# Patient Record
Sex: FEMALE | Race: WHITE | NOT HISPANIC OR LATINO | Employment: OTHER | URBAN - METROPOLITAN AREA
[De-identification: names, ages, dates, MRNs, and addresses within clinical notes are randomized per-mention and may not be internally consistent; named-entity substitution may affect disease eponyms.]

---

## 2017-05-12 DIAGNOSIS — Z43.3 ATTENTION TO COLOSTOMY: Primary | ICD-10-CM

## 2017-05-16 ENCOUNTER — TELEPHONE (OUTPATIENT)
Dept: INTERNAL MEDICINE | Facility: CLINIC | Age: 72
End: 2017-05-16

## 2017-05-16 NOTE — TELEPHONE ENCOUNTER
----- Message from Darcie Escudero sent at 5/15/2017  2:09 PM CDT -----  Contact: Regan with [employer] [facility]   Refill- Need medical necessity form    Ostomy supplies Product # [id number], silvio pouches #[id number], 3 refills 90 day supply     Please send to [employer], fax [phone], phone [phone] ext [phone]    Thanks!

## 2017-05-16 NOTE — TELEPHONE ENCOUNTER
Spoke w [facility]; informed that Dr. Walker is not the prescribing doctor and will have to contact colon and rectal.

## 2017-05-22 ENCOUNTER — TELEPHONE (OUTPATIENT)
Dept: ADMINISTRATIVE | Facility: HOSPITAL | Age: 72
End: 2017-05-22

## 2017-05-22 NOTE — TELEPHONE ENCOUNTER
Per email from Vidal alfonso/MALI. Can you please call patient to schedule appt w/Dr. Walker?  ------------------------------------------------------------------------  Good Morning ,  Radha Sotelo MRN [id number]  Orders were received at [facility] for colostomy supplies for this patient , clinical notes are needed to support this request. There are no office visits on file w/ PCP since 7/28/2015 and no office visits on file w/ [facility] .   Please schedule the patient for office visit and fax the office notes in to support the request for colostomy supplies.  Thanks ,  Vidal Palafox, RN   [facility] RN Navigator   [facility]

## 2017-06-28 DIAGNOSIS — Z12.11 COLON CANCER SCREENING: ICD-10-CM

## 2017-08-03 RX ORDER — BUDESONIDE AND FORMOTEROL FUMARATE DIHYDRATE 80; 4.5 UG/1; UG/1
AEROSOL RESPIRATORY (INHALATION)
Qty: 10.2 INHALER | Refills: 5 | Status: SHIPPED | OUTPATIENT
Start: 2017-08-03 | End: 2018-02-20 | Stop reason: SDUPTHER

## 2018-02-20 RX ORDER — BUDESONIDE AND FORMOTEROL FUMARATE DIHYDRATE 80; 4.5 UG/1; UG/1
AEROSOL RESPIRATORY (INHALATION)
Qty: 10.2 G | Refills: 5 | Status: SHIPPED | OUTPATIENT
Start: 2018-02-20 | End: 2019-01-16 | Stop reason: SDUPTHER

## 2019-01-17 RX ORDER — BUDESONIDE AND FORMOTEROL FUMARATE DIHYDRATE 80; 4.5 UG/1; UG/1
AEROSOL RESPIRATORY (INHALATION)
Qty: 10.2 G | Refills: 0 | Status: SHIPPED | OUTPATIENT
Start: 2019-01-17 | End: 2019-03-14 | Stop reason: SDUPTHER

## 2019-03-14 RX ORDER — BUDESONIDE AND FORMOTEROL FUMARATE DIHYDRATE 80; 4.5 UG/1; UG/1
AEROSOL RESPIRATORY (INHALATION)
Qty: 10.2 G | Refills: 0 | Status: SHIPPED | OUTPATIENT
Start: 2019-03-14 | End: 2019-04-13 | Stop reason: SDUPTHER

## 2019-04-15 RX ORDER — BUDESONIDE AND FORMOTEROL FUMARATE DIHYDRATE 80; 4.5 UG/1; UG/1
AEROSOL RESPIRATORY (INHALATION)
Qty: 10.2 G | Refills: 3 | Status: SHIPPED | OUTPATIENT
Start: 2019-04-15 | End: 2019-09-23 | Stop reason: SDUPTHER

## 2019-09-23 RX ORDER — BUDESONIDE AND FORMOTEROL FUMARATE DIHYDRATE 80; 4.5 UG/1; UG/1
AEROSOL RESPIRATORY (INHALATION)
Qty: 10.2 G | Refills: 0 | Status: SHIPPED | OUTPATIENT
Start: 2019-09-23 | End: 2019-10-18 | Stop reason: SDUPTHER

## 2019-10-20 RX ORDER — BUDESONIDE AND FORMOTEROL FUMARATE DIHYDRATE 80; 4.5 UG/1; UG/1
AEROSOL RESPIRATORY (INHALATION)
Qty: 10.2 G | Refills: 6 | Status: SHIPPED | OUTPATIENT
Start: 2019-10-20 | End: 2020-07-28

## 2020-03-01 ENCOUNTER — HOSPITAL ENCOUNTER (INPATIENT)
Facility: HOSPITAL | Age: 75
LOS: 5 days | Discharge: NURSING, REHAB OR LONG-TERM CARE | DRG: 189 | End: 2020-03-06
Attending: EMERGENCY MEDICINE | Admitting: INTERNAL MEDICINE
Payer: MEDICARE

## 2020-03-01 DIAGNOSIS — R06.02 SOB (SHORTNESS OF BREATH): Primary | ICD-10-CM

## 2020-03-01 DIAGNOSIS — J96.22 ACUTE ON CHRONIC RESPIRATORY FAILURE WITH HYPOXIA AND HYPERCAPNIA: ICD-10-CM

## 2020-03-01 DIAGNOSIS — J18.9 ATYPICAL PNEUMONIA: ICD-10-CM

## 2020-03-01 DIAGNOSIS — R07.9 CHEST PAIN: ICD-10-CM

## 2020-03-01 DIAGNOSIS — R07.89 CHEST DISCOMFORT: ICD-10-CM

## 2020-03-01 DIAGNOSIS — M79.89 LEG SWELLING: ICD-10-CM

## 2020-03-01 DIAGNOSIS — J44.1 COPD EXACERBATION: ICD-10-CM

## 2020-03-01 DIAGNOSIS — J96.21 ACUTE ON CHRONIC RESPIRATORY FAILURE WITH HYPOXIA AND HYPERCAPNIA: ICD-10-CM

## 2020-03-01 LAB
ALBUMIN SERPL BCP-MCNC: 2.8 G/DL (ref 3.5–5.2)
ALLENS TEST: ABNORMAL
ALP SERPL-CCNC: 175 U/L (ref 55–135)
ALT SERPL W/O P-5'-P-CCNC: 13 U/L (ref 10–44)
ANION GAP SERPL CALC-SCNC: 17 MMOL/L (ref 8–16)
AST SERPL-CCNC: 17 U/L (ref 10–40)
BASOPHILS # BLD AUTO: 0.1 K/UL (ref 0–0.2)
BASOPHILS NFR BLD: 0.6 % (ref 0–1.9)
BILIRUB SERPL-MCNC: 0.3 MG/DL (ref 0.1–1)
BNP SERPL-MCNC: 482 PG/ML (ref 0–99)
BUN SERPL-MCNC: 17 MG/DL (ref 8–23)
CALCIUM SERPL-MCNC: 9.5 MG/DL (ref 8.7–10.5)
CHLORIDE SERPL-SCNC: 104 MMOL/L (ref 95–110)
CO2 SERPL-SCNC: 15 MMOL/L (ref 23–29)
CREAT SERPL-MCNC: 0.9 MG/DL (ref 0.5–1.4)
CTP QC/QA: YES
D DIMER PPP IA.FEU-MCNC: 4.64 MG/L FEU
DELSYS: ABNORMAL
DIFFERENTIAL METHOD: ABNORMAL
EOSINOPHIL # BLD AUTO: 0 K/UL (ref 0–0.5)
EOSINOPHIL NFR BLD: 0 % (ref 0–8)
EP: 5
EP: 5
ERYTHROCYTE [DISTWIDTH] IN BLOOD BY AUTOMATED COUNT: 13.3 % (ref 11.5–14.5)
ERYTHROCYTE [SEDIMENTATION RATE] IN BLOOD BY WESTERGREN METHOD: 18 MM/H
EST. GFR  (AFRICAN AMERICAN): >60 ML/MIN/1.73 M^2
EST. GFR  (NON AFRICAN AMERICAN): >60 ML/MIN/1.73 M^2
FIO2: 40
FLOW: 3
GLUCOSE SERPL-MCNC: 94 MG/DL (ref 70–110)
HCO3 UR-SCNC: 21.4 MMOL/L (ref 24–28)
HCO3 UR-SCNC: 23.6 MMOL/L (ref 24–28)
HCO3 UR-SCNC: 25.9 MMOL/L (ref 24–28)
HCT VFR BLD AUTO: 41.2 % (ref 37–48.5)
HGB BLD-MCNC: 12.7 G/DL (ref 12–16)
IMM GRANULOCYTES # BLD AUTO: 0.14 K/UL (ref 0–0.04)
IMM GRANULOCYTES NFR BLD AUTO: 0.8 % (ref 0–0.5)
IP: 10
IP: 15
LACTATE SERPL-SCNC: 0.9 MMOL/L (ref 0.5–2.2)
LYMPHOCYTES # BLD AUTO: 0.4 K/UL (ref 1–4.8)
LYMPHOCYTES NFR BLD: 2.4 % (ref 18–48)
MCH RBC QN AUTO: 30.5 PG (ref 27–31)
MCHC RBC AUTO-ENTMCNC: 30.8 G/DL (ref 32–36)
MCV RBC AUTO: 99 FL (ref 82–98)
MIN VOL: 7.9
MODE: ABNORMAL
MONOCYTES # BLD AUTO: 0.7 K/UL (ref 0.3–1)
MONOCYTES NFR BLD: 3.9 % (ref 4–15)
NEUTROPHILS # BLD AUTO: 16.1 K/UL (ref 1.8–7.7)
NEUTROPHILS NFR BLD: 92.3 % (ref 38–73)
NRBC BLD-RTO: 0 /100 WBC
PCO2 BLDA: 70.6 MMHG (ref 35–45)
PCO2 BLDA: 80.3 MMHG (ref 35–45)
PCO2 BLDA: 83.6 MMHG (ref 35–45)
PH SMN: 7.08 [PH] (ref 7.35–7.45)
PH SMN: 7.09 [PH] (ref 7.35–7.45)
PH SMN: 7.1 [PH] (ref 7.35–7.45)
PLATELET # BLD AUTO: 251 K/UL (ref 150–350)
PLATELET BLD QL SMEAR: ABNORMAL
PMV BLD AUTO: 9.5 FL (ref 9.2–12.9)
PO2 BLDA: 28 MMHG (ref 40–60)
PO2 BLDA: 42 MMHG (ref 40–60)
PO2 BLDA: 48 MMHG (ref 40–60)
POC BE: -4 MMOL/L
POC BE: -6 MMOL/L
POC BE: -8 MMOL/L
POC MOLECULAR INFLUENZA A AGN: NEGATIVE
POC MOLECULAR INFLUENZA B AGN: NEGATIVE
POC SATURATED O2: 32 % (ref 95–100)
POC SATURATED O2: 58 % (ref 95–100)
POC SATURATED O2: 66 % (ref 95–100)
POC TCO2: 24 MMOL/L (ref 24–29)
POC TCO2: 26 MMOL/L (ref 24–29)
POC TCO2: 28 MMOL/L (ref 24–29)
POTASSIUM SERPL-SCNC: 3.5 MMOL/L (ref 3.5–5.1)
PROT SERPL-MCNC: 7.6 G/DL (ref 6–8.4)
RBC # BLD AUTO: 4.16 M/UL (ref 4–5.4)
SAMPLE: ABNORMAL
SITE: ABNORMAL
SODIUM SERPL-SCNC: 136 MMOL/L (ref 136–145)
SP02: 95
SP02: 99
SPONT RATE: 20
TOXIC GRANULES BLD QL SMEAR: PRESENT
TROPONIN I SERPL DL<=0.01 NG/ML-MCNC: 0.02 NG/ML (ref 0–0.03)
TROPONIN I SERPL DL<=0.01 NG/ML-MCNC: 0.03 NG/ML (ref 0–0.03)
WBC # BLD AUTO: 17.44 K/UL (ref 3.9–12.7)

## 2020-03-01 PROCEDURE — 83880 ASSAY OF NATRIURETIC PEPTIDE: CPT

## 2020-03-01 PROCEDURE — 86703 HIV-1/HIV-2 1 RESULT ANTBDY: CPT

## 2020-03-01 PROCEDURE — 63600175 PHARM REV CODE 636 W HCPCS: Performed by: EMERGENCY MEDICINE

## 2020-03-01 PROCEDURE — 83605 ASSAY OF LACTIC ACID: CPT

## 2020-03-01 PROCEDURE — 94660 CPAP INITIATION&MGMT: CPT

## 2020-03-01 PROCEDURE — 85379 FIBRIN DEGRADATION QUANT: CPT

## 2020-03-01 PROCEDURE — 84484 ASSAY OF TROPONIN QUANT: CPT | Mod: 91

## 2020-03-01 PROCEDURE — 84484 ASSAY OF TROPONIN QUANT: CPT

## 2020-03-01 PROCEDURE — 87633 RESP VIRUS 12-25 TARGETS: CPT

## 2020-03-01 PROCEDURE — 99900035 HC TECH TIME PER 15 MIN (STAT)

## 2020-03-01 PROCEDURE — 93010 EKG 12-LEAD: ICD-10-PCS | Mod: 76,,, | Performed by: INTERNAL MEDICINE

## 2020-03-01 PROCEDURE — 63600175 PHARM REV CODE 636 W HCPCS: Performed by: STUDENT IN AN ORGANIZED HEALTH CARE EDUCATION/TRAINING PROGRAM

## 2020-03-01 PROCEDURE — 96374 THER/PROPH/DIAG INJ IV PUSH: CPT | Mod: 59

## 2020-03-01 PROCEDURE — 82803 BLOOD GASES ANY COMBINATION: CPT

## 2020-03-01 PROCEDURE — 25000003 PHARM REV CODE 250: Performed by: EMERGENCY MEDICINE

## 2020-03-01 PROCEDURE — 93010 ELECTROCARDIOGRAM REPORT: CPT | Mod: 76,,, | Performed by: INTERNAL MEDICINE

## 2020-03-01 PROCEDURE — 93005 ELECTROCARDIOGRAM TRACING: CPT

## 2020-03-01 PROCEDURE — 96375 TX/PRO/DX INJ NEW DRUG ADDON: CPT

## 2020-03-01 PROCEDURE — 12000002 HC ACUTE/MED SURGE SEMI-PRIVATE ROOM

## 2020-03-01 PROCEDURE — 94640 AIRWAY INHALATION TREATMENT: CPT

## 2020-03-01 PROCEDURE — 25000242 PHARM REV CODE 250 ALT 637 W/ HCPCS: Performed by: EMERGENCY MEDICINE

## 2020-03-01 PROCEDURE — 80053 COMPREHEN METABOLIC PANEL: CPT

## 2020-03-01 PROCEDURE — 84145 PROCALCITONIN (PCT): CPT

## 2020-03-01 PROCEDURE — 25500020 PHARM REV CODE 255: Performed by: INTERNAL MEDICINE

## 2020-03-01 PROCEDURE — 99291 PR CRITICAL CARE, E/M 30-74 MINUTES: ICD-10-PCS | Mod: ,,, | Performed by: EMERGENCY MEDICINE

## 2020-03-01 PROCEDURE — 27000190 HC CPAP FULL FACE MASK W/VALVE

## 2020-03-01 PROCEDURE — 99291 CRITICAL CARE FIRST HOUR: CPT | Mod: 25

## 2020-03-01 PROCEDURE — 99291 CRITICAL CARE FIRST HOUR: CPT | Mod: ,,, | Performed by: EMERGENCY MEDICINE

## 2020-03-01 PROCEDURE — 87040 BLOOD CULTURE FOR BACTERIA: CPT | Mod: 59

## 2020-03-01 PROCEDURE — 87502 INFLUENZA DNA AMP PROBE: CPT

## 2020-03-01 PROCEDURE — 25000003 PHARM REV CODE 250: Performed by: STUDENT IN AN ORGANIZED HEALTH CARE EDUCATION/TRAINING PROGRAM

## 2020-03-01 PROCEDURE — 93010 ELECTROCARDIOGRAM REPORT: CPT | Mod: ,,, | Performed by: INTERNAL MEDICINE

## 2020-03-01 PROCEDURE — 96365 THER/PROPH/DIAG IV INF INIT: CPT

## 2020-03-01 PROCEDURE — 85025 COMPLETE CBC W/AUTO DIFF WBC: CPT

## 2020-03-01 RX ORDER — HEPARIN SODIUM 5000 [USP'U]/ML
5000 INJECTION, SOLUTION INTRAVENOUS; SUBCUTANEOUS EVERY 8 HOURS
Status: DISCONTINUED | OUTPATIENT
Start: 2020-03-01 | End: 2020-03-05

## 2020-03-01 RX ORDER — ALBUTEROL SULFATE 2.5 MG/.5ML
2.5 SOLUTION RESPIRATORY (INHALATION)
Status: COMPLETED | OUTPATIENT
Start: 2020-03-01 | End: 2020-03-01

## 2020-03-01 RX ORDER — FAMOTIDINE 20 MG/1
20 TABLET, FILM COATED ORAL 2 TIMES DAILY
Status: DISCONTINUED | OUTPATIENT
Start: 2020-03-01 | End: 2020-03-02

## 2020-03-01 RX ORDER — SODIUM CHLORIDE 0.9 % (FLUSH) 0.9 %
10 SYRINGE (ML) INJECTION
Status: DISCONTINUED | OUTPATIENT
Start: 2020-03-01 | End: 2020-03-06 | Stop reason: HOSPADM

## 2020-03-01 RX ORDER — METHYLPREDNISOLONE SOD SUCC 125 MG
125 VIAL (EA) INJECTION
Status: COMPLETED | OUTPATIENT
Start: 2020-03-01 | End: 2020-03-01

## 2020-03-01 RX ORDER — IPRATROPIUM BROMIDE AND ALBUTEROL SULFATE 2.5; .5 MG/3ML; MG/3ML
3 SOLUTION RESPIRATORY (INHALATION) EVERY 4 HOURS
Status: DISCONTINUED | OUTPATIENT
Start: 2020-03-02 | End: 2020-03-06 | Stop reason: HOSPADM

## 2020-03-01 RX ORDER — ASPIRIN 325 MG
325 TABLET ORAL
Status: COMPLETED | OUTPATIENT
Start: 2020-03-01 | End: 2020-03-01

## 2020-03-01 RX ORDER — FUROSEMIDE 10 MG/ML
40 INJECTION INTRAMUSCULAR; INTRAVENOUS
Status: COMPLETED | OUTPATIENT
Start: 2020-03-01 | End: 2020-03-01

## 2020-03-01 RX ORDER — CEFTRIAXONE 1 G/1
1 INJECTION, POWDER, FOR SOLUTION INTRAMUSCULAR; INTRAVENOUS
Status: COMPLETED | OUTPATIENT
Start: 2020-03-01 | End: 2020-03-01

## 2020-03-01 RX ORDER — CEFTRIAXONE 1 G/1
1 INJECTION, POWDER, FOR SOLUTION INTRAMUSCULAR; INTRAVENOUS
Status: DISCONTINUED | OUTPATIENT
Start: 2020-03-02 | End: 2020-03-02

## 2020-03-01 RX ADMIN — FUROSEMIDE 40 MG: 10 INJECTION, SOLUTION INTRAMUSCULAR; INTRAVENOUS at 07:03

## 2020-03-01 RX ADMIN — AZITHROMYCIN MONOHYDRATE 500 MG: 500 INJECTION, POWDER, LYOPHILIZED, FOR SOLUTION INTRAVENOUS at 05:03

## 2020-03-01 RX ADMIN — METHYLPREDNISOLONE SODIUM SUCCINATE 125 MG: 125 INJECTION, POWDER, FOR SOLUTION INTRAMUSCULAR; INTRAVENOUS at 05:03

## 2020-03-01 RX ADMIN — FAMOTIDINE 20 MG: 20 TABLET ORAL at 11:03

## 2020-03-01 RX ADMIN — ALBUTEROL SULFATE 2.5 MG: 2.5 SOLUTION RESPIRATORY (INHALATION) at 04:03

## 2020-03-01 RX ADMIN — METHYLPREDNISOLONE SODIUM SUCCINATE 80 MG: 40 INJECTION, POWDER, FOR SOLUTION INTRAMUSCULAR; INTRAVENOUS at 11:03

## 2020-03-01 RX ADMIN — CEFTRIAXONE SODIUM 1 G: 1 INJECTION, POWDER, FOR SOLUTION INTRAMUSCULAR; INTRAVENOUS at 05:03

## 2020-03-01 RX ADMIN — ASPIRIN 325 MG ORAL TABLET 325 MG: 325 PILL ORAL at 05:03

## 2020-03-01 RX ADMIN — HEPARIN SODIUM 5000 UNITS: 5000 INJECTION, SOLUTION INTRAVENOUS; SUBCUTANEOUS at 11:03

## 2020-03-01 RX ADMIN — IOHEXOL 75 ML: 350 INJECTION, SOLUTION INTRAVENOUS at 10:03

## 2020-03-01 NOTE — ED PROVIDER NOTES
ED PROGRESS NOTE    Care transferred to me by DENY Bartholomew with plan to f/u labs, CXR, and reassess.    Labs Reviewed   CBC W/ AUTO DIFFERENTIAL - Abnormal; Notable for the following components:       Result Value    WBC 17.44 (*)     Mean Corpuscular Volume 99 (*)     Mean Corpuscular Hemoglobin Conc 30.8 (*)     Immature Granulocytes 0.8 (*)     Gran # (ANC) 16.1 (*)     Immature Grans (Abs) 0.14 (*)     Lymph # 0.4 (*)     Gran% 92.3 (*)     Lymph% 2.4 (*)     Mono% 3.9 (*)     All other components within normal limits   COMPREHENSIVE METABOLIC PANEL - Abnormal; Notable for the following components:    CO2 15 (*)     Albumin 2.8 (*)     Alkaline Phosphatase 175 (*)     Anion Gap 17 (*)     All other components within normal limits   TROPONIN I - Abnormal; Notable for the following components:    Troponin I 0.034 (*)     All other components within normal limits   B-TYPE NATRIURETIC PEPTIDE - Abnormal; Notable for the following components:     (*)     All other components within normal limits   D DIMER, QUANTITATIVE - Abnormal; Notable for the following components:    D-Dimer 4.64 (*)     All other components within normal limits    Narrative:     add on ddimr [id number] per dr. bartholomew 03/01/2020  18:33    ISTAT PROCEDURE - Abnormal; Notable for the following components:    POC PH 7.089 (*)     POC PCO2 70.6 (*)     POC HCO3 21.4 (*)     POC SATURATED O2 66 (*)     All other components within normal limits   CULTURE, BLOOD   CULTURE, BLOOD   LACTIC ACID, PLASMA   D DIMER, QUANTITATIVE   TROPONIN I   POCT INFLUENZA A/B MOLECULAR         X-Ray Chest AP Portable   Final Result      Bilateral diffuse nonspecific interstitial coarsening which could represent pulmonary edema versus interstitial pneumonia.  No focal consolidation.         Electronically signed by: Zain Browning MD   Date:    03/01/2020   Time:    17:01        Reassessed pt -- using accessory muscles, lungs with slight rales at bases, hypoxic to 85%  on NC 3L. Ordered VBG, as well as d-dimer and CTPA after noting mild RLE edema. Denies CP. VBG returned 7.08  70  48. Trial of BiPAP for one hour. Pt taking mask off intermittently; repeat VBG worse at 7.07  80  28. Discussed code status with pt who reports she is full code and would like to be intubated if necessary. Discussed with ICU for admission. CTPA pending at time of admission to ICU.     Demetra Almodovar MD  03/01/20 2467

## 2020-03-01 NOTE — ED PROVIDER NOTES
Encounter Date: 3/1/2020       History     Chief Complaint   Patient presents with    Asthma     history of COPD; duo nebs given by EMS. Pt is 98% on RA with respirations of 24.      74-year-old female presents with 3 days of shortness of breath with associated wheezing and cough.  She has a history of COPD and significant smoking history.  She no longer smokes cigarettes but continues to vape.  She has had 3 days of chest discomfort.  She also has had fevers and a productive cough.  She denies any hemoptysis.  She states she has a history of heart disease.  She has had 4 heart attacks.  She denies using oxygen at home.  She denies sick contacts.  She denies travel outside the [place]        Review of patient's allergies indicates:  No Known Allergies  Past Medical History:   Diagnosis Date    Anemia     Anxiety     COPD (chronic obstructive pulmonary disease)     COPD (chronic obstructive pulmonary disease) with emphysema     Coronary artery disease     Depression     Diverticulitis     Hyperlipidemia     Hypertension     PVD (peripheral vascular disease)     PVD (peripheral vascular disease)     S/P colostomy     Substance abuse     hx heavy etoh use     Tobacco abuse      Past Surgical History:   Procedure Laterality Date    COLOSTOMY      ECTOPIC PREGNANCY SURGERY      right forefoot amputation      right toe amputation      x2 toes     History reviewed. No pertinent family history.  Social History     Tobacco Use    Smoking status: Former Smoker     Packs/day: 0.50     Years: 50.00     Pack years: 25.00     Types: Cigarettes     Last attempt to quit: 2013     Years since quittin.6    Smokeless tobacco: Never Used   Substance Use Topics    Alcohol use: No    Drug use: No     Review of Systems   Constitutional: Positive for fever.   HENT: Positive for congestion. Negative for sore throat.    Respiratory: Positive for cough, shortness of breath and wheezing.    Cardiovascular: Positive  for chest pain. Negative for palpitations and leg swelling.   Gastrointestinal: Negative for abdominal pain.   Genitourinary: Negative for dysuria.   Musculoskeletal: Negative for arthralgias.   Neurological: Negative for headaches.   Hematological: Negative for adenopathy.   Psychiatric/Behavioral: Negative for agitation.       Physical Exam     Initial Vitals   BP Pulse Resp Temp SpO2   03/01/20 1442 03/01/20 1442 03/01/20 1442 03/01/20 1451 03/01/20 1442   (!) 142/78 (!) 120 (!) 24 98.3 °F (36.8 °C) 98 %      MAP       --                Physical Exam    Constitutional:   Patient appears chronically ill and with moderate respiratory distress   HENT:   Head: Atraumatic.   Eyes: Pupils are equal, round, and reactive to light.   Neck: Normal range of motion. Neck supple. No JVD present.   Cardiovascular:   Tachycardic   Pulmonary/Chest: She has rales.   Abdominal: Soft. Bowel sounds are normal. She exhibits no distension.   Musculoskeletal: She exhibits edema.   Neurological: She is alert. She has normal strength. No sensory deficit. GCS score is 15. GCS eye subscore is 4. GCS verbal subscore is 5. GCS motor subscore is 6.   Skin: Skin is warm. No rash noted.   Psychiatric: She has a normal mood and affect.         ED Course   Procedures  Labs Reviewed   CBC W/ AUTO DIFFERENTIAL - Abnormal; Notable for the following components:       Result Value    WBC 17.44 (*)     Mean Corpuscular Volume 99 (*)     Mean Corpuscular Hemoglobin Conc 30.8 (*)     All other components within normal limits   CULTURE, BLOOD   CULTURE, BLOOD   COMPREHENSIVE METABOLIC PANEL   TROPONIN I   TROPONIN I   B-TYPE NATRIURETIC PEPTIDE   LACTIC ACID, PLASMA   POCT INFLUENZA A/B MOLECULAR     EKG Readings: (Independently Interpreted)   Sinus tachycardia 121 with nonspecific ST changes       Imaging Results          X-Ray Chest AP Portable (Final result)  Result time 03/01/20 17:01:04    Final result by Zain Browning MD (03/01/20 17:01:04)                  Impression:      Bilateral diffuse nonspecific interstitial coarsening which could represent pulmonary edema versus interstitial pneumonia.  No focal consolidation.      Electronically signed by: Zain Browning MD  Date:    03/01/2020  Time:    17:01             Narrative:    EXAMINATION:  XR CHEST AP PORTABLE    CLINICAL HISTORY:  Chest Pain;    TECHNIQUE:  Single frontal view of the chest was performed.    COMPARISON:  Chest radiograph 01/22/2015    FINDINGS:  Patient is slightly rotated.  Tubing overlies the chest.    Cardiomediastinal silhouette is midline and within normal limits for age allowing for frontal view and patient rotation.  Interval increased bilateral diffuse nonspecific interstitial coarsening.  Bibasilar scattered linear opacities consistent with subsegmental scarring versus atelectasis.  No large consolidation, pleural effusion or pneumothorax definitively seen.  Hilar contours appear unchanged.  No acute osseous process seen.  PA and lateral views can be obtained.                                 Medical Decision Making:   Initial Assessment:   Patient with history of COPD presents with shortness of breath, wheeze, or rhonchi and infectious history.  She also has chest pain.  Will do a cardiac, infectious workup.  Will give nebs and steroids.  Will empiric IV antibiotics to cover pulmonary source.  Clinical Tests:   Lab Tests: Ordered  Radiological Study: Ordered and Reviewed  Medical Tests: Ordered and Reviewed  ED Management:  Patient had some improvement with nebs.  Case signed out to Dr. Nishi ESQUIVEL at 5:00 p.m. with labs and radiology read pending.  We still have a broad differential diagnosis              Attending Attestation:         Attending Critical Care:   Critical Care Times:   Direct Patient Care (initial evaluation, reassessments, and time considering the case)................................................................22 minutes.   Additional History from reviewing old  medical records or taking additional history from the family, EMS, PCP, etc.......................3 minutes.   Ordering, Reviewing, and Interpreting Diagnostic Studies...............................................................................................................3 minutes.   Documentation..................................................................................................................................................................................3 minutes.   Consultation with other Physicians. .................................................................................................................................................3 minutes.   ==============================================================  · Total Critical Care Time - exclusive of procedural time: 34 minutes.  ==============================================================                            Clinical Impression:       ICD-10-CM ICD-9-CM   1. SOB (shortness of breath) R06.02 786.05   2. Chest pain R07.9 786.50                                Javy Landrum MD  03/01/20 1716

## 2020-03-01 NOTE — ED NOTES
Radha Flores, a 74 y.o. female presents to the ED via EMS with CC SOB      Patient identifiers verified verbally with patient and correct for Radha Flores.    LOC/ APPEARANCE: The patient is AAOx4. Pt is speaking appropriately, no slurred speech. Pt changed into hospital gown. Continuous cardiac monitor, cont pulse ox, and auto BP cuff applied to patient. Pt is clean and well groomed. No JVD visible. Pt reports pain level of 0. Pt updated on POC. Bed low and locked with side rails up x2, call bell in pt reach.  SKIN: Skin is warm dry and intact, and color is consistent with ethnicity. Capillary refill <3 seconds. No breakdown or brusing visible. Mucus membranes moist, acyanotic.  RESPIRATORY: Airway is open and patent. Respirations-spontaneous, labored, 22 breaths per min, equal bilaterally on inspiration and expiration. Pt. Taking shallow breaths, wheezes heard on expiration and crackles heard in bilateral bases.   CARDIAC: Patienttachycardic heart rate 112.  No peripheral edema noted, and patient has no c/o chest pain. Peripheral pulses present equal and strong throughout.  ABDOMEN: Soft and non-tender to palpation with no distention noted. Normoactive bowel sounds x4 quadrants. Pt has no complaints of abnormal bowel movements, denies blood in stool. Pt reports normal appetite.   NEUROLOGIC: Eyes open spontaneously and facial expression symmetrical. Pt behavior appropriate to situation, and pt follows commands. Pt reports sensation present in all extremities when touched with a finger, denies any numbness or tingling. PERRLA  MUSCULOSKELETAL: Spontaneous movement noted to all extremities. Hand  equal and leg strength strong +5 bilaterally.   : No complaints of frequency, burning, urgency or blood in the urine. No complaints of incontinence.

## 2020-03-02 PROBLEM — J18.9 ATYPICAL PNEUMONIA: Status: ACTIVE | Noted: 2020-03-02

## 2020-03-02 PROBLEM — R79.89 ELEVATED TROPONIN: Status: ACTIVE | Noted: 2020-03-02

## 2020-03-02 LAB
ADENOVIRUS: NOT DETECTED
ALLENS TEST: ABNORMAL
ANION GAP SERPL CALC-SCNC: 13 MMOL/L (ref 8–16)
ANION GAP SERPL CALC-SCNC: 16 MMOL/L (ref 8–16)
ANISOCYTOSIS BLD QL SMEAR: SLIGHT
ASCENDING AORTA: 2.98 CM
AV INDEX (PROSTH): 1.27
AV MEAN GRADIENT: 2 MMHG
AV PEAK GRADIENT: 3 MMHG
AV VALVE AREA: 4.1 CM2
AV VELOCITY RATIO: 1.15
B-OH-BUTYR BLD STRIP-SCNC: 0.9 MMOL/L (ref 0–0.5)
BASOPHILS # BLD AUTO: 0.05 K/UL (ref 0–0.2)
BASOPHILS NFR BLD: 0.4 % (ref 0–1.9)
BILIRUB UR QL STRIP: NEGATIVE
BORDETELLA PARAPERTUSSIS (IS1001): NOT DETECTED
BORDETELLA PERTUSSIS (PTXP): NOT DETECTED
BSA FOR ECHO PROCEDURE: 1.64 M2
BUN SERPL-MCNC: 24 MG/DL (ref 8–23)
BUN SERPL-MCNC: 32 MG/DL (ref 8–23)
CALCIUM SERPL-MCNC: 9.1 MG/DL (ref 8.7–10.5)
CALCIUM SERPL-MCNC: 9.3 MG/DL (ref 8.7–10.5)
CHLAMYDIA PNEUMONIAE: NOT DETECTED
CHLORIDE SERPL-SCNC: 103 MMOL/L (ref 95–110)
CHLORIDE SERPL-SCNC: 99 MMOL/L (ref 95–110)
CLARITY UR REFRACT.AUTO: CLEAR
CO2 SERPL-SCNC: 21 MMOL/L (ref 23–29)
CO2 SERPL-SCNC: 23 MMOL/L (ref 23–29)
COLOR UR AUTO: ABNORMAL
CORONAVIRUS 229E, COMMON COLD VIRUS: NOT DETECTED
CORONAVIRUS HKU1, COMMON COLD VIRUS: NOT DETECTED
CORONAVIRUS NL63, COMMON COLD VIRUS: NOT DETECTED
CORONAVIRUS OC43, COMMON COLD VIRUS: NOT DETECTED
CREAT SERPL-MCNC: 0.9 MG/DL (ref 0.5–1.4)
CREAT SERPL-MCNC: 1 MG/DL (ref 0.5–1.4)
CV ECHO LV RWT: 0.27 CM
DELSYS: ABNORMAL
DIFFERENTIAL METHOD: ABNORMAL
DOP CALC AO PEAK VEL: 0.92 M/S
DOP CALC AO VTI: 15.61 CM
DOP CALC LVOT AREA: 3.2 CM2
DOP CALC LVOT DIAMETER: 2.03 CM
DOP CALC LVOT PEAK VEL: 1.06 M/S
DOP CALC LVOT STROKE VOLUME: 63.95 CM3
DOP CALCLVOT PEAK VEL VTI: 19.77 CM
ECHO LV POSTERIOR WALL: 0.68 CM (ref 0.6–1.1)
EOSINOPHIL # BLD AUTO: 0 K/UL (ref 0–0.5)
EOSINOPHIL NFR BLD: 0 % (ref 0–8)
EP: 5
EP: 5
ERYTHROCYTE [DISTWIDTH] IN BLOOD BY AUTOMATED COUNT: 13.2 % (ref 11.5–14.5)
EST. GFR  (AFRICAN AMERICAN): >60 ML/MIN/1.73 M^2
EST. GFR  (AFRICAN AMERICAN): >60 ML/MIN/1.73 M^2
EST. GFR  (NON AFRICAN AMERICAN): 56 ML/MIN/1.73 M^2
EST. GFR  (NON AFRICAN AMERICAN): >60 ML/MIN/1.73 M^2
ETHANOL SERPL-MCNC: <10 MG/DL
FIO2: 30
FLOW: 1
FLUBV RNA NPH QL NAA+NON-PROBE: NOT DETECTED
FRACTIONAL SHORTENING: 42 % (ref 28–44)
GLUCOSE SERPL-MCNC: 131 MG/DL (ref 70–110)
GLUCOSE SERPL-MCNC: 144 MG/DL (ref 70–110)
GLUCOSE UR QL STRIP: NEGATIVE
HCO3 UR-SCNC: 19.4 MMOL/L (ref 24–28)
HCO3 UR-SCNC: 28.4 MMOL/L (ref 24–28)
HCO3 UR-SCNC: 32.7 MMOL/L (ref 24–28)
HCT VFR BLD AUTO: 39.6 % (ref 37–48.5)
HGB BLD-MCNC: 12.2 G/DL (ref 12–16)
HGB UR QL STRIP: ABNORMAL
HIV 1+2 AB+HIV1 P24 AG SERPL QL IA: NEGATIVE
HPIV1 RNA NPH QL NAA+NON-PROBE: NOT DETECTED
HPIV2 RNA NPH QL NAA+NON-PROBE: NOT DETECTED
HPIV3 RNA NPH QL NAA+NON-PROBE: NOT DETECTED
HPIV4 RNA NPH QL NAA+NON-PROBE: NOT DETECTED
HUMAN METAPNEUMOVIRUS: NOT DETECTED
HYALINE CASTS UR QL AUTO: 3 /LPF
HYPOCHROMIA BLD QL SMEAR: ABNORMAL
IMM GRANULOCYTES # BLD AUTO: 0.07 K/UL (ref 0–0.04)
IMM GRANULOCYTES NFR BLD AUTO: 0.6 % (ref 0–0.5)
INFLUENZA A (SUBTYPES H1,H1-2009,H3): NOT DETECTED
INTERVENTRICULAR SEPTUM: 0.71 CM (ref 0.6–1.1)
IP: 15
IP: 15
KETONES UR QL STRIP: NEGATIVE
LA MAJOR: 3.83 CM
LA MINOR: 3.89 CM
LA WIDTH: 4 CM
LACTATE SERPL-SCNC: 0.7 MMOL/L (ref 0.5–2.2)
LEFT ATRIUM SIZE: 3.25 CM
LEFT ATRIUM VOLUME INDEX: 26.6 ML/M2
LEFT ATRIUM VOLUME: 42.65 CM3
LEFT INTERNAL DIMENSION IN SYSTOLE: 2.98 CM (ref 2.1–4)
LEFT VENTRICLE DIASTOLIC VOLUME INDEX: 77.49 ML/M2
LEFT VENTRICLE DIASTOLIC VOLUME: 124.29 ML
LEFT VENTRICLE MASS INDEX: 74 G/M2
LEFT VENTRICLE SYSTOLIC VOLUME INDEX: 21.5 ML/M2
LEFT VENTRICLE SYSTOLIC VOLUME: 34.56 ML
LEFT VENTRICULAR INTERNAL DIMENSION IN DIASTOLE: 5.11 CM (ref 3.5–6)
LEFT VENTRICULAR MASS: 118.07 G
LEUKOCYTE ESTERASE UR QL STRIP: NEGATIVE
LYMPHOCYTES # BLD AUTO: 0.6 K/UL (ref 1–4.8)
LYMPHOCYTES NFR BLD: 4.6 % (ref 18–48)
MAGNESIUM SERPL-MCNC: 1.9 MG/DL (ref 1.6–2.6)
MAGNESIUM SERPL-MCNC: 2.2 MG/DL (ref 1.6–2.6)
MCH RBC QN AUTO: 30.2 PG (ref 27–31)
MCHC RBC AUTO-ENTMCNC: 30.8 G/DL (ref 32–36)
MCV RBC AUTO: 98 FL (ref 82–98)
MICROSCOPIC COMMENT: ABNORMAL
MODE: ABNORMAL
MONOCYTES # BLD AUTO: 0.5 K/UL (ref 0.3–1)
MONOCYTES NFR BLD: 4 % (ref 4–15)
MYCOPLASMA PNEUMONIAE: NOT DETECTED
NEUTROPHILS # BLD AUTO: 11.3 K/UL (ref 1.8–7.7)
NEUTROPHILS NFR BLD: 90.4 % (ref 38–73)
NITRITE UR QL STRIP: NEGATIVE
NRBC BLD-RTO: 0 /100 WBC
PCO2 BLDA: 56.4 MMHG (ref 35–45)
PCO2 BLDA: 73.4 MMHG (ref 35–45)
PCO2 BLDA: 91.2 MMHG (ref 35–45)
PH SMN: 7.14 [PH] (ref 7.35–7.45)
PH SMN: 7.16 [PH] (ref 7.35–7.45)
PH SMN: 7.2 [PH] (ref 7.35–7.45)
PH UR STRIP: 5 [PH] (ref 5–8)
PISA TR MAX VEL: 2.64 M/S
PLATELET # BLD AUTO: 291 K/UL (ref 150–350)
PLATELET BLD QL SMEAR: ABNORMAL
PMV BLD AUTO: 9.6 FL (ref 9.2–12.9)
PO2 BLDA: 36 MMHG (ref 40–60)
PO2 BLDA: 43 MMHG (ref 40–60)
PO2 BLDA: 51 MMHG (ref 40–60)
POC BE: -9 MMOL/L
POC BE: 0 MMOL/L
POC BE: 4 MMOL/L
POC SATURATED O2: 51 % (ref 95–100)
POC SATURATED O2: 63 % (ref 95–100)
POC SATURATED O2: 75 % (ref 95–100)
POC TCO2: 21 MMOL/L (ref 24–29)
POC TCO2: 31 MMOL/L (ref 24–29)
POC TCO2: 35 MMOL/L (ref 24–29)
POIKILOCYTOSIS BLD QL SMEAR: SLIGHT
POTASSIUM SERPL-SCNC: 2.7 MMOL/L (ref 3.5–5.1)
POTASSIUM SERPL-SCNC: 4.6 MMOL/L (ref 3.5–5.1)
PROCALCITONIN SERPL IA-MCNC: 0.54 NG/ML
PROT UR QL STRIP: NEGATIVE
RA PRESSURE: 3 MMHG
RBC # BLD AUTO: 4.04 M/UL (ref 4–5.4)
RBC #/AREA URNS AUTO: 2 /HPF (ref 0–4)
RESPIRATORY INFECTION PANEL SOURCE: NORMAL
RSV RNA NPH QL NAA+NON-PROBE: NOT DETECTED
RV+EV RNA NPH QL NAA+NON-PROBE: NOT DETECTED
SAMPLE: ABNORMAL
SINUS: 3 CM
SITE: ABNORMAL
SODIUM SERPL-SCNC: 136 MMOL/L (ref 136–145)
SODIUM SERPL-SCNC: 139 MMOL/L (ref 136–145)
SP GR UR STRIP: 1.01 (ref 1–1.03)
SP02: 90
SP02: 94
SPONT RATE: 18
STJ: 2.83 CM
TDI SEPTAL: 0.06 M/S
TOXIC GRANULES BLD QL SMEAR: PRESENT
TR MAX PG: 28 MMHG
TRICUSPID ANNULAR PLANE SYSTOLIC EXCURSION: 1.99 CM
TROPONIN I SERPL DL<=0.01 NG/ML-MCNC: 0.01 NG/ML (ref 0–0.03)
TROPONIN I SERPL DL<=0.01 NG/ML-MCNC: 0.02 NG/ML (ref 0–0.03)
TV REST PULMONARY ARTERY PRESSURE: 31 MMHG
URN SPEC COLLECT METH UR: ABNORMAL
WBC # BLD AUTO: 12.44 K/UL (ref 3.9–12.7)
WBC #/AREA URNS AUTO: 0 /HPF (ref 0–5)

## 2020-03-02 PROCEDURE — 99291 CRITICAL CARE FIRST HOUR: CPT | Mod: ,,, | Performed by: INTERNAL MEDICINE

## 2020-03-02 PROCEDURE — 87449 NOS EACH ORGANISM AG IA: CPT

## 2020-03-02 PROCEDURE — 99900035 HC TECH TIME PER 15 MIN (STAT)

## 2020-03-02 PROCEDURE — 94640 AIRWAY INHALATION TREATMENT: CPT

## 2020-03-02 PROCEDURE — 83735 ASSAY OF MAGNESIUM: CPT | Mod: 91

## 2020-03-02 PROCEDURE — 51702 INSERT TEMP BLADDER CATH: CPT

## 2020-03-02 PROCEDURE — 51701 INSERT BLADDER CATHETER: CPT

## 2020-03-02 PROCEDURE — 81001 URINALYSIS AUTO W/SCOPE: CPT

## 2020-03-02 PROCEDURE — 83735 ASSAY OF MAGNESIUM: CPT

## 2020-03-02 PROCEDURE — 80320 DRUG SCREEN QUANTALCOHOLS: CPT

## 2020-03-02 PROCEDURE — 93010 ELECTROCARDIOGRAM REPORT: CPT | Mod: ,,, | Performed by: INTERNAL MEDICINE

## 2020-03-02 PROCEDURE — 84484 ASSAY OF TROPONIN QUANT: CPT

## 2020-03-02 PROCEDURE — 82010 KETONE BODYS QUAN: CPT | Mod: 91

## 2020-03-02 PROCEDURE — 25000003 PHARM REV CODE 250: Performed by: INTERNAL MEDICINE

## 2020-03-02 PROCEDURE — 25000242 PHARM REV CODE 250 ALT 637 W/ HCPCS: Performed by: STUDENT IN AN ORGANIZED HEALTH CARE EDUCATION/TRAINING PROGRAM

## 2020-03-02 PROCEDURE — 97110 THERAPEUTIC EXERCISES: CPT

## 2020-03-02 PROCEDURE — 80048 BASIC METABOLIC PNL TOTAL CA: CPT | Mod: 91

## 2020-03-02 PROCEDURE — 82803 BLOOD GASES ANY COMBINATION: CPT

## 2020-03-02 PROCEDURE — 97165 OT EVAL LOW COMPLEX 30 MIN: CPT

## 2020-03-02 PROCEDURE — 20000000 HC ICU ROOM

## 2020-03-02 PROCEDURE — 97162 PT EVAL MOD COMPLEX 30 MIN: CPT

## 2020-03-02 PROCEDURE — 99291 PR CRITICAL CARE, E/M 30-74 MINUTES: ICD-10-PCS | Mod: ,,, | Performed by: INTERNAL MEDICINE

## 2020-03-02 PROCEDURE — 25000003 PHARM REV CODE 250: Performed by: STUDENT IN AN ORGANIZED HEALTH CARE EDUCATION/TRAINING PROGRAM

## 2020-03-02 PROCEDURE — 82010 KETONE BODYS QUAN: CPT

## 2020-03-02 PROCEDURE — 93005 ELECTROCARDIOGRAM TRACING: CPT

## 2020-03-02 PROCEDURE — 63600175 PHARM REV CODE 636 W HCPCS: Performed by: STUDENT IN AN ORGANIZED HEALTH CARE EDUCATION/TRAINING PROGRAM

## 2020-03-02 PROCEDURE — 94660 CPAP INITIATION&MGMT: CPT

## 2020-03-02 PROCEDURE — 80048 BASIC METABOLIC PNL TOTAL CA: CPT

## 2020-03-02 PROCEDURE — 93010 EKG 12-LEAD: ICD-10-PCS | Mod: ,,, | Performed by: INTERNAL MEDICINE

## 2020-03-02 PROCEDURE — S0028 INJECTION, FAMOTIDINE, 20 MG: HCPCS | Performed by: INTERNAL MEDICINE

## 2020-03-02 PROCEDURE — 83605 ASSAY OF LACTIC ACID: CPT

## 2020-03-02 PROCEDURE — 94761 N-INVAS EAR/PLS OXIMETRY MLT: CPT

## 2020-03-02 PROCEDURE — 85025 COMPLETE CBC W/AUTO DIFF WBC: CPT

## 2020-03-02 PROCEDURE — 27000221 HC OXYGEN, UP TO 24 HOURS

## 2020-03-02 RX ORDER — ONDANSETRON 2 MG/ML
4 INJECTION INTRAMUSCULAR; INTRAVENOUS EVERY 6 HOURS PRN
Status: DISCONTINUED | OUTPATIENT
Start: 2020-03-02 | End: 2020-03-06 | Stop reason: HOSPADM

## 2020-03-02 RX ORDER — POTASSIUM CHLORIDE 7.45 MG/ML
10 INJECTION INTRAVENOUS
Status: DISCONTINUED | OUTPATIENT
Start: 2020-03-02 | End: 2020-03-02

## 2020-03-02 RX ORDER — CEFTRIAXONE 1 G/1
1 INJECTION, POWDER, FOR SOLUTION INTRAMUSCULAR; INTRAVENOUS ONCE
Status: COMPLETED | OUTPATIENT
Start: 2020-03-02 | End: 2020-03-02

## 2020-03-02 RX ORDER — FAMOTIDINE 10 MG/ML
20 INJECTION INTRAVENOUS DAILY
Status: DISCONTINUED | OUTPATIENT
Start: 2020-03-02 | End: 2020-03-03

## 2020-03-02 RX ORDER — GUAIFENESIN 600 MG/1
600 TABLET, EXTENDED RELEASE ORAL 2 TIMES DAILY
Status: DISCONTINUED | OUTPATIENT
Start: 2020-03-02 | End: 2020-03-02

## 2020-03-02 RX ORDER — FAMOTIDINE 20 MG/1
20 TABLET, FILM COATED ORAL DAILY
Status: DISCONTINUED | OUTPATIENT
Start: 2020-03-03 | End: 2020-03-02

## 2020-03-02 RX ORDER — POTASSIUM CHLORIDE 20 MEQ/1
40 TABLET, EXTENDED RELEASE ORAL
Status: COMPLETED | OUTPATIENT
Start: 2020-03-02 | End: 2020-03-02

## 2020-03-02 RX ORDER — POTASSIUM CHLORIDE 7.45 MG/ML
30 INJECTION INTRAVENOUS ONCE
Status: COMPLETED | OUTPATIENT
Start: 2020-03-02 | End: 2020-03-02

## 2020-03-02 RX ORDER — ASPIRIN 325 MG
325 TABLET ORAL ONCE
Status: COMPLETED | OUTPATIENT
Start: 2020-03-02 | End: 2020-03-02

## 2020-03-02 RX ORDER — GUAIFENESIN 600 MG/1
600 TABLET, EXTENDED RELEASE ORAL 2 TIMES DAILY
Status: DISCONTINUED | OUTPATIENT
Start: 2020-03-02 | End: 2020-03-06 | Stop reason: HOSPADM

## 2020-03-02 RX ADMIN — IPRATROPIUM BROMIDE AND ALBUTEROL SULFATE 3 ML: .5; 3 SOLUTION RESPIRATORY (INHALATION) at 12:03

## 2020-03-02 RX ADMIN — IPRATROPIUM BROMIDE AND ALBUTEROL SULFATE 3 ML: .5; 3 SOLUTION RESPIRATORY (INHALATION) at 08:03

## 2020-03-02 RX ADMIN — METHYLPREDNISOLONE SODIUM SUCCINATE 80 MG: 40 INJECTION, POWDER, FOR SOLUTION INTRAMUSCULAR; INTRAVENOUS at 08:03

## 2020-03-02 RX ADMIN — HEPARIN SODIUM 5000 UNITS: 5000 INJECTION, SOLUTION INTRAVENOUS; SUBCUTANEOUS at 01:03

## 2020-03-02 RX ADMIN — IPRATROPIUM BROMIDE AND ALBUTEROL SULFATE 3 ML: .5; 3 SOLUTION RESPIRATORY (INHALATION) at 04:03

## 2020-03-02 RX ADMIN — AZITHROMYCIN MONOHYDRATE 500 MG: 500 INJECTION, POWDER, LYOPHILIZED, FOR SOLUTION INTRAVENOUS at 05:03

## 2020-03-02 RX ADMIN — IPRATROPIUM BROMIDE AND ALBUTEROL SULFATE 3 ML: .5; 3 SOLUTION RESPIRATORY (INHALATION) at 11:03

## 2020-03-02 RX ADMIN — HEPARIN SODIUM 5000 UNITS: 5000 INJECTION, SOLUTION INTRAVENOUS; SUBCUTANEOUS at 10:03

## 2020-03-02 RX ADMIN — ONDANSETRON 4 MG: 2 INJECTION INTRAMUSCULAR; INTRAVENOUS at 04:03

## 2020-03-02 RX ADMIN — METHYLPREDNISOLONE SODIUM SUCCINATE 40 MG: 40 INJECTION, POWDER, FOR SOLUTION INTRAMUSCULAR; INTRAVENOUS at 01:03

## 2020-03-02 RX ADMIN — HEPARIN SODIUM 5000 UNITS: 5000 INJECTION, SOLUTION INTRAVENOUS; SUBCUTANEOUS at 05:03

## 2020-03-02 RX ADMIN — CEFTRIAXONE SODIUM 1 G: 1 INJECTION, POWDER, FOR SOLUTION INTRAMUSCULAR; INTRAVENOUS at 05:03

## 2020-03-02 RX ADMIN — POTASSIUM CHLORIDE 30 MEQ: 7.46 INJECTION, SOLUTION INTRAVENOUS at 06:03

## 2020-03-02 RX ADMIN — ASPIRIN 325 MG ORAL TABLET 325 MG: 325 PILL ORAL at 09:03

## 2020-03-02 RX ADMIN — POTASSIUM CHLORIDE 40 MEQ: 1500 TABLET, EXTENDED RELEASE ORAL at 05:03

## 2020-03-02 RX ADMIN — GUAIFENESIN 600 MG: 600 TABLET, EXTENDED RELEASE ORAL at 05:03

## 2020-03-02 RX ADMIN — IPRATROPIUM BROMIDE AND ALBUTEROL SULFATE 3 ML: .5; 3 SOLUTION RESPIRATORY (INHALATION) at 03:03

## 2020-03-02 RX ADMIN — FAMOTIDINE 20 MG: 10 INJECTION INTRAVENOUS at 01:03

## 2020-03-02 RX ADMIN — CEFTRIAXONE SODIUM 1 G: 1 INJECTION, POWDER, FOR SOLUTION INTRAMUSCULAR; INTRAVENOUS at 01:03

## 2020-03-02 RX ADMIN — METHYLPREDNISOLONE SODIUM SUCCINATE 40 MG: 40 INJECTION, POWDER, FOR SOLUTION INTRAMUSCULAR; INTRAVENOUS at 11:03

## 2020-03-02 RX ADMIN — POTASSIUM CHLORIDE 10 MEQ: 7.46 INJECTION, SOLUTION INTRAVENOUS at 03:03

## 2020-03-02 NOTE — ASSESSMENT & PLAN NOTE
Likely 2/2 Atypical PNA. Initial VBG consistent with hypercapnic, hypoxic respiratory failure. CXR with interstitial markings, however less likely to be pulmonary edema as patient without other clinical signs of overload.  - CT Chest pending in setting of unilateral leg swelling, elevated Ddimer  - Continue bipap, currently worsening, if hypercarbia persists, plan to intubate  - See atypical PNA treatment plan below

## 2020-03-02 NOTE — HPI
74 year old F with PMH of COPD, CAD, PAD who initially presented to the ED today for SOB for the past 3 days. States that she has had associated cough and subjective fevers, all of which have progressively worsened. Upon presentation to the ED, she was tachypneic, using accessory muscles to breath with wheezing on examination. Patient was placed on bipap for breathing support. Breathing treatment was given. Labs remarkable for WBC of 17.44. Initial VBG of 7.1/70/48/21.4/-8.  BNP of 482, Troponin of 0.034. CXR with nonspecific interstitial markings concerning for possible atypical PNA vs pulmonary edema. She was given Lasix, Azithromycin and Rocephin in ED. Patient remained tachycardic throughout ED course with signs of left leg edema raising concern for possible PE. Ddimer positive. CT Chest pending at time of consultation. Repeat VBG unchanged while in ED.     Critical Care Medicine consulted for management of unstable respiratory status.

## 2020-03-02 NOTE — ED NOTES
Pt care assumed. Report received by GUSTAVO Vidal. Pt lying in stretcher in low and locked position and side rails raised x2. Call light, pt's belongings, and bedside table within pt's reach. Pt on continuous cardiac monitoring, pulse oximetry, and BP cycling every 30 minutes. Pt in NAD and verbalized no needs at this time.

## 2020-03-02 NOTE — PLAN OF CARE
Problem: Physical Therapy Goal  Goal: Physical Therapy Goal  Description  Goals to be met by: 3/26/2020     Patient will increase functional independence with mobility by performin. Supine to sit with MInimal Assistance  2. Sit to stand transfer with Minimal Assistance  3. Bed to chair transfer with Moderate Assistance using LRAD  4. Sitting at edge of bed x8 minutes with Stand-by Assistance     Outcome: Ongoing, Progressing     Pt evaluated and appropriate goals established.     Brittney Griffith, PT, DPT  3/2/2020  [phone]

## 2020-03-02 NOTE — ASSESSMENT & PLAN NOTE
CXR with increased interstitial markings.   - CT Chest pending for further characterization  - Continue Azithro, Rocephin (started 3/1)  - Duonebs prn  - Solumedrol 80mg bid  - Procalcitonin, RIP, Legionella pending

## 2020-03-02 NOTE — PLAN OF CARE
Problem: Occupational Therapy Goal  Goal: Occupational Therapy Goal  Description  Goals to be met by: 3/16/2020     Patient will increase functional independence with ADLs by performing:    UE Dressing with Minimal Assistance.  Grooming while seated with Minimal Assistance.  Sitting at edge of bed x10 minutes with Supervision.  Rolling to Bilateral with Moderate Assistance.   Supine to sit with Maximum Assistance in preparation for EOB/OOB functional activities.     Outcome: Ongoing, Progressing    OT evaluation completed and POC established.  Melissa Moya OT  3/2/2020

## 2020-03-02 NOTE — ED NOTES
Pt found out of bed, sitting on chair on side of bed urinating on floor. Pt redirected, put back into bed and again educated on using call light.

## 2020-03-02 NOTE — SUBJECTIVE & OBJECTIVE
Past Medical History:   Diagnosis Date    Anemia     Anxiety     COPD (chronic obstructive pulmonary disease)     COPD (chronic obstructive pulmonary disease) with emphysema     Coronary artery disease     Depression     Diverticulitis     Hyperlipidemia     Hypertension     PVD (peripheral vascular disease)     PVD (peripheral vascular disease)     S/P colostomy     Substance abuse     hx heavy etoh use     Tobacco abuse        Past Surgical History:   Procedure Laterality Date    COLOSTOMY      ECTOPIC PREGNANCY SURGERY      right forefoot amputation      right toe amputation      x2 toes       Review of patient's allergies indicates:  No Known Allergies    Family History     None        Tobacco Use    Smoking status: Former Smoker     Packs/day: 0.50     Years: 50.00     Pack years: 25.00     Types: Cigarettes     Last attempt to quit: 2013     Years since quittin.6    Smokeless tobacco: Never Used   Substance and Sexual Activity    Alcohol use: No    Drug use: No    Sexual activity: Not on file      Review of Systems   Constitutional: Positive for fatigue and fever. Negative for activity change and appetite change.   HENT: Negative for congestion, sneezing and sore throat.    Eyes: Negative for discharge and redness.   Respiratory: Positive for cough and shortness of breath. Negative for wheezing.    Gastrointestinal: Negative for abdominal pain, blood in stool, diarrhea, nausea and vomiting.   Genitourinary: Negative for dysuria and vaginal bleeding.   Musculoskeletal: Negative for back pain and neck pain.   Skin: Negative for pallor and rash.   Neurological: Negative for seizures and light-headedness.     Objective:     Vital Signs (Most Recent):  Temp: 98.3 °F (36.8 °C) (20 1451)  Pulse: 108 (20 013)  Resp: 18 (20)  BP: (!) 110/53 (20 0012)  SpO2: 99 % (20) Vital Signs (24h Range):  Temp:  [98.3 °F (36.8 °C)] 98.3 °F (36.8 °C)  Pulse:   [107-124] 108  Resp:  [18-41] 18  SpO2:  [92 %-100 %] 99 %  BP: (110-156)/(53-78) 110/53   Weight: 63.5 kg (140 lb)  Body mass index is 27.34 kg/m².      Intake/Output Summary (Last 24 hours) at 3/2/2020 0140  Last data filed at 3/1/2020 1835  Gross per 24 hour   Intake 250 ml   Output --   Net 250 ml       Physical Exam   Constitutional: She is oriented to person, place, and time. She appears distressed (mild).   Elderly, chronically ill appearing woman in mild distress with Bipap on   HENT:   Head: Normocephalic and atraumatic.   Eyes: Pupils are equal, round, and reactive to light. Conjunctivae and EOM are normal. Right eye exhibits no discharge. Left eye exhibits no discharge.   Neck: Normal range of motion. Neck supple.   Cardiovascular: Normal heart sounds and intact distal pulses.   Tachycardic and regular   Pulmonary/Chest: She is in respiratory distress. She has wheezes (mild end expiratory wheezes).   Using accessory muscles to breath with retractions noted  Diminished breath sounds bibasilar   Abdominal: Soft. Bowel sounds are normal. There is no tenderness. There is no guarding.   Colostomy bag in place with dark stool  Ostomy patent pink and producing with mild prolapse   Musculoskeletal: She exhibits edema (1+ edema of RLE). She exhibits no tenderness.   Right foot s/p midfoot amputation   Neurological: She is alert and oriented to person, place, and time.   Skin: Skin is warm and dry. Capillary refill takes less than 2 seconds. There is pallor.   Nursing note and vitals reviewed.      Vents:  Oxygen Concentration (%): 40 (03/02/20 0137)  Lines/Drains/Airways     Drain                 Colostomy LLQ -- days         Colostomy LLQ -- days    Female External Urinary Catheter 03/01/20 2053 less than 1 day          Peripheral Intravenous Line                 Peripheral IV - Single Lumen Left Antecubital -- days         Peripheral IV - Single Lumen 03/01/20 2013 20 G Left Antecubital less than 1 day          Peripheral IV - Single Lumen 03/02/20 0001 20 G Right Forearm less than 1 day              Significant Labs:    CBC/Anemia Profile:  Recent Labs   Lab 03/01/20  1644   WBC 17.44*   HGB 12.7   HCT 41.2      MCV 99*   RDW 13.3        Chemistries:  Recent Labs   Lab 03/01/20  1644      K 3.5      CO2 15*   BUN 17   CREATININE 0.9   CALCIUM 9.5   ALBUMIN 2.8*   PROT 7.6   BILITOT 0.3   ALKPHOS 175*   ALT 13   AST 17       All pertinent labs within the past 24 hours have been reviewed.    Significant Imaging: I have reviewed and interpreted all pertinent imaging results/findings within the past 24 hours.

## 2020-03-02 NOTE — ED NOTES
Pt transported to CMICU with this nurse and respiratory therapist. Pt on continuous cardiac monitoring, pulse oximetry, and BP. Pt not soiled with urine or feces at time of transport.

## 2020-03-02 NOTE — PLAN OF CARE
POC reviewed with pt at 0500. Pt unable to verbalize understanding. No acute events overnight. O2 sats >94% throughout shift. Pt progressing toward goals. Will continue to monitor. See flowsheets for full assessment and VS info

## 2020-03-02 NOTE — ED NOTES
Pt found out of bed sitting on trash can. Pt states that she had to use bathroom. Urine saturating pt's pants and noted on floor. Pt helped back into bed. Pt redirected to stay in bed and instructed how to use call light. Side rails raised x2, call light in reach.

## 2020-03-02 NOTE — PT/OT/SLP EVAL
Physical Therapy Evaluation and Treatment    Patient Name:  Radha Flores   MRN:  [id number]    *co-treatment with OT \  Recommendations:     Discharge Recommendations:  nursing facility, skilled   Discharge Equipment Recommendations: (TBD)   Barriers to discharge: Decreased caregiver support, unknown living environment     Assessment:     Radha Flores is a 74 y.o. female admitted with a medical diagnosis of Acute on chronic respiratory failure with hypoxia and hypercapnia.  She presents with the following impairments/functional limitations:  weakness, impaired endurance, impaired self care skills, impaired functional mobilty, gait instability, impaired balance, impaired cardiopulmonary response to activity, pain. Pt required significant encouragement for participation this date. Pt unable to effectively communicate, given pt on BiPAP. Pt with intermittent command following, able to sit EOB, however, minimal participation noted. Pt returned to supine, indicating pain at IV insertion site, RN notified. PT was unable to gather information regarding pt prior level of function, pt reports she lives alone and was not using any medical equipment. Pt does not appear to be at prior level of function at this time. Upon discharge, pt would benefit from continued skilled therapy intervention at skilled nursing facility to progress toward more independent mobility. At this time, pt would continue to benefit from acute skilled therapy intervention to address deficits and progress toward prior level of function.       Rehab Prognosis: Good; patient would benefit from acute skilled PT services to address these deficits and reach maximum level of function.    Recent Surgery: * No surgery found *      Plan:     During this hospitalization, patient to be seen 3 x/week to address the identified rehab impairments via gait training, therapeutic activities, neuromuscular re-education, therapeutic exercises and progress toward the  following goals:    · Plan of Care Expires:  04/02/20    Subjective     Chief Complaint: Pt indicating pain at IV insertion site following session   Patient/Family Comments/goals: unable to determine   Pain/Comfort:  · Pain Rating 1: (pt reported pain, did not quantify or indicate location )    Patients cultural, spiritual, Islam conflicts given the current situation: no    Living Environment:  Unable to accurately assess. Pt on BiPAP, not answering yes/no questions. Pt does indicate that she lives alone and does not own any medical equipment.    Update: spoke to pt daughter this PM. Per daughter, pt lives with her daughter in  A 2nd floor apartment with no elevator access, full flight of stairs to enter with one sided hand rail. Pt was independent with ambulating in apartment, did not require assistance with ADLs and did not leave apartment. Pt does not have any medical equipment. Pt daughter will be available for assistance upon discharge.     Objective:     Communicated with RN prior to session.  Patient found HOB elevated with blood pressure cuff, pulse ox (continuous), telemetry, peripheral IV, BIPAP, colostomy  upon PT entry to room.    General Precautions: Standard, droplet, fall   Orthopedic Precautions:N/A   Braces: N/A     Exams:  · Cognitive Exam:  Patient is unable to communicate 2/2 BiPAP donned, arouses to verbal/tactile stimuli, followed ~40% of commands  · Gross Motor Coordination:  Impaired   · RLE ROM: WFL  · RLE Strength: grossly 3/5   · LLE ROM: WFL  · LLE Strength: grossly 3/5     Functional Mobility:  · Bed Mobility:     · Supine to Sit: total assistance  · Sit to Supine: total assistance      Therapeutic Activities and Exercises:   Pt sat EOB for 5 mins with maximum assistance initially, progressing to requiring contact guard assistance for static sitting balance. Encouragement and cuing provided to perform functional mobility with UE, or exercises with LE. Pt not participating in any  movement with extremities. Cuing continued to focus on strengthening of trunk musculature and increase tolerance to sitting EOB.   Pt educated on role of PT/POC. Pt verbalized understanding.   Pt encouraged to only perform OOB mobility with assistance from nursing/therapy. Pt agreeable.   Pt encouraged to perform bed level exercise daily including straight leg raise, knee to chest, and ankle DF/PF.     AM-PAC 6 CLICK MOBILITY  Total Score:10     Patient left HOB elevated with all lines intact, call button in reach and RN notified.    GOALS:   Multidisciplinary Problems     Physical Therapy Goals        Problem: Physical Therapy Goal    Goal Priority Disciplines Outcome Goal Variances Interventions   Physical Therapy Goal     PT, PT/OT Ongoing, Progressing     Description:  Goals to be met by: 3/26/2020     Patient will increase functional independence with mobility by performin. Supine to sit with MInimal Assistance  2. Sit to stand transfer with Minimal Assistance  3. Bed to chair transfer with Moderate Assistance using LRAD  4. Sitting at edge of bed x8 minutes with Stand-by Assistance                      History:     Past Medical History:   Diagnosis Date    Anemia     Anxiety     COPD (chronic obstructive pulmonary disease)     COPD (chronic obstructive pulmonary disease) with emphysema     Coronary artery disease     Depression     Diverticulitis     Hyperlipidemia     Hypertension     PVD (peripheral vascular disease)     PVD (peripheral vascular disease)     S/P colostomy     Substance abuse     hx heavy etoh use     Tobacco abuse        Past Surgical History:   Procedure Laterality Date    COLOSTOMY      ECTOPIC PREGNANCY SURGERY      right forefoot amputation      right toe amputation      x2 toes       Time Tracking:     PT Received On: 20  PT Start Time: 856     PT Stop Time: 15  PT Total Time (min): 19 min     Billable Minutes: Evaluation 9 mins  and Therapeutic  Exercise 10 mins       Brittney Griffith, PT  03/02/2020

## 2020-03-02 NOTE — PT/OT/SLP EVAL
Occupational Therapy   Evaluation (Co-Eval with PT)    Name: Radha Flores  MRN: [id number]  Admitting Diagnosis:  Acute on chronic respiratory failure with hypoxia and hypercapnia     * No surgery found *      Recommendations:     Discharge Recommendations: nursing facility, skilled  Discharge Equipment Recommendations:  (TBD)  Barriers to discharge:  Decreased caregiver support(Requires increased skilled assistance)    Assessment:     Radha Flores is a 74 y.o. female with a medical diagnosis of Acute on chronic respiratory failure with hypoxia and hypercapnia.  She presents with a decline in occupational participation and functional mobility. Patient is lethargic throughout evaluation and intermittently following commands. Performance deficits affecting function: weakness, impaired endurance, impaired self care skills, impaired functional mobilty, gait instability, impaired balance, impaired cardiopulmonary response to activity, pain.      Rehab Prognosis: Good; patient would benefit from acute skilled OT services to address these deficits and reach maximum level of function.       Plan:     Patient to be seen 3 x/week to address the above listed problems via self-care/home management, therapeutic activities, therapeutic exercises  · Plan of Care Expires: 04/02/20  · Plan of Care Reviewed with: patient    Subjective     Chief Complaint: Patient indicated pain at IV site  Patient/Family Comments/goals: Unknown    Occupational Profile:  Living Environment: Patient indicated she lives alone and does not use/own equipment but did not provide further information  Previous level of function: Unknown  Roles and Routines: Unknown  Equipment Used at Home:  (Reports no equipment)  Assistance upon Discharge: Unknown    Pain/Comfort:  · Pain Rating 1: other (see comments)(Reported pain but did not rate)    Patients cultural, spiritual, Confucianism conflicts given the current situation: no    Objective:     Communicated  with: RN prior to session.  Patient found HOB elevated with peripheral IV, mock catheter, colostomy, pulse ox (continuous), telemetry upon OT entry to room.    General Precautions: Standard, droplet, fall   Orthopedic Precautions:N/A   Braces: N/A     Occupational Performance:    Bed Mobility:    · Patient completed Supine to Sit to R side EOB with total assistance  · Patient sat EOB ~5 minutes with max assist initially for static/dynamic sitting balance progressing to CGA  · Patient completed Sit to Supine with total assistance    Functional Mobility/Transfers:  · Did not assess - unsafe at this time    Activities of Daily Living:  · Did not assess - patient too lethargic and not following commands    Cognitive/Visual Perceptual:  Cognitive/Psychosocial Skills:     -       Follows Commands/attention:Follows ~40% of simple commands  -       Communication: limited due to BiPAP    Physical Exam:  Upper Extremity Range of Motion:     -       Right Upper Extremity: Observed to be WFL  -       Left Upper Extremity: Observed to be WFL  Upper Extremity Strength:    -       Right Upper Extremity: Unable to assess  -       Left Upper Extremity: Unable to assess    AMPAC 6 Click ADL:  AMPAC Total Score: 12    Treatment & Education:  Role of OT/evaluation  Call button for assistance  Education:    Patient left HOB elevated with all lines intact, call button in reach, bed alarm on and RN notified    GOALS:   Multidisciplinary Problems     Occupational Therapy Goals        Problem: Occupational Therapy Goal    Goal Priority Disciplines Outcome Interventions   Occupational Therapy Goal     OT, PT/OT Ongoing, Progressing    Description:  Goals to be met by: 3/16/2020     Patient will increase functional independence with ADLs by performing:    UE Dressing with Minimal Assistance.  Grooming while seated with Minimal Assistance.  Sitting at edge of bed x10 minutes with Supervision.  Rolling to Bilateral with Moderate Assistance.    Supine to sit with Maximum Assistance in preparation for EOB/OOB functional activities.                      History:     Past Medical History:   Diagnosis Date    Anemia     Anxiety     COPD (chronic obstructive pulmonary disease)     COPD (chronic obstructive pulmonary disease) with emphysema     Coronary artery disease     Depression     Diverticulitis     Hyperlipidemia     Hypertension     PVD (peripheral vascular disease)     PVD (peripheral vascular disease)     S/P colostomy     Substance abuse     hx heavy etoh use     Tobacco abuse        Past Surgical History:   Procedure Laterality Date    COLOSTOMY      ECTOPIC PREGNANCY SURGERY      right forefoot amputation      right toe amputation      x2 toes       Time Tracking:     OT Date of Treatment: 03/02/20  OT Start Time: 0856  OT Stop Time: 0916  OT Total Time (min): 20 min    Billable Minutes:Evaluation 10 minutes    Melissa Moya OT  3/2/2020

## 2020-03-02 NOTE — PLAN OF CARE
CMICU DAILY GOALS       A: Awake    RASS: Goal - RASS Goal: 0-->alert and calm  Actual - RASS (Junior Agitation-Sedation Scale): 0-->alert and calm   Restraint necessity:    B: Breath   SBT: Not intubated   C: Coordinate A & B, analgesics/sedatives   Pain: managed    SAT: Not intubated  D: Delirium   CAM-ICU: Overall CAM-ICU: Negative  E: Early Mobility   MOVE Screen: Pass   Activity: Activity Management: activity adjusted per tolerance  FAS: Feeding/Nutrition   Diet order: Diet/Nutrition Received: NPO, Specialty Diet/Nutrition Received: other (see comments)(NPO) Fluid restriction:    T: Thrombus   DVT prophylaxis: VTE Required Core Measure: Pharmacological prophylaxis initiated/maintained  H: HOB Elevation   Head of Bed (HOB): HOB at 30-45 degrees  U: Ulcer Prophylaxis   GI: yes  G: Glucose control   managed    S: Skin   Bundle compliance: yes   Bathing/Skin Care: dressed/undressed Date: Partial bath completed today.  B: Bowel Function   no issues   I: Indwelling Catheters   Harris necessity:      Urethral Catheter 03/02/20 0749-Reason for Continuing Urinary Catheterization: Urinary retention   CVC necessity: No   IPAD offered: No  D: De-escalation Antibx   No  Plan for the day   BiPAP, IV antibiotics.   Family/Goals of care/Code Status   Code Status: Full Code     No acute events throughout day, VS and assessment per flow sheet, patient progressing towards goals as tolerated, plan of care reviewed with Radha Flores and family, all concerns addressed, will continue to monitor.

## 2020-03-02 NOTE — CONSULTS
Critical Care Medicine Consultation Note    Consult acknowledged.    74 year old F with PMH of CAD, COPD presenting to ED for progressive SOB. Found to be in acute on chronic hypoxic and hypercapnic respiratory failure. Now worsening on bipap. Plan to admit to Critical Care Medicine.    Full H&P to follow.    Cecille Land MD  Critical Care Medicine

## 2020-03-02 NOTE — PLAN OF CARE
74 year old woman with history of COPD, CAD, and PAD a/w acute on chronic hypoxemic and hypercapnic respiratory failure.    · Acute on chronic respiratory failure likely 2/2 COPD exacerbation: Patient awake and alert/conversant during my evaluation today; responding to bipap (10/5) continue NIPPV, steroids, nebulizers. Monitor mental status closely; low threshold for intubation should the patient develop narcosis.  · Chest xray with bilateral patchy opacities; follow up CT with tree-n-bud appearance at the bases.  Patient started on CAP coverage with azithromycin and ceftriaxone. Sputum cultures with AFB when possible.   · Hypokalemia: potassium replaced. Follow up electrolytes this afternoon.    Spoke with patient's daughter (who is also currently an inpatient at [facility]) and explained the plan of care for today.  All questions were answered to her satisfaction.     Critical Care Time: 25 minutes  Critical care was time spent personally by me on the following activities: evaluating this patient's organ dysfunction, development of treatment plan, discussing treatment plan with patient or surrogate and bedside caregivers, discussions with consultants, evaluation of patient's response to treatment, examination of patient, ordering and performing treatments and interventions, ordering and review of laboratory studies, ordering and review of radiographic studies, re-evaluation of patient's condition. This critical care time did not overlap with that of any other provider or involve time for any procedures.

## 2020-03-02 NOTE — H&P
[facility]  Critical Care Medicine  History & Physical    Patient Name: Radha Flores  MRN: [id number]  Admission Date: 3/1/2020  Hospital Length of Stay: 1 days  Code Status: Full Code  Attending Physician: Serge Le MD   Primary Care Provider: Angela Walker MD   Principal Problem: Acute on chronic respiratory failure with hypoxia and hypercapnia    Subjective:     HPI:  74 year old F with PMH of COPD, CAD, PAD who initially presented to the ED today for SOB for the past 3 days. States that she has had associated cough and subjective fevers, all of which have progressively worsened. Upon presentation to the ED, she was tachypneic, using accessory muscles to breath with wheezing on examination. Patient was placed on bipap for breathing support. Breathing treatment was given. Labs remarkable for WBC of 17.44. Initial VBG of 7.1/70/48/21.4/-8.  BNP of 482, Troponin of 0.034. CXR with nonspecific interstitial markings concerning for possible atypical PNA vs pulmonary edema. She was given Lasix, Azithromycin and Rocephin in ED. Patient remained tachycardic throughout ED course with signs of left leg edema raising concern for possible PE. Ddimer positive. CT Chest pending at time of consultation. Repeat VBG unchanged while in ED.     Critical Care Medicine consulted for management of unstable respiratory status.        Hospital/ICU Course:  No notes on file     Past Medical History:   Diagnosis Date    Anemia     Anxiety     COPD (chronic obstructive pulmonary disease)     COPD (chronic obstructive pulmonary disease) with emphysema     Coronary artery disease     Depression     Diverticulitis     Hyperlipidemia     Hypertension     PVD (peripheral vascular disease)     PVD (peripheral vascular disease)     S/P colostomy     Substance abuse     hx heavy etoh use     Tobacco abuse        Past Surgical History:   Procedure Laterality Date    COLOSTOMY      ECTOPIC  PREGNANCY SURGERY      right forefoot amputation      right toe amputation      x2 toes       Review of patient's allergies indicates:  No Known Allergies    Family History     None        Tobacco Use    Smoking status: Former Smoker     Packs/day: 0.50     Years: 50.00     Pack years: 25.00     Types: Cigarettes     Last attempt to quit: 2013     Years since quittin.6    Smokeless tobacco: Never Used   Substance and Sexual Activity    Alcohol use: No    Drug use: No    Sexual activity: Not on file      Review of Systems   Constitutional: Positive for fatigue and fever. Negative for activity change and appetite change.   HENT: Negative for congestion, sneezing and sore throat.    Eyes: Negative for discharge and redness.   Respiratory: Positive for cough and shortness of breath. Negative for wheezing.    Gastrointestinal: Negative for abdominal pain, blood in stool, diarrhea, nausea and vomiting.   Genitourinary: Negative for dysuria and vaginal bleeding.   Musculoskeletal: Negative for back pain and neck pain.   Skin: Negative for pallor and rash.   Neurological: Negative for seizures and light-headedness.     Objective:     Vital Signs (Most Recent):  Temp: 98.3 °F (36.8 °C) (20 1451)  Pulse: 108 (20 013)  Resp: 18 (20 013)  BP: (!) 110/53 (20 0012)  SpO2: 99 % (20) Vital Signs (24h Range):  Temp:  [98.3 °F (36.8 °C)] 98.3 °F (36.8 °C)  Pulse:  [107-124] 108  Resp:  [18-41] 18  SpO2:  [92 %-100 %] 99 %  BP: (110-156)/(53-78) 110/53   Weight: 63.5 kg (140 lb)  Body mass index is 27.34 kg/m².      Intake/Output Summary (Last 24 hours) at 3/2/2020 0140  Last data filed at 3/1/2020 1835  Gross per 24 hour   Intake 250 ml   Output --   Net 250 ml       Physical Exam   Constitutional: She is oriented to person, place, and time. She appears distressed (mild).   Elderly, chronically ill appearing woman in mild distress with Bipap on   HENT:   Head: Normocephalic and  atraumatic.   Eyes: Pupils are equal, round, and reactive to light. Conjunctivae and EOM are normal. Right eye exhibits no discharge. Left eye exhibits no discharge.   Neck: Normal range of motion. Neck supple.   Cardiovascular: Normal heart sounds and intact distal pulses.   Tachycardic and regular   Pulmonary/Chest: She is in respiratory distress. She has wheezes (mild end expiratory wheezes).   Using accessory muscles to breath with retractions noted  Diminished breath sounds bibasilar   Abdominal: Soft. Bowel sounds are normal. There is no tenderness. There is no guarding.   Colostomy bag in place with dark stool  Ostomy patent pink and producing with mild prolapse   Musculoskeletal: She exhibits edema (1+ edema of RLE). She exhibits no tenderness.   Right foot s/p midfoot amputation   Neurological: She is alert and oriented to person, place, and time.   Skin: Skin is warm and dry. Capillary refill takes less than 2 seconds. There is pallor.   Nursing note and vitals reviewed.      Vents:  Oxygen Concentration (%): 40 (03/02/20 0137)  Lines/Drains/Airways     Drain                 Colostomy LLQ -- days         Colostomy LLQ -- days    Female External Urinary Catheter 03/01/20 2053 less than 1 day          Peripheral Intravenous Line                 Peripheral IV - Single Lumen Left Antecubital -- days         Peripheral IV - Single Lumen 03/01/20 2013 20 G Left Antecubital less than 1 day         Peripheral IV - Single Lumen 03/02/20 0001 20 G Right Forearm less than 1 day              Significant Labs:    CBC/Anemia Profile:  Recent Labs   Lab 03/01/20  1644   WBC 17.44*   HGB 12.7   HCT 41.2      MCV 99*   RDW 13.3        Chemistries:  Recent Labs   Lab 03/01/20  1644      K 3.5      CO2 15*   BUN 17   CREATININE 0.9   CALCIUM 9.5   ALBUMIN 2.8*   PROT 7.6   BILITOT 0.3   ALKPHOS 175*   ALT 13   AST 17       All pertinent labs within the past 24 hours have been reviewed.    Significant  Imaging: I have reviewed and interpreted all pertinent imaging results/findings within the past 24 hours.    Assessment/Plan:     Pulmonary  * Acute on chronic respiratory failure with hypoxia and hypercapnia  Likely 2/2 Atypical PNA. Initial VBG consistent with hypercapnic, hypoxic respiratory failure. CXR with interstitial markings, however less likely to be pulmonary edema as patient without other clinical signs of overload.  - CT Chest pending in setting of unilateral leg swelling, elevated Ddimer  - Continue bipap, currently worsening, if hypercarbia persists, plan to intubate  - See atypical PNA treatment plan below    Atypical pneumonia  CXR with increased interstitial markings.   - CT Chest pending for further characterization  - Continue Azithro, Rocephin (started 3/1)  - Duonebs prn  - Solumedrol 80mg bid  - Procalcitonin, RIP, Legionella pending    Cardiac/Vascular  Elevated troponin  Likely NSTEMI type 2 secondary to demand. EKG with ST elevation  - Trend q6h until peak    CAD (coronary artery disease)  Elevated BNP and troponin on admission  - Plan for TTE in AM        Critical Care Daily Checklist:    A: Awake: RASS Goal/Actual Goal: RASS Goal: 0-->alert and calm  Actual: Junior Agitation Sedation Scale (RASS): (P) Drowsy   B: Spontaneous Breathing Trial Performed?  n/a   C: SAT & SBT Coordinated?  n/a                      D: Delirium: CAM-ICU Overall CAM-ICU: (P) Positive   E: Early Mobility Performed? No   F: Feeding Goal:    Status:     Current Diet Order   Procedures    Diet NPO      AS: Analgesia/Sedation n/a   T: Thromboembolic Prophylaxis JOHN   H: HOB > 300 Yes   U: Stress Ulcer Prophylaxis (if needed) Pepcid   G: Glucose Control None   B: Bowel Function     I: Indwelling Catheter (Lines & Harris) Necessity Assessed   D: De-escalation of Antimicrobials/Pharmacotherapies Assessed; on Azithro, Rocephin    Plan for the day/ETD Admit to MICU    Code Status:  Family/Goals of Care: Full  Code  Continue plan       Critical secondary to Patient has a condition that poses threat to life and bodily function: Severe Respiratory Distress     Critical care was time spent personally by me on the following activities: development of treatment plan with patient or surrogate and bedside caregivers, discussions with consultants, evaluation of patient's response to treatment, examination of patient, ordering and performing treatments and interventions, ordering and review of laboratory studies, ordering and review of radiographic studies, pulse oximetry, re-evaluation of patient's condition. This critical care time did not overlap with that of any other provider or involve time for any procedures.     Cecille Land MD  Critical Care Medicine  [facility]

## 2020-03-03 PROBLEM — R06.02 SOB (SHORTNESS OF BREATH): Status: ACTIVE | Noted: 2020-03-03

## 2020-03-03 LAB
ALLENS TEST: ABNORMAL
ALLENS TEST: ABNORMAL
ANION GAP SERPL CALC-SCNC: 16 MMOL/L (ref 8–16)
BASOPHILS # BLD AUTO: 0.03 K/UL (ref 0–0.2)
BASOPHILS NFR BLD: 0.4 % (ref 0–1.9)
BUN SERPL-MCNC: 35 MG/DL (ref 8–23)
CALCIUM SERPL-MCNC: 8.9 MG/DL (ref 8.7–10.5)
CHLORIDE SERPL-SCNC: 104 MMOL/L (ref 95–110)
CO2 SERPL-SCNC: 23 MMOL/L (ref 23–29)
CREAT SERPL-MCNC: 0.9 MG/DL (ref 0.5–1.4)
DELSYS: ABNORMAL
DELSYS: ABNORMAL
DIFFERENTIAL METHOD: ABNORMAL
EOSINOPHIL # BLD AUTO: 0 K/UL (ref 0–0.5)
EOSINOPHIL NFR BLD: 0 % (ref 0–8)
EP: 7
ERYTHROCYTE [DISTWIDTH] IN BLOOD BY AUTOMATED COUNT: 13.6 % (ref 11.5–14.5)
ERYTHROCYTE [SEDIMENTATION RATE] IN BLOOD BY WESTERGREN METHOD: 20 MM/H
EST. GFR  (AFRICAN AMERICAN): >60 ML/MIN/1.73 M^2
EST. GFR  (NON AFRICAN AMERICAN): >60 ML/MIN/1.73 M^2
FIO2: 30
FLOW: 2
GLUCOSE SERPL-MCNC: 128 MG/DL (ref 70–110)
HCO3 UR-SCNC: 25.3 MMOL/L (ref 24–28)
HCO3 UR-SCNC: 32.6 MMOL/L (ref 24–28)
HCT VFR BLD AUTO: 44.5 % (ref 37–48.5)
HGB BLD-MCNC: 13.2 G/DL (ref 12–16)
IMM GRANULOCYTES # BLD AUTO: 0.06 K/UL (ref 0–0.04)
IMM GRANULOCYTES NFR BLD AUTO: 0.8 % (ref 0–0.5)
IP: 17
LYMPHOCYTES # BLD AUTO: 0.7 K/UL (ref 1–4.8)
LYMPHOCYTES NFR BLD: 8.5 % (ref 18–48)
MAGNESIUM SERPL-MCNC: 2.3 MG/DL (ref 1.6–2.6)
MCH RBC QN AUTO: 30 PG (ref 27–31)
MCHC RBC AUTO-ENTMCNC: 29.7 G/DL (ref 32–36)
MCV RBC AUTO: 101 FL (ref 82–98)
MODE: ABNORMAL
MODE: ABNORMAL
MONOCYTES # BLD AUTO: 0.5 K/UL (ref 0.3–1)
MONOCYTES NFR BLD: 6 % (ref 4–15)
NEUTROPHILS # BLD AUTO: 6.4 K/UL (ref 1.8–7.7)
NEUTROPHILS NFR BLD: 84.3 % (ref 38–73)
NRBC BLD-RTO: 0 /100 WBC
PCO2 BLDA: 57.9 MMHG (ref 35–45)
PCO2 BLDA: 78.1 MMHG (ref 35–45)
PH SMN: 7.23 [PH] (ref 7.35–7.45)
PH SMN: 7.25 [PH] (ref 7.35–7.45)
PHOSPHATE SERPL-MCNC: 4.3 MG/DL (ref 2.7–4.5)
PLATELET # BLD AUTO: 315 K/UL (ref 150–350)
PMV BLD AUTO: 9.7 FL (ref 9.2–12.9)
PO2 BLDA: 46 MMHG (ref 40–60)
PO2 BLDA: 54 MMHG (ref 40–60)
POC BE: -2 MMOL/L
POC BE: 5 MMOL/L
POC SATURATED O2: 71 % (ref 95–100)
POC SATURATED O2: 81 % (ref 95–100)
POC TCO2: 27 MMOL/L (ref 24–29)
POC TCO2: 35 MMOL/L (ref 24–29)
POTASSIUM SERPL-SCNC: 4.4 MMOL/L (ref 3.5–5.1)
RBC # BLD AUTO: 4.4 M/UL (ref 4–5.4)
SAMPLE: ABNORMAL
SAMPLE: ABNORMAL
SITE: ABNORMAL
SITE: ABNORMAL
SODIUM SERPL-SCNC: 143 MMOL/L (ref 136–145)
SP02: 97
WBC # BLD AUTO: 7.62 K/UL (ref 3.9–12.7)

## 2020-03-03 PROCEDURE — 63600175 PHARM REV CODE 636 W HCPCS: Performed by: STUDENT IN AN ORGANIZED HEALTH CARE EDUCATION/TRAINING PROGRAM

## 2020-03-03 PROCEDURE — 84100 ASSAY OF PHOSPHORUS: CPT

## 2020-03-03 PROCEDURE — 99900035 HC TECH TIME PER 15 MIN (STAT)

## 2020-03-03 PROCEDURE — 25000003 PHARM REV CODE 250: Performed by: INTERNAL MEDICINE

## 2020-03-03 PROCEDURE — 82803 BLOOD GASES ANY COMBINATION: CPT

## 2020-03-03 PROCEDURE — 83735 ASSAY OF MAGNESIUM: CPT

## 2020-03-03 PROCEDURE — 99233 SBSQ HOSP IP/OBS HIGH 50: CPT | Mod: GC,,, | Performed by: INTERNAL MEDICINE

## 2020-03-03 PROCEDURE — 86580 TB INTRADERMAL TEST: CPT | Performed by: STUDENT IN AN ORGANIZED HEALTH CARE EDUCATION/TRAINING PROGRAM

## 2020-03-03 PROCEDURE — 80048 BASIC METABOLIC PNL TOTAL CA: CPT

## 2020-03-03 PROCEDURE — 25000003 PHARM REV CODE 250: Performed by: STUDENT IN AN ORGANIZED HEALTH CARE EDUCATION/TRAINING PROGRAM

## 2020-03-03 PROCEDURE — 94640 AIRWAY INHALATION TREATMENT: CPT

## 2020-03-03 PROCEDURE — 27000221 HC OXYGEN, UP TO 24 HOURS

## 2020-03-03 PROCEDURE — 99233 PR SUBSEQUENT HOSPITAL CARE,LEVL III: ICD-10-PCS | Mod: GC,,, | Performed by: INTERNAL MEDICINE

## 2020-03-03 PROCEDURE — 94761 N-INVAS EAR/PLS OXIMETRY MLT: CPT

## 2020-03-03 PROCEDURE — S0028 INJECTION, FAMOTIDINE, 20 MG: HCPCS | Performed by: INTERNAL MEDICINE

## 2020-03-03 PROCEDURE — 94660 CPAP INITIATION&MGMT: CPT

## 2020-03-03 PROCEDURE — 20000000 HC ICU ROOM

## 2020-03-03 PROCEDURE — 30200315 PPD INTRADERMAL TEST REV CODE 302: Performed by: STUDENT IN AN ORGANIZED HEALTH CARE EDUCATION/TRAINING PROGRAM

## 2020-03-03 PROCEDURE — 85025 COMPLETE CBC W/AUTO DIFF WBC: CPT

## 2020-03-03 PROCEDURE — 25000242 PHARM REV CODE 250 ALT 637 W/ HCPCS: Performed by: STUDENT IN AN ORGANIZED HEALTH CARE EDUCATION/TRAINING PROGRAM

## 2020-03-03 RX ORDER — SIMVASTATIN 20 MG/1
20 TABLET, FILM COATED ORAL NIGHTLY
Status: DISCONTINUED | OUTPATIENT
Start: 2020-03-03 | End: 2020-03-05

## 2020-03-03 RX ORDER — NAPROXEN SODIUM 220 MG/1
81 TABLET, FILM COATED ORAL DAILY
Status: DISCONTINUED | OUTPATIENT
Start: 2020-03-04 | End: 2020-03-06 | Stop reason: HOSPADM

## 2020-03-03 RX ORDER — CLOPIDOGREL BISULFATE 75 MG/1
75 TABLET ORAL DAILY
Status: DISCONTINUED | OUTPATIENT
Start: 2020-03-04 | End: 2020-03-06 | Stop reason: HOSPADM

## 2020-03-03 RX ORDER — CITALOPRAM 10 MG/1
20 TABLET ORAL DAILY
Status: DISCONTINUED | OUTPATIENT
Start: 2020-03-04 | End: 2020-03-06 | Stop reason: HOSPADM

## 2020-03-03 RX ORDER — PREDNISONE 20 MG/1
40 TABLET ORAL DAILY
Status: COMPLETED | OUTPATIENT
Start: 2020-03-03 | End: 2020-03-05

## 2020-03-03 RX ORDER — CILOSTAZOL 100 MG/1
100 TABLET ORAL 2 TIMES DAILY
Status: DISCONTINUED | OUTPATIENT
Start: 2020-03-03 | End: 2020-03-06 | Stop reason: HOSPADM

## 2020-03-03 RX ORDER — FLUTICASONE FUROATE AND VILANTEROL 100; 25 UG/1; UG/1
1 POWDER RESPIRATORY (INHALATION) DAILY
Status: DISCONTINUED | OUTPATIENT
Start: 2020-03-04 | End: 2020-03-06 | Stop reason: HOSPADM

## 2020-03-03 RX ORDER — METOPROLOL TARTRATE 25 MG/1
25 TABLET, FILM COATED ORAL 2 TIMES DAILY
Status: DISCONTINUED | OUTPATIENT
Start: 2020-03-03 | End: 2020-03-05

## 2020-03-03 RX ADMIN — PREDNISONE 40 MG: 20 TABLET ORAL at 01:03

## 2020-03-03 RX ADMIN — SIMVASTATIN 20 MG: 20 TABLET, FILM COATED ORAL at 09:03

## 2020-03-03 RX ADMIN — HEPARIN SODIUM 5000 UNITS: 5000 INJECTION, SOLUTION INTRAVENOUS; SUBCUTANEOUS at 05:03

## 2020-03-03 RX ADMIN — GUAIFENESIN 600 MG: 600 TABLET, EXTENDED RELEASE ORAL at 09:03

## 2020-03-03 RX ADMIN — TUBERCULIN PURIFIED PROTEIN DERIVATIVE 5 UNITS: 5 INJECTION, SOLUTION INTRADERMAL at 02:03

## 2020-03-03 RX ADMIN — AZITHROMYCIN MONOHYDRATE 500 MG: 500 INJECTION, POWDER, LYOPHILIZED, FOR SOLUTION INTRAVENOUS at 04:03

## 2020-03-03 RX ADMIN — HEPARIN SODIUM 5000 UNITS: 5000 INJECTION, SOLUTION INTRAVENOUS; SUBCUTANEOUS at 09:03

## 2020-03-03 RX ADMIN — FAMOTIDINE 20 MG: 10 INJECTION INTRAVENOUS at 09:03

## 2020-03-03 RX ADMIN — HEPARIN SODIUM 5000 UNITS: 5000 INJECTION, SOLUTION INTRAVENOUS; SUBCUTANEOUS at 01:03

## 2020-03-03 RX ADMIN — CILOSTAZOL 100 MG: 100 TABLET ORAL at 09:03

## 2020-03-03 RX ADMIN — IPRATROPIUM BROMIDE AND ALBUTEROL SULFATE 3 ML: .5; 3 SOLUTION RESPIRATORY (INHALATION) at 08:03

## 2020-03-03 RX ADMIN — IPRATROPIUM BROMIDE AND ALBUTEROL SULFATE 3 ML: .5; 3 SOLUTION RESPIRATORY (INHALATION) at 05:03

## 2020-03-03 RX ADMIN — IPRATROPIUM BROMIDE AND ALBUTEROL SULFATE 3 ML: .5; 3 SOLUTION RESPIRATORY (INHALATION) at 11:03

## 2020-03-03 RX ADMIN — GUAIFENESIN 600 MG: 600 TABLET, EXTENDED RELEASE ORAL at 08:03

## 2020-03-03 RX ADMIN — CEFTRIAXONE 2 G: 2 INJECTION, SOLUTION INTRAVENOUS at 11:03

## 2020-03-03 RX ADMIN — METHYLPREDNISOLONE SODIUM SUCCINATE 40 MG: 40 INJECTION, POWDER, FOR SOLUTION INTRAMUSCULAR; INTRAVENOUS at 05:03

## 2020-03-03 RX ADMIN — IPRATROPIUM BROMIDE AND ALBUTEROL SULFATE 3 ML: .5; 3 SOLUTION RESPIRATORY (INHALATION) at 03:03

## 2020-03-03 RX ADMIN — METOPROLOL TARTRATE 25 MG: 25 TABLET ORAL at 09:03

## 2020-03-03 NOTE — PLAN OF CARE
SW has completed locet. Awaiting 142.       03/03/20 1405   Post-Acute Status   Post-Acute Authorization Placement  (Skilled Nursing Facility)   Post-Acute Placement Status Awaiting Orders for SNF   Discharge Delays (!) Other  (SW left vmsg for dgtr regarding choices for snf placement within the [facility].)   Discharge Plan   Discharge Plan A Skilled Nursing Facility   Discharge Plan B [facility]       Dixie Sarkar LMSW  [facility]  [phone]

## 2020-03-03 NOTE — RESIDENT HANDOFF
ICU Transfer of Care Note  Critical Care Medicine    Admit Date: 3/1/2020  LOS: 2    CC: Acute on chronic respiratory failure with hypoxia and hypercapnia    Code Status: Full Code     Transfer to Hospital Medicine discussed with Dr. Huang at 1600.     HPI and Hospital Course:     HPI:  74 year old F with PMH of COPD, CAD, PAD who initially presented to the ED today for SOB for the past 3 days. States that she has had associated cough and subjective fevers, all of which have progressively worsened. Upon presentation to the ED, she was tachypneic, using accessory muscles to breath with wheezing on examination. Patient was placed on bipap for breathing support. Breathing treatment was given. Labs remarkable for WBC of 17.44. Initial VBG of 7.1/70/48/21.4/-8.  BNP of 482, Troponin of 0.034. CXR with nonspecific interstitial markings concerning for possible atypical PNA vs pulmonary edema. She was given Lasix, Azithromycin and Rocephin in ED. Patient remained tachycardic throughout ED course with signs of left leg edema raising concern for possible PE. Ddimer positive. CT Chest pending at time of consultation. Repeat VBG unchanged while in ED.     Critical Care Medicine consulted for management of unstable respiratory status.        Hospital/ICU Course:  Admitted to CMICU on 3/2 for acute on chronic hypoxemic/hypercapnic respiratory failure. Given 40 mg IV lasix x1 and placed on BiPAP. Later in the day, tolerating NC before going back on BiPAP overnight. On 3/3, patient off BiPAP in the morning and tolerating 1-2L NC. Cough assist ordered to obtain sputum sample for concerns of MAC. Azithromycin completed, but continuing Rocephin at this time. Advancing diet as tolerated. Steroids decreased to 40 prednisone qd with plan to stop after another day or two. Patient stable for step down from ICU to hospital medicine.           To Follow Up:     - Sputum sample with AFB pending collection (MAC)  - Bipap (10/5); continue  bipap qhs, transitioning to oral steroids, continue nebulizers.       Discharge Plan:     - Evaluate for home O2 requirement  - Complete antibiotic therapy for CAP  - If chest xray does not clear prior to discharge, repeat imaging and follow up with pulmonary 4-6 weeks after discharge.    Call with questions.

## 2020-03-03 NOTE — SUBJECTIVE & OBJECTIVE
Interval History/Significant Events: NAEO. Patient off BiPAP in the morning and tolerating 1-2L NC. Cough assist ordered to obtain sputum sample for concerns of MAC. Azithromycin completed, but continuing Rocephin at this time. Advancing diet as tolerated. Patient stable for step down from ICU to hospital medicine.      Review of Systems   Constitutional: Negative for activity change, appetite change, fatigue and fever.   HENT: Negative for congestion, sneezing and sore throat.    Eyes: Negative for discharge and redness.   Respiratory: Positive for cough. Negative for shortness of breath and wheezing.    Gastrointestinal: Negative for abdominal pain, blood in stool, diarrhea, nausea and vomiting.   Genitourinary: Negative for dysuria and vaginal bleeding.   Musculoskeletal: Negative for back pain and neck pain.   Skin: Negative for pallor and rash.   Neurological: Negative for seizures and light-headedness.     Objective:     Vital Signs (Most Recent):  Temp: 98.4 °F (36.9 °C) (03/03/20 0715)  Pulse: (!) 113 (03/03/20 0830)  Resp: (!) 24 (03/03/20 0830)  BP: (!) 154/68 (03/03/20 0800)  SpO2: (!) 76 % (03/03/20 0830) Vital Signs (24h Range):  Temp:  [97.5 °F (36.4 °C)-98.4 °F (36.9 °C)] 98.4 °F (36.9 °C)  Pulse:  [] 113  Resp:  [15-60] 24  SpO2:  [76 %-100 %] 76 %  BP: ()/() 154/68   Weight: 63.5 kg (140 lb)  Body mass index is 27.34 kg/m².      Intake/Output Summary (Last 24 hours) at 3/3/2020 0845  Last data filed at 3/3/2020 0800  Gross per 24 hour   Intake 1015 ml   Output 1615 ml   Net -600 ml       Physical Exam   Constitutional: She is oriented to person, place, and time. No distress.   Elderly, chronically ill appearing woman   HENT:   Head: Normocephalic and atraumatic.   Eyes: Pupils are equal, round, and reactive to light. Conjunctivae and EOM are normal. Right eye exhibits no discharge. Left eye exhibits no discharge.   Neck: Normal range of motion. Neck supple.   Cardiovascular: Normal  heart sounds and intact distal pulses.   Tachycardic and regular   Pulmonary/Chest: No respiratory distress. She has wheezes (mild end expiratory wheezes).   Abdominal: Soft. Bowel sounds are normal. There is no tenderness. There is no guarding.   Colostomy bag in place with dark stool  Ostomy patent pink and producing with mild prolapse   Musculoskeletal: She exhibits no edema or tenderness.   Right foot s/p midfoot amputation   Neurological: She is alert and oriented to person, place, and time.   Skin: Skin is warm and dry. Capillary refill takes less than 2 seconds. No rash noted. No erythema.   Nursing note and vitals reviewed.      Vents:  Oxygen Concentration (%): 2 (03/03/20 0400)  Lines/Drains/Airways     Drain                 Colostomy -- days         Colostomy LLQ -- days         Colostomy LLQ -- days         Urethral Catheter 03/02/20 0749 1 day          Peripheral Intravenous Line                 Peripheral IV - Single Lumen Left Antecubital -- days         Peripheral IV - Single Lumen 03/01/20 2013 20 G Left Antecubital 1 day         Peripheral IV - Single Lumen 03/02/20 2100 Left Forearm less than 1 day              Significant Labs:    CBC/Anemia Profile:  Recent Labs   Lab 03/01/20  1644 03/02/20  0506 03/03/20  0624   WBC 17.44* 12.44 7.62   HGB 12.7 12.2 13.2   HCT 41.2 39.6 44.5    291 315   MCV 99* 98 101*   RDW 13.3 13.2 13.6        Chemistries:  Recent Labs   Lab 03/01/20  1644 03/02/20  0506 03/02/20  1144 03/02/20  2056 03/03/20  0428    136  --  139 143   K 3.5 2.7*  --  4.6 4.4    99  --  103 104   CO2 15* 21*  --  23 23   BUN 17 24*  --  32* 35*   CREATININE 0.9 0.9  --  1.0 0.9   CALCIUM 9.5 9.1  --  9.3 8.9   ALBUMIN 2.8*  --   --   --   --    PROT 7.6  --   --   --   --    BILITOT 0.3  --   --   --   --    ALKPHOS 175*  --   --   --   --    ALT 13  --   --   --   --    AST 17  --   --   --   --    MG  --   --  1.9 2.2 2.3   PHOS  --   --   --   --  4.3       All  pertinent labs within the past 24 hours have been reviewed.    Significant Imaging:  I have reviewed all pertinent imaging results/findings within the past 24 hours.

## 2020-03-03 NOTE — ASSESSMENT & PLAN NOTE
CXR with increased interstitial markings.   - Continue Rocephin (started 3/1)  - Completed Zachary Powell prn  - Prednisone 40 qd

## 2020-03-03 NOTE — PLAN OF CARE
CMICU DAILY GOALS       A: Awake    RASS: Goal - RASS Goal: 0-->alert and calm  Actual - RASS (Junior Agitation-Sedation Scale): 0-->alert and calm   Restraint necessity: Clinical Justification: Removing medical devices  B: Breath   SBT: Not intubated   C: Coordinate A & B, analgesics/sedatives   Pain: managed    SAT: Pass  D: Delirium   CAM-ICU: Overall CAM-ICU: Negative  E: Early Mobility   MOVE Screen: Pass   Activity: Activity Management: bedrest maintained per order  FAS: Feeding/Nutrition   Diet order: Diet/Nutrition Received: NPO, other (see comments)(except meds), Specialty Diet/Nutrition Received: other (see comments)(NPO) Fluid restriction:    T: Thrombus   DVT prophylaxis: VTE Required Core Measure: Pharmacological prophylaxis initiated/maintained  H: HOB Elevation   Head of Bed (HOB): HOB at 30-45 degrees  U: Ulcer Prophylaxis   GI: no  G: Glucose control   managed    S: Skin   Bundle compliance: yes   Bathing/Skin Care: dressed/undressed Date: [unfilled]  B: Bowel Function   With colostomy bag    I: Indwelling Catheters   Harris necessity:      Urethral Catheter 03/02/20 0749-Reason for Continuing Urinary Catheterization: Critically ill in ICU requiring intensive monitoring   CVC necessity: No   IPAD offered: No  D: De-escalation Antibx   Yes  Plan for the day   Monitor respiratory status. Antibiotics.   Family/Goals of care/Code Status   Code Status: Full Code     No acute events throughout day, VS and assessment per flow sheet, patient progressing towards goals as tolerated, plan of care reviewed with Radha Flores and family, all concerns addressed, will continue to monitor.

## 2020-03-03 NOTE — HOSPITAL COURSE
Admitted to CMICU on 3/2 for acute on chronic hypoxemic/hypercapnic respiratory failure. Given 40 mg IV lasix x1 and placed on BiPAP. Later in the day, tolerating NC before going back on BiPAP overnight. On 3/3, patient off BiPAP in the morning and tolerating 1-2L NC. Cough assist ordered to obtain sputum sample for concerns of MAC. Azithromycin completed, but continuing Rocephin at this time. Advancing diet as tolerated. Steroids decreased to 40 prednisone qd with plan to stop after another day or two. Patient stable for step down from ICU to hospital medicine.

## 2020-03-03 NOTE — PLAN OF CARE
Hospital Medicine ICU Acceptance Note      Date of Admit: 3/1/2020  Date of Transfer / Stepdown: 3/3/2020  Terrell, C/J, L, Onc (IV chemo w/in 1 month), Gyn/Onc, or other special case?: no   ICU team stepping patient down: MICU  ICU team member giving verbal handoff: Leonid Edwards MD  Accepting  team: TO    Brief History of Present Illness:      74 year old F with PMH of COPD, CAD, PAD who initially presented to the ED today for SOB. States that she has had associated cough and subjective fevers, all of which have progressively worsened. Upon presentation to the ED, she was tachypneic, using accessory muscles to breath with wheezing on examination. Patient was placed on bipap for breathing support. Breathing treatment was given. Labs remarkable for WBC of 17.44. Initial VBG of 7.1/70/48/21.4/-8.  BNP of 482, Troponin of 0.034. CXR with nonspecific interstitial markings concerning for possible atypical PNA vs pulmonary edema. She was given Lasix, Azithromycin and Rocephin in ED. Patient remained tachycardic throughout ED course with signs of left leg edema raising concern for possible PE. Ddimer positive. CT Chest pending at time of consultation. Repeat VBG unchanged while in ED. Critical Care Medicine consulted for management of unstable respiratory status.    Hospital/ICU Course:     Admitted to CMICU on 3/2 for acute on chronic hypoxemic/hypercapnic respiratory failure. Given 40 mg IV lasix x1 and placed on BiPAP. Later in the day, tolerating NC before going back on BiPAP overnight. On 3/3, patient off BiPAP in the morning and tolerating 1-2L NC. Cough assist ordered to obtain sputum sample for concerns of MAC. Azithromycin completed, but continuing Rocephin at this time. Advancing diet as tolerated. Steroids decreased to 40 prednisone qd with plan to stop after another day or two. Patient stable for step down from ICU to hospital medicine.    Consultants and Procedures:      Consultants:  None    Procedures:    None    Transfer Information:     Diet:  Cardiac diet    Physical Activity:  PT OT    To Do / Pending Studies / Follow ups:  - sputum for AFB  - continue abx for CAP, transition to oral tomorrow if stable  - check need for home oxygen   - dispo  - Bipap (10/5); continue bipap qhs, transitioning to oral steroids, continue nebulizers  - If chest xray does not clear prior to discharge, repeat imaging and follow up with pulmonary 4-6 weeks after discharge.    Patient has been accepted by Hospital Medicine Team G, who will assume care of the patient upon arrival to the floor from the ICU. Please contact ICU team with any concerns prior to arrival. Please contact Hospital Medicine at [phone] (please do NOT leave a voicemail) when patient arrives to the floor.    Storm Huang MD

## 2020-03-03 NOTE — ASSESSMENT & PLAN NOTE
Likely 2/2 Atypical PNA. Initial VBG consistent with hypercapnic, hypoxic respiratory failure. CXR with interstitial markings, however less likely to be pulmonary edema as patient without other clinical signs of overload.  - CT Chest negative for PE  - Continue NC 1-2L during daytime and bipap QHS  - See atypical PNA treatment plan below

## 2020-03-03 NOTE — NURSING
Pt  had episode of 32 beat V.tach , BP stable .  After 5 mins pt complaint of chest pain , EKG done and labs sent (CMP, Mg and Trop). MD Emery Oden came to bedside, 1x  dose of  mg given. Pt pain fully relieved. WCTM

## 2020-03-03 NOTE — PROGRESS NOTES
[facility]  Critical Care Medicine  Progress Note    Patient Name: Radha Flores  MRN: [id number]  Admission Date: 3/1/2020  Hospital Length of Stay: 2 days  Code Status: Full Code  Attending Provider: Miriam Wright MD  Primary Care Provider: Angela Walker MD   Principal Problem: Acute on chronic respiratory failure with hypoxia and hypercapnia    Subjective:     HPI:  74 year old F with PMH of COPD, CAD, PAD who initially presented to the ED today for SOB for the past 3 days. States that she has had associated cough and subjective fevers, all of which have progressively worsened. Upon presentation to the ED, she was tachypneic, using accessory muscles to breath with wheezing on examination. Patient was placed on bipap for breathing support. Breathing treatment was given. Labs remarkable for WBC of 17.44. Initial VBG of 7.1/70/48/21.4/-8.  BNP of 482, Troponin of 0.034. CXR with nonspecific interstitial markings concerning for possible atypical PNA vs pulmonary edema. She was given Lasix, Azithromycin and Rocephin in ED. Patient remained tachycardic throughout ED course with signs of left leg edema raising concern for possible PE. Ddimer positive. CT Chest pending at time of consultation. Repeat VBG unchanged while in ED.     Critical Care Medicine consulted for management of unstable respiratory status.        Hospital/ICU Course:  Admitted to CMICU on 3/2 for acute on chronic hypoxemic/hypercapnic respiratory failure. Given 40 mg IV lasix x1 and placed on BiPAP. Later in the day, tolerating NC before going back on BiPAP overnight. On 3/3, patient off BiPAP in the morning and tolerating 1-2L NC. Cough assist ordered to obtain sputum sample for concerns of MAC. Azithromycin completed, but continuing Rocephin at this time. Advancing diet as tolerated. Steroids decreased to 40 prednisone qd with plan to stop after another day or two. Patient stable for step down from ICU to hospital  medicine.         Interval History/Significant Events: NAEO. Patient off BiPAP in the morning and tolerating 1-2L NC. Cough assist ordered to obtain sputum sample for concerns of MAC. Azithromycin completed, but continuing Rocephin at this time. Advancing diet as tolerated. Patient stable for step down from ICU to hospital medicine.      Review of Systems   Constitutional: Negative for activity change, appetite change, fatigue and fever.   HENT: Negative for congestion, sneezing and sore throat.    Eyes: Negative for discharge and redness.   Respiratory: Positive for cough. Negative for shortness of breath and wheezing.    Gastrointestinal: Negative for abdominal pain, blood in stool, diarrhea, nausea and vomiting.   Genitourinary: Negative for dysuria and vaginal bleeding.   Musculoskeletal: Negative for back pain and neck pain.   Skin: Negative for pallor and rash.   Neurological: Negative for seizures and light-headedness.     Objective:     Vital Signs (Most Recent):  Temp: 98.4 °F (36.9 °C) (03/03/20 0715)  Pulse: (!) 113 (03/03/20 0830)  Resp: (!) 24 (03/03/20 0830)  BP: (!) 154/68 (03/03/20 0800)  SpO2: (!) 76 % (03/03/20 0830) Vital Signs (24h Range):  Temp:  [97.5 °F (36.4 °C)-98.4 °F (36.9 °C)] 98.4 °F (36.9 °C)  Pulse:  [] 113  Resp:  [15-60] 24  SpO2:  [76 %-100 %] 76 %  BP: ()/() 154/68   Weight: 63.5 kg (140 lb)  Body mass index is 27.34 kg/m².      Intake/Output Summary (Last 24 hours) at 3/3/2020 0845  Last data filed at 3/3/2020 0800  Gross per 24 hour   Intake 1015 ml   Output 1615 ml   Net -600 ml       Physical Exam   Constitutional: She is oriented to person, place, and time. No distress.   Elderly, chronically ill appearing woman   HENT:   Head: Normocephalic and atraumatic.   Eyes: Pupils are equal, round, and reactive to light. Conjunctivae and EOM are normal. Right eye exhibits no discharge. Left eye exhibits no discharge.   Neck: Normal range of motion. Neck supple.    Cardiovascular: Normal heart sounds and intact distal pulses.   Tachycardic and regular   Pulmonary/Chest: No respiratory distress. She has wheezes (mild end expiratory wheezes).   Abdominal: Soft. Bowel sounds are normal. There is no tenderness. There is no guarding.   Colostomy bag in place with dark stool  Ostomy patent pink and producing with mild prolapse   Musculoskeletal: She exhibits no edema or tenderness.   Right foot s/p midfoot amputation   Neurological: She is alert and oriented to person, place, and time.   Skin: Skin is warm and dry. Capillary refill takes less than 2 seconds. No rash noted. No erythema.   Nursing note and vitals reviewed.      Vents:  Oxygen Concentration (%): 2 (03/03/20 0400)  Lines/Drains/Airways     Drain                 Colostomy -- days         Colostomy LLQ -- days         Colostomy LLQ -- days         Urethral Catheter 03/02/20 0749 1 day          Peripheral Intravenous Line                 Peripheral IV - Single Lumen Left Antecubital -- days         Peripheral IV - Single Lumen 03/01/20 2013 20 G Left Antecubital 1 day         Peripheral IV - Single Lumen 03/02/20 2100 Left Forearm less than 1 day              Significant Labs:    CBC/Anemia Profile:  Recent Labs   Lab 03/01/20  1644 03/02/20  0506 03/03/20  0624   WBC 17.44* 12.44 7.62   HGB 12.7 12.2 13.2   HCT 41.2 39.6 44.5    291 315   MCV 99* 98 101*   RDW 13.3 13.2 13.6        Chemistries:  Recent Labs   Lab 03/01/20  1644 03/02/20  0506 03/02/20  1144 03/02/20  2056 03/03/20  0428    136  --  139 143   K 3.5 2.7*  --  4.6 4.4    99  --  103 104   CO2 15* 21*  --  23 23   BUN 17 24*  --  32* 35*   CREATININE 0.9 0.9  --  1.0 0.9   CALCIUM 9.5 9.1  --  9.3 8.9   ALBUMIN 2.8*  --   --   --   --    PROT 7.6  --   --   --   --    BILITOT 0.3  --   --   --   --    ALKPHOS 175*  --   --   --   --    ALT 13  --   --   --   --    AST 17  --   --   --   --    MG  --   --  1.9 2.2 2.3   PHOS  --   --   --    --  4.3       All pertinent labs within the past 24 hours have been reviewed.    Significant Imaging:  I have reviewed all pertinent imaging results/findings within the past 24 hours.      ABG  Recent Labs   Lab 03/03/20  0953   PH 7.249*   PO2 54   PCO2 57.9*   HCO3 25.3   BE -2     Assessment/Plan:     Pulmonary  * Acute on chronic respiratory failure with hypoxia and hypercapnia  Likely 2/2 Atypical PNA. Initial VBG consistent with hypercapnic, hypoxic respiratory failure. CXR with interstitial markings, however less likely to be pulmonary edema as patient without other clinical signs of overload.  - CT Chest negative for PE  - Continue NC 1-2L during daytime and bipap QHS  - See atypical PNA treatment plan below    COPD exacerbation  See Acute on chronic respiratory failure with hypoxia and hypercapnia    Atypical pneumonia  CXR with increased interstitial markings.   - Continue Rocephin (started 3/1)  - Completed Azithro  - Duonebs prn  - Prednisone 40 qd      Cardiac/Vascular  Elevated troponin  Likely NSTEMI type 2 secondary to demand.       CAD (coronary artery disease)  Elevated BNP and troponin on admission  - Lasix x1 given  - TTE: EF 60, normal LV function       Critical Care Daily Checklist:    A: Awake: RASS Goal/Actual Goal: RASS Goal: 0-->alert and calm  Actual: Junior Agitation Sedation Scale (RASS): Alert and calm   B: Spontaneous Breathing Trial Performed?     C: SAT & SBT Coordinated?  NA                      D: Delirium: CAM-ICU Overall CAM-ICU: Negative   E: Early Mobility Performed? Yes   F: Feeding Goal:    Status:     Current Diet Order   Procedures    Diet Low Sodium, 2gm      AS: Analgesia/Sedation NA   T: Thromboembolic Prophylaxis SQH   H: HOB > 300 Yes   U: Stress Ulcer Prophylaxis (if needed) NA   G: Glucose Control NA   B: Bowel Function     I: Indwelling Catheter (Lines & Harris) Necessity NA   D: De-escalation of Antimicrobials/Pharmacotherapies Rocephin    Plan for the day/ETD  SD    Code Status:  Family/Goals of Care: Full Code  Discussed with patient        Critical secondary to Patient has a condition that poses threat to life and bodily function: Severe Respiratory Distress      Critical care was time spent personally by me on the following activities: development of treatment plan with patient or surrogate and bedside caregivers, discussions with consultants, evaluation of patient's response to treatment, examination of patient, ordering and performing treatments and interventions, ordering and review of laboratory studies, ordering and review of radiographic studies, pulse oximetry, re-evaluation of patient's condition. This critical care time did not overlap with that of any other provider or involve time for any procedures.     Leonid Edwards MD  Critical Care Medicine  [facility]

## 2020-03-04 PROBLEM — Z72.0 NICOTINE VAPOR PRODUCT USER: Status: ACTIVE | Noted: 2020-03-04

## 2020-03-04 LAB
ANION GAP SERPL CALC-SCNC: 8 MMOL/L (ref 8–16)
BASOPHILS # BLD AUTO: 0.01 K/UL (ref 0–0.2)
BASOPHILS NFR BLD: 0.1 % (ref 0–1.9)
BUN SERPL-MCNC: 29 MG/DL (ref 8–23)
CALCIUM SERPL-MCNC: 9.1 MG/DL (ref 8.7–10.5)
CHLORIDE SERPL-SCNC: 105 MMOL/L (ref 95–110)
CO2 SERPL-SCNC: 27 MMOL/L (ref 23–29)
CREAT SERPL-MCNC: 0.7 MG/DL (ref 0.5–1.4)
DIFFERENTIAL METHOD: ABNORMAL
EOSINOPHIL # BLD AUTO: 0 K/UL (ref 0–0.5)
EOSINOPHIL NFR BLD: 0 % (ref 0–8)
ERYTHROCYTE [DISTWIDTH] IN BLOOD BY AUTOMATED COUNT: 13.6 % (ref 11.5–14.5)
EST. GFR  (AFRICAN AMERICAN): >60 ML/MIN/1.73 M^2
EST. GFR  (NON AFRICAN AMERICAN): >60 ML/MIN/1.73 M^2
GLUCOSE SERPL-MCNC: 118 MG/DL (ref 70–110)
HCT VFR BLD AUTO: 38.7 % (ref 37–48.5)
HGB BLD-MCNC: 11.6 G/DL (ref 12–16)
IMM GRANULOCYTES # BLD AUTO: 0.11 K/UL (ref 0–0.04)
IMM GRANULOCYTES NFR BLD AUTO: 1.3 % (ref 0–0.5)
L PNEUMO AG UR QL IA: NOT DETECTED
LYMPHOCYTES # BLD AUTO: 0.7 K/UL (ref 1–4.8)
LYMPHOCYTES NFR BLD: 8.4 % (ref 18–48)
MAGNESIUM SERPL-MCNC: 2.3 MG/DL (ref 1.6–2.6)
MCH RBC QN AUTO: 30.2 PG (ref 27–31)
MCHC RBC AUTO-ENTMCNC: 30 G/DL (ref 32–36)
MCV RBC AUTO: 101 FL (ref 82–98)
MONOCYTES # BLD AUTO: 0.7 K/UL (ref 0.3–1)
MONOCYTES NFR BLD: 8.8 % (ref 4–15)
NEUTROPHILS # BLD AUTO: 6.7 K/UL (ref 1.8–7.7)
NEUTROPHILS NFR BLD: 81.4 % (ref 38–73)
NRBC BLD-RTO: 0 /100 WBC
PHOSPHATE SERPL-MCNC: 2.6 MG/DL (ref 2.7–4.5)
PLATELET # BLD AUTO: 291 K/UL (ref 150–350)
PMV BLD AUTO: 9.5 FL (ref 9.2–12.9)
POTASSIUM SERPL-SCNC: 4 MMOL/L (ref 3.5–5.1)
RBC # BLD AUTO: 3.84 M/UL (ref 4–5.4)
SODIUM SERPL-SCNC: 140 MMOL/L (ref 136–145)
WBC # BLD AUTO: 8.26 K/UL (ref 3.9–12.7)

## 2020-03-04 PROCEDURE — 99900035 HC TECH TIME PER 15 MIN (STAT)

## 2020-03-04 PROCEDURE — 97535 SELF CARE MNGMENT TRAINING: CPT

## 2020-03-04 PROCEDURE — 94640 AIRWAY INHALATION TREATMENT: CPT

## 2020-03-04 PROCEDURE — 25000242 PHARM REV CODE 250 ALT 637 W/ HCPCS: Performed by: STUDENT IN AN ORGANIZED HEALTH CARE EDUCATION/TRAINING PROGRAM

## 2020-03-04 PROCEDURE — 80048 BASIC METABOLIC PNL TOTAL CA: CPT

## 2020-03-04 PROCEDURE — 25000003 PHARM REV CODE 250: Performed by: STUDENT IN AN ORGANIZED HEALTH CARE EDUCATION/TRAINING PROGRAM

## 2020-03-04 PROCEDURE — 85025 COMPLETE CBC W/AUTO DIFF WBC: CPT

## 2020-03-04 PROCEDURE — 20600001 HC STEP DOWN PRIVATE ROOM

## 2020-03-04 PROCEDURE — 27000646 HC AEROBIKA DEVICE

## 2020-03-04 PROCEDURE — 94761 N-INVAS EAR/PLS OXIMETRY MLT: CPT

## 2020-03-04 PROCEDURE — 63600175 PHARM REV CODE 636 W HCPCS: Performed by: STUDENT IN AN ORGANIZED HEALTH CARE EDUCATION/TRAINING PROGRAM

## 2020-03-04 PROCEDURE — 27000221 HC OXYGEN, UP TO 24 HOURS

## 2020-03-04 PROCEDURE — 99233 SBSQ HOSP IP/OBS HIGH 50: CPT | Mod: GC,,, | Performed by: INTERNAL MEDICINE

## 2020-03-04 PROCEDURE — S4991 NICOTINE PATCH NONLEGEND: HCPCS | Performed by: STUDENT IN AN ORGANIZED HEALTH CARE EDUCATION/TRAINING PROGRAM

## 2020-03-04 PROCEDURE — 83735 ASSAY OF MAGNESIUM: CPT

## 2020-03-04 PROCEDURE — 97530 THERAPEUTIC ACTIVITIES: CPT

## 2020-03-04 PROCEDURE — 99233 PR SUBSEQUENT HOSPITAL CARE,LEVL III: ICD-10-PCS | Mod: GC,,, | Performed by: INTERNAL MEDICINE

## 2020-03-04 PROCEDURE — 84100 ASSAY OF PHOSPHORUS: CPT

## 2020-03-04 PROCEDURE — 94664 DEMO&/EVAL PT USE INHALER: CPT

## 2020-03-04 RX ORDER — NICOTINE 7MG/24HR
1 PATCH, TRANSDERMAL 24 HOURS TRANSDERMAL DAILY
Status: DISCONTINUED | OUTPATIENT
Start: 2020-03-04 | End: 2020-03-06 | Stop reason: HOSPADM

## 2020-03-04 RX ORDER — SODIUM,POTASSIUM PHOSPHATES 280-250MG
2 POWDER IN PACKET (EA) ORAL EVERY 4 HOURS
Status: COMPLETED | OUTPATIENT
Start: 2020-03-04 | End: 2020-03-04

## 2020-03-04 RX ADMIN — ASPIRIN 81 MG CHEWABLE TABLET 81 MG: 81 TABLET CHEWABLE at 08:03

## 2020-03-04 RX ADMIN — IPRATROPIUM BROMIDE AND ALBUTEROL SULFATE 3 ML: .5; 3 SOLUTION RESPIRATORY (INHALATION) at 08:03

## 2020-03-04 RX ADMIN — CLOPIDOGREL BISULFATE 75 MG: 75 TABLET ORAL at 08:03

## 2020-03-04 RX ADMIN — CILOSTAZOL 100 MG: 100 TABLET ORAL at 08:03

## 2020-03-04 RX ADMIN — GUAIFENESIN 600 MG: 600 TABLET, EXTENDED RELEASE ORAL at 08:03

## 2020-03-04 RX ADMIN — HEPARIN SODIUM 5000 UNITS: 5000 INJECTION, SOLUTION INTRAVENOUS; SUBCUTANEOUS at 09:03

## 2020-03-04 RX ADMIN — IPRATROPIUM BROMIDE AND ALBUTEROL SULFATE 3 ML: .5; 3 SOLUTION RESPIRATORY (INHALATION) at 03:03

## 2020-03-04 RX ADMIN — CEFTRIAXONE 2 G: 2 INJECTION, SOLUTION INTRAVENOUS at 10:03

## 2020-03-04 RX ADMIN — IPRATROPIUM BROMIDE AND ALBUTEROL SULFATE 3 ML: .5; 3 SOLUTION RESPIRATORY (INHALATION) at 11:03

## 2020-03-04 RX ADMIN — IPRATROPIUM BROMIDE AND ALBUTEROL SULFATE 3 ML: .5; 3 SOLUTION RESPIRATORY (INHALATION) at 12:03

## 2020-03-04 RX ADMIN — FLUTICASONE FUROATE AND VILANTEROL TRIFENATATE 1 PUFF: 100; 25 POWDER RESPIRATORY (INHALATION) at 08:03

## 2020-03-04 RX ADMIN — NICOTINE 1 PATCH: 7 PATCH TRANSDERMAL at 10:03

## 2020-03-04 RX ADMIN — METOPROLOL TARTRATE 25 MG: 25 TABLET ORAL at 08:03

## 2020-03-04 RX ADMIN — POTASSIUM & SODIUM PHOSPHATES POWDER PACK 280-160-250 MG 2 PACKET: 280-160-250 PACK at 08:03

## 2020-03-04 RX ADMIN — HEPARIN SODIUM 5000 UNITS: 5000 INJECTION, SOLUTION INTRAVENOUS; SUBCUTANEOUS at 05:03

## 2020-03-04 RX ADMIN — CITALOPRAM HYDROBROMIDE 20 MG: 20 TABLET ORAL at 08:03

## 2020-03-04 RX ADMIN — HEPARIN SODIUM 5000 UNITS: 5000 INJECTION, SOLUTION INTRAVENOUS; SUBCUTANEOUS at 02:03

## 2020-03-04 RX ADMIN — PREDNISONE 40 MG: 20 TABLET ORAL at 08:03

## 2020-03-04 RX ADMIN — POTASSIUM & SODIUM PHOSPHATES POWDER PACK 280-160-250 MG 2 PACKET: 280-160-250 PACK at 02:03

## 2020-03-04 RX ADMIN — SIMVASTATIN 20 MG: 20 TABLET, FILM COATED ORAL at 08:03

## 2020-03-04 RX ADMIN — POTASSIUM & SODIUM PHOSPHATES POWDER PACK 280-160-250 MG 2 PACKET: 280-160-250 PACK at 05:03

## 2020-03-04 NOTE — PLAN OF CARE
Problem: Physical Therapy Goal  Goal: Physical Therapy Goal  Description  Goals to be met by: 3/26/2020     Patient will increase functional independence with mobility by performin. Supine to sit with MInimal Assistance  2. Sit to stand transfer with Minimal Assistance- met 3/4/2020  Sit to stand transfer with stand by assistance   3. Bed to chair transfer with Moderate Assistance using LRAD  4. Sitting at edge of bed x8 minutes with Stand-by Assistance      Outcome: Ongoing, Progressing     Pt is progressing toward goals. All goals remain appropriate.    Brittney Griffith, PT, DPT  3/4/2020  [phone]

## 2020-03-04 NOTE — PLAN OF CARE
"CMICU DAILY GOALS       A: Awake    RASS: Goal - RASS Goal: 0-->alert and calm  Actual - RASS (Junior Agitation-Sedation Scale): 0-->alert and calm   Restraint necessity: Clinical Justification: Removing medical devices  B: Breath   SBT: Not intubated   C: Coordinate A & B, analgesics/sedatives   Pain: managed    SAT: Not intubated  D: Delirium   CAM-ICU: Overall CAM-ICU: Negative  E: Early Mobility   MOVE Screen: Pass   Activity: Activity Management: bedrest maintained per order  FAS: Feeding/Nutrition   Diet order: Diet/Nutrition Received: 2 gram sodium, Specialty Diet/Nutrition Received: other (see comments)(NPO) Fluid restriction:    T: Thrombus   DVT prophylaxis: VTE Required Core Measure: Pharmacological prophylaxis initiated/maintained  H: HOB Elevation   Head of Bed (HOB): HOB at 30-45 degrees  U: Ulcer Prophylaxis   GI:no  G: Glucose control   managed    S: Skin   Bundle compliance: yes   Bathing/Skin Care: back care, bath, chlorhexidine, bath, complete, linen changed Date: [unfilled]  B: Bowel Function   Colostomy bag    I: Indwelling Catheters   Harris necessity: [REMOVED]      Urethral Catheter 03/02/20 0749-Reason for Continuing Urinary Catheterization: Critically ill in ICU requiring intensive monitoring   CVC necessity: No   IPAD offered: No  D: De-escalation Antibx   Yes  Plan for the day   For step down with sitter.   Family/Goals of care/Code Status   Code Status: Full Code    09:46 pm -Pt noted more confused tonight then yesterday, RN asked for her name and said " I'm Caro",  doesn't know where she is and  wants to go home. MD NAOMI Land made aware.   MD ordered VBG. WCTM.  Pt more awake and oriented, knows her name and birthday, needs frequent reoriention, with sitter at bedside.   No acute events throughout day, VS and assessment per flow sheet, patient progressing towards goals as tolerated, plan of care reviewed with Radha Flores and family, all concerns addressed, will continue to " monitor.

## 2020-03-04 NOTE — PT/OT/SLP PROGRESS
"Occupational Therapy   Treatment    Name: Radha Flores  MRN: [id number]  Admitting Diagnosis:  Acute on chronic respiratory failure with hypoxia and hypercapnia       Recommendations:     Discharge Recommendations: nursing facility, skilled pending functional progress       Assessment:     Radha Flores is a 74 y.o. female with a medical diagnosis of Acute on chronic respiratory failure with hypoxia and hypercapnia.  . Performance deficits affecting function are weakness, gait instability, impaired balance, impaired endurance, impaired coordination, decreased safety awareness, impaired self care skills, impaired cognition, decreased coordination, impaired functional mobilty. Pt tolerated session well. Improved performance compared to eval; however, decreased oxygenation and increased SOB with limited activity.     Rehab Prognosis:  Good; patient would benefit from acute skilled OT services to address these deficits and reach maximum level of function.       Plan:     Patient to be seen 3 x/week to address the above listed problems via self-care/home management, therapeutic activities, therapeutic exercises, neuromuscular re-education, cognitive retraining  · Plan of Care Expires: 04/02/20  · Plan of Care Reviewed with: patient    Subjective     "I am short of breath" pt reports once sitting EOB.     Pain/Comfort:  · Pain Rating 1: 0/10    Objective:     Communicated with: nsg prior to session.  Pt found supine in bed with 2 LPM oxygen in place and saturation 96%. With activity, saturation decreased to 78% and returned to 90% with supine rest break    General Precautions: Standard, droplet, fall   Orthopedic Precautions:N/A     Occupational Performance:     Bed Mobility:    Supine>sit with MAX A   Sit>supine with MIN A     Functional Mobility/Transfers:  · Sit>stand with CGA    Activities of Daily Living:  · Feeding: set-up  · G/H; seated with set-up  · LE dressing: TOTAL A       [facility] 6 Click ADL: " 14    Treatment & Education:  Initially, pt with eyes open but not following commands. Once EOB, pt with increased alertness and interaction with therapist. Pt then following simple commands. Pt tolerated sitting approx 5-6 min with Fair to Fair+ sitting balance; however, pt reports increased SOB with oxygenation not recovering very well following the supine>sit transition.  Pt returned to bed with bed placed in modified chair position with saturation improving to 90%.  Education provided re: OT POC and safety with functional mobility/ADL skills.     Patient left with bed in chair position with all lines intact, call button in reach and nsg notifiedEducation:      GOALS:   Multidisciplinary Problems     Occupational Therapy Goals        Problem: Occupational Therapy Goal    Goal Priority Disciplines Outcome Interventions   Occupational Therapy Goal     OT, PT/OT Ongoing, Progressing    Description:  Goals to be met by: 3/16/2020     Patient will increase functional independence with ADLs by performing:    UE Dressing  With set-up  Grooming while standing with with set-up  LE dressing with supervision.  Toileting with SBA  Pt to tolerated OOB to chair x 3-4 hours daily to increase endurance for functional activity.  Pt to t/f to commode with SBA                         Time Tracking:     OT Date of Treatment: 03/04/20  OT Start Time: 0814  OT Stop Time: 0833  OT Total Time (min): 19 min    Billable Minutes:Self Care/Home Management 19    MAURICIO Myers  3/4/2020

## 2020-03-04 NOTE — PLAN OF CARE
Goals updated this date. POC remains appropriate.   Problem: Occupational Therapy Goal  Goal: Occupational Therapy Goal  Description  Goals to be met by: 3/16/2020     Patient will increase functional independence with ADLs by performing:    UE Dressing  With set-up  Grooming while standing with with set-up  LE dressing with supervision.  Toileting with SBA  Pt to tolerated OOB to chair x 3-4 hours daily to increase endurance for functional activity.  Pt to t/f to commode with SBA        3/4/2020 1036 by MAURICIO Myers  Outcome: Ongoing, Progressing  3/4/2020 1035 by MAURICIO Myers  Outcome: Ongoing, Progressing

## 2020-03-04 NOTE — PT/OT/SLP PROGRESS
Physical Therapy Treatment    Patient Name:  Radha Flores   MRN:  [id number]    *co-treatment with OT   Recommendations:     Discharge Recommendations:  nursing facility, skilled   Discharge Equipment Recommendations: walker, rolling   Barriers to discharge: Inaccessible home and Decreased caregiver support    Assessment:     Radha Flores is a 74 y.o. female admitted with a medical diagnosis of Acute on chronic respiratory failure with hypoxia and hypercapnia.  She presents with the following impairments/functional limitations:  weakness, impaired endurance, impaired functional mobilty, impaired self care skills, gait instability, impaired balance, impaired cognition, impaired cardiopulmonary response to activity. Pt tolerated activity with increased participation, however pt continues to dem'o periods of decreased response with decreased command following. Pt with fluctuating respiratory status, SpO2 dropped 96% to 78 % following supine to sit transfer, improved with prolonged sitting rest. Pt is not at PLOF and not safe to return home at this time. Upon discharge, pt would benefit from continued skilled therapy intervention at skilled nursing facility to progress toward more independent mobility. At this time, pt would continue to benefit from acute skilled therapy intervention to address deficits and progress toward prior level of function.       Rehab Prognosis: Good; patient would benefit from acute skilled PT services to address these deficits and reach maximum level of function.    Recent Surgery: * No surgery found *      Plan:     During this hospitalization, patient to be seen 3 x/week to address the identified rehab impairments via gait training, therapeutic activities, therapeutic exercises, neuromuscular re-education and progress toward the following goals:    · Plan of Care Expires:  04/02/20    Subjective     Chief Complaint: Pt c/o SOB   Patient/Family Comments/goals: to get better and  return home   Pain/Comfort:  · Pain Rating 1: 0/10  · Pain Rating Post-Intervention 1: 0/10      Objective:     Communicated with RN prior to session.  Patient found HOB elevated with blood pressure cuff, pulse ox (continuous), telemetry, oxygen, peripheral IV, colostomy upon PT entry to room.     General Precautions: Standard, fall   Orthopedic Precautions:N/A   Braces: N/A     Functional Mobility:  · Bed Mobility:     · Supine to Sit: maximal assistance  · Sit to Supine: minimum assistance  · Transfers:     · Sit to Stand:  minimum assistance with hand-held assist  · Gait: Pt ambulated 4 lateral steps to the L along EOB with minimum assistance and HHA. Pt demo'd small steps size, decreased foot clearance. Facilitation provided for lateral weight shift to promote step initiation. Pt with no LOB, no dizziness, reports of SOB.       AM-PAC 6 CLICK MOBILITY  Turning over in bed (including adjusting bedclothes, sheets and blankets)?: 2  Sitting down on and standing up from a chair with arms (e.g., wheelchair, bedside commode, etc.): 3  Moving from lying on back to sitting on the side of the bed?: 2  Moving to and from a bed to a chair (including a wheelchair)?: 3  Need to walk in hospital room?: 2  Climbing 3-5 steps with a railing?: 1  Basic Mobility Total Score: 13       Therapeutic Activities and Exercises:   Pt sat EOB with stand by assistance for ~5 mins. Pt with intermittent command following and interaction with therapists. Pt participated in self care with OT.   Pt reporting SOB, following supine > sit transfer, pt SpO2 decreased 96%-78%, slowly increased to 88-90%.   Pt educated on role of PT/POC. Pt verbalized understanding.   Pt encouraged to only perform OOB mobility with assistance from nursing/therapy. Pt agreeable.       Patient left HOB elevated with all lines intact, call button in reach and RN notified..    GOALS:   Multidisciplinary Problems     Physical Therapy Goals        Problem: Physical Therapy  Goal    Goal Priority Disciplines Outcome Goal Variances Interventions   Physical Therapy Goal     PT, PT/OT Ongoing, Progressing     Description:  Goals to be met by: 3/26/2020     Patient will increase functional independence with mobility by performin. Supine to sit with MInimal Assistance  2. Sit to stand transfer with Minimal Assistance- met 3/4/2020  Sit to stand transfer with stand by assistance   3. Bed to chair transfer with Moderate Assistance using LRAD  4. Sitting at edge of bed x8 minutes with Stand-by Assistance                       Time Tracking:     PT Received On: 20  PT Start Time: 813     PT Stop Time: 830  PT Total Time (min): 17 min     Billable Minutes: Therapeutic Activity 17 mins     Treatment Type: Treatment  PT/PTA: PT     PTA Visit Number: 0     Brittney Griffith, PT  2020

## 2020-03-04 NOTE — SUBJECTIVE & OBJECTIVE
Interval History/Significant Events: NAEO. Patient off BiPAP in the morning and tolerating 1-2L NC. Continue BiPAP QHS. Continuing Rocephin at this time. Advancing diet as tolerated. Patient stable for step down from ICU to hospital medicine.      Review of Systems   Constitutional: Negative for activity change, appetite change, fatigue and fever.   HENT: Negative for congestion, sneezing and sore throat.    Eyes: Negative for discharge and redness.   Respiratory: Positive for cough. Negative for shortness of breath and wheezing.    Gastrointestinal: Negative for abdominal pain, blood in stool, diarrhea, nausea and vomiting.   Genitourinary: Negative for dysuria and vaginal bleeding.   Musculoskeletal: Negative for back pain and neck pain.   Skin: Negative for pallor and rash.   Neurological: Negative for seizures and light-headedness.     Objective:     Vital Signs (Most Recent):  Temp: 98.2 °F (36.8 °C) (03/04/20 1203)  Pulse: (!) 112 (03/04/20 1203)  Resp: 20 (03/04/20 1203)  BP: 131/74 (03/04/20 1203)  SpO2: (!) 90 % (03/04/20 1203) Vital Signs (24h Range):  Temp:  [97.5 °F (36.4 °C)-98.4 °F (36.9 °C)] 98.2 °F (36.8 °C)  Pulse:  [] 112  Resp:  [18-45] 20  SpO2:  [86 %-100 %] 90 %  BP: (105-159)/(53-81) 131/74   Weight: 63.5 kg (140 lb)  Body mass index is 27.34 kg/m².      Intake/Output Summary (Last 24 hours) at 3/4/2020 1418  Last data filed at 3/4/2020 1025  Gross per 24 hour   Intake 380 ml   Output 551 ml   Net -171 ml       Physical Exam   Constitutional: She is oriented to person, place, and time. No distress.   Elderly, chronically ill appearing woman   HENT:   Head: Normocephalic and atraumatic.   Eyes: Pupils are equal, round, and reactive to light. Conjunctivae and EOM are normal. Right eye exhibits no discharge. Left eye exhibits no discharge.   Neck: Normal range of motion. Neck supple.   Cardiovascular: Normal heart sounds and intact distal pulses.   Tachycardic and regular   Pulmonary/Chest:  No respiratory distress. She has wheezes (mild end expiratory wheezes).   Abdominal: Soft. Bowel sounds are normal. There is no tenderness. There is no guarding.   Colostomy bag in place with dark stool  Ostomy patent pink and producing with mild prolapse   Musculoskeletal: She exhibits no edema or tenderness.   Right foot s/p midfoot amputation   Neurological: She is alert and oriented to person, place, and time.   Skin: Skin is warm and dry. Capillary refill takes less than 2 seconds. No rash noted. No erythema.   Nursing note and vitals reviewed.      Vents:  Oxygen Concentration (%): 30 (03/04/20 0600)  Lines/Drains/Airways     Drain                 Colostomy -- days         Colostomy LLQ -- days         Colostomy LLQ -- days          Peripheral Intravenous Line                 Peripheral IV - Single Lumen Left Antecubital -- days         Peripheral IV - Single Lumen 03/03/20 22 G Left Forearm 1 day              Significant Labs:    CBC/Anemia Profile:  Recent Labs   Lab 03/03/20  0624 03/04/20  0306   WBC 7.62 8.26   HGB 13.2 11.6*   HCT 44.5 38.7    291   * 101*   RDW 13.6 13.6        Chemistries:  Recent Labs   Lab 03/02/20  2056 03/03/20  0428 03/04/20  0306    143 140   K 4.6 4.4 4.0    104 105   CO2 23 23 27   BUN 32* 35* 29*   CREATININE 1.0 0.9 0.7   CALCIUM 9.3 8.9 9.1   MG 2.2 2.3 2.3   PHOS  --  4.3 2.6*       All pertinent labs within the past 24 hours have been reviewed.    Significant Imaging:  I have reviewed all pertinent imaging results/findings within the past 24 hours.

## 2020-03-04 NOTE — PROGRESS NOTES
[facility]  Critical Care Medicine  Progress Note    Patient Name: Radha Flores  MRN: [id number]  Admission Date: 3/1/2020  Hospital Length of Stay: 3 days  Code Status: Full Code  Attending Provider: Storm Huang MD  Primary Care Provider: Angela Walker MD   Principal Problem: Acute on chronic respiratory failure with hypoxia and hypercapnia    Subjective:     HPI:  74 year old F with PMH of COPD, CAD, PAD who initially presented to the ED today for SOB for the past 3 days. States that she has had associated cough and subjective fevers, all of which have progressively worsened. Upon presentation to the ED, she was tachypneic, using accessory muscles to breath with wheezing on examination. Patient was placed on bipap for breathing support. Breathing treatment was given. Labs remarkable for WBC of 17.44. Initial VBG of 7.1/70/48/21.4/-8.  BNP of 482, Troponin of 0.034. CXR with nonspecific interstitial markings concerning for possible atypical PNA vs pulmonary edema. She was given Lasix, Azithromycin and Rocephin in ED. Patient remained tachycardic throughout ED course with signs of left leg edema raising concern for possible PE. Ddimer positive. CT Chest pending at time of consultation. Repeat VBG unchanged while in ED.     Critical Care Medicine consulted for management of unstable respiratory status.        Hospital/ICU Course:  Admitted to CMICU on 3/2 for acute on chronic hypoxemic/hypercapnic respiratory failure. Given 40 mg IV lasix x1 and placed on BiPAP. Later in the day, tolerating NC before going back on BiPAP overnight. On 3/3, patient off BiPAP in the morning and tolerating 1-2L NC. Cough assist ordered to obtain sputum sample for concerns of MAC. Azithromycin completed, but continuing Rocephin at this time. Advancing diet as tolerated. Steroids decreased to 40 prednisone qd with plan to stop after another day or two. Patient stable for step down from ICU to hospital  medicine.         Interval History/Significant Events: NAEO. Patient off BiPAP in the morning and tolerating 1-2L NC. Continue BiPAP QHS. Continuing Rocephin at this time. Advancing diet as tolerated. Patient stable for step down from ICU to hospital medicine.      Review of Systems   Constitutional: Negative for activity change, appetite change, fatigue and fever.   HENT: Negative for congestion, sneezing and sore throat.    Eyes: Negative for discharge and redness.   Respiratory: Positive for cough. Negative for shortness of breath and wheezing.    Gastrointestinal: Negative for abdominal pain, blood in stool, diarrhea, nausea and vomiting.   Genitourinary: Negative for dysuria and vaginal bleeding.   Musculoskeletal: Negative for back pain and neck pain.   Skin: Negative for pallor and rash.   Neurological: Negative for seizures and light-headedness.     Objective:     Vital Signs (Most Recent):  Temp: 98.2 °F (36.8 °C) (03/04/20 1203)  Pulse: (!) 112 (03/04/20 1203)  Resp: 20 (03/04/20 1203)  BP: 131/74 (03/04/20 1203)  SpO2: (!) 90 % (03/04/20 1203) Vital Signs (24h Range):  Temp:  [97.5 °F (36.4 °C)-98.4 °F (36.9 °C)] 98.2 °F (36.8 °C)  Pulse:  [] 112  Resp:  [18-45] 20  SpO2:  [86 %-100 %] 90 %  BP: (105-159)/(53-81) 131/74   Weight: 63.5 kg (140 lb)  Body mass index is 27.34 kg/m².      Intake/Output Summary (Last 24 hours) at 3/4/2020 1418  Last data filed at 3/4/2020 1025  Gross per 24 hour   Intake 380 ml   Output 551 ml   Net -171 ml       Physical Exam   Constitutional: She is oriented to person, place, and time. No distress.   Elderly, chronically ill appearing woman   HENT:   Head: Normocephalic and atraumatic.   Eyes: Pupils are equal, round, and reactive to light. Conjunctivae and EOM are normal. Right eye exhibits no discharge. Left eye exhibits no discharge.   Neck: Normal range of motion. Neck supple.   Cardiovascular: Normal heart sounds and intact distal pulses.   Tachycardic and regular    Pulmonary/Chest: No respiratory distress. She has wheezes (mild end expiratory wheezes).   Abdominal: Soft. Bowel sounds are normal. There is no tenderness. There is no guarding.   Colostomy bag in place with dark stool  Ostomy patent pink and producing with mild prolapse   Musculoskeletal: She exhibits no edema or tenderness.   Right foot s/p midfoot amputation   Neurological: She is alert and oriented to person, place, and time.   Skin: Skin is warm and dry. Capillary refill takes less than 2 seconds. No rash noted. No erythema.   Nursing note and vitals reviewed.      Vents:  Oxygen Concentration (%): 30 (03/04/20 0600)  Lines/Drains/Airways     Drain                 Colostomy -- days         Colostomy LLQ -- days         Colostomy LLQ -- days          Peripheral Intravenous Line                 Peripheral IV - Single Lumen Left Antecubital -- days         Peripheral IV - Single Lumen 03/03/20 22 G Left Forearm 1 day              Significant Labs:    CBC/Anemia Profile:  Recent Labs   Lab 03/03/20  0624 03/04/20  0306   WBC 7.62 8.26   HGB 13.2 11.6*   HCT 44.5 38.7    291   * 101*   RDW 13.6 13.6        Chemistries:  Recent Labs   Lab 03/02/20  2056 03/03/20  0428 03/04/20  0306    143 140   K 4.6 4.4 4.0    104 105   CO2 23 23 27   BUN 32* 35* 29*   CREATININE 1.0 0.9 0.7   CALCIUM 9.3 8.9 9.1   MG 2.2 2.3 2.3   PHOS  --  4.3 2.6*       All pertinent labs within the past 24 hours have been reviewed.    Significant Imaging:  I have reviewed all pertinent imaging results/findings within the past 24 hours.      ABG  Recent Labs   Lab 03/03/20  2201   PH 7.229*   PO2 46   PCO2 78.1*   HCO3 32.6*   BE 5     Assessment/Plan:     Pulmonary  * Acute on chronic respiratory failure with hypoxia and hypercapnia  Likely 2/2 Atypical PNA. Initial VBG consistent with hypercapnic, hypoxic respiratory failure. CXR with interstitial markings, however less likely to be pulmonary edema as patient  without other clinical signs of overload.  - CT Chest negative for PE  - Continue NC 1-2L during daytime and bipap QHS  - See atypical PNA treatment plan below    COPD exacerbation  See Acute on chronic respiratory failure with hypoxia and hypercapnia    Atypical pneumonia  CXR with increased interstitial markings.   - Continue Rocephin (started 3/1)  - Completed Azithro  - Duonebs prn  - Prednisone 40 qd      Cardiac/Vascular  Elevated troponin  Likely NSTEMI type 2 secondary to demand.       CAD (coronary artery disease)  Elevated BNP and troponin on admission  - Lasix x1 given  - TTE: EF 60, normal LV function    Other  Nicotine vapor product user  Counseled on the risks associated with vaping and encouraged cessation   Nicotine patch ordered      Critical secondary to Patient has a condition that poses threat to life and bodily function: Severe Respiratory Distress      Critical care was time spent personally by me on the following activities: development of treatment plan with patient or surrogate and bedside caregivers, discussions with consultants, evaluation of patient's response to treatment, examination of patient, ordering and performing treatments and interventions, ordering and review of laboratory studies, ordering and review of radiographic studies, pulse oximetry, re-evaluation of patient's condition. This critical care time did not overlap with that of any other provider or involve time for any procedures.     Leonid Edwards MD  Critical Care Medicine  [facility]

## 2020-03-05 LAB
ANION GAP SERPL CALC-SCNC: 8 MMOL/L (ref 8–16)
BASOPHILS # BLD AUTO: 0.03 K/UL (ref 0–0.2)
BASOPHILS NFR BLD: 0.3 % (ref 0–1.9)
BUN SERPL-MCNC: 26 MG/DL (ref 8–23)
CALCIUM SERPL-MCNC: 9 MG/DL (ref 8.7–10.5)
CHLORIDE SERPL-SCNC: 104 MMOL/L (ref 95–110)
CO2 SERPL-SCNC: 30 MMOL/L (ref 23–29)
CREAT SERPL-MCNC: 0.7 MG/DL (ref 0.5–1.4)
DIFFERENTIAL METHOD: ABNORMAL
EOSINOPHIL # BLD AUTO: 0 K/UL (ref 0–0.5)
EOSINOPHIL NFR BLD: 0.1 % (ref 0–8)
ERYTHROCYTE [DISTWIDTH] IN BLOOD BY AUTOMATED COUNT: 13.6 % (ref 11.5–14.5)
EST. GFR  (AFRICAN AMERICAN): >60 ML/MIN/1.73 M^2
EST. GFR  (NON AFRICAN AMERICAN): >60 ML/MIN/1.73 M^2
GLUCOSE SERPL-MCNC: 91 MG/DL (ref 70–110)
HCT VFR BLD AUTO: 41.1 % (ref 37–48.5)
HGB BLD-MCNC: 12.3 G/DL (ref 12–16)
IMM GRANULOCYTES # BLD AUTO: 0.13 K/UL (ref 0–0.04)
IMM GRANULOCYTES NFR BLD AUTO: 1.5 % (ref 0–0.5)
LYMPHOCYTES # BLD AUTO: 1.3 K/UL (ref 1–4.8)
LYMPHOCYTES NFR BLD: 15.1 % (ref 18–48)
MAGNESIUM SERPL-MCNC: 2.3 MG/DL (ref 1.6–2.6)
MCH RBC QN AUTO: 30.1 PG (ref 27–31)
MCHC RBC AUTO-ENTMCNC: 29.9 G/DL (ref 32–36)
MCV RBC AUTO: 101 FL (ref 82–98)
MONOCYTES # BLD AUTO: 1.3 K/UL (ref 0.3–1)
MONOCYTES NFR BLD: 14.4 % (ref 4–15)
NEUTROPHILS # BLD AUTO: 6.1 K/UL (ref 1.8–7.7)
NEUTROPHILS NFR BLD: 68.6 % (ref 38–73)
NRBC BLD-RTO: 0 /100 WBC
PHOSPHATE SERPL-MCNC: 2.6 MG/DL (ref 2.7–4.5)
PLATELET # BLD AUTO: 241 K/UL (ref 150–350)
PMV BLD AUTO: 9.5 FL (ref 9.2–12.9)
POTASSIUM SERPL-SCNC: 3.8 MMOL/L (ref 3.5–5.1)
RBC # BLD AUTO: 4.09 M/UL (ref 4–5.4)
SODIUM SERPL-SCNC: 142 MMOL/L (ref 136–145)
TB INDURATION 48 - 72 HR READ: 0 MM
WBC # BLD AUTO: 8.87 K/UL (ref 3.9–12.7)

## 2020-03-05 PROCEDURE — 84100 ASSAY OF PHOSPHORUS: CPT

## 2020-03-05 PROCEDURE — 94640 AIRWAY INHALATION TREATMENT: CPT

## 2020-03-05 PROCEDURE — 99231 SBSQ HOSP IP/OBS SF/LOW 25: CPT | Mod: ,,, | Performed by: INTERNAL MEDICINE

## 2020-03-05 PROCEDURE — 94761 N-INVAS EAR/PLS OXIMETRY MLT: CPT

## 2020-03-05 PROCEDURE — S4991 NICOTINE PATCH NONLEGEND: HCPCS | Performed by: STUDENT IN AN ORGANIZED HEALTH CARE EDUCATION/TRAINING PROGRAM

## 2020-03-05 PROCEDURE — 63600175 PHARM REV CODE 636 W HCPCS: Performed by: STUDENT IN AN ORGANIZED HEALTH CARE EDUCATION/TRAINING PROGRAM

## 2020-03-05 PROCEDURE — 99231 PR SUBSEQUENT HOSPITAL CARE,LEVL I: ICD-10-PCS | Mod: ,,, | Performed by: INTERNAL MEDICINE

## 2020-03-05 PROCEDURE — 25000003 PHARM REV CODE 250: Performed by: STUDENT IN AN ORGANIZED HEALTH CARE EDUCATION/TRAINING PROGRAM

## 2020-03-05 PROCEDURE — 63600175 PHARM REV CODE 636 W HCPCS: Performed by: INTERNAL MEDICINE

## 2020-03-05 PROCEDURE — 27000221 HC OXYGEN, UP TO 24 HOURS

## 2020-03-05 PROCEDURE — 83735 ASSAY OF MAGNESIUM: CPT

## 2020-03-05 PROCEDURE — 20600001 HC STEP DOWN PRIVATE ROOM

## 2020-03-05 PROCEDURE — 94660 CPAP INITIATION&MGMT: CPT

## 2020-03-05 PROCEDURE — 99900035 HC TECH TIME PER 15 MIN (STAT)

## 2020-03-05 PROCEDURE — 80048 BASIC METABOLIC PNL TOTAL CA: CPT

## 2020-03-05 PROCEDURE — 25000242 PHARM REV CODE 250 ALT 637 W/ HCPCS: Performed by: STUDENT IN AN ORGANIZED HEALTH CARE EDUCATION/TRAINING PROGRAM

## 2020-03-05 PROCEDURE — 94664 DEMO&/EVAL PT USE INHALER: CPT

## 2020-03-05 PROCEDURE — 25000003 PHARM REV CODE 250: Performed by: PHYSICIAN ASSISTANT

## 2020-03-05 PROCEDURE — 36415 COLL VENOUS BLD VENIPUNCTURE: CPT

## 2020-03-05 PROCEDURE — 25000003 PHARM REV CODE 250: Performed by: INTERNAL MEDICINE

## 2020-03-05 PROCEDURE — 85025 COMPLETE CBC W/AUTO DIFF WBC: CPT

## 2020-03-05 RX ORDER — TALC
6 POWDER (GRAM) TOPICAL NIGHTLY PRN
Status: DISCONTINUED | OUTPATIENT
Start: 2020-03-05 | End: 2020-03-06 | Stop reason: HOSPADM

## 2020-03-05 RX ORDER — GLUCAGON 1 MG
1 KIT INJECTION
Status: DISCONTINUED | OUTPATIENT
Start: 2020-03-05 | End: 2020-03-06 | Stop reason: HOSPADM

## 2020-03-05 RX ORDER — POLYETHYLENE GLYCOL 3350 17 G/17G
17 POWDER, FOR SOLUTION ORAL DAILY
Status: DISCONTINUED | OUTPATIENT
Start: 2020-03-05 | End: 2020-03-06 | Stop reason: HOSPADM

## 2020-03-05 RX ORDER — ERGOCALCIFEROL 1.25 MG/1
50000 CAPSULE ORAL
Status: DISCONTINUED | OUTPATIENT
Start: 2020-03-05 | End: 2020-03-06 | Stop reason: HOSPADM

## 2020-03-05 RX ORDER — LIDOCAINE 50 MG/G
1 PATCH TOPICAL
Status: DISCONTINUED | OUTPATIENT
Start: 2020-03-05 | End: 2020-03-06 | Stop reason: HOSPADM

## 2020-03-05 RX ORDER — IBUPROFEN 200 MG
16 TABLET ORAL
Status: DISCONTINUED | OUTPATIENT
Start: 2020-03-05 | End: 2020-03-06 | Stop reason: HOSPADM

## 2020-03-05 RX ORDER — IPRATROPIUM BROMIDE AND ALBUTEROL SULFATE 2.5; .5 MG/3ML; MG/3ML
3 SOLUTION RESPIRATORY (INHALATION) EVERY 4 HOURS PRN
Status: DISCONTINUED | OUTPATIENT
Start: 2020-03-05 | End: 2020-03-06 | Stop reason: HOSPADM

## 2020-03-05 RX ORDER — ENOXAPARIN SODIUM 100 MG/ML
40 INJECTION SUBCUTANEOUS EVERY 24 HOURS
Status: DISCONTINUED | OUTPATIENT
Start: 2020-03-05 | End: 2020-03-06 | Stop reason: HOSPADM

## 2020-03-05 RX ORDER — METOPROLOL TARTRATE 25 MG/1
25 TABLET, FILM COATED ORAL ONCE
Status: COMPLETED | OUTPATIENT
Start: 2020-03-05 | End: 2020-03-05

## 2020-03-05 RX ORDER — IBUPROFEN 200 MG
24 TABLET ORAL
Status: DISCONTINUED | OUTPATIENT
Start: 2020-03-05 | End: 2020-03-06 | Stop reason: HOSPADM

## 2020-03-05 RX ORDER — ATORVASTATIN CALCIUM 20 MG/1
40 TABLET, FILM COATED ORAL NIGHTLY
Status: DISCONTINUED | OUTPATIENT
Start: 2020-03-05 | End: 2020-03-06 | Stop reason: HOSPADM

## 2020-03-05 RX ORDER — SODIUM,POTASSIUM PHOSPHATES 280-250MG
1 POWDER IN PACKET (EA) ORAL EVERY 4 HOURS
Status: DISCONTINUED | OUTPATIENT
Start: 2020-03-05 | End: 2020-03-05

## 2020-03-05 RX ORDER — BISACODYL 10 MG
10 SUPPOSITORY, RECTAL RECTAL DAILY PRN
Status: DISCONTINUED | OUTPATIENT
Start: 2020-03-05 | End: 2020-03-06 | Stop reason: HOSPADM

## 2020-03-05 RX ORDER — ACETAMINOPHEN 325 MG/1
650 TABLET ORAL EVERY 4 HOURS PRN
Status: DISCONTINUED | OUTPATIENT
Start: 2020-03-05 | End: 2020-03-06 | Stop reason: HOSPADM

## 2020-03-05 RX ORDER — METOPROLOL TARTRATE 25 MG/1
50 TABLET, FILM COATED ORAL 2 TIMES DAILY
Status: DISCONTINUED | OUTPATIENT
Start: 2020-03-05 | End: 2020-03-06 | Stop reason: HOSPADM

## 2020-03-05 RX ADMIN — POTASSIUM & SODIUM PHOSPHATES POWDER PACK 280-160-250 MG 1 PACKET: 280-160-250 PACK at 09:03

## 2020-03-05 RX ADMIN — IPRATROPIUM BROMIDE AND ALBUTEROL SULFATE 3 ML: .5; 3 SOLUTION RESPIRATORY (INHALATION) at 09:03

## 2020-03-05 RX ADMIN — CEFTRIAXONE 2 G: 2 INJECTION, SOLUTION INTRAVENOUS at 11:03

## 2020-03-05 RX ADMIN — IPRATROPIUM BROMIDE AND ALBUTEROL SULFATE 3 ML: .5; 3 SOLUTION RESPIRATORY (INHALATION) at 04:03

## 2020-03-05 RX ADMIN — POLYETHYLENE GLYCOL 3350 17 G: 17 POWDER, FOR SOLUTION ORAL at 09:03

## 2020-03-05 RX ADMIN — METOPROLOL TARTRATE 50 MG: 25 TABLET ORAL at 09:03

## 2020-03-05 RX ADMIN — CILOSTAZOL 100 MG: 100 TABLET ORAL at 09:03

## 2020-03-05 RX ADMIN — ERGOCALCIFEROL 50000 UNITS: 1.25 CAPSULE ORAL at 11:03

## 2020-03-05 RX ADMIN — ASPIRIN 81 MG CHEWABLE TABLET 81 MG: 81 TABLET CHEWABLE at 09:03

## 2020-03-05 RX ADMIN — HEPARIN SODIUM 5000 UNITS: 5000 INJECTION, SOLUTION INTRAVENOUS; SUBCUTANEOUS at 07:03

## 2020-03-05 RX ADMIN — IPRATROPIUM BROMIDE AND ALBUTEROL SULFATE 3 ML: .5; 3 SOLUTION RESPIRATORY (INHALATION) at 12:03

## 2020-03-05 RX ADMIN — GUAIFENESIN 600 MG: 600 TABLET, EXTENDED RELEASE ORAL at 09:03

## 2020-03-05 RX ADMIN — METOPROLOL TARTRATE 25 MG: 25 TABLET ORAL at 11:03

## 2020-03-05 RX ADMIN — ATORVASTATIN CALCIUM 40 MG: 20 TABLET, FILM COATED ORAL at 09:03

## 2020-03-05 RX ADMIN — METOPROLOL TARTRATE 25 MG: 25 TABLET ORAL at 09:03

## 2020-03-05 RX ADMIN — PREDNISONE 40 MG: 20 TABLET ORAL at 09:03

## 2020-03-05 RX ADMIN — CITALOPRAM HYDROBROMIDE 20 MG: 20 TABLET ORAL at 09:03

## 2020-03-05 RX ADMIN — IPRATROPIUM BROMIDE AND ALBUTEROL SULFATE 3 ML: .5; 3 SOLUTION RESPIRATORY (INHALATION) at 07:03

## 2020-03-05 RX ADMIN — CLOPIDOGREL BISULFATE 75 MG: 75 TABLET ORAL at 09:03

## 2020-03-05 RX ADMIN — ENOXAPARIN SODIUM 40 MG: 100 INJECTION SUBCUTANEOUS at 05:03

## 2020-03-05 RX ADMIN — NICOTINE 1 PATCH: 7 PATCH TRANSDERMAL at 09:03

## 2020-03-05 RX ADMIN — IPRATROPIUM BROMIDE AND ALBUTEROL SULFATE 3 ML: .5; 3 SOLUTION RESPIRATORY (INHALATION) at 11:03

## 2020-03-05 NOTE — CARE UPDATE
Pt refusing bipap at this time. MD made aware. Pt sats WNL at this time. Will continue to monitor pt closely.

## 2020-03-05 NOTE — PLAN OF CARE
CM met with patient to discuss her discharge plan. PT/OT are recommending SNF at this time. Ms. Flores is agreeable to going to a skilled facility. She would like referrals sent to facilities near her home in [place]. SW notified of above conversation. Will continue to follow.    Germaine Esparza RN  Ext [phone]

## 2020-03-05 NOTE — PLAN OF CARE
SNF request FS sent to Evon Duncan at [facility] for auth request.        Franci Rodriguez LMSW

## 2020-03-05 NOTE — PLAN OF CARE
Problem: Adult Inpatient Plan of Care  Goal: Plan of Care Review  Description    Outcome: Ongoing, Progressing  Flowsheets (Taken 3/4/2020 1816)  Plan of Care Reviewed With: patient     Pt arrived to unit from ICU on shift. Alert and oriented x 2. Occasional confusion to place and situation. Vitals stable. Denies any pain when asked. Pt assisted to bedside commode on shift. Tolerated well. Remains on 2 L NC. Pt satting 82% at arrival and bumped up to 3 L but able to be titrated back down. No other issues noted. Pt updated on POC. WCTM.       Problem: Fall Injury Risk  Goal: Absence of Fall and Fall-Related Injury  Outcome: Ongoing, Progressing  Intervention: Promote Injury-Free Environment  Flowsheets (Taken 3/4/2020 1816)  Environmental Safety Modification: assistive device/personal items within reach; room near unit station     Pt remains free from falls. Bed alarm kept on for pt safety. Non skid socks in place. Pt educated on importance on calling for assistance before getting out of bed. Verbalized understanding.

## 2020-03-05 NOTE — PLAN OF CARE
Purposeful rounding completed this shift. Patient resting in bed in NAD. No new or adverse findings noted by this nurse this shift. No deviation from shift established baseline. No needs verbalized at this time. Bed in low laying position with bed locks intact. Call bell within reach. Safety maintained this shift.

## 2020-03-05 NOTE — PLAN OF CARE
03/05/20 1119   Post-Acute Status   Post-Acute Authorization Placement   Post-Acute Placement Status Referrals Sent   Discharge Delays None known at this time   Discharge Plan   Discharge Plan A Skilled Nursing Facility

## 2020-03-05 NOTE — PROGRESS NOTES
[facility] Hospital Medicine                                                                     Progress Note     [place] [facility] [place] Storm Huang MD   Admit Date: 3/1/2020   Hospital Day: 4  FLORIAN: 3/9/2020   Code status: Full Code   Principal Problem: Acute on chronic respiratory failure with hypoxia and hypercapnia     SUMMARY:     74 year old F with PMH of COPD, CAD, PAD, Tobacco abuse presented with progressively worsening dyspnea for several days with associated cough and subjective fevers, all of which have progressively worsened. Upon presentation to the ED, she was tachypneic, using accessory muscles to breath with wheezing on examination. Patient was placed on bipap for breathing support. Breathing treatment was given. Labs remarkable for WBC of 17.44. Initial VBG of 7.1/70/48/21.4/-8.  BNP of 482, Troponin of 0.034. CXR with nonspecific interstitial markings concerning for possible atypical PNA vs pulmonary edema. She was given Lasix, Azithromycin and Rocephin in ED. Patient remained tachycardic throughout ED course with signs of left leg edema raising concern for possible PE. Ddimer positive. CT Chest pending at time of consultation. Repeat VBG unchanged while in ED. Critical Care Medicine consulted for management of unstable respiratory status.    Admitted to MICU on 3/2 for acute on chronic hypoxemic/hypercapnic respiratory failure. Given 40 mg IV lasix x1 and placed on BiPAP. Later in the day, tolerating NC before going back on BiPAP overnight. On 3/3, patient off BiPAP in the morning and tolerating 1-2L NC. Cough assist ordered to obtain sputum sample for concerns of MAC. Azithromycin completed, but continuing Rocephin at this time. Advancing diet as tolerated. Steroids decreased to 40 prednisone qd with plan to stop after another day or two.  Patient stable for step down from ICU to hospital medicine. She was stepped down to medicine 3/ and remains stable on oxygen.     PT OT rec SNF. PPD placed. SW/CM aware.    SUBJECTIVE:     Pt was seen and examined at bedside. Pt had no acute events overnight, and no new complaints this morning. Afebrile. Tachycardic. No new complaints other than dyspnea (which is improving) and generalized weakness. Denies any CP or pain. Eating well otherwise. Normal BM and urinating well.     ROS (Positive in Bold, otherwise negative)  Pain Scale: 0 /10   Constitutional: fever, chills, night sweats, generalized weakness   CV: chest pain, edema, palpitations  Resp: SOB, cough, wheezing, sputum production  GI: changes in appetite, NVDC, pain, melena, hematochezia, GERD, hematemesis  : Dysuria, hematuria, urinary urgency, frequency  MSK: arthralgia/myalgia, joint swelling  SKIN: rashes, pruritis, petechiae   Neuro/Psych: FND, anxiety, depression      OBJECTIVE:     Vitals:  Temp:  [96.8 °F (36 °C)-98.6 °F (37 °C)]   Pulse:  []   Resp:  [18-28]   BP: (109-158)/(59-77)   SpO2:  [82 %-99 %]      I & O (Last 24H):     Intake/Output Summary (Last 24 hours) at 3/5/2020 1017  Last data filed at 3/5/2020 0900  Gross per 24 hour   Intake 770 ml   Output 751 ml   Net 19 ml         GEN:  female  in no acute distress. Nontoxic. Resting in bed. Cooperative.  HEENT: NCAT. PERRL. EOMI. Conjunctivae/corneas clear, sclera Anicteric.  CVS: RR. Tachycardic. Normal s1 s2 no murmur, click, rub or gallop  LUNL NC. CTAB. Normal respiratory effort. Diffuse wheezes on bilateral lung fields. No rhonchi, or crackles.  ABD: Normoactive BS, soft, NT, ND, no masses or organomegaly.  EXT: No edema. No cyanosis. Full ROM. No clubbing  SKIN: color, texture, turgor normal. No rashes or lesions  NEURO: Alert, oriented x 4, Spont mvt of all extremities with no focal deficits noted.      All recent labs and imaging has been reviewed.     Recent  Results (from the past 24 hour(s))   Basic metabolic panel    Collection Time: 03/05/20  4:37 AM   Result Value Ref Range    Sodium 142 136 - 145 mmol/L    Potassium 3.8 3.5 - 5.1 mmol/L    Chloride 104 95 - 110 mmol/L    CO2 30 (H) 23 - 29 mmol/L    Glucose 91 70 - 110 mg/dL    BUN, Bld 26 (H) 8 - 23 mg/dL    Creatinine 0.7 0.5 - 1.4 mg/dL    Calcium 9.0 8.7 - 10.5 mg/dL    Anion Gap 8 8 - 16 mmol/L    eGFR if African American >60.0 >60 mL/min/1.73 m^2    eGFR if non African American >60.0 >60 mL/min/1.73 m^2   CBC auto differential    Collection Time: 03/05/20  4:37 AM   Result Value Ref Range    WBC 8.87 3.90 - 12.70 K/uL    RBC 4.09 4.00 - 5.40 M/uL    Hemoglobin 12.3 12.0 - 16.0 g/dL    Hematocrit 41.1 37.0 - 48.5 %    Mean Corpuscular Volume 101 (H) 82 - 98 fL    Mean Corpuscular Hemoglobin 30.1 27.0 - 31.0 pg    Mean Corpuscular Hemoglobin Conc 29.9 (L) 32.0 - 36.0 g/dL    RDW 13.6 11.5 - 14.5 %    Platelets 241 150 - 350 K/uL    MPV 9.5 9.2 - 12.9 fL    Immature Granulocytes 1.5 (H) 0.0 - 0.5 %    Gran # (ANC) 6.1 1.8 - 7.7 K/uL    Immature Grans (Abs) 0.13 (H) 0.00 - 0.04 K/uL    Lymph # 1.3 1.0 - 4.8 K/uL    Mono # 1.3 (H) 0.3 - 1.0 K/uL    Eos # 0.0 0.0 - 0.5 K/uL    Baso # 0.03 0.00 - 0.20 K/uL    nRBC 0 0 /100 WBC    Gran% 68.6 38.0 - 73.0 %    Lymph% 15.1 (L) 18.0 - 48.0 %    Mono% 14.4 4.0 - 15.0 %    Eosinophil% 0.1 0.0 - 8.0 %    Basophil% 0.3 0.0 - 1.9 %    Differential Method Automated    Phosphorus    Collection Time: 03/05/20  4:37 AM   Result Value Ref Range    Phosphorus 2.6 (L) 2.7 - 4.5 mg/dL   Magnesium    Collection Time: 03/05/20  4:37 AM   Result Value Ref Range    Magnesium 2.3 1.6 - 2.6 mg/dL       No results for input(s): POCTGLUCOSE in the last 168 hours.    Hemoglobin A1C   Date Value Ref Range Status   10/23/2012 5.6 4.0 - 6.2 % Final        Active Hospital Problems    Diagnosis  POA    *Acute on chronic respiratory failure with hypoxia and hypercapnia [J96.21, J96.22]  Yes     Nicotine vapor product user [Z78.9]  Yes    SOB (shortness of breath) [R06.02]  Yes    Atypical pneumonia [J18.9]  Yes    Elevated troponin [R79.89]  Yes    COPD exacerbation [J44.1]  Yes    CAD (coronary artery disease) [I25.10]  Yes      Resolved Hospital Problems   No resolved problems to display.          ASSESSMENT AND PLAN:     Acute on chronic respiratory failure with hypoxia and hypercapnia  COPD exacerbation   - Secondary to COPD exacerbation secondary possible atypical pneumonia  - Initial VBG consistent with hypercapnic, hypoxic respiratory failure. CXR with interstitial markings, however less likely to be pulmonary edema as patient without other clinical signs of overload. CT Chest negative for PE however noting tree-n-bud appearance at the bases which could be concerning for GRACIELA in the right setting.  - Admitted to ICU now step down to medicine  - Supplemental oxygen PRN, wean as able  - Scheduled duonebs   - Completed Azithro, continue ceftriaxone started 3/1  - Continue bipap qhs  - 5 days of steroids  - Sputum cultures with AFB  - If chest xray does not clear prior to discharge, she should have repeat imaging and follow up with pulmonary 4-6 weeks after discharge.  - Overall improved since admit     CAD (coronary artery disease)  Elevated troponin  - type 2 secondary to demand. No acute cp.  - Elevated BNP and troponin on admission  - Lasix x1 given on admit  - TTE: EF 60, normal LV function  - Continue to monitor  - Continue daily asa, BB and statin      Nicotine vapor product user  - Counseled on the risks associated with vaping and encouraged cessation   - Nicotine patch ordered    Debility  - PT OT rec SNF  - CM/SW aware     Vit D def  - continue ergocalciferol     PAD  - continue asa and statin     Depression/Anxiety  - continue home citalopram 20 mg daily         Prophylaxis- Lovenox  Code Status- Full Code   Discharge plan and follow up - d/c to SNF once medically stable    Storm  MD Reina  Hospital Medicine Staff  Pager [phone]

## 2020-03-06 VITALS
HEIGHT: 60 IN | RESPIRATION RATE: 18 BRPM | OXYGEN SATURATION: 92 % | TEMPERATURE: 96 F | HEART RATE: 84 BPM | BODY MASS INDEX: 22.47 KG/M2 | WEIGHT: 114.44 LBS | DIASTOLIC BLOOD PRESSURE: 74 MMHG | SYSTOLIC BLOOD PRESSURE: 135 MMHG

## 2020-03-06 LAB
25(OH)D3+25(OH)D2 SERPL-MCNC: 5 NG/ML (ref 30–96)
ANION GAP SERPL CALC-SCNC: 8 MMOL/L (ref 8–16)
BACTERIA BLD CULT: NORMAL
BACTERIA BLD CULT: NORMAL
BASOPHILS # BLD AUTO: 0.02 K/UL (ref 0–0.2)
BASOPHILS NFR BLD: 0.3 % (ref 0–1.9)
BUN SERPL-MCNC: 25 MG/DL (ref 8–23)
CALCIUM SERPL-MCNC: 9 MG/DL (ref 8.7–10.5)
CHLORIDE SERPL-SCNC: 105 MMOL/L (ref 95–110)
CO2 SERPL-SCNC: 32 MMOL/L (ref 23–29)
CREAT SERPL-MCNC: 0.7 MG/DL (ref 0.5–1.4)
DIFFERENTIAL METHOD: ABNORMAL
EOSINOPHIL # BLD AUTO: 0 K/UL (ref 0–0.5)
EOSINOPHIL NFR BLD: 0.6 % (ref 0–8)
ERYTHROCYTE [DISTWIDTH] IN BLOOD BY AUTOMATED COUNT: 13.7 % (ref 11.5–14.5)
EST. GFR  (AFRICAN AMERICAN): >60 ML/MIN/1.73 M^2
EST. GFR  (NON AFRICAN AMERICAN): >60 ML/MIN/1.73 M^2
GLUCOSE SERPL-MCNC: 92 MG/DL (ref 70–110)
HCT VFR BLD AUTO: 43.7 % (ref 37–48.5)
HGB BLD-MCNC: 13 G/DL (ref 12–16)
IMM GRANULOCYTES # BLD AUTO: 0.11 K/UL (ref 0–0.04)
IMM GRANULOCYTES NFR BLD AUTO: 1.6 % (ref 0–0.5)
LYMPHOCYTES # BLD AUTO: 1.3 K/UL (ref 1–4.8)
LYMPHOCYTES NFR BLD: 18.3 % (ref 18–48)
MAGNESIUM SERPL-MCNC: 2.4 MG/DL (ref 1.6–2.6)
MCH RBC QN AUTO: 29.7 PG (ref 27–31)
MCHC RBC AUTO-ENTMCNC: 29.7 G/DL (ref 32–36)
MCV RBC AUTO: 100 FL (ref 82–98)
MONOCYTES # BLD AUTO: 0.9 K/UL (ref 0.3–1)
MONOCYTES NFR BLD: 12.9 % (ref 4–15)
NEUTROPHILS # BLD AUTO: 4.6 K/UL (ref 1.8–7.7)
NEUTROPHILS NFR BLD: 66.3 % (ref 38–73)
NRBC BLD-RTO: 0 /100 WBC
PHOSPHATE SERPL-MCNC: 3.1 MG/DL (ref 2.7–4.5)
PLATELET # BLD AUTO: 256 K/UL (ref 150–350)
PMV BLD AUTO: 9.8 FL (ref 9.2–12.9)
POTASSIUM SERPL-SCNC: 3.8 MMOL/L (ref 3.5–5.1)
RBC # BLD AUTO: 4.38 M/UL (ref 4–5.4)
SODIUM SERPL-SCNC: 145 MMOL/L (ref 136–145)
WBC # BLD AUTO: 6.99 K/UL (ref 3.9–12.7)

## 2020-03-06 PROCEDURE — 99239 HOSP IP/OBS DSCHRG MGMT >30: CPT | Mod: ,,, | Performed by: INTERNAL MEDICINE

## 2020-03-06 PROCEDURE — 85025 COMPLETE CBC W/AUTO DIFF WBC: CPT

## 2020-03-06 PROCEDURE — 82306 VITAMIN D 25 HYDROXY: CPT

## 2020-03-06 PROCEDURE — 63600175 PHARM REV CODE 636 W HCPCS: Performed by: STUDENT IN AN ORGANIZED HEALTH CARE EDUCATION/TRAINING PROGRAM

## 2020-03-06 PROCEDURE — 25000003 PHARM REV CODE 250: Performed by: INTERNAL MEDICINE

## 2020-03-06 PROCEDURE — 25000003 PHARM REV CODE 250: Performed by: STUDENT IN AN ORGANIZED HEALTH CARE EDUCATION/TRAINING PROGRAM

## 2020-03-06 PROCEDURE — 94660 CPAP INITIATION&MGMT: CPT

## 2020-03-06 PROCEDURE — 99900035 HC TECH TIME PER 15 MIN (STAT)

## 2020-03-06 PROCEDURE — 94640 AIRWAY INHALATION TREATMENT: CPT

## 2020-03-06 PROCEDURE — 99239 PR HOSPITAL DISCHARGE DAY,>30 MIN: ICD-10-PCS | Mod: ,,, | Performed by: INTERNAL MEDICINE

## 2020-03-06 PROCEDURE — 80048 BASIC METABOLIC PNL TOTAL CA: CPT

## 2020-03-06 PROCEDURE — 94761 N-INVAS EAR/PLS OXIMETRY MLT: CPT

## 2020-03-06 PROCEDURE — 94664 DEMO&/EVAL PT USE INHALER: CPT

## 2020-03-06 PROCEDURE — 97530 THERAPEUTIC ACTIVITIES: CPT

## 2020-03-06 PROCEDURE — 83735 ASSAY OF MAGNESIUM: CPT

## 2020-03-06 PROCEDURE — 27000221 HC OXYGEN, UP TO 24 HOURS

## 2020-03-06 PROCEDURE — 36415 COLL VENOUS BLD VENIPUNCTURE: CPT

## 2020-03-06 PROCEDURE — 63600175 PHARM REV CODE 636 W HCPCS: Performed by: INTERNAL MEDICINE

## 2020-03-06 PROCEDURE — S4991 NICOTINE PATCH NONLEGEND: HCPCS | Performed by: STUDENT IN AN ORGANIZED HEALTH CARE EDUCATION/TRAINING PROGRAM

## 2020-03-06 PROCEDURE — 25000242 PHARM REV CODE 250 ALT 637 W/ HCPCS: Performed by: STUDENT IN AN ORGANIZED HEALTH CARE EDUCATION/TRAINING PROGRAM

## 2020-03-06 PROCEDURE — 97535 SELF CARE MNGMENT TRAINING: CPT

## 2020-03-06 PROCEDURE — 84100 ASSAY OF PHOSPHORUS: CPT

## 2020-03-06 RX ORDER — NICOTINE 7MG/24HR
1 PATCH, TRANSDERMAL 24 HOURS TRANSDERMAL DAILY
Qty: 10 PATCH | Refills: 0 | Status: SHIPPED | OUTPATIENT
Start: 2020-03-07 | End: 2023-08-11

## 2020-03-06 RX ORDER — IPRATROPIUM BROMIDE AND ALBUTEROL SULFATE 2.5; .5 MG/3ML; MG/3ML
3 SOLUTION RESPIRATORY (INHALATION)
Qty: 1 BOX | Refills: 0
Start: 2020-03-06 | End: 2021-08-09 | Stop reason: SDUPTHER

## 2020-03-06 RX ORDER — LIDOCAINE 50 MG/G
1 PATCH TOPICAL DAILY
Refills: 0
Start: 2020-03-06 | End: 2023-08-11

## 2020-03-06 RX ORDER — METOPROLOL TARTRATE 50 MG/1
50 TABLET ORAL 2 TIMES DAILY
Qty: 180 TABLET | Refills: 3
Start: 2020-03-06 | End: 2023-08-11

## 2020-03-06 RX ORDER — POLYETHYLENE GLYCOL 3350 17 G/17G
17 POWDER, FOR SOLUTION ORAL DAILY
Refills: 0
Start: 2020-03-07 | End: 2023-08-11

## 2020-03-06 RX ORDER — ACETAMINOPHEN 325 MG/1
650 TABLET ORAL EVERY 4 HOURS PRN
Refills: 0
Start: 2020-03-06 | End: 2023-08-11

## 2020-03-06 RX ORDER — ACETYLCYSTEINE 600 MG
600 CAPSULE ORAL 2 TIMES DAILY
Start: 2020-03-06 | End: 2023-08-11

## 2020-03-06 RX ORDER — ERGOCALCIFEROL 1.25 MG/1
CAPSULE ORAL
Qty: 12 CAPSULE | Refills: 0
Start: 2020-03-06 | End: 2023-08-11

## 2020-03-06 RX ORDER — ATORVASTATIN CALCIUM 40 MG/1
40 TABLET, FILM COATED ORAL NIGHTLY
Qty: 90 TABLET | Refills: 3 | Status: SHIPPED | OUTPATIENT
Start: 2020-03-06 | End: 2021-03-06

## 2020-03-06 RX ORDER — GUAIFENESIN 600 MG/1
600 TABLET, EXTENDED RELEASE ORAL 2 TIMES DAILY
Qty: 16 TABLET | Refills: 0
Start: 2020-03-06 | End: 2020-03-14

## 2020-03-06 RX ORDER — TIOTROPIUM BROMIDE 18 UG/1
18 CAPSULE ORAL; RESPIRATORY (INHALATION) DAILY
Refills: 0
Start: 2020-03-13 | End: 2023-08-11

## 2020-03-06 RX ADMIN — CILOSTAZOL 100 MG: 100 TABLET ORAL at 09:03

## 2020-03-06 RX ADMIN — NICOTINE 1 PATCH: 7 PATCH TRANSDERMAL at 09:03

## 2020-03-06 RX ADMIN — GUAIFENESIN 600 MG: 600 TABLET, EXTENDED RELEASE ORAL at 09:03

## 2020-03-06 RX ADMIN — IPRATROPIUM BROMIDE AND ALBUTEROL SULFATE 3 ML: .5; 3 SOLUTION RESPIRATORY (INHALATION) at 03:03

## 2020-03-06 RX ADMIN — METOPROLOL TARTRATE 50 MG: 25 TABLET ORAL at 09:03

## 2020-03-06 RX ADMIN — IPRATROPIUM BROMIDE AND ALBUTEROL SULFATE 3 ML: .5; 3 SOLUTION RESPIRATORY (INHALATION) at 01:03

## 2020-03-06 RX ADMIN — FLUTICASONE FUROATE AND VILANTEROL TRIFENATATE 1 PUFF: 100; 25 POWDER RESPIRATORY (INHALATION) at 09:03

## 2020-03-06 RX ADMIN — ENOXAPARIN SODIUM 40 MG: 100 INJECTION SUBCUTANEOUS at 04:03

## 2020-03-06 RX ADMIN — CLOPIDOGREL BISULFATE 75 MG: 75 TABLET ORAL at 09:03

## 2020-03-06 RX ADMIN — ASPIRIN 81 MG CHEWABLE TABLET 81 MG: 81 TABLET CHEWABLE at 09:03

## 2020-03-06 RX ADMIN — CEFTRIAXONE 2 G: 2 INJECTION, SOLUTION INTRAVENOUS at 11:03

## 2020-03-06 RX ADMIN — IPRATROPIUM BROMIDE AND ALBUTEROL SULFATE 3 ML: .5; 3 SOLUTION RESPIRATORY (INHALATION) at 08:03

## 2020-03-06 RX ADMIN — IPRATROPIUM BROMIDE AND ALBUTEROL SULFATE 3 ML: .5; 3 SOLUTION RESPIRATORY (INHALATION) at 04:03

## 2020-03-06 NOTE — PT/OT/SLP PROGRESS
Occupational Therapy   Treatment    Name: Radha Flores  MRN: [id number]  Admitting Diagnosis:  Acute on chronic respiratory failure with hypoxia and hypercapnia       Recommendations:     Discharge Recommendations: nursing facility, skilled  Discharge Equipment Recommendations:  (TBD)  Barriers to discharge:  Decreased caregiver support    Assessment:     Radha Flores is a 74 y.o. female with a medical diagnosis of Acute on chronic respiratory failure with hypoxia and hypercapnia.  She presents with performance deficits affecting function are weakness, impaired endurance, impaired self care skills, impaired functional mobilty, gait instability, impaired balance, decreased lower extremity function, impaired cardiopulmonary response to activity. Pt presenting with continued decreased endurance, decreased activity tolerance, fatigue, and weakness. Pt is easily distracted when therapist performing set-up of tasks within room while she is completing another task. Requires continued re-directing and repeated commands to complete activity. Very eager to maintain independence, will respond she can do something when therapist attempts to provide assistance. Increased time needed to complete tasks due to increased work of breath and increased secretions forming requiring pt to attempt to self-suction.  Pt would continue to benefit from acute skilled OT services to increase independence. OT to continue to recommend SNF upon D/C at this time to improve quality of life.    Rehab Prognosis:  Good; patient would benefit from acute skilled OT services to address these deficits and reach maximum level of function.       Plan:     Patient to be seen 3 x/week to address the above listed problems via self-care/home management, therapeutic activities, therapeutic exercises  · Plan of Care Expires: 04/02/20  · Plan of Care Reviewed with: patient    Subjective     Pain/Comfort:  · Pain Rating 1: 0/10  · Pain Rating  Post-Intervention 1: 0/10    Objective:     Communicated with: RN prior to session.  Patient found HOB elevated with telemetry, pulse ox (continuous), oxygen, colostomy upon OT entry to room. RN present upon OT entry waiting for pt to take morning medications. Pt requiring increased time due to complaints of unable to catch breath. Offered to help pt sit EOB, however, pt able to take medications while seated with HOB elevated.    General Precautions: Standard, fall   Orthopedic Precautions:N/A   Braces: N/A     Occupational Performance:     Bed Mobility:    · Patient completed Rolling/Turning to Left with  stand by assistance  · Patient completed Scooting to EOB with stand by assistance  · Patient completed Supine to Sit with stand by assistance     Functional Mobility/Transfers:  · Patient completed Sit <> Stand Transfer from EOB with contact guard assistance with no assistive device and hand-held assist   · Patient completed Bed <> Bedside Chair Transfer with minimum assistance with no assistive device and hand-held assist  · Functional Mobility: Pt performed functional mobility task with MIN A and no AD using [place] assist. Pt requires assist to maintain balance, presenting with RLE toe amputations. Pt would benefit from RW to increase functional independence.    Activities of Daily Living:  · Feeding:  independence not assessed, however, pt able to perform  · Grooming: stand by assistance pt with increased distractions, increased work of breath with activity, and increased secretion formation  · Pt left with set-up to perform oral care seated UIC  · Pt performed wiping of face with wet towel seated EOB  · Upper Body Dressing: stand by assistance not assessed, however, pt would require set-up  · Lower Body Dressing: minimum assistance not assessed, however pt would require assistance      AMPA 6 Click ADL: 18    Treatment & Education:  OT role and POC reviewed. Pt tolerated session well, performing EOB sitting ~15  mins, presenting with lean into LUE at times with increased fatigue. Pt able to self-correct posture. Pt with increased fatigue with activity, and resulting in increased work of breath. Increased time required to complete tasks due to easily distractibility and cardiopulmonary response with activity. Pt requires MIN A to perform functional mobility tasks, would benefit from RW to increase independence and improve balance and stability in stance. During grooming task, pt removed oxygen to wipe face with wet towel, became SOB with increased work of breath due to prolonged oxygen off. Returned nasal cannula to pt's nose and instructed to perform deep breathing. Pt with understanding and demonstrated learning of deep breathing technique, able to recall needing to breath in through nose and out through mouth.     Patient left up in chair with call button in reach and chair alarm onEducation:      GOALS:   Multidisciplinary Problems     Occupational Therapy Goals        Problem: Occupational Therapy Goal    Goal Priority Disciplines Outcome Interventions   Occupational Therapy Goal     OT, PT/OT Ongoing, Progressing    Description:  Goals to be met by: 3/16/2020     Patient will increase functional independence with ADLs by performing:    UE Dressing  With set-up  Grooming while standing with with set-up  LE dressing with supervision.  Toileting with SBA  Pt to tolerated OOB to chair x 3-4 hours daily to increase endurance for functional activity.  Pt to t/f to commode with SBA                         Time Tracking:     OT Date of Treatment: 03/06/20  OT Start Time: 0957  OT Stop Time: 1023  OT Total Time (min): 26 min    Billable Minutes:Self Care/Home Management 12 mintues  Therapeutic Activity 14 minutes    SKYLAR Tejeda  3/6/2020

## 2020-03-06 NOTE — PLAN OF CARE
Continue OT plan of care.    Problem: Occupational Therapy Goal  Goal: Occupational Therapy Goal  Description  Goals to be met by: 3/16/2020     Patient will increase functional independence with ADLs by performing:    UE Dressing  With set-up  Grooming while standing with with set-up  LE dressing with supervision.  Toileting with SBA  Pt to tolerated OOB to chair x 3-4 hours daily to increase endurance for functional activity.  Pt to t/f to commode with SBA        Outcome: Ongoing, Progressing

## 2020-03-06 NOTE — PLAN OF CARE
03/06/20 1557   Post-Acute Status   Post-Acute Authorization Placement   Post-Acute Placement Status Set-up Complete   [facility]

## 2020-03-06 NOTE — PLAN OF CARE
Pt accepted to [facility] SNF as per Nakita in admissions.  [facility] RN should call report to nurse for Radha Flores at [phone].  Transportation order placed in pt chart for WC van with 2L of oxygen, and all questions regarding transfer should be directed to extension [phone], option 5.  Transport packet printed and sent to nurses's station desk on 8th floor tower.              Franci Rodriguez LMSW  Ext [phone]

## 2020-03-06 NOTE — PLAN OF CARE
[facility]     Department of Hospital Medicine     [address] [place] [address]     [phone] after hours  [phone] fax       NURSING HOME ORDERS    03/06/2020    Admit to Nursing Home:  Skilled Bed      Diagnoses:  Active Hospital Problems    Diagnosis  POA    *Acute on chronic respiratory failure with hypoxia and hypercapnia [J96.21, J96.22]  Yes    Nicotine vapor product user [Z78.9]  Yes    SOB (shortness of breath) [R06.02]  Yes    Atypical pneumonia [J18.9]  Yes    Elevated troponin [R79.89]  Yes    COPD exacerbation [J44.1]  Yes    CAD (coronary artery disease) [I25.10]  Yes      Resolved Hospital Problems   No resolved problems to display.       Patient is homebound due to:  Acute on chronic respiratory failure with hypoxia and hypercapnia    Allergies:Review of patient's allergies indicates:  No Known Allergies    Vitals:     Every shift (Skilled Nursing patients)    Diet: cardiac diet    Supplement:  1 can every three times a day with meals                         Type:  Boost     Acitivities: As tolerated       LABS:  Per facility protocol    Nursing Precautions:   - Aspiration precautions:             - Total assistance with meals            -  Upright 90 degrees befor during and after meals             -  Suction at bedside          - Fall precautions per nursing home protocol   - Decubitus precautions:        -  for positioning   - Pressure reducing foam mattress   - Turn patient every two hours. Use wedge pillows to anchor patient    CONSULTS:   Physical Therapy to evaluate and treat     Occupational Therapy to evaluate and treat     Nutrition to evaluate and recommend diet    MISCELLANEOUS CARE:      Routine Skin for Bedridden Patients:  Apply moisture barrier cream to all    skin folds and wet areas in perineal area daily and after baths and                           all bowel movements.      Medications: Discontinue all  previous medication orders, if any. See new list below.     Mark Radha Christel   Home Medication Instructions DEEPIKA:[id number]    Printed on:03/06/20 1340   Medication Information                      acetaminophen (TYLENOL) 325 MG tablet  Take 2 tablets (650 mg total) by mouth every 4 (four) hours as needed for Pain or Temperature greater than (101).             acetylcysteine 600 mg Cap  Take 1 capsule (600 mg total) by mouth 2 (two) times daily.             albuterol-ipratropium (DUO-NEB) 2.5 mg-0.5 mg/3 mL nebulizer solution  Take 3 mLs by nebulization every 6 (six) hours while awake. Scheduled until 3/13 then PRN every 6 hours as needed             aspirin 81 MG Chew  Take 1 tablet (81 mg total) by mouth once daily.             atorvastatin (LIPITOR) 40 MG tablet  Take 1 tablet (40 mg total) by mouth every evening.             cilostazol (PLETAL) 100 MG Tab  Take 1 tablet (100 mg total) by mouth 2 (two) times daily.             citalopram (CELEXA) 20 MG tablet  Take 1 tablet (20 mg total) by mouth once daily.             clopidogrel (PLAVIX) 75 mg tablet  Take 1 tablet (75 mg total) by mouth once daily.             ergocalciferol (ERGOCALCIFEROL) 50,000 unit Cap  Twice weekly for 4 weeks then once weekly for 4 weeks             guaiFENesin (MUCINEX) 600 mg 12 hr tablet  Take 1 tablet (600 mg total) by mouth 2 (two) times daily. for 8 days             lidocaine (LIDODERM) 5 %  Place 1 patch onto the skin once daily. Remove & Discard patch within 12 hours or as directed by MD             metoprolol tartrate (LOPRESSOR) 50 MG tablet  Take 1 tablet (50 mg total) by mouth 2 (two) times daily.             nicotine (NICODERM CQ) 7 mg/24 hr  Place 1 patch onto the skin once daily for 10 days             polyethylene glycol (GLYCOLAX) 17 gram PwPk  Take 17 g by mouth once daily.             SYMBICORT 80-4.5 mcg/actuation HFAA  INHALE 2 PUFFS BY MOUTH TWICE DAILY. RINSE MOUTH AFTER USE             tiotropium (SPIRIVA) 18  mcg inhalation capsule  Inhale 1 capsule (18 mcg total) into the lungs once daily. Controller. Start after scheduled duone course completed. Start 3/14.               2L continuous oxygen                         _________________________________  Storm Huang MD  03/06/2020

## 2020-03-09 LAB — ACETOACET SERPL-MCNC: 56 MCG/ML

## 2020-03-09 NOTE — DISCHARGE SUMMARY
[facility]  Discharge Summary  Hospital Medicine    Patient Name: Radha Flores  YOB: 1945    Admit Date: 3/1/2020    Discharge Date and Time: 3/6/2020  7:54 PM    Discharge Attending Physician: Storm Huang MD     Team: [facility]    Reason for Admission:   Chief Complaint   Patient presents with    Asthma     history of COPD; duo nebs given by EMS. Pt is 98% on RA with respirations of 24.        Active Hospital Problems    Diagnosis  POA    *Acute on chronic respiratory failure with hypoxia and hypercapnia [J96.21, J96.22]  Yes    Nicotine vapor product user [Z78.9]  Yes    SOB (shortness of breath) [R06.02]  Yes    Atypical pneumonia [J18.9]  Yes    Elevated troponin [R79.89]  Yes    COPD exacerbation [J44.1]  Yes    CAD (coronary artery disease) [I25.10]  Yes      Resolved Hospital Problems   No resolved problems to display.       HPI:   74 year old F with PMH of COPD, CAD, PAD, Tobacco abuse presented with progressively worsening dyspnea for several days with associated cough and subjective fevers, all of which have progressively worsened. Upon presentation to the ED, she was tachypneic, using accessory muscles to breath with wheezing on examination. Patient was placed on bipap for breathing support. Breathing treatment was given. Labs remarkable for WBC of 17.44. Initial VBG of 7.1/70/48/21.4/-8.  BNP of 482, Troponin of 0.034. CXR with nonspecific interstitial markings concerning for possible atypical PNA vs pulmonary edema. She was given Lasix, Azithromycin and Rocephin in ED. Patient remained tachycardic throughout ED course with signs of left leg edema raising concern for possible PE. Ddimer positive. CT Chest pending at time of consultation. Repeat VBG unchanged while in ED. Critical Care Medicine consulted for management of unstable respiratory status.    Hospital Course:   Admitted to MICU on 3/2 for acute on chronic hypoxemic/hypercapnic respiratory  failure. Given 40 mg IV lasix x1 and placed on BiPAP. Later in the day, tolerating NC before going back on BiPAP overnight. On 3/3, patient off BiPAP in the morning and tolerating 1-2L NC. Cough assist ordered to obtain sputum sample for concerns of MAC. Azithromycin completed, but continuing Rocephin at this time. Advancing diet as tolerated. Steroids decreased to 40 prednisone qd with plan to stop after another day or two. Patient stable for step down from ICU to hospital medicine. She was stepped down to medicine 3/4 and remains stable on oxygen. Remained stable on the floors. PT OT rec SNF. PPD placed. SW/CM aware. SNF approved. Stable for dc with oxygen. Will need to fu with PCP and pulmonary on discharge.    Principal Problem: Acute on chronic respiratory failure with hypoxia and hypercapnia    Other Problems Addressed:  Acute on chronic respiratory failure with hypoxia and hypercapnia  COPD exacerbation   CAD (coronary artery disease)  Elevated troponin  Nicotine vapor product user  Debility  Vit D def  PAD  Depression/Anxiety    Procedures Performed: * No surgery found *    Special Care, Treatment, and Services Provided: none    Consults: CCM    Significant Diagnostic Studies: Reviewed     Final Diagnoses: Same as principal problem.    Discharged Condition: stable  Face to face services were provided on 3/9/2020   Time Spent:  I spent > 30 minutes on the discharge, which included reviewing hospital course with patient/family, reviewing discharge medications, and arranging follow-up care.  Physical Exam on 3/9/2020:  /74 (BP Location: Left arm, Patient Position: Lying)   Pulse 84   Temp 96 °F (35.6 °C) (Oral)   Resp 18   Ht 5' (1.524 m)   Wt 51.9 kg (114 lb 6.7 oz)   LMP  (LMP Unknown)   SpO2 (!) 92%   Breastfeeding? No   BMI 22.35 kg/m²     GEN:  female  in no acute distress. Nontoxic. Resting in bed. Cooperative.  HEENT: NCAT. PERRL. EOMI. Conjunctivae/corneas clear, sclera  Anicteric.  CVS: RR. Tachycardic. Normal s1 s2 no murmur, click, rub or gallop  LUNL NC. CTAB. Normal respiratory effort.  wheezes on bilateral lung fields chronic issue. No rhonchi, or crackles.  ABD: Normoactive BS, soft, NT, ND, no masses or organomegaly.  EXT: No edema. No cyanosis. Full ROM. No clubbing  SKIN: color, texture, turgor normal. No rashes or lesions  NEURO: Alert, oriented x 4, Spont mvt of all extremities with no focal deficits noted.    Disposition: Skilled Nursing Facility    Follow Up Instructions:   Follow-up Information     Angela Walker MD. Schedule an appointment as soon as possible for a visit in 2 weeks.    Specialty:  Internal Medicine  Why:  Post hospital follow up  Contact information:  [address]  [facility] [address] [phone]                 No future appointments.    Medications:    Discharge Medication List as of 3/6/2020  7:56 PM      START taking these medications    Details   acetaminophen (TYLENOL) 325 MG tablet Take 2 tablets (650 mg total) by mouth every 4 (four) hours as needed for Pain or Temperature greater than (101)., Starting Fri 3/6/2020, No Print      acetylcysteine 600 mg Cap Take 1 capsule (600 mg total) by mouth 2 (two) times daily., Starting Fri 3/6/2020, No Print      albuterol-ipratropium (DUO-NEB) 2.5 mg-0.5 mg/3 mL nebulizer solution Take 3 mLs by nebulization every 6 (six) hours while awake. Scheduled until 3/13 then PRN every 6 hours as needed, Starting Fri 3/6/2020, Until Sat 3/13/2021, No Print      atorvastatin (LIPITOR) 40 MG tablet Take 1 tablet (40 mg total) by mouth every evening., Starting Fri 3/6/2020, Until Sat 3/6/2021, Normal      ergocalciferol (ERGOCALCIFEROL) 50,000 unit Cap Twice weekly for 4 weeks then once weekly for 4 weeks, No Print      guaiFENesin (MUCINEX) 600 mg 12 hr tablet Take 1 tablet (600 mg total) by mouth 2 (two) times daily. for 8 days, Starting Fri 3/6/2020, Until Sat 3/14/2020, No Print      lidocaine  (LIDODERM) 5 % Place 1 patch onto the skin once daily. Remove & Discard patch within 12 hours or as directed by MD, Starting Fri 3/6/2020, No Print      nicotine (NICODERM CQ) 7 mg/24 hr Place 1 patch onto the skin once daily., Starting Sat 3/7/2020, Normal      polyethylene glycol (GLYCOLAX) 17 gram PwPk Take 17 g by mouth once daily., Starting Sat 3/7/2020, No Print      tiotropium (SPIRIVA) 18 mcg inhalation capsule Inhale 1 capsule (18 mcg total) into the lungs once daily. Controller. Start after scheduled duonebs course completed., Starting Fri 3/13/2020, Until Sat 3/13/2021, No Print         CONTINUE these medications which have CHANGED    Details   metoprolol tartrate (LOPRESSOR) 50 MG tablet Take 1 tablet (50 mg total) by mouth 2 (two) times daily., Starting Fri 3/6/2020, No Print         CONTINUE these medications which have NOT CHANGED    Details   aspirin 81 MG Chew Take 1 tablet (81 mg total) by mouth once daily., Starting 11/21/2012, Until Discontinued, No Print      cilostazol (PLETAL) 100 MG Tab Take 1 tablet (100 mg total) by mouth 2 (two) times daily., Starting 3/2/2016, Until u 3/2/17, Normal      citalopram (CELEXA) 20 MG tablet Take 1 tablet (20 mg total) by mouth once daily., Starting 7/28/2015, Until Discontinued, Normal      clopidogrel (PLAVIX) 75 mg tablet Take 1 tablet (75 mg total) by mouth once daily., Starting 2/26/2016, Until Sat 2/25/17, Print      SYMBICORT 80-4.5 mcg/actuation HFAA INHALE 2 PUFFS BY MOUTH TWICE DAILY. RINSE MOUTH AFTER USE, Normal         STOP taking these medications       PROAIR HFA 90 mcg/actuation inhaler Comments:   Reason for Stopping:         simvastatin (ZOCOR) 20 MG tablet Comments:   Reason for Stopping:               Discharge Instructions:  Discussed in length with patient. If her presenting symptoms were to worsen, or if febrile, she should return to the ED. Patient voiced understanding. She needs to fu with PCP and Pulmonary with appropriate  labs.      Discharge Procedure Orders   CT Chest Without Contrast   Standing Status: Future Standing Exp. Date: 03/06/21     Order Specific Question Answer Comments   May the Radiologist modify the order per protocol to meet the clinical needs of the patient? Yes      Ambulatory referral/consult to Pulmonology   Standing Status: Future   Referral Priority: Routine Referral Type: Consultation   Referral Reason: Specialty Services Required   Requested Specialty: Pulmonary Disease   Number of Visits Requested: 1         Storm Huang MD  Department of Hospital Medicine

## 2020-03-10 ENCOUNTER — TELEPHONE (OUTPATIENT)
Dept: PULMONOLOGY | Facility: CLINIC | Age: 75
End: 2020-03-10

## 2020-03-10 NOTE — TELEPHONE ENCOUNTER
Attempted to contact patient to schedule her appointment but no one answered patient telephone and no voicemail picked up to leave patient a message.

## 2020-03-11 ENCOUNTER — DOCUMENTATION ONLY (OUTPATIENT)
Dept: HEPATOLOGY | Facility: HOSPITAL | Age: 75
End: 2020-03-11

## 2020-03-18 ENCOUNTER — DOCUMENTATION ONLY (OUTPATIENT)
Dept: HEPATOLOGY | Facility: HOSPITAL | Age: 75
End: 2020-03-18

## 2020-03-22 PROCEDURE — G0180 PR HOME HEALTH MD CERTIFICATION: ICD-10-PCS | Mod: ,,, | Performed by: INTERNAL MEDICINE

## 2020-03-22 PROCEDURE — G0180 MD CERTIFICATION HHA PATIENT: HCPCS | Mod: ,,, | Performed by: INTERNAL MEDICINE

## 2020-03-24 ENCOUNTER — TELEPHONE (OUTPATIENT)
Dept: INTERNAL MEDICINE | Facility: CLINIC | Age: 75
End: 2020-03-24

## 2020-03-24 NOTE — TELEPHONE ENCOUNTER
----- Message from Addis Tiwari sent at 3/24/2020 11:25 AM CDT -----  Contact: Jos [phone]  Patient was released from [facility] and is back home.  Request call back about follow up.

## 2020-03-24 NOTE — TELEPHONE ENCOUNTER
Spoke with pt recent admission for pneumonia  Was hospitalized and sent to nursing home  For rehab doing better reports home health monitoring her  And feeling well   Her new phone number clarified in chart  She has home health  And does not want follow up with coronavirus  At this time but plans to call back to skyla  In the future

## 2020-03-25 ENCOUNTER — TELEPHONE (OUTPATIENT)
Dept: WOUND CARE | Facility: CLINIC | Age: 75
End: 2020-03-25

## 2020-03-25 DIAGNOSIS — Z43.3 ATTENTION TO COLOSTOMY: Primary | ICD-10-CM

## 2020-04-14 ENCOUNTER — EXTERNAL HOME HEALTH (OUTPATIENT)
Dept: HOME HEALTH SERVICES | Facility: HOSPITAL | Age: 75
End: 2020-04-14
Payer: MEDICARE

## 2020-04-21 ENCOUNTER — TELEPHONE (OUTPATIENT)
Dept: PULMONOLOGY | Facility: CLINIC | Age: 75
End: 2020-04-21

## 2020-04-21 NOTE — TELEPHONE ENCOUNTER
I called and spoke to Mrs Flores about a [facility] visit after her 3-1-20 admission for respiratory failure. She told me she is feeling better and rarely uses the oxygen.She is not interested in a Virtual visit. She is staying home due to the Covid virus. Mrs Flores said send her a visit when the clinic reopens. Betsy Garcia LPN

## 2020-06-10 ENCOUNTER — TELEPHONE (OUTPATIENT)
Dept: PULMONOLOGY | Facility: CLINIC | Age: 75
End: 2020-06-10

## 2020-06-10 NOTE — TELEPHONE ENCOUNTER
"Pt called back to schedule appt, but did not want to travel to have covid testing done, then come in for appt, pt will call back when as she stated "things settle down", I advised pt to call back when she was ready, pt verbalized understanding  "

## 2020-11-25 ENCOUNTER — TELEPHONE (OUTPATIENT)
Dept: INTERNAL MEDICINE | Facility: CLINIC | Age: 75
End: 2020-11-25

## 2020-11-25 RX ORDER — ALBUTEROL SULFATE 90 UG/1
AEROSOL, METERED RESPIRATORY (INHALATION)
Qty: 25.5 G | Refills: 1 | Status: CANCELLED | OUTPATIENT
Start: 2020-11-25

## 2020-11-25 RX ORDER — ALBUTEROL SULFATE 90 UG/1
AEROSOL, METERED RESPIRATORY (INHALATION)
Qty: 25.5 G | Refills: 1 | Status: SHIPPED | OUTPATIENT
Start: 2020-11-25 | End: 2021-05-28 | Stop reason: SDUPTHER

## 2020-11-25 NOTE — TELEPHONE ENCOUNTER
----- Message from Gissell Taylor sent at 11/25/2020 11:09 AM CST -----  Contact: [phone]  Requesting an RX refill or new RX.  Is this a refill or new RX: Refill 1  RX name and strength:PROAIR HFA 90 mcg/actuation inhaler  Is this a 30 day or 90 day RX:   Pharmacy name and phone # (copy/paste from chart):  [id number] [place] [id number] [address] AT [facility] [address] [phone] (Phone) [phone] (Fax)    Comments:

## 2020-11-27 ENCOUNTER — TELEPHONE (OUTPATIENT)
Dept: INTERNAL MEDICINE | Facility: CLINIC | Age: 75
End: 2020-11-27

## 2020-11-27 NOTE — TELEPHONE ENCOUNTER
----- Message from Linda Puente sent at 11/27/2020 10:57 AM CST -----  Contact: [phone] @ Patient  Requesting an RX refill or new RX.  Is this a refill or new RX:   RX name and strength:albuterol (PROAIR HFA) 90 mcg/actuation inhaler  Is this a 30 day or 90 day RX:   Pharmacy name and phone # [facility] #[id number] [place] [phone] AT [facility] [address]    Comments:

## 2021-03-30 RX ORDER — BUDESONIDE AND FORMOTEROL FUMARATE DIHYDRATE 80; 4.5 UG/1; UG/1
AEROSOL RESPIRATORY (INHALATION)
Qty: 10.2 G | Refills: 6 | Status: SHIPPED | OUTPATIENT
Start: 2021-03-30 | End: 2021-12-01

## 2021-05-28 RX ORDER — ALBUTEROL SULFATE 90 UG/1
AEROSOL, METERED RESPIRATORY (INHALATION)
Qty: 25.5 G | Refills: 0 | Status: SHIPPED | OUTPATIENT
Start: 2021-05-28 | End: 2021-08-14

## 2021-07-12 ENCOUNTER — HOSPITAL ENCOUNTER (INPATIENT)
Facility: HOSPITAL | Age: 76
LOS: 1 days | Discharge: NURSING, REHAB OR LONG-TERM CARE | DRG: 190 | End: 2021-07-15
Attending: EMERGENCY MEDICINE | Admitting: INTERNAL MEDICINE
Payer: MEDICARE

## 2021-07-12 DIAGNOSIS — J44.0 COPD (CHRONIC OBSTRUCTIVE PULMONARY DISEASE) WITH ACUTE BRONCHITIS: ICD-10-CM

## 2021-07-12 DIAGNOSIS — R06.02 SHORTNESS OF BREATH: ICD-10-CM

## 2021-07-12 DIAGNOSIS — J96.01 ACUTE RESPIRATORY FAILURE WITH HYPOXIA: ICD-10-CM

## 2021-07-12 DIAGNOSIS — I25.10 CORONARY ARTERY DISEASE INVOLVING NATIVE CORONARY ARTERY OF NATIVE HEART WITHOUT ANGINA PECTORIS: ICD-10-CM

## 2021-07-12 DIAGNOSIS — J44.1 COPD WITH ACUTE EXACERBATION: Primary | ICD-10-CM

## 2021-07-12 DIAGNOSIS — E78.5 HYPERLIPIDEMIA, UNSPECIFIED HYPERLIPIDEMIA TYPE: ICD-10-CM

## 2021-07-12 DIAGNOSIS — Z86.73 HX OF TRANSIENT ISCHEMIC ATTACK (TIA): ICD-10-CM

## 2021-07-12 DIAGNOSIS — I10 ESSENTIAL HYPERTENSION: ICD-10-CM

## 2021-07-12 DIAGNOSIS — Z95.5 S/P CORONARY ARTERY STENT PLACEMENT: ICD-10-CM

## 2021-07-12 DIAGNOSIS — I25.10 CORONARY ARTERY DISEASE, ANGINA PRESENCE UNSPECIFIED, UNSPECIFIED VESSEL OR LESION TYPE, UNSPECIFIED WHETHER NATIVE OR TRANSPLANTED HEART: ICD-10-CM

## 2021-07-12 DIAGNOSIS — J20.9 COPD (CHRONIC OBSTRUCTIVE PULMONARY DISEASE) WITH ACUTE BRONCHITIS: ICD-10-CM

## 2021-07-12 DIAGNOSIS — Z87.891 FORMER SMOKER: ICD-10-CM

## 2021-07-12 DIAGNOSIS — I73.9 PAD (PERIPHERAL ARTERY DISEASE): ICD-10-CM

## 2021-07-12 DIAGNOSIS — Z89.431 HISTORY OF AMPUTATION OF RIGHT FOOT: ICD-10-CM

## 2021-07-12 PROBLEM — F32.9 MAJOR DEPRESSIVE DISORDER: Status: ACTIVE | Noted: 2021-07-12

## 2021-07-12 LAB
ALBUMIN SERPL BCP-MCNC: 4.1 G/DL (ref 3.5–5.2)
ALP SERPL-CCNC: 92 U/L (ref 55–135)
ALT SERPL W/O P-5'-P-CCNC: 19 U/L (ref 10–44)
ANION GAP SERPL CALC-SCNC: 12 MMOL/L (ref 8–16)
AST SERPL-CCNC: 28 U/L (ref 10–40)
BASOPHILS # BLD AUTO: 0.04 K/UL (ref 0–0.2)
BASOPHILS NFR BLD: 0.5 % (ref 0–1.9)
BILIRUB SERPL-MCNC: 0.5 MG/DL (ref 0.1–1)
BNP SERPL-MCNC: 72 PG/ML (ref 0–99)
BUN SERPL-MCNC: 11 MG/DL (ref 8–23)
CALCIUM SERPL-MCNC: 9 MG/DL (ref 8.7–10.5)
CHLORIDE SERPL-SCNC: 100 MMOL/L (ref 95–110)
CHOLEST SERPL-MCNC: 188 MG/DL (ref 120–199)
CHOLEST/HDLC SERPL: 2.6 {RATIO} (ref 2–5)
CO2 SERPL-SCNC: 21 MMOL/L (ref 23–29)
CREAT SERPL-MCNC: 0.8 MG/DL (ref 0.5–1.4)
CTP QC/QA: YES
DIFFERENTIAL METHOD: ABNORMAL
EOSINOPHIL # BLD AUTO: 0.4 K/UL (ref 0–0.5)
EOSINOPHIL NFR BLD: 5.2 % (ref 0–8)
ERYTHROCYTE [DISTWIDTH] IN BLOOD BY AUTOMATED COUNT: 12.8 % (ref 11.5–14.5)
EST. GFR  (AFRICAN AMERICAN): >60 ML/MIN/1.73 M^2
EST. GFR  (NON AFRICAN AMERICAN): >60 ML/MIN/1.73 M^2
ESTIMATED AVG GLUCOSE: 105 MG/DL (ref 68–131)
GLUCOSE SERPL-MCNC: 109 MG/DL (ref 70–110)
HBA1C MFR BLD: 5.3 % (ref 4–5.6)
HCT VFR BLD AUTO: 39 % (ref 37–48.5)
HDLC SERPL-MCNC: 73 MG/DL (ref 40–75)
HDLC SERPL: 38.8 % (ref 20–50)
HGB BLD-MCNC: 12.6 G/DL (ref 12–16)
IMM GRANULOCYTES # BLD AUTO: 0.05 K/UL (ref 0–0.04)
IMM GRANULOCYTES NFR BLD AUTO: 0.6 % (ref 0–0.5)
LACTATE SERPL-SCNC: 0.9 MMOL/L (ref 0.5–2.2)
LDLC SERPL CALC-MCNC: 105.8 MG/DL (ref 63–159)
LYMPHOCYTES # BLD AUTO: 0.4 K/UL (ref 1–4.8)
LYMPHOCYTES NFR BLD: 5.3 % (ref 18–48)
MAGNESIUM SERPL-MCNC: 1.7 MG/DL (ref 1.6–2.6)
MCH RBC QN AUTO: 30.5 PG (ref 27–31)
MCHC RBC AUTO-ENTMCNC: 32.3 G/DL (ref 32–36)
MCV RBC AUTO: 94 FL (ref 82–98)
MONOCYTES # BLD AUTO: 0.7 K/UL (ref 0.3–1)
MONOCYTES NFR BLD: 8.2 % (ref 4–15)
NEUTROPHILS # BLD AUTO: 6.5 K/UL (ref 1.8–7.7)
NEUTROPHILS NFR BLD: 80.2 % (ref 38–73)
NONHDLC SERPL-MCNC: 115 MG/DL
NRBC BLD-RTO: 0 /100 WBC
PLATELET # BLD AUTO: 209 K/UL (ref 150–450)
PMV BLD AUTO: 9.9 FL (ref 9.2–12.9)
POTASSIUM SERPL-SCNC: 3.9 MMOL/L (ref 3.5–5.1)
PROT SERPL-MCNC: 7.8 G/DL (ref 6–8.4)
RBC # BLD AUTO: 4.13 M/UL (ref 4–5.4)
SARS-COV-2 RDRP RESP QL NAA+PROBE: NEGATIVE
SODIUM SERPL-SCNC: 133 MMOL/L (ref 136–145)
TRIGL SERPL-MCNC: 46 MG/DL (ref 30–150)
TROPONIN I SERPL DL<=0.01 NG/ML-MCNC: 0.02 NG/ML (ref 0–0.03)
TROPONIN I SERPL DL<=0.01 NG/ML-MCNC: 0.02 NG/ML (ref 0–0.03)
TSH SERPL DL<=0.005 MIU/L-ACNC: 0.88 UIU/ML (ref 0.4–4)
WBC # BLD AUTO: 8.13 K/UL (ref 3.9–12.7)

## 2021-07-12 PROCEDURE — 93010 EKG 12-LEAD: ICD-10-PCS | Mod: ,,, | Performed by: INTERNAL MEDICINE

## 2021-07-12 PROCEDURE — 94640 AIRWAY INHALATION TREATMENT: CPT

## 2021-07-12 PROCEDURE — 63700000 PHARM REV CODE 250 ALT 637 W/O HCPCS: Performed by: EMERGENCY MEDICINE

## 2021-07-12 PROCEDURE — 83605 ASSAY OF LACTIC ACID: CPT | Performed by: EMERGENCY MEDICINE

## 2021-07-12 PROCEDURE — 83735 ASSAY OF MAGNESIUM: CPT | Performed by: STUDENT IN AN ORGANIZED HEALTH CARE EDUCATION/TRAINING PROGRAM

## 2021-07-12 PROCEDURE — G0378 HOSPITAL OBSERVATION PER HR: HCPCS

## 2021-07-12 PROCEDURE — 63600175 PHARM REV CODE 636 W HCPCS: Performed by: EMERGENCY MEDICINE

## 2021-07-12 PROCEDURE — 83880 ASSAY OF NATRIURETIC PEPTIDE: CPT | Performed by: STUDENT IN AN ORGANIZED HEALTH CARE EDUCATION/TRAINING PROGRAM

## 2021-07-12 PROCEDURE — 80053 COMPREHEN METABOLIC PANEL: CPT | Performed by: EMERGENCY MEDICINE

## 2021-07-12 PROCEDURE — U0002 COVID-19 LAB TEST NON-CDC: HCPCS | Performed by: EMERGENCY MEDICINE

## 2021-07-12 PROCEDURE — 80061 LIPID PANEL: CPT | Performed by: STUDENT IN AN ORGANIZED HEALTH CARE EDUCATION/TRAINING PROGRAM

## 2021-07-12 PROCEDURE — 85025 COMPLETE CBC W/AUTO DIFF WBC: CPT | Performed by: EMERGENCY MEDICINE

## 2021-07-12 PROCEDURE — 84484 ASSAY OF TROPONIN QUANT: CPT | Performed by: EMERGENCY MEDICINE

## 2021-07-12 PROCEDURE — 83036 HEMOGLOBIN GLYCOSYLATED A1C: CPT | Performed by: STUDENT IN AN ORGANIZED HEALTH CARE EDUCATION/TRAINING PROGRAM

## 2021-07-12 PROCEDURE — 25000003 PHARM REV CODE 250: Performed by: STUDENT IN AN ORGANIZED HEALTH CARE EDUCATION/TRAINING PROGRAM

## 2021-07-12 PROCEDURE — 96372 THER/PROPH/DIAG INJ SC/IM: CPT | Mod: 59

## 2021-07-12 PROCEDURE — 87040 BLOOD CULTURE FOR BACTERIA: CPT | Performed by: EMERGENCY MEDICINE

## 2021-07-12 PROCEDURE — 93010 ELECTROCARDIOGRAM REPORT: CPT | Mod: ,,, | Performed by: INTERNAL MEDICINE

## 2021-07-12 PROCEDURE — 25000242 PHARM REV CODE 250 ALT 637 W/ HCPCS: Performed by: STUDENT IN AN ORGANIZED HEALTH CARE EDUCATION/TRAINING PROGRAM

## 2021-07-12 PROCEDURE — 84443 ASSAY THYROID STIM HORMONE: CPT | Performed by: STUDENT IN AN ORGANIZED HEALTH CARE EDUCATION/TRAINING PROGRAM

## 2021-07-12 PROCEDURE — 63600175 PHARM REV CODE 636 W HCPCS: Performed by: STUDENT IN AN ORGANIZED HEALTH CARE EDUCATION/TRAINING PROGRAM

## 2021-07-12 PROCEDURE — 25000003 PHARM REV CODE 250: Performed by: EMERGENCY MEDICINE

## 2021-07-12 PROCEDURE — 96375 TX/PRO/DX INJ NEW DRUG ADDON: CPT

## 2021-07-12 PROCEDURE — 25000242 PHARM REV CODE 250 ALT 637 W/ HCPCS: Performed by: EMERGENCY MEDICINE

## 2021-07-12 PROCEDURE — 99291 CRITICAL CARE FIRST HOUR: CPT | Mod: 25

## 2021-07-12 PROCEDURE — 96365 THER/PROPH/DIAG IV INF INIT: CPT

## 2021-07-12 PROCEDURE — 96361 HYDRATE IV INFUSION ADD-ON: CPT

## 2021-07-12 PROCEDURE — 93005 ELECTROCARDIOGRAM TRACING: CPT

## 2021-07-12 PROCEDURE — 99292 CRITICAL CARE ADDL 30 MIN: CPT

## 2021-07-12 RX ORDER — GUAIFENESIN 600 MG/1
1200 TABLET, EXTENDED RELEASE ORAL 2 TIMES DAILY
Status: ON HOLD | COMMUNITY
End: 2021-07-15 | Stop reason: SDUPTHER

## 2021-07-12 RX ORDER — ASPIRIN 81 MG/1
81 TABLET ORAL DAILY
Status: ON HOLD | COMMUNITY
End: 2021-07-15 | Stop reason: SDUPTHER

## 2021-07-12 RX ORDER — IBUPROFEN 200 MG
24 TABLET ORAL
Status: DISCONTINUED | OUTPATIENT
Start: 2021-07-12 | End: 2021-07-13

## 2021-07-12 RX ORDER — ENOXAPARIN SODIUM 100 MG/ML
40 INJECTION SUBCUTANEOUS EVERY 24 HOURS
Status: DISCONTINUED | OUTPATIENT
Start: 2021-07-12 | End: 2021-07-15 | Stop reason: HOSPADM

## 2021-07-12 RX ORDER — IBUPROFEN 200 MG
16 TABLET ORAL
Status: DISCONTINUED | OUTPATIENT
Start: 2021-07-12 | End: 2021-07-15 | Stop reason: HOSPADM

## 2021-07-12 RX ORDER — PREDNISONE 20 MG/1
40 TABLET ORAL
Status: COMPLETED | OUTPATIENT
Start: 2021-07-12 | End: 2021-07-12

## 2021-07-12 RX ORDER — GUAIFENESIN 600 MG/1
1200 TABLET, EXTENDED RELEASE ORAL 2 TIMES DAILY
Status: DISCONTINUED | OUTPATIENT
Start: 2021-07-12 | End: 2021-07-15 | Stop reason: HOSPADM

## 2021-07-12 RX ORDER — AMLODIPINE BESYLATE 5 MG/1
5 TABLET ORAL
Status: COMPLETED | OUTPATIENT
Start: 2021-07-12 | End: 2021-07-12

## 2021-07-12 RX ORDER — METOPROLOL SUCCINATE 25 MG/1
25 TABLET, EXTENDED RELEASE ORAL DAILY
Status: CANCELLED | OUTPATIENT
Start: 2021-07-12

## 2021-07-12 RX ORDER — ALBUTEROL SULFATE 2.5 MG/.5ML
2.5 SOLUTION RESPIRATORY (INHALATION) EVERY 4 HOURS PRN
Status: DISCONTINUED | OUTPATIENT
Start: 2021-07-12 | End: 2021-07-15 | Stop reason: HOSPADM

## 2021-07-12 RX ORDER — ATORVASTATIN CALCIUM 40 MG/1
40 TABLET, FILM COATED ORAL DAILY
Status: DISCONTINUED | OUTPATIENT
Start: 2021-07-12 | End: 2021-07-15 | Stop reason: HOSPADM

## 2021-07-12 RX ORDER — BUDESONIDE AND FORMOTEROL FUMARATE DIHYDRATE 160; 4.5 UG/1; UG/1
2 AEROSOL RESPIRATORY (INHALATION) EVERY 12 HOURS
Status: ON HOLD | COMMUNITY
End: 2021-07-15 | Stop reason: SDUPTHER

## 2021-07-12 RX ORDER — GLUCAGON 1 MG
1 KIT INJECTION
Status: DISCONTINUED | OUTPATIENT
Start: 2021-07-12 | End: 2021-07-13

## 2021-07-12 RX ORDER — ASPIRIN 81 MG/1
81 TABLET ORAL DAILY
Status: DISCONTINUED | OUTPATIENT
Start: 2021-07-13 | End: 2021-07-15 | Stop reason: HOSPADM

## 2021-07-12 RX ORDER — PREDNISONE 20 MG/1
40 TABLET ORAL DAILY
Status: DISCONTINUED | OUTPATIENT
Start: 2021-07-13 | End: 2021-07-15 | Stop reason: HOSPADM

## 2021-07-12 RX ORDER — HYDROCORTISONE 1 %
CREAM (GRAM) TOPICAL 2 TIMES DAILY
Status: DISCONTINUED | OUTPATIENT
Start: 2021-07-12 | End: 2021-07-12

## 2021-07-12 RX ORDER — SODIUM CHLORIDE 0.9 % (FLUSH) 0.9 %
10 SYRINGE (ML) INJECTION
Status: DISCONTINUED | OUTPATIENT
Start: 2021-07-12 | End: 2021-07-15 | Stop reason: HOSPADM

## 2021-07-12 RX ORDER — IPRATROPIUM BROMIDE AND ALBUTEROL SULFATE 2.5; .5 MG/3ML; MG/3ML
3 SOLUTION RESPIRATORY (INHALATION)
Status: COMPLETED | OUTPATIENT
Start: 2021-07-12 | End: 2021-07-12

## 2021-07-12 RX ORDER — ELECTROLYTES/DEXTROSE
SOLUTION, ORAL ORAL 2 TIMES DAILY
Status: DISCONTINUED | OUTPATIENT
Start: 2021-07-12 | End: 2021-07-15 | Stop reason: HOSPADM

## 2021-07-12 RX ORDER — METHYLPREDNISOLONE SOD SUCC 125 MG
125 VIAL (EA) INJECTION ONCE
Status: COMPLETED | OUTPATIENT
Start: 2021-07-12 | End: 2021-07-12

## 2021-07-12 RX ORDER — IPRATROPIUM BROMIDE AND ALBUTEROL SULFATE 2.5; .5 MG/3ML; MG/3ML
3 SOLUTION RESPIRATORY (INHALATION)
Status: DISCONTINUED | OUTPATIENT
Start: 2021-07-12 | End: 2021-07-15 | Stop reason: HOSPADM

## 2021-07-12 RX ORDER — AZITHROMYCIN 250 MG/1
500 TABLET, FILM COATED ORAL
Status: COMPLETED | OUTPATIENT
Start: 2021-07-12 | End: 2021-07-12

## 2021-07-12 RX ADMIN — METHYLPREDNISOLONE SODIUM SUCCINATE 125 MG: 125 INJECTION, POWDER, FOR SOLUTION INTRAMUSCULAR; INTRAVENOUS at 08:07

## 2021-07-12 RX ADMIN — PREDNISONE 40 MG: 20 TABLET ORAL at 03:07

## 2021-07-12 RX ADMIN — GUAIFENESIN 1200 MG: 600 TABLET, EXTENDED RELEASE ORAL at 08:07

## 2021-07-12 RX ADMIN — IPRATROPIUM BROMIDE AND ALBUTEROL SULFATE 3 ML: .5; 2.5 SOLUTION RESPIRATORY (INHALATION) at 03:07

## 2021-07-12 RX ADMIN — SODIUM CHLORIDE 500 ML: 0.9 INJECTION, SOLUTION INTRAVENOUS at 04:07

## 2021-07-12 RX ADMIN — AMLODIPINE BESYLATE 5 MG: 5 TABLET ORAL at 04:07

## 2021-07-12 RX ADMIN — ATORVASTATIN CALCIUM 40 MG: 40 TABLET, FILM COATED ORAL at 08:07

## 2021-07-12 RX ADMIN — ENOXAPARIN SODIUM 40 MG: 40 INJECTION SUBCUTANEOUS at 08:07

## 2021-07-12 RX ADMIN — CEFTRIAXONE 1 G: 1 INJECTION, SOLUTION INTRAVENOUS at 08:07

## 2021-07-12 RX ADMIN — AZITHROMYCIN MONOHYDRATE 500 MG: 250 TABLET ORAL at 03:07

## 2021-07-12 RX ADMIN — IPRATROPIUM BROMIDE AND ALBUTEROL SULFATE 3 ML: .5; 3 SOLUTION RESPIRATORY (INHALATION) at 07:07

## 2021-07-13 LAB
ALBUMIN SERPL BCP-MCNC: 4 G/DL (ref 3.5–5.2)
ALP SERPL-CCNC: 96 U/L (ref 55–135)
ALT SERPL W/O P-5'-P-CCNC: 20 U/L (ref 10–44)
ANION GAP SERPL CALC-SCNC: 14 MMOL/L (ref 8–16)
AST SERPL-CCNC: 29 U/L (ref 10–40)
BASOPHILS # BLD AUTO: 0.02 K/UL (ref 0–0.2)
BASOPHILS NFR BLD: 0.3 % (ref 0–1.9)
BILIRUB SERPL-MCNC: 0.3 MG/DL (ref 0.1–1)
BUN SERPL-MCNC: 14 MG/DL (ref 8–23)
CALCIUM SERPL-MCNC: 9.1 MG/DL (ref 8.7–10.5)
CHLORIDE SERPL-SCNC: 101 MMOL/L (ref 95–110)
CO2 SERPL-SCNC: 20 MMOL/L (ref 23–29)
CREAT SERPL-MCNC: 0.8 MG/DL (ref 0.5–1.4)
DACRYOCYTES BLD QL SMEAR: ABNORMAL
DIFFERENTIAL METHOD: ABNORMAL
EOSINOPHIL # BLD AUTO: 0 K/UL (ref 0–0.5)
EOSINOPHIL NFR BLD: 0 % (ref 0–8)
ERYTHROCYTE [DISTWIDTH] IN BLOOD BY AUTOMATED COUNT: 12.9 % (ref 11.5–14.5)
EST. GFR  (AFRICAN AMERICAN): >60 ML/MIN/1.73 M^2
EST. GFR  (NON AFRICAN AMERICAN): >60 ML/MIN/1.73 M^2
GLUCOSE SERPL-MCNC: 146 MG/DL (ref 70–110)
HCT VFR BLD AUTO: 41.3 % (ref 37–48.5)
HGB BLD-MCNC: 12.8 G/DL (ref 12–16)
IMM GRANULOCYTES # BLD AUTO: 0.04 K/UL (ref 0–0.04)
IMM GRANULOCYTES NFR BLD AUTO: 0.7 % (ref 0–0.5)
LYMPHOCYTES # BLD AUTO: 0.5 K/UL (ref 1–4.8)
LYMPHOCYTES NFR BLD: 9.3 % (ref 18–48)
MAGNESIUM SERPL-MCNC: 1.9 MG/DL (ref 1.6–2.6)
MCH RBC QN AUTO: 29.7 PG (ref 27–31)
MCHC RBC AUTO-ENTMCNC: 31 G/DL (ref 32–36)
MCV RBC AUTO: 96 FL (ref 82–98)
MONOCYTES # BLD AUTO: 0.2 K/UL (ref 0.3–1)
MONOCYTES NFR BLD: 3.4 % (ref 4–15)
NEUTROPHILS # BLD AUTO: 5 K/UL (ref 1.8–7.7)
NEUTROPHILS NFR BLD: 86.3 % (ref 38–73)
NRBC BLD-RTO: 0 /100 WBC
OVALOCYTES BLD QL SMEAR: ABNORMAL
PLATELET # BLD AUTO: 192 K/UL (ref 150–450)
PLATELET BLD QL SMEAR: ABNORMAL
PMV BLD AUTO: 10.3 FL (ref 9.2–12.9)
POIKILOCYTOSIS BLD QL SMEAR: SLIGHT
POTASSIUM SERPL-SCNC: 4.2 MMOL/L (ref 3.5–5.1)
PROT SERPL-MCNC: 7.8 G/DL (ref 6–8.4)
RBC # BLD AUTO: 4.31 M/UL (ref 4–5.4)
SODIUM SERPL-SCNC: 135 MMOL/L (ref 136–145)
WBC # BLD AUTO: 5.83 K/UL (ref 3.9–12.7)

## 2021-07-13 PROCEDURE — 94761 N-INVAS EAR/PLS OXIMETRY MLT: CPT

## 2021-07-13 PROCEDURE — 63700000 PHARM REV CODE 250 ALT 637 W/O HCPCS: Performed by: STUDENT IN AN ORGANIZED HEALTH CARE EDUCATION/TRAINING PROGRAM

## 2021-07-13 PROCEDURE — 99900035 HC TECH TIME PER 15 MIN (STAT)

## 2021-07-13 PROCEDURE — 96376 TX/PRO/DX INJ SAME DRUG ADON: CPT

## 2021-07-13 PROCEDURE — 25000242 PHARM REV CODE 250 ALT 637 W/ HCPCS: Performed by: STUDENT IN AN ORGANIZED HEALTH CARE EDUCATION/TRAINING PROGRAM

## 2021-07-13 PROCEDURE — 96372 THER/PROPH/DIAG INJ SC/IM: CPT | Mod: 59

## 2021-07-13 PROCEDURE — 96361 HYDRATE IV INFUSION ADD-ON: CPT

## 2021-07-13 PROCEDURE — 63600175 PHARM REV CODE 636 W HCPCS: Performed by: STUDENT IN AN ORGANIZED HEALTH CARE EDUCATION/TRAINING PROGRAM

## 2021-07-13 PROCEDURE — 25000003 PHARM REV CODE 250: Performed by: INTERNAL MEDICINE

## 2021-07-13 PROCEDURE — 80053 COMPREHEN METABOLIC PANEL: CPT | Performed by: STUDENT IN AN ORGANIZED HEALTH CARE EDUCATION/TRAINING PROGRAM

## 2021-07-13 PROCEDURE — 85025 COMPLETE CBC W/AUTO DIFF WBC: CPT | Performed by: STUDENT IN AN ORGANIZED HEALTH CARE EDUCATION/TRAINING PROGRAM

## 2021-07-13 PROCEDURE — 97535 SELF CARE MNGMENT TRAINING: CPT

## 2021-07-13 PROCEDURE — 94640 AIRWAY INHALATION TREATMENT: CPT

## 2021-07-13 PROCEDURE — 27000221 HC OXYGEN, UP TO 24 HOURS

## 2021-07-13 PROCEDURE — 83735 ASSAY OF MAGNESIUM: CPT | Performed by: STUDENT IN AN ORGANIZED HEALTH CARE EDUCATION/TRAINING PROGRAM

## 2021-07-13 PROCEDURE — 97165 OT EVAL LOW COMPLEX 30 MIN: CPT

## 2021-07-13 PROCEDURE — 25000003 PHARM REV CODE 250: Performed by: STUDENT IN AN ORGANIZED HEALTH CARE EDUCATION/TRAINING PROGRAM

## 2021-07-13 PROCEDURE — G0378 HOSPITAL OBSERVATION PER HR: HCPCS

## 2021-07-13 PROCEDURE — 97162 PT EVAL MOD COMPLEX 30 MIN: CPT

## 2021-07-13 PROCEDURE — 97530 THERAPEUTIC ACTIVITIES: CPT

## 2021-07-13 RX ORDER — AZITHROMYCIN 250 MG/1
250 TABLET, FILM COATED ORAL DAILY
Status: DISCONTINUED | OUTPATIENT
Start: 2021-07-13 | End: 2021-07-15 | Stop reason: HOSPADM

## 2021-07-13 RX ORDER — METOPROLOL SUCCINATE 25 MG/1
25 TABLET, EXTENDED RELEASE ORAL DAILY
Status: DISCONTINUED | OUTPATIENT
Start: 2021-07-13 | End: 2021-07-15 | Stop reason: HOSPADM

## 2021-07-13 RX ORDER — SODIUM CHLORIDE 9 MG/ML
INJECTION, SOLUTION INTRAVENOUS CONTINUOUS
Status: DISCONTINUED | OUTPATIENT
Start: 2021-07-13 | End: 2021-07-15 | Stop reason: HOSPADM

## 2021-07-13 RX ORDER — AZITHROMYCIN 250 MG/1
500 TABLET, FILM COATED ORAL DAILY
Status: DISCONTINUED | OUTPATIENT
Start: 2021-07-13 | End: 2021-07-13

## 2021-07-13 RX ADMIN — IPRATROPIUM BROMIDE AND ALBUTEROL SULFATE 3 ML: .5; 3 SOLUTION RESPIRATORY (INHALATION) at 01:07

## 2021-07-13 RX ADMIN — PREDNISONE 40 MG: 20 TABLET ORAL at 09:07

## 2021-07-13 RX ADMIN — IPRATROPIUM BROMIDE AND ALBUTEROL SULFATE 3 ML: .5; 3 SOLUTION RESPIRATORY (INHALATION) at 07:07

## 2021-07-13 RX ADMIN — AZITHROMYCIN MONOHYDRATE 250 MG: 250 TABLET ORAL at 11:07

## 2021-07-13 RX ADMIN — ATORVASTATIN CALCIUM 40 MG: 40 TABLET, FILM COATED ORAL at 09:07

## 2021-07-13 RX ADMIN — GUAIFENESIN 1200 MG: 600 TABLET, EXTENDED RELEASE ORAL at 09:07

## 2021-07-13 RX ADMIN — ASPIRIN 81 MG: 81 TABLET, COATED ORAL at 09:07

## 2021-07-13 RX ADMIN — SODIUM CHLORIDE: 0.9 INJECTION, SOLUTION INTRAVENOUS at 08:07

## 2021-07-13 RX ADMIN — ENOXAPARIN SODIUM 40 MG: 40 INJECTION SUBCUTANEOUS at 04:07

## 2021-07-13 RX ADMIN — HYDROCORTISONE: 0.01 CREAM TOPICAL at 09:07

## 2021-07-13 RX ADMIN — CEFTRIAXONE 1 G: 1 INJECTION, SOLUTION INTRAVENOUS at 08:07

## 2021-07-13 RX ADMIN — HYDROCORTISONE: 0.01 CREAM TOPICAL at 01:07

## 2021-07-13 RX ADMIN — METOPROLOL SUCCINATE 25 MG: 25 TABLET, EXTENDED RELEASE ORAL at 09:07

## 2021-07-14 ENCOUNTER — TELEPHONE (OUTPATIENT)
Dept: CARDIOLOGY | Facility: HOSPITAL | Age: 76
End: 2021-07-14

## 2021-07-14 PROBLEM — J96.01 ACUTE RESPIRATORY FAILURE WITH HYPOXIA: Status: ACTIVE | Noted: 2021-07-14

## 2021-07-14 PROBLEM — J96.21 ACUTE ON CHRONIC RESPIRATORY FAILURE WITH HYPOXIA: Status: ACTIVE | Noted: 2021-07-14

## 2021-07-14 LAB
ALBUMIN SERPL BCP-MCNC: 3.6 G/DL (ref 3.5–5.2)
ALP SERPL-CCNC: 77 U/L (ref 55–135)
ALT SERPL W/O P-5'-P-CCNC: 18 U/L (ref 10–44)
ANION GAP SERPL CALC-SCNC: 9 MMOL/L (ref 8–16)
AST SERPL-CCNC: 36 U/L (ref 10–40)
BASOPHILS # BLD AUTO: 0.01 K/UL (ref 0–0.2)
BASOPHILS NFR BLD: 0.1 % (ref 0–1.9)
BILIRUB SERPL-MCNC: 0.2 MG/DL (ref 0.1–1)
BUN SERPL-MCNC: 23 MG/DL (ref 8–23)
CALCIUM SERPL-MCNC: 9.4 MG/DL (ref 8.7–10.5)
CHLORIDE SERPL-SCNC: 104 MMOL/L (ref 95–110)
CO2 SERPL-SCNC: 25 MMOL/L (ref 23–29)
CREAT SERPL-MCNC: 0.8 MG/DL (ref 0.5–1.4)
DIFFERENTIAL METHOD: ABNORMAL
EOSINOPHIL # BLD AUTO: 0 K/UL (ref 0–0.5)
EOSINOPHIL NFR BLD: 0 % (ref 0–8)
ERYTHROCYTE [DISTWIDTH] IN BLOOD BY AUTOMATED COUNT: 12.9 % (ref 11.5–14.5)
EST. GFR  (AFRICAN AMERICAN): >60 ML/MIN/1.73 M^2
EST. GFR  (NON AFRICAN AMERICAN): >60 ML/MIN/1.73 M^2
GLUCOSE SERPL-MCNC: 97 MG/DL (ref 70–110)
HCT VFR BLD AUTO: 37.6 % (ref 37–48.5)
HGB BLD-MCNC: 11.6 G/DL (ref 12–16)
IMM GRANULOCYTES # BLD AUTO: 0.05 K/UL (ref 0–0.04)
IMM GRANULOCYTES NFR BLD AUTO: 0.6 % (ref 0–0.5)
LYMPHOCYTES # BLD AUTO: 1.1 K/UL (ref 1–4.8)
LYMPHOCYTES NFR BLD: 11.8 % (ref 18–48)
MAGNESIUM SERPL-MCNC: 2.1 MG/DL (ref 1.6–2.6)
MCH RBC QN AUTO: 30 PG (ref 27–31)
MCHC RBC AUTO-ENTMCNC: 30.9 G/DL (ref 32–36)
MCV RBC AUTO: 97 FL (ref 82–98)
MONOCYTES # BLD AUTO: 1.2 K/UL (ref 0.3–1)
MONOCYTES NFR BLD: 13.6 % (ref 4–15)
NEUTROPHILS # BLD AUTO: 6.6 K/UL (ref 1.8–7.7)
NEUTROPHILS NFR BLD: 73.9 % (ref 38–73)
NRBC BLD-RTO: 0 /100 WBC
PLATELET # BLD AUTO: 211 K/UL (ref 150–450)
PMV BLD AUTO: 9.7 FL (ref 9.2–12.9)
POTASSIUM SERPL-SCNC: 4.2 MMOL/L (ref 3.5–5.1)
PROT SERPL-MCNC: 6.9 G/DL (ref 6–8.4)
RBC # BLD AUTO: 3.87 M/UL (ref 4–5.4)
SODIUM SERPL-SCNC: 138 MMOL/L (ref 136–145)
WBC # BLD AUTO: 8.89 K/UL (ref 3.9–12.7)

## 2021-07-14 PROCEDURE — 99900035 HC TECH TIME PER 15 MIN (STAT)

## 2021-07-14 PROCEDURE — 96361 HYDRATE IV INFUSION ADD-ON: CPT

## 2021-07-14 PROCEDURE — 94761 N-INVAS EAR/PLS OXIMETRY MLT: CPT

## 2021-07-14 PROCEDURE — 25000242 PHARM REV CODE 250 ALT 637 W/ HCPCS: Performed by: STUDENT IN AN ORGANIZED HEALTH CARE EDUCATION/TRAINING PROGRAM

## 2021-07-14 PROCEDURE — 11000001 HC ACUTE MED/SURG PRIVATE ROOM

## 2021-07-14 PROCEDURE — 63700000 PHARM REV CODE 250 ALT 637 W/O HCPCS: Performed by: STUDENT IN AN ORGANIZED HEALTH CARE EDUCATION/TRAINING PROGRAM

## 2021-07-14 PROCEDURE — 97535 SELF CARE MNGMENT TRAINING: CPT

## 2021-07-14 PROCEDURE — 94640 AIRWAY INHALATION TREATMENT: CPT

## 2021-07-14 PROCEDURE — 97116 GAIT TRAINING THERAPY: CPT | Mod: CQ

## 2021-07-14 PROCEDURE — 25000003 PHARM REV CODE 250: Performed by: STUDENT IN AN ORGANIZED HEALTH CARE EDUCATION/TRAINING PROGRAM

## 2021-07-14 PROCEDURE — 85025 COMPLETE CBC W/AUTO DIFF WBC: CPT | Performed by: STUDENT IN AN ORGANIZED HEALTH CARE EDUCATION/TRAINING PROGRAM

## 2021-07-14 PROCEDURE — 86580 TB INTRADERMAL TEST: CPT | Performed by: STUDENT IN AN ORGANIZED HEALTH CARE EDUCATION/TRAINING PROGRAM

## 2021-07-14 PROCEDURE — 36415 COLL VENOUS BLD VENIPUNCTURE: CPT | Performed by: STUDENT IN AN ORGANIZED HEALTH CARE EDUCATION/TRAINING PROGRAM

## 2021-07-14 PROCEDURE — 83735 ASSAY OF MAGNESIUM: CPT | Performed by: STUDENT IN AN ORGANIZED HEALTH CARE EDUCATION/TRAINING PROGRAM

## 2021-07-14 PROCEDURE — 30200315 PPD INTRADERMAL TEST REV CODE 302: Performed by: STUDENT IN AN ORGANIZED HEALTH CARE EDUCATION/TRAINING PROGRAM

## 2021-07-14 PROCEDURE — 63600175 PHARM REV CODE 636 W HCPCS: Performed by: STUDENT IN AN ORGANIZED HEALTH CARE EDUCATION/TRAINING PROGRAM

## 2021-07-14 PROCEDURE — 25000003 PHARM REV CODE 250: Performed by: INTERNAL MEDICINE

## 2021-07-14 PROCEDURE — 27000221 HC OXYGEN, UP TO 24 HOURS

## 2021-07-14 PROCEDURE — 80053 COMPREHEN METABOLIC PANEL: CPT | Performed by: STUDENT IN AN ORGANIZED HEALTH CARE EDUCATION/TRAINING PROGRAM

## 2021-07-14 PROCEDURE — 97530 THERAPEUTIC ACTIVITIES: CPT | Mod: CQ

## 2021-07-14 RX ADMIN — ASPIRIN 81 MG: 81 TABLET, COATED ORAL at 09:07

## 2021-07-14 RX ADMIN — IPRATROPIUM BROMIDE AND ALBUTEROL SULFATE 3 ML: .5; 3 SOLUTION RESPIRATORY (INHALATION) at 08:07

## 2021-07-14 RX ADMIN — PREDNISONE 40 MG: 20 TABLET ORAL at 09:07

## 2021-07-14 RX ADMIN — GUAIFENESIN 1200 MG: 600 TABLET, EXTENDED RELEASE ORAL at 09:07

## 2021-07-14 RX ADMIN — IPRATROPIUM BROMIDE AND ALBUTEROL SULFATE 3 ML: .5; 3 SOLUTION RESPIRATORY (INHALATION) at 02:07

## 2021-07-14 RX ADMIN — CEFTRIAXONE 1 G: 1 INJECTION, SOLUTION INTRAVENOUS at 09:07

## 2021-07-14 RX ADMIN — SODIUM CHLORIDE: 0.9 INJECTION, SOLUTION INTRAVENOUS at 09:07

## 2021-07-14 RX ADMIN — TUBERCULIN PURIFIED PROTEIN DERIVATIVE 5 UNITS: 5 INJECTION, SOLUTION INTRADERMAL at 03:07

## 2021-07-14 RX ADMIN — METOPROLOL SUCCINATE 25 MG: 25 TABLET, EXTENDED RELEASE ORAL at 09:07

## 2021-07-14 RX ADMIN — ATORVASTATIN CALCIUM 40 MG: 40 TABLET, FILM COATED ORAL at 09:07

## 2021-07-14 RX ADMIN — AZITHROMYCIN MONOHYDRATE 250 MG: 250 TABLET ORAL at 09:07

## 2021-07-14 RX ADMIN — HYDROCORTISONE: 0.01 CREAM TOPICAL at 09:07

## 2021-07-14 RX ADMIN — ENOXAPARIN SODIUM 40 MG: 40 INJECTION SUBCUTANEOUS at 04:07

## 2021-07-15 VITALS
TEMPERATURE: 98 F | WEIGHT: 145.06 LBS | SYSTOLIC BLOOD PRESSURE: 153 MMHG | HEART RATE: 83 BPM | RESPIRATION RATE: 16 BRPM | OXYGEN SATURATION: 92 % | HEIGHT: 60 IN | DIASTOLIC BLOOD PRESSURE: 96 MMHG | BODY MASS INDEX: 28.48 KG/M2

## 2021-07-15 LAB
ALBUMIN SERPL BCP-MCNC: 3.5 G/DL (ref 3.5–5.2)
ALP SERPL-CCNC: 71 U/L (ref 55–135)
ALT SERPL W/O P-5'-P-CCNC: 17 U/L (ref 10–44)
ANION GAP SERPL CALC-SCNC: 8 MMOL/L (ref 8–16)
AST SERPL-CCNC: 33 U/L (ref 10–40)
BASOPHILS # BLD AUTO: 0.01 K/UL (ref 0–0.2)
BASOPHILS NFR BLD: 0.2 % (ref 0–1.9)
BILIRUB SERPL-MCNC: 0.2 MG/DL (ref 0.1–1)
BUN SERPL-MCNC: 23 MG/DL (ref 8–23)
CALCIUM SERPL-MCNC: 8.8 MG/DL (ref 8.7–10.5)
CHLORIDE SERPL-SCNC: 105 MMOL/L (ref 95–110)
CO2 SERPL-SCNC: 26 MMOL/L (ref 23–29)
CREAT SERPL-MCNC: 0.8 MG/DL (ref 0.5–1.4)
DIFFERENTIAL METHOD: ABNORMAL
EOSINOPHIL # BLD AUTO: 0 K/UL (ref 0–0.5)
EOSINOPHIL NFR BLD: 0.3 % (ref 0–8)
ERYTHROCYTE [DISTWIDTH] IN BLOOD BY AUTOMATED COUNT: 12.9 % (ref 11.5–14.5)
EST. GFR  (AFRICAN AMERICAN): >60 ML/MIN/1.73 M^2
EST. GFR  (NON AFRICAN AMERICAN): >60 ML/MIN/1.73 M^2
FERRITIN SERPL-MCNC: 135 NG/ML (ref 20–300)
GLUCOSE SERPL-MCNC: 89 MG/DL (ref 70–110)
HCT VFR BLD AUTO: 38.6 % (ref 37–48.5)
HGB BLD-MCNC: 11.7 G/DL (ref 12–16)
IMM GRANULOCYTES # BLD AUTO: 0.03 K/UL (ref 0–0.04)
IMM GRANULOCYTES NFR BLD AUTO: 0.5 % (ref 0–0.5)
IRON SERPL-MCNC: 65 UG/DL (ref 30–160)
LYMPHOCYTES # BLD AUTO: 1.3 K/UL (ref 1–4.8)
LYMPHOCYTES NFR BLD: 21.6 % (ref 18–48)
MAGNESIUM SERPL-MCNC: 2 MG/DL (ref 1.6–2.6)
MCH RBC QN AUTO: 30.2 PG (ref 27–31)
MCHC RBC AUTO-ENTMCNC: 30.3 G/DL (ref 32–36)
MCV RBC AUTO: 100 FL (ref 82–98)
MONOCYTES # BLD AUTO: 1 K/UL (ref 0.3–1)
MONOCYTES NFR BLD: 16.8 % (ref 4–15)
NEUTROPHILS # BLD AUTO: 3.6 K/UL (ref 1.8–7.7)
NEUTROPHILS NFR BLD: 60.6 % (ref 38–73)
NRBC BLD-RTO: 0 /100 WBC
PLATELET # BLD AUTO: 190 K/UL (ref 150–450)
PLATELET BLD QL SMEAR: ABNORMAL
PMV BLD AUTO: 10.6 FL (ref 9.2–12.9)
POTASSIUM SERPL-SCNC: 4.2 MMOL/L (ref 3.5–5.1)
PROT SERPL-MCNC: 6.6 G/DL (ref 6–8.4)
RBC # BLD AUTO: 3.88 M/UL (ref 4–5.4)
SATURATED IRON: 22 % (ref 20–50)
SODIUM SERPL-SCNC: 139 MMOL/L (ref 136–145)
TOTAL IRON BINDING CAPACITY: 302 UG/DL (ref 250–450)
TRANSFERRIN SERPL-MCNC: 204 MG/DL (ref 200–375)
WBC # BLD AUTO: 6.01 K/UL (ref 3.9–12.7)

## 2021-07-15 PROCEDURE — 97116 GAIT TRAINING THERAPY: CPT

## 2021-07-15 PROCEDURE — 63700000 PHARM REV CODE 250 ALT 637 W/O HCPCS: Performed by: STUDENT IN AN ORGANIZED HEALTH CARE EDUCATION/TRAINING PROGRAM

## 2021-07-15 PROCEDURE — 83735 ASSAY OF MAGNESIUM: CPT | Performed by: STUDENT IN AN ORGANIZED HEALTH CARE EDUCATION/TRAINING PROGRAM

## 2021-07-15 PROCEDURE — 25000242 PHARM REV CODE 250 ALT 637 W/ HCPCS: Performed by: STUDENT IN AN ORGANIZED HEALTH CARE EDUCATION/TRAINING PROGRAM

## 2021-07-15 PROCEDURE — 36415 COLL VENOUS BLD VENIPUNCTURE: CPT | Performed by: STUDENT IN AN ORGANIZED HEALTH CARE EDUCATION/TRAINING PROGRAM

## 2021-07-15 PROCEDURE — 25000003 PHARM REV CODE 250: Performed by: STUDENT IN AN ORGANIZED HEALTH CARE EDUCATION/TRAINING PROGRAM

## 2021-07-15 PROCEDURE — 82607 VITAMIN B-12: CPT | Performed by: STUDENT IN AN ORGANIZED HEALTH CARE EDUCATION/TRAINING PROGRAM

## 2021-07-15 PROCEDURE — 85025 COMPLETE CBC W/AUTO DIFF WBC: CPT | Performed by: STUDENT IN AN ORGANIZED HEALTH CARE EDUCATION/TRAINING PROGRAM

## 2021-07-15 PROCEDURE — 94640 AIRWAY INHALATION TREATMENT: CPT

## 2021-07-15 PROCEDURE — 80053 COMPREHEN METABOLIC PANEL: CPT | Performed by: STUDENT IN AN ORGANIZED HEALTH CARE EDUCATION/TRAINING PROGRAM

## 2021-07-15 PROCEDURE — 97110 THERAPEUTIC EXERCISES: CPT

## 2021-07-15 PROCEDURE — 82746 ASSAY OF FOLIC ACID SERUM: CPT | Performed by: STUDENT IN AN ORGANIZED HEALTH CARE EDUCATION/TRAINING PROGRAM

## 2021-07-15 PROCEDURE — 82728 ASSAY OF FERRITIN: CPT | Performed by: STUDENT IN AN ORGANIZED HEALTH CARE EDUCATION/TRAINING PROGRAM

## 2021-07-15 PROCEDURE — 83540 ASSAY OF IRON: CPT | Performed by: STUDENT IN AN ORGANIZED HEALTH CARE EDUCATION/TRAINING PROGRAM

## 2021-07-15 PROCEDURE — 63600175 PHARM REV CODE 636 W HCPCS: Performed by: STUDENT IN AN ORGANIZED HEALTH CARE EDUCATION/TRAINING PROGRAM

## 2021-07-15 PROCEDURE — 94761 N-INVAS EAR/PLS OXIMETRY MLT: CPT

## 2021-07-15 PROCEDURE — 27000221 HC OXYGEN, UP TO 24 HOURS

## 2021-07-15 RX ORDER — BUDESONIDE AND FORMOTEROL FUMARATE DIHYDRATE 160; 4.5 UG/1; UG/1
2 AEROSOL RESPIRATORY (INHALATION) EVERY 12 HOURS
Qty: 1 INHALER | Refills: 5
Start: 2021-07-15 | End: 2021-08-14

## 2021-07-15 RX ORDER — IPRATROPIUM BROMIDE AND ALBUTEROL SULFATE 2.5; .5 MG/3ML; MG/3ML
3 SOLUTION RESPIRATORY (INHALATION)
Qty: 1 BOX | Refills: 0
Start: 2021-07-15 | End: 2022-05-30 | Stop reason: SDUPTHER

## 2021-07-15 RX ORDER — ATORVASTATIN CALCIUM 40 MG/1
40 TABLET, FILM COATED ORAL DAILY
Qty: 30 TABLET | Refills: 5
Start: 2021-07-16 | End: 2023-08-11

## 2021-07-15 RX ORDER — ASPIRIN 81 MG/1
81 TABLET ORAL DAILY
Qty: 30 TABLET | Refills: 5
Start: 2021-07-15 | End: 2022-01-11

## 2021-07-15 RX ORDER — METOPROLOL SUCCINATE 25 MG/1
25 TABLET, EXTENDED RELEASE ORAL DAILY
Qty: 30 TABLET | Refills: 5
Start: 2021-07-16 | End: 2023-08-11

## 2021-07-15 RX ORDER — GUAIFENESIN 600 MG/1
1200 TABLET, EXTENDED RELEASE ORAL 2 TIMES DAILY
Qty: 120 TABLET | Refills: 3
Start: 2021-07-15 | End: 2021-11-12

## 2021-07-15 RX ORDER — AZITHROMYCIN 250 MG/1
250 TABLET, FILM COATED ORAL DAILY
Qty: 1 TABLET | Refills: 0
Start: 2021-07-16 | End: 2021-07-17

## 2021-07-15 RX ORDER — PREDNISONE 20 MG/1
40 TABLET ORAL DAILY
Qty: 4 TABLET | Refills: 0
Start: 2021-07-16 | End: 2021-07-18

## 2021-07-15 RX ORDER — ELECTROLYTES/DEXTROSE
SOLUTION, ORAL ORAL 2 TIMES DAILY
Qty: 28 G | Refills: 0
Start: 2021-07-15

## 2021-07-15 RX ADMIN — HYDROCORTISONE: 0.01 CREAM TOPICAL at 09:07

## 2021-07-15 RX ADMIN — PREDNISONE 40 MG: 20 TABLET ORAL at 09:07

## 2021-07-15 RX ADMIN — METOPROLOL SUCCINATE 25 MG: 25 TABLET, EXTENDED RELEASE ORAL at 09:07

## 2021-07-15 RX ADMIN — IPRATROPIUM BROMIDE AND ALBUTEROL SULFATE 3 ML: .5; 3 SOLUTION RESPIRATORY (INHALATION) at 02:07

## 2021-07-15 RX ADMIN — ASPIRIN 81 MG: 81 TABLET, COATED ORAL at 09:07

## 2021-07-15 RX ADMIN — IPRATROPIUM BROMIDE AND ALBUTEROL SULFATE 3 ML: .5; 3 SOLUTION RESPIRATORY (INHALATION) at 08:07

## 2021-07-15 RX ADMIN — AZITHROMYCIN MONOHYDRATE 250 MG: 250 TABLET ORAL at 09:07

## 2021-07-15 RX ADMIN — ENOXAPARIN SODIUM 40 MG: 40 INJECTION SUBCUTANEOUS at 05:07

## 2021-07-15 RX ADMIN — ATORVASTATIN CALCIUM 40 MG: 40 TABLET, FILM COATED ORAL at 09:07

## 2021-07-15 RX ADMIN — GUAIFENESIN 1200 MG: 600 TABLET, EXTENDED RELEASE ORAL at 09:07

## 2021-07-16 LAB
FOLATE SERPL-MCNC: 10.3 NG/ML (ref 4–24)
VIT B12 SERPL-MCNC: 229 PG/ML (ref 210–950)

## 2021-07-17 LAB
BACTERIA BLD CULT: NORMAL
BACTERIA BLD CULT: NORMAL

## 2021-07-22 ENCOUNTER — LAB VISIT (OUTPATIENT)
Dept: LAB | Facility: OTHER | Age: 76
End: 2021-07-22
Payer: MEDICARE

## 2021-07-22 DIAGNOSIS — Z20.822 ENCOUNTER FOR LABORATORY TESTING FOR COVID-19 VIRUS: ICD-10-CM

## 2021-07-22 PROCEDURE — U0003 INFECTIOUS AGENT DETECTION BY NUCLEIC ACID (DNA OR RNA); SEVERE ACUTE RESPIRATORY SYNDROME CORONAVIRUS 2 (SARS-COV-2) (CORONAVIRUS DISEASE [COVID-19]), AMPLIFIED PROBE TECHNIQUE, MAKING USE OF HIGH THROUGHPUT TECHNOLOGIES AS DESCRIBED BY CMS-2020-01-R: HCPCS | Performed by: NURSE PRACTITIONER

## 2021-07-24 LAB
SARS-COV-2 RNA RESP QL NAA+PROBE: NOT DETECTED
SARS-COV-2- CYCLE NUMBER: -1

## 2021-07-29 ENCOUNTER — LAB VISIT (OUTPATIENT)
Dept: LAB | Facility: OTHER | Age: 76
End: 2021-07-29
Payer: MEDICARE

## 2021-07-29 DIAGNOSIS — Z20.822 ENCOUNTER FOR LABORATORY TESTING FOR COVID-19 VIRUS: ICD-10-CM

## 2021-07-29 PROCEDURE — U0003 INFECTIOUS AGENT DETECTION BY NUCLEIC ACID (DNA OR RNA); SEVERE ACUTE RESPIRATORY SYNDROME CORONAVIRUS 2 (SARS-COV-2) (CORONAVIRUS DISEASE [COVID-19]), AMPLIFIED PROBE TECHNIQUE, MAKING USE OF HIGH THROUGHPUT TECHNOLOGIES AS DESCRIBED BY CMS-2020-01-R: HCPCS | Performed by: NURSE PRACTITIONER

## 2021-07-30 LAB
SARS-COV-2 RNA RESP QL NAA+PROBE: NOT DETECTED
SARS-COV-2- CYCLE NUMBER: -1

## 2021-08-04 PROCEDURE — G0180 MD CERTIFICATION HHA PATIENT: HCPCS | Mod: ,,, | Performed by: INTERNAL MEDICINE

## 2021-08-04 PROCEDURE — G0180 PR HOME HEALTH MD CERTIFICATION: ICD-10-PCS | Mod: ,,, | Performed by: INTERNAL MEDICINE

## 2021-08-05 ENCOUNTER — TELEPHONE (OUTPATIENT)
Dept: ADMINISTRATIVE | Facility: HOSPITAL | Age: 76
End: 2021-08-05

## 2021-08-05 DIAGNOSIS — I25.10 CORONARY ARTERY DISEASE, ANGINA PRESENCE UNSPECIFIED, UNSPECIFIED VESSEL OR LESION TYPE, UNSPECIFIED WHETHER NATIVE OR TRANSPLANTED HEART: ICD-10-CM

## 2021-08-05 DIAGNOSIS — J44.1 COPD EXACERBATION: Primary | ICD-10-CM

## 2021-08-05 DIAGNOSIS — R06.02 SOB (SHORTNESS OF BREATH): ICD-10-CM

## 2021-08-06 DIAGNOSIS — I25.10 CORONARY ARTERY DISEASE, ANGINA PRESENCE UNSPECIFIED, UNSPECIFIED VESSEL OR LESION TYPE, UNSPECIFIED WHETHER NATIVE OR TRANSPLANTED HEART: Primary | ICD-10-CM

## 2021-08-06 DIAGNOSIS — J44.9 CHRONIC OBSTRUCTIVE PULMONARY DISEASE, UNSPECIFIED COPD TYPE: ICD-10-CM

## 2021-08-06 DIAGNOSIS — R53.81 DEBILITY: ICD-10-CM

## 2021-08-09 RX ORDER — IPRATROPIUM BROMIDE AND ALBUTEROL SULFATE 2.5; .5 MG/3ML; MG/3ML
3 SOLUTION RESPIRATORY (INHALATION) EVERY 6 HOURS PRN
Qty: 1 BOX | Refills: 6 | Status: SHIPPED | OUTPATIENT
Start: 2021-08-09 | End: 2022-02-03 | Stop reason: SDUPTHER

## 2021-08-14 RX ORDER — ALBUTEROL SULFATE 90 UG/1
AEROSOL, METERED RESPIRATORY (INHALATION)
Qty: 25.5 G | Refills: 0 | Status: SHIPPED | OUTPATIENT
Start: 2021-08-14 | End: 2022-06-08

## 2021-08-18 ENCOUNTER — CARE AT HOME (OUTPATIENT)
Dept: HOME HEALTH SERVICES | Facility: CLINIC | Age: 76
End: 2021-08-18
Payer: MEDICARE

## 2021-08-18 DIAGNOSIS — J44.1 COPD EXACERBATION: ICD-10-CM

## 2021-08-18 DIAGNOSIS — I25.10 CORONARY ARTERY DISEASE, ANGINA PRESENCE UNSPECIFIED, UNSPECIFIED VESSEL OR LESION TYPE, UNSPECIFIED WHETHER NATIVE OR TRANSPLANTED HEART: ICD-10-CM

## 2021-08-18 DIAGNOSIS — R06.02 SOB (SHORTNESS OF BREATH): ICD-10-CM

## 2021-08-18 PROCEDURE — 99497 PR ADVNCD CARE PLAN 30 MIN: ICD-10-PCS | Mod: S$GLB,,, | Performed by: NURSE PRACTITIONER

## 2021-08-18 PROCEDURE — 99350 HOME/RES VST EST HIGH MDM 60: CPT | Mod: S$GLB,,, | Performed by: NURSE PRACTITIONER

## 2021-08-18 PROCEDURE — 99350 PR HOME VISIT,ESTAB PATIENT,LEVEL IV: ICD-10-PCS | Mod: S$GLB,,, | Performed by: NURSE PRACTITIONER

## 2021-08-18 PROCEDURE — 99499 UNLISTED E&M SERVICE: CPT | Mod: S$GLB,,, | Performed by: NURSE PRACTITIONER

## 2021-08-18 PROCEDURE — 99499 RISK ADDL DX/OHS AUDIT: ICD-10-PCS | Mod: S$GLB,,, | Performed by: NURSE PRACTITIONER

## 2021-08-18 PROCEDURE — 99497 ADVNCD CARE PLAN 30 MIN: CPT | Mod: S$GLB,,, | Performed by: NURSE PRACTITIONER

## 2021-08-22 VITALS
RESPIRATION RATE: 20 BRPM | HEIGHT: 60 IN | DIASTOLIC BLOOD PRESSURE: 69 MMHG | WEIGHT: 114 LBS | HEART RATE: 80 BPM | OXYGEN SATURATION: 94 % | SYSTOLIC BLOOD PRESSURE: 130 MMHG | TEMPERATURE: 98 F | BODY MASS INDEX: 22.38 KG/M2

## 2021-08-23 ENCOUNTER — EXTERNAL HOME HEALTH (OUTPATIENT)
Dept: HOME HEALTH SERVICES | Facility: HOSPITAL | Age: 76
End: 2021-08-23
Payer: MEDICARE

## 2021-09-30 ENCOUNTER — CARE AT HOME (OUTPATIENT)
Dept: HOME HEALTH SERVICES | Facility: CLINIC | Age: 76
End: 2021-09-30
Payer: MEDICARE

## 2021-09-30 DIAGNOSIS — J44.1 COPD EXACERBATION: Primary | ICD-10-CM

## 2021-09-30 PROCEDURE — 99350 HOME/RES VST EST HIGH MDM 60: CPT | Mod: S$GLB,,, | Performed by: NURSE PRACTITIONER

## 2021-09-30 PROCEDURE — 99499 UNLISTED E&M SERVICE: CPT | Mod: S$GLB,,, | Performed by: NURSE PRACTITIONER

## 2021-09-30 PROCEDURE — 99499 RISK ADDL DX/OHS AUDIT: ICD-10-PCS | Mod: S$GLB,,, | Performed by: NURSE PRACTITIONER

## 2021-09-30 PROCEDURE — 99350 PR HOME VISIT,ESTAB PATIENT,LEVEL IV: ICD-10-PCS | Mod: S$GLB,,, | Performed by: NURSE PRACTITIONER

## 2021-10-13 VITALS — BODY MASS INDEX: 22.38 KG/M2 | HEIGHT: 60 IN | WEIGHT: 114 LBS

## 2021-12-01 RX ORDER — BUDESONIDE AND FORMOTEROL FUMARATE DIHYDRATE 80; 4.5 UG/1; UG/1
AEROSOL RESPIRATORY (INHALATION)
Qty: 10.2 G | Refills: 6 | Status: SHIPPED | OUTPATIENT
Start: 2021-12-01 | End: 2022-06-10

## 2022-02-03 RX ORDER — IPRATROPIUM BROMIDE AND ALBUTEROL SULFATE 2.5; .5 MG/3ML; MG/3ML
3 SOLUTION RESPIRATORY (INHALATION) EVERY 6 HOURS PRN
Qty: 1 EACH | Refills: 6 | Status: SHIPPED | OUTPATIENT
Start: 2022-02-03 | End: 2022-05-30

## 2022-02-03 NOTE — TELEPHONE ENCOUNTER
----- Message from Jadyn Ghosh sent at 2/3/2022  9:20 AM CST -----  Contact: [phone]  Requesting an RX refill or new RX.  Is this a refill or new RX: refill  RX name and strength :  albuterol-ipratropium (DUO-NEB) 2.5 mg-0.5 mg/3 mL nebulizer solution 1 Box   Is this a 30 day or 90 day RX:   Pharmacy name and phone # :  [id number] [place] [id number] [address] AT [facility] [address]   Phone:  [phone]  Fax:  [phone]    The doctors have asked that we provide their patients with the following 2 reminders -- prescription refills can take up to 72 hours, and a friendly reminder that in the future you can use your [facility] account to request refills: yes, patient is out of medication, please call to confirm.

## 2022-02-24 ENCOUNTER — PES CALL (OUTPATIENT)
Dept: ADMINISTRATIVE | Facility: CLINIC | Age: 77
End: 2022-02-24
Payer: MEDICARE

## 2022-05-16 RX ORDER — BUDESONIDE AND FORMOTEROL FUMARATE DIHYDRATE 80; 4.5 UG/1; UG/1
AEROSOL RESPIRATORY (INHALATION)
Qty: 10.2 G | Refills: 6 | OUTPATIENT
Start: 2022-05-16

## 2022-05-16 NOTE — TELEPHONE ENCOUNTER
Refill Routing Note   Medication(s) are not appropriate for processing by [facility] for the following reason(s):      - Patient has been seen in the ED/Hospital since the last PCP visit  - Unclear if patient follows with you     [facility] action(s):  Defer          Medication reconciliation completed: No     Appointments  past 12m or future 3m with PCP    Date Provider   Last Visit   7/28/2015 Angela Walker MD   Next Visit   Visit date not found Angela Walker MD   ED visits in past 90 days: 0        Note composed:2:54 PM 05/16/2022

## 2022-06-07 NOTE — TELEPHONE ENCOUNTER
Refill Routing Note   Medication(s) are not appropriate for processing by [facility] for the following reason(s):      - Unclear if patient follows with you     [facility] action(s):  Defer          Medication reconciliation completed: No     Appointments  past 12m or future 3m with PCP    Date Provider   Last Visit   7/28/2015 Angela Walker MD   Next Visit   Visit date not found Angela Walker MD   ED visits in past 90 days: 0        Note composed:5:51 PM 06/07/2022

## 2022-06-08 RX ORDER — ALBUTEROL SULFATE 90 UG/1
AEROSOL, METERED RESPIRATORY (INHALATION)
Qty: 25.5 G | Refills: 3 | Status: ON HOLD | OUTPATIENT
Start: 2022-06-08 | End: 2023-11-30 | Stop reason: SDUPTHER

## 2022-06-10 RX ORDER — BUDESONIDE AND FORMOTEROL FUMARATE DIHYDRATE 80; 4.5 UG/1; UG/1
AEROSOL RESPIRATORY (INHALATION)
Qty: 10.2 G | Refills: 6 | Status: SHIPPED | OUTPATIENT
Start: 2022-06-10 | End: 2022-12-23 | Stop reason: SDUPTHER

## 2022-06-10 NOTE — TELEPHONE ENCOUNTER
Refill Routing Note   Medication(s) are not appropriate for processing by [facility] for the following reason(s):      - Patient has not been seen in over 15 months by PCP  - Unclear if patient follows with you     [facility] action(s):  Defer          Medication reconciliation completed: No     Appointments  past 12m or future 3m with PCP    Date Provider   Last Visit   7/28/2015 Angela Walker MD   Next Visit   Visit date not found Angela Walker MD   ED visits in past 90 days: 0        Note composed:8:50 AM 06/10/2022

## 2022-08-26 RX ORDER — IPRATROPIUM BROMIDE AND ALBUTEROL SULFATE 2.5; .5 MG/3ML; MG/3ML
SOLUTION RESPIRATORY (INHALATION)
Qty: 540 ML | Refills: 6 | Status: SHIPPED | OUTPATIENT
Start: 2022-08-26 | End: 2023-08-31

## 2022-08-26 NOTE — TELEPHONE ENCOUNTER
Refill Routing Note   Medication(s) are not appropriate for processing by [facility] for the following reason(s):      - Unclear if patient follows with you     [facility] action(s):  Defer          Medication reconciliation completed: No     Appointments  past 12m or future 3m with PCP    Date Provider   Last Visit   7/28/2015 Angela Walker MD   Next Visit   Visit date not found Angela Walker MD   ED visits in past 90 days: 0        Note composed:4:51 AM 08/26/2022

## 2023-02-09 ENCOUNTER — TELEPHONE (OUTPATIENT)
Dept: INTERNAL MEDICINE | Facility: CLINIC | Age: 78
End: 2023-02-09
Payer: MEDICARE

## 2023-07-23 RX ORDER — BUDESONIDE AND FORMOTEROL FUMARATE DIHYDRATE 80; 4.5 UG/1; UG/1
AEROSOL RESPIRATORY (INHALATION)
Qty: 10.2 G | Refills: 6 | Status: ON HOLD | OUTPATIENT
Start: 2023-07-23 | End: 2023-11-30 | Stop reason: SDUPTHER

## 2023-07-23 NOTE — TELEPHONE ENCOUNTER
Refill Routing Note   Medication(s) are not appropriate for processing by [facility] for the following reason(s):      Responsible provider unclear    [facility] action(s):  Defer Care Due:  None identified            Appointments  past 12m or future 3m with PCP    Date Provider   Last Visit   7/28/2015 Angela Walker MD   Next Visit   Visit date not found Angela Walker MD   ED visits in past 90 days: 0        Note composed:12:54 PM 07/23/2023

## 2023-08-10 ENCOUNTER — HOSPITAL ENCOUNTER (OUTPATIENT)
Facility: HOSPITAL | Age: 78
Discharge: HOME HEALTH | End: 2023-08-12
Attending: EMERGENCY MEDICINE | Admitting: EMERGENCY MEDICINE
Payer: MEDICARE

## 2023-08-10 DIAGNOSIS — L03.90 CELLULITIS: ICD-10-CM

## 2023-08-10 DIAGNOSIS — I70.25 ATHEROSCLEROSIS OF NATIVE ARTERIES OF THE EXTREMITIES WITH ULCERATION: ICD-10-CM

## 2023-08-10 DIAGNOSIS — I99.8 ISCHEMIA OF EXTREMITY: ICD-10-CM

## 2023-08-10 DIAGNOSIS — L03.119 CELLULITIS AND ABSCESS OF FOOT: ICD-10-CM

## 2023-08-10 DIAGNOSIS — L02.619 CELLULITIS AND ABSCESS OF FOOT: ICD-10-CM

## 2023-08-10 DIAGNOSIS — I73.9 PAD (PERIPHERAL ARTERY DISEASE): Primary | ICD-10-CM

## 2023-08-10 DIAGNOSIS — J44.1 COPD EXACERBATION: ICD-10-CM

## 2023-08-10 DIAGNOSIS — Z89.431: ICD-10-CM

## 2023-08-10 DIAGNOSIS — T81.89XD NON-HEALING SURGICAL WOUND, SUBSEQUENT ENCOUNTER: ICD-10-CM

## 2023-08-10 DIAGNOSIS — R53.81 PHYSICAL DECONDITIONING: ICD-10-CM

## 2023-08-10 DIAGNOSIS — R06.02 SHORTNESS OF BREATH: ICD-10-CM

## 2023-08-10 DIAGNOSIS — R52 PAIN: ICD-10-CM

## 2023-08-10 PROCEDURE — 94761 N-INVAS EAR/PLS OXIMETRY MLT: CPT

## 2023-08-10 PROCEDURE — 94640 AIRWAY INHALATION TREATMENT: CPT | Mod: XB

## 2023-08-10 PROCEDURE — 93010 EKG 12-LEAD: ICD-10-PCS | Mod: ,,, | Performed by: INTERNAL MEDICINE

## 2023-08-10 PROCEDURE — 63600175 PHARM REV CODE 636 W HCPCS

## 2023-08-10 PROCEDURE — 27000221 HC OXYGEN, UP TO 24 HOURS

## 2023-08-10 PROCEDURE — 93010 ELECTROCARDIOGRAM REPORT: CPT | Mod: ,,, | Performed by: INTERNAL MEDICINE

## 2023-08-10 PROCEDURE — 99900035 HC TECH TIME PER 15 MIN (STAT)

## 2023-08-10 PROCEDURE — 93005 ELECTROCARDIOGRAM TRACING: CPT

## 2023-08-10 PROCEDURE — 96375 TX/PRO/DX INJ NEW DRUG ADDON: CPT

## 2023-08-10 PROCEDURE — 99285 EMERGENCY DEPT VISIT HI MDM: CPT | Mod: 25

## 2023-08-10 PROCEDURE — 80053 COMPREHEN METABOLIC PANEL: CPT

## 2023-08-10 PROCEDURE — 86803 HEPATITIS C AB TEST: CPT | Performed by: PHYSICIAN ASSISTANT

## 2023-08-10 PROCEDURE — 85025 COMPLETE CBC W/AUTO DIFF WBC: CPT

## 2023-08-10 PROCEDURE — 25000242 PHARM REV CODE 250 ALT 637 W/ HCPCS

## 2023-08-10 PROCEDURE — 84484 ASSAY OF TROPONIN QUANT: CPT

## 2023-08-10 PROCEDURE — 83880 ASSAY OF NATRIURETIC PEPTIDE: CPT

## 2023-08-10 PROCEDURE — 87389 HIV-1 AG W/HIV-1&-2 AB AG IA: CPT | Performed by: PHYSICIAN ASSISTANT

## 2023-08-10 RX ORDER — IPRATROPIUM BROMIDE AND ALBUTEROL SULFATE 2.5; .5 MG/3ML; MG/3ML
3 SOLUTION RESPIRATORY (INHALATION)
Status: COMPLETED | OUTPATIENT
Start: 2023-08-10 | End: 2023-08-10

## 2023-08-10 RX ORDER — METHYLPREDNISOLONE SOD SUCC 125 MG
125 VIAL (EA) INJECTION
Status: COMPLETED | OUTPATIENT
Start: 2023-08-10 | End: 2023-08-10

## 2023-08-10 RX ORDER — IPRATROPIUM BROMIDE AND ALBUTEROL SULFATE 2.5; .5 MG/3ML; MG/3ML
SOLUTION RESPIRATORY (INHALATION)
Status: DISPENSED
Start: 2023-08-10 | End: 2023-08-11

## 2023-08-10 RX ORDER — MORPHINE SULFATE 4 MG/ML
4 INJECTION, SOLUTION INTRAMUSCULAR; INTRAVENOUS
Status: COMPLETED | OUTPATIENT
Start: 2023-08-10 | End: 2023-08-10

## 2023-08-10 RX ORDER — ONDANSETRON 2 MG/ML
4 INJECTION INTRAMUSCULAR; INTRAVENOUS
Status: COMPLETED | OUTPATIENT
Start: 2023-08-10 | End: 2023-08-10

## 2023-08-10 RX ADMIN — IPRATROPIUM BROMIDE AND ALBUTEROL SULFATE 3 ML: .5; 3 SOLUTION RESPIRATORY (INHALATION) at 11:08

## 2023-08-10 RX ADMIN — ONDANSETRON 4 MG: 2 INJECTION INTRAMUSCULAR; INTRAVENOUS at 11:08

## 2023-08-10 RX ADMIN — MORPHINE SULFATE 4 MG: 4 INJECTION INTRAVENOUS at 11:08

## 2023-08-10 RX ADMIN — METHYLPREDNISOLONE SODIUM SUCCINATE 125 MG: 125 INJECTION, POWDER, FOR SOLUTION INTRAMUSCULAR; INTRAVENOUS at 11:08

## 2023-08-11 PROBLEM — L08.9 RIGHT FOOT INFECTION: Status: ACTIVE | Noted: 2023-08-11

## 2023-08-11 PROBLEM — J44.9 CHRONIC OBSTRUCTIVE PULMONARY DISEASE: Status: ACTIVE | Noted: 2021-07-12

## 2023-08-11 LAB
ALBUMIN SERPL BCP-MCNC: 3.5 G/DL (ref 3.5–5.2)
ALBUMIN SERPL BCP-MCNC: 3.6 G/DL (ref 3.5–5.2)
ALLENS TEST: ABNORMAL
ALLENS TEST: ABNORMAL
ALP SERPL-CCNC: 92 U/L (ref 55–135)
ALP SERPL-CCNC: 93 U/L (ref 55–135)
ALT SERPL W/O P-5'-P-CCNC: 30 U/L (ref 10–44)
ALT SERPL W/O P-5'-P-CCNC: 32 U/L (ref 10–44)
ANION GAP SERPL CALC-SCNC: 12 MMOL/L (ref 8–16)
ANION GAP SERPL CALC-SCNC: 9 MMOL/L (ref 8–16)
APTT PPP: 26.6 SEC (ref 21–32)
AST SERPL-CCNC: 22 U/L (ref 10–40)
AST SERPL-CCNC: 25 U/L (ref 10–40)
BASOPHILS # BLD AUTO: 0.04 K/UL (ref 0–0.2)
BASOPHILS # BLD AUTO: 0.07 K/UL (ref 0–0.2)
BASOPHILS NFR BLD: 0.3 % (ref 0–1.9)
BASOPHILS NFR BLD: 0.8 % (ref 0–1.9)
BILIRUB SERPL-MCNC: 0.2 MG/DL (ref 0.1–1)
BILIRUB SERPL-MCNC: 0.3 MG/DL (ref 0.1–1)
BNP SERPL-MCNC: 56 PG/ML (ref 0–99)
BUN SERPL-MCNC: 10 MG/DL (ref 8–23)
BUN SERPL-MCNC: 9 MG/DL (ref 8–23)
CALCIUM SERPL-MCNC: 8.5 MG/DL (ref 8.7–10.5)
CALCIUM SERPL-MCNC: 9.4 MG/DL (ref 8.7–10.5)
CHLORIDE SERPL-SCNC: 101 MMOL/L (ref 95–110)
CHLORIDE SERPL-SCNC: 101 MMOL/L (ref 95–110)
CO2 SERPL-SCNC: 20 MMOL/L (ref 23–29)
CO2 SERPL-SCNC: 28 MMOL/L (ref 23–29)
CREAT SERPL-MCNC: 0.7 MG/DL (ref 0.5–1.4)
CREAT SERPL-MCNC: 0.7 MG/DL (ref 0.5–1.4)
DIFFERENTIAL METHOD: ABNORMAL
DIFFERENTIAL METHOD: ABNORMAL
EOSINOPHIL # BLD AUTO: 0 K/UL (ref 0–0.5)
EOSINOPHIL # BLD AUTO: 0.3 K/UL (ref 0–0.5)
EOSINOPHIL NFR BLD: 0.1 % (ref 0–8)
EOSINOPHIL NFR BLD: 3.8 % (ref 0–8)
ERYTHROCYTE [DISTWIDTH] IN BLOOD BY AUTOMATED COUNT: 13.6 % (ref 11.5–14.5)
ERYTHROCYTE [DISTWIDTH] IN BLOOD BY AUTOMATED COUNT: 13.7 % (ref 11.5–14.5)
EST. GFR  (NO RACE VARIABLE): >60 ML/MIN/1.73 M^2
EST. GFR  (NO RACE VARIABLE): >60 ML/MIN/1.73 M^2
GLUCOSE SERPL-MCNC: 101 MG/DL (ref 70–110)
GLUCOSE SERPL-MCNC: 182 MG/DL (ref 70–110)
HCO3 UR-SCNC: 29.2 MMOL/L (ref 24–28)
HCO3 UR-SCNC: 29.5 MMOL/L (ref 24–28)
HCT VFR BLD AUTO: 41.1 % (ref 37–48.5)
HCT VFR BLD AUTO: 42.5 % (ref 37–48.5)
HCV AB SERPL QL IA: NORMAL
HGB BLD-MCNC: 13.1 G/DL (ref 12–16)
HGB BLD-MCNC: 13.2 G/DL (ref 12–16)
HIV 1+2 AB+HIV1 P24 AG SERPL QL IA: NORMAL
IMM GRANULOCYTES # BLD AUTO: 0.1 K/UL (ref 0–0.04)
IMM GRANULOCYTES # BLD AUTO: 0.1 K/UL (ref 0–0.04)
IMM GRANULOCYTES NFR BLD AUTO: 0.7 % (ref 0–0.5)
IMM GRANULOCYTES NFR BLD AUTO: 1.1 % (ref 0–0.5)
INR PPP: 1 (ref 0.8–1.2)
LACTATE SERPL-SCNC: 2 MMOL/L (ref 0.5–2.2)
LYMPHOCYTES # BLD AUTO: 0.3 K/UL (ref 1–4.8)
LYMPHOCYTES # BLD AUTO: 1 K/UL (ref 1–4.8)
LYMPHOCYTES NFR BLD: 11 % (ref 18–48)
LYMPHOCYTES NFR BLD: 2.3 % (ref 18–48)
MAGNESIUM SERPL-MCNC: 1.7 MG/DL (ref 1.6–2.6)
MCH RBC QN AUTO: 29.3 PG (ref 27–31)
MCH RBC QN AUTO: 29.4 PG (ref 27–31)
MCHC RBC AUTO-ENTMCNC: 31.1 G/DL (ref 32–36)
MCHC RBC AUTO-ENTMCNC: 31.9 G/DL (ref 32–36)
MCV RBC AUTO: 92 FL (ref 82–98)
MCV RBC AUTO: 94 FL (ref 82–98)
MONOCYTES # BLD AUTO: 0.1 K/UL (ref 0.3–1)
MONOCYTES # BLD AUTO: 0.7 K/UL (ref 0.3–1)
MONOCYTES NFR BLD: 0.5 % (ref 4–15)
MONOCYTES NFR BLD: 8 % (ref 4–15)
NEUTROPHILS # BLD AUTO: 12.8 K/UL (ref 1.8–7.7)
NEUTROPHILS # BLD AUTO: 6.8 K/UL (ref 1.8–7.7)
NEUTROPHILS NFR BLD: 75.3 % (ref 38–73)
NEUTROPHILS NFR BLD: 96.1 % (ref 38–73)
NRBC BLD-RTO: 0 /100 WBC
NRBC BLD-RTO: 0 /100 WBC
PCO2 BLDA: 67 MMHG (ref 35–45)
PCO2 BLDA: 68.1 MMHG (ref 35–45)
PH SMN: 7.24 [PH] (ref 7.35–7.45)
PH SMN: 7.25 [PH] (ref 7.35–7.45)
PHOSPHATE SERPL-MCNC: 4.5 MG/DL (ref 2.7–4.5)
PLATELET # BLD AUTO: 173 K/UL (ref 150–450)
PLATELET # BLD AUTO: 188 K/UL (ref 150–450)
PMV BLD AUTO: 9.6 FL (ref 9.2–12.9)
PMV BLD AUTO: 9.6 FL (ref 9.2–12.9)
PO2 BLDA: 23 MMHG (ref 40–60)
PO2 BLDA: 24 MMHG (ref 40–60)
POC BE: 2 MMOL/L
POC BE: 2 MMOL/L
POC SATURATED O2: 28 % (ref 95–100)
POC SATURATED O2: 32 % (ref 95–100)
POC TCO2: 31 MMOL/L (ref 24–29)
POC TCO2: 32 MMOL/L (ref 24–29)
POTASSIUM SERPL-SCNC: 4.2 MMOL/L (ref 3.5–5.1)
POTASSIUM SERPL-SCNC: 4.3 MMOL/L (ref 3.5–5.1)
PROT SERPL-MCNC: 6.9 G/DL (ref 6–8.4)
PROT SERPL-MCNC: 6.9 G/DL (ref 6–8.4)
PROTHROMBIN TIME: 10.3 SEC (ref 9–12.5)
RBC # BLD AUTO: 4.45 M/UL (ref 4–5.4)
RBC # BLD AUTO: 4.51 M/UL (ref 4–5.4)
SAMPLE: ABNORMAL
SAMPLE: ABNORMAL
SITE: ABNORMAL
SITE: ABNORMAL
SODIUM SERPL-SCNC: 133 MMOL/L (ref 136–145)
SODIUM SERPL-SCNC: 138 MMOL/L (ref 136–145)
TROPONIN I SERPL DL<=0.01 NG/ML-MCNC: 0.01 NG/ML (ref 0–0.03)
WBC # BLD AUTO: 13.36 K/UL (ref 3.9–12.7)
WBC # BLD AUTO: 8.99 K/UL (ref 3.9–12.7)

## 2023-08-11 PROCEDURE — 96372 THER/PROPH/DIAG INJ SC/IM: CPT | Mod: 59

## 2023-08-11 PROCEDURE — 85610 PROTHROMBIN TIME: CPT

## 2023-08-11 PROCEDURE — 63600175 PHARM REV CODE 636 W HCPCS

## 2023-08-11 PROCEDURE — 96367 TX/PROPH/DG ADDL SEQ IV INF: CPT

## 2023-08-11 PROCEDURE — 96366 THER/PROPH/DIAG IV INF ADDON: CPT

## 2023-08-11 PROCEDURE — 96365 THER/PROPH/DIAG IV INF INIT: CPT

## 2023-08-11 PROCEDURE — 25000003 PHARM REV CODE 250

## 2023-08-11 PROCEDURE — 99204 OFFICE O/P NEW MOD 45 MIN: CPT | Mod: ,,, | Performed by: PODIATRIST

## 2023-08-11 PROCEDURE — 99204 PR OFFICE/OUTPT VISIT, NEW, LEVL IV, 45-59 MIN: ICD-10-PCS | Mod: ,,, | Performed by: SURGERY

## 2023-08-11 PROCEDURE — 84100 ASSAY OF PHOSPHORUS: CPT

## 2023-08-11 PROCEDURE — 99204 OFFICE O/P NEW MOD 45 MIN: CPT | Mod: ,,, | Performed by: SURGERY

## 2023-08-11 PROCEDURE — 80053 COMPREHEN METABOLIC PANEL: CPT

## 2023-08-11 PROCEDURE — 25500020 PHARM REV CODE 255: Performed by: STUDENT IN AN ORGANIZED HEALTH CARE EDUCATION/TRAINING PROGRAM

## 2023-08-11 PROCEDURE — 25000242 PHARM REV CODE 250 ALT 637 W/ HCPCS

## 2023-08-11 PROCEDURE — 96375 TX/PRO/DX INJ NEW DRUG ADDON: CPT

## 2023-08-11 PROCEDURE — 99900035 HC TECH TIME PER 15 MIN (STAT)

## 2023-08-11 PROCEDURE — 87040 BLOOD CULTURE FOR BACTERIA: CPT | Mod: 59

## 2023-08-11 PROCEDURE — 99406 PR TOBACCO USE CESSATION INTERMEDIATE 3-10 MINUTES: ICD-10-PCS | Mod: ,,, | Performed by: STUDENT IN AN ORGANIZED HEALTH CARE EDUCATION/TRAINING PROGRAM

## 2023-08-11 PROCEDURE — 94761 N-INVAS EAR/PLS OXIMETRY MLT: CPT

## 2023-08-11 PROCEDURE — 83735 ASSAY OF MAGNESIUM: CPT

## 2023-08-11 PROCEDURE — 99223 PR INITIAL HOSPITAL CARE,LEVL III: ICD-10-PCS | Mod: 25,GC,, | Performed by: STUDENT IN AN ORGANIZED HEALTH CARE EDUCATION/TRAINING PROGRAM

## 2023-08-11 PROCEDURE — 27000221 HC OXYGEN, UP TO 24 HOURS

## 2023-08-11 PROCEDURE — G0378 HOSPITAL OBSERVATION PER HR: HCPCS

## 2023-08-11 PROCEDURE — 96376 TX/PRO/DX INJ SAME DRUG ADON: CPT

## 2023-08-11 PROCEDURE — 99406 BEHAV CHNG SMOKING 3-10 MIN: CPT | Mod: ,,, | Performed by: STUDENT IN AN ORGANIZED HEALTH CARE EDUCATION/TRAINING PROGRAM

## 2023-08-11 PROCEDURE — S4991 NICOTINE PATCH NONLEGEND: HCPCS | Performed by: STUDENT IN AN ORGANIZED HEALTH CARE EDUCATION/TRAINING PROGRAM

## 2023-08-11 PROCEDURE — 85025 COMPLETE CBC W/AUTO DIFF WBC: CPT

## 2023-08-11 PROCEDURE — 82803 BLOOD GASES ANY COMBINATION: CPT

## 2023-08-11 PROCEDURE — 25000003 PHARM REV CODE 250: Performed by: STUDENT IN AN ORGANIZED HEALTH CARE EDUCATION/TRAINING PROGRAM

## 2023-08-11 PROCEDURE — 25500020 PHARM REV CODE 255: Performed by: EMERGENCY MEDICINE

## 2023-08-11 PROCEDURE — 94640 AIRWAY INHALATION TREATMENT: CPT | Mod: XB

## 2023-08-11 PROCEDURE — 63600175 PHARM REV CODE 636 W HCPCS: Performed by: STUDENT IN AN ORGANIZED HEALTH CARE EDUCATION/TRAINING PROGRAM

## 2023-08-11 PROCEDURE — 85730 THROMBOPLASTIN TIME PARTIAL: CPT

## 2023-08-11 PROCEDURE — 83605 ASSAY OF LACTIC ACID: CPT

## 2023-08-11 PROCEDURE — A9585 GADOBUTROL INJECTION: HCPCS | Performed by: STUDENT IN AN ORGANIZED HEALTH CARE EDUCATION/TRAINING PROGRAM

## 2023-08-11 PROCEDURE — 99223 1ST HOSP IP/OBS HIGH 75: CPT | Mod: 25,GC,, | Performed by: STUDENT IN AN ORGANIZED HEALTH CARE EDUCATION/TRAINING PROGRAM

## 2023-08-11 PROCEDURE — 99204 PR OFFICE/OUTPT VISIT, NEW, LEVL IV, 45-59 MIN: ICD-10-PCS | Mod: ,,, | Performed by: PODIATRIST

## 2023-08-11 RX ORDER — ONDANSETRON 2 MG/ML
4 INJECTION INTRAMUSCULAR; INTRAVENOUS
Status: COMPLETED | OUTPATIENT
Start: 2023-08-11 | End: 2023-08-11

## 2023-08-11 RX ORDER — IBUPROFEN 200 MG
800 TABLET ORAL 2 TIMES DAILY PRN
Status: ON HOLD | COMMUNITY
End: 2023-08-12 | Stop reason: HOSPADM

## 2023-08-11 RX ORDER — HEPARIN SODIUM,PORCINE/D5W 25000/250
0-40 INTRAVENOUS SOLUTION INTRAVENOUS CONTINUOUS
Status: DISCONTINUED | OUTPATIENT
Start: 2023-08-11 | End: 2023-08-11

## 2023-08-11 RX ORDER — IPRATROPIUM BROMIDE AND ALBUTEROL SULFATE 2.5; .5 MG/3ML; MG/3ML
3 SOLUTION RESPIRATORY (INHALATION)
Status: DISCONTINUED | OUTPATIENT
Start: 2023-08-11 | End: 2023-08-11

## 2023-08-11 RX ORDER — IPRATROPIUM BROMIDE AND ALBUTEROL SULFATE 2.5; .5 MG/3ML; MG/3ML
3 SOLUTION RESPIRATORY (INHALATION) EVERY 4 HOURS
Status: DISCONTINUED | OUTPATIENT
Start: 2023-08-11 | End: 2023-08-12 | Stop reason: HOSPADM

## 2023-08-11 RX ORDER — TALC
6 POWDER (GRAM) TOPICAL NIGHTLY PRN
Status: DISCONTINUED | OUTPATIENT
Start: 2023-08-11 | End: 2023-08-12 | Stop reason: HOSPADM

## 2023-08-11 RX ORDER — PREDNISONE 20 MG/1
40 TABLET ORAL DAILY
Status: DISCONTINUED | OUTPATIENT
Start: 2023-08-12 | End: 2023-08-12 | Stop reason: HOSPADM

## 2023-08-11 RX ORDER — NALOXONE HCL 0.4 MG/ML
0.02 VIAL (ML) INJECTION
Status: DISCONTINUED | OUTPATIENT
Start: 2023-08-11 | End: 2023-08-12 | Stop reason: HOSPADM

## 2023-08-11 RX ORDER — ACETAMINOPHEN 325 MG/1
650 TABLET ORAL EVERY 4 HOURS PRN
Status: DISCONTINUED | OUTPATIENT
Start: 2023-08-11 | End: 2023-08-12 | Stop reason: HOSPADM

## 2023-08-11 RX ORDER — NAPROXEN SODIUM 220 MG/1
81 TABLET, FILM COATED ORAL DAILY
Status: DISCONTINUED | OUTPATIENT
Start: 2023-08-11 | End: 2023-08-12 | Stop reason: HOSPADM

## 2023-08-11 RX ORDER — BUDESONIDE AND FORMOTEROL FUMARATE DIHYDRATE 80; 4.5 UG/1; UG/1
2 AEROSOL RESPIRATORY (INHALATION) 2 TIMES DAILY
Status: DISCONTINUED | OUTPATIENT
Start: 2023-08-11 | End: 2023-08-11

## 2023-08-11 RX ORDER — ACETYLCYSTEINE 600 MG
600 CAPSULE ORAL 2 TIMES DAILY
Status: DISCONTINUED | OUTPATIENT
Start: 2023-08-11 | End: 2023-08-11

## 2023-08-11 RX ORDER — HYDROMORPHONE HYDROCHLORIDE 1 MG/ML
0.5 INJECTION, SOLUTION INTRAMUSCULAR; INTRAVENOUS; SUBCUTANEOUS
Status: DISCONTINUED | OUTPATIENT
Start: 2023-08-11 | End: 2023-08-11

## 2023-08-11 RX ORDER — PREDNISONE 20 MG/1
40 TABLET ORAL DAILY
Status: DISCONTINUED | OUTPATIENT
Start: 2023-08-11 | End: 2023-08-11

## 2023-08-11 RX ORDER — HYDROMORPHONE HYDROCHLORIDE 1 MG/ML
1 INJECTION, SOLUTION INTRAMUSCULAR; INTRAVENOUS; SUBCUTANEOUS
Status: COMPLETED | OUTPATIENT
Start: 2023-08-11 | End: 2023-08-11

## 2023-08-11 RX ORDER — SODIUM CHLORIDE 0.9 % (FLUSH) 0.9 %
10 SYRINGE (ML) INJECTION EVERY 12 HOURS PRN
Status: DISCONTINUED | OUTPATIENT
Start: 2023-08-11 | End: 2023-08-12 | Stop reason: HOSPADM

## 2023-08-11 RX ORDER — MORPHINE SULFATE 4 MG/ML
4 INJECTION, SOLUTION INTRAMUSCULAR; INTRAVENOUS
Status: COMPLETED | OUTPATIENT
Start: 2023-08-11 | End: 2023-08-11

## 2023-08-11 RX ORDER — GADOBUTROL 604.72 MG/ML
7 INJECTION INTRAVENOUS
Status: COMPLETED | OUTPATIENT
Start: 2023-08-11 | End: 2023-08-11

## 2023-08-11 RX ORDER — CITALOPRAM 20 MG/1
20 TABLET, FILM COATED ORAL DAILY
Status: DISCONTINUED | OUTPATIENT
Start: 2023-08-11 | End: 2023-08-12 | Stop reason: HOSPADM

## 2023-08-11 RX ORDER — IBUPROFEN 200 MG
1 TABLET ORAL DAILY
Status: DISCONTINUED | OUTPATIENT
Start: 2023-08-11 | End: 2023-08-12 | Stop reason: HOSPADM

## 2023-08-11 RX ORDER — ATORVASTATIN CALCIUM 40 MG/1
40 TABLET, FILM COATED ORAL DAILY
Status: DISCONTINUED | OUTPATIENT
Start: 2023-08-11 | End: 2023-08-12 | Stop reason: HOSPADM

## 2023-08-11 RX ORDER — IBUPROFEN 200 MG
24 TABLET ORAL
Status: DISCONTINUED | OUTPATIENT
Start: 2023-08-11 | End: 2023-08-12 | Stop reason: HOSPADM

## 2023-08-11 RX ORDER — ENOXAPARIN SODIUM 100 MG/ML
40 INJECTION SUBCUTANEOUS EVERY 24 HOURS
Status: DISCONTINUED | OUTPATIENT
Start: 2023-08-11 | End: 2023-08-12 | Stop reason: HOSPADM

## 2023-08-11 RX ORDER — DROPERIDOL 2.5 MG/ML
1.25 INJECTION, SOLUTION INTRAMUSCULAR; INTRAVENOUS
Status: COMPLETED | OUTPATIENT
Start: 2023-08-11 | End: 2023-08-11

## 2023-08-11 RX ORDER — ONDANSETRON 4 MG/1
4 TABLET, ORALLY DISINTEGRATING ORAL EVERY 6 HOURS PRN
Status: DISCONTINUED | OUTPATIENT
Start: 2023-08-11 | End: 2023-08-12 | Stop reason: HOSPADM

## 2023-08-11 RX ORDER — IBUPROFEN 200 MG
16 TABLET ORAL
Status: DISCONTINUED | OUTPATIENT
Start: 2023-08-11 | End: 2023-08-12 | Stop reason: HOSPADM

## 2023-08-11 RX ORDER — METOPROLOL TARTRATE 50 MG/1
50 TABLET ORAL 2 TIMES DAILY
Status: DISCONTINUED | OUTPATIENT
Start: 2023-08-11 | End: 2023-08-12 | Stop reason: HOSPADM

## 2023-08-11 RX ORDER — GLUCAGON 1 MG
1 KIT INJECTION
Status: DISCONTINUED | OUTPATIENT
Start: 2023-08-11 | End: 2023-08-12 | Stop reason: HOSPADM

## 2023-08-11 RX ADMIN — CITALOPRAM HYDROBROMIDE 20 MG: 20 TABLET ORAL at 09:08

## 2023-08-11 RX ADMIN — Medication 1 PATCH: at 06:08

## 2023-08-11 RX ADMIN — CEFTRIAXONE 1 G: 1 INJECTION, POWDER, FOR SOLUTION INTRAMUSCULAR; INTRAVENOUS at 05:08

## 2023-08-11 RX ADMIN — ONDANSETRON 4 MG: 2 INJECTION INTRAMUSCULAR; INTRAVENOUS at 02:08

## 2023-08-11 RX ADMIN — ACETAMINOPHEN 650 MG: 325 TABLET ORAL at 10:08

## 2023-08-11 RX ADMIN — VANCOMYCIN HYDROCHLORIDE 1250 MG: 1.25 INJECTION, POWDER, LYOPHILIZED, FOR SOLUTION INTRAVENOUS at 06:08

## 2023-08-11 RX ADMIN — GADOBUTROL 7 ML: 604.72 INJECTION INTRAVENOUS at 10:08

## 2023-08-11 RX ADMIN — PIPERACILLIN AND TAZOBACTAM 4.5 G: 4; .5 INJECTION, POWDER, LYOPHILIZED, FOR SOLUTION INTRAVENOUS; PARENTERAL at 05:08

## 2023-08-11 RX ADMIN — IPRATROPIUM BROMIDE AND ALBUTEROL SULFATE 3 ML: .5; 3 SOLUTION RESPIRATORY (INHALATION) at 08:08

## 2023-08-11 RX ADMIN — BUDESONIDE AND FORMOTEROL FUMARATE DIHYDRATE 2 PUFF: 80; 4.5 AEROSOL RESPIRATORY (INHALATION) at 09:08

## 2023-08-11 RX ADMIN — ENOXAPARIN SODIUM 40 MG: 40 INJECTION SUBCUTANEOUS at 06:08

## 2023-08-11 RX ADMIN — METOPROLOL TARTRATE 50 MG: 50 TABLET, FILM COATED ORAL at 10:08

## 2023-08-11 RX ADMIN — METOPROLOL TARTRATE 50 MG: 50 TABLET, FILM COATED ORAL at 09:08

## 2023-08-11 RX ADMIN — MORPHINE SULFATE 4 MG: 4 INJECTION INTRAVENOUS at 12:08

## 2023-08-11 RX ADMIN — PREDNISONE 40 MG: 20 TABLET ORAL at 12:08

## 2023-08-11 RX ADMIN — ATORVASTATIN CALCIUM 40 MG: 40 TABLET, FILM COATED ORAL at 09:08

## 2023-08-11 RX ADMIN — DROPERIDOL 1.25 MG: 2.5 INJECTION, SOLUTION INTRAMUSCULAR; INTRAVENOUS at 03:08

## 2023-08-11 RX ADMIN — IOHEXOL 100 ML: 350 INJECTION, SOLUTION INTRAVENOUS at 03:08

## 2023-08-11 RX ADMIN — HYDROMORPHONE HYDROCHLORIDE 1 MG: 1 INJECTION, SOLUTION INTRAMUSCULAR; INTRAVENOUS; SUBCUTANEOUS at 01:08

## 2023-08-11 RX ADMIN — HEPARIN SODIUM AND DEXTROSE 18 UNITS/KG/HR: 10000; 5 INJECTION INTRAVENOUS at 01:08

## 2023-08-11 RX ADMIN — ASPIRIN 81 MG 81 MG: 81 TABLET ORAL at 09:08

## 2023-08-11 RX ADMIN — IPRATROPIUM BROMIDE AND ALBUTEROL SULFATE 3 ML: .5; 3 SOLUTION RESPIRATORY (INHALATION) at 02:08

## 2023-08-11 NOTE — SUBJECTIVE & OBJECTIVE
Past Medical History:   Diagnosis Date    Anemia     Anxiety     COPD (chronic obstructive pulmonary disease)     COPD (chronic obstructive pulmonary disease) with emphysema     Coronary artery disease     Depression     Diverticulitis     Hyperlipidemia     Hypertension     PVD (peripheral vascular disease)     PVD (peripheral vascular disease)     S/P colostomy     Substance abuse     hx heavy etoh use     Tobacco abuse        Past Surgical History:   Procedure Laterality Date    COLOSTOMY      ECTOPIC PREGNANCY SURGERY      right forefoot amputation      right toe amputation      x2 toes       Review of patient's allergies indicates:  No Known Allergies    No current facility-administered medications on file prior to encounter.     Current Outpatient Medications on File Prior to Encounter   Medication Sig    acetaminophen (TYLENOL) 325 MG tablet Take 2 tablets (650 mg total) by mouth every 4 (four) hours as needed for Pain or Temperature greater than (101).    acetylcysteine 600 mg Cap Take 1 capsule (600 mg total) by mouth 2 (two) times daily.    albuterol (PROVENTIL/VENTOLIN HFA) 90 mcg/actuation inhaler INHALE 2 PUFFS BY MOUTH EVERY 6 HOURS AS NEEDED    albuterol sulfate (PROAIR RESPICLICK) 90 mcg/actuation inhaler Inhale 2 puffs into the lungs every 6 (six) hours as needed for Wheezing. Rescue    albuterol-ipratropium (DUO-NEB) 2.5 mg-0.5 mg/3 mL nebulizer solution USE 1 VIAL VIA NEBULIZER EVERY 6 HOURS AS NEEDED FOR WHEEZING, SHORTNESS OF BREATH    aspirin 81 MG Chew Take 1 tablet (81 mg total) by mouth once daily.    atorvastatin (LIPITOR) 40 MG tablet Take 1 tablet (40 mg total) by mouth once daily.    budesonide-formoterol 80-4.5 mcg (SYMBICORT) 80-4.5 mcg/actuation HFAA INHALE 2 PUFFS INTO THE LUNGS TWICE DAILY. RINSE MOUTH AFTER USE    cilostazol (PLETAL) 100 MG Tab Take 1 tablet (100 mg total) by mouth 2 (two) times daily.    citalopram (CELEXA) 20 MG tablet Take 1 tablet (20 mg total) by mouth once  daily.    clopidogrel (PLAVIX) 75 mg tablet Take 1 tablet (75 mg total) by mouth once daily.    ergocalciferol (ERGOCALCIFEROL) 50,000 unit Cap Twice weekly for 4 weeks then once weekly for 4 weeks    hydrocortisone-aloe vera 1 % Crea cream Apply topically 2 (two) times daily.    lidocaine (LIDODERM) 5 % Place 1 patch onto the skin once daily. Remove & Discard patch within 12 hours or as directed by MD    metoprolol succinate (TOPROL-XL) 25 MG 24 hr tablet Take 1 tablet (25 mg total) by mouth once daily.    metoprolol tartrate (LOPRESSOR) 50 MG tablet Take 1 tablet (50 mg total) by mouth 2 (two) times daily.    nicotine (NICODERM CQ) 7 mg/24 hr Place 1 patch onto the skin once daily.    polyethylene glycol (GLYCOLAX) 17 gram PwPk Take 17 g by mouth once daily.    tiotropium (SPIRIVA) 18 mcg inhalation capsule Inhale 1 capsule (18 mcg total) into the lungs once daily. Controller. Start after scheduled duone course completed.     Family History    None       Tobacco Use    Smoking status: Former     Current packs/day: 0.00     Average packs/day: 0.5 packs/day for 50.0 years (25.0 ttl pk-yrs)     Types: Cigarettes     Start date: 7/5/1963     Quit date: 7/5/2013     Years since quitting: 10.1    Smokeless tobacco: Never   Substance and Sexual Activity    Alcohol use: No    Drug use: No    Sexual activity: Not on file     Review of Systems   Constitutional:  Negative for chills and fever.   HENT:  Negative for sore throat.    Respiratory:  Negative for cough and shortness of breath.    Cardiovascular:  Negative for chest pain and leg swelling.   Genitourinary:  Negative for difficulty urinating.   Musculoskeletal:  Positive for joint swelling. Negative for back pain.   Skin:  Positive for color change.   Neurological:  Negative for headaches.     Objective:     Vital Signs (Most Recent):  Temp: 98.3 °F (36.8 °C) (08/11/23 0702)  Pulse: 110 (08/11/23 0850)  Resp: 18 (08/11/23 0850)  BP: (!) 146/67 (08/11/23  0702)  SpO2: 95 % (08/11/23 0850) Vital Signs (24h Range):  Temp:  [98.2 °F (36.8 °C)-98.7 °F (37.1 °C)] 98.3 °F (36.8 °C)  Pulse:  [] 110  Resp:  [12-34] 18  SpO2:  [94 %-100 %] 95 %  BP: (131-170)/(60-79) 146/67     Weight: 65.3 kg (144 lb)  Body mass index is 28.12 kg/m².     Physical Exam  Vitals and nursing note reviewed.   Constitutional:       General: She is not in acute distress.     Appearance: Normal appearance. She is not ill-appearing.   HENT:      Head: Normocephalic and atraumatic.      Right Ear: External ear normal.      Left Ear: External ear normal.      Nose: Nose normal.      Mouth/Throat:      Mouth: Mucous membranes are moist.      Pharynx: Oropharynx is clear.   Eyes:      General:         Right eye: No discharge.         Left eye: No discharge.   Cardiovascular:      Rate and Rhythm: Normal rate and regular rhythm.      Pulses: Normal pulses.      Heart sounds: Normal heart sounds. No murmur heard.  Pulmonary:      Effort: Pulmonary effort is normal. No respiratory distress.      Breath sounds: Normal breath sounds.   Abdominal:      General: Bowel sounds are normal. There is no distension.      Palpations: Abdomen is soft. There is no mass.      Tenderness: There is no abdominal tenderness. There is no guarding or rebound.      Hernia: No hernia is present.   Musculoskeletal:         General: Swelling and tenderness present. Normal range of motion.      Cervical back: Normal range of motion.      Right lower leg: Edema present.      Left lower leg: No edema.   Skin:     General: Skin is warm and dry.      Capillary Refill: Capillary refill takes less than 2 seconds.   Neurological:      General: No focal deficit present.      Mental Status: She is alert and oriented to person, place, and time.   Psychiatric:         Mood and Affect: Mood normal.         Behavior: Behavior normal.                Significant Labs: All pertinent labs within the past 24 hours have been  reviewed.    Significant Imaging: I have reviewed all pertinent imaging results/findings within the past 24 hours.

## 2023-08-11 NOTE — ASSESSMENT & PLAN NOTE
78-year-old woman with history of PID status post SFA stent who now presents with right TMA pain and erythema.    - CTA reviewed, previous SFA stents occluded however with distal reconstitution.  Likely chronic in nature, less likely acute limb ischemic given erythematous and warm skin sensation.  - would recommend workup for osteomyelitis, starting empiric antibiotics at this time.  - would benefit from podiatry evaluation.  - from a vascular perspective:  No indication for anticoagulation at this time given likely chronic SFA occlusion.

## 2023-08-11 NOTE — SUBJECTIVE & OBJECTIVE
(Not in a hospital admission)      Review of patient's allergies indicates:  No Known Allergies    Past Medical History:   Diagnosis Date    Anemia     Anxiety     COPD (chronic obstructive pulmonary disease)     COPD (chronic obstructive pulmonary disease) with emphysema     Coronary artery disease     Depression     Diverticulitis     Hyperlipidemia     Hypertension     PVD (peripheral vascular disease)     PVD (peripheral vascular disease)     S/P colostomy     Substance abuse     hx heavy etoh use     Tobacco abuse      Past Surgical History:   Procedure Laterality Date    COLOSTOMY      ECTOPIC PREGNANCY SURGERY      right forefoot amputation      right toe amputation      x2 toes     Family History    None       Tobacco Use    Smoking status: Former     Current packs/day: 0.00     Average packs/day: 0.5 packs/day for 50.0 years (25.0 ttl pk-yrs)     Types: Cigarettes     Start date: 7/5/1963     Quit date: 7/5/2013     Years since quitting: 10.1    Smokeless tobacco: Never   Substance and Sexual Activity    Alcohol use: No    Drug use: No    Sexual activity: Not on file     Review of Systems   Constitutional:  Negative for chills and fever.        As stated in HPI otherwise negative.   Skin:  Positive for color change.   Neurological:  Negative for weakness and numbness.   All other systems reviewed and are negative.    Objective:     Vital Signs (Most Recent):  Temp: 98.3 °F (36.8 °C) (08/11/23 0702)  Pulse: 109 (08/11/23 0702)  Resp: 12 (08/11/23 0702)  BP: (!) 146/67 (08/11/23 0702)  SpO2: (!) 94 % (08/11/23 0702) Vital Signs (24h Range):  Temp:  [98.2 °F (36.8 °C)-98.7 °F (37.1 °C)] 98.3 °F (36.8 °C)  Pulse:  [] 109  Resp:  [12-34] 12  SpO2:  [94 %-100 %] 94 %  BP: (131-170)/(60-79) 146/67     Weight: 65.3 kg (144 lb)  Body mass index is 28.12 kg/m².      Physical Exam  Constitutional:       General: She is not in acute distress.     Appearance: Normal appearance.   HENT:      Head: Normocephalic and  atraumatic.   Eyes:      Pupils: Pupils are equal, round, and reactive to light.   Cardiovascular:      Rate and Rhythm: Normal rate and regular rhythm.      Pulses:           Femoral pulses are 1+ on the right side and 2+ on the left side.       Popliteal pulses are 2+ on the right side and 2+ on the left side.        Dorsalis pedis pulses are 0 on the right side and detected w/ Doppler on the left side.        Posterior tibial pulses are 0 on the right side and detected w/ Doppler on the left side.   Pulmonary:      Effort: Pulmonary effort is normal.   Abdominal:      General: There is no distension.      Tenderness: There is no abdominal tenderness.      Comments: LLQ ostomy present.    Musculoskeletal:         General: Tenderness present.      Right Lower Extremity: (R TMA with erythema and tenderness to palpation, focal area of dark purple tissue on distal portion of TMA.)  Feet:      Right foot:      Skin integrity: Erythema present.   Skin:     General: Skin is warm.      Findings: Erythema present.   Neurological:      General: No focal deficit present.      Mental Status: She is alert.          Significant Labs:  All pertinent labs from the last 24 hours have been reviewed.    Significant Diagnostics:  CT: No results found in the last 24 hours.

## 2023-08-11 NOTE — CONSULTS
[facility]  Vascular Surgery  Consult Note    Inpatient consult to Vascular Surgery  Consult performed by: Loy Hernandez MD  Consult ordered by: Brittney Dickinson MD        Subjective:     Chief Complaint/Reason for Admission:  TMA pain    History of Present Illness: [age] year old female with multiple medical histories, previous right foot wounds for which she underwent an angiogram with SFA stents and TMA by Dr. Saldaña in 2015, who presents with 4 days of worsening right foot pain at the distal site of her TMA.  Since her revascularization and TMA in the past, she has not had any issues.  Ambulates well without issue.    In regards to her foot pain,  She states that she has not had any injury to the area.  She noted the pain was severe, tenderness to palpation.  No purulent drainage.  No fevers or chills.    Former smoker.  Quit in the past.      (Not in a hospital admission)      Review of patient's allergies indicates:  No Known Allergies    Past Medical History:   Diagnosis Date    Anemia     Anxiety     COPD (chronic obstructive pulmonary disease)     COPD (chronic obstructive pulmonary disease) with emphysema     Coronary artery disease     Depression     Diverticulitis     Hyperlipidemia     Hypertension     PVD (peripheral vascular disease)     PVD (peripheral vascular disease)     S/P colostomy     Substance abuse     hx heavy etoh use     Tobacco abuse      Past Surgical History:   Procedure Laterality Date    COLOSTOMY      ECTOPIC PREGNANCY SURGERY      right forefoot amputation      right toe amputation      x2 toes     Family History    None       Tobacco Use    Smoking status: Former     Current packs/day: 0.00     Average packs/day: 0.5 packs/day for 50.0 years (25.0 ttl pk-yrs)     Types: Cigarettes     Start date: 7/5/1963     Quit date: 7/5/2013     Years since quitting: 10.1    Smokeless tobacco: Never   Substance and Sexual Activity    Alcohol use: No     Drug use: No    Sexual activity: Not on file     Review of Systems   Constitutional:  Negative for chills and fever.        As stated in HPI otherwise negative.   Skin:  Positive for color change.   Neurological:  Negative for weakness and numbness.   All other systems reviewed and are negative.    Objective:     Vital Signs (Most Recent):  Temp: 98.3 °F (36.8 °C) (08/11/23 0702)  Pulse: 109 (08/11/23 0702)  Resp: 12 (08/11/23 0702)  BP: (!) 146/67 (08/11/23 0702)  SpO2: (!) 94 % (08/11/23 0702) Vital Signs (24h Range):  Temp:  [98.2 °F (36.8 °C)-98.7 °F (37.1 °C)] 98.3 °F (36.8 °C)  Pulse:  [] 109  Resp:  [12-34] 12  SpO2:  [94 %-100 %] 94 %  BP: (131-170)/(60-79) 146/67     Weight: 65.3 kg (144 lb)  Body mass index is 28.12 kg/m².      Physical Exam  Constitutional:       General: She is not in acute distress.     Appearance: Normal appearance.   HENT:      Head: Normocephalic and atraumatic.   Eyes:      Pupils: Pupils are equal, round, and reactive to light.   Cardiovascular:      Rate and Rhythm: Normal rate and regular rhythm.      Pulses:           Femoral pulses are 1+ on the right side and 2+ on the left side.       Popliteal pulses are 2+ on the right side and 2+ on the left side.        Dorsalis pedis pulses are 0 on the right side and detected w/ Doppler on the left side.        Posterior tibial pulses are 0 on the right side and detected w/ Doppler on the left side.   Pulmonary:      Effort: Pulmonary effort is normal.   Abdominal:      General: There is no distension.      Tenderness: There is no abdominal tenderness.      Comments: LLQ ostomy present.    Musculoskeletal:         General: Tenderness present.      Right Lower Extremity: (R TMA with erythema and tenderness to palpation, focal area of dark purple tissue on distal portion of TMA.)  Feet:      Right foot:      Skin integrity: Erythema present.   Skin:     General: Skin is warm.      Findings: Erythema present.   Neurological:       General: No focal deficit present.      Mental Status: She is alert.          Significant Labs:  All pertinent labs from the last 24 hours have been reviewed.    Significant Diagnostics:  CT: No results found in the last 24 hours.    Assessment/Plan:     History of amputation of right foot  78-year-old woman with history of PID status post SFA stent who now presents with right TMA pain and erythema.    - CTA reviewed, previous SFA stents occluded however with distal reconstitution.  Likely chronic in nature, less likely acute limb ischemic given erythematous and warm skin sensation.  - would recommend workup for osteomyelitis, starting empiric antibiotics at this time.  - would benefit from podiatry evaluation.  - from a vascular perspective:  No indication for anticoagulation at this time given likely chronic SFA occlusion.           Thank you for your consult. I will follow-up with patient. Please contact us if you have any additional questions.    Loy Hernandez MD  Vascular Surgery  [facility] - [facility]

## 2023-08-11 NOTE — HOSPITAL COURSE
Admitted to [facility] for further evaluation and treatment of a COPD exacerbation and infection of her R foot.  Rocephin and Methylprednisolone were started, placed on 4L via NC.   Ordered Q6 DuoNeb and Prednisone 40mg QD x5 days for COPD.   Podiatry was consulted; heel-off boots ordered.  Started empiric IV Vanc and Zosyn. MRI foot: no osteomyelitis identified.   Zosyn switched to Rocephin.  Her COPD sxs resolved by the following morning, and she felt back to her recent baseline.  She was DC'ed to home in stable condition w home O2, HH, Pulm rehab orders, Pulm follow-up, PO doxy and Augmentin x 14 days v skin/soft tissue infection, and steroids for her COPD.  Return precautions educated.

## 2023-08-11 NOTE — H&P
"[facility] - Emergency Dept  [facility]  History & Physical    Patient Name: Radha Sotelo  MRN: [id number]  Patient Class: OP- Observation  Admission Date: 8/10/2023  Attending Physician: Harlan Aguilar MD   Primary Care Provider: [facility] Primary [facility]         Patient information was obtained from patient, past medical records and ER records.     Subjective:     Principal Problem:Right foot infection    Chief Complaint:   Chief Complaint   Patient presents with    Foot Pain     Arrived by ems c/ o R foot. Per ems, was amputated 8 years ago. Pain started on Monday 10/10. Redness noted per ems. Denies fever. Denies trauma. "Just woke up one day & the pain started" gradually worsening. Ambulates with walker per ems        HPI: Ms. Sotelo is a 78 YOF with COPD, significant cardiac history, diabetes, PVC s/p R foot amputation presents for R foot pain. Onset 4 days ago, located on right distal foot, no injury or trauma to the area, area is tender and erythematous. No remitting factors, patient has been unable to walk normally so has been using a walker for 1 day. No pus present. ROS negative for falls, fevers, chills. Skin changes and pain is limited to R foot and has not spread.     In the ED the patient was hemodynamically stable and lab work was unremarkable. XR of R foot was concerning for edema at right foot stump, with osteopenia. No fracture. In the ED they were unable to obtain DP or PT pulse, consulted vascular surgery. Patient was initially placed patient on heparin drip. CTA with runoff was concerning for multifocal moderate to severe stenosis involving the bilateral lower extremities.  Occlusion of the distal right femoral stent and moderate to severe stenosis of the popliteal artery with one-vessel runoff.  Severe multifocal stenosis within the left femoral and popliteal artery with two vessel runoff. Per vascular surgery the findings are likely showing chronically occluded stent and she has " collateral vasculature. They recommend discontinuing the heparin drip and  admission for osteomyelitis vs cellulitis.         Past Medical History:   Diagnosis Date    Anemia     Anxiety     COPD (chronic obstructive pulmonary disease)     COPD (chronic obstructive pulmonary disease) with emphysema     Coronary artery disease     Depression     Diverticulitis     Hyperlipidemia     Hypertension     PVD (peripheral vascular disease)     PVD (peripheral vascular disease)     S/P colostomy     Substance abuse     hx heavy etoh use     Tobacco abuse        Past Surgical History:   Procedure Laterality Date    COLOSTOMY      ECTOPIC PREGNANCY SURGERY      right forefoot amputation      right toe amputation      x2 toes       Review of patient's allergies indicates:  No Known Allergies    No current facility-administered medications on file prior to encounter.     Current Outpatient Medications on File Prior to Encounter   Medication Sig    acetaminophen (TYLENOL) 325 MG tablet Take 2 tablets (650 mg total) by mouth every 4 (four) hours as needed for Pain or Temperature greater than (101).    acetylcysteine 600 mg Cap Take 1 capsule (600 mg total) by mouth 2 (two) times daily.    albuterol (PROVENTIL/VENTOLIN HFA) 90 mcg/actuation inhaler INHALE 2 PUFFS BY MOUTH EVERY 6 HOURS AS NEEDED    albuterol sulfate (PROAIR RESPICLICK) 90 mcg/actuation inhaler Inhale 2 puffs into the lungs every 6 (six) hours as needed for Wheezing. Rescue    albuterol-ipratropium (DUO-NEB) 2.5 mg-0.5 mg/3 mL nebulizer solution USE 1 VIAL VIA NEBULIZER EVERY 6 HOURS AS NEEDED FOR WHEEZING, SHORTNESS OF BREATH    aspirin 81 MG Chew Take 1 tablet (81 mg total) by mouth once daily.    atorvastatin (LIPITOR) 40 MG tablet Take 1 tablet (40 mg total) by mouth once daily.    budesonide-formoterol 80-4.5 mcg (SYMBICORT) 80-4.5 mcg/actuation HFAA INHALE 2 PUFFS INTO THE LUNGS TWICE DAILY. RINSE MOUTH AFTER USE    cilostazol  (PLETAL) 100 MG Tab Take 1 tablet (100 mg total) by mouth 2 (two) times daily.    citalopram (CELEXA) 20 MG tablet Take 1 tablet (20 mg total) by mouth once daily.    clopidogrel (PLAVIX) 75 mg tablet Take 1 tablet (75 mg total) by mouth once daily.    ergocalciferol (ERGOCALCIFEROL) 50,000 unit Cap Twice weekly for 4 weeks then once weekly for 4 weeks    hydrocortisone-aloe vera 1 % Crea cream Apply topically 2 (two) times daily.    lidocaine (LIDODERM) 5 % Place 1 patch onto the skin once daily. Remove & Discard patch within 12 hours or as directed by MD    metoprolol succinate (TOPROL-XL) 25 MG 24 hr tablet Take 1 tablet (25 mg total) by mouth once daily.    metoprolol tartrate (LOPRESSOR) 50 MG tablet Take 1 tablet (50 mg total) by mouth 2 (two) times daily.    nicotine (NICODERM CQ) 7 mg/24 hr Place 1 patch onto the skin once daily.    polyethylene glycol (GLYCOLAX) 17 gram PwPk Take 17 g by mouth once daily.    tiotropium (SPIRIVA) 18 mcg inhalation capsule Inhale 1 capsule (18 mcg total) into the lungs once daily. Controller. Start after scheduled duonebs course completed.     Family History    None       Tobacco Use    Smoking status: Former     Current packs/day: 0.00     Average packs/day: 0.5 packs/day for 50.0 years (25.0 ttl pk-yrs)     Types: Cigarettes     Start date: 7/5/1963     Quit date: 7/5/2013     Years since quitting: 10.1    Smokeless tobacco: Never   Substance and Sexual Activity    Alcohol use: No    Drug use: No    Sexual activity: Not on file     Review of Systems   Constitutional:  Negative for chills and fever.   HENT:  Negative for sore throat.    Respiratory:  Negative for cough and shortness of breath.    Cardiovascular:  Negative for chest pain and leg swelling.   Genitourinary:  Negative for difficulty urinating.   Musculoskeletal:  Positive for joint swelling. Negative for back pain.   Skin:  Positive for color change.   Neurological:  Negative for headaches.      Objective:     Vital Signs (Most Recent):  Temp: 98.3 °F (36.8 °C) (08/11/23 0702)  Pulse: 110 (08/11/23 0850)  Resp: 18 (08/11/23 0850)  BP: (!) 146/67 (08/11/23 0702)  SpO2: 95 % (08/11/23 0850) Vital Signs (24h Range):  Temp:  [98.2 °F (36.8 °C)-98.7 °F (37.1 °C)] 98.3 °F (36.8 °C)  Pulse:  [] 110  Resp:  [12-34] 18  SpO2:  [94 %-100 %] 95 %  BP: (131-170)/(60-79) 146/67     Weight: 65.3 kg (144 lb)  Body mass index is 28.12 kg/m².     Physical Exam  Vitals and nursing note reviewed.   Constitutional:       General: She is not in acute distress.     Appearance: Normal appearance. She is not ill-appearing.   HENT:      Head: Normocephalic and atraumatic.      Right Ear: External ear normal.      Left Ear: External ear normal.      Nose: Nose normal.      Mouth/Throat:      Mouth: Mucous membranes are moist.      Pharynx: Oropharynx is clear.   Eyes:      General:         Right eye: No discharge.         Left eye: No discharge.   Cardiovascular:      Rate and Rhythm: Normal rate and regular rhythm.      Pulses: Normal pulses.      Heart sounds: Normal heart sounds. No murmur heard.  Pulmonary:      Effort: Pulmonary effort is normal. No respiratory distress.      Breath sounds: Normal breath sounds.   Abdominal:      General: Bowel sounds are normal. There is no distension.      Palpations: Abdomen is soft. There is no mass.      Tenderness: There is no abdominal tenderness. There is no guarding or rebound.      Hernia: No hernia is present.   Musculoskeletal:         General: Swelling and tenderness present. Normal range of motion.      Cervical back: Normal range of motion.      Right lower leg: Edema present.      Left lower leg: No edema.   Skin:     General: Skin is warm and dry.      Capillary Refill: Capillary refill takes less than 2 seconds.   Neurological:      General: No focal deficit present.      Mental Status: She is alert and oriented to person, place, and time.   Psychiatric:         Mood  and Affect: Mood normal.         Behavior: Behavior normal.                Significant Labs: All pertinent labs within the past 24 hours have been reviewed.    Significant Imaging: I have reviewed all pertinent imaging results/findings within the past 24 hours.    Assessment/Plan:     * Right foot infection  Patient with a history of PAD s/p angioplasty and transmetatarsal amputation of right foot presenting with new foot tenderness, erythema, and warmth likely infectious. Per vascular surgery CTA findings show occlusions which are likely chronic and the patient has formed collaterals. Patient is not septic at presentation. XR of R foot was concerning for edema at right foot stump, with osteopenia. No fracture. XR did not show evidence of osteomyelitis     Ddx: RLE cellulitis vs osteomyelitis    - MRI R foot pending   - Vanc/ceftriaxone for osteomyelitis and skin and soft tissue coverage   - podiatry consulted, recs appreciated   - Follow up wound culture   - Fall precautions  - Pain control: Tylenol and can escalate as needed  - Wound care   - Blood cultures pending   - Aerobic and anerobic cultures pending     Major depressive disorder  - Continue home citalopram     History of amputation of right foot  See right foot infection plan       HLD (hyperlipidemia)  HLD   - Continue home statin     Essential hypertension  Continue home metoprolol       Chronic obstructive pulmonary disease  Continue Symbicort BID, Acetylcysteine BID, Albuterol neb Q6H      CAD (coronary artery disease)  S/P coronary artery stent placement 07/12/2021   - continue home ASA      PVD (peripheral vascular disease)  Continue home Lipitor 40 and ASA 81         VTE Risk Mitigation (From admission, onward)         Ordered     enoxaparin injection 40 mg  Daily         08/11/23 0456     IP VTE HIGH RISK PATIENT  Once         08/11/23 0456     Place sequential compression device  Until discontinued         08/11/23 0456                       On  08/11/2023, patient should be placed in hospital observation services under my care in collaboration with Dr. Aguilar.    Neris Grigsby DO  Department of Hospital Medicine  [facility] - Emergency Dept

## 2023-08-11 NOTE — MEDICAL/APP STUDENT
[facility] Medicine Student   Progress Note  [facility] HOSP MED 4    Admit Date: 8/10/2023  Hospital Day: 0  08/11/2023  2:19 PM    SUBJECTIVE:   Ms. Radha Sotelo is a 78 y.o. female with a relevant medical history of COPD, significant cardiac history, PVD s/p SFA stent (2012) and R metatarsal amputation(2015), diverticulitis s/p colostomy(10+ years ago) who is being followed up for Right foot infection and COPD exacerbation. Presenting complaint of sudden onset R foot pain x4 days with slight response to home NSAIDS and ice baths. Pt also reports shortness of breath and worsening cough. Pt does not use oxygen or CPAP at home; only uses Symbicort with rescue inhaler. Reports >60 pack year smoking history.     Pt lives with her granddaughter in a 2nd story apartment and has not left her home in years due to mobility issues with staircases. Pt recently began using a walker and can only tolerate short walking intervals. Denies hx of recent falls.    Interval history: Overnight pt reports persistent pain in R foot. Pt states hard to breathe due to it being too warm. No further complaints.    Review of Systems   Constitutional:  Negative for chills and fever.   HENT:  Negative for congestion and ear pain.    Eyes:  Negative for double vision and photophobia.   Respiratory:  Positive for cough, sputum production, shortness of breath and wheezing. Negative for hemoptysis.    Cardiovascular:  Negative for chest pain and palpitations.   Gastrointestinal:  Negative for nausea and vomiting.   Genitourinary:  Negative for dysuria, hematuria and urgency.   Musculoskeletal:  Negative for falls.   Skin: Negative.    Neurological:  Negative for dizziness, loss of consciousness and headaches.       Please refer to the H&P for past medical, family, and social history.    OBJECTIVE:     Vital Signs Recent:  Temp: 97.8 °F (36.6 °C) (08/11/23 1413)  Pulse: 81 (08/11/23 1413)  Resp: 18 (08/11/23 1413)  BP: (!) 153/60 (08/11/23 1413)  SpO2:  96 % (08/11/23 1413)  Oxygen Documentation:    Flow (L/min): 4  Oxygen Concentration (%): 100        Device (Oxygen Therapy): nasal cannula  $ Is the patient on Low Flow Oxygen?: Yes      Vital Signs Range (Last 24H):  Temp:  [97.8 °F (36.6 °C)-98.7 °F (37.1 °C)]   Pulse:  []   Resp:  [12-34]   BP: (130-170)/(60-82)   SpO2:  [94 %-100 %]   Oxygen Concentration (%): 100 (08/11/23 1413)    I & O (Last 24H):  Intake/Output Summary (Last 24 hours) at 8/11/2023 1419  Last data filed at 8/11/2023 0544  Gross per 24 hour   Intake 160.37 ml   Output --   Net 160.37 ml        Physical Exam:  Physical Exam  Constitutional:       Appearance: Normal appearance.   HENT:      Head: Normocephalic.      Nose: Nose normal.      Mouth/Throat:      Mouth: Mucous membranes are moist.      Pharynx: Oropharynx is clear.   Eyes:      Extraocular Movements: Extraocular movements intact.      Conjunctiva/sclera: Conjunctivae normal.      Pupils: Pupils are equal, round, and reactive to light.   Cardiovascular:      Rate and Rhythm: Normal rate and regular rhythm.      Heart sounds: Normal heart sounds.   Pulmonary:      Breath sounds: Wheezing present.   Abdominal:      Hernia: A hernia is present.      Comments: Large chronic ventral hernia and colostomy pouch   Musculoskeletal:      Cervical back: Normal range of motion and neck supple.      Right lower leg: Edema present.      Left lower leg: Edema present.   Skin:     General: Skin is warm and dry.   Neurological:      General: No focal deficit present.      Mental Status: She is alert.         Labs:   Recent Labs   Lab 08/10/23  2357 08/11/23  0519    133*   K 4.3 4.2    101   CO2 28 20*   BUN 10 9   CREATININE 0.7 0.7    182*   CALCIUM 9.4 8.5*   MG  --  1.7   PHOS  --  4.5     Recent Labs   Lab 08/10/23  2357 08/11/23  0040 08/11/23  0519   ALKPHOS 93  --  92   ALT 32  --  30   AST 25  --  22   ALBUMIN 3.6  --  3.5   PROT 6.9  --  6.9   BILITOT 0.3  --  0.2    INR  --  1.0  --      Recent Labs   Lab 08/10/23  2357 08/11/23  0519   WBC 8.99 13.36*   HGB 13.1 13.2   HCT 41.1 42.5    188       Diagnostic Results:  Imaging Results               CTA Runoff ABD PEL Bilat Lower Ext (Final result)  Result time 08/11/23 06:10:31      Final result by Grupo Barrios MD (08/11/23 06:10:31)                   Impression:      Significant aortic atherosclerotic disease with ectatic aorta.  Prominent atherosclerotic calcification at the origins of the major branch vessels with likely hemodynamically significant stenosis of the renal arteries.    Significant multifocal moderate to severe stenosis involving the bilateral lower extremities.  Occlusion of the distal right femoral stent and moderate to severe stenosis of the popliteal artery with one-vessel runoff.  Severe multifocal stenosis within the left femoral and popliteal artery with two vessel runoff.    Postoperative changes of left hemicolectomy and transverse colon end colostomy within the left lower quadrant.  There is a large bowel containing parastomal hernia as well as a bowel containing abdominal hernia.  No evidence to suggest bowel obstruction.    Multiple thoracolumbar compression deformities as above.    Postoperative changes of transmetatarsal amputation of the right foot.    Additional findings as above.    This report was flagged in Epic as abnormal.    Electronically signed by resident: Shefali Drake  Date:    08/11/2023  Time:    05:11    Electronically signed by: Grupo Barrios MD  Date:    08/11/2023  Time:    06:10               Narrative:    EXAMINATION:  CTA RUNOFF ABD PEL BILAT LOWER EXT    CLINICAL HISTORY:  Claudication or leg ischemia;    TECHNIQUE:  CTA of abdomen, pelvis, and bilateral lower extremities performed with 100 mL Omnipaque 350 intravenous contrast.    COMPARISON:  CTA 03/01/2020 10/18/2012.  CT 01/11/2013 12/27/2012    FINDINGS:  Heart: Aortic and mitral valve calcifications.   Coronary artery calcifications.  Heart size is normal.  No pericardial fluid.    Lung Bases: Elevated left hemidiaphragm associated with compressive atelectasis..    Liver: Mildly enlarged liver.  Decreased attenuation of the hepatic parenchyma may relate to hepatic steatosis versus contrast bolus timing.    Gallbladder: Normal appearance without evidence for cholecystitis.    Bile Ducts: No intra or extrahepatic biliary ductal dilation.    Pancreas: Unremarkable.    Spleen: Normal.    Adrenals: Normal.    Genitourinary: Normal in size and location.Left vascular calcifications.  No evidence of hydronephrosis.  Bladder is distended without wall thickening.    Reproductive organs: Normal.    GI tract/Mesentery: Small-sized hiatal hernia.  Postoperative changes of left hemicolectomy and transverse colon end colostomy within the left lower quadrant.  There is a large parastomal hernia.  Additional large bowel containing ventral wall hernia.  No evidence of bowel obstruction or dilatation.  No bowel wall thickening.  Colonic diverticulosis without evidence of diverticulitis.  Appendix is unremarkable.  Mesenteric haziness within the central abdomen.    Peritoneal Space: No abdominopelvic ascites or intraperitoneal free air.    Retroperitoneum: No significant adenopathy.    Abdominal wall: Midline ventral wall and parastomal hernias as discussed above.    Bones and soft tissue: Diffuse osteopenia.  There is a mild superior endplate deformity of the T11 vertebral body.  There is a moderate compression deformity of the T12 vertebral body, mildly progressed compared to prior study of 03/01/2020.  There is a severe T1 compression fracture.  There is a mild superior endplate deformity of the L2 vertebral body.  Bilateral knee degenerative changes.  Bilateral foot osseous heterogeneity right greater than left, possibly relating to disuse osteopenia.  Postoperative changes of transmetatarsal amputation of the right foot.  No  evidence of subcutaneous emphysema within the soft tissues.  Nonspecific edema of the lower extremities.    Aorta: Torturous and mildly ectatic aorta.  Extensive calcified and noncalcified atherosclerotic plaque.  Significant atherosclerotic calcification involving the origins of the celiac artery and superior mesenteric artery which remain patent.  Prominent atherosclerotic calcification involving the proximal bilateral renal arteries with likely hemodynamically significant stenosis.    Right:    Common iliac artery: Moderate disease with mild narrowing.    External iliac artery: No significant stenosis.    Common femoral artery: Mild narrowing.    Femoral artery: Multiple stents in place.  There is mild narrowing proximal to the stent.  There is occlusion of the distal stent with reconstitution distally.  There is multifocal hemodynamically significant stenosis involving the reconstituted distal femoral artery.    Popliteal artery: Moderate to severe stenosis.    Anterior tibial artery: Occluded distally    Posterior tibial artery: Occluded proximally.    Peroneal artery: Patent to the level of foot.    Left:    Common iliac artery: Moderate disease with moderate stenosis proximally.    External iliac artery: Mild disease    Common femoral artery: Moderate calcification at the bifurcation.    Femoral artery: Multifocal severe stenosis.    Popliteal artery: Multifocal moderate to severe stenosis with severe high-grade stenosis versus occlusion distally.    Anterior tibial artery: Severe disease.    Posterior tibial artery: Occluded distally.    Peroneal artery: Severe disease.                                       X-Ray Chest AP Portable (Final result)  Result time 08/11/23 01:01:04      Final result by Grupo Barrios MD (08/11/23 01:01:04)                   Impression:      Chronic coarse interstitial attenuation and left basilar atelectasis or scarring.  No definite new large confluent airspace consolidation  appreciated.      Electronically signed by: Grupo Barrios MD  Date:    08/11/2023  Time:    01:01               Narrative:    EXAMINATION:  XR CHEST AP PORTABLE    CLINICAL HISTORY:  Shortness of breath    TECHNIQUE:  Single frontal view of the chest was performed.    COMPARISON:  07/12/2021    FINDINGS:  Cardiac monitoring leads overlie the chest.  Cardiac silhouette is not significantly enlarged.  There is atherosclerotic calcification of the thoracic aorta.  There is mild elevation of the left hemidiaphragm.  Lungs demonstrate chronic coarse interstitial attenuation.  There is a linear subsegmental opacity at the left lung base favored relate to atelectasis or scarring.  No new large confluent airspace consolidation appreciated.  No significant volume of pleural fluid or pneumothorax identified.  Osseous structures demonstrate degenerative change.                                       X-Ray Foot Complete Right (Final result)  Result time 08/11/23 00:48:02      Final result by rGupo Barrios MD (08/11/23 00:48:02)                   Impression:      As above.      Electronically signed by: Grupo Barrios MD  Date:    08/11/2023  Time:    00:48               Narrative:    EXAMINATION:  XR FOOT COMPLETE 3 VIEW RIGHT    CLINICAL HISTORY:  . Pain, unspecified    TECHNIQUE:  AP, lateral, and oblique views of the right foot were performed.    COMPARISON:  Right foot radiograph series 03/22/2013    FINDINGS:  There are postoperative changes relating to prior partial transmetatarsal amputation of the right foot.  The resection margins appear relatively well delineated without significant erosive change, noting assessment is limited by lack of any prior postoperative imaging.  There is mild nonspecific soft tissue edema at the resection stump.    The remaining osseous structures demonstrate heterogeneous appearance which may relate to disuse osteopenia.  No definite displaced fracture identified.  There are  scattered vascular calcifications present.                                       Scheduled Meds:   albuterol-ipratropium  3 mL Nebulization Q6H WAKE    aspirin  81 mg Oral Daily    atorvastatin  40 mg Oral Daily    budesonide-formoterol 80-4.5 mcg  2 puff Inhalation BID    [START ON 8/12/2023] cefTRIAXone (ROCEPHIN) IVPB  2 g Intravenous Q24H    citalopram  20 mg Oral Daily    enoxparin  40 mg Subcutaneous Daily    metoprolol tartrate  50 mg Oral BID    nicotine  1 patch Transdermal Daily    predniSONE  40 mg Oral Daily    [START ON 8/12/2023] vancomycin (VANCOCIN) IV (PEDS and ADULTS)  15 mg/kg Intravenous Q24H     Continuous Infusions:  PRN Meds:acetaminophen, dextrose 50%, dextrose 50%, glucagon (human recombinant), glucose, glucose, HYDROmorphone, melatonin, naloxone, ondansetron, sodium chloride 0.9%, Pharmacy to dose Vancomycin consult **AND** vancomycin - pharmacy to dose    ASSESSMENT/PLAN:   * Right foot infection  Patient with a history of PAD s/p angioplasty and transmetatarsal amputation of right foot presenting with new foot tenderness, erythema, and warmth likely infectious. Per vascular surgery CTA findings show occlusions which are likely chronic and the patient has formed collaterals. Patient is not septic at presentation. XR of R foot was concerning for edema at right foot stump, with osteopenia. No fracture. XR did not show evidence of osteomyelitis      Ddx: RLE cellulitis vs osteomyelitis     MRI R foot pending   Vanc/ceftriaxone for osteomyelitis and skin and soft tissue coverage   podiatry consulted, recs appreciated   Follow up wound culture   Fall precautions  Pain control: Tylenol and can escalate as needed  Wound care   Blood cultures pending   Aerobic and anerobic cultures pending     Chronic obstructive pulmonary disease exacerbation  Hold home Symbicort BID  Begin prednisone 40mg QD  Begin Acetylcysteine BID - D/C 08/11  Continue Home Albuterol neb Q6H  Antibiotic coverage as above for  right foot infection     Major depressive disorder  - Continue home citalopram      History of amputation of right foot  See right foot infection plan         HLD (hyperlipidemia)  HLD   Continue home statin      Essential hypertension  Continue home metoprolol            CAD (coronary artery disease)  S/P coronary artery stent placement 07/12/2021   - continue home ASA        PVD (peripheral vascular disease)  Continue home Lipitor 40 and ASA 81       VTE Risk Mitigation (From admission, onward)           Ordered     enoxaparin injection 40 mg  Daily         08/11/23 0456     IP VTE HIGH RISK PATIENT  Once         08/11/23 0456     Place sequential compression device  Until discontinued         08/11/23 0456                   Tomas Hernandez MS3  Department of Hospital Medicine  [facility]

## 2023-08-11 NOTE — HPI
[age] year old female with multiple medical histories, previous right foot wounds for which she underwent an angiogram with SFA stents and TMA by Dr. Saldaña in 2015, who presents with 4 days of worsening right foot pain at the distal site of her TMA.  Since her revascularization and TMA in the past, she has not had any issues.  Ambulates well without issue.    In regards to her foot pain,  She states that she has not had any injury to the area.  She noted the pain was severe, tenderness to palpation.  No purulent drainage.  No fevers or chills.    Former smoker.  Quit in the past.

## 2023-08-11 NOTE — PHARMACY MED REC
"Admission Medication History     The home medication history was taken by John Garibay.    You may go to "Admission" then "Reconcile Home Medications" tabs to review and/or act upon these items.     The home medication list has been updated by the Pharmacy department.   Please read ALL comments highlighted in yellow.   Please address this information as you see fit.    Feel free to contact us if you have any questions or require assistance.      The medications listed below were removed from the home medication list. Please reorder if appropriate:  Patient reports no longer taking the following medication(s):  ACETAMINOPHEN 325 MG  ACETYLCYSTEINE 600 MG CAP  ATORVASTATIN 40 MG  CILOSTAZOL 100 MG  CITALOPRAM 20 MG   ERGOCALCIFEROL 50,000 UNIT  LIDOCAINE 5 % PATCH  METOPROLOL SUCCINATE 25 MG TAB  METOPROLOL TARTRATE 50 MG  NICOTINE 7 MG/24 HR PATCH  POLYETHYLENE GLYCOL 17 GRAM PWPK  TIOTROPIUM 18 MCG INH CAP    Medications listed below were obtained from: Patient  Current Outpatient Medications on File Prior to Encounter   Medication Sig    albuterol (PROVENTIL/VENTOLIN HFA) 90 mcg/actuation inhaler   INHALE 2 PUFFS BY MOUTH EVERY 6 HOURS AS NEEDED    albuterol-ipratropium (DUO-NEB) 2.5 mg-0.5 mg/3 mL nebulizer solution   USE 1 VIAL VIA NEBULIZER EVERY 6 HOURS AS NEEDED FOR WHEEZING, SHORTNESS OF BREATH      aspirin 81 MG Chew Take 1 tablet (81 mg total) by mouth once daily.      budesonide-formoterol 80-4.5 mcg (SYMBICORT) 80-4.5 mcg/actuation HFAA   INHALE 2 PUFFS INTO THE LUNGS TWICE DAILY. RINSE MOUTH AFTER USE    hydrocortisone-aloe vera 1 % Crea cream Apply topically 2 (two) times daily as needed (Itching).      ibuprofen (ADVIL,MOTRIN) 200 MG tablet   Take 800 mg by mouth 2 (two) times daily as needed for Pain.       Potential issues to be addressed PRIOR TO DISCHARGE  Patient reported not taking the following medications: (PLAVIX). These medications remain on the home medication list. Please address " accordingly.               John Garibay  EXT [id number]                  .

## 2023-08-11 NOTE — ED TRIAGE NOTES
"Radha Sotelo, a 78 y.o. female presents to the ED w/ complaint of pain in right foot. Pt states that she had right foot amputated 8 years ago, but has been having pain for 2 days. Foot is red and swollen also.    Triage note:  Chief Complaint   Patient presents with    Foot Pain     Arrived by ems c/ o R foot. Per ems, was amputated 8 years ago. Pain started on Monday 10/10. Redness noted per ems. Denies fever. Denies trauma. "Just woke up one day & the pain started" gradually worsening. Ambulates with walker per ems     Review of patient's allergies indicates:  No Known Allergies  Past Medical History:   Diagnosis Date    Anemia     Anxiety     COPD (chronic obstructive pulmonary disease)     COPD (chronic obstructive pulmonary disease) with emphysema     Coronary artery disease     Depression     Diverticulitis     Hyperlipidemia     Hypertension     PVD (peripheral vascular disease)     PVD (peripheral vascular disease)     S/P colostomy     Substance abuse     hx heavy etoh use     Tobacco abuse     Patient identifiers for Radha Sotelo checked and correct.    LOC: The patient is awake, alert and aware of environment with an appropriate affect, the patient is oriented x 4 and speaking appropriately.    APPEARANCE: Patient resting comfortably and in no acute distress, patient is clean and well groomed, patient's clothing is properly fastened.    SKIN: The skin is warm and dry, color consistent with ethnicity, patient has normal skin turgor and moist mucus membranes, skin intact, no breakdown or bruising noted.    MUSCULOSKELETAL: Patient moving all extremities well, but has obvious swelling with deformities noted on right foot and leg area. Not able to feel pulse on r foot area.    RESPIRATORY: Airway is open and patent, respirations are spontaneous and even, patient has a normal effort and rate.    CARDIAC: Patient has a normal rate and rhythm, no periphreal edema noted, capillary refill < 3 " seconds.    ABDOMEN: Soft and non tender to palpation, no distention noted. Patient denies any nausea, vomiting, diarrhea, or constipation.     NEUROLOGIC: Eyes open spontaneously, PERRL, behavior appropriate to situation, follows commands, facial expression symmetrical, bilateral hand grasp equal and even, purposeful motor response noted, normal sensation in all extremities.     HEENT: No abnormalities noted. White sclera and pupils equal round and reactive to light. Denies headache, dizziness.     : Pt voids independently, denies dysuria, hematuria, frequency.

## 2023-08-11 NOTE — HPI
Ms. Sotelo is a 78 YOF with COPD, significant cardiac history, diabetes, PVC s/p R foot amputation presents for R foot pain. Onset 4 days ago, located on right distal foot, no injury or trauma to the area, area is tender and erythematous. No remitting factors, patient has been unable to walk normally so has been using a walker for 1 day. No pus present. ROS negative for falls, fevers, chills. Skin changes and pain is limited to R foot and has not spread.     In the ED the patient was hemodynamically stable and lab work was unremarkable. XR of R foot was concerning for edema at right foot stump, with osteopenia. No fracture. In the ED they were unable to obtain DP or PT pulse, consulted vascular surgery. Patient was initially placed patient on heparin drip. CTA with runoff was concerning for multifocal moderate to severe stenosis involving the bilateral lower extremities.  Occlusion of the distal right femoral stent and moderate to severe stenosis of the popliteal artery with one-vessel runoff.  Severe multifocal stenosis within the left femoral and popliteal artery with two vessel runoff. Per vascular surgery the findings are likely showing chronically occluded stent and she has collateral vasculature. They recommend discontinuing the heparin drip and HM admission for osteomyelitis vs cellulitis.

## 2023-08-11 NOTE — NURSING
Nurses Note -- 4 Eyes      8/11/2023   6:55 PM      Skin assessed during: Admit      [] No Altered Skin Integrity Present    []Prevention Measures Documented      [x] Yes- Altered Skin Integrity Present or Discovered   [x] LDA Added if Not in Epic (Describe Wound)   [] New Altered Skin Integrity was Present on Admit and Documented in LDA   [] Wound Image Taken    Wound Care Consulted? Yes    Attending Nurse:  Brittany Mcintosh RN/Staff Member:   Alphonse Doe RN

## 2023-08-11 NOTE — ASSESSMENT & PLAN NOTE
Patient with a history of PAD s/p angioplasty and transmetatarsal amputation of right foot presenting with new foot tenderness, erythema, and warmth likely infectious. Per vascular surgery CTA findings show occlusions which are likely chronic and the patient has formed collaterals. Patient is not septic at presentation. XR of R foot was concerning for edema at right foot stump, with osteopenia. No fracture. XR did not show evidence of osteomyelitis     Ddx: RLE cellulitis vs osteomyelitis    - MRI R foot pending   - Vanc/ceftriaxone for osteomyelitis and skin and soft tissue coverage   - podiatry consulted, recs appreciated   - Follow up wound culture   - Fall precautions  - Pain control: Tylenol and can escalate as needed  - Wound care   - Blood cultures pending   - Aerobic and anerobic cultures pending

## 2023-08-11 NOTE — ED PROVIDER NOTES
"Encounter Date: 8/10/2023       History     Chief Complaint   Patient presents with    Foot Pain     Arrived by ems c/ o R foot. Per ems, was amputated 8 years ago. Pain started on Monday 10/10. Redness noted per ems. Denies fever. Denies trauma. "Just woke up one day & the pain started" gradually worsening. Ambulates with walker per ems     Radha Sotelo is a 78 y.o. female with PMH of COPD, significant cardiac history, diabetes, peripheral vascular disease status post right foot amputation.  presenting to [facility] ED for right foot pain.  States the pain began yesterday.  Patient has redness to the distal part of her right foot.  Foot is extremely tender to light touch.  She has not had anything similar to this happened before.  Patient denies any recent fever or chills.  Patient states that she is short of breath since the room is hot.  Patient appears significantly short of breath and is coughing a lot.        Review of patient's allergies indicates:  No Known Allergies  Past Medical History:   Diagnosis Date    Anemia     Anxiety     COPD (chronic obstructive pulmonary disease)     COPD (chronic obstructive pulmonary disease) with emphysema     Coronary artery disease     Depression     Diverticulitis     Hyperlipidemia     Hypertension     PVD (peripheral vascular disease)     PVD (peripheral vascular disease)     S/P colostomy     Substance abuse     hx heavy etoh use     Tobacco abuse      Past Surgical History:   Procedure Laterality Date    COLOSTOMY      ECTOPIC PREGNANCY SURGERY      right forefoot amputation      right toe amputation      x2 toes     History reviewed. No pertinent family history.  Social History     Tobacco Use    Smoking status: Former     Current packs/day: 0.00     Average packs/day: 0.5 packs/day for 50.0 years (25.0 ttl pk-yrs)     Types: Cigarettes     Start date: 7/5/1963     Quit date: 7/5/2013     Years since quitting: 10.1    Smokeless tobacco: Never   Substance Use Topics    " Alcohol use: No    Drug use: No     Review of Systems   Constitutional:  Negative for chills and fever.   HENT:  Negative for congestion, rhinorrhea and sore throat.    Eyes:  Negative for visual disturbance.   Respiratory:  Positive for cough and shortness of breath.    Cardiovascular:  Negative for chest pain and leg swelling.   Gastrointestinal:  Negative for abdominal pain, diarrhea, nausea and vomiting.   Genitourinary:  Negative for dysuria and hematuria.   Neurological:  Negative for weakness.       Physical Exam     Initial Vitals [08/10/23 2328]   BP Pulse Resp Temp SpO2   (!) 163/73 95 18 98.7 °F (37.1 °C) 96 %      MAP       --         Physical Exam    Nursing note and vitals reviewed.  Constitutional: She appears well-developed and well-nourished. She is cooperative.  Non-toxic appearance. She appears ill. No distress.   HENT:   Head: Normocephalic and atraumatic.   Mouth/Throat: Mucous membranes are normal. Mucous membranes are not dry.   Eyes: Conjunctivae are normal. Pupils are equal, round, and reactive to light.   Neck: Trachea normal and phonation normal.   Cardiovascular:  Regular rhythm, normal heart sounds, intact distal pulses and normal pulses.   Tachycardia present.   Exam reveals no gallop, no S3, no S4 and no friction rub.       No murmur heard.  Pulmonary/Chest: Tachypnea noted. No respiratory distress. She has wheezes. She has rhonchi. She has no rales.   Abdominal: Abdomen is soft. She exhibits no distension.   Musculoskeletal:      Right lower leg: No edema.      Left lower leg: No edema.     Neurological: She is alert.   Skin: Skin is warm, dry and intact. Capillary refill takes less than 2 seconds.        Psychiatric: She has a normal mood and affect. Her speech is normal.         ED Course   Procedures  Labs Reviewed   CBC W/ AUTO DIFFERENTIAL - Abnormal; Notable for the following components:       Result Value    MCHC 31.9 (*)     Immature Granulocytes 1.1 (*)     Immature Grans  (Abs) 0.10 (*)     Gran % 75.3 (*)     Lymph % 11.0 (*)     All other components within normal limits    Narrative:     Release to patient->Immediate   ISTAT PROCEDURE - Abnormal; Notable for the following components:    POC PH 7.240 (*)     POC PCO2 68.1 (*)     POC PO2 23 (*)     POC HCO3 29.2 (*)     POC SATURATED O2 28 (*)     POC TCO2 31 (*)     All other components within normal limits   HIV 1 / 2 ANTIBODY    Narrative:     Release to patient->Immediate   HEPATITIS C ANTIBODY    Narrative:     Release to patient->Immediate   COMPREHENSIVE METABOLIC PANEL    Narrative:     Release to patient->Immediate   TROPONIN I    Narrative:     Release to patient->Immediate   B-TYPE NATRIURETIC PEPTIDE    Narrative:     Release to patient->Immediate   LACTIC ACID, PLASMA   APTT    Narrative:     Draw baseline aPTT prior to starting the heparin bolus or  infusion  (if patient is on warfarin prior to heparin therapy)   PROTIME-INR    Narrative:     Draw baseline aPTT prior to starting the heparin bolus or  infusion  (if patient is on warfarin prior to heparin therapy)     EKG Readings: (Independently Interpreted)   Initial Reading: No STEMI. Rhythm: Sinus Tachycardia. Heart Rate: 103. Ectopy: No Ectopy. Conduction: Normal. ST Segments: Normal ST Segments. T Waves Flipped: AVR, V1 and V2. Axis: Normal. Clinical Impression: Sinus Tachycardia       Imaging Results              CTA Runoff ABD PEL Bilat Lower Ext (In process)                      X-Ray Chest AP Portable (Final result)  Result time 08/11/23 01:01:04      Final result by Grupo Barrios MD (08/11/23 01:01:04)                   Impression:      Chronic coarse interstitial attenuation and left basilar atelectasis or scarring.  No definite new large confluent airspace consolidation appreciated.      Electronically signed by: Grupo Barrios MD  Date:    08/11/2023  Time:    01:01               Narrative:    EXAMINATION:  XR CHEST AP PORTABLE    CLINICAL  HISTORY:  Shortness of breath    TECHNIQUE:  Single frontal view of the chest was performed.    COMPARISON:  07/12/2021    FINDINGS:  Cardiac monitoring leads overlie the chest.  Cardiac silhouette is not significantly enlarged.  There is atherosclerotic calcification of the thoracic aorta.  There is mild elevation of the left hemidiaphragm.  Lungs demonstrate chronic coarse interstitial attenuation.  There is a linear subsegmental opacity at the left lung base favored relate to atelectasis or scarring.  No new large confluent airspace consolidation appreciated.  No significant volume of pleural fluid or pneumothorax identified.  Osseous structures demonstrate degenerative change.                                       X-Ray Foot Complete Right (Final result)  Result time 08/11/23 00:48:02      Final result by Grupo Barrios MD (08/11/23 00:48:02)                   Impression:      As above.      Electronically signed by: Grupo Barrios MD  Date:    08/11/2023  Time:    00:48               Narrative:    EXAMINATION:  XR FOOT COMPLETE 3 VIEW RIGHT    CLINICAL HISTORY:  . Pain, unspecified    TECHNIQUE:  AP, lateral, and oblique views of the right foot were performed.    COMPARISON:  Right foot radiograph series 03/22/2013    FINDINGS:  There are postoperative changes relating to prior partial transmetatarsal amputation of the right foot.  The resection margins appear relatively well delineated without significant erosive change, noting assessment is limited by lack of any prior postoperative imaging.  There is mild nonspecific soft tissue edema at the resection stump.    The remaining osseous structures demonstrate heterogeneous appearance which may relate to disuse osteopenia.  No definite displaced fracture identified.  There are scattered vascular calcifications present.                                       Medications   albuterol-ipratropium (DUO-NEB) 2.5 mg-0.5 mg/3 mL nebulizer solution (  Canceled Entry  8/11/23 0000)   HYDROmorphone injection 0.5 mg (has no administration in time range)   vancomycin (VANCOCIN) 1,000 mg in dextrose 5 % (D5W) 250 mL IVPB (Vial-Mate) (has no administration in time range)   piperacillin-tazobactam (ZOSYN) 4.5 g in dextrose 5 % in water (D5W) 100 mL IVPB (MB+) (has no administration in time range)   morphine injection 4 mg (4 mg Intravenous Given 8/10/23 2358)   ondansetron injection 4 mg (4 mg Intravenous Given 8/10/23 2359)   methylPREDNISolone sodium succinate injection 125 mg (125 mg Intravenous Given 8/10/23 2359)   albuterol-ipratropium 2.5 mg-0.5 mg/3 mL nebulizer solution 3 mL (3 mLs Nebulization Given 8/10/23 2355)   morphine injection 4 mg (4 mg Intravenous Given 8/11/23 0025)   heparin 25,000 units in dextrose 5% (100 units/ml) IV bolus from bag INITIAL BOLUS (4,272 Units Intravenous Bolus from Bag 8/11/23 0134)   HYDROmorphone injection 1 mg (1 mg Intravenous Given 8/11/23 0123)   ondansetron injection 4 mg (4 mg Intravenous Given 8/11/23 0227)   droPERidol injection 1.25 mg (1.25 mg Intravenous Given 8/11/23 0310)   iohexoL (OMNIPAQUE 350) injection 100 mL (100 mLs Intravenous Given 8/11/23 0337)     Medical Decision Making:   Initial Assessment:   78-year-old female with history of peripheral vascular disease presenting for right foot pain.  Initially, patient is hypertensive but overall hemodynamically stable.  Appears to be in significant pain secondary to pain in her foot.  Differential Diagnosis:   Acute limb ischemia, COPD exacerbation, pneumonia, acute on chronic respiratory failure, demand ischemia, fluid overload  Clinical Tests:   Lab Tests: Reviewed and Ordered  The following lab test(s) were unremarkable: CBC, CMP and Troponin  Radiological Study: Ordered and Reviewed  Medical Tests: Ordered and Reviewed  ED Management:  Patient presents with severe pain in her right foot with no Doppler dorsalis pedis or posterior tibialis pulse.  Concerning for acute limb  ischemia.  Will place order for heparin drip, obtain CTA with runoff, obtain vascular surgery consult.  Additionally, patient is significantly short of breath and coughing a lot.  In the setting of COPD, concerning for pneumonia versus COPD exacerbation.  Will give Solu-Medrol, DuoNebs x3 and reassess.  Will give morphine and Zofran for pain.    Workup as detailed below in ED course.  Workup significant for hypercapnic respiratory acidosis consistent with COPD exacerbation.  Upon reassessment, patient breathing comfortably with significant improving in wheezing in the bilateral lung zones.    Discussed patient's case with vascular surgery who agreed to evaluate this patient.  After CT imaging, state that stent is chronically occluded patient has collateral vasculature.  They recommended admission to Hospital Medicine for cellulitis versus osteomyelitis.  Recommend discontinuing heparin infusion.      Will give patient vanc/Zosyn for possible osteomyelitis.  Patient requiring multiple doses of IV pain medication for pain control and antiemetics for nausea.  Discussed with Hospital Medicine who agreed to admit patient service for further management.  She is hemodynamically stable and pain-free without any signs of respiratory distress, she is appropriate for transfer to the floor.               ED Course as of 08/11/23 0446   Fri Aug 11, 2023   0038 POC PH(!): 7.240 [ES]   0039 POC PCO2(!): 68.1 [ES]   0039 POC HCO3(!): 29.2 [ES]   0039 CBC auto differential(!)  WNL   [ES]   0123 Lactate, Buddy: 2.0 [ES]   0327 CTA Runoff ABD PEL Bilat Lower Ext [AA]      ED Course User Index  [AA] Twyla Seals MD  [ES] Brittney Dickinson MD                 Clinical Impression:   Final diagnoses:  [R06.02] Shortness of breath  [R52] Pain  [L03.90] Cellulitis        ED Disposition Condition    Observation Stable                Brittney Dickinson MD  Resident  08/11/23 0446

## 2023-08-11 NOTE — PROGRESS NOTES
"Pharmacokinetic Initial Assessment: IV Vancomycin    Assessment/Plan:    Initiate intravenous vancomycin with loading dose of 1250 mg once followed by a maintenance dose of vancomycin 1000 mg IV every 24 hours  Desired empiric serum trough concentration is 10 to 20 mcg/mL  Draw vancomycin trough level 60 min prior to third dose on 08/13/23 at approximately 0530  Pharmacy will continue to follow and monitor vancomycin.      Please contact pharmacy at extension [phone] with any questions regarding this assessment.     Thank you for the consult,   Fifi Mesa       Patient brief summary:  Radha Sotelo is a 78 y.o. female initiated on antimicrobial therapy with IV Vancomycin for treatment of suspected skin & soft tissue infection    Drug Allergies:   Review of patient's allergies indicates:  No Known Allergies    Actual Body Weight:   65.3 kg    Renal Function:   Estimated Creatinine Clearance: 56.7 mL/min (based on SCr of 0.7 mg/dL).,     Dialysis Method (if applicable):  N/A    CBC (last 72 hours):  Recent Labs   Lab Result Units 08/10/23  2357   WBC K/uL 8.99   Hemoglobin g/dL 13.1   Hematocrit % 41.1   Platelets K/uL 173   Gran % % 75.3*   Lymph % % 11.0*   Mono % % 8.0   Eosinophil % % 3.8   Basophil % % 0.8   Differential Method  Automated       Metabolic Panel (last 72 hours):  Recent Labs   Lab Result Units 08/10/23  2357   Sodium mmol/L 138   Potassium mmol/L 4.3   Chloride mmol/L 101   CO2 mmol/L 28   Glucose mg/dL 101   BUN mg/dL 10   Creatinine mg/dL 0.7   Albumin g/dL 3.6   Total Bilirubin mg/dL 0.3   Alkaline Phosphatase U/L 93   AST U/L 25   ALT U/L 32       Drug levels (last 3 results):  No results for input(s): "VANCOMYCINRA", "VANCORANDOM", "VANCOMYCINPE", "VANCOPEAK", "VANCOMYCINTR", "VANCOTROUGH" in the last 72 hours.    Microbiologic Results:  Microbiology Results (last 7 days)       Procedure Component Value Units Date/Time    Blood culture [[id number]] Collected: 08/11/23 0501    Order " Status: Sent Specimen: Blood from Peripheral, Hand, Right Updated: 08/11/23 0520    Blood culture [[id number]] Collected: 08/11/23 0501    Order Status: Sent Specimen: Blood from Peripheral, Hand, Right Updated: 08/11/23 0520    Culture, Anaerobe [[id number]]     Order Status: No result Specimen: Skin from Foot, Right     Aerobic culture [[id number]]     Order Status: No result Specimen: Skin from Foot, Right

## 2023-08-11 NOTE — PROVIDER PROGRESS NOTES - EMERGENCY DEPT.
Encounter Date: 8/10/2023    ED Physician Progress Notes          Physician Attestation Statement for Resident:  As the supervising MD   Physician Attestation Statement: I have personally seen and examined this patient.       I agree with the history unless otherwise noted.     As the supervising MD I agree with the PE unless otherwise noted.      I have reviewed and agree with the residents interpretation of the following unless otherwise noted:   I have personally reviewed and interpreted the patients laboratory studies: VB., tro pneg, CBC/CMP unremarkable  I have personally reviewed and interpreted imaging studies: CXR: No evidence of consolidation, cardiomegaly, pulmonary edema, pneumothroax    I have personally reviewed and interpreted the patient's EKG: Sinus tachycardia without ischemic changes, limited by motion artifact      I have also reviewed the following:   External records:  Patient with a history of peripheral artery disease status post angioplasty and right foot amputation    old imaging and results: echo 3.2020 EF 60%    As the supervising MD I agree with the treatment, course, plan, and disposition unless otherwise noted.    Critical Care   Date: 2023  Performed by: Twyla Seals MD   Authorized by: Twyla Seals MD      Total critical care time (exclusive of procedural time) : 45 minutes, exclusive of separately billable procedures and treating other patients and teaching time.    Critical care was necessary to treat or prevent imminent or life-threatening deterioration of the following conditions:  limb ischemia     Due to a high probability of clinically significant, life threatening deterioration, the patient required my highest level of preparedness to intervene emergently and I personally spent this critical care time directly and personally managing the patient. This critical care time included obtaining a history; examining the patient; pulse oximetry; ordering and review of  studies; arranging urgent treatment with development of a management plan; evaluation of patient's response to treatment; frequent reassessment, documentation, and, discussions with other providers, patient.    Of note, patient with evidence of CO2 > 60 on VBG, however, patient is awake, alert, oriented, not concerning for hypercarbic resp failure    Discussed with vascular surgery - patient with likely chronic vascular changes, more concerned that patient's foot pain represents infection rather than critical limb ischemia

## 2023-08-12 VITALS
TEMPERATURE: 99 F | BODY MASS INDEX: 28.26 KG/M2 | DIASTOLIC BLOOD PRESSURE: 59 MMHG | HEART RATE: 116 BPM | RESPIRATION RATE: 18 BRPM | OXYGEN SATURATION: 98 % | SYSTOLIC BLOOD PRESSURE: 130 MMHG | WEIGHT: 143.94 LBS | HEIGHT: 60 IN

## 2023-08-12 LAB
ALBUMIN SERPL BCP-MCNC: 3.1 G/DL (ref 3.5–5.2)
ALP SERPL-CCNC: 77 U/L (ref 55–135)
ALT SERPL W/O P-5'-P-CCNC: 22 U/L (ref 10–44)
ANION GAP SERPL CALC-SCNC: 9 MMOL/L (ref 8–16)
AST SERPL-CCNC: 26 U/L (ref 10–40)
BASOPHILS # BLD AUTO: 0.01 K/UL (ref 0–0.2)
BASOPHILS NFR BLD: 0.1 % (ref 0–1.9)
BILIRUB SERPL-MCNC: 0.2 MG/DL (ref 0.1–1)
BUN SERPL-MCNC: 23 MG/DL (ref 8–23)
CALCIUM SERPL-MCNC: 8.5 MG/DL (ref 8.7–10.5)
CHLORIDE SERPL-SCNC: 99 MMOL/L (ref 95–110)
CO2 SERPL-SCNC: 26 MMOL/L (ref 23–29)
CREAT SERPL-MCNC: 0.9 MG/DL (ref 0.5–1.4)
DIFFERENTIAL METHOD: ABNORMAL
EOSINOPHIL # BLD AUTO: 0 K/UL (ref 0–0.5)
EOSINOPHIL NFR BLD: 0 % (ref 0–8)
ERYTHROCYTE [DISTWIDTH] IN BLOOD BY AUTOMATED COUNT: 13.5 % (ref 11.5–14.5)
EST. GFR  (NO RACE VARIABLE): >60 ML/MIN/1.73 M^2
GLUCOSE SERPL-MCNC: 91 MG/DL (ref 70–110)
HCT VFR BLD AUTO: 39 % (ref 37–48.5)
HGB BLD-MCNC: 12.1 G/DL (ref 12–16)
IMM GRANULOCYTES # BLD AUTO: 0.07 K/UL (ref 0–0.04)
IMM GRANULOCYTES NFR BLD AUTO: 0.7 % (ref 0–0.5)
LYMPHOCYTES # BLD AUTO: 0.8 K/UL (ref 1–4.8)
LYMPHOCYTES NFR BLD: 7.8 % (ref 18–48)
MAGNESIUM SERPL-MCNC: 1.9 MG/DL (ref 1.6–2.6)
MCH RBC QN AUTO: 29.2 PG (ref 27–31)
MCHC RBC AUTO-ENTMCNC: 31 G/DL (ref 32–36)
MCV RBC AUTO: 94 FL (ref 82–98)
MONOCYTES # BLD AUTO: 1 K/UL (ref 0.3–1)
MONOCYTES NFR BLD: 9.4 % (ref 4–15)
NEUTROPHILS # BLD AUTO: 8.7 K/UL (ref 1.8–7.7)
NEUTROPHILS NFR BLD: 82 % (ref 38–73)
NRBC BLD-RTO: 0 /100 WBC
PHOSPHATE SERPL-MCNC: 5.4 MG/DL (ref 2.7–4.5)
PLATELET # BLD AUTO: 208 K/UL (ref 150–450)
PMV BLD AUTO: 10 FL (ref 9.2–12.9)
POTASSIUM SERPL-SCNC: 4.6 MMOL/L (ref 3.5–5.1)
PROT SERPL-MCNC: 6 G/DL (ref 6–8.4)
RBC # BLD AUTO: 4.14 M/UL (ref 4–5.4)
SODIUM SERPL-SCNC: 134 MMOL/L (ref 136–145)
WBC # BLD AUTO: 10.56 K/UL (ref 3.9–12.7)

## 2023-08-12 PROCEDURE — 83735 ASSAY OF MAGNESIUM: CPT

## 2023-08-12 PROCEDURE — 25000003 PHARM REV CODE 250

## 2023-08-12 PROCEDURE — G0378 HOSPITAL OBSERVATION PER HR: HCPCS

## 2023-08-12 PROCEDURE — 36415 COLL VENOUS BLD VENIPUNCTURE: CPT

## 2023-08-12 PROCEDURE — 25000003 PHARM REV CODE 250: Performed by: STUDENT IN AN ORGANIZED HEALTH CARE EDUCATION/TRAINING PROGRAM

## 2023-08-12 PROCEDURE — 63600175 PHARM REV CODE 636 W HCPCS

## 2023-08-12 PROCEDURE — 80053 COMPREHEN METABOLIC PANEL: CPT

## 2023-08-12 PROCEDURE — 94761 N-INVAS EAR/PLS OXIMETRY MLT: CPT

## 2023-08-12 PROCEDURE — 94640 AIRWAY INHALATION TREATMENT: CPT | Mod: XB

## 2023-08-12 PROCEDURE — 27000221 HC OXYGEN, UP TO 24 HOURS

## 2023-08-12 PROCEDURE — 25000242 PHARM REV CODE 250 ALT 637 W/ HCPCS: Performed by: STUDENT IN AN ORGANIZED HEALTH CARE EDUCATION/TRAINING PROGRAM

## 2023-08-12 PROCEDURE — 96365 THER/PROPH/DIAG IV INF INIT: CPT | Mod: 59

## 2023-08-12 PROCEDURE — 84100 ASSAY OF PHOSPHORUS: CPT

## 2023-08-12 PROCEDURE — 96376 TX/PRO/DX INJ SAME DRUG ADON: CPT

## 2023-08-12 PROCEDURE — 99900035 HC TECH TIME PER 15 MIN (STAT)

## 2023-08-12 PROCEDURE — 99239 PR HOSPITAL DISCHARGE DAY,>30 MIN: ICD-10-PCS | Mod: GC,,, | Performed by: STUDENT IN AN ORGANIZED HEALTH CARE EDUCATION/TRAINING PROGRAM

## 2023-08-12 PROCEDURE — S4991 NICOTINE PATCH NONLEGEND: HCPCS | Performed by: STUDENT IN AN ORGANIZED HEALTH CARE EDUCATION/TRAINING PROGRAM

## 2023-08-12 PROCEDURE — 63600175 PHARM REV CODE 636 W HCPCS: Performed by: STUDENT IN AN ORGANIZED HEALTH CARE EDUCATION/TRAINING PROGRAM

## 2023-08-12 PROCEDURE — 85025 COMPLETE CBC W/AUTO DIFF WBC: CPT

## 2023-08-12 PROCEDURE — 99239 HOSP IP/OBS DSCHRG MGMT >30: CPT | Mod: GC,,, | Performed by: STUDENT IN AN ORGANIZED HEALTH CARE EDUCATION/TRAINING PROGRAM

## 2023-08-12 RX ORDER — IBUPROFEN 400 MG/1
400 TABLET ORAL EVERY 4 HOURS PRN
Status: DISCONTINUED | OUTPATIENT
Start: 2023-08-12 | End: 2023-08-12 | Stop reason: HOSPADM

## 2023-08-12 RX ORDER — OXYCODONE HYDROCHLORIDE 5 MG/1
5 TABLET ORAL EVERY 8 HOURS PRN
Status: DISCONTINUED | OUTPATIENT
Start: 2023-08-12 | End: 2023-08-12 | Stop reason: HOSPADM

## 2023-08-12 RX ORDER — OXYCODONE HYDROCHLORIDE 5 MG/1
5 TABLET ORAL EVERY 8 HOURS PRN
Qty: 5 TABLET | Refills: 0 | Status: SHIPPED | OUTPATIENT
Start: 2023-08-12 | End: 2023-08-12 | Stop reason: SDUPTHER

## 2023-08-12 RX ORDER — METOPROLOL TARTRATE 50 MG/1
50 TABLET ORAL 2 TIMES DAILY
Qty: 180 TABLET | Refills: 3 | Status: ON HOLD
Start: 2023-08-12 | End: 2023-11-22

## 2023-08-12 RX ORDER — IBUPROFEN 600 MG/1
600 TABLET ORAL EVERY 6 HOURS PRN
Qty: 56 TABLET | Refills: 0 | Status: ON HOLD | OUTPATIENT
Start: 2023-08-12 | End: 2023-08-28 | Stop reason: HOSPADM

## 2023-08-12 RX ORDER — ONDANSETRON 4 MG/1
4 TABLET, ORALLY DISINTEGRATING ORAL EVERY 6 HOURS PRN
Qty: 30 TABLET | Refills: 0 | Status: ON HOLD | OUTPATIENT
Start: 2023-08-12 | End: 2023-08-28

## 2023-08-12 RX ORDER — GABAPENTIN 300 MG/1
300 CAPSULE ORAL ONCE
Status: COMPLETED | OUTPATIENT
Start: 2023-08-12 | End: 2023-08-12

## 2023-08-12 RX ORDER — AMOXICILLIN AND CLAVULANATE POTASSIUM 875; 125 MG/1; MG/1
1 TABLET, FILM COATED ORAL 2 TIMES DAILY
Qty: 24 TABLET | Refills: 0 | Status: ON HOLD | OUTPATIENT
Start: 2023-08-12 | End: 2023-08-28 | Stop reason: HOSPADM

## 2023-08-12 RX ORDER — OXYCODONE HYDROCHLORIDE 5 MG/1
5 TABLET ORAL EVERY 6 HOURS PRN
Qty: 12 TABLET | Refills: 0 | Status: SHIPPED | OUTPATIENT
Start: 2023-08-12 | End: 2023-08-15

## 2023-08-12 RX ORDER — IBUPROFEN 200 MG
1 TABLET ORAL DAILY
Qty: 28 PATCH | Refills: 11 | Status: SHIPPED | OUTPATIENT
Start: 2023-08-13 | End: 2023-09-13

## 2023-08-12 RX ORDER — CILOSTAZOL 100 MG/1
100 TABLET ORAL 2 TIMES DAILY
Qty: 60 TABLET | Refills: 11 | Status: ON HOLD | OUTPATIENT
Start: 2023-08-12 | End: 2023-08-28 | Stop reason: HOSPADM

## 2023-08-12 RX ORDER — DOXYCYCLINE HYCLATE 100 MG
100 TABLET ORAL EVERY 12 HOURS
Qty: 24 TABLET | Refills: 0 | Status: ON HOLD | OUTPATIENT
Start: 2023-08-12 | End: 2023-08-28 | Stop reason: HOSPADM

## 2023-08-12 RX ORDER — IBUPROFEN 600 MG/1
600 TABLET ORAL EVERY 6 HOURS PRN
Status: DISCONTINUED | OUTPATIENT
Start: 2023-08-12 | End: 2023-08-12

## 2023-08-12 RX ORDER — CITALOPRAM 20 MG/1
20 TABLET, FILM COATED ORAL DAILY
Qty: 90 TABLET | Refills: 3 | Status: ON HOLD | OUTPATIENT
Start: 2023-08-12 | End: 2023-11-22

## 2023-08-12 RX ORDER — PREDNISONE 20 MG/1
40 TABLET ORAL DAILY
Qty: 4 TABLET | Refills: 0 | Status: SHIPPED | OUTPATIENT
Start: 2023-08-13 | End: 2023-08-15

## 2023-08-12 RX ORDER — ATORVASTATIN CALCIUM 40 MG/1
40 TABLET, FILM COATED ORAL DAILY
Qty: 30 TABLET | Refills: 5 | Status: ON HOLD
Start: 2023-08-12 | End: 2023-11-22

## 2023-08-12 RX ADMIN — CEFTRIAXONE 2 G: 2 INJECTION, POWDER, FOR SOLUTION INTRAMUSCULAR; INTRAVENOUS at 07:08

## 2023-08-12 RX ADMIN — ASPIRIN 81 MG 81 MG: 81 TABLET ORAL at 08:08

## 2023-08-12 RX ADMIN — IBUPROFEN 400 MG: 400 TABLET, FILM COATED ORAL at 02:08

## 2023-08-12 RX ADMIN — ONDANSETRON 4 MG: 4 TABLET, ORALLY DISINTEGRATING ORAL at 08:08

## 2023-08-12 RX ADMIN — GABAPENTIN 300 MG: 300 CAPSULE ORAL at 12:08

## 2023-08-12 RX ADMIN — IPRATROPIUM BROMIDE AND ALBUTEROL SULFATE 3 ML: .5; 3 SOLUTION RESPIRATORY (INHALATION) at 04:08

## 2023-08-12 RX ADMIN — OXYCODONE HYDROCHLORIDE 5 MG: 5 TABLET ORAL at 01:08

## 2023-08-12 RX ADMIN — METOPROLOL TARTRATE 50 MG: 50 TABLET, FILM COATED ORAL at 08:08

## 2023-08-12 RX ADMIN — OXYCODONE HYDROCHLORIDE 5 MG: 5 TABLET ORAL at 08:08

## 2023-08-12 RX ADMIN — IBUPROFEN 600 MG: 600 TABLET ORAL at 10:08

## 2023-08-12 RX ADMIN — CITALOPRAM HYDROBROMIDE 20 MG: 20 TABLET ORAL at 08:08

## 2023-08-12 RX ADMIN — VANCOMYCIN HYDROCHLORIDE 1000 MG: 1 INJECTION, POWDER, LYOPHILIZED, FOR SOLUTION INTRAVENOUS at 08:08

## 2023-08-12 RX ADMIN — PREDNISONE 40 MG: 20 TABLET ORAL at 08:08

## 2023-08-12 RX ADMIN — IPRATROPIUM BROMIDE AND ALBUTEROL SULFATE 3 ML: .5; 3 SOLUTION RESPIRATORY (INHALATION) at 12:08

## 2023-08-12 RX ADMIN — IPRATROPIUM BROMIDE AND ALBUTEROL SULFATE 3 ML: .5; 3 SOLUTION RESPIRATORY (INHALATION) at 11:08

## 2023-08-12 RX ADMIN — ATORVASTATIN CALCIUM 40 MG: 40 TABLET, FILM COATED ORAL at 08:08

## 2023-08-12 RX ADMIN — IPRATROPIUM BROMIDE AND ALBUTEROL SULFATE 3 ML: .5; 3 SOLUTION RESPIRATORY (INHALATION) at 03:08

## 2023-08-12 RX ADMIN — IPRATROPIUM BROMIDE AND ALBUTEROL SULFATE 3 ML: .5; 3 SOLUTION RESPIRATORY (INHALATION) at 07:08

## 2023-08-12 RX ADMIN — Medication 1 PATCH: at 08:08

## 2023-08-12 NOTE — NURSING
"Pt AAO x 4, c/o right foot pain throughout the shift. Pain medication given as ordered. Telmetry monitoring in progress throughout the shift. Pt is discharged. IV to left forearm removed with catheter intact. Pt refused COVID vaccine on discharge. All discharge instructions reviewed with patient. Pt verbalized understanding. All medications delivered to the bedside. Medication reviewed with patient for accuracy. All questions answered. Belongings and medications packed in a "patient belonging" bag. 2 full oxygen tanks with nasal cannulas attached and belongings bag sent with patient and [facility]. Pt left on a stretcher with 3L O2 NC with [facility].   "

## 2023-08-12 NOTE — HPI
Radha Sotelo is a  77 yo F with a PMHx of HLD, HTN, PVS, CAD, COPD, alcoholism, and tobacco abuse who was admitted to the hospital for R foot pain.  The pain started 2 days ago.  Patient noticed redness on the distal part of her right foot.  She denies fevers, chills, nausea, vomiting.  Patient has a R TMA conducted 8 years ago.  TMA surgical site is healed .  Patient says she has a 60 year history of smoking.    X-ray of right foot show prior TMA, no other osseous deformities, mild nonspecific soft tissue edema noted at the resection.  CTA of right distal lower extremity shows occlusion of previous SFA stent.  MRI of right foot pending.    Podiatry was consulted for right foot pain with erythema noted at distal stump of TMA site.  Patient denies any recent trauma.  Denies any wounds or ulcers in the past.  Denies any recent infections or illnesses.

## 2023-08-12 NOTE — ASSESSMENT & PLAN NOTE
Patient with a history of PAD s/p angioplasty and transmetatarsal amputation of right foot presenting with new foot tenderness, erythema, and warmth likely infectious. Per vascular surgery CTA findings show occlusions which are likely chronic and the patient has formed collaterals. Patient is not septic at presentation. XR of R foot was concerning for edema at right foot stump, with osteopenia. No fracture. XR did not show evidence of osteomyelitis     Ddx: RLE cellulitis vs osteomyelitis.  Vanc/ceftriaxone for osteomyelitis and skin and soft tissue coverage.  MRI R foot ruled out osteo.     - podiatry consulted, boots ordered  - PO doxycycline 100 and Augmentin 875 BID x 12 days (to complete 14 days total)  - Pain control: Tylenol, Ibuprofen and 5 tablets of oxycodone 5 mg  -  w aide, PT  - Walker for home use  -     Smoking cessation program ordered

## 2023-08-12 NOTE — PLAN OF CARE
Pt Aox4 and follows commands. Drowsy, but responds to voice. NS-ST on tele. 4L NC. Congested cough. Standby assist with walker. Pt reports severe pain in right amputated foot. Provider made aware and one time dose of Gabapentin given. Pt tolerated well. Bed in lowest position and call light within reach.

## 2023-08-12 NOTE — ASSESSMENT & PLAN NOTE
IDSA R foot concerning of soft tissue infection or OM. Xray of R foot shows mild nonspecific soft tissue edema at the resection stump, osseous structures demonstrate heterogeneous appearance which may relate to disuse osteopenias.CTA of right distal lower extremity shows occlusion of previous SFA stent. There are scattered vascular calcifications present TMA site has healed appropriately.  No wounds or lesions noted.    - MRI of right foot pending  - Vascular surgery consulted, recommendations appreciated  - Right foot cultures pending  - Continue antibiotics plan per ID  - Heel-off boots ordered.  Advised patient to always wear heel-off boots when laying in bed  - Pain management per primary team  - Podiatry will continue to follow

## 2023-08-12 NOTE — PLAN OF CARE
O-E approved home O2 for pt. SW delivered 2 e-tanks to pt.     Pt has concerns about discharge and pain control. Primary team and bedside RN aware.     PFC order for stretcher transportation initiated. Pt lives upstairs and cannot walk and has no one to assist her with transferring. Ambulance auth request sent to [facility] for review.     TIGRE Nicholson, LCSW  [facility]  [facility] [phone]

## 2023-08-12 NOTE — PLAN OF CARE
[facility] - [facility] ([facility]  Discharge Final Note    Primary Care Provider: No, Primary Doctor    Expected Discharge Date: 8/12/2023    Final Discharge Note (most recent)       Final Note - 08/12/23 1656          Final Note    Assessment Type Final Discharge Note     Anticipated Discharge Disposition [place] [facility]     Hospital Resources/Appts/Education Provided Provided patient/caregiver with written discharge plan information        Post-Acute Status    Post-Acute Authorization Home Health     Home Health Status Pending Payor Review     Discharge Delays None known at this time                     Important Message from Medicare             [facility] ETA 1730. Pt has two portable O2 tanks at bedside. Sent HH referrals. Pending PHN review and HH agency acceptance.     Talisha Beal, TIGRE, LCSW  [facility]end -[facility]  [facility] [phone]

## 2023-08-12 NOTE — NURSING
Pt AAO x 4, c/o pain 10/10 to her right foot. Pt states that she will return to the ED, if the pain is not controlled. Med team 4 notified. Pain medication adjusted for discharge and additional dose of Ibuprofen 600mg PO ordered for now. Monitoring.

## 2023-08-12 NOTE — NURSING
Pt arrived from the ER at 5p. Pt AAO x 4, c/o pain to right foot. Telemetry monitoring in progress. Skin intact with a 4 eyes assessment completed. Right foot is partially amputated with edema. Anterior part of the foot is black/purple in color with no skin breakdown. Pt is ambulatory with a walker and standby assist. Pt sitting in the recliner with feet elevated. Oriented to the room. Call light in reach. Monitoring.

## 2023-08-12 NOTE — ASSESSMENT & PLAN NOTE
· Continue Symbicort BID, Acetylcysteine BID, Albuterol neb Q6H  · Pulmonary rehabilitation  · Follow up with Pulm in clinic   · Smoking cessation program ordered, and pt educated about dangers of smoking

## 2023-08-12 NOTE — DISCHARGE SUMMARY
[facility] ([address] Medicine  Discharge Summary      Patient Name: Radha Sotelo  MRN: [id number]  DEEPIKA: [id number]  Patient Class: OP- Observation  Admission Date: 8/10/2023  Hospital Length of Stay: 0 days  Discharge Date and Time:  08/12/2023 2:21 PM  Attending Physician: Harlan Aguilar MD   Discharging Provider: Ken Graf MD  Primary Care Provider: Laurence Primary Doctor  [facility] Medicine Team: [place] [id number] Ken Graf MD  Primary Care Team: [place] [id number]    HPI:   Ms. Sotelo is a 78 YOF with COPD, significant cardiac history, diabetes, PVC s/p R foot amputation presents for R foot pain. Onset 4 days ago, located on right distal foot, no injury or trauma to the area, area is tender and erythematous. No remitting factors, patient has been unable to walk normally so has been using a walker for 1 day. No pus present. ROS negative for falls, fevers, chills. Skin changes and pain is limited to R foot and has not spread.     In the ED the patient was hemodynamically stable and lab work was unremarkable. XR of R foot was concerning for edema at right foot stump, with osteopenia. No fracture. In the ED they were unable to obtain DP or PT pulse, consulted vascular surgery. Patient was initially placed patient on heparin drip. CTA with runoff was concerning for multifocal moderate to severe stenosis involving the bilateral lower extremities.  Occlusion of the distal right femoral stent and moderate to severe stenosis of the popliteal artery with one-vessel runoff.  Severe multifocal stenosis within the left femoral and popliteal artery with two vessel runoff. Per vascular surgery the findings are likely showing chronically occluded stent and she has collateral vasculature. They recommend discontinuing the heparin drip and HM admission for osteomyelitis vs cellulitis.         * No surgery found *      Hospital Course:   Admitted to [facility] Medicine 4 for further  "evaluation and treatment of a COPD exacerbation and infection of her R foot.  placed on 4L via NC. Ordered Q6 DuoNeb and Prednisone 40mg QD x5 days for COPD.   Podiatry was consulted; heel-off boots ordered.  Started empiric IV Vanc and Zosyn. MRI foot: no osteomyelitis identified.   Zosyn switched to Rocephin.  Her COPD sxs resolved by the following morning, and she felt back to her recent baseline.  She was DC'ed to home in stable condition w home O2, HH, Pulm rehab orders, Pulm follow-up, PO doxy and Augmentin x 14 days v skin/soft tissue infection, and steroids for her COPD.  Return precautions educated.         Goals of Care Treatment Preferences:  Code Status: Full Code      Consults:   Consults (From admission, onward)          Status Ordering Provider     IP consult to case management  Once        Provider:  (Not yet assigned)    Acknowledged SONA VILLAVICENCIO     Inpatient consult to Podiatry  Once        Provider:  (Not yet assigned)    Completed YEN EPSTEIN     Inpatient consult to Vascular Surgery  Once        Provider:  (Not yet assigned)    Completed CHERRY GE                Final Active Diagnoses:    Diagnosis Date Noted POA    PRINCIPAL PROBLEM:  Soft tissue infection of R foot [L08.9] 08/11/2023 Yes    History of amputation of right forefoot [Z89.431] 07/12/2021 Not Applicable    Chronic obstructive pulmonary disease [J44.9] 07/12/2021 Unknown    HLD (hyperlipidemia) [E78.5] 07/12/2021 Yes    Major depressive disorder [F32.9] 07/12/2021 Yes    Essential hypertension [I10] 07/12/2021 Yes    CAD (coronary artery disease) [I25.10] 03/22/2013 Yes    PVD (peripheral vascular disease) [I73.9] 11/12/2012 Yes      Problems Resolved During this Admission:       Discharged Condition: stable    Disposition: Home-Health Care c    Follow Up:    Patient Instructions:      WALKER FOR HOME USE     Order Specific Question Answer Comments   Type of Walker: Xavi (4'4"-5'6")    With wheels? No  "   Height: 5' (1.524 m)    Weight: 65.3 kg (143 lb 15.4 oz)    Length of need (1-99 months): 99    Please check all that apply: Patient's condition impairs ambulation.    Please check all that apply: Patient is unable to safely ambulate without equipment.      OXYGEN FOR HOME USE     Order Specific Question Answer Comments   Liter Flow 3    Duration Continuous    Qualifying Test Performed at: Rest    Oxygen saturation: 82    Portable mode: continuous    Route nasal cannula    Device: home concentrator with portable tanks    Length of need (in months): 99 mos    Patient condition with qualifying saturation COPD    Height: 5' (1.524 m)    Weight: 65.3 kg (143 lb 15.4 oz)    Alternative treatment measures have been tried or considered and deemed clinically ineffective. Yes      PRAFO BOOTS FOR HOME USE     Order Specific Question Answer Comments   Height: 5' (1.524 m)    Weight: 65.3 kg (143 lb 15.4 oz)    Quantity: 1    Length of need (1-99 months): 99      Ambulatory referral/consult to Smoking Cessation Program   Standing Status: Future   Referral Priority: Routine Referral Type: Consultation   Referral Reason: Specialty Services Required   Requested Specialty: CTTS   Number of Visits Requested: 1     Ambulatory referral/consult to Pulmonary Rehab   Standing Status: Future   Referral Priority: Routine Referral Type: Consultation   Referral Reason: Specialty Services Required   Requested Specialty: Pulmonary Disease   Number of Visits Requested: 1     Ambulatory referral/consult to Pulmonology   Standing Status: Future   Referral Priority: Routine Referral Type: Consultation   Referral Reason: Specialty Services Required   Requested Specialty: Pulmonary Disease   Number of Visits Requested: 1       Significant Diagnostic Studies: Labs:   CMP   Recent Labs   Lab 08/10/23  2357 08/11/23  0519 08/12/23  0415    133* 134*   K 4.3 4.2 4.6    101 99   CO2 28 20* 26    182* 91   BUN 10 9 23   CREATININE  0.7 0.7 0.9   CALCIUM 9.4 8.5* 8.5*   PROT 6.9 6.9 6.0   ALBUMIN 3.6 3.5 3.1*   BILITOT 0.3 0.2 0.2   ALKPHOS 93 92 77   AST 25 22 26   ALT 32 30 22   ANIONGAP 9 12 9    and CBC   Recent Labs   Lab 08/10/23  2357 08/11/23  0519 08/12/23  0415   WBC 8.99 13.36* 10.56   HGB 13.1 13.2 12.1   HCT 41.1 42.5 39.0    188 208     Microbiology:   Blood Culture   Lab Results   Component Value Date    LABBLOO No Growth to date 08/11/2023    LABBLOO No Growth to date 08/11/2023    LABBLOO No Growth to date 08/11/2023    LABBLOO No Growth to date 08/11/2023     Radiology: X-Ray: CXR: X-Ray Chest 1 View (CXR): No results found for this visit on 08/10/23.    Pending Diagnostic Studies:       Procedure Component Value Units Date/Time    VAS Ankle Brachial Indices Resting [[id number]]     Order Status: Sent Lab Status: No result            Medications:  Reconciled Home Medications:      Medication List        START taking these medications      amoxicillin-clavulanate 875-125mg 875-125 mg per tablet  Commonly known as: AUGMENTIN  Take 1 tablet by mouth 2 (two) times daily for 12 days     doxycycline 100 MG tablet  Commonly known as: VIBRA-TABS  Take 1 tablet (100 mg total) by mouth every 12 (twelve) hours. for 12 days     nicotine 21 mg/24 hr  Commonly known as: NICODERM CQ  Place 1 patch onto the skin once daily.  Start taking on: August 13, 2023     ondansetron 4 MG Tbdl  Commonly known as: ZOFRAN-ODT  Dissolve 1 tablet (4 mg total) by mouth every 6 (six) hours as needed.     oxyCODONE 5 MG immediate release tablet  Commonly known as: ROXICODONE  Take 1 tablet (5 mg total) by mouth every 8 (eight) hours as needed for Pain.     predniSONE 20 MG tablet  Commonly known as: DELTASONE  Take 2 tablets (40 mg total) by mouth once daily for 2 days  Start taking on: August 13, 2023            CHANGE how you take these medications      hydrocortisone-aloe vera 1 % Crea cream  Apply topically 2 (two) times daily.  What changed:   when  to take this  reasons to take this     ibuprofen 600 MG tablet  Commonly known as: ADVIL,MOTRIN  Take 1 tablet (600 mg total) by mouth every 6 (six) hours as needed (moderate pain and swelling).  What changed:   medication strength  how much to take  when to take this  reasons to take this            CONTINUE taking these medications      albuterol 90 mcg/actuation inhaler  Commonly known as: PROVENTIL/VENTOLIN HFA  INHALE 2 PUFFS BY MOUTH EVERY 6 HOURS AS NEEDED     albuterol-ipratropium 2.5 mg-0.5 mg/3 mL nebulizer solution  Commonly known as: DUO-NEB  USE 1 VIAL VIA NEBULIZER EVERY 6 HOURS AS NEEDED FOR WHEEZING, SHORTNESS OF BREATH     aspirin 81 MG Chew  Take 1 tablet (81 mg total) by mouth once daily.     atorvastatin 40 MG tablet  Commonly known as: LIPITOR  Take 1 tablet (40 mg total) by mouth once daily.     budesonide-formoterol 80-4.5 mcg 80-4.5 mcg/actuation Hfaa  Commonly known as: SYMBICORT  INHALE 2 PUFFS INTO THE LUNGS TWICE DAILY. RINSE MOUTH AFTER USE     citalopram 20 MG tablet  Commonly known as: CeleXA  Take 1 tablet (20 mg total) by mouth once daily.     clopidogreL 75 mg tablet  Commonly known as: PLAVIX  Take 1 tablet (75 mg total) by mouth once daily.     metoprolol tartrate 50 MG tablet  Commonly known as: LOPRESSOR  Take 1 tablet (50 mg total) by mouth 2 (two) times daily.              Indwelling Lines/Drains at time of discharge:   Lines/Drains/Airways       Drain  Duration                  Colostomy LLQ -- days         Colostomy 07/13/21 0201 Descending/sigmoid  days                    Time spent on the discharge of patient: 45 minutes         Ken Graf MD  Department of Hospital Medicine  [facility] - Intensive Care ([place])

## 2023-08-12 NOTE — PLAN OF CARE
Problem: Adult Inpatient Plan of Care  Goal: Plan of Care Review  Outcome: Ongoing, Progressing  Goal: Patient-Specific Goal (Individualized)  Outcome: Ongoing, Progressing  Goal: Optimal Comfort and Wellbeing  Outcome: Ongoing, Progressing     Problem: Oral Intake Inadequate (Acute Kidney Injury/Impairment)  Goal: Optimal Nutrition Intake  Outcome: Ongoing, Progressing     Problem: Infection (Pneumonia)  Goal: Resolution of Infection Signs and Symptoms  Outcome: Ongoing, Progressing     Problem: Respiratory Compromise (Pneumonia)  Goal: Effective Oxygenation and Ventilation  Outcome: Ongoing, Progressing     Problem: Impaired Wound Healing  Goal: Optimal Wound Healing  Outcome: Ongoing, Progressing

## 2023-08-12 NOTE — ASSESSMENT & PLAN NOTE
- Continue home citalopram    tenzinbin [id number]  n meddadv  Group rxcvsd  Id [id number]  [phone]    Called pt w/ dtr and states that rivastigmine patch is effective  PA initiated on CMM, key#[id number]   Awaiting determination

## 2023-08-12 NOTE — CONSULTS
[facility] ([place] [address])  Podiatry  Consult Note    Patient Name: Radha Sotelo  MRN: [id number]  Admission Date: 8/10/2023  Hospital Length of Stay: 0 days  Attending Physician: Harlan Aguilar MD  Primary Care Provider: Laurence, Primary Doctor     Inpatient consult to Podiatry  Consult performed by: Enedina Harrington DPM  Consult ordered by: Neris Grigsby DO  Reason for consult: R foot erythema and pain        Subjective:     History of Present Illness:  Radha Sotelo is a  77 yo F with a PMHx of HLD, HTN, PVS, CAD, COPD, alcoholism, and tobacco abuse who was admitted to the hospital for R foot pain.  The pain started 2 days ago.  Patient noticed redness on the distal part of her right foot.  She denies fevers, chills, nausea, vomiting.  Patient has a R TMA conducted 8 years ago.  TMA surgical site is healed .  Patient says she has a 60 year history of smoking.    X-ray of right foot show prior TMA, no other osseous deformities, mild nonspecific soft tissue edema noted at the resection.  CTA of right distal lower extremity shows occlusion of previous SFA stent.  MRI of right foot pending.    Podiatry was consulted for right foot pain with erythema noted at distal stump of TMA site.  Patient denies any recent trauma.  Denies any wounds or ulcers in the past.  Denies any recent infections or illnesses.      Scheduled Meds:   albuterol-ipratropium  3 mL Nebulization Q4H    aspirin  81 mg Oral Daily    atorvastatin  40 mg Oral Daily    [START ON 8/12/2023] cefTRIAXone (ROCEPHIN) IVPB  2 g Intravenous Q24H    citalopram  20 mg Oral Daily    enoxparin  40 mg Subcutaneous Daily    metoprolol tartrate  50 mg Oral BID    nicotine  1 patch Transdermal Daily    [START ON 8/12/2023] predniSONE  40 mg Oral Daily    [START ON 8/12/2023] vancomycin (VANCOCIN) IV (PEDS and ADULTS)  15 mg/kg Intravenous Q24H     Continuous Infusions:  PRN Meds:acetaminophen, dextrose 50%, dextrose 50%, glucagon (human  recombinant), glucose, glucose, melatonin, naloxone, ondansetron, sodium chloride 0.9%, Pharmacy to dose Vancomycin consult **AND** vancomycin - pharmacy to dose    Review of patient's allergies indicates:  No Known Allergies     Past Medical History:   Diagnosis Date    Anemia     Anxiety     COPD (chronic obstructive pulmonary disease)     COPD (chronic obstructive pulmonary disease) with emphysema     Coronary artery disease     Depression     Diverticulitis     Hyperlipidemia     Hypertension     PVD (peripheral vascular disease)     PVD (peripheral vascular disease)     S/P colostomy     Substance abuse     hx heavy etoh use     Tobacco abuse      Past Surgical History:   Procedure Laterality Date    COLOSTOMY      ECTOPIC PREGNANCY SURGERY      right forefoot amputation      right toe amputation      x2 toes       Family History    None       Tobacco Use    Smoking status: Former     Current packs/day: 0.00     Average packs/day: 0.5 packs/day for 50.0 years (25.0 ttl pk-yrs)     Types: Cigarettes     Start date: 7/5/1963     Quit date: 7/5/2013     Years since quitting: 10.1    Smokeless tobacco: Never   Substance and Sexual Activity    Alcohol use: No    Drug use: No    Sexual activity: Not on file     Review of Systems   Constitutional:  Positive for activity change and fatigue. Negative for chills and fever.   HENT:  Negative for congestion and facial swelling.    Eyes:  Negative for pain and visual disturbance.   Respiratory:  Positive for shortness of breath. Negative for chest tightness.    Cardiovascular:  Negative for chest pain and palpitations.   Gastrointestinal:  Negative for abdominal pain and diarrhea.   Endocrine: Negative for cold intolerance and polyuria.   Genitourinary:  Negative for dysuria and urgency.   Musculoskeletal:  Negative for back pain and neck pain.        Reports right TMA 8 years ago and pain   Skin:  Positive for color change and rash.        Reports erythema on dorsal right  TMA site that started 2 days ago   Allergic/Immunologic: Negative for environmental allergies and food allergies.   Neurological:  Negative for dizziness and weakness.   Hematological:  Negative for adenopathy. Does not bruise/bleed easily.   Psychiatric/Behavioral:  Negative for behavioral problems and sleep disturbance.      Objective:     Vital Signs (Most Recent):  Temp: 97.9 °F (36.6 °C) (08/11/23 2003)  Pulse: 89 (08/11/23 2003)  Resp: 18 (08/11/23 2003)  BP: (!) 120/56 (08/11/23 2003)  SpO2: 96 % (08/11/23 2003) Vital Signs (24h Range):  Temp:  [97.8 °F (36.6 °C)-99 °F (37.2 °C)] 97.9 °F (36.6 °C)  Pulse:  [] 89  Resp:  [12-34] 18  SpO2:  [94 %-100 %] 96 %  BP: ()/(54-82) 120/56     Weight: 65.3 kg (143 lb 15.4 oz)  Body mass index is 28.12 kg/m².    Foot Exam    General  Orientation: alert and oriented to person, place, and time       Right Foot/Ankle     Inspection and Palpation  Ecchymosis: none  Tenderness: bony tenderness (Tenderness to palpation on dorsal foot and also distal stump of TMA site)  Swelling: none   Skin Exam: skin changes, abnormal color and erythema; no drainage and no maceration (Erythema noted on dorsal midfoot and distal TMA stump.  No wounds or lesions noted.  Pre ulcerative lesion noted on plantar foot.)    Neurovascular  Dorsalis pedis: absent  Posterior tibial: absent    Comments  Right foot:  Previous TMA has healed.  Erythema noted on dorsal midfoot and distal TMA stump.  No wounds or lesions noted.  No edema noted.  No drainage or blisters.  Foot is tenderness to palpation.    Left Foot/Ankle      Inspection and Palpation  Ecchymosis: none  Tenderness: none   Swelling: none   Arch: normal  Hammertoes: absent  Claw toes: absent  Hallux valgus: no  Hallux limitus: no  Skin Exam: skin intact;     Neurovascular  Dorsalis pedis: doppler  Posterior tibial: doppler          Bedside images or R foot    Laboratory:  CBC:   Recent Labs   Lab 08/11/23  0519   WBC 13.36*   RBC  4.51   HGB 13.2   HCT 42.5      MCV 94   MCH 29.3   MCHC 31.1*     CMP:   Recent Labs   Lab 08/11/23  0519   *   CALCIUM 8.5*   ALBUMIN 3.5   PROT 6.9   *   K 4.2   CO2 20*      BUN 9   CREATININE 0.7   ALKPHOS 92   ALT 30   AST 22   BILITOT 0.2     Diagnostic Results:  I have reviewed all pertinent imaging results/findings within the past 24 hours.    Assessment/Plan:     ID  * Right foot infection  IDSA R foot concerning of soft tissue infection or OM. Xray of R foot shows mild nonspecific soft tissue edema at the resection stump, osseous structures demonstrate heterogeneous appearance which may relate to disuse osteopenias.CTA of right distal lower extremity shows occlusion of previous SFA stent. There are scattered vascular calcifications present TMA site has healed appropriately.  No wounds or lesions noted.    - MRI of right foot pending  - Vascular surgery consulted, recommendations appreciated  - Right foot cultures pending  - Continue antibiotics plan per ID  - Heel-off boots ordered.  Advised patient to always wear heel-off boots when laying in bed  - Pain management per primary team  - Podiatry will continue to follow      Thank you for your consult. I will follow-up with patient. Please contact us if you have any additional questions.    Enedina Harrington DPM  Podiatry  [facility] ([place]

## 2023-08-12 NOTE — NURSING
Home Oxygen Evaluation    Date Performed: 8/12/2023    1) Patient's Home O2 Sat on room air, while at rest: 82%        If O2 sats on room air at rest are 88% or below, patient qualifies. No additional testing needed. Document N/A in steps 2 and 3. If 89% or above, complete steps 2.      2) Patient's O2 Sat on room air while exercising: N/A        If O2 sats on room air while exercising remain 89% or above patient does not qualify, no further testing needed Document N/A in step 3. If O2 sats on room air while exercising are 88% or below, continue to step 3.      3) Patient's O2 Sat while exercising on O2: N/A at N/A LPM         (Must show improvement from #2 for patients to qualify)    If O2 sats improve on oxygen, patient qualifies for portable oxygen. If not, the patient does not qualify.

## 2023-08-12 NOTE — PLAN OF CARE
Problem: Adult Inpatient Plan of Care  Goal: Plan of Care Review  Outcome: Ongoing, Progressing  Goal: Patient-Specific Goal (Individualized)  Outcome: Ongoing, Progressing     Problem: Infection (Pneumonia)  Goal: Resolution of Infection Signs and Symptoms  Outcome: Ongoing, Progressing     Problem: Respiratory Compromise (Pneumonia)  Goal: Effective Oxygenation and Ventilation  Outcome: Ongoing, Progressing     Problem: Impaired Wound Healing  Goal: Optimal Wound Healing  Outcome: Ongoing, Progressing

## 2023-08-12 NOTE — SUBJECTIVE & OBJECTIVE
Scheduled Meds:   albuterol-ipratropium  3 mL Nebulization Q4H    aspirin  81 mg Oral Daily    atorvastatin  40 mg Oral Daily    [START ON 8/12/2023] cefTRIAXone (ROCEPHIN) IVPB  2 g Intravenous Q24H    citalopram  20 mg Oral Daily    enoxparin  40 mg Subcutaneous Daily    metoprolol tartrate  50 mg Oral BID    nicotine  1 patch Transdermal Daily    [START ON 8/12/2023] predniSONE  40 mg Oral Daily    [START ON 8/12/2023] vancomycin (VANCOCIN) IV (PEDS and ADULTS)  15 mg/kg Intravenous Q24H     Continuous Infusions:  PRN Meds:acetaminophen, dextrose 50%, dextrose 50%, glucagon (human recombinant), glucose, glucose, melatonin, naloxone, ondansetron, sodium chloride 0.9%, Pharmacy to dose Vancomycin consult **AND** vancomycin - pharmacy to dose    Review of patient's allergies indicates:  No Known Allergies     Past Medical History:   Diagnosis Date    Anemia     Anxiety     COPD (chronic obstructive pulmonary disease)     COPD (chronic obstructive pulmonary disease) with emphysema     Coronary artery disease     Depression     Diverticulitis     Hyperlipidemia     Hypertension     PVD (peripheral vascular disease)     PVD (peripheral vascular disease)     S/P colostomy     Substance abuse     hx heavy etoh use     Tobacco abuse      Past Surgical History:   Procedure Laterality Date    COLOSTOMY      ECTOPIC PREGNANCY SURGERY      right forefoot amputation      right toe amputation      x2 toes       Family History    None       Tobacco Use    Smoking status: Former     Current packs/day: 0.00     Average packs/day: 0.5 packs/day for 50.0 years (25.0 ttl pk-yrs)     Types: Cigarettes     Start date: 7/5/1963     Quit date: 7/5/2013     Years since quitting: 10.1    Smokeless tobacco: Never   Substance and Sexual Activity    Alcohol use: No    Drug use: No    Sexual activity: Not on file     Review of Systems   Constitutional:  Positive for activity change and fatigue. Negative for chills and fever.   HENT:  Negative  for congestion and facial swelling.    Eyes:  Negative for pain and visual disturbance.   Respiratory:  Positive for shortness of breath. Negative for chest tightness.    Cardiovascular:  Negative for chest pain and palpitations.   Gastrointestinal:  Negative for abdominal pain and diarrhea.   Endocrine: Negative for cold intolerance and polyuria.   Genitourinary:  Negative for dysuria and urgency.   Musculoskeletal:  Negative for back pain and neck pain.        Reports right TMA 8 years ago and pain   Skin:  Positive for color change and rash.        Reports erythema on dorsal right TMA site that started 2 days ago   Allergic/Immunologic: Negative for environmental allergies and food allergies.   Neurological:  Negative for dizziness and weakness.   Hematological:  Negative for adenopathy. Does not bruise/bleed easily.   Psychiatric/Behavioral:  Negative for behavioral problems and sleep disturbance.      Objective:     Vital Signs (Most Recent):  Temp: 97.9 °F (36.6 °C) (08/11/23 2003)  Pulse: 89 (08/11/23 2003)  Resp: 18 (08/11/23 2003)  BP: (!) 120/56 (08/11/23 2003)  SpO2: 96 % (08/11/23 2003) Vital Signs (24h Range):  Temp:  [97.8 °F (36.6 °C)-99 °F (37.2 °C)] 97.9 °F (36.6 °C)  Pulse:  [] 89  Resp:  [12-34] 18  SpO2:  [94 %-100 %] 96 %  BP: ()/(54-82) 120/56     Weight: 65.3 kg (143 lb 15.4 oz)  Body mass index is 28.12 kg/m².    Foot Exam    General  Orientation: alert and oriented to person, place, and time       Right Foot/Ankle     Inspection and Palpation  Ecchymosis: none  Tenderness: bony tenderness (Tenderness to palpation on dorsal foot and also distal stump of TMA site)  Swelling: none   Skin Exam: skin changes, abnormal color and erythema; no drainage and no maceration (Erythema noted on dorsal midfoot and distal TMA stump.  No wounds or lesions noted.  Pre ulcerative lesion noted on plantar foot.)    Neurovascular  Dorsalis pedis: absent  Posterior tibial: absent    Comments  Right  foot:  Previous TMA has healed.  Erythema noted on dorsal midfoot and distal TMA stump.  No wounds or lesions noted.  No edema noted.  No drainage or blisters.  Foot is tenderness to palpation.    Left Foot/Ankle      Inspection and Palpation  Ecchymosis: none  Tenderness: none   Swelling: none   Arch: normal  Hammertoes: absent  Claw toes: absent  Hallux valgus: no  Hallux limitus: no  Skin Exam: skin intact;     Neurovascular  Dorsalis pedis: doppler  Posterior tibial: doppler          Bedside images or R foot    Laboratory:  CBC:   Recent Labs   Lab 08/11/23  0519   WBC 13.36*   RBC 4.51   HGB 13.2   HCT 42.5      MCV 94   MCH 29.3   MCHC 31.1*     CMP:   Recent Labs   Lab 08/11/23  0519   *   CALCIUM 8.5*   ALBUMIN 3.5   PROT 6.9   *   K 4.2   CO2 20*      BUN 9   CREATININE 0.7   ALKPHOS 92   ALT 30   AST 22   BILITOT 0.2     Diagnostic Results:  I have reviewed all pertinent imaging results/findings within the past 24 hours.

## 2023-08-12 NOTE — PLAN OF CARE
[facility] ([place] [address])      HOME HEALTH ORDERS  FACE TO FACE ENCOUNTER    Patient Name: Radha Sotelo  YOB: 1945    PCP: Laurence, Primary Doctor   PCP Address: None  PCP Phone Number: None  PCP Fax: None    Encounter Date: 8/10/23    Admit to Home Health    Diagnoses:  Active Hospital Problems    Diagnosis  POA    *Right foot infection [L08.9]  Unknown    History of amputation of right foot [Z89.431]  Not Applicable    Chronic obstructive pulmonary disease [J44.9]  Unknown    HLD (hyperlipidemia) [E78.5]  Yes    Major depressive disorder [F32.9]  Yes    Essential hypertension [I10]  Yes    CAD (coronary artery disease) [I25.10]  Yes    PVD (peripheral vascular disease) [I73.9]  Yes      Resolved Hospital Problems   No resolved problems to display.       Follow Up Appointments:  No future appointments.    Allergies:Review of patient's allergies indicates:  No Known Allergies    Medications: Review discharge medications with patient and family and provide education.    Current Facility-Administered Medications   Medication Dose Route Frequency Provider Last Rate Last Admin    acetaminophen tablet 650 mg  650 mg Oral Q4H PRN Calista, Neris, DO   650 mg at 08/11/23 2251    albuterol-ipratropium 2.5 mg-0.5 mg/3 mL nebulizer solution 3 mL  3 mL Nebulization Q4H Harlan Aguilar MD   3 mL at 08/12/23 1131    aspirin chewable tablet 81 mg  81 mg Oral Daily Calista, Neris, DO   81 mg at 08/12/23 0807    atorvastatin tablet 40 mg  40 mg Oral Daily Calista, Neris, DO   40 mg at 08/12/23 0807    cefTRIAXone (ROCEPHIN) 2 g in dextrose 5 % in water (D5W) 100 mL IVPB (MB+)  2 g Intravenous Q24H Randolph Mistry MD   Stopped at 08/12/23 0755    citalopram tablet 20 mg  20 mg Oral Daily Calista, Neris, DO   20 mg at 08/12/23 0807    dextrose 50% injection 12.5 g  12.5 g Intravenous PRN Calista, Neris, DO        dextrose 50% injection 25 g  25 g Intravenous PRN Calista, Neris, DO        enoxaparin  injection 40 mg  40 mg Subcutaneous Daily Neris Grigsby, DO   40 mg at 08/11/23 1809    glucagon (human recombinant) injection 1 mg  1 mg Intramuscular PRN Neris Grigsby,         glucose chewable tablet 16 g  16 g Oral PRN Neris Grigsby,         glucose chewable tablet 24 g  24 g Oral PRN Neris Grigsby, DO        ibuprofen tablet 600 mg  600 mg Oral Q6H PRN Ken Graf MD   600 mg at 08/12/23 1031    melatonin tablet 6 mg  6 mg Oral Nightly PRN Neris Grigsby,         metoprolol tartrate (LOPRESSOR) tablet 50 mg  50 mg Oral BID Neris Grigsby, DO   50 mg at 08/12/23 0807    naloxone 0.4 mg/mL injection 0.02 mg  0.02 mg Intravenous PRN Neris Grigsby DO        nicotine 21 mg/24 hr 1 patch  1 patch Transdermal Daily Harlan Aguilar MD   1 patch at 08/12/23 0807    ondansetron disintegrating tablet 4 mg  4 mg Oral Q6H PRN Harlan Aguilar MD   4 mg at 08/12/23 0826    oxyCODONE immediate release tablet 5 mg  5 mg Oral Q8H PRN Harlan Aguilar MD   5 mg at 08/12/23 1304    predniSONE tablet 40 mg  40 mg Oral Daily Randolph Mistry MD   40 mg at 08/12/23 0807    sodium chloride 0.9% flush 10 mL  10 mL Intravenous Q12H PRN Neris Grigsby DO         Current Discharge Medication List        START taking these medications    Details   amoxicillin-clavulanate 875-125mg (AUGMENTIN) 875-125 mg per tablet Take 1 tablet by mouth 2 (two) times daily for 12 days  Qty: 24 tablet, Refills: 0      doxycycline (VIBRA-TABS) 100 MG tablet Take 1 tablet (100 mg total) by mouth every 12 (twelve) hours. for 12 days  Qty: 24 tablet, Refills: 0      nicotine (NICODERM CQ) 21 mg/24 hr Place 1 patch onto the skin once daily.  Qty: 28 patch, Refills: 11      ondansetron (ZOFRAN-ODT) 4 MG TbDL Dissolve 1 tablet (4 mg total) by mouth every 6 (six) hours as needed.  Qty: 30 tablet, Refills: 0      oxyCODONE (ROXICODONE) 5 MG immediate release tablet Take 1 tablet (5 mg total) by mouth every 8 (eight) hours as needed for  Pain.  Qty: 5 tablet, Refills: 0    Comments: Quantity prescribed more than 7 day supply? No      predniSONE (DELTASONE) 20 MG tablet Take 2 tablets (40 mg total) by mouth once daily for 2 days  Qty: 4 tablet, Refills: 0           CONTINUE these medications which have CHANGED    Details   atorvastatin (LIPITOR) 40 MG tablet Take 1 tablet (40 mg total) by mouth once daily.  Qty: 30 tablet, Refills: 5      citalopram (CELEXA) 20 MG tablet Take 1 tablet (20 mg total) by mouth once daily.  Qty: 90 tablet, Refills: 3    Comments: needs appointment for further refills      ibuprofen (ADVIL,MOTRIN) 600 MG tablet Take 1 tablet (600 mg total) by mouth every 6 (six) hours as needed (moderate pain and swelling).  Qty: 56 tablet, Refills: 0      metoprolol tartrate (LOPRESSOR) 50 MG tablet Take 1 tablet (50 mg total) by mouth 2 (two) times daily.  Qty: 180 tablet, Refills: 3    Comments: .           CONTINUE these medications which have NOT CHANGED    Details   albuterol (PROVENTIL/VENTOLIN HFA) 90 mcg/actuation inhaler INHALE 2 PUFFS BY MOUTH EVERY 6 HOURS AS NEEDED  Qty: 25.5 g, Refills: 3      albuterol-ipratropium (DUO-NEB) 2.5 mg-0.5 mg/3 mL nebulizer solution USE 1 VIAL VIA NEBULIZER EVERY 6 HOURS AS NEEDED FOR WHEEZING, SHORTNESS OF BREATH  Qty: 540 mL, Refills: 6      aspirin 81 MG Chew Take 1 tablet (81 mg total) by mouth once daily.      budesonide-formoterol 80-4.5 mcg (SYMBICORT) 80-4.5 mcg/actuation HFAA INHALE 2 PUFFS INTO THE LUNGS TWICE DAILY. RINSE MOUTH AFTER USE  Qty: 10.2 g, Refills: 6      hydrocortisone-aloe vera 1 % Crea cream Apply topically 2 (two) times daily.  Qty: 28 g, Refills: 0      clopidogrel (PLAVIX) 75 mg tablet Take 1 tablet (75 mg total) by mouth once daily.  Qty: 30 tablet, Refills: 11               I have seen and examined this patient within the last 30 days. My clinical findings that support the need for the home health skilled services and home bound status are the  following:no   Weakness/numbness causing balance and gait disturbance due to Infection, Weakness/Debility, and foot partial amputation making it taxing to leave home.  Requiring assistive device to leave home due to unsteady gait caused by  Weakness/Debility and partial foot amputation.     Diet:   cardiac diet    Labs:  SN to perform labs:  CBC: Biweekly; 1 month(s) and BMP: Weekly; 1 month(s) and Report Lab results to PCP.    Referrals/ Consults  Physical Therapy to evaluate and treat. Evaluate for home safety and equipment needs; Establish/upgrade home exercise program. Perform / instruct on therapeutic exercises, gait training, transfer training, and Range of Motion.  Occupational Therapy to evaluate and treat. Evaluate home environment for safety and equipment needs. Perform/Instruct on transfers, ADL training, ROM, and therapeutic exercises.   to evaluate for community resources/long-range planning.  Aide to provide assistance with personal care, ADLs, and vital signs.    Activities:   activity as tolerated    Nursing:   Agency to admit patient within 24 hours of hospital discharge unless specified on physician order or at patient request    SN to complete comprehensive assessment including routine vital signs. Instruct on disease process and s/s of complications to report to MD. Review/verify medication list sent home with the patient at time of discharge  and instruct patient/caregiver as needed. Frequency may be adjusted depending on start of care date.     Skilled nurse to perform up to 3 visits PRN for symptoms related to diagnosis    Notify MD if SBP > 160 or < 90; DBP > 90 or < 50; HR > 120 or < 50; Temp > 101; O2 < 88%;    Ok to schedule additional visits based on staff availability and patient request on consecutive days within the home health episode.    When multiple disciplines ordered:    Start of Care occurs on Sunday - Wednesday schedule remaining discipline evaluations as ordered  on separate consecutive days following the start of care.    Thursday SOC -schedule subsequent evaluations Friday and Monday the following week.     Friday - Saturday SOC - schedule subsequent discipline evaluations on consecutive days starting Monday of the following week.    For all post-discharge communication and subsequent orders please contact patient's primary care physician. If unable to reach primary care physician or do not receive response within 30 minutes, please contact [facility] RN for clinical staff order clarification    Miscellaneous   Home Oxygen:  Oxygen at 3 L/min nasal canula to be used:  Continuously.    Home Health Aide:  Nursing Three times weekly, Physical Therapy Three times weekly, Occupational Therapy Three times weekly, Medical Social Work Other: Weekly for 2 visits , Respiratory Therapy Three times weekly, and Home Health Aide Three times weekly    Wound Care Orders  no    I certify that this patient is confined to her home and needs intermittent skilled nursing care, physical therapy, and occupational therapy.

## 2023-08-12 NOTE — PROGRESS NOTES
VANCOMYCIN DOSING BY PHARMACY DISCONTINUATION NOTE    Radha Sotelo is a 78 y.o. female who had been consulted for vancomycin dosing.    The pharmacy consult for vancomycin dosing has been discontinued.     Vancomycin Dosing by Pharmacy Consult will sign-off. Please reconsult if necessary. Thank you for allowing us to participate in this patient's care.       Haleigh Reis, PharmD  [id number]

## 2023-08-12 NOTE — PLAN OF CARE
Met with pt to review discharge recommendation of HH and is agreeable to plan    Provided list of facilities in-network with patient's payor plan. Notified that referral sent to below listed facilities from in-network list based on proximity to home/family support:   [facility]  2. [facility]  3. [facility]  4. [facility]  5.     Pt instructed to identify preference.    Preferred Facility: (if more than 1, listed in order of descending preference)  1.Pt has no HH preferences     If an additional preferred facility not listed above is identified, additional referral to be sent. If above facilities unable to accept, will send additional referrals to in-network providers.      Referral sent to multiple agencies as well as pt's insurer, [employer]. Awaiting acceptance.     TIGRE Nicholson, LCSW  [facility]  [facility] [phone]

## 2023-08-15 DIAGNOSIS — I73.9 PVD (PERIPHERAL VASCULAR DISEASE): Primary | ICD-10-CM

## 2023-08-16 LAB
BACTERIA BLD CULT: NORMAL
BACTERIA BLD CULT: NORMAL

## 2023-08-18 ENCOUNTER — HOSPITAL ENCOUNTER (INPATIENT)
Facility: HOSPITAL | Age: 78
LOS: 10 days | Discharge: HOME HEALTH | DRG: 464 | End: 2023-08-29
Attending: STUDENT IN AN ORGANIZED HEALTH CARE EDUCATION/TRAINING PROGRAM | Admitting: HOSPITALIST
Payer: MEDICARE

## 2023-08-18 ENCOUNTER — TELEPHONE (OUTPATIENT)
Dept: VASCULAR SURGERY | Facility: CLINIC | Age: 78
End: 2023-08-18
Payer: MEDICARE

## 2023-08-18 DIAGNOSIS — I73.9 PAD (PERIPHERAL ARTERY DISEASE): Primary | ICD-10-CM

## 2023-08-18 DIAGNOSIS — L02.611 ABSCESS OF FOOT WITHOUT TOES, RIGHT: ICD-10-CM

## 2023-08-18 DIAGNOSIS — Z98.890 S/P ANGIOGRAM OF EXTREMITY: ICD-10-CM

## 2023-08-18 DIAGNOSIS — L08.9 SOFT TISSUE INFECTION OF FOOT: ICD-10-CM

## 2023-08-18 DIAGNOSIS — L03.115 CELLULITIS OF RIGHT LOWER EXTREMITY: ICD-10-CM

## 2023-08-18 DIAGNOSIS — L03.119 CELLULITIS AND ABSCESS OF FOOT: ICD-10-CM

## 2023-08-18 DIAGNOSIS — L97.512 FOOT ULCER, RIGHT, WITH FAT LAYER EXPOSED: ICD-10-CM

## 2023-08-18 DIAGNOSIS — I70.235 ATHEROSCLEROSIS OF NATIVE ARTERIES OF RIGHT LEG WITH ULCERATION OF OTHER PART OF FOOT: ICD-10-CM

## 2023-08-18 DIAGNOSIS — I73.9 PVD (PERIPHERAL VASCULAR DISEASE): ICD-10-CM

## 2023-08-18 DIAGNOSIS — L02.619 CELLULITIS AND ABSCESS OF FOOT: ICD-10-CM

## 2023-08-18 DIAGNOSIS — E87.6 HYPOKALEMIA: ICD-10-CM

## 2023-08-18 DIAGNOSIS — R52 PAIN: ICD-10-CM

## 2023-08-18 DIAGNOSIS — L03.90 CELLULITIS: ICD-10-CM

## 2023-08-18 LAB
ALBUMIN SERPL BCP-MCNC: 3.2 G/DL (ref 3.5–5.2)
ALP SERPL-CCNC: 74 U/L (ref 55–135)
ALT SERPL W/O P-5'-P-CCNC: 19 U/L (ref 10–44)
ANION GAP SERPL CALC-SCNC: 9 MMOL/L (ref 8–16)
AST SERPL-CCNC: 17 U/L (ref 10–40)
BASOPHILS # BLD AUTO: 0.04 K/UL (ref 0–0.2)
BASOPHILS NFR BLD: 0.5 % (ref 0–1.9)
BILIRUB SERPL-MCNC: 0.6 MG/DL (ref 0.1–1)
BUN SERPL-MCNC: 14 MG/DL (ref 8–23)
CALCIUM SERPL-MCNC: 8.4 MG/DL (ref 8.7–10.5)
CHLORIDE SERPL-SCNC: 100 MMOL/L (ref 95–110)
CO2 SERPL-SCNC: 27 MMOL/L (ref 23–29)
CREAT SERPL-MCNC: 0.8 MG/DL (ref 0.5–1.4)
CRP SERPL-MCNC: 33.7 MG/L (ref 0–8.2)
DIFFERENTIAL METHOD: ABNORMAL
EOSINOPHIL # BLD AUTO: 0.4 K/UL (ref 0–0.5)
EOSINOPHIL NFR BLD: 4.8 % (ref 0–8)
ERYTHROCYTE [DISTWIDTH] IN BLOOD BY AUTOMATED COUNT: 14.6 % (ref 11.5–14.5)
ERYTHROCYTE [SEDIMENTATION RATE] IN BLOOD BY PHOTOMETRIC METHOD: 27 MM/HR (ref 0–36)
EST. GFR  (NO RACE VARIABLE): >60 ML/MIN/1.73 M^2
GLUCOSE SERPL-MCNC: 94 MG/DL (ref 70–110)
HCT VFR BLD AUTO: 38.3 % (ref 37–48.5)
HGB BLD-MCNC: 12.2 G/DL (ref 12–16)
IMM GRANULOCYTES # BLD AUTO: 0.06 K/UL (ref 0–0.04)
IMM GRANULOCYTES NFR BLD AUTO: 0.7 % (ref 0–0.5)
LYMPHOCYTES # BLD AUTO: 0.9 K/UL (ref 1–4.8)
LYMPHOCYTES NFR BLD: 10.7 % (ref 18–48)
MCH RBC QN AUTO: 29 PG (ref 27–31)
MCHC RBC AUTO-ENTMCNC: 31.9 G/DL (ref 32–36)
MCV RBC AUTO: 91 FL (ref 82–98)
MONOCYTES # BLD AUTO: 0.8 K/UL (ref 0.3–1)
MONOCYTES NFR BLD: 8.8 % (ref 4–15)
NEUTROPHILS # BLD AUTO: 6.4 K/UL (ref 1.8–7.7)
NEUTROPHILS NFR BLD: 74.5 % (ref 38–73)
NRBC BLD-RTO: 0 /100 WBC
PLATELET # BLD AUTO: 244 K/UL (ref 150–450)
PMV BLD AUTO: 9.8 FL (ref 9.2–12.9)
POTASSIUM SERPL-SCNC: 3.2 MMOL/L (ref 3.5–5.1)
PROT SERPL-MCNC: 6 G/DL (ref 6–8.4)
RBC # BLD AUTO: 4.21 M/UL (ref 4–5.4)
SODIUM SERPL-SCNC: 136 MMOL/L (ref 136–145)
WBC # BLD AUTO: 8.57 K/UL (ref 3.9–12.7)

## 2023-08-18 PROCEDURE — 87040 BLOOD CULTURE FOR BACTERIA: CPT | Mod: 59

## 2023-08-18 PROCEDURE — 99223 1ST HOSP IP/OBS HIGH 75: CPT | Mod: ,,, | Performed by: STUDENT IN AN ORGANIZED HEALTH CARE EDUCATION/TRAINING PROGRAM

## 2023-08-18 PROCEDURE — 63600175 PHARM REV CODE 636 W HCPCS

## 2023-08-18 PROCEDURE — 99285 EMERGENCY DEPT VISIT HI MDM: CPT | Mod: 25

## 2023-08-18 PROCEDURE — 80053 COMPREHEN METABOLIC PANEL: CPT

## 2023-08-18 PROCEDURE — 99223 PR INITIAL HOSPITAL CARE,LEVL III: ICD-10-PCS | Mod: ,,, | Performed by: STUDENT IN AN ORGANIZED HEALTH CARE EDUCATION/TRAINING PROGRAM

## 2023-08-18 PROCEDURE — G0378 HOSPITAL OBSERVATION PER HR: HCPCS

## 2023-08-18 PROCEDURE — 25000003 PHARM REV CODE 250

## 2023-08-18 PROCEDURE — 85025 COMPLETE CBC W/AUTO DIFF WBC: CPT

## 2023-08-18 PROCEDURE — 96365 THER/PROPH/DIAG IV INF INIT: CPT

## 2023-08-18 PROCEDURE — 86140 C-REACTIVE PROTEIN: CPT

## 2023-08-18 PROCEDURE — 85652 RBC SED RATE AUTOMATED: CPT

## 2023-08-18 RX ORDER — HYDROCODONE BITARTRATE AND ACETAMINOPHEN 5; 325 MG/1; MG/1
1 TABLET ORAL
Status: COMPLETED | OUTPATIENT
Start: 2023-08-18 | End: 2023-08-18

## 2023-08-18 RX ADMIN — VANCOMYCIN HYDROCHLORIDE 1250 MG: 1.25 INJECTION, POWDER, LYOPHILIZED, FOR SOLUTION INTRAVENOUS at 09:08

## 2023-08-18 RX ADMIN — HYDROCODONE BITARTRATE AND ACETAMINOPHEN 1 TABLET: 5; 325 TABLET ORAL at 10:08

## 2023-08-18 RX ADMIN — POTASSIUM BICARBONATE 25 MEQ: 978 TABLET, EFFERVESCENT ORAL at 10:08

## 2023-08-18 NOTE — TELEPHONE ENCOUNTER
Spoke with Didi to ascertain the reason for her request.Informed her of the pt's last visit with Dr Saldaña and questioned which provider ordered the Home Health and she should contact that provider.Didi verbalized that she will contact another provider.  ----- Message from Glenda Nielson sent at 8/18/2023  8:19 AM CDT -----  Regarding: orders  Contact: [phone]  Didi alfonso/ [facility] calling want to know if you can sign home health order for this pt.  Fax # [phone]

## 2023-08-19 LAB
ALBUMIN SERPL BCP-MCNC: 3 G/DL (ref 3.5–5.2)
ALP SERPL-CCNC: 75 U/L (ref 55–135)
ALT SERPL W/O P-5'-P-CCNC: 22 U/L (ref 10–44)
ANION GAP SERPL CALC-SCNC: 10 MMOL/L (ref 8–16)
AST SERPL-CCNC: 22 U/L (ref 10–40)
BASOPHILS # BLD AUTO: 0.05 K/UL (ref 0–0.2)
BASOPHILS NFR BLD: 0.5 % (ref 0–1.9)
BILIRUB SERPL-MCNC: 0.4 MG/DL (ref 0.1–1)
BUN SERPL-MCNC: 12 MG/DL (ref 8–23)
CALCIUM SERPL-MCNC: 8.6 MG/DL (ref 8.7–10.5)
CHLORIDE SERPL-SCNC: 101 MMOL/L (ref 95–110)
CO2 SERPL-SCNC: 27 MMOL/L (ref 23–29)
CREAT SERPL-MCNC: 0.7 MG/DL (ref 0.5–1.4)
DIFFERENTIAL METHOD: ABNORMAL
EOSINOPHIL # BLD AUTO: 0.5 K/UL (ref 0–0.5)
EOSINOPHIL NFR BLD: 4.6 % (ref 0–8)
ERYTHROCYTE [DISTWIDTH] IN BLOOD BY AUTOMATED COUNT: 14.6 % (ref 11.5–14.5)
EST. GFR  (NO RACE VARIABLE): >60 ML/MIN/1.73 M^2
ESTIMATED AVG GLUCOSE: 114 MG/DL (ref 68–131)
GLUCOSE SERPL-MCNC: 84 MG/DL (ref 70–110)
HBA1C MFR BLD: 5.6 % (ref 4–5.6)
HCT VFR BLD AUTO: 41.2 % (ref 37–48.5)
HGB BLD-MCNC: 12.9 G/DL (ref 12–16)
IMM GRANULOCYTES # BLD AUTO: 0.06 K/UL (ref 0–0.04)
IMM GRANULOCYTES NFR BLD AUTO: 0.6 % (ref 0–0.5)
LYMPHOCYTES # BLD AUTO: 0.7 K/UL (ref 1–4.8)
LYMPHOCYTES NFR BLD: 7 % (ref 18–48)
MAGNESIUM SERPL-MCNC: 1.7 MG/DL (ref 1.6–2.6)
MCH RBC QN AUTO: 29.3 PG (ref 27–31)
MCHC RBC AUTO-ENTMCNC: 31.3 G/DL (ref 32–36)
MCV RBC AUTO: 94 FL (ref 82–98)
MONOCYTES # BLD AUTO: 1 K/UL (ref 0.3–1)
MONOCYTES NFR BLD: 9.8 % (ref 4–15)
NEUTROPHILS # BLD AUTO: 8.2 K/UL (ref 1.8–7.7)
NEUTROPHILS NFR BLD: 77.5 % (ref 38–73)
NRBC BLD-RTO: 0 /100 WBC
PHOSPHATE SERPL-MCNC: 3.1 MG/DL (ref 2.7–4.5)
PLATELET # BLD AUTO: 234 K/UL (ref 150–450)
PMV BLD AUTO: 9.5 FL (ref 9.2–12.9)
POTASSIUM SERPL-SCNC: 4.4 MMOL/L (ref 3.5–5.1)
PROT SERPL-MCNC: 6.1 G/DL (ref 6–8.4)
RBC # BLD AUTO: 4.4 M/UL (ref 4–5.4)
SODIUM SERPL-SCNC: 138 MMOL/L (ref 136–145)
WBC # BLD AUTO: 10.52 K/UL (ref 3.9–12.7)

## 2023-08-19 PROCEDURE — 80053 COMPREHEN METABOLIC PANEL: CPT | Performed by: STUDENT IN AN ORGANIZED HEALTH CARE EDUCATION/TRAINING PROGRAM

## 2023-08-19 PROCEDURE — 83735 ASSAY OF MAGNESIUM: CPT | Performed by: STUDENT IN AN ORGANIZED HEALTH CARE EDUCATION/TRAINING PROGRAM

## 2023-08-19 PROCEDURE — 25000003 PHARM REV CODE 250: Performed by: STUDENT IN AN ORGANIZED HEALTH CARE EDUCATION/TRAINING PROGRAM

## 2023-08-19 PROCEDURE — 63600175 PHARM REV CODE 636 W HCPCS: Performed by: STUDENT IN AN ORGANIZED HEALTH CARE EDUCATION/TRAINING PROGRAM

## 2023-08-19 PROCEDURE — 83036 HEMOGLOBIN GLYCOSYLATED A1C: CPT | Performed by: STUDENT IN AN ORGANIZED HEALTH CARE EDUCATION/TRAINING PROGRAM

## 2023-08-19 PROCEDURE — 85025 COMPLETE CBC W/AUTO DIFF WBC: CPT | Performed by: STUDENT IN AN ORGANIZED HEALTH CARE EDUCATION/TRAINING PROGRAM

## 2023-08-19 PROCEDURE — 94640 AIRWAY INHALATION TREATMENT: CPT

## 2023-08-19 PROCEDURE — 27000221 HC OXYGEN, UP TO 24 HOURS

## 2023-08-19 PROCEDURE — 99900035 HC TECH TIME PER 15 MIN (STAT)

## 2023-08-19 PROCEDURE — 84100 ASSAY OF PHOSPHORUS: CPT | Performed by: STUDENT IN AN ORGANIZED HEALTH CARE EDUCATION/TRAINING PROGRAM

## 2023-08-19 PROCEDURE — 25000242 PHARM REV CODE 250 ALT 637 W/ HCPCS: Performed by: STUDENT IN AN ORGANIZED HEALTH CARE EDUCATION/TRAINING PROGRAM

## 2023-08-19 PROCEDURE — 21400001 HC TELEMETRY ROOM

## 2023-08-19 PROCEDURE — 94761 N-INVAS EAR/PLS OXIMETRY MLT: CPT

## 2023-08-19 PROCEDURE — 25000003 PHARM REV CODE 250: Performed by: INTERNAL MEDICINE

## 2023-08-19 PROCEDURE — 36415 COLL VENOUS BLD VENIPUNCTURE: CPT | Performed by: STUDENT IN AN ORGANIZED HEALTH CARE EDUCATION/TRAINING PROGRAM

## 2023-08-19 RX ORDER — OXYCODONE HYDROCHLORIDE 5 MG/1
5 TABLET ORAL EVERY 8 HOURS PRN
Status: DISCONTINUED | OUTPATIENT
Start: 2023-08-19 | End: 2023-08-19

## 2023-08-19 RX ORDER — CITALOPRAM 20 MG/1
20 TABLET, FILM COATED ORAL DAILY
Status: DISCONTINUED | OUTPATIENT
Start: 2023-08-19 | End: 2023-08-24

## 2023-08-19 RX ORDER — IBUPROFEN 200 MG
24 TABLET ORAL
Status: DISCONTINUED | OUTPATIENT
Start: 2023-08-19 | End: 2023-08-29 | Stop reason: HOSPADM

## 2023-08-19 RX ORDER — IBUPROFEN 200 MG
1 TABLET ORAL DAILY
Status: DISCONTINUED | OUTPATIENT
Start: 2023-08-19 | End: 2023-08-19

## 2023-08-19 RX ORDER — ACETAMINOPHEN 500 MG
1000 TABLET ORAL EVERY 8 HOURS
Status: DISCONTINUED | OUTPATIENT
Start: 2023-08-19 | End: 2023-08-29 | Stop reason: HOSPADM

## 2023-08-19 RX ORDER — NALOXONE HCL 0.4 MG/ML
0.02 VIAL (ML) INJECTION
Status: DISCONTINUED | OUTPATIENT
Start: 2023-08-19 | End: 2023-08-29 | Stop reason: HOSPADM

## 2023-08-19 RX ORDER — IBUPROFEN 200 MG
16 TABLET ORAL
Status: DISCONTINUED | OUTPATIENT
Start: 2023-08-19 | End: 2023-08-29 | Stop reason: HOSPADM

## 2023-08-19 RX ORDER — ENOXAPARIN SODIUM 100 MG/ML
40 INJECTION SUBCUTANEOUS EVERY 24 HOURS
Status: DISCONTINUED | OUTPATIENT
Start: 2023-08-19 | End: 2023-08-29 | Stop reason: HOSPADM

## 2023-08-19 RX ORDER — CILOSTAZOL 50 MG/1
50 TABLET ORAL 2 TIMES DAILY
Status: DISCONTINUED | OUTPATIENT
Start: 2023-08-19 | End: 2023-08-29 | Stop reason: HOSPADM

## 2023-08-19 RX ORDER — PREDNISONE 20 MG/1
40 TABLET ORAL DAILY
Status: DISCONTINUED | OUTPATIENT
Start: 2023-08-19 | End: 2023-08-19

## 2023-08-19 RX ORDER — KETOROLAC TROMETHAMINE 15 MG/ML
15 INJECTION, SOLUTION INTRAMUSCULAR; INTRAVENOUS EVERY 6 HOURS PRN
Status: ACTIVE | OUTPATIENT
Start: 2023-08-19 | End: 2023-08-22

## 2023-08-19 RX ORDER — SODIUM CHLORIDE 0.9 % (FLUSH) 0.9 %
10 SYRINGE (ML) INJECTION EVERY 12 HOURS PRN
Status: DISCONTINUED | OUTPATIENT
Start: 2023-08-19 | End: 2023-08-29 | Stop reason: HOSPADM

## 2023-08-19 RX ORDER — OXYCODONE HYDROCHLORIDE 10 MG/1
10 TABLET ORAL EVERY 6 HOURS PRN
Status: DISCONTINUED | OUTPATIENT
Start: 2023-08-19 | End: 2023-08-29 | Stop reason: HOSPADM

## 2023-08-19 RX ORDER — IPRATROPIUM BROMIDE AND ALBUTEROL SULFATE 2.5; .5 MG/3ML; MG/3ML
3 SOLUTION RESPIRATORY (INHALATION) EVERY 4 HOURS
Status: DISCONTINUED | OUTPATIENT
Start: 2023-08-19 | End: 2023-08-19

## 2023-08-19 RX ORDER — ALBUTEROL SULFATE 90 UG/1
2 AEROSOL, METERED RESPIRATORY (INHALATION) EVERY 4 HOURS PRN
Status: DISCONTINUED | OUTPATIENT
Start: 2023-08-19 | End: 2023-08-22

## 2023-08-19 RX ORDER — GLUCAGON 1 MG
1 KIT INJECTION
Status: DISCONTINUED | OUTPATIENT
Start: 2023-08-19 | End: 2023-08-29 | Stop reason: HOSPADM

## 2023-08-19 RX ORDER — ATORVASTATIN CALCIUM 40 MG/1
40 TABLET, FILM COATED ORAL DAILY
Status: DISCONTINUED | OUTPATIENT
Start: 2023-08-19 | End: 2023-08-29 | Stop reason: HOSPADM

## 2023-08-19 RX ORDER — OXYCODONE HYDROCHLORIDE 5 MG/1
5 TABLET ORAL EVERY 6 HOURS PRN
Status: DISCONTINUED | OUTPATIENT
Start: 2023-08-19 | End: 2023-08-29 | Stop reason: HOSPADM

## 2023-08-19 RX ORDER — IPRATROPIUM BROMIDE 0.5 MG/2.5ML
0.5 SOLUTION RESPIRATORY (INHALATION) EVERY 12 HOURS
Status: DISCONTINUED | OUTPATIENT
Start: 2023-08-19 | End: 2023-08-19

## 2023-08-19 RX ORDER — NAPROXEN SODIUM 220 MG/1
81 TABLET, FILM COATED ORAL DAILY
Status: DISCONTINUED | OUTPATIENT
Start: 2023-08-19 | End: 2023-08-29 | Stop reason: HOSPADM

## 2023-08-19 RX ORDER — ACETAMINOPHEN 325 MG/1
650 TABLET ORAL EVERY 4 HOURS PRN
Status: DISCONTINUED | OUTPATIENT
Start: 2023-08-19 | End: 2023-08-19

## 2023-08-19 RX ORDER — ONDANSETRON 4 MG/1
4 TABLET, ORALLY DISINTEGRATING ORAL EVERY 6 HOURS PRN
Status: DISCONTINUED | OUTPATIENT
Start: 2023-08-19 | End: 2023-08-29 | Stop reason: HOSPADM

## 2023-08-19 RX ORDER — ALBUTEROL SULFATE 90 UG/1
2 AEROSOL, METERED RESPIRATORY (INHALATION) 4 TIMES DAILY
Status: DISCONTINUED | OUTPATIENT
Start: 2023-08-19 | End: 2023-08-19

## 2023-08-19 RX ORDER — TALC
6 POWDER (GRAM) TOPICAL NIGHTLY PRN
Status: DISCONTINUED | OUTPATIENT
Start: 2023-08-19 | End: 2023-08-29 | Stop reason: HOSPADM

## 2023-08-19 RX ORDER — LEVALBUTEROL 1.25 MG/.5ML
1.25 SOLUTION, CONCENTRATE RESPIRATORY (INHALATION) EVERY 8 HOURS
Status: DISCONTINUED | OUTPATIENT
Start: 2023-08-19 | End: 2023-08-19

## 2023-08-19 RX ORDER — IPRATROPIUM BROMIDE 0.5 MG/2.5ML
0.5 SOLUTION RESPIRATORY (INHALATION) EVERY 8 HOURS
Status: DISCONTINUED | OUTPATIENT
Start: 2023-08-20 | End: 2023-08-22

## 2023-08-19 RX ORDER — IBUPROFEN 200 MG
400 TABLET ORAL EVERY 4 HOURS PRN
Status: DISCONTINUED | OUTPATIENT
Start: 2023-08-19 | End: 2023-08-19

## 2023-08-19 RX ORDER — HYDROCODONE BITARTRATE AND ACETAMINOPHEN 10; 325 MG/1; MG/1
1 TABLET ORAL ONCE
Status: COMPLETED | OUTPATIENT
Start: 2023-08-19 | End: 2023-08-19

## 2023-08-19 RX ORDER — METOPROLOL TARTRATE 50 MG/1
50 TABLET ORAL 2 TIMES DAILY
Status: DISCONTINUED | OUTPATIENT
Start: 2023-08-19 | End: 2023-08-29 | Stop reason: HOSPADM

## 2023-08-19 RX ORDER — FLUTICASONE FUROATE AND VILANTEROL 200; 25 UG/1; UG/1
1 POWDER RESPIRATORY (INHALATION) DAILY
Status: DISCONTINUED | OUTPATIENT
Start: 2023-08-19 | End: 2023-08-29 | Stop reason: HOSPADM

## 2023-08-19 RX ORDER — LEVALBUTEROL 1.25 MG/.5ML
1.25 SOLUTION, CONCENTRATE RESPIRATORY (INHALATION) EVERY 8 HOURS
Status: DISCONTINUED | OUTPATIENT
Start: 2023-08-20 | End: 2023-08-22

## 2023-08-19 RX ADMIN — ACETAMINOPHEN 1000 MG: 500 TABLET ORAL at 03:08

## 2023-08-19 RX ADMIN — METOPROLOL TARTRATE 50 MG: 50 TABLET, FILM COATED ORAL at 09:08

## 2023-08-19 RX ADMIN — ASPIRIN 81 MG 81 MG: 81 TABLET ORAL at 10:08

## 2023-08-19 RX ADMIN — IPRATROPIUM BROMIDE 0.5 MG: 0.5 SOLUTION RESPIRATORY (INHALATION) at 06:08

## 2023-08-19 RX ADMIN — LEVALBUTEROL 1.25 MG: 1.25 SOLUTION, CONCENTRATE RESPIRATORY (INHALATION) at 06:08

## 2023-08-19 RX ADMIN — OXYCODONE HYDROCHLORIDE 10 MG: 10 TABLET ORAL at 06:08

## 2023-08-19 RX ADMIN — LEVALBUTEROL 1.25 MG: 1.25 SOLUTION, CONCENTRATE RESPIRATORY (INHALATION) at 05:08

## 2023-08-19 RX ADMIN — CILOSTAZOL 50 MG: 50 TABLET ORAL at 10:08

## 2023-08-19 RX ADMIN — ENOXAPARIN SODIUM 40 MG: 40 INJECTION SUBCUTANEOUS at 06:08

## 2023-08-19 RX ADMIN — METOPROLOL TARTRATE 50 MG: 50 TABLET, FILM COATED ORAL at 01:08

## 2023-08-19 RX ADMIN — CILOSTAZOL 50 MG: 50 TABLET ORAL at 09:08

## 2023-08-19 RX ADMIN — ACETAMINOPHEN 1000 MG: 500 TABLET ORAL at 09:08

## 2023-08-19 RX ADMIN — CEFTRIAXONE 2 G: 2 INJECTION, POWDER, FOR SOLUTION INTRAMUSCULAR; INTRAVENOUS at 01:08

## 2023-08-19 RX ADMIN — CITALOPRAM HYDROBROMIDE 20 MG: 20 TABLET ORAL at 10:08

## 2023-08-19 RX ADMIN — VANCOMYCIN HYDROCHLORIDE 1000 MG: 1 INJECTION, POWDER, LYOPHILIZED, FOR SOLUTION INTRAVENOUS at 09:08

## 2023-08-19 RX ADMIN — HYDROCODONE BITARTRATE AND ACETAMINOPHEN 1 TABLET: 10; 325 TABLET ORAL at 11:08

## 2023-08-19 RX ADMIN — ATORVASTATIN CALCIUM 40 MG: 40 TABLET, FILM COATED ORAL at 10:08

## 2023-08-19 RX ADMIN — METOPROLOL TARTRATE 50 MG: 50 TABLET, FILM COATED ORAL at 10:08

## 2023-08-19 NOTE — SUBJECTIVE & OBJECTIVE
Past Medical History:   Diagnosis Date    Anemia     Anxiety     COPD (chronic obstructive pulmonary disease)     COPD (chronic obstructive pulmonary disease) with emphysema     Coronary artery disease     Depression     Diverticulitis     Hyperlipidemia     Hypertension     PVD (peripheral vascular disease)     PVD (peripheral vascular disease)     S/P colostomy     Substance abuse     hx heavy etoh use     Tobacco abuse        Past Surgical History:   Procedure Laterality Date    COLOSTOMY      ECTOPIC PREGNANCY SURGERY      right forefoot amputation      right toe amputation      x2 toes       Review of patient's allergies indicates:  No Known Allergies    No current facility-administered medications on file prior to encounter.     Current Outpatient Medications on File Prior to Encounter   Medication Sig    albuterol (PROVENTIL/VENTOLIN HFA) 90 mcg/actuation inhaler INHALE 2 PUFFS BY MOUTH EVERY 6 HOURS AS NEEDED    albuterol-ipratropium (DUO-NEB) 2.5 mg-0.5 mg/3 mL nebulizer solution USE 1 VIAL VIA NEBULIZER EVERY 6 HOURS AS NEEDED FOR WHEEZING, SHORTNESS OF BREATH    amoxicillin-clavulanate 875-125mg (AUGMENTIN) 875-125 mg per tablet Take 1 tablet by mouth 2 (two) times daily for 12 days    aspirin 81 MG Chew Take 1 tablet (81 mg total) by mouth once daily.    atorvastatin (LIPITOR) 40 MG tablet Take 1 tablet (40 mg total) by mouth once daily.    budesonide-formoterol 80-4.5 mcg (SYMBICORT) 80-4.5 mcg/actuation HFAA INHALE 2 PUFFS INTO THE LUNGS TWICE DAILY. RINSE MOUTH AFTER USE    cilostazoL (PLETAL) 100 MG Tab Take 1 tablet (100 mg total) by mouth 2 (two) times daily.    citalopram (CELEXA) 20 MG tablet Take 1 tablet (20 mg total) by mouth once daily.    clopidogrel (PLAVIX) 75 mg tablet Take 1 tablet (75 mg total) by mouth once daily. (Patient not taking: Reported on 8/11/2023)    doxycycline (VIBRA-TABS) 100 MG tablet Take 1 tablet (100 mg total) by mouth every 12 (twelve) hours. for 12 days     hydrocortisone-aloe vera 1 % Crea cream Apply topically 2 (two) times daily.    ibuprofen (ADVIL,MOTRIN) 600 MG tablet Take 1 tablet (600 mg total) by mouth every 6 (six) hours as needed (moderate pain and swelling).    metoprolol tartrate (LOPRESSOR) 50 MG tablet Take 1 tablet (50 mg total) by mouth 2 (two) times daily.    nicotine (NICODERM CQ) 21 mg/24 hr Place 1 patch onto the skin once daily.    ondansetron (ZOFRAN-ODT) 4 MG TbDL Dissolve 1 tablet (4 mg total) by mouth every 6 (six) hours as needed.     Family History    None       Tobacco Use    Smoking status: Former     Current packs/day: 0.00     Average packs/day: 0.5 packs/day for 50.0 years (25.0 ttl pk-yrs)     Types: Cigarettes     Start date: 7/5/1963     Quit date: 7/5/2013     Years since quitting: 10.1    Smokeless tobacco: Never   Substance and Sexual Activity    Alcohol use: No    Drug use: No    Sexual activity: Not on file     Review of Systems   Constitutional:  Negative for chills and fever.   HENT:  Negative for congestion and sore throat.    Eyes:  Negative for visual disturbance.   Respiratory:  Negative for chest tightness and shortness of breath.    Cardiovascular:  Negative for chest pain and palpitations.   Gastrointestinal:  Negative for abdominal distention, abdominal pain, constipation, diarrhea, nausea and vomiting.   Musculoskeletal:  Positive for joint swelling. Negative for back pain.   Skin:  Positive for color change, rash and wound.   Neurological:  Negative for dizziness, weakness and headaches.   Hematological:  Does not bruise/bleed easily.     Objective:     Vital Signs (Most Recent):  Temp: 98.3 °F (36.8 °C) (08/19/23 0118)  Pulse: 89 (08/19/23 0118)  Resp: (!) 24 (08/19/23 0119)  BP: 138/82 (08/19/23 0119)  SpO2: 98 % (08/19/23 0119) Vital Signs (24h Range):  Temp:  [98 °F (36.7 °C)-99.8 °F (37.7 °C)] 98.3 °F (36.8 °C)  Pulse:  [] 89  Resp:  [18-24] 24  SpO2:  [91 %-98 %] 98 %  BP: (119-140)/(56-82) 138/82      Weight: 64.8 kg (142 lb 13.7 oz)  Body mass index is 27.9 kg/m².     Physical Exam  Vitals and nursing note reviewed.   Constitutional:       General: She is not in acute distress.     Appearance: Normal appearance. She is obese. She is not ill-appearing or toxic-appearing.   HENT:      Head: Normocephalic and atraumatic.      Mouth/Throat:      Mouth: Mucous membranes are dry.   Eyes:      General: No scleral icterus.  Cardiovascular:      Rate and Rhythm: Normal rate and regular rhythm.      Pulses: Normal pulses.      Heart sounds: Normal heart sounds.   Pulmonary:      Effort: Pulmonary effort is normal. No respiratory distress.      Breath sounds: Normal breath sounds.   Abdominal:      General: There is no distension.      Palpations: Abdomen is soft. There is no mass.      Tenderness: There is no abdominal tenderness. There is no right CVA tenderness or left CVA tenderness.   Musculoskeletal:         General: Swelling, tenderness and deformity present.      Cervical back: No rigidity.      Right lower leg: Edema present.      Left lower leg: Edema present.   Lymphadenopathy:      Cervical: No cervical adenopathy.   Skin:     General: Skin is warm and dry.      Capillary Refill: Poor peripheral pulses in all 4 extremities.     Coloration: Skin is not jaundiced.   Neurological:      General: No focal deficit present.      Mental Status: She is alert and oriented to person, place, and time.                Significant Labs: All pertinent labs within the past 24 hours have been reviewed.    Significant Imaging: I have reviewed all pertinent imaging results/findings within the past 24 hours.

## 2023-08-19 NOTE — ED TRIAGE NOTES
Patient was came to the ER due to severe left foot pain. The patient states that she was seen one week ago for the pain in her foot. The patients foot has a large black spot in the center of the top of the foot. The left foot is also partially amputated. The patient has a hx of COPD and is on 3 l of oxygen. The patient denies any chest pain.

## 2023-08-19 NOTE — HPI
Patient is a 78-year-old female with past medical history significant for severe COPD on home O2 for chronic hypoxic respiratory failure (4 L via nasal cannula), CAD, PAT, previous osteomyelitis with transmetatarsal amputation (2012) who presented to the emergency department due to worsening right foot pain with associated discoloration.  She had a similar presentation approximately 10 days ago and was started on broad-spectrum antibiotics.  Since that time she is been having worsening pain associated with the right leg as well as discoloration with possible deep blistering.  An x-ray was done in the emergency department which demonstrated no erosive changes of osteomyelitis.  She does have some overlying right erythema of the affected area as well as dark discoloration with possible blood blister present.  There is probably some fluctuance however complete evaluation was limited secondary to patient intolerance.  She has a black spot which does not appear to be leaking any purulent fluid.  She denies any fevers, chills, shortness of breath, chest pain, or intolerance of lying flat.  She states that she has been dealing with progressive lower extremity swelling bilaterally.  She does not take any diuretics for this and it is a new problem.

## 2023-08-19 NOTE — PROGRESS NOTES
[facility] ([address] Medicine  Progress Note    Patient Name: Radha Sotelo  MRN: [id number]  Patient Class: IP- Inpatient   Admission Date: 8/18/2023  Length of Stay: 0 days  Attending Physician: Samir Upton MD  Primary Care Provider: Laurence, Primary Doctor        Subjective:     Principal Problem:Cellulitis and abscess of foot        HPI:  Patient is a 78-year-old female with past medical history significant for severe COPD on home O2 for chronic hypoxic respiratory failure (4 L via nasal cannula), CAD, PAT, previous osteomyelitis with transmetatarsal amputation (2012) who presented to the emergency department due to worsening right foot pain with associated discoloration.  She had a similar presentation approximately 10 days ago and was started on broad-spectrum antibiotics.  Since that time she is been having worsening pain associated with the right leg as well as discoloration with possible deep blistering.  An x-ray was done in the emergency department which demonstrated no erosive changes of osteomyelitis.  She does have some overlying right erythema of the affected area as well as dark discoloration with possible blood blister present.  There is probably some fluctuance however complete evaluation was limited secondary to patient intolerance.  She has a black spot which does not appear to be leaking any purulent fluid.  She denies any fevers, chills, shortness of breath, chest pain, or intolerance of lying flat.  She states that she has been dealing with progressive lower extremity swelling bilaterally.  She does not take any diuretics for this and it is a new problem.           Overview/Hospital Course:  No notes on file    Interval History: NAEON. MRI didn't show abscess or OM, although exam was incomplete as patient unable to tolerate procedure. Denies fever/chills. Pain is slightly improved.     Review of Systems   Constitutional:  Negative for chills and fever.   HENT:   Negative for congestion and sore throat.    Eyes:  Negative for visual disturbance.   Respiratory:  Negative for chest tightness and shortness of breath.    Cardiovascular:  Negative for chest pain and palpitations.   Gastrointestinal:  Negative for abdominal distention, abdominal pain, constipation, diarrhea, nausea and vomiting.   Musculoskeletal:  Positive for joint swelling. Negative for back pain.   Skin:  Positive for color change, rash and wound.   Neurological:  Negative for dizziness, weakness and headaches.   Hematological:  Does not bruise/bleed easily.     Objective:     Vital Signs (Most Recent):  Temp: 98.2 °F (36.8 °C) (08/19/23 0445)  Pulse: 82 (08/19/23 0609)  Resp: 18 (08/19/23 1109)  BP: (!) 118/53 (08/19/23 0445)  SpO2: 97 % (08/19/23 0609) Vital Signs (24h Range):  Temp:  [98 °F (36.7 °C)-99.8 °F (37.7 °C)] 98.2 °F (36.8 °C)  Pulse:  [] 82  Resp:  [18-24] 18  SpO2:  [91 %-98 %] 97 %  BP: (118-140)/(53-82) 118/53     Weight: 64.8 kg (142 lb 13.7 oz)  Body mass index is 27.9 kg/m².    Intake/Output Summary (Last 24 hours) at 8/19/2023 1203  Last data filed at 8/19/2023 0552  Gross per 24 hour   Intake 200 ml   Output --   Net 200 ml         Physical Exam  Vitals and nursing note reviewed.   Constitutional:       General: She is not in acute distress.     Appearance: Normal appearance. She is obese. She is not ill-appearing or toxic-appearing.   HENT:      Head: Normocephalic and atraumatic.      Mouth/Throat:      Mouth: Mucous membranes are dry.   Eyes:      General: No scleral icterus.  Cardiovascular:      Rate and Rhythm: Normal rate and regular rhythm.      Pulses: Normal pulses.      Heart sounds: Normal heart sounds.   Pulmonary:      Effort: Pulmonary effort is normal. No respiratory distress.      Breath sounds: Normal breath sounds.   Abdominal:      General: There is no distension.      Palpations: Abdomen is soft. There is no mass.      Tenderness: There is no abdominal  tenderness. There is no right CVA tenderness or left CVA tenderness.   Musculoskeletal:         General: Swelling, tenderness and deformity present.      Cervical back: No rigidity.      Right lower leg: Edema present.      Left lower leg: Edema present.   Lymphadenopathy:      Cervical: No cervical adenopathy.   Skin:     General: Skin is warm and dry.      Capillary Refill: Poor peripheral pulses in all 4 extremities.     Coloration: Skin is not jaundiced.   Neurological:      General: No focal deficit present.      Mental Status: She is alert and oriented to person, place, and time.             Significant Labs: All pertinent labs within the past 24 hours have been reviewed.    Significant Imaging: I have reviewed all pertinent imaging results/findings within the past 24 hours.      Assessment/Plan:      * Cellulitis and abscess of foot  Patient is a 78-year-old female with previous history of chronic osteo with unhealing wound status post transmetatarsal amputation who is now presenting after a recent hospitalization with worsening right lower extremity swelling, erythema, and pain.  She states that it is much worse than normal and that she is developed a black rash over the affected area.  She denies any overt purulence or opening skin however she remains very tender to palpation.  X-ray at this time does not show any erosive changes and stable from previous hospitalization 10 days ago.  Will obtain an MRI to definitively rule out the presence of osteomyelitis given mildly elevated inflammatory markers which are sensitive but not specific    Non-surgical site of infection and Non-purulent Cellulitis  (ICD 10: L03.90)    There is not an abscess evident.    There is the presence of:   - Rapid progression    There is not presence or evidence of involvement of indwelling medical device    Empiric Vancomycin is based on the presence of Recurrence despite previous treatment    tetanus status unknown to the  patient    There is not the presence of an immunocompromising condition requiring consideration for fungal, virus, or atypical organisms.    Plan:  - Vancomycin and ceftriaxone  - Blood cultures pending  - MRI foot w contrast shows nonspecific diffuse subcutaneous edema and redemonstration of a small nonspecific fluid collection within the soft tissues at the level the resection stump, no abscess or OM identified. Limited incomplete examination as the patient was unable to tolerate the study.   - Elevation of affected area and pain control  - PT/OT        Chronic obstructive pulmonary disease  Patient uses nebulizers q.6 hours.  Unfortunately due to national shortages, we are unable to provide these.  Will resume her home inhalers and provide nebulizers as able.  She is currently on 4 L of oxygen which appears to be her baseline.  She states that she is no longer smoking and denies any signs or symptoms of ongoing acute exacerbation of her COPD.    Patient information:     SpO2: 97 %       mMRC Key:  0 - Dyspnea only with strenuous exercise  1 - Dyspnea when hurrying or walking up a slight hill  2 - Walks slower than people of the same age because of dyspnea or has to stop for breath when walking at own pace  3 - Stops for breath after walking 100 yards or after a few minutes  4 - Too dyspneic to leave house or breathless when dressing    Exacerbation Component:  - COPD: stable  Patient Is not showing signs of a current COPD exacerbation The patient is not currently have symptoms / an exacerbation. The patient has COPD for approximately 10+ years. Symptoms in previous episodes have included dyspnea, cough and wheezing, and typically last 5 days. Previous episodes have been exacerbated by Smoke and upper respiratory infection. Current treatment includes albuterol nebulizer, albuterol/ipratropium inhaler and oxygen, which generally provides some relief of symptoms.      Plan:  - Risk factor reduction (Smoking  "cessation/avoidance, management of GERD, pHTN treatment)  - Avoidance of triggers (Air pollution, respiratory infections, pulmonary embolisms)  - Complete metabolic panel  - Target SpO2 of 88-92% or PaO2 of 60-70 mmHg              Coronary artery disease  No ongoing chest pain.  Will continue to monitor and may need preoperative evaluation if surgery is warranted.      PVD (peripheral vascular disease)  Patient seen by Dr. Saldaña but has not been seen since 2015.    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 Later she underwent a right lower extremity angiogram and amputation of the right fourth and fifth toe 1/25/2013"      Unspecified protein-calorie malnutrition  Nutrition consulted. Most recent weight and BMI monitored-     Measurements:  Wt Readings from Last 1 Encounters:   08/19/23 64.8 kg (142 lb 13.7 oz)   Body mass index is 27.9 kg/m².    Patient to be screened and assessed by RD.  Previous protein calorie malnutrition.          VTE Risk Mitigation (From admission, onward)         Ordered     enoxaparin injection 40 mg  Daily         08/19/23 0020     Place sequential compression device  Until discontinued         08/19/23 0020                Discharge Planning   FLORIAN:      Code Status: Full Code   Is the patient medically ready for discharge?:     Reason for patient still in hospital (select all that apply): Treatment                     Samir Upton MD  Department of Hospital Medicine   [facility] - Intensive Care ([place]    "

## 2023-08-19 NOTE — ED PROVIDER NOTES
Encounter Date: 8/18/2023       History     Chief Complaint   Patient presents with    Foot Pain     R foot pain, seen a week ago for same complaint. Hx partial R amputation of foot. Sating 88% on RA, supposed to wear home O2 but states she doesn't know how to use it, placed on 2 L with EMS     78-year-old female medical history of COPD(on O2), CAD, PVD s/p r. transmetarsal amputation (2012) presents to the emergency department due to continued right foot pain after being discharged from the hospital 10 days ago for similar presentation.  On August 8th patient was admitted with concerns of osteomyelitis of the right foot however MRI findings were negative.  Patient received IV antibiotics and was discharged on Augmentin.  However she reports since she is been home she feels as if the pain is continuing to get worse. She has also noticed a discharge expel from the foot along with a new wound. She also notes a new black spot that was not previously there. She is not experiencing fevers or chills.      Review of patient's allergies indicates:  No Known Allergies  Past Medical History:   Diagnosis Date    Anemia     Anxiety     COPD (chronic obstructive pulmonary disease)     COPD (chronic obstructive pulmonary disease) with emphysema     Coronary artery disease     Depression     Diverticulitis     Hyperlipidemia     Hypertension     PVD (peripheral vascular disease)     PVD (peripheral vascular disease)     S/P colostomy     Substance abuse     hx heavy etoh use     Tobacco abuse      Past Surgical History:   Procedure Laterality Date    COLOSTOMY      ECTOPIC PREGNANCY SURGERY      right forefoot amputation      right toe amputation      x2 toes     No family history on file.  Social History     Tobacco Use    Smoking status: Former     Current packs/day: 0.00     Average packs/day: 0.5 packs/day for 50.0 years (25.0 ttl pk-yrs)     Types: Cigarettes     Start date: 7/5/1963     Quit date: 7/5/2013     Years since  quitting: 10.1    Smokeless tobacco: Never   Substance Use Topics    Alcohol use: No    Drug use: No     Review of Systems   Constitutional:  Negative for chills and fever.   Respiratory:  Negative for shortness of breath.    Cardiovascular:  Negative for chest pain.   Gastrointestinal:  Negative for abdominal pain.   Musculoskeletal:  Positive for arthralgias and gait problem.   Allergic/Immunologic: Negative for immunocompromised state.       Physical Exam     Initial Vitals [08/18/23 1930]   BP Pulse Resp Temp SpO2   121/60 97 (!) 21 99.8 °F (37.7 °C) 96 %      MAP       --         Physical Exam    Vitals reviewed.  Constitutional: She appears well-developed and well-nourished.   HENT:   Head: Normocephalic and atraumatic.   Eyes: Conjunctivae and EOM are normal.   Neck:   Normal range of motion.  Cardiovascular:  Normal rate and normal heart sounds.           Pulmonary/Chest: Breath sounds normal. No respiratory distress. She has no wheezes. She has no rales.   Abdominal: Abdomen is soft. She exhibits no distension. There is no abdominal tenderness.   Musculoskeletal:         General: Tenderness present.      Cervical back: Normal range of motion.      Comments: Right foot:  Transmetatarsal amputation, extremity appears slightly warm, erythematous tender to touch over distal aspect of extremity.  There was an area of hyperpigmentation likely underlining hematoma.     Neurological: She is alert and oriented to person, place, and time.   Skin: Skin is warm and dry. There is erythema.   Psychiatric: She has a normal mood and affect. Thought content normal.         ED Course   Procedures  Labs Reviewed   CBC W/ AUTO DIFFERENTIAL - Abnormal; Notable for the following components:       Result Value    MCHC 31.9 (*)     RDW 14.6 (*)     Immature Granulocytes 0.7 (*)     Immature Grans (Abs) 0.06 (*)     Lymph # 0.9 (*)     Gran % 74.5 (*)     Lymph % 10.7 (*)     All other components within normal limits    COMPREHENSIVE METABOLIC PANEL - Abnormal; Notable for the following components:    Potassium 3.2 (*)     Calcium 8.4 (*)     Albumin 3.2 (*)     All other components within normal limits   C-REACTIVE PROTEIN - Abnormal; Notable for the following components:    CRP 33.7 (*)     All other components within normal limits   SEDIMENTATION RATE          Imaging Results              X-Ray Foot Complete Right (Final result)  Result time 08/18/23 23:34:21      Final result by Jones Mg MD (08/18/23 23:34:21)                   Impression:      Similar findings when compared with 08/11/2023.      Electronically signed by: Jones Mg MD  Date:    08/18/2023  Time:    23:34               Narrative:    EXAMINATION:  XR FOOT COMPLETE 3 VIEW RIGHT    CLINICAL HISTORY:  Pain, unspecified    TECHNIQUE:  AP, lateral, and oblique views of the right foot were performed.    COMPARISON:  08/11/2023.    FINDINGS:  Bones are demineralized.  Postoperative changes of transmetatarsal amputation involving the 1st through 5th toes.  No acute displaced fracture.  No dislocation.  Significant degenerative changes in the midfoot.  No obvious new bony erosion allowing for limitations related to osteopenia.  There is mild nonspecific soft tissue edema adjacent to the resection stump.  No unexpected radiopaque foreign body.                                       Medications   sodium chloride 0.9% flush 10 mL (has no administration in time range)   naloxone 0.4 mg/mL injection 0.02 mg (has no administration in time range)   glucose chewable tablet 16 g (has no administration in time range)   glucose chewable tablet 24 g (has no administration in time range)   glucagon (human recombinant) injection 1 mg (has no administration in time range)   enoxaparin injection 40 mg (has no administration in time range)   melatonin tablet 6 mg (has no administration in time range)   aspirin chewable tablet 81 mg (81 mg Oral Given 8/19/23 1011)    atorvastatin tablet 40 mg (40 mg Oral Given 8/19/23 1011)   citalopram tablet 20 mg (20 mg Oral Given 8/19/23 1011)   metoprolol tartrate (LOPRESSOR) tablet 50 mg (50 mg Oral Given 8/19/23 1011)   cefTRIAXone (ROCEPHIN) 2 g in dextrose 5 % in water (D5W) 100 mL IVPB (MB+) (0 g Intravenous Stopped 8/19/23 0200)   ondansetron disintegrating tablet 4 mg (has no administration in time range)   sars-cov-2 (covid-19) (Pfizer COVID-19) 30 mcg/0.3 ml injection 0.3 mL (has no administration in time range)   levalbuterol nebulizer solution 1.25 mg (1.25 mg Nebulization Given 8/19/23 0609)     And   ipratropium 0.02 % nebulizer solution 0.5 mg (0.5 mg Nebulization Not Given 8/19/23 0800)   dextrose 10% bolus 125 mL 125 mL (has no administration in time range)   dextrose 10% bolus 250 mL 250 mL (has no administration in time range)   vancomycin - pharmacy to dose (has no administration in time range)   vancomycin (VANCOCIN) 1,000 mg in dextrose 5 % (D5W) 250 mL IVPB (Vial-Mate) (has no administration in time range)   fluticasone furoate-vilanteroL 200-25 mcg/dose diskus inhaler 1 puff (1 puff Inhalation Not Given 8/19/23 0900)   cilostazoL tablet 50 mg (50 mg Oral Given 8/19/23 1011)   albuterol inhaler 2 puff (2 puffs Inhalation Not Given 8/19/23 0903)   acetaminophen tablet 1,000 mg (has no administration in time range)   oxyCODONE immediate release tablet 5 mg (has no administration in time range)   oxyCODONE immediate release tablet Tab 10 mg (has no administration in time range)   ketorolac injection 15 mg (has no administration in time range)   vancomycin 1,250 mg in dextrose 5 % (D5W) 250 mL IVPB (Vial-Mate) (0 mg Intravenous Stopped 8/18/23 2304)   HYDROcodone-acetaminophen 5-325 mg per tablet 1 tablet (1 tablet Oral Given 8/18/23 2210)   potassium bicarbonate disintegrating tablet 25 mEq (25 mEq Oral Given 8/18/23 9838)   HYDROcodone-acetaminophen  mg per tablet 1 tablet (1 tablet Oral Given 8/19/23 1105)      Medical Decision Making  Differential diagnosis:  Cellulitis, abscess, osteomyelitis also considered bacteremia    [age] year old female presents to emergency department due to continued right foot pain after  recently being d/c due to imiliar presentation. Pt negative workup of osteomyelitis  on August 8th. No   foot has become more erythematous and painful to the touch.  Labs and imaging reordered.  Concern of failure of outpatient antibiotics I initiate vancomycin  Disposition plan likely observation due to failure on outpatient antibiotics.  Blood cultures also pending to rule out bacteremia.   Discussed with Hospital Medicine placement for admission for IV abx broad spectrum coverage. Pt place in observation. Pt also with unexplained Hypokalemia given oral replacement. Pain managed in ED  Pt discussed with supervising physician       Amount and/or Complexity of Data Reviewed  Labs: ordered. Decision-making details documented in ED Course.  Radiology: ordered.    Risk  Prescription drug management.               ED Course as of 08/19/23 1312   Fri Aug 18, 2023   2209 CRP(!): 33.7  Elevated inflammatory marker [CR]   2209 Potassium(!): 3.2  Hypokalemia noted [CR]      ED Course User Index  [CR] Preeti Reed PA-C                    Clinical Impression:   Final diagnoses:  [R52] Pain  [E87.6] Hypokalemia (Primary)  [L03.115] Cellulitis of right lower extremity  [L03.90] Cellulitis        ED Disposition Condition    Observation Stable                Preeti Reed PA-C  08/19/23 [id number]       Rigo Reyez MD  08/19/23 [id number]

## 2023-08-19 NOTE — ASSESSMENT & PLAN NOTE
Patient is a 78-year-old female with previous history of chronic osteo with unhealing wound status post transmetatarsal amputation who is now presenting after a recent hospitalization with worsening right lower extremity swelling, erythema, and pain.  She states that it is much worse than normal and that she is developed a black rash over the affected area.  She denies any overt purulence or opening skin however she remains very tender to palpation.  X-ray at this time does not show any erosive changes and stable from previous hospitalization 10 days ago.  Will obtain an MRI to definitively rule out the presence of osteomyelitis given mildly elevated inflammatory markers which are sensitive but not specific    Non-surgical site of infection and Non-purulent Cellulitis  (ICD 10: L03.90)    There is not an abscess evident.    There is the presence of:   - Rapid progression    There is not presence or evidence of involvement of indwelling medical device    Empiric Vancomycin is based on the presence of Recurrence despite previous treatment    tetanus status unknown to the patient    There is not the presence of an immunocompromising condition requiring consideration for fungal, virus, or atypical organisms.    Plan:  - Vancomycin and ceftriaxone  - Blood cultures pending  - MRI foot w contrast shows nonspecific diffuse subcutaneous edema and redemonstration of a small nonspecific fluid collection within the soft tissues at the level the resection stump, no abscess or OM identified. Limited incomplete examination as the patient was unable to tolerate the study.   - Elevation of affected area and pain control  - PT/OT

## 2023-08-19 NOTE — ASSESSMENT & PLAN NOTE
No ongoing chest pain.  Will continue to monitor and may need preoperative evaluation if surgery is warranted.

## 2023-08-19 NOTE — SUBJECTIVE & OBJECTIVE
Interval History: NAEON. MRI didn't show abscess or OM, although exam was incomplete as patient unable to tolerate procedure. Denies fever/chills. Pain is slightly improved.     Review of Systems   Constitutional:  Negative for chills and fever.   HENT:  Negative for congestion and sore throat.    Eyes:  Negative for visual disturbance.   Respiratory:  Negative for chest tightness and shortness of breath.    Cardiovascular:  Negative for chest pain and palpitations.   Gastrointestinal:  Negative for abdominal distention, abdominal pain, constipation, diarrhea, nausea and vomiting.   Musculoskeletal:  Positive for joint swelling. Negative for back pain.   Skin:  Positive for color change, rash and wound.   Neurological:  Negative for dizziness, weakness and headaches.   Hematological:  Does not bruise/bleed easily.     Objective:     Vital Signs (Most Recent):  Temp: 98.2 °F (36.8 °C) (08/19/23 0445)  Pulse: 82 (08/19/23 0609)  Resp: 18 (08/19/23 1109)  BP: (!) 118/53 (08/19/23 0445)  SpO2: 97 % (08/19/23 0609) Vital Signs (24h Range):  Temp:  [98 °F (36.7 °C)-99.8 °F (37.7 °C)] 98.2 °F (36.8 °C)  Pulse:  [] 82  Resp:  [18-24] 18  SpO2:  [91 %-98 %] 97 %  BP: (118-140)/(53-82) 118/53     Weight: 64.8 kg (142 lb 13.7 oz)  Body mass index is 27.9 kg/m².    Intake/Output Summary (Last 24 hours) at 8/19/2023 1203  Last data filed at 8/19/2023 0552  Gross per 24 hour   Intake 200 ml   Output --   Net 200 ml         Physical Exam  Vitals and nursing note reviewed.   Constitutional:       General: She is not in acute distress.     Appearance: Normal appearance. She is obese. She is not ill-appearing or toxic-appearing.   HENT:      Head: Normocephalic and atraumatic.      Mouth/Throat:      Mouth: Mucous membranes are dry.   Eyes:      General: No scleral icterus.  Cardiovascular:      Rate and Rhythm: Normal rate and regular rhythm.      Pulses: Normal pulses.      Heart sounds: Normal heart sounds.   Pulmonary:       Effort: Pulmonary effort is normal. No respiratory distress.      Breath sounds: Normal breath sounds.   Abdominal:      General: There is no distension.      Palpations: Abdomen is soft. There is no mass.      Tenderness: There is no abdominal tenderness. There is no right CVA tenderness or left CVA tenderness.   Musculoskeletal:         General: Swelling, tenderness and deformity present.      Cervical back: No rigidity.      Right lower leg: Edema present.      Left lower leg: Edema present.   Lymphadenopathy:      Cervical: No cervical adenopathy.   Skin:     General: Skin is warm and dry.      Capillary Refill: Poor peripheral pulses in all 4 extremities.     Coloration: Skin is not jaundiced.   Neurological:      General: No focal deficit present.      Mental Status: She is alert and oriented to person, place, and time.             Significant Labs: All pertinent labs within the past 24 hours have been reviewed.    Significant Imaging: I have reviewed all pertinent imaging results/findings within the past 24 hours.

## 2023-08-19 NOTE — PLAN OF CARE
[facility] - Intensive Care ([facility]-16)  Discharge Assessment    Primary Care Provider: No, Primary Doctor     Discharge Assessment (most recent)       BRIEF DISCHARGE ASSESSMENT - 08/19/23 1523          Discharge Planning    Assessment Type Discharge Planning Brief Assessment (P)      Support Systems Children (P)      Equipment Currently Used at Home walker, standard;bath bench (P)      Current Living Arrangements apartment (P)    1 flight of stairs    Patient/Family Anticipates Transition to home with family (P)      DME Needed Upon Discharge  none (P)      Discharge Plan A Home with family (P)      Discharge Plan B Home (P)         Financial Resource Strain    How hard is it for you to pay for the very basics like food, housing, medical care, and heating? Not very hard (P)         Housing Stability    In the last 12 months, was there a time when you were not able to pay the mortgage or rent on time? No (P)      In the last 12 months, how many places have you lived? 1 (P)         Transportation Needs    In the past 12 months, has lack of transportation kept you from medical appointments or from getting medications? No (P)      In the past 12 months, has lack of transportation kept you from meetings, work, or from getting things needed for daily living? No (P)         Food Insecurity    Within the past 12 months, you worried that your food would run out before you got the money to buy more. Never true (P)      Within the past 12 months, the food you bought just didn't last and you didn't have money to get more. Never true (P)         Social Connections    In a typical week, how many times do you talk on the phone with family, friends, or neighbors? More than three times a week (P)      How often do you get together with friends or relatives? More than three times a week (P)      Are you , , , , never , or living with a partner?  (P)                        SW met with  patient at bedside for DPA. Patient alert/oriented. Patient confirmed face sheet information as correct. Patient states that she lives in an apartment with her 16y/o granddaughter; 1 flight of stairs to enter. Patient states that she uses a walker and bath bench. Patient IS on home oxygen. Patient Not on HD. Patient Not on a coumadin. Patient states that she will need a ride home upon dc.        TIGRE Dunbar, LMSW  [facility]  Case Management  Ext. [phone]

## 2023-08-19 NOTE — ASSESSMENT & PLAN NOTE
Patient is a 78-year-old female with previous history of chronic osteo with unhealing wound status post transmetatarsal amputation who is now presenting after a recent hospitalization with worsening right lower extremity swelling, erythema, and pain.  She states that it is much worse than normal and that she is developed a black rash over the affected area.  She denies any overt purulence or opening skin however she remains very tender to palpation.  X-ray at this time does not show any erosive changes and stable from previous hospitalization 10 days ago.  Will obtain an MRI to definitively rule out the presence of osteomyelitis given mildly elevated inflammatory markers which are sensitive but not specific    Non-surgical site of infection and Non-purulent Cellulitis  (ICD 10: L03.90)    There is not an abscess evident.    There is the presence of:   - Rapid progression    There is not presence or evidence of involvement of indwelling medical device    Empiric Vancomycin is based on the presence of Recurrence despite previous treatment    tetanus status unknown to the patient    There is not the presence of an immunocompromising condition requiring consideration for fungal, virus, or atypical organisms.    Plan:  - Vancomycin and ceftriaxone  - Blood cultures  - MRI foot w contrast  - Elevation of affected area +/- NSAIDs or other non-opiate analgesics  - will obtain nutrition evaluation to optimize.

## 2023-08-19 NOTE — ASSESSMENT & PLAN NOTE
Patient uses nebulizers q.6 hours.  Unfortunately due to national shortages, we are unable to provide these.  Will resume her home inhalers and provide nebulizers as able.  She is currently on 4 L of oxygen which appears to be her baseline.  She states that she is no longer smoking and denies any signs or symptoms of ongoing acute exacerbation of her COPD.    Patient information:     SpO2: 98 %       mMRC Key:  0 - Dyspnea only with strenuous exercise  1 - Dyspnea when hurrying or walking up a slight hill  2 - Walks slower than people of the same age because of dyspnea or has to stop for breath when walking at own pace  3 - Stops for breath after walking 100 yards or after a few minutes  4 - Too dyspneic to leave house or breathless when dressing    Exacerbation Component:  - COPD: stable  Patient Is not showing signs of a current COPD exacerbation The patient is not currently have symptoms / an exacerbation. The patient has COPD for approximately 10+ years. Symptoms in previous episodes have included dyspnea, cough and wheezing, and typically last 5 days. Previous episodes have been exacerbated by Smoke and upper respiratory infection. Current treatment includes albuterol nebulizer, albuterol/ipratropium inhaler and oxygen, which generally provides some relief of symptoms.      Plan:  - Risk factor reduction (Smoking cessation/avoidance, management of GERD, pHTN treatment)  - Avoidance of triggers (Air pollution, respiratory infections, pulmonary embolisms)  - Complete metabolic panel  - Target SpO2 of 88-92% or PaO2 of 60-70 mmHg

## 2023-08-19 NOTE — H&P
[facility] - Emergency Dept  [facility]  History & Physical    Patient Name: Radha Sotelo  MRN: [id number]  Patient Class: OP- Observation  Admission Date: 8/18/2023  Attending Physician: Darian Ku MD   Primary Care Provider: Laurence Primary Doctor         Patient information was obtained from patient, past medical records and ER records.     Subjective:     Principal Problem:Cellulitis and abscess of foot    Chief Complaint:   Chief Complaint   Patient presents with    Foot Pain     R foot pain, seen a week ago for same complaint. Hx partial R amputation of foot. Sating 88% on RA, supposed to wear home O2 but states she doesn't know how to use it, placed on 2 L with EMS        HPI: Patient is a 78-year-old female with past medical history significant for severe COPD on home O2 for chronic hypoxic respiratory failure (4 L via nasal cannula), CAD, PAT, previous osteomyelitis with transmetatarsal amputation (2012) who presented to the emergency department due to worsening right foot pain with associated discoloration.  She had a similar presentation approximately 10 days ago and was started on broad-spectrum antibiotics.  Since that time she is been having worsening pain associated with the right leg as well as discoloration with possible deep blistering.  An x-ray was done in the emergency department which demonstrated no erosive changes of osteomyelitis.  She does have some overlying right erythema of the affected area as well as dark discoloration with possible blood blister present.  There is probably some fluctuance however complete evaluation was limited secondary to patient intolerance.  She has a black spot which does not appear to be leaking any purulent fluid.  She denies any fevers, chills, shortness of breath, chest pain, or intolerance of lying flat.  She states that she has been dealing with progressive lower extremity swelling bilaterally.  She does not take any diuretics for this and it is  a new problem.           Past Medical History:   Diagnosis Date    Anemia     Anxiety     COPD (chronic obstructive pulmonary disease)     COPD (chronic obstructive pulmonary disease) with emphysema     Coronary artery disease     Depression     Diverticulitis     Hyperlipidemia     Hypertension     PVD (peripheral vascular disease)     PVD (peripheral vascular disease)     S/P colostomy     Substance abuse     hx heavy etoh use     Tobacco abuse        Past Surgical History:   Procedure Laterality Date    COLOSTOMY      ECTOPIC PREGNANCY SURGERY      right forefoot amputation      right toe amputation      x2 toes       Review of patient's allergies indicates:  No Known Allergies    No current facility-administered medications on file prior to encounter.     Current Outpatient Medications on File Prior to Encounter   Medication Sig    albuterol (PROVENTIL/VENTOLIN HFA) 90 mcg/actuation inhaler INHALE 2 PUFFS BY MOUTH EVERY 6 HOURS AS NEEDED    albuterol-ipratropium (DUO-NEB) 2.5 mg-0.5 mg/3 mL nebulizer solution USE 1 VIAL VIA NEBULIZER EVERY 6 HOURS AS NEEDED FOR WHEEZING, SHORTNESS OF BREATH    amoxicillin-clavulanate 875-125mg (AUGMENTIN) 875-125 mg per tablet Take 1 tablet by mouth 2 (two) times daily for 12 days    aspirin 81 MG Chew Take 1 tablet (81 mg total) by mouth once daily.    atorvastatin (LIPITOR) 40 MG tablet Take 1 tablet (40 mg total) by mouth once daily.    budesonide-formoterol 80-4.5 mcg (SYMBICORT) 80-4.5 mcg/actuation HFAA INHALE 2 PUFFS INTO THE LUNGS TWICE DAILY. RINSE MOUTH AFTER USE    cilostazoL (PLETAL) 100 MG Tab Take 1 tablet (100 mg total) by mouth 2 (two) times daily.    citalopram (CELEXA) 20 MG tablet Take 1 tablet (20 mg total) by mouth once daily.    clopidogrel (PLAVIX) 75 mg tablet Take 1 tablet (75 mg total) by mouth once daily. (Patient not taking: Reported on 8/11/2023)    doxycycline (VIBRA-TABS) 100 MG tablet Take 1 tablet (100 mg total) by  mouth every 12 (twelve) hours. for 12 days    hydrocortisone-aloe vera 1 % Crea cream Apply topically 2 (two) times daily.    ibuprofen (ADVIL,MOTRIN) 600 MG tablet Take 1 tablet (600 mg total) by mouth every 6 (six) hours as needed (moderate pain and swelling).    metoprolol tartrate (LOPRESSOR) 50 MG tablet Take 1 tablet (50 mg total) by mouth 2 (two) times daily.    nicotine (NICODERM CQ) 21 mg/24 hr Place 1 patch onto the skin once daily.    ondansetron (ZOFRAN-ODT) 4 MG TbDL Dissolve 1 tablet (4 mg total) by mouth every 6 (six) hours as needed.     Family History    None       Tobacco Use    Smoking status: Former     Current packs/day: 0.00     Average packs/day: 0.5 packs/day for 50.0 years (25.0 ttl pk-yrs)     Types: Cigarettes     Start date: 7/5/1963     Quit date: 7/5/2013     Years since quitting: 10.1    Smokeless tobacco: Never   Substance and Sexual Activity    Alcohol use: No    Drug use: No    Sexual activity: Not on file     Review of Systems   Constitutional:  Negative for chills and fever.   HENT:  Negative for congestion and sore throat.    Eyes:  Negative for visual disturbance.   Respiratory:  Negative for chest tightness and shortness of breath.    Cardiovascular:  Negative for chest pain and palpitations.   Gastrointestinal:  Negative for abdominal distention, abdominal pain, constipation, diarrhea, nausea and vomiting.   Musculoskeletal:  Positive for joint swelling. Negative for back pain.   Skin:  Positive for color change, rash and wound.   Neurological:  Negative for dizziness, weakness and headaches.   Hematological:  Does not bruise/bleed easily.     Objective:     Vital Signs (Most Recent):  Temp: 98.3 °F (36.8 °C) (08/19/23 0118)  Pulse: 89 (08/19/23 0118)  Resp: (!) 24 (08/19/23 0119)  BP: 138/82 (08/19/23 0119)  SpO2: 98 % (08/19/23 0119) Vital Signs (24h Range):  Temp:  [98 °F (36.7 °C)-99.8 °F (37.7 °C)] 98.3 °F (36.8 °C)  Pulse:  [] 89  Resp:  [18-24] 24  SpO2:   [91 %-98 %] 98 %  BP: (119-140)/(56-82) 138/82     Weight: 64.8 kg (142 lb 13.7 oz)  Body mass index is 27.9 kg/m².     Physical Exam  Vitals and nursing note reviewed.   Constitutional:       General: She is not in acute distress.     Appearance: Normal appearance. She is obese. She is not ill-appearing or toxic-appearing.   HENT:      Head: Normocephalic and atraumatic.      Mouth/Throat:      Mouth: Mucous membranes are dry.   Eyes:      General: No scleral icterus.  Cardiovascular:      Rate and Rhythm: Normal rate and regular rhythm.      Pulses: Normal pulses.      Heart sounds: Normal heart sounds.   Pulmonary:      Effort: Pulmonary effort is normal. No respiratory distress.      Breath sounds: Normal breath sounds.   Abdominal:      General: There is no distension.      Palpations: Abdomen is soft. There is no mass.      Tenderness: There is no abdominal tenderness. There is no right CVA tenderness or left CVA tenderness.   Musculoskeletal:         General: Swelling, tenderness and deformity present.      Cervical back: No rigidity.      Right lower leg: Edema present.      Left lower leg: Edema present.   Lymphadenopathy:      Cervical: No cervical adenopathy.   Skin:     General: Skin is warm and dry.      Capillary Refill: Poor peripheral pulses in all 4 extremities.     Coloration: Skin is not jaundiced.   Neurological:      General: No focal deficit present.      Mental Status: She is alert and oriented to person, place, and time.                Significant Labs: All pertinent labs within the past 24 hours have been reviewed.    Significant Imaging: I have reviewed all pertinent imaging results/findings within the past 24 hours.    Assessment/Plan:     * Cellulitis and abscess of foot  Patient is a 78-year-old female with previous history of chronic osteo with unhealing wound status post transmetatarsal amputation who is now presenting after a recent hospitalization with worsening right lower  extremity swelling, erythema, and pain.  She states that it is much worse than normal and that she is developed a black rash over the affected area.  She denies any overt purulence or opening skin however she remains very tender to palpation.  X-ray at this time does not show any erosive changes and stable from previous hospitalization 10 days ago.  Will obtain an MRI to definitively rule out the presence of osteomyelitis given mildly elevated inflammatory markers which are sensitive but not specific    Non-surgical site of infection and Non-purulent Cellulitis  (ICD 10: L03.90)    There is not an abscess evident.    There is the presence of:   - Rapid progression    There is not presence or evidence of involvement of indwelling medical device    Empiric Vancomycin is based on the presence of Recurrence despite previous treatment    tetanus status unknown to the patient    There is not the presence of an immunocompromising condition requiring consideration for fungal, virus, or atypical organisms.    Plan:  - Vancomycin and ceftriaxone  - Blood cultures  - MRI foot w contrast  - Elevation of affected area +/- NSAIDs or other non-opiate analgesics  - will obtain nutrition evaluation to optimize.        Chronic obstructive pulmonary disease  Patient uses nebulizers q.6 hours.  Unfortunately due to national shortages, we are unable to provide these.  Will resume her home inhalers and provide nebulizers as able.  She is currently on 4 L of oxygen which appears to be her baseline.  She states that she is no longer smoking and denies any signs or symptoms of ongoing acute exacerbation of her COPD.    Patient information:     SpO2: 98 %       mMRC Key:  0 - Dyspnea only with strenuous exercise  1 - Dyspnea when hurrying or walking up a slight hill  2 - Walks slower than people of the same age because of dyspnea or has to stop for breath when walking at own pace  3 - Stops for breath after walking 100 yards or after a few  "minutes  4 - Too dyspneic to leave house or breathless when dressing    Exacerbation Component:  - COPD: stable  Patient Is not showing signs of a current COPD exacerbation The patient is not currently have symptoms / an exacerbation. The patient has COPD for approximately 10+ years. Symptoms in previous episodes have included dyspnea, cough and wheezing, and typically last 5 days. Previous episodes have been exacerbated by Smoke and upper respiratory infection. Current treatment includes albuterol nebulizer, albuterol/ipratropium inhaler and oxygen, which generally provides some relief of symptoms.      Plan:  - Risk factor reduction (Smoking cessation/avoidance, management of GERD, pHTN treatment)  - Avoidance of triggers (Air pollution, respiratory infections, pulmonary embolisms)  - Complete metabolic panel  - Target SpO2 of 88-92% or PaO2 of 60-70 mmHg              Coronary artery disease  No ongoing chest pain.  Will continue to monitor and may need preoperative evaluation if surgery is warranted.      PVD (peripheral vascular disease)  Patient seen by Dr. Saldaña but has not been seen since 2015.    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 Later she underwent a right lower extremity angiogram and amputation of the right fourth and fifth toe 1/25/2013"      Unspecified protein-calorie malnutrition  Nutrition consulted. Most recent weight and BMI monitored-     Measurements:  Wt Readings from Last 1 Encounters:   08/19/23 64.8 kg (142 lb 13.7 oz)   Body mass index is 27.9 kg/m².    Patient to be " screened and assessed by RD.  Previous protein calorie malnutrition.        VTE Risk Mitigation (From admission, onward)         Ordered     enoxaparin injection 40 mg  Daily         08/19/23 0020     Place sequential compression device  Until discontinued         08/19/23 0020                   On 08/19/2023, patient should be placed in hospital observation services under my care.        Terrance Ashton MD  Department of Hospital Medicine  [facility] - Emergency Dept

## 2023-08-19 NOTE — ED NOTES
Bedside handoff with Ryan RN    Assumed care of pt at this time. VSS, RR even and unlabored. Resting in bed comfortably. No voiced compaints of pain or discomfort at this time. Safety protocols remain. Verified lines/leads attatched to patient at this time.      General: Awake and alert:  Neck: Supple  Respiratory: O2 NC. Nonlabored respirations. No audible wheeze. Speaking in full sentences.  Cardiac: Well-perfused. No acrocyanosis.  Abdomen: Supple  Skin: Erythema and distal ecchymosis noted to left foot stump.  Neurological: Moves all extremities symmetrically and equally except of course there is no distal right foot. Answers questions appropriately.

## 2023-08-19 NOTE — ASSESSMENT & PLAN NOTE
"Patient seen by Dr. Saldaña but has not been seen since 2015.    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 Later she underwent a right lower extremity angiogram and amputation of the right fourth and fifth toe 1/25/2013"    "

## 2023-08-19 NOTE — ASSESSMENT & PLAN NOTE
Nutrition consulted. Most recent weight and BMI monitored-     Measurements:  Wt Readings from Last 1 Encounters:   08/19/23 64.8 kg (142 lb 13.7 oz)   Body mass index is 27.9 kg/m².    Patient to be screened and assessed by RD.  Previous protein calorie malnutrition.

## 2023-08-19 NOTE — ASSESSMENT & PLAN NOTE
Patient uses nebulizers q.6 hours.  Unfortunately due to national shortages, we are unable to provide these.  Will resume her home inhalers and provide nebulizers as able.  She is currently on 4 L of oxygen which appears to be her baseline.  She states that she is no longer smoking and denies any signs or symptoms of ongoing acute exacerbation of her COPD.    Patient information:     SpO2: 97 %       mMRC Key:  0 - Dyspnea only with strenuous exercise  1 - Dyspnea when hurrying or walking up a slight hill  2 - Walks slower than people of the same age because of dyspnea or has to stop for breath when walking at own pace  3 - Stops for breath after walking 100 yards or after a few minutes  4 - Too dyspneic to leave house or breathless when dressing    Exacerbation Component:  - COPD: stable  Patient Is not showing signs of a current COPD exacerbation The patient is not currently have symptoms / an exacerbation. The patient has COPD for approximately 10+ years. Symptoms in previous episodes have included dyspnea, cough and wheezing, and typically last 5 days. Previous episodes have been exacerbated by Smoke and upper respiratory infection. Current treatment includes albuterol nebulizer, albuterol/ipratropium inhaler and oxygen, which generally provides some relief of symptoms.      Plan:  - Risk factor reduction (Smoking cessation/avoidance, management of GERD, pHTN treatment)  - Avoidance of triggers (Air pollution, respiratory infections, pulmonary embolisms)  - Complete metabolic panel  - Target SpO2 of 88-92% or PaO2 of 60-70 mmHg

## 2023-08-19 NOTE — PROGRESS NOTES
"Pharmacokinetic Initial Assessment: IV Vancomycin    Assessment/Plan:    Initiate intravenous vancomycin with loading dose of 1250 mg once followed by a maintenance dose of vancomycin 1000mg IV every 24 hours  Desired empiric serum trough concentration is 15 to 20 mcg/mL  Draw vancomycin trough level 60 min prior to fourth dose on 8/20 at approximately 2030  Pharmacy will continue to follow and monitor vancomycin.      Please contact pharmacy at extension [phone] with any questions regarding this assessment.     Thank you for the consult,   Mariya Reis       Patient brief summary:  Radha Sotelo is a 78 y.o. female initiated on antimicrobial therapy with IV Vancomycin for treatment of suspected bone/joint infection    Drug Allergies:   Review of patient's allergies indicates:  No Known Allergies    Actual Body Weight:   64.9kg    Renal Function:   Estimated Creatinine Clearance: 49.6 mL/min (based on SCr of 0.8 mg/dL).,     Dialysis Method (if applicable):  N/A    CBC (last 72 hours):  Recent Labs   Lab Result Units 08/18/23  2112   WBC K/uL 8.57   Hemoglobin g/dL 12.2   Hematocrit % 38.3   Platelets K/uL 244   Gran % % 74.5*   Lymph % % 10.7*   Mono % % 8.8   Eosinophil % % 4.8   Basophil % % 0.5   Differential Method  Automated       Metabolic Panel (last 72 hours):  Recent Labs   Lab Result Units 08/18/23  2112   Sodium mmol/L 136   Potassium mmol/L 3.2*   Chloride mmol/L 100   CO2 mmol/L 27   Glucose mg/dL 94   BUN mg/dL 14   Creatinine mg/dL 0.8   Albumin g/dL 3.2*   Total Bilirubin mg/dL 0.6   Alkaline Phosphatase U/L 74   AST U/L 17   ALT U/L 19       Drug levels (last 3 results):  No results for input(s): "VANCOMYCINRA", "VANCORANDOM", "VANCOMYCINPE", "VANCOPEAK", "VANCOMYCINTR", "VANCOTROUGH" in the last 72 hours.    Microbiologic Results:  Microbiology Results (last 7 days)       Procedure Component Value Units Date/Time    Blood Culture #1 **CANNOT BE ORDERED STAT** [[id number]] Collected: 08/18/23 " 2113    Order Status: Sent Specimen: Blood from Peripheral, Forearm, Right Updated: 08/18/23 2146    Blood Culture #2 **CANNOT BE ORDERED STAT** [[id number]] Collected: 08/18/23 2113    Order Status: Sent Specimen: Blood from Peripheral, Forearm, Right Updated: 08/18/23 2146

## 2023-08-20 LAB
ALBUMIN SERPL BCP-MCNC: 2.9 G/DL (ref 3.5–5.2)
ALP SERPL-CCNC: 69 U/L (ref 55–135)
ALT SERPL W/O P-5'-P-CCNC: 17 U/L (ref 10–44)
ANION GAP SERPL CALC-SCNC: 8 MMOL/L (ref 8–16)
AST SERPL-CCNC: 20 U/L (ref 10–40)
BASOPHILS # BLD AUTO: 0.06 K/UL (ref 0–0.2)
BASOPHILS NFR BLD: 0.7 % (ref 0–1.9)
BILIRUB SERPL-MCNC: 0.3 MG/DL (ref 0.1–1)
BUN SERPL-MCNC: 18 MG/DL (ref 8–23)
CALCIUM SERPL-MCNC: 8.4 MG/DL (ref 8.7–10.5)
CHLORIDE SERPL-SCNC: 100 MMOL/L (ref 95–110)
CO2 SERPL-SCNC: 26 MMOL/L (ref 23–29)
CREAT SERPL-MCNC: 0.8 MG/DL (ref 0.5–1.4)
DIFFERENTIAL METHOD: ABNORMAL
EOSINOPHIL # BLD AUTO: 0.4 K/UL (ref 0–0.5)
EOSINOPHIL NFR BLD: 4.3 % (ref 0–8)
ERYTHROCYTE [DISTWIDTH] IN BLOOD BY AUTOMATED COUNT: 14.5 % (ref 11.5–14.5)
EST. GFR  (NO RACE VARIABLE): >60 ML/MIN/1.73 M^2
GLUCOSE SERPL-MCNC: 76 MG/DL (ref 70–110)
HCT VFR BLD AUTO: 37.5 % (ref 37–48.5)
HGB BLD-MCNC: 11.7 G/DL (ref 12–16)
IMM GRANULOCYTES # BLD AUTO: 0.05 K/UL (ref 0–0.04)
IMM GRANULOCYTES NFR BLD AUTO: 0.6 % (ref 0–0.5)
LYMPHOCYTES # BLD AUTO: 1 K/UL (ref 1–4.8)
LYMPHOCYTES NFR BLD: 11.2 % (ref 18–48)
MAGNESIUM SERPL-MCNC: 1.7 MG/DL (ref 1.6–2.6)
MCH RBC QN AUTO: 29.2 PG (ref 27–31)
MCHC RBC AUTO-ENTMCNC: 31.2 G/DL (ref 32–36)
MCV RBC AUTO: 94 FL (ref 82–98)
MONOCYTES # BLD AUTO: 1 K/UL (ref 0.3–1)
MONOCYTES NFR BLD: 11 % (ref 4–15)
NEUTROPHILS # BLD AUTO: 6.4 K/UL (ref 1.8–7.7)
NEUTROPHILS NFR BLD: 72.2 % (ref 38–73)
NRBC BLD-RTO: 0 /100 WBC
PHOSPHATE SERPL-MCNC: 3.1 MG/DL (ref 2.7–4.5)
PLATELET # BLD AUTO: 223 K/UL (ref 150–450)
PMV BLD AUTO: 9.1 FL (ref 9.2–12.9)
POTASSIUM SERPL-SCNC: 3.9 MMOL/L (ref 3.5–5.1)
PROT SERPL-MCNC: 5.6 G/DL (ref 6–8.4)
RBC # BLD AUTO: 4.01 M/UL (ref 4–5.4)
SODIUM SERPL-SCNC: 134 MMOL/L (ref 136–145)
VANCOMYCIN TROUGH SERPL-MCNC: 9.9 UG/ML (ref 10–22)
WBC # BLD AUTO: 8.82 K/UL (ref 3.9–12.7)

## 2023-08-20 PROCEDURE — 21400001 HC TELEMETRY ROOM

## 2023-08-20 PROCEDURE — 99223 1ST HOSP IP/OBS HIGH 75: CPT | Mod: ,,, | Performed by: SURGERY

## 2023-08-20 PROCEDURE — 84100 ASSAY OF PHOSPHORUS: CPT | Performed by: STUDENT IN AN ORGANIZED HEALTH CARE EDUCATION/TRAINING PROGRAM

## 2023-08-20 PROCEDURE — 94640 AIRWAY INHALATION TREATMENT: CPT

## 2023-08-20 PROCEDURE — 63600175 PHARM REV CODE 636 W HCPCS: Performed by: STUDENT IN AN ORGANIZED HEALTH CARE EDUCATION/TRAINING PROGRAM

## 2023-08-20 PROCEDURE — 80053 COMPREHEN METABOLIC PANEL: CPT | Performed by: STUDENT IN AN ORGANIZED HEALTH CARE EDUCATION/TRAINING PROGRAM

## 2023-08-20 PROCEDURE — 97162 PT EVAL MOD COMPLEX 30 MIN: CPT

## 2023-08-20 PROCEDURE — 25000003 PHARM REV CODE 250: Performed by: INTERNAL MEDICINE

## 2023-08-20 PROCEDURE — 36415 COLL VENOUS BLD VENIPUNCTURE: CPT | Performed by: INTERNAL MEDICINE

## 2023-08-20 PROCEDURE — 99900035 HC TECH TIME PER 15 MIN (STAT)

## 2023-08-20 PROCEDURE — 94761 N-INVAS EAR/PLS OXIMETRY MLT: CPT

## 2023-08-20 PROCEDURE — 83735 ASSAY OF MAGNESIUM: CPT | Performed by: STUDENT IN AN ORGANIZED HEALTH CARE EDUCATION/TRAINING PROGRAM

## 2023-08-20 PROCEDURE — 99223 PR INITIAL HOSPITAL CARE,LEVL III: ICD-10-PCS | Mod: ,,, | Performed by: SURGERY

## 2023-08-20 PROCEDURE — 36415 COLL VENOUS BLD VENIPUNCTURE: CPT | Performed by: STUDENT IN AN ORGANIZED HEALTH CARE EDUCATION/TRAINING PROGRAM

## 2023-08-20 PROCEDURE — 25000003 PHARM REV CODE 250: Performed by: STUDENT IN AN ORGANIZED HEALTH CARE EDUCATION/TRAINING PROGRAM

## 2023-08-20 PROCEDURE — 27000221 HC OXYGEN, UP TO 24 HOURS

## 2023-08-20 PROCEDURE — 25000242 PHARM REV CODE 250 ALT 637 W/ HCPCS: Performed by: INTERNAL MEDICINE

## 2023-08-20 PROCEDURE — 85025 COMPLETE CBC W/AUTO DIFF WBC: CPT | Performed by: STUDENT IN AN ORGANIZED HEALTH CARE EDUCATION/TRAINING PROGRAM

## 2023-08-20 PROCEDURE — 97165 OT EVAL LOW COMPLEX 30 MIN: CPT

## 2023-08-20 PROCEDURE — 80202 ASSAY OF VANCOMYCIN: CPT | Performed by: INTERNAL MEDICINE

## 2023-08-20 RX ADMIN — CILOSTAZOL 50 MG: 50 TABLET ORAL at 09:08

## 2023-08-20 RX ADMIN — LEVALBUTEROL 1.25 MG: 1.25 SOLUTION, CONCENTRATE RESPIRATORY (INHALATION) at 12:08

## 2023-08-20 RX ADMIN — LEVALBUTEROL 1.25 MG: 1.25 SOLUTION, CONCENTRATE RESPIRATORY (INHALATION) at 03:08

## 2023-08-20 RX ADMIN — VANCOMYCIN HYDROCHLORIDE 1000 MG: 1 INJECTION, POWDER, LYOPHILIZED, FOR SOLUTION INTRAVENOUS at 10:08

## 2023-08-20 RX ADMIN — ASPIRIN 81 MG 81 MG: 81 TABLET ORAL at 09:08

## 2023-08-20 RX ADMIN — CITALOPRAM HYDROBROMIDE 20 MG: 20 TABLET ORAL at 09:08

## 2023-08-20 RX ADMIN — ACETAMINOPHEN 1000 MG: 500 TABLET ORAL at 09:08

## 2023-08-20 RX ADMIN — IPRATROPIUM BROMIDE 0.5 MG: 0.5 SOLUTION RESPIRATORY (INHALATION) at 03:08

## 2023-08-20 RX ADMIN — LEVALBUTEROL 1.25 MG: 1.25 SOLUTION, CONCENTRATE RESPIRATORY (INHALATION) at 07:08

## 2023-08-20 RX ADMIN — ATORVASTATIN CALCIUM 40 MG: 40 TABLET, FILM COATED ORAL at 09:08

## 2023-08-20 RX ADMIN — OXYCODONE HYDROCHLORIDE 10 MG: 10 TABLET ORAL at 05:08

## 2023-08-20 RX ADMIN — IPRATROPIUM BROMIDE 0.5 MG: 0.5 SOLUTION RESPIRATORY (INHALATION) at 07:08

## 2023-08-20 RX ADMIN — CEFTRIAXONE 2 G: 2 INJECTION, POWDER, FOR SOLUTION INTRAMUSCULAR; INTRAVENOUS at 03:08

## 2023-08-20 RX ADMIN — ACETAMINOPHEN 1000 MG: 500 TABLET ORAL at 06:08

## 2023-08-20 RX ADMIN — OXYCODONE HYDROCHLORIDE 10 MG: 10 TABLET ORAL at 09:08

## 2023-08-20 RX ADMIN — IPRATROPIUM BROMIDE 0.5 MG: 0.5 SOLUTION RESPIRATORY (INHALATION) at 12:08

## 2023-08-20 RX ADMIN — ACETAMINOPHEN 1000 MG: 500 TABLET ORAL at 03:08

## 2023-08-20 RX ADMIN — METOPROLOL TARTRATE 50 MG: 50 TABLET, FILM COATED ORAL at 09:08

## 2023-08-20 RX ADMIN — ENOXAPARIN SODIUM 40 MG: 40 INJECTION SUBCUTANEOUS at 05:08

## 2023-08-20 NOTE — PT/OT/SLP EVAL
Occupational Therapy   Evaluation    Name: Radha Sotelo  MRN: [id number]  Admitting Diagnosis: Cellulitis and abscess of foot  Recent Surgery: * No surgery found *      Recommendations:     Discharge Recommendations: nursing facility, skilled  Discharge Equipment Recommendations:  none  Barriers to discharge:  Inaccessible home environment (lives on second floor)    Assessment:     Radha Sotelo is a 78 y.o. female with a medical diagnosis of Cellulitis and abscess of foot.  She presents with pain in RLE especially when seated EOB. Pt. Reported being fatigued from going toprocedures already this am. Pt. Is typically Mod I with mobility and self-care tasks and is recommended to continue OT services to maximize safety and I with ADL tasks.  Performance deficits affecting function: weakness, impaired endurance, impaired self care skills, impaired functional mobility, gait instability, pain, edema, impaired skin, decreased lower extremity function, impaired sensation.      Rehab Prognosis: Good; patient would benefit from acute skilled OT services to address these deficits and reach maximum level of function.       Plan:     Patient to be seen 3 x/week to address the above listed problems via self-care/home management, therapeutic activities, therapeutic exercises, neuromuscular re-education  Plan of Care Expires: 09/19/23  Plan of Care Reviewed with: patient    Subjective     Chief Complaint: Pain in right foot with sitting EOB  Patient/Family Comments/goals: To have foot get better     Occupational Profile:  Living Environment: Pt. Resides in second floor apartment with 17 y.o. granddaughter. Pt. Has full flight of steps to enter with HR. Pt. Has a tub shower with a shower chair.   Previous level of function: Pt. Reports Mod I with mobility and ADL tasks PTA  Roles and Routines: caretaker of self, community dweller  Equipment Used at Home: walker, rolling, shower chair  Assistance upon Discharge:  limited    Pain/Comfort:  Pain Rating 1:  (reported it was tolerable initially)  Location - Side 1: Right  Location 1: foot (TMA site)  Pain Addressed 1: Reposition, Distraction, Pre-medicate for activity  Pain Rating Post-Intervention 1:  (pain increased when sitting EOB)    Patients cultural, spiritual, Zoroastrianism conflicts given the current situation: no    Objective:     Communicated with: nurse prior to session.  Patient found supine with oxygen upon OT entry to room.    General Precautions: Standard, fall  Orthopedic Precautions: N/A  Braces: N/A  Respiratory Status: Nasal cannula, flow 2 L/min    Occupational Performance:    Bed Mobility:    Patient completed Rolling/Turning to Right with independence  Patient completed Scooting/Bridging with modified independence  Patient completed Supine to Sit with supervision  Sit to supine : SBA    Functional Mobility/Transfers:  Pt. Declined 2/2 pain  Functional Mobility: not tested    Activities of Daily Living:  Pt. Declined reporting already did stuff this am and she is tired    Cognitive/Visual Perceptual:  Cognitive/Psychosocial Skills:     -       Oriented to: Person, Place, Time, and Situation   -       Follows Commands/attention:Follows multistep  commands  -       Communication: clear/fluent  -       Memory: No Deficits noted  -       Safety awareness/insight to disability: intact   -       Mood/Affect/Coping skills/emotional control: Guarded  Visual/Perceptual:      -Intact .    Physical Exam:  Balance: -       sit: good  Postural examination/scapula alignment:    -       Rounded shoulders  -       Forward head  -       Posterior pelvic tilt  Skin integrity: right foot red with edema and discoloration   Edema:  Moderate in right foot  Upper Extremity Range of Motion:     -       Right Upper Extremity: WNL  -       Left Upper Extremity: WNL   Strength:    -       Right Upper Extremity: WNL  -       Left Upper Extremity: WNL    [facility] 6 Click ADL:  [facility]  Total Score: 21    Treatment & Education:  Pt. Educated on role of OT and pOC  Pt. Educated on importance of OOB/therapy    Patient left supine with all lines intact and call button in reach    GOALS:   Multidisciplinary Problems       Occupational Therapy Goals          Problem: Occupational Therapy    Goal Priority Disciplines Outcome Interventions   Occupational Therapy Goal     OT, PT/OT Ongoing, Progressing    Description: Goals to be met by: 08-31-23     Patient will increase functional independence with ADLs by performing:    UE Dressing with Set-up Assistance.  Grooming while seated with Set-up Assistance.  Toileting from toilet with Modified [place] for hygiene and clothing management.   Supine to sit with Leon.  Stand pivot transfers with Modified [place].  Toilet transfer to toilet with Modified [place].  Pt. To be I with HEP to BUE to improve level of endurance                         History:     Past Medical History:   Diagnosis Date    Anemia     Anxiety     COPD (chronic obstructive pulmonary disease)     COPD (chronic obstructive pulmonary disease) with emphysema     Coronary artery disease     Depression     Diverticulitis     Hyperlipidemia     Hypertension     PVD (peripheral vascular disease)     PVD (peripheral vascular disease)     S/P colostomy     Substance abuse     hx heavy etoh use     Tobacco abuse          Past Surgical History:   Procedure Laterality Date    COLOSTOMY      ECTOPIC PREGNANCY SURGERY      right forefoot amputation      right toe amputation      x2 toes       Time Tracking:     OT Date of Treatment: 08/20/23  OT Start Time: 0745  OT Stop Time: 0802  OT Total Time (min): 17 min    Billable Minutes:Evaluation 17    8/20/2023

## 2023-08-20 NOTE — CONSULTS
[facility] ([place] [address])  Vascular Surgery  Consult Note    Inpatient consult to Vascular Surgery  Consult performed by: Nadine Solomon MD  Consult ordered by: Samir Upton MD        Subjective:     Chief Complaint/Reason for Consult: TMA wound    History of Present Illness: Radha Sotelo is a 78 year old female with a PMH of COPD, HTN, CAD, SIADH, previous right foot wounds for which she underwent an angiogram with SFA stents and TMA by Dr. Saldaña in 2015, who represents worsening right foot pain at the distal site of her TMA.  Since her revascularization and TMA in the past, she has not had any issues. She was admitted earlier this week with a similar issue. She reports that she does not walk on the area right now due to pain.     In regards to her foot pain,  She states that she has not had any injury to the area.  She noted the pain was severe, tenderness to palpation.  No purulent drainage.  No fevers or chills. The workup during her previous admission did not reveal any osteomyelitis. The wound has now worsened with an area of necrosis.      Medications Prior to Admission   Medication Sig Dispense Refill Last Dose    albuterol (PROVENTIL/VENTOLIN HFA) 90 mcg/actuation inhaler INHALE 2 PUFFS BY MOUTH EVERY 6 HOURS AS NEEDED 25.5 g 3 8/18/2023    albuterol-ipratropium (DUO-NEB) 2.5 mg-0.5 mg/3 mL nebulizer solution USE 1 VIAL VIA NEBULIZER EVERY 6 HOURS AS NEEDED FOR WHEEZING, SHORTNESS OF BREATH 540 mL 6 8/18/2023    amoxicillin-clavulanate 875-125mg (AUGMENTIN) 875-125 mg per tablet Take 1 tablet by mouth 2 (two) times daily for 12 days 24 tablet 0 8/18/2023    aspirin 81 MG Chew Take 1 tablet (81 mg total) by mouth once daily.   8/18/2023    atorvastatin (LIPITOR) 40 MG tablet Take 1 tablet (40 mg total) by mouth once daily. 30 tablet 5 8/18/2023    budesonide-formoterol 80-4.5 mcg (SYMBICORT) 80-4.5 mcg/actuation HFAA INHALE 2 PUFFS INTO THE LUNGS TWICE DAILY. RINSE MOUTH  AFTER USE 10.2 g 6 8/18/2023    cilostazoL (PLETAL) 100 MG Tab Take 1 tablet (100 mg total) by mouth 2 (two) times daily. 60 tablet 11 8/18/2023    citalopram (CELEXA) 20 MG tablet Take 1 tablet (20 mg total) by mouth once daily. 90 tablet 3 8/18/2023    hydrocortisone-aloe vera 1 % Crea cream Apply topically 2 (two) times daily. 28 g 0 8/18/2023    ibuprofen (ADVIL,MOTRIN) 600 MG tablet Take 1 tablet (600 mg total) by mouth every 6 (six) hours as needed (moderate pain and swelling). 56 tablet 0 8/18/2023    metoprolol tartrate (LOPRESSOR) 50 MG tablet Take 1 tablet (50 mg total) by mouth 2 (two) times daily. 180 tablet 3 8/18/2023    nicotine (NICODERM CQ) 21 mg/24 hr Place 1 patch onto the skin once daily. 28 patch 11 8/18/2023    clopidogrel (PLAVIX) 75 mg tablet Take 1 tablet (75 mg total) by mouth once daily. (Patient not taking: Reported on 8/11/2023) 30 tablet 11     doxycycline (VIBRA-TABS) 100 MG tablet Take 1 tablet (100 mg total) by mouth every 12 (twelve) hours. for 12 days 24 tablet 0     ondansetron (ZOFRAN-ODT) 4 MG TbDL Dissolve 1 tablet (4 mg total) by mouth every 6 (six) hours as needed. 30 tablet 0        Review of patient's allergies indicates:  No Known Allergies    Past Medical History:   Diagnosis Date    Anemia     Anxiety     COPD (chronic obstructive pulmonary disease)     COPD (chronic obstructive pulmonary disease) with emphysema     Coronary artery disease     Depression     Diverticulitis     Hyperlipidemia     Hypertension     PVD (peripheral vascular disease)     PVD (peripheral vascular disease)     S/P colostomy     Substance abuse     hx heavy etoh use     Tobacco abuse      Past Surgical History:   Procedure Laterality Date    COLOSTOMY      ECTOPIC PREGNANCY SURGERY      right forefoot amputation      right toe amputation      x2 toes     Family History    None       Tobacco Use    Smoking status: Former     Current packs/day: 0.00     Average  packs/day: 0.5 packs/day for 50.0 years (25.0 ttl pk-yrs)     Types: Cigarettes     Start date: 7/5/1963     Quit date: 7/5/2013     Years since quitting: 10.1    Smokeless tobacco: Never   Substance and Sexual Activity    Alcohol use: No    Drug use: No    Sexual activity: Not on file     Review of Systems   Constitutional:  Negative for chills, diaphoresis and fever.   Respiratory:  Negative for cough and shortness of breath.    Cardiovascular:  Negative for chest pain, palpitations and leg swelling.   Musculoskeletal:  Positive for arthralgias (foot pain).   Skin:  Positive for wound.   Neurological:  Negative for dizziness, weakness and numbness.     Objective:     Vital Signs (Most Recent):  Temp: 98.6 °F (37 °C) (08/20/23 1532)  Pulse: 81 (08/20/23 1532)  Resp: 17 (08/20/23 1532)  BP: (!) 116/57 (08/20/23 1532)  SpO2: (!) 93 % (08/20/23 1532) Vital Signs (24h Range):  Temp:  [97.8 °F (36.6 °C)-98.7 °F (37.1 °C)] 98.6 °F (37 °C)  Pulse:  [57-91] 81  Resp:  [16-20] 17  SpO2:  [92 %-96 %] 93 %  BP: (106-130)/(51-59) 116/57     Weight: 64.8 kg (142 lb 13.7 oz)  Body mass index is 27.9 kg/m².      Physical Exam  Vitals and nursing note reviewed.   Constitutional:       General: She is not in acute distress.  Cardiovascular:      Rate and Rhythm: Normal rate and regular rhythm.      Comments: R: 1+ femoral, monophasic DP and PT  L: 2+ femoral, monophasic DP and PT  Pulmonary:      Effort: Pulmonary effort is normal. No respiratory distress.   Skin:     General: Skin is warm and dry.      Comments: See image below for wound   Neurological:      General: No focal deficit present.      Mental Status: She is alert and oriented to person, place, and time.            Significant Labs:  CBC:   Recent Labs   Lab 08/20/23  0527   WBC 8.82   RBC 4.01   HGB 11.7*   HCT 37.5      MCV 94   MCH 29.2   MCHC 31.2*     CMP:   Recent Labs   Lab 08/20/23  0527   GLU 76   CALCIUM 8.4*   ALBUMIN 2.9*   PROT 5.6*   *   K  3.9   CO2 26      BUN 18   CREATININE 0.8   ALKPHOS 69   ALT 17   AST 20   BILITOT 0.3     All pertinent labs from the last 24 hours have been reviewed.    Significant Diagnostics:  I have reviewed all pertinent imaging results/findings within the past 24 hours.    Assessment/Plan:     PVD (peripheral vascular disease)  78 year old female with a PMH of COPD, HTN, CAD, SIADH, previous right foot wounds for which she underwent an angiogram with SFA stents and TMA by Dr. Saldaña in 2015 now with worsening right foot pain and wounds.    - Recommend NICCI and US. Will tentatively plan for angiogram on Thursday.   - Recommend podiatry evaluation  - Continue ASA/statin        Thank you for your consult. I will follow-up with patient. Please contact us if you have any additional questions.    Nadine Solomon MD  Vascular Surgery  [facility] ([place]

## 2023-08-20 NOTE — ASSESSMENT & PLAN NOTE
Patient uses nebulizers q.6 hours.  Unfortunately due to national shortages, we are unable to provide these.  Will resume her home inhalers and provide nebulizers as able.  She is currently on 4 L of oxygen which appears to be her baseline.  She states that she is no longer smoking and denies any signs or symptoms of ongoing acute exacerbation of her COPD.    Patient information:     SpO2: (!) 93 %       mMRC Key:  0 - Dyspnea only with strenuous exercise  1 - Dyspnea when hurrying or walking up a slight hill  2 - Walks slower than people of the same age because of dyspnea or has to stop for breath when walking at own pace  3 - Stops for breath after walking 100 yards or after a few minutes  4 - Too dyspneic to leave house or breathless when dressing    Exacerbation Component:  - COPD: stable  Patient Is not showing signs of a current COPD exacerbation The patient is not currently have symptoms / an exacerbation. The patient has COPD for approximately 10+ years. Symptoms in previous episodes have included dyspnea, cough and wheezing, and typically last 5 days. Previous episodes have been exacerbated by Smoke and upper respiratory infection. Current treatment includes albuterol nebulizer, albuterol/ipratropium inhaler and oxygen, which generally provides some relief of symptoms.      Plan:  - Risk factor reduction (Smoking cessation/avoidance, management of GERD, pHTN treatment)  - Avoidance of triggers (Air pollution, respiratory infections, pulmonary embolisms)  - Complete metabolic panel  - Target SpO2 of 88-92% or PaO2 of 60-70 mmHg

## 2023-08-20 NOTE — PT/OT/SLP EVAL
"Physical Therapy Evaluation    Patient Name:  Radha Sotelo   MRN:  [id number]    Recommendations:     Discharge Recommendations: nursing facility, skilled   Discharge Equipment Recommendations: none   Barriers to discharge: None    Assessment:     Radha Sotelo is a 78 y.o. female admitted with a medical diagnosis of Cellulitis and abscess of foot.  She presents with the following impairments/functional limitations: weakness, impaired endurance, impaired functional mobility, gait instability, impaired balance, decreased lower extremity function, pain. At baseline patient is modified independent with all activity. Currently patient is primarily limited by pain, restricting additional mobility assessment. Recommending discharge to SNF.      Rehab Prognosis: Good; patient would benefit from acute skilled PT services to address these deficits and reach maximum level of function.    Recent Surgery: * No surgery found *      Plan:     During this hospitalization, patient to be seen 3 x/week to address the identified rehab impairments via gait training, therapeutic activities, therapeutic exercises, wheelchair management/training and progress toward the following goals:    Plan of Care Expires:  09/20/23    Subjective     Chief Complaint: RLE pain  Patient/Family Comments/goals: "Oh wow how am I going to get up my stairs?"  Pain/Comfort:  Pain Rating 1: 0/10  Pain Rating Post-Intervention 1:  (not rated- intolerable pain with EOB sitting)    Patients cultural, spiritual, Worship conflicts given the current situation: no    Living Environment:  Patient lives in 2nd story [facility] with 14 ANAID, BHR. Daughter lives below.  Prior to admission, patients level of function was modified indp.  Equipment used at home: walker, rolling, shower chair.  DME owned (not currently used): none.  Upon discharge, patient will have assistance from daughter.    Objective:     Communicated with RN prior to session.  Patient found HOB " elevated with oxygen  upon PT entry to room.    General Precautions: Standard, fall  Orthopedic Precautions:N/A   Braces: N/A  Respiratory Status: Nasal cannula, flow 2 L/min    Exams:  Cognitive Exam:  Patient is oriented to Person, Place, Time, and Situation  RLE ROM: WFL  RLE Strength: unable to be assessed due to pain  LLE ROM: WFL  LLE Strength: WNL    Functional Mobility:  Bed Mobility:     Scooting: supervision  Supine to Sit: supervision  Sit to Supine: supervision  Patient able to tolerate ~60 seconds EOB before returning supine due to pain      AM-PAC 6 CLICK MOBILITY  Total Score:17       Treatment & Education:  Education: patient educated on POC, need for therapy, safety with mobility, discharge recommendations and equipment recommendations. Patient verbalized understanding of all topics.       Patient left HOB elevated with all lines intact and call button in reach.    GOALS:   Multidisciplinary Problems       Physical Therapy Goals          Problem: Physical Therapy    Goal Priority Disciplines Outcome Goal Variances Interventions   Physical Therapy Goal     PT, PT/OT Ongoing, Progressing     Description: Goals to be met by: 23    Patient will increase functional independence with mobility by performin. Sit to stand transfer with [place]  2. Bed to chair transfer with Modified [place] using Rolling Walker  3. Gait  x 150 feet with Modified [place] using Rolling Walker.   4. Ascend/descend 14 stair with bilateral Handrails [place] using No Assistive Device.   5. Sitting at edge of bed x2 minutes with [place]  6. Stand for 2 minutes with [place] using No Assistive Device                         History:     Past Medical History:   Diagnosis Date    Anemia     Anxiety     COPD (chronic obstructive pulmonary disease)     COPD (chronic obstructive pulmonary disease) with emphysema     Coronary artery disease     Depression     Diverticulitis     Hyperlipidemia      Hypertension     PVD (peripheral vascular disease)     PVD (peripheral vascular disease)     S/P colostomy     Substance abuse     hx heavy etoh use     Tobacco abuse        Past Surgical History:   Procedure Laterality Date    COLOSTOMY      ECTOPIC PREGNANCY SURGERY      right forefoot amputation      right toe amputation      x2 toes       Time Tracking:     PT Received On: 08/20/23  PT Start Time: 1222     PT Stop Time: 1235  PT Total Time (min): 13 min     Billable Minutes: Evaluation 13 minutes      08/20/2023

## 2023-08-20 NOTE — SUBJECTIVE & OBJECTIVE
Medications Prior to Admission   Medication Sig Dispense Refill Last Dose    albuterol (PROVENTIL/VENTOLIN HFA) 90 mcg/actuation inhaler INHALE 2 PUFFS BY MOUTH EVERY 6 HOURS AS NEEDED 25.5 g 3 8/18/2023    albuterol-ipratropium (DUO-NEB) 2.5 mg-0.5 mg/3 mL nebulizer solution USE 1 VIAL VIA NEBULIZER EVERY 6 HOURS AS NEEDED FOR WHEEZING, SHORTNESS OF BREATH 540 mL 6 8/18/2023    amoxicillin-clavulanate 875-125mg (AUGMENTIN) 875-125 mg per tablet Take 1 tablet by mouth 2 (two) times daily for 12 days 24 tablet 0 8/18/2023    aspirin 81 MG Chew Take 1 tablet (81 mg total) by mouth once daily.   8/18/2023    atorvastatin (LIPITOR) 40 MG tablet Take 1 tablet (40 mg total) by mouth once daily. 30 tablet 5 8/18/2023    budesonide-formoterol 80-4.5 mcg (SYMBICORT) 80-4.5 mcg/actuation HFAA INHALE 2 PUFFS INTO THE LUNGS TWICE DAILY. RINSE MOUTH AFTER USE 10.2 g 6 8/18/2023    cilostazoL (PLETAL) 100 MG Tab Take 1 tablet (100 mg total) by mouth 2 (two) times daily. 60 tablet 11 8/18/2023    citalopram (CELEXA) 20 MG tablet Take 1 tablet (20 mg total) by mouth once daily. 90 tablet 3 8/18/2023    hydrocortisone-aloe vera 1 % Crea cream Apply topically 2 (two) times daily. 28 g 0 8/18/2023    ibuprofen (ADVIL,MOTRIN) 600 MG tablet Take 1 tablet (600 mg total) by mouth every 6 (six) hours as needed (moderate pain and swelling). 56 tablet 0 8/18/2023    metoprolol tartrate (LOPRESSOR) 50 MG tablet Take 1 tablet (50 mg total) by mouth 2 (two) times daily. 180 tablet 3 8/18/2023    nicotine (NICODERM CQ) 21 mg/24 hr Place 1 patch onto the skin once daily. 28 patch 11 8/18/2023    clopidogrel (PLAVIX) 75 mg tablet Take 1 tablet (75 mg total) by mouth once daily. (Patient not taking: Reported on 8/11/2023) 30 tablet 11     doxycycline (VIBRA-TABS) 100 MG tablet Take 1 tablet (100 mg total) by mouth every 12 (twelve) hours. for 12 days 24 tablet 0     ondansetron (ZOFRAN-ODT) 4 MG TbDL Dissolve 1 tablet (4 mg total) by mouth every 6  (six) hours as needed. 30 tablet 0        Review of patient's allergies indicates:  No Known Allergies    Past Medical History:   Diagnosis Date    Anemia     Anxiety     COPD (chronic obstructive pulmonary disease)     COPD (chronic obstructive pulmonary disease) with emphysema     Coronary artery disease     Depression     Diverticulitis     Hyperlipidemia     Hypertension     PVD (peripheral vascular disease)     PVD (peripheral vascular disease)     S/P colostomy     Substance abuse     hx heavy etoh use     Tobacco abuse      Past Surgical History:   Procedure Laterality Date    COLOSTOMY      ECTOPIC PREGNANCY SURGERY      right forefoot amputation      right toe amputation      x2 toes     Family History    None       Tobacco Use    Smoking status: Former     Current packs/day: 0.00     Average packs/day: 0.5 packs/day for 50.0 years (25.0 ttl pk-yrs)     Types: Cigarettes     Start date: 7/5/1963     Quit date: 7/5/2013     Years since quitting: 10.1    Smokeless tobacco: Never   Substance and Sexual Activity    Alcohol use: No    Drug use: No    Sexual activity: Not on file     Review of Systems   Constitutional:  Negative for chills, diaphoresis and fever.   Respiratory:  Negative for cough and shortness of breath.    Cardiovascular:  Negative for chest pain, palpitations and leg swelling.   Musculoskeletal:  Positive for arthralgias (foot pain).   Skin:  Positive for wound.   Neurological:  Negative for dizziness, weakness and numbness.     Objective:     Vital Signs (Most Recent):  Temp: 98.6 °F (37 °C) (08/20/23 1532)  Pulse: 81 (08/20/23 1532)  Resp: 17 (08/20/23 1532)  BP: (!) 116/57 (08/20/23 1532)  SpO2: (!) 93 % (08/20/23 1532) Vital Signs (24h Range):  Temp:  [97.8 °F (36.6 °C)-98.7 °F (37.1 °C)] 98.6 °F (37 °C)  Pulse:  [57-91] 81  Resp:  [16-20] 17  SpO2:  [92 %-96 %] 93 %  BP: (106-130)/(51-59) 116/57     Weight: 64.8 kg (142 lb 13.7 oz)  Body mass index is 27.9 kg/m².      Physical  Exam  Vitals and nursing note reviewed.   Constitutional:       General: She is not in acute distress.  Cardiovascular:      Rate and Rhythm: Normal rate and regular rhythm.      Comments: R: 1+ femoral, monophasic DP and PT  L: 2+ femoral, monophasic DP and PT  Pulmonary:      Effort: Pulmonary effort is normal. No respiratory distress.   Skin:     General: Skin is warm and dry.      Comments: See image below for wound   Neurological:      General: No focal deficit present.      Mental Status: She is alert and oriented to person, place, and time.            Significant Labs:  CBC:   Recent Labs   Lab 08/20/23  0527   WBC 8.82   RBC 4.01   HGB 11.7*   HCT 37.5      MCV 94   MCH 29.2   MCHC 31.2*     CMP:   Recent Labs   Lab 08/20/23 0527   GLU 76   CALCIUM 8.4*   ALBUMIN 2.9*   PROT 5.6*   *   K 3.9   CO2 26      BUN 18   CREATININE 0.8   ALKPHOS 69   ALT 17   AST 20   BILITOT 0.3     All pertinent labs from the last 24 hours have been reviewed.    Significant Diagnostics:  I have reviewed all pertinent imaging results/findings within the past 24 hours.

## 2023-08-20 NOTE — HPI
Radha Sotelo is a 78 year old female with a PMH of COPD, HTN, CAD, SIADH, previous right foot wounds for which she underwent an angiogram with SFA stents and TMA by Dr. Saldaña in 2015, who represents worsening right foot pain at the distal site of her TMA.  Since her revascularization and TMA in the past, she has not had any issues. She was admitted earlier this week with a similar issue. She reports that she does not walk on the area right now due to pain.     In regards to her foot pain,  She states that she has not had any injury to the area.  She noted the pain was severe, tenderness to palpation.  No purulent drainage.  No fevers or chills. The workup during her previous admission did not reveal any osteomyelitis. The wound has now worsened with an area of necrosis.

## 2023-08-20 NOTE — PROGRESS NOTES
[facility] ([address] Medicine  Progress Note    Patient Name: Radha Sotelo  MRN: [id number]  Patient Class: IP- Inpatient   Admission Date: 8/18/2023  Length of Stay: 1 days  Attending Physician: Samir Upton MD  Primary Care Provider: Laurence, Primary Doctor        Subjective:     Principal Problem:Cellulitis and abscess of foot        HPI:  Patient is a 78-year-old female with past medical history significant for severe COPD on home O2 for chronic hypoxic respiratory failure (4 L via nasal cannula), CAD, PAT, previous osteomyelitis with transmetatarsal amputation (2012) who presented to the emergency department due to worsening right foot pain with associated discoloration.  She had a similar presentation approximately 10 days ago and was started on broad-spectrum antibiotics.  Since that time she is been having worsening pain associated with the right leg as well as discoloration with possible deep blistering.  An x-ray was done in the emergency department which demonstrated no erosive changes of osteomyelitis.  She does have some overlying right erythema of the affected area as well as dark discoloration with possible blood blister present.  There is probably some fluctuance however complete evaluation was limited secondary to patient intolerance.  She has a black spot which does not appear to be leaking any purulent fluid.  She denies any fevers, chills, shortness of breath, chest pain, or intolerance of lying flat.  She states that she has been dealing with progressive lower extremity swelling bilaterally.  She does not take any diuretics for this and it is a new problem.           Overview/Hospital Course:  No notes on file    Interval History: NAEON. Pt still c/o significant pain to RLE stump. Discoloration noted which hasn't changed since admission, but warm to touch. Denies fever/chills.     Review of Systems   Constitutional:  Negative for chills and fever.   HENT:   Negative for congestion and sore throat.    Eyes:  Negative for visual disturbance.   Respiratory:  Negative for chest tightness and shortness of breath.    Cardiovascular:  Negative for chest pain and palpitations.   Gastrointestinal:  Negative for abdominal distention, abdominal pain, constipation, diarrhea, nausea and vomiting.   Musculoskeletal:  Positive for joint swelling. Negative for back pain.   Skin:  Positive for color change, rash and wound.   Neurological:  Negative for dizziness, weakness and headaches.   Hematological:  Does not bruise/bleed easily.     Objective:     Vital Signs (Most Recent):  Temp: 98.7 °F (37.1 °C) (08/20/23 0844)  Pulse: 83 (08/20/23 1056)  Resp: 18 (08/20/23 0742)  BP: (!) 117/55 (08/20/23 0844)  SpO2: (!) 93 % (08/20/23 0844) Vital Signs (24h Range):  Temp:  [97.8 °F (36.6 °C)-98.7 °F (37.1 °C)] 98.7 °F (37.1 °C)  Pulse:  [57-88] 83  Resp:  [16-21] 18  SpO2:  [93 %-96 %] 93 %  BP: (106-123)/(51-57) 117/55     Weight: 64.8 kg (142 lb 13.7 oz)  Body mass index is 27.9 kg/m².    Intake/Output Summary (Last 24 hours) at 8/20/2023 1057  Last data filed at 8/20/2023 0601  Gross per 24 hour   Intake 650 ml   Output --   Net 650 ml           Physical Exam  Vitals and nursing note reviewed.   Constitutional:       General: She is not in acute distress.     Appearance: Normal appearance. She is obese. She is not ill-appearing or toxic-appearing.   HENT:      Head: Normocephalic and atraumatic.      Mouth/Throat:      Mouth: Mucous membranes are dry.   Eyes:      General: No scleral icterus.  Cardiovascular:      Rate and Rhythm: Normal rate and regular rhythm.      Pulses: Normal pulses.      Heart sounds: Normal heart sounds.   Pulmonary:      Effort: Pulmonary effort is normal. No respiratory distress.      Breath sounds: Normal breath sounds.   Abdominal:      General: There is no distension.      Palpations: Abdomen is soft. There is no mass.      Tenderness: There is no abdominal  tenderness. There is no right CVA tenderness or left CVA tenderness.   Musculoskeletal:         General: Swelling, tenderness and deformity present.      Cervical back: No rigidity.      Right lower leg: Edema present.      Left lower leg: Edema present.   Lymphadenopathy:      Cervical: No cervical adenopathy.   Skin:     General: Skin is warm and dry.      Capillary Refill: Poor peripheral pulses in all 4 extremities.     Coloration: Skin is not jaundiced.   Neurological:      General: No focal deficit present.      Mental Status: She is alert and oriented to person, place, and time.             Significant Labs: All pertinent labs within the past 24 hours have been reviewed.    Significant Imaging: I have reviewed all pertinent imaging results/findings within the past 24 hours.      Assessment/Plan:      * Cellulitis and abscess of foot  Patient is a 78-year-old female with previous history of chronic osteo with unhealing wound status post transmetatarsal amputation who is now presenting after a recent hospitalization with worsening right lower extremity swelling, erythema, and pain.  She states that it is much worse than normal and that she is developed a black rash over the affected area.  She denies any overt purulence or opening skin however she remains very tender to palpation.  X-ray at this time does not show any erosive changes and stable from previous hospitalization 10 days ago.  Will obtain an MRI to definitively rule out the presence of osteomyelitis given mildly elevated inflammatory markers which are sensitive but not specific    Non-surgical site of infection and Non-purulent Cellulitis  (ICD 10: L03.90)    There is not an abscess evident.    There is the presence of:   - Rapid progression    There is not presence or evidence of involvement of indwelling medical device    Empiric Vancomycin is based on the presence of Recurrence despite previous treatment    tetanus status unknown to the  patient    There is not the presence of an immunocompromising condition requiring consideration for fungal, virus, or atypical organisms.    Plan:  - Vancomycin and ceftriaxone  - Blood cultures pending  - MRI foot w contrast shows nonspecific diffuse subcutaneous edema and redemonstration of a small nonspecific fluid collection within the soft tissues at the level the resection stump, no abscess or OM identified. Limited incomplete examination as the patient was unable to tolerate the study.   - Elevation of affected area and pain control  - PT/OT recommending SNF  - Still c/o significant pain to RLE stump. Will get arterial doppler and consult vascular surgery      Chronic obstructive pulmonary disease  Patient uses nebulizers q.6 hours.  Unfortunately due to national shortages, we are unable to provide these.  Will resume her home inhalers and provide nebulizers as able.  She is currently on 4 L of oxygen which appears to be her baseline.  She states that she is no longer smoking and denies any signs or symptoms of ongoing acute exacerbation of her COPD.    Patient information:     SpO2: (!) 93 %       mMRC Key:  0 - Dyspnea only with strenuous exercise  1 - Dyspnea when hurrying or walking up a slight hill  2 - Walks slower than people of the same age because of dyspnea or has to stop for breath when walking at own pace  3 - Stops for breath after walking 100 yards or after a few minutes  4 - Too dyspneic to leave house or breathless when dressing    Exacerbation Component:  - COPD: stable  Patient Is not showing signs of a current COPD exacerbation The patient is not currently have symptoms / an exacerbation. The patient has COPD for approximately 10+ years. Symptoms in previous episodes have included dyspnea, cough and wheezing, and typically last 5 days. Previous episodes have been exacerbated by Smoke and upper respiratory infection. Current treatment includes albuterol nebulizer, albuterol/ipratropium  "inhaler and oxygen, which generally provides some relief of symptoms.      Plan:  - Risk factor reduction (Smoking cessation/avoidance, management of GERD, pHTN treatment)  - Avoidance of triggers (Air pollution, respiratory infections, pulmonary embolisms)  - Complete metabolic panel  - Target SpO2 of 88-92% or PaO2 of 60-70 mmHg              Coronary artery disease  No ongoing chest pain.  Will continue to monitor and may need preoperative evaluation if surgery is warranted.      PVD (peripheral vascular disease)  Patient seen by Dr. Saldaña but has not been seen since 2015.    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  Now has discoloration to RLE stump. Will get arterial doppler and consult vascular surgery      Unspecified protein-calorie malnutrition  Nutrition consulted. Most recent weight and BMI monitored-     Measurements:  Wt Readings from Last 1 Encounters:   08/19/23 64.8 kg (142 lb 13.7 oz)   Body mass index is 27.9 kg/m².    Patient to be screened and assessed by RD.  Previous protein calorie malnutrition.          VTE Risk Mitigation (From admission, onward)         Ordered     enoxaparin injection 40 mg  Daily         08/19/23 0020     Place sequential compression device  Until discontinued         08/19/23 0020                Discharge Planning   FLORIAN:      Code Status: Full Code   Is the patient " medically ready for discharge?:     Reason for patient still in hospital (select all that apply): Treatment and Consult recommendations  Discharge Plan A: Home with family                  Samir Upton MD  Department of Hospital Medicine   [facility] - Intensive Care ([place]

## 2023-08-20 NOTE — ASSESSMENT & PLAN NOTE
78 year old female with a PMH of COPD, HTN, CAD, SIADH, previous right foot wounds for which she underwent an angiogram with SFA stents and TMA by Dr. Saldaña in 2015 now with worsening right foot pain and wounds.    - Recommend NICCI and US. Will tentatively plan for angiogram on Thursday.   - Recommend podiatry evaluation  - Continue ASA/statin

## 2023-08-20 NOTE — ASSESSMENT & PLAN NOTE
"Patient seen by Dr. Saldaña but has not been seen since 2015.    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  Now has discoloration to RLE stump. Will get arterial doppler and consult vascular surgery    "

## 2023-08-20 NOTE — PLAN OF CARE
Problem: Physical Therapy  Goal: Physical Therapy Goal  Description: Goals to be met by: 23    Patient will increase functional independence with mobility by performin. Sit to stand transfer with [place]  2. Bed to chair transfer with Modified [place] using Rolling Walker  3. Gait  x 150 feet with Modified [place] using Rolling Walker.   4. Ascend/descend 14 stair with bilateral Handrails [place] using No Assistive Device.   5. Sitting at edge of bed x2 minutes with [place]  6. Stand for 2 minutes with [place] using No Assistive Device    Outcome: Ongoing, Progressing     PT eval completed and goals established. Pt will begin PT POC, progressing as tolerated..

## 2023-08-20 NOTE — SUBJECTIVE & OBJECTIVE
Interval History: NAEON. Pt still c/o significant pain to RLE stump. Discoloration noted which hasn't changed since admission, but warm to touch. Denies fever/chills.     Review of Systems   Constitutional:  Negative for chills and fever.   HENT:  Negative for congestion and sore throat.    Eyes:  Negative for visual disturbance.   Respiratory:  Negative for chest tightness and shortness of breath.    Cardiovascular:  Negative for chest pain and palpitations.   Gastrointestinal:  Negative for abdominal distention, abdominal pain, constipation, diarrhea, nausea and vomiting.   Musculoskeletal:  Positive for joint swelling. Negative for back pain.   Skin:  Positive for color change, rash and wound.   Neurological:  Negative for dizziness, weakness and headaches.   Hematological:  Does not bruise/bleed easily.     Objective:     Vital Signs (Most Recent):  Temp: 98.7 °F (37.1 °C) (08/20/23 0844)  Pulse: 83 (08/20/23 1056)  Resp: 18 (08/20/23 0742)  BP: (!) 117/55 (08/20/23 0844)  SpO2: (!) 93 % (08/20/23 0844) Vital Signs (24h Range):  Temp:  [97.8 °F (36.6 °C)-98.7 °F (37.1 °C)] 98.7 °F (37.1 °C)  Pulse:  [57-88] 83  Resp:  [16-21] 18  SpO2:  [93 %-96 %] 93 %  BP: (106-123)/(51-57) 117/55     Weight: 64.8 kg (142 lb 13.7 oz)  Body mass index is 27.9 kg/m².    Intake/Output Summary (Last 24 hours) at 8/20/2023 1057  Last data filed at 8/20/2023 0601  Gross per 24 hour   Intake 650 ml   Output --   Net 650 ml           Physical Exam  Vitals and nursing note reviewed.   Constitutional:       General: She is not in acute distress.     Appearance: Normal appearance. She is obese. She is not ill-appearing or toxic-appearing.   HENT:      Head: Normocephalic and atraumatic.      Mouth/Throat:      Mouth: Mucous membranes are dry.   Eyes:      General: No scleral icterus.  Cardiovascular:      Rate and Rhythm: Normal rate and regular rhythm.      Pulses: Normal pulses.      Heart sounds: Normal heart sounds.   Pulmonary:       Effort: Pulmonary effort is normal. No respiratory distress.      Breath sounds: Normal breath sounds.   Abdominal:      General: There is no distension.      Palpations: Abdomen is soft. There is no mass.      Tenderness: There is no abdominal tenderness. There is no right CVA tenderness or left CVA tenderness.   Musculoskeletal:         General: Swelling, tenderness and deformity present.      Cervical back: No rigidity.      Right lower leg: Edema present.      Left lower leg: Edema present.   Lymphadenopathy:      Cervical: No cervical adenopathy.   Skin:     General: Skin is warm and dry.      Capillary Refill: Poor peripheral pulses in all 4 extremities.     Coloration: Skin is not jaundiced.   Neurological:      General: No focal deficit present.      Mental Status: She is alert and oriented to person, place, and time.             Significant Labs: All pertinent labs within the past 24 hours have been reviewed.    Significant Imaging: I have reviewed all pertinent imaging results/findings within the past 24 hours.

## 2023-08-20 NOTE — ASSESSMENT & PLAN NOTE
Patient is a 78-year-old female with previous history of chronic osteo with unhealing wound status post transmetatarsal amputation who is now presenting after a recent hospitalization with worsening right lower extremity swelling, erythema, and pain.  She states that it is much worse than normal and that she is developed a black rash over the affected area.  She denies any overt purulence or opening skin however she remains very tender to palpation.  X-ray at this time does not show any erosive changes and stable from previous hospitalization 10 days ago.  Will obtain an MRI to definitively rule out the presence of osteomyelitis given mildly elevated inflammatory markers which are sensitive but not specific    Non-surgical site of infection and Non-purulent Cellulitis  (ICD 10: L03.90)    There is not an abscess evident.    There is the presence of:   - Rapid progression    There is not presence or evidence of involvement of indwelling medical device    Empiric Vancomycin is based on the presence of Recurrence despite previous treatment    tetanus status unknown to the patient    There is not the presence of an immunocompromising condition requiring consideration for fungal, virus, or atypical organisms.    Plan:  - Vancomycin and ceftriaxone  - Blood cultures pending  - MRI foot w contrast shows nonspecific diffuse subcutaneous edema and redemonstration of a small nonspecific fluid collection within the soft tissues at the level the resection stump, no abscess or OM identified. Limited incomplete examination as the patient was unable to tolerate the study.   - Elevation of affected area and pain control  - PT/OT recommending SNF  - Still c/o significant pain to RLE stump. Will get arterial doppler and consult vascular surgery

## 2023-08-20 NOTE — PLAN OF CARE
Problem: Adult Inpatient Plan of Care  Goal: Plan of Care Review  Outcome: Ongoing, Progressing  Goal: Patient-Specific Goal (Individualized)  Outcome: Ongoing, Progressing  Goal: Absence of Hospital-Acquired Illness or Injury  Outcome: Ongoing, Progressing  Goal: Optimal Comfort and Wellbeing  Outcome: Ongoing, Progressing  Goal: Readiness for Transition of Care  Outcome: Ongoing, Progressing     Problem: Fluid and Electrolyte Imbalance (Acute Kidney Injury/Impairment)  Goal: Fluid and Electrolyte Balance  Outcome: Ongoing, Progressing     Problem: Oral Intake Inadequate (Acute Kidney Injury/Impairment)  Goal: Optimal Nutrition Intake  Outcome: Ongoing, Progressing     Problem: Renal Function Impairment (Acute Kidney Injury/Impairment)  Goal: Effective Renal Function  Outcome: Ongoing, Progressing     Problem: Fluid Imbalance (Pneumonia)  Goal: Fluid Balance  Outcome: Ongoing, Progressing     Problem: Infection (Pneumonia)  Goal: Resolution of Infection Signs and Symptoms  Outcome: Ongoing, Progressing     Problem: Respiratory Compromise (Pneumonia)  Goal: Effective Oxygenation and Ventilation  Outcome: Ongoing, Progressing     Problem: Impaired Wound Healing  Goal: Optimal Wound Healing  Outcome: Ongoing, Progressing     Problem: Fall Injury Risk  Goal: Absence of Fall and Fall-Related Injury  Outcome: Ongoing, Progressing

## 2023-08-20 NOTE — PLAN OF CARE
Problem: Occupational Therapy  Goal: Occupational Therapy Goal  Description: Goals to be met by: 08-31-23     Patient will increase functional independence with ADLs by performing:    UE Dressing with Set-up Assistance.  Grooming while seated with Set-up Assistance.  Toileting from toilet with Modified [place] for hygiene and clothing management.   Supine to sit with [place].  Stand pivot transfers with Modified [place].  Toilet transfer to toilet with Modified [place].  Pt. To be I with HEP to BUE to improve level of endurance    Outcome: Ongoing, Progressing

## 2023-08-21 PROBLEM — E87.6 HYPOKALEMIA: Status: ACTIVE | Noted: 2023-08-21

## 2023-08-21 LAB
ALBUMIN SERPL BCP-MCNC: 2.8 G/DL (ref 3.5–5.2)
ALP SERPL-CCNC: 124 U/L (ref 55–135)
ALT SERPL W/O P-5'-P-CCNC: 43 U/L (ref 10–44)
ANION GAP SERPL CALC-SCNC: 11 MMOL/L (ref 8–16)
AST SERPL-CCNC: 71 U/L (ref 10–40)
BASOPHILS # BLD AUTO: 0.03 K/UL (ref 0–0.2)
BASOPHILS NFR BLD: 0.3 % (ref 0–1.9)
BILIRUB SERPL-MCNC: 2.3 MG/DL (ref 0.1–1)
BUN SERPL-MCNC: 17 MG/DL (ref 8–23)
CALCIUM SERPL-MCNC: 7.9 MG/DL (ref 8.7–10.5)
CHLORIDE SERPL-SCNC: 99 MMOL/L (ref 95–110)
CO2 SERPL-SCNC: 22 MMOL/L (ref 23–29)
CREAT SERPL-MCNC: 0.7 MG/DL (ref 0.5–1.4)
DIFFERENTIAL METHOD: ABNORMAL
EOSINOPHIL # BLD AUTO: 0.2 K/UL (ref 0–0.5)
EOSINOPHIL NFR BLD: 1.7 % (ref 0–8)
ERYTHROCYTE [DISTWIDTH] IN BLOOD BY AUTOMATED COUNT: 14.4 % (ref 11.5–14.5)
EST. GFR  (NO RACE VARIABLE): >60 ML/MIN/1.73 M^2
GLUCOSE SERPL-MCNC: 100 MG/DL (ref 70–110)
GRAM STN SPEC: NORMAL
GRAM STN SPEC: NORMAL
HCT VFR BLD AUTO: 36.3 % (ref 37–48.5)
HGB BLD-MCNC: 11.4 G/DL (ref 12–16)
IMM GRANULOCYTES # BLD AUTO: 0.04 K/UL (ref 0–0.04)
IMM GRANULOCYTES NFR BLD AUTO: 0.4 % (ref 0–0.5)
LYMPHOCYTES # BLD AUTO: 0.6 K/UL (ref 1–4.8)
LYMPHOCYTES NFR BLD: 6.2 % (ref 18–48)
MAGNESIUM SERPL-MCNC: 1.6 MG/DL (ref 1.6–2.6)
MCH RBC QN AUTO: 29.5 PG (ref 27–31)
MCHC RBC AUTO-ENTMCNC: 31.4 G/DL (ref 32–36)
MCV RBC AUTO: 94 FL (ref 82–98)
MONOCYTES # BLD AUTO: 0.8 K/UL (ref 0.3–1)
MONOCYTES NFR BLD: 8.2 % (ref 4–15)
NEUTROPHILS # BLD AUTO: 7.7 K/UL (ref 1.8–7.7)
NEUTROPHILS NFR BLD: 83.2 % (ref 38–73)
NRBC BLD-RTO: 0 /100 WBC
PHOSPHATE SERPL-MCNC: 2.7 MG/DL (ref 2.7–4.5)
PLATELET # BLD AUTO: 247 K/UL (ref 150–450)
PMV BLD AUTO: 9.5 FL (ref 9.2–12.9)
POTASSIUM SERPL-SCNC: 3.5 MMOL/L (ref 3.5–5.1)
PROT SERPL-MCNC: 5.5 G/DL (ref 6–8.4)
RBC # BLD AUTO: 3.87 M/UL (ref 4–5.4)
SODIUM SERPL-SCNC: 132 MMOL/L (ref 136–145)
WBC # BLD AUTO: 9.26 K/UL (ref 3.9–12.7)

## 2023-08-21 PROCEDURE — 87015 SPECIMEN INFECT AGNT CONCNTJ: CPT

## 2023-08-21 PROCEDURE — 87206 SMEAR FLUORESCENT/ACID STAI: CPT

## 2023-08-21 PROCEDURE — 99900035 HC TECH TIME PER 15 MIN (STAT)

## 2023-08-21 PROCEDURE — 87205 SMEAR GRAM STAIN: CPT

## 2023-08-21 PROCEDURE — 99223 1ST HOSP IP/OBS HIGH 75: CPT | Mod: 25,,, | Performed by: PODIATRIST

## 2023-08-21 PROCEDURE — 87102 FUNGUS ISOLATION CULTURE: CPT

## 2023-08-21 PROCEDURE — 85025 COMPLETE CBC W/AUTO DIFF WBC: CPT | Performed by: STUDENT IN AN ORGANIZED HEALTH CARE EDUCATION/TRAINING PROGRAM

## 2023-08-21 PROCEDURE — 94640 AIRWAY INHALATION TREATMENT: CPT

## 2023-08-21 PROCEDURE — 11042 PR DEBRIDEMENT, SKIN, SUB-Q TISSUE,=<20 SQ CM: ICD-10-PCS | Mod: ,,, | Performed by: PODIATRIST

## 2023-08-21 PROCEDURE — 63600175 PHARM REV CODE 636 W HCPCS: Performed by: INTERNAL MEDICINE

## 2023-08-21 PROCEDURE — 87077 CULTURE AEROBIC IDENTIFY: CPT

## 2023-08-21 PROCEDURE — 87075 CULTR BACTERIA EXCEPT BLOOD: CPT

## 2023-08-21 PROCEDURE — 80053 COMPREHEN METABOLIC PANEL: CPT | Performed by: STUDENT IN AN ORGANIZED HEALTH CARE EDUCATION/TRAINING PROGRAM

## 2023-08-21 PROCEDURE — 25000003 PHARM REV CODE 250: Performed by: INTERNAL MEDICINE

## 2023-08-21 PROCEDURE — 94761 N-INVAS EAR/PLS OXIMETRY MLT: CPT

## 2023-08-21 PROCEDURE — 87186 SC STD MICRODIL/AGAR DIL: CPT

## 2023-08-21 PROCEDURE — 93923 UPR/LXTR ART STDY 3+ LVLS: CPT | Mod: 26,,, | Performed by: SURGERY

## 2023-08-21 PROCEDURE — 99223 PR INITIAL HOSPITAL CARE,LEVL III: ICD-10-PCS | Mod: 25,,, | Performed by: PODIATRIST

## 2023-08-21 PROCEDURE — 93923 PR NON-INVASIVE PHYSIOLOGIC STUDY EXTREMITY 3 LEVELS: ICD-10-PCS | Mod: 26,,, | Performed by: SURGERY

## 2023-08-21 PROCEDURE — 25000003 PHARM REV CODE 250: Performed by: STUDENT IN AN ORGANIZED HEALTH CARE EDUCATION/TRAINING PROGRAM

## 2023-08-21 PROCEDURE — 83735 ASSAY OF MAGNESIUM: CPT | Performed by: STUDENT IN AN ORGANIZED HEALTH CARE EDUCATION/TRAINING PROGRAM

## 2023-08-21 PROCEDURE — 25000242 PHARM REV CODE 250 ALT 637 W/ HCPCS: Performed by: INTERNAL MEDICINE

## 2023-08-21 PROCEDURE — 84100 ASSAY OF PHOSPHORUS: CPT | Performed by: STUDENT IN AN ORGANIZED HEALTH CARE EDUCATION/TRAINING PROGRAM

## 2023-08-21 PROCEDURE — 11042 DBRDMT SUBQ TIS 1ST 20SQCM/<: CPT | Mod: ,,, | Performed by: PODIATRIST

## 2023-08-21 PROCEDURE — 21400001 HC TELEMETRY ROOM

## 2023-08-21 PROCEDURE — 63600175 PHARM REV CODE 636 W HCPCS: Performed by: STUDENT IN AN ORGANIZED HEALTH CARE EDUCATION/TRAINING PROGRAM

## 2023-08-21 PROCEDURE — 36415 COLL VENOUS BLD VENIPUNCTURE: CPT | Performed by: STUDENT IN AN ORGANIZED HEALTH CARE EDUCATION/TRAINING PROGRAM

## 2023-08-21 PROCEDURE — S4991 NICOTINE PATCH NONLEGEND: HCPCS | Performed by: INTERNAL MEDICINE

## 2023-08-21 PROCEDURE — 27000221 HC OXYGEN, UP TO 24 HOURS

## 2023-08-21 PROCEDURE — 87116 MYCOBACTERIA CULTURE: CPT

## 2023-08-21 PROCEDURE — 87070 CULTURE OTHR SPECIMN AEROBIC: CPT

## 2023-08-21 RX ORDER — IBUPROFEN 200 MG
1 TABLET ORAL DAILY
Status: DISCONTINUED | OUTPATIENT
Start: 2023-08-21 | End: 2023-08-29 | Stop reason: HOSPADM

## 2023-08-21 RX ADMIN — ASPIRIN 81 MG 81 MG: 81 TABLET ORAL at 08:08

## 2023-08-21 RX ADMIN — ATORVASTATIN CALCIUM 40 MG: 40 TABLET, FILM COATED ORAL at 08:08

## 2023-08-21 RX ADMIN — LEVALBUTEROL 1.25 MG: 1.25 SOLUTION, CONCENTRATE RESPIRATORY (INHALATION) at 02:08

## 2023-08-21 RX ADMIN — Medication 1 PATCH: at 08:08

## 2023-08-21 RX ADMIN — CILOSTAZOL 50 MG: 50 TABLET ORAL at 08:08

## 2023-08-21 RX ADMIN — METOPROLOL TARTRATE 50 MG: 50 TABLET, FILM COATED ORAL at 09:08

## 2023-08-21 RX ADMIN — CILOSTAZOL 50 MG: 50 TABLET ORAL at 09:08

## 2023-08-21 RX ADMIN — LEVALBUTEROL 1.25 MG: 1.25 SOLUTION, CONCENTRATE RESPIRATORY (INHALATION) at 12:08

## 2023-08-21 RX ADMIN — LEVALBUTEROL 1.25 MG: 1.25 SOLUTION, CONCENTRATE RESPIRATORY (INHALATION) at 08:08

## 2023-08-21 RX ADMIN — METOPROLOL TARTRATE 50 MG: 50 TABLET, FILM COATED ORAL at 08:08

## 2023-08-21 RX ADMIN — OXYCODONE HYDROCHLORIDE 10 MG: 10 TABLET ORAL at 02:08

## 2023-08-21 RX ADMIN — IPRATROPIUM BROMIDE 0.5 MG: 0.5 SOLUTION RESPIRATORY (INHALATION) at 11:08

## 2023-08-21 RX ADMIN — ENOXAPARIN SODIUM 40 MG: 40 INJECTION SUBCUTANEOUS at 05:08

## 2023-08-21 RX ADMIN — IPRATROPIUM BROMIDE 0.5 MG: 0.5 SOLUTION RESPIRATORY (INHALATION) at 08:08

## 2023-08-21 RX ADMIN — IPRATROPIUM BROMIDE 0.5 MG: 0.5 SOLUTION RESPIRATORY (INHALATION) at 02:08

## 2023-08-21 RX ADMIN — ACETAMINOPHEN 1000 MG: 500 TABLET ORAL at 02:08

## 2023-08-21 RX ADMIN — CITALOPRAM HYDROBROMIDE 20 MG: 20 TABLET ORAL at 08:08

## 2023-08-21 RX ADMIN — IPRATROPIUM BROMIDE 0.5 MG: 0.5 SOLUTION RESPIRATORY (INHALATION) at 12:08

## 2023-08-21 RX ADMIN — ACETAMINOPHEN 1000 MG: 500 TABLET ORAL at 09:08

## 2023-08-21 RX ADMIN — VANCOMYCIN HYDROCHLORIDE 1250 MG: 1.25 INJECTION, POWDER, LYOPHILIZED, FOR SOLUTION INTRAVENOUS at 09:08

## 2023-08-21 RX ADMIN — CEFTRIAXONE 2 G: 2 INJECTION, POWDER, FOR SOLUTION INTRAMUSCULAR; INTRAVENOUS at 01:08

## 2023-08-21 RX ADMIN — LEVALBUTEROL 1.25 MG: 1.25 SOLUTION, CONCENTRATE RESPIRATORY (INHALATION) at 11:08

## 2023-08-21 NOTE — SUBJECTIVE & OBJECTIVE
Medications:  Continuous Infusions:  Scheduled Meds:   acetaminophen  1,000 mg Oral Q8H    aspirin  81 mg Oral Daily    atorvastatin  40 mg Oral Daily    cefTRIAXone (ROCEPHIN) IVPB  2 g Intravenous Q24H    cilostazoL  50 mg Oral BID    citalopram  20 mg Oral Daily    enoxparin  40 mg Subcutaneous Daily    fluticasone furoate-vilanteroL  1 puff Inhalation Daily    levalbuterol  1.25 mg Nebulization Q8H    And    ipratropium  0.5 mg Nebulization Q8H    metoprolol tartrate  50 mg Oral BID    nicotine  1 patch Transdermal Daily    vancomycin (VANCOCIN) IV (PEDS and ADULTS)  1,250 mg Intravenous Q24H     PRN Meds:albuterol, dextrose 10%, dextrose 10%, glucagon (human recombinant), glucose, glucose, ketorolac, melatonin, naloxone, ondansetron, oxyCODONE, oxyCODONE, sars-cov-2 (covid-19), sodium chloride 0.9%, Pharmacy to dose Vancomycin consult **AND** vancomycin - pharmacy to dose     Objective:     Vital Signs (Most Recent):  Temp: 98.6 °F (37 °C) (08/21/23 0351)  Pulse: 89 (08/21/23 0351)  Resp: 19 (08/21/23 0351)  BP: 129/60 (08/21/23 0351)  SpO2: (!) 92 % (08/21/23 0351) Vital Signs (24h Range):  Temp:  [98.6 °F (37 °C)-99.2 °F (37.3 °C)] 98.6 °F (37 °C)  Pulse:  [64-91] 89  Resp:  [17-20] 19  SpO2:  [92 %-94 %] 92 %  BP: (116-140)/(55-67) 129/60          Physical Exam  Constitutional:       Appearance: Normal appearance.   Cardiovascular:      Rate and Rhythm: Normal rate.      Comments: 2+ palpable b/l femoral pulses  Monophasic b/l PT signal  Monophasic b/l DP signal  Pulmonary:      Effort: Pulmonary effort is normal.   Skin:     General: Skin is warm and dry.      Comments: Worsening RLE wound     Neurological:      Mental Status: She is alert and oriented to person, place, and time.          Significant Labs:  BMP:   Recent Labs   Lab 08/21/23  0230      *   K 3.5   CL 99   CO2 22*   BUN 17   CREATININE 0.7   CALCIUM 7.9*   MG 1.6     CBC:   Recent Labs   Lab 08/21/23  0230   WBC 9.26   RBC  3.87*   HGB 11.4*   HCT 36.3*      MCV 94   MCH 29.5   MCHC 31.4*     CMP:   Recent Labs   Lab 08/21/23  0230      CALCIUM 7.9*   ALBUMIN 2.8*   PROT 5.5*   *   K 3.5   CO2 22*   CL 99   BUN 17   CREATININE 0.7   ALKPHOS 124   ALT 43   AST 71*   BILITOT 2.3*       Significant Diagnostics:  I have reviewed all pertinent imaging results/findings within the past 24 hours.

## 2023-08-21 NOTE — SUBJECTIVE & OBJECTIVE
Scheduled Meds:   acetaminophen  1,000 mg Oral Q8H    aspirin  81 mg Oral Daily    atorvastatin  40 mg Oral Daily    cefTRIAXone (ROCEPHIN) IVPB  2 g Intravenous Q24H    cilostazoL  50 mg Oral BID    citalopram  20 mg Oral Daily    enoxparin  40 mg Subcutaneous Daily    fluticasone furoate-vilanteroL  1 puff Inhalation Daily    levalbuterol  1.25 mg Nebulization Q8H    And    ipratropium  0.5 mg Nebulization Q8H    metoprolol tartrate  50 mg Oral BID    nicotine  1 patch Transdermal Daily    vancomycin (VANCOCIN) IV (PEDS and ADULTS)  1,250 mg Intravenous Q24H     Continuous Infusions:  PRN Meds:albuterol, dextrose 10%, dextrose 10%, glucagon (human recombinant), glucose, glucose, ketorolac, melatonin, naloxone, ondansetron, oxyCODONE, oxyCODONE, sars-cov-2 (covid-19), sodium chloride 0.9%, Pharmacy to dose Vancomycin consult **AND** vancomycin - pharmacy to dose    Review of patient's allergies indicates:  No Known Allergies     Past Medical History:   Diagnosis Date    Anemia     Anxiety     COPD (chronic obstructive pulmonary disease)     COPD (chronic obstructive pulmonary disease) with emphysema     Coronary artery disease     Depression     Diverticulitis     Hyperlipidemia     Hypertension     PVD (peripheral vascular disease)     PVD (peripheral vascular disease)     S/P colostomy     Substance abuse     hx heavy etoh use     Tobacco abuse      Past Surgical History:   Procedure Laterality Date    COLOSTOMY      ECTOPIC PREGNANCY SURGERY      right forefoot amputation      right toe amputation      x2 toes       Family History    None       Tobacco Use    Smoking status: Former     Current packs/day: 0.00     Average packs/day: 0.5 packs/day for 50.0 years (25.0 ttl pk-yrs)     Types: Cigarettes     Start date: 7/5/1963     Quit date: 7/5/2013     Years since quitting: 10.1    Smokeless tobacco: Never   Substance and Sexual Activity    Alcohol use: No    Drug use: No    Sexual activity: Not on file      Review of Systems   Constitutional:  Positive for activity change and fatigue. Negative for chills and fever.   Respiratory:  Positive for shortness of breath.    Cardiovascular:  Negative for leg swelling.   Gastrointestinal:  Negative for diarrhea and vomiting.   Musculoskeletal:  Negative for back pain and neck pain.        Reports right TMA 8 years ago and pain   Skin:  Positive for color change and wound. Negative for rash.        Reports erythema on dorsal right TMA site that started 2 days ago   Neurological:  Negative for dizziness and weakness.   Hematological:  Negative for adenopathy. Does not bruise/bleed easily.   Psychiatric/Behavioral:  Negative for behavioral problems.      Objective:     Vital Signs (Most Recent):  Temp: 98.9 °F (37.2 °C) (08/21/23 0756)  Pulse: 75 (08/21/23 1033)  Resp: 18 (08/21/23 0800)  BP: 129/61 (08/21/23 0756)  SpO2: (!) 93 % (08/21/23 0800) Vital Signs (24h Range):  Temp:  [98.6 °F (37 °C)-99.2 °F (37.3 °C)] 98.9 °F (37.2 °C)  Pulse:  [64-91] 75  Resp:  [17-20] 18  SpO2:  [92 %-94 %] 93 %  BP: (116-140)/(57-67) 129/61     Weight: 64.8 kg (142 lb 13.7 oz)  Body mass index is 27.9 kg/m².    Foot Exam    General  Orientation: alert and oriented to person, place, and time       Right Foot/Ankle     Inspection and Palpation  Skin Exam: blister, drainage, skin changes, ulcer and erythema; skin not intact     Neurovascular  Dorsalis pedis: absent  Posterior tibial: absent  Saphenous nerve sensation: diminished  Tibial nerve sensation: diminished  Superficial peroneal nerve sensation: diminished  Deep peroneal nerve sensation: diminished  Sural nerve sensation: diminished    Comments  S/p R TMA. Extreme tenderness/pain on palpation. Distal dorsal TMA stump presents with darkened eschar with bullae underneath, roughly 2.0x2.0cm circular. Erythematous periwound. Upon needle aspiration and deroofing, pink and light yellow pus expressed (see photograph).     Left Foot/Ankle   "    Neurovascular  Dorsalis pedis: absent  Posterior tibial: absent  Saphenous nerve sensation: diminished  Tibial nerve sensation: diminished  Superficial peroneal nerve sensation: diminished  Deep peroneal nerve sensation: diminished  Sural nerve sensation: diminished          Laboratory:  Blood Cultures: No results for input(s): "LABBLOO" in the last 48 hours.  BMP:   Recent Labs   Lab 08/21/23 0230      *   K 3.5   CL 99   CO2 22*   BUN 17   CREATININE 0.7   CALCIUM 7.9*   MG 1.6     CBC:   Recent Labs   Lab 08/21/23 0230   WBC 9.26   RBC 3.87*   HGB 11.4*   HCT 36.3*      MCV 94   MCH 29.5   MCHC 31.4*     CMP:   Recent Labs   Lab 08/21/23 0230      CALCIUM 7.9*   ALBUMIN 2.8*   PROT 5.5*   *   K 3.5   CO2 22*   CL 99   BUN 17   CREATININE 0.7   ALKPHOS 124   ALT 43   AST 71*   BILITOT 2.3*     CRP:   Recent Labs   Lab 08/18/23 2112   CRP 33.7*     Wound Cultures: No results for input(s): "LABAERO" in the last 4320 hours.  Microbiology Results (last 7 days)       Procedure Component Value Units Date/Time    AFB Culture & Smear [[id number]] Collected: 08/21/23 0927    Order Status: Sent Specimen: Abscess from Foot, Right Updated: 08/21/23 1103    Fungus culture [[id number]] Collected: 08/21/23 0927    Order Status: Sent Specimen: Abscess from Foot, Right Updated: 08/21/23 1102    Gram stain [[id number]] Collected: 08/21/23 0927    Order Status: Sent Specimen: Abscess from Foot, Right Updated: 08/21/23 1102    Aerobic culture [[id number]] Collected: 08/21/23 0927    Order Status: Sent Specimen: Abscess from Foot, Right Updated: 08/21/23 1101    Culture, Anaerobe [[id number]] Collected: 08/21/23 0927    Order Status: Sent Specimen: Abscess from Foot, Right Updated: 08/21/23 1101    Blood Culture #1 **CANNOT BE ORDERED STAT** [[id number]] Collected: 08/18/23 2113    Order Status: Completed Specimen: Blood from Peripheral, Forearm, Right Updated: 08/20/23 1065     Blood Culture, " Routine No Growth to date      No Growth to date      No Growth to date    Blood Culture #2 **CANNOT BE ORDERED STAT** [[id number]] Collected: 08/18/23 2113    Order Status: Completed Specimen: Blood from Peripheral, Forearm, Right Updated: 08/20/23 2312     Blood Culture, Routine No Growth to date      No Growth to date      No Growth to date          All pertinent labs reviewed within the last 24 hours.    Diagnostic Results:  MRI: I have reviewed all pertinent results/findings within the past 24 hours.     X-Ray: I have reviewed all pertinent results/findings within the past 24 hours.     I have reviewed all pertinent imaging results/findings within the past 24 hours.    Clinical Findings: Deroofed abscess, right TMA stump

## 2023-08-21 NOTE — ASSESSMENT & PLAN NOTE
Patient is a 78-year-old female with previous history of chronic osteo with unhealing wound status post transmetatarsal amputation who is now presenting after a recent hospitalization with worsening right lower extremity swelling, erythema, and pain.  She states that it is much worse than normal and that she is developed a black rash over the affected area.  She denies any overt purulence or opening skin however she remains very tender to palpation.  X-ray at this time does not show any erosive changes and stable from previous hospitalization 10 days ago.  Will obtain an MRI to definitively rule out the presence of osteomyelitis given mildly elevated inflammatory markers which are sensitive but not specific    Non-surgical site of infection and Non-purulent Cellulitis  (ICD 10: L03.90)    There is not an abscess evident.    There is the presence of:   - Rapid progression    There is not presence or evidence of involvement of indwelling medical device    Empiric Vancomycin is based on the presence of Recurrence despite previous treatment    tetanus status unknown to the patient    There is not the presence of an immunocompromising condition requiring consideration for fungal, virus, or atypical organisms.    Plan:  - Vancomycin and ceftriaxone  - Blood cultures pending  - MRI foot w contrast shows nonspecific diffuse subcutaneous edema and redemonstration of a small nonspecific fluid collection within the soft tissues at the level the resection stump, no abscess or OM identified. Limited incomplete examination as the patient was unable to tolerate the study.   - Elevation of affected area and pain control  - PT/OT recommending SNF  - Still c/o significant pain to RLE stump  - Vascular surgery and podiatry consulted  - s/p debridement and abscess drainage 8/21, cultures obtained  - Cont vanc/ceftriaxone and follow cultures

## 2023-08-21 NOTE — ASSESSMENT & PLAN NOTE
"Patient seen by Dr. Saldaña but has not been seen since 2015.    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  Now has discoloration to RLE stump.  Vascular surgery consulted  Awaiting arterial doppler and NICCI. Possible angiogram 8/24    "

## 2023-08-21 NOTE — CONSULTS
[facility] ([facility]  Adult Nutrition  Consult Note    SUMMARY     Recommendations    Continue Regular diet   Add Jordy BID to promote wound healing  RD to monitor and follow    Goals: Meet % EEN, EPN by RD f/u  Nutrition Goal Status: new  Communication of RD Recs:  (POC)    Assessment and Plan    Nutrition Problem  Increased nutrient needs (protein, energy)    Related to (etiology):   Increased physiological demands    Signs and Symptoms (as evidenced by):   Cellulitis and abscess of foot    Interventions/Recommendations (treatment strategy):  Collaboration with other providers    Nutrition Diagnosis Status:   New     Reason for Assessment    Reason For Assessment: consult  Diagnosis:  (cellulitis)  Relevant Medical History: protein calorie malnutrition, CAD  Interdisciplinary Rounds: did not attend    General Information Comments:   RD consulted for wounds and malnutrition. Pt reports having good appetite, eating at least 50% of food each meals. C/o nausea from taking too many meds at once. Denies chewing/swallowing difficulties. Regular BM. Pt with foot wound per chart.  Dislike ONS but agree to try Jordy. Stated  lb. Per wt record, pt weighed ~114 lb in 2021,  lb. NFPE completed today, noted mild muscle wasting, likely age related. Does not meet criteria for malnutrition at this time.     Wt Readings from Last 10 Encounters:   08/19/23 64.8 kg (142 lb 13.7 oz)   08/11/23 65.3 kg (143 lb 15.4 oz)   09/30/21 51.7 kg (114 lb)   08/18/21 51.7 kg (114 lb)   07/14/21 65.8 kg (145 lb 1 oz)   03/04/20 51.9 kg (114 lb 6.7 oz)   07/28/15 53.1 kg (117 lb)   03/05/15 48.9 kg (107 lb 12.8 oz)   02/05/15 46.5 kg (102 lb 8 oz)   01/20/15 50.8 kg (112 lb)     Nutrition Discharge Planning: Adequate PO intake    Nutrition Risk Screen    Nutrition Risk Screen: no indicators present    Nutrition/Diet History    Spiritual, Cultural Beliefs, Confucianism Practices, Values that Affect Care:  no    Anthropometrics    Temp: 98.9 °F (37.2 °C)  Height Method: Stated  Height: 5' (152.4 cm)  Height (inches): 60 in  Weight Method: Bed Scale  Weight: 64.8 kg (142 lb 13.7 oz)  Weight (lb): 142.86 lb  Ideal Body Weight (IBW), Female: 100 lb  % Ideal Body Weight, Female (lb): 142.86 %  BMI (Calculated): 27.9     Lab/Procedures/Meds    Pertinent Labs Reviewed: reviewed  Pertinent Labs Comments: Na 132, Tprotein 7.9, albumin 2.8, Tbili 2.3, AST 71  Pertinent Medications Reviewed: reviewed  Pertinent Medications Comments: atorvastatin    Estimated/Assessed Needs    Weight Used For Calorie Calculations: 64.4 kg (142 lb)  Energy Calorie Requirements (kcal): 0748-5904  Energy Need Method: Kcal/kg (25-30)  Protein Requirements: 77-97g (1.2-1.5g/kg )  Weight Used For Protein Calculations: 64.4 kg (142 lb)  Fluid Requirements (mL): 1ml/kcal or per MD  Estimated Fluid Requirement Method: RDA Method  RDA Method (mL): 1610     Nutrition Prescription Ordered    Current Diet Order: Regular    Evaluation of Received Nutrient/Fluid Intake    I/O: + 0.9 L since admit  Energy Calories Required: not meeting needs  Protein Required: not meeting needs  Comments: LBM 8/20  Tolerance: tolerating  % Intake of Estimated Energy Needs: 50 - 75 %  % Meal Intake: 50 - 75 %    Nutrition Risk    Level of Risk/Frequency of Follow-up:  (1x/week)     Monitor and Evaluation    Food and Nutrient Intake: energy intake, food and beverage intake  Food and Nutrient Adminstration: diet order  Physical Activity and Function: nutrition-related ADLs and IADLs  Anthropometric Measurements: height/length, weight, weight change, body mass index  Biochemical Data, Medical Tests and Procedures: electrolyte and renal panel, gastrointestinal profile, glucose/endocrine profile, inflammatory profile, lipid profile  Nutrition-Focused Physical Findings: overall appearance     Nutrition Follow-Up    RD Follow-up?: Yes

## 2023-08-21 NOTE — PROGRESS NOTES
[facility] ([place] [address])  Vascular Surgery  Progress Note    Patient Name: Radha Sotelo  MRN: [id number]  Admission Date: 8/18/2023  Primary Care Provider: Laurence, Primary Doctor    Subjective:     Interval History: Patient is doing well this am. Worsening of RLE TMA wound with worsening pain.     Post-Op Info:  Procedure(s) (LRB):  Angiogram Extremity Unilateral (Right)           Medications:  Continuous Infusions:  Scheduled Meds:   acetaminophen  1,000 mg Oral Q8H    aspirin  81 mg Oral Daily    atorvastatin  40 mg Oral Daily    cefTRIAXone (ROCEPHIN) IVPB  2 g Intravenous Q24H    cilostazoL  50 mg Oral BID    citalopram  20 mg Oral Daily    enoxparin  40 mg Subcutaneous Daily    fluticasone furoate-vilanteroL  1 puff Inhalation Daily    levalbuterol  1.25 mg Nebulization Q8H    And    ipratropium  0.5 mg Nebulization Q8H    metoprolol tartrate  50 mg Oral BID    nicotine  1 patch Transdermal Daily    vancomycin (VANCOCIN) IV (PEDS and ADULTS)  1,250 mg Intravenous Q24H     PRN Meds:albuterol, dextrose 10%, dextrose 10%, glucagon (human recombinant), glucose, glucose, ketorolac, melatonin, naloxone, ondansetron, oxyCODONE, oxyCODONE, sars-cov-2 (covid-19), sodium chloride 0.9%, Pharmacy to dose Vancomycin consult **AND** vancomycin - pharmacy to dose     Objective:     Vital Signs (Most Recent):  Temp: 98.6 °F (37 °C) (08/21/23 0351)  Pulse: 89 (08/21/23 0351)  Resp: 19 (08/21/23 0351)  BP: 129/60 (08/21/23 0351)  SpO2: (!) 92 % (08/21/23 0351) Vital Signs (24h Range):  Temp:  [98.6 °F (37 °C)-99.2 °F (37.3 °C)] 98.6 °F (37 °C)  Pulse:  [64-91] 89  Resp:  [17-20] 19  SpO2:  [92 %-94 %] 92 %  BP: (116-140)/(55-67) 129/60          Physical Exam  Constitutional:       Appearance: Normal appearance.   Cardiovascular:      Rate and Rhythm: Normal rate.      Comments: 2+ palpable b/l femoral pulses  Monophasic b/l PT signal  Monophasic b/l DP signal  Pulmonary:      Effort: Pulmonary effort  is normal.   Skin:     General: Skin is warm and dry.      Comments: Worsening RLE wound     Neurological:      Mental Status: She is alert and oriented to person, place, and time.          Significant Labs:  BMP:   Recent Labs   Lab 08/21/23  0230      *   K 3.5   CL 99   CO2 22*   BUN 17   CREATININE 0.7   CALCIUM 7.9*   MG 1.6     CBC:   Recent Labs   Lab 08/21/23 0230   WBC 9.26   RBC 3.87*   HGB 11.4*   HCT 36.3*      MCV 94   MCH 29.5   MCHC 31.4*     CMP:   Recent Labs   Lab 08/21/23  0230      CALCIUM 7.9*   ALBUMIN 2.8*   PROT 5.5*   *   K 3.5   CO2 22*   CL 99   BUN 17   CREATININE 0.7   ALKPHOS 124   ALT 43   AST 71*   BILITOT 2.3*       Significant Diagnostics:  I have reviewed all pertinent imaging results/findings within the past 24 hours.    Assessment/Plan:     PVD (peripheral vascular disease)  78 year old female with a PMH of COPD, HTN, CAD, SIADH, previous right foot wounds for which she underwent an angiogram with SFA stents and TMA by Dr. Saldaña in 2015 now with worsening right foot pain and wounds.    - Obtain NICCI and US. Will tentatively plan for angiogram on Thursday.   - Podiatry consulted  - Continue ASA/statin          Jasmin Sullivan MD  Vascular Surgery  [facility] ([place])

## 2023-08-21 NOTE — ASSESSMENT & PLAN NOTE
78-year-old female with a PMHx of HLD, HTN, PVS, CAD, COPD, SIADH, alcoholism, and tobacco abuse presents to [facility] ED 8/18 for R foot pain. Podiatry was consulted for right foot pain, swelling, and discoloration noted at distal stump of TMA site.     S/p R TMA in 2012. Extreme tenderness/pain on palpation. Distal dorsal TMA stump presents with darkened eschar with bullae underneath, roughly 2.0x2.0cm circular. Erythematous periwound. Upon needle aspiration and deroofing, pink and light yellow pus expressed (see photograph).     · XR R suggestive of edema at distal stump.   · MRI R showing fluid collection at stump, no osseous involvement or suggestion of osteomyelitis.   · Non-leukocytotic at today's visit.   · On IV vancomycin, ceftriaxone  · Blood cx 8/18 NGTD. Abscess culture taken in today's visit, results pending. Appreciate ID recs.    · Vascular following, appreciate recs  · Needle aspiration and sterile scissor and forcep deroofing of dorsal right foot blister. See procedure note. Pink/yellow purulence expressed.  · No probe to bone exam in today's visit - patient was agitated and in pain  · Dressings: iodosorb, 4x4 gauze, kerlix secured with tape  · Podiatry will continue to follow.

## 2023-08-21 NOTE — PROGRESS NOTES
Pharmacokinetic Assessment Follow Up: IV Vancomycin    Vancomycin serum concentration assessment(s):    The trough level was drawn correctly and can be used to guide therapy at this time. The measurement is below the desired definitive target range of 15 to 20 mcg/mL.    Vancomycin Regimen Plan:    Change regimen to Vancomycin 1250 mg IV every 24 hours with next serum trough concentration measured at 2100 prior to 4th dose on 8/23    Drug levels (last 3 results):  Recent Labs   Lab Result Units 08/20/23  2126   Vancomycin-Trough ug/mL 9.9*       Pharmacy will continue to follow and monitor vancomycin.    Please contact pharmacy at extension [phone] for questions regarding this assessment.    Thank you for the consult,   Mariya Reis       Patient brief summary:  Radha Sotelo is a 78 y.o. female initiated on antimicrobial therapy with IV Vancomycin for treatment of bone/joint infection      Drug Allergies:   Review of patient's allergies indicates:  No Known Allergies    Actual Body Weight:   64.8kg    Renal Function:   Estimated Creatinine Clearance: 49.5 mL/min (based on SCr of 0.8 mg/dL).,     Dialysis Method (if applicable):  N/A    CBC (last 72 hours):  Recent Labs   Lab Result Units 08/18/23 2112 08/19/23 0735 08/20/23  0527   WBC K/uL 8.57 10.52 8.82   Hemoglobin g/dL 12.2 12.9 11.7*   Hemoglobin A1C %  --  5.6  --    Hematocrit % 38.3 41.2 37.5   Platelets K/uL 244 234 223   Gran % % 74.5* 77.5* 72.2   Lymph % % 10.7* 7.0* 11.2*   Mono % % 8.8 9.8 11.0   Eosinophil % % 4.8 4.6 4.3   Basophil % % 0.5 0.5 0.7   Differential Method  Automated Automated Automated       Metabolic Panel (last 72 hours):  Recent Labs   Lab Result Units 08/18/23 2112 08/19/23  0735 08/20/23  0527   Sodium mmol/L 136 138 134*   Potassium mmol/L 3.2* 4.4 3.9   Chloride mmol/L 100 101 100   CO2 mmol/L 27 27 26   Glucose mg/dL 94 84 76   BUN mg/dL 14 12 18   Creatinine mg/dL 0.8 0.7 0.8   Albumin g/dL 3.2* 3.0* 2.9*   Total  Bilirubin mg/dL 0.6 0.4 0.3   Alkaline Phosphatase U/L 74 75 69   AST U/L 17 22 20   ALT U/L 19 22 17   Magnesium mg/dL  --  1.7 1.7   Phosphorus mg/dL  --  3.1 3.1       Vancomycin Administrations:  vancomycin given in the last 96 hours                     vancomycin (VANCOCIN) 1,000 mg in dextrose 5 % (D5W) 250 mL IVPB (Vial-Mate) (mg) 1,000 mg New Bag 08/20/23 2214     1,000 mg New Bag 08/19/23 2148    vancomycin 1,250 mg in dextrose 5 % (D5W) 250 mL IVPB (Vial-Mate) (mg) 1,250 mg New Bag 08/18/23 2134                    Microbiologic Results:  Microbiology Results (last 7 days)       Procedure Component Value Units Date/Time    Blood Culture #1 **CANNOT BE ORDERED STAT** [[id number]] Collected: 08/18/23 2113    Order Status: Completed Specimen: Blood from Peripheral, Forearm, Right Updated: 08/20/23 2312     Blood Culture, Routine No Growth to date      No Growth to date      No Growth to date    Blood Culture #2 **CANNOT BE ORDERED STAT** [[id number]] Collected: 08/18/23 2113    Order Status: Completed Specimen: Blood from Peripheral, Forearm, Right Updated: 08/20/23 2312     Blood Culture, Routine No Growth to date      No Growth to date      No Growth to date

## 2023-08-21 NOTE — PROGRESS NOTES
[facility] ([address] Medicine  Progress Note    Patient Name: Radha Sotelo  MRN: [id number]  Patient Class: IP- Inpatient   Admission Date: 8/18/2023  Length of Stay: 2 days  Attending Physician: Samir Upton MD  Primary Care Provider: Laurence, Primary Doctor        Subjective:     Principal Problem:Cellulitis and abscess of foot        HPI:  Patient is a 78-year-old female with past medical history significant for severe COPD on home O2 for chronic hypoxic respiratory failure (4 L via nasal cannula), CAD, PAT, previous osteomyelitis with transmetatarsal amputation (2012) who presented to the emergency department due to worsening right foot pain with associated discoloration.  She had a similar presentation approximately 10 days ago and was started on broad-spectrum antibiotics.  Since that time she is been having worsening pain associated with the right leg as well as discoloration with possible deep blistering.  An x-ray was done in the emergency department which demonstrated no erosive changes of osteomyelitis.  She does have some overlying right erythema of the affected area as well as dark discoloration with possible blood blister present.  There is probably some fluctuance however complete evaluation was limited secondary to patient intolerance.  She has a black spot which does not appear to be leaking any purulent fluid.  She denies any fevers, chills, shortness of breath, chest pain, or intolerance of lying flat.  She states that she has been dealing with progressive lower extremity swelling bilaterally.  She does not take any diuretics for this and it is a new problem.           Overview/Hospital Course:  No notes on file    Interval History: NAEON. S/p debridement and drainage of abscess of R TMA stump, cultures obtained. Denies fever/chills. Arterial doppler pending.    Review of Systems   Constitutional:  Negative for chills and fever.   HENT:  Negative for congestion  and sore throat.    Eyes:  Negative for visual disturbance.   Respiratory:  Negative for chest tightness and shortness of breath.    Cardiovascular:  Negative for chest pain and palpitations.   Gastrointestinal:  Negative for abdominal distention, abdominal pain, constipation, diarrhea, nausea and vomiting.   Musculoskeletal:  Positive for joint swelling. Negative for back pain.   Skin:  Positive for color change, rash and wound.   Neurological:  Negative for dizziness, weakness and headaches.   Hematological:  Does not bruise/bleed easily.     Objective:     Vital Signs (Most Recent):  Temp: 98.9 °F (37.2 °C) (08/21/23 0756)  Pulse: 75 (08/21/23 1033)  Resp: 18 (08/21/23 0800)  BP: 129/61 (08/21/23 0756)  SpO2: (!) 93 % (08/21/23 0800) Vital Signs (24h Range):  Temp:  [98.6 °F (37 °C)-99.2 °F (37.3 °C)] 98.9 °F (37.2 °C)  Pulse:  [64-90] 75  Resp:  [17-20] 18  SpO2:  [92 %-94 %] 93 %  BP: (116-140)/(57-67) 129/61     Weight: 64.8 kg (142 lb 13.7 oz)  Body mass index is 27.9 kg/m².    Intake/Output Summary (Last 24 hours) at 8/21/2023 1400  Last data filed at 8/21/2023 0600  Gross per 24 hour   Intake 650 ml   Output 601 ml   Net 49 ml           Physical Exam  Vitals and nursing note reviewed.   Constitutional:       General: She is not in acute distress.     Appearance: Normal appearance. She is obese. She is not ill-appearing or toxic-appearing.   HENT:      Head: Normocephalic and atraumatic.      Mouth/Throat:      Mouth: Mucous membranes are dry.   Eyes:      General: No scleral icterus.  Cardiovascular:      Rate and Rhythm: Normal rate and regular rhythm.      Pulses: Normal pulses.      Heart sounds: Normal heart sounds.   Pulmonary:      Effort: Pulmonary effort is normal. No respiratory distress.      Breath sounds: Normal breath sounds.   Abdominal:      General: There is no distension.      Palpations: Abdomen is soft. There is no mass.      Tenderness: There is no abdominal tenderness. There is no right  CVA tenderness or left CVA tenderness.   Musculoskeletal:         General: Swelling, tenderness and deformity present.      Cervical back: No rigidity.      Right lower leg: Edema present.      Left lower leg: Edema present.   Lymphadenopathy:      Cervical: No cervical adenopathy.   Skin:     General: Skin is warm and dry.      Capillary Refill: Poor peripheral pulses in all 4 extremities.     Coloration: Skin is not jaundiced.   Neurological:      General: No focal deficit present.      Mental Status: She is alert and oriented to person, place, and time.             Significant Labs: All pertinent labs within the past 24 hours have been reviewed.    Significant Imaging: I have reviewed all pertinent imaging results/findings within the past 24 hours.      Assessment/Plan:      * Cellulitis and abscess of foot  Patient is a 78-year-old female with previous history of chronic osteo with unhealing wound status post transmetatarsal amputation who is now presenting after a recent hospitalization with worsening right lower extremity swelling, erythema, and pain.  She states that it is much worse than normal and that she is developed a black rash over the affected area.  She denies any overt purulence or opening skin however she remains very tender to palpation.  X-ray at this time does not show any erosive changes and stable from previous hospitalization 10 days ago.  Will obtain an MRI to definitively rule out the presence of osteomyelitis given mildly elevated inflammatory markers which are sensitive but not specific    Non-surgical site of infection and Non-purulent Cellulitis  (ICD 10: L03.90)    There is not an abscess evident.    There is the presence of:   - Rapid progression    There is not presence or evidence of involvement of indwelling medical device    Empiric Vancomycin is based on the presence of Recurrence despite previous treatment    tetanus status unknown to the patient    There is not the presence of  an immunocompromising condition requiring consideration for fungal, virus, or atypical organisms.    Plan:  - Vancomycin and ceftriaxone  - Blood cultures pending  - MRI foot w contrast shows nonspecific diffuse subcutaneous edema and redemonstration of a small nonspecific fluid collection within the soft tissues at the level the resection stump, no abscess or OM identified. Limited incomplete examination as the patient was unable to tolerate the study.   - Elevation of affected area and pain control  - PT/OT recommending SNF  - Still c/o significant pain to RLE stump  - Vascular surgery and podiatry consulted  - s/p debridement and abscess drainage 8/21, cultures obtained  - Cont vanc/ceftriaxone and follow cultures      Hypokalemia        Chronic obstructive pulmonary disease  Patient uses nebulizers q.6 hours.  Unfortunately due to national shortages, we are unable to provide these.  Will resume her home inhalers and provide nebulizers as able.  She is currently on 4 L of oxygen which appears to be her baseline.  She states that she is no longer smoking and denies any signs or symptoms of ongoing acute exacerbation of her COPD.    Patient information:     SpO2: (!) 93 %       mMRC Key:  0 - Dyspnea only with strenuous exercise  1 - Dyspnea when hurrying or walking up a slight hill  2 - Walks slower than people of the same age because of dyspnea or has to stop for breath when walking at own pace  3 - Stops for breath after walking 100 yards or after a few minutes  4 - Too dyspneic to leave house or breathless when dressing    Exacerbation Component:  - COPD: stable  Patient Is not showing signs of a current COPD exacerbation The patient is not currently have symptoms / an exacerbation. The patient has COPD for approximately 10+ years. Symptoms in previous episodes have included dyspnea, cough and wheezing, and typically last 5 days. Previous episodes have been exacerbated by Smoke and upper respiratory infection.  "Current treatment includes albuterol nebulizer, albuterol/ipratropium inhaler and oxygen, which generally provides some relief of symptoms.      Plan:  - Risk factor reduction (Smoking cessation/avoidance, management of GERD, pHTN treatment)  - Avoidance of triggers (Air pollution, respiratory infections, pulmonary embolisms)  - Complete metabolic panel  - Target SpO2 of 88-92% or PaO2 of 60-70 mmHg              Coronary artery disease  No ongoing chest pain.  Will continue to monitor and may need preoperative evaluation if surgery is warranted.      PVD (peripheral vascular disease)  Patient seen by Dr. Saldaña but has not been seen since 2015.    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  Now has discoloration to RLE stump.  Vascular surgery consulted  Awaiting arterial doppler and NICCI. Possible angiogram 8/24      Unspecified protein-calorie malnutrition  Nutrition consulted. Most recent weight and BMI monitored-     Measurements:  Wt Readings from Last 1 Encounters:   08/19/23 64.8 kg (142 lb 13.7 oz)   Body mass index is 27.9 kg/m².    Patient to be screened and assessed by RD.  Previous protein calorie malnutrition.          VTE Risk Mitigation (From admission, onward)         Ordered     enoxaparin injection 40 mg  Daily         08/19/23 0020     Place sequential compression device  Until discontinued         " 08/19/23 0020                Discharge Planning   FLORIAN: 8/23/2023     Code Status: Full Code   Is the patient medically ready for discharge?: No    Reason for patient still in hospital (select all that apply): Treatment and Consult recommendations  Discharge Plan A: Home with family                  Samir Upton MD  Department of Hospital Medicine   [facility] - Intensive Care ([place]-[address])

## 2023-08-21 NOTE — HPI
78-year-old female with a PMHx of HLD, HTN, PVS, CAD, COPD, SIADH, alcoholism, and tobacco abuse presents to [facility] ED 8/18 for R foot pain. Podiatry was consulted for right foot pain, swelling, and discoloration noted at distal stump of TMA site.  Patient has previously been under care for ischemic pain and infection at [facility] at same foot.  Denies any wounds or ulcers in the past.  Denies any recent infections or illnesses. Patient noticed redness, pain, and a darkened eschar on the distal dorsal aspect of her TMA stump. States that she has noticed fluid discharge from the eschar. XR R suggestive of edema at distal stump. MRI R showing fluid collection at stump, no osseous involvement or suggestion of osteomyelitis. Non-leukocytotic at today's visit. Blood cx 8/18 NGTD. Patient otherwise denies N/V/F/C/SOB.

## 2023-08-21 NOTE — CONSULTS
Podiatry following pt, Wound Care to sign-off at this time. Please re consult for any further needs.

## 2023-08-21 NOTE — CONSULTS
[facility] ([place] [address])  Podiatry  Consult Note    Patient Name: Radha Sotelo  MRN: [id number]  Admission Date: 8/18/2023  Hospital Length of Stay: 2 days  Attending Physician: Samir Upton MD  Primary Care Provider: Laurence, Primary Doctor     Inpatient consult to Podiatry  Consult performed by: Rangel Cotton MD  Consult ordered by: Samir Upton MD  Reason for consult: right foot pain, wound  Assessment/Recommendations: Thanks for consult, will continue to follow. Bedside procedure to deroof abscess at TMA stump, purulence was expressed - cultures pending  Appreciate abx recs from ID        Subjective:     History of Present Illness:  78-year-old female with a PMHx of HLD, HTN, PVS, CAD, COPD, SIADH, alcoholism, and tobacco abuse presents to [facility] ED 8/18 for R foot pain. Podiatry was consulted for right foot pain, swelling, and discoloration noted at distal stump of TMA site.  Patient has previously been under care for ischemic pain and infection at [facility] at same foot.  Denies any wounds or ulcers in the past.  Denies any recent infections or illnesses. Patient noticed redness, pain, and a darkened eschar on the distal dorsal aspect of her TMA stump. States that she has noticed fluid discharge from the eschar. Patient very agitated in today's visit, states she is in extreme pain and tylenol isn't helping. XR R suggestive of edema at distal stump. MRI R showing fluid collection at stump, no osseous involvement or suggestion of osteomyelitis. Non-leukocytotic at today's visit. Blood cx 8/18 NGTD. Patient otherwise denies N/V/F/C/SOB.         Scheduled Meds:   acetaminophen  1,000 mg Oral Q8H    aspirin  81 mg Oral Daily    atorvastatin  40 mg Oral Daily    cefTRIAXone (ROCEPHIN) IVPB  2 g Intravenous Q24H    cilostazoL  50 mg Oral BID    citalopram  20 mg Oral Daily    enoxparin  40 mg Subcutaneous Daily    fluticasone furoate-vilanteroL  1 puff Inhalation Daily    levalbuterol  1.25  mg Nebulization Q8H    And    ipratropium  0.5 mg Nebulization Q8H    metoprolol tartrate  50 mg Oral BID    nicotine  1 patch Transdermal Daily    vancomycin (VANCOCIN) IV (PEDS and ADULTS)  1,250 mg Intravenous Q24H     Continuous Infusions:  PRN Meds:albuterol, dextrose 10%, dextrose 10%, glucagon (human recombinant), glucose, glucose, ketorolac, melatonin, naloxone, ondansetron, oxyCODONE, oxyCODONE, sars-cov-2 (covid-19), sodium chloride 0.9%, Pharmacy to dose Vancomycin consult **AND** vancomycin - pharmacy to dose    Review of patient's allergies indicates:  No Known Allergies     Past Medical History:   Diagnosis Date    Anemia     Anxiety     COPD (chronic obstructive pulmonary disease)     COPD (chronic obstructive pulmonary disease) with emphysema     Coronary artery disease     Depression     Diverticulitis     Hyperlipidemia     Hypertension     PVD (peripheral vascular disease)     PVD (peripheral vascular disease)     S/P colostomy     Substance abuse     hx heavy etoh use     Tobacco abuse      Past Surgical History:   Procedure Laterality Date    COLOSTOMY      ECTOPIC PREGNANCY SURGERY      right forefoot amputation      right toe amputation      x2 toes       Family History    None       Tobacco Use    Smoking status: Former     Current packs/day: 0.00     Average packs/day: 0.5 packs/day for 50.0 years (25.0 ttl pk-yrs)     Types: Cigarettes     Start date: 7/5/1963     Quit date: 7/5/2013     Years since quitting: 10.1    Smokeless tobacco: Never   Substance and Sexual Activity    Alcohol use: No    Drug use: No    Sexual activity: Not on file     Review of Systems   Constitutional:  Positive for activity change and fatigue. Negative for chills and fever.   Respiratory:  Positive for shortness of breath.    Cardiovascular:  Negative for leg swelling.   Gastrointestinal:  Negative for diarrhea and vomiting.   Musculoskeletal:  Negative for back pain and neck pain.         Reports right TMA 8 years ago and pain   Skin:  Positive for color change and wound. Negative for rash.        Reports erythema on dorsal right TMA site that started 2 days ago   Neurological:  Negative for dizziness and weakness.   Hematological:  Negative for adenopathy. Does not bruise/bleed easily.   Psychiatric/Behavioral:  Negative for behavioral problems.      Objective:     Vital Signs (Most Recent):  Temp: 98.9 °F (37.2 °C) (08/21/23 0756)  Pulse: 75 (08/21/23 1033)  Resp: 18 (08/21/23 0800)  BP: 129/61 (08/21/23 0756)  SpO2: (!) 93 % (08/21/23 0800) Vital Signs (24h Range):  Temp:  [98.6 °F (37 °C)-99.2 °F (37.3 °C)] 98.9 °F (37.2 °C)  Pulse:  [64-91] 75  Resp:  [17-20] 18  SpO2:  [92 %-94 %] 93 %  BP: (116-140)/(57-67) 129/61     Weight: 64.8 kg (142 lb 13.7 oz)  Body mass index is 27.9 kg/m².    Foot Exam    General  Orientation: alert and oriented to person, place, and time       Right Foot/Ankle     Inspection and Palpation  Skin Exam: blister, drainage, skin changes, ulcer and erythema; skin not intact     Neurovascular  Dorsalis pedis: absent  Posterior tibial: absent  Saphenous nerve sensation: diminished  Tibial nerve sensation: diminished  Superficial peroneal nerve sensation: diminished  Deep peroneal nerve sensation: diminished  Sural nerve sensation: diminished    Comments  S/p R TMA. Extreme tenderness/pain on palpation. Distal dorsal TMA stump presents with darkened eschar with bullae underneath, roughly 2.0x2.0cm circular. Erythematous periwound. Upon needle aspiration and deroofing, pink and light yellow pus expressed (see photograph).     Left Foot/Ankle      Neurovascular  Dorsalis pedis: absent  Posterior tibial: absent  Saphenous nerve sensation: diminished  Tibial nerve sensation: diminished  Superficial peroneal nerve sensation: diminished  Deep peroneal nerve sensation: diminished  Sural nerve sensation: diminished          Laboratory:  Blood Cultures: No results for input(s):  ""LABBLOO" in the last 48 hours.  BMP:   Recent Labs   Lab 08/21/23 0230      *   K 3.5   CL 99   CO2 22*   BUN 17   CREATININE 0.7   CALCIUM 7.9*   MG 1.6     CBC:   Recent Labs   Lab 08/21/23 0230   WBC 9.26   RBC 3.87*   HGB 11.4*   HCT 36.3*      MCV 94   MCH 29.5   MCHC 31.4*     CMP:   Recent Labs   Lab 08/21/23 0230      CALCIUM 7.9*   ALBUMIN 2.8*   PROT 5.5*   *   K 3.5   CO2 22*   CL 99   BUN 17   CREATININE 0.7   ALKPHOS 124   ALT 43   AST 71*   BILITOT 2.3*     CRP:   Recent Labs   Lab 08/18/23 2112   CRP 33.7*     Wound Cultures: No results for input(s): "LABAERO" in the last 4320 hours.  Microbiology Results (last 7 days)       Procedure Component Value Units Date/Time    AFB Culture & Smear [[id number]] Collected: 08/21/23 0927    Order Status: Sent Specimen: Abscess from Foot, Right Updated: 08/21/23 1103    Fungus culture [[id number]] Collected: 08/21/23 0927    Order Status: Sent Specimen: Abscess from Foot, Right Updated: 08/21/23 1102    Gram stain [[id number]] Collected: 08/21/23 0927    Order Status: Sent Specimen: Abscess from Foot, Right Updated: 08/21/23 1102    Aerobic culture [[id number]] Collected: 08/21/23 0927    Order Status: Sent Specimen: Abscess from Foot, Right Updated: 08/21/23 1101    Culture, Anaerobe [[id number]] Collected: 08/21/23 0927    Order Status: Sent Specimen: Abscess from Foot, Right Updated: 08/21/23 1101    Blood Culture #1 **CANNOT BE ORDERED STAT** [[id number]] Collected: 08/18/23 2113    Order Status: Completed Specimen: Blood from Peripheral, Forearm, Right Updated: 08/20/23 2312     Blood Culture, Routine No Growth to date      No Growth to date      No Growth to date    Blood Culture #2 **CANNOT BE ORDERED STAT** [[id number]] Collected: 08/18/23 2113    Order Status: Completed Specimen: Blood from Peripheral, Forearm, Right Updated: 08/20/23 2067     Blood Culture, Routine No Growth to date      No Growth to date      No Growth to " date          All pertinent labs reviewed within the last 24 hours.    Diagnostic Results:  MRI: I have reviewed all pertinent results/findings within the past 24 hours.     X-Ray: I have reviewed all pertinent results/findings within the past 24 hours.     I have reviewed all pertinent imaging results/findings within the past 24 hours.    Clinical Findings: Deroofed abscess, right TMA stump        Assessment/Plan:     ID  * Cellulitis and abscess of foot  78-year-old female with a PMHx of HLD, HTN, PVS, CAD, COPD, SIADH, alcoholism, and tobacco abuse presents to [facility] ED 8/18 for R foot pain. Podiatry was consulted for right foot pain, swelling, and discoloration noted at distal stump of TMA site.     S/p R TMA in 2012. Extreme tenderness/pain on palpation. Distal dorsal TMA stump presents with darkened eschar with bullae underneath, roughly 2.0x2.0cm circular. Erythematous periwound. Upon needle aspiration and deroofing, pink and light yellow pus expressed (see photograph).     · XR R suggestive of edema at distal stump.   · MRI R showing fluid collection at stump, no osseous involvement or suggestion of osteomyelitis.   · Non-leukocytotic at today's visit.   · On IV vancomycin, ceftriaxone  · Blood cx 8/18 NGTD. Abscess culture taken in today's visit, results pending. Appreciate ID recs.    · Vascular following, appreciate recs  · Needle aspiration and sterile scissor and forcep deroofing of dorsal right foot blister. See procedure note. Pink/yellow purulence expressed.  · No probe to bone exam in today's visit - patient was agitated and in pain  · Dressings: iodosorb, 4x4 gauze, kerlix secured with tape  · Podiatry will continue to follow.           Thank you for your consult. I will follow-up with patient. Please contact us if you have any additional questions.    Rangel Cotton MD  Podiatry  [facility] - Intensive Care ([place]-[address])

## 2023-08-21 NOTE — PROCEDURES
"Radha Sotelo is a 78 y.o. female patient.    Temp: 98.9 °F (37.2 °C) (08/21/23 0756)  Pulse: 75 (08/21/23 1033)  Resp: 18 (08/21/23 0800)  BP: 129/61 (08/21/23 0756)  SpO2: (!) 93 % (08/21/23 0800)  Weight: 64.8 kg (142 lb 13.7 oz) (08/19/23 0118)  Height: 5' (152.4 cm) (08/19/23 0118)       Debridement    Date/Time: 8/21/2023 10:00 AM    Performed by: Rangel Cotton MD  Authorized by: Rangel Cotton MD    Time out: Immediately prior to procedure a "time out" was called to verify the correct patient, procedure, equipment, support staff and site/side marked as required.    Consent Done?:  Yes (Verbal)    Preparation: Patient was prepped and draped in usual sterile fashion    Local anesthesia used?: No      Wound Details:    Location:  Right foot    Location:  Right Dorsal Foot    Type of Debridement:  Excisional       Length (cm):  2       Area (sq cm):  4       Width (cm):  2       Percent Debrided (%):  100       Depth (cm):  1.2       Total Area Debrided (sq cm):  4    Depth of debridement:  Subcutaneous tissue    Tissue debrided:  Adipose, Dermis, Epidermis and Subcutaneous    Devitalized tissue debrided:  Necrotic/Eschar    Instruments:  Other, Scissors and Forceps  Bleeding:  Minimal  Method Used:  Pressure  1st Wound Pain Assessment: 9  Specimen Collected: Specimen sent to microbiology     After time out was performed, including verification of patient ID, procedure, site and side, availability of information and equipment, review of safety issues, and agreement with consent, the procedure site was marked and the patient was prepped aseptically. 5cc's of sanguineous purulent joint fluid was aspirated from the right dorsal foot using an 18g x 1.5 needle in sterile fashion without complication, as well as utilizing sterile scissors and forceps. Bandage was applied (iodosorb, 4x4 gauze, kerlix secured with tape).      8/21/2023    I was present for entire procedure and agree with the resident medical " documentation, and supervised and managed the medical care of the patient.      Christine Kimble DPM

## 2023-08-21 NOTE — ASSESSMENT & PLAN NOTE
78 year old female with a PMH of COPD, HTN, CAD, SIADH, previous right foot wounds for which she underwent an angiogram with SFA stents and TMA by Dr. Saldaña in 2015 now with worsening right foot pain and wounds.    - Will tentatively plan for angiogram on Thursday.   - Podiatry following  - Continue ASA/statin  - rest of care per primary team  - please call with questions

## 2023-08-21 NOTE — PLAN OF CARE
Recommendations    Continue Regular diet   Add Jordy BID to promote wound healing  RD to monitor and follow    Goals: Meet % EEN, EPN by RD f/u  Nutrition Goal Status: new  Communication of RD Recs:  (POC)

## 2023-08-21 NOTE — SUBJECTIVE & OBJECTIVE
Interval History: NAEON. S/p debridement and drainage of abscess of R TMA stump, cultures obtained. Denies fever/chills. Arterial doppler pending.    Review of Systems   Constitutional:  Negative for chills and fever.   HENT:  Negative for congestion and sore throat.    Eyes:  Negative for visual disturbance.   Respiratory:  Negative for chest tightness and shortness of breath.    Cardiovascular:  Negative for chest pain and palpitations.   Gastrointestinal:  Negative for abdominal distention, abdominal pain, constipation, diarrhea, nausea and vomiting.   Musculoskeletal:  Positive for joint swelling. Negative for back pain.   Skin:  Positive for color change, rash and wound.   Neurological:  Negative for dizziness, weakness and headaches.   Hematological:  Does not bruise/bleed easily.     Objective:     Vital Signs (Most Recent):  Temp: 98.9 °F (37.2 °C) (08/21/23 0756)  Pulse: 75 (08/21/23 1033)  Resp: 18 (08/21/23 0800)  BP: 129/61 (08/21/23 0756)  SpO2: (!) 93 % (08/21/23 0800) Vital Signs (24h Range):  Temp:  [98.6 °F (37 °C)-99.2 °F (37.3 °C)] 98.9 °F (37.2 °C)  Pulse:  [64-90] 75  Resp:  [17-20] 18  SpO2:  [92 %-94 %] 93 %  BP: (116-140)/(57-67) 129/61     Weight: 64.8 kg (142 lb 13.7 oz)  Body mass index is 27.9 kg/m².    Intake/Output Summary (Last 24 hours) at 8/21/2023 1400  Last data filed at 8/21/2023 0600  Gross per 24 hour   Intake 650 ml   Output 601 ml   Net 49 ml           Physical Exam  Vitals and nursing note reviewed.   Constitutional:       General: She is not in acute distress.     Appearance: Normal appearance. She is obese. She is not ill-appearing or toxic-appearing.   HENT:      Head: Normocephalic and atraumatic.      Mouth/Throat:      Mouth: Mucous membranes are dry.   Eyes:      General: No scleral icterus.  Cardiovascular:      Rate and Rhythm: Normal rate and regular rhythm.      Pulses: Normal pulses.      Heart sounds: Normal heart sounds.   Pulmonary:      Effort: Pulmonary  effort is normal. No respiratory distress.      Breath sounds: Normal breath sounds.   Abdominal:      General: There is no distension.      Palpations: Abdomen is soft. There is no mass.      Tenderness: There is no abdominal tenderness. There is no right CVA tenderness or left CVA tenderness.   Musculoskeletal:         General: Swelling, tenderness and deformity present.      Cervical back: No rigidity.      Right lower leg: Edema present.      Left lower leg: Edema present.   Lymphadenopathy:      Cervical: No cervical adenopathy.   Skin:     General: Skin is warm and dry.      Capillary Refill: Poor peripheral pulses in all 4 extremities.     Coloration: Skin is not jaundiced.   Neurological:      General: No focal deficit present.      Mental Status: She is alert and oriented to person, place, and time.             Significant Labs: All pertinent labs within the past 24 hours have been reviewed.    Significant Imaging: I have reviewed all pertinent imaging results/findings within the past 24 hours.

## 2023-08-22 LAB
ALBUMIN SERPL BCP-MCNC: 2.7 G/DL (ref 3.5–5.2)
ALP SERPL-CCNC: 139 U/L (ref 55–135)
ALT SERPL W/O P-5'-P-CCNC: 72 U/L (ref 10–44)
ANION GAP SERPL CALC-SCNC: 9 MMOL/L (ref 8–16)
AST SERPL-CCNC: 79 U/L (ref 10–40)
BASOPHILS # BLD AUTO: 0.05 K/UL (ref 0–0.2)
BASOPHILS NFR BLD: 0.8 % (ref 0–1.9)
BILIRUB SERPL-MCNC: 0.7 MG/DL (ref 0.1–1)
BUN SERPL-MCNC: 14 MG/DL (ref 8–23)
CALCIUM SERPL-MCNC: 8 MG/DL (ref 8.7–10.5)
CHLORIDE SERPL-SCNC: 97 MMOL/L (ref 95–110)
CO2 SERPL-SCNC: 26 MMOL/L (ref 23–29)
CREAT SERPL-MCNC: 0.7 MG/DL (ref 0.5–1.4)
DIFFERENTIAL METHOD: ABNORMAL
EOSINOPHIL # BLD AUTO: 0.3 K/UL (ref 0–0.5)
EOSINOPHIL NFR BLD: 4.9 % (ref 0–8)
ERYTHROCYTE [DISTWIDTH] IN BLOOD BY AUTOMATED COUNT: 14.5 % (ref 11.5–14.5)
EST. GFR  (NO RACE VARIABLE): >60 ML/MIN/1.73 M^2
GLUCOSE SERPL-MCNC: 82 MG/DL (ref 70–110)
HCT VFR BLD AUTO: 35.8 % (ref 37–48.5)
HGB BLD-MCNC: 11.5 G/DL (ref 12–16)
IMM GRANULOCYTES # BLD AUTO: 0.03 K/UL (ref 0–0.04)
IMM GRANULOCYTES NFR BLD AUTO: 0.5 % (ref 0–0.5)
LYMPHOCYTES # BLD AUTO: 0.7 K/UL (ref 1–4.8)
LYMPHOCYTES NFR BLD: 11 % (ref 18–48)
MAGNESIUM SERPL-MCNC: 1.6 MG/DL (ref 1.6–2.6)
MCH RBC QN AUTO: 29.9 PG (ref 27–31)
MCHC RBC AUTO-ENTMCNC: 32.1 G/DL (ref 32–36)
MCV RBC AUTO: 93 FL (ref 82–98)
MONOCYTES # BLD AUTO: 0.7 K/UL (ref 0.3–1)
MONOCYTES NFR BLD: 11.5 % (ref 4–15)
NEUTROPHILS # BLD AUTO: 4.4 K/UL (ref 1.8–7.7)
NEUTROPHILS NFR BLD: 71.3 % (ref 38–73)
NRBC BLD-RTO: 0 /100 WBC
PHOSPHATE SERPL-MCNC: 2.6 MG/DL (ref 2.7–4.5)
PLATELET # BLD AUTO: 228 K/UL (ref 150–450)
PMV BLD AUTO: 9.3 FL (ref 9.2–12.9)
POTASSIUM SERPL-SCNC: 3.5 MMOL/L (ref 3.5–5.1)
PROT SERPL-MCNC: 5.5 G/DL (ref 6–8.4)
RBC # BLD AUTO: 3.84 M/UL (ref 4–5.4)
SODIUM SERPL-SCNC: 132 MMOL/L (ref 136–145)
WBC # BLD AUTO: 6.11 K/UL (ref 3.9–12.7)

## 2023-08-22 PROCEDURE — 27000221 HC OXYGEN, UP TO 24 HOURS

## 2023-08-22 PROCEDURE — 85025 COMPLETE CBC W/AUTO DIFF WBC: CPT | Performed by: STUDENT IN AN ORGANIZED HEALTH CARE EDUCATION/TRAINING PROGRAM

## 2023-08-22 PROCEDURE — 94799 UNLISTED PULMONARY SVC/PX: CPT

## 2023-08-22 PROCEDURE — 25000242 PHARM REV CODE 250 ALT 637 W/ HCPCS: Performed by: INTERNAL MEDICINE

## 2023-08-22 PROCEDURE — 25000003 PHARM REV CODE 250: Performed by: INTERNAL MEDICINE

## 2023-08-22 PROCEDURE — 83735 ASSAY OF MAGNESIUM: CPT | Performed by: STUDENT IN AN ORGANIZED HEALTH CARE EDUCATION/TRAINING PROGRAM

## 2023-08-22 PROCEDURE — 63600175 PHARM REV CODE 636 W HCPCS: Performed by: INTERNAL MEDICINE

## 2023-08-22 PROCEDURE — S4991 NICOTINE PATCH NONLEGEND: HCPCS | Performed by: INTERNAL MEDICINE

## 2023-08-22 PROCEDURE — 21400001 HC TELEMETRY ROOM

## 2023-08-22 PROCEDURE — 63600175 PHARM REV CODE 636 W HCPCS: Performed by: STUDENT IN AN ORGANIZED HEALTH CARE EDUCATION/TRAINING PROGRAM

## 2023-08-22 PROCEDURE — 94640 AIRWAY INHALATION TREATMENT: CPT

## 2023-08-22 PROCEDURE — 94761 N-INVAS EAR/PLS OXIMETRY MLT: CPT

## 2023-08-22 PROCEDURE — 36415 COLL VENOUS BLD VENIPUNCTURE: CPT | Performed by: STUDENT IN AN ORGANIZED HEALTH CARE EDUCATION/TRAINING PROGRAM

## 2023-08-22 PROCEDURE — 25000003 PHARM REV CODE 250: Performed by: STUDENT IN AN ORGANIZED HEALTH CARE EDUCATION/TRAINING PROGRAM

## 2023-08-22 PROCEDURE — 80053 COMPREHEN METABOLIC PANEL: CPT | Performed by: STUDENT IN AN ORGANIZED HEALTH CARE EDUCATION/TRAINING PROGRAM

## 2023-08-22 PROCEDURE — 84100 ASSAY OF PHOSPHORUS: CPT | Performed by: STUDENT IN AN ORGANIZED HEALTH CARE EDUCATION/TRAINING PROGRAM

## 2023-08-22 PROCEDURE — 99900035 HC TECH TIME PER 15 MIN (STAT)

## 2023-08-22 RX ORDER — IPRATROPIUM BROMIDE 0.5 MG/2.5ML
0.5 SOLUTION RESPIRATORY (INHALATION) EVERY 4 HOURS PRN
Status: DISCONTINUED | OUTPATIENT
Start: 2023-08-22 | End: 2023-08-22

## 2023-08-22 RX ORDER — LEVALBUTEROL 1.25 MG/.5ML
1.25 SOLUTION, CONCENTRATE RESPIRATORY (INHALATION) EVERY 4 HOURS PRN
Status: DISCONTINUED | OUTPATIENT
Start: 2023-08-22 | End: 2023-08-29 | Stop reason: HOSPADM

## 2023-08-22 RX ORDER — LEVALBUTEROL 1.25 MG/.5ML
1.25 SOLUTION, CONCENTRATE RESPIRATORY (INHALATION) EVERY 4 HOURS PRN
Status: DISCONTINUED | OUTPATIENT
Start: 2023-08-22 | End: 2023-08-22

## 2023-08-22 RX ORDER — IPRATROPIUM BROMIDE 0.5 MG/2.5ML
0.5 SOLUTION RESPIRATORY (INHALATION) EVERY 6 HOURS
Status: DISCONTINUED | OUTPATIENT
Start: 2023-08-22 | End: 2023-08-29 | Stop reason: HOSPADM

## 2023-08-22 RX ADMIN — OXYCODONE HYDROCHLORIDE 10 MG: 10 TABLET ORAL at 06:08

## 2023-08-22 RX ADMIN — CEFTRIAXONE 2 G: 2 INJECTION, POWDER, FOR SOLUTION INTRAMUSCULAR; INTRAVENOUS at 12:08

## 2023-08-22 RX ADMIN — METOPROLOL TARTRATE 50 MG: 50 TABLET, FILM COATED ORAL at 08:08

## 2023-08-22 RX ADMIN — IPRATROPIUM BROMIDE 0.5 MG: 0.5 SOLUTION RESPIRATORY (INHALATION) at 07:08

## 2023-08-22 RX ADMIN — METOPROLOL TARTRATE 50 MG: 50 TABLET, FILM COATED ORAL at 09:08

## 2023-08-22 RX ADMIN — ACETAMINOPHEN 1000 MG: 500 TABLET ORAL at 06:08

## 2023-08-22 RX ADMIN — ENOXAPARIN SODIUM 40 MG: 40 INJECTION SUBCUTANEOUS at 05:08

## 2023-08-22 RX ADMIN — ACETAMINOPHEN 1000 MG: 500 TABLET ORAL at 09:08

## 2023-08-22 RX ADMIN — Medication 6 MG: at 09:08

## 2023-08-22 RX ADMIN — Medication 1 PATCH: at 08:08

## 2023-08-22 RX ADMIN — ASPIRIN 81 MG 81 MG: 81 TABLET ORAL at 08:08

## 2023-08-22 RX ADMIN — LEVALBUTEROL 1.25 MG: 1.25 SOLUTION, CONCENTRATE RESPIRATORY (INHALATION) at 07:08

## 2023-08-22 RX ADMIN — CITALOPRAM HYDROBROMIDE 20 MG: 20 TABLET ORAL at 08:08

## 2023-08-22 RX ADMIN — ATORVASTATIN CALCIUM 40 MG: 40 TABLET, FILM COATED ORAL at 08:08

## 2023-08-22 RX ADMIN — LEVALBUTEROL 1.25 MG: 1.25 SOLUTION, CONCENTRATE RESPIRATORY (INHALATION) at 08:08

## 2023-08-22 RX ADMIN — IPRATROPIUM BROMIDE 0.5 MG: 0.5 SOLUTION RESPIRATORY (INHALATION) at 08:08

## 2023-08-22 RX ADMIN — VANCOMYCIN HYDROCHLORIDE 1250 MG: 1.25 INJECTION, POWDER, LYOPHILIZED, FOR SOLUTION INTRAVENOUS at 09:08

## 2023-08-22 RX ADMIN — CILOSTAZOL 50 MG: 50 TABLET ORAL at 08:08

## 2023-08-22 RX ADMIN — CILOSTAZOL 50 MG: 50 TABLET ORAL at 09:08

## 2023-08-22 RX ADMIN — OXYCODONE HYDROCHLORIDE 10 MG: 10 TABLET ORAL at 01:08

## 2023-08-22 RX ADMIN — OXYCODONE HYDROCHLORIDE 10 MG: 10 TABLET ORAL at 09:08

## 2023-08-22 NOTE — ASSESSMENT & PLAN NOTE
Patient uses nebulizers q.6 hours.  Unfortunately due to national shortages, we are unable to provide these.  Will resume her home inhalers and provide nebulizers as able.  She is currently on 4 L of oxygen which appears to be her baseline.  She states that she is no longer smoking and denies any signs or symptoms of ongoing acute exacerbation of her COPD.    Patient information:     SpO2: (!) 92 %       mMRC Key:  0 - Dyspnea only with strenuous exercise  1 - Dyspnea when hurrying or walking up a slight hill  2 - Walks slower than people of the same age because of dyspnea or has to stop for breath when walking at own pace  3 - Stops for breath after walking 100 yards or after a few minutes  4 - Too dyspneic to leave house or breathless when dressing    Exacerbation Component:  - COPD: stable  Patient Is not showing signs of a current COPD exacerbation The patient is not currently have symptoms / an exacerbation. The patient has COPD for approximately 10+ years. Symptoms in previous episodes have included dyspnea, cough and wheezing, and typically last 5 days. Previous episodes have been exacerbated by Smoke and upper respiratory infection. Current treatment includes albuterol nebulizer, albuterol/ipratropium inhaler and oxygen, which generally provides some relief of symptoms.      Plan:  - Risk factor reduction (Smoking cessation/avoidance, management of GERD, pHTN treatment)  - Avoidance of triggers (Air pollution, respiratory infections, pulmonary embolisms)  - Complete metabolic panel  - Target SpO2 of 88-92% or PaO2 of 60-70 mmHg

## 2023-08-22 NOTE — SUBJECTIVE & OBJECTIVE
Interval History: NAEON. Wound culture growing staph. Arterial doppler showing severe PAD RLE. Denies fever/chills. Still c/o RLE pain when trying to ambulate. Planning for angiogram 8/24.    Review of Systems   Constitutional:  Negative for chills and fever.   HENT:  Negative for congestion and sore throat.    Eyes:  Negative for visual disturbance.   Respiratory:  Negative for chest tightness and shortness of breath.    Cardiovascular:  Negative for chest pain and palpitations.   Gastrointestinal:  Negative for abdominal distention, abdominal pain, constipation, diarrhea, nausea and vomiting.   Musculoskeletal:  Positive for joint swelling. Negative for back pain.   Skin:  Positive for color change, rash and wound.   Neurological:  Negative for dizziness, weakness and headaches.   Hematological:  Does not bruise/bleed easily.     Objective:     Vital Signs (Most Recent):  Temp: 98.3 °F (36.8 °C) (08/22/23 1125)  Pulse: 79 (08/22/23 1515)  Resp: 18 (08/22/23 1347)  BP: (!) 119/56 (08/22/23 1125)  SpO2: (!) 92 % (08/22/23 1515) Vital Signs (24h Range):  Temp:  [98.2 °F (36.8 °C)-99.2 °F (37.3 °C)] 98.3 °F (36.8 °C)  Pulse:  [62-93] 79  Resp:  [17-23] 18  SpO2:  [89 %-96 %] 92 %  BP: (102-133)/(55-60) 119/56     Weight: 64.8 kg (142 lb 13.7 oz)  Body mass index is 27.9 kg/m².    Intake/Output Summary (Last 24 hours) at 8/22/2023 1521  Last data filed at 8/22/2023 1005  Gross per 24 hour   Intake 480 ml   Output 202 ml   Net 278 ml           Physical Exam  Vitals and nursing note reviewed.   Constitutional:       General: She is not in acute distress.     Appearance: Normal appearance. She is obese. She is not ill-appearing or toxic-appearing.   HENT:      Head: Normocephalic and atraumatic.      Mouth/Throat:      Mouth: Mucous membranes are dry.   Eyes:      General: No scleral icterus.  Cardiovascular:      Rate and Rhythm: Normal rate and regular rhythm.      Pulses: Normal pulses.      Heart sounds: Normal heart  sounds.   Pulmonary:      Effort: Pulmonary effort is normal. No respiratory distress.      Breath sounds: Normal breath sounds.   Abdominal:      General: There is no distension.      Palpations: Abdomen is soft. There is no mass.      Tenderness: There is no abdominal tenderness. There is no right CVA tenderness or left CVA tenderness.   Musculoskeletal:         General: Swelling, tenderness and deformity present.      Cervical back: No rigidity.      Right lower leg: Edema present.      Left lower leg: Edema present.   Lymphadenopathy:      Cervical: No cervical adenopathy.   Skin:     General: Skin is warm and dry.      Capillary Refill: Poor peripheral pulses in all 4 extremities.     Coloration: Skin is not jaundiced.   Neurological:      General: No focal deficit present.      Mental Status: She is alert and oriented to person, place, and time.             Significant Labs: All pertinent labs within the past 24 hours have been reviewed.    Significant Imaging: I have reviewed all pertinent imaging results/findings within the past 24 hours.

## 2023-08-22 NOTE — PT/OT/SLP PROGRESS
Occupational Therapy      Patient Name:  Radha Sotelo   MRN:  [id number]    Patient not seen today secondary to Pain (pt declined therapy d/t pain in RLE). Will follow-up when appropriate.    8/22/2023

## 2023-08-22 NOTE — PROGRESS NOTES
[facility] ([address] Medicine  Progress Note    Patient Name: Radha Sotelo  MRN: [id number]  Patient Class: IP- Inpatient   Admission Date: 8/18/2023  Length of Stay: 3 days  Attending Physician: Samir Upton MD  Primary Care Provider: Laurence, Primary Doctor        Subjective:     Principal Problem:Cellulitis and abscess of foot        HPI:  Patient is a 78-year-old female with past medical history significant for severe COPD on home O2 for chronic hypoxic respiratory failure (4 L via nasal cannula), CAD, PAT, previous osteomyelitis with transmetatarsal amputation (2012) who presented to the emergency department due to worsening right foot pain with associated discoloration.  She had a similar presentation approximately 10 days ago and was started on broad-spectrum antibiotics.  Since that time she is been having worsening pain associated with the right leg as well as discoloration with possible deep blistering.  An x-ray was done in the emergency department which demonstrated no erosive changes of osteomyelitis.  She does have some overlying right erythema of the affected area as well as dark discoloration with possible blood blister present.  There is probably some fluctuance however complete evaluation was limited secondary to patient intolerance.  She has a black spot which does not appear to be leaking any purulent fluid.  She denies any fevers, chills, shortness of breath, chest pain, or intolerance of lying flat.  She states that she has been dealing with progressive lower extremity swelling bilaterally.  She does not take any diuretics for this and it is a new problem.           Overview/Hospital Course:  No notes on file    Interval History: NAEON. Wound culture growing staph. Arterial doppler showing severe PAD RLE. Denies fever/chills. Still c/o RLE pain when trying to ambulate. Planning for angiogram 8/24.    Review of Systems   Constitutional:  Negative for chills and  fever.   HENT:  Negative for congestion and sore throat.    Eyes:  Negative for visual disturbance.   Respiratory:  Negative for chest tightness and shortness of breath.    Cardiovascular:  Negative for chest pain and palpitations.   Gastrointestinal:  Negative for abdominal distention, abdominal pain, constipation, diarrhea, nausea and vomiting.   Musculoskeletal:  Positive for joint swelling. Negative for back pain.   Skin:  Positive for color change, rash and wound.   Neurological:  Negative for dizziness, weakness and headaches.   Hematological:  Does not bruise/bleed easily.     Objective:     Vital Signs (Most Recent):  Temp: 98.3 °F (36.8 °C) (08/22/23 1125)  Pulse: 79 (08/22/23 1515)  Resp: 18 (08/22/23 1347)  BP: (!) 119/56 (08/22/23 1125)  SpO2: (!) 92 % (08/22/23 1515) Vital Signs (24h Range):  Temp:  [98.2 °F (36.8 °C)-99.2 °F (37.3 °C)] 98.3 °F (36.8 °C)  Pulse:  [62-93] 79  Resp:  [17-23] 18  SpO2:  [89 %-96 %] 92 %  BP: (102-133)/(55-60) 119/56     Weight: 64.8 kg (142 lb 13.7 oz)  Body mass index is 27.9 kg/m².    Intake/Output Summary (Last 24 hours) at 8/22/2023 1521  Last data filed at 8/22/2023 1005  Gross per 24 hour   Intake 480 ml   Output 202 ml   Net 278 ml           Physical Exam  Vitals and nursing note reviewed.   Constitutional:       General: She is not in acute distress.     Appearance: Normal appearance. She is obese. She is not ill-appearing or toxic-appearing.   HENT:      Head: Normocephalic and atraumatic.      Mouth/Throat:      Mouth: Mucous membranes are dry.   Eyes:      General: No scleral icterus.  Cardiovascular:      Rate and Rhythm: Normal rate and regular rhythm.      Pulses: Normal pulses.      Heart sounds: Normal heart sounds.   Pulmonary:      Effort: Pulmonary effort is normal. No respiratory distress.      Breath sounds: Normal breath sounds.   Abdominal:      General: There is no distension.      Palpations: Abdomen is soft. There is no mass.      Tenderness:  There is no abdominal tenderness. There is no right CVA tenderness or left CVA tenderness.   Musculoskeletal:         General: Swelling, tenderness and deformity present.      Cervical back: No rigidity.      Right lower leg: Edema present.      Left lower leg: Edema present.   Lymphadenopathy:      Cervical: No cervical adenopathy.   Skin:     General: Skin is warm and dry.      Capillary Refill: Poor peripheral pulses in all 4 extremities.     Coloration: Skin is not jaundiced.   Neurological:      General: No focal deficit present.      Mental Status: She is alert and oriented to person, place, and time.             Significant Labs: All pertinent labs within the past 24 hours have been reviewed.    Significant Imaging: I have reviewed all pertinent imaging results/findings within the past 24 hours.      Assessment/Plan:      * Cellulitis and abscess of foot  Patient is a 78-year-old female with previous history of chronic osteo with unhealing wound status post transmetatarsal amputation who is now presenting after a recent hospitalization with worsening right lower extremity swelling, erythema, and pain.  She states that it is much worse than normal and that she is developed a black rash over the affected area.  She denies any overt purulence or opening skin however she remains very tender to palpation.  X-ray at this time does not show any erosive changes and stable from previous hospitalization 10 days ago.  Will obtain an MRI to definitively rule out the presence of osteomyelitis given mildly elevated inflammatory markers which are sensitive but not specific    Non-surgical site of infection and Non-purulent Cellulitis  (ICD 10: L03.90)    There is not an abscess evident.    There is the presence of:   - Rapid progression    There is not presence or evidence of involvement of indwelling medical device    Empiric Vancomycin is based on the presence of Recurrence despite previous treatment    tetanus status  unknown to the patient    There is not the presence of an immunocompromising condition requiring consideration for fungal, virus, or atypical organisms.    Plan:  - Vancomycin and ceftriaxone  - Blood cultures pending  - MRI foot w contrast shows nonspecific diffuse subcutaneous edema and redemonstration of a small nonspecific fluid collection within the soft tissues at the level the resection stump, no abscess or OM identified. Limited incomplete examination as the patient was unable to tolerate the study.   - Elevation of affected area and pain control  - PT/OT recommending SNF  - Still c/o significant pain to RLE stump  - Vascular surgery and podiatry consulted  - s/p debridement and abscess drainage 8/21, cultures obtained  - Wound cx growing staph  - Cont vanc/ceftriaxone and follow cultures      Hypokalemia        Chronic obstructive pulmonary disease  Patient uses nebulizers q.6 hours.  Unfortunately due to national shortages, we are unable to provide these.  Will resume her home inhalers and provide nebulizers as able.  She is currently on 4 L of oxygen which appears to be her baseline.  She states that she is no longer smoking and denies any signs or symptoms of ongoing acute exacerbation of her COPD.    Patient information:     SpO2: (!) 92 %       mMRC Key:  0 - Dyspnea only with strenuous exercise  1 - Dyspnea when hurrying or walking up a slight hill  2 - Walks slower than people of the same age because of dyspnea or has to stop for breath when walking at own pace  3 - Stops for breath after walking 100 yards or after a few minutes  4 - Too dyspneic to leave house or breathless when dressing    Exacerbation Component:  - COPD: stable  Patient Is not showing signs of a current COPD exacerbation The patient is not currently have symptoms / an exacerbation. The patient has COPD for approximately 10+ years. Symptoms in previous episodes have included dyspnea, cough and wheezing, and typically last 5 days.  "Previous episodes have been exacerbated by Smoke and upper respiratory infection. Current treatment includes albuterol nebulizer, albuterol/ipratropium inhaler and oxygen, which generally provides some relief of symptoms.      Plan:  - Risk factor reduction (Smoking cessation/avoidance, management of GERD, pHTN treatment)  - Avoidance of triggers (Air pollution, respiratory infections, pulmonary embolisms)  - Complete metabolic panel  - Target SpO2 of 88-92% or PaO2 of 60-70 mmHg              Coronary artery disease  No ongoing chest pain.  Will continue to monitor and may need preoperative evaluation if surgery is warranted.      PVD (peripheral vascular disease)  Patient seen by Dr. Saldaña but has not been seen since 2015.    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  Now has discoloration to RLE stump.  Vascular surgery consulted  Arterial doppler showing severe PAD RLE.   Planning for angiogram 8/24.      Unspecified protein-calorie malnutrition  Nutrition consulted. Most recent weight and BMI monitored-     Measurements:  Wt Readings from Last 1 Encounters:   08/19/23 64.8 kg (142 lb 13.7 oz)   Body mass index is 27.9 kg/m².    Patient to be screened and assessed by RD.  Previous protein calorie malnutrition.          VTE Risk Mitigation (From admission, onward)         Ordered     enoxaparin injection 40 mg  " Daily         08/19/23 0020     Place sequential compression device  Until discontinued         08/19/23 0020                Discharge Planning   FLORIAN: 8/23/2023     Code Status: Full Code   Is the patient medically ready for discharge?: No    Reason for patient still in hospital (select all that apply): Treatment  Discharge Plan A: Home with family                  Samir Upton MD  Department of Hospital Medicine   [facility] - Intensive Care ([place])

## 2023-08-22 NOTE — ASSESSMENT & PLAN NOTE
Patient is a 78-year-old female with previous history of chronic osteo with unhealing wound status post transmetatarsal amputation who is now presenting after a recent hospitalization with worsening right lower extremity swelling, erythema, and pain.  She states that it is much worse than normal and that she is developed a black rash over the affected area.  She denies any overt purulence or opening skin however she remains very tender to palpation.  X-ray at this time does not show any erosive changes and stable from previous hospitalization 10 days ago.  Will obtain an MRI to definitively rule out the presence of osteomyelitis given mildly elevated inflammatory markers which are sensitive but not specific    Non-surgical site of infection and Non-purulent Cellulitis  (ICD 10: L03.90)    There is not an abscess evident.    There is the presence of:   - Rapid progression    There is not presence or evidence of involvement of indwelling medical device    Empiric Vancomycin is based on the presence of Recurrence despite previous treatment    tetanus status unknown to the patient    There is not the presence of an immunocompromising condition requiring consideration for fungal, virus, or atypical organisms.    Plan:  - Vancomycin and ceftriaxone  - Blood cultures pending  - MRI foot w contrast shows nonspecific diffuse subcutaneous edema and redemonstration of a small nonspecific fluid collection within the soft tissues at the level the resection stump, no abscess or OM identified. Limited incomplete examination as the patient was unable to tolerate the study.   - Elevation of affected area and pain control  - PT/OT recommending SNF  - Still c/o significant pain to RLE stump  - Vascular surgery and podiatry consulted  - s/p debridement and abscess drainage 8/21, cultures obtained  - Wound cx growing staph  - Cont vanc/ceftriaxone and follow cultures

## 2023-08-22 NOTE — PLAN OF CARE
Problem: Adult Inpatient Plan of Care  Goal: Plan of Care Review  Outcome: Ongoing, Progressing  Goal: Patient-Specific Goal (Individualized)  Outcome: Ongoing, Progressing  Goal: Absence of Hospital-Acquired Illness or Injury  Outcome: Ongoing, Progressing  Goal: Optimal Comfort and Wellbeing  Outcome: Ongoing, Progressing  Goal: Readiness for Transition of Care  Outcome: Ongoing, Progressing     Problem: Fluid and Electrolyte Imbalance (Acute Kidney Injury/Impairment)  Goal: Fluid and Electrolyte Balance  Outcome: Ongoing, Progressing     Problem: Oral Intake Inadequate (Acute Kidney Injury/Impairment)  Goal: Optimal Nutrition Intake  Outcome: Ongoing, Progressing     Problem: Renal Function Impairment (Acute Kidney Injury/Impairment)  Goal: Effective Renal Function  Outcome: Ongoing, Progressing     Problem: Fluid Imbalance (Pneumonia)  Goal: Fluid Balance  Outcome: Ongoing, Progressing     Problem: Infection (Pneumonia)  Goal: Resolution of Infection Signs and Symptoms  Outcome: Ongoing, Progressing     Problem: Respiratory Compromise (Pneumonia)  Goal: Effective Oxygenation and Ventilation  Outcome: Ongoing, Progressing     Problem: Impaired Wound Healing  Goal: Optimal Wound Healing  Outcome: Ongoing, Progressing     Problem: Fall Injury Risk  Goal: Absence of Fall and Fall-Related Injury  Outcome: Ongoing, Progressing     Problem: Skin Injury Risk Increased  Goal: Skin Health and Integrity  Outcome: Ongoing, Progressing

## 2023-08-22 NOTE — ASSESSMENT & PLAN NOTE
"Patient seen by Dr. Saldaña but has not been seen since 2015.    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  Now has discoloration to RLE stump.  Vascular surgery consulted  Arterial doppler showing severe PAD RLE.   Planning for angiogram 8/24.    "

## 2023-08-22 NOTE — PLAN OF CARE
ADRY spoke with pt in room.  Informed that therapy recommending SNF.  Pt declines SNF, states she wants to get HH like she did before, states she was with [facility].  ADRY called Talisha at [facility] [phone], [facility] requesting call back with info on which  agency pt was with in the past.    3:11 PM  Talisha called back, states that this pt is Jasmin's pt, but per records, pt was active with [facility]; just fax orders once rec'd.  Call Jasmin at [phone] for any future concerns regarding this pt.    3:30 PM  ADRY called [facility] [phone], spoke with Didi, she confirms pt active with them, she is on their hospital board.    MELLO MaderaN, BS, RN, CCM

## 2023-08-22 NOTE — SUBJECTIVE & OBJECTIVE
Medications:  Continuous Infusions:  Scheduled Meds:   acetaminophen  1,000 mg Oral Q8H    aspirin  81 mg Oral Daily    atorvastatin  40 mg Oral Daily    cefTRIAXone (ROCEPHIN) IVPB  2 g Intravenous Q24H    cilostazoL  50 mg Oral BID    citalopram  20 mg Oral Daily    enoxparin  40 mg Subcutaneous Daily    fluticasone furoate-vilanteroL  1 puff Inhalation Daily    levalbuterol  1.25 mg Nebulization Q8H    And    ipratropium  0.5 mg Nebulization Q8H    metoprolol tartrate  50 mg Oral BID    nicotine  1 patch Transdermal Daily    vancomycin (VANCOCIN) IV (PEDS and ADULTS)  1,250 mg Intravenous Q24H     PRN Meds:albuterol, dextrose 10%, dextrose 10%, glucagon (human recombinant), glucose, glucose, ketorolac, melatonin, naloxone, ondansetron, oxyCODONE, oxyCODONE, sars-cov-2 (covid-19), sodium chloride 0.9%, Pharmacy to dose Vancomycin consult **AND** vancomycin - pharmacy to dose     Objective:     Vital Signs (Most Recent):  Temp: 98.3 °F (36.8 °C) (08/22/23 0733)  Pulse: 79 (08/22/23 0749)  Resp: 18 (08/22/23 0749)  BP: (!) 133/58 (08/22/23 0733)  SpO2: (!) 93 % (08/22/23 0749) Vital Signs (24h Range):  Temp:  [98.2 °F (36.8 °C)-99.2 °F (37.3 °C)] 98.3 °F (36.8 °C)  Pulse:  [62-93] 79  Resp:  [17-23] 18  SpO2:  [89 %-96 %] 93 %  BP: (102-133)/(55-61) 133/58          Physical Exam  Constitutional:       Appearance: Normal appearance.   Cardiovascular:      Rate and Rhythm: Normal rate.      Comments: 2+ palpable b/l femoral pulses  Monophasic b/l PT signal  Monophasic b/l DP signal  Pulmonary:      Effort: Pulmonary effort is normal.   Skin:     General: Skin is warm and dry.      Comments: RLE TMA wound     Neurological:      Mental Status: She is alert and oriented to person, place, and time.          Significant Labs:  BMP:   Recent Labs   Lab 08/22/23  0428   GLU 82   *   K 3.5   CL 97   CO2 26   BUN 14   CREATININE 0.7   CALCIUM 8.0*   MG 1.6       CBC:   Recent Labs   Lab 08/22/23 0428   WBC 6.11   RBC  3.84*   HGB 11.5*   HCT 35.8*      MCV 93   MCH 29.9   MCHC 32.1       CMP:   Recent Labs   Lab 08/22/23  0428   GLU 82   CALCIUM 8.0*   ALBUMIN 2.7*   PROT 5.5*   *   K 3.5   CO2 26   CL 97   BUN 14   CREATININE 0.7   ALKPHOS 139*   ALT 72*   AST 79*   BILITOT 0.7         Significant Diagnostics:  I have reviewed all pertinent imaging results/findings within the past 24 hours.

## 2023-08-22 NOTE — PROGRESS NOTES
[facility] ([place] [id number])  Vascular Surgery  Progress Note    Patient Name: Radha Sotelo  MRN: [id number]  Admission Date: 8/18/2023  Primary Care Provider: Laurence, Primary Doctor    Subjective:     Interval History: naeon. Patient doing well this am.     Post-Op Info:  Procedure(s) (LRB):  Angiogram Extremity Unilateral (Right)           Medications:  Continuous Infusions:  Scheduled Meds:   acetaminophen  1,000 mg Oral Q8H    aspirin  81 mg Oral Daily    atorvastatin  40 mg Oral Daily    cefTRIAXone (ROCEPHIN) IVPB  2 g Intravenous Q24H    cilostazoL  50 mg Oral BID    citalopram  20 mg Oral Daily    enoxparin  40 mg Subcutaneous Daily    fluticasone furoate-vilanteroL  1 puff Inhalation Daily    levalbuterol  1.25 mg Nebulization Q8H    And    ipratropium  0.5 mg Nebulization Q8H    metoprolol tartrate  50 mg Oral BID    nicotine  1 patch Transdermal Daily    vancomycin (VANCOCIN) IV (PEDS and ADULTS)  1,250 mg Intravenous Q24H     PRN Meds:albuterol, dextrose 10%, dextrose 10%, glucagon (human recombinant), glucose, glucose, ketorolac, melatonin, naloxone, ondansetron, oxyCODONE, oxyCODONE, sars-cov-2 (covid-19), sodium chloride 0.9%, Pharmacy to dose Vancomycin consult **AND** vancomycin - pharmacy to dose     Objective:     Vital Signs (Most Recent):  Temp: 98.3 °F (36.8 °C) (08/22/23 0733)  Pulse: 79 (08/22/23 0749)  Resp: 18 (08/22/23 0749)  BP: (!) 133/58 (08/22/23 0733)  SpO2: (!) 93 % (08/22/23 0749) Vital Signs (24h Range):  Temp:  [98.2 °F (36.8 °C)-99.2 °F (37.3 °C)] 98.3 °F (36.8 °C)  Pulse:  [62-93] 79  Resp:  [17-23] 18  SpO2:  [89 %-96 %] 93 %  BP: (102-133)/(55-61) 133/58         Physical Exam  Constitutional:       Appearance: Normal appearance.   Cardiovascular:      Rate and Rhythm: Normal rate.      Comments:   Monophasic b/l PT signal  Monophasic b/l DP signal  Pulmonary:      Effort: Pulmonary effort is normal.   Skin:     General: Skin is warm and dry.      Comments: CALE BOOKER  wound     Neurological:      Mental Status: She is alert and oriented to person, place, and time.          Significant Labs:  BMP:   Recent Labs   Lab 08/22/23 0428   GLU 82   *   K 3.5   CL 97   CO2 26   BUN 14   CREATININE 0.7   CALCIUM 8.0*   MG 1.6       CBC:   Recent Labs   Lab 08/22/23 0428   WBC 6.11   RBC 3.84*   HGB 11.5*   HCT 35.8*      MCV 93   MCH 29.9   MCHC 32.1       CMP:   Recent Labs   Lab 08/22/23 0428   GLU 82   CALCIUM 8.0*   ALBUMIN 2.7*   PROT 5.5*   *   K 3.5   CO2 26   CL 97   BUN 14   CREATININE 0.7   ALKPHOS 139*   ALT 72*   AST 79*   BILITOT 0.7         Significant Diagnostics:  I have reviewed all pertinent imaging results/findings within the past 24 hours.    Assessment/Plan:     PVD (peripheral vascular disease)  78 year old female with a PMH of COPD, HTN, CAD, SIADH, previous right foot wounds for which she underwent an angiogram with SFA stents and TMA by Dr. Saldaña in 2015 now with worsening right foot pain and wounds.    - Will tentatively plan for angiogram on Thursday.   - Podiatry following  - Continue ASA/statin  - rest of care per primary team  - please call with questions          Jasmin Sullivan MD  Vascular Surgery  [facility] ([facility])

## 2023-08-23 PROBLEM — Z75.8 DISCHARGE PLANNING ISSUES: Status: ACTIVE | Noted: 2023-08-23

## 2023-08-23 PROBLEM — Z02.9 DISCHARGE PLANNING ISSUES: Status: ACTIVE | Noted: 2023-08-23

## 2023-08-23 LAB
ALBUMIN SERPL BCP-MCNC: 2.7 G/DL (ref 3.5–5.2)
ALP SERPL-CCNC: 159 U/L (ref 55–135)
ALT SERPL W/O P-5'-P-CCNC: 72 U/L (ref 10–44)
ANION GAP SERPL CALC-SCNC: 7 MMOL/L (ref 8–16)
AST SERPL-CCNC: 74 U/L (ref 10–40)
BACTERIA BLD CULT: NORMAL
BACTERIA BLD CULT: NORMAL
BACTERIA SPEC AEROBE CULT: ABNORMAL
BASOPHILS # BLD AUTO: 0.05 K/UL (ref 0–0.2)
BASOPHILS NFR BLD: 0.7 % (ref 0–1.9)
BILIRUB SERPL-MCNC: 0.5 MG/DL (ref 0.1–1)
BUN SERPL-MCNC: 12 MG/DL (ref 8–23)
CALCIUM SERPL-MCNC: 8.1 MG/DL (ref 8.7–10.5)
CHLORIDE SERPL-SCNC: 101 MMOL/L (ref 95–110)
CO2 SERPL-SCNC: 25 MMOL/L (ref 23–29)
CREAT SERPL-MCNC: 0.8 MG/DL (ref 0.5–1.4)
DIFFERENTIAL METHOD: ABNORMAL
EOSINOPHIL # BLD AUTO: 0.3 K/UL (ref 0–0.5)
EOSINOPHIL NFR BLD: 5 % (ref 0–8)
ERYTHROCYTE [DISTWIDTH] IN BLOOD BY AUTOMATED COUNT: 14.4 % (ref 11.5–14.5)
EST. GFR  (NO RACE VARIABLE): >60 ML/MIN/1.73 M^2
GLUCOSE SERPL-MCNC: 116 MG/DL (ref 70–110)
HCT VFR BLD AUTO: 37.3 % (ref 37–48.5)
HGB BLD-MCNC: 11.4 G/DL (ref 12–16)
IMM GRANULOCYTES # BLD AUTO: 0.03 K/UL (ref 0–0.04)
IMM GRANULOCYTES NFR BLD AUTO: 0.4 % (ref 0–0.5)
LYMPHOCYTES # BLD AUTO: 0.8 K/UL (ref 1–4.8)
LYMPHOCYTES NFR BLD: 11.1 % (ref 18–48)
MAGNESIUM SERPL-MCNC: 1.5 MG/DL (ref 1.6–2.6)
MCH RBC QN AUTO: 29.1 PG (ref 27–31)
MCHC RBC AUTO-ENTMCNC: 30.6 G/DL (ref 32–36)
MCV RBC AUTO: 95 FL (ref 82–98)
MONOCYTES # BLD AUTO: 0.7 K/UL (ref 0.3–1)
MONOCYTES NFR BLD: 10.6 % (ref 4–15)
NEUTROPHILS # BLD AUTO: 5 K/UL (ref 1.8–7.7)
NEUTROPHILS NFR BLD: 72.2 % (ref 38–73)
NRBC BLD-RTO: 0 /100 WBC
PHOSPHATE SERPL-MCNC: 2.7 MG/DL (ref 2.7–4.5)
PLATELET # BLD AUTO: 220 K/UL (ref 150–450)
PMV BLD AUTO: 9.3 FL (ref 9.2–12.9)
POCT GLUCOSE: 97 MG/DL (ref 70–110)
POTASSIUM SERPL-SCNC: 3.4 MMOL/L (ref 3.5–5.1)
PROT SERPL-MCNC: 5.4 G/DL (ref 6–8.4)
RBC # BLD AUTO: 3.92 M/UL (ref 4–5.4)
SODIUM SERPL-SCNC: 133 MMOL/L (ref 136–145)
WBC # BLD AUTO: 6.86 K/UL (ref 3.9–12.7)

## 2023-08-23 PROCEDURE — 99233 SBSQ HOSP IP/OBS HIGH 50: CPT | Mod: ,,, | Performed by: SURGERY

## 2023-08-23 PROCEDURE — 94640 AIRWAY INHALATION TREATMENT: CPT

## 2023-08-23 PROCEDURE — 83735 ASSAY OF MAGNESIUM: CPT | Performed by: STUDENT IN AN ORGANIZED HEALTH CARE EDUCATION/TRAINING PROGRAM

## 2023-08-23 PROCEDURE — 80053 COMPREHEN METABOLIC PANEL: CPT | Performed by: STUDENT IN AN ORGANIZED HEALTH CARE EDUCATION/TRAINING PROGRAM

## 2023-08-23 PROCEDURE — 25000003 PHARM REV CODE 250: Performed by: INTERNAL MEDICINE

## 2023-08-23 PROCEDURE — 99900035 HC TECH TIME PER 15 MIN (STAT)

## 2023-08-23 PROCEDURE — 84100 ASSAY OF PHOSPHORUS: CPT | Performed by: STUDENT IN AN ORGANIZED HEALTH CARE EDUCATION/TRAINING PROGRAM

## 2023-08-23 PROCEDURE — 25000003 PHARM REV CODE 250: Performed by: STUDENT IN AN ORGANIZED HEALTH CARE EDUCATION/TRAINING PROGRAM

## 2023-08-23 PROCEDURE — 97530 THERAPEUTIC ACTIVITIES: CPT | Mod: CQ

## 2023-08-23 PROCEDURE — 94761 N-INVAS EAR/PLS OXIMETRY MLT: CPT

## 2023-08-23 PROCEDURE — 25000242 PHARM REV CODE 250 ALT 637 W/ HCPCS: Performed by: STUDENT IN AN ORGANIZED HEALTH CARE EDUCATION/TRAINING PROGRAM

## 2023-08-23 PROCEDURE — 99233 PR SUBSEQUENT HOSPITAL CARE,LEVL III: ICD-10-PCS | Mod: ,,, | Performed by: SURGERY

## 2023-08-23 PROCEDURE — 99233 PR SUBSEQUENT HOSPITAL CARE,LEVL III: ICD-10-PCS | Mod: 95,,, | Performed by: INTERNAL MEDICINE

## 2023-08-23 PROCEDURE — 99223 1ST HOSP IP/OBS HIGH 75: CPT | Mod: GC,,, | Performed by: INTERNAL MEDICINE

## 2023-08-23 PROCEDURE — S4991 NICOTINE PATCH NONLEGEND: HCPCS | Performed by: INTERNAL MEDICINE

## 2023-08-23 PROCEDURE — 36415 COLL VENOUS BLD VENIPUNCTURE: CPT | Performed by: STUDENT IN AN ORGANIZED HEALTH CARE EDUCATION/TRAINING PROGRAM

## 2023-08-23 PROCEDURE — 99233 SBSQ HOSP IP/OBS HIGH 50: CPT | Mod: 95,,, | Performed by: INTERNAL MEDICINE

## 2023-08-23 PROCEDURE — 99223 PR INITIAL HOSPITAL CARE,LEVL III: ICD-10-PCS | Mod: GC,,, | Performed by: INTERNAL MEDICINE

## 2023-08-23 PROCEDURE — 21400001 HC TELEMETRY ROOM

## 2023-08-23 PROCEDURE — 85025 COMPLETE CBC W/AUTO DIFF WBC: CPT | Performed by: STUDENT IN AN ORGANIZED HEALTH CARE EDUCATION/TRAINING PROGRAM

## 2023-08-23 PROCEDURE — 25000242 PHARM REV CODE 250 ALT 637 W/ HCPCS: Performed by: INTERNAL MEDICINE

## 2023-08-23 PROCEDURE — 27000221 HC OXYGEN, UP TO 24 HOURS

## 2023-08-23 PROCEDURE — 63600175 PHARM REV CODE 636 W HCPCS: Performed by: STUDENT IN AN ORGANIZED HEALTH CARE EDUCATION/TRAINING PROGRAM

## 2023-08-23 RX ORDER — POTASSIUM CHLORIDE 20 MEQ/1
40 TABLET, EXTENDED RELEASE ORAL ONCE
Status: COMPLETED | OUTPATIENT
Start: 2023-08-23 | End: 2023-08-23

## 2023-08-23 RX ORDER — LANOLIN ALCOHOL/MO/W.PET/CERES
400 CREAM (GRAM) TOPICAL 2 TIMES DAILY
Status: DISCONTINUED | OUTPATIENT
Start: 2023-08-23 | End: 2023-08-29 | Stop reason: HOSPADM

## 2023-08-23 RX ORDER — CEFADROXIL 1000 MG/1
1 TABLET ORAL EVERY 12 HOURS
Status: DISCONTINUED | OUTPATIENT
Start: 2023-08-23 | End: 2023-08-23

## 2023-08-23 RX ORDER — CEFADROXIL 1000 MG/1
1 TABLET ORAL DAILY
Status: DISCONTINUED | OUTPATIENT
Start: 2023-08-24 | End: 2023-08-29 | Stop reason: HOSPADM

## 2023-08-23 RX ADMIN — ACETAMINOPHEN 1000 MG: 500 TABLET ORAL at 02:08

## 2023-08-23 RX ADMIN — IPRATROPIUM BROMIDE 0.5 MG: 0.5 SOLUTION RESPIRATORY (INHALATION) at 12:08

## 2023-08-23 RX ADMIN — POTASSIUM CHLORIDE 40 MEQ: 1500 TABLET, EXTENDED RELEASE ORAL at 10:08

## 2023-08-23 RX ADMIN — LEVALBUTEROL 1.25 MG: 1.25 SOLUTION, CONCENTRATE RESPIRATORY (INHALATION) at 12:08

## 2023-08-23 RX ADMIN — ATORVASTATIN CALCIUM 40 MG: 40 TABLET, FILM COATED ORAL at 10:08

## 2023-08-23 RX ADMIN — ASPIRIN 81 MG 81 MG: 81 TABLET ORAL at 10:08

## 2023-08-23 RX ADMIN — METOPROLOL TARTRATE 50 MG: 50 TABLET, FILM COATED ORAL at 09:08

## 2023-08-23 RX ADMIN — ENOXAPARIN SODIUM 40 MG: 40 INJECTION SUBCUTANEOUS at 04:08

## 2023-08-23 RX ADMIN — ACETAMINOPHEN 1000 MG: 500 TABLET ORAL at 06:08

## 2023-08-23 RX ADMIN — OXYCODONE HYDROCHLORIDE 10 MG: 10 TABLET ORAL at 06:08

## 2023-08-23 RX ADMIN — CITALOPRAM HYDROBROMIDE 20 MG: 20 TABLET ORAL at 10:08

## 2023-08-23 RX ADMIN — CILOSTAZOL 50 MG: 50 TABLET ORAL at 10:08

## 2023-08-23 RX ADMIN — Medication 1 PATCH: at 10:08

## 2023-08-23 RX ADMIN — IPRATROPIUM BROMIDE 0.5 MG: 0.5 SOLUTION RESPIRATORY (INHALATION) at 07:08

## 2023-08-23 RX ADMIN — ACETAMINOPHEN 1000 MG: 500 TABLET ORAL at 09:08

## 2023-08-23 RX ADMIN — Medication 400 MG: at 10:08

## 2023-08-23 RX ADMIN — METOPROLOL TARTRATE 50 MG: 50 TABLET, FILM COATED ORAL at 10:08

## 2023-08-23 RX ADMIN — CILOSTAZOL 50 MG: 50 TABLET ORAL at 09:08

## 2023-08-23 RX ADMIN — IPRATROPIUM BROMIDE 0.5 MG: 0.5 SOLUTION RESPIRATORY (INHALATION) at 01:08

## 2023-08-23 RX ADMIN — IPRATROPIUM BROMIDE 0.5 MG: 0.5 SOLUTION RESPIRATORY (INHALATION) at 08:08

## 2023-08-23 RX ADMIN — FLUTICASONE FUROATE AND VILANTEROL TRIFENATATE 1 PUFF: 200; 25 POWDER RESPIRATORY (INHALATION) at 08:08

## 2023-08-23 RX ADMIN — CEFTRIAXONE 2 G: 2 INJECTION, POWDER, FOR SOLUTION INTRAMUSCULAR; INTRAVENOUS at 12:08

## 2023-08-23 RX ADMIN — Medication 6 MG: at 09:08

## 2023-08-23 NOTE — CONSULTS
[facility] ([place] [address])  Hospital Medicine  Telemedicine Consult Note    Patient Name: Radha Sotelo  MRN: [id number]  Admission Date: 8/18/2023  Hospital Length of Stay: 3 days  Attending Physician: Samir Upton MD   Primary Care Provider: Laurence, Primary Doctor       Thank you for your consult to [facility]. We have reviewed the patient chart. This patient does meet criteria for [facility] service at this time.  Will assume care on 08/23/23 at 7AM.        Nancy Bush MD  Department of Hospital Medicine   [facility]ensive [facility] ([place]-[facility])

## 2023-08-23 NOTE — CONSULTS
[facility] ([place] [address])  Infectious Disease  Consult Note    Patient Name: Radha Sotelo  MRN: [id number]  Admission Date: 8/18/2023  Hospital Length of Stay: 4 days  Attending Physician: Nancy Bush MD  Primary Care Provider: No, Primary Doctor     Isolation Status: No active isolations    Patient information was obtained from patient and ER records.      Inpatient consult to Infectious Diseases  Consult performed by: Enedina Harrington DPM  Consult ordered by: Nancy Bush MD  Reason for consult: wound on TMA site        Assessment/Plan:     ID  * Cellulitis and abscess of foot  Radha Sotelo is a 77 yo F with a PMHx of COPD on home O2 for chronic hypoxic respiratory failure (4 L via nasal cannula), CAD, PAT, previous osteomyelitis with transmetatarsal amputation (2012) who was admitted for worsening R foot pain and discoloration. In fectious diseas was consulted for a wound, after abscess de-vicki, with purulent drainage and infection.     - Wound cultures positive for MSSA  - Pending fungal and anaerobes wound cultures  - Discontinue IV vancomycin and ceftriaxone  - Start PO cefadroxil   - Recommendations on discharge: PO cefadroxil PO 500mg q12h for 2 weeks (end date 9/7)  - Will follow cultures to guide antibiotics  - Discussed plan with ID staff    Thank you for your consult. I will sign off. Please contact us if you have any additional questions.    Enedina Harrington DPM  Infectious Disease  [facility] ([place] [address])    Subjective:     Principal Problem: Cellulitis and abscess of foot    HPI: Radha Sotelo is a 77 yo F with a PMHx of COPD on home O2 for chronic hypoxic respiratory failure (4 L via nasal cannula), CAD, PAT, previous osteomyelitis with transmetatarsal amputation (2012) who presented to the emergency department due to worsening right foot pain with associated discoloration.  She had a similar presentation approximately 10 days ago and was started on  broad-spectrum antibiotics.  Since that time she is been having worsening pain associated with the right leg as well as discoloration with possible deep blistering.  An x-ray was done in the emergency department which demonstrated no erosive changes of osteomyelitis.  She does have some overlying right erythema of the affected area as well as dark discoloration with possible blood blister present.  There is probably some fluctuance however complete evaluation was limited secondary to patient intolerance.  She has a black spot which does not appear to be leaking any purulent fluid.  She denies any fevers, chills, shortness of breath, chest pain, or intolerance of lying flat.  She states that she has been dealing with progressive lower extremity swelling bilaterally.  She does not take any diuretics for this and it is a new problem.    Infectious disease for wound, after abcess de-vicki, with infection. Wound located on dorsal aspect of TMA site. Wound cultures postitive for MSSA. Pending AFB, fungal, and anerobe cultures. Xray and MRI of foot show no evidence of OM. Patient reports pain to forefoot. Denies f/c/n/v.       Past Medical History:   Diagnosis Date    Anemia     Anxiety     COPD (chronic obstructive pulmonary disease)     COPD (chronic obstructive pulmonary disease) with emphysema     Coronary artery disease     Depression     Diverticulitis     Hyperlipidemia     Hypertension     PVD (peripheral vascular disease)     PVD (peripheral vascular disease)     S/P colostomy     Substance abuse     hx heavy etoh use     Tobacco abuse        Past Surgical History:   Procedure Laterality Date    COLOSTOMY      ECTOPIC PREGNANCY SURGERY      right forefoot amputation      right toe amputation      x2 toes       Review of patient's allergies indicates:  No Known Allergies    Medications:  Medications Prior to Admission   Medication Sig    albuterol (PROVENTIL/VENTOLIN HFA) 90 mcg/actuation  inhaler INHALE 2 PUFFS BY MOUTH EVERY 6 HOURS AS NEEDED    albuterol-ipratropium (DUO-NEB) 2.5 mg-0.5 mg/3 mL nebulizer solution USE 1 VIAL VIA NEBULIZER EVERY 6 HOURS AS NEEDED FOR WHEEZING, SHORTNESS OF BREATH    amoxicillin-clavulanate 875-125mg (AUGMENTIN) 875-125 mg per tablet Take 1 tablet by mouth 2 (two) times daily for 12 days    aspirin 81 MG Chew Take 1 tablet (81 mg total) by mouth once daily.    atorvastatin (LIPITOR) 40 MG tablet Take 1 tablet (40 mg total) by mouth once daily.    budesonide-formoterol 80-4.5 mcg (SYMBICORT) 80-4.5 mcg/actuation HFAA INHALE 2 PUFFS INTO THE LUNGS TWICE DAILY. RINSE MOUTH AFTER USE    cilostazoL (PLETAL) 100 MG Tab Take 1 tablet (100 mg total) by mouth 2 (two) times daily.    citalopram (CELEXA) 20 MG tablet Take 1 tablet (20 mg total) by mouth once daily.    hydrocortisone-aloe vera 1 % Crea cream Apply topically 2 (two) times daily.    ibuprofen (ADVIL,MOTRIN) 600 MG tablet Take 1 tablet (600 mg total) by mouth every 6 (six) hours as needed (moderate pain and swelling).    metoprolol tartrate (LOPRESSOR) 50 MG tablet Take 1 tablet (50 mg total) by mouth 2 (two) times daily.    nicotine (NICODERM CQ) 21 mg/24 hr Place 1 patch onto the skin once daily.    clopidogrel (PLAVIX) 75 mg tablet Take 1 tablet (75 mg total) by mouth once daily. (Patient not taking: Reported on 2023)    doxycycline (VIBRA-TABS) 100 MG tablet Take 1 tablet (100 mg total) by mouth every 12 (twelve) hours. for 12 days    [] ondansetron (ZOFRAN-ODT) 4 MG TbDL Dissolve 1 tablet (4 mg total) by mouth every 6 (six) hours as needed.     Antibiotics (From admission, onward)      Start     Stop Route Frequency Ordered    23 2200  vancomycin 1,250 mg in dextrose 5 % (D5W) 250 mL IVPB (Vial-Mate)         -- IV Every 24 hours (non-standard times) 23 0147    23 0121  vancomycin - pharmacy to dose  (vancomycin IVPB (PEDS and ADULTS))        See Hyperspace for full  Linked Orders Report.    -- IV pharmacy to manage frequency 08/19/23 0021          Antifungals (From admission, onward)      None          Antivirals (From admission, onward)      None             Immunization History   Administered Date(s) Administered    PPD Test 03/03/2020, 07/14/2021       Family History    None       Social History     Socioeconomic History    Marital status:    Tobacco Use    Smoking status: Former     Current packs/day: 0.00     Average packs/day: 0.5 packs/day for 50.0 years (25.0 ttl pk-yrs)     Types: Cigarettes     Start date: 7/5/1963     Quit date: 7/5/2013     Years since quitting: 10.1    Smokeless tobacco: Never   Substance and Sexual Activity    Alcohol use: No    Drug use: No   Social History Narrative    ** Merged History Encounter **          Social Determinants of Health     Financial Resource Strain: Low Risk  (8/19/2023)    Overall Financial Resource Strain (CARDIA)     Difficulty of Paying Living Expenses: Not very hard   Recent Concern: Financial Resource Strain - Medium Risk (8/12/2023)    Overall Financial Resource Strain (CARDIA)     Difficulty of Paying Living Expenses: Somewhat hard   Food Insecurity: No Food Insecurity (8/19/2023)    Hunger Vital Sign     Worried About Running Out of Food in the Last Year: Never true     Ran Out of Food in the Last Year: Never true   Transportation Needs: No Transportation Needs (8/19/2023)    PRAPARE - Transportation     Lack of Transportation (Medical): No     Lack of Transportation (Non-Medical): No   Recent Concern: Transportation Needs - Unmet Transportation Needs (8/12/2023)    PRAPARE - Transportation     Lack of Transportation (Medical): Yes     Lack of Transportation (Non-Medical): Yes   Physical Activity: Inactive (8/12/2023)    Exercise Vital Sign     Days of Exercise per Week: 0 days     Minutes of Exercise per Session: 0 min   Stress: Stress Concern Present (8/12/2023)    [employer] [place] [employer]  Occupational [employer] - Occupational Stress Questionnaire     Feeling of Stress : To some extent   Social Connections: Socially Isolated (8/19/2023)    Social Connection and Isolation Panel [NHANES]     Frequency of Communication with Friends and Family: More than three times a week     Frequency of Social Gatherings with Friends and Family: More than three times a week     Attends Sikhism Services: Never     Active Member of Clubs or Organizations: No     Attends Club or Organization Meetings: Never     Marital Status:    Housing Stability: Low Risk  (8/19/2023)    Housing Stability Vital Sign     Unable to Pay for Housing in the Last Year: No     Number of Places Lived in the Last Year: 1     Unstable Housing in the Last Year: No     Review of Systems   Constitutional:  Negative for chills and fever.   HENT:  Negative for congestion and sore throat.    Eyes:  Negative for visual disturbance.   Respiratory:  Negative for chest tightness and shortness of breath.    Cardiovascular:  Negative for chest pain and palpitations.   Gastrointestinal:  Negative for abdominal distention, abdominal pain, constipation, diarrhea, nausea and vomiting.   Musculoskeletal:  Positive for joint swelling. Negative for back pain.   Skin:  Positive for color change, rash and wound.   Neurological:  Negative for dizziness, weakness and headaches.   Hematological:  Does not bruise/bleed easily.     Objective:     Vital Signs (Most Recent):  Temp: 98.1 °F (36.7 °C) (08/23/23 1132)  Pulse: 71 (08/23/23 1306)  Resp: 18 (08/23/23 1306)  BP: (!) 117/57 (08/23/23 1132)  SpO2: (!) 94 % (08/23/23 1306) Vital Signs (24h Range):  Temp:  [97.8 °F (36.6 °C)-98.4 °F (36.9 °C)] 98.1 °F (36.7 °C)  Pulse:  [69-92] 71  Resp:  [18-24] 18  SpO2:  [88 %-95 %] 94 %  BP: (100-129)/(53-68) 117/57     Weight: 64.8 kg (142 lb 13.7 oz)  Body mass index is 27.9 kg/m².    Estimated Creatinine Clearance: 49.5 mL/min (based on SCr of 0.8 mg/dL).      Physical Exam  Vitals and nursing note reviewed.   Constitutional:       General: She is not in acute distress.     Appearance: Normal appearance. She is obese. She is not ill-appearing or toxic-appearing.   HENT:      Head: Normocephalic and atraumatic.      Mouth/Throat:      Mouth: Mucous membranes are dry.   Eyes:      General: No scleral icterus.  Cardiovascular:      Rate and Rhythm: Normal rate and regular rhythm.      Pulses: Normal pulses.      Heart sounds: Normal heart sounds.   Pulmonary:      Effort: Pulmonary effort is normal. No respiratory distress.      Breath sounds: Normal breath sounds.   Abdominal:      General: There is no distension.      Palpations: Abdomen is soft. There is no mass.      Tenderness: There is no abdominal tenderness. There is no right CVA tenderness or left CVA tenderness.   Musculoskeletal:         General: Swelling, tenderness and deformity present.      Cervical back: No rigidity.      Right lower leg: Edema present. S/p TMA with dorsal wound. Tenderness to palpation      Left lower leg: Edema present.   Lymphadenopathy:      Cervical: No cervical adenopathy.   Skin:     General: Skin is warm and dry.      Capillary Refill: Poor peripheral pulses in all 4 extremities.     Coloration: Skin is not jaundiced.   Neurological:      General: No focal deficit present.      Mental Status: She is alert and oriented to person, place, and time.      Significant Labs: CBC:   Recent Labs   Lab 08/22/23  0428 08/23/23  0406   WBC 6.11 6.86   HGB 11.5* 11.4*   HCT 35.8* 37.3    220     CMP:   Recent Labs   Lab 08/22/23  0428 08/23/23  0406   * 133*   K 3.5 3.4*   CL 97 101   CO2 26 25   GLU 82 116*   BUN 14 12   CREATININE 0.7 0.8   CALCIUM 8.0* 8.1*   PROT 5.5* 5.4*   ALBUMIN 2.7* 2.7*   BILITOT 0.7 0.5   ALKPHOS 139* 159*   AST 79* 74*   ALT 72* 72*   ANIONGAP 9 7*     Wound Culture:   Recent Labs   Lab 08/21/23  0927   LABAERO STAPHYLOCOCCUS AUREUS  Few  *      Significant Imaging: I have reviewed all pertinent imaging results/findings within the past 24 hours.

## 2023-08-23 NOTE — ASSESSMENT & PLAN NOTE
Radha Sotelo is a 79 yo F with a PMHx of COPD on home O2 for chronic hypoxic respiratory failure (4 L via nasal cannula), CAD, PAT, previous osteomyelitis with transmetatarsal amputation (2012) who was admitted for worsening R foot pain and discoloration. In fectious diseas was consulted for a wound, after abscess de-vicki, with purulent drainage and infection.     - Wound cultures positive for MSSA  - Pending fungal and anaerobes wound cultures  - Discontinue IV vancomycin and ceftriaxone  - Start PO cefadroxil   - Recommendations on discharge: PO cefadroxil PO 500mg q12h for 2 weeks (end date 9/7)  - Will follow cultures to guide antibiotics  - Discussed plan with ID staff

## 2023-08-23 NOTE — PT/OT/SLP PROGRESS
Physical Therapy Treatment    Patient Name:  Radha Sotelo   MRN:  [id number]    Recommendations:     Discharge Recommendations: nursing facility, skilled  Discharge Equipment Recommendations: none  Barriers to discharge: None    Assessment:     Radha Sotelo is a 78 y.o. female admitted with a medical diagnosis of Cellulitis and abscess of foot.  She presents with the following impairments/functional limitations: weakness, impaired endurance, impaired self care skills, impaired functional mobility, gait instability, impaired balance, decreased lower extremity function, pain, impaired skin, orthopedic precautions.    Rehab Prognosis: Good; patient would benefit from acute skilled PT services to address these deficits and reach maximum level of function.    Recent Surgery: Procedure(s) (LRB):  Angiogram Extremity Unilateral (Right)      Plan:     During this hospitalization, patient to be seen 3 x/week to address the identified rehab impairments via gait training, therapeutic activities, therapeutic exercises, wheelchair management/training and progress toward the following goals:    Plan of Care Expires:  09/20/23    Subjective     Chief Complaint: pain with mobility   Patient/Family Comments/goals: decrease pain  Pain/Comfort:  Pain Rating 1: 6/10  Location - Side 1: Right  Location 1: foot  Pain Addressed 1: Reposition, Distraction, Cessation of Activity  Pain Rating 2: 10/10 (with mobility)  Location - Side 2: Right  Location 2: foot  Pain Addressed 2: Cessation of Activity      Objective:     Communicated with RN prior to session.  Patient found HOB elevated with oxygen, telemetry upon PT entry to room.     General Precautions: Standard, fall  Orthopedic Precautions: N/A  Braces: N/A  Respiratory Status: nasal cannula     Functional Mobility:  Pt declined all mobility due to pain      AM-PAC 6 CLICK MOBILITY  Turning over in bed (including adjusting bedclothes, sheets and blankets)?: 3  Sitting down on  and standing up from a chair with arms (e.g., wheelchair, bedside commode, etc.): 3  Moving from lying on back to sitting on the side of the bed?: 4  Moving to and from a bed to a chair (including a wheelchair)?: 3  Need to walk in hospital room?: 2  Climbing 3-5 steps with a railing?: 1  Basic Mobility Total Score: 16       Treatment & Education:  Pt positioned in bed to alleviate pressure per pt comfort.     Education provided on benefit of PT, benefit of mobility, safety, calling for assistance.    Bedside table in front of patient and area set up for function, convenience, and safety. RN aware of patient's mobility needs and status. Questions/concerns addressed within PTA scope of practice; patient  with no further questions. Time was provided for active listening, discussion of health disposition, and discussion of safe discharge.      Patient left HOB elevated with all lines intact and call button in reach..    GOALS:   Multidisciplinary Problems       Physical Therapy Goals          Problem: Physical Therapy    Goal Priority Disciplines Outcome Goal Variances Interventions   Physical Therapy Goal     PT, PT/OT Ongoing, Progressing     Description: Goals to be met by: 23    Patient will increase functional independence with mobility by performin. Sit to stand transfer with [place]  2. Bed to chair transfer with Modified [place] using Rolling Walker  3. Gait  x 150 feet with Modified [place] using Rolling Walker.   4. Ascend/descend 14 stair with bilateral Handrails [place] using No Assistive Device.   5. Sitting at edge of bed x2 minutes with [place]  6. Stand for 2 minutes with [place] using No Assistive Device                         Time Tracking:     PT Received On: 23  PT Start Time: 1107     PT Stop Time: 1115  PT Total Time (min): 8 min     Billable Minutes: Therapeutic Activity 8    Treatment Type: Treatment  PT/PTA: PTA     Number of PTA visits since  last PT visit: 1 08/23/2023

## 2023-08-23 NOTE — SUBJECTIVE & OBJECTIVE
Interval History: [facility] follow up visit for suspected Pain [R52]  Cellulitis [L03.90] present on admission.   This service was provided by telemedicine.    Patient was transferred to [facility] on: 8/23/2023  The patient location is: [facility]/[id number] A   Admitted 8/18/2023  8:14 PM    The patient is able to provide adequate history. Additional history was obtained from: past medical records.  No significant events overnight reported.  Patient reports complaints of R foot wound. Remains on O2 at home level.   Symptoms are unchanged since yesterday.     I have reviewed the following on 8/23/2023:     Details     [x]   Lab results  Liver enzymes elevated. Hypomagnesemia. Hypokalemia. H&H stable    []   Micro reports     []   Pathology reports     []   Imaging reports     []   Cardiology Procedure reports     []   Non- records/CareEverywhere     []  Independently viewed/assessed:      I have ordered the following or verified the orders are active for: CBC, CMP    Review of Systems   Constitutional:  Negative for fever.   Respiratory:  Negative for shortness of breath.      Objective:     Vital Signs (Most Recent):  Temp: 98.3 °F (36.8 °C) (08/23/23 0721)  Pulse: 78 (08/23/23 0805)  Resp: 18 (08/23/23 0805)  BP: (!) 124/59 (08/23/23 0721)  SpO2: (!) 94 % (08/23/23 0805) Vital Signs (24h Range):  Temp:  [97.8 °F (36.6 °C)-98.4 °F (36.9 °C)] 98.3 °F (36.8 °C)  Pulse:  [71-92] 78  Resp:  [18-24] 18  SpO2:  [88 %-95 %] 94 %  BP: (100-129)/(53-68) 124/59     Weight: 64.8 kg (142 lb 13.7 oz)  Body mass index is 27.9 kg/m².    Intake/Output Summary (Last 24 hours) at 8/23/2023 1048  Last data filed at 8/23/2023 0904  Gross per 24 hour   Intake 720 ml   Output --   Net 720 ml         Physical Exam  Cardiovascular:      Comments: Monitor and/or Vital signs reviewed at time of visit  Pulmonary:      Effort: Pulmonary effort is normal. No accessory muscle usage or respiratory distress.   Musculoskeletal:      Right  Lower Extremity: (TMA)  Neurological:      Mental Status: She is alert. She is not disoriented.   Psychiatric:         Attention and Perception: Attention normal.         Behavior: Behavior is cooperative.         Significant Labs:   Recent Labs   Lab 08/19/23  0735   HGBA1C 5.6     Recent Labs   Lab 08/23/23  2053   POCTGLUCOSE 97     Recent Labs   Lab 08/21/23  0230 08/22/23  0428 08/23/23  0406   WBC 9.26 6.11 6.86   HGB 11.4* 11.5* 11.4*   HCT 36.3* 35.8* 37.3    228 220     Recent Labs   Lab 08/21/23  0230 08/22/23  0428 08/23/23  0406   GRAN 83.2*  7.7 71.3  4.4 72.2  5.0   LYMPH 6.2*  0.6* 11.0*  0.7* 11.1*  0.8*   MONO 8.2  0.8 11.5  0.7 10.6  0.7   EOS 0.2 0.3 0.3     Recent Labs   Lab 08/21/23 0230 08/22/23 0428 08/23/23  0406   * 132* 133*   K 3.5 3.5 3.4*   CL 99 97 101   CO2 22* 26 25   BUN 17 14 12   CREATININE 0.7 0.7 0.8    82 116*   CALCIUM 7.9* 8.0* 8.1*   ALBUMIN 2.8* 2.7* 2.7*   MG 1.6 1.6 1.5*   PHOS 2.7 2.6* 2.7     Recent Labs   Lab 08/18/23 2112 08/19/23 0735 08/21/23 0230 08/22/23 0428 08/23/23  0406   ALKPHOS 74   < > 124 139* 159*   ALT 19   < > 43 72* 72*   AST 17   < > 71* 79* 74*   PROT 6.0   < > 5.5* 5.5* 5.4*   BILITOT 0.6   < > 2.3* 0.7 0.5   CRP 33.7*  --   --   --   --     < > = values in this interval not displayed.     Recent Labs   Lab 08/11/23  0018 08/18/23 2112   LACTATE 2.0  --    SEDRATE  --  27     SARS-CoV2 (COVID-19) Qualitative PCR (no units)   Date Value   07/29/2021 Not Detected   07/22/2021 Not Detected     POC Rapid COVID (no units)   Date Value   07/12/2021 Negative       Results for orders placed during the hospital encounter of 03/01/20    Echo Color Flow Doppler? Yes    Interpretation Summary  · Normal left ventricular systolic function. The estimated ejection fraction is 60%.  · Normal right ventricular systolic function.  · Normal LV diastolic function.  · The estimated PA systolic pressure is 31 mmHg.  · Normal central  venous pressure (3 mmHg).      VAS Ankle Brachial Indices Resting  Indication  ========    Peripheral Arterial Disease    Pressure Lower  ============    Rt brachial A syst BP  129 mmHg  Rt low thigh BP    58 mmHg  Rt calf BP 46 mmHg  Rt PTA BP  0 mmHg  Rt dors pedis A BP 50 mmHg  Rt NICCI post tibial (rt post tib A BP / max brach A BP) 0.00  Right NICCI ratio use:   dors. pedis  Rt NICCI (rt dors ped A BP / max brach A BP) 0.39  Rt ankle BP / max brach A BP   0.39  Lt low thigh BP    174 mmHg  Lt calf  mmHg  Lt PTA BP  65 mmHg  Lt dors pedis A BP 77 mmHg  Lt toe BP  41 mmHg  Lt NICCI (lt post tibial A BP / max brach A BP)  0.50  Left NICCI ratio use:    dors. pedis  Lt NICCI dors ped (lt dors ped A BP / max brach A BP)    0.60  Lt ankle BP / max brach A BP   0.60  Lt TBI (lt toe BP / max brach A BP)    0.32    PPG Lower  =========    Rt toe digit waveform: History of Toe Amputation  Rt 2nd digit waveform: History of Toe Amputation  Rt 3rd digit waveform: History of Toe Amputation  Rt 4th digit waveform: History of Toe Amputation  Rt 5th digit waveform: History of Toe Amputation  Lt toe BP - PPG    41 mmHg  Lt toe digit waveform: moderately dampened    Comment  ========    Doppler signals were faint and difficult to obtain secondary to venous flow in the right lower extremity.    Impression  =========    Right Leg: Segmental pressures and PVR waveforms suggest severe peripheral arterial occlusive disease. Absent DPA signal.  Rt lower digits: History of transmetatarsal amputation.    Left Leg: Segmental pressures and PVR waveforms suggest moderate peripheral arterial occlusive disease.  Lt lower digits: Toe PPG waveforms as described above. - TBI of 0.32 with a Great toe pressure of 41 mmHg is noted.    DATE OF SERVICE: 08/21/2023                                                      Sonographer: Bridgett Rodrigues  Electronically Signed by: Phillip Cortez II at 08/21/2023-17:26      Labs and Imaging listed above were reviewed.

## 2023-08-23 NOTE — HPI
Radha Sotelo is a 77 yo F with a PMHx of COPD on home O2 for chronic hypoxic respiratory failure (4 L via nasal cannula), CAD, PAT, previous osteomyelitis with transmetatarsal amputation (2012) who presented to the emergency department due to worsening right foot pain with associated discoloration.  She had a similar presentation approximately 10 days ago and was started on broad-spectrum antibiotics.  Since that time she is been having worsening pain associated with the right leg as well as discoloration with possible deep blistering.  An x-ray was done in the emergency department which demonstrated no erosive changes of osteomyelitis.  She does have some overlying right erythema of the affected area as well as dark discoloration with possible blood blister present.  There is probably some fluctuance however complete evaluation was limited secondary to patient intolerance.  She has a black spot which does not appear to be leaking any purulent fluid.  She denies any fevers, chills, shortness of breath, chest pain, or intolerance of lying flat.  She states that she has been dealing with progressive lower extremity swelling bilaterally.  She does not take any diuretics for this and it is a new problem.    Infectious disease for wound, after abcess de-vicki, with infection. Wound located on dorsal aspect of TMA site. Wound cultures postitive for MSSA. Pending AFB, fungal, and anerobe cultures. Xray and MRI of foot show no evidence of OM. Patient reports pain to forefoot. Denies f/c/n/v.

## 2023-08-23 NOTE — PLAN OF CARE
ADRY spoke with pt in room, asked if she wanted to go to SNF upon d/c to get stronger to navigate stairs.  She states she wants to go home, has daughter and son in law to help her, could live downstairs with family if she needed to in order to get stronger to navigate stairs.  Instructed that MD said possible d/c on Friday.  Pt v/u.    9:14 AM  ADRY spoke with dtr Anthony Reardon [phone].  She states she and her  will  be able to help pt navigate stairs.    Kim Pascual, MELLON, BS, RN, CCM

## 2023-08-23 NOTE — SUBJECTIVE & OBJECTIVE
Past Medical History:   Diagnosis Date    Anemia     Anxiety     COPD (chronic obstructive pulmonary disease)     COPD (chronic obstructive pulmonary disease) with emphysema     Coronary artery disease     Depression     Diverticulitis     Hyperlipidemia     Hypertension     PVD (peripheral vascular disease)     PVD (peripheral vascular disease)     S/P colostomy     Substance abuse     hx heavy etoh use     Tobacco abuse        Past Surgical History:   Procedure Laterality Date    COLOSTOMY      ECTOPIC PREGNANCY SURGERY      right forefoot amputation      right toe amputation      x2 toes       Review of patient's allergies indicates:  No Known Allergies    Medications:  Medications Prior to Admission   Medication Sig    albuterol (PROVENTIL/VENTOLIN HFA) 90 mcg/actuation inhaler INHALE 2 PUFFS BY MOUTH EVERY 6 HOURS AS NEEDED    albuterol-ipratropium (DUO-NEB) 2.5 mg-0.5 mg/3 mL nebulizer solution USE 1 VIAL VIA NEBULIZER EVERY 6 HOURS AS NEEDED FOR WHEEZING, SHORTNESS OF BREATH    amoxicillin-clavulanate 875-125mg (AUGMENTIN) 875-125 mg per tablet Take 1 tablet by mouth 2 (two) times daily for 12 days    aspirin 81 MG Chew Take 1 tablet (81 mg total) by mouth once daily.    atorvastatin (LIPITOR) 40 MG tablet Take 1 tablet (40 mg total) by mouth once daily.    budesonide-formoterol 80-4.5 mcg (SYMBICORT) 80-4.5 mcg/actuation HFAA INHALE 2 PUFFS INTO THE LUNGS TWICE DAILY. RINSE MOUTH AFTER USE    cilostazoL (PLETAL) 100 MG Tab Take 1 tablet (100 mg total) by mouth 2 (two) times daily.    citalopram (CELEXA) 20 MG tablet Take 1 tablet (20 mg total) by mouth once daily.    hydrocortisone-aloe vera 1 % Crea cream Apply topically 2 (two) times daily.    ibuprofen (ADVIL,MOTRIN) 600 MG tablet Take 1 tablet (600 mg total) by mouth every 6 (six) hours as needed (moderate pain and swelling).    metoprolol tartrate (LOPRESSOR) 50 MG tablet Take 1 tablet (50 mg total) by mouth 2 (two) times daily.    nicotine (NICODERM  CQ) 21 mg/24 hr Place 1 patch onto the skin once daily.    clopidogrel (PLAVIX) 75 mg tablet Take 1 tablet (75 mg total) by mouth once daily. (Patient not taking: Reported on 2023)    doxycycline (VIBRA-TABS) 100 MG tablet Take 1 tablet (100 mg total) by mouth every 12 (twelve) hours. for 12 days    [] ondansetron (ZOFRAN-ODT) 4 MG TbDL Dissolve 1 tablet (4 mg total) by mouth every 6 (six) hours as needed.     Antibiotics (From admission, onward)      Start     Stop Route Frequency Ordered    23 2200  vancomycin 1,250 mg in dextrose 5 % (D5W) 250 mL IVPB (Vial-Mate)         -- IV Every 24 hours (non-standard times) 23 0147    23 0121  vancomycin - pharmacy to dose  (vancomycin IVPB (PEDS and ADULTS))        See Hyperspace for full Linked Orders Report.    -- IV pharmacy to manage frequency 23 0021          Antifungals (From admission, onward)      None          Antivirals (From admission, onward)      None             Immunization History   Administered Date(s) Administered    PPD Test 2020, 2021       Family History    None       Social History     Socioeconomic History    Marital status:    Tobacco Use    Smoking status: Former     Current packs/day: 0.00     Average packs/day: 0.5 packs/day for 50.0 years (25.0 ttl pk-yrs)     Types: Cigarettes     Start date: 1963     Quit date: 2013     Years since quitting: 10.1    Smokeless tobacco: Never   Substance and Sexual Activity    Alcohol use: No    Drug use: No   Social History Narrative    ** Merged History Encounter **          Social Determinants of Health     Financial Resource Strain: Low Risk  (2023)    Overall Financial Resource Strain (CARDIA)     Difficulty of Paying Living Expenses: Not very hard   Recent Concern: Financial Resource Strain - Medium Risk (2023)    Overall Financial Resource Strain (CARDIA)     Difficulty of Paying Living Expenses: Somewhat hard   Food Insecurity: No  Food Insecurity (8/19/2023)    Hunger Vital Sign     Worried About Running Out of Food in the Last Year: Never true     Ran Out of Food in the Last Year: Never true   Transportation Needs: No Transportation Needs (8/19/2023)    PRAPARE - Transportation     Lack of Transportation (Medical): No     Lack of Transportation (Non-Medical): No   Recent Concern: Transportation Needs - Unmet Transportation Needs (8/12/2023)    PRAPARE - Transportation     Lack of Transportation (Medical): Yes     Lack of Transportation (Non-Medical): Yes   Physical Activity: Inactive (8/12/2023)    Exercise Vital Sign     Days of Exercise per Week: 0 days     Minutes of Exercise per Session: 0 min   Stress: Stress Concern Present (8/12/2023)    [employer] [place] [employer] - Occupational Stress Questionnaire     Feeling of Stress : To some extent   Social Connections: Socially Isolated (8/19/2023)    Social Connection and Isolation Panel [NHANES]     Frequency of Communication with Friends and Family: More than three times a week     Frequency of Social Gatherings with Friends and Family: More than three times a week     Attends Gnosticist Services: Never     Active Member of Clubs or Organizations: No     Attends Club or Organization Meetings: Never     Marital Status:    Housing Stability: Low Risk  (8/19/2023)    Housing Stability Vital Sign     Unable to Pay for Housing in the Last Year: No     Number of Places Lived in the Last Year: 1     Unstable Housing in the Last Year: No     Review of Systems   Constitutional:  Negative for chills and fever.   HENT:  Negative for congestion and sore throat.    Eyes:  Negative for visual disturbance.   Respiratory:  Negative for chest tightness and shortness of breath.    Cardiovascular:  Negative for chest pain and palpitations.   Gastrointestinal:  Negative for abdominal distention, abdominal pain, constipation, diarrhea, nausea and vomiting.   Musculoskeletal:  Positive for  joint swelling. Negative for back pain.   Skin:  Positive for color change, rash and wound.   Neurological:  Negative for dizziness, weakness and headaches.   Hematological:  Does not bruise/bleed easily.     Objective:     Vital Signs (Most Recent):  Temp: 98.1 °F (36.7 °C) (08/23/23 1132)  Pulse: 71 (08/23/23 1306)  Resp: 18 (08/23/23 1306)  BP: (!) 117/57 (08/23/23 1132)  SpO2: (!) 94 % (08/23/23 1306) Vital Signs (24h Range):  Temp:  [97.8 °F (36.6 °C)-98.4 °F (36.9 °C)] 98.1 °F (36.7 °C)  Pulse:  [69-92] 71  Resp:  [18-24] 18  SpO2:  [88 %-95 %] 94 %  BP: (100-129)/(53-68) 117/57     Weight: 64.8 kg (142 lb 13.7 oz)  Body mass index is 27.9 kg/m².    Estimated Creatinine Clearance: 49.5 mL/min (based on SCr of 0.8 mg/dL).     Physical Exam  Vitals and nursing note reviewed.   Constitutional:       General: She is not in acute distress.     Appearance: Normal appearance. She is obese. She is not ill-appearing or toxic-appearing.   HENT:      Head: Normocephalic and atraumatic.      Mouth/Throat:      Mouth: Mucous membranes are dry.   Eyes:      General: No scleral icterus.  Cardiovascular:      Rate and Rhythm: Normal rate and regular rhythm.      Pulses: Normal pulses.      Heart sounds: Normal heart sounds.   Pulmonary:      Effort: Pulmonary effort is normal. No respiratory distress.      Breath sounds: Normal breath sounds.   Abdominal:      General: There is no distension.      Palpations: Abdomen is soft. There is no mass.      Tenderness: There is no abdominal tenderness. There is no right CVA tenderness or left CVA tenderness.   Musculoskeletal:         General: Swelling, tenderness and deformity present.      Cervical back: No rigidity.      Right lower leg: Edema present. S/p TMA with dorsal wound. Tenderness to palpation      Left lower leg: Edema present.   Lymphadenopathy:      Cervical: No cervical adenopathy.   Skin:     General: Skin is warm and dry.      Capillary Refill: Poor peripheral pulses  in all 4 extremities.     Coloration: Skin is not jaundiced.   Neurological:      General: No focal deficit present.      Mental Status: She is alert and oriented to person, place, and time.      Significant Labs: CBC:   Recent Labs   Lab 08/22/23 0428 08/23/23  0406   WBC 6.11 6.86   HGB 11.5* 11.4*   HCT 35.8* 37.3    220     CMP:   Recent Labs   Lab 08/22/23 0428 08/23/23  0406   * 133*   K 3.5 3.4*   CL 97 101   CO2 26 25   GLU 82 116*   BUN 14 12   CREATININE 0.7 0.8   CALCIUM 8.0* 8.1*   PROT 5.5* 5.4*   ALBUMIN 2.7* 2.7*   BILITOT 0.7 0.5   ALKPHOS 139* 159*   AST 79* 74*   ALT 72* 72*   ANIONGAP 9 7*     Wound Culture:   Recent Labs   Lab 08/21/23  0927   LABAERO STAPHYLOCOCCUS AUREUS  Few  *     Significant Imaging: I have reviewed all pertinent imaging results/findings within the past 24 hours.

## 2023-08-23 NOTE — PT/OT/SLP PROGRESS
"Occupational Therapy      Patient Name:  Radha Sotelo   MRN:  [id number]    Patient not seen today secondary to patient unwilling to participate due to fatigue and having just returned to bed.  She said she had been up in the chair earlier and said, "It felt good."  Will follow-up as scheduled per OT POC.    8/23/2023  "

## 2023-08-24 LAB
ALBUMIN SERPL BCP-MCNC: 2.8 G/DL (ref 3.5–5.2)
ALP SERPL-CCNC: 153 U/L (ref 55–135)
ALT SERPL W/O P-5'-P-CCNC: 65 U/L (ref 10–44)
ANION GAP SERPL CALC-SCNC: 13 MMOL/L (ref 8–16)
AST SERPL-CCNC: 64 U/L (ref 10–40)
BASOPHILS # BLD AUTO: 0.04 K/UL (ref 0–0.2)
BASOPHILS NFR BLD: 0.6 % (ref 0–1.9)
BILIRUB SERPL-MCNC: 0.8 MG/DL (ref 0.1–1)
BUN SERPL-MCNC: 12 MG/DL (ref 8–23)
CALCIUM SERPL-MCNC: 8.2 MG/DL (ref 8.7–10.5)
CHLORIDE SERPL-SCNC: 100 MMOL/L (ref 95–110)
CO2 SERPL-SCNC: 24 MMOL/L (ref 23–29)
CREAT SERPL-MCNC: 0.7 MG/DL (ref 0.5–1.4)
DIFFERENTIAL METHOD: ABNORMAL
EOSINOPHIL # BLD AUTO: 0.2 K/UL (ref 0–0.5)
EOSINOPHIL NFR BLD: 2.9 % (ref 0–8)
ERYTHROCYTE [DISTWIDTH] IN BLOOD BY AUTOMATED COUNT: 14.6 % (ref 11.5–14.5)
EST. GFR  (NO RACE VARIABLE): >60 ML/MIN/1.73 M^2
GLUCOSE SERPL-MCNC: 78 MG/DL (ref 70–110)
HCT VFR BLD AUTO: 35.9 % (ref 37–48.5)
HGB BLD-MCNC: 11.3 G/DL (ref 12–16)
IMM GRANULOCYTES # BLD AUTO: 0.03 K/UL (ref 0–0.04)
IMM GRANULOCYTES NFR BLD AUTO: 0.4 % (ref 0–0.5)
LYMPHOCYTES # BLD AUTO: 0.7 K/UL (ref 1–4.8)
LYMPHOCYTES NFR BLD: 10.3 % (ref 18–48)
MAGNESIUM SERPL-MCNC: 1.6 MG/DL (ref 1.6–2.6)
MCH RBC QN AUTO: 29.2 PG (ref 27–31)
MCHC RBC AUTO-ENTMCNC: 31.5 G/DL (ref 32–36)
MCV RBC AUTO: 93 FL (ref 82–98)
MONOCYTES # BLD AUTO: 0.5 K/UL (ref 0.3–1)
MONOCYTES NFR BLD: 7.5 % (ref 4–15)
NEUTROPHILS # BLD AUTO: 5.6 K/UL (ref 1.8–7.7)
NEUTROPHILS NFR BLD: 78.3 % (ref 38–73)
NRBC BLD-RTO: 0 /100 WBC
PHOSPHATE SERPL-MCNC: 2.3 MG/DL (ref 2.7–4.5)
PLATELET # BLD AUTO: 235 K/UL (ref 150–450)
PMV BLD AUTO: 9.4 FL (ref 9.2–12.9)
POTASSIUM SERPL-SCNC: 3.7 MMOL/L (ref 3.5–5.1)
PROT SERPL-MCNC: 5.6 G/DL (ref 6–8.4)
RBC # BLD AUTO: 3.87 M/UL (ref 4–5.4)
SODIUM SERPL-SCNC: 137 MMOL/L (ref 136–145)
WBC # BLD AUTO: 7.2 K/UL (ref 3.9–12.7)

## 2023-08-24 PROCEDURE — 25000003 PHARM REV CODE 250

## 2023-08-24 PROCEDURE — 27000221 HC OXYGEN, UP TO 24 HOURS

## 2023-08-24 PROCEDURE — 84100 ASSAY OF PHOSPHORUS: CPT | Performed by: STUDENT IN AN ORGANIZED HEALTH CARE EDUCATION/TRAINING PROGRAM

## 2023-08-24 PROCEDURE — 94640 AIRWAY INHALATION TREATMENT: CPT

## 2023-08-24 PROCEDURE — 36415 COLL VENOUS BLD VENIPUNCTURE: CPT | Performed by: STUDENT IN AN ORGANIZED HEALTH CARE EDUCATION/TRAINING PROGRAM

## 2023-08-24 PROCEDURE — 25000242 PHARM REV CODE 250 ALT 637 W/ HCPCS: Performed by: INTERNAL MEDICINE

## 2023-08-24 PROCEDURE — 94761 N-INVAS EAR/PLS OXIMETRY MLT: CPT

## 2023-08-24 PROCEDURE — 94799 UNLISTED PULMONARY SVC/PX: CPT

## 2023-08-24 PROCEDURE — 25000003 PHARM REV CODE 250: Performed by: INTERNAL MEDICINE

## 2023-08-24 PROCEDURE — 25000242 PHARM REV CODE 250 ALT 637 W/ HCPCS: Performed by: STUDENT IN AN ORGANIZED HEALTH CARE EDUCATION/TRAINING PROGRAM

## 2023-08-24 PROCEDURE — 99499 UNLISTED E&M SERVICE: CPT | Mod: ,,, | Performed by: SURGERY

## 2023-08-24 PROCEDURE — 25000003 PHARM REV CODE 250: Performed by: STUDENT IN AN ORGANIZED HEALTH CARE EDUCATION/TRAINING PROGRAM

## 2023-08-24 PROCEDURE — 21400001 HC TELEMETRY ROOM

## 2023-08-24 PROCEDURE — 27000646 HC AEROBIKA DEVICE

## 2023-08-24 PROCEDURE — 99499 NO LOS: ICD-10-PCS | Mod: ,,, | Performed by: SURGERY

## 2023-08-24 PROCEDURE — 85025 COMPLETE CBC W/AUTO DIFF WBC: CPT | Performed by: STUDENT IN AN ORGANIZED HEALTH CARE EDUCATION/TRAINING PROGRAM

## 2023-08-24 PROCEDURE — 80053 COMPREHEN METABOLIC PANEL: CPT | Performed by: STUDENT IN AN ORGANIZED HEALTH CARE EDUCATION/TRAINING PROGRAM

## 2023-08-24 PROCEDURE — 99900035 HC TECH TIME PER 15 MIN (STAT)

## 2023-08-24 PROCEDURE — 99233 PR SUBSEQUENT HOSPITAL CARE,LEVL III: ICD-10-PCS | Mod: 95,,, | Performed by: INTERNAL MEDICINE

## 2023-08-24 PROCEDURE — S4991 NICOTINE PATCH NONLEGEND: HCPCS | Performed by: INTERNAL MEDICINE

## 2023-08-24 PROCEDURE — 99233 SBSQ HOSP IP/OBS HIGH 50: CPT | Mod: 95,,, | Performed by: INTERNAL MEDICINE

## 2023-08-24 PROCEDURE — 63600175 PHARM REV CODE 636 W HCPCS: Performed by: STUDENT IN AN ORGANIZED HEALTH CARE EDUCATION/TRAINING PROGRAM

## 2023-08-24 PROCEDURE — 83735 ASSAY OF MAGNESIUM: CPT | Performed by: STUDENT IN AN ORGANIZED HEALTH CARE EDUCATION/TRAINING PROGRAM

## 2023-08-24 RX ORDER — SODIUM,POTASSIUM PHOSPHATES 280-250MG
2 POWDER IN PACKET (EA) ORAL ONCE
Status: COMPLETED | OUTPATIENT
Start: 2023-08-24 | End: 2023-08-24

## 2023-08-24 RX ORDER — GUAIFENESIN 600 MG/1
600 TABLET, EXTENDED RELEASE ORAL 2 TIMES DAILY
Status: DISCONTINUED | OUTPATIENT
Start: 2023-08-24 | End: 2023-08-29 | Stop reason: HOSPADM

## 2023-08-24 RX ADMIN — Medication 6 MG: at 09:08

## 2023-08-24 RX ADMIN — METOPROLOL TARTRATE 50 MG: 50 TABLET, FILM COATED ORAL at 02:08

## 2023-08-24 RX ADMIN — ENOXAPARIN SODIUM 40 MG: 40 INJECTION SUBCUTANEOUS at 04:08

## 2023-08-24 RX ADMIN — GUAIFENESIN 600 MG: 600 TABLET, EXTENDED RELEASE ORAL at 02:08

## 2023-08-24 RX ADMIN — Medication 1 PATCH: at 08:08

## 2023-08-24 RX ADMIN — METOPROLOL TARTRATE 50 MG: 50 TABLET, FILM COATED ORAL at 09:08

## 2023-08-24 RX ADMIN — Medication 400 MG: at 09:08

## 2023-08-24 RX ADMIN — ATORVASTATIN CALCIUM 40 MG: 40 TABLET, FILM COATED ORAL at 02:08

## 2023-08-24 RX ADMIN — CEFADROXIL 1 G: 1000 TABLET ORAL at 02:08

## 2023-08-24 RX ADMIN — ASPIRIN 81 MG 81 MG: 81 TABLET ORAL at 02:08

## 2023-08-24 RX ADMIN — IPRATROPIUM BROMIDE 0.5 MG: 0.5 SOLUTION RESPIRATORY (INHALATION) at 07:08

## 2023-08-24 RX ADMIN — ACETAMINOPHEN 1000 MG: 500 TABLET ORAL at 09:08

## 2023-08-24 RX ADMIN — FLUTICASONE FUROATE AND VILANTEROL TRIFENATATE 1 PUFF: 200; 25 POWDER RESPIRATORY (INHALATION) at 07:08

## 2023-08-24 RX ADMIN — GUAIFENESIN 600 MG: 600 TABLET, EXTENDED RELEASE ORAL at 09:08

## 2023-08-24 RX ADMIN — CILOSTAZOL 50 MG: 50 TABLET ORAL at 02:08

## 2023-08-24 RX ADMIN — Medication 400 MG: at 02:08

## 2023-08-24 RX ADMIN — CILOSTAZOL 50 MG: 50 TABLET ORAL at 09:08

## 2023-08-24 RX ADMIN — OXYCODONE HYDROCHLORIDE 10 MG: 10 TABLET ORAL at 09:08

## 2023-08-24 RX ADMIN — LEVALBUTEROL 1.25 MG: 1.25 SOLUTION, CONCENTRATE RESPIRATORY (INHALATION) at 04:08

## 2023-08-24 RX ADMIN — POTASSIUM & SODIUM PHOSPHATES POWDER PACK 280-160-250 MG 2 PACKET: 280-160-250 PACK at 04:08

## 2023-08-24 NOTE — ASSESSMENT & PLAN NOTE
"Patient seen by Dr. Saldaña but has not been seen since 2015.    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  Now has discoloration to RLE stump.  Vascular surgery consulted  Arterial doppler showing severe PAD RLE.   Planning for angiogram 8/24 - cancelled and awaiting future date    "

## 2023-08-24 NOTE — PLAN OF CARE
Chart reviewed. Preop nursing care completed per orders. Safe surgery checklist complete.  Call bell within reach. Instructed pt to call for assistance.

## 2023-08-24 NOTE — ASSESSMENT & PLAN NOTE
Patient uses nebulizers q.6 hours.  Unfortunately due to national shortages, we are unable to provide these.  Will resume her home inhalers and provide nebulizers as able.  She is currently on 2 L of oxygen which appears to be her baseline.  She states that she is no longer smoking and denies any signs or symptoms of ongoing acute exacerbation of her COPD.    Patient information:     SpO2: (!) 94 %       mMRC Key:  0 - Dyspnea only with strenuous exercise  1 - Dyspnea when hurrying or walking up a slight hill  2 - Walks slower than people of the same age because of dyspnea or has to stop for breath when walking at own pace  3 - Stops for breath after walking 100 yards or after a few minutes  4 - Too dyspneic to leave house or breathless when dressing    Exacerbation Component:  - COPD: stable  Patient Is not showing signs of a current COPD exacerbation The patient is not currently have symptoms / an exacerbation. The patient has COPD for approximately 10+ years. Symptoms in previous episodes have included dyspnea, cough and wheezing, and typically last 5 days. Previous episodes have been exacerbated by Smoke and upper respiratory infection. Current treatment includes albuterol nebulizer, albuterol/ipratropium inhaler, oxygen and Breo, which generally provides some relief of symptoms.    - Risk factor reduction (Smoking cessation/avoidance, management of GERD, pHTN treatment)  - Avoidance of triggers (Air pollution, respiratory infections, pulmonary embolisms)  - Complete metabolic panel  - Target SpO2 of 88-92% or PaO2 of 60-70 mmHg

## 2023-08-24 NOTE — ASSESSMENT & PLAN NOTE
78 year old female with a PMH of COPD, HTN, CAD, SIADH, previous right foot wounds for which she underwent an angiogram with SFA stents and TMA by Dr. Saldaña in 2015 now with worsening right foot pain and wounds.    - OR for angiogram today  - Podiatry following  - Continue ASA/statin  - rest of care per primary team  - please call with questions

## 2023-08-24 NOTE — PROGRESS NOTES
[facility] ([facility])  Vascular Surgery  Progress Note    Patient Name: Radha Sotelo  MRN: [id number]  Admission Date: 8/18/2023  Primary Care Provider: Laurence, Primary Doctor    Subjective:     Interval History: Patient doing well this am. No complaints. NPO for OR.    Post-Op Info:  Procedure(s) (LRB):  Angiogram Extremity Unilateral (Right)   Day of Surgery       Medications:  Continuous Infusions:  Scheduled Meds:   acetaminophen  1,000 mg Oral Q8H    aspirin  81 mg Oral Daily    atorvastatin  40 mg Oral Daily    cefadroxil  1 g Oral Daily    cilostazoL  50 mg Oral BID    citalopram  20 mg Oral Daily    enoxparin  40 mg Subcutaneous Daily    fluticasone furoate-vilanteroL  1 puff Inhalation Daily    guaiFENesin  600 mg Oral BID    ipratropium  0.5 mg Nebulization Q6H    magnesium oxide  400 mg Oral BID    metoprolol tartrate  50 mg Oral BID    nicotine  1 patch Transdermal Daily     PRN Meds:cadexomer iodine, dextrose 10%, dextrose 10%, glucagon (human recombinant), glucose, glucose, levalbuterol **AND** ipratropium, melatonin, naloxone, ondansetron, oxyCODONE, oxyCODONE, sars-cov-2 (covid-19), sodium chloride 0.9%     Objective:     Vital Signs (Most Recent):  Temp: 97.9 °F (36.6 °C) (08/24/23 0949)  Pulse: 86 (08/24/23 0949)  Resp: 20 (08/24/23 0949)  BP: 138/63 (08/24/23 0949)  SpO2: (!) 93 % (08/24/23 0949) Vital Signs (24h Range):  Temp:  [97.9 °F (36.6 °C)-99.2 °F (37.3 °C)] 97.9 °F (36.6 °C)  Pulse:  [70-88] 86  Resp:  [16-20] 20  SpO2:  [89 %-94 %] 93 %  BP: (101-146)/(52-65) 138/63         Physical Exam  Constitutional:       Appearance: Normal appearance.   Cardiovascular:      Rate and Rhythm: Normal rate.      Comments: 2+ palpable b/l femoral pulses  Pulmonary:      Effort: Pulmonary effort is normal.   Skin:     General: Skin is warm and dry.      Comments: RLE TMA wound     Neurological:      Mental Status: She is alert and oriented to person, place, and time.          Significant Labs:  BMP:    Recent Labs   Lab 08/24/23 0302   GLU 78      K 3.7      CO2 24   BUN 12   CREATININE 0.7   CALCIUM 8.2*   MG 1.6       CBC:   Recent Labs   Lab 08/24/23 0302   WBC 7.20   RBC 3.87*   HGB 11.3*   HCT 35.9*      MCV 93   MCH 29.2   MCHC 31.5*       CMP:   Recent Labs   Lab 08/24/23 0302   GLU 78   CALCIUM 8.2*   ALBUMIN 2.8*   PROT 5.6*      K 3.7   CO2 24      BUN 12   CREATININE 0.7   ALKPHOS 153*   ALT 65*   AST 64*   BILITOT 0.8         Significant Diagnostics:  I have reviewed all pertinent imaging results/findings within the past 24 hours.    Assessment/Plan:     PVD (peripheral vascular disease)  78 year old female with a PMH of COPD, HTN, CAD, SIADH, previous right foot wounds for which she underwent an angiogram with SFA stents and TMA by Dr. Saldaña in 2015 now with worsening right foot pain and wounds.    - OR for angiogram today  - Podiatry following  - Continue ASA/statin  - rest of care per primary team  - please call with questions          Jasmin Sullivan MD  Vascular Surgery  [facility] (2nd Fl)

## 2023-08-24 NOTE — NURSING
Patient requested to be administered morning medications after angiogram.  Patient stated should could not take medications with sips of water.

## 2023-08-24 NOTE — ASSESSMENT & PLAN NOTE
Patient is a 78-year-old female with previous history of chronic osteo with unhealing wound status post transmetatarsal amputation who is now presenting after a recent hospitalization with worsening right lower extremity swelling, erythema, and pain.  She states that it is much worse than normal and that she is developed a black rash over the affected area.  She denies any overt purulence or opening skin however she remains very tender to palpation.  X-ray at this time does not show any erosive changes and stable from previous hospitalization 10 days ago.  Will obtain an MRI to definitively rule out the presence of osteomyelitis given mildly elevated inflammatory markers which are sensitive but not specific    Non-surgical site of infection and Non-purulent Cellulitis  (ICD 10: L03.90)    There is not an abscess evident.    There is the presence of:   - Rapid progression    There is not presence or evidence of involvement of indwelling medical device    Empiric Vancomycin is based on the presence of Recurrence despite previous treatment    tetanus status unknown to the patient    There is not the presence of an immunocompromising condition requiring consideration for fungal, virus, or atypical organisms.    - Vancomycin and ceftriaxone therapy  - Blood cultures negative  - MRI foot w contrast shows nonspecific diffuse subcutaneous edema and redemonstration of a small nonspecific fluid collection within the soft tissues at the level the resection stump, no abscess or OM identified. Limited incomplete examination as the patient was unable to tolerate the study.   - Elevation of affected area and pain control  - PT/OT recommending SNF  - Still c/o significant pain to RLE stump  - Vascular surgery and podiatry consulted  - s/p debridement and abscess drainage 8/21  - Wound cx growing MSSA  - ID consulted and antibiotics per ID recommendations:  Antibiotics (From admission, onward)    Start     Stop Route Frequency  Ordered    08/24/23 0900  cefadroxil tablet 1 g         -- Oral Daily 08/23/23 3702

## 2023-08-24 NOTE — ASSESSMENT & PLAN NOTE
Patient uses nebulizers q.6 hours.  Unfortunately due to national shortages, we are unable to provide these.  Will resume her home inhalers and provide nebulizers as able.  She is currently on 2 L of oxygen which appears to be her baseline.  She states that she is no longer smoking and denies any signs or symptoms of ongoing acute exacerbation of her COPD.    Patient information:     SpO2: (!) 94 %       mMRC Key:  0 - Dyspnea only with strenuous exercise  1 - Dyspnea when hurrying or walking up a slight hill  2 - Walks slower than people of the same age because of dyspnea or has to stop for breath when walking at own pace  3 - Stops for breath after walking 100 yards or after a few minutes  4 - Too dyspneic to leave house or breathless when dressing    Exacerbation Component:  - COPD: stable  Patient Is not showing signs of a current COPD exacerbation The patient is not currently have symptoms / an exacerbation. The patient has COPD for approximately 10+ years. Symptoms in previous episodes have included dyspnea, cough and wheezing, and typically last 5 days. Previous episodes have been exacerbated by Smoke and upper respiratory infection. Current treatment includes albuterol nebulizer, albuterol/ipratropium inhaler and oxygen, which generally provides some relief of symptoms.    - Risk factor reduction (Smoking cessation/avoidance, management of GERD, pHTN treatment)  - Avoidance of triggers (Air pollution, respiratory infections, pulmonary embolisms)  - Complete metabolic panel  - Target SpO2 of 88-92% or PaO2 of 60-70 mmHg

## 2023-08-24 NOTE — PROGRESS NOTES
[facility] ([address] Medicine  Telemedicine Progress Note    Patient Name: Radha Sotelo  MRN: [id number]  Patient Class: IP- Inpatient   Admission Date: 8/18/2023  Length of Stay: 5 days  Attending Physician: Luz Maria Spivey MD  Primary Care Provider: Laurence, Primary Doctor          Subjective:     Principal Problem:Cellulitis and abscess of foot        HPI:  Patient is a 78-year-old female with past medical history significant for severe COPD on home O2 for chronic hypoxic respiratory failure (4 L via nasal cannula), CAD, PAT, previous osteomyelitis with transmetatarsal amputation (2012) who presented to the emergency department due to worsening right foot pain with associated discoloration.  She had a similar presentation approximately 10 days ago and was started on broad-spectrum antibiotics.  Since that time she is been having worsening pain associated with the right leg as well as discoloration with possible deep blistering.  An x-ray was done in the emergency department which demonstrated no erosive changes of osteomyelitis.  She does have some overlying right erythema of the affected area as well as dark discoloration with possible blood blister present.  There is probably some fluctuance however complete evaluation was limited secondary to patient intolerance.  She has a black spot which does not appear to be leaking any purulent fluid.  She denies any fevers, chills, shortness of breath, chest pain, or intolerance of lying flat.  She states that she has been dealing with progressive lower extremity swelling bilaterally.  She does not take any diuretics for this and it is a new problem.           Overview/Hospital Course:  No notes on file      This follow-up encounter was provided through telemedicine to address  Cellulitis and abscess of foot present on admission.  Patient was transferred to the telemedicine service on:  08/23/2023   The patient location is: [id number]  A admitted 8/18/2023  8:14 PM.      Interval History/Overnight Events:   Clinical record since admit reviewed.    Patient is able to provide adequate history.    Pain to right foot with ambulation; dressing intact without strikethrough; coughing and SOB after neb therapy and uses mucinex regularly at home which will be resumed; dyspnea improved currently; + chronic cough    -LE angiogram cancelled due to scheduling conflict    Review of Systems   Constitutional:  Positive for fatigue. Negative for appetite change and fever.   Respiratory:  Positive for cough and shortness of breath.    Musculoskeletal:  Positive for arthralgias.   Skin:  Positive for wound.          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    []   Pathology reports     []   Imaging reports     []   Cardiology Procedure reports     []   Outside records/CareEverywhere     []  Independently viewed:         Inpatient Medications reviewed and prescribed for management of current problems:  Scheduled Meds:   acetaminophen  1,000 mg Oral Q8H    aspirin  81 mg Oral Daily    atorvastatin  40 mg Oral Daily    cefadroxil  1 g Oral Daily    cilostazoL  50 mg Oral BID    citalopram  20 mg Oral Daily    enoxparin  40 mg Subcutaneous Daily    fluticasone furoate-vilanteroL  1 puff Inhalation Daily    guaiFENesin  600 mg Oral BID    ipratropium  0.5 mg Nebulization Q6H    magnesium oxide  400 mg Oral BID    metoprolol tartrate  50 mg Oral BID    nicotine  1 patch Transdermal Daily     Continuous Infusions:  PRN Meds:.cadexomer iodine, dextrose 10%, dextrose 10%, glucagon (human recombinant), glucose, glucose, levalbuterol **AND** ipratropium, melatonin, naloxone, ondansetron, oxyCODONE, oxyCODONE, sars-cov-2 (covid-19), sodium chloride 0.9%      Objective:     Temp:  [98.1 °F (36.7 °C)-99.2 °F (37.3 °C)] 98.6 °F (37 °C)  Pulse:  [69-88]  85  Resp:  [16-18] 16  SpO2:  [89 %-94 %] 93 %  BP: (101-146)/(52-65) 119/57      Intake/Output Summary (Last 24 hours) at 8/24/2023 0912  Last data filed at 8/23/2023 1734  Gross per 24 hour   Intake 480 ml   Output --   Net 480 ml        Body mass index is 27.9 kg/m².    Physical Exam  Vitals and nursing note reviewed.   Constitutional:       General: She is not in acute distress.     Appearance: Normal appearance.   HENT:      Head: Normocephalic and atraumatic.   Eyes:      Extraocular Movements: Extraocular movements intact.   Cardiovascular:      Rate and Rhythm: Normal rate.   Pulmonary:      Effort: Pulmonary effort is normal. No tachypnea or respiratory distress.   Musculoskeletal:      Comments: Right foot bandaged   Neurological:      General: No focal deficit present.      Mental Status: She is alert and oriented to person, place, and time.      Cranial Nerves: No cranial nerve deficit.      Motor: No weakness.   Psychiatric:         Attention and Perception: Attention normal.         Mood and Affect: Mood and affect normal.         Speech: Speech normal.         Behavior: Behavior is cooperative.        Labs: All labs within the last 24 hours were reviewed.   Recent Results (from the past 24 hour(s))   POCT glucose    Collection Time: 08/23/23  8:53 PM   Result Value Ref Range    POCT Glucose 97 70 - 110 mg/dL   CBC Auto Differential    Collection Time: 08/24/23  3:02 AM   Result Value Ref Range    WBC 7.20 3.90 - 12.70 K/uL    RBC 3.87 (L) 4.00 - 5.40 M/uL    Hemoglobin 11.3 (L) 12.0 - 16.0 g/dL    Hematocrit 35.9 (L) 37.0 - 48.5 %    MCV 93 82 - 98 fL    MCH 29.2 27.0 - 31.0 pg    MCHC 31.5 (L) 32.0 - 36.0 g/dL    RDW 14.6 (H) 11.5 - 14.5 %    Platelets 235 150 - 450 K/uL    MPV 9.4 9.2 - 12.9 fL    Immature Granulocytes 0.4 0.0 - 0.5 %    Gran # (ANC) 5.6 1.8 - 7.7 K/uL    Immature Grans (Abs) 0.03 0.00 - 0.04 K/uL    Lymph # 0.7 (L) 1.0 - 4.8 K/uL    Mono # 0.5 0.3 - 1.0 K/uL    Eos # 0.2 0.0 - 0.5  "K/uL    Baso # 0.04 0.00 - 0.20 K/uL    nRBC 0 0 /100 WBC    Gran % 78.3 (H) 38.0 - 73.0 %    Lymph % 10.3 (L) 18.0 - 48.0 %    Mono % 7.5 4.0 - 15.0 %    Eosinophil % 2.9 0.0 - 8.0 %    Basophil % 0.6 0.0 - 1.9 %    Differential Method Automated    Comprehensive Metabolic Panel    Collection Time: 08/24/23  3:02 AM   Result Value Ref Range    Sodium 137 136 - 145 mmol/L    Potassium 3.7 3.5 - 5.1 mmol/L    Chloride 100 95 - 110 mmol/L    CO2 24 23 - 29 mmol/L    Glucose 78 70 - 110 mg/dL    BUN 12 8 - 23 mg/dL    Creatinine 0.7 0.5 - 1.4 mg/dL    Calcium 8.2 (L) 8.7 - 10.5 mg/dL    Total Protein 5.6 (L) 6.0 - 8.4 g/dL    Albumin 2.8 (L) 3.5 - 5.2 g/dL    Total Bilirubin 0.8 0.1 - 1.0 mg/dL    Alkaline Phosphatase 153 (H) 55 - 135 U/L    AST 64 (H) 10 - 40 U/L    ALT 65 (H) 10 - 44 U/L    eGFR >60.0 >60 mL/min/1.73 m^2    Anion Gap 13 8 - 16 mmol/L   Magnesium    Collection Time: 08/24/23  3:02 AM   Result Value Ref Range    Magnesium 1.6 1.6 - 2.6 mg/dL   Phosphorus    Collection Time: 08/24/23  3:02 AM   Result Value Ref Range    Phosphorus 2.3 (L) 2.7 - 4.5 mg/dL        Lab Results   Component Value Date    [id number] Not Detected 07/29/2021       Recent Labs   Lab 08/22/23  0428 08/23/23  0406 08/24/23  0302   WBC 6.11 6.86 7.20   LYMPH 11.0*  0.7* 11.1*  0.8* 10.3*  0.7*   HGB 11.5* 11.4* 11.3*   HCT 35.8* 37.3 35.9*    220 235     Recent Labs   Lab 08/22/23  0428 08/23/23  0406 08/24/23  0302   * 133* 137   K 3.5 3.4* 3.7   CL 97 101 100   CO2 26 25 24   BUN 14 12 12   CREATININE 0.7 0.8 0.7   GLU 82 116* 78   CALCIUM 8.0* 8.1* 8.2*   MG 1.6 1.5* 1.6   PHOS 2.6* 2.7 2.3*     Recent Labs   Lab 08/22/23  0428 08/23/23  0406 08/24/23  0302   ALKPHOS 139* 159* 153*   ALT 72* 72* 65*   AST 79* 74* 64*   ALBUMIN 2.7* 2.7* 2.8*   PROT 5.5* 5.4* 5.6*   BILITOT 0.7 0.5 0.8        No results for input(s): "DDIMER", "FERRITIN", "CRP", "LDH", "BNP", "TROPONINI", "CPK" in the last 72 hours.    Invalid " "input(s): "PROCALCITONIN"        Microbiology: All microbiology updates for the past 24 hours have been reviewed.  Microbiology Results (last 7 days)       Procedure Component Value Units Date/Time    Blood Culture #1 **CANNOT BE ORDERED STAT** [[id number]] Collected: 08/18/23 2113    Order Status: Completed Specimen: Blood from Peripheral, Forearm, Right Updated: 08/23/23 2312     Blood Culture, Routine No growth after 5 days.    Blood Culture #2 **CANNOT BE ORDERED STAT** [[id number]] Collected: 08/18/23 2113    Order Status: Completed Specimen: Blood from Peripheral, Forearm, Right Updated: 08/23/23 2312     Blood Culture, Routine No growth after 5 days.    Aerobic culture [[id number]]  (Abnormal)  (Susceptibility) Collected: 08/21/23 0927    Order Status: Completed Specimen: Abscess from Foot, Right Updated: 08/23/23 1251     Aerobic Bacterial Culture STAPHYLOCOCCUS AUREUS  Few      Narrative:      Right foot TMA abscess    Culture, Anaerobe [[id number]] Collected: 08/21/23 0927    Order Status: Completed Specimen: Abscess from Foot, Right Updated: 08/23/23 0955     Anaerobic Culture Culture in progress    Narrative:      Right foot TMA abscess    AFB Culture & Smear [[id number]] Collected: 08/21/23 0927    Order Status: Completed Specimen: Abscess from Foot, Right Updated: 08/22/23 2127     AFB Culture & Smear Culture in progress     AFB CULTURE STAIN No acid fast bacilli seen.    Narrative:      Right foot TMA abscess    Fungus culture [[id number]] Collected: 08/21/23 0927    Order Status: Completed Specimen: Abscess from Foot, Right Updated: 08/22/23 1359     Fungus (Mycology) Culture Culture in progress    Narrative:      Right foot TMA abscess    Gram stain [[id number]] Collected: 08/21/23 0927    Order Status: Completed Specimen: Abscess from Foot, Right Updated: 08/21/23 1651     Gram Stain Result Moderate WBC's      Few Gram positive cocci    Narrative:      Right foot TMA abscess              Imaging All " imaging within the last 24 hours was reviewed.       Results for orders placed during the hospital encounter of 03/01/20    Echo Color Flow Doppler? Yes    Interpretation Summary  · Normal left ventricular systolic function. The estimated ejection fraction is 60%.  · Normal right ventricular systolic function.  · Normal LV diastolic function.  · The estimated PA systolic pressure is 31 mmHg.  · Normal central venous pressure (3 mmHg).      VAS Ankle Brachial Indices Resting  Indication  ========    Peripheral Arterial Disease    Pressure Lower  ============    Rt brachial A syst BP  129 mmHg  Rt low thigh BP    58 mmHg  Rt calf BP 46 mmHg  Rt PTA BP  0 mmHg  Rt dors pedis A BP 50 mmHg  Rt NICCI post tibial (rt post tib A BP / max brach A BP) 0.00  Right NICCI ratio use:   dors. pedis  Rt NICCI (rt dors ped A BP / max brach A BP) 0.39  Rt ankle BP / max brach A BP   0.39  Lt low thigh BP    174 mmHg  Lt calf  mmHg  Lt PTA BP  65 mmHg  Lt dors pedis A BP 77 mmHg  Lt toe BP  41 mmHg  Lt NICCI (lt post tibial A BP / max brach A BP)  0.50  Left NICCI ratio use:    dors. pedis  Lt NICCI dors ped (lt dors ped A BP / max brach A BP)    0.60  Lt ankle BP / max brach A BP   0.60  Lt TBI (lt toe BP / max brach A BP)    0.32    PPG Lower  =========    Rt toe digit waveform: History of Toe Amputation  Rt 2nd digit waveform: History of Toe Amputation  Rt 3rd digit waveform: History of Toe Amputation  Rt 4th digit waveform: History of Toe Amputation  Rt 5th digit waveform: History of Toe Amputation  Lt toe BP - PPG    41 mmHg  Lt toe digit waveform: moderately dampened    Comment  ========    Doppler signals were faint and difficult to obtain secondary to venous flow in the right lower extremity.    Impression  =========    Right Leg: Segmental pressures and PVR waveforms suggest severe peripheral arterial occlusive disease. Absent DPA signal.  Rt lower digits: History of transmetatarsal amputation.    Left Leg: Segmental pressures and  PVR waveforms suggest moderate peripheral arterial occlusive disease.  Lt lower digits: Toe PPG waveforms as described above. - TBI of 0.32 with a Great toe pressure of 41 mmHg is noted.    DATE OF SERVICE: 08/21/2023                                                      Sonographer: Bridgett Rodrigues  Electronically Signed by: Phillip Cortez II at 08/21/2023-17:26             Assessment/Plan:      * Cellulitis and abscess of foot  Patient is a 78-year-old female with previous history of chronic osteo with unhealing wound status post transmetatarsal amputation who is now presenting after a recent hospitalization with worsening right lower extremity swelling, erythema, and pain.  She states that it is much worse than normal and that she is developed a black rash over the affected area.  She denies any overt purulence or opening skin however she remains very tender to palpation.  X-ray at this time does not show any erosive changes and stable from previous hospitalization 10 days ago.  Will obtain an MRI to definitively rule out the presence of osteomyelitis given mildly elevated inflammatory markers which are sensitive but not specific    Non-surgical site of infection and Non-purulent Cellulitis  (ICD 10: L03.90)    There is not an abscess evident.    There is the presence of:   - Rapid progression    There is not presence or evidence of involvement of indwelling medical device    Empiric Vancomycin is based on the presence of Recurrence despite previous treatment    tetanus status unknown to the patient    There is not the presence of an immunocompromising condition requiring consideration for fungal, virus, or atypical organisms.    - Vancomycin and ceftriaxone therapy  - Blood cultures negative  - MRI foot w contrast shows nonspecific diffuse subcutaneous edema and redemonstration of a small nonspecific fluid collection within the soft tissues at the level the resection stump, no abscess or OM identified. Limited  incomplete examination as the patient was unable to tolerate the study.   - Elevation of affected area and pain control  - PT/OT recommending SNF  - Still c/o significant pain to RLE stump  - Vascular surgery and podiatry consulted  - s/p debridement and abscess drainage 8/21  - Wound cx growing MSSA  - ID consulted and antibiotics per ID recommendations:  Antibiotics (From admission, onward)    Start     Stop Route Frequency Ordered    08/24/23 0900  cefadroxil tablet 1 g         -- Oral Daily 08/23/23 1558          Discharge planning  Diet: Diet Adult Regular (IDDSI Level 7)  Significant LDAs:   IV Access Type: Peripheral  Urinary Catheter Indication if present: Patient Does Not Have Urinary Catheter  Other Lines/Tubes/Drains:     Goals of Care:    Previous admission:  8/10/23  Code Status: Full Code  Likely prognosis:  Fair    FLORIAN: 8/25/2023     Code Status: Full Code   Is the patient medically ready for discharge?: No    Reason for patient still in hospital (select all that apply): Patient trending condition, Treatment and Consult recommendations  Discharge Plan A: Home Health   Discharge Delays: None known at this time  The amount of time spent during, and associated with, this encounter was 50 minutes; the nature of the activities performed were:  Preparing to see the patient, chart review  Obtaining and/or receiving separately obtained history   Performing medically appropriate examination and evaluation  Counseling and educating the patient/family/caregiver  Ordering medications, tests, or procedures  Referring to and communicating with other health care professionals  Documenting clinical information in the EHR  Independently interpreting results  Care coordination    Hypokalemia  Replete with KCl and replete Mag    Chronic obstructive pulmonary disease  Patient uses nebulizers q.6 hours.  Unfortunately due to national shortages, we are unable to provide these.  Will resume her home inhalers and provide  "nebulizers as able.  She is currently on 2 L of oxygen which appears to be her baseline.  She states that she is no longer smoking and denies any signs or symptoms of ongoing acute exacerbation of her COPD.    Patient information:     SpO2: (!) 94 %       mMRC Key:  0 - Dyspnea only with strenuous exercise  1 - Dyspnea when hurrying or walking up a slight hill  2 - Walks slower than people of the same age because of dyspnea or has to stop for breath when walking at own pace  3 - Stops for breath after walking 100 yards or after a few minutes  4 - Too dyspneic to leave house or breathless when dressing    Exacerbation Component:  - COPD: stable  Patient Is not showing signs of a current COPD exacerbation The patient is not currently have symptoms / an exacerbation. The patient has COPD for approximately 10+ years. Symptoms in previous episodes have included dyspnea, cough and wheezing, and typically last 5 days. Previous episodes have been exacerbated by Smoke and upper respiratory infection. Current treatment includes albuterol nebulizer, albuterol/ipratropium inhaler, oxygen and Breo, which generally provides some relief of symptoms.    - Risk factor reduction (Smoking cessation/avoidance, management of GERD, pHTN treatment)  - Avoidance of triggers (Air pollution, respiratory infections, pulmonary embolisms)  - Complete metabolic panel  - Target SpO2 of 88-92% or PaO2 of 60-70 mmHg    Coronary artery disease  No ongoing chest pain.  Will continue to monitor and may need preoperative evaluation if surgery is warranted.    PVD (peripheral vascular disease)  Patient seen by Dr. Saldaña but has not been seen since 2015.    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  Now has discoloration to RLE stump.  Vascular surgery consulted  Arterial doppler showing severe PAD RLE.   Planning for angiogram 8/24 - cancelled and awaiting future date      Unspecified protein-calorie malnutrition  Nutrition consulted. Most recent weight and BMI monitored-     Measurements:  Wt Readings from Last 1 Encounters:   08/24/23 64.8 kg (142 lb 13.7 oz)   Body mass index is 27.9 kg/m².    Patient to be screened and assessed by RD.  Previous protein calorie malnutrition.        VTE Risk Mitigation (From admission, onward)         Ordered     enoxaparin injection 40 mg  Daily         08/19/23 0020     Place sequential compression device  Until discontinued         08/19/23 0020                  I have completed this tele-visit with the assistance of a telepresenter.    The attending portion of this evaluation, treatment, and documentation was performed per Luz Maria Spivey MD via Telemedicine AudioVisual using the secure [employer] software platform with 2 way audio/video. The provider was located off-site and the patient is located in the hospital. The aforementioned video software was utilized to document the relevant history and physical exam    Luz Maria Spivey MD  Department of Hospital Medicine   [facility] - Intensive Care ([place] [id number])  "

## 2023-08-24 NOTE — PLAN OF CARE
[facility] - Intensive Care ([facility])  Discharge Reassessment    Primary Care Provider: Laurence Primary Doctor    Expected Discharge Date: 8/25/2023    Reassessment (most recent)       Discharge Reassessment - 08/24/23 1554          Discharge Reassessment    Assessment Type Discharge Planning Reassessment (P)      Did the patient's condition or plan change since previous assessment? No (P)      Discharge Plan discussed with: Patient (P)      Communicated FLORIAN with patient/caregiver No (P)      Discharge Plan A Home Health (P)      Discharge Plan B Home (P)      DME Needed Upon Discharge  none (P)      Transition of Care Barriers None (P)      Why the patient remains in the hospital Requires continued medical care (P)         Post-Acute Status    Post-Acute Authorization Home Health (P)      Home Health Status Set-up Complete/Auth obtained (P)      Discharge Delays None known at this time (P)                  CM spoke with pt in room.  She confirms d/c plan is to go home with HH. Instructed that [facility] had approved [facility].  Pt v/u.    MELLO MaderaN, BS, RN, CCM

## 2023-08-24 NOTE — SUBJECTIVE & OBJECTIVE
Medications:  Continuous Infusions:  Scheduled Meds:   acetaminophen  1,000 mg Oral Q8H    aspirin  81 mg Oral Daily    atorvastatin  40 mg Oral Daily    cefadroxil  1 g Oral Daily    cilostazoL  50 mg Oral BID    citalopram  20 mg Oral Daily    enoxparin  40 mg Subcutaneous Daily    fluticasone furoate-vilanteroL  1 puff Inhalation Daily    guaiFENesin  600 mg Oral BID    ipratropium  0.5 mg Nebulization Q6H    magnesium oxide  400 mg Oral BID    metoprolol tartrate  50 mg Oral BID    nicotine  1 patch Transdermal Daily     PRN Meds:cadexomer iodine, dextrose 10%, dextrose 10%, glucagon (human recombinant), glucose, glucose, levalbuterol **AND** ipratropium, melatonin, naloxone, ondansetron, oxyCODONE, oxyCODONE, sars-cov-2 (covid-19), sodium chloride 0.9%     Objective:     Vital Signs (Most Recent):  Temp: 97.9 °F (36.6 °C) (08/24/23 0949)  Pulse: 86 (08/24/23 0949)  Resp: 20 (08/24/23 0949)  BP: 138/63 (08/24/23 0949)  SpO2: (!) 93 % (08/24/23 0949) Vital Signs (24h Range):  Temp:  [97.9 °F (36.6 °C)-99.2 °F (37.3 °C)] 97.9 °F (36.6 °C)  Pulse:  [70-88] 86  Resp:  [16-20] 20  SpO2:  [89 %-94 %] 93 %  BP: (101-146)/(52-65) 138/63          Physical Exam  Constitutional:       Appearance: Normal appearance.   Cardiovascular:      Rate and Rhythm: Normal rate.      Comments: 2+ palpable b/l femoral pulses    Pulmonary:      Effort: Pulmonary effort is normal.   Skin:     General: Skin is warm and dry.      Comments: RLE TMA wound     Neurological:      Mental Status: She is alert and oriented to person, place, and time.          Significant Labs:  BMP:   Recent Labs   Lab 08/24/23  0302   GLU 78      K 3.7      CO2 24   BUN 12   CREATININE 0.7   CALCIUM 8.2*   MG 1.6       CBC:   Recent Labs   Lab 08/24/23  0302   WBC 7.20   RBC 3.87*   HGB 11.3*   HCT 35.9*      MCV 93   MCH 29.2   MCHC 31.5*       CMP:   Recent Labs   Lab 08/24/23  0302   GLU 78   CALCIUM 8.2*   ALBUMIN 2.8*   PROT 5.6*   NA  137   K 3.7   CO2 24      BUN 12   CREATININE 0.7   ALKPHOS 153*   ALT 65*   AST 64*   BILITOT 0.8         Significant Diagnostics:  I have reviewed all pertinent imaging results/findings within the past 24 hours.

## 2023-08-24 NOTE — PROGRESS NOTES
[facility] - Intensive Care ([address] Medicine  Telemedicine Progress Note    Patient Name: Radha Sotelo  MRN: [id number]  Patient Class: IP- Inpatient   Admission Date: 8/18/2023  Length of Stay: 4 days  Attending Physician: Nancy Bush MD  Primary Care Provider: Laurence, Primary Doctor    Subjective:     Principal Problem:Cellulitis and abscess of foot    HPI:  Patient is a 78-year-old female with past medical history significant for severe COPD on home O2 for chronic hypoxic respiratory failure (4 L via nasal cannula), CAD, PAT, previous osteomyelitis with transmetatarsal amputation (2012) who presented to the emergency department due to worsening right foot pain with associated discoloration.  She had a similar presentation approximately 10 days ago and was started on broad-spectrum antibiotics.  Since that time she is been having worsening pain associated with the right leg as well as discoloration with possible deep blistering.  An x-ray was done in the emergency department which demonstrated no erosive changes of osteomyelitis.  She does have some overlying right erythema of the affected area as well as dark discoloration with possible blood blister present.  There is probably some fluctuance however complete evaluation was limited secondary to patient intolerance.  She has a black spot which does not appear to be leaking any purulent fluid.  She denies any fevers, chills, shortness of breath, chest pain, or intolerance of lying flat.  She states that she has been dealing with progressive lower extremity swelling bilaterally.  She does not take any diuretics for this and it is a new problem.       Overview/Hospital Course:  No notes on file    Interval History: Virtual follow up visit for suspected Pain [R52]  Cellulitis [L03.90] present on admission.   This service was provided by telemedicine.    Patient was transferred to [facility] on: 8/23/2023  The patient location  is: [id number] A   Admitted 8/18/2023  8:14 PM    The patient is able to provide adequate history. Additional history was obtained from: past medical records.  No significant events overnight reported.  Patient reports complaints of R foot wound. Remains on O2 at home level.   Symptoms are unchanged since yesterday.     I have reviewed the following on 8/23/2023:     Details     [x]   Lab results  Liver enzymes elevated. Hypomagnesemia. Hypokalemia. H&H stable    []   Micro reports     []   Pathology reports     []   Imaging reports     []   Cardiology Procedure reports     []   Non-HM records/CareEverywhere     []  Independently viewed/assessed:      I have ordered the following or verified the orders are active for: CBC, CMP    Review of Systems   Constitutional:  Negative for fever.   Respiratory:  Negative for shortness of breath.      Objective:     Vital Signs (Most Recent):  Temp: 98.3 °F (36.8 °C) (08/23/23 0721)  Pulse: 78 (08/23/23 0805)  Resp: 18 (08/23/23 0805)  BP: (!) 124/59 (08/23/23 0721)  SpO2: (!) 94 % (08/23/23 0805) Vital Signs (24h Range):  Temp:  [97.8 °F (36.6 °C)-98.4 °F (36.9 °C)] 98.3 °F (36.8 °C)  Pulse:  [71-92] 78  Resp:  [18-24] 18  SpO2:  [88 %-95 %] 94 %  BP: (100-129)/(53-68) 124/59     Weight: 64.8 kg (142 lb 13.7 oz)  Body mass index is 27.9 kg/m².    Intake/Output Summary (Last 24 hours) at 8/23/2023 1048  Last data filed at 8/23/2023 0904  Gross per 24 hour   Intake 720 ml   Output --   Net 720 ml         Physical Exam  Cardiovascular:      Comments: Monitor and/or Vital signs reviewed at time of visit  Pulmonary:      Effort: Pulmonary effort is normal. No accessory muscle usage or respiratory distress.   Musculoskeletal:      Right Lower Extremity: (TMA)  Neurological:      Mental Status: She is alert. She is not disoriented.   Psychiatric:         Attention and Perception: Attention normal.         Behavior: Behavior is cooperative.         Significant Labs:   Recent Labs    Lab 08/19/23  0735   HGBA1C 5.6     Recent Labs   Lab 08/23/23 2053   POCTGLUCOSE 97     Recent Labs   Lab 08/21/23  0230 08/22/23  0428 08/23/23  0406   WBC 9.26 6.11 6.86   HGB 11.4* 11.5* 11.4*   HCT 36.3* 35.8* 37.3    228 220     Recent Labs   Lab 08/21/23  0230 08/22/23  0428 08/23/23  0406   GRAN 83.2*  7.7 71.3  4.4 72.2  5.0   LYMPH 6.2*  0.6* 11.0*  0.7* 11.1*  0.8*   MONO 8.2  0.8 11.5  0.7 10.6  0.7   EOS 0.2 0.3 0.3     Recent Labs   Lab 08/21/23 0230 08/22/23 0428 08/23/23  0406   * 132* 133*   K 3.5 3.5 3.4*   CL 99 97 101   CO2 22* 26 25   BUN 17 14 12   CREATININE 0.7 0.7 0.8    82 116*   CALCIUM 7.9* 8.0* 8.1*   ALBUMIN 2.8* 2.7* 2.7*   MG 1.6 1.6 1.5*   PHOS 2.7 2.6* 2.7     Recent Labs   Lab 08/18/23 2112 08/19/23 0735 08/21/23 0230 08/22/23 0428 08/23/23  0406   ALKPHOS 74   < > 124 139* 159*   ALT 19   < > 43 72* 72*   AST 17   < > 71* 79* 74*   PROT 6.0   < > 5.5* 5.5* 5.4*   BILITOT 0.6   < > 2.3* 0.7 0.5   CRP 33.7*  --   --   --   --     < > = values in this interval not displayed.     Recent Labs   Lab 08/11/23 0018 08/18/23 2112   LACTATE 2.0  --    SEDRATE  --  27     SARS-CoV2 (COVID-19) Qualitative PCR (no units)   Date Value   07/29/2021 Not Detected   07/22/2021 Not Detected     POC Rapid COVID (no units)   Date Value   07/12/2021 Negative       Results for orders placed during the hospital encounter of 03/01/20    Echo Color Flow Doppler? Yes    Interpretation Summary  · Normal left ventricular systolic function. The estimated ejection fraction is 60%.  · Normal right ventricular systolic function.  · Normal LV diastolic function.  · The estimated PA systolic pressure is 31 mmHg.  · Normal central venous pressure (3 mmHg).      VAS Ankle Brachial Indices Resting  Indication  ========    Peripheral Arterial Disease    Pressure Lower  ============    Rt brachial A syst BP  129 mmHg  Rt low thigh BP    58 mmHg  Rt calf BP 46 mmHg  Rt PTA BP  0  mmHg  Rt dors pedis A BP 50 mmHg  Rt NICCI post tibial (rt post tib A BP / max brach A BP) 0.00  Right NICCI ratio use:   dors. pedis  Rt NICCI (rt dors ped A BP / max brach A BP) 0.39  Rt ankle BP / max brach A BP   0.39  Lt low thigh BP    174 mmHg  Lt calf  mmHg  Lt PTA BP  65 mmHg  Lt dors pedis A BP 77 mmHg  Lt toe BP  41 mmHg  Lt NICCI (lt post tibial A BP / max brach A BP)  0.50  Left NICCI ratio use:    dors. pedis  Lt NICCI dors ped (lt dors ped A BP / max brach A BP)    0.60  Lt ankle BP / max brach A BP   0.60  Lt TBI (lt toe BP / max brach A BP)    0.32    PPG Lower  =========    Rt toe digit waveform: History of Toe Amputation  Rt 2nd digit waveform: History of Toe Amputation  Rt 3rd digit waveform: History of Toe Amputation  Rt 4th digit waveform: History of Toe Amputation  Rt 5th digit waveform: History of Toe Amputation  Lt toe BP - PPG    41 mmHg  Lt toe digit waveform: moderately dampened    Comment  ========    Doppler signals were faint and difficult to obtain secondary to venous flow in the right lower extremity.    Impression  =========    Right Leg: Segmental pressures and PVR waveforms suggest severe peripheral arterial occlusive disease. Absent DPA signal.  Rt lower digits: History of transmetatarsal amputation.    Left Leg: Segmental pressures and PVR waveforms suggest moderate peripheral arterial occlusive disease.  Lt lower digits: Toe PPG waveforms as described above. - TBI of 0.32 with a Great toe pressure of 41 mmHg is noted.    DATE OF SERVICE: 08/21/2023                                                      Sonographer: Bridgett Rodrigues  Electronically Signed by: Phillip Cortez II at 08/21/2023-17:26      Labs and Imaging listed above were reviewed.       Assessment/Plan:      * Cellulitis and abscess of foot  Patient is a 78-year-old female with previous history of chronic osteo with unhealing wound status post transmetatarsal amputation who is now presenting after a recent hospitalization  with worsening right lower extremity swelling, erythema, and pain.  She states that it is much worse than normal and that she is developed a black rash over the affected area.  She denies any overt purulence or opening skin however she remains very tender to palpation.  X-ray at this time does not show any erosive changes and stable from previous hospitalization 10 days ago.  Will obtain an MRI to definitively rule out the presence of osteomyelitis given mildly elevated inflammatory markers which are sensitive but not specific    Non-surgical site of infection and Non-purulent Cellulitis  (ICD 10: L03.90)    There is not an abscess evident.    There is the presence of:   - Rapid progression    There is not presence or evidence of involvement of indwelling medical device    Empiric Vancomycin is based on the presence of Recurrence despite previous treatment    tetanus status unknown to the patient    There is not the presence of an immunocompromising condition requiring consideration for fungal, virus, or atypical organisms.    - Vancomycin and ceftriaxone  - Blood cultures pending  - MRI foot w contrast shows nonspecific diffuse subcutaneous edema and redemonstration of a small nonspecific fluid collection within the soft tissues at the level the resection stump, no abscess or OM identified. Limited incomplete examination as the patient was unable to tolerate the study.   - Elevation of affected area and pain control  - PT/OT recommending SNF  - Still c/o significant pain to RLE stump  - Vascular surgery and podiatry consulted  - s/p debridement and abscess drainage 8/21, cultures obtained  - Wound cx growing Staph aureus  - ID consulted and antibiotics per ID recommendations:  Antibiotics (From admission, onward)    Start     Stop Route Frequency Ordered    08/24/23 0900  cefadroxil tablet 1 g         -- Oral Daily 08/23/23 2237          PVD (peripheral vascular disease)  Patient seen by Dr. Saldaña but has not been  "seen since 2015.    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  Now has discoloration to RLE stump.  Vascular surgery consulted  Arterial doppler showing severe PAD RLE.   Planning for angiogram 8/24.      Chronic obstructive pulmonary disease  Patient uses nebulizers q.6 hours.  Unfortunately due to national shortages, we are unable to provide these.  Will resume her home inhalers and provide nebulizers as able.  She is currently on 2 L of oxygen which appears to be her baseline.  She states that she is no longer smoking and denies any signs or symptoms of ongoing acute exacerbation of her COPD.    Patient information:     SpO2: (!) 94 %       mMRC Key:  0 - Dyspnea only with strenuous exercise  1 - Dyspnea when hurrying or walking up a slight hill  2 - Walks slower than people of the same age because of dyspnea or has to stop for breath when walking at own pace  3 - Stops for breath after walking 100 yards or after a few minutes  4 - Too dyspneic to leave house or breathless when dressing    Exacerbation Component:  - COPD: stable  Patient Is not showing signs of a current COPD exacerbation The patient is not currently have symptoms / an exacerbation. The patient has COPD for approximately 10+ years. Symptoms in previous episodes have included dyspnea, cough and wheezing, and typically last 5 days. Previous " episodes have been exacerbated by Smoke and upper respiratory infection. Current treatment includes albuterol nebulizer, albuterol/ipratropium inhaler and oxygen, which generally provides some relief of symptoms.    - Risk factor reduction (Smoking cessation/avoidance, management of GERD, pHTN treatment)  - Avoidance of triggers (Air pollution, respiratory infections, pulmonary embolisms)  - Complete metabolic panel  - Target SpO2 of 88-92% or PaO2 of 60-70 mmHg    Coronary artery disease  No ongoing chest pain.  Will continue to monitor and may need preoperative evaluation if surgery is warranted.    Unspecified protein-calorie malnutrition  Nutrition consulted. Most recent weight and BMI monitored-     Measurements:  Wt Readings from Last 1 Encounters:   08/19/23 64.8 kg (142 lb 13.7 oz)   Body mass index is 27.9 kg/m².    Patient to be screened and assessed by RD.  Previous protein calorie malnutrition.      Hypokalemia  Replete with KCl and replete Mag    Discharge planning  Diet: Diet Adult Regular (IDDSI Level 7)  Diet NPO  Significant LDAs:   IV Access Type: Peripheral  Urinary Catheter Indication if present: Patient Does Not Have Urinary Catheter  Other Lines/Tubes/Drains:     Goals of Care:    Previous admission:  8/10/23  Code Status: Full Code  Likely prognosis:  Fair    FLORIAN: 8/25/2023     Code Status: Full Code   Is the patient medically ready for discharge?: No    Reason for patient still in hospital (select all that apply): Patient trending condition, Treatment and Consult recommendations  Discharge Plan A: Home with family      The amount of time spent during, and associated with, this encounter was 50 minutes; the nature of the activities performed were:  Preparing to see the patient, chart review  Obtaining and/or receiving separately obtained history   Performing medically appropriate examination and evaluation  Counseling and educating the patient/family/caregiver  Ordering medications, tests, or  procedures  Referring to and communicating with other health care professionals  Documenting clinical information in the EHR  Independently interpreting results  Care coordination        Active Hospital Problems    Diagnosis  POA    *Cellulitis and abscess of foot [L03.119, L02.619]  Yes     Priority: 1 - High    PVD (peripheral vascular disease) [I73.9]  Yes     Priority: 2     Chronic obstructive pulmonary disease [J44.9]  Yes     Priority: 3     Coronary artery disease [I25.10]  Yes     Priority: 4     Unspecified protein-calorie malnutrition [E46]  Yes     Priority: 10     Hypokalemia [E87.6]  Yes     Priority: 24     Discharge planning [Z02.9]  Not Applicable     Priority: 25 - Low      Resolved Hospital Problems   No resolved problems to display.       Inpatient Medications Prescribed for Management of Current Problems:     Scheduled Meds:    acetaminophen  1,000 mg Oral Q8H    aspirin  81 mg Oral Daily    atorvastatin  40 mg Oral Daily    [START ON 8/24/2023] cefadroxil  1 g Oral Daily    cilostazoL  50 mg Oral BID    citalopram  20 mg Oral Daily    enoxparin  40 mg Subcutaneous Daily    fluticasone furoate-vilanteroL  1 puff Inhalation Daily    ipratropium  0.5 mg Nebulization Q6H    magnesium oxide  400 mg Oral BID    metoprolol tartrate  50 mg Oral BID    nicotine  1 patch Transdermal Daily    potassium chloride  40 mEq Oral Once     Continuous Infusions:   As Needed: cadexomer iodine, dextrose 10%, dextrose 10%, glucagon (human recombinant), glucose, glucose, levalbuterol **AND** ipratropium, melatonin, naloxone, ondansetron, oxyCODONE, oxyCODONE, sars-cov-2 (covid-19), sodium chloride 0.9%    VTE Risk Mitigation (From admission, onward)         Ordered     enoxaparin injection 40 mg  Daily         08/19/23 0020     Place sequential compression device  Until discontinued         08/19/23 0020              I have completed this tele-visit with the assistance of a telepresenter.    The  attending portion of this evaluation, treatment, and documentation was performed per Nancy Bush MD via Telemedicine AudioVisual using the secure [employer] software platform with 2 way audio/video. The provider was located off-site and the patient is located in the hospital. The aforementioned video software was utilized to document the relevant history and physical exam    Nancy Bush MD  Department of Hospital Medicine   [facility] - Intensive Care ([place]-16)

## 2023-08-24 NOTE — ASSESSMENT & PLAN NOTE
Nutrition consulted. Most recent weight and BMI monitored-     Measurements:  Wt Readings from Last 1 Encounters:   08/24/23 64.8 kg (142 lb 13.7 oz)   Body mass index is 27.9 kg/m².    Patient to be screened and assessed by RD.  Previous protein calorie malnutrition.

## 2023-08-24 NOTE — NURSING
Patient complaining of SOB after breathing treatment.  Patient observed coughing and swallowing sputum.  Encouraged patient to spit out sputum.  Holden, Respiratory at bedside.  Patient encouraged to calm down and breath in through her nose.  Fan provided.  Patient is now calm.  Patient remains on 2L nasal cannula (home dose).

## 2023-08-24 NOTE — NURSING
"Patient stated that she wiped her "Vagina" area and a small amount of blood was on the tissue.  No active bleeding noted.  ALESHA Spivey M.D. notified via secure chat.  " ----- Message from Anjelica Sutton sent at 2023 11:15 AM CDT -----  Regarding: lab order  Patient coming for labs on 23 and some , does she need those??  Thank you.

## 2023-08-24 NOTE — PT/OT/SLP PROGRESS
Occupational Therapy      Patient Name:  Radha Sotelo   MRN:  [id number]    Patient not seen today secondary to Off the floor for procedure/surgery (angiogram). Will follow-up when appropriate.    8/24/2023

## 2023-08-24 NOTE — ASSESSMENT & PLAN NOTE
Patient is a 78-year-old female with previous history of chronic osteo with unhealing wound status post transmetatarsal amputation who is now presenting after a recent hospitalization with worsening right lower extremity swelling, erythema, and pain.  She states that it is much worse than normal and that she is developed a black rash over the affected area.  She denies any overt purulence or opening skin however she remains very tender to palpation.  X-ray at this time does not show any erosive changes and stable from previous hospitalization 10 days ago.  Will obtain an MRI to definitively rule out the presence of osteomyelitis given mildly elevated inflammatory markers which are sensitive but not specific    Non-surgical site of infection and Non-purulent Cellulitis  (ICD 10: L03.90)    There is not an abscess evident.    There is the presence of:   - Rapid progression    There is not presence or evidence of involvement of indwelling medical device    Empiric Vancomycin is based on the presence of Recurrence despite previous treatment    tetanus status unknown to the patient    There is not the presence of an immunocompromising condition requiring consideration for fungal, virus, or atypical organisms.    - Vancomycin and ceftriaxone  - Blood cultures pending  - MRI foot w contrast shows nonspecific diffuse subcutaneous edema and redemonstration of a small nonspecific fluid collection within the soft tissues at the level the resection stump, no abscess or OM identified. Limited incomplete examination as the patient was unable to tolerate the study.   - Elevation of affected area and pain control  - PT/OT recommending SNF  - Still c/o significant pain to RLE stump  - Vascular surgery and podiatry consulted  - s/p debridement and abscess drainage 8/21, cultures obtained  - Wound cx growing Staph aureus  - ID consulted and antibiotics per ID recommendations:  Antibiotics (From admission, onward)    Start     Stop  Route Frequency Ordered    08/24/23 0900  cefadroxil tablet 1 g         -- Oral Daily 08/23/23 6082

## 2023-08-24 NOTE — ASSESSMENT & PLAN NOTE
Diet: Diet Adult Regular (IDDSI Level 7)  Significant LDAs:   IV Access Type: Peripheral  Urinary Catheter Indication if present: Patient Does Not Have Urinary Catheter  Other Lines/Tubes/Drains:     Goals of Care:    Previous admission:  8/10/23  Code Status: Full Code  Likely prognosis:  Fair    FLORIAN: 8/25/2023     Code Status: Full Code   Is the patient medically ready for discharge?: No    Reason for patient still in hospital (select all that apply): Patient trending condition, Treatment and Consult recommendations  Discharge Plan A: Home Health   Discharge Delays: None known at this time  The amount of time spent during, and associated with, this encounter was 50 minutes; the nature of the activities performed were:  Preparing to see the patient, chart review  Obtaining and/or receiving separately obtained history   Performing medically appropriate examination and evaluation  Counseling and educating the patient/family/caregiver  Ordering medications, tests, or procedures  Referring to and communicating with other health care professionals  Documenting clinical information in the EHR  Independently interpreting results  Care coordination

## 2023-08-24 NOTE — PLAN OF CARE
Problem: Adult Inpatient Plan of Care  Goal: Plan of Care Review  Outcome: Ongoing, Progressing  Goal: Patient-Specific Goal (Individualized)  Outcome: Ongoing, Progressing  Goal: Absence of Hospital-Acquired Illness or Injury  Outcome: Ongoing, Progressing  Goal: Optimal Comfort and Wellbeing  Outcome: Ongoing, Progressing  Goal: Readiness for Transition of Care  Outcome: Ongoing, Progressing     Problem: Respiratory Compromise (Pneumonia)  Goal: Effective Oxygenation and Ventilation  Outcome: Ongoing, Progressing     Problem: Fall Injury Risk  Goal: Absence of Fall and Fall-Related Injury  Outcome: Ongoing, Progressing

## 2023-08-24 NOTE — SUBJECTIVE & OBJECTIVE
This follow-up encounter was provided through telemedicine to address  Cellulitis and abscess of foot present on admission.  Patient was transferred to the telemedicine service on:  08/23/2023   The patient location is: [id number] A admitted 8/18/2023  8:14 PM.      Interval History/Overnight Events:   Clinical record since admit reviewed.    Patient is able to provide adequate history.    Pain to right foot with ambulation; dressing intact without strikethrough; coughing and SOB after neb therapy and uses mucinex regularly at home which will be resumed; dyspnea improved currently; + chronic cough    -LE angiogram cancelled due to scheduling conflict    Review of Systems   Constitutional:  Positive for fatigue. Negative for appetite change and fever.   Respiratory:  Positive for cough and shortness of breath.    Musculoskeletal:  Positive for arthralgias.   Skin:  Positive for wound.          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    []   Pathology reports     []   Imaging reports     []   Cardiology Procedure reports     []   Outside records/CareEverywhere     []  Independently viewed:         Inpatient Medications reviewed and prescribed for management of current problems:  Scheduled Meds:   acetaminophen  1,000 mg Oral Q8H    aspirin  81 mg Oral Daily    atorvastatin  40 mg Oral Daily    cefadroxil  1 g Oral Daily    cilostazoL  50 mg Oral BID    citalopram  20 mg Oral Daily    enoxparin  40 mg Subcutaneous Daily    fluticasone furoate-vilanteroL  1 puff Inhalation Daily    guaiFENesin  600 mg Oral BID    ipratropium  0.5 mg Nebulization Q6H    magnesium oxide  400 mg Oral BID    metoprolol tartrate  50 mg Oral BID    nicotine  1 patch Transdermal Daily     Continuous Infusions:  PRN Meds:.cadexomer iodine, dextrose 10%, dextrose 10%, glucagon (human recombinant), glucose, glucose, levalbuterol  **AND** ipratropium, melatonin, naloxone, ondansetron, oxyCODONE, oxyCODONE, sars-cov-2 (covid-19), sodium chloride 0.9%      Objective:     Temp:  [98.1 °F (36.7 °C)-99.2 °F (37.3 °C)] 98.6 °F (37 °C)  Pulse:  [69-88] 85  Resp:  [16-18] 16  SpO2:  [89 %-94 %] 93 %  BP: (101-146)/(52-65) 119/57      Intake/Output Summary (Last 24 hours) at 8/24/2023 0912  Last data filed at 8/23/2023 1734  Gross per 24 hour   Intake 480 ml   Output --   Net 480 ml        Body mass index is 27.9 kg/m².    Physical Exam  Vitals and nursing note reviewed.   Constitutional:       General: She is not in acute distress.     Appearance: Normal appearance.   HENT:      Head: Normocephalic and atraumatic.   Eyes:      Extraocular Movements: Extraocular movements intact.   Cardiovascular:      Rate and Rhythm: Normal rate.   Pulmonary:      Effort: Pulmonary effort is normal. No tachypnea or respiratory distress.   Musculoskeletal:      Comments: Right foot bandaged   Neurological:      General: No focal deficit present.      Mental Status: She is alert and oriented to person, place, and time.      Cranial Nerves: No cranial nerve deficit.      Motor: No weakness.   Psychiatric:         Attention and Perception: Attention normal.         Mood and Affect: Mood and affect normal.         Speech: Speech normal.         Behavior: Behavior is cooperative.        Labs: All labs within the last 24 hours were reviewed.   Recent Results (from the past 24 hour(s))   POCT glucose    Collection Time: 08/23/23  8:53 PM   Result Value Ref Range    POCT Glucose 97 70 - 110 mg/dL   CBC Auto Differential    Collection Time: 08/24/23  3:02 AM   Result Value Ref Range    WBC 7.20 3.90 - 12.70 K/uL    RBC 3.87 (L) 4.00 - 5.40 M/uL    Hemoglobin 11.3 (L) 12.0 - 16.0 g/dL    Hematocrit 35.9 (L) 37.0 - 48.5 %    MCV 93 82 - 98 fL    MCH 29.2 27.0 - 31.0 pg    MCHC 31.5 (L) 32.0 - 36.0 g/dL    RDW 14.6 (H) 11.5 - 14.5 %    Platelets 235 150 - 450 K/uL    MPV 9.4 9.2  - 12.9 fL    Immature Granulocytes 0.4 0.0 - 0.5 %    Gran # (ANC) 5.6 1.8 - 7.7 K/uL    Immature Grans (Abs) 0.03 0.00 - 0.04 K/uL    Lymph # 0.7 (L) 1.0 - 4.8 K/uL    Mono # 0.5 0.3 - 1.0 K/uL    Eos # 0.2 0.0 - 0.5 K/uL    Baso # 0.04 0.00 - 0.20 K/uL    nRBC 0 0 /100 WBC    Gran % 78.3 (H) 38.0 - 73.0 %    Lymph % 10.3 (L) 18.0 - 48.0 %    Mono % 7.5 4.0 - 15.0 %    Eosinophil % 2.9 0.0 - 8.0 %    Basophil % 0.6 0.0 - 1.9 %    Differential Method Automated    Comprehensive Metabolic Panel    Collection Time: 08/24/23  3:02 AM   Result Value Ref Range    Sodium 137 136 - 145 mmol/L    Potassium 3.7 3.5 - 5.1 mmol/L    Chloride 100 95 - 110 mmol/L    CO2 24 23 - 29 mmol/L    Glucose 78 70 - 110 mg/dL    BUN 12 8 - 23 mg/dL    Creatinine 0.7 0.5 - 1.4 mg/dL    Calcium 8.2 (L) 8.7 - 10.5 mg/dL    Total Protein 5.6 (L) 6.0 - 8.4 g/dL    Albumin 2.8 (L) 3.5 - 5.2 g/dL    Total Bilirubin 0.8 0.1 - 1.0 mg/dL    Alkaline Phosphatase 153 (H) 55 - 135 U/L    AST 64 (H) 10 - 40 U/L    ALT 65 (H) 10 - 44 U/L    eGFR >60.0 >60 mL/min/1.73 m^2    Anion Gap 13 8 - 16 mmol/L   Magnesium    Collection Time: 08/24/23  3:02 AM   Result Value Ref Range    Magnesium 1.6 1.6 - 2.6 mg/dL   Phosphorus    Collection Time: 08/24/23  3:02 AM   Result Value Ref Range    Phosphorus 2.3 (L) 2.7 - 4.5 mg/dL        Lab Results   Component Value Date    [id number] Not Detected 07/29/2021       Recent Labs   Lab 08/22/23 0428 08/23/23  0406 08/24/23  0302   WBC 6.11 6.86 7.20   LYMPH 11.0*  0.7* 11.1*  0.8* 10.3*  0.7*   HGB 11.5* 11.4* 11.3*   HCT 35.8* 37.3 35.9*    220 235     Recent Labs   Lab 08/22/23 0428 08/23/23  0406 08/24/23  0302   * 133* 137   K 3.5 3.4* 3.7   CL 97 101 100   CO2 26 25 24   BUN 14 12 12   CREATININE 0.7 0.8 0.7   GLU 82 116* 78   CALCIUM 8.0* 8.1* 8.2*   MG 1.6 1.5* 1.6   PHOS 2.6* 2.7 2.3*     Recent Labs   Lab 08/22/23 0428 08/23/23  0406 08/24/23  0302   ALKPHOS 139* 159* 153*   ALT 72* 72* 65*  "  AST 79* 74* 64*   ALBUMIN 2.7* 2.7* 2.8*   PROT 5.5* 5.4* 5.6*   BILITOT 0.7 0.5 0.8        No results for input(s): "DDIMER", "FERRITIN", "CRP", "LDH", "BNP", "TROPONINI", "CPK" in the last 72 hours.    Invalid input(s): "PROCALCITONIN"        Microbiology: All microbiology updates for the past 24 hours have been reviewed.  Microbiology Results (last 7 days)       Procedure Component Value Units Date/Time    Blood Culture #1 **CANNOT BE ORDERED STAT** [[id number]] Collected: 08/18/23 2113    Order Status: Completed Specimen: Blood from Peripheral, Forearm, Right Updated: 08/23/23 2312     Blood Culture, Routine No growth after 5 days.    Blood Culture #2 **CANNOT BE ORDERED STAT** [[id number]] Collected: 08/18/23 2113    Order Status: Completed Specimen: Blood from Peripheral, Forearm, Right Updated: 08/23/23 2312     Blood Culture, Routine No growth after 5 days.    Aerobic culture [[id number]]  (Abnormal)  (Susceptibility) Collected: 08/21/23 0927    Order Status: Completed Specimen: Abscess from Foot, Right Updated: 08/23/23 1251     Aerobic Bacterial Culture STAPHYLOCOCCUS AUREUS  Few      Narrative:      Right foot TMA abscess    Culture, Anaerobe [[id number]] Collected: 08/21/23 0927    Order Status: Completed Specimen: Abscess from Foot, Right Updated: 08/23/23 0955     Anaerobic Culture Culture in progress    Narrative:      Right foot TMA abscess    AFB Culture & Smear [[id number]] Collected: 08/21/23 0927    Order Status: Completed Specimen: Abscess from Foot, Right Updated: 08/22/23 2127     AFB Culture & Smear Culture in progress     AFB CULTURE STAIN No acid fast bacilli seen.    Narrative:      Right foot TMA abscess    Fungus culture [[id number]] Collected: 08/21/23 0927    Order Status: Completed Specimen: Abscess from Foot, Right Updated: 08/22/23 1359     Fungus (Mycology) Culture Culture in progress    Narrative:      Right foot TMA abscess    Gram stain [[id number]] Collected: 08/21/23 0927    Order " Status: Completed Specimen: Abscess from Foot, Right Updated: 08/21/23 4787     Gram Stain Result Moderate WBC's      Few Gram positive cocci    Narrative:      Right foot TMA abscess              Imaging All imaging within the last 24 hours was reviewed.       Results for orders placed during the hospital encounter of 03/01/20    Echo Color Flow Doppler? Yes    Interpretation Summary  · Normal left ventricular systolic function. The estimated ejection fraction is 60%.  · Normal right ventricular systolic function.  · Normal LV diastolic function.  · The estimated PA systolic pressure is 31 mmHg.  · Normal central venous pressure (3 mmHg).      VAS Ankle Brachial Indices Resting  Indication  ========    Peripheral Arterial Disease    Pressure Lower  ============    Rt brachial A syst BP  129 mmHg  Rt low thigh BP    58 mmHg  Rt calf BP 46 mmHg  Rt PTA BP  0 mmHg  Rt dors pedis A BP 50 mmHg  Rt NICCI post tibial (rt post tib A BP / max brach A BP) 0.00  Right NICCI ratio use:   dors. pedis  Rt NICCI (rt dors ped A BP / max brach A BP) 0.39  Rt ankle BP / max brach A BP   0.39  Lt low thigh BP    174 mmHg  Lt calf  mmHg  Lt PTA BP  65 mmHg  Lt dors pedis A BP 77 mmHg  Lt toe BP  41 mmHg  Lt NICCI (lt post tibial A BP / max brach A BP)  0.50  Left NICCI ratio use:    dors. pedis  Lt NICCI dors ped (lt dors ped A BP / max brach A BP)    0.60  Lt ankle BP / max brach A BP   0.60  Lt TBI (lt toe BP / max brach A BP)    0.32    PPG Lower  =========    Rt toe digit waveform: History of Toe Amputation  Rt 2nd digit waveform: History of Toe Amputation  Rt 3rd digit waveform: History of Toe Amputation  Rt 4th digit waveform: History of Toe Amputation  Rt 5th digit waveform: History of Toe Amputation  Lt toe BP - PPG    41 mmHg  Lt toe digit waveform: moderately dampened    Comment  ========    Doppler signals were faint and difficult to obtain secondary to venous flow in the right lower extremity.    Impression  =========    Right  Leg: Segmental pressures and PVR waveforms suggest severe peripheral arterial occlusive disease. Absent DPA signal.  Rt lower digits: History of transmetatarsal amputation.    Left Leg: Segmental pressures and PVR waveforms suggest moderate peripheral arterial occlusive disease.  Lt lower digits: Toe PPG waveforms as described above. - TBI of 0.32 with a Great toe pressure of 41 mmHg is noted.    DATE OF SERVICE: 08/21/2023                                                      Sonographer: Bridgett Rodrigues  Electronically Signed by: Phillip Cortez II at 08/21/2023-17:26

## 2023-08-24 NOTE — ASSESSMENT & PLAN NOTE
Diet: Diet Adult Regular (IDDSI Level 7)  Diet NPO  Significant LDAs:   IV Access Type: Peripheral  Urinary Catheter Indication if present: Patient Does Not Have Urinary Catheter  Other Lines/Tubes/Drains:     Goals of Care:    Previous admission:  8/10/23  Code Status: Full Code  Likely prognosis:  Fair    FLORIAN: 8/25/2023     Code Status: Full Code   Is the patient medically ready for discharge?: No    Reason for patient still in hospital (select all that apply): Patient trending condition, Treatment and Consult recommendations  Discharge Plan A: Home with family      The amount of time spent during, and associated with, this encounter was 50 minutes; the nature of the activities performed were:  Preparing to see the patient, chart review  Obtaining and/or receiving separately obtained history   Performing medically appropriate examination and evaluation  Counseling and educating the patient/family/caregiver  Ordering medications, tests, or procedures  Referring to and communicating with other health care professionals  Documenting clinical information in the EHR  Independently interpreting results  Care coordination

## 2023-08-25 ENCOUNTER — ANESTHESIA (OUTPATIENT)
Dept: SURGERY | Facility: HOSPITAL | Age: 78
DRG: 464 | End: 2023-08-25
Payer: MEDICARE

## 2023-08-25 ENCOUNTER — ANESTHESIA EVENT (OUTPATIENT)
Dept: SURGERY | Facility: HOSPITAL | Age: 78
DRG: 464 | End: 2023-08-25
Payer: MEDICARE

## 2023-08-25 LAB
ALBUMIN SERPL BCP-MCNC: 3 G/DL (ref 3.5–5.2)
ALP SERPL-CCNC: 142 U/L (ref 55–135)
ALT SERPL W/O P-5'-P-CCNC: 61 U/L (ref 10–44)
ANION GAP SERPL CALC-SCNC: 9 MMOL/L (ref 8–16)
AST SERPL-CCNC: 57 U/L (ref 10–40)
BACTERIA SPEC ANAEROBE CULT: NORMAL
BASOPHILS # BLD AUTO: 0.04 K/UL (ref 0–0.2)
BASOPHILS NFR BLD: 0.6 % (ref 0–1.9)
BILIRUB SERPL-MCNC: 0.7 MG/DL (ref 0.1–1)
BUN SERPL-MCNC: 15 MG/DL (ref 8–23)
CALCIUM SERPL-MCNC: 9.2 MG/DL (ref 8.7–10.5)
CHLORIDE SERPL-SCNC: 100 MMOL/L (ref 95–110)
CO2 SERPL-SCNC: 27 MMOL/L (ref 23–29)
CREAT SERPL-MCNC: 0.8 MG/DL (ref 0.5–1.4)
DIFFERENTIAL METHOD: ABNORMAL
EOSINOPHIL # BLD AUTO: 0.4 K/UL (ref 0–0.5)
EOSINOPHIL NFR BLD: 5.1 % (ref 0–8)
ERYTHROCYTE [DISTWIDTH] IN BLOOD BY AUTOMATED COUNT: 15 % (ref 11.5–14.5)
EST. GFR  (NO RACE VARIABLE): >60 ML/MIN/1.73 M^2
GLUCOSE SERPL-MCNC: 76 MG/DL (ref 70–110)
HCT VFR BLD AUTO: 38.3 % (ref 37–48.5)
HGB BLD-MCNC: 11.8 G/DL (ref 12–16)
IMM GRANULOCYTES # BLD AUTO: 0.04 K/UL (ref 0–0.04)
IMM GRANULOCYTES NFR BLD AUTO: 0.6 % (ref 0–0.5)
LYMPHOCYTES # BLD AUTO: 1 K/UL (ref 1–4.8)
LYMPHOCYTES NFR BLD: 14.9 % (ref 18–48)
MAGNESIUM SERPL-MCNC: 1.9 MG/DL (ref 1.6–2.6)
MCH RBC QN AUTO: 28.7 PG (ref 27–31)
MCHC RBC AUTO-ENTMCNC: 30.8 G/DL (ref 32–36)
MCV RBC AUTO: 93 FL (ref 82–98)
MONOCYTES # BLD AUTO: 0.7 K/UL (ref 0.3–1)
MONOCYTES NFR BLD: 9.9 % (ref 4–15)
NEUTROPHILS # BLD AUTO: 4.7 K/UL (ref 1.8–7.7)
NEUTROPHILS NFR BLD: 68.9 % (ref 38–73)
NRBC BLD-RTO: 0 /100 WBC
PHOSPHATE SERPL-MCNC: 3 MG/DL (ref 2.7–4.5)
PLATELET # BLD AUTO: 266 K/UL (ref 150–450)
PMV BLD AUTO: 10 FL (ref 9.2–12.9)
POTASSIUM SERPL-SCNC: 3.8 MMOL/L (ref 3.5–5.1)
PROT SERPL-MCNC: 6.2 G/DL (ref 6–8.4)
RBC # BLD AUTO: 4.11 M/UL (ref 4–5.4)
SODIUM SERPL-SCNC: 136 MMOL/L (ref 136–145)
WBC # BLD AUTO: 6.85 K/UL (ref 3.9–12.7)

## 2023-08-25 PROCEDURE — 94761 N-INVAS EAR/PLS OXIMETRY MLT: CPT

## 2023-08-25 PROCEDURE — 63600175 PHARM REV CODE 636 W HCPCS

## 2023-08-25 PROCEDURE — 83735 ASSAY OF MAGNESIUM: CPT | Performed by: STUDENT IN AN ORGANIZED HEALTH CARE EDUCATION/TRAINING PROGRAM

## 2023-08-25 PROCEDURE — 25000003 PHARM REV CODE 250: Performed by: STUDENT IN AN ORGANIZED HEALTH CARE EDUCATION/TRAINING PROGRAM

## 2023-08-25 PROCEDURE — 63600175 PHARM REV CODE 636 W HCPCS: Mod: JG | Performed by: SURGERY

## 2023-08-25 PROCEDURE — 25000003 PHARM REV CODE 250: Performed by: INTERNAL MEDICINE

## 2023-08-25 PROCEDURE — 27000221 HC OXYGEN, UP TO 24 HOURS

## 2023-08-25 PROCEDURE — 37000009 HC ANESTHESIA EA ADD 15 MINS: Performed by: SURGERY

## 2023-08-25 PROCEDURE — 63600175 PHARM REV CODE 636 W HCPCS: Performed by: STUDENT IN AN ORGANIZED HEALTH CARE EDUCATION/TRAINING PROGRAM

## 2023-08-25 PROCEDURE — 63600175 PHARM REV CODE 636 W HCPCS: Performed by: NURSE ANESTHETIST, CERTIFIED REGISTERED

## 2023-08-25 PROCEDURE — 75710 PR  ANGIO EXTREMITY UNILAT: ICD-10-PCS | Mod: 26,59,, | Performed by: SURGERY

## 2023-08-25 PROCEDURE — 63600175 PHARM REV CODE 636 W HCPCS: Performed by: ANESTHESIOLOGY

## 2023-08-25 PROCEDURE — D9220A PRA ANESTHESIA: ICD-10-PCS | Mod: CRNA,,, | Performed by: NURSE ANESTHETIST, CERTIFIED REGISTERED

## 2023-08-25 PROCEDURE — D9220A PRA ANESTHESIA: Mod: CRNA,,, | Performed by: NURSE ANESTHETIST, CERTIFIED REGISTERED

## 2023-08-25 PROCEDURE — 71000033 HC RECOVERY, INTIAL HOUR: Performed by: SURGERY

## 2023-08-25 PROCEDURE — 99232 PR SUBSEQUENT HOSPITAL CARE,LEVL II: ICD-10-PCS | Mod: 95,,, | Performed by: INTERNAL MEDICINE

## 2023-08-25 PROCEDURE — D9220A PRA ANESTHESIA: Mod: ANES,,, | Performed by: ANESTHESIOLOGY

## 2023-08-25 PROCEDURE — 27201423 OPTIME MED/SURG SUP & DEVICES STERILE SUPPLY: Performed by: SURGERY

## 2023-08-25 PROCEDURE — 94640 AIRWAY INHALATION TREATMENT: CPT

## 2023-08-25 PROCEDURE — C1725 CATH, TRANSLUMIN NON-LASER: HCPCS | Performed by: SURGERY

## 2023-08-25 PROCEDURE — 37232 PR REVASCULARIZE TIBIAL/PERON ARTERY,ANGIOPLASTY EA ADD: CPT | Mod: RT,,, | Performed by: SURGERY

## 2023-08-25 PROCEDURE — 84100 ASSAY OF PHOSPHORUS: CPT | Performed by: STUDENT IN AN ORGANIZED HEALTH CARE EDUCATION/TRAINING PROGRAM

## 2023-08-25 PROCEDURE — C1894 INTRO/SHEATH, NON-LASER: HCPCS | Performed by: SURGERY

## 2023-08-25 PROCEDURE — 85025 COMPLETE CBC W/AUTO DIFF WBC: CPT | Performed by: STUDENT IN AN ORGANIZED HEALTH CARE EDUCATION/TRAINING PROGRAM

## 2023-08-25 PROCEDURE — 37226 PR FEM/POPL REVASC W/STENT: CPT | Mod: 51,RT,, | Performed by: SURGERY

## 2023-08-25 PROCEDURE — 99232 SBSQ HOSP IP/OBS MODERATE 35: CPT | Mod: 95,,, | Performed by: INTERNAL MEDICINE

## 2023-08-25 PROCEDURE — 25000003 PHARM REV CODE 250: Performed by: NURSE ANESTHETIST, CERTIFIED REGISTERED

## 2023-08-25 PROCEDURE — 25000003 PHARM REV CODE 250: Performed by: SURGERY

## 2023-08-25 PROCEDURE — 21400001 HC TELEMETRY ROOM

## 2023-08-25 PROCEDURE — C1769 GUIDE WIRE: HCPCS | Performed by: SURGERY

## 2023-08-25 PROCEDURE — C1874 STENT, COATED/COV W/DEL SYS: HCPCS | Performed by: SURGERY

## 2023-08-25 PROCEDURE — C1887 CATHETER, GUIDING: HCPCS | Performed by: SURGERY

## 2023-08-25 PROCEDURE — 75710 ARTERY X-RAYS ARM/LEG: CPT | Mod: 26,59,, | Performed by: SURGERY

## 2023-08-25 PROCEDURE — 27000646 HC AEROBIKA DEVICE

## 2023-08-25 PROCEDURE — D9220A PRA ANESTHESIA: ICD-10-PCS | Mod: ANES,,, | Performed by: ANESTHESIOLOGY

## 2023-08-25 PROCEDURE — 37228 PR TIB/PER REVASC W/TLA: ICD-10-PCS | Mod: RT,,, | Performed by: SURGERY

## 2023-08-25 PROCEDURE — 25500020 PHARM REV CODE 255: Performed by: SURGERY

## 2023-08-25 PROCEDURE — 36000707: Performed by: SURGERY

## 2023-08-25 PROCEDURE — 37000008 HC ANESTHESIA 1ST 15 MINUTES: Performed by: SURGERY

## 2023-08-25 PROCEDURE — 37232 PR REVASCULARIZE TIBIAL/PERON ARTERY,ANGIOPLASTY EA ADD: ICD-10-PCS | Mod: RT,,, | Performed by: SURGERY

## 2023-08-25 PROCEDURE — 80053 COMPREHEN METABOLIC PANEL: CPT | Performed by: STUDENT IN AN ORGANIZED HEALTH CARE EDUCATION/TRAINING PROGRAM

## 2023-08-25 PROCEDURE — C1760 CLOSURE DEV, VASC: HCPCS | Performed by: SURGERY

## 2023-08-25 PROCEDURE — 37228 PR TIB/PER REVASC W/TLA: CPT | Mod: RT,,, | Performed by: SURGERY

## 2023-08-25 PROCEDURE — 71000015 HC POSTOP RECOV 1ST HR: Performed by: SURGERY

## 2023-08-25 PROCEDURE — 36000706: Performed by: SURGERY

## 2023-08-25 PROCEDURE — 37226 PR FEM/POPL REVASC W/STENT: ICD-10-PCS | Mod: 51,RT,, | Performed by: SURGERY

## 2023-08-25 PROCEDURE — 94664 DEMO&/EVAL PT USE INHALER: CPT

## 2023-08-25 PROCEDURE — 25000242 PHARM REV CODE 250 ALT 637 W/ HCPCS: Performed by: INTERNAL MEDICINE

## 2023-08-25 PROCEDURE — 99900035 HC TECH TIME PER 15 MIN (STAT)

## 2023-08-25 PROCEDURE — 36415 COLL VENOUS BLD VENIPUNCTURE: CPT | Performed by: STUDENT IN AN ORGANIZED HEALTH CARE EDUCATION/TRAINING PROGRAM

## 2023-08-25 DEVICE — IMPLANTABLE DEVICE: Type: IMPLANTABLE DEVICE | Site: OTHER (ADD COMMENT) | Status: FUNCTIONAL

## 2023-08-25 RX ORDER — PROPOFOL 10 MG/ML
VIAL (ML) INTRAVENOUS
Status: DISCONTINUED | OUTPATIENT
Start: 2023-08-25 | End: 2023-08-25

## 2023-08-25 RX ORDER — MIDAZOLAM HYDROCHLORIDE 1 MG/ML
INJECTION, SOLUTION INTRAMUSCULAR; INTRAVENOUS
Status: DISCONTINUED | OUTPATIENT
Start: 2023-08-25 | End: 2023-08-25

## 2023-08-25 RX ORDER — OXYCODONE AND ACETAMINOPHEN 5; 325 MG/1; MG/1
1 TABLET ORAL
Status: DISCONTINUED | OUTPATIENT
Start: 2023-08-25 | End: 2023-08-25 | Stop reason: HOSPADM

## 2023-08-25 RX ORDER — NITROGLYCERIN 5 MG/ML
INJECTION, SOLUTION INTRAVENOUS
Status: DISCONTINUED | OUTPATIENT
Start: 2023-08-25 | End: 2023-08-25 | Stop reason: HOSPADM

## 2023-08-25 RX ORDER — HYDRALAZINE HYDROCHLORIDE 20 MG/ML
INJECTION INTRAMUSCULAR; INTRAVENOUS
Status: DISCONTINUED | OUTPATIENT
Start: 2023-08-25 | End: 2023-08-25

## 2023-08-25 RX ORDER — DEXMEDETOMIDINE HYDROCHLORIDE 100 UG/ML
INJECTION, SOLUTION INTRAVENOUS CONTINUOUS PRN
Status: DISCONTINUED | OUTPATIENT
Start: 2023-08-25 | End: 2023-08-25

## 2023-08-25 RX ORDER — LIDOCAINE HYDROCHLORIDE 10 MG/ML
INJECTION, SOLUTION EPIDURAL; INFILTRATION; INTRACAUDAL; PERINEURAL
Status: DISCONTINUED | OUTPATIENT
Start: 2023-08-25 | End: 2023-08-25 | Stop reason: HOSPADM

## 2023-08-25 RX ORDER — HEPARIN SODIUM 1000 [USP'U]/ML
INJECTION, SOLUTION INTRAVENOUS; SUBCUTANEOUS
Status: DISCONTINUED | OUTPATIENT
Start: 2023-08-25 | End: 2023-08-25

## 2023-08-25 RX ORDER — HEPARIN SODIUM 1000 [USP'U]/ML
INJECTION, SOLUTION INTRAVENOUS; SUBCUTANEOUS
Status: DISCONTINUED | OUTPATIENT
Start: 2023-08-25 | End: 2023-08-25 | Stop reason: HOSPADM

## 2023-08-25 RX ORDER — VERAPAMIL HYDROCHLORIDE 2.5 MG/ML
INJECTION, SOLUTION INTRAVENOUS
Status: DISCONTINUED | OUTPATIENT
Start: 2023-08-25 | End: 2023-08-25 | Stop reason: HOSPADM

## 2023-08-25 RX ORDER — KETAMINE HCL IN 0.9 % NACL 50 MG/5 ML
SYRINGE (ML) INTRAVENOUS
Status: DISCONTINUED | OUTPATIENT
Start: 2023-08-25 | End: 2023-08-25

## 2023-08-25 RX ORDER — IODIXANOL 320 MG/ML
INJECTION, SOLUTION INTRAVASCULAR
Status: DISCONTINUED | OUTPATIENT
Start: 2023-08-25 | End: 2023-08-25 | Stop reason: HOSPADM

## 2023-08-25 RX ORDER — CLOPIDOGREL BISULFATE 75 MG/1
300 TABLET ORAL ONCE
Status: COMPLETED | OUTPATIENT
Start: 2023-08-25 | End: 2023-08-25

## 2023-08-25 RX ORDER — HYDROMORPHONE HYDROCHLORIDE 1 MG/ML
0.2 INJECTION, SOLUTION INTRAMUSCULAR; INTRAVENOUS; SUBCUTANEOUS EVERY 5 MIN PRN
Status: DISCONTINUED | OUTPATIENT
Start: 2023-08-25 | End: 2023-08-25 | Stop reason: HOSPADM

## 2023-08-25 RX ORDER — LABETALOL HYDROCHLORIDE 5 MG/ML
INJECTION, SOLUTION INTRAVENOUS
Status: DISCONTINUED | OUTPATIENT
Start: 2023-08-25 | End: 2023-08-25

## 2023-08-25 RX ORDER — HYDROMORPHONE HYDROCHLORIDE 1 MG/ML
INJECTION, SOLUTION INTRAMUSCULAR; INTRAVENOUS; SUBCUTANEOUS
Status: COMPLETED
Start: 2023-08-25 | End: 2023-08-25

## 2023-08-25 RX ORDER — SODIUM CHLORIDE 0.9 % (FLUSH) 0.9 %
3 SYRINGE (ML) INJECTION
Status: DISCONTINUED | OUTPATIENT
Start: 2023-08-25 | End: 2023-08-25 | Stop reason: HOSPADM

## 2023-08-25 RX ORDER — CLOPIDOGREL BISULFATE 75 MG/1
75 TABLET ORAL DAILY
Status: DISCONTINUED | OUTPATIENT
Start: 2023-08-26 | End: 2023-08-29 | Stop reason: HOSPADM

## 2023-08-25 RX ORDER — FENTANYL CITRATE 50 UG/ML
INJECTION, SOLUTION INTRAMUSCULAR; INTRAVENOUS
Status: DISCONTINUED | OUTPATIENT
Start: 2023-08-25 | End: 2023-08-25

## 2023-08-25 RX ORDER — ONDANSETRON 2 MG/ML
4 INJECTION INTRAMUSCULAR; INTRAVENOUS DAILY PRN
Status: DISCONTINUED | OUTPATIENT
Start: 2023-08-25 | End: 2023-08-25 | Stop reason: HOSPADM

## 2023-08-25 RX ORDER — CEFAZOLIN SODIUM 1 G/3ML
INJECTION, POWDER, FOR SOLUTION INTRAMUSCULAR; INTRAVENOUS
Status: DISCONTINUED | OUTPATIENT
Start: 2023-08-25 | End: 2023-08-25

## 2023-08-25 RX ADMIN — CLOPIDOGREL BISULFATE 300 MG: 75 TABLET ORAL at 05:08

## 2023-08-25 RX ADMIN — HYDROMORPHONE HYDROCHLORIDE 0.2 MG: 1 INJECTION, SOLUTION INTRAMUSCULAR; INTRAVENOUS; SUBCUTANEOUS at 05:08

## 2023-08-25 RX ADMIN — FLUTICASONE FUROATE AND VILANTEROL TRIFENATATE 1 PUFF: 200; 25 POWDER RESPIRATORY (INHALATION) at 07:08

## 2023-08-25 RX ADMIN — ACETAMINOPHEN 1000 MG: 500 TABLET ORAL at 06:08

## 2023-08-25 RX ADMIN — IPRATROPIUM BROMIDE 0.5 MG: 0.5 SOLUTION RESPIRATORY (INHALATION) at 07:08

## 2023-08-25 RX ADMIN — IPRATROPIUM BROMIDE 0.5 MG: 0.5 SOLUTION RESPIRATORY (INHALATION) at 12:08

## 2023-08-25 RX ADMIN — METOPROLOL TARTRATE 50 MG: 50 TABLET, FILM COATED ORAL at 08:08

## 2023-08-25 RX ADMIN — Medication 15 MG: at 03:08

## 2023-08-25 RX ADMIN — PROPOFOL 20 MG: 10 INJECTION, EMULSION INTRAVENOUS at 03:08

## 2023-08-25 RX ADMIN — HEPARIN SODIUM 2000 UNITS: 1000 INJECTION, SOLUTION INTRAVENOUS; SUBCUTANEOUS at 03:08

## 2023-08-25 RX ADMIN — DEXMEDETOMIDINE HYDROCHLORIDE 40 MCG: 100 INJECTION, SOLUTION INTRAVENOUS at 01:08

## 2023-08-25 RX ADMIN — FENTANYL CITRATE 25 MCG: 50 INJECTION, SOLUTION INTRAMUSCULAR; INTRAVENOUS at 03:08

## 2023-08-25 RX ADMIN — Medication 15 MG: at 02:08

## 2023-08-25 RX ADMIN — Medication 10 MG: at 01:08

## 2023-08-25 RX ADMIN — OXYCODONE HYDROCHLORIDE 10 MG: 10 TABLET ORAL at 05:08

## 2023-08-25 RX ADMIN — DEXMEDETOMIDINE HYDROCHLORIDE 16 MCG: 100 INJECTION, SOLUTION INTRAVENOUS at 02:08

## 2023-08-25 RX ADMIN — FENTANYL CITRATE 25 MCG: 50 INJECTION, SOLUTION INTRAMUSCULAR; INTRAVENOUS at 01:08

## 2023-08-25 RX ADMIN — LABETALOL HYDROCHLORIDE 5 MG: 5 INJECTION, SOLUTION INTRAVENOUS at 03:08

## 2023-08-25 RX ADMIN — MIDAZOLAM HYDROCHLORIDE 1 MG: 1 INJECTION, SOLUTION INTRAMUSCULAR; INTRAVENOUS at 01:08

## 2023-08-25 RX ADMIN — DEXMEDETOMIDINE HYDROCHLORIDE 0.8 MCG/KG/HR: 100 INJECTION, SOLUTION INTRAVENOUS at 01:08

## 2023-08-25 RX ADMIN — IPRATROPIUM BROMIDE 0.5 MG: 0.5 SOLUTION RESPIRATORY (INHALATION) at 11:08

## 2023-08-25 RX ADMIN — CEFAZOLIN 2 G: 330 INJECTION, POWDER, FOR SOLUTION INTRAMUSCULAR; INTRAVENOUS at 01:08

## 2023-08-25 RX ADMIN — ACETAMINOPHEN 1000 MG: 500 TABLET ORAL at 08:08

## 2023-08-25 RX ADMIN — PROPOFOL 50 MCG/KG/MIN: 10 INJECTION, EMULSION INTRAVENOUS at 04:08

## 2023-08-25 RX ADMIN — OXYCODONE HYDROCHLORIDE 5 MG: 5 TABLET ORAL at 06:08

## 2023-08-25 RX ADMIN — HYDRALAZINE HYDROCHLORIDE 5 MG: 20 INJECTION INTRAMUSCULAR; INTRAVENOUS at 04:08

## 2023-08-25 RX ADMIN — SODIUM CHLORIDE: 9 INJECTION, SOLUTION INTRAVENOUS at 01:08

## 2023-08-25 RX ADMIN — GUAIFENESIN 600 MG: 600 TABLET, EXTENDED RELEASE ORAL at 08:08

## 2023-08-25 RX ADMIN — CILOSTAZOL 50 MG: 50 TABLET ORAL at 08:08

## 2023-08-25 RX ADMIN — Medication 6 MG: at 08:08

## 2023-08-25 RX ADMIN — Medication 400 MG: at 08:08

## 2023-08-25 RX ADMIN — FENTANYL CITRATE 25 MCG: 50 INJECTION, SOLUTION INTRAMUSCULAR; INTRAVENOUS at 02:08

## 2023-08-25 RX ADMIN — HYDROMORPHONE HYDROCHLORIDE 0.2 MG: 1 INJECTION, SOLUTION INTRAMUSCULAR; INTRAVENOUS; SUBCUTANEOUS at 06:08

## 2023-08-25 RX ADMIN — ENOXAPARIN SODIUM 40 MG: 40 INJECTION SUBCUTANEOUS at 05:08

## 2023-08-25 RX ADMIN — HEPARIN SODIUM 6000 UNITS: 1000 INJECTION, SOLUTION INTRAVENOUS; SUBCUTANEOUS at 02:08

## 2023-08-25 NOTE — ANESTHESIA PREPROCEDURE EVALUATION
[facility]  Anesthesia Pre-Operative Evaluation         Patient Name: Radha Sotelo  YOB: 1945  MRN: [id number]    SUBJECTIVE:     Pre-operative evaluation for Procedure(s) (LRB):  Angiogram Extremity Unilateral (Right)     08/25/2023    Radha Sotelo is a 78 y.o. female w/ a significant PMHx of severe COPD on home O2 for chronic hypoxic respiratory failure (4 L via nasal cannula), HTN, CAD, PAT, previous osteomyelitis with transmetatarsal amputation (2012) who presented to the emergency department due to worsening right foot pain with associated discoloration.     Patient now presents for the above procedure(s).    TTE 3/2/20:    Normal left ventricular systolic function. The estimated ejection fraction is 60%.   Normal right ventricular systolic function.   Normal LV diastolic function.   The estimated PA systolic pressure is 31 mmHg.   Normal central venous pressure (3 mmHg).      LDA:        Peripheral IV - Single Lumen 08/22/23 2130 22 G Posterior;Right Hand (Active)   Site Assessment Clean;Dry;Intact;No redness;No swelling 08/22/23 2100   Extremity Assessment Distal to IV No abnormal discoloration;No redness;No swelling;No warmth 08/22/23 2100   Line Status Saline locked 08/22/23 2100   Dressing Status Clean;Dry;Intact 08/22/23 2100   Number of days: 0            Colostomy LLQ (Active)   Stomal Appliance 1 piece 08/22/23 1053   Stoma Appearance round;pink 08/22/23 1053   Site Assessment Clean;Intact 08/22/23 1053   Peristomal Assessment Intact;Clean 08/22/23 1053   Stoma Function flatus;stool 08/12/23 0819   Tolerance no signs/symptoms of discomfort 08/11/23 2000   Output (mL) 300 mL 08/21/23 1504   Number of days:             Colostomy 07/13/21 0201 Descending/sigmoid LLQ (Active)   Number of days: 771       Prev airway: None documented.    Drips: None documented.      Patient Active Problem List   Diagnosis    Autoimmune hemolytic anemia    SIADH (syndrome of  inappropriate ADH production)    Cholestatic jaundice    Transaminase or LDH elevation    Sepsis due to Escherichia coli    Psoas abscess    Unspecified protein-calorie malnutrition    Vaginal candida    Physical deconditioning    PVD (peripheral vascular disease)    S/P colostomy    Renal failure    Gangrene of toe    Acute renal failure (ARF)    Ischemia of extremity    Coronary artery disease    Atherosclerosis of native arteries of the extremities with ulceration    Cellulitis and abscess of foot    CAD (coronary artery disease)    Nonhealing surgical wound    Malnutrition of mild degree    Ulcer of other part of foot    Delayed wound healing    Non-compliance    Chest pain at rest    Acute on chronic respiratory failure with hypoxia and hypercapnia    Atypical pneumonia    Elevated troponin    COPD exacerbation    SOB (shortness of breath)    Nicotine vapor product user    Chronic obstructive pulmonary disease    Essential hypertension    CAD (coronary artery disease)    S/P coronary artery stent placement    HLD (hyperlipidemia)    Hx of transient ischemic attack (TIA)    PAD (peripheral artery disease)    History of transmetatarsal amputation of right foot    Major depressive disorder    Former smoker    Shortness of breath    Acute respiratory failure with hypoxia    Soft tissue infection of R foot    Hypokalemia    Discharge planning       Review of patient's allergies indicates:  No Known Allergies    Current Inpatient Medications:      Current Facility-Administered Medications on File Prior to Visit   Medication Dose Route Frequency Provider Last Rate Last Admin    acetaminophen tablet 1,000 mg  1,000 mg Oral Q8H Hand, Samir TANNER MD   1,000 mg at 08/25/23 0600    aspirin chewable tablet 81 mg  81 mg Oral Daily Terrance Ashton MD   81 mg at 08/24/23 1416    atorvastatin tablet 40 mg  40 mg Oral Daily Terrance Ashton MD   40 mg at 08/24/23 1416    cadexomer  iodine 0.9 % gel   Topical (Top) Daily PRN Nancy Bush MD        cefadroxil tablet 1 g  1 g Oral Daily Enedina Harrington DPM   1 g at 08/24/23 1417    cilostazoL tablet 50 mg  50 mg Oral BID Terrance Ashton MD   50 mg at 08/24/23 2105    dextrose 10% bolus 125 mL 125 mL  12.5 g Intravenous PRN Terrance Ashton MD        dextrose 10% bolus 250 mL 250 mL  25 g Intravenous PRN Terrance Ashton MD        enoxaparin injection 40 mg  40 mg Subcutaneous Daily Terrance Ashton MD   40 mg at 08/24/23 1642    fluticasone furoate-vilanteroL 200-25 mcg/dose diskus inhaler 1 puff  1 puff Inhalation Daily Terrance Ashton MD   1 puff at 08/25/23 0732    glucagon (human recombinant) injection 1 mg  1 mg Intramuscular PRN Terrance Ashton MD        glucose chewable tablet 16 g  16 g Oral PRN Terrance Ashton MD        glucose chewable tablet 24 g  24 g Oral PRN Terrance Ashton MD        guaiFENesin 12 hr tablet 600 mg  600 mg Oral BID Luz Maria Spivey MD   600 mg at 08/24/23 2104    levalbuterol nebulizer solution 1.25 mg  1.25 mg Nebulization Q4H PRN Samir Upton MD   1.25 mg at 08/24/23 1658    And    ipratropium 0.02 % nebulizer solution 0.5 mg  0.5 mg Nebulization Q6H Samir Upton MD   0.5 mg at 08/25/23 0720    magnesium oxide tablet 400 mg  400 mg Oral BID Nancy Bush MD   400 mg at 08/24/23 2104    melatonin tablet 6 mg  6 mg Oral Nightly PRN Terrance Ashton MD   6 mg at 08/24/23 2104    metoprolol tartrate (LOPRESSOR) tablet 50 mg  50 mg Oral BID Terrance Ashton MD   50 mg at 08/24/23 2104    naloxone 0.4 mg/mL injection 0.02 mg  0.02 mg Intravenous PRN Terrance Ashton MD        nicotine 21 mg/24 hr 1 patch  1 patch Transdermal Daily Samir Upton MD   1 patch at 08/24/23 0848    ondansetron disintegrating tablet 4 mg  4 mg Oral Q6H PRN Terrance Ashton MD        oxyCODONE immediate release tablet 5 mg  5 mg Oral Q6H PRN Samir Upton MD        oxyCODONE immediate release tablet Tab 10 mg  10 mg Oral Q6H  PRN Samir Upton MD   10 mg at 08/24/23 2104    sars-cov-2 (covid-19) (Pfizer COVID-19) 30 mcg/0.3 ml injection 0.3 mL  0.3 mL Intramuscular vaccine x 1 dose Terrance Ashton MD        sodium chloride 0.9% flush 10 mL  10 mL Intravenous Q12H PRN Terrance Ashton MD         Current Outpatient Medications on File Prior to Visit   Medication Sig Dispense Refill    albuterol (PROVENTIL/VENTOLIN HFA) 90 mcg/actuation inhaler INHALE 2 PUFFS BY MOUTH EVERY 6 HOURS AS NEEDED 25.5 g 3    albuterol-ipratropium (DUO-NEB) 2.5 mg-0.5 mg/3 mL nebulizer solution USE 1 VIAL VIA NEBULIZER EVERY 6 HOURS AS NEEDED FOR WHEEZING, SHORTNESS OF BREATH 540 mL 6    aspirin 81 MG Chew Take 1 tablet (81 mg total) by mouth once daily.      atorvastatin (LIPITOR) 40 MG tablet Take 1 tablet (40 mg total) by mouth once daily. 30 tablet 5    budesonide-formoterol 80-4.5 mcg (SYMBICORT) 80-4.5 mcg/actuation HFAA INHALE 2 PUFFS INTO THE LUNGS TWICE DAILY. RINSE MOUTH AFTER USE 10.2 g 6    cilostazoL (PLETAL) 100 MG Tab Take 1 tablet (100 mg total) by mouth 2 (two) times daily. 60 tablet 11    citalopram (CELEXA) 20 MG tablet Take 1 tablet (20 mg total) by mouth once daily. 90 tablet 3    clopidogrel (PLAVIX) 75 mg tablet Take 1 tablet (75 mg total) by mouth once daily. (Patient not taking: Reported on 8/11/2023) 30 tablet 11    hydrocortisone-aloe vera 1 % Crea cream Apply topically 2 (two) times daily. 28 g 0    ibuprofen (ADVIL,MOTRIN) 600 MG tablet Take 1 tablet (600 mg total) by mouth every 6 (six) hours as needed (moderate pain and swelling). 56 tablet 0    metoprolol tartrate (LOPRESSOR) 50 MG tablet Take 1 tablet (50 mg total) by mouth 2 (two) times daily. 180 tablet 3    nicotine (NICODERM CQ) 21 mg/24 hr Place 1 patch onto the skin once daily. 28 patch 11       Past Surgical History:   Procedure Laterality Date    ANGIOGRAPHY OF LOWER EXTREMITY Right 8/24/2023    Procedure: Angiogram Extremity Unilateral;  Surgeon: Dion  Benji TANNER MD;  Location: [facility] OR [address] [place] [address];  Service: Vascular;  Laterality: Right;    COLOSTOMY      ECTOPIC PREGNANCY SURGERY      right forefoot amputation      right toe amputation      x2 toes       Social History     Socioeconomic History    Marital status:    Tobacco Use    Smoking status: Former     Current packs/day: 0.00     Average packs/day: 0.5 packs/day for 50.0 years (25.0 ttl pk-yrs)     Types: Cigarettes     Start date: 7/5/1963     Quit date: 7/5/2013     Years since quitting: 10.1    Smokeless tobacco: Never   Substance and Sexual Activity    Alcohol use: No    Drug use: No   Social History Narrative    ** Merged History Encounter **          Social Determinants of Health     Financial Resource Strain: Low Risk  (8/19/2023)    Overall Financial Resource Strain (CARDIA)     Difficulty of Paying Living Expenses: Not very hard   Recent Concern: Financial Resource Strain - Medium Risk (8/12/2023)    Overall Financial Resource Strain (CARDIA)     Difficulty of Paying Living Expenses: Somewhat hard   Food Insecurity: No Food Insecurity (8/19/2023)    Hunger Vital Sign     Worried About Running Out of Food in the Last Year: Never true     Ran Out of Food in the Last Year: Never true   Transportation Needs: No Transportation Needs (8/19/2023)    PRAPARE - Transportation     Lack of Transportation (Medical): No     Lack of Transportation (Non-Medical): No   Recent Concern: Transportation Needs - Unmet Transportation Needs (8/12/2023)    PRAPARE - Transportation     Lack of Transportation (Medical): Yes     Lack of Transportation (Non-Medical): Yes   Physical Activity: Inactive (8/12/2023)    Exercise Vital Sign     Days of Exercise per Week: 0 days     Minutes of Exercise per Session: 0 min   Stress: Stress Concern Present (8/12/2023)    [employer] [place] [employer] - Occupational Stress Questionnaire     Feeling of Stress : To some extent   Social Connections:  Socially Isolated (8/19/2023)    Social Connection and Isolation Panel [NHANES]     Frequency of Communication with Friends and Family: More than three times a week     Frequency of Social Gatherings with Friends and Family: More than three times a week     Attends Taoism Services: Never     Active Member of Clubs or Organizations: No     Attends Club or Organization Meetings: Never     Marital Status:    Housing Stability: Low Risk  (8/19/2023)    Housing Stability Vital Sign     Unable to Pay for Housing in the Last Year: No     Number of Places Lived in the Last Year: 1     Unstable Housing in the Last Year: No       OBJECTIVE:     Vital Signs Range (Last 24H):  Temp:  [36.7 °C (98 °F)-37.2 °C (99 °F)]   Pulse:  [63-93]   Resp:  [16-20]   BP: (105-130)/(57-63)   SpO2:  [93 %-96 %]       Significant Labs:  Lab Results   Component Value Date    WBC 6.85 08/25/2023    HGB 11.8 (L) 08/25/2023    HCT 38.3 08/25/2023     08/25/2023    CHOL 188 07/12/2021    TRIG 46 07/12/2021    HDL 73 07/12/2021    ALT 61 (H) 08/25/2023    AST 57 (H) 08/25/2023     08/25/2023    K 3.8 08/25/2023     08/25/2023    CREATININE 0.8 08/25/2023    BUN 15 08/25/2023    CO2 27 08/25/2023    TSH 0.882 07/12/2021    INR 1.0 08/11/2023    HGBA1C 5.6 08/19/2023       Diagnostic Studies: No relevant studies.    EKG:   Results for orders placed or performed during the hospital encounter of 08/10/23   EKG 12-lead    Collection Time: 08/10/23 11:43 PM    Narrative    Test Reason : R06.02,    Vent. Rate : 103 BPM     Atrial Rate : 103 BPM     P-R Int : 208 ms          QRS Dur : 092 ms      QT Int : 344 ms       P-R-T Axes : 082 077 084 degrees     QTc Int : 450 ms    Sinus tachycardia with Premature atrial complexes  Incomplete right bundle branch block  Borderline Abnormal ECG  When compared with ECG of 12-JUL-2021 15:08,  Premature atrial complexes are now Present  Confirmed by ANTHONY WALTER MD ([id number]) on  8/11/2023 12:56:16 PM    Referred By: [email or web address]   SELF           Confirmed By:ANTHONY WALTER MD       2D ECHO:  TTE:  Results for orders placed or performed during the hospital encounter of 03/01/20   Echo Color Flow Doppler? Yes   Result Value Ref Range    Ascending aorta 2.98 cm    STJ 2.83 cm    AV mean gradient 2 mmHg    Ao peak hortencia 0.92 m/s    Ao VTI 15.61 cm    IVS 0.71 0.6 - 1.1 cm    LA size 3.25 cm    Left Atrium Major Axis 3.83 cm    Left Atrium Minor Axis 3.89 cm    LVIDd 5.11 3.5 - 6.0 cm    LVIDs 2.98 2.1 - 4.0 cm    LVOT diameter 2.03 cm    LVOT peak VTI 19.77 cm    Posterior Wall 0.68 0.6 - 1.1 cm    Sinus 3.00 cm    TAPSE 1.99 cm    TR Max Hortencia 2.64 m/s    TDI SEPTAL 0.06 m/s    LA WIDTH 4.00 cm    LV Diastolic Volume 124.29 mL    LV Systolic Volume 34.56 mL    LVOT peak hortencia 1.06 m/s    FS 42 %    LA volume 42.65 cm3    LV mass 118.07 g    Left Ventricle Relative Wall Thickness 0.27 cm    AV valve area 4.10 cm2    AV Velocity Ratio 1.15     AV index (prosthetic) 1.27     LVOT area 3.2 cm2    LVOT stroke volume 63.95 cm3    AV peak gradient 3 mmHg    LV Systolic Volume Index 21.5 mL/m2    LV Diastolic Volume Index 77.49 mL/m2    LA Volume Index 26.6 mL/m2    LV Mass Index 74 g/m2    Triscuspid Valve Regurgitation Peak Gradient 28 mmHg    BSA 1.64 m2    Right Atrial Pressure (from IVC) 3 mmHg    TV resting pulmonary artery pressure 31 mmHg    Narrative    · Normal left ventricular systolic function. The estimated ejection   fraction is 60%.  · Normal right ventricular systolic function.  · Normal LV diastolic function.  · The estimated PA systolic pressure is 31 mmHg.  · Normal central venous pressure (3 mmHg).          GREG:  No results found for this or any previous visit.    ASSESSMENT/PLAN:         Pre-op Assessment    I have reviewed the Patient Summary Reports.     I have reviewed the Nursing Notes. I have reviewed the NPO Status.   I have reviewed the Medications.     Review of Systems  Anesthesia  Hx:  No problems with previous Anesthesia  History of prior surgery of interest to airway management or planning: Denies Family Hx of Anesthesia complications.   Denies Personal Hx of Anesthesia complications.   Social:  Non-Smoker, No Alcohol Use    Hematology/Oncology:     Oncology Normal   Hematology Comments: On pletal and plavix for CAD and PVD took yesterday   EENT/Dental:   denies chronic allergic rhinitis   Cardiovascular:   Hypertension Past MI (several , last one 15 years ago, ) CAD  CABG/stent (stent 20 years ago)  hyperlipidemia    Pulmonary:   COPD (2 LPM continuous), severe Asthma Shortness of breath Denies Sleep Apnea. Does not go to store, cooks and light cleaning,  SOB frequently   Renal/:   Denies Chronic Renal Disease.     Hepatic/GI:   Denies GERD. Denies Liver Disease.    Musculoskeletal:   Denies Arthritis.     Neurological:   Denies CVA. Denies Seizures.    Endocrine:   Denies Diabetes.    Psych:   Denies Psychiatric History.          Physical Exam  General: Well nourished, Cooperative, Alert and Oriented    Airway:  Mallampati: II   Mouth Opening: Normal  TM Distance: Normal  Tongue: Normal  Neck ROM: Normal ROM    Dental:  Dentures  Full upper removable dentures      Anesthesia Plan  Type of Anesthesia, risks & benefits discussed:    Anesthesia Type: Gen Natural Airway, MAC  Intra-op Monitoring Plan: Standard ASA Monitors  Post Op Pain Control Plan: multimodal analgesia  Induction:  IV  Airway Plan: Direct, Post-Induction  Informed Consent: Informed consent signed with the Patient and all parties understand the risks and agree with anesthesia plan.  All questions answered.   ASA Score: 3  Day of Surgery Review of History & Physical: H&P Update referred to the surgeon/provider.    Ready For Surgery From Anesthesia Perspective.     .

## 2023-08-25 NOTE — TRANSFER OF CARE
Anesthesia Transfer of Care Note    Patient: Radha Sotelo    Procedure(s) Performed: Procedure(s) (LRB):  ANGIOGRAM, EXTREMITY, UNILATERAL (Right)  STENT, SUPERFICIAL FEMORAL ARTERY (Right)  PTA, PERONEAL TIBIAL TRUNK WITH SHOCKWAVE (Right)    Patient location: PACU    Anesthesia Type: MAC    Transport from OR: Transported from OR on 2-3 L/min O2 by NC with adequate spontaneous ventilation    Post pain: adequate analgesia    Post assessment: no apparent anesthetic complications and tolerated procedure well    Post vital signs: stable    Level of consciousness: awake and alert    Nausea/Vomiting: no nausea/vomiting    Complications: none    Transfer of care protocol was followed      Last vitals:   Visit Vitals  BP (!) 111/52   Pulse 81   Temp 36.5 °C (97.7 °F) (Skin)   Resp (!) 23   Ht 5' (1.524 m)   Wt 64.4 kg (142 lb)   LMP  (LMP Unknown)   SpO2 (!) 91%   Breastfeeding No   BMI 27.73 kg/m²

## 2023-08-25 NOTE — PT/OT/SLP PROGRESS
Physical Therapy      Patient Name:  Radha Sotelo   MRN:  [id number]    Patient not seen today secondary to  (Pt refused 2/2 LE pain). Will follow-up Monday (8/28) as appropriate.

## 2023-08-25 NOTE — ASSESSMENT & PLAN NOTE
Diet: Diet NPO  Significant LDAs:   IV Access Type: Peripheral  Urinary Catheter Indication if present: Patient Does Not Have Urinary Catheter  Other Lines/Tubes/Drains:     Goals of Care:    Previous admission:  8/10/23  Code Status: Full Code  Likely prognosis:  Fair    FLORIAN: 8/26/2023     Code Status: Full Code   Is the patient medically ready for discharge?: No    Reason for patient still in hospital (select all that apply): Patient trending condition, Treatment and Consult recommendations  Discharge Plan A: Home Health   Discharge Delays: None known at this time  The amount of time spent during, and associated with, this encounter was 35 minutes; the nature of the activities performed were:  Preparing to see the patient, chart review  Obtaining and/or receiving separately obtained history   Performing medically appropriate examination and evaluation  Counseling and educating the patient/family/caregiver  Ordering medications, tests, or procedures  Referring to and communicating with other health care professionals  Documenting clinical information in the EHR  Independently interpreting results  Care coordination

## 2023-08-25 NOTE — PROGRESS NOTES
[facility] ([address] Medicine  Telemedicine Progress Note    Patient Name: Radha Sotelo  MRN: [id number]  Patient Class: IP- Inpatient   Admission Date: 8/18/2023  Length of Stay: 6 days  Attending Physician: Luz Maria Spivey MD  Primary Care Provider: Laurence, Primary Doctor          Subjective:     Principal Problem:Cellulitis and abscess of foot        HPI:  Patient is a 78-year-old female with past medical history significant for severe COPD on home O2 for chronic hypoxic respiratory failure (4 L via nasal cannula), CAD, PAT, previous osteomyelitis with transmetatarsal amputation (2012) who presented to the emergency department due to worsening right foot pain with associated discoloration.  She had a similar presentation approximately 10 days ago and was started on broad-spectrum antibiotics.  Since that time she is been having worsening pain associated with the right leg as well as discoloration with possible deep blistering.  An x-ray was done in the emergency department which demonstrated no erosive changes of osteomyelitis.  She does have some overlying right erythema of the affected area as well as dark discoloration with possible blood blister present.  There is probably some fluctuance however complete evaluation was limited secondary to patient intolerance.  She has a black spot which does not appear to be leaking any purulent fluid.  She denies any fevers, chills, shortness of breath, chest pain, or intolerance of lying flat.  She states that she has been dealing with progressive lower extremity swelling bilaterally.  She does not take any diuretics for this and it is a new problem.           Overview/Hospital Course:  No notes on file      This follow-up encounter was provided through telemedicine to address  Cellulitis and abscess of foot present on admission.  Patient was transferred to the telemedicine service on:  08/23/2023   The patient location is: [facility]  A admitted 8/18/2023  8:14 PM.      Interval History/Overnight Events:     Patient is able to provide adequate history.    Cough improved - patient going for angiogram today - no dyspnea; pain to right foot stable    Review of Systems   Constitutional:  Positive for fatigue. Negative for appetite change and fever.   Respiratory:  Positive for cough. Negative for shortness of breath.    Musculoskeletal:  Positive for arthralgias.   Skin:  Positive for wound.          I have reviewed the following on 08/25/2023:     Details     [x]   Lab results  LFT's improving; CBC stable; Cr 0.8; LFT's improving    [x]   Micro reports MSSA right TMA    []   Pathology reports     []   Imaging reports     []   Cardiology Procedure reports     []   Outside records/CareEverywhere     []  Independently viewed:         Inpatient Medications reviewed and prescribed for management of current problems:  Scheduled Meds:   acetaminophen  1,000 mg Oral Q8H    aspirin  81 mg Oral Daily    atorvastatin  40 mg Oral Daily    cefadroxil  1 g Oral Daily    cilostazoL  50 mg Oral BID    enoxparin  40 mg Subcutaneous Daily    fluticasone furoate-vilanteroL  1 puff Inhalation Daily    guaiFENesin  600 mg Oral BID    ipratropium  0.5 mg Nebulization Q6H    magnesium oxide  400 mg Oral BID    metoprolol tartrate  50 mg Oral BID    nicotine  1 patch Transdermal Daily     Continuous Infusions:  PRN Meds:.cadexomer iodine, dextrose 10%, dextrose 10%, glucagon (human recombinant), glucose, glucose, levalbuterol **AND** ipratropium, melatonin, naloxone, ondansetron, oxyCODONE, oxyCODONE, sars-cov-2 (covid-19), sodium chloride 0.9%      Objective:     Temp:  [98 °F (36.7 °C)-99 °F (37.2 °C)] 98.1 °F (36.7 °C)  Pulse:  [63-93] 87  Resp:  [16-20] 16  SpO2:  [93 %-96 %] 96 %  BP: (105-130)/(57-63) 105/63    No intake or output data in the 24 hours ending 08/25/23 1157       Body mass index is 27.9 kg/m².    Physical Exam  Vitals and nursing note  reviewed.   Constitutional:       General: She is not in acute distress.     Appearance: Normal appearance.   HENT:      Head: Normocephalic and atraumatic.   Eyes:      Extraocular Movements: Extraocular movements intact.   Cardiovascular:      Rate and Rhythm: Normal rate.   Pulmonary:      Effort: Pulmonary effort is normal. No tachypnea or respiratory distress.   Musculoskeletal:      Comments: Right foot bandaged   Neurological:      General: No focal deficit present.      Mental Status: She is alert and oriented to person, place, and time.      Cranial Nerves: No cranial nerve deficit.      Motor: No weakness.   Psychiatric:         Attention and Perception: Attention normal.         Mood and Affect: Mood and affect normal.         Speech: Speech normal.         Behavior: Behavior is cooperative.          Labs: All labs within the last 24 hours were reviewed.   Recent Results (from the past 24 hour(s))   CBC Auto Differential    Collection Time: 08/25/23  4:59 AM   Result Value Ref Range    WBC 6.85 3.90 - 12.70 K/uL    RBC 4.11 4.00 - 5.40 M/uL    Hemoglobin 11.8 (L) 12.0 - 16.0 g/dL    Hematocrit 38.3 37.0 - 48.5 %    MCV 93 82 - 98 fL    MCH 28.7 27.0 - 31.0 pg    MCHC 30.8 (L) 32.0 - 36.0 g/dL    RDW 15.0 (H) 11.5 - 14.5 %    Platelets 266 150 - 450 K/uL    MPV 10.0 9.2 - 12.9 fL    Immature Granulocytes 0.6 (H) 0.0 - 0.5 %    Gran # (ANC) 4.7 1.8 - 7.7 K/uL    Immature Grans (Abs) 0.04 0.00 - 0.04 K/uL    Lymph # 1.0 1.0 - 4.8 K/uL    Mono # 0.7 0.3 - 1.0 K/uL    Eos # 0.4 0.0 - 0.5 K/uL    Baso # 0.04 0.00 - 0.20 K/uL    nRBC 0 0 /100 WBC    Gran % 68.9 38.0 - 73.0 %    Lymph % 14.9 (L) 18.0 - 48.0 %    Mono % 9.9 4.0 - 15.0 %    Eosinophil % 5.1 0.0 - 8.0 %    Basophil % 0.6 0.0 - 1.9 %    Differential Method Automated    Comprehensive Metabolic Panel    Collection Time: 08/25/23  4:59 AM   Result Value Ref Range    Sodium 136 136 - 145 mmol/L    Potassium 3.8 3.5 - 5.1 mmol/L    Chloride 100 95 - 110  "mmol/L    CO2 27 23 - 29 mmol/L    Glucose 76 70 - 110 mg/dL    BUN 15 8 - 23 mg/dL    Creatinine 0.8 0.5 - 1.4 mg/dL    Calcium 9.2 8.7 - 10.5 mg/dL    Total Protein 6.2 6.0 - 8.4 g/dL    Albumin 3.0 (L) 3.5 - 5.2 g/dL    Total Bilirubin 0.7 0.1 - 1.0 mg/dL    Alkaline Phosphatase 142 (H) 55 - 135 U/L    AST 57 (H) 10 - 40 U/L    ALT 61 (H) 10 - 44 U/L    eGFR >60.0 >60 mL/min/1.73 m^2    Anion Gap 9 8 - 16 mmol/L   Magnesium    Collection Time: 08/25/23  4:59 AM   Result Value Ref Range    Magnesium 1.9 1.6 - 2.6 mg/dL   Phosphorus    Collection Time: 08/25/23  4:59 AM   Result Value Ref Range    Phosphorus 3.0 2.7 - 4.5 mg/dL        Lab Results   Component Value Date    RGU45DOCOPEP Not Detected 07/29/2021       Recent Labs   Lab 08/23/23  0406 08/24/23  0302 08/25/23  0459   WBC 6.86 7.20 6.85   LYMPH 11.1*  0.8* 10.3*  0.7* 14.9*  1.0   HGB 11.4* 11.3* 11.8*   HCT 37.3 35.9* 38.3    235 266       Recent Labs   Lab 08/23/23 0406 08/24/23  0302 08/25/23  0459   * 137 136   K 3.4* 3.7 3.8    100 100   CO2 25 24 27   BUN 12 12 15   CREATININE 0.8 0.7 0.8   * 78 76   CALCIUM 8.1* 8.2* 9.2   MG 1.5* 1.6 1.9   PHOS 2.7 2.3* 3.0       Recent Labs   Lab 08/23/23  0406 08/24/23  0302 08/25/23  0459   ALKPHOS 159* 153* 142*   ALT 72* 65* 61*   AST 74* 64* 57*   ALBUMIN 2.7* 2.8* 3.0*   PROT 5.4* 5.6* 6.2   BILITOT 0.5 0.8 0.7          No results for input(s): "DDIMER", "FERRITIN", "CRP", "LDH", "BNP", "TROPONINI", "CPK" in the last 72 hours.    Invalid input(s): "PROCALCITONIN"        Microbiology: All microbiology updates for the past 24 hours have been reviewed.  Microbiology Results (last 7 days)       Procedure Component Value Units Date/Time    Culture, Anaerobe [[id number]] Collected: 08/21/23 0927    Order Status: Completed Specimen: Abscess from Foot, Right Updated: 08/25/23 1008     Anaerobic Culture No anaerobes isolated    Narrative:      Right foot TMA abscess    Blood Culture #1 " **CANNOT BE ORDERED STAT** [[id number]] Collected: 08/18/23 2113    Order Status: Completed Specimen: Blood from Peripheral, Forearm, Right Updated: 08/23/23 2312     Blood Culture, Routine No growth after 5 days.    Blood Culture #2 **CANNOT BE ORDERED STAT** [[id number]] Collected: 08/18/23 2113    Order Status: Completed Specimen: Blood from Peripheral, Forearm, Right Updated: 08/23/23 2312     Blood Culture, Routine No growth after 5 days.    Aerobic culture [[id number]]  (Abnormal)  (Susceptibility) Collected: 08/21/23 0927    Order Status: Completed Specimen: Abscess from Foot, Right Updated: 08/23/23 1251     Aerobic Bacterial Culture STAPHYLOCOCCUS AUREUS  Few      Narrative:      Right foot TMA abscess    AFB Culture & Smear [[id number]] Collected: 08/21/23 0927    Order Status: Completed Specimen: Abscess from Foot, Right Updated: 08/22/23 2127     AFB Culture & Smear Culture in progress     AFB CULTURE STAIN No acid fast bacilli seen.    Narrative:      Right foot TMA abscess    Fungus culture [[id number]] Collected: 08/21/23 0927    Order Status: Completed Specimen: Abscess from Foot, Right Updated: 08/22/23 1359     Fungus (Mycology) Culture Culture in progress    Narrative:      Right foot TMA abscess    Gram stain [[id number]] Collected: 08/21/23 0927    Order Status: Completed Specimen: Abscess from Foot, Right Updated: 08/21/23 1651     Gram Stain Result Moderate WBC's      Few Gram positive cocci    Narrative:      Right foot TMA abscess              Imaging All imaging within the last 24 hours was reviewed.       Results for orders placed during the hospital encounter of 03/01/20    Echo Color Flow Doppler? Yes    Interpretation Summary  · Normal left ventricular systolic function. The estimated ejection fraction is 60%.  · Normal right ventricular systolic function.  · Normal LV diastolic function.  · The estimated PA systolic pressure is 31 mmHg.  · Normal central venous pressure (3 mmHg).      VAS  Ankle Brachial Indices Resting  Indication  ========    Peripheral Arterial Disease    Pressure Lower  ============    Rt brachial A syst BP  129 mmHg  Rt low thigh BP    58 mmHg  Rt calf BP 46 mmHg  Rt PTA BP  0 mmHg  Rt dors pedis A BP 50 mmHg  Rt NICCI post tibial (rt post tib A BP / max brach A BP) 0.00  Right NICCI ratio use:   dors. pedis  Rt NICCI (rt dors ped A BP / max brach A BP) 0.39  Rt ankle BP / max brach A BP   0.39  Lt low thigh BP    174 mmHg  Lt calf  mmHg  Lt PTA BP  65 mmHg  Lt dors pedis A BP 77 mmHg  Lt toe BP  41 mmHg  Lt NICCI (lt post tibial A BP / max brach A BP)  0.50  Left NICCI ratio use:    dors. pedis  Lt NICCI dors ped (lt dors ped A BP / max brach A BP)    0.60  Lt ankle BP / max brach A BP   0.60  Lt TBI (lt toe BP / max brach A BP)    0.32    PPG Lower  =========    Rt toe digit waveform: History of Toe Amputation  Rt 2nd digit waveform: History of Toe Amputation  Rt 3rd digit waveform: History of Toe Amputation  Rt 4th digit waveform: History of Toe Amputation  Rt 5th digit waveform: History of Toe Amputation  Lt toe BP - PPG    41 mmHg  Lt toe digit waveform: moderately dampened    Comment  ========    Doppler signals were faint and difficult to obtain secondary to venous flow in the right lower extremity.    Impression  =========    Right Leg: Segmental pressures and PVR waveforms suggest severe peripheral arterial occlusive disease. Absent DPA signal.  Rt lower digits: History of transmetatarsal amputation.    Left Leg: Segmental pressures and PVR waveforms suggest moderate peripheral arterial occlusive disease.  Lt lower digits: Toe PPG waveforms as described above. - TBI of 0.32 with a Great toe pressure of 41 mmHg is noted.    DATE OF SERVICE: 08/21/2023                                                      Sonographer: Bridgett Rodrigues  Electronically Signed by: Phillip Cortez II at 08/21/2023-17:26             Assessment/Plan:      * Cellulitis and abscess of foot  Patient is a  78-year-old female with previous history of chronic osteo with unhealing wound status post transmetatarsal amputation who is now presenting after a recent hospitalization with worsening right lower extremity swelling, erythema, and pain.  She states that it is much worse than normal and that she is developed a black rash over the affected area.  She denies any overt purulence or opening skin however she remains very tender to palpation.  X-ray at this time does not show any erosive changes and stable from previous hospitalization 10 days ago.  Will obtain an MRI to definitively rule out the presence of osteomyelitis given mildly elevated inflammatory markers which are sensitive but not specific    Non-surgical site of infection and Non-purulent Cellulitis  (ICD 10: L03.90)    There is not an abscess evident.    There is the presence of:   - Rapid progression    There is not presence or evidence of involvement of indwelling medical device    Empiric Vancomycin is based on the presence of Recurrence despite previous treatment    tetanus status unknown to the patient    There is not the presence of an immunocompromising condition requiring consideration for fungal, virus, or atypical organisms.    - Vancomycin and ceftriaxone therapy  - Blood cultures negative  - MRI foot w contrast shows nonspecific diffuse subcutaneous edema and redemonstration of a small nonspecific fluid collection within the soft tissues at the level the resection stump, no abscess or OM identified. Limited incomplete examination as the patient was unable to tolerate the study.   - Elevation of affected area and pain control  - PT/OT recommending SNF  - Still c/o significant pain to RLE stump  - Vascular surgery and podiatry consulted  - s/p debridement and abscess drainage 8/21  - Wound cx growing MSSA  - ID consulted and antibiotics per ID recommendations:  Antibiotics (From admission, onward)    Start     Stop Route Frequency Ordered     08/24/23 0900  cefadroxil tablet 1 g         -- Oral Daily 08/23/23 1558          Discharge planning  Diet: Diet NPO  Significant LDAs:   IV Access Type: Peripheral  Urinary Catheter Indication if present: Patient Does Not Have Urinary Catheter  Other Lines/Tubes/Drains:     Goals of Care:    Previous admission:  8/10/23  Code Status: Full Code  Likely prognosis:  Fair    FLORIAN: 8/26/2023     Code Status: Full Code   Is the patient medically ready for discharge?: No    Reason for patient still in hospital (select all that apply): Patient trending condition, Treatment and Consult recommendations  Discharge Plan A: Home Health   Discharge Delays: None known at this time  The amount of time spent during, and associated with, this encounter was 35 minutes; the nature of the activities performed were:  Preparing to see the patient, chart review  Obtaining and/or receiving separately obtained history   Performing medically appropriate examination and evaluation  Counseling and educating the patient/family/caregiver  Ordering medications, tests, or procedures  Referring to and communicating with other health care professionals  Documenting clinical information in the EHR  Independently interpreting results  Care coordination    Hypokalemia  Replete with KCl and replete Mag; continue to monitor    History of transmetatarsal amputation of right foot  Podiatry following for wound; angiogram planned    Chronic obstructive pulmonary disease  Patient uses nebulizers q.6 hours.  Unfortunately due to national shortages, we are unable to provide these.  Will resume her home inhalers and provide nebulizers as able.  She is currently on 2 L of oxygen which appears to be her baseline.  She states that she is no longer smoking and denies any signs or symptoms of ongoing acute exacerbation of her COPD.    Patient information:     SpO2: (!) 94 %       mMRC Key:  0 - Dyspnea only with strenuous exercise  1 - Dyspnea when hurrying or walking  "up a slight hill  2 - Walks slower than people of the same age because of dyspnea or has to stop for breath when walking at own pace  3 - Stops for breath after walking 100 yards or after a few minutes  4 - Too dyspneic to leave house or breathless when dressing    Exacerbation Component:  - COPD: stable  Patient Is not showing signs of a current COPD exacerbation The patient is not currently have symptoms / an exacerbation. The patient has COPD for approximately 10+ years. Symptoms in previous episodes have included dyspnea, cough and wheezing, and typically last 5 days. Previous episodes have been exacerbated by Smoke and upper respiratory infection. Current treatment includes albuterol nebulizer, albuterol/ipratropium inhaler, oxygen and Breo, which generally provides some relief of symptoms.    - Risk factor reduction (Smoking cessation/avoidance, management of GERD, pHTN treatment)  - Avoidance of triggers (Air pollution, respiratory infections, pulmonary embolisms)  - Complete metabolic panel  - Target SpO2 of 88-92% or PaO2 of 60-70 mmHg    Coronary artery disease  No ongoing chest pain.  Will continue to monitor and may need preoperative evaluation if surgery is warranted.    PVD (peripheral vascular disease)  Patient seen by Dr. Saldaña but has not been seen since 2015.    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  Now " has discoloration to RLE stump.  Vascular surgery consulted  Arterial doppler showing severe PAD RLE.   Planning for angiogram 8/25      Unspecified protein-calorie malnutrition  Nutrition consulted. Most recent weight and BMI monitored-     Measurements:  Wt Readings from Last 1 Encounters:   08/24/23 64.8 kg (142 lb 13.7 oz)   Body mass index is 27.9 kg/m².    Patient to be screened and assessed by RD.  Previous protein calorie malnutrition.        VTE Risk Mitigation (From admission, onward)         Ordered     enoxaparin injection 40 mg  Daily         08/19/23 0020     Place sequential compression device  Until discontinued         08/19/23 0020                  I have completed this tele-visit without the assistance of a telepresenter.    The attending portion of this evaluation, treatment, and documentation was performed per Luz Maria Spivey MD via Telemedicine AudioVisual using the secure [employer] software platform with 2 way audio/video. The provider was located off-site and the patient is located in the hospital. The aforementioned video software was utilized to document the relevant history and physical exam   Note:  Secure [email or web address] software used instead of [employer] for this encounter.      Luz Maria Spivey MD  Department of Hospital Medicine   [facility] - Intensive Care ([place]-[facility])

## 2023-08-25 NOTE — PROGRESS NOTES
[facility] ([place] [address])  Vascular Surgery  Progress Note    Patient Name: Radha Sotelo  MRN: [id number]  Admission Date: 8/18/2023  Primary Care Provider: Laurence, Primary Doctor    Subjective:     Interval History:   No acute events overnight. To OR today for angiogram     Post-Op Info:  Procedure(s) (LRB):  Angiogram Extremity Unilateral (Right)   1 Day Post-Op       Medications:  Continuous Infusions:  Scheduled Meds:   acetaminophen  1,000 mg Oral Q8H    aspirin  81 mg Oral Daily    atorvastatin  40 mg Oral Daily    cefadroxil  1 g Oral Daily    cilostazoL  50 mg Oral BID    enoxparin  40 mg Subcutaneous Daily    fluticasone furoate-vilanteroL  1 puff Inhalation Daily    guaiFENesin  600 mg Oral BID    ipratropium  0.5 mg Nebulization Q6H    magnesium oxide  400 mg Oral BID    metoprolol tartrate  50 mg Oral BID    nicotine  1 patch Transdermal Daily     PRN Meds:cadexomer iodine, dextrose 10%, dextrose 10%, glucagon (human recombinant), glucose, glucose, levalbuterol **AND** ipratropium, melatonin, naloxone, ondansetron, oxyCODONE, oxyCODONE, sars-cov-2 (covid-19), sodium chloride 0.9%     Objective:     Vital Signs (Most Recent):  Temp: 98.1 °F (36.7 °C) (08/25/23 1151)  Pulse: 87 (08/25/23 0732)  Resp: 16 (08/25/23 0732)  BP: 105/63 (08/25/23 0434)  SpO2: 96 % (08/25/23 0720) Vital Signs (24h Range):  Temp:  [98 °F (36.7 °C)-99 °F (37.2 °C)] 98.1 °F (36.7 °C)  Pulse:  [63-93] 87  Resp:  [16-20] 16  SpO2:  [93 %-96 %] 96 %  BP: (105-130)/(57-63) 105/63          Physical Exam  Constitutional:       Appearance: Normal appearance.   Cardiovascular:      Rate and Rhythm: Normal rate.      Comments: 2+ palpable b/l femoral pulses    Pulmonary:      Effort: Pulmonary effort is normal.   Skin:     General: Skin is warm and dry.      Comments: RLE TMA wound     Neurological:      Mental Status: She is alert and oriented to person, place, and time.          Significant Labs:  CBC:   Recent  Labs   Lab 08/25/23  0459   WBC 6.85   RBC 4.11   HGB 11.8*   HCT 38.3      MCV 93   MCH 28.7   MCHC 30.8*     CMP:   Recent Labs   Lab 08/25/23 0459   GLU 76   CALCIUM 9.2   ALBUMIN 3.0*   PROT 6.2      K 3.8   CO2 27      BUN 15   CREATININE 0.8   ALKPHOS 142*   ALT 61*   AST 57*   BILITOT 0.7       Significant Diagnostics:  I have reviewed all pertinent imaging results/findings within the past 24 hours.    Assessment/Plan:     PVD (peripheral vascular disease)  78 year old female with a PMH of COPD, HTN, CAD, SIADH, previous right foot wounds for which she underwent an angiogram with SFA stents and TMA by Dr. Saldaña in 2015 now with worsening right foot pain and wounds.    - OR for angiogram today  - Podiatry following  - Continue ASA/statin  - rest of care per primary team  - please call with questions          Amy Horvath MD  Vascular Surgery  [facility] - Intensive Care ([place])

## 2023-08-25 NOTE — ASSESSMENT & PLAN NOTE
"Patient seen by Dr. Saldaña but has not been seen since 2015.    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  Now has discoloration to RLE stump.  Vascular surgery consulted  Arterial doppler showing severe PAD RLE.   Planning for angiogram 8/25    "

## 2023-08-25 NOTE — BRIEF OP NOTE
[facility] - [facility] (2nd Fl)  Brief Operative Note    SUMMARY     Surgery Date: 8/25/2023     Surgeon(s) and Role:     * Gary Rico MD - Primary     * Loy Hernandez MD - Fellow    Assisting Surgeon: None    Pre-op Diagnosis:  PVD (peripheral vascular disease) [I73.9]    Post-op Diagnosis:  Post-Op Diagnosis Codes:     * PVD (peripheral vascular disease) [I73.9]    Procedure(s) (LRB):  ANGIOGRAM, EXTREMITY, UNILATERAL (Right)  STENT, SUPERFICIAL FEMORAL ARTERY (Right)  PTA, PERONEAL TIBIAL TRUNK WITH SHOCKWAVE (Right)    Anesthesia: Choice    Operative Findings: occluded R SFA stent and multiple areas of high grade calcified stenosis in R Akpop and Bkpop and TP-trunk w single vessel run off via peroneal.    S/p SFA stent recannalization and stenting (5x150 Viabahn) down into Akpop, post-dil w 5x150 balloon.  Intravascular Lithotripsy of of R BK pop and TP-trunk and proximal peroneal with 2.5x40 Shockwave balloon.  POBA of TP trunk and peroneal arteries w 2.5x40 balloon.  Completion angio showing excellent result with reestablishment of inline flow to foot via peroneal    L fem access (6Frx70 Dylan) proglide closure    Estimated Blood Loss: 10 cc       Specimens:   Specimen (24h ago, onward)      None            [id number]

## 2023-08-25 NOTE — SUBJECTIVE & OBJECTIVE
Medications:  Continuous Infusions:  Scheduled Meds:   acetaminophen  1,000 mg Oral Q8H    aspirin  81 mg Oral Daily    atorvastatin  40 mg Oral Daily    cefadroxil  1 g Oral Daily    cilostazoL  50 mg Oral BID    enoxparin  40 mg Subcutaneous Daily    fluticasone furoate-vilanteroL  1 puff Inhalation Daily    guaiFENesin  600 mg Oral BID    ipratropium  0.5 mg Nebulization Q6H    magnesium oxide  400 mg Oral BID    metoprolol tartrate  50 mg Oral BID    nicotine  1 patch Transdermal Daily     PRN Meds:cadexomer iodine, dextrose 10%, dextrose 10%, glucagon (human recombinant), glucose, glucose, levalbuterol **AND** ipratropium, melatonin, naloxone, ondansetron, oxyCODONE, oxyCODONE, sars-cov-2 (covid-19), sodium chloride 0.9%     Objective:     Vital Signs (Most Recent):  Temp: 98.1 °F (36.7 °C) (08/25/23 1151)  Pulse: 87 (08/25/23 0732)  Resp: 16 (08/25/23 0732)  BP: 105/63 (08/25/23 0434)  SpO2: 96 % (08/25/23 0720) Vital Signs (24h Range):  Temp:  [98 °F (36.7 °C)-99 °F (37.2 °C)] 98.1 °F (36.7 °C)  Pulse:  [63-93] 87  Resp:  [16-20] 16  SpO2:  [93 %-96 %] 96 %  BP: (105-130)/(57-63) 105/63          Physical Exam  Constitutional:       Appearance: Normal appearance.   Cardiovascular:      Rate and Rhythm: Normal rate.      Comments: 2+ palpable b/l femoral pulses    Pulmonary:      Effort: Pulmonary effort is normal.   Skin:     General: Skin is warm and dry.      Comments: RLE TMA wound     Neurological:      Mental Status: She is alert and oriented to person, place, and time.          Significant Labs:  CBC:   Recent Labs   Lab 08/25/23  0459   WBC 6.85   RBC 4.11   HGB 11.8*   HCT 38.3      MCV 93   MCH 28.7   MCHC 30.8*     CMP:   Recent Labs   Lab 08/25/23  0459   GLU 76   CALCIUM 9.2   ALBUMIN 3.0*   PROT 6.2      K 3.8   CO2 27      BUN 15   CREATININE 0.8   ALKPHOS 142*   ALT 61*   AST 57*   BILITOT 0.7       Significant Diagnostics:  I have reviewed all pertinent imaging  results/findings within the past 24 hours.

## 2023-08-25 NOTE — SUBJECTIVE & OBJECTIVE
This follow-up encounter was provided through telemedicine to address  Cellulitis and abscess of foot present on admission.  Patient was transferred to the telemedicine service on:  08/23/2023   The patient location is: [id number] A admitted 8/18/2023  8:14 PM.      Interval History/Overnight Events:     Patient is able to provide adequate history.    Cough improved - patient going for angiogram today - no dyspnea; pain to right foot stable    Review of Systems   Constitutional:  Positive for fatigue. Negative for appetite change and fever.   Respiratory:  Positive for cough. Negative for shortness of breath.    Musculoskeletal:  Positive for arthralgias.   Skin:  Positive for wound.          I have reviewed the following on 08/25/2023:     Details     [x]   Lab results  LFT's improving; CBC stable; Cr 0.8; LFT's improving    [x]   Micro reports MSSA right TMA    []   Pathology reports     []   Imaging reports     []   Cardiology Procedure reports     []   Outside records/CareEverywhere     []  Independently viewed:         Inpatient Medications reviewed and prescribed for management of current problems:  Scheduled Meds:   acetaminophen  1,000 mg Oral Q8H    aspirin  81 mg Oral Daily    atorvastatin  40 mg Oral Daily    cefadroxil  1 g Oral Daily    cilostazoL  50 mg Oral BID    enoxparin  40 mg Subcutaneous Daily    fluticasone furoate-vilanteroL  1 puff Inhalation Daily    guaiFENesin  600 mg Oral BID    ipratropium  0.5 mg Nebulization Q6H    magnesium oxide  400 mg Oral BID    metoprolol tartrate  50 mg Oral BID    nicotine  1 patch Transdermal Daily     Continuous Infusions:  PRN Meds:.cadexomer iodine, dextrose 10%, dextrose 10%, glucagon (human recombinant), glucose, glucose, levalbuterol **AND** ipratropium, melatonin, naloxone, ondansetron, oxyCODONE, oxyCODONE, sars-cov-2 (covid-19), sodium chloride 0.9%      Objective:     Temp:  [98 °F (36.7 °C)-99 °F (37.2 °C)] 98.1 °F (36.7 °C)  Pulse:  [63-93]  87  Resp:  [16-20] 16  SpO2:  [93 %-96 %] 96 %  BP: (105-130)/(57-63) 105/63    No intake or output data in the 24 hours ending 08/25/23 1157       Body mass index is 27.9 kg/m².    Physical Exam  Vitals and nursing note reviewed.   Constitutional:       General: She is not in acute distress.     Appearance: Normal appearance.   HENT:      Head: Normocephalic and atraumatic.   Eyes:      Extraocular Movements: Extraocular movements intact.   Cardiovascular:      Rate and Rhythm: Normal rate.   Pulmonary:      Effort: Pulmonary effort is normal. No tachypnea or respiratory distress.   Musculoskeletal:      Comments: Right foot bandaged   Neurological:      General: No focal deficit present.      Mental Status: She is alert and oriented to person, place, and time.      Cranial Nerves: No cranial nerve deficit.      Motor: No weakness.   Psychiatric:         Attention and Perception: Attention normal.         Mood and Affect: Mood and affect normal.         Speech: Speech normal.         Behavior: Behavior is cooperative.          Labs: All labs within the last 24 hours were reviewed.   Recent Results (from the past 24 hour(s))   CBC Auto Differential    Collection Time: 08/25/23  4:59 AM   Result Value Ref Range    WBC 6.85 3.90 - 12.70 K/uL    RBC 4.11 4.00 - 5.40 M/uL    Hemoglobin 11.8 (L) 12.0 - 16.0 g/dL    Hematocrit 38.3 37.0 - 48.5 %    MCV 93 82 - 98 fL    MCH 28.7 27.0 - 31.0 pg    MCHC 30.8 (L) 32.0 - 36.0 g/dL    RDW 15.0 (H) 11.5 - 14.5 %    Platelets 266 150 - 450 K/uL    MPV 10.0 9.2 - 12.9 fL    Immature Granulocytes 0.6 (H) 0.0 - 0.5 %    Gran # (ANC) 4.7 1.8 - 7.7 K/uL    Immature Grans (Abs) 0.04 0.00 - 0.04 K/uL    Lymph # 1.0 1.0 - 4.8 K/uL    Mono # 0.7 0.3 - 1.0 K/uL    Eos # 0.4 0.0 - 0.5 K/uL    Baso # 0.04 0.00 - 0.20 K/uL    nRBC 0 0 /100 WBC    Gran % 68.9 38.0 - 73.0 %    Lymph % 14.9 (L) 18.0 - 48.0 %    Mono % 9.9 4.0 - 15.0 %    Eosinophil % 5.1 0.0 - 8.0 %    Basophil % 0.6 0.0 - 1.9 %  "   Differential Method Automated    Comprehensive Metabolic Panel    Collection Time: 08/25/23  4:59 AM   Result Value Ref Range    Sodium 136 136 - 145 mmol/L    Potassium 3.8 3.5 - 5.1 mmol/L    Chloride 100 95 - 110 mmol/L    CO2 27 23 - 29 mmol/L    Glucose 76 70 - 110 mg/dL    BUN 15 8 - 23 mg/dL    Creatinine 0.8 0.5 - 1.4 mg/dL    Calcium 9.2 8.7 - 10.5 mg/dL    Total Protein 6.2 6.0 - 8.4 g/dL    Albumin 3.0 (L) 3.5 - 5.2 g/dL    Total Bilirubin 0.7 0.1 - 1.0 mg/dL    Alkaline Phosphatase 142 (H) 55 - 135 U/L    AST 57 (H) 10 - 40 U/L    ALT 61 (H) 10 - 44 U/L    eGFR >60.0 >60 mL/min/1.73 m^2    Anion Gap 9 8 - 16 mmol/L   Magnesium    Collection Time: 08/25/23  4:59 AM   Result Value Ref Range    Magnesium 1.9 1.6 - 2.6 mg/dL   Phosphorus    Collection Time: 08/25/23  4:59 AM   Result Value Ref Range    Phosphorus 3.0 2.7 - 4.5 mg/dL        Lab Results   Component Value Date    [id number] Not Detected 07/29/2021       Recent Labs   Lab 08/23/23 0406 08/24/23 0302 08/25/23 0459   WBC 6.86 7.20 6.85   LYMPH 11.1*  0.8* 10.3*  0.7* 14.9*  1.0   HGB 11.4* 11.3* 11.8*   HCT 37.3 35.9* 38.3    235 266       Recent Labs   Lab 08/23/23 0406 08/24/23  0302 08/25/23  0459   * 137 136   K 3.4* 3.7 3.8    100 100   CO2 25 24 27   BUN 12 12 15   CREATININE 0.8 0.7 0.8   * 78 76   CALCIUM 8.1* 8.2* 9.2   MG 1.5* 1.6 1.9   PHOS 2.7 2.3* 3.0       Recent Labs   Lab 08/23/23 0406 08/24/23 0302 08/25/23  0459   ALKPHOS 159* 153* 142*   ALT 72* 65* 61*   AST 74* 64* 57*   ALBUMIN 2.7* 2.8* 3.0*   PROT 5.4* 5.6* 6.2   BILITOT 0.5 0.8 0.7          No results for input(s): "DDIMER", "FERRITIN", "CRP", "LDH", "BNP", "TROPONINI", "CPK" in the last 72 hours.    Invalid input(s): "PROCALCITONIN"        Microbiology: All microbiology updates for the past 24 hours have been reviewed.  Microbiology Results (last 7 days)       Procedure Component Value Units Date/Time    Culture, Anaerobe " [[id number]] Collected: 08/21/23 0927    Order Status: Completed Specimen: Abscess from Foot, Right Updated: 08/25/23 1008     Anaerobic Culture No anaerobes isolated    Narrative:      Right foot TMA abscess    Blood Culture #1 **CANNOT BE ORDERED STAT** [[id number]] Collected: 08/18/23 2113    Order Status: Completed Specimen: Blood from Peripheral, Forearm, Right Updated: 08/23/23 2312     Blood Culture, Routine No growth after 5 days.    Blood Culture #2 **CANNOT BE ORDERED STAT** [[id number]] Collected: 08/18/23 2113    Order Status: Completed Specimen: Blood from Peripheral, Forearm, Right Updated: 08/23/23 2312     Blood Culture, Routine No growth after 5 days.    Aerobic culture [[id number]]  (Abnormal)  (Susceptibility) Collected: 08/21/23 0927    Order Status: Completed Specimen: Abscess from Foot, Right Updated: 08/23/23 1251     Aerobic Bacterial Culture STAPHYLOCOCCUS AUREUS  Few      Narrative:      Right foot TMA abscess    AFB Culture & Smear [[id number]] Collected: 08/21/23 0927    Order Status: Completed Specimen: Abscess from Foot, Right Updated: 08/22/23 2127     AFB Culture & Smear Culture in progress     AFB CULTURE STAIN No acid fast bacilli seen.    Narrative:      Right foot TMA abscess    Fungus culture [[id number]] Collected: 08/21/23 0927    Order Status: Completed Specimen: Abscess from Foot, Right Updated: 08/22/23 1359     Fungus (Mycology) Culture Culture in progress    Narrative:      Right foot TMA abscess    Gram stain [[id number]] Collected: 08/21/23 0927    Order Status: Completed Specimen: Abscess from Foot, Right Updated: 08/21/23 1651     Gram Stain Result Moderate WBC's      Few Gram positive cocci    Narrative:      Right foot TMA abscess              Imaging All imaging within the last 24 hours was reviewed.       Results for orders placed during the hospital encounter of 03/01/20    Echo Color Flow Doppler? Yes    Interpretation Summary  · Normal left ventricular systolic  function. The estimated ejection fraction is 60%.  · Normal right ventricular systolic function.  · Normal LV diastolic function.  · The estimated PA systolic pressure is 31 mmHg.  · Normal central venous pressure (3 mmHg).      VAS Ankle Brachial Indices Resting  Indication  ========    Peripheral Arterial Disease    Pressure Lower  ============    Rt brachial A syst BP  129 mmHg  Rt low thigh BP    58 mmHg  Rt calf BP 46 mmHg  Rt PTA BP  0 mmHg  Rt dors pedis A BP 50 mmHg  Rt NICCI post tibial (rt post tib A BP / max brach A BP) 0.00  Right NICCI ratio use:   dors. pedis  Rt NICCI (rt dors ped A BP / max brach A BP) 0.39  Rt ankle BP / max brach A BP   0.39  Lt low thigh BP    174 mmHg  Lt calf  mmHg  Lt PTA BP  65 mmHg  Lt dors pedis A BP 77 mmHg  Lt toe BP  41 mmHg  Lt NICCI (lt post tibial A BP / max brach A BP)  0.50  Left NICCI ratio use:    dors. pedis  Lt NICCI dors ped (lt dors ped A BP / max brach A BP)    0.60  Lt ankle BP / max brach A BP   0.60  Lt TBI (lt toe BP / max brach A BP)    0.32    PPG Lower  =========    Rt toe digit waveform: History of Toe Amputation  Rt 2nd digit waveform: History of Toe Amputation  Rt 3rd digit waveform: History of Toe Amputation  Rt 4th digit waveform: History of Toe Amputation  Rt 5th digit waveform: History of Toe Amputation  Lt toe BP - PPG    41 mmHg  Lt toe digit waveform: moderately dampened    Comment  ========    Doppler signals were faint and difficult to obtain secondary to venous flow in the right lower extremity.    Impression  =========    Right Leg: Segmental pressures and PVR waveforms suggest severe peripheral arterial occlusive disease. Absent DPA signal.  Rt lower digits: History of transmetatarsal amputation.    Left Leg: Segmental pressures and PVR waveforms suggest moderate peripheral arterial occlusive disease.  Lt lower digits: Toe PPG waveforms as described above. - TBI of 0.32 with a Great toe pressure of 41 mmHg is noted.    DATE OF SERVICE:  08/21/2023                                                      Sonographer: Bridgett Rodrigues  Electronically Signed by: Phillip Cortez II at 08/21/2023-17:26

## 2023-08-25 NOTE — PLAN OF CARE
"Patient to [facility] with remote tele leads, no tele box. Leads placed in pt chart to return with her to floor after postop recovery. Airstrip obtained and snipped. Pt noted to be in NSR.  Patient reports she did not "take any medications today" which verifies report of floor RN.  Notified Dr Santana at 1313 pt had not taken AM dose metoprolol, reviewed VS with Anesthesiologist Dr Santana who verified understanding of same.   " Publisher

## 2023-08-26 PROBLEM — I70.235 ATHEROSCLEROSIS OF NATIVE ARTERIES OF RIGHT LEG WITH ULCERATION OF OTHER PART OF FOOT: Status: ACTIVE | Noted: 2021-07-12

## 2023-08-26 LAB
ALBUMIN SERPL BCP-MCNC: 2.6 G/DL (ref 3.5–5.2)
ALP SERPL-CCNC: 107 U/L (ref 55–135)
ALT SERPL W/O P-5'-P-CCNC: 34 U/L (ref 10–44)
ANION GAP SERPL CALC-SCNC: 12 MMOL/L (ref 8–16)
AST SERPL-CCNC: 46 U/L (ref 10–40)
BASOPHILS # BLD AUTO: 0.05 K/UL (ref 0–0.2)
BASOPHILS NFR BLD: 0.7 % (ref 0–1.9)
BILIRUB SERPL-MCNC: 0.6 MG/DL (ref 0.1–1)
BUN SERPL-MCNC: 15 MG/DL (ref 8–23)
CALCIUM SERPL-MCNC: 8.2 MG/DL (ref 8.7–10.5)
CHLORIDE SERPL-SCNC: 104 MMOL/L (ref 95–110)
CO2 SERPL-SCNC: 22 MMOL/L (ref 23–29)
CREAT SERPL-MCNC: 0.7 MG/DL (ref 0.5–1.4)
DIFFERENTIAL METHOD: ABNORMAL
EOSINOPHIL # BLD AUTO: 0.3 K/UL (ref 0–0.5)
EOSINOPHIL NFR BLD: 3.6 % (ref 0–8)
ERYTHROCYTE [DISTWIDTH] IN BLOOD BY AUTOMATED COUNT: 15.1 % (ref 11.5–14.5)
EST. GFR  (NO RACE VARIABLE): >60 ML/MIN/1.73 M^2
GLUCOSE SERPL-MCNC: 79 MG/DL (ref 70–110)
HCT VFR BLD AUTO: 32 % (ref 37–48.5)
HGB BLD-MCNC: 10 G/DL (ref 12–16)
IMM GRANULOCYTES # BLD AUTO: 0.12 K/UL (ref 0–0.04)
IMM GRANULOCYTES NFR BLD AUTO: 1.6 % (ref 0–0.5)
LACTATE SERPL-SCNC: 0.7 MMOL/L (ref 0.5–2.2)
LYMPHOCYTES # BLD AUTO: 0.5 K/UL (ref 1–4.8)
LYMPHOCYTES NFR BLD: 6.7 % (ref 18–48)
MCH RBC QN AUTO: 29.3 PG (ref 27–31)
MCHC RBC AUTO-ENTMCNC: 31.3 G/DL (ref 32–36)
MCV RBC AUTO: 94 FL (ref 82–98)
MONOCYTES # BLD AUTO: 0.8 K/UL (ref 0.3–1)
MONOCYTES NFR BLD: 10.3 % (ref 4–15)
NEUTROPHILS # BLD AUTO: 5.9 K/UL (ref 1.8–7.7)
NEUTROPHILS NFR BLD: 77.1 % (ref 38–73)
NRBC BLD-RTO: 0 /100 WBC
PLATELET # BLD AUTO: 227 K/UL (ref 150–450)
PMV BLD AUTO: 9.4 FL (ref 9.2–12.9)
POTASSIUM SERPL-SCNC: 3.3 MMOL/L (ref 3.5–5.1)
PROT SERPL-MCNC: 5.5 G/DL (ref 6–8.4)
RBC # BLD AUTO: 3.41 M/UL (ref 4–5.4)
SODIUM SERPL-SCNC: 138 MMOL/L (ref 136–145)
WBC # BLD AUTO: 7.58 K/UL (ref 3.9–12.7)

## 2023-08-26 PROCEDURE — 25000003 PHARM REV CODE 250

## 2023-08-26 PROCEDURE — 99223 1ST HOSP IP/OBS HIGH 75: CPT | Mod: ,,, | Performed by: SURGERY

## 2023-08-26 PROCEDURE — S4991 NICOTINE PATCH NONLEGEND: HCPCS | Performed by: INTERNAL MEDICINE

## 2023-08-26 PROCEDURE — 63600175 PHARM REV CODE 636 W HCPCS: Performed by: STUDENT IN AN ORGANIZED HEALTH CARE EDUCATION/TRAINING PROGRAM

## 2023-08-26 PROCEDURE — 63600175 PHARM REV CODE 636 W HCPCS

## 2023-08-26 PROCEDURE — 25000003 PHARM REV CODE 250: Performed by: INTERNAL MEDICINE

## 2023-08-26 PROCEDURE — 21400001 HC TELEMETRY ROOM

## 2023-08-26 PROCEDURE — 25000242 PHARM REV CODE 250 ALT 637 W/ HCPCS: Performed by: INTERNAL MEDICINE

## 2023-08-26 PROCEDURE — 99900035 HC TECH TIME PER 15 MIN (STAT)

## 2023-08-26 PROCEDURE — 99232 PR SUBSEQUENT HOSPITAL CARE,LEVL II: ICD-10-PCS | Mod: 95,,, | Performed by: INTERNAL MEDICINE

## 2023-08-26 PROCEDURE — 25000003 PHARM REV CODE 250: Performed by: STUDENT IN AN ORGANIZED HEALTH CARE EDUCATION/TRAINING PROGRAM

## 2023-08-26 PROCEDURE — 99232 SBSQ HOSP IP/OBS MODERATE 35: CPT | Mod: 95,,, | Performed by: INTERNAL MEDICINE

## 2023-08-26 PROCEDURE — 80053 COMPREHEN METABOLIC PANEL: CPT | Performed by: INTERNAL MEDICINE

## 2023-08-26 PROCEDURE — 94640 AIRWAY INHALATION TREATMENT: CPT

## 2023-08-26 PROCEDURE — 63600175 PHARM REV CODE 636 W HCPCS: Performed by: PHYSICIAN ASSISTANT

## 2023-08-26 PROCEDURE — 85025 COMPLETE CBC W/AUTO DIFF WBC: CPT | Performed by: INTERNAL MEDICINE

## 2023-08-26 PROCEDURE — 36415 COLL VENOUS BLD VENIPUNCTURE: CPT | Performed by: INTERNAL MEDICINE

## 2023-08-26 PROCEDURE — 83605 ASSAY OF LACTIC ACID: CPT

## 2023-08-26 PROCEDURE — 99223 PR INITIAL HOSPITAL CARE,LEVL III: ICD-10-PCS | Mod: ,,, | Performed by: SURGERY

## 2023-08-26 PROCEDURE — 36415 COLL VENOUS BLD VENIPUNCTURE: CPT | Performed by: STUDENT IN AN ORGANIZED HEALTH CARE EDUCATION/TRAINING PROGRAM

## 2023-08-26 PROCEDURE — 94664 DEMO&/EVAL PT USE INHALER: CPT

## 2023-08-26 RX ORDER — SODIUM CHLORIDE 9 MG/ML
INJECTION, SOLUTION INTRAVENOUS CONTINUOUS
Status: ACTIVE | OUTPATIENT
Start: 2023-08-26 | End: 2023-08-26

## 2023-08-26 RX ORDER — MUPIROCIN 20 MG/G
OINTMENT TOPICAL 2 TIMES DAILY
Status: DISCONTINUED | OUTPATIENT
Start: 2023-08-26 | End: 2023-08-29 | Stop reason: HOSPADM

## 2023-08-26 RX ORDER — SODIUM CHLORIDE 9 MG/ML
INJECTION, SOLUTION INTRAVENOUS CONTINUOUS
Status: DISCONTINUED | OUTPATIENT
Start: 2023-08-26 | End: 2023-08-26

## 2023-08-26 RX ADMIN — CILOSTAZOL 50 MG: 50 TABLET ORAL at 08:08

## 2023-08-26 RX ADMIN — Medication: at 09:08

## 2023-08-26 RX ADMIN — SODIUM CHLORIDE: 9 INJECTION, SOLUTION INTRAVENOUS at 02:08

## 2023-08-26 RX ADMIN — ENOXAPARIN SODIUM 40 MG: 40 INJECTION SUBCUTANEOUS at 05:08

## 2023-08-26 RX ADMIN — OXYCODONE HYDROCHLORIDE 5 MG: 5 TABLET ORAL at 06:08

## 2023-08-26 RX ADMIN — OXYCODONE HYDROCHLORIDE 10 MG: 10 TABLET ORAL at 08:08

## 2023-08-26 RX ADMIN — GUAIFENESIN 600 MG: 600 TABLET, EXTENDED RELEASE ORAL at 08:08

## 2023-08-26 RX ADMIN — CLOPIDOGREL BISULFATE 75 MG: 75 TABLET ORAL at 09:08

## 2023-08-26 RX ADMIN — Medication 1 PATCH: at 09:08

## 2023-08-26 RX ADMIN — GUAIFENESIN 600 MG: 600 TABLET, EXTENDED RELEASE ORAL at 09:08

## 2023-08-26 RX ADMIN — ATORVASTATIN CALCIUM 40 MG: 40 TABLET, FILM COATED ORAL at 09:08

## 2023-08-26 RX ADMIN — IPRATROPIUM BROMIDE 0.5 MG: 0.5 SOLUTION RESPIRATORY (INHALATION) at 01:08

## 2023-08-26 RX ADMIN — CILOSTAZOL 50 MG: 50 TABLET ORAL at 09:08

## 2023-08-26 RX ADMIN — Medication 6 MG: at 08:08

## 2023-08-26 RX ADMIN — IPRATROPIUM BROMIDE 0.5 MG: 0.5 SOLUTION RESPIRATORY (INHALATION) at 08:08

## 2023-08-26 RX ADMIN — METOPROLOL TARTRATE 50 MG: 50 TABLET, FILM COATED ORAL at 08:08

## 2023-08-26 RX ADMIN — SODIUM CHLORIDE, SODIUM LACTATE, POTASSIUM CHLORIDE, AND CALCIUM CHLORIDE 500 ML: .6; .31; .03; .02 INJECTION, SOLUTION INTRAVENOUS at 01:08

## 2023-08-26 RX ADMIN — ASPIRIN 81 MG 81 MG: 81 TABLET ORAL at 09:08

## 2023-08-26 RX ADMIN — SODIUM CHLORIDE: 9 INJECTION, SOLUTION INTRAVENOUS at 06:08

## 2023-08-26 RX ADMIN — ACETAMINOPHEN 1000 MG: 500 TABLET ORAL at 02:08

## 2023-08-26 RX ADMIN — SODIUM CHLORIDE, POTASSIUM CHLORIDE, SODIUM LACTATE AND CALCIUM CHLORIDE 500 ML: 600; 310; 30; 20 INJECTION, SOLUTION INTRAVENOUS at 12:08

## 2023-08-26 RX ADMIN — FLUTICASONE FUROATE AND VILANTEROL TRIFENATATE 1 PUFF: 200; 25 POWDER RESPIRATORY (INHALATION) at 01:08

## 2023-08-26 RX ADMIN — MUPIROCIN: 20 OINTMENT TOPICAL at 08:08

## 2023-08-26 RX ADMIN — CEFADROXIL 1 G: 1000 TABLET ORAL at 10:08

## 2023-08-26 RX ADMIN — METOPROLOL TARTRATE 50 MG: 50 TABLET, FILM COATED ORAL at 09:08

## 2023-08-26 RX ADMIN — Medication 400 MG: at 09:08

## 2023-08-26 RX ADMIN — Medication 400 MG: at 08:08

## 2023-08-26 NOTE — ASSESSMENT & PLAN NOTE
Patient is a 78-year-old female with previous history of chronic osteo with unhealing wound status post transmetatarsal amputation who is now presenting after a recent hospitalization with worsening right lower extremity swelling, erythema, and pain.  She states that it is much worse than normal and that she is developed a black rash over the affected area.  She denies any overt purulence or opening skin however she remains very tender to palpation.  X-ray at this time does not show any erosive changes and stable from previous hospitalization 10 days ago.  Will obtain an MRI to definitively rule out the presence of osteomyelitis given mildly elevated inflammatory markers which are sensitive but not specific    Non-surgical site of infection and Non-purulent Cellulitis  (ICD 10: L03.90)    There is not an abscess evident.    There is the presence of:   - Rapid progression    There is not presence or evidence of involvement of indwelling medical device    Empiric Vancomycin is based on the presence of Recurrence despite previous treatment    tetanus status unknown to the patient    There is not the presence of an immunocompromising condition requiring consideration for fungal, virus, or atypical organisms.    - Vancomycin and ceftriaxone therapy  - Blood cultures negative  - MRI foot w contrast shows nonspecific diffuse subcutaneous edema and redemonstration of a small nonspecific fluid collection within the soft tissues at the level the resection stump, no abscess or OM identified. Limited incomplete examination as the patient was unable to tolerate the study.   - Elevation of affected area and pain control  - PT/OT recommending SNF  - Still c/o significant pain to RLE stump  - Vascular surgery and podiatry consulted  - s/p debridement and abscess drainage 8/21 - plan for wound care at discharge  - Wound cx growing MSSA  - ID consulted and antibiotics per ID recommendations:  Antibiotics (From admission, onward)     Start     Stop Route Frequency Ordered    08/26/23 2100  mupirocin 2 % ointment         08/31/23 2059 Nasl 2 times daily 08/26/23 1309    08/24/23 0900  cefadroxil tablet 1 g         -- Oral Daily 08/23/23 6528

## 2023-08-26 NOTE — PROGRESS NOTES
[facility] - [facility] ([place] [address])  Vascular Surgery  Progress Note    Patient Name: Radha Sotelo  MRN: [id number]  Admission Date: 8/18/2023  Primary Care Provider: Laurence, Primary Doctor    Subjective:     Interval History: naeon. Patient reports improvement in her R foot pain. Some increased bleeding with a small hematoma noted over her L femoral access site this am on rounds. Easily stopped with some pressure. Will obtain u/s to further assess.    Post-Op Info:  Procedure(s) (LRB):  ANGIOGRAM, EXTREMITY, UNILATERAL (Right)  STENT, SUPERFICIAL FEMORAL ARTERY (Right)  PTA, PERONEAL TIBIAL TRUNK WITH SHOCKWAVE (Right)   1 Day Post-Op       Medications:  Continuous Infusions:   sodium chloride 0.9% 100 mL/hr at 08/26/23 0625     Scheduled Meds:   acetaminophen  1,000 mg Oral Q8H    aspirin  81 mg Oral Daily    atorvastatin  40 mg Oral Daily    cefadroxil  1 g Oral Daily    cilostazoL  50 mg Oral BID    clopidogreL  75 mg Oral Daily    enoxparin  40 mg Subcutaneous Daily    fluticasone furoate-vilanteroL  1 puff Inhalation Daily    guaiFENesin  600 mg Oral BID    ipratropium  0.5 mg Nebulization Q6H    magnesium oxide  400 mg Oral BID    metoprolol tartrate  50 mg Oral BID    nicotine  1 patch Transdermal Daily     PRN Meds:cadexomer iodine, dextrose 10%, dextrose 10%, glucagon (human recombinant), glucose, glucose, levalbuterol **AND** ipratropium, melatonin, naloxone, ondansetron, oxyCODONE, oxyCODONE, sars-cov-2 (covid-19), sodium chloride 0.9%     Objective:     Vital Signs (Most Recent):  Temp: 98.1 °F (36.7 °C) (08/26/23 0945)  Pulse: 101 (08/26/23 0945)  Resp: 16 (08/26/23 0945)  BP: 139/63 (08/26/23 0945)  SpO2: (!) 94 % (08/26/23 0945) Vital Signs (24h Range):  Temp:  [97.7 °F (36.5 °C)-99.3 °F (37.4 °C)] 98.1 °F (36.7 °C)  Pulse:  [] 101  Resp:  [16-23] 16  SpO2:  [91 %-97 %] 94 %  BP: ()/(47-73) 139/63         Physical Exam  Constitutional:       Appearance: Normal  appearance.   Cardiovascular:      Rate and Rhythm: Normal rate.      Comments: Biphasic L dp signal  Monophasic R dp signal    Pulmonary:      Effort: Pulmonary effort is normal.   Skin:     General: Skin is warm and dry.      Comments: RLE TMA wound     Neurological:      Mental Status: She is alert and oriented to person, place, and time.          Significant Labs:  CBC:   Recent Labs   Lab 08/25/23  0459   WBC 6.85   RBC 4.11   HGB 11.8*   HCT 38.3      MCV 93   MCH 28.7   MCHC 30.8*       CMP:   Recent Labs   Lab 08/25/23  0459   GLU 76   CALCIUM 9.2   ALBUMIN 3.0*   PROT 6.2      K 3.8   CO2 27      BUN 15   CREATININE 0.8   ALKPHOS 142*   ALT 61*   AST 57*   BILITOT 0.7         Significant Diagnostics:  I have reviewed all pertinent imaging results/findings within the past 24 hours.    Assessment/Plan:     PVD (peripheral vascular disease)  78 year old female with a PMH of COPD, HTN, CAD, SIADH, previous right foot wounds for which she underwent an angiogram with SFA stents and TMA by Dr. Saldaña in 2015 now with worsening right foot pain and wounds. Now s/p angiogram w R SFA stent placement on 8/25    - Will order u/s L femoral access site  - Podiatry following  - Continue ASA/statin/plavix  - rest of care per primary team  - please call with questions; any worsening bleeding of L femoral access site          Jasmin Sullivan MD  Vascular Surgery  [facility] - Intensive Care ([place]-[facility])

## 2023-08-26 NOTE — ASSESSMENT & PLAN NOTE
78 year old female with a PMH of COPD, HTN, CAD, SIADH, previous right foot wounds for which she underwent an angiogram with SFA stents and TMA by Dr. Saldaña in 2015 now with worsening right foot pain and wounds. Now s/p angiogram w R SFA stent placement on 8/25    - Will order u/s L femoral access site  - Podiatry following  - Continue ASA/statin/plavix  - rest of care per primary team  - please call with questions; any worsening bleeding of L femoral access site

## 2023-08-26 NOTE — ASSESSMENT & PLAN NOTE
Nutrition consulted. Most recent weight and BMI monitored-     Measurements:  Wt Readings from Last 1 Encounters:   08/25/23 64.4 kg (142 lb)   Body mass index is 27.73 kg/m².    Patient to be screened and assessed by RD.  Previous protein calorie malnutrition.

## 2023-08-26 NOTE — PROGRESS NOTES
[facility] ([address] Medicine  Telemedicine Progress Note    Patient Name: Radha Sotelo  MRN: [id number]  Patient Class: IP- Inpatient   Admission Date: 8/18/2023  Length of Stay: 7 days  Attending Physician: Luz Maria Spivey MD  Primary Care Provider: Laurence, Primary Doctor          Subjective:     Principal Problem:Cellulitis and abscess of foot        HPI:  Patient is a 78-year-old female with past medical history significant for severe COPD on home O2 for chronic hypoxic respiratory failure (4 L via nasal cannula), CAD, PAT, previous osteomyelitis with transmetatarsal amputation (2012) who presented to the emergency department due to worsening right foot pain with associated discoloration.  She had a similar presentation approximately 10 days ago and was started on broad-spectrum antibiotics.  Since that time she is been having worsening pain associated with the right leg as well as discoloration with possible deep blistering.  An x-ray was done in the emergency department which demonstrated no erosive changes of osteomyelitis.  She does have some overlying right erythema of the affected area as well as dark discoloration with possible blood blister present.  There is probably some fluctuance however complete evaluation was limited secondary to patient intolerance.  She has a black spot which does not appear to be leaking any purulent fluid.  She denies any fevers, chills, shortness of breath, chest pain, or intolerance of lying flat.  She states that she has been dealing with progressive lower extremity swelling bilaterally.  She does not take any diuretics for this and it is a new problem.           Overview/Hospital Course:  No notes on file      This follow-up encounter was provided through telemedicine to address  Cellulitis and abscess of foot present on admission.  Patient was transferred to the telemedicine service on:  08/23/2023   The patient location is: [id number]  A admitted 8/18/2023  8:14 PM.      Interval History/Overnight Events:     Patient is able to provide adequate history.    Low BP overnight with hematoma to groin assessed by vascular surgery - foot pain unchanged - dyspnea and cough stable  -discussed wound care plan with podiatry  -re-ordered labs  Review of Systems   Constitutional:  Positive for fatigue. Negative for appetite change and fever.   Respiratory:  Positive for cough. Negative for shortness of breath.    Musculoskeletal:  Positive for arthralgias.   Skin:  Positive for wound.          I have reviewed the following on 08/26/2023:     Details     [x]   Lab results Nl lactate    []   Micro reports     []   Pathology reports     []   Imaging reports     []   Cardiology Procedure reports     []   Outside records/CareEverywhere     []  Independently viewed:         Inpatient Medications reviewed and prescribed for management of current problems:  Scheduled Meds:   acetaminophen  1,000 mg Oral Q8H    aspirin  81 mg Oral Daily    atorvastatin  40 mg Oral Daily    cefadroxil  1 g Oral Daily    cilostazoL  50 mg Oral BID    clopidogreL  75 mg Oral Daily    enoxparin  40 mg Subcutaneous Daily    fluticasone furoate-vilanteroL  1 puff Inhalation Daily    guaiFENesin  600 mg Oral BID    ipratropium  0.5 mg Nebulization Q6H    magnesium oxide  400 mg Oral BID    metoprolol tartrate  50 mg Oral BID    mupirocin   Nasal BID    nicotine  1 patch Transdermal Daily     Continuous Infusions:   sodium chloride 0.9% 100 mL/hr at 08/26/23 1431     PRN Meds:.cadexomer iodine, dextrose 10%, dextrose 10%, glucagon (human recombinant), glucose, glucose, levalbuterol **AND** ipratropium, melatonin, naloxone, ondansetron, oxyCODONE, oxyCODONE, sars-cov-2 (covid-19), sodium chloride 0.9%      Objective:     Temp:  [98.1 °F (36.7 °C)-99.3 °F (37.4 °C)] 99 °F (37.2 °C)  Pulse:  [] 88  Resp:  [16-23] 18  SpO2:  [91 %-97 %] 94 %  BP: ()/(47-64)  139/63      Intake/Output Summary (Last 24 hours) at 8/26/2023 1545  Last data filed at 8/26/2023 0636  Gross per 24 hour   Intake 1900 ml   Output --   Net 1900 ml          Body mass index is 27.73 kg/m².    Physical Exam  Vitals and nursing note reviewed.   Constitutional:       General: She is not in acute distress.     Appearance: Normal appearance.   HENT:      Head: Normocephalic and atraumatic.   Eyes:      Extraocular Movements: Extraocular movements intact.   Cardiovascular:      Rate and Rhythm: Normal rate.   Pulmonary:      Effort: Pulmonary effort is normal. No tachypnea or respiratory distress.   Musculoskeletal:      Comments: Right foot bandaged   Neurological:      General: No focal deficit present.      Mental Status: She is alert and oriented to person, place, and time.      Cranial Nerves: No cranial nerve deficit.      Motor: No weakness.   Psychiatric:         Attention and Perception: Attention normal.         Mood and Affect: Mood and affect normal.         Speech: Speech normal.         Behavior: Behavior is cooperative.          Labs: All labs within the last 24 hours were reviewed.   Recent Results (from the past 24 hour(s))   Lactic acid, plasma    Collection Time: 08/26/23  5:28 AM   Result Value Ref Range    Lactate (Lactic Acid) 0.7 0.5 - 2.2 mmol/L        Lab Results   Component Value Date    [id number] Not Detected 07/29/2021       Recent Labs   Lab 08/23/23 0406 08/24/23 0302 08/25/23 0459   WBC 6.86 7.20 6.85   LYMPH 11.1*  0.8* 10.3*  0.7* 14.9*  1.0   HGB 11.4* 11.3* 11.8*   HCT 37.3 35.9* 38.3    235 266       Recent Labs   Lab 08/23/23 0406 08/24/23 0302 08/25/23 0459   * 137 136   K 3.4* 3.7 3.8    100 100   CO2 25 24 27   BUN 12 12 15   CREATININE 0.8 0.7 0.8   * 78 76   CALCIUM 8.1* 8.2* 9.2   MG 1.5* 1.6 1.9   PHOS 2.7 2.3* 3.0       Recent Labs   Lab 08/23/23 0406 08/24/23  0302 08/25/23 0459   ALKPHOS 159* 153* 142*   ALT 72* 65* 61*  "  AST 74* 64* 57*   ALBUMIN 2.7* 2.8* 3.0*   PROT 5.4* 5.6* 6.2   BILITOT 0.5 0.8 0.7          No results for input(s): "DDIMER", "FERRITIN", "CRP", "LDH", "BNP", "TROPONINI", "CPK" in the last 72 hours.    Invalid input(s): "PROCALCITONIN"        Microbiology: All microbiology updates for the past 24 hours have been reviewed.  Microbiology Results (last 7 days)       Procedure Component Value Units Date/Time    Culture, Anaerobe [[id number]] Collected: 08/21/23 0927    Order Status: Completed Specimen: Abscess from Foot, Right Updated: 08/25/23 1008     Anaerobic Culture No anaerobes isolated    Narrative:      Right foot TMA abscess    Blood Culture #1 **CANNOT BE ORDERED STAT** [[id number]] Collected: 08/18/23 2113    Order Status: Completed Specimen: Blood from Peripheral, Forearm, Right Updated: 08/23/23 2312     Blood Culture, Routine No growth after 5 days.    Blood Culture #2 **CANNOT BE ORDERED STAT** [[id number]] Collected: 08/18/23 2113    Order Status: Completed Specimen: Blood from Peripheral, Forearm, Right Updated: 08/23/23 2312     Blood Culture, Routine No growth after 5 days.    Aerobic culture [[id number]]  (Abnormal)  (Susceptibility) Collected: 08/21/23 0927    Order Status: Completed Specimen: Abscess from Foot, Right Updated: 08/23/23 1251     Aerobic Bacterial Culture STAPHYLOCOCCUS AUREUS  Few      Narrative:      Right foot TMA abscess    AFB Culture & Smear [[id number]] Collected: 08/21/23 0927    Order Status: Completed Specimen: Abscess from Foot, Right Updated: 08/22/23 2127     AFB Culture & Smear Culture in progress     AFB CULTURE STAIN No acid fast bacilli seen.    Narrative:      Right foot TMA abscess    Fungus culture [[id number]] Collected: 08/21/23 0927    Order Status: Completed Specimen: Abscess from Foot, Right Updated: 08/22/23 1359     Fungus (Mycology) Culture Culture in progress    Narrative:      Right foot TMA abscess    Gram stain [[id number]] Collected: 08/21/23 0927    " Order Status: Completed Specimen: Abscess from Foot, Right Updated: 08/21/23 1414     Gram Stain Result Moderate WBC's      Few Gram positive cocci    Narrative:      Right foot TMA abscess              Imaging All imaging within the last 24 hours was reviewed.       Results for orders placed during the hospital encounter of 03/01/20    Echo Color Flow Doppler? Yes    Interpretation Summary  · Normal left ventricular systolic function. The estimated ejection fraction is 60%.  · Normal right ventricular systolic function.  · Normal LV diastolic function.  · The estimated PA systolic pressure is 31 mmHg.  · Normal central venous pressure (3 mmHg).      VAS Ankle Brachial Indices Resting  Indication  ========    Peripheral Arterial Disease    Pressure Lower  ============    Rt brachial A syst BP  129 mmHg  Rt low thigh BP    58 mmHg  Rt calf BP 46 mmHg  Rt PTA BP  0 mmHg  Rt dors pedis A BP 50 mmHg  Rt NICCI post tibial (rt post tib A BP / max brach A BP) 0.00  Right NICCI ratio use:   dors. pedis  Rt NICCI (rt dors ped A BP / max brach A BP) 0.39  Rt ankle BP / max brach A BP   0.39  Lt low thigh BP    174 mmHg  Lt calf  mmHg  Lt PTA BP  65 mmHg  Lt dors pedis A BP 77 mmHg  Lt toe BP  41 mmHg  Lt NICCI (lt post tibial A BP / max brach A BP)  0.50  Left NICCI ratio use:    dors. pedis  Lt NICCI dors ped (lt dors ped A BP / max brach A BP)    0.60  Lt ankle BP / max brach A BP   0.60  Lt TBI (lt toe BP / max brach A BP)    0.32    PPG Lower  =========    Rt toe digit waveform: History of Toe Amputation  Rt 2nd digit waveform: History of Toe Amputation  Rt 3rd digit waveform: History of Toe Amputation  Rt 4th digit waveform: History of Toe Amputation  Rt 5th digit waveform: History of Toe Amputation  Lt toe BP - PPG    41 mmHg  Lt toe digit waveform: moderately dampened    Comment  ========    Doppler signals were faint and difficult to obtain secondary to venous flow in the right lower  extremity.    Impression  =========    Right Leg: Segmental pressures and PVR waveforms suggest severe peripheral arterial occlusive disease. Absent DPA signal.  Rt lower digits: History of transmetatarsal amputation.    Left Leg: Segmental pressures and PVR waveforms suggest moderate peripheral arterial occlusive disease.  Lt lower digits: Toe PPG waveforms as described above. - TBI of 0.32 with a Great toe pressure of 41 mmHg is noted.    DATE OF SERVICE: 08/21/2023                                                      Sonographer: Bridgett Rodrigues  Electronically Signed by: Phillip Cortez II at 08/21/2023-17:26             Assessment/Plan:      * Cellulitis and abscess of foot  Patient is a 78-year-old female with previous history of chronic osteo with unhealing wound status post transmetatarsal amputation who is now presenting after a recent hospitalization with worsening right lower extremity swelling, erythema, and pain.  She states that it is much worse than normal and that she is developed a black rash over the affected area.  She denies any overt purulence or opening skin however she remains very tender to palpation.  X-ray at this time does not show any erosive changes and stable from previous hospitalization 10 days ago.  Will obtain an MRI to definitively rule out the presence of osteomyelitis given mildly elevated inflammatory markers which are sensitive but not specific    Non-surgical site of infection and Non-purulent Cellulitis  (ICD 10: L03.90)    There is not an abscess evident.    There is the presence of:   - Rapid progression    There is not presence or evidence of involvement of indwelling medical device    Empiric Vancomycin is based on the presence of Recurrence despite previous treatment    tetanus status unknown to the patient    There is not the presence of an immunocompromising condition requiring consideration for fungal, virus, or atypical organisms.    - Vancomycin and ceftriaxone  therapy  - Blood cultures negative  - MRI foot w contrast shows nonspecific diffuse subcutaneous edema and redemonstration of a small nonspecific fluid collection within the soft tissues at the level the resection stump, no abscess or OM identified. Limited incomplete examination as the patient was unable to tolerate the study.   - Elevation of affected area and pain control  - PT/OT recommending SNF  - Still c/o significant pain to RLE stump  - Vascular surgery and podiatry consulted  - s/p debridement and abscess drainage 8/21 - plan for wound care at discharge  - Wound cx growing MSSA  - ID consulted and antibiotics per ID recommendations:  Antibiotics (From admission, onward)    Start     Stop Route Frequency Ordered    08/26/23 2100  mupirocin 2 % ointment         08/31/23 2059 Nasl 2 times daily 08/26/23 1309    08/24/23 0900  cefadroxil tablet 1 g         -- Oral Daily 08/23/23 1558          Discharge planning  Diet: Diet Adult Regular (IDDSI Level 7)  Significant LDAs:   IV Access Type: Peripheral  Urinary Catheter Indication if present: Patient Does Not Have Urinary Catheter  Other Lines/Tubes/Drains:     Goals of Care:    Previous admission:  8/10/23  Code Status: Full Code  Likely prognosis:  Fair    FLORIAN: 8/28/2023     Code Status: Full Code   Is the patient medically ready for discharge?: No    Reason for patient still in hospital (select all that apply): Patient trending condition, Treatment and Consult recommendations  Discharge Plan A: Home Health   Discharge Delays: None known at this time  The amount of time spent during, and associated with, this encounter was 35 minutes; the nature of the activities performed were:  Preparing to see the patient, chart review  Obtaining and/or receiving separately obtained history   Performing medically appropriate examination and evaluation  Counseling and educating the patient/family/caregiver  Ordering medications, tests, or procedures  Referring to and  communicating with other health care professionals  Documenting clinical information in the EHR  Independently interpreting results  Care coordination    Hypokalemia  Replete with KCl and replete Mag; continue to monitor    History of transmetatarsal amputation of right foot  Podiatry following for wound; angiogram planned    Chronic obstructive pulmonary disease  Patient uses nebulizers q.6 hours.  Unfortunately due to national shortages, we are unable to provide these.  Will resume her home inhalers and provide nebulizers as able.  She is currently on 2 L of oxygen which appears to be her baseline.  She states that she is no longer smoking and denies any signs or symptoms of ongoing acute exacerbation of her COPD.    Patient information:     SpO2: (!) 91 %       mMRC Key:  0 - Dyspnea only with strenuous exercise  1 - Dyspnea when hurrying or walking up a slight hill  2 - Walks slower than people of the same age because of dyspnea or has to stop for breath when walking at own pace  3 - Stops for breath after walking 100 yards or after a few minutes  4 - Too dyspneic to leave house or breathless when dressing    Exacerbation Component:  - COPD: stable  Patient Is not showing signs of a current COPD exacerbation The patient is not currently have symptoms / an exacerbation. The patient has COPD for approximately 10+ years. Symptoms in previous episodes have included dyspnea, cough and wheezing, and typically last 5 days. Previous episodes have been exacerbated by Smoke and upper respiratory infection. Current treatment includes albuterol nebulizer, albuterol/ipratropium inhaler, oxygen and Breo, which generally provides some relief of symptoms.    - Risk factor reduction (Smoking cessation/avoidance, management of GERD, pHTN treatment)  - Avoidance of triggers (Air pollution, respiratory infections, pulmonary embolisms)  - Complete metabolic panel  - Target SpO2 of 88-92% or PaO2 of 60-70 mmHg    Coronary artery  "disease  No ongoing chest pain.  Will continue to monitor and may need preoperative evaluation if surgery is warranted.    PVD (peripheral vascular disease)  Patient seen by Dr. Saldaña but has not been seen since 2015.    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  Now has discoloration to RLE stump.  Vascular surgery consulted  Arterial doppler showing severe PAD RLE.   Angiogram on 8/25 with right SFA stent placed; continue ASA/statin and plavix  -US ordered to evaluate hematoma      Unspecified protein-calorie malnutrition  Nutrition consulted. Most recent weight and BMI monitored-     Measurements:  Wt Readings from Last 1 Encounters:   08/25/23 64.4 kg (142 lb)   Body mass index is 27.73 kg/m².    Patient to be screened and assessed by RD.  Previous protein calorie malnutrition.        VTE Risk Mitigation (From admission, onward)         Ordered     enoxaparin injection 40 mg  Daily         08/19/23 0020     Place sequential compression device  Until discontinued         08/19/23 0020                I have completed this tele-visit without the assistance of a telepresenter.    The attending portion of this evaluation, treatment, and documentation was performed per Luz Maria Spivey MD via Telemedicine AudioVisual using the secure [employer] software platform with 2 way audio/video. The provider was located off-site and the " patient is located in the hospital. The aforementioned video software was utilized to document the relevant history and physical exam    Luz Maria Spivey MD  Department of Hospital Medicine   [facility] - Intensive Care ([place])

## 2023-08-26 NOTE — SUBJECTIVE & OBJECTIVE
This follow-up encounter was provided through telemedicine to address  Cellulitis and abscess of foot present on admission.  Patient was transferred to the telemedicine service on:  08/23/2023   The patient location is: [id number] A admitted 8/18/2023  8:14 PM.      Interval History/Overnight Events:     Patient is able to provide adequate history.    Low BP overnight with hematoma to groin assessed by vascular surgery - foot pain unchanged - dyspnea and cough stable  -discussed wound care plan with podiatry  -re-ordered labs  Review of Systems   Constitutional:  Positive for fatigue. Negative for appetite change and fever.   Respiratory:  Positive for cough. Negative for shortness of breath.    Musculoskeletal:  Positive for arthralgias.   Skin:  Positive for wound.          I have reviewed the following on 08/26/2023:     Details     [x]   Lab results Nl lactate    []   Micro reports     []   Pathology reports     []   Imaging reports     []   Cardiology Procedure reports     []   Outside records/CareEverywhere     []  Independently viewed:         Inpatient Medications reviewed and prescribed for management of current problems:  Scheduled Meds:   acetaminophen  1,000 mg Oral Q8H    aspirin  81 mg Oral Daily    atorvastatin  40 mg Oral Daily    cefadroxil  1 g Oral Daily    cilostazoL  50 mg Oral BID    clopidogreL  75 mg Oral Daily    enoxparin  40 mg Subcutaneous Daily    fluticasone furoate-vilanteroL  1 puff Inhalation Daily    guaiFENesin  600 mg Oral BID    ipratropium  0.5 mg Nebulization Q6H    magnesium oxide  400 mg Oral BID    metoprolol tartrate  50 mg Oral BID    mupirocin   Nasal BID    nicotine  1 patch Transdermal Daily     Continuous Infusions:   sodium chloride 0.9% 100 mL/hr at 08/26/23 1431     PRN Meds:.cadexomer iodine, dextrose 10%, dextrose 10%, glucagon (human recombinant), glucose, glucose, levalbuterol **AND** ipratropium, melatonin, naloxone, ondansetron, oxyCODONE, oxyCODONE,  sars-cov-2 (covid-19), sodium chloride 0.9%      Objective:     Temp:  [98.1 °F (36.7 °C)-99.3 °F (37.4 °C)] 99 °F (37.2 °C)  Pulse:  [] 88  Resp:  [16-23] 18  SpO2:  [91 %-97 %] 94 %  BP: ()/(47-64) 139/63      Intake/Output Summary (Last 24 hours) at 8/26/2023 1545  Last data filed at 8/26/2023 0636  Gross per 24 hour   Intake 1900 ml   Output --   Net 1900 ml          Body mass index is 27.73 kg/m².    Physical Exam  Vitals and nursing note reviewed.   Constitutional:       General: She is not in acute distress.     Appearance: Normal appearance.   HENT:      Head: Normocephalic and atraumatic.   Eyes:      Extraocular Movements: Extraocular movements intact.   Cardiovascular:      Rate and Rhythm: Normal rate.   Pulmonary:      Effort: Pulmonary effort is normal. No tachypnea or respiratory distress.   Musculoskeletal:      Comments: Right foot bandaged   Neurological:      General: No focal deficit present.      Mental Status: She is alert and oriented to person, place, and time.      Cranial Nerves: No cranial nerve deficit.      Motor: No weakness.   Psychiatric:         Attention and Perception: Attention normal.         Mood and Affect: Mood and affect normal.         Speech: Speech normal.         Behavior: Behavior is cooperative.          Labs: All labs within the last 24 hours were reviewed.   Recent Results (from the past 24 hour(s))   Lactic acid, plasma    Collection Time: 08/26/23  5:28 AM   Result Value Ref Range    Lactate (Lactic Acid) 0.7 0.5 - 2.2 mmol/L        Lab Results   Component Value Date    [id number] Not Detected 07/29/2021       Recent Labs   Lab 08/23/23  0406 08/24/23  0302 08/25/23  0459   WBC 6.86 7.20 6.85   LYMPH 11.1*  0.8* 10.3*  0.7* 14.9*  1.0   HGB 11.4* 11.3* 11.8*   HCT 37.3 35.9* 38.3    235 266       Recent Labs   Lab 08/23/23  0406 08/24/23  0302 08/25/23  0459   * 137 136   K 3.4* 3.7 3.8    100 100   CO2 25 24 27   BUN 12 12 15  "  CREATININE 0.8 0.7 0.8   * 78 76   CALCIUM 8.1* 8.2* 9.2   MG 1.5* 1.6 1.9   PHOS 2.7 2.3* 3.0       Recent Labs   Lab 08/23/23  0406 08/24/23  0302 08/25/23  0459   ALKPHOS 159* 153* 142*   ALT 72* 65* 61*   AST 74* 64* 57*   ALBUMIN 2.7* 2.8* 3.0*   PROT 5.4* 5.6* 6.2   BILITOT 0.5 0.8 0.7          No results for input(s): "DDIMER", "FERRITIN", "CRP", "LDH", "BNP", "TROPONINI", "CPK" in the last 72 hours.    Invalid input(s): "PROCALCITONIN"        Microbiology: All microbiology updates for the past 24 hours have been reviewed.  Microbiology Results (last 7 days)       Procedure Component Value Units Date/Time    Culture, Anaerobe [[id number]] Collected: 08/21/23 0927    Order Status: Completed Specimen: Abscess from Foot, Right Updated: 08/25/23 1008     Anaerobic Culture No anaerobes isolated    Narrative:      Right foot TMA abscess    Blood Culture #1 **CANNOT BE ORDERED STAT** [[id number]] Collected: 08/18/23 2113    Order Status: Completed Specimen: Blood from Peripheral, Forearm, Right Updated: 08/23/23 2312     Blood Culture, Routine No growth after 5 days.    Blood Culture #2 **CANNOT BE ORDERED STAT** [[id number]] Collected: 08/18/23 2113    Order Status: Completed Specimen: Blood from Peripheral, Forearm, Right Updated: 08/23/23 2312     Blood Culture, Routine No growth after 5 days.    Aerobic culture [[id number]]  (Abnormal)  (Susceptibility) Collected: 08/21/23 0927    Order Status: Completed Specimen: Abscess from Foot, Right Updated: 08/23/23 1251     Aerobic Bacterial Culture STAPHYLOCOCCUS AUREUS  Few      Narrative:      Right foot TMA abscess    AFB Culture & Smear [[id number]] Collected: 08/21/23 0927    Order Status: Completed Specimen: Abscess from Foot, Right Updated: 08/22/23 2127     AFB Culture & Smear Culture in progress     AFB CULTURE STAIN No acid fast bacilli seen.    Narrative:      Right foot TMA abscess    Fungus culture [[id number]] Collected: 08/21/23 0927    Order Status: " Completed Specimen: Abscess from Foot, Right Updated: 08/22/23 6173     Fungus (Mycology) Culture Culture in progress    Narrative:      Right foot TMA abscess    Gram stain [[id number]] Collected: 08/21/23 0918    Order Status: Completed Specimen: Abscess from Foot, Right Updated: 08/21/23 1651     Gram Stain Result Moderate WBC's      Few Gram positive cocci    Narrative:      Right foot TMA abscess              Imaging All imaging within the last 24 hours was reviewed.       Results for orders placed during the hospital encounter of 03/01/20    Echo Color Flow Doppler? Yes    Interpretation Summary  · Normal left ventricular systolic function. The estimated ejection fraction is 60%.  · Normal right ventricular systolic function.  · Normal LV diastolic function.  · The estimated PA systolic pressure is 31 mmHg.  · Normal central venous pressure (3 mmHg).      VAS Ankle Brachial Indices Resting  Indication  ========    Peripheral Arterial Disease    Pressure Lower  ============    Rt brachial A syst BP  129 mmHg  Rt low thigh BP    58 mmHg  Rt calf BP 46 mmHg  Rt PTA BP  0 mmHg  Rt dors pedis A BP 50 mmHg  Rt NICCI post tibial (rt post tib A BP / max brach A BP) 0.00  Right NICCI ratio use:   dors. pedis  Rt NICCI (rt dors ped A BP / max brach A BP) 0.39  Rt ankle BP / max brach A BP   0.39  Lt low thigh BP    174 mmHg  Lt calf  mmHg  Lt PTA BP  65 mmHg  Lt dors pedis A BP 77 mmHg  Lt toe BP  41 mmHg  Lt NICCI (lt post tibial A BP / max brach A BP)  0.50  Left NICCI ratio use:    dors. pedis  Lt NICCI dors ped (lt dors ped A BP / max brach A BP)    0.60  Lt ankle BP / max brach A BP   0.60  Lt TBI (lt toe BP / max brach A BP)    0.32    PPG Lower  =========    Rt toe digit waveform: History of Toe Amputation  Rt 2nd digit waveform: History of Toe Amputation  Rt 3rd digit waveform: History of Toe Amputation  Rt 4th digit waveform: History of Toe Amputation  Rt 5th digit waveform: History of Toe Amputation  Lt toe BP - PPG     41 mmHg  Lt toe digit waveform: moderately dampened    Comment  ========    Doppler signals were faint and difficult to obtain secondary to venous flow in the right lower extremity.    Impression  =========    Right Leg: Segmental pressures and PVR waveforms suggest severe peripheral arterial occlusive disease. Absent DPA signal.  Rt lower digits: History of transmetatarsal amputation.    Left Leg: Segmental pressures and PVR waveforms suggest moderate peripheral arterial occlusive disease.  Lt lower digits: Toe PPG waveforms as described above. - TBI of 0.32 with a Great toe pressure of 41 mmHg is noted.    DATE OF SERVICE: 08/21/2023                                                      Sonographer: Bridgett Rodrigues  Electronically Signed by: Phillip Cortez II at 08/21/2023-17:26

## 2023-08-26 NOTE — PT/OT/SLP PROGRESS
Occupational Therapy      Patient Name:  Radha Sotelo   MRN:  [id number]    Patient not seen today secondary to Patient unwilling to participate, Patient fatigue, Pain (pt refused d/t pain and fatigue. 1st attempt pt unwilling, 2nd attempt pt fatigued d/t commode transfer prior to session and stating she is leaving today). Will follow-up when appropriate.    8/26/2023

## 2023-08-26 NOTE — NURSING TRANSFER
Nursing Transfer Note      8/25/2023   7:46 PM    Nurse giving handoff:ALBERT Mckenzie RN  Nurse receiving handoff:Marylou    Reason patient is being transferred: meets criteria    Transfer To: 16W    Transfer via stretcher    Transfer with 2 L NC to O2    Transported by PCT      4eyes on Skin: no    Medicines sent: none    Any special needs or follow-up needed: bedrest until 2030    Patient belongings transferred with patient:  none at bedside    Chart send with patient: Yes

## 2023-08-26 NOTE — SUBJECTIVE & OBJECTIVE
Medications:  Continuous Infusions:   sodium chloride 0.9% 100 mL/hr at 08/26/23 0625     Scheduled Meds:   acetaminophen  1,000 mg Oral Q8H    aspirin  81 mg Oral Daily    atorvastatin  40 mg Oral Daily    cefadroxil  1 g Oral Daily    cilostazoL  50 mg Oral BID    clopidogreL  75 mg Oral Daily    enoxparin  40 mg Subcutaneous Daily    fluticasone furoate-vilanteroL  1 puff Inhalation Daily    guaiFENesin  600 mg Oral BID    ipratropium  0.5 mg Nebulization Q6H    magnesium oxide  400 mg Oral BID    metoprolol tartrate  50 mg Oral BID    nicotine  1 patch Transdermal Daily     PRN Meds:cadexomer iodine, dextrose 10%, dextrose 10%, glucagon (human recombinant), glucose, glucose, levalbuterol **AND** ipratropium, melatonin, naloxone, ondansetron, oxyCODONE, oxyCODONE, sars-cov-2 (covid-19), sodium chloride 0.9%     Objective:     Vital Signs (Most Recent):  Temp: 98.1 °F (36.7 °C) (08/26/23 0945)  Pulse: 101 (08/26/23 0945)  Resp: 16 (08/26/23 0945)  BP: 139/63 (08/26/23 0945)  SpO2: (!) 94 % (08/26/23 0945) Vital Signs (24h Range):  Temp:  [97.7 °F (36.5 °C)-99.3 °F (37.4 °C)] 98.1 °F (36.7 °C)  Pulse:  [] 101  Resp:  [16-23] 16  SpO2:  [91 %-97 %] 94 %  BP: ()/(47-73) 139/63          Physical Exam  Constitutional:       Appearance: Normal appearance.   Cardiovascular:      Rate and Rhythm: Normal rate.      Comments: Biphasic L dp signal  Monophasic R dp signal    Pulmonary:      Effort: Pulmonary effort is normal.   Skin:     General: Skin is warm and dry.      Comments: RLE TMA wound     Neurological:      Mental Status: She is alert and oriented to person, place, and time.          Significant Labs:  CBC:   Recent Labs   Lab 08/25/23  0459   WBC 6.85   RBC 4.11   HGB 11.8*   HCT 38.3      MCV 93   MCH 28.7   MCHC 30.8*       CMP:   Recent Labs   Lab 08/25/23  0459   GLU 76   CALCIUM 9.2   ALBUMIN 3.0*   PROT 6.2      K 3.8   CO2 27      BUN 15   CREATININE 0.8   ALKPHOS 142*    ALT 61*   AST 57*   BILITOT 0.7         Significant Diagnostics:  I have reviewed all pertinent imaging results/findings within the past 24 hours.

## 2023-08-26 NOTE — ASSESSMENT & PLAN NOTE
Patient uses nebulizers q.6 hours.  Unfortunately due to national shortages, we are unable to provide these.  Will resume her home inhalers and provide nebulizers as able.  She is currently on 2 L of oxygen which appears to be her baseline.  She states that she is no longer smoking and denies any signs or symptoms of ongoing acute exacerbation of her COPD.    Patient information:     SpO2: (!) 91 %       mMRC Key:  0 - Dyspnea only with strenuous exercise  1 - Dyspnea when hurrying or walking up a slight hill  2 - Walks slower than people of the same age because of dyspnea or has to stop for breath when walking at own pace  3 - Stops for breath after walking 100 yards or after a few minutes  4 - Too dyspneic to leave house or breathless when dressing    Exacerbation Component:  - COPD: stable  Patient Is not showing signs of a current COPD exacerbation The patient is not currently have symptoms / an exacerbation. The patient has COPD for approximately 10+ years. Symptoms in previous episodes have included dyspnea, cough and wheezing, and typically last 5 days. Previous episodes have been exacerbated by Smoke and upper respiratory infection. Current treatment includes albuterol nebulizer, albuterol/ipratropium inhaler, oxygen and Breo, which generally provides some relief of symptoms.    - Risk factor reduction (Smoking cessation/avoidance, management of GERD, pHTN treatment)  - Avoidance of triggers (Air pollution, respiratory infections, pulmonary embolisms)  - Complete metabolic panel  - Target SpO2 of 88-92% or PaO2 of 60-70 mmHg

## 2023-08-26 NOTE — ASSESSMENT & PLAN NOTE
"Patient seen by Dr. Saldaña but has not been seen since 2015.    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  Now has discoloration to RLE stump.  Vascular surgery consulted  Arterial doppler showing severe PAD RLE.   Angiogram on 8/25 with right SFA stent placed; continue ASA/statin and plavix  -US ordered to evaluate hematoma    "

## 2023-08-26 NOTE — ASSESSMENT & PLAN NOTE
78 year old female with a PMH of COPD, HTN, CAD, SIADH, previous right foot wounds for which she underwent an angiogram with SFA stents and TMA by Dr. Saldaña in 2015 now with worsening right foot pain and wounds. Now s/p angiogram w R SFA stent placement on 8/25    - Will order u/s L femoral access site  - Podiatry following  - Continue ASA/statin  - rest of care per primary team  - please call with questions; any worsening bleeding of L femoral access site

## 2023-08-26 NOTE — ASSESSMENT & PLAN NOTE
Diet: Diet Adult Regular (IDDSI Level 7)  Significant LDAs:   IV Access Type: Peripheral  Urinary Catheter Indication if present: Patient Does Not Have Urinary Catheter  Other Lines/Tubes/Drains:     Goals of Care:    Previous admission:  8/10/23  Code Status: Full Code  Likely prognosis:  Fair    FLORIAN: 8/28/2023     Code Status: Full Code   Is the patient medically ready for discharge?: No    Reason for patient still in hospital (select all that apply): Patient trending condition, Treatment and Consult recommendations  Discharge Plan A: Home Health   Discharge Delays: None known at this time  The amount of time spent during, and associated with, this encounter was 35 minutes; the nature of the activities performed were:  Preparing to see the patient, chart review  Obtaining and/or receiving separately obtained history   Performing medically appropriate examination and evaluation  Counseling and educating the patient/family/caregiver  Ordering medications, tests, or procedures  Referring to and communicating with other health care professionals  Documenting clinical information in the EHR  Independently interpreting results  Care coordination

## 2023-08-27 LAB
ALBUMIN SERPL BCP-MCNC: 2.6 G/DL (ref 3.5–5.2)
ALP SERPL-CCNC: 101 U/L (ref 55–135)
ALT SERPL W/O P-5'-P-CCNC: 29 U/L (ref 10–44)
ANION GAP SERPL CALC-SCNC: 8 MMOL/L (ref 8–16)
AST SERPL-CCNC: 37 U/L (ref 10–40)
BASOPHILS # BLD AUTO: 0.06 K/UL (ref 0–0.2)
BASOPHILS NFR BLD: 0.8 % (ref 0–1.9)
BILIRUB SERPL-MCNC: 0.4 MG/DL (ref 0.1–1)
BUN SERPL-MCNC: 13 MG/DL (ref 8–23)
CALCIUM SERPL-MCNC: 8.3 MG/DL (ref 8.7–10.5)
CHLORIDE SERPL-SCNC: 105 MMOL/L (ref 95–110)
CO2 SERPL-SCNC: 24 MMOL/L (ref 23–29)
CREAT SERPL-MCNC: 0.7 MG/DL (ref 0.5–1.4)
DIFFERENTIAL METHOD: ABNORMAL
EOSINOPHIL # BLD AUTO: 0.3 K/UL (ref 0–0.5)
EOSINOPHIL NFR BLD: 4.5 % (ref 0–8)
ERYTHROCYTE [DISTWIDTH] IN BLOOD BY AUTOMATED COUNT: 15.2 % (ref 11.5–14.5)
EST. GFR  (NO RACE VARIABLE): >60 ML/MIN/1.73 M^2
GLUCOSE SERPL-MCNC: 117 MG/DL (ref 70–110)
HCT VFR BLD AUTO: 32.1 % (ref 37–48.5)
HGB BLD-MCNC: 9.9 G/DL (ref 12–16)
IMM GRANULOCYTES # BLD AUTO: 0.04 K/UL (ref 0–0.04)
IMM GRANULOCYTES NFR BLD AUTO: 0.6 % (ref 0–0.5)
LYMPHOCYTES # BLD AUTO: 0.5 K/UL (ref 1–4.8)
LYMPHOCYTES NFR BLD: 7.2 % (ref 18–48)
MAGNESIUM SERPL-MCNC: 1.7 MG/DL (ref 1.6–2.6)
MCH RBC QN AUTO: 29.4 PG (ref 27–31)
MCHC RBC AUTO-ENTMCNC: 30.8 G/DL (ref 32–36)
MCV RBC AUTO: 95 FL (ref 82–98)
MONOCYTES # BLD AUTO: 0.7 K/UL (ref 0.3–1)
MONOCYTES NFR BLD: 10.4 % (ref 4–15)
NEUTROPHILS # BLD AUTO: 5.4 K/UL (ref 1.8–7.7)
NEUTROPHILS NFR BLD: 76.5 % (ref 38–73)
NRBC BLD-RTO: 0 /100 WBC
PHOSPHATE SERPL-MCNC: 3.4 MG/DL (ref 2.7–4.5)
PLATELET # BLD AUTO: 222 K/UL (ref 150–450)
PMV BLD AUTO: 9.9 FL (ref 9.2–12.9)
POTASSIUM SERPL-SCNC: 3.4 MMOL/L (ref 3.5–5.1)
PROT SERPL-MCNC: 5.5 G/DL (ref 6–8.4)
RBC # BLD AUTO: 3.37 M/UL (ref 4–5.4)
SODIUM SERPL-SCNC: 137 MMOL/L (ref 136–145)
WBC # BLD AUTO: 7.09 K/UL (ref 3.9–12.7)

## 2023-08-27 PROCEDURE — 25000003 PHARM REV CODE 250: Performed by: STUDENT IN AN ORGANIZED HEALTH CARE EDUCATION/TRAINING PROGRAM

## 2023-08-27 PROCEDURE — 99232 PR SUBSEQUENT HOSPITAL CARE,LEVL II: ICD-10-PCS | Mod: 95,,, | Performed by: INTERNAL MEDICINE

## 2023-08-27 PROCEDURE — 94640 AIRWAY INHALATION TREATMENT: CPT

## 2023-08-27 PROCEDURE — 36415 COLL VENOUS BLD VENIPUNCTURE: CPT | Performed by: STUDENT IN AN ORGANIZED HEALTH CARE EDUCATION/TRAINING PROGRAM

## 2023-08-27 PROCEDURE — 25000003 PHARM REV CODE 250: Performed by: INTERNAL MEDICINE

## 2023-08-27 PROCEDURE — 97110 THERAPEUTIC EXERCISES: CPT

## 2023-08-27 PROCEDURE — 25000003 PHARM REV CODE 250

## 2023-08-27 PROCEDURE — 94761 N-INVAS EAR/PLS OXIMETRY MLT: CPT

## 2023-08-27 PROCEDURE — 97535 SELF CARE MNGMENT TRAINING: CPT

## 2023-08-27 PROCEDURE — 99900035 HC TECH TIME PER 15 MIN (STAT)

## 2023-08-27 PROCEDURE — 25000242 PHARM REV CODE 250 ALT 637 W/ HCPCS: Performed by: INTERNAL MEDICINE

## 2023-08-27 PROCEDURE — 99232 SBSQ HOSP IP/OBS MODERATE 35: CPT | Mod: 95,,, | Performed by: INTERNAL MEDICINE

## 2023-08-27 PROCEDURE — 97164 PT RE-EVAL EST PLAN CARE: CPT

## 2023-08-27 PROCEDURE — 80053 COMPREHEN METABOLIC PANEL: CPT | Performed by: STUDENT IN AN ORGANIZED HEALTH CARE EDUCATION/TRAINING PROGRAM

## 2023-08-27 PROCEDURE — 63600175 PHARM REV CODE 636 W HCPCS: Performed by: STUDENT IN AN ORGANIZED HEALTH CARE EDUCATION/TRAINING PROGRAM

## 2023-08-27 PROCEDURE — 27000221 HC OXYGEN, UP TO 24 HOURS

## 2023-08-27 PROCEDURE — 94664 DEMO&/EVAL PT USE INHALER: CPT

## 2023-08-27 PROCEDURE — 21400001 HC TELEMETRY ROOM

## 2023-08-27 PROCEDURE — 97168 OT RE-EVAL EST PLAN CARE: CPT

## 2023-08-27 PROCEDURE — 84100 ASSAY OF PHOSPHORUS: CPT | Performed by: STUDENT IN AN ORGANIZED HEALTH CARE EDUCATION/TRAINING PROGRAM

## 2023-08-27 PROCEDURE — 85025 COMPLETE CBC W/AUTO DIFF WBC: CPT | Performed by: STUDENT IN AN ORGANIZED HEALTH CARE EDUCATION/TRAINING PROGRAM

## 2023-08-27 PROCEDURE — 83735 ASSAY OF MAGNESIUM: CPT | Performed by: STUDENT IN AN ORGANIZED HEALTH CARE EDUCATION/TRAINING PROGRAM

## 2023-08-27 PROCEDURE — S4991 NICOTINE PATCH NONLEGEND: HCPCS | Performed by: INTERNAL MEDICINE

## 2023-08-27 RX ADMIN — CEFADROXIL 1 G: 1000 TABLET ORAL at 10:08

## 2023-08-27 RX ADMIN — IPRATROPIUM BROMIDE 0.5 MG: 0.5 SOLUTION RESPIRATORY (INHALATION) at 12:08

## 2023-08-27 RX ADMIN — MUPIROCIN: 20 OINTMENT TOPICAL at 08:08

## 2023-08-27 RX ADMIN — IPRATROPIUM BROMIDE 0.5 MG: 0.5 SOLUTION RESPIRATORY (INHALATION) at 08:08

## 2023-08-27 RX ADMIN — GUAIFENESIN 600 MG: 600 TABLET, EXTENDED RELEASE ORAL at 08:08

## 2023-08-27 RX ADMIN — METOPROLOL TARTRATE 50 MG: 50 TABLET, FILM COATED ORAL at 10:08

## 2023-08-27 RX ADMIN — CLOPIDOGREL BISULFATE 75 MG: 75 TABLET ORAL at 10:08

## 2023-08-27 RX ADMIN — ACETAMINOPHEN 1000 MG: 500 TABLET ORAL at 02:08

## 2023-08-27 RX ADMIN — Medication 1 PATCH: at 10:08

## 2023-08-27 RX ADMIN — IPRATROPIUM BROMIDE 0.5 MG: 0.5 SOLUTION RESPIRATORY (INHALATION) at 01:08

## 2023-08-27 RX ADMIN — IPRATROPIUM BROMIDE 0.5 MG: 0.5 SOLUTION RESPIRATORY (INHALATION) at 07:08

## 2023-08-27 RX ADMIN — Medication 400 MG: at 08:08

## 2023-08-27 RX ADMIN — Medication 6 MG: at 08:08

## 2023-08-27 RX ADMIN — MUPIROCIN: 20 OINTMENT TOPICAL at 10:08

## 2023-08-27 RX ADMIN — CILOSTAZOL 50 MG: 50 TABLET ORAL at 10:08

## 2023-08-27 RX ADMIN — ENOXAPARIN SODIUM 40 MG: 40 INJECTION SUBCUTANEOUS at 05:08

## 2023-08-27 RX ADMIN — OXYCODONE HYDROCHLORIDE 10 MG: 10 TABLET ORAL at 06:08

## 2023-08-27 RX ADMIN — GUAIFENESIN 600 MG: 600 TABLET, EXTENDED RELEASE ORAL at 10:08

## 2023-08-27 RX ADMIN — ATORVASTATIN CALCIUM 40 MG: 40 TABLET, FILM COATED ORAL at 10:08

## 2023-08-27 RX ADMIN — CILOSTAZOL 50 MG: 50 TABLET ORAL at 08:08

## 2023-08-27 RX ADMIN — ASPIRIN 81 MG 81 MG: 81 TABLET ORAL at 10:08

## 2023-08-27 RX ADMIN — Medication 400 MG: at 10:08

## 2023-08-27 RX ADMIN — METOPROLOL TARTRATE 50 MG: 50 TABLET, FILM COATED ORAL at 08:08

## 2023-08-27 RX ADMIN — OXYCODONE HYDROCHLORIDE 10 MG: 10 TABLET ORAL at 08:08

## 2023-08-27 RX ADMIN — FLUTICASONE FUROATE AND VILANTEROL TRIFENATATE 1 PUFF: 200; 25 POWDER RESPIRATORY (INHALATION) at 08:08

## 2023-08-27 NOTE — PT/OT/SLP RE-EVAL
Occupational Therapy   Re-evaluation with PT    Co-evaluation/treatment performed due to patient's multiple deficits requiring two skilled therapists to appropriately and safely assess patient's strength and endurance while facilitating functional tasks in addition to accommodating for patient's activity tolerance.       Name: Radha Sotelo  MRN: [id number]  Admitting Diagnosis:  Cellulitis and abscess of foot  Recent Surgery: Procedure(s) (LRB):  ANGIOGRAM, EXTREMITY, UNILATERAL (Right)  STENT, SUPERFICIAL FEMORAL ARTERY (Right)  PTA, PERONEAL TIBIAL TRUNK WITH SHOCKWAVE (Right) 2 Days Post-Op    Recommendations:     Discharge Recommendations: other (see comments)  Discharge Equipment Recommendations: none  Barriers to discharge:  Inaccessible home environment (lives on 2nd floor)    Assessment:     Radha Sotelo is a 78 y.o. female with a medical diagnosis of Cellulitis and abscess of foot.  She presents with performance deficits affecting function are weakness, impaired endurance, impaired self care skills, impaired functional mobility, gait instability, impaired balance, decreased lower extremity function, pain, impaired skin, orthopedic precautions.  Pt completed therapy session with good engagement this date. Pt able to complete bed mobility, functional OOB mobility, and transitioned to upright chair. Pt appeared short of breath but measured at 93% while sitting in upright chair.  Pt reports close community environment in apartments where she lives. Pt would be good candidate for continued therapy to maximize strengthening and healing.     Rehab Prognosis: Good; patient would benefit from acute skilled OT services to address these deficits and reach maximum level of function.       Plan:     Patient to be seen 3 x/week to address the above listed problems via self-care/home management, therapeutic activities, therapeutic exercises  Plan of Care Expires: 09/19/23  Plan of Care Reviewed with:  "patient    Subjective     Chief Complaint: "I'm feeling better today"   Patient/Family stated goals: Get better, return home  Communicated with: RN prior to session.  Pain/Comfort:  Pain Rating 1: not rated  Location - Side 1: Right  Location 1: foot  Pain Addressed 1: Reposition, Distraction, Cessation of Activity  Pain Rating Post-Intervention 1: 0/10    Objective:     Communicated with: RN prior to session.  Patient found supine with: oxygen, telemetry upon OT entry to room.    General Precautions: Standard, fall  Orthopedic Precautions: N/A  Braces: N/A  Respiratory Status: Nasal cannula, flow 2.5 L/min    Occupational Performance:    Bed Mobility:    Patient completed Rolling/Turning to Left with  stand by assistance  Patient completed Rolling/Turning to Right with stand by assistance  Patient completed Scooting/Bridging with stand by assistance  Patient completed Supine to Sit with stand by assistance    Functional Mobility/Transfers:  Patient completed Sit <> Stand Transfer with contact guard assistance  with  rolling walker from EOB  Pt completed Sit <> Stand Transfer with CGA to upright chair for controlled lower  Functional Mobility: Pt engaging in functional mobility to simulate household/community distances with hospital room with CGA and utilizing RW in order to maximize functional activity tolerance and standing balance required for engagement in occupations of choice.     Activities of Daily Living:  Upper Body Dressing: moderate assistance affixing gown at neck and back for maximum coverage   Lower Body Dressing: minimum assistance donning bilateral sandals    Cognitive/Visual Perceptual:  Cognitive/Psychosocial Skills:     -       Oriented to: AOx4   -       Follows Commands/attention:Follows multistep  commands  -       Communication: clear/fluent  -       Memory: No Deficits noted  -       Safety awareness/insight to disability: intact   -       Mood/Affect/Coping skills/emotional control: " Appropriate to situation  Visual/Perceptual:      -Intact      Physical Exam:  Balance:    -       EOB: good,  Standing: fair  Postural examination/scapula alignment:    -       Rounded shoulders  -       Forward head  Skin integrity: Visible skin intact  Edema:  None noted  Sensation:    -       Intact  Motor Planning:    -       Intact  Dominant hand:    -       right  Upper Extremity Range of Motion:     -       Right Upper Extremity: WFL  -       Left Upper Extremity: WFL  Upper Extremity Strength:    -       Right Upper Extremity: WFL  -       Left Upper Extremity: WFL   Strength:    -       Right Upper Extremity: WFL  -       Left Upper Extremity: WFL  Fine Motor Coordination:    -       Intact  Gross motor coordination:   WFL  Neurological:    -       Intact    AMPAC 6 Click:  AMPA Total Score: 17    Treatment & Education:  Pt educated on role of OT, POC, and goals for therapy.    POC was dicussed with patient/caregiver, who was included in its development and is in agreement with the identified goals and treatment plan.   Patient and family aware of patient's deficits and therapy progression.   Time provided for therapeutic counseling and discussion of health disposition.   Educated on importance of EOB/OOB mobility, maintaining routine, sitting up in chair, and maximizing independence with ADLs during admission   Pt completed ADLs and functional mobility for treatment session as noted above   Pt/caregiver verbalized understanding and expressed no further concerns/questions.  Updated communication board with level of assist required      Patient left up in chair with all lines intact, call button in reach, and RN notified    GOALS:   Multidisciplinary Problems       Occupational Therapy Goals          Problem: Occupational Therapy    Goal Priority Disciplines Outcome Interventions   Occupational Therapy Goal     OT, PT/OT Ongoing, Progressing    Description: Goals to be met by: 08-31-23     Patient will  increase functional independence with ADLs by performing:    UE Dressing with Set-up Assistance.  Grooming while seated with Set-up Assistance.  Toileting from toilet with Modified Scott for hygiene and clothing management.   Supine to sit with Scott.  Stand pivot transfers with Modified Scott.  Toilet transfer to toilet with Modified Scott.  Pt. To be I with HEP to BUE to improve level of endurance                         History:     Past Medical History:   Diagnosis Date    Anemia     Anxiety     COPD (chronic obstructive pulmonary disease)     COPD (chronic obstructive pulmonary disease) with emphysema     Coronary artery disease     Depression     Diverticulitis     Hyperlipidemia     Hypertension     PVD (peripheral vascular disease)     PVD (peripheral vascular disease)     S/P colostomy     Substance abuse     hx heavy etoh use     Tobacco abuse          Past Surgical History:   Procedure Laterality Date    ANGIOGRAPHY OF LOWER EXTREMITY Right 8/24/2023    Procedure: Angiogram Extremity Unilateral;  Surgeon: Benji Ashley MD;  Location: [address];  Service: Vascular;  Laterality: Right;    COLOSTOMY      ECTOPIC PREGNANCY SURGERY      right forefoot amputation      right toe amputation      x2 toes       Time Tracking:     OT Date of Treatment: 08/27/23  OT Start Time: 1030  OT Stop Time: 1053  OT Total Time (min): 23 min    Billable Minutes:Re-eval 10  Self Care/Home Management 13    8/27/2023

## 2023-08-27 NOTE — PT/OT/SLP RE-EVAL
Physical Therapy Re-evaluation (Co-Eval with OT)    Patient Name:  Radha Sotelo   MRN:  [id number]    Recommendations:     Discharge Recommendations: other (see comments)  Discharge Equipment Recommendations: none   Barriers to discharge: None    Assessment:     Radha Sotelo is a 78 y.o. female admitted with a medical diagnosis of Cellulitis and abscess of foot.  She presents with the following impairments/functional limitations: weakness, impaired endurance, impaired self care skills, impaired functional mobility, gait instability, impaired balance, decreased lower extremity function, pain, impaired skin, orthopedic precautions. Patient presents for re-evaluation POD2 angiogram w R SFA shunt placement. Patient was able to complete STS transfers and ambulation with 2ww due to decreased pain compared to last visit. Patient displays good motivation to participate. Recommending discharge home with home health PT to address exercise tolerance and deficits. Patient does have 14 ANAID home; she reported she does take breaks during and she has help from her daughter and son in law when navigating stairs.     Co-evaluation with OT performed due to patient complexity and deficits, requiring two skilled therapists to appropriately and safely assess patient's strength and endurance while facilitating functional tasks in addition to accommodating for patient's activity tolerance.      Rehab Prognosis:  Good; patient would benefit from acute skilled PT services to address these deficits and reach maximum level of function.      Recent Surgery: Procedure(s) (LRB):  ANGIOGRAM, EXTREMITY, UNILATERAL (Right)  STENT, SUPERFICIAL FEMORAL ARTERY (Right)  PTA, PERONEAL TIBIAL TRUNK WITH SHOCKWAVE (Right) 2 Days Post-Op    Plan:     During this hospitalization, patient to be seen 3 x/week to address the above listed problems via gait training, therapeutic activities, therapeutic exercises, wheelchair management/training  Plan of  "Care Expires:  09/20/23  Plan of Care Reviewed with: patient    Subjective     Communicated with RN prior to session.  Patient found HOB elevated with oxygen, telemetry upon PT entry to room, agreeable to evaluation.      Chief Complaint: SOB with exercise  Patient comments/goals: "that felt good, my foot didn't even hurt!" Re: ambulation.   Pain/Comfort:  Pain Rating 1: 0/10  Pain Rating Post-Intervention 1: 0/10    Patients cultural, spiritual, [employer]al conflicts given the current situation: no      Objective:     Patient found with: oxygen, telemetry     General Precautions: Standard, fall  Orthopedic Precautions: N/A  Braces: N/A  Respiratory Status: Nasal cannula, flow 2 L/min    Exams:  Cognitive Exam:  Patient is oriented to Person, Place, Time, and Situation  Sensation:    -       Intact  RLE ROM: WNL  RLE Strength: WNL  LLE ROM: WNL  LLE Strength: WNL    Functional Mobility:  Bed Mobility:     Rolling Right: stand by assistance  Scooting: stand by assistance  Supine to Sit: stand by assistance  Transfers:     Sit to Stand:  stand by assistance with rolling walker  Gait: patient ambulated 20' with CGA with 2ww. CGA for balance. Deviations: decreased gait speed and step length.    AM-PAC 6 CLICK MOBILITY  Total Score:22       Treatment and Education:   Education: patient educated on POC, need for therapy, safety with mobility, discharge recommendations and equipment recommendations. Patient verbalized understanding of all topics.   Cues for walker safety with transfers emphasizing UE placement.   Education on standing rest breaks during stair navigation and having assistance for safety. Deep breathing to increased spO2 when SOB.     Patient left up in chair with all lines intact, call button in reach, and RN notified.    GOALS:   Multidisciplinary Problems       Physical Therapy Goals          Problem: Physical Therapy    Goal Priority Disciplines Outcome Goal Variances Interventions   Physical Therapy Goal "     PT, PT/OT Ongoing, Progressing     Description: Goals to be met by: 23    Patient will increase functional independence with mobility by performin. Sit to stand transfer with [place]  2. Bed to chair transfer with Modified [place] using Rolling Walker  3. Gait  x 150 feet with Modified [place] using Rolling Walker.   4. Ascend/descend 14 stair with bilateral Handrails [place] using No Assistive Device.   5. Sitting at edge of bed x2 minutes with [place]  6. Stand for 2 minutes with [place] using No Assistive Device                         History:     Past Medical History:   Diagnosis Date    Anemia     Anxiety     COPD (chronic obstructive pulmonary disease)     COPD (chronic obstructive pulmonary disease) with emphysema     Coronary artery disease     Depression     Diverticulitis     Hyperlipidemia     Hypertension     PVD (peripheral vascular disease)     PVD (peripheral vascular disease)     S/P colostomy     Substance abuse     hx heavy etoh use     Tobacco abuse        Past Surgical History:   Procedure Laterality Date    ANGIOGRAPHY OF LOWER EXTREMITY Right 2023    Procedure: Angiogram Extremity Unilateral;  Surgeon: Benji Ashley MD;  Location: [facility] OR [address] [place] [address];  Service: Vascular;  Laterality: Right;    COLOSTOMY      ECTOPIC PREGNANCY SURGERY      right forefoot amputation      right toe amputation      x2 toes       Time Tracking:     PT Received On: 23  PT Start Time: 1028     PT Stop Time: 1053  PT Total Time (min): 25 min     Billable Minutes: Re-eval 15 minutes and Therapeutic Exercise 10 minutes      2023

## 2023-08-27 NOTE — PLAN OF CARE
Problem: Physical Therapy  Goal: Physical Therapy Goal  Description: Goals to be met by: 23    Patient will increase functional independence with mobility by performin. Sit to stand transfer with [place]  2. Bed to chair transfer with Modified [place] using Rolling Walker  3. Gait  x 150 feet with Modified [place] using Rolling Walker.   4. Ascend/descend 14 stair with bilateral Handrails [place] using No Assistive Device.   5. Sitting at edge of bed x2 minutes with [place]  6. Stand for 2 minutes with [place] using No Assistive Device    Outcome: Ongoing, Progressing     Re-eval , goals remain appropriate for patient.

## 2023-08-27 NOTE — PLAN OF CARE
Problem: Adult Inpatient Plan of Care  Goal: Plan of Care Review  Outcome: Ongoing, Progressing     Problem: Adult Inpatient Plan of Care  Goal: Patient-Specific Goal (Individualized)  Outcome: Ongoing, Progressing     Problem: Adult Inpatient Plan of Care  Goal: Absence of Hospital-Acquired Illness or Injury  Outcome: Ongoing, Progressing     Problem: Adult Inpatient Plan of Care  Goal: Optimal Comfort and Wellbeing  Outcome: Ongoing, Progressing     Problem: Adult Inpatient Plan of Care  Goal: Readiness for Transition of Care  Outcome: Ongoing, Progressing     Problem: Fluid and Electrolyte Imbalance (Acute Kidney Injury/Impairment)  Goal: Fluid and Electrolyte Balance  Outcome: Ongoing, Progressing     Problem: Oral Intake Inadequate (Acute Kidney Injury/Impairment)  Goal: Optimal Nutrition Intake  Outcome: Ongoing, Progressing     Problem: Renal Function Impairment (Acute Kidney Injury/Impairment)  Goal: Effective Renal Function  Outcome: Ongoing, Progressing   Patient AAOX4 no acute distress noted VSS 2L/NC 95% pain controlled throughout this shift safety measures maintained will cont will cont to monitor

## 2023-08-27 NOTE — PLAN OF CARE
Problem: Adult Inpatient Plan of Care  Goal: Plan of Care Review  Outcome: Ongoing, Progressing     Problem: Adult Inpatient Plan of Care  Goal: Patient-Specific Goal (Individualized)  Outcome: Ongoing, Progressing     Problem: Adult Inpatient Plan of Care  Goal: Absence of Hospital-Acquired Illness or Injury  Outcome: Ongoing, Progressing     Problem: Adult Inpatient Plan of Care  Goal: Optimal Comfort and Wellbeing  Outcome: Ongoing, Progressing     Problem: Adult Inpatient Plan of Care  Goal: Readiness for Transition of Care  Outcome: Ongoing, Progressing     Problem: Fluid and Electrolyte Imbalance (Acute Kidney Injury/Impairment)  Goal: Fluid and Electrolyte Balance  Outcome: Ongoing, Progressing     Problem: Oral Intake Inadequate (Acute Kidney Injury/Impairment)  Goal: Optimal Nutrition Intake  Outcome: Ongoing, Progressing     Problem: Renal Function Impairment (Acute Kidney Injury/Impairment)  Goal: Effective Renal Function  Outcome: Ongoing, Progressing     Problem: Infection (Pneumonia)  Goal: Resolution of Infection Signs and Symptoms  Outcome: Ongoing, Progressing     Problem: Respiratory Compromise (Pneumonia)  Goal: Effective Oxygenation and Ventilation  Outcome: Ongoing, Progressing     Problem: Impaired Wound Healing  Goal: Optimal Wound Healing  Outcome: Ongoing, Progressing     Problem: Fall Injury Risk  Goal: Absence of Fall and Fall-Related Injury  Outcome: Ongoing, Progressing     Problem: Skin Injury Risk Increased  Goal: Skin Health and Integrity  Outcome: Ongoing, Progressing

## 2023-08-27 NOTE — PLAN OF CARE
Pt tolerated re-evaluative session well this date    Problem: Occupational Therapy  Goal: Occupational Therapy Goal  Description: Goals to be met by: 08-31-23     Patient will increase functional independence with ADLs by performing:    UE Dressing with Set-up Assistance.  Grooming while seated with Set-up Assistance.  Toileting from toilet with Modified [place] for hygiene and clothing management.   Supine to sit with [place].  Stand pivot transfers with Modified [place].  Toilet transfer to toilet with Modified [place].  Pt. To be I with HEP to BUE to improve level of endurance    Outcome: Ongoing, Progressing

## 2023-08-28 LAB
ALBUMIN SERPL BCP-MCNC: 2.5 G/DL (ref 3.5–5.2)
ALP SERPL-CCNC: 111 U/L (ref 55–135)
ALT SERPL W/O P-5'-P-CCNC: 24 U/L (ref 10–44)
ANION GAP SERPL CALC-SCNC: 8 MMOL/L (ref 8–16)
AST SERPL-CCNC: 30 U/L (ref 10–40)
BASOPHILS # BLD AUTO: 0.05 K/UL (ref 0–0.2)
BASOPHILS NFR BLD: 0.9 % (ref 0–1.9)
BILIRUB SERPL-MCNC: 0.7 MG/DL (ref 0.1–1)
BUN SERPL-MCNC: 12 MG/DL (ref 8–23)
CALCIUM SERPL-MCNC: 8.4 MG/DL (ref 8.7–10.5)
CHLORIDE SERPL-SCNC: 103 MMOL/L (ref 95–110)
CO2 SERPL-SCNC: 26 MMOL/L (ref 23–29)
CREAT SERPL-MCNC: 0.6 MG/DL (ref 0.5–1.4)
DIFFERENTIAL METHOD: ABNORMAL
EOSINOPHIL # BLD AUTO: 0.3 K/UL (ref 0–0.5)
EOSINOPHIL NFR BLD: 6 % (ref 0–8)
ERYTHROCYTE [DISTWIDTH] IN BLOOD BY AUTOMATED COUNT: 15.1 % (ref 11.5–14.5)
EST. GFR  (NO RACE VARIABLE): >60 ML/MIN/1.73 M^2
GLUCOSE SERPL-MCNC: 87 MG/DL (ref 70–110)
HCT VFR BLD AUTO: 30.7 % (ref 37–48.5)
HGB BLD-MCNC: 9.6 G/DL (ref 12–16)
IMM GRANULOCYTES # BLD AUTO: 0.02 K/UL (ref 0–0.04)
IMM GRANULOCYTES NFR BLD AUTO: 0.4 % (ref 0–0.5)
LYMPHOCYTES # BLD AUTO: 0.7 K/UL (ref 1–4.8)
LYMPHOCYTES NFR BLD: 12.7 % (ref 18–48)
MAGNESIUM SERPL-MCNC: 1.8 MG/DL (ref 1.6–2.6)
MCH RBC QN AUTO: 29.3 PG (ref 27–31)
MCHC RBC AUTO-ENTMCNC: 31.3 G/DL (ref 32–36)
MCV RBC AUTO: 94 FL (ref 82–98)
MONOCYTES # BLD AUTO: 0.6 K/UL (ref 0.3–1)
MONOCYTES NFR BLD: 11.6 % (ref 4–15)
NEUTROPHILS # BLD AUTO: 3.7 K/UL (ref 1.8–7.7)
NEUTROPHILS NFR BLD: 68.4 % (ref 38–73)
NRBC BLD-RTO: 0 /100 WBC
PHOSPHATE SERPL-MCNC: 2.7 MG/DL (ref 2.7–4.5)
PLATELET # BLD AUTO: 232 K/UL (ref 150–450)
PMV BLD AUTO: 9.9 FL (ref 9.2–12.9)
POCT GLUCOSE: 100 MG/DL (ref 70–110)
POTASSIUM SERPL-SCNC: 3 MMOL/L (ref 3.5–5.1)
PROT SERPL-MCNC: 5.4 G/DL (ref 6–8.4)
RBC # BLD AUTO: 3.28 M/UL (ref 4–5.4)
SODIUM SERPL-SCNC: 137 MMOL/L (ref 136–145)
WBC # BLD AUTO: 5.36 K/UL (ref 3.9–12.7)

## 2023-08-28 PROCEDURE — 83735 ASSAY OF MAGNESIUM: CPT | Performed by: STUDENT IN AN ORGANIZED HEALTH CARE EDUCATION/TRAINING PROGRAM

## 2023-08-28 PROCEDURE — 94761 N-INVAS EAR/PLS OXIMETRY MLT: CPT

## 2023-08-28 PROCEDURE — 25000003 PHARM REV CODE 250: Performed by: INTERNAL MEDICINE

## 2023-08-28 PROCEDURE — 94640 AIRWAY INHALATION TREATMENT: CPT

## 2023-08-28 PROCEDURE — 80053 COMPREHEN METABOLIC PANEL: CPT | Performed by: STUDENT IN AN ORGANIZED HEALTH CARE EDUCATION/TRAINING PROGRAM

## 2023-08-28 PROCEDURE — 27000221 HC OXYGEN, UP TO 24 HOURS

## 2023-08-28 PROCEDURE — S4991 NICOTINE PATCH NONLEGEND: HCPCS | Performed by: INTERNAL MEDICINE

## 2023-08-28 PROCEDURE — 36415 COLL VENOUS BLD VENIPUNCTURE: CPT | Performed by: STUDENT IN AN ORGANIZED HEALTH CARE EDUCATION/TRAINING PROGRAM

## 2023-08-28 PROCEDURE — 25000242 PHARM REV CODE 250 ALT 637 W/ HCPCS: Performed by: INTERNAL MEDICINE

## 2023-08-28 PROCEDURE — 25000003 PHARM REV CODE 250

## 2023-08-28 PROCEDURE — 25000003 PHARM REV CODE 250: Performed by: STUDENT IN AN ORGANIZED HEALTH CARE EDUCATION/TRAINING PROGRAM

## 2023-08-28 PROCEDURE — 84100 ASSAY OF PHOSPHORUS: CPT | Performed by: STUDENT IN AN ORGANIZED HEALTH CARE EDUCATION/TRAINING PROGRAM

## 2023-08-28 PROCEDURE — 99900035 HC TECH TIME PER 15 MIN (STAT)

## 2023-08-28 PROCEDURE — 21400001 HC TELEMETRY ROOM

## 2023-08-28 PROCEDURE — 94664 DEMO&/EVAL PT USE INHALER: CPT

## 2023-08-28 PROCEDURE — 85025 COMPLETE CBC W/AUTO DIFF WBC: CPT | Performed by: STUDENT IN AN ORGANIZED HEALTH CARE EDUCATION/TRAINING PROGRAM

## 2023-08-28 RX ORDER — ONDANSETRON 4 MG/1
4 TABLET, ORALLY DISINTEGRATING ORAL EVERY 6 HOURS PRN
Qty: 30 TABLET | Refills: 0 | Status: SHIPPED | OUTPATIENT
Start: 2023-08-28 | End: 2023-09-05

## 2023-08-28 RX ORDER — OXYCODONE HYDROCHLORIDE 5 MG/1
5 TABLET ORAL EVERY 4 HOURS PRN
Qty: 21 TABLET | Refills: 0 | Status: ON HOLD | OUTPATIENT
Start: 2023-08-28 | End: 2023-11-22

## 2023-08-28 RX ORDER — CLOPIDOGREL BISULFATE 75 MG/1
75 TABLET ORAL DAILY
Qty: 30 TABLET | Refills: 11 | Status: ON HOLD | OUTPATIENT
Start: 2023-08-28 | End: 2023-11-22

## 2023-08-28 RX ORDER — POTASSIUM CHLORIDE 20 MEQ/1
40 TABLET, EXTENDED RELEASE ORAL EVERY 4 HOURS
Status: COMPLETED | OUTPATIENT
Start: 2023-08-28 | End: 2023-08-28

## 2023-08-28 RX ORDER — GUAIFENESIN 600 MG/1
600 TABLET, EXTENDED RELEASE ORAL 2 TIMES DAILY
Status: ON HOLD | COMMUNITY
Start: 2023-08-28 | End: 2023-11-22

## 2023-08-28 RX ORDER — CEFADROXIL 500 MG/1
1000 CAPSULE ORAL DAILY
Qty: 20 CAPSULE | Refills: 0 | Status: SHIPPED | OUTPATIENT
Start: 2023-08-28 | End: 2023-09-07

## 2023-08-28 RX ADMIN — CLOPIDOGREL BISULFATE 75 MG: 75 TABLET ORAL at 09:08

## 2023-08-28 RX ADMIN — MUPIROCIN: 20 OINTMENT TOPICAL at 09:08

## 2023-08-28 RX ADMIN — Medication: at 09:08

## 2023-08-28 RX ADMIN — METOPROLOL TARTRATE 50 MG: 50 TABLET, FILM COATED ORAL at 09:08

## 2023-08-28 RX ADMIN — ASPIRIN 81 MG 81 MG: 81 TABLET ORAL at 09:08

## 2023-08-28 RX ADMIN — CILOSTAZOL 50 MG: 50 TABLET ORAL at 09:08

## 2023-08-28 RX ADMIN — CEFADROXIL 1 G: 1000 TABLET ORAL at 09:08

## 2023-08-28 RX ADMIN — FLUTICASONE FUROATE AND VILANTEROL TRIFENATATE 1 PUFF: 200; 25 POWDER RESPIRATORY (INHALATION) at 07:08

## 2023-08-28 RX ADMIN — OXYCODONE HYDROCHLORIDE 10 MG: 10 TABLET ORAL at 09:08

## 2023-08-28 RX ADMIN — IPRATROPIUM BROMIDE 0.5 MG: 0.5 SOLUTION RESPIRATORY (INHALATION) at 01:08

## 2023-08-28 RX ADMIN — ATORVASTATIN CALCIUM 40 MG: 40 TABLET, FILM COATED ORAL at 09:08

## 2023-08-28 RX ADMIN — IPRATROPIUM BROMIDE 0.5 MG: 0.5 SOLUTION RESPIRATORY (INHALATION) at 07:08

## 2023-08-28 RX ADMIN — POTASSIUM CHLORIDE 40 MEQ: 1500 TABLET, EXTENDED RELEASE ORAL at 09:08

## 2023-08-28 RX ADMIN — Medication 400 MG: at 09:08

## 2023-08-28 RX ADMIN — ACETAMINOPHEN 1000 MG: 500 TABLET ORAL at 09:08

## 2023-08-28 RX ADMIN — Medication 1 PATCH: at 09:08

## 2023-08-28 RX ADMIN — GUAIFENESIN 600 MG: 600 TABLET, EXTENDED RELEASE ORAL at 09:08

## 2023-08-28 RX ADMIN — OXYCODONE HYDROCHLORIDE 5 MG: 5 TABLET ORAL at 01:08

## 2023-08-28 RX ADMIN — POTASSIUM CHLORIDE 40 MEQ: 1500 TABLET, EXTENDED RELEASE ORAL at 01:08

## 2023-08-28 NOTE — SUBJECTIVE & OBJECTIVE
Subjective:     Interval History:  Patient seen lying in bed.  Admits to right foot pain.  Denies any current fevers chills nausea or vomiting.  No other pedal complaints at this time.    Scheduled Meds:   acetaminophen  1,000 mg Oral Q8H    aspirin  81 mg Oral Daily    atorvastatin  40 mg Oral Daily    cefadroxil  1 g Oral Daily    cilostazoL  50 mg Oral BID    clopidogreL  75 mg Oral Daily    enoxparin  40 mg Subcutaneous Daily    fluticasone furoate-vilanteroL  1 puff Inhalation Daily    guaiFENesin  600 mg Oral BID    ipratropium  0.5 mg Nebulization Q6H    magnesium oxide  400 mg Oral BID    metoprolol tartrate  50 mg Oral BID    mupirocin   Nasal BID    nicotine  1 patch Transdermal Daily    potassium chloride  40 mEq Oral Q4H     Continuous Infusions:  PRN Meds:cadexomer iodine, dextrose 10%, dextrose 10%, glucagon (human recombinant), glucose, glucose, levalbuterol **AND** ipratropium, melatonin, naloxone, ondansetron, oxyCODONE, oxyCODONE, sars-cov-2 (covid-19), sodium chloride 0.9%    Review of Systems   Constitutional:  Positive for activity change and fatigue. Negative for chills and fever.   Respiratory:  Positive for shortness of breath.    Cardiovascular:  Negative for leg swelling.   Gastrointestinal:  Negative for diarrhea and vomiting.   Musculoskeletal:  Negative for back pain and neck pain.        Reports right TMA 8 years ago and pain   Skin:  Positive for color change and wound. Negative for rash.        Reports erythema on dorsal right TMA site that started 2 days ago   Neurological:  Negative for dizziness and weakness.   Hematological:  Negative for adenopathy. Does not bruise/bleed easily.   Psychiatric/Behavioral:  Negative for behavioral problems.      Objective:     Vital Signs (Most Recent):  Temp: 98.4 °F (36.9 °C) (08/28/23 0734)  Pulse: 92 (08/28/23 0749)  Resp: 18 (08/28/23 0949)  BP: (!) 119/57 (08/28/23 0734)  SpO2: (!) 93 % (08/28/23 0749) Vital Signs (24h Range):  Temp:  [98  "°F (36.7 °C)-99.8 °F (37.7 °C)] 98.4 °F (36.9 °C)  Pulse:  [] 92  Resp:  [14-20] 18  SpO2:  [90 %-96 %] 93 %  BP: (106-142)/(53-66) 119/57     Weight: 64.4 kg (142 lb)  Body mass index is 27.73 kg/m².    Foot Exam    General  Orientation: alert and oriented to person, place, and time       Right Foot/Ankle     Inspection and Palpation  Skin Exam: blister, drainage, skin changes, ulcer and erythema; skin not intact     Neurovascular  Dorsalis pedis: absent  Posterior tibial: absent  Saphenous nerve sensation: diminished  Tibial nerve sensation: diminished  Superficial peroneal nerve sensation: diminished  Deep peroneal nerve sensation: diminished  Sural nerve sensation: diminished    Comments  S/p R TMA. Extreme tenderness/pain on palpation. Distal dorsal TMA stump presents with darkened eschar at distal tip of right TMA site.  No probe to bone noted.  Eschar seems dry and stable     Left Foot/Ankle      Neurovascular  Dorsalis pedis: absent  Posterior tibial: absent  Saphenous nerve sensation: diminished  Tibial nerve sensation: diminished  Superficial peroneal nerve sensation: diminished  Deep peroneal nerve sensation: diminished  Sural nerve sensation: diminished              Laboratory:  A1C:   Recent Labs   Lab 08/19/23  0735   HGBA1C 5.6     Blood Cultures: No results for input(s): "LABBLOO" in the last 48 hours.  CBC:   Recent Labs   Lab 08/28/23  0638   WBC 5.36   RBC 3.28*   HGB 9.6*   HCT 30.7*      MCV 94   MCH 29.3   MCHC 31.3*     CMP:   Recent Labs   Lab 08/28/23  0638   GLU 87   CALCIUM 8.4*   ALBUMIN 2.5*   PROT 5.4*      K 3.0*   CO2 26      BUN 12   CREATININE 0.6   ALKPHOS 111   ALT 24   AST 30   BILITOT 0.7     Coagulation: No results for input(s): "PT", "INR", "APTT" in the last 168 hours.  CRP: No results for input(s): "CRP" in the last 168 hours.  ESR: No results for input(s): "SEDRATE" in the last 168 hours.  Prealbumin: No results for input(s): "PREALBUMIN" in the " last 48 hours.  Wound Cultures:   Recent Labs   Lab 08/21/23 0927   LABAERO STAPHYLOCOCCUS AUREUS  Few  *     Microbiology Results (last 7 days)       Procedure Component Value Units Date/Time    Culture, Anaerobe [[id number]] Collected: 08/21/23 0927    Order Status: Completed Specimen: Abscess from Foot, Right Updated: 08/25/23 1008     Anaerobic Culture No anaerobes isolated    Narrative:      Right foot TMA abscess    Blood Culture #1 **CANNOT BE ORDERED STAT** [[id number]] Collected: 08/18/23 2113    Order Status: Completed Specimen: Blood from Peripheral, Forearm, Right Updated: 08/23/23 2312     Blood Culture, Routine No growth after 5 days.    Blood Culture #2 **CANNOT BE ORDERED STAT** [[id number]] Collected: 08/18/23 2113    Order Status: Completed Specimen: Blood from Peripheral, Forearm, Right Updated: 08/23/23 2312     Blood Culture, Routine No growth after 5 days.    Aerobic culture [[id number]]  (Abnormal)  (Susceptibility) Collected: 08/21/23 0927    Order Status: Completed Specimen: Abscess from Foot, Right Updated: 08/23/23 1251     Aerobic Bacterial Culture STAPHYLOCOCCUS AUREUS  Few      Narrative:      Right foot TMA abscess    AFB Culture & Smear [[id number]] Collected: 08/21/23 0927    Order Status: Completed Specimen: Abscess from Foot, Right Updated: 08/22/23 2127     AFB Culture & Smear Culture in progress     AFB CULTURE STAIN No acid fast bacilli seen.    Narrative:      Right foot TMA abscess    Fungus culture [[id number]] Collected: 08/21/23 0927    Order Status: Completed Specimen: Abscess from Foot, Right Updated: 08/22/23 1359     Fungus (Mycology) Culture Culture in progress    Narrative:      Right foot TMA abscess    Gram stain [[id number]] Collected: 08/21/23 0927    Order Status: Completed Specimen: Abscess from Foot, Right Updated: 08/21/23 1651     Gram Stain Result Moderate WBC's      Few Gram positive cocci    Narrative:      Right foot TMA abscess          Specimen (24h ago,  onward)      None

## 2023-08-28 NOTE — PROGRESS NOTES
[facility] - [facility] ([address] Medicine  Telemedicine Progress Note    Patient Name: Radha Sotelo  MRN: [id number]  Patient Class: IP- Inpatient   Admission Date: 8/18/2023  Length of Stay: 8 days  Attending Physician: Luz Maria Spivey MD  Primary Care Provider: Laurence, Primary Doctor          Subjective:     Principal Problem:Cellulitis and abscess of foot        HPI:  Patient is a 78-year-old female with past medical history significant for severe COPD on home O2 for chronic hypoxic respiratory failure (4 L via nasal cannula), CAD, PAT, previous osteomyelitis with transmetatarsal amputation (2012) who presented to the emergency department due to worsening right foot pain with associated discoloration.  She had a similar presentation approximately 10 days ago and was started on broad-spectrum antibiotics.  Since that time she is been having worsening pain associated with the right leg as well as discoloration with possible deep blistering.  An x-ray was done in the emergency department which demonstrated no erosive changes of osteomyelitis.  She does have some overlying right erythema of the affected area as well as dark discoloration with possible blood blister present.  There is probably some fluctuance however complete evaluation was limited secondary to patient intolerance.  She has a black spot which does not appear to be leaking any purulent fluid.  She denies any fevers, chills, shortness of breath, chest pain, or intolerance of lying flat.  She states that she has been dealing with progressive lower extremity swelling bilaterally.  She does not take any diuretics for this and it is a new problem.           Overview/Hospital Course:  No notes on file      This follow-up encounter was provided through telemedicine to address  Cellulitis and abscess of foot present on admission.  Patient was transferred to the telemedicine service on:  08/23/2023   The patient location is: [id number]  A admitted 8/18/2023  8:14 PM.      Interval History/Overnight Events:     Patient is able to provide adequate history.    Patient feels well and ambulated with PT - seated in chair during visit;  foot pain controlled    Review of Systems   Constitutional:  Positive for fatigue. Negative for appetite change and fever.   Respiratory:  Positive for cough. Negative for shortness of breath.    Musculoskeletal:  Positive for arthralgias.   Skin:  Positive for wound.          I have reviewed the following on 08/27/2023:     Details     [x]   Lab results Hgb stable; Cr 0.7; LFT wnl    []   Micro reports     []   Pathology reports     [x]   Imaging reports US with left groin hematoma    []   Cardiology Procedure reports     []   Outside records/CareEverywhere     []  Independently viewed:         Inpatient Medications reviewed and prescribed for management of current problems:  Scheduled Meds:   acetaminophen  1,000 mg Oral Q8H    aspirin  81 mg Oral Daily    atorvastatin  40 mg Oral Daily    cefadroxil  1 g Oral Daily    cilostazoL  50 mg Oral BID    clopidogreL  75 mg Oral Daily    enoxparin  40 mg Subcutaneous Daily    fluticasone furoate-vilanteroL  1 puff Inhalation Daily    guaiFENesin  600 mg Oral BID    ipratropium  0.5 mg Nebulization Q6H    magnesium oxide  400 mg Oral BID    metoprolol tartrate  50 mg Oral BID    mupirocin   Nasal BID    nicotine  1 patch Transdermal Daily     Continuous Infusions:    PRN Meds:.cadexomer iodine, dextrose 10%, dextrose 10%, glucagon (human recombinant), glucose, glucose, levalbuterol **AND** ipratropium, melatonin, naloxone, ondansetron, oxyCODONE, oxyCODONE, sars-cov-2 (covid-19), sodium chloride 0.9%      Objective:     Temp:  [98 °F (36.7 °C)-99.8 °F (37.7 °C)] 99.8 °F (37.7 °C)  Pulse:  [] 84  Resp:  [12-20] 18  SpO2:  [90 %-96 %] 91 %  BP: (103-142)/(50-66) 142/66      Intake/Output Summary (Last 24 hours) at 8/27/2023 2136  Last data filed at 8/27/2023  1800  Gross per 24 hour   Intake 830 ml   Output 300 ml   Net 530 ml          Body mass index is 27.73 kg/m².    Physical Exam  Vitals and nursing note reviewed.   Constitutional:       General: She is not in acute distress.     Appearance: Normal appearance.   HENT:      Head: Normocephalic and atraumatic.   Eyes:      Extraocular Movements: Extraocular movements intact.   Cardiovascular:      Rate and Rhythm: Normal rate.   Pulmonary:      Effort: Pulmonary effort is normal. No tachypnea or respiratory distress.   Musculoskeletal:      Comments: Right foot bandaged   Neurological:      General: No focal deficit present.      Mental Status: She is alert and oriented to person, place, and time.      Cranial Nerves: No cranial nerve deficit.      Motor: No weakness.   Psychiatric:         Attention and Perception: Attention normal.         Mood and Affect: Mood and affect normal.         Speech: Speech normal.         Behavior: Behavior is cooperative.          Labs: All labs within the last 24 hours were reviewed.   Recent Results (from the past 24 hour(s))   CBC Auto Differential    Collection Time: 08/27/23  5:04 AM   Result Value Ref Range    WBC 7.09 3.90 - 12.70 K/uL    RBC 3.37 (L) 4.00 - 5.40 M/uL    Hemoglobin 9.9 (L) 12.0 - 16.0 g/dL    Hematocrit 32.1 (L) 37.0 - 48.5 %    MCV 95 82 - 98 fL    MCH 29.4 27.0 - 31.0 pg    MCHC 30.8 (L) 32.0 - 36.0 g/dL    RDW 15.2 (H) 11.5 - 14.5 %    Platelets 222 150 - 450 K/uL    MPV 9.9 9.2 - 12.9 fL    Immature Granulocytes 0.6 (H) 0.0 - 0.5 %    Gran # (ANC) 5.4 1.8 - 7.7 K/uL    Immature Grans (Abs) 0.04 0.00 - 0.04 K/uL    Lymph # 0.5 (L) 1.0 - 4.8 K/uL    Mono # 0.7 0.3 - 1.0 K/uL    Eos # 0.3 0.0 - 0.5 K/uL    Baso # 0.06 0.00 - 0.20 K/uL    nRBC 0 0 /100 WBC    Gran % 76.5 (H) 38.0 - 73.0 %    Lymph % 7.2 (L) 18.0 - 48.0 %    Mono % 10.4 4.0 - 15.0 %    Eosinophil % 4.5 0.0 - 8.0 %    Basophil % 0.8 0.0 - 1.9 %    Differential Method Automated    Comprehensive  "Metabolic Panel    Collection Time: 08/27/23  5:04 AM   Result Value Ref Range    Sodium 137 136 - 145 mmol/L    Potassium 3.4 (L) 3.5 - 5.1 mmol/L    Chloride 105 95 - 110 mmol/L    CO2 24 23 - 29 mmol/L    Glucose 117 (H) 70 - 110 mg/dL    BUN 13 8 - 23 mg/dL    Creatinine 0.7 0.5 - 1.4 mg/dL    Calcium 8.3 (L) 8.7 - 10.5 mg/dL    Total Protein 5.5 (L) 6.0 - 8.4 g/dL    Albumin 2.6 (L) 3.5 - 5.2 g/dL    Total Bilirubin 0.4 0.1 - 1.0 mg/dL    Alkaline Phosphatase 101 55 - 135 U/L    AST 37 10 - 40 U/L    ALT 29 10 - 44 U/L    eGFR >60.0 >60 mL/min/1.73 m^2    Anion Gap 8 8 - 16 mmol/L   Magnesium    Collection Time: 08/27/23  5:04 AM   Result Value Ref Range    Magnesium 1.7 1.6 - 2.6 mg/dL   Phosphorus    Collection Time: 08/27/23  5:04 AM   Result Value Ref Range    Phosphorus 3.4 2.7 - 4.5 mg/dL        Lab Results   Component Value Date    [id number] Not Detected 07/29/2021       Recent Labs   Lab 08/25/23  0459 08/26/23  1610 08/27/23  0504   WBC 6.85 7.58 7.09   LYMPH 14.9*  1.0 6.7*  0.5* 7.2*  0.5*   HGB 11.8* 10.0* 9.9*   HCT 38.3 32.0* 32.1*    227 222       Recent Labs   Lab 08/24/23  0302 08/25/23  0459 08/26/23  1610 08/27/23  0504    136 138 137   K 3.7 3.8 3.3* 3.4*    100 104 105   CO2 24 27 22* 24   BUN 12 15 15 13   CREATININE 0.7 0.8 0.7 0.7   GLU 78 76 79 117*   CALCIUM 8.2* 9.2 8.2* 8.3*   MG 1.6 1.9  --  1.7   PHOS 2.3* 3.0  --  3.4       Recent Labs   Lab 08/25/23  0459 08/26/23  1610 08/27/23  0504   ALKPHOS 142* 107 101   ALT 61* 34 29   AST 57* 46* 37   ALBUMIN 3.0* 2.6* 2.6*   PROT 6.2 5.5* 5.5*   BILITOT 0.7 0.6 0.4          No results for input(s): "DDIMER", "FERRITIN", "CRP", "LDH", "BNP", "TROPONINI", "CPK" in the last 72 hours.    Invalid input(s): "PROCALCITONIN"        Microbiology: All microbiology updates for the past 24 hours have been reviewed.  Microbiology Results (last 7 days)       Procedure Component Value Units Date/Time    Culture, Anaerobe " [[id number]] Collected: 08/21/23 0927    Order Status: Completed Specimen: Abscess from Foot, Right Updated: 08/25/23 1008     Anaerobic Culture No anaerobes isolated    Narrative:      Right foot TMA abscess    Blood Culture #1 **CANNOT BE ORDERED STAT** [[id number]] Collected: 08/18/23 2113    Order Status: Completed Specimen: Blood from Peripheral, Forearm, Right Updated: 08/23/23 2312     Blood Culture, Routine No growth after 5 days.    Blood Culture #2 **CANNOT BE ORDERED STAT** [[id number]] Collected: 08/18/23 2113    Order Status: Completed Specimen: Blood from Peripheral, Forearm, Right Updated: 08/23/23 2312     Blood Culture, Routine No growth after 5 days.    Aerobic culture [[id number]]  (Abnormal)  (Susceptibility) Collected: 08/21/23 0927    Order Status: Completed Specimen: Abscess from Foot, Right Updated: 08/23/23 1251     Aerobic Bacterial Culture STAPHYLOCOCCUS AUREUS  Few      Narrative:      Right foot TMA abscess    AFB Culture & Smear [[id number]] Collected: 08/21/23 0927    Order Status: Completed Specimen: Abscess from Foot, Right Updated: 08/22/23 2127     AFB Culture & Smear Culture in progress     AFB CULTURE STAIN No acid fast bacilli seen.    Narrative:      Right foot TMA abscess    Fungus culture [[id number]] Collected: 08/21/23 0927    Order Status: Completed Specimen: Abscess from Foot, Right Updated: 08/22/23 1359     Fungus (Mycology) Culture Culture in progress    Narrative:      Right foot TMA abscess    Gram stain [[id number]] Collected: 08/21/23 0927    Order Status: Completed Specimen: Abscess from Foot, Right Updated: 08/21/23 1651     Gram Stain Result Moderate WBC's      Few Gram positive cocci    Narrative:      Right foot TMA abscess              Imaging All imaging within the last 24 hours was reviewed.       Results for orders placed during the hospital encounter of 03/01/20    Echo Color Flow Doppler? Yes    Interpretation Summary  · Normal left ventricular systolic  function. The estimated ejection fraction is 60%.  · Normal right ventricular systolic function.  · Normal LV diastolic function.  · The estimated PA systolic pressure is 31 mmHg.  · Normal central venous pressure (3 mmHg).      US Lower Extremity Arteries Left  Narrative: EXAMINATION:  US LOWER EXTREMITY ARTERIES LEFT    CLINICAL HISTORY:  Bleeding and hematoma over L femoral access site; want to assess for possible pseudoaneurysm;    TECHNIQUE:  Limited ultrasound evaluation of the left groin to rule out pseudoaneurysm.  Multiple gray scale and color doppler images were obtained in addition to waveform analysis.    COMPARISON:  CT a runoff study    FINDINGS:  Visualized left common femoral vein is patent.  Left common femoral artery is patent with monophasic waveforms and peak systolic velocity of 106 cm/sec.  Monophasic waveforms may reflect peripheral arterial disease though of limited characterization given nondedicated study.  No tardus parvus waveform.  No evidence of pseudoaneurysm.    There is a left groin soft tissue heterogeneous hypoechoic collection measuring 2.7 x 5.2 x 2.7 cm without internal vascularity, likely reflecting hematoma in light of history.  Impression: No evidence of pseudoaneurysm as clinically questioned.  Left groin fluid collection measuring 5.2 cm likely reflecting hematoma in light of history.    Electronically signed by resident: Castillo Herrera  Date:    08/26/2023  Time:    20:06    Electronically signed by: Zain Browning MD  Date:    08/26/2023  Time:    20:34             Assessment/Plan:      * Cellulitis and abscess of foot  Patient is a 78-year-old female with previous history of chronic osteo with unhealing wound status post transmetatarsal amputation who is now presenting after a recent hospitalization with worsening right lower extremity swelling, erythema, and pain.  She states that it is much worse than normal and that she is developed a black rash over the affected area.   She denies any overt purulence or opening skin however she remains very tender to palpation.  X-ray at this time does not show any erosive changes and stable from previous hospitalization 10 days ago.  Will obtain an MRI to definitively rule out the presence of osteomyelitis given mildly elevated inflammatory markers which are sensitive but not specific    Non-surgical site of infection and Non-purulent Cellulitis  (ICD 10: L03.90)    There is not an abscess evident.    There is the presence of:   - Rapid progression    There is not presence or evidence of involvement of indwelling medical device    Empiric Vancomycin is based on the presence of Recurrence despite previous treatment    tetanus status unknown to the patient    There is not the presence of an immunocompromising condition requiring consideration for fungal, virus, or atypical organisms.    - Vancomycin and ceftriaxone therapy  - Blood cultures negative  - MRI foot w contrast shows nonspecific diffuse subcutaneous edema and redemonstration of a small nonspecific fluid collection within the soft tissues at the level the resection stump, no abscess or OM identified. Limited incomplete examination as the patient was unable to tolerate the study.   - Elevation of affected area and pain control  - PT/OT recommending SNF  - Still c/o significant pain to RLE stump  - Vascular surgery and podiatry consulted  - s/p debridement and abscess drainage 8/21 - plan for wound care at discharge  - Wound cx growing MSSA  - ID consulted and antibiotics per ID recommendations:  Antibiotics (From admission, onward)    Start     Stop Route Frequency Ordered    08/26/23 2100  mupirocin 2 % ointment         08/31/23 2059 Nasl 2 times daily 08/26/23 1309    08/24/23 0900  cefadroxil tablet 1 g         -- Oral Daily 08/23/23 1558          Discharge planning  Diet: Diet Adult Regular (IDDSI Level 7)  Significant LDAs:   IV Access Type: Peripheral  Urinary Catheter Indication if  present: Patient Does Not Have Urinary Catheter  Other Lines/Tubes/Drains:     Goals of Care:    Previous admission:  8/10/23  Code Status: Full Code  Likely prognosis:  Fair    FLORIAN: 8/28/2023     Code Status: Full Code   Is the patient medically ready for discharge?: No    Reason for patient still in hospital (select all that apply): Patient trending condition, Treatment and Consult recommendations  Discharge Plan A: Home Health   Discharge Delays: None known at this time  The amount of time spent during, and associated with, this encounter was 35 minutes; the nature of the activities performed were:  Preparing to see the patient, chart review  Obtaining and/or receiving separately obtained history   Performing medically appropriate examination and evaluation  Counseling and educating the patient/family/caregiver  Ordering medications, tests, or procedures  Referring to and communicating with other health care professionals  Documenting clinical information in the EHR  Independently interpreting results  Care coordination    Hypokalemia  Replete with KCl and replete Mag; continue to monitor    History of transmetatarsal amputation of right foot  Podiatry following for wound; angiogram planned    Chronic obstructive pulmonary disease  Patient uses nebulizers q.6 hours.  Unfortunately due to national shortages, we are unable to provide these.  Will resume her home inhalers and provide nebulizers as able.  She is currently on 2 L of oxygen which appears to be her baseline.  She states that she is no longer smoking and denies any signs or symptoms of ongoing acute exacerbation of her COPD.    Patient information:     SpO2: (!) 91 %       mMRC Key:  0 - Dyspnea only with strenuous exercise  1 - Dyspnea when hurrying or walking up a slight hill  2 - Walks slower than people of the same age because of dyspnea or has to stop for breath when walking at own pace  3 - Stops for breath after walking 100 yards or after a few  "minutes  4 - Too dyspneic to leave house or breathless when dressing    Exacerbation Component:  - COPD: stable  Patient Is not showing signs of a current COPD exacerbation The patient is not currently have symptoms / an exacerbation. The patient has COPD for approximately 10+ years. Symptoms in previous episodes have included dyspnea, cough and wheezing, and typically last 5 days. Previous episodes have been exacerbated by Smoke and upper respiratory infection. Current treatment includes albuterol nebulizer, albuterol/ipratropium inhaler, oxygen and Breo, which generally provides some relief of symptoms.    - Risk factor reduction (Smoking cessation/avoidance, management of GERD, pHTN treatment)  - Avoidance of triggers (Air pollution, respiratory infections, pulmonary embolisms)  - Complete metabolic panel  - Target SpO2 of 88-92% or PaO2 of 60-70 mmHg    Coronary artery disease  No ongoing chest pain.  Will continue to monitor and may need preoperative evaluation if surgery is warranted.    PVD (peripheral vascular disease)  Patient seen by Dr. Saldaña but has not been seen since 2015.    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  Now has discoloration to RLE stump.  Vascular surgery consulted  Arterial doppler showing severe PAD RLE.   Angiogram on 8/25 with right SFA stent placed; continue ASA/statin and plavix  -US " ordered to evaluate hematoma      Unspecified protein-calorie malnutrition  Nutrition consulted. Most recent weight and BMI monitored-     Measurements:  Wt Readings from Last 1 Encounters:   08/25/23 64.4 kg (142 lb)   Body mass index is 27.73 kg/m².    Patient to be screened and assessed by RD.  Previous protein calorie malnutrition.        VTE Risk Mitigation (From admission, onward)         Ordered     enoxaparin injection 40 mg  Daily         08/19/23 0020     Place sequential compression device  Until discontinued         08/19/23 0020                      I have completed this tele-visit with the assistance of a telepresenter.    The attending portion of this evaluation, treatment, and documentation was performed per Luz Maria Spivey MD via Telemedicine AudioVisual using the secure [employer] software platform with 2 way audio/video. The provider was located off-site and the patient is located in the hospital. The aforementioned video software was utilized to document the relevant history and physical exam    Luz Maria Spivey MD  Department of Hospital Medicine   [facility] - Intensive Care ([place])

## 2023-08-28 NOTE — ANESTHESIA POSTPROCEDURE EVALUATION
Anesthesia Post Evaluation    Patient: Radha Sotelo    Procedure(s) Performed: Procedure(s) (LRB):  ANGIOGRAM, EXTREMITY, UNILATERAL (Right)  STENT, SUPERFICIAL FEMORAL ARTERY (Right)  PTA, PERONEAL TIBIAL TRUNK WITH SHOCKWAVE (Right)    Final Anesthesia Type: general      Patient location during evaluation: PACU  Patient participation: Yes- Able to Participate  Level of consciousness: awake and alert  Post-procedure vital signs: reviewed and stable  Pain management: adequate  Airway patency: patent    PONV status at discharge: No PONV  Anesthetic complications: no      Cardiovascular status: blood pressure returned to baseline  Respiratory status: unassisted  Hydration status: euvolemic  Follow-up not needed.          Vitals Value Taken Time   /57 08/28/23 0734   Temp 36.9 °C (98.4 °F) 08/28/23 0734   Pulse 92 08/28/23 0749   Resp 12 08/28/23 0749   SpO2 93 % 08/28/23 0749         Event Time   Out of Recovery 08/25/2023 17:50:00         Pain/Laura Score: Pain Rating Prior to Med Admin: 7 (8/27/2023  8:33 PM)  Pain Rating Post Med Admin: 0 (8/27/2023  3:17 PM)

## 2023-08-28 NOTE — SUBJECTIVE & OBJECTIVE
This follow-up encounter was provided through telemedicine to address  Cellulitis and abscess of foot present on admission.  Patient was transferred to the telemedicine service on:  08/23/2023   The patient location is: [id number] A admitted 8/18/2023  8:14 PM.      Interval History/Overnight Events:     Patient is able to provide adequate history.    Patient feels well and ambulated with PT - seated in chair during visit;  foot pain controlled    Review of Systems   Constitutional:  Positive for fatigue. Negative for appetite change and fever.   Respiratory:  Positive for cough. Negative for shortness of breath.    Musculoskeletal:  Positive for arthralgias.   Skin:  Positive for wound.          I have reviewed the following on 08/27/2023:     Details     [x]   Lab results Hgb stable; Cr 0.7; LFT wnl    []   Micro reports     []   Pathology reports     [x]   Imaging reports US with left groin hematoma    []   Cardiology Procedure reports     []   Outside records/CareEverywhere     []  Independently viewed:         Inpatient Medications reviewed and prescribed for management of current problems:  Scheduled Meds:   acetaminophen  1,000 mg Oral Q8H    aspirin  81 mg Oral Daily    atorvastatin  40 mg Oral Daily    cefadroxil  1 g Oral Daily    cilostazoL  50 mg Oral BID    clopidogreL  75 mg Oral Daily    enoxparin  40 mg Subcutaneous Daily    fluticasone furoate-vilanteroL  1 puff Inhalation Daily    guaiFENesin  600 mg Oral BID    ipratropium  0.5 mg Nebulization Q6H    magnesium oxide  400 mg Oral BID    metoprolol tartrate  50 mg Oral BID    mupirocin   Nasal BID    nicotine  1 patch Transdermal Daily     Continuous Infusions:    PRN Meds:.cadexomer iodine, dextrose 10%, dextrose 10%, glucagon (human recombinant), glucose, glucose, levalbuterol **AND** ipratropium, melatonin, naloxone, ondansetron, oxyCODONE, oxyCODONE, sars-cov-2 (covid-19), sodium chloride 0.9%      Objective:     Temp:  [98 °F (36.7 °C)-99.8  °F (37.7 °C)] 99.8 °F (37.7 °C)  Pulse:  [] 84  Resp:  [12-20] 18  SpO2:  [90 %-96 %] 91 %  BP: (103-142)/(50-66) 142/66      Intake/Output Summary (Last 24 hours) at 8/27/2023 2136  Last data filed at 8/27/2023 1800  Gross per 24 hour   Intake 830 ml   Output 300 ml   Net 530 ml          Body mass index is 27.73 kg/m².    Physical Exam  Vitals and nursing note reviewed.   Constitutional:       General: She is not in acute distress.     Appearance: Normal appearance.   HENT:      Head: Normocephalic and atraumatic.   Eyes:      Extraocular Movements: Extraocular movements intact.   Cardiovascular:      Rate and Rhythm: Normal rate.   Pulmonary:      Effort: Pulmonary effort is normal. No tachypnea or respiratory distress.   Musculoskeletal:      Comments: Right foot bandaged   Neurological:      General: No focal deficit present.      Mental Status: She is alert and oriented to person, place, and time.      Cranial Nerves: No cranial nerve deficit.      Motor: No weakness.   Psychiatric:         Attention and Perception: Attention normal.         Mood and Affect: Mood and affect normal.         Speech: Speech normal.         Behavior: Behavior is cooperative.          Labs: All labs within the last 24 hours were reviewed.   Recent Results (from the past 24 hour(s))   CBC Auto Differential    Collection Time: 08/27/23  5:04 AM   Result Value Ref Range    WBC 7.09 3.90 - 12.70 K/uL    RBC 3.37 (L) 4.00 - 5.40 M/uL    Hemoglobin 9.9 (L) 12.0 - 16.0 g/dL    Hematocrit 32.1 (L) 37.0 - 48.5 %    MCV 95 82 - 98 fL    MCH 29.4 27.0 - 31.0 pg    MCHC 30.8 (L) 32.0 - 36.0 g/dL    RDW 15.2 (H) 11.5 - 14.5 %    Platelets 222 150 - 450 K/uL    MPV 9.9 9.2 - 12.9 fL    Immature Granulocytes 0.6 (H) 0.0 - 0.5 %    Gran # (ANC) 5.4 1.8 - 7.7 K/uL    Immature Grans (Abs) 0.04 0.00 - 0.04 K/uL    Lymph # 0.5 (L) 1.0 - 4.8 K/uL    Mono # 0.7 0.3 - 1.0 K/uL    Eos # 0.3 0.0 - 0.5 K/uL    Baso # 0.06 0.00 - 0.20 K/uL    nRBC 0 0  "/100 WBC    Gran % 76.5 (H) 38.0 - 73.0 %    Lymph % 7.2 (L) 18.0 - 48.0 %    Mono % 10.4 4.0 - 15.0 %    Eosinophil % 4.5 0.0 - 8.0 %    Basophil % 0.8 0.0 - 1.9 %    Differential Method Automated    Comprehensive Metabolic Panel    Collection Time: 08/27/23  5:04 AM   Result Value Ref Range    Sodium 137 136 - 145 mmol/L    Potassium 3.4 (L) 3.5 - 5.1 mmol/L    Chloride 105 95 - 110 mmol/L    CO2 24 23 - 29 mmol/L    Glucose 117 (H) 70 - 110 mg/dL    BUN 13 8 - 23 mg/dL    Creatinine 0.7 0.5 - 1.4 mg/dL    Calcium 8.3 (L) 8.7 - 10.5 mg/dL    Total Protein 5.5 (L) 6.0 - 8.4 g/dL    Albumin 2.6 (L) 3.5 - 5.2 g/dL    Total Bilirubin 0.4 0.1 - 1.0 mg/dL    Alkaline Phosphatase 101 55 - 135 U/L    AST 37 10 - 40 U/L    ALT 29 10 - 44 U/L    eGFR >60.0 >60 mL/min/1.73 m^2    Anion Gap 8 8 - 16 mmol/L   Magnesium    Collection Time: 08/27/23  5:04 AM   Result Value Ref Range    Magnesium 1.7 1.6 - 2.6 mg/dL   Phosphorus    Collection Time: 08/27/23  5:04 AM   Result Value Ref Range    Phosphorus 3.4 2.7 - 4.5 mg/dL        Lab Results   Component Value Date    [id number] Not Detected 07/29/2021       Recent Labs   Lab 08/25/23  0459 08/26/23  1610 08/27/23  0504   WBC 6.85 7.58 7.09   LYMPH 14.9*  1.0 6.7*  0.5* 7.2*  0.5*   HGB 11.8* 10.0* 9.9*   HCT 38.3 32.0* 32.1*    227 222       Recent Labs   Lab 08/24/23  0302 08/25/23  0459 08/26/23  1610 08/27/23  0504    136 138 137   K 3.7 3.8 3.3* 3.4*    100 104 105   CO2 24 27 22* 24   BUN 12 15 15 13   CREATININE 0.7 0.8 0.7 0.7   GLU 78 76 79 117*   CALCIUM 8.2* 9.2 8.2* 8.3*   MG 1.6 1.9  --  1.7   PHOS 2.3* 3.0  --  3.4       Recent Labs   Lab 08/25/23  0459 08/26/23  1610 08/27/23  0504   ALKPHOS 142* 107 101   ALT 61* 34 29   AST 57* 46* 37   ALBUMIN 3.0* 2.6* 2.6*   PROT 6.2 5.5* 5.5*   BILITOT 0.7 0.6 0.4          No results for input(s): "DDIMER", "FERRITIN", "CRP", "LDH", "BNP", "TROPONINI", "CPK" in the last 72 hours.    Invalid input(s): " ""PROCALCITONIN"        Microbiology: All microbiology updates for the past 24 hours have been reviewed.  Microbiology Results (last 7 days)       Procedure Component Value Units Date/Time    Culture, Anaerobe [[id number]] Collected: 08/21/23 0927    Order Status: Completed Specimen: Abscess from Foot, Right Updated: 08/25/23 1008     Anaerobic Culture No anaerobes isolated    Narrative:      Right foot TMA abscess    Blood Culture #1 **CANNOT BE ORDERED STAT** [[id number]] Collected: 08/18/23 2113    Order Status: Completed Specimen: Blood from Peripheral, Forearm, Right Updated: 08/23/23 2312     Blood Culture, Routine No growth after 5 days.    Blood Culture #2 **CANNOT BE ORDERED STAT** [[id number]] Collected: 08/18/23 2113    Order Status: Completed Specimen: Blood from Peripheral, Forearm, Right Updated: 08/23/23 2312     Blood Culture, Routine No growth after 5 days.    Aerobic culture [[id number]]  (Abnormal)  (Susceptibility) Collected: 08/21/23 0927    Order Status: Completed Specimen: Abscess from Foot, Right Updated: 08/23/23 1251     Aerobic Bacterial Culture STAPHYLOCOCCUS AUREUS  Few      Narrative:      Right foot TMA abscess    AFB Culture & Smear [[id number]] Collected: 08/21/23 0927    Order Status: Completed Specimen: Abscess from Foot, Right Updated: 08/22/23 2127     AFB Culture & Smear Culture in progress     AFB CULTURE STAIN No acid fast bacilli seen.    Narrative:      Right foot TMA abscess    Fungus culture [[id number]] Collected: 08/21/23 0927    Order Status: Completed Specimen: Abscess from Foot, Right Updated: 08/22/23 1359     Fungus (Mycology) Culture Culture in progress    Narrative:      Right foot TMA abscess    Gram stain [[id number]] Collected: 08/21/23 0927    Order Status: Completed Specimen: Abscess from Foot, Right Updated: 08/21/23 1651     Gram Stain Result Moderate WBC's      Few Gram positive cocci    Narrative:      Right foot TMA abscess              Imaging All imaging " within the last 24 hours was reviewed.       Results for orders placed during the hospital encounter of 03/01/20    Echo Color Flow Doppler? Yes    Interpretation Summary  · Normal left ventricular systolic function. The estimated ejection fraction is 60%.  · Normal right ventricular systolic function.  · Normal LV diastolic function.  · The estimated PA systolic pressure is 31 mmHg.  · Normal central venous pressure (3 mmHg).      US Lower Extremity Arteries Left  Narrative: EXAMINATION:  US LOWER EXTREMITY ARTERIES LEFT    CLINICAL HISTORY:  Bleeding and hematoma over L femoral access site; want to assess for possible pseudoaneurysm;    TECHNIQUE:  Limited ultrasound evaluation of the left groin to rule out pseudoaneurysm.  Multiple gray scale and color doppler images were obtained in addition to waveform analysis.    COMPARISON:  CT a runoff study    FINDINGS:  Visualized left common femoral vein is patent.  Left common femoral artery is patent with monophasic waveforms and peak systolic velocity of 106 cm/sec.  Monophasic waveforms may reflect peripheral arterial disease though of limited characterization given nondedicated study.  No tardus parvus waveform.  No evidence of pseudoaneurysm.    There is a left groin soft tissue heterogeneous hypoechoic collection measuring 2.7 x 5.2 x 2.7 cm without internal vascularity, likely reflecting hematoma in light of history.  Impression: No evidence of pseudoaneurysm as clinically questioned.  Left groin fluid collection measuring 5.2 cm likely reflecting hematoma in light of history.    Electronically signed by resident: Castillo Herrera  Date:    08/26/2023  Time:    20:06    Electronically signed by: Zain Browning MD  Date:    08/26/2023  Time:    20:34

## 2023-08-28 NOTE — PROGRESS NOTES
[facility] ([place] [address])  Podiatry  Progress Note    Patient Name: Radha Sotelo  MRN: [id number]  Admission Date: 8/18/2023  Hospital Length of Stay: 9 days  Attending Physician: Luz Maria Spivey MD  Primary Care Provider: Laurence, Primary Doctor     Subjective:     Interval History:  Patient seen lying in bed.  Admits to right foot pain.  Denies any current fevers chills nausea or vomiting.  No other pedal complaints at this time.    Scheduled Meds:   acetaminophen  1,000 mg Oral Q8H    aspirin  81 mg Oral Daily    atorvastatin  40 mg Oral Daily    cefadroxil  1 g Oral Daily    cilostazoL  50 mg Oral BID    clopidogreL  75 mg Oral Daily    enoxparin  40 mg Subcutaneous Daily    fluticasone furoate-vilanteroL  1 puff Inhalation Daily    guaiFENesin  600 mg Oral BID    ipratropium  0.5 mg Nebulization Q6H    magnesium oxide  400 mg Oral BID    metoprolol tartrate  50 mg Oral BID    mupirocin   Nasal BID    nicotine  1 patch Transdermal Daily    potassium chloride  40 mEq Oral Q4H     Continuous Infusions:  PRN Meds:cadexomer iodine, dextrose 10%, dextrose 10%, glucagon (human recombinant), glucose, glucose, levalbuterol **AND** ipratropium, melatonin, naloxone, ondansetron, oxyCODONE, oxyCODONE, sars-cov-2 (covid-19), sodium chloride 0.9%    Review of Systems   Constitutional:  Positive for activity change and fatigue. Negative for chills and fever.   Respiratory:  Positive for shortness of breath.    Cardiovascular:  Negative for leg swelling.   Gastrointestinal:  Negative for diarrhea and vomiting.   Musculoskeletal:  Negative for back pain and neck pain.        Reports right TMA 8 years ago and pain   Skin:  Positive for color change and wound. Negative for rash.        Reports erythema on dorsal right TMA site that started 2 days ago   Neurological:  Negative for dizziness and weakness.   Hematological:  Negative for adenopathy. Does not bruise/bleed easily.   Psychiatric/Behavioral:  Negative for  "behavioral problems.      Objective:     Vital Signs (Most Recent):  Temp: 98.4 °F (36.9 °C) (08/28/23 0734)  Pulse: 92 (08/28/23 0749)  Resp: 18 (08/28/23 0949)  BP: (!) 119/57 (08/28/23 0734)  SpO2: (!) 93 % (08/28/23 0749) Vital Signs (24h Range):  Temp:  [98 °F (36.7 °C)-99.8 °F (37.7 °C)] 98.4 °F (36.9 °C)  Pulse:  [] 92  Resp:  [14-20] 18  SpO2:  [90 %-96 %] 93 %  BP: (106-142)/(53-66) 119/57     Weight: 64.4 kg (142 lb)  Body mass index is 27.73 kg/m².    Foot Exam    General  Orientation: alert and oriented to person, place, and time       Right Foot/Ankle     Inspection and Palpation  Skin Exam: blister, drainage, skin changes, ulcer and erythema; skin not intact     Neurovascular  Dorsalis pedis: absent  Posterior tibial: absent  Saphenous nerve sensation: diminished  Tibial nerve sensation: diminished  Superficial peroneal nerve sensation: diminished  Deep peroneal nerve sensation: diminished  Sural nerve sensation: diminished    Comments  S/p R TMA. Extreme tenderness/pain on palpation. Distal dorsal TMA stump presents with darkened eschar at distal tip of right TMA site.  No probe to bone noted.  Eschar seems dry and stable     Left Foot/Ankle      Neurovascular  Dorsalis pedis: absent  Posterior tibial: absent  Saphenous nerve sensation: diminished  Tibial nerve sensation: diminished  Superficial peroneal nerve sensation: diminished  Deep peroneal nerve sensation: diminished  Sural nerve sensation: diminished              Laboratory:  A1C:   Recent Labs   Lab 08/19/23  0735   HGBA1C 5.6     Blood Cultures: No results for input(s): "LABBLOO" in the last 48 hours.  CBC:   Recent Labs   Lab 08/28/23  0638   WBC 5.36   RBC 3.28*   HGB 9.6*   HCT 30.7*      MCV 94   MCH 29.3   MCHC 31.3*     CMP:   Recent Labs   Lab 08/28/23  0638   GLU 87   CALCIUM 8.4*   ALBUMIN 2.5*   PROT 5.4*      K 3.0*   CO2 26      BUN 12   CREATININE 0.6   ALKPHOS 111   ALT 24   AST 30   BILITOT 0.7 " "    Coagulation: No results for input(s): "PT", "INR", "APTT" in the last 168 hours.  CRP: No results for input(s): "CRP" in the last 168 hours.  ESR: No results for input(s): "SEDRATE" in the last 168 hours.  Prealbumin: No results for input(s): "PREALBUMIN" in the last 48 hours.  Wound Cultures:   Recent Labs   Lab 08/21/23 0927   LABAERO STAPHYLOCOCCUS AUREUS  Few  *     Microbiology Results (last 7 days)       Procedure Component Value Units Date/Time    Culture, Anaerobe [[id number]] Collected: 08/21/23 0927    Order Status: Completed Specimen: Abscess from Foot, Right Updated: 08/25/23 1008     Anaerobic Culture No anaerobes isolated    Narrative:      Right foot TMA abscess    Blood Culture #1 **CANNOT BE ORDERED STAT** [[id number]] Collected: 08/18/23 2113    Order Status: Completed Specimen: Blood from Peripheral, Forearm, Right Updated: 08/23/23 2312     Blood Culture, Routine No growth after 5 days.    Blood Culture #2 **CANNOT BE ORDERED STAT** [[id number]] Collected: 08/18/23 2113    Order Status: Completed Specimen: Blood from Peripheral, Forearm, Right Updated: 08/23/23 2312     Blood Culture, Routine No growth after 5 days.    Aerobic culture [[id number]]  (Abnormal)  (Susceptibility) Collected: 08/21/23 0927    Order Status: Completed Specimen: Abscess from Foot, Right Updated: 08/23/23 1251     Aerobic Bacterial Culture STAPHYLOCOCCUS AUREUS  Few      Narrative:      Right foot TMA abscess    AFB Culture & Smear [[id number]] Collected: 08/21/23 0927    Order Status: Completed Specimen: Abscess from Foot, Right Updated: 08/22/23 2127     AFB Culture & Smear Culture in progress     AFB CULTURE STAIN No acid fast bacilli seen.    Narrative:      Right foot TMA abscess    Fungus culture [[id number]] Collected: 08/21/23 0927    Order Status: Completed Specimen: Abscess from Foot, Right Updated: 08/22/23 1359     Fungus (Mycology) Culture Culture in progress    Narrative:      Right foot TMA abscess    Gram " stain [[id number]] Collected: 08/21/23 0927    Order Status: Completed Specimen: Abscess from Foot, Right Updated: 08/21/23 2552     Gram Stain Result Moderate WBC's      Few Gram positive cocci    Narrative:      Right foot TMA abscess          Specimen (24h ago, onward)      None              Assessment/Plan:     ID  * Cellulitis and abscess of foot  78-year-old female with a PMHx of HLD, HTN, PVS, CAD, COPD, SIADH, alcoholism, and tobacco abuse presents to [facility] ED 8/18 for R foot pain. Podiatry was consulted for right foot pain, swelling, and discoloration noted at distal stump of TMA site.     S/p R TMA in 2012. Extreme tenderness/pain on palpation. Distal dorsal TMA stump presents with darkened eschar on distal aspect of tma site.     XR R suggestive of edema at distal stump.   MRI R showing fluid collection at stump, no osseous involvement or suggestion of osteomyelitis. Performed I and d on 8/21 see procedure note. Cx collected during that time.  Non-leukocytotic at today's visit.   ABX plan per ID  Vascular surgery following, appreciate recs  Dressings: iodosorb, 4x4 gauze, kerlix secured with ace wraps  Podiatry will sign off.  Thank you for involving us in the care of this patient.  Please contact us with any additional questions        Citlalli Roper DPM PGY-2  Podiatric Medicine & Surgery  [facility]  Secure Chat Preferred  Mobile: [phone]  Pager: [phone]

## 2023-08-28 NOTE — ASSESSMENT & PLAN NOTE
78-year-old female with a PMHx of HLD, HTN, PVS, CAD, COPD, SIADH, alcoholism, and tobacco abuse [place] [facility] [place] for R foot pain. Podiatry was consulted for right foot pain, swelling, and discoloration noted at distal stump of TMA site.     S/p R TMA in 2012. Extreme tenderness/pain on palpation. Distal dorsal TMA stump presents with darkened eschar with bullae underneath, roughly 2.0x2.0cm circular. Erythematous periwound. Upon needle aspiration and deroofing, pink and light yellow pus expressed (see photograph).     · XR R suggestive of edema at distal stump.   · MRI R showing fluid collection at stump, no osseous involvement or suggestion of osteomyelitis.   · Non-leukocytotic at today's visit.   · ABX plan per ID  · Blood cx 8/18 NGTD. Abscess culture taken in today's visit, results pending. Appreciate ID recs.    · Vascular surgery following, appreciate recs  · Final cx pending   · Dressings: iodosorb, 4x4 gauze, kerlix secured with ace wraps  · Podiatry will sign off.  Thank you for involving us in the care of this patient.  Please contact us with any additional questions

## 2023-08-28 NOTE — PLAN OF CARE
[facility] ([facility])  Discharge Reassessment    Primary Care Provider: No, Primary Doctor    Expected Discharge Date: 8/28/2023    Reassessment (most recent)       Discharge Reassessment - 08/28/23 1137          Discharge Reassessment    Assessment Type Discharge Planning Reassessment (P)      Did the patient's condition or plan change since previous assessment? No (P)      Discharge Plan discussed with: Patient (P)      Communicated FLORIAN with patient/caregiver Yes (P)      Discharge Plan A Home (P)      Discharge Plan B Home Health (P)      DME Needed Upon Discharge  wheelchair (P)      Transition of Care Barriers None (P)      Why the patient remains in the hospital Requires continued medical care (P)         Post-Acute Status    Post-Acute Authorization Home Health (P)      Home Health Status Set-up Complete/Auth obtained (P)      Discharge Delays None known at this time (P)                  ADRY spoke with pt in room; she states family will come pick her up from hospital.    CM called [facility] [phone], spoke with Didi, informed pt being d/c'd today.  She requests CM send d/c orders.  DC orders and packet sent via CP.    CM messaged Karen VILLARREAL to [facility] for w/c for d/c.    1:49 PM  CM called pt's dtr Anthony Reardon, who states they cannot pick pt up d/t they have no car right now.  CM to arrange transportation upon d/c.     3:00 PM  CM arranged w/c van transportation.    MELLO MaderaN, BS, RN, CCM

## 2023-08-28 NOTE — PLAN OF CARE
[facility] - [facility] ([place] [address])      HOME HEALTH ORDERS  FACE TO FACE ENCOUNTER    Patient Name: Radha Sotelo  YOB: 1945    PCP: Laurence, Primary Doctor   PCP Address: None  PCP Phone Number: None  PCP Fax: None    Encounter Date: 8/18/23    Admit to Home Health    Diagnoses:  Active Hospital Problems    Diagnosis  POA    *Cellulitis and abscess of foot [L03.119, L02.619]  Yes    Discharge planning [Z02.9]  Not Applicable    Hypokalemia [E87.6]  Yes    Chronic obstructive pulmonary disease [J44.9]  Yes    History of transmetatarsal amputation of right foot [Z89.431]  Not Applicable    Atherosclerosis of native arteries of right leg with ulceration of other part of foot [I70.235]  Yes    Coronary artery disease [I25.10]  Yes    PVD (peripheral vascular disease) [I73.9]  Yes    Unspecified protein-calorie malnutrition [E46]  Yes      Resolved Hospital Problems   No resolved problems to display.       Follow Up Appointments:  Future Appointments   Date Time Provider Department Center   9/1/2023 11:00 AM Con Reis PA-C [facility] ID [facility]   9/20/2023 11:00 AM VASCULAR, LAB [facility]   9/20/2023  1:00 PM Dionicio Saldaña MD [facility]       Allergies:Review of patient's allergies indicates:  No Known Allergies    Medications: Review discharge medications with patient and family and provide education.    Current Facility-Administered Medications   Medication Dose Route Frequency Provider Last Rate Last Admin    acetaminophen tablet 1,000 mg  1,000 mg Oral Q8H Samir Upton MD   1,000 mg at 08/27/23 1417    aspirin chewable tablet 81 mg  81 mg Oral Daily Terrance Ashton MD   81 mg at 08/27/23 1025    atorvastatin tablet 40 mg  40 mg Oral Daily Terrance Ashton MD   40 mg at 08/27/23 1025    cadexomer iodine 0.9 % gel   Topical (Top) Daily PRN Nancy Bush MD   Given by Provider at 08/28/23 0933    cefadroxil tablet 1 g  1 g Oral Daily Enedina Harrington DPM   1 g at  08/27/23 1026    cilostazoL tablet 50 mg  50 mg Oral BID Terrance Ashton MD   50 mg at 08/27/23 2033    clopidogreL tablet 75 mg  75 mg Oral Daily Loy Hernandez MD   75 mg at 08/27/23 1025    dextrose 10% bolus 125 mL 125 mL  12.5 g Intravenous PRN Terrance Ashton MD        dextrose 10% bolus 250 mL 250 mL  25 g Intravenous PRN Terrance Ashton MD        enoxaparin injection 40 mg  40 mg Subcutaneous Daily Terrance Ashton MD   40 mg at 08/27/23 1758    fluticasone furoate-vilanteroL 200-25 mcg/dose diskus inhaler 1 puff  1 puff Inhalation Daily Terrance Ashton MD   1 puff at 08/28/23 0749    glucagon (human recombinant) injection 1 mg  1 mg Intramuscular PRN Terrance Ashton MD        glucose chewable tablet 16 g  16 g Oral PRN Terrance Ashton MD        glucose chewable tablet 24 g  24 g Oral PRN Terrance Ashton MD        guaiFENesin 12 hr tablet 600 mg  600 mg Oral BID Luz Maria Spivey MD   600 mg at 08/27/23 2033    levalbuterol nebulizer solution 1.25 mg  1.25 mg Nebulization Q4H PRN Samir Upton MD   1.25 mg at 08/24/23 1658    And    ipratropium 0.02 % nebulizer solution 0.5 mg  0.5 mg Nebulization Q6H Samir Upton MD   0.5 mg at 08/28/23 0749    magnesium oxide tablet 400 mg  400 mg Oral BID Nancy Bush MD   400 mg at 08/27/23 2033    melatonin tablet 6 mg  6 mg Oral Nightly PRN Terrance Ashton MD   6 mg at 08/27/23 2033    metoprolol tartrate (LOPRESSOR) tablet 50 mg  50 mg Oral BID Luz Maria Spivey MD   50 mg at 08/27/23 2033    mupirocin 2 % ointment   Nasal BID Luz Maria Spivey MD   Given at 08/27/23 2034    naloxone 0.4 mg/mL injection 0.02 mg  0.02 mg Intravenous PRN Terrance Ashton MD        nicotine 21 mg/24 hr 1 patch  1 patch Transdermal Daily Samir Upton MD   1 patch at 08/27/23 1027    ondansetron disintegrating tablet 4 mg  4 mg Oral Q6H PRN Terrance Ashton MD        oxyCODONE immediate release tablet 5 mg  5 mg Oral Q6H PRN Samir Upton MD   5 mg at 08/26/23 0617    oxyCODONE immediate release  tablet Tab 10 mg  10 mg Oral Q6H PRN Hand, Samir TANNER MD   10 mg at 08/27/23 2033    potassium chloride SA CR tablet 40 mEq  40 mEq Oral Q4H Luz Maria Spivey MD        sars-cov-2 (covid-19) (Pfizer COVID-19) 30 mcg/0.3 ml injection 0.3 mL  0.3 mL Intramuscular vaccine x 1 dose Terrance Ashton MD        sodium chloride 0.9% flush 10 mL  10 mL Intravenous Q12H PRN Terrance Ashton MD         Current Discharge Medication List        START taking these medications    Details   cadexomer iodine (IODOSORB) 0.9 % gel Apply topically once daily. Apply to right foot TMA.  Qty: 40 g, Refills: 2      cefadroxil (DURICEF) 500 MG Cap Take 2 capsules (1,000 mg total) by mouth once daily. for 10 days  Qty: 20 capsule, Refills: 0      guaiFENesin (MUCINEX) 600 mg 12 hr tablet Take 1 tablet (600 mg total) by mouth 2 (two) times daily.      oxyCODONE (ROXICODONE) 5 MG immediate release tablet Take 1 tablet (5 mg total) by mouth every 4 (four) hours as needed for Pain.  Qty: 21 tablet, Refills: 0    Comments: Quantity prescribed more than 7 day supply? No           CONTINUE these medications which have CHANGED    Details   clopidogreL (PLAVIX) 75 mg tablet Take 1 tablet (75 mg total) by mouth once daily.  Qty: 30 tablet, Refills: 11      ondansetron (ZOFRAN-ODT) 4 MG TbDL Dissolve 1 tablet (4 mg total) by mouth every 6 (six) hours as needed.  Qty: 30 tablet, Refills: 0           CONTINUE these medications which have NOT CHANGED    Details   albuterol (PROVENTIL/VENTOLIN HFA) 90 mcg/actuation inhaler INHALE 2 PUFFS BY MOUTH EVERY 6 HOURS AS NEEDED  Qty: 25.5 g, Refills: 3      albuterol-ipratropium (DUO-NEB) 2.5 mg-0.5 mg/3 mL nebulizer solution USE 1 VIAL VIA NEBULIZER EVERY 6 HOURS AS NEEDED FOR WHEEZING, SHORTNESS OF BREATH  Qty: 540 mL, Refills: 6      aspirin 81 MG Chew Take 1 tablet (81 mg total) by mouth once daily.      atorvastatin (LIPITOR) 40 MG tablet Take 1 tablet (40 mg total) by mouth once daily.  Qty: 30 tablet, Refills: 5       budesonide-formoterol 80-4.5 mcg (SYMBICORT) 80-4.5 mcg/actuation HFAA INHALE 2 PUFFS INTO THE LUNGS TWICE DAILY. RINSE MOUTH AFTER USE  Qty: 10.2 g, Refills: 6      citalopram (CELEXA) 20 MG tablet Take 1 tablet (20 mg total) by mouth once daily.  Qty: 90 tablet, Refills: 3    Comments: needs appointment for further refills      hydrocortisone-aloe vera 1 % Crea cream Apply topically 2 (two) times daily.  Qty: 28 g, Refills: 0      metoprolol tartrate (LOPRESSOR) 50 MG tablet Take 1 tablet (50 mg total) by mouth 2 (two) times daily.  Qty: 180 tablet, Refills: 3    Comments: .      nicotine (NICODERM CQ) 21 mg/24 hr Place 1 patch onto the skin once daily.  Qty: 28 patch, Refills: 11           STOP taking these medications       amoxicillin-clavulanate 875-125mg (AUGMENTIN) 875-125 mg per tablet Comments:   Reason for Stopping:         cilostazoL (PLETAL) 100 MG Tab Comments:   Reason for Stopping:         ibuprofen (ADVIL,MOTRIN) 600 MG tablet Comments:   Reason for Stopping:         doxycycline (VIBRA-TABS) 100 MG tablet Comments:   Reason for Stopping:                 I have seen and examined this patient within the last 30 days. My clinical findings that support the need for the home health skilled services and home bound status are the following:no   Weakness/numbness causing balance and gait disturbance due to Infection and Weakness/Debility making it taxing to leave home.  Requiring assistive device to leave home due to unsteady gait caused by  Infection and Weakness/Debility.     Diet:   regular diet      Referrals/ Consults  Physical Therapy to evaluate and treat. Evaluate for home safety and equipment needs; Establish/upgrade home exercise program. Perform / instruct on therapeutic exercises, gait training, transfer training, and Range of Motion.  Occupational Therapy to evaluate and treat. Evaluate home environment for safety and equipment needs. Perform/Instruct on transfers, ADL training, ROM, and  therapeutic exercises.  Aide to provide assistance with personal care, ADLs, and vital signs.    Activities:   activity as tolerated and other elevate right leg when seated or in bed    Nursing:   Agency to admit patient within 24 hours of hospital discharge unless specified on physician order or at patient request    SN to complete comprehensive assessment including routine vital signs. Instruct on disease process and s/s of complications to report to MD. Review/verify medication list sent home with the patient at time of discharge  and instruct patient/caregiver as needed. Frequency may be adjusted depending on start of care date.     Skilled nurse to perform up to 3 visits PRN for symptoms related to diagnosis    Notify MD if SBP > 160 or < 90; DBP > 90 or < 50; HR > 120 or < 50; Temp > 101; O2 < 88%;    Ok to schedule additional visits based on staff availability and patient request on consecutive days within the home health episode.    When multiple disciplines ordered:    Start of Care occurs on Sunday - Wednesday schedule remaining discipline evaluations as ordered on separate consecutive days following the start of care.    Thursday SOC -schedule subsequent evaluations Friday and Monday the following week.     Friday - Saturday SOC - schedule subsequent discipline evaluations on consecutive days starting Monday of the following week.    For all post-discharge communication and subsequent orders please contact patient's primary care physician. If unable to reach primary care physician or do not receive response within 30 minutes, please contact [phone] for clinical staff order clarification    Miscellaneous   Home Oxygen:  No change, Oxygen at 2 L/min nasal canula to be used:  Continuously., Assess oxygen saturation via pulse oximeter as needed for increase in SOB., and Notify physician if oxygen saturation less than 88%    Home Health Frequency:  Nursing Three times weekly, Physical Therapy Three times  weekly, Occupational Therapy Three times weekly, and Home Health Aide Three times weekly    Wound Care Orders  yes:  right foot TMA: Dressing change: three times a week to right foot wound: apply iodosorb, then cover with 4x4 gauze, followed by kerlix secured with tape    I certify that this patient is confined to her home and needs intermittent skilled nursing care, physical therapy, and occupational therapy.      Luz Maria Spivey MD

## 2023-08-29 ENCOUNTER — TELEPHONE (OUTPATIENT)
Dept: PODIATRY | Facility: CLINIC | Age: 78
End: 2023-08-29
Payer: MEDICARE

## 2023-08-29 VITALS
BODY MASS INDEX: 28.13 KG/M2 | OXYGEN SATURATION: 94 % | DIASTOLIC BLOOD PRESSURE: 63 MMHG | HEIGHT: 60 IN | RESPIRATION RATE: 21 BRPM | HEART RATE: 107 BPM | WEIGHT: 143.31 LBS | TEMPERATURE: 97 F | SYSTOLIC BLOOD PRESSURE: 115 MMHG

## 2023-08-29 LAB
ALBUMIN SERPL BCP-MCNC: 2.6 G/DL (ref 3.5–5.2)
ALP SERPL-CCNC: 280 U/L (ref 55–135)
ALT SERPL W/O P-5'-P-CCNC: 33 U/L (ref 10–44)
ANION GAP SERPL CALC-SCNC: 8 MMOL/L (ref 8–16)
AST SERPL-CCNC: 48 U/L (ref 10–40)
BASOPHILS # BLD AUTO: 0.05 K/UL (ref 0–0.2)
BASOPHILS NFR BLD: 0.9 % (ref 0–1.9)
BILIRUB SERPL-MCNC: 1.2 MG/DL (ref 0.1–1)
BUN SERPL-MCNC: 12 MG/DL (ref 8–23)
CALCIUM SERPL-MCNC: 8.5 MG/DL (ref 8.7–10.5)
CHLORIDE SERPL-SCNC: 103 MMOL/L (ref 95–110)
CO2 SERPL-SCNC: 26 MMOL/L (ref 23–29)
CREAT SERPL-MCNC: 0.6 MG/DL (ref 0.5–1.4)
DIFFERENTIAL METHOD: ABNORMAL
EOSINOPHIL # BLD AUTO: 0.3 K/UL (ref 0–0.5)
EOSINOPHIL NFR BLD: 5.9 % (ref 0–8)
ERYTHROCYTE [DISTWIDTH] IN BLOOD BY AUTOMATED COUNT: 14.9 % (ref 11.5–14.5)
EST. GFR  (NO RACE VARIABLE): >60 ML/MIN/1.73 M^2
GLUCOSE SERPL-MCNC: 98 MG/DL (ref 70–110)
HCT VFR BLD AUTO: 31 % (ref 37–48.5)
HGB BLD-MCNC: 9.7 G/DL (ref 12–16)
IMM GRANULOCYTES # BLD AUTO: 0.03 K/UL (ref 0–0.04)
IMM GRANULOCYTES NFR BLD AUTO: 0.5 % (ref 0–0.5)
LYMPHOCYTES # BLD AUTO: 0.9 K/UL (ref 1–4.8)
LYMPHOCYTES NFR BLD: 15.3 % (ref 18–48)
MAGNESIUM SERPL-MCNC: 1.9 MG/DL (ref 1.6–2.6)
MCH RBC QN AUTO: 29.6 PG (ref 27–31)
MCHC RBC AUTO-ENTMCNC: 31.3 G/DL (ref 32–36)
MCV RBC AUTO: 95 FL (ref 82–98)
MONOCYTES # BLD AUTO: 0.6 K/UL (ref 0.3–1)
MONOCYTES NFR BLD: 11.3 % (ref 4–15)
NEUTROPHILS # BLD AUTO: 3.7 K/UL (ref 1.8–7.7)
NEUTROPHILS NFR BLD: 66.1 % (ref 38–73)
NRBC BLD-RTO: 0 /100 WBC
PHOSPHATE SERPL-MCNC: 1.7 MG/DL (ref 2.7–4.5)
PLATELET # BLD AUTO: 238 K/UL (ref 150–450)
PMV BLD AUTO: 10 FL (ref 9.2–12.9)
POC ACTIVATED CLOTTING TIME K: 245 SEC (ref 74–137)
POC ACTIVATED CLOTTING TIME K: 269 SEC (ref 74–137)
POC ACTIVATED CLOTTING TIME K: 287 SEC (ref 74–137)
POTASSIUM SERPL-SCNC: 3.6 MMOL/L (ref 3.5–5.1)
PROT SERPL-MCNC: 5.7 G/DL (ref 6–8.4)
RBC # BLD AUTO: 3.28 M/UL (ref 4–5.4)
SAMPLE: ABNORMAL
SODIUM SERPL-SCNC: 137 MMOL/L (ref 136–145)
WBC # BLD AUTO: 5.56 K/UL (ref 3.9–12.7)

## 2023-08-29 PROCEDURE — 80053 COMPREHEN METABOLIC PANEL: CPT | Performed by: STUDENT IN AN ORGANIZED HEALTH CARE EDUCATION/TRAINING PROGRAM

## 2023-08-29 PROCEDURE — 27000221 HC OXYGEN, UP TO 24 HOURS

## 2023-08-29 PROCEDURE — 36415 COLL VENOUS BLD VENIPUNCTURE: CPT | Performed by: STUDENT IN AN ORGANIZED HEALTH CARE EDUCATION/TRAINING PROGRAM

## 2023-08-29 PROCEDURE — 99239 PR HOSPITAL DISCHARGE DAY,>30 MIN: ICD-10-PCS | Mod: 95,,, | Performed by: INTERNAL MEDICINE

## 2023-08-29 PROCEDURE — 25000003 PHARM REV CODE 250: Performed by: INTERNAL MEDICINE

## 2023-08-29 PROCEDURE — 94664 DEMO&/EVAL PT USE INHALER: CPT

## 2023-08-29 PROCEDURE — 25000242 PHARM REV CODE 250 ALT 637 W/ HCPCS: Performed by: INTERNAL MEDICINE

## 2023-08-29 PROCEDURE — S4991 NICOTINE PATCH NONLEGEND: HCPCS | Performed by: INTERNAL MEDICINE

## 2023-08-29 PROCEDURE — 99900035 HC TECH TIME PER 15 MIN (STAT)

## 2023-08-29 PROCEDURE — 25000003 PHARM REV CODE 250: Performed by: STUDENT IN AN ORGANIZED HEALTH CARE EDUCATION/TRAINING PROGRAM

## 2023-08-29 PROCEDURE — 94761 N-INVAS EAR/PLS OXIMETRY MLT: CPT

## 2023-08-29 PROCEDURE — 83735 ASSAY OF MAGNESIUM: CPT | Performed by: STUDENT IN AN ORGANIZED HEALTH CARE EDUCATION/TRAINING PROGRAM

## 2023-08-29 PROCEDURE — 85025 COMPLETE CBC W/AUTO DIFF WBC: CPT | Performed by: STUDENT IN AN ORGANIZED HEALTH CARE EDUCATION/TRAINING PROGRAM

## 2023-08-29 PROCEDURE — 99239 HOSP IP/OBS DSCHRG MGMT >30: CPT | Mod: 95,,, | Performed by: INTERNAL MEDICINE

## 2023-08-29 PROCEDURE — 25000003 PHARM REV CODE 250

## 2023-08-29 PROCEDURE — 84100 ASSAY OF PHOSPHORUS: CPT | Performed by: STUDENT IN AN ORGANIZED HEALTH CARE EDUCATION/TRAINING PROGRAM

## 2023-08-29 PROCEDURE — 94640 AIRWAY INHALATION TREATMENT: CPT

## 2023-08-29 RX ORDER — SODIUM,POTASSIUM PHOSPHATES 280-250MG
2 POWDER IN PACKET (EA) ORAL EVERY 4 HOURS
Status: DISCONTINUED | OUTPATIENT
Start: 2023-08-29 | End: 2023-08-29 | Stop reason: HOSPADM

## 2023-08-29 RX ORDER — SODIUM,POTASSIUM PHOSPHATES 280-250MG
2 POWDER IN PACKET (EA) ORAL ONCE
Status: DISCONTINUED | OUTPATIENT
Start: 2023-08-29 | End: 2023-08-29

## 2023-08-29 RX ORDER — SODIUM,POTASSIUM PHOSPHATES 280-250MG
2 POWDER IN PACKET (EA) ORAL EVERY 4 HOURS
Status: CANCELLED | OUTPATIENT
Start: 2023-08-29 | End: 2023-08-29

## 2023-08-29 RX ADMIN — ATORVASTATIN CALCIUM 40 MG: 40 TABLET, FILM COATED ORAL at 08:08

## 2023-08-29 RX ADMIN — METOPROLOL TARTRATE 50 MG: 50 TABLET, FILM COATED ORAL at 08:08

## 2023-08-29 RX ADMIN — ASPIRIN 81 MG 81 MG: 81 TABLET ORAL at 08:08

## 2023-08-29 RX ADMIN — GUAIFENESIN 600 MG: 600 TABLET, EXTENDED RELEASE ORAL at 08:08

## 2023-08-29 RX ADMIN — CEFADROXIL 1 G: 1000 TABLET ORAL at 08:08

## 2023-08-29 RX ADMIN — LEVALBUTEROL 1.25 MG: 1.25 SOLUTION, CONCENTRATE RESPIRATORY (INHALATION) at 07:08

## 2023-08-29 RX ADMIN — CLOPIDOGREL BISULFATE 75 MG: 75 TABLET ORAL at 08:08

## 2023-08-29 RX ADMIN — IPRATROPIUM BROMIDE 0.5 MG: 0.5 SOLUTION RESPIRATORY (INHALATION) at 07:08

## 2023-08-29 RX ADMIN — Medication 1 PATCH: at 08:08

## 2023-08-29 RX ADMIN — CILOSTAZOL 50 MG: 50 TABLET ORAL at 08:08

## 2023-08-29 RX ADMIN — Medication 400 MG: at 08:08

## 2023-08-29 RX ADMIN — MUPIROCIN: 20 OINTMENT TOPICAL at 08:08

## 2023-08-29 RX ADMIN — POTASSIUM & SODIUM PHOSPHATES POWDER PACK 280-160-250 MG 2 PACKET: 280-160-250 PACK at 01:08

## 2023-08-29 NOTE — PLAN OF CARE
Problem: Adult Inpatient Plan of Care  Goal: Absence of Hospital-Acquired Illness or Injury  Outcome: Met  Goal: Optimal Comfort and Wellbeing  Outcome: Met  Goal: Readiness for Transition of Care  Outcome: Met     Problem: Fluid and Electrolyte Imbalance (Acute Kidney Injury/Impairment)  Goal: Fluid and Electrolyte Balance  Outcome: Met     Problem: Oral Intake Inadequate (Acute Kidney Injury/Impairment)  Goal: Optimal Nutrition Intake  Outcome: Met     Problem: Renal Function Impairment (Acute Kidney Injury/Impairment)  Goal: Effective Renal Function  Outcome: Met     Problem: Fluid Imbalance (Pneumonia)  Goal: Fluid Balance  Outcome: Met     Problem: Infection (Pneumonia)  Goal: Resolution of Infection Signs and Symptoms  Outcome: Met   Pt being discharged home per MD orders.  VSS, pt afebrile and no c/o pain at this time.  Reviewed discharge instructions, follow-up's and meds with pt. PIV removed, gauze/tape placed to site.  All personal belongings packed and with pt at bedside.  Ride to be provided by [employer].

## 2023-08-29 NOTE — PROGRESS NOTES
Patient will discharge with [facility] transportation. Assisted patient to wheelchair. Patient has all belongings and personal wheelchair. IV removed and discharge paperwork given and reviewed. Patient left with [facility] transport.

## 2023-08-29 NOTE — PLAN OF CARE
[facility] ([facility])  Discharge Final Note    Primary Care Provider: No, Primary Doctor    Expected Discharge Date: 8/29/2023    Final Discharge Note (most recent)       Final Note - 08/29/23 1518          Final Note    Assessment Type Final Discharge Note (P)      Anticipated Discharge Disposition Home-Health Care Svc (P)         Post-Acute Status    Post-Acute Authorization Home Health (P)      Home Health Status Set-up Complete/Auth obtained (P)      Discharge Delays None known at this time (P)                      Important Message from Medicare  Important Message from Medicare regarding Discharge Appeal Rights: Explained to patient/caregiver, Signed/date by patient/caregiver     Date IMM was signed: 08/28/23  Time IMM was signed: 0943    Contact Info       Podiatry    Podiatry   [phone]       Next Steps: Follow up    Instructions: A message has been sent to the Podiatry clinic, the clinic nurse will contact you to schedule a hospital follow up visit. However, if you do not hear from them within 24 to 48 hours of discharge please call to schedule the appointment.          Pt sent home with [facility].    MELLO MaderaN, BS, RN, CCM

## 2023-08-29 NOTE — PLAN OF CARE
CM called [facility] [phone] to instruct that pt being d/c'd today, will they be ready to receive her?  CM LVM requesting call back.    CM called [facility] [phone] to instruct that pt being d/c'd today, will they be ready to receive her?  CM LVM requesting call back.    1:22 PM  CM spoke with pt in room - she states dtr will be home when she gets home; she will not need help walking up stairs, she will walk up herself.  [facility] transport has been ordered; Rafael with [facility] will call CM back with ETA.    2:47 PM  CM called [facility] , spoke with Didi, informed that pt was d/c'd today, not yesterday.    MELLO MaderaN, BS, RN, CCM

## 2023-08-29 NOTE — PLAN OF CARE
[facility] A message was sent to the clinic requesting an appointment on the patients behalf. I added this information to the AVS as a reminder for the patient.

## 2023-08-29 NOTE — TELEPHONE ENCOUNTER
SHEILA SOARES calling regarding Patient Advice (message) for Pt asking for a call back to help get schedule for a hospital f/u there is a referral in the system     Left voice message for patient to give our office a call back at [phone]. Patient is in the hospital.

## 2023-08-29 NOTE — NURSING
Spoke with patient flow center regarding ETA of wheelchair van . Was informed that the wheelchair van intended to pick the pt up can no longer accommodate her. more request have been sent but no response at this time. Pt notified & stated that she doesn't have anyone else than can pick her up & she is ready to go to bed. Pt requested to stay until morning. Med team notified. MD guevara for d/c to be held until tomorrow. New  time is scheduled for 0900.

## 2023-08-29 NOTE — PLAN OF CARE
Problem: Adult Inpatient Plan of Care  Goal: Plan of Care Review  Outcome: Ongoing, Progressing  Goal: Patient-Specific Goal (Individualized)  Outcome: Ongoing, Progressing  Goal: Absence of Hospital-Acquired Illness or Injury  Outcome: Ongoing, Progressing  Goal: Optimal Comfort and Wellbeing  Outcome: Ongoing, Progressing  Goal: Readiness for Transition of Care  Outcome: Ongoing, Progressing     Problem: Oral Intake Inadequate (Acute Kidney Injury/Impairment)  Goal: Optimal Nutrition Intake  Outcome: Ongoing, Progressing     Problem: Renal Function Impairment (Acute Kidney Injury/Impairment)  Goal: Effective Renal Function  Outcome: Ongoing, Progressing     Problem: Fluid Imbalance (Pneumonia)  Goal: Fluid Balance  Outcome: Ongoing, Progressing     Problem: Infection (Pneumonia)  Goal: Resolution of Infection Signs and Symptoms  Outcome: Ongoing, Progressing

## 2023-08-29 NOTE — PT/OT/SLP PROGRESS
Physical Therapy      Patient Name:  Radha Sotelo   MRN:  [id number]    Patient not seen today secondary to  (Pt declined 2/2 going home this date. Pt requesting PT f/u tomorrow should patient remain in hospital.). Will follow-up tomorrow as appropriate.     Spoke with IR and was assured that the schedulers will be calling patient today to get him scheduled for the biopsies. Updated  patient.

## 2023-08-30 ENCOUNTER — PATIENT OUTREACH (OUTPATIENT)
Dept: ADMINISTRATIVE | Facility: CLINIC | Age: 78
End: 2023-08-30
Payer: MEDICARE

## 2023-08-30 ENCOUNTER — TELEPHONE (OUTPATIENT)
Dept: PODIATRY | Facility: CLINIC | Age: 78
End: 2023-08-30
Payer: MEDICARE

## 2023-08-30 DIAGNOSIS — L02.619 CELLULITIS AND ABSCESS OF FOOT: Primary | ICD-10-CM

## 2023-08-30 DIAGNOSIS — L03.119 CELLULITIS AND ABSCESS OF FOOT: Primary | ICD-10-CM

## 2023-08-30 NOTE — PROGRESS NOTES
C3 nurse attempted to contact Radha Sotelo for a TCC post hospital discharge follow up call. No answer. Left message on voicemail to return call to Lenny @ [phone] with callback info. Patient does not have a PCP and does not have a hospital follow up appt.

## 2023-08-30 NOTE — TELEPHONE ENCOUNTER
Spoke with patient to help with scheduling appointment for wound care. Patient voice understanding to conversation.

## 2023-08-30 NOTE — PROGRESS NOTES
C3 nurse spoke with Radha Sotelo for a TCC post hospital discharge follow up call. The patient does not have a scheduled HOSFU appointment within 5-7 days post hospital discharge date 08/29/23 and does not have a PCP. Order placed for NP home referral for follow up visit.

## 2023-08-31 NOTE — ASSESSMENT & PLAN NOTE
"Patient seen by Dr. Saldaña but has not been seen since 2015.    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  Now has discoloration to RLE stump.  Vascular surgery consulted  Arterial doppler showing severe PAD RLE.   Angiogram on 8/25 with right SFA stent placed; continue ASA/statin and plavix  -US ordered to evaluate hematoma - no pseudoaneurysm seen  -f/u with Vascular surgery    "

## 2023-08-31 NOTE — ASSESSMENT & PLAN NOTE
Nutrition consulted. Most recent weight and BMI monitored-     Measurements:  Wt Readings from Last 1 Encounters:   08/28/23 65 kg (143 lb 4.8 oz)   Body mass index is 27.99 kg/m².    Patient to be screened and assessed by RD.  Previous protein calorie malnutrition.

## 2023-08-31 NOTE — DISCHARGE SUMMARY
[facility] - [facility] ([address] Medicine  Discharge Summary      Patient Name: Radha Sotelo  MRN: [id number]  Patient Class: IP- Inpatient  Admission Date: 8/18/2023  Hospital Length of Stay: 10 days  Discharge Date and Time: 8/29/2023  2:44 PM  Attending Physician: Laurence att. providers found   Discharging Provider: Luz Maria Spivey MD  Primary Care Provider: Laurence, Primary Doctor      HPI:   Patient is a 78-year-old female with past medical history significant for severe COPD on home O2 for chronic hypoxic respiratory failure (4 L via nasal cannula), CAD, PAT, previous osteomyelitis with transmetatarsal amputation (2012) who presented to the emergency department due to worsening right foot pain with associated discoloration.  She had a similar presentation approximately 10 days ago and was started on broad-spectrum antibiotics.  Since that time she is been having worsening pain associated with the right leg as well as discoloration with possible deep blistering.  An x-ray was done in the emergency department which demonstrated no erosive changes of osteomyelitis.  She does have some overlying right erythema of the affected area as well as dark discoloration with possible blood blister present.  There is probably some fluctuance however complete evaluation was limited secondary to patient intolerance.  She has a black spot which does not appear to be leaking any purulent fluid.  She denies any fevers, chills, shortness of breath, chest pain, or intolerance of lying flat.  She states that she has been dealing with progressive lower extremity swelling bilaterally.  She does not take any diuretics for this and it is a new problem.           Procedure(s) (LRB):  ANGIOGRAM, EXTREMITY, UNILATERAL (Right)  STENT, SUPERFICIAL FEMORAL ARTERY (Right)  PTA, PERONEAL TIBIAL TRUNK WITH SHOCKWAVE (Right)      Hospital Course:   Se problem based hospital course.       Goals of Care Treatment Preferences:  Code  Status: Full Code      Consults:   Consults (From admission, onward)        Status Ordering Provider     Inpatient consult to Midline team  Once        Provider:  (Not yet assigned)    Completed JUAN FRANCISCO JAVED     Inpatient consult to Infectious Diseases  Once        Provider:  (Not yet assigned)    Completed JUAN FRANCISCO JAVED     Inpatient virtual consult to Hospital Medicine  Once        Provider:  (Not yet assigned)    Completed ERICA DORSEY     Inpatient consult to Podiatry  Once        Provider:  (Not yet assigned)    Completed ERICA DORSEY     Inpatient consult to Vascular Surgery  Once        Provider:  (Not yet assigned)    Completed ERICA DORSEY     Inpatient consult to Registered Dietitian/Nutritionist  Once        Provider:  (Not yet assigned)    Completed ERICA DORSEY     Inpatient consult to Registered Dietitian/Nutritionist  Once        Provider:  (Not yet assigned)    Completed KATHRYN GUARDADO          Pulmonary  Chronic obstructive pulmonary disease  Patient uses nebulizers q.6 hours.  Unfortunately due to national shortages, we are unable to provide these.  Will resume her home inhalers and provide nebulizers as able.  She is currently on 2 L of oxygen which appears to be her baseline.  She states that she is no longer smoking and denies any signs or symptoms of ongoing acute exacerbation of her COPD.  Patient information:       Exacerbation Component:  - COPD: stable  Patient Is not showing signs of a current COPD exacerbation The patient is not currently have symptoms / an exacerbation. The patient has COPD for approximately 10+ years. Symptoms in previous episodes have included dyspnea, cough and wheezing, and typically last 5 days. Previous episodes have been exacerbated by Smoke and upper respiratory infection. Current treatment includes albuterol nebulizer, albuterol/ipratropium inhaler, oxygen and Breo, which generally provides some relief of symptoms.    - Risk factor  "reduction (Smoking cessation/avoidance, management of GERD, pHTN treatment)  - Avoidance of triggers (Air pollution, respiratory infections, pulmonary embolisms)  - Complete metabolic panel  - Target SpO2 of 88-92% or PaO2 of 60-70 mmHg  - continue home oxygen with nebs inhaler and mucinex    Cardiac/Vascular  Coronary artery disease  No ongoing chest pain.    Continue ASA, Plavix, statin    PVD (peripheral vascular disease)  Patient seen by Dr. Saldaña but has not been seen since 2015.    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  Now has discoloration to RLE stump.  Vascular surgery consulted  Arterial doppler showing severe PAD RLE.   Angiogram on 8/25 with right SFA stent placed; continue ASA/statin and plavix  -US ordered to evaluate hematoma - no pseudoaneurysm seen  -f/u with Vascular surgery      Renal/  Hypokalemia  Replete with KCl and replete Mag; continue to monitor    ID  * Cellulitis and abscess of foot  Patient is a 78-year-old female with previous history of chronic osteo with unhealing wound status post transmetatarsal amputation who is now presenting after a recent hospitalization with worsening right lower extremity swelling, erythema, and pain.  She states that it is much worse than normal and that she is developed a black rash over the affected area.  She denies any overt purulence or opening skin however " she remains very tender to palpation.  X-ray at this time does not show any erosive changes and stable from previous hospitalization 10 days ago.  Will obtain an MRI to definitively rule out the presence of osteomyelitis given mildly elevated inflammatory markers which are sensitive but not specific.  -Empiric Vancomycin is based on the presence of Recurrence despite previous treatment  - Vancomycin and ceftriaxone therapy  - Blood cultures negative  - MRI foot w contrast shows nonspecific diffuse subcutaneous edema and redemonstration of a small nonspecific fluid collection within the soft tissues at the level the resection stump, no abscess or OM identified. Limited incomplete examination as the patient was unable to tolerate the study.   - Elevation of affected area and pain control  - PT/OT recommending SNF but patient insists on going home with family and home health  - Still c/o significant pain to RLE stump  - Vascular surgery and podiatry consulted  - s/p debridement and abscess drainage 8/21 - plan for wound care at discharge with home health  - Wound cx growing MSSA  - ID consulted and antibiotics per ID recommendations: cefadroxxil for 2 week therapy  - vascularity improved with stent by Vascular surgery  - f/u with podiatry and Vascular surgery      Endocrine  Unspecified protein-calorie malnutrition  Nutrition consulted. Most recent weight and BMI monitored-     Measurements:  Wt Readings from Last 1 Encounters:   08/28/23 65 kg (143 lb 4.8 oz)   Body mass index is 27.99 kg/m².    Patient to be screened and assessed by RD.  Previous protein calorie malnutrition.      Orthopedic  History of transmetatarsal amputation of right foot  Podiatry following for wound; angiogram done and flow regained with stent      Final Active Diagnoses:    Diagnosis Date Noted POA    PRINCIPAL PROBLEM:  Cellulitis and abscess of foot [L03.119, L02.619] 03/22/2013 Yes    Discharge planning [Z02.9] 08/23/2023 Not Applicable  "   Hypokalemia [E87.6] 08/21/2023 Yes    Chronic obstructive pulmonary disease [J44.9] 07/12/2021 Yes    History of transmetatarsal amputation of right foot [Z89.431] 07/12/2021 Not Applicable    Atherosclerosis of native arteries of right leg with ulceration of other part of foot [I70.235] 07/12/2021 Yes    Coronary artery disease [I25.10]  Yes    PVD (peripheral vascular disease) [I73.9] 11/12/2012 Yes    Unspecified protein-calorie malnutrition [E46] 11/04/2012 Yes      Problems Resolved During this Admission:       Discharged Condition: stable    Disposition: Home-Health Care [facility] [place]    Follow Up:   Follow-up Information     Podiatry Follow up.    Why: A message has been sent to the Podiatry clinic, the clinic nurse will contact you to schedule a hospital follow up visit. However, if you do not hear from them within 24 to 48 hours of discharge please call to schedule the appointment.  Contact information:  Podiatry   [phone]                     Patient Instructions:      WHEELCHAIR FOR HOME USE     Order Specific Question Answer Comments   Hours in W/C per day: 8    Type of Wheelchair: Standard    Size(Width): 18"(STD adult)    Leg Support: STD footrests    Lap Belt: Velcro    Accessories: Anti-tippers    Cushion: Basic    Reclining Back No    Height: 5' (1.524 m)    Weight: 64.4 kg (142 lb)    Does patient have medical equipment at home? walker, rolling    Does patient have medical equipment at home? shower chair    Length of need (1-99 months): 99    Please check all that apply: Caregiver is capable and willing to operate wheelchair safely.    Please check all that apply: Patient's upper body strength is sufficient for propulsion.    Please check all that apply: The patient requires the use of a w/c for activities of daily living within the Home.    Please check all that apply: Patient mobility limitations cannot be sufficiently resolved by the use of other ambulatory therapies.      Ambulatory " referral/consult to Vascular Surgery   Standing Status: Future   Referral Priority: Urgent Referral Type: Consultation   Referral Reason: Specialty Services Required   Referred to Provider: ADALI CERVANTES Requested Specialty: Vascular Surgery   Number of Visits Requested: 1     Ambulatory referral/consult to Podiatry   Standing Status: Future   Referral Priority: Urgent Referral Type: Consultation   Referral Reason: Specialty Services Required   Requested Specialty: Podiatry   Number of Visits Requested: 1     Diet Adult Regular     Notify your health care provider if you experience any of the following:  temperature >100.4     Notify your health care provider if you experience any of the following:  persistent nausea and vomiting or diarrhea     Notify your health care provider if you experience any of the following:  severe uncontrolled pain     Notify your health care provider if you experience any of the following:  difficulty breathing or increased cough     Notify your health care provider if you experience any of the following:  severe persistent headache     Keep surgical extremity elevated   Order Comments: Keep right leg elevated when seated or in bed.     Notify your health care provider if you experience any of the following:  worsening rash     Notify your health care provider if you experience any of the following:  persistent dizziness, light-headedness, or visual disturbances     Notify your health care provider if you experience any of the following:  redness, tenderness, or signs of infection (pain, swelling, redness, odor or green/yellow discharge around incision site)     Notify your health care provider if you experience any of the following:  increased confusion or weakness     Change dressing (specify)   Order Comments: Dressing change: three times a week to right foot wound: apply iodosorb, then cover with 4x4 gauze, followed by kerlix secured with tape     Activity as tolerated        Significant Diagnostic Studies: as above    Pending Diagnostic Studies:     Procedure Component Value Units Date/Time    CBC Auto Differential [[id number]] Collected: 08/26/23 0528    Order Status: Sent Lab Status: In process Updated: 08/26/23 0529    Specimen: Blood     Comprehensive Metabolic Panel [[id number]] Collected: 08/26/23 0528    Order Status: Sent Lab Status: In process Updated: 08/26/23 0529    Specimen: Blood     Magnesium [[id number]] Collected: 08/26/23 0528    Order Status: Sent Lab Status: In process Updated: 08/26/23 0529    Specimen: Blood     Phosphorus [[id number]] Collected: 08/26/23 0528    Order Status: Sent Lab Status: In process Updated: 08/26/23 0529    Specimen: Blood          Medications:  Reconciled Home Medications:      Medication List      START taking these medications    cadexomer iodine 0.9 % gel  Commonly known as: IODOSORB  Apply topically once daily. Apply to right foot TMA.     cefadroxil 500 MG Cap  Commonly known as: DURICEF  Take 2 capsules (1,000 mg total) by mouth once daily. for 10 days     guaiFENesin 600 mg 12 hr tablet  Commonly known as: MUCINEX  Take 1 tablet (600 mg total) by mouth 2 (two) times daily.     oxyCODONE 5 MG immediate release tablet  Commonly known as: ROXICODONE  Take 1 tablet (5 mg total) by mouth every 4 (four) hours as needed for Pain.        CONTINUE taking these medications    albuterol 90 mcg/actuation inhaler  Commonly known as: PROVENTIL/VENTOLIN HFA  INHALE 2 PUFFS BY MOUTH EVERY 6 HOURS AS NEEDED     albuterol-ipratropium 2.5 mg-0.5 mg/3 mL nebulizer solution  Commonly known as: DUO-NEB  USE 1 VIAL VIA NEBULIZER EVERY 6 HOURS AS NEEDED FOR WHEEZING, SHORTNESS OF BREATH     aspirin 81 MG Chew  Take 1 tablet (81 mg total) by mouth once daily.     atorvastatin 40 MG tablet  Commonly known as: LIPITOR  Take 1 tablet (40 mg total) by mouth once daily.     budesonide-formoterol 80-4.5 mcg 80-4.5 mcg/actuation Hfaa  Commonly known as:  SYMBICORT  INHALE 2 PUFFS INTO THE LUNGS TWICE DAILY. RINSE MOUTH AFTER USE     citalopram 20 MG tablet  Commonly known as: CeleXA  Take 1 tablet (20 mg total) by mouth once daily.     clopidogreL 75 mg tablet  Commonly known as: PLAVIX  Take 1 tablet (75 mg total) by mouth once daily.     hydrocortisone-aloe vera 1 % Crea cream  Apply topically 2 (two) times daily.     metoprolol tartrate 50 MG tablet  Commonly known as: LOPRESSOR  Take 1 tablet (50 mg total) by mouth 2 (two) times daily.     nicotine 21 mg/24 hr  Commonly known as: NICODERM CQ  Place 1 patch onto the skin once daily.     ondansetron 4 MG Tbdl  Commonly known as: ZOFRAN-ODT  Dissolve 1 tablet (4 mg total) by mouth every 6 (six) hours as needed.        STOP taking these medications    amoxicillin-clavulanate 875-125mg 875-125 mg per tablet  Commonly known as: AUGMENTIN     cilostazoL 100 MG Tab  Commonly known as: PLETAL     doxycycline 100 MG tablet  Commonly known as: VIBRA-TABS     ibuprofen 600 MG tablet  Commonly known as: ADVIL,MOTRIN            Indwelling Lines/Drains at time of discharge:   Lines/Drains/Airways     Drain  Duration                Colostomy 07/13/21 0201 Descending/sigmoid  days                Time spent on the discharge of patient: 45 minutes         The attending portion of this evaluation, treatment, and documentation was performed per Luz Maria Spivey MD via Telemedicine AudioVisual using the secure Vidyo software platform with 2 way audio/video. The provider was located off-site and the patient is located in the hospital. The aforementioned video software was utilized to document the relevant history and physical exam   Note:  Secure eCareManager software used instead of Trivie for this encounter.      Luz Maria Spivey MD  Department of Hospital Medicine  [facility] - Intensive Care ([place])

## 2023-08-31 NOTE — ASSESSMENT & PLAN NOTE
Patient is a 78-year-old female with previous history of chronic osteo with unhealing wound status post transmetatarsal amputation who is now presenting after a recent hospitalization with worsening right lower extremity swelling, erythema, and pain.  She states that it is much worse than normal and that she is developed a black rash over the affected area.  She denies any overt purulence or opening skin however she remains very tender to palpation.  X-ray at this time does not show any erosive changes and stable from previous hospitalization 10 days ago.  Will obtain an MRI to definitively rule out the presence of osteomyelitis given mildly elevated inflammatory markers which are sensitive but not specific.  -Empiric Vancomycin is based on the presence of Recurrence despite previous treatment  - Vancomycin and ceftriaxone therapy  - Blood cultures negative  - MRI foot w contrast shows nonspecific diffuse subcutaneous edema and redemonstration of a small nonspecific fluid collection within the soft tissues at the level the resection stump, no abscess or OM identified. Limited incomplete examination as the patient was unable to tolerate the study.   - Elevation of affected area and pain control  - PT/OT recommending SNF but patient insists on going home with family and home health  - Still c/o significant pain to RLE stump  - Vascular surgery and podiatry consulted  - s/p debridement and abscess drainage 8/21 - plan for wound care at discharge with home health  - Wound cx growing MSSA  - ID consulted and antibiotics per ID recommendations: cefadroxxil for 2 week therapy  - vascularity improved with stent by Vascular surgery  - f/u with podiatry and Vascular surgery

## 2023-08-31 NOTE — ASSESSMENT & PLAN NOTE
Patient uses nebulizers q.6 hours.  Unfortunately due to national shortages, we are unable to provide these.  Will resume her home inhalers and provide nebulizers as able.  She is currently on 2 L of oxygen which appears to be her baseline.  She states that she is no longer smoking and denies any signs or symptoms of ongoing acute exacerbation of her COPD.  Patient information:       Exacerbation Component:  - COPD: stable  Patient Is not showing signs of a current COPD exacerbation The patient is not currently have symptoms / an exacerbation. The patient has COPD for approximately 10+ years. Symptoms in previous episodes have included dyspnea, cough and wheezing, and typically last 5 days. Previous episodes have been exacerbated by Smoke and upper respiratory infection. Current treatment includes albuterol nebulizer, albuterol/ipratropium inhaler, oxygen and Breo, which generally provides some relief of symptoms.    - Risk factor reduction (Smoking cessation/avoidance, management of GERD, pHTN treatment)  - Avoidance of triggers (Air pollution, respiratory infections, pulmonary embolisms)  - Complete metabolic panel  - Target SpO2 of 88-92% or PaO2 of 60-70 mmHg  - continue home oxygen with nebs inhaler and mucinex

## 2023-09-03 NOTE — OP NOTE
Date: 8/25/2023    Surgeon:  Gary Rico MD    Assistant: Loy Hernandez MD - Fellow    Pre-op Diagnosis:  PVD (peripheral vascular disease) [I73.9]     Post-op Diagnosis:  Post-Op Diagnosis Codes:     * PVD (peripheral vascular disease) [I73.9]     Procedure(s) (LRB):  ANGIOGRAM, EXTREMITY, UNILATERAL (Right)  STENT, SUPERFICIAL FEMORAL ARTERY (Right)  PTA, PERONEAL TIBIAL TRUNK WITH SHOCKWAVE (Right)    Ultrasound guidance for left common femoral artery percutaneous access  Aortogram and right lower extremity angiogram  Right superficial femoral and popliteal artery angioplasty and stent ([id number] Viabahn)  Angioplasty and Intravascular lithotripsy of right below-knee popliteal artery (2.5x40 Shockwave balloon)  Angioplasty and Intravascular lithotripsy of right tibioperoneal trunk (2.5x40 Shockwave balloon)  Angioplasty and Intravascular lithotripsy of right peroneal artery (2.5x40 Shockwave balloon)      Anesthesia: Choice     Operative Findings: occluded R SFA stent and multiple areas of high grade calcified stenosis in R Akpop and Bkpop and TP-trunk w single vessel run off via peroneal.    S/p SFA stent recannalization and stenting (5x150 Viabahn) down into Akpop, post-dil w 5x150 balloon.  Intravascular Lithotripsy of of R BK pop and TP-trunk and proximal peroneal with 2.5x40 Shockwave balloon.  POBA of TP trunk and peroneal arteries w 2.5x40 balloon.  Completion angio showing excellent result with reestablishment of inline flow to foot via peroneal     L fem access (6Frx70 Dylan) proglide closure    EBL: 10cc    Complications:  None; patient tolerated the procedure well.    Indications:   This is a 78-year-old female with history of COPD on oxygen, previous transmetatarsal amputation for osteomyelitis, who presented with right foot infection of her nonhealing TMA site.  She underwent underwent debridement and received IV antibiotics and positive cultures for MSSA.  Imaging was performed which was  notable for occlusion of her previous SFA stent and right NICCI of 0.39.  Given these findings revascularization was offered.  The risks benefits and alternatives of an angiogram with intervention was discussed with the patient who wished to proceed with the surgery and gave written consent.    Operative Procedure:  The patient was brought to the operating room and placed on the operating table in supine position.  Both the patient and procedure and laterality were confirmed and identified during timeout process. The patient received perioperative antibiotics. The patient's bilateral groins were prepped and draped in usual sterile fashion. Using ultrasound guidance, the left common femoral artery vessel patency was confirmed. Then direct ultrasound guidance the left CFA was entered with a 21-G needle, Mandril wire and 4-Fr micropuncture needle. Then under fluoroscopic guidance, an 0.035-in wire was placed into the distal aorta. The micropuncture dilator was then exchanged over the wire for a 5-Indonesian sheath. Next an omni catheter was placed over the wire and into the distal aorta under fluoroscopy and an aortogram was performed.     Aorto-iliac vessels:  Patent  Right Common femoral, profunda femoral arteries:  Patent profunda, and common femoral artery  Right Superficial femoral artery: 3 stents noted, Patent proximal, however distal two stents occluded. Distal SFA reconstitution via collaterals.  Right Popliteal artery:  Greater than 50% occlusion of the below-knee popliteal    Tibials: single-vessel peroneal runoff, greater than 60% stenosis of the peroneal artery and tibioperoneal trunk.    Based on the images from this diagnostic angio, a decision was made to intervene. We then systemically heparinized the patient. Next, we placed a up-and-over sheath and the SFA stent lesion was crossed with an 035 glidewire.  The SFA was pre-dilated with a 4 mm Bedford balloon.  A 5 x 150 mm Viabahn stent was then placed through  the occlusion to the level of the above we popliteal and this was post dilated with a 5 x 150 mm balloon.  Post stent angiography demonstrated excellent result and resolution of the stenosis.    We then turned our attention to the popliteal and peroneal lesion.  The popliteal artery was treated with a 3x40mm shockwave intravenous lithotripsy balloon, and the peroneal lesion treated with 2.5 by 40 mm shockwave.  Following this intervention there was some spasm noted within the proximal peroneal artery.  This was treated with 2.5 mm POBA which was held for 2 minutes.  Post angiography demonstrated resolution of the stenosis with excellent runoff to the foot.     Heparin was reversed with protamine.  The left common femoral artery was closed with a Perclose device without immediate complications.    The patient tolerated the procedure well and was taken to the post anesthesia recovery unit in good condition.    All needle, sponge and instrument counts were correct at the end of the case.  Dr. Gary Rico was present for the entire case.

## 2023-09-05 ENCOUNTER — TELEPHONE (OUTPATIENT)
Dept: PODIATRY | Facility: CLINIC | Age: 78
End: 2023-09-05
Payer: MEDICARE

## 2023-09-05 NOTE — TELEPHONE ENCOUNTER
Spoke with representative at [employer] [facility] in reference to patient getting help to appointment. Rony sates the patient does not have this coverage in her plan.

## 2023-09-08 ENCOUNTER — TELEPHONE (OUTPATIENT)
Dept: FAMILY MEDICINE | Facility: CLINIC | Age: 78
End: 2023-09-08

## 2023-09-08 ENCOUNTER — PES CALL (OUTPATIENT)
Dept: HOME HEALTH SERVICES | Facility: CLINIC | Age: 78
End: 2023-09-08
Payer: MEDICARE

## 2023-09-08 NOTE — TELEPHONE ENCOUNTER
----- Message from Seymour Santillan sent at 9/8/2023  9:44 AM CDT -----  Regarding: Brittney [facility] [phone]  Type: Patient Call Back     What is the request in detail: Brittney is requesting a call back in regards to the care at home referral that has been placed in the pts chart. Please call back with details on scheduling.     Can the clinic reply by ERNESTINASDORETHA? No     Would the patient rather a call back or a response via [facility]? Call back    Best call back number: [phone]    Additional Information:    Thank you.

## 2023-09-13 ENCOUNTER — TELEPHONE (OUTPATIENT)
Dept: HOME HEALTH SERVICES | Facility: CLINIC | Age: 78
End: 2023-09-13

## 2023-09-13 ENCOUNTER — OFFICE VISIT (OUTPATIENT)
Dept: HOME HEALTH SERVICES | Facility: CLINIC | Age: 78
End: 2023-09-13
Payer: MEDICARE

## 2023-09-13 VITALS
DIASTOLIC BLOOD PRESSURE: 68 MMHG | OXYGEN SATURATION: 97 % | SYSTOLIC BLOOD PRESSURE: 130 MMHG | HEART RATE: 88 BPM | RESPIRATION RATE: 18 BRPM

## 2023-09-13 DIAGNOSIS — I25.118 ATHEROSCLEROTIC HEART DISEASE OF NATIVE CORONARY ARTERY WITH OTHER FORMS OF ANGINA PECTORIS: ICD-10-CM

## 2023-09-13 DIAGNOSIS — I70.235 ATHEROSCLEROSIS OF NATIVE ARTERIES OF RIGHT LEG WITH ULCERATION OF OTHER PART OF FOOT: ICD-10-CM

## 2023-09-13 DIAGNOSIS — L03.119 CELLULITIS AND ABSCESS OF FOOT: ICD-10-CM

## 2023-09-13 DIAGNOSIS — F33.0 MILD EPISODE OF RECURRENT MAJOR DEPRESSIVE DISORDER: Primary | ICD-10-CM

## 2023-09-13 DIAGNOSIS — L02.619 CELLULITIS AND ABSCESS OF FOOT: ICD-10-CM

## 2023-09-13 DIAGNOSIS — J44.9 CHRONIC OBSTRUCTIVE PULMONARY DISEASE, UNSPECIFIED COPD TYPE: ICD-10-CM

## 2023-09-13 PROBLEM — I50.9 CHRONIC HEART FAILURE, UNSPECIFIED HEART FAILURE TYPE: Status: ACTIVE | Noted: 2023-09-13

## 2023-09-13 PROCEDURE — 99499 UNLISTED E&M SERVICE: CPT | Mod: S$GLB,,, | Performed by: NURSE PRACTITIONER

## 2023-09-13 PROCEDURE — 99499 RISK ADDL DX/OHS AUDIT: ICD-10-PCS | Mod: S$GLB,,, | Performed by: NURSE PRACTITIONER

## 2023-09-13 PROCEDURE — 1111F PR DISCHARGE MEDS RECONCILED W/ CURRENT OUTPATIENT MED LIST: ICD-10-PCS | Mod: CPTII,S$GLB,, | Performed by: NURSE PRACTITIONER

## 2023-09-13 PROCEDURE — 3075F PR MOST RECENT SYSTOLIC BLOOD PRESS GE 130-139MM HG: ICD-10-PCS | Mod: CPTII,S$GLB,, | Performed by: NURSE PRACTITIONER

## 2023-09-13 PROCEDURE — 1159F MED LIST DOCD IN RCRD: CPT | Mod: CPTII,S$GLB,, | Performed by: NURSE PRACTITIONER

## 2023-09-13 PROCEDURE — 1126F AMNT PAIN NOTED NONE PRSNT: CPT | Mod: CPTII,S$GLB,, | Performed by: NURSE PRACTITIONER

## 2023-09-13 PROCEDURE — 1111F DSCHRG MED/CURRENT MED MERGE: CPT | Mod: CPTII,S$GLB,, | Performed by: NURSE PRACTITIONER

## 2023-09-13 PROCEDURE — 99349 PR HOME VISIT,ESTAB PATIENT,LEVEL III: ICD-10-PCS | Mod: S$GLB,,, | Performed by: NURSE PRACTITIONER

## 2023-09-13 PROCEDURE — 1159F PR MEDICATION LIST DOCUMENTED IN MEDICAL RECORD: ICD-10-PCS | Mod: CPTII,S$GLB,, | Performed by: NURSE PRACTITIONER

## 2023-09-13 PROCEDURE — 1160F PR REVIEW ALL MEDS BY PRESCRIBER/CLIN PHARMACIST DOCUMENTED: ICD-10-PCS | Mod: CPTII,S$GLB,, | Performed by: NURSE PRACTITIONER

## 2023-09-13 PROCEDURE — 3075F SYST BP GE 130 - 139MM HG: CPT | Mod: CPTII,S$GLB,, | Performed by: NURSE PRACTITIONER

## 2023-09-13 PROCEDURE — 1126F PR PAIN SEVERITY QUANTIFIED, NO PAIN PRESENT: ICD-10-PCS | Mod: CPTII,S$GLB,, | Performed by: NURSE PRACTITIONER

## 2023-09-13 PROCEDURE — 1160F RVW MEDS BY RX/DR IN RCRD: CPT | Mod: CPTII,S$GLB,, | Performed by: NURSE PRACTITIONER

## 2023-09-13 PROCEDURE — 3078F PR MOST RECENT DIASTOLIC BLOOD PRESSURE < 80 MM HG: ICD-10-PCS | Mod: CPTII,S$GLB,, | Performed by: NURSE PRACTITIONER

## 2023-09-13 PROCEDURE — 3078F DIAST BP <80 MM HG: CPT | Mod: CPTII,S$GLB,, | Performed by: NURSE PRACTITIONER

## 2023-09-13 PROCEDURE — 99349 HOME/RES VST EST MOD MDM 40: CPT | Mod: S$GLB,,, | Performed by: NURSE PRACTITIONER

## 2023-09-13 NOTE — ASSESSMENT & PLAN NOTE
Stable, resp at baseline  Using oxygen at 2L sat 97-99  Pt needs portable oxygen, she cannot mobilize with a portable tank with her amputation and walker or WC. Requesting portable concentrator. Orders will be sent to N  Continue all medications and oxygen as currently in place

## 2023-09-13 NOTE — ASSESSMENT & PLAN NOTE
Recent admit with debridement and revasc of foot  Antibiotics completed since discharge  Wound healing, some drainage-serosangious  Current dressing dry/intact   performing wound care

## 2023-09-13 NOTE — PROGRESS NOTES
[facility]  Transitional Care Management (TCM) Home Visit    Encounter Provider: Kalyn Pemberton   PCP: No, Primary Doctor  Consult Requested By: Dr. Maykel Gutierrez  [facility] Hospital Course Synopsis:    Recent admit related to cellulitis to right foot, IV antibiotics, vasc procedure to revasc foot    Hospital Length of Stay: 10 days  Admit Date: 8/18/23  IP Discharge Date: 8/29/23  Days since discharge (from IP or SNF): 14 [facility] Contact Note: 8/30/23    Patient ID: Radha Sotelo is a 78 y.o. female was recently admitted to the hospital, this is their TCM encounter.      DECISION MAKING TODAY     Assessment & Plan:  Problem List Items Addressed This Visit          Psychiatric    Mild episode of recurrent major depressive disorder - Primary    Current Assessment & Plan     Pt with chronic depression  No thoughts of SI/HI  Celexa in place  Continue current plan  Monitor            Pulmonary    Chronic obstructive pulmonary disease    Current Assessment & Plan     Stable, resp at baseline  Using oxygen at 2L sat 97-99  Pt needs portable oxygen, she cannot mobilize with a portable tank with her amputation and walker or WC. Requesting portable concentrator. 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 with ulceration of other part of foot    Current Assessment & Plan     Related to smoking history  Vascular following  ASA and plavix in place  HH performing wound care            ID    Cellulitis and abscess of foot    Current Assessment & Plan     Recent admit with debridement and revasc of foot  Antibiotics completed since discharge  Wound healing, some drainage-serosangious  Current dressing dry/intact  HH performing wound care          Other Visit Diagnoses       Atherosclerotic heart disease of native coronary artery with other forms of angina pectoris                 Medication List on  Discharge:     Medication List            Accurate as of September 13, 2023  3:22 PM. If you have any questions, ask your nurse or doctor.                CONTINUE taking these medications      albuterol 90 mcg/actuation inhaler  Commonly known as: PROVENTIL/VENTOLIN HFA  INHALE 2 PUFFS BY MOUTH EVERY 6 HOURS AS NEEDED     albuterol-ipratropium 2.5 mg-0.5 mg/3 mL nebulizer solution  Commonly known as: DUO-NEB  USE 1 VIAL VIA NEBULIZER EVERY 6 HOURS AS NEEDED FOR WHEEZING OR SHORTNESS OF BREATH     aspirin 81 MG Chew  Take 1 tablet (81 mg total) by mouth once daily.     atorvastatin 40 MG tablet  Commonly known as: LIPITOR  Take 1 tablet (40 mg total) by mouth once daily.     budesonide-formoterol 80-4.5 mcg 80-4.5 mcg/actuation Hfaa  Commonly known as: SYMBICORT  INHALE 2 PUFFS INTO THE LUNGS TWICE DAILY. RINSE MOUTH AFTER USE     cadexomer iodine 0.9 % gel  Commonly known as: IODOSORB  Apply topically once daily. Apply to right foot TMA.     citalopram 20 MG tablet  Commonly known as: CeleXA  Take 1 tablet (20 mg total) by mouth once daily.     clopidogreL 75 mg tablet  Commonly known as: PLAVIX  Take 1 tablet (75 mg total) by mouth once daily.     guaiFENesin 600 mg 12 hr tablet  Commonly known as: MUCINEX  Take 1 tablet (600 mg total) by mouth 2 (two) times daily.     hydrocortisone-aloe vera 1 % Crea cream  Apply topically 2 (two) times daily.     metoprolol tartrate 50 MG tablet  Commonly known as: LOPRESSOR  Take 1 tablet (50 mg total) by mouth 2 (two) times daily.     nicotine 21 mg/24 hr  Commonly known as: NICODERM CQ  Place 1 patch onto the skin once daily.     oxyCODONE 5 MG immediate release tablet  Commonly known as: ROXICODONE  Take 1 tablet (5 mg total) by mouth every 4 (four) hours as needed for Pain.              Medication Reconciliation:  Were medications changed on discharge? Yes  Were medications in the home? Yes  Is the patient taking the medications as directed? Yes  Does the patient understand  the medications and changes? Yes  Did you (as the provider) update the med list? Yes      HISTORY OF PRESENT ILLNESS          Family and/or Caregiver present at visit?  No  Name of Caregiver:   History provided by: patient    Developments since the hospitalization: Pain to wound improved, continued drainage from wound. HH performing wound care    Current needs: Portable oxygen    Impression upon entering the home:  Physical Dwelling: apartment/condo 2nd floor  Appearance of home environment: cleaniness: clean, walking pathways: cluttered, lighting: adequate, and home structure: sound structure  Functional Status: independent  Mobility: ambulatory with device  Nutritional access: adequate intake and access  Home Health: Yes, HH Agency [facility]    DME/Supplies: rolling walker, oxygen, and wheelchair     Diagnostic tests reviewed/disposition: No diagnosic tests pending after this hospitalization.  Disease/illness education: PAD  Establishment or re-establishment of referral orders for community resources: No other necessary community resources.   Discussion with other health care providers: No discussion with other health care providers necessary.   Does patient have a PCP at [facility]? No   Repatriation plan with PCP? Care at Home reason: transportation   Does patient have an ostomy (ileostomy, colostomy, suprapubic catheter, nephrostomy tube, tracheostomy, PEG tube, pleurex catheter, cholecystostomy, etc)? No  Were BPAs reviewed? Yes    Social History     Socioeconomic History    Marital status:    Tobacco Use    Smoking status: Former     Current packs/day: 0.00     Average packs/day: 0.5 packs/day for 50.0 years (25.0 ttl pk-yrs)     Types: Cigarettes     Start date: 7/5/1963     Quit date: 7/5/2013     Years since quitting: 10.1    Smokeless tobacco: Never   Substance and Sexual Activity    Alcohol use: No    Drug use: No   Social History Narrative    ** Merged History Encounter **          Social Determinants of  Health     Financial Resource Strain: Low Risk  (8/19/2023)    Overall Financial Resource Strain (CARDIA)     Difficulty of Paying Living Expenses: Not very hard   Recent Concern: Financial Resource Strain - Medium Risk (8/12/2023)    Overall Financial Resource Strain (CARDIA)     Difficulty of Paying Living Expenses: Somewhat hard   Food Insecurity: No Food Insecurity (8/19/2023)    Hunger Vital Sign     Worried About Running Out of Food in the Last Year: Never true     Ran Out of Food in the Last Year: Never true   Transportation Needs: No Transportation Needs (8/19/2023)    PRAPARE - Transportation     Lack of Transportation (Medical): No     Lack of Transportation (Non-Medical): No   Recent Concern: Transportation Needs - Unmet Transportation Needs (8/12/2023)    PRAPARE - Transportation     Lack of Transportation (Medical): Yes     Lack of Transportation (Non-Medical): Yes   Physical Activity: Inactive (8/12/2023)    Exercise Vital Sign     Days of Exercise per Week: 0 days     Minutes of Exercise per Session: 0 min   Stress: Stress Concern Present (8/12/2023)    [place] [employer] - Occupational Stress Questionnaire     Feeling of Stress : To some extent   Social Connections: Socially Isolated (8/19/2023)    Social Connection and Isolation Panel [NHANES]     Frequency of Communication with Friends and Family: More than three times a week     Frequency of Social Gatherings with Friends and Family: More than three times a week     Attends Hoahaoism Services: Never     Active Member of Clubs or Organizations: No     Attends Club or Organization Meetings: Never     Marital Status:    Housing Stability: Low Risk  (8/19/2023)    Housing Stability Vital Sign     Unable to Pay for Housing in the Last Year: No     Number of Places Lived in the Last Year: 1     Unstable Housing in the Last Year: No         OBJECTIVE:     Vital Signs:  Vitals:    09/13/23 0952   BP: 130/68   Pulse: 88   Resp:  18       Review of Systems   Constitutional:  Negative for activity change, fatigue and fever.   HENT: Negative.     Eyes: Negative.    Respiratory:  Positive for shortness of breath. Negative for chest tightness.    Cardiovascular: Negative.  Negative for leg swelling.   Gastrointestinal: Negative.    Endocrine: Negative.    Genitourinary: Negative.    Musculoskeletal:  Positive for gait problem.   Skin:  Positive for wound.   Allergic/Immunologic: Negative.    Hematological: Negative.    Psychiatric/Behavioral: Negative.  Negative for agitation.    All other systems reviewed and are negative.      Physical Exam:  Physical Exam    INSTRUCTIONS FOR PATIENT:     Scheduled Follow-up, Appts Reviewed with Modifications if Needed: Yes  Future Appointments   Date Time Provider Department Center   9/20/2023 11:00 AM VASCULAR, LAB [facility]   9/20/2023  1:00 PM Dionicio Saldaña MD [facility]       Signature: Kalyn Pemberton NP    Transition of Care Visit:  I have reviewed and updated the history and problem list.  I have reconciled the medication list.  I have discussed the hospitalization and current medical issues, prognosis and plans with the patient/family.

## 2023-09-18 ENCOUNTER — TELEPHONE (OUTPATIENT)
Dept: VASCULAR SURGERY | Facility: CLINIC | Age: 78
End: 2023-09-18
Payer: MEDICARE

## 2023-09-18 NOTE — TELEPHONE ENCOUNTER
"Contacted the pt to confirm appt and the pt verbalized"I need to reschedule my appt.I'm waiting on them to decide to deliver my portable oxygen". Appt rescheduled and confirmed with the pt.  "

## 2023-09-20 LAB — FUNGUS SPEC CULT: NORMAL

## 2023-10-03 ENCOUNTER — DOCUMENT SCAN (OUTPATIENT)
Dept: HOME HEALTH SERVICES | Facility: HOSPITAL | Age: 78
End: 2023-10-03
Payer: MEDICARE

## 2023-10-05 ENCOUNTER — EXTERNAL HOME HEALTH (OUTPATIENT)
Dept: HOME HEALTH SERVICES | Facility: HOSPITAL | Age: 78
End: 2023-10-05
Payer: MEDICARE

## 2023-10-09 ENCOUNTER — TELEPHONE (OUTPATIENT)
Dept: VASCULAR SURGERY | Facility: CLINIC | Age: 78
End: 2023-10-09
Payer: MEDICARE

## 2023-10-09 ENCOUNTER — DOCUMENT SCAN (OUTPATIENT)
Dept: HOME HEALTH SERVICES | Facility: HOSPITAL | Age: 78
End: 2023-10-09
Payer: MEDICARE

## 2023-10-09 LAB
ACID FAST MOD KINY STN SPEC: NORMAL
MYCOBACTERIUM SPEC QL CULT: NORMAL

## 2023-10-09 NOTE — TELEPHONE ENCOUNTER
"Spoke with the pt who rescheduled appt.Pt verbalized that "the weather is going to be real bad Wednesday and I can't get wet.I had pneumonia before and I can't get it again".Appt rescheduled and appt letter placed in the mail.  "

## 2023-10-10 ENCOUNTER — DOCUMENT SCAN (OUTPATIENT)
Dept: HOME HEALTH SERVICES | Facility: HOSPITAL | Age: 78
End: 2023-10-10
Payer: MEDICARE

## 2023-10-23 ENCOUNTER — DOCUMENT SCAN (OUTPATIENT)
Dept: HOME HEALTH SERVICES | Facility: HOSPITAL | Age: 78
End: 2023-10-23
Payer: MEDICARE

## 2023-11-22 ENCOUNTER — HOSPITAL ENCOUNTER (INPATIENT)
Facility: HOSPITAL | Age: 78
LOS: 2 days | Discharge: HOME | DRG: 322 | End: 2023-11-24
Attending: STUDENT IN AN ORGANIZED HEALTH CARE EDUCATION/TRAINING PROGRAM | Admitting: INTERNAL MEDICINE
Payer: MEDICARE

## 2023-11-22 ENCOUNTER — TELEPHONE (OUTPATIENT)
Dept: CARDIAC REHAB | Facility: CLINIC | Age: 78
End: 2023-11-22
Payer: MEDICARE

## 2023-11-22 DIAGNOSIS — I25.119 CORONARY ARTERY DISEASE INVOLVING NATIVE HEART WITH ANGINA PECTORIS, UNSPECIFIED VESSEL OR LESION TYPE: ICD-10-CM

## 2023-11-22 DIAGNOSIS — Z95.5 S/P CORONARY ARTERY STENT PLACEMENT: ICD-10-CM

## 2023-11-22 DIAGNOSIS — I25.10 CORONARY ARTERY DISEASE INVOLVING NATIVE CORONARY ARTERY OF NATIVE HEART WITHOUT ANGINA PECTORIS: ICD-10-CM

## 2023-11-22 DIAGNOSIS — I21.3 STEMI (ST ELEVATION MYOCARDIAL INFARCTION): Primary | ICD-10-CM

## 2023-11-22 LAB
ABO + RH BLD: NORMAL
ALBUMIN SERPL BCP-MCNC: 3.7 G/DL (ref 3.5–5.2)
ALP SERPL-CCNC: 86 U/L (ref 55–135)
ALT SERPL W/O P-5'-P-CCNC: 25 U/L (ref 10–44)
ANION GAP SERPL CALC-SCNC: 11 MMOL/L (ref 8–16)
ANION GAP SERPL CALC-SCNC: 13 MMOL/L (ref 8–16)
ANION GAP SERPL CALC-SCNC: 8 MMOL/L (ref 8–16)
APTT PPP: 46.9 SEC (ref 21–32)
APTT PPP: >150 SEC (ref 21–32)
AST SERPL-CCNC: 24 U/L (ref 10–40)
BASOPHILS # BLD AUTO: 0.06 K/UL (ref 0–0.2)
BASOPHILS NFR BLD: 1 % (ref 0–1.9)
BILIRUB SERPL-MCNC: 0.3 MG/DL (ref 0.1–1)
BLD GP AB SCN CELLS X3 SERPL QL: NORMAL
BUN SERPL-MCNC: 12 MG/DL (ref 8–23)
BUN SERPL-MCNC: 13 MG/DL (ref 8–23)
BUN SERPL-MCNC: 14 MG/DL (ref 8–23)
CALCIUM SERPL-MCNC: 8.3 MG/DL (ref 8.7–10.5)
CALCIUM SERPL-MCNC: 8.5 MG/DL (ref 8.7–10.5)
CALCIUM SERPL-MCNC: 8.8 MG/DL (ref 8.7–10.5)
CATH EF ESTIMATED: 60 %
CHLORIDE SERPL-SCNC: 100 MMOL/L (ref 95–110)
CHLORIDE SERPL-SCNC: 101 MMOL/L (ref 95–110)
CHLORIDE SERPL-SCNC: 106 MMOL/L (ref 95–110)
CO2 SERPL-SCNC: 21 MMOL/L (ref 23–29)
CO2 SERPL-SCNC: 21 MMOL/L (ref 23–29)
CO2 SERPL-SCNC: 23 MMOL/L (ref 23–29)
CREAT SERPL-MCNC: 0.8 MG/DL (ref 0.5–1.4)
DIFFERENTIAL METHOD: ABNORMAL
EOSINOPHIL # BLD AUTO: 0.2 K/UL (ref 0–0.5)
EOSINOPHIL NFR BLD: 3.8 % (ref 0–8)
ERYTHROCYTE [DISTWIDTH] IN BLOOD BY AUTOMATED COUNT: 13.3 % (ref 11.5–14.5)
EST. GFR  (NO RACE VARIABLE): >60 ML/MIN/1.73 M^2
GLUCOSE SERPL-MCNC: 148 MG/DL (ref 70–110)
GLUCOSE SERPL-MCNC: 166 MG/DL (ref 70–110)
GLUCOSE SERPL-MCNC: 96 MG/DL (ref 70–110)
HCT VFR BLD AUTO: 36.6 % (ref 37–48.5)
HGB BLD-MCNC: 12.2 G/DL (ref 12–16)
IMM GRANULOCYTES # BLD AUTO: 0.05 K/UL (ref 0–0.04)
IMM GRANULOCYTES NFR BLD AUTO: 0.8 % (ref 0–0.5)
INR PPP: 1.1 (ref 0.8–1.2)
LYMPHOCYTES # BLD AUTO: 1.5 K/UL (ref 1–4.8)
LYMPHOCYTES NFR BLD: 23.4 % (ref 18–48)
MAGNESIUM SERPL-MCNC: 1.8 MG/DL (ref 1.6–2.6)
MCH RBC QN AUTO: 30 PG (ref 27–31)
MCHC RBC AUTO-ENTMCNC: 33.3 G/DL (ref 32–36)
MCV RBC AUTO: 90 FL (ref 82–98)
MONOCYTES # BLD AUTO: 0.8 K/UL (ref 0.3–1)
MONOCYTES NFR BLD: 12.6 % (ref 4–15)
NEUTROPHILS # BLD AUTO: 3.7 K/UL (ref 1.8–7.7)
NEUTROPHILS NFR BLD: 58.4 % (ref 38–73)
NRBC BLD-RTO: 0 /100 WBC
PLATELET # BLD AUTO: 233 K/UL (ref 150–450)
PMV BLD AUTO: 9.5 FL (ref 9.2–12.9)
POCT GLUCOSE: 140 MG/DL (ref 70–110)
POCT GLUCOSE: 99 MG/DL (ref 70–110)
POTASSIUM SERPL-SCNC: 2.7 MMOL/L (ref 3.5–5.1)
POTASSIUM SERPL-SCNC: 2.8 MMOL/L (ref 3.5–5.1)
POTASSIUM SERPL-SCNC: 4.2 MMOL/L (ref 3.5–5.1)
PROT SERPL-MCNC: 6.4 G/DL (ref 6–8.4)
PROTHROMBIN TIME: 11.5 SEC (ref 9–12.5)
RBC # BLD AUTO: 4.07 M/UL (ref 4–5.4)
SODIUM SERPL-SCNC: 133 MMOL/L (ref 136–145)
SODIUM SERPL-SCNC: 135 MMOL/L (ref 136–145)
SODIUM SERPL-SCNC: 136 MMOL/L (ref 136–145)
SPECIMEN OUTDATE: NORMAL
TROPONIN I SERPL DL<=0.01 NG/ML-MCNC: 0.08 NG/ML (ref 0–0.03)
WBC # BLD AUTO: 6.25 K/UL (ref 3.9–12.7)

## 2023-11-22 PROCEDURE — C1894 INTRO/SHEATH, NON-LASER: HCPCS | Performed by: INTERNAL MEDICINE

## 2023-11-22 PROCEDURE — 93005 ELECTROCARDIOGRAM TRACING: CPT

## 2023-11-22 PROCEDURE — 84484 ASSAY OF TROPONIN QUANT: CPT | Performed by: STUDENT IN AN ORGANIZED HEALTH CARE EDUCATION/TRAINING PROGRAM

## 2023-11-22 PROCEDURE — 25500020 PHARM REV CODE 255: Performed by: INTERNAL MEDICINE

## 2023-11-22 PROCEDURE — 85025 COMPLETE CBC W/AUTO DIFF WBC: CPT | Performed by: STUDENT IN AN ORGANIZED HEALTH CARE EDUCATION/TRAINING PROGRAM

## 2023-11-22 PROCEDURE — 92921 PR PTCA, ADD'L VESSEL: CPT | Mod: RC,GC,, | Performed by: INTERNAL MEDICINE

## 2023-11-22 PROCEDURE — 92941 PRQ TRLML REVSC TOT OCCL AMI: CPT | Mod: RC,GC,, | Performed by: INTERNAL MEDICINE

## 2023-11-22 PROCEDURE — 93010 EKG 12-LEAD: ICD-10-PCS | Mod: ,,, | Performed by: INTERNAL MEDICINE

## 2023-11-22 PROCEDURE — 27201423 OPTIME MED/SURG SUP & DEVICES STERILE SUPPLY: Performed by: INTERNAL MEDICINE

## 2023-11-22 PROCEDURE — 92921 HC PTCA , ADD'L BRANCH: CPT | Mod: RC | Performed by: INTERNAL MEDICINE

## 2023-11-22 PROCEDURE — 63600175 PHARM REV CODE 636 W HCPCS: Performed by: STUDENT IN AN ORGANIZED HEALTH CARE EDUCATION/TRAINING PROGRAM

## 2023-11-22 PROCEDURE — 93010 ELECTROCARDIOGRAM REPORT: CPT | Mod: ,,, | Performed by: INTERNAL MEDICINE

## 2023-11-22 PROCEDURE — C1887 CATHETER, GUIDING: HCPCS | Performed by: INTERNAL MEDICINE

## 2023-11-22 PROCEDURE — 99153 MOD SED SAME PHYS/QHP EA: CPT | Performed by: INTERNAL MEDICINE

## 2023-11-22 PROCEDURE — C1874 STENT, COATED/COV W/DEL SYS: HCPCS | Performed by: INTERNAL MEDICINE

## 2023-11-22 PROCEDURE — 80053 COMPREHEN METABOLIC PANEL: CPT | Performed by: STUDENT IN AN ORGANIZED HEALTH CARE EDUCATION/TRAINING PROGRAM

## 2023-11-22 PROCEDURE — 99152 MOD SED SAME PHYS/QHP 5/>YRS: CPT | Mod: GC,,, | Performed by: INTERNAL MEDICINE

## 2023-11-22 PROCEDURE — 99900035 HC TECH TIME PER 15 MIN (STAT)

## 2023-11-22 PROCEDURE — 93458 L HRT ARTERY/VENTRICLE ANGIO: CPT | Mod: 59 | Performed by: INTERNAL MEDICINE

## 2023-11-22 PROCEDURE — C9606 PERC D-E COR REVASC W AMI S: HCPCS | Mod: RC | Performed by: INTERNAL MEDICINE

## 2023-11-22 PROCEDURE — 80048 BASIC METABOLIC PNL TOTAL CA: CPT | Mod: XB | Performed by: INTERNAL MEDICINE

## 2023-11-22 PROCEDURE — 93458 PR CATH PLACE/CORON ANGIO, IMG SUPER/INTERP,W LEFT HEART VENTRICULOGRAPHY: ICD-10-PCS | Mod: 26,59,51,GC | Performed by: INTERNAL MEDICINE

## 2023-11-22 PROCEDURE — 92941 PR AMI ANY METHOD: ICD-10-PCS | Mod: RC,GC,, | Performed by: INTERNAL MEDICINE

## 2023-11-22 PROCEDURE — 92921 PR PTCA, ADD'L VESSEL: ICD-10-PCS | Mod: RC,GC,, | Performed by: INTERNAL MEDICINE

## 2023-11-22 PROCEDURE — 27000221 HC OXYGEN, UP TO 24 HOURS

## 2023-11-22 PROCEDURE — C1769 GUIDE WIRE: HCPCS | Performed by: INTERNAL MEDICINE

## 2023-11-22 PROCEDURE — 25000003 PHARM REV CODE 250: Performed by: STUDENT IN AN ORGANIZED HEALTH CARE EDUCATION/TRAINING PROGRAM

## 2023-11-22 PROCEDURE — 85610 PROTHROMBIN TIME: CPT | Performed by: STUDENT IN AN ORGANIZED HEALTH CARE EDUCATION/TRAINING PROGRAM

## 2023-11-22 PROCEDURE — 25000003 PHARM REV CODE 250: Performed by: INTERNAL MEDICINE

## 2023-11-22 PROCEDURE — 99152 PR MOD CONSCIOUS SEDATION, SAME PHYS, 5+ YRS, FIRST 15 MIN: ICD-10-PCS | Mod: GC,,, | Performed by: INTERNAL MEDICINE

## 2023-11-22 PROCEDURE — 83735 ASSAY OF MAGNESIUM: CPT | Performed by: INTERNAL MEDICINE

## 2023-11-22 PROCEDURE — 99223 1ST HOSP IP/OBS HIGH 75: CPT | Mod: AI,GC,, | Performed by: INTERNAL MEDICINE

## 2023-11-22 PROCEDURE — 99223 PR INITIAL HOSPITAL CARE,LEVL III: ICD-10-PCS | Mod: AI,GC,, | Performed by: INTERNAL MEDICINE

## 2023-11-22 PROCEDURE — C1760 CLOSURE DEV, VASC: HCPCS | Performed by: INTERNAL MEDICINE

## 2023-11-22 PROCEDURE — 99285 EMERGENCY DEPT VISIT HI MDM: CPT | Mod: 25

## 2023-11-22 PROCEDURE — 20000000 HC ICU ROOM

## 2023-11-22 PROCEDURE — 86850 RBC ANTIBODY SCREEN: CPT | Performed by: STUDENT IN AN ORGANIZED HEALTH CARE EDUCATION/TRAINING PROGRAM

## 2023-11-22 PROCEDURE — 94640 AIRWAY INHALATION TREATMENT: CPT

## 2023-11-22 PROCEDURE — 93458 L HRT ARTERY/VENTRICLE ANGIO: CPT | Mod: 26,59,51,GC | Performed by: INTERNAL MEDICINE

## 2023-11-22 PROCEDURE — 96374 THER/PROPH/DIAG INJ IV PUSH: CPT

## 2023-11-22 PROCEDURE — 99152 MOD SED SAME PHYS/QHP 5/>YRS: CPT | Performed by: INTERNAL MEDICINE

## 2023-11-22 PROCEDURE — 80048 BASIC METABOLIC PNL TOTAL CA: CPT | Mod: 91,XB | Performed by: STUDENT IN AN ORGANIZED HEALTH CARE EDUCATION/TRAINING PROGRAM

## 2023-11-22 PROCEDURE — 25000242 PHARM REV CODE 250 ALT 637 W/ HCPCS: Performed by: STUDENT IN AN ORGANIZED HEALTH CARE EDUCATION/TRAINING PROGRAM

## 2023-11-22 PROCEDURE — 85730 THROMBOPLASTIN TIME PARTIAL: CPT | Performed by: INTERNAL MEDICINE

## 2023-11-22 PROCEDURE — 94761 N-INVAS EAR/PLS OXIMETRY MLT: CPT

## 2023-11-22 PROCEDURE — C1725 CATH, TRANSLUMIN NON-LASER: HCPCS | Performed by: INTERNAL MEDICINE

## 2023-11-22 PROCEDURE — 63600175 PHARM REV CODE 636 W HCPCS: Performed by: INTERNAL MEDICINE

## 2023-11-22 PROCEDURE — 85730 THROMBOPLASTIN TIME PARTIAL: CPT | Mod: 91 | Performed by: STUDENT IN AN ORGANIZED HEALTH CARE EDUCATION/TRAINING PROGRAM

## 2023-11-22 DEVICE — EVEROLIMUS-ELUTING PLATINUM CHROMIUM CORONARY STENT SYSTEM
Type: IMPLANTABLE DEVICE | Site: HEART | Status: FUNCTIONAL
Brand: SYNERGY™ XD

## 2023-11-22 RX ORDER — HEPARIN SODIUM,PORCINE/D5W 25000/250
0-40 INTRAVENOUS SOLUTION INTRAVENOUS CONTINUOUS
Status: DISCONTINUED | OUTPATIENT
Start: 2023-11-22 | End: 2023-11-23

## 2023-11-22 RX ORDER — SODIUM CHLORIDE 9 MG/ML
INJECTION, SOLUTION INTRAVENOUS
Status: DISCONTINUED | OUTPATIENT
Start: 2023-11-22 | End: 2023-11-24 | Stop reason: HOSPADM

## 2023-11-22 RX ORDER — SODIUM CHLORIDE 9 MG/ML
INJECTION, SOLUTION INTRAVENOUS CONTINUOUS
Status: CANCELLED | OUTPATIENT
Start: 2023-11-22 | End: 2023-11-22

## 2023-11-22 RX ORDER — DEXTROSE MONOHYDRATE, SODIUM CHLORIDE, AND POTASSIUM CHLORIDE 50; 1.49; 2.25 G/1000ML; G/1000ML; G/1000ML
INJECTION, SOLUTION INTRAVENOUS
Status: DISCONTINUED | OUTPATIENT
Start: 2023-11-22 | End: 2023-11-24 | Stop reason: HOSPADM

## 2023-11-22 RX ORDER — MORPHINE SULFATE 2 MG/ML
2 INJECTION, SOLUTION INTRAMUSCULAR; INTRAVENOUS
Status: COMPLETED | OUTPATIENT
Start: 2023-11-22 | End: 2023-11-22

## 2023-11-22 RX ORDER — DIPHENHYDRAMINE HCL 50 MG
50 CAPSULE ORAL ONCE
Status: CANCELLED | OUTPATIENT
Start: 2023-11-22 | End: 2023-11-22

## 2023-11-22 RX ORDER — ALBUTEROL SULFATE 2.5 MG/.5ML
2.5 SOLUTION RESPIRATORY (INHALATION) EVERY 6 HOURS
Status: DISCONTINUED | OUTPATIENT
Start: 2023-11-23 | End: 2023-11-24 | Stop reason: HOSPADM

## 2023-11-22 RX ORDER — METOPROLOL TARTRATE 50 MG/1
50 TABLET ORAL 2 TIMES DAILY
Status: DISCONTINUED | OUTPATIENT
Start: 2023-11-22 | End: 2023-11-24 | Stop reason: HOSPADM

## 2023-11-22 RX ORDER — HEPARIN SOD,PORCINE/0.9 % NACL 1000/500ML
INTRAVENOUS SOLUTION INTRAVENOUS
Status: DISCONTINUED | OUTPATIENT
Start: 2023-11-22 | End: 2023-11-22 | Stop reason: HOSPADM

## 2023-11-22 RX ORDER — NAPROXEN SODIUM 220 MG/1
81 TABLET, FILM COATED ORAL DAILY
Status: DISCONTINUED | OUTPATIENT
Start: 2023-11-22 | End: 2023-11-24 | Stop reason: HOSPADM

## 2023-11-22 RX ORDER — FENTANYL CITRATE 50 UG/ML
INJECTION, SOLUTION INTRAMUSCULAR; INTRAVENOUS
Status: DISCONTINUED | OUTPATIENT
Start: 2023-11-22 | End: 2023-11-22 | Stop reason: HOSPADM

## 2023-11-22 RX ORDER — POTASSIUM CHLORIDE 20 MEQ/1
40 TABLET, EXTENDED RELEASE ORAL
Status: DISPENSED | OUTPATIENT
Start: 2023-11-22 | End: 2023-11-23

## 2023-11-22 RX ORDER — DILTIAZEM HCL/D5W 125 MG/125
5 PLASTIC BAG, INJECTION (ML) INTRAVENOUS CONTINUOUS
Status: DISCONTINUED | OUTPATIENT
Start: 2023-11-22 | End: 2023-11-22

## 2023-11-22 RX ORDER — DIPHENHYDRAMINE HCL 50 MG
50 CAPSULE ORAL
Status: COMPLETED | OUTPATIENT
Start: 2023-11-22 | End: 2023-11-22

## 2023-11-22 RX ORDER — PHENYLEPHRINE HYDROCHLORIDE 10 MG/ML
INJECTION INTRAVENOUS
Status: DISCONTINUED | OUTPATIENT
Start: 2023-11-22 | End: 2023-11-22 | Stop reason: HOSPADM

## 2023-11-22 RX ORDER — ACETAMINOPHEN 325 MG/1
650 TABLET ORAL EVERY 4 HOURS PRN
Status: DISCONTINUED | OUTPATIENT
Start: 2023-11-22 | End: 2023-11-24 | Stop reason: HOSPADM

## 2023-11-22 RX ORDER — METOPROLOL TARTRATE 1 MG/ML
5 INJECTION, SOLUTION INTRAVENOUS ONCE
Status: DISCONTINUED | OUTPATIENT
Start: 2023-11-22 | End: 2023-11-22

## 2023-11-22 RX ORDER — ONDANSETRON 8 MG/1
8 TABLET, ORALLY DISINTEGRATING ORAL EVERY 8 HOURS PRN
Status: DISCONTINUED | OUTPATIENT
Start: 2023-11-22 | End: 2023-11-24 | Stop reason: HOSPADM

## 2023-11-22 RX ORDER — SODIUM CHLORIDE 9 MG/ML
INJECTION, SOLUTION INTRAVENOUS CONTINUOUS
Status: ACTIVE | OUTPATIENT
Start: 2023-11-22 | End: 2023-11-22

## 2023-11-22 RX ORDER — ATORVASTATIN CALCIUM 20 MG/1
80 TABLET, FILM COATED ORAL DAILY
Status: DISCONTINUED | OUTPATIENT
Start: 2023-11-22 | End: 2023-11-24 | Stop reason: HOSPADM

## 2023-11-22 RX ORDER — SODIUM CHLORIDE 0.9 % (FLUSH) 0.9 %
10 SYRINGE (ML) INJECTION
Status: DISCONTINUED | OUTPATIENT
Start: 2023-11-22 | End: 2023-11-24 | Stop reason: HOSPADM

## 2023-11-22 RX ORDER — TALC
6 POWDER (GRAM) TOPICAL NIGHTLY PRN
Status: DISCONTINUED | OUTPATIENT
Start: 2023-11-22 | End: 2023-11-24 | Stop reason: HOSPADM

## 2023-11-22 RX ORDER — METOPROLOL TARTRATE 1 MG/ML
5 INJECTION, SOLUTION INTRAVENOUS ONCE
Status: COMPLETED | OUTPATIENT
Start: 2023-11-22 | End: 2023-11-22

## 2023-11-22 RX ORDER — DILTIAZEM HYDROCHLORIDE 5 MG/ML
INJECTION INTRAVENOUS
Status: DISCONTINUED | OUTPATIENT
Start: 2023-11-22 | End: 2023-11-22 | Stop reason: HOSPADM

## 2023-11-22 RX ORDER — HEPARIN SODIUM 1000 [USP'U]/ML
INJECTION, SOLUTION INTRAVENOUS; SUBCUTANEOUS
Status: DISCONTINUED | OUTPATIENT
Start: 2023-11-22 | End: 2023-11-22 | Stop reason: HOSPADM

## 2023-11-22 RX ORDER — MIDAZOLAM HYDROCHLORIDE 2 MG/2ML
INJECTION, SOLUTION INTRAMUSCULAR; INTRAVENOUS
Status: DISCONTINUED | OUTPATIENT
Start: 2023-11-22 | End: 2023-11-22 | Stop reason: HOSPADM

## 2023-11-22 RX ORDER — SODIUM CHLORIDE 9 MG/ML
500 INJECTION, SOLUTION INTRAVENOUS
Status: COMPLETED | OUTPATIENT
Start: 2023-11-22 | End: 2023-11-22

## 2023-11-22 RX ORDER — ALBUTEROL SULFATE 2.5 MG/.5ML
2.5 SOLUTION RESPIRATORY (INHALATION) EVERY 4 HOURS PRN
Status: DISCONTINUED | OUTPATIENT
Start: 2023-11-22 | End: 2023-11-24 | Stop reason: HOSPADM

## 2023-11-22 RX ORDER — POTASSIUM CHLORIDE 7.45 MG/ML
10 INJECTION INTRAVENOUS
Status: DISPENSED | OUTPATIENT
Start: 2023-11-22 | End: 2023-11-22

## 2023-11-22 RX ADMIN — METOPROLOL TARTRATE 50 MG: 50 TABLET, FILM COATED ORAL at 09:11

## 2023-11-22 RX ADMIN — DIPHENHYDRAMINE HYDROCHLORIDE 50 MG: 50 CAPSULE ORAL at 01:11

## 2023-11-22 RX ADMIN — ASPIRIN 81 MG CHEWABLE TABLET 81 MG: 81 TABLET CHEWABLE at 04:11

## 2023-11-22 RX ADMIN — ALBUTEROL SULFATE 2.5 MG: 2.5 SOLUTION RESPIRATORY (INHALATION) at 08:11

## 2023-11-22 RX ADMIN — SODIUM CHLORIDE 500 ML: 9 INJECTION, SOLUTION INTRAVENOUS at 01:11

## 2023-11-22 RX ADMIN — Medication 6 MG: at 09:11

## 2023-11-22 RX ADMIN — HEPARIN SODIUM AND DEXTROSE 12 UNITS/KG/HR: 10000; 5 INJECTION INTRAVENOUS at 09:11

## 2023-11-22 RX ADMIN — POTASSIUM CHLORIDE 10 MEQ: 7.46 INJECTION, SOLUTION INTRAVENOUS at 08:11

## 2023-11-22 RX ADMIN — DILTIAZEM HYDROCHLORIDE: 5 INJECTION INTRAVENOUS at 04:11

## 2023-11-22 RX ADMIN — SODIUM CHLORIDE: 9 INJECTION, SOLUTION INTRAVENOUS at 04:11

## 2023-11-22 RX ADMIN — POTASSIUM CHLORIDE 10 MEQ: 7.46 INJECTION, SOLUTION INTRAVENOUS at 05:11

## 2023-11-22 RX ADMIN — POTASSIUM CHLORIDE 40 MEQ: 1500 TABLET, EXTENDED RELEASE ORAL at 05:11

## 2023-11-22 RX ADMIN — TICAGRELOR 90 MG: 90 TABLET ORAL at 09:11

## 2023-11-22 RX ADMIN — POTASSIUM CHLORIDE 10 MEQ: 7.46 INJECTION, SOLUTION INTRAVENOUS at 06:11

## 2023-11-22 RX ADMIN — METOPROLOL TARTRATE 5 MG: 5 INJECTION, SOLUTION INTRAVENOUS at 03:11

## 2023-11-22 RX ADMIN — ATORVASTATIN CALCIUM 80 MG: 40 TABLET, FILM COATED ORAL at 04:11

## 2023-11-22 RX ADMIN — TICAGRELOR 180 MG: 90 TABLET ORAL at 01:11

## 2023-11-22 RX ADMIN — POTASSIUM CHLORIDE 40 MEQ: 1500 TABLET, EXTENDED RELEASE ORAL at 09:11

## 2023-11-22 RX ADMIN — MORPHINE SULFATE 2 MG: 2 INJECTION, SOLUTION INTRAMUSCULAR; INTRAVENOUS at 01:11

## 2023-11-22 NOTE — Clinical Note
An angiography was performed of the lv. The angiography was performed via hand injection with 8 mL of contrast.

## 2023-11-22 NOTE — H&P
[facility] - Emergency Dept  Cardiology  History and Physical     Patient Name: Radha Sotelo  MRN: [id number]  Admission Date: 11/22/2023  Code Status: Prior   Attending Provider: Caterina Gutierrez MD   Primary Care Physician: No, Primary Doctor  Principal Problem:<principal problem not specified>    Patient information was obtained from patient and ER records.     Subjective:     Chief Complaint:  chest pain       HPI:  78 F with severe COPD on home O2 for chronic hypoxic respiratory failure (4 L via nasal cannula), CAD, PAT, previous osteomyelitis with transmetatarsal amputation (2012) who presented to the emergency department with chest pain. Code STEMI called in ED with concerns for inferior STEMI.    She had chest pain this early this AM around 10AM. She called EMS, was given nitro en route without improvement in pain. Pain is mid sternal radiating to L shoulder.   Denies PND, orthopnea, LE edema or palpitation.          Past Medical History:   Diagnosis Date    Anemia     Anxiety     COPD (chronic obstructive pulmonary disease)     COPD (chronic obstructive pulmonary disease) with emphysema     Coronary artery disease     Depression     Diverticulitis     Hyperlipidemia     Hypertension     PVD (peripheral vascular disease)     PVD (peripheral vascular disease)     S/P colostomy     Substance abuse     hx heavy etoh use     Tobacco abuse        Past Surgical History:   Procedure Laterality Date    ANGIOGRAM, EXTREMITY, UNILATERAL Right 8/25/2023    Procedure: ANGIOGRAM, EXTREMITY, UNILATERAL;  Surgeon: Gary Rico MD;  Location: [facility] OR [address] [place] [address];  Service: Vascular;  Laterality: Right;  US GUIDED ACCESS LEFT GROIN  contrast: 150ml  fluoro: 27.9 min  mGy: 254.73  Gycm2: 62.4919  radial flush cocktail: 10ml    ANGIOGRAPHY OF LOWER EXTREMITY Right 8/24/2023    Procedure: Angiogram Extremity Unilateral;  Surgeon: Benji Ashley MD;  Location: [facility] OR [address] [place] [address];  Service: Vascular;   Laterality: Right;    COLOSTOMY      ECTOPIC PREGNANCY SURGERY      PTA, PERONEAL Right 8/25/2023    Procedure: PTA, PERONEAL TIBIAL TRUNK WITH SHOCKWAVE;  Surgeon: Gary Rico MD;  Location: [facility] OR [address] [place] [address];  Service: Vascular;  Laterality: Right;    right forefoot amputation      right toe amputation      x2 toes    STENT, SUPERFICIAL FEMORAL ARTERY Right 8/25/2023    Procedure: STENT, SUPERFICIAL FEMORAL ARTERY;  Surgeon: Gary Rico MD;  Location: [facility] OR [address] [place] [address];  Service: Vascular;  Laterality: Right;  VIABAHN 6 X 15 X120       Review of patient's allergies indicates:  No Known Allergies    No current facility-administered medications on file prior to encounter.     Current Outpatient Medications on File Prior to Encounter   Medication Sig    albuterol (PROVENTIL/VENTOLIN HFA) 90 mcg/actuation inhaler INHALE 2 PUFFS BY MOUTH EVERY 6 HOURS AS NEEDED    albuterol-ipratropium (DUO-NEB) 2.5 mg-0.5 mg/3 mL nebulizer solution USE 1 VIAL VIA NEBULIZER EVERY 6 HOURS AS NEEDED FOR WHEEZING OR SHORTNESS OF BREATH    aspirin 81 MG Chew Take 1 tablet (81 mg total) by mouth once daily.    atorvastatin (LIPITOR) 40 MG tablet Take 1 tablet (40 mg total) by mouth once daily.    budesonide-formoterol 80-4.5 mcg (SYMBICORT) 80-4.5 mcg/actuation HFAA INHALE 2 PUFFS INTO THE LUNGS TWICE DAILY. RINSE MOUTH AFTER USE    cadexomer iodine (IODOSORB) 0.9 % gel Apply topically once daily. Apply to right foot TMA.    citalopram (CELEXA) 20 MG tablet Take 1 tablet (20 mg total) by mouth once daily.    clopidogreL (PLAVIX) 75 mg tablet Take 1 tablet (75 mg total) by mouth once daily.    guaiFENesin (MUCINEX) 600 mg 12 hr tablet Take 1 tablet (600 mg total) by mouth 2 (two) times daily.    hydrocortisone-aloe vera 1 % Crea cream Apply topically 2 (two) times daily.    metoprolol tartrate (LOPRESSOR) 50 MG tablet Take 1 tablet (50 mg total) by mouth 2 (two) times daily.    oxyCODONE (ROXICODONE) 5 MG  immediate release tablet Take 1 tablet (5 mg total) by mouth every 4 (four) hours as needed for Pain.     Family History    None       Tobacco Use    Smoking status: Former     Current packs/day: 0.00     Average packs/day: 0.5 packs/day for 50.0 years (25.0 ttl pk-yrs)     Types: Cigarettes     Start date: 7/5/1963     Quit date: 7/5/2013     Years since quitting: 10.3    Smokeless tobacco: Never   Substance and Sexual Activity    Alcohol use: No    Drug use: No    Sexual activity: Not on file     Review of Systems   Constitutional: Negative for chills, decreased appetite, diaphoresis, fever, malaise/fatigue and weight gain.   HENT:  Negative for congestion and ear discharge.    Eyes:  Negative for blurred vision.   Cardiovascular:  Positive for chest pain and dyspnea on exertion. Negative for irregular heartbeat, leg swelling, orthopnea, palpitations and paroxysmal nocturnal dyspnea.   Respiratory:  Positive for shortness of breath. Negative for cough, snoring and sputum production.    Skin:  Negative for color change, dry skin and rash.   Musculoskeletal:  Negative for back pain, falls, joint pain and neck pain.   Gastrointestinal:  Negative for bloating, abdominal pain, constipation and diarrhea.   Genitourinary:  Negative for dysuria, flank pain, hematuria and hesitancy.   Neurological:  Negative for focal weakness, headaches, numbness and seizures.   Psychiatric/Behavioral:  Negative for altered mental status, depression and hallucinations.      Objective:     Vital Signs (Most Recent):  Temp: 98 °F (36.7 °C) (11/22/23 1310)  Pulse: 96 (11/22/23 1310)  Resp: 20 (11/22/23 1318)  BP: (!) 107/59 (11/22/23 1310)  SpO2: 98 % (11/22/23 1310) Vital Signs (24h Range):  Temp:  [98 °F (36.7 °C)] 98 °F (36.7 °C)  Pulse:  [96] 96  Resp:  [20] 20  SpO2:  [98 %] 98 %  BP: (107)/(59) 107/59     Weight: 64.9 kg (143 lb)  Body mass index is 27.93 kg/m².    SpO2: 98 %       No intake or output data in the 24 hours ending  "11/22/23 1324    Lines/Drains/Airways       Drain  Duration                  Colostomy 07/13/21 0201 Descending/sigmoid  days              Peripheral Intravenous Line  Duration                  Peripheral IV - Single Lumen 11/22/23 0000 20 G Anterior;Right Forearm <1 day         Peripheral IV - Single Lumen 11/22/23 1314 20 G Left Antecubital <1 day                     Physical Exam  Constitutional:       General: She is not in acute distress.     Appearance: She is not ill-appearing.   HENT:      Nose: Nose normal.      Mouth/Throat:      Mouth: Mucous membranes are moist.   Eyes:      Pupils: Pupils are equal, round, and reactive to light.   Neck:      Comments: No JVD appreciated   Cardiovascular:      Rate and Rhythm: Normal rate and regular rhythm.      Pulses: Normal pulses.      Heart sounds: No murmur heard.  Pulmonary:      Effort: Pulmonary effort is normal. No respiratory distress.      Breath sounds: No wheezing or rales.   Abdominal:      General: Abdomen is flat. There is no distension.      Palpations: Abdomen is soft.      Tenderness: There is no right CVA tenderness or left CVA tenderness.   Musculoskeletal:         General: No swelling.      Cervical back: Normal range of motion and neck supple. No tenderness.      Right lower leg: No edema.      Left lower leg: No edema.   Skin:     General: Skin is warm.      Coloration: Skin is not pale.      Findings: No rash.   Neurological:      General: No focal deficit present.      Mental Status: She is alert and oriented to person, place, and time.      Motor: No weakness.          Significant Labs: ABG: No results for input(s): "PH", "PCO2", "HCO3", "POCSATURATED", "BE" in the last 48 hours., Blood Culture: No results for input(s): "LABBLOO" in the last 48 hours., BMP: No results for input(s): "GLU", "NA", "K", "CL", "CO2", "BUN", "CREATININE", "CALCIUM", "MG" in the last 48 hours., CMP No results for input(s): "NA", "K", "CL", "CO2", "GLU", " ""BUN", "CREATININE", "CALCIUM", "PROT", "ALBUMIN", "BILITOT", "ALKPHOS", "AST", "ALT", "ANIONGAP", "ESTGFRAFRICA", "EGFRNONAA" in the last 48 hours., CBC No results for input(s): "WBC", "HGB", "HCT", "PLT" in the last 48 hours., INR No results for input(s): "INR", "PROTIME" in the last 48 hours., Lipid Panel No results for input(s): "CHOL", "HDL", "LDLCALC", "TRIG", "CHOLHDL" in the last 48 hours.,   Pathology Results  (Last 10 years)      None        , Troponin No results for input(s): "TROPONINI" in the last 48 hours., and All pertinent lab results from the last 24 hours have been reviewed.     Assessment and Plan:     Chest pain  STEMI in ED in inferior leads     LHC +/- PCI, patient is a GALEN candidate  - Anti-platelet Therapy: ASA and loaded with Brilinta   - Access: R Radial vs Fem   - Creatinine: 0.6  - Pre-Op Med: Bendaryl 50mg pO   - All patient's questions were answered.  -The risks, benefits and alternatives of the procedure were explained to the patient.   -The risks of coronary angiography include but are not limited to: bleeding, infection, heart rhythm abnormalities, allergic reactions, kidney injury and potential need for dialysis, stroke and death.   - Should stenting be indicated, the patient has agreed to dual anti-platelet therapy with a drug-eluting stent   - Additionally, pt is aware that non-compliance is likely to result in stent clotting with heart attack, heart failure, and/or death  -The risks of moderate sedation include hypotension, respiratory depression, arrhythmias, bronchospasm, and death.   - Informed consent was obtained and the  patient is agreeable to proceed with the procedure.          VTE Risk Mitigation (From admission, onward)      None            Cristina Alarcon MD  Cardiology   [facility] - Emergency Dept      "

## 2023-11-22 NOTE — ASSESSMENT & PLAN NOTE
STEMI in ED in inferior leads      LHC +/- PCI, patient is a GALEN candidate  - Anti-platelet Therapy: ASA and loaded with Brilinta   - Access: R Radial vs Fem   - Creatinine: 0.6  - Pre-Op Med: Bendaryl 50mg pO   - All patient's questions were answered.  -The risks, benefits and alternatives of the procedure were explained to the patient.   -The risks of coronary angiography include but are not limited to: bleeding, infection, heart rhythm abnormalities, allergic reactions, kidney injury and potential need for dialysis, stroke and death.   - Should stenting be indicated, the patient has agreed to dual anti-platelet therapy with a drug-eluting stent   - Additionally, pt is aware that non-compliance is likely to result in stent clotting with heart attack, heart failure, and/or death  -The risks of moderate sedation include hypotension, respiratory depression, arrhythmias, bronchospasm, and death.   - Informed consent was obtained and the  patient is agreeable to proceed with the procedure.

## 2023-11-22 NOTE — HPI
78 F with severe COPD on home O2 for chronic hypoxic respiratory failure (4 L via nasal cannula), CAD, PAT, previous osteomyelitis with transmetatarsal amputation (2012) who presented to the emergency department with chest pain. Code STEMI called in ED with concerns for inferior STEMI.    She had chest pain this early this AM around 10AM. She called EMS, was given nitro en route without improvement in pain. Pain is mid sternal radiating to L shoulder.   Denies PND, orthopnea, LE edema or palpitation.

## 2023-11-22 NOTE — BRIEF OP NOTE
Brief Operative Note:    : Checo Bhatt MD     Referring Physician: Self,Aaareferral     All Operators: Surgeon(s):  Luis Eduardo Diggs MD Hallak, MD Iker Oswald Oscar, MD Tafur Soto, Jose D., MD     Preoperative Diagnosis: STEMI (ST elevation myocardial infarction) [I21.3]     Postop Diagnosis: STEMI (ST elevation myocardial infarction) [I21.3]    Treatments/Procedures: Procedure(s) (LRB):  Angiogram, Coronary, with Left Heart Cath (N/A)  Stent, Drug Eluting, Single Vessel, Coronary  PTCA, Single Vessel    Findings: Proximal RCA subtotal occlusion and distal RCA stenosis s/p GALEN to prox RCA and balloon angioplasty to distal RCA. See catheterization report for full details.    Estimated Blood loss: 20 cc    Specimens removed: No    Recommendations:   - Routine post-cath care as per orders  - IVF at 250 cc/hr for 2 hrs  - Cardiac rehab referral, Continue medical management, Risk factor reduction  - admit to CCU  - start beta blocker for rate control      Jael Darling

## 2023-11-22 NOTE — TELEPHONE ENCOUNTER
Letter regarding Phase II cardiac rehab was sent to patient.  Will contact patient in 2 weeks to see if interested.    Roberto Lawson RN  Cardiac Rehab Nurse

## 2023-11-22 NOTE — ED NOTES
Patient placed in hospital gown at this time. Patient placed on cardiac monitor, bp cuff, and continuous  pulse ox. MD Brenda at bedside. EKG in process at this time.

## 2023-11-22 NOTE — Clinical Note
Procedure(s):  INCISION AND DRAINAGE RIGHT KNEE. general    Anesthesia Post Evaluation        Patient location during evaluation: PACU  Note status: Adequate. Level of consciousness: responsive to verbal stimuli and sleepy but conscious  Pain management: satisfactory to patient  Airway patency: patent  Anesthetic complications: no  Cardiovascular status: acceptable  Respiratory status: acceptable  Hydration status: acceptable  Comments: +Post-Anesthesia Evaluation and Assessment    Patient: Qasim Hunter MRN: [id number]  SSN: [id number]   YOB: 1979  Age: 44 y.o. Sex: male      Cardiovascular Function/Vital Signs    /71   Pulse (!) 102   Temp 37 °C (98.6 °F)   Resp 25   Ht 5' 9\" (1.753 m)   Wt 63.5 kg (140 lb)   SpO2 96%   BMI 20.67 kg/m²     Patient is status post Procedure(s):  INCISION AND DRAINAGE RIGHT KNEE. Nausea/Vomiting: Controlled. Postoperative hydration reviewed and adequate. Pain:  Pain Scale 1: FLACC (08/03/22 1345)  Pain Intensity 1: 0 (08/03/22 1345)   Managed. Neurological Status:   Neuro (WDL): Exceptions to WDL (08/03/22 1330)   At baseline. Mental Status and Level of Consciousness: Arousable. Pulmonary Status:   O2 Device: None (Room air) (08/03/22 1330)   Adequate oxygenation and airway patent. Complications related to anesthesia: None    Post-anesthesia assessment completed. No concerns. Signed By: Garth Oquendo MD    8/3/2022  Post anesthesia nausea and vomiting:  controlled      INITIAL Post-op Vital signs:   Vitals Value Taken Time   /71 08/03/22 1345   Temp 37 °C (98.6 °F) 08/03/22 1330   Pulse 102 08/03/22 1347   Resp 21 08/03/22 1347   SpO2 95 % 08/03/22 1347   Vitals shown include unvalidated device data. The catheter was inserted into the proximal   right coronary artery.

## 2023-11-22 NOTE — CONSULTS
[facility] - Emergency Dept  Cardiology  Consult Note    Patient Name: Radha Sotelo  MRN: [id number]  Admission Date: 11/22/2023  Hospital Length of Stay: 0 days  Code Status: Prior   Attending Provider: Caterina Gutierrez MD   Consulting Provider: Cristina Alarcon MD  Primary Care Physician: No, Primary Doctor  Principal Problem:<principal problem not specified>    Patient information was obtained from patient and ER records.     Inpatient consult to Interventional Cardiology  Consult performed by: Cristina Alarcon MD  Consult ordered by: Cristina Alarcon MD        Subjective:     Chief Complaint:  chest pain      HPI:   78 F with severe COPD on home O2 for chronic hypoxic respiratory failure (4 L via nasal cannula), CAD, PAT, previous osteomyelitis with transmetatarsal amputation (2012) who presented to the emergency department with chest pain. Code STEMI called in ED with concerns for inferior STEMI.    She had chest pain this early this AM around 10AM. She called EMS, was given nitro en route without improvement in pain. Pain is mid sternal radiating to L shoulder.   Denies PND, orthopnea, LE edema or palpitation.          Past Medical History:   Diagnosis Date    Anemia     Anxiety     COPD (chronic obstructive pulmonary disease)     COPD (chronic obstructive pulmonary disease) with emphysema     Coronary artery disease     Depression     Diverticulitis     Hyperlipidemia     Hypertension     PVD (peripheral vascular disease)     PVD (peripheral vascular disease)     S/P colostomy     Substance abuse     hx heavy etoh use     Tobacco abuse        Past Surgical History:   Procedure Laterality Date    ANGIOGRAM, EXTREMITY, UNILATERAL Right 8/25/2023    Procedure: ANGIOGRAM, EXTREMITY, UNILATERAL;  Surgeon: Gary Rico MD;  Location: [facility] OR [address] [place] [address];  Service: Vascular;  Laterality: Right;  US GUIDED ACCESS LEFT GROIN  contrast: 150ml  fluoro: 27.9 min  mGy: 254.73  Gycm2: 62.4919  radial  flush cocktail: 10ml    ANGIOGRAPHY OF LOWER EXTREMITY Right 8/24/2023    Procedure: Angiogram Extremity Unilateral;  Surgeon: Benji Ashley MD;  [facility];  Service: Vascular;  Laterality: Right;    COLOSTOMY      ECTOPIC PREGNANCY SURGERY      PTA, PERONEAL Right 8/25/2023    Procedure: PTA, PERONEAL TIBIAL TRUNK WITH SHOCKWAVE;  Surgeon: Gary Rico MD;  [facility];  Service: Vascular;  Laterality: Right;    right forefoot amputation      right toe amputation      x2 toes    STENT, SUPERFICIAL FEMORAL ARTERY Right 8/25/2023    Procedure: STENT, SUPERFICIAL FEMORAL ARTERY;  Surgeon: Gary Rico MD;  Location: [facility] OR [address] [place] [address];  Service: Vascular;  Laterality: Right;  VIABAHN 6 X 15 X120       Review of patient's allergies indicates:  No Known Allergies    No current facility-administered medications on file prior to encounter.     Current Outpatient Medications on File Prior to Encounter   Medication Sig    albuterol (PROVENTIL/VENTOLIN HFA) 90 mcg/actuation inhaler INHALE 2 PUFFS BY MOUTH EVERY 6 HOURS AS NEEDED    albuterol-ipratropium (DUO-NEB) 2.5 mg-0.5 mg/3 mL nebulizer solution USE 1 VIAL VIA NEBULIZER EVERY 6 HOURS AS NEEDED FOR WHEEZING OR SHORTNESS OF BREATH    aspirin 81 MG Chew Take 1 tablet (81 mg total) by mouth once daily.    atorvastatin (LIPITOR) 40 MG tablet Take 1 tablet (40 mg total) by mouth once daily.    budesonide-formoterol 80-4.5 mcg (SYMBICORT) 80-4.5 mcg/actuation HFAA INHALE 2 PUFFS INTO THE LUNGS TWICE DAILY. RINSE MOUTH AFTER USE    cadexomer iodine (IODOSORB) 0.9 % gel Apply topically once daily. Apply to right foot TMA.    citalopram (CELEXA) 20 MG tablet Take 1 tablet (20 mg total) by mouth once daily.    clopidogreL (PLAVIX) 75 mg tablet Take 1 tablet (75 mg total) by mouth once daily.    guaiFENesin (MUCINEX) 600 mg 12 hr tablet Take 1 tablet (600 mg total) by mouth 2 (two) times daily.    hydrocortisone-aloe  vera 1 % Crea cream Apply topically 2 (two) times daily.    metoprolol tartrate (LOPRESSOR) 50 MG tablet Take 1 tablet (50 mg total) by mouth 2 (two) times daily.    oxyCODONE (ROXICODONE) 5 MG immediate release tablet Take 1 tablet (5 mg total) by mouth every 4 (four) hours as needed for Pain.     Family History    None       Tobacco Use    Smoking status: Former     Current packs/day: 0.00     Average packs/day: 0.5 packs/day for 50.0 years (25.0 ttl pk-yrs)     Types: Cigarettes     Start date: 7/5/1963     Quit date: 7/5/2013     Years since quitting: 10.3    Smokeless tobacco: Never   Substance and Sexual Activity    Alcohol use: No    Drug use: No    Sexual activity: Not on file     Review of Systems   Constitutional: Negative for chills, decreased appetite, diaphoresis, fever, malaise/fatigue and weight gain.   HENT:  Negative for congestion and ear discharge.    Eyes:  Negative for blurred vision.   Cardiovascular:  Positive for chest pain and dyspnea on exertion. Negative for irregular heartbeat, leg swelling, orthopnea, palpitations and paroxysmal nocturnal dyspnea.   Respiratory:  Positive for shortness of breath. Negative for cough, snoring and sputum production.    Skin:  Negative for color change, dry skin and rash.   Musculoskeletal:  Negative for back pain, falls, joint pain and neck pain.   Gastrointestinal:  Negative for bloating, abdominal pain, constipation and diarrhea.   Genitourinary:  Negative for dysuria, flank pain, hematuria and hesitancy.   Neurological:  Negative for focal weakness, headaches, numbness and seizures.   Psychiatric/Behavioral:  Negative for altered mental status, depression and hallucinations.      Objective:     Vital Signs (Most Recent):  Temp: 98 °F (36.7 °C) (11/22/23 1310)  Pulse: 96 (11/22/23 1310)  Resp: 20 (11/22/23 1318)  BP: (!) 107/59 (11/22/23 1310)  SpO2: 98 % (11/22/23 1310) Vital Signs (24h Range):  Temp:  [98 °F (36.7 °C)] 98 °F (36.7 °C)  Pulse:  [96]  "96  Resp:  [20] 20  SpO2:  [98 %] 98 %  BP: (107)/(59) 107/59     Weight: 64.9 kg (143 lb)  Body mass index is 27.93 kg/m².    SpO2: 98 %       No intake or output data in the 24 hours ending 11/22/23 1326    Lines/Drains/Airways       Drain  Duration                  Colostomy 07/13/21 0201 Descending/sigmoid  days              Peripheral Intravenous Line  Duration                  Peripheral IV - Single Lumen 11/22/23 0000 20 G Anterior;Right Forearm <1 day         Peripheral IV - Single Lumen 11/22/23 1314 20 G Left Antecubital <1 day                     Physical Exam  Constitutional:       General: She is not in acute distress.     Appearance: She is not ill-appearing.   HENT:      Nose: Nose normal.      Mouth/Throat:      Mouth: Mucous membranes are moist.   Eyes:      Pupils: Pupils are equal, round, and reactive to light.   Neck:      Comments: No JVD appreciated   Cardiovascular:      Rate and Rhythm: Normal rate and regular rhythm.      Pulses: Normal pulses.      Heart sounds: No murmur heard.  Pulmonary:      Effort: Pulmonary effort is normal. No respiratory distress.      Breath sounds: No wheezing or rales.   Abdominal:      General: Abdomen is flat. There is no distension.      Palpations: Abdomen is soft.      Tenderness: There is no right CVA tenderness or left CVA tenderness.      Comments: Ostomy bag    Musculoskeletal:         General: No swelling.      Cervical back: Normal range of motion and neck supple. No tenderness.      Right lower leg: No edema.      Left lower leg: No edema.   Skin:     General: Skin is warm.      Coloration: Skin is not pale.      Findings: No rash.   Neurological:      General: No focal deficit present.      Mental Status: She is alert and oriented to person, place, and time.      Motor: No weakness.          Significant Labs: ABG: No results for input(s): "PH", "PCO2", "HCO3", "POCSATURATED", "BE" in the last 48 hours., Blood Culture: No results for input(s): " ""LABBLOO" in the last 48 hours., BMP: No results for input(s): "GLU", "NA", "K", "CL", "CO2", "BUN", "CREATININE", "CALCIUM", "MG" in the last 48 hours., CMP No results for input(s): "NA", "K", "CL", "CO2", "GLU", "BUN", "CREATININE", "CALCIUM", "PROT", "ALBUMIN", "BILITOT", "ALKPHOS", "AST", "ALT", "ANIONGAP", "ESTGFRAFRICA", "EGFRNONAA" in the last 48 hours., CBC No results for input(s): "WBC", "HGB", "HCT", "PLT" in the last 48 hours., INR No results for input(s): "INR", "PROTIME" in the last 48 hours., Lipid Panel No results for input(s): "CHOL", "HDL", "LDLCALC", "TRIG", "CHOLHDL" in the last 48 hours.,   Pathology Results  (Last 10 years)      None        , Troponin No results for input(s): "TROPONINI" in the last 48 hours., and All pertinent lab results from the last 24 hours have been reviewed.     Assessment and Plan:     Chest pain  STEMI in ED in inferior leads      LHC +/- PCI, patient is a GALEN candidate  - Anti-platelet Therapy: ASA and loaded with Brilinta   - Access: R Radial vs Fem   - Creatinine: 0.6  - Pre-Op Med: Bendaryl 50mg pO   - All patient's questions were answered.  -The risks, benefits and alternatives of the procedure were explained to the patient.   -The risks of coronary angiography include but are not limited to: bleeding, infection, heart rhythm abnormalities, allergic reactions, kidney injury and potential need for dialysis, stroke and death.   - Should stenting be indicated, the patient has agreed to dual anti-platelet therapy with a drug-eluting stent   - Additionally, pt is aware that non-compliance is likely to result in stent clotting with heart attack, heart failure, and/or death  -The risks of moderate sedation include hypotension, respiratory depression, arrhythmias, bronchospasm, and death.   - Informed consent was obtained and the  patient is agreeable to proceed with the procedure.          VTE Risk Mitigation (From admission, onward)      None            Thank you for " your consult. I will follow-up with patient. Please contact us if you have any additional questions.    Cristina Alarcon MD  Cardiology   [facility] - Emergency Dept

## 2023-11-22 NOTE — ED TRIAGE NOTES
Patient presents to the ED via EMS with complaints of chest pain x 1 hour, constant pain. Patient has a history of 4 MI's in the past, states this one hurts worse. Patient denies SOB. Patient unsure of blood thinner last usage. Patient received 1 sublingual brennen with EMS and a full dose of aspirin with EMS.

## 2023-11-22 NOTE — ED PROVIDER NOTES
Encounter Date: 11/22/2023       History     Chief Complaint   Patient presents with    Chest Pain     Radha Sotelo is a 79 y/o female with PMH of MI x5 on ASA and plavix (out of plavix x1mo, taking ASA), COPD, PVD, etoh abuse, s/p colostomy who presents via EMS for 45 min of sternal crushing chest pain that is severe. Inferior STEMI on EKG with reciprocal changes in anterior and high lateral leads on EKG from EMS. Patient was given 324 ASA, sublingual NTG x1, and 150 cc fluid bolus by EMS en route.     The history is provided by the patient, medical records and the EMS personnel. The history is limited by the condition of the patient. No  was used.     Review of patient's allergies indicates:  No Known Allergies  Past Medical History:   Diagnosis Date    Anemia     Anxiety     COPD (chronic obstructive pulmonary disease)     COPD (chronic obstructive pulmonary disease) with emphysema     Coronary artery disease     Depression     Diverticulitis     Hyperlipidemia     Hypertension     PVD (peripheral vascular disease)     PVD (peripheral vascular disease)     S/P colostomy     Substance abuse     hx heavy etoh use     Tobacco abuse      Past Surgical History:   Procedure Laterality Date    ANGIOGRAM, EXTREMITY, UNILATERAL Right 8/25/2023    Procedure: ANGIOGRAM, EXTREMITY, UNILATERAL;  Surgeon: Gary Rico MD;  Location: [facility] OR [address] [place] [address];  Service: Vascular;  Laterality: Right;  US GUIDED ACCESS LEFT GROIN  contrast: 150ml  fluoro: 27.9 min  mGy: 254.73  Gycm2: 62.4919  radial flush cocktail: 10ml    ANGIOGRAPHY OF LOWER EXTREMITY Right 8/24/2023    Procedure: Angiogram Extremity Unilateral;  Surgeon: Benji Ashley MD;  Location: [facility] OR [address] [place] [address];  Service: Vascular;  Laterality: Right;    COLOSTOMY      ECTOPIC PREGNANCY SURGERY      PTA, PERONEAL Right 8/25/2023    Procedure: PTA, PERONEAL TIBIAL TRUNK WITH SHOCKWAVE;  Surgeon: Gary Rico MD;   [facility];  Service: Vascular;  Laterality: Right;    right forefoot amputation      right toe amputation      x2 toes    STENT, SUPERFICIAL FEMORAL ARTERY Right 8/25/2023    Procedure: STENT, SUPERFICIAL FEMORAL ARTERY;  Surgeon: Gary Rico MD;  Location: [facility] OR [address] [place] [address];  Service: Vascular;  Laterality: Right;  VIABAHN 6 X 15 X120     History reviewed. No pertinent family history.  Social History     Tobacco Use    Smoking status: Former     Current packs/day: 0.00     Average packs/day: 0.5 packs/day for 50.0 years (25.0 ttl pk-yrs)     Types: Cigarettes     Start date: 7/5/1963     Quit date: 7/5/2013     Years since quitting: 10.3    Smokeless tobacco: Never   Substance Use Topics    Alcohol use: No    Drug use: No     Review of Systems    Physical Exam     Initial Vitals [11/22/23 1310]   BP Pulse Resp Temp SpO2   (!) 107/59 96 20 98 °F (36.7 °C) 98 %      MAP       --         Physical Exam    Nursing note and vitals reviewed.  Constitutional: She appears well-developed and well-nourished. She appears distressed.   HENT:   Head: Normocephalic.   Mouth/Throat: Oropharynx is clear and moist.   Eyes: Conjunctivae are normal. Pupils are equal, round, and reactive to light. No scleral icterus.   Neck: Neck supple.   Cardiovascular:  Regular rhythm.   Tachycardia present.         Pulmonary/Chest: Tachypnea noted. She has no wheezes. She has no rhonchi. She has no rales.   Abdominal: Abdomen is soft. She exhibits no distension. There is no abdominal tenderness. There is no rebound.   Musculoskeletal:         General: No edema.      Cervical back: Neck supple.     Neurological: She is alert. GCS score is 15. GCS eye subscore is 4. GCS verbal subscore is 5. GCS motor subscore is 6.   Skin: Skin is warm and dry. Capillary refill takes less than 2 seconds.         ED Course   Procedures  Labs Reviewed   URINALYSIS, REFLEX TO URINE CULTURE     EKG Readings: (Independently Interpreted)    Initial Reading: STEMI. Previous EKG: Compared with most recent EKG Heart Rate: 91.   ST elevations in inferior leads II, III, avF with reciprocal depressions in avl, avr, and V2.   Consistent with inferior STEMI        Imaging Results    None          Medications   sodium chloride 0.9% flush 10 mL (has no administration in time range)   aspirin chewable tablet 81 mg (81 mg Oral Given 11/22/23 1651)   atorvastatin tablet 80 mg (80 mg Oral Given 11/22/23 1651)   metoprolol tartrate (LOPRESSOR) tablet 50 mg (has no administration in time range)   ticagrelor tablet 90 mg (has no administration in time range)   sodium chloride 0.9% flush 10 mL (has no administration in time range)   dextrose 5 % and 0.2 % NaCl with KCl 20 mEq infusion (  Verify Only 11/22/23 2100)   0.9%  NaCl infusion (500 mLs Intravenous New Bag 11/22/23 1418)   acetaminophen tablet 650 mg (has no administration in time range)   ondansetron disintegrating tablet 8 mg (has no administration in time range)   0.9%  NaCl infusion ( Intravenous Stopped 11/22/23 1816)   heparin 25,000 units in dextrose 5% (100 units/ml) IV bolus from bag INITIAL BOLUS (0 Units Intravenous Hold 11/22/23 1807)   heparin 25,000 units in dextrose 5% 250 mL (100 units/mL) infusion LOW INTENSITY nomogram - [facility] (12 Units/kg/hr × 53.3 kg (Adjusted) Intravenous New Bag 11/22/23 2105)   heparin 25,000 units in dextrose 5% (100 units/ml) IV bolus from bag - ADDITIONAL PRN BOLUS - 60 units/kg (has no administration in time range)   heparin 25,000 units in dextrose 5% (100 units/ml) IV bolus from bag - ADDITIONAL PRN BOLUS - 30 units/kg (has no administration in time range)   potassium chloride 10 mEq in 100 mL IVPB ( Intravenous Verify Only 11/22/23 2100)   potassium chloride SA CR tablet 40 mEq (40 mEq Oral Given 11/22/23 1700)   albuterol sulfate nebulizer solution 2.5 mg (2.5 mg Nebulization Given 11/22/23 2021)   melatonin tablet 6 mg (has no administration in time range)    albuterol sulfate nebulizer solution 2.5 mg (has no administration in time range)   diphenhydrAMINE capsule 50 mg (50 mg Oral Given 11/22/23 1319)   ticagrelor tablet 180 mg (180 mg Oral Given 11/22/23 1319)   0.9%  NaCl infusion (500 mLs Intravenous New Bag 11/22/23 1319)   morphine injection 2 mg (2 mg Intravenous Given 11/22/23 1318)   metoprolol injection 5 mg (5 mg Intravenous Given 11/22/23 1530)     Medical Decision Making  EKG consistent with inferior wall STEMI with symptoms and history concerning for acute MI.     Cardiology called when call received from EMS en route.   Cath lab activated immediately upon arrival.   Brilinta ordered, Asprin already given en route  Patient was consented and taken emergently for heart catheterization.     Patient admitted to cardiology for further evaluation and management.     Amount and/or Complexity of Data Reviewed  Labs: ordered.  ECG/medicine tests: ordered.    Risk  OTC drugs.  Prescription drug management.                                   Clinical Impression:  Final diagnoses:  [I21.3] STEMI (ST elevation myocardial infarction) (Primary)                 Ava Card MD  Resident  11/22/23 5079

## 2023-11-22 NOTE — SUBJECTIVE & OBJECTIVE
Past Medical History:   Diagnosis Date    Anemia     Anxiety     COPD (chronic obstructive pulmonary disease)     COPD (chronic obstructive pulmonary disease) with emphysema     Coronary artery disease     Depression     Diverticulitis     Hyperlipidemia     Hypertension     PVD (peripheral vascular disease)     PVD (peripheral vascular disease)     S/P colostomy     Substance abuse     hx heavy etoh use     Tobacco abuse        Past Surgical History:   Procedure Laterality Date    ANGIOGRAM, EXTREMITY, UNILATERAL Right 8/25/2023    Procedure: ANGIOGRAM, EXTREMITY, UNILATERAL;  Surgeon: Gary Rico MD;  Location: [facility] OR [address] [place] [address];  Service: Vascular;  Laterality: Right;  US GUIDED ACCESS LEFT GROIN  contrast: 150ml  fluoro: 27.9 min  mGy: 254.73  Gycm2: 62.4919  radial flush cocktail: 10ml    ANGIOGRAPHY OF LOWER EXTREMITY Right 8/24/2023    Procedure: Angiogram Extremity Unilateral;  Surgeon: Benji Ashley MD;  Location: [facility] OR [address] [place] [address];  Service: Vascular;  Laterality: Right;    COLOSTOMY      ECTOPIC PREGNANCY SURGERY      PTA, PERONEAL Right 8/25/2023    Procedure: PTA, PERONEAL TIBIAL TRUNK WITH SHOCKWAVE;  Surgeon: Gary Rico MD;  Location: [facility] OR [address] [place] [address];  Service: Vascular;  Laterality: Right;    right forefoot amputation      right toe amputation      x2 toes    STENT, SUPERFICIAL FEMORAL ARTERY Right 8/25/2023    Procedure: STENT, SUPERFICIAL FEMORAL ARTERY;  Surgeon: Gary Rico MD;  Location: [facility] OR [address] [place] [address];  Service: Vascular;  Laterality: Right;  VIABAHN 6 X 15 X120       Review of patient's allergies indicates:  No Known Allergies    No current facility-administered medications on file prior to encounter.     Current Outpatient Medications on File Prior to Encounter   Medication Sig    albuterol (PROVENTIL/VENTOLIN HFA) 90 mcg/actuation inhaler INHALE 2 PUFFS BY MOUTH EVERY 6 HOURS AS NEEDED    albuterol-ipratropium (DUO-NEB) 2.5 mg-0.5 mg/3 mL  nebulizer solution USE 1 VIAL VIA NEBULIZER EVERY 6 HOURS AS NEEDED FOR WHEEZING OR SHORTNESS OF BREATH    aspirin 81 MG Chew Take 1 tablet (81 mg total) by mouth once daily.    atorvastatin (LIPITOR) 40 MG tablet Take 1 tablet (40 mg total) by mouth once daily.    budesonide-formoterol 80-4.5 mcg (SYMBICORT) 80-4.5 mcg/actuation HFAA INHALE 2 PUFFS INTO THE LUNGS TWICE DAILY. RINSE MOUTH AFTER USE    cadexomer iodine (IODOSORB) 0.9 % gel Apply topically once daily. Apply to right foot TMA.    citalopram (CELEXA) 20 MG tablet Take 1 tablet (20 mg total) by mouth once daily.    clopidogreL (PLAVIX) 75 mg tablet Take 1 tablet (75 mg total) by mouth once daily.    guaiFENesin (MUCINEX) 600 mg 12 hr tablet Take 1 tablet (600 mg total) by mouth 2 (two) times daily.    hydrocortisone-aloe vera 1 % Crea cream Apply topically 2 (two) times daily.    metoprolol tartrate (LOPRESSOR) 50 MG tablet Take 1 tablet (50 mg total) by mouth 2 (two) times daily.    oxyCODONE (ROXICODONE) 5 MG immediate release tablet Take 1 tablet (5 mg total) by mouth every 4 (four) hours as needed for Pain.     Family History    None       Tobacco Use    Smoking status: Former     Current packs/day: 0.00     Average packs/day: 0.5 packs/day for 50.0 years (25.0 ttl pk-yrs)     Types: Cigarettes     Start date: 7/5/1963     Quit date: 7/5/2013     Years since quitting: 10.3    Smokeless tobacco: Never   Substance and Sexual Activity    Alcohol use: No    Drug use: No    Sexual activity: Not on file     Review of Systems   Constitutional: Negative for chills, decreased appetite, diaphoresis, fever, malaise/fatigue and weight gain.   HENT:  Negative for congestion and ear discharge.    Eyes:  Negative for blurred vision.   Cardiovascular:  Positive for chest pain and dyspnea on exertion. Negative for irregular heartbeat, leg swelling, orthopnea, palpitations and paroxysmal nocturnal dyspnea.   Respiratory:  Positive for shortness of breath. Negative for  cough, snoring and sputum production.    Skin:  Negative for color change, dry skin and rash.   Musculoskeletal:  Negative for back pain, falls, joint pain and neck pain.   Gastrointestinal:  Negative for bloating, abdominal pain, constipation and diarrhea.   Genitourinary:  Negative for dysuria, flank pain, hematuria and hesitancy.   Neurological:  Negative for focal weakness, headaches, numbness and seizures.   Psychiatric/Behavioral:  Negative for altered mental status, depression and hallucinations.      Objective:     Vital Signs (Most Recent):  Temp: 98 °F (36.7 °C) (11/22/23 1310)  Pulse: 96 (11/22/23 1310)  Resp: 20 (11/22/23 1318)  BP: (!) 107/59 (11/22/23 1310)  SpO2: 98 % (11/22/23 1310) Vital Signs (24h Range):  Temp:  [98 °F (36.7 °C)] 98 °F (36.7 °C)  Pulse:  [96] 96  Resp:  [20] 20  SpO2:  [98 %] 98 %  BP: (107)/(59) 107/59     Weight: 64.9 kg (143 lb)  Body mass index is 27.93 kg/m².    SpO2: 98 %       No intake or output data in the 24 hours ending 11/22/23 1326    Lines/Drains/Airways       Drain  Duration                  Colostomy 07/13/21 0201 Descending/sigmoid  days              Peripheral Intravenous Line  Duration                  Peripheral IV - Single Lumen 11/22/23 0000 20 G Anterior;Right Forearm <1 day         Peripheral IV - Single Lumen 11/22/23 1314 20 G Left Antecubital <1 day                     Physical Exam  Constitutional:       General: She is not in acute distress.     Appearance: She is not ill-appearing.   HENT:      Nose: Nose normal.      Mouth/Throat:      Mouth: Mucous membranes are moist.   Eyes:      Pupils: Pupils are equal, round, and reactive to light.   Neck:      Comments: No JVD appreciated   Cardiovascular:      Rate and Rhythm: Normal rate and regular rhythm.      Pulses: Normal pulses.      Heart sounds: No murmur heard.  Pulmonary:      Effort: Pulmonary effort is normal. No respiratory distress.      Breath sounds: No wheezing or rales.   Abdominal:  "     General: Abdomen is flat. There is no distension.      Palpations: Abdomen is soft.      Tenderness: There is no right CVA tenderness or left CVA tenderness.      Comments: Ostomy bag    Musculoskeletal:         General: No swelling.      Cervical back: Normal range of motion and neck supple. No tenderness.      Right lower leg: No edema.      Left lower leg: No edema.   Skin:     General: Skin is warm.      Coloration: Skin is not pale.      Findings: No rash.   Neurological:      General: No focal deficit present.      Mental Status: She is alert and oriented to person, place, and time.      Motor: No weakness.          Significant Labs: ABG: No results for input(s): "PH", "PCO2", "HCO3", "POCSATURATED", "BE" in the last 48 hours., Blood Culture: No results for input(s): "LABBLOO" in the last 48 hours., BMP: No results for input(s): "GLU", "NA", "K", "CL", "CO2", "BUN", "CREATININE", "CALCIUM", "MG" in the last 48 hours., CMP No results for input(s): "NA", "K", "CL", "CO2", "GLU", "BUN", "CREATININE", "CALCIUM", "PROT", "ALBUMIN", "BILITOT", "ALKPHOS", "AST", "ALT", "ANIONGAP", "ESTGFRAFRICA", "EGFRNONAA" in the last 48 hours., CBC No results for input(s): "WBC", "HGB", "HCT", "PLT" in the last 48 hours., INR No results for input(s): "INR", "PROTIME" in the last 48 hours., Lipid Panel No results for input(s): "CHOL", "HDL", "LDLCALC", "TRIG", "CHOLHDL" in the last 48 hours.,   Pathology Results  (Last 10 years)      None        , Troponin No results for input(s): "TROPONINI" in the last 48 hours., and All pertinent lab results from the last 24 hours have been reviewed.     "

## 2023-11-22 NOTE — NURSING
1630: RN unable to draw aPTT from pt. RN attempted blood draw2x. Charge nurse informed.     1800: lab reported critical aPTT of >150. Dr. Fierro informed. Per MD check another aPTT at 1930. No obvious bleeding from pt. Pt received heparin during cath lab case. Heparin currently not infusing. Will continue to monitor.     1841: pt off of bedrest. No bleeding noted to site. Pt sitting up eating.     ..  CICU DAILY GOALS       A: Awake    RASS: Goal - RASS Goal: 0-->alert and calm  Actual - RASS (Junior Agitation-Sedation Scale): alert and calm   Restraint necessity:    B: Breath   SBT: Not intubated   C: Coordinate A & B, analgesics/sedatives   Pain: managed    SAT: Not intubated  D: Delirium   CAM-ICU: Overall CAM-ICU: Negative  E: Early(intubated/ Progressive (non-intubated) Mobility   MOVE Screen: Pass   Activity: Activity Management: Rolling - L1  FAS: Feeding/Nutrition   Diet order: Diet/Nutrition Received: low saturated fat/low cholesterol, 2 gram sodium,   Fluid restriction:       T: Thrombus   DVT prophylaxis: VTE Required Core Measure: Pharmacological prophylaxis initiated/maintained  H: HOB Elevation   Head of Bed (HOB) Positioning: HOB at 15 degrees  U: Ulcer Prophylaxis   GI: yes  G: Glucose control   managed    S: Skin   Bundle compliance: yes   Bathing/Skin Care: dressed/undressed, electrode patches/site rotation, incontinence care, linen changed, bath, complete Date: 11/22  B: Bowel Function   no issues   I: Indwelling Catheters   Harris necessity:     CVC necessity: No   IPAD offered: No  D: De-escalation Antibx   No  Plan for the day   Draw labs, replace electrolytes, monitor neurovascular frequently  Family/Goals of care/Code Status   Code Status: Full Code     No acute events throughout day, VS and assessment per flow sheet, patient progressing towards goals as tolerated, plan of care reviewed with Radha Sotelo and family, all concerns addressed, will continue to monitor.

## 2023-11-22 NOTE — LETTER
November 22, 2023    Radha Sotelo  [address]  [address]  [facility] [address]             [facility]  [address] [facility]  [place] [address]  Phone: [phone]                                  [facility]   [address] [facility]   [place] [address]  [phone]         [facility]   [address] [place] [address] [phone]         [facility]    [address]  [place] [address] [phone]   Re: Radha Sotelo  Clinic number: [id number]    Dear Ms. Sotelo:    You were recently admitted to an [facility] facility for cardiac (heart) problem.  Your physician has referred you to [facility].  Cardiac Rehab Phase 2 is an educational and exercise program, conducted in a outpatient setting, proven to help reduce your risk for recurrent heart events.    Cardiac rehab has two major parts:    1. Exercise training to help you achieve cardiovascular fitness while learning how to exercise safely and improve muscle strength and endurance.  Your exercise prescription will be based on the results of the cardiopulmonary stress test (CPX) which will be done before entering the program and at completion.  2. Education, counseling and training to help you understand your heart condition and find ways to reduce your risk of future heart problems.  The cardiac rehab team will help you learn how to cope with the stress of adjusting to a new lifestyle and to deal with your fears about the future.    Phase 2 is a 36-session program, meeting 3 times a week for 12 weeks.  Each session consists of an hour of exercise and half-hour dedicated to the educational topic of the day.  Class days vary per location.  Please contact your nearest facility for details.    Through cardiac rehab you will learn:  About your heart condition, medical therapies, and medication  Risk factors in y our lifestyle contributing  to heart disease  New strategies to modify your risk factors  About a healthy diet that can lower your blood cholesterol, control weight, help prevent or control high blood pressure, and diabetes  How to stop smoking  How to manage stress    If you are interested in getting started, call the [facility] of your choosing.     Sincerely,     [facility]

## 2023-11-22 NOTE — SUBJECTIVE & OBJECTIVE
Past Medical History:   Diagnosis Date    Anemia     Anxiety     COPD (chronic obstructive pulmonary disease)     COPD (chronic obstructive pulmonary disease) with emphysema     Coronary artery disease     Depression     Diverticulitis     Hyperlipidemia     Hypertension     PVD (peripheral vascular disease)     PVD (peripheral vascular disease)     S/P colostomy     Substance abuse     hx heavy etoh use     Tobacco abuse        Past Surgical History:   Procedure Laterality Date    ANGIOGRAM, EXTREMITY, UNILATERAL Right 8/25/2023    Procedure: ANGIOGRAM, EXTREMITY, UNILATERAL;  Surgeon: Gary Rico MD;  Location: [facility] OR [address] [place] [address];  Service: Vascular;  Laterality: Right;  US GUIDED ACCESS LEFT GROIN  contrast: 150ml  fluoro: 27.9 min  mGy: 254.73  Gycm2: 62.4919  radial flush cocktail: 10ml    ANGIOGRAPHY OF LOWER EXTREMITY Right 8/24/2023    Procedure: Angiogram Extremity Unilateral;  Surgeon: Benji Ashley MD;  Location: [facility] OR [address] [place] [address];  Service: Vascular;  Laterality: Right;    COLOSTOMY      ECTOPIC PREGNANCY SURGERY      PTA, PERONEAL Right 8/25/2023    Procedure: PTA, PERONEAL TIBIAL TRUNK WITH SHOCKWAVE;  Surgeon: Gary Rico MD;  Location: [facility] OR [address] [place] [address];  Service: Vascular;  Laterality: Right;    right forefoot amputation      right toe amputation      x2 toes    STENT, SUPERFICIAL FEMORAL ARTERY Right 8/25/2023    Procedure: STENT, SUPERFICIAL FEMORAL ARTERY;  Surgeon: Gary Rico MD;  Location: [facility] OR [address] [place] [address];  Service: Vascular;  Laterality: Right;  VIABAHN 6 X 15 X120       Review of patient's allergies indicates:  No Known Allergies    No current facility-administered medications on file prior to encounter.     Current Outpatient Medications on File Prior to Encounter   Medication Sig    albuterol (PROVENTIL/VENTOLIN HFA) 90 mcg/actuation inhaler INHALE 2 PUFFS BY MOUTH EVERY 6 HOURS AS NEEDED    albuterol-ipratropium (DUO-NEB) 2.5 mg-0.5 mg/3 mL  nebulizer solution USE 1 VIAL VIA NEBULIZER EVERY 6 HOURS AS NEEDED FOR WHEEZING OR SHORTNESS OF BREATH    aspirin 81 MG Chew Take 1 tablet (81 mg total) by mouth once daily.    atorvastatin (LIPITOR) 40 MG tablet Take 1 tablet (40 mg total) by mouth once daily.    budesonide-formoterol 80-4.5 mcg (SYMBICORT) 80-4.5 mcg/actuation HFAA INHALE 2 PUFFS INTO THE LUNGS TWICE DAILY. RINSE MOUTH AFTER USE    cadexomer iodine (IODOSORB) 0.9 % gel Apply topically once daily. Apply to right foot TMA.    citalopram (CELEXA) 20 MG tablet Take 1 tablet (20 mg total) by mouth once daily.    clopidogreL (PLAVIX) 75 mg tablet Take 1 tablet (75 mg total) by mouth once daily.    guaiFENesin (MUCINEX) 600 mg 12 hr tablet Take 1 tablet (600 mg total) by mouth 2 (two) times daily.    hydrocortisone-aloe vera 1 % Crea cream Apply topically 2 (two) times daily.    metoprolol tartrate (LOPRESSOR) 50 MG tablet Take 1 tablet (50 mg total) by mouth 2 (two) times daily.    oxyCODONE (ROXICODONE) 5 MG immediate release tablet Take 1 tablet (5 mg total) by mouth every 4 (four) hours as needed for Pain.     Family History    None       Tobacco Use    Smoking status: Former     Current packs/day: 0.00     Average packs/day: 0.5 packs/day for 50.0 years (25.0 ttl pk-yrs)     Types: Cigarettes     Start date: 7/5/1963     Quit date: 7/5/2013     Years since quitting: 10.3    Smokeless tobacco: Never   Substance and Sexual Activity    Alcohol use: No    Drug use: No    Sexual activity: Not on file     Review of Systems   Constitutional: Negative for chills, decreased appetite, diaphoresis, fever, malaise/fatigue and weight gain.   HENT:  Negative for congestion and ear discharge.    Eyes:  Negative for blurred vision.   Cardiovascular:  Positive for chest pain and dyspnea on exertion. Negative for irregular heartbeat, leg swelling, orthopnea, palpitations and paroxysmal nocturnal dyspnea.   Respiratory:  Positive for shortness of breath. Negative for  cough, snoring and sputum production.    Skin:  Negative for color change, dry skin and rash.   Musculoskeletal:  Negative for back pain, falls, joint pain and neck pain.   Gastrointestinal:  Negative for bloating, abdominal pain, constipation and diarrhea.   Genitourinary:  Negative for dysuria, flank pain, hematuria and hesitancy.   Neurological:  Negative for focal weakness, headaches, numbness and seizures.   Psychiatric/Behavioral:  Negative for altered mental status, depression and hallucinations.      Objective:     Vital Signs (Most Recent):  Temp: 98 °F (36.7 °C) (11/22/23 1310)  Pulse: 96 (11/22/23 1310)  Resp: 20 (11/22/23 1318)  BP: (!) 107/59 (11/22/23 1310)  SpO2: 98 % (11/22/23 1310) Vital Signs (24h Range):  Temp:  [98 °F (36.7 °C)] 98 °F (36.7 °C)  Pulse:  [96] 96  Resp:  [20] 20  SpO2:  [98 %] 98 %  BP: (107)/(59) 107/59     Weight: 64.9 kg (143 lb)  Body mass index is 27.93 kg/m².    SpO2: 98 %       No intake or output data in the 24 hours ending 11/22/23 1324    Lines/Drains/Airways       Drain  Duration                  Colostomy 07/13/21 0201 Descending/sigmoid  days              Peripheral Intravenous Line  Duration                  Peripheral IV - Single Lumen 11/22/23 0000 20 G Anterior;Right Forearm <1 day         Peripheral IV - Single Lumen 11/22/23 1314 20 G Left Antecubital <1 day                     Physical Exam  Constitutional:       General: She is not in acute distress.     Appearance: She is not ill-appearing.   HENT:      Nose: Nose normal.      Mouth/Throat:      Mouth: Mucous membranes are moist.   Eyes:      Pupils: Pupils are equal, round, and reactive to light.   Neck:      Comments: No JVD appreciated   Cardiovascular:      Rate and Rhythm: Normal rate and regular rhythm.      Pulses: Normal pulses.      Heart sounds: No murmur heard.  Pulmonary:      Effort: Pulmonary effort is normal. No respiratory distress.      Breath sounds: No wheezing or rales.   Abdominal:  "     General: Abdomen is flat. There is no distension.      Palpations: Abdomen is soft.      Tenderness: There is no right CVA tenderness or left CVA tenderness.   Musculoskeletal:         General: No swelling.      Cervical back: Normal range of motion and neck supple. No tenderness.      Right lower leg: No edema.      Left lower leg: No edema.   Skin:     General: Skin is warm.      Coloration: Skin is not pale.      Findings: No rash.   Neurological:      General: No focal deficit present.      Mental Status: She is alert and oriented to person, place, and time.      Motor: No weakness.          Significant Labs: ABG: No results for input(s): "PH", "PCO2", "HCO3", "POCSATURATED", "BE" in the last 48 hours., Blood Culture: No results for input(s): "LABBLOO" in the last 48 hours., BMP: No results for input(s): "GLU", "NA", "K", "CL", "CO2", "BUN", "CREATININE", "CALCIUM", "MG" in the last 48 hours., CMP No results for input(s): "NA", "K", "CL", "CO2", "GLU", "BUN", "CREATININE", "CALCIUM", "PROT", "ALBUMIN", "BILITOT", "ALKPHOS", "AST", "ALT", "ANIONGAP", "ESTGFRAFRICA", "EGFRNONAA" in the last 48 hours., CBC No results for input(s): "WBC", "HGB", "HCT", "PLT" in the last 48 hours., INR No results for input(s): "INR", "PROTIME" in the last 48 hours., Lipid Panel No results for input(s): "CHOL", "HDL", "LDLCALC", "TRIG", "CHOLHDL" in the last 48 hours.,   Pathology Results  (Last 10 years)      None        , Troponin No results for input(s): "TROPONINI" in the last 48 hours., and All pertinent lab results from the last 24 hours have been reviewed.     "

## 2023-11-22 NOTE — Clinical Note
The catheter was inserted into the left ventricle. Hemodynamics were performed.  An angiography was performed of the LV. The angiography was performed via hand injection with 8 mL of contrast.

## 2023-11-22 NOTE — ED NOTES
Patient left ED at this time, patient being transported to cath lab at this time with cardiology team and 2 ED nurses.

## 2023-11-23 LAB
ALBUMIN SERPL BCP-MCNC: 3.1 G/DL (ref 3.5–5.2)
ALP SERPL-CCNC: 72 U/L (ref 55–135)
ALT SERPL W/O P-5'-P-CCNC: 24 U/L (ref 10–44)
ANION GAP SERPL CALC-SCNC: 7 MMOL/L (ref 8–16)
APTT PPP: 105 SEC (ref 21–32)
APTT PPP: 25.9 SEC (ref 21–32)
APTT PPP: 25.9 SEC (ref 21–32)
ASCENDING AORTA: 3.31 CM
AST SERPL-CCNC: 67 U/L (ref 10–40)
AV INDEX (PROSTH): 0.62
AV MEAN GRADIENT: 5 MMHG
AV PEAK GRADIENT: 7 MMHG
AV VALVE AREA BY VELOCITY RATIO: 1.87 CM²
AV VALVE AREA: 1.95 CM²
AV VELOCITY RATIO: 0.59
BASOPHILS # BLD AUTO: 0.04 K/UL (ref 0–0.2)
BASOPHILS NFR BLD: 0.4 % (ref 0–1.9)
BILIRUB SERPL-MCNC: 0.3 MG/DL (ref 0.1–1)
BSA FOR ECHO PROCEDURE: 1.65 M2
BUN SERPL-MCNC: 14 MG/DL (ref 8–23)
CALCIUM SERPL-MCNC: 7.7 MG/DL (ref 8.7–10.5)
CHLORIDE SERPL-SCNC: 111 MMOL/L (ref 95–110)
CO2 SERPL-SCNC: 18 MMOL/L (ref 23–29)
CREAT SERPL-MCNC: 0.7 MG/DL (ref 0.5–1.4)
CV ECHO LV RWT: 0.35 CM
DIFFERENTIAL METHOD: ABNORMAL
DOP CALC AO PEAK VEL: 1.33 M/S
DOP CALC AO VTI: 26.21 CM
DOP CALC LVOT AREA: 3.1 CM2
DOP CALC LVOT DIAMETER: 2 CM
DOP CALC LVOT PEAK VEL: 0.79 M/S
DOP CALC LVOT STROKE VOLUME: 51.21 CM3
DOP CALCLVOT PEAK VEL VTI: 16.31 CM
E WAVE DECELERATION TIME: 183.35 MSEC
E/A RATIO: 1.46
E/E' RATIO: 12.24 M/S
ECHO LV POSTERIOR WALL: 0.76 CM (ref 0.6–1.1)
EOSINOPHIL # BLD AUTO: 0.2 K/UL (ref 0–0.5)
EOSINOPHIL NFR BLD: 2.2 % (ref 0–8)
ERYTHROCYTE [DISTWIDTH] IN BLOOD BY AUTOMATED COUNT: 13.6 % (ref 11.5–14.5)
EST. GFR  (NO RACE VARIABLE): >60 ML/MIN/1.73 M^2
FRACTIONAL SHORTENING: 34 % (ref 28–44)
GLUCOSE SERPL-MCNC: 157 MG/DL (ref 70–110)
HCT VFR BLD AUTO: 30.8 % (ref 37–48.5)
HGB BLD-MCNC: 9.8 G/DL (ref 12–16)
IMM GRANULOCYTES # BLD AUTO: 0.06 K/UL (ref 0–0.04)
IMM GRANULOCYTES NFR BLD AUTO: 0.6 % (ref 0–0.5)
INTERVENTRICULAR SEPTUM: 0.93 CM (ref 0.6–1.1)
IVRT: 88.49 MSEC
LA MAJOR: 4.91 CM
LA MINOR: 4.97 CM
LA WIDTH: 4.4 CM
LEFT ATRIUM SIZE: 3.66 CM
LEFT ATRIUM VOLUME INDEX MOD: 37.3 ML/M2
LEFT ATRIUM VOLUME INDEX: 42 ML/M2
LEFT ATRIUM VOLUME MOD: 60 CM3
LEFT ATRIUM VOLUME: 67.62 CM3
LEFT INTERNAL DIMENSION IN SYSTOLE: 2.89 CM (ref 2.1–4)
LEFT VENTRICLE DIASTOLIC VOLUME INDEX: 53.5 ML/M2
LEFT VENTRICLE DIASTOLIC VOLUME: 86.13 ML
LEFT VENTRICLE MASS INDEX: 72 G/M2
LEFT VENTRICLE SYSTOLIC VOLUME INDEX: 19.8 ML/M2
LEFT VENTRICLE SYSTOLIC VOLUME: 31.83 ML
LEFT VENTRICULAR INTERNAL DIMENSION IN DIASTOLE: 4.37 CM (ref 3.5–6)
LEFT VENTRICULAR MASS: 116.32 G
LV LATERAL E/E' RATIO: 10.4 M/S
LV SEPTAL E/E' RATIO: 14.86 M/S
LYMPHOCYTES # BLD AUTO: 0.7 K/UL (ref 1–4.8)
LYMPHOCYTES NFR BLD: 6.7 % (ref 18–48)
MAGNESIUM SERPL-MCNC: 1.5 MG/DL (ref 1.6–2.6)
MCH RBC QN AUTO: 29.9 PG (ref 27–31)
MCHC RBC AUTO-ENTMCNC: 31.8 G/DL (ref 32–36)
MCV RBC AUTO: 94 FL (ref 82–98)
MONOCYTES # BLD AUTO: 1.1 K/UL (ref 0.3–1)
MONOCYTES NFR BLD: 11 % (ref 4–15)
MV PEAK A VEL: 0.71 M/S
MV PEAK E VEL: 1.04 M/S
NEUTROPHILS # BLD AUTO: 7.8 K/UL (ref 1.8–7.7)
NEUTROPHILS NFR BLD: 79.1 % (ref 38–73)
NRBC BLD-RTO: 0 /100 WBC
PHOSPHATE SERPL-MCNC: 2.9 MG/DL (ref 2.7–4.5)
PISA TR MAX VEL: 3.02 M/S
PLATELET # BLD AUTO: 184 K/UL (ref 150–450)
PMV BLD AUTO: 9.7 FL (ref 9.2–12.9)
POCT GLUCOSE: 103 MG/DL (ref 70–110)
POCT GLUCOSE: 106 MG/DL (ref 70–110)
POCT GLUCOSE: 110 MG/DL (ref 70–110)
POTASSIUM SERPL-SCNC: 3.8 MMOL/L (ref 3.5–5.1)
PROT SERPL-MCNC: 5.3 G/DL (ref 6–8.4)
RA MAJOR: 4.24 CM
RA PRESSURE ESTIMATED: 8 MMHG
RA WIDTH: 3.62 CM
RBC # BLD AUTO: 3.28 M/UL (ref 4–5.4)
RIGHT ATRIAL AREA: 20 CM2
RIGHT VENTRICULAR END-DIASTOLIC DIMENSION: 2.87 CM
RV TB RVSP: 11 MMHG
SINUS: 2.63 CM
SODIUM SERPL-SCNC: 136 MMOL/L (ref 136–145)
STJ: 2.31 CM
TDI LATERAL: 0.1 M/S
TDI SEPTAL: 0.07 M/S
TDI: 0.09 M/S
TR MAX PG: 36 MMHG
TRICUSPID ANNULAR PLANE SYSTOLIC EXCURSION: 1.56 CM
TV REST PULMONARY ARTERY PRESSURE: 44 MMHG
WBC # BLD AUTO: 9.81 K/UL (ref 3.9–12.7)
Z-SCORE OF LEFT VENTRICULAR DIMENSION IN END DIASTOLE: -0.44
Z-SCORE OF LEFT VENTRICULAR DIMENSION IN END SYSTOLE: 0.16

## 2023-11-23 PROCEDURE — 25000242 PHARM REV CODE 250 ALT 637 W/ HCPCS: Performed by: STUDENT IN AN ORGANIZED HEALTH CARE EDUCATION/TRAINING PROGRAM

## 2023-11-23 PROCEDURE — 85730 THROMBOPLASTIN TIME PARTIAL: CPT | Performed by: STUDENT IN AN ORGANIZED HEALTH CARE EDUCATION/TRAINING PROGRAM

## 2023-11-23 PROCEDURE — 25000003 PHARM REV CODE 250: Performed by: STUDENT IN AN ORGANIZED HEALTH CARE EDUCATION/TRAINING PROGRAM

## 2023-11-23 PROCEDURE — 84100 ASSAY OF PHOSPHORUS: CPT | Performed by: STUDENT IN AN ORGANIZED HEALTH CARE EDUCATION/TRAINING PROGRAM

## 2023-11-23 PROCEDURE — 99900035 HC TECH TIME PER 15 MIN (STAT)

## 2023-11-23 PROCEDURE — 20600001 HC STEP DOWN PRIVATE ROOM

## 2023-11-23 PROCEDURE — 27000221 HC OXYGEN, UP TO 24 HOURS

## 2023-11-23 PROCEDURE — 99233 SBSQ HOSP IP/OBS HIGH 50: CPT | Mod: GC,,, | Performed by: INTERNAL MEDICINE

## 2023-11-23 PROCEDURE — 80053 COMPREHEN METABOLIC PANEL: CPT | Performed by: STUDENT IN AN ORGANIZED HEALTH CARE EDUCATION/TRAINING PROGRAM

## 2023-11-23 PROCEDURE — 94640 AIRWAY INHALATION TREATMENT: CPT

## 2023-11-23 PROCEDURE — 85025 COMPLETE CBC W/AUTO DIFF WBC: CPT | Performed by: STUDENT IN AN ORGANIZED HEALTH CARE EDUCATION/TRAINING PROGRAM

## 2023-11-23 PROCEDURE — S4991 NICOTINE PATCH NONLEGEND: HCPCS | Performed by: STUDENT IN AN ORGANIZED HEALTH CARE EDUCATION/TRAINING PROGRAM

## 2023-11-23 PROCEDURE — 99233 PR SUBSEQUENT HOSPITAL CARE,LEVL III: ICD-10-PCS | Mod: GC,,, | Performed by: INTERNAL MEDICINE

## 2023-11-23 PROCEDURE — 94761 N-INVAS EAR/PLS OXIMETRY MLT: CPT

## 2023-11-23 PROCEDURE — 85730 THROMBOPLASTIN TIME PARTIAL: CPT | Mod: 91 | Performed by: INTERNAL MEDICINE

## 2023-11-23 PROCEDURE — 63600175 PHARM REV CODE 636 W HCPCS: Performed by: STUDENT IN AN ORGANIZED HEALTH CARE EDUCATION/TRAINING PROGRAM

## 2023-11-23 PROCEDURE — 83735 ASSAY OF MAGNESIUM: CPT | Performed by: STUDENT IN AN ORGANIZED HEALTH CARE EDUCATION/TRAINING PROGRAM

## 2023-11-23 RX ORDER — POTASSIUM CHLORIDE 750 MG/1
30 CAPSULE, EXTENDED RELEASE ORAL ONCE
Status: COMPLETED | OUTPATIENT
Start: 2023-11-23 | End: 2023-11-23

## 2023-11-23 RX ORDER — MAGNESIUM SULFATE HEPTAHYDRATE 40 MG/ML
2 INJECTION, SOLUTION INTRAVENOUS ONCE
Status: COMPLETED | OUTPATIENT
Start: 2023-11-23 | End: 2023-11-23

## 2023-11-23 RX ORDER — IBUPROFEN 200 MG
1 TABLET ORAL DAILY
Status: DISCONTINUED | OUTPATIENT
Start: 2023-11-23 | End: 2023-11-24 | Stop reason: HOSPADM

## 2023-11-23 RX ORDER — FLUTICASONE FUROATE AND VILANTEROL 100; 25 UG/1; UG/1
1 POWDER RESPIRATORY (INHALATION) DAILY
Status: DISCONTINUED | OUTPATIENT
Start: 2023-11-23 | End: 2023-11-24 | Stop reason: HOSPADM

## 2023-11-23 RX ADMIN — Medication 6 MG: at 08:11

## 2023-11-23 RX ADMIN — ALBUTEROL SULFATE 2.5 MG: 2.5 SOLUTION RESPIRATORY (INHALATION) at 07:11

## 2023-11-23 RX ADMIN — Medication 1 PATCH: at 10:11

## 2023-11-23 RX ADMIN — APIXABAN 5 MG: 5 TABLET, FILM COATED ORAL at 08:11

## 2023-11-23 RX ADMIN — ATORVASTATIN CALCIUM 80 MG: 40 TABLET, FILM COATED ORAL at 08:11

## 2023-11-23 RX ADMIN — POTASSIUM CHLORIDE 30 MEQ: 10 CAPSULE, COATED, EXTENDED RELEASE ORAL at 09:11

## 2023-11-23 RX ADMIN — ALBUTEROL SULFATE 2.5 MG: 2.5 SOLUTION RESPIRATORY (INHALATION) at 08:11

## 2023-11-23 RX ADMIN — METOPROLOL TARTRATE 50 MG: 50 TABLET, FILM COATED ORAL at 08:11

## 2023-11-23 RX ADMIN — ASPIRIN 81 MG CHEWABLE TABLET 81 MG: 81 TABLET CHEWABLE at 08:11

## 2023-11-23 RX ADMIN — FLUTICASONE FUROATE AND VILANTEROL TRIFENATATE 1 PUFF: 100; 25 POWDER RESPIRATORY (INHALATION) at 12:11

## 2023-11-23 RX ADMIN — TICAGRELOR 90 MG: 90 TABLET ORAL at 08:11

## 2023-11-23 RX ADMIN — ALBUTEROL SULFATE 2.5 MG: 2.5 SOLUTION RESPIRATORY (INHALATION) at 02:11

## 2023-11-23 RX ADMIN — ALBUTEROL SULFATE 2.5 MG: 2.5 SOLUTION RESPIRATORY (INHALATION) at 06:11

## 2023-11-23 RX ADMIN — MAGNESIUM SULFATE HEPTAHYDRATE 2 G: 40 INJECTION, SOLUTION INTRAVENOUS at 09:11

## 2023-11-23 RX ADMIN — ALBUTEROL SULFATE 2.5 MG: 2.5 SOLUTION RESPIRATORY (INHALATION) at 12:11

## 2023-11-23 RX ADMIN — DIPHENHYDRAMINE HYDROCHLORIDE 10 ML: 25 SOLUTION ORAL at 08:11

## 2023-11-23 NOTE — PLAN OF CARE
"POC reviewed with Radha Sotelo and family. Questions and concerns addressed. No acute changes. See below and flowsheets for full assessment and VS info.       Neuro:  Sanford Coma Scale  Best Eye Response: 4-->(E4) spontaneous  Best Motor Response: 6-->(M6) obeys commands  Best Verbal Response: 5-->(V5) oriented  Sanford Coma Scale Score: 15  Assessment Qualifiers: patient not sedated/intubated  Pupil PERRLA: yes    24 hr Temp:  [97.9 °F (36.6 °C)-98.1 °F (36.7 °C)]     CV:  Rhythm: atrial rhythm   DVT prophylaxis: VTE Required Core Measure: Pharmacological prophylaxis initiated/maintained                            Pulses  Right Radial Pulse: 1+ (weak)  Left Radial Pulse: 1+ (weak)  Right Dorsalis Pedis Pulse: 1+ (weak)  Left Dorsalis Pedis Pulse: 1+ (weak)  Right Posterior Tibial Pulse: 1+ (weak)  Left Posterior Tibial Pulse: 1+ (weak)    Resp:  Flow (L/min): 2  Oxygen Concentration (%): 28    GI/:  GI prophylaxis: no  Diet/Nutrition Received: low saturated fat/low cholesterol, 2 gram sodium  Last Bowel Movement: 11/22/23  Voiding Characteristics: external catheter   Intake/Output Summary (Last 24 hours) at 11/23/2023 0724  Last data filed at 11/23/2023 0600  Gross per 24 hour   Intake 1560.16 ml   Output 660 ml   Net 900.16 ml          Labs/Accuchecks:  Recent Labs   Lab 11/22/23  1317   WBC 6.25   RBC 4.07   HGB 12.2   HCT 36.6*         Recent Labs   Lab 11/22/23  1728 11/22/23  1943 11/23/23  0240   INR 1.1  --   --    APTT >150.0* 46.9* 25.9  25.9      Recent Labs     11/22/23  1317 11/22/23  1606 11/22/23  2301      < > 135*   K 2.7*   < > 4.2   CO2 23   < > 21*      < > 106   BUN 14   < > 12   CREATININE 0.8   < > 0.8   ALKPHOS 86  --   --    ALT 25  --   --    AST 24  --   --    BILITOT 0.3  --   --     < > = values in this interval not displayed.       Recent Labs   Lab 11/22/23  1317   TROPONINI 0.083*    No results for input(s): "PH", "PCO2", "PO2", "HCO3", "POCSATURATED", " ""BE" in the last 72 hours.    Electrolytes: Electrolytes replaced  Accuchecks: ACHS    Gtts/LDAs:   sodium chloride 0.9%      dextrose 5 % and 0.2 % NaCl with KCl 20 mEq Stopped (11/22/23 2235)    heparin (porcine) in D5W 15 Units/kg/hr (11/23/23 0600)       Lines/Drains/Airways       Drain  Duration                  Colostomy 07/13/21 0201 Descending/sigmoid  days    Female External Urinary Catheter 11/22/23 1550 <1 day              Peripheral Intravenous Line  Duration                  Peripheral IV - Single Lumen 11/22/23 0000 20 G Anterior;Right Forearm 1 day         Peripheral IV - Single Lumen 11/22/23 1314 20 G Left Antecubital <1 day                    Skin/Wounds  Bathing/Skin Care: dressed/undressed;electrode patches/site rotation;incontinence care;linen changed;bath, complete (11/22/23 1550)  Wounds: Yes  Wound care consulted: No    "

## 2023-11-23 NOTE — SUBJECTIVE & OBJECTIVE
Interval History:    Patient doing well this morning s/p cath, chest pain resolved and no acture complaints. C/o some SOB in afternoon, CXR non-revealing. Respiratory status improved with breathing tx    Review of Systems   Constitutional: Negative for chills, decreased appetite, diaphoresis, fever, malaise/fatigue and weight gain.   HENT:  Negative for congestion and ear discharge.    Eyes:  Negative for blurred vision.   Cardiovascular:  Positive for dyspnea on exertion. Negative for chest pain, irregular heartbeat, leg swelling, orthopnea, palpitations and paroxysmal nocturnal dyspnea.   Respiratory:  Positive for shortness of breath. Negative for cough, snoring and sputum production.    Skin:  Negative for color change, dry skin and rash.   Musculoskeletal:  Negative for back pain, falls, joint pain and neck pain.   Gastrointestinal:  Negative for bloating, abdominal pain, constipation and diarrhea.   Genitourinary:  Negative for dysuria, flank pain, hematuria and hesitancy.   Neurological:  Negative for focal weakness, headaches, numbness and seizures.   Psychiatric/Behavioral:  Negative for altered mental status, depression and hallucinations.      Objective:     Vital Signs (Most Recent):  Temp: 98.2 °F (36.8 °C) (11/23/23 1100)  Pulse: 76 (11/23/23 1300)  Resp: (!) 24 (11/23/23 1300)  BP: (!) 99/50 (11/23/23 1300)  SpO2: 100 % (11/23/23 1300) Vital Signs (24h Range):  Temp:  [97.9 °F (36.6 °C)-98.2 °F (36.8 °C)] 98.2 °F (36.8 °C)  Pulse:  [] 76  Resp:  [16-25] 24  SpO2:  [97 %-100 %] 100 %  BP: ()/(50-76) 99/50     Weight: 64.4 kg (142 lb)  Body mass index is 27.73 kg/m².    SpO2: 100 %         Intake/Output Summary (Last 24 hours) at 11/23/2023 1354  Last data filed at 11/23/2023 1300  Gross per 24 hour   Intake 1839.88 ml   Output 1185 ml   Net 654.88 ml       Lines/Drains/Airways       Drain  Duration                  Colostomy 07/13/21 0201 Descending/sigmoid  days    Female External  "Urinary Catheter 11/22/23 1550 <1 day              Peripheral Intravenous Line  Duration                  Peripheral IV - Single Lumen 11/22/23 0000 20 G Anterior;Right Forearm 1 day         Peripheral IV - Single Lumen 11/23/23 0750 20 G Anterior;Distal;Right Forearm <1 day                     Physical Exam  Constitutional:       General: She is not in acute distress.     Appearance: She is not ill-appearing.   HENT:      Nose: Nose normal.      Mouth/Throat:      Mouth: Mucous membranes are moist.   Eyes:      Pupils: Pupils are equal, round, and reactive to light.   Neck:      Comments: No JVD appreciated   Cardiovascular:      Rate and Rhythm: Normal rate and regular rhythm.      Pulses: Normal pulses.      Heart sounds: No murmur heard.  Pulmonary:      Effort: Pulmonary effort is normal. No respiratory distress.      Breath sounds: No wheezing or rales.   Abdominal:      General: Abdomen is flat. There is no distension.      Palpations: Abdomen is soft.      Tenderness: There is no right CVA tenderness or left CVA tenderness.      Comments: Ostomy bag    Musculoskeletal:         General: No swelling.      Cervical back: Normal range of motion and neck supple. No tenderness.      Right lower leg: No edema.      Left lower leg: No edema.   Skin:     General: Skin is warm.      Coloration: Skin is not pale.      Findings: No rash.   Neurological:      General: No focal deficit present.      Mental Status: She is alert and oriented to person, place, and time.      Motor: No weakness.          Significant Labs: ABG: No results for input(s): "PH", "PCO2", "HCO3", "POCSATURATED", "BE" in the last 48 hours., Blood Culture: No results for input(s): "LABBLOO" in the last 48 hours., BMP:   Recent Labs   Lab 11/22/23  1606 11/22/23  2301 11/23/23  0708   * 96 157*   * 135* 136   K 2.8* 4.2 3.8    106 111*   CO2 21* 21* 18*   BUN 13 12 14   CREATININE 0.8 0.8 0.7   CALCIUM 8.5* 8.3* 7.7*   MG 1.8  --  " "1.5*   , CMP   Recent Labs   Lab 11/22/23  1317 11/22/23  1606 11/22/23  2301 11/23/23  0708    133* 135* 136   K 2.7* 2.8* 4.2 3.8    101 106 111*   CO2 23 21* 21* 18*   * 148* 96 157*   BUN 14 13 12 14   CREATININE 0.8 0.8 0.8 0.7   CALCIUM 8.8 8.5* 8.3* 7.7*   PROT 6.4  --   --  5.3*   ALBUMIN 3.7  --   --  3.1*   BILITOT 0.3  --   --  0.3   ALKPHOS 86  --   --  72   AST 24  --   --  67*   ALT 25  --   --  24   ANIONGAP 13 11 8 7*     , CBC   Recent Labs   Lab 11/22/23  1317 11/23/23  0708   WBC 6.25 9.81   HGB 12.2 9.8*   HCT 36.6* 30.8*    184   , INR   Recent Labs   Lab 11/22/23  1728   INR 1.1     , Lipid Panel No results for input(s): "CHOL", "HDL", "LDLCALC", "TRIG", "CHOLHDL" in the last 48 hours.,   Pathology Results  (Last 10 years)      None        , Troponin   Recent Labs   Lab 11/22/23  1317   TROPONINI 0.083*     , and All pertinent lab results from the last 24 hours have been reviewed.     "

## 2023-11-23 NOTE — HOSPITAL COURSE
Presented to the ED with chest pain. EKG concerning for inferior STEMI and pt was taken to cath lab. During procedure, noted proximal RCA subtotal occlusion and distal RCA stenosis; GALEN placed to prox RCA and balloon angioplasty to distal RCA. Pt feeling much better post-procedure, CP resolved.  Repeat Echo on 11/23 showed EF of 55-60% w/ Grade II diastolic dysfunction. Progressive mobility attempted by nursing staff though pt refused to mobilize, insisting that she will not move around until she gets home (uses walker for assistance at home). One brief episode of A-fib after cath in setting of MI. Patients Chadvasc score 6, HASBLED score 3, though decision made to hold eliquis given increased risk of bleeding in the event of a fall. Risk and benefits of triple therapy discussed with patient and she stated that she does not want to take eliquis. Pt does not frequently visit doctors, and believed to be unreliable with follow up appointments due to her frailty. She will require DAPT with aspirin and plavix for one year and was instructed to do so. Will also discharge with statin and lopressor.

## 2023-11-23 NOTE — PROGRESS NOTES
[facility] - [facility] Step[facility]  Cardiology  Progress Note    Patient Name: Radha Sotelo  MRN: [id number]  Admission Date: 11/22/2023  Hospital Length of Stay: 1 days  Code Status: Full Code   Attending Physician: Checo Bhatt MD   Primary Care Physician: No, Primary Doctor  Expected Discharge Date:   Principal Problem: STEMI  Subjective:         Interval History:    Patient doing well this morning s/p cath, chest pain resolved and no acture complaints. C/o some SOB in afternoon, CXR non-revealing. Respiratory status improved with breathing tx    Review of Systems   Constitutional: Negative for chills, decreased appetite, diaphoresis, fever, malaise/fatigue and weight gain.   HENT:  Negative for congestion and ear discharge.    Eyes:  Negative for blurred vision.   Cardiovascular:  Positive for dyspnea on exertion. Negative for chest pain, irregular heartbeat, leg swelling, orthopnea, palpitations and paroxysmal nocturnal dyspnea.   Respiratory:  Positive for shortness of breath. Negative for cough, snoring and sputum production.    Skin:  Negative for color change, dry skin and rash.   Musculoskeletal:  Negative for back pain, falls, joint pain and neck pain.   Gastrointestinal:  Negative for bloating, abdominal pain, constipation and diarrhea.   Genitourinary:  Negative for dysuria, flank pain, hematuria and hesitancy.   Neurological:  Negative for focal weakness, headaches, numbness and seizures.   Psychiatric/Behavioral:  Negative for altered mental status, depression and hallucinations.      Objective:     Vital Signs (Most Recent):  Temp: 98.2 °F (36.8 °C) (11/23/23 1100)  Pulse: 76 (11/23/23 1300)  Resp: (!) 24 (11/23/23 1300)  BP: (!) 99/50 (11/23/23 1300)  SpO2: 100 % (11/23/23 1300) Vital Signs (24h Range):  Temp:  [97.9 °F (36.6 °C)-98.2 °F (36.8 °C)] 98.2 °F (36.8 °C)  Pulse:  [] 76  Resp:  [16-25] 24  SpO2:  [97 %-100 %] 100 %  BP: ()/(50-76) 99/50     Weight: 64.4 kg (142  "lb)  Body mass index is 27.73 kg/m².    SpO2: 100 %         Intake/Output Summary (Last 24 hours) at 11/23/2023 1354  Last data filed at 11/23/2023 1300  Gross per 24 hour   Intake 1839.88 ml   Output 1185 ml   Net 654.88 ml       Lines/Drains/Airways       Drain  Duration                  Colostomy 07/13/21 0201 Descending/sigmoid  days    Female External Urinary Catheter 11/22/23 1550 <1 day              Peripheral Intravenous Line  Duration                  Peripheral IV - Single Lumen 11/22/23 0000 20 G Anterior;Right Forearm 1 day         Peripheral IV - Single Lumen 11/23/23 0750 20 G Anterior;Distal;Right Forearm <1 day                     Physical Exam  Constitutional:       General: She is not in acute distress.     Appearance: She is not ill-appearing.   HENT:      Nose: Nose normal.      Mouth/Throat:      Mouth: Mucous membranes are moist.   Eyes:      Pupils: Pupils are equal, round, and reactive to light.   Neck:      Comments: No JVD appreciated   Cardiovascular:      Rate and Rhythm: Normal rate and regular rhythm.      Pulses: Normal pulses.      Heart sounds: No murmur heard.  Pulmonary:      Effort: Pulmonary effort is normal. No respiratory distress.      Breath sounds: No wheezing or rales.   Abdominal:      General: Abdomen is flat. There is no distension.      Palpations: Abdomen is soft.      Tenderness: There is no right CVA tenderness or left CVA tenderness.      Comments: Ostomy bag    Musculoskeletal:         General: No swelling.      Cervical back: Normal range of motion and neck supple. No tenderness.      Right lower leg: No edema.      Left lower leg: No edema.   Skin:     General: Skin is warm.      Coloration: Skin is not pale.      Findings: No rash.   Neurological:      General: No focal deficit present.      Mental Status: She is alert and oriented to person, place, and time.      Motor: No weakness.          Significant Labs: ABG: No results for input(s): "PH", "PCO2", " ""HCO3", "POCSATURATED", "BE" in the last 48 hours., Blood Culture: No results for input(s): "LABBLOO" in the last 48 hours., BMP:   Recent Labs   Lab 11/22/23  1606 11/22/23  2301 11/23/23  0708   * 96 157*   * 135* 136   K 2.8* 4.2 3.8    106 111*   CO2 21* 21* 18*   BUN 13 12 14   CREATININE 0.8 0.8 0.7   CALCIUM 8.5* 8.3* 7.7*   MG 1.8  --  1.5*   , CMP   Recent Labs   Lab 11/22/23  1317 11/22/23  1606 11/22/23  2301 11/23/23  0708    133* 135* 136   K 2.7* 2.8* 4.2 3.8    101 106 111*   CO2 23 21* 21* 18*   * 148* 96 157*   BUN 14 13 12 14   CREATININE 0.8 0.8 0.8 0.7   CALCIUM 8.8 8.5* 8.3* 7.7*   PROT 6.4  --   --  5.3*   ALBUMIN 3.7  --   --  3.1*   BILITOT 0.3  --   --  0.3   ALKPHOS 86  --   --  72   AST 24  --   --  67*   ALT 25  --   --  24   ANIONGAP 13 11 8 7*     , CBC   Recent Labs   Lab 11/22/23  1317 11/23/23  0708   WBC 6.25 9.81   HGB 12.2 9.8*   HCT 36.6* 30.8*    184   , INR   Recent Labs   Lab 11/22/23  1728   INR 1.1     , Lipid Panel No results for input(s): "CHOL", "HDL", "LDLCALC", "TRIG", "CHOLHDL" in the last 48 hours.,   Pathology Results  (Last 10 years)      None        , Troponin   Recent Labs   Lab 11/22/23  1317   TROPONINI 0.083*     , and All pertinent lab results from the last 24 hours have been reviewed.     Assessment and Plan:         Chest pain  STEMI in ED in inferior leads      LHC +/- PCI, patient is a GALEN candidate  - Anti-platelet Therapy: ASA and loaded with Brilinta   - Access: R Radial vs Fem   - Creatinine: 0.6  - Pre-Op Med: Bendaryl 50mg pO   - All patient's questions were answered.  -The risks, benefits and alternatives of the procedure were explained to the patient.   -The risks of coronary angiography include but are not limited to: bleeding, infection, heart rhythm abnormalities, allergic reactions, kidney injury and potential need for dialysis, stroke and death.   - Should stenting be indicated, the patient has " agreed to dual anti-platelet therapy with a drug-eluting stent   - Additionally, pt is aware that non-compliance is likely to result in stent clotting with heart attack, heart failure, and/or death  -The risks of moderate sedation include hypotension, respiratory depression, arrhythmias, bronchospasm, and death.   - Informed consent was obtained and the  patient is agreeable to proceed with the procedure.    Procedure details: Proximal RCA subtotal occlusion and distal RCA stenosis s/p GALEN to prox RCA and balloon angioplasty to distal RCA    - Pt doing well s/p cath, CP resolved  - Maintain triple therapy with ASA, Brilinta, and Eliquis for 1 month, then d/c ASA  - Repeat Echo today  - Continue Statin and lopressor  - Smoking cessation program referral outpatient, nicotine replacement while under admission               VTE Risk Mitigation (From admission, onward)           Ordered     apixaban tablet 5 mg  2 times daily         11/23/23 0948     IP VTE HIGH RISK PATIENT  Once         11/22/23 1348     Place sequential compression device  Until discontinued         11/22/23 1348                    Nicola Marcelo MD  Cardiology  [facility] - [facility] Step[facility]

## 2023-11-23 NOTE — NURSING
0954: spoke to Dr. Evans informed him pt's aPTT is 105, heparin at 8units/kg/hr. Per Dr. Evans, d/c heparin and will start pt on eliquis later tonight. Per Dr. Evans, hold 1100am dose of eliquis. Per Dr. Evans will redraw aPTT for morning labs, no redraw at this time. Heparin stopped per MD. No bleeding noted on pt.     1031: pt states she would like a nicotine patch and her symbicort. Dr. Evans informed. Pt reports smoking about 20 cigs a day, nicotine patch ordered.     1216: pt c/ of sob, pt mildly uses accessory muscles and slightly tachypnic. No change in O2sat, 99% on 2L. Very mild wheezes heard on ausculatation. Dr. Evans informed, chest xray placed and RT informed. Pt denies chest pain    1240: pt reports some improvement in breathing after breathing tx. Pt sitting up in bed. Pt still tachypnic and mild assecory muscles noted. O2 sat 100% on 2L. Will continue to monitor.     1300: pt reporting improvement in SOB. Dr. Evans at bedside assessing pt. Per Dr. Evans, okay to transfer pt.     1345: pt transported to [facility] via telemonitor.

## 2023-11-23 NOTE — ASSESSMENT & PLAN NOTE
STEMI in ED in inferior leads      LHC +/- PCI, patient is a GALEN candidate  - Anti-platelet Therapy: ASA and loaded with Brilinta   - Access: R Radial vs Fem   - Creatinine: 0.6  - Pre-Op Med: Bendaryl 50mg pO   - All patient's questions were answered.  -The risks, benefits and alternatives of the procedure were explained to the patient.   -The risks of coronary angiography include but are not limited to: bleeding, infection, heart rhythm abnormalities, allergic reactions, kidney injury and potential need for dialysis, stroke and death.   - Should stenting be indicated, the patient has agreed to dual anti-platelet therapy with a drug-eluting stent   - Additionally, pt is aware that non-compliance is likely to result in stent clotting with heart attack, heart failure, and/or death  -The risks of moderate sedation include hypotension, respiratory depression, arrhythmias, bronchospasm, and death.   - Informed consent was obtained and the  patient is agreeable to proceed with the procedure.    Procedure details: Proximal RCA subtotal occlusion and distal RCA stenosis s/p GALEN to prox RCA and balloon angioplasty to distal RCA    - Pt doing well s/p cath, CP resolved  - Maintain triple therapy with ASA, Brilinta, and Eliquis for 1 month, then d/c ASA  - Repeat Echo today  - Continue Statin and lopressor  - Smoking cessation program referral outpatient, nicotine replacement while under admission

## 2023-11-24 VITALS
BODY MASS INDEX: 27.88 KG/M2 | SYSTOLIC BLOOD PRESSURE: 112 MMHG | HEIGHT: 60 IN | TEMPERATURE: 98 F | HEART RATE: 76 BPM | RESPIRATION RATE: 20 BRPM | OXYGEN SATURATION: 98 % | WEIGHT: 142 LBS | DIASTOLIC BLOOD PRESSURE: 59 MMHG

## 2023-11-24 LAB
ALBUMIN SERPL BCP-MCNC: 3.4 G/DL (ref 3.5–5.2)
ALP SERPL-CCNC: 81 U/L (ref 55–135)
ALT SERPL W/O P-5'-P-CCNC: 22 U/L (ref 10–44)
ANION GAP SERPL CALC-SCNC: 10 MMOL/L (ref 8–16)
AST SERPL-CCNC: 47 U/L (ref 10–40)
BASOPHILS # BLD AUTO: 0.03 K/UL (ref 0–0.2)
BASOPHILS NFR BLD: 0.2 % (ref 0–1.9)
BILIRUB SERPL-MCNC: 0.5 MG/DL (ref 0.1–1)
BUN SERPL-MCNC: 21 MG/DL (ref 8–23)
CALCIUM SERPL-MCNC: 9.1 MG/DL (ref 8.7–10.5)
CHLORIDE SERPL-SCNC: 107 MMOL/L (ref 95–110)
CO2 SERPL-SCNC: 19 MMOL/L (ref 23–29)
CREAT SERPL-MCNC: 0.7 MG/DL (ref 0.5–1.4)
DIFFERENTIAL METHOD: ABNORMAL
EOSINOPHIL # BLD AUTO: 0 K/UL (ref 0–0.5)
EOSINOPHIL NFR BLD: 0.2 % (ref 0–8)
ERYTHROCYTE [DISTWIDTH] IN BLOOD BY AUTOMATED COUNT: 13.8 % (ref 11.5–14.5)
EST. GFR  (NO RACE VARIABLE): >60 ML/MIN/1.73 M^2
GLUCOSE SERPL-MCNC: 108 MG/DL (ref 70–110)
HCT VFR BLD AUTO: 36.5 % (ref 37–48.5)
HGB BLD-MCNC: 11.3 G/DL (ref 12–16)
IMM GRANULOCYTES # BLD AUTO: 0.07 K/UL (ref 0–0.04)
IMM GRANULOCYTES NFR BLD AUTO: 0.6 % (ref 0–0.5)
LYMPHOCYTES # BLD AUTO: 0.9 K/UL (ref 1–4.8)
LYMPHOCYTES NFR BLD: 7.1 % (ref 18–48)
MAGNESIUM SERPL-MCNC: 2 MG/DL (ref 1.6–2.6)
MCH RBC QN AUTO: 29.9 PG (ref 27–31)
MCHC RBC AUTO-ENTMCNC: 31 G/DL (ref 32–36)
MCV RBC AUTO: 97 FL (ref 82–98)
MONOCYTES # BLD AUTO: 1.4 K/UL (ref 0.3–1)
MONOCYTES NFR BLD: 11.6 % (ref 4–15)
NEUTROPHILS # BLD AUTO: 9.8 K/UL (ref 1.8–7.7)
NEUTROPHILS NFR BLD: 80.3 % (ref 38–73)
NRBC BLD-RTO: 0 /100 WBC
PHOSPHATE SERPL-MCNC: 3.9 MG/DL (ref 2.7–4.5)
PLATELET # BLD AUTO: 187 K/UL (ref 150–450)
PMV BLD AUTO: 9.2 FL (ref 9.2–12.9)
POTASSIUM SERPL-SCNC: 4.7 MMOL/L (ref 3.5–5.1)
PROT SERPL-MCNC: 6.3 G/DL (ref 6–8.4)
RBC # BLD AUTO: 3.78 M/UL (ref 4–5.4)
SODIUM SERPL-SCNC: 136 MMOL/L (ref 136–145)
WBC # BLD AUTO: 12.19 K/UL (ref 3.9–12.7)

## 2023-11-24 PROCEDURE — 83735 ASSAY OF MAGNESIUM: CPT | Performed by: STUDENT IN AN ORGANIZED HEALTH CARE EDUCATION/TRAINING PROGRAM

## 2023-11-24 PROCEDURE — 99900035 HC TECH TIME PER 15 MIN (STAT)

## 2023-11-24 PROCEDURE — 94640 AIRWAY INHALATION TREATMENT: CPT

## 2023-11-24 PROCEDURE — S4991 NICOTINE PATCH NONLEGEND: HCPCS | Performed by: STUDENT IN AN ORGANIZED HEALTH CARE EDUCATION/TRAINING PROGRAM

## 2023-11-24 PROCEDURE — 27000221 HC OXYGEN, UP TO 24 HOURS

## 2023-11-24 PROCEDURE — 80053 COMPREHEN METABOLIC PANEL: CPT | Performed by: STUDENT IN AN ORGANIZED HEALTH CARE EDUCATION/TRAINING PROGRAM

## 2023-11-24 PROCEDURE — 84100 ASSAY OF PHOSPHORUS: CPT | Performed by: STUDENT IN AN ORGANIZED HEALTH CARE EDUCATION/TRAINING PROGRAM

## 2023-11-24 PROCEDURE — 25000242 PHARM REV CODE 250 ALT 637 W/ HCPCS: Performed by: STUDENT IN AN ORGANIZED HEALTH CARE EDUCATION/TRAINING PROGRAM

## 2023-11-24 PROCEDURE — 99239 HOSP IP/OBS DSCHRG MGMT >30: CPT | Mod: GC,,, | Performed by: INTERNAL MEDICINE

## 2023-11-24 PROCEDURE — 36415 COLL VENOUS BLD VENIPUNCTURE: CPT | Performed by: STUDENT IN AN ORGANIZED HEALTH CARE EDUCATION/TRAINING PROGRAM

## 2023-11-24 PROCEDURE — 94761 N-INVAS EAR/PLS OXIMETRY MLT: CPT

## 2023-11-24 PROCEDURE — 85025 COMPLETE CBC W/AUTO DIFF WBC: CPT | Performed by: STUDENT IN AN ORGANIZED HEALTH CARE EDUCATION/TRAINING PROGRAM

## 2023-11-24 PROCEDURE — 99239 PR HOSPITAL DISCHARGE DAY,>30 MIN: ICD-10-PCS | Mod: GC,,, | Performed by: INTERNAL MEDICINE

## 2023-11-24 PROCEDURE — 25000003 PHARM REV CODE 250: Performed by: STUDENT IN AN ORGANIZED HEALTH CARE EDUCATION/TRAINING PROGRAM

## 2023-11-24 RX ORDER — ASPIRIN 81 MG/1
81 TABLET ORAL DAILY
Qty: 30 TABLET | Refills: 0 | Status: SHIPPED | OUTPATIENT
Start: 2023-11-24 | End: 2023-11-24 | Stop reason: SDUPTHER

## 2023-11-24 RX ORDER — CLOPIDOGREL 300 MG/1
600 TABLET, FILM COATED ORAL ONCE
Status: COMPLETED | OUTPATIENT
Start: 2023-11-24 | End: 2023-11-24

## 2023-11-24 RX ORDER — CLOPIDOGREL BISULFATE 75 MG/1
75 TABLET ORAL DAILY
Status: DISCONTINUED | OUTPATIENT
Start: 2023-11-25 | End: 2023-11-24 | Stop reason: HOSPADM

## 2023-11-24 RX ORDER — ASPIRIN 81 MG/1
81 TABLET ORAL DAILY
Qty: 90 TABLET | Refills: 2 | Status: SHIPPED | OUTPATIENT
Start: 2023-11-24 | End: 2024-08-20

## 2023-11-24 RX ORDER — NITROGLYCERIN 0.4 MG/1
0.4 TABLET SUBLINGUAL EVERY 5 MIN PRN
Qty: 25 TABLET | Refills: 2 | Status: ON HOLD | OUTPATIENT
Start: 2023-11-24 | End: 2023-11-30 | Stop reason: SDUPTHER

## 2023-11-24 RX ORDER — CLOPIDOGREL 300 MG/1
300 TABLET, FILM COATED ORAL ONCE
Status: DISCONTINUED | OUTPATIENT
Start: 2023-11-24 | End: 2023-11-24

## 2023-11-24 RX ORDER — CLOPIDOGREL BISULFATE 75 MG/1
75 TABLET ORAL DAILY
Qty: 30 TABLET | Refills: 11 | Status: ON HOLD | OUTPATIENT
Start: 2023-11-25 | End: 2023-11-30 | Stop reason: SDUPTHER

## 2023-11-24 RX ORDER — METOPROLOL SUCCINATE 25 MG/1
25 TABLET, EXTENDED RELEASE ORAL DAILY
Qty: 90 TABLET | Refills: 3 | Status: ON HOLD | OUTPATIENT
Start: 2023-11-24 | End: 2023-11-30 | Stop reason: SDUPTHER

## 2023-11-24 RX ORDER — ATORVASTATIN CALCIUM 80 MG/1
80 TABLET, FILM COATED ORAL DAILY
Qty: 90 TABLET | Refills: 3 | Status: ON HOLD | OUTPATIENT
Start: 2023-11-24 | End: 2023-11-30 | Stop reason: SDUPTHER

## 2023-11-24 RX ORDER — IBUPROFEN 200 MG
1 TABLET ORAL DAILY
Qty: 28 PATCH | Refills: 0 | Status: ON HOLD | OUTPATIENT
Start: 2023-11-24 | End: 2023-11-30 | Stop reason: SDUPTHER

## 2023-11-24 RX ADMIN — APIXABAN 5 MG: 5 TABLET, FILM COATED ORAL at 09:11

## 2023-11-24 RX ADMIN — DIPHENHYDRAMINE HYDROCHLORIDE 10 ML: 25 SOLUTION ORAL at 05:11

## 2023-11-24 RX ADMIN — Medication 1 PATCH: at 09:11

## 2023-11-24 RX ADMIN — ACETAMINOPHEN 650 MG: 325 TABLET ORAL at 05:11

## 2023-11-24 RX ADMIN — ALBUTEROL SULFATE 2.5 MG: 2.5 SOLUTION RESPIRATORY (INHALATION) at 01:11

## 2023-11-24 RX ADMIN — METOPROLOL TARTRATE 50 MG: 50 TABLET, FILM COATED ORAL at 09:11

## 2023-11-24 RX ADMIN — ATORVASTATIN CALCIUM 80 MG: 40 TABLET, FILM COATED ORAL at 09:11

## 2023-11-24 RX ADMIN — ALBUTEROL SULFATE 2.5 MG: 2.5 SOLUTION RESPIRATORY (INHALATION) at 03:11

## 2023-11-24 RX ADMIN — CLOPIDOGREL BISULFATE 600 MG: 300 TABLET, FILM COATED ORAL at 04:11

## 2023-11-24 RX ADMIN — ALBUTEROL SULFATE 2.5 MG: 2.5 SOLUTION RESPIRATORY (INHALATION) at 07:11

## 2023-11-24 RX ADMIN — ASPIRIN 81 MG CHEWABLE TABLET 81 MG: 81 TABLET CHEWABLE at 09:11

## 2023-11-24 RX ADMIN — TICAGRELOR 90 MG: 90 TABLET ORAL at 09:11

## 2023-11-24 NOTE — PLAN OF CARE
Problem: Fluid and Electrolyte Imbalance (Acute Kidney Injury/Impairment)  Goal: Fluid and Electrolyte Balance  Outcome: Ongoing, Progressing     Problem: Fluid Imbalance (Pneumonia)  Goal: Fluid Balance  Outcome: Ongoing, Progressing     Problem: Infection (Pneumonia)  Goal: Resolution of Infection Signs and Symptoms  Outcome: Ongoing, Progressing       Problem: Adult Inpatient Plan of Care  Goal: Plan of Care Review  Outcome: Ongoing, Progressing  Goal: Patient-Specific Goal (Individualized)  Outcome: Ongoing, Progressing  Goal: Absence of Hospital-Acquired Illness or Injury  Outcome: Ongoing, Progressing  Goal: Optimal Comfort and Wellbeing  Outcome: Ongoing, Progressing  Goal: Readiness for Transition of Care  Outcome: Ongoing, Progressing     Problem: Fall Injury Risk  Goal: Absence of Fall and Fall-Related Injury  Outcome: Ongoing, Progressing     Problem: Skin Injury Risk Increased  Goal: Skin Health and Integrity  Outcome: Ongoing, Progressing     Pt educated on fall risk overnight,pt remained free from falls/trauma/injury. Patient endorsed soreness on chest area and some indigestion. MD notified and medication order obtained. Plan of care reviewed with pt, all questions answered. Bed locked in lowest position, call bell within reach, will continue to monitor.

## 2023-11-24 NOTE — PLAN OF CARE
[facility]  Discharge Final Note    Primary Care Provider: No, Primary Doctor    Expected Discharge Date: 11/24/2023    Final Discharge Note (most recent)       Final Note - 11/24/23 1500          Final Note    Assessment Type Final Discharge Note     Anticipated Discharge Disposition Home or Self Care     Hospital Resources/Appts/Education Provided Appointments scheduled and added to AVS                 Referral placed to [facility].      Evon Dickinson LMSW [facility]  [id number]      Future Appointments   Date Time Provider Department Center   12/1/2023  8:20 AM Michael Reis MD [id number]   12/6/2023  9:00 AM VASCULAR, LAB [facility]   12/6/2023  9:40 AM Dionicio Saldaña MD [facility]

## 2023-11-24 NOTE — NURSING
Notified team about pt's complaint of gas pain.  Pt has nothing currently ordered PRN for relief.  CCU team states they will order something PRN- awaiting new order.

## 2023-11-24 NOTE — DISCHARGE INSTRUCTIONS
Discharge Instructions:   -Take Aspirin 81mg and Plavix 75mg daily for one year then ok to stop taking plavix (continue taking aspirin)   -Take Atorvastatin 80mg daily   -Take Lopressor 25mg daily   -Follow up with outpatient cardiology and Cardiac Rehab      -If any chest pain, take nitroglycerin tablet by placing under tongue. If chest pain persists after 5 minutes, place another tablet under the tongue. If still persists after another 5 minutes, place another tablet under the tongue and call an ambulance to go to the emergency room.

## 2023-11-24 NOTE — DISCHARGE SUMMARY
[facility] - Cardiology Stepdown  Cardiology  Discharge Summary      Patient Name: Radha Sotelo  MRN: [id number]  Admission Date: 11/22/2023  Hospital Length of Stay: 2 days  Discharge Date and Time:  11/24/2023 2:13 PM  Attending Physician: Mackenzie Davis MD    Discharging Provider: Nicola Marcelo MD  Primary Care Physician: Laurence, Primary Doctor    HPI:   78 F with severe COPD on home O2 for chronic hypoxic respiratory failure (4 L via nasal cannula), CAD, PAT, previous osteomyelitis with transmetatarsal amputation (2012) who presented to the emergency department with chest pain. Code STEMI called in ED with concerns for inferior STEMI.    She had chest pain this early this AM around 10AM. She called EMS, was given nitro en route without improvement in pain. Pain is mid sternal radiating to L shoulder.   Denies PND, orthopnea, LE edema or palpitation.          Procedure(s) (LRB):  Angiogram, Coronary, with Left Heart Cath (N/A)  Stent, Drug Eluting, Single Vessel, Coronary  PTCA, Single Vessel     Indwelling Lines/Drains at time of discharge:  Lines/Drains/Airways       Drain  Duration                  Colostomy 07/13/21 0201 Descending/sigmoid  days    Female External Urinary Catheter 11/22/23 1550 1 day                    Hospital Course:  Presented to the ED with chest pain. EKG concerning for inferior STEMI and pt was taken to cath lab. During procedure, noted proximal RCA subtotal occlusion and distal RCA stenosis; GALEN placed to prox RCA and balloon angioplasty to distal RCA. Pt feeling much better post-procedure, CP resolved.  Repeat Echo on 11/23 showed EF of 55-60% w/ Grade II diastolic dysfunction. Progressive mobility attempted by nursing staff though pt refused to mobilize, insisting that she will not move around until she gets home (uses walker for assistance at home). One brief episode of A-fib after cath in setting of MI. Patients Chadvasc score 6, HASBLED score 3, though decision  made to hold eliquis given increased risk of bleeding in the event of a fall. Risk and benefits of triple therapy discussed with patient and she stated that she does not want to take eliquis. Pt does not frequently visit doctors, and believed to be unreliable with follow up appointments due to her frailty. She will require DAPT with aspirin and plavix for one year and was instructed to do so. Will also discharge with statin and lopressor.     Physical Exam on 11/24  Constitutional:       General: She is not in acute distress.     Appearance: She is not ill-appearing.   HENT:      Nose: Nose normal.      Mouth/Throat:      Mouth: Mucous membranes are moist.   Eyes:      Pupils: Pupils are equal, round, and reactive to light.   Neck:      Comments: No JVD appreciated   Cardiovascular:      Rate and Rhythm: Normal rate and regular rhythm.      Pulses: Normal pulses.      Heart sounds: No murmur heard.  Pulmonary:      Effort: Pulmonary effort is normal. No respiratory distress.      Breath sounds: No wheezing or rales.   Abdominal:      General: Abdomen is flat. There is no distension.      Palpations: Abdomen is soft.      Tenderness: There is no right CVA tenderness or left CVA tenderness.      Comments: Ostomy bag    Musculoskeletal:         General: No swelling.      Cervical back: Normal range of motion and neck supple. No tenderness.      Right lower leg: No edema.      Left lower leg: No edema.   Skin:     General: Skin is warm.      Coloration: Skin is not pale.      Findings: No rash.   Neurological:      General: No focal deficit present.      Mental Status: She is alert and oriented to person, place, and time.      Motor: No weakness    Goals of Care Treatment Preferences:  Code Status: Full Code      Consults:   Consults (From admission, onward)          Status Ordering Provider     Inpatient consult to Interventional Cardiology  Once        Provider:  (Not yet assigned)    JUDITH Jesus             Significant Diagnostic Studies: Labs: All labs within the past 24 hours have been reviewed    Pending Diagnostic Studies:       None            Final Active Diagnoses:    Diagnosis Date Noted POA    PRINCIPAL PROBLEM:  Chest pain [R07.9] 12/09/2013 Yes      Problems Resolved During this Admission:     No new Assessment & Plan notes have been filed under this hospital service since the last note was generated.  Service: Cardiology      Discharged Condition: stable    Disposition: Home or Self Care    Follow Up:    Patient Instructions:      Ambulatory referral/consult to [facility]   Standing Status: Future   Referral Priority: Routine Referral Type: Consultation   Referral Reason: Specialty Services Required   Number of Visits Requested: 1     Ambulatory referral/consult to Cardiology   Standing Status: Future   Referral Priority: Routine Referral Type: Consultation   Referral Reason: Specialty Services Required   Requested Specialty: Cardiology   Number of Visits Requested: 1     Cardiac rehab phase II   Standing Status: Future Standing Exp. Date: 11/22/24     Order Specific Question Answer Comments   Department [facility] CARDIAC REHAB    Select qualifying diagnosis: I21.11 - ST elevation (STEMI) myocardial infarction involving right coronary artery      Medications:  Reconciled Home Medications:      Medication List        START taking these medications      aspirin 81 MG EC tablet  Commonly known as: ECOTRIN  Take 1 tablet (81 mg total) by mouth once daily.  Replaces: aspirin 81 MG Chew     atorvastatin 80 MG tablet  Commonly known as: LIPITOR  Take 1 tablet (80 mg total) by mouth once daily.     clopidogreL 75 mg tablet  Commonly known as: PLAVIX  Take 1 tablet (75 mg total) by mouth once daily.  Start taking on: November 25, 2023     metoprolol succinate 25 MG 24 hr tablet  Commonly known as: TOPROL-XL  Take 1 tablet (25 mg total) by mouth once daily.     nicotine 21 mg/24  hr  Commonly known as: NICODERM CQ  Place 1 patch onto the skin once daily. for 15 days     nitroGLYCERIN 0.4 MG SL tablet  Commonly known as: NITROSTAT  Place 1 tablet (0.4 mg total) under the tongue every 5 (five) minutes as needed for Chest pain.            CONTINUE taking these medications      albuterol 90 mcg/actuation inhaler  Commonly known as: PROVENTIL/VENTOLIN HFA  INHALE 2 PUFFS BY MOUTH EVERY 6 HOURS AS NEEDED     budesonide-formoterol 80-4.5 mcg 80-4.5 mcg/actuation Hfaa  Commonly known as: SYMBICORT  INHALE 2 PUFFS INTO THE LUNGS TWICE DAILY. RINSE MOUTH AFTER USE     cadexomer iodine 0.9 % gel  Commonly known as: IODOSORB  Apply topically once daily. Apply to right foot TMA.     hydrocortisone-aloe vera 1 % Crea cream  Apply topically 2 (two) times daily.            STOP taking these medications      aspirin 81 MG Chew  Replaced by: aspirin 81 MG EC tablet              Time spent on the discharge of patient: 35 minutes    Nicola Marcelo MD  Cardiology  [facility] Stepdown

## 2023-11-24 NOTE — PLAN OF CARE
[facility] - Cardiology Stepdown  Initial Discharge Assessment       Primary Care Provider: No, Primary Doctor    Admission Diagnosis: STEMI (ST elevation myocardial infarction) [I21.3]    Admission Date: 11/22/2023  Expected Discharge Date: 11/24/2023         Payor: [employer] MEDICARE / Plan: Remicalm 65 / Product Type: Medicare Advantage /     Extended Emergency Contact Information  Primary Emergency Contact: Bonnie Garcia  Address: UNKNOWN   [place]  Mobile Phone: [phone]  Relation: Sister  Secondary Emergency Contact: Anthony Reardon  Mobile Phone: [phone]  Relation: Daughter   needed? No    Discharge Plan A: Home  Discharge Plan B: Home Health      [facility] [place], [place] - [address]  [address] [facility] [address]  Phone: [phone] Fax: [phone]    [id number] - [place], [place] - [address]  [address] [address]  Phone: [phone] Fax: [phone]    [facility] - [place], [place] - [address]  [address] [facility] [address]  Phone: [phone] Fax: [phone]    [id number] [address] [address]  Phone: [phone] Fax: [phone]      Initial Assessment (most recent)       Adult Discharge Assessment - 11/24/23 1251          Discharge Assessment    Assessment Type Discharge Planning Assessment     Confirmed/corrected address, phone number and insurance Yes     Confirmed Demographics Correct on Facesheet     Source of Information patient;health record     Reason For Admission STEMI     People in Home alone     Facility Arrived From: Home     Do you expect to return to your current living situation? Yes     Do you have help at home or someone to help you manage your care at home? Yes     Who are your caregiver(s)  and their phone number(s)? Anthony Reardon (daughter) [phone]     Prior to hospitilization cognitive status: Alert/Oriented     Current cognitive status: Alert/Oriented     Walking or Climbing Stairs ambulation difficulty, requires equipment     Mobility Management rolling walker     Equipment Currently Used at Home walker, rolling;shower chair     Readmission within 30 days? No     Patient currently being followed by outpatient case management? No     Do you currently have service(s) that help you manage your care at home? No     Do you take prescription medications? Yes     Do you have prescription coverage? Yes     Coverage PHN     Do you have any problems affording any of your prescribed medications? No     Is the patient taking medications as prescribed? yes     Who is going to help you get home at discharge? son-in-law     Are you on dialysis? No     Do you take coumadin? No     DME Needed Upon Discharge  none     Discharge Plan discussed with: Patient     Discharge Plan A Home     Discharge Plan B Home Health                 SW met with pt at bedside to discuss discharge planning.  Pt lives alone and uses a rolling walker for ambulation but is independent with ADLs.  Pt will have transportation from her son-in-law and assistance from her daughter and 17-year-old granddaughter at discharge.  Pt reported that she does not go to doctor's appts because of the stairs in her home.  Referral placed to [facility].  Discharge Plan A and Plan B have been determined by review of patient's clinical status, future medical and therapeutic needs, and coverage/benefits for post-acute care in coordination with multidisciplinary team members.  LITO name and ext on whiteboard; discharge planning booklet provided.  Will continue to follow.      Evon Dickinson LMSW  [facility]  [id number]

## 2023-11-24 NOTE — NURSING
Pt discharged per MD orders.  Tele discontinued and returned to station.  IV discontinued; catheter tip intact x2.  Medication list and prescriptions reviewed; prescriptions sent to pt preferred pharmacy and printed prescriptions provided.  Pt verbalizes understanding of all written and verbal discharge instructions.  Pt awaiting family/escort arrival.  Will continue to monitor.

## 2023-11-27 ENCOUNTER — PATIENT OUTREACH (OUTPATIENT)
Dept: ADMINISTRATIVE | Facility: CLINIC | Age: 78
End: 2023-11-27
Payer: MEDICARE

## 2023-11-27 NOTE — PROGRESS NOTES
"C3 nurse spoke with Radha Sotelo for a TCC post hospital discharge follow up call. The patient does not have a hospital follow up scheduled and states "I'm not going anywhere to see a doctor." Order for NP home referral was placed at time of discharge.   "

## 2023-11-28 ENCOUNTER — PES CALL (OUTPATIENT)
Dept: HOME HEALTH SERVICES | Facility: CLINIC | Age: 78
End: 2023-11-28
Payer: MEDICARE

## 2023-11-29 ENCOUNTER — HOSPITAL ENCOUNTER (OUTPATIENT)
Facility: HOSPITAL | Age: 78
LOS: 1 days | Discharge: HOME HEALTH | End: 2023-12-01
Attending: EMERGENCY MEDICINE | Admitting: INTERNAL MEDICINE
Payer: MEDICARE

## 2023-11-29 DIAGNOSIS — I24.9 ACS (ACUTE CORONARY SYNDROME): ICD-10-CM

## 2023-11-29 DIAGNOSIS — E87.6 HYPOKALEMIA: ICD-10-CM

## 2023-11-29 DIAGNOSIS — I25.10 CORONARY ARTERY DISEASE INVOLVING NATIVE CORONARY ARTERY OF NATIVE HEART WITHOUT ANGINA PECTORIS: ICD-10-CM

## 2023-11-29 DIAGNOSIS — R07.9 CHEST PAIN: ICD-10-CM

## 2023-11-29 DIAGNOSIS — J96.01 ACUTE RESPIRATORY FAILURE WITH HYPOXIA: Primary | ICD-10-CM

## 2023-11-29 DIAGNOSIS — I50.33 ACUTE ON CHRONIC DIASTOLIC HEART FAILURE: ICD-10-CM

## 2023-11-29 DIAGNOSIS — J44.9 CHRONIC OBSTRUCTIVE PULMONARY DISEASE, UNSPECIFIED COPD TYPE: ICD-10-CM

## 2023-11-29 LAB
ALBUMIN SERPL BCP-MCNC: 3.5 G/DL (ref 3.5–5.2)
ALP SERPL-CCNC: 75 U/L (ref 55–135)
ALT SERPL W/O P-5'-P-CCNC: 18 U/L (ref 10–44)
ANION GAP SERPL CALC-SCNC: 14 MMOL/L (ref 8–16)
APTT PPP: 23.7 SEC (ref 21–32)
AST SERPL-CCNC: 19 U/L (ref 10–40)
BASOPHILS # BLD AUTO: 0.05 K/UL (ref 0–0.2)
BASOPHILS NFR BLD: 0.7 % (ref 0–1.9)
BILIRUB SERPL-MCNC: 0.4 MG/DL (ref 0.1–1)
BNP SERPL-MCNC: 831 PG/ML (ref 0–99)
BUN SERPL-MCNC: 16 MG/DL (ref 8–23)
CALCIUM SERPL-MCNC: 9.3 MG/DL (ref 8.7–10.5)
CHLORIDE SERPL-SCNC: 104 MMOL/L (ref 95–110)
CHOLEST SERPL-MCNC: 152 MG/DL (ref 120–199)
CHOLEST/HDLC SERPL: 3.4 {RATIO} (ref 2–5)
CO2 SERPL-SCNC: 21 MMOL/L (ref 23–29)
CREAT SERPL-MCNC: 0.7 MG/DL (ref 0.5–1.4)
DIFFERENTIAL METHOD: ABNORMAL
EOSINOPHIL # BLD AUTO: 0.2 K/UL (ref 0–0.5)
EOSINOPHIL NFR BLD: 3 % (ref 0–8)
ERYTHROCYTE [DISTWIDTH] IN BLOOD BY AUTOMATED COUNT: 13.5 % (ref 11.5–14.5)
EST. GFR  (NO RACE VARIABLE): >60 ML/MIN/1.73 M^2
GLUCOSE SERPL-MCNC: 94 MG/DL (ref 70–110)
HCT VFR BLD AUTO: 34.4 % (ref 37–48.5)
HDLC SERPL-MCNC: 45 MG/DL (ref 40–75)
HDLC SERPL: 29.6 % (ref 20–50)
HGB BLD-MCNC: 11.1 G/DL (ref 12–16)
IMM GRANULOCYTES # BLD AUTO: 0.03 K/UL (ref 0–0.04)
IMM GRANULOCYTES NFR BLD AUTO: 0.4 % (ref 0–0.5)
INR PPP: 1 (ref 0.8–1.2)
LDLC SERPL CALC-MCNC: 84.2 MG/DL (ref 63–159)
LYMPHOCYTES # BLD AUTO: 0.7 K/UL (ref 1–4.8)
LYMPHOCYTES NFR BLD: 10.1 % (ref 18–48)
MCH RBC QN AUTO: 29.8 PG (ref 27–31)
MCHC RBC AUTO-ENTMCNC: 32.3 G/DL (ref 32–36)
MCV RBC AUTO: 93 FL (ref 82–98)
MONOCYTES # BLD AUTO: 0.6 K/UL (ref 0.3–1)
MONOCYTES NFR BLD: 8.3 % (ref 4–15)
NEUTROPHILS # BLD AUTO: 5.7 K/UL (ref 1.8–7.7)
NEUTROPHILS NFR BLD: 77.5 % (ref 38–73)
NONHDLC SERPL-MCNC: 107 MG/DL
NRBC BLD-RTO: 0 /100 WBC
PLATELET # BLD AUTO: 272 K/UL (ref 150–450)
PMV BLD AUTO: 9.6 FL (ref 9.2–12.9)
POC CARDIAC TROPONIN I: 0.32 NG/ML (ref 0–0.08)
POC CARDIAC TROPONIN I: 0.72 NG/ML (ref 0–0.08)
POTASSIUM SERPL-SCNC: 3.4 MMOL/L (ref 3.5–5.1)
PROT SERPL-MCNC: 6.6 G/DL (ref 6–8.4)
PROTHROMBIN TIME: 10.3 SEC (ref 9–12.5)
RBC # BLD AUTO: 3.72 M/UL (ref 4–5.4)
SAMPLE: ABNORMAL
SAMPLE: ABNORMAL
SODIUM SERPL-SCNC: 139 MMOL/L (ref 136–145)
TRIGL SERPL-MCNC: 114 MG/DL (ref 30–150)
TROPONIN I SERPL DL<=0.01 NG/ML-MCNC: 0.57 NG/ML (ref 0–0.03)
TROPONIN I SERPL DL<=0.01 NG/ML-MCNC: 0.62 NG/ML (ref 0–0.03)
TROPONIN I SERPL DL<=0.01 NG/ML-MCNC: 0.97 NG/ML (ref 0–0.03)
WBC # BLD AUTO: 7.32 K/UL (ref 3.9–12.7)

## 2023-11-29 PROCEDURE — 27000221 HC OXYGEN, UP TO 24 HOURS

## 2023-11-29 PROCEDURE — 99222 1ST HOSP IP/OBS MODERATE 55: CPT | Mod: GC,,, | Performed by: INTERNAL MEDICINE

## 2023-11-29 PROCEDURE — 25000242 PHARM REV CODE 250 ALT 637 W/ HCPCS

## 2023-11-29 PROCEDURE — 93010 ELECTROCARDIOGRAM REPORT: CPT | Mod: ,,, | Performed by: INTERNAL MEDICINE

## 2023-11-29 PROCEDURE — 96366 THER/PROPH/DIAG IV INF ADDON: CPT

## 2023-11-29 PROCEDURE — G0378 HOSPITAL OBSERVATION PER HR: HCPCS

## 2023-11-29 PROCEDURE — 94761 N-INVAS EAR/PLS OXIMETRY MLT: CPT

## 2023-11-29 PROCEDURE — 85730 THROMBOPLASTIN TIME PARTIAL: CPT | Mod: 91

## 2023-11-29 PROCEDURE — 96375 TX/PRO/DX INJ NEW DRUG ADDON: CPT

## 2023-11-29 PROCEDURE — 84484 ASSAY OF TROPONIN QUANT: CPT | Mod: 91

## 2023-11-29 PROCEDURE — 83880 ASSAY OF NATRIURETIC PEPTIDE: CPT | Performed by: EMERGENCY MEDICINE

## 2023-11-29 PROCEDURE — 63600175 PHARM REV CODE 636 W HCPCS

## 2023-11-29 PROCEDURE — 80061 LIPID PANEL: CPT | Performed by: EMERGENCY MEDICINE

## 2023-11-29 PROCEDURE — 93005 ELECTROCARDIOGRAM TRACING: CPT

## 2023-11-29 PROCEDURE — 96365 THER/PROPH/DIAG IV INF INIT: CPT

## 2023-11-29 PROCEDURE — 36415 COLL VENOUS BLD VENIPUNCTURE: CPT | Performed by: INTERNAL MEDICINE

## 2023-11-29 PROCEDURE — 85730 THROMBOPLASTIN TIME PARTIAL: CPT | Performed by: INTERNAL MEDICINE

## 2023-11-29 PROCEDURE — 63600175 PHARM REV CODE 636 W HCPCS: Performed by: EMERGENCY MEDICINE

## 2023-11-29 PROCEDURE — 85610 PROTHROMBIN TIME: CPT

## 2023-11-29 PROCEDURE — 25000003 PHARM REV CODE 250: Performed by: STUDENT IN AN ORGANIZED HEALTH CARE EDUCATION/TRAINING PROGRAM

## 2023-11-29 PROCEDURE — 99900035 HC TECH TIME PER 15 MIN (STAT)

## 2023-11-29 PROCEDURE — 99222 PR INITIAL HOSPITAL CARE,LEVL II: ICD-10-PCS | Mod: GC,,, | Performed by: INTERNAL MEDICINE

## 2023-11-29 PROCEDURE — 25000003 PHARM REV CODE 250: Performed by: EMERGENCY MEDICINE

## 2023-11-29 PROCEDURE — 99285 EMERGENCY DEPT VISIT HI MDM: CPT | Mod: 25

## 2023-11-29 PROCEDURE — 84484 ASSAY OF TROPONIN QUANT: CPT

## 2023-11-29 PROCEDURE — 63600175 PHARM REV CODE 636 W HCPCS: Performed by: STUDENT IN AN ORGANIZED HEALTH CARE EDUCATION/TRAINING PROGRAM

## 2023-11-29 PROCEDURE — 94640 AIRWAY INHALATION TREATMENT: CPT

## 2023-11-29 PROCEDURE — 84484 ASSAY OF TROPONIN QUANT: CPT | Mod: 91 | Performed by: EMERGENCY MEDICINE

## 2023-11-29 PROCEDURE — 80053 COMPREHEN METABOLIC PANEL: CPT | Performed by: EMERGENCY MEDICINE

## 2023-11-29 PROCEDURE — 84484 ASSAY OF TROPONIN QUANT: CPT | Mod: 91 | Performed by: INTERNAL MEDICINE

## 2023-11-29 PROCEDURE — 93010 ELECTROCARDIOGRAM REPORT: CPT | Mod: 76,,, | Performed by: INTERNAL MEDICINE

## 2023-11-29 PROCEDURE — 27100098 HC SPACER

## 2023-11-29 PROCEDURE — 93010 EKG 12-LEAD: ICD-10-PCS | Mod: ,,, | Performed by: INTERNAL MEDICINE

## 2023-11-29 PROCEDURE — 85025 COMPLETE CBC W/AUTO DIFF WBC: CPT | Performed by: EMERGENCY MEDICINE

## 2023-11-29 RX ORDER — IBUPROFEN 200 MG
16 TABLET ORAL
Status: DISCONTINUED | OUTPATIENT
Start: 2023-11-29 | End: 2023-12-01 | Stop reason: HOSPADM

## 2023-11-29 RX ORDER — CLOPIDOGREL BISULFATE 75 MG/1
75 TABLET ORAL DAILY
Status: DISCONTINUED | OUTPATIENT
Start: 2023-11-30 | End: 2023-12-01 | Stop reason: HOSPADM

## 2023-11-29 RX ORDER — IBUPROFEN 200 MG
24 TABLET ORAL
Status: DISCONTINUED | OUTPATIENT
Start: 2023-11-29 | End: 2023-12-01 | Stop reason: HOSPADM

## 2023-11-29 RX ORDER — ATORVASTATIN CALCIUM 40 MG/1
80 TABLET, FILM COATED ORAL DAILY
Status: DISCONTINUED | OUTPATIENT
Start: 2023-11-30 | End: 2023-11-29

## 2023-11-29 RX ORDER — IBUPROFEN 200 MG
1 TABLET ORAL DAILY
Status: DISCONTINUED | OUTPATIENT
Start: 2023-11-30 | End: 2023-12-01 | Stop reason: HOSPADM

## 2023-11-29 RX ORDER — ASPIRIN 81 MG/1
81 TABLET ORAL DAILY
Status: DISCONTINUED | OUTPATIENT
Start: 2023-11-30 | End: 2023-12-01 | Stop reason: HOSPADM

## 2023-11-29 RX ORDER — NALOXONE HCL 0.4 MG/ML
0.02 VIAL (ML) INJECTION
Status: DISCONTINUED | OUTPATIENT
Start: 2023-11-29 | End: 2023-12-01 | Stop reason: HOSPADM

## 2023-11-29 RX ORDER — ALBUTEROL SULFATE 90 UG/1
2 AEROSOL, METERED RESPIRATORY (INHALATION) EVERY 6 HOURS
Status: DISCONTINUED | OUTPATIENT
Start: 2023-11-29 | End: 2023-11-30

## 2023-11-29 RX ORDER — ATORVASTATIN CALCIUM 20 MG/1
80 TABLET, FILM COATED ORAL DAILY
Status: DISCONTINUED | OUTPATIENT
Start: 2023-11-29 | End: 2023-12-01 | Stop reason: HOSPADM

## 2023-11-29 RX ORDER — MORPHINE SULFATE 2 MG/ML
2 INJECTION, SOLUTION INTRAMUSCULAR; INTRAVENOUS
Status: COMPLETED | OUTPATIENT
Start: 2023-11-29 | End: 2023-11-29

## 2023-11-29 RX ORDER — NITROGLYCERIN 0.4 MG/1
0.4 TABLET SUBLINGUAL EVERY 5 MIN PRN
Status: DISCONTINUED | OUTPATIENT
Start: 2023-11-29 | End: 2023-12-01 | Stop reason: HOSPADM

## 2023-11-29 RX ORDER — SODIUM CHLORIDE 0.9 % (FLUSH) 0.9 %
10 SYRINGE (ML) INJECTION EVERY 12 HOURS PRN
Status: DISCONTINUED | OUTPATIENT
Start: 2023-11-29 | End: 2023-12-01 | Stop reason: HOSPADM

## 2023-11-29 RX ORDER — ONDANSETRON 2 MG/ML
4 INJECTION INTRAMUSCULAR; INTRAVENOUS
Status: COMPLETED | OUTPATIENT
Start: 2023-11-29 | End: 2023-11-29

## 2023-11-29 RX ORDER — FUROSEMIDE 10 MG/ML
20 INJECTION INTRAMUSCULAR; INTRAVENOUS
Status: DISCONTINUED | OUTPATIENT
Start: 2023-11-29 | End: 2023-12-01

## 2023-11-29 RX ORDER — ASPIRIN 325 MG
325 TABLET ORAL ONCE
Status: DISCONTINUED | OUTPATIENT
Start: 2023-11-29 | End: 2023-12-01 | Stop reason: HOSPADM

## 2023-11-29 RX ORDER — CLOPIDOGREL 300 MG/1
600 TABLET, FILM COATED ORAL
Status: COMPLETED | OUTPATIENT
Start: 2023-11-29 | End: 2023-11-29

## 2023-11-29 RX ORDER — NITROGLYCERIN 0.4 MG/1
0.4 TABLET SUBLINGUAL EVERY 5 MIN PRN
Status: DISCONTINUED | OUTPATIENT
Start: 2023-11-29 | End: 2023-11-29

## 2023-11-29 RX ORDER — METOPROLOL SUCCINATE 25 MG/1
25 TABLET, EXTENDED RELEASE ORAL DAILY
Status: DISCONTINUED | OUTPATIENT
Start: 2023-11-29 | End: 2023-12-01 | Stop reason: HOSPADM

## 2023-11-29 RX ORDER — HEPARIN SODIUM,PORCINE/D5W 25000/250
0-40 INTRAVENOUS SOLUTION INTRAVENOUS CONTINUOUS
Status: DISCONTINUED | OUTPATIENT
Start: 2023-11-29 | End: 2023-11-30

## 2023-11-29 RX ORDER — GLUCAGON 1 MG
1 KIT INJECTION
Status: DISCONTINUED | OUTPATIENT
Start: 2023-11-29 | End: 2023-12-01 | Stop reason: HOSPADM

## 2023-11-29 RX ORDER — METOPROLOL SUCCINATE 25 MG/1
25 TABLET, EXTENDED RELEASE ORAL DAILY
Status: DISCONTINUED | OUTPATIENT
Start: 2023-11-30 | End: 2023-11-29

## 2023-11-29 RX ADMIN — HEPARIN SODIUM 12 UNITS/KG/HR: 10000 INJECTION, SOLUTION INTRAVENOUS at 05:11

## 2023-11-29 RX ADMIN — METOPROLOL SUCCINATE 25 MG: 25 TABLET, EXTENDED RELEASE ORAL at 05:11

## 2023-11-29 RX ADMIN — ONDANSETRON 4 MG: 2 INJECTION INTRAMUSCULAR; INTRAVENOUS at 01:11

## 2023-11-29 RX ADMIN — CLOPIDOGREL BISULFATE 600 MG: 300 TABLET, FILM COATED ORAL at 02:11

## 2023-11-29 RX ADMIN — FUROSEMIDE 20 MG: 10 INJECTION, SOLUTION INTRAMUSCULAR; INTRAVENOUS at 05:11

## 2023-11-29 RX ADMIN — ATORVASTATIN CALCIUM 80 MG: 40 TABLET, FILM COATED ORAL at 04:11

## 2023-11-29 RX ADMIN — MORPHINE SULFATE 2 MG: 2 INJECTION, SOLUTION INTRAMUSCULAR; INTRAVENOUS at 01:11

## 2023-11-29 RX ADMIN — ALBUTEROL SULFATE 2 PUFF: 108 INHALANT RESPIRATORY (INHALATION) at 07:11

## 2023-11-29 NOTE — SUBJECTIVE & OBJECTIVE
Past Medical History:   Diagnosis Date    Anemia     Anxiety     COPD (chronic obstructive pulmonary disease)     COPD (chronic obstructive pulmonary disease) with emphysema     Coronary artery disease     Depression     Diverticulitis     Hyperlipidemia     Hypertension     PVD (peripheral vascular disease)     PVD (peripheral vascular disease)     S/P colostomy     Substance abuse     hx heavy etoh use     Tobacco abuse        Past Surgical History:   Procedure Laterality Date    ANGIOGRAM, CORONARY, WITH LEFT HEART CATHETERIZATION N/A 11/22/2023    Procedure: Angiogram, Coronary, with Left Heart Cath;  Surgeon: Checo Bhatt MD;  Location: [facility] CATH LAB;  Service: Cardiology;  Laterality: N/A;    ANGIOGRAM, EXTREMITY, UNILATERAL Right 8/25/2023    Procedure: ANGIOGRAM, EXTREMITY, UNILATERAL;  Surgeon: Gary Rico MD;  Location: [facility] OR [address] [place] [address];  Service: Vascular;  Laterality: Right;  US GUIDED ACCESS LEFT GROIN  contrast: 150ml  fluoro: 27.9 min  mGy: 254.73  Gycm2: 62.4919  radial flush cocktail: 10ml    ANGIOGRAPHY OF LOWER EXTREMITY Right 8/24/2023    Procedure: Angiogram Extremity Unilateral;  Surgeon: Benji Ashley MD;  Location: [facility] OR [address] [place] [address];  Service: Vascular;  Laterality: Right;    COLOSTOMY      ECTOPIC PREGNANCY SURGERY      PTA, PERONEAL Right 8/25/2023    Procedure: PTA, PERONEAL TIBIAL TRUNK WITH SHOCKWAVE;  Surgeon: Gary Rico MD;  [facility];  Service: Vascular;  Laterality: Right;    PTCA, SINGLE VESSEL  11/22/2023    Procedure: PTCA, Single Vessel;  Surgeon: Checo Bhatt MD;  Location: [facility] CATH LAB;  Service: Cardiology;;    right forefoot amputation      right toe amputation      x2 toes    STENT, DRUG ELUTING, SINGLE VESSEL, CORONARY  11/22/2023    Procedure: Stent, Drug Eluting, Single Vessel, Coronary;  Surgeon: Checo Bhatt MD;  Location: [facility] CATH LAB;  Service: Cardiology;;    STENT, SUPERFICIAL FEMORAL  ARTERY Right 8/25/2023    Procedure: STENT, SUPERFICIAL FEMORAL ARTERY;  Surgeon: Gary Rico MD;  Location: [facility] OR [address] [place] [address];  Service: Vascular;  Laterality: Right;  VIABAHN 6 X 15 X120       Review of patient's allergies indicates:  No Known Allergies    No current facility-administered medications on file prior to encounter.     Current Outpatient Medications on File Prior to Encounter   Medication Sig    albuterol (PROVENTIL/VENTOLIN HFA) 90 mcg/actuation inhaler INHALE 2 PUFFS BY MOUTH EVERY 6 HOURS AS NEEDED    aspirin (ECOTRIN) 81 MG EC tablet Take 1 tablet (81 mg total) by mouth once daily.    atorvastatin (LIPITOR) 80 MG tablet Take 1 tablet (80 mg total) by mouth once daily.    budesonide-formoterol 80-4.5 mcg (SYMBICORT) 80-4.5 mcg/actuation HFAA INHALE 2 PUFFS INTO THE LUNGS TWICE DAILY. RINSE MOUTH AFTER USE    cadexomer iodine (IODOSORB) 0.9 % gel Apply topically once daily. Apply to right foot TMA. (Patient not taking: Reported on 11/27/2023)    clopidogreL (PLAVIX) 75 mg tablet Take 1 tablet (75 mg total) by mouth once daily.    hydrocortisone-aloe vera 1 % Crea cream Apply topically 2 (two) times daily.    metoprolol succinate (TOPROL-XL) 25 MG 24 hr tablet Take 1 tablet (25 mg total) by mouth once daily.    nicotine (NICODERM CQ) 21 mg/24 hr Place 1 patch onto the skin once daily. for 15 days    nitroGLYCERIN (NITROSTAT) 0.4 MG SL tablet Place 1 tablet (0.4 mg total) under the tongue every 5 (five) minutes as needed for Chest pain.     Family History    None       Tobacco Use    Smoking status: Former     Current packs/day: 0.00     Average packs/day: 0.5 packs/day for 50.0 years (25.0 ttl pk-yrs)     Types: Cigarettes     Start date: 7/5/1963     Quit date: 7/5/2013     Years since quitting: 10.4    Smokeless tobacco: Never   Substance and Sexual Activity    Alcohol use: No    Drug use: No    Sexual activity: Not on file     Review of Systems   Constitutional: Negative for chills,  decreased appetite, diaphoresis, fever, malaise/fatigue and weight gain.   HENT:  Negative for congestion and ear discharge.    Eyes:  Negative for blurred vision.   Cardiovascular:  Positive for chest pain, dyspnea on exertion and leg swelling (mild). Negative for irregular heartbeat, orthopnea, palpitations and paroxysmal nocturnal dyspnea.   Respiratory:  Positive for shortness of breath. Negative for cough, snoring and sputum production.    Skin:  Negative for color change, dry skin and rash.   Musculoskeletal:  Negative for back pain, falls, joint pain and neck pain.   Gastrointestinal:  Negative for bloating, abdominal pain, constipation and diarrhea.   Genitourinary:  Negative for dysuria, flank pain, hematuria and hesitancy.   Neurological:  Negative for focal weakness, headaches, numbness and seizures.   Psychiatric/Behavioral:  Negative for altered mental status, depression and hallucinations.      Objective:     Vital Signs (Most Recent):  Temp: 97.9 °F (36.6 °C) (11/29/23 1200)  Pulse: 78 (11/29/23 1417)  Resp: 19 (11/29/23 1417)  BP: (!) 118/59 (11/29/23 1417)  SpO2: 99 % (11/29/23 1417) Vital Signs (24h Range):  Temp:  [97.9 °F (36.6 °C)] 97.9 °F (36.6 °C)  Pulse:  [76-88] 78  Resp:  [17-27] 19  SpO2:  [98 %-100 %] 99 %  BP: (118-160)/(59-83) 118/59     Weight: 49.9 kg (110 lb)  Body mass index is 21.48 kg/m².    SpO2: 99 %       No intake or output data in the 24 hours ending 11/29/23 1507    Lines/Drains/Airways       Drain  Duration                  Colostomy 07/13/21 0201 Descending/sigmoid  days              Peripheral Intravenous Line  Duration                  Peripheral IV - Single Lumen 11/29/23 1303 20 G Anterior;Distal;Left Forearm <1 day                     Physical Exam  Constitutional:       General: She is not in acute distress.     Appearance: She is not ill-appearing.   HENT:      Nose: Nose normal.      Mouth/Throat:      Mouth: Mucous membranes are moist.   Eyes:      Pupils:  Pupils are equal, round, and reactive to light.   Neck:      Comments: No JVD appreciated   Cardiovascular:      Rate and Rhythm: Normal rate and regular rhythm.      Pulses: Normal pulses.      Heart sounds: No murmur heard.  Pulmonary:      Effort: Pulmonary effort is normal. No respiratory distress.      Breath sounds: No wheezing or rales.   Abdominal:      General: Abdomen is flat. There is no distension.      Palpations: Abdomen is soft.      Tenderness: There is no right CVA tenderness or left CVA tenderness.      Comments: Ostomy bag    Musculoskeletal:         General: No swelling.      Cervical back: Normal range of motion and neck supple. No tenderness.      Right lower leg: No edema.      Left lower leg: No edema.      Comments: S/p R TMA   Skin:     General: Skin is warm.      Coloration: Skin is not pale.      Findings: No rash.   Neurological:      General: No focal deficit present.      Mental Status: She is alert and oriented to person, place, and time.      Motor: No weakness.        Significant Labs: CMP   Recent Labs   Lab 11/29/23  1319      K 3.4*      CO2 21*   GLU 94   BUN 16   CREATININE 0.7   CALCIUM 9.3   PROT 6.6   ALBUMIN 3.5   BILITOT 0.4   ALKPHOS 75   AST 19   ALT 18   ANIONGAP 14   , CBC   Recent Labs   Lab 11/29/23  1319   WBC 7.32   HGB 11.1*   HCT 34.4*      , Troponin   Recent Labs   Lab 11/29/23  1319   TROPONINI 0.571*  0.624*   All pertinent lab results from the last 24 hours have been reviewed.    Significant Imaging: and Echocardiogram: Transthoracic echo (TTE) complete (Cupid Only):   Results for orders placed or performed during the hospital encounter of 11/22/23   Echo   Result Value Ref Range    RA Width 3.62 cm    LA volume (mod) 60.00 cm3    Left Atrium Major Axis 4.91 cm    Left Atrium Minor Axis 4.97 cm    RA Major Axis 4.24 cm    LV Diastolic Volume 86.13 mL    LV Systolic Volume 31.83 mL    MV Peak A James 0.71 m/s    TR Max James 3.02 m/s    MV  Peak E James 1.04 m/s    Ao VTI 26.21 cm    Ao peak james 1.33 m/s    LVOT peak VTI 16.31 cm    LVOT peak james 0.79 m/s    LVOT diameter 2.00 cm    IVRT 88.49 msec    E wave deceleration time 183.35 msec    AV mean gradient 5 mmHg    TAPSE 1.56 cm    RVDD 2.87 cm    LA size 3.66 cm    Ascending aorta 3.31 cm    STJ 2.31 cm    Sinus 2.63 cm    LVIDs 2.89 2.1 - 4.0 cm    Posterior Wall 0.76 0.6 - 1.1 cm    IVS 0.93 0.6 - 1.1 cm    LVIDd 4.37 3.5 - 6.0 cm    TDI LATERAL 0.10 m/s    LA WIDTH 4.40 cm    TDI SEPTAL 0.07 m/s    LV LATERAL E/E' RATIO 10.40 m/s    LV SEPTAL E/E' RATIO 14.86 m/s    FS 34 28 - 44 %    LA volume 67.62 cm3    LV mass 116.32 g    ZLVIDD -0.44     ZLVIDS 0.16     Left Ventricle Relative Wall Thickness 0.35 cm    AV valve area 1.95 cm²    AV Velocity Ratio 0.59     AV index (prosthetic) 0.62     E/A ratio 1.46     Mean e' 0.09 m/s    LVOT area 3.1 cm2    LVOT stroke volume 51.21 cm3    AV peak gradient 7 mmHg    E/E' ratio 12.24 m/s    LV Systolic Volume Index 19.8 mL/m2    LV Diastolic Volume Index 53.50 mL/m2    LA Volume Index 42.0 mL/m2    LV Mass Index 72 g/m2    Triscuspid Valve Regurgitation Peak Gradient 36 mmHg    LA Volume Index (Mod) 37.3 mL/m2    ADOLFO by Velocity Ratio 1.87 cm²    BSA 1.65 m2    TV resting pulmonary artery pressure 44 mmHg    RV TB RVSP 11 mmHg    Est. RA pres 8 mmHg    RA area 20.0 cm2    Narrative      Left Ventricle: The left ventricle is normal in size. Ventricular mass   is normal. Normal wall thickness. Normal wall motion. There is normal   systolic function with a visually estimated ejection fraction of 55 - 60%.   Grade II diastolic dysfunction.    Right Ventricle: Normal right ventricular cavity size. Wall thickness   is normal. Right ventricle wall motion  is normal. Systolic function is   normal.    The following segments are hypokinetic: basal inferior, basal   inferolateral and mid inferolateral suggesting ischemia/infarct in LCx or   RCA territory.    Left  Atrium: Left atrium is mildly dilated.    Right Atrium: Right atrium is mildly dilated.    Aortic Valve: There is severe aortic valve sclerosis. Moderately   calcified cusps. There is moderate annular calcification present.    Mitral Valve: There is mild mitral annular calcification present. There   is mild to moderate regurgitation. Mild restriction of the posterior   leaflet likely ischemic MR.    Pulmonary Artery: The estimated pulmonary artery systolic pressure is   44 mmHg.    IVC/SVC: Intermediate venous pressure at 8 mmHg.       Angiogram 11/22/2023:    This was a successful PCI for acute myocardial infarction.    The Prox RCA lesion was 99% stenosed with 0% stenosis post-intervention.    The Dist RCA lesion was 90% stenosed with 0% stenosis post-intervention.    The RPDA lesion was 100% stenosed with 10% stenosis post-intervention.    The ejection fraction was greater than 55% by visual estimate.    The pre-procedure left ventricular end diastolic pressure was 15.    The estimated blood loss was <50 mL.

## 2023-11-29 NOTE — ASSESSMENT & PLAN NOTE
-  on admit  - CXR with ?L-sided pleural effusion  - Mild b/l LE edema    TTE 11/23/2023:    Left Ventricle: The left ventricle is normal in size. Ventricular mass is normal. Normal wall thickness. Normal wall motion. There is normal systolic function with a visually estimated ejection fraction of 55 - 60%. Grade II diastolic dysfunction.    Right Ventricle: Normal right ventricular cavity size. Wall thickness is normal. Right ventricle wall motion  is normal. Systolic function is normal.    The following segments are hypokinetic: basal inferior, basal inferolateral and mid inferolateral suggesting ischemia/infarct in LCx or RCA territory.    Left Atrium: Left atrium is mildly dilated.    Right Atrium: Right atrium is mildly dilated.    Aortic Valve: There is severe aortic valve sclerosis. Moderately calcified cusps. There is moderate annular calcification present.    Mitral Valve: There is mild mitral annular calcification present. There is mild to moderate regurgitation. Mild restriction of the posterior leaflet likely ischemic MR.    Pulmonary Artery: The estimated pulmonary artery systolic pressure is 44 mmHg.    IVC/SVC: Intermediate venous pressure at 8 mmHg.     PLAN:   - IV Lasix 20 mg BID   - BMP q 12h   - Replete electolytes for K >4, Phos >3, Mg >2   - Monitor I/Os

## 2023-11-29 NOTE — HPI
Ms. Sotelo is a 78-year-old F with PMHx of CAD s/p recent PCI (11/22, GALEN x2 to pRCA, dRCA; angioplasty rPDA), PAT, HTN, HLD, PVD, severe COPD (on 4L NC),and previous osteomyelitis with transmetatarsal amputation (2012) who presented to the emergency department with recurrent chest pain. Of note, pt recently discharged from CCU on 11/24 following admission for inferior STEMI s/p PCI with RCA GALEN x2 and balloon angioplasty. Echo on 11/23 showed EF of 55-60% w/ Grade II diastolic dysfunction. Chest pain resolved following procedure and pt was discharged with strict instructions to maintain compliance with ASA and plavix x1 year along with HIS and BB; see full hospital course below. Pt declined bedside delivery of prescriptions at time of discharge and returns today with recurrent chest pain stating she has not picked up her prescriptions.     Hospital Course 11/22-11/24/2023:  Presented to the ED with chest pain. EKG concerning for inferior STEMI and pt was taken to cath lab. During procedure, noted proximal RCA subtotal occlusion and distal RCA stenosis; GALEN placed to prox RCA and balloon angioplasty to distal RCA. Pt feeling much better post-procedure, CP resolved.  Repeat Echo on 11/23 showed EF of 55-60% w/ Grade II diastolic dysfunction. Progressive mobility attempted by nursing staff though pt refused to mobilize, insisting that she will not move around until she gets home (uses walker for assistance at home). One brief episode of A-fib after cath in setting of MI. Patients Chadvasc score 6, HASBLED score 3, though decision made to hold eliquis given increased risk of bleeding in the event of a fall. Risk and benefits of triple therapy discussed with patient and she stated that she does not want to take eliquis. Pt does not frequently visit doctors, and believed to be unreliable with follow up appointments due to her frailty. She will require DAPT with aspirin and plavix for one year and was instructed to do so.  Will also discharge with statin and lopressor.

## 2023-11-29 NOTE — ASSESSMENT & PLAN NOTE
- Presented with CP x2 episodes at rest since discharge, nonradiating  - Did not  rx for ASA, Plavix, NTG since discharge   - Tn 0.624 > 0.571  - EKG w/o evidence of acute ischemia  - CP resolved at time of CCU evaluation     PLAN:  - Loaded with  mg, Plavix 600 mg   - Heparin gtt  - ASA 81/Plavix 75 tomorrow  - Continue HIS, BB  - NTG PRN  - Cardiac telemetry  - Daily labs  - Trend Tn, EKG for CP

## 2023-11-29 NOTE — H&P
[facility] - Emergency Dept  Cardiology  History and Physical     Patient Name: Radha Sotelo  MRN: [id number]  Admission Date: 11/29/2023  Code Status: Full Code   Attending Provider: Mackenzie Davis MD   Primary Care Physician: No, Primary Doctor  Principal Problem:Chest pain    Patient information was obtained from patient, past medical records, and ER records.     Subjective:     Chief Complaint:  chest pain     HPI:  Ms. Sotelo is a 78-year-old F with PMHx of CAD s/p recent PCI (11/22, GALEN x2 to pRCA, dRCA; angioplasty rPDA), PAT, HTN, HLD, PVD, severe COPD (on 4L NC),and previous osteomyelitis with transmetatarsal amputation (2012) who presented to the emergency department with recurrent chest pain. Of note, pt recently discharged from CCU on 11/24 following admission for inferior STEMI s/p PCI with RCA GALEN x2 and balloon angioplasty. Echo on 11/23 showed EF of 55-60% w/ Grade II diastolic dysfunction. Chest pain resolved following procedure and pt was discharged with strict instructions to maintain compliance with ASA and plavix x1 year along with HIS and BB; see full hospital course below. Pt declined bedside delivery of prescriptions at time of discharge and returns today with recurrent chest pain stating she has not picked up her prescriptions.     Hospital Course 11/22-11/24/2023:  Presented to the ED with chest pain. EKG concerning for inferior STEMI and pt was taken to cath lab. During procedure, noted proximal RCA subtotal occlusion and distal RCA stenosis; GALEN placed to prox RCA and balloon angioplasty to distal RCA. Pt feeling much better post-procedure, CP resolved.  Repeat Echo on 11/23 showed EF of 55-60% w/ Grade II diastolic dysfunction. Progressive mobility attempted by nursing staff though pt refused to mobilize, insisting that she will not move around until she gets home (uses walker for assistance at home). One brief episode of A-fib after cath in setting of MI. Patients Chadvasc score  6, HASBLED score 3, though decision made to hold eliquis given increased risk of bleeding in the event of a fall. Risk and benefits of triple therapy discussed with patient and she stated that she does not want to take eliquis. Pt does not frequently visit doctors, and believed to be unreliable with follow up appointments due to her frailty. She will require DAPT with aspirin and plavix for one year and was instructed to do so. Will also discharge with statin and lopressor.          Past Medical History:   Diagnosis Date    Anemia     Anxiety     COPD (chronic obstructive pulmonary disease)     COPD (chronic obstructive pulmonary disease) with emphysema     Coronary artery disease     Depression     Diverticulitis     Hyperlipidemia     Hypertension     PVD (peripheral vascular disease)     PVD (peripheral vascular disease)     S/P colostomy     Substance abuse     hx heavy etoh use     Tobacco abuse        Past Surgical History:   Procedure Laterality Date    ANGIOGRAM, CORONARY, WITH LEFT HEART CATHETERIZATION N/A 11/22/2023    Procedure: Angiogram, Coronary, with Left Heart Cath;  Surgeon: Checo Bhatt MD;  Location: [facility] CATH LAB;  Service: Cardiology;  Laterality: N/A;    ANGIOGRAM, EXTREMITY, UNILATERAL Right 8/25/2023    Procedure: ANGIOGRAM, EXTREMITY, UNILATERAL;  Surgeon: Gary Rico MD;  Location: [facility] OR [address] [place] [address];  Service: Vascular;  Laterality: Right;  US GUIDED ACCESS LEFT GROIN  contrast: 150ml  fluoro: 27.9 min  mGy: 254.73  Gycm2: 62.4919  radial flush cocktail: 10ml    ANGIOGRAPHY OF LOWER EXTREMITY Right 8/24/2023    Procedure: Angiogram Extremity Unilateral;  Surgeon: Benji Ashley MD;  Location: [facility] OR [address] [place] [address];  Service: Vascular;  Laterality: Right;    COLOSTOMY      ECTOPIC PREGNANCY SURGERY      PTA, PERONEAL Right 8/25/2023    Procedure: PTA, PERONEAL TIBIAL TRUNK WITH SHOCKWAVE;  Surgeon: Gary Rico MD;  Location: [facility] OR [address] [place] [address];  Service:  Vascular;  Laterality: Right;    PTCA, SINGLE VESSEL  11/22/2023    Procedure: PTCA, Single Vessel;  Surgeon: Checo Bhatt MD;  Location: [facility] CATH LAB;  Service: Cardiology;;    right forefoot amputation      right toe amputation      x2 toes    STENT, DRUG ELUTING, SINGLE VESSEL, CORONARY  11/22/2023    Procedure: Stent, Drug Eluting, Single Vessel, Coronary;  Surgeon: Checo Bhatt MD;  Location: [facility] CATH LAB;  Service: Cardiology;;    STENT, SUPERFICIAL FEMORAL ARTERY Right 8/25/2023    Procedure: STENT, SUPERFICIAL FEMORAL ARTERY;  Surgeon: Gary Rico MD;  [facility];  Service: Vascular;  Laterality: Right;  VIABAHN 6 X 15 X120       Review of patient's allergies indicates:  No Known Allergies    No current facility-administered medications on file prior to encounter.     Current Outpatient Medications on File Prior to Encounter   Medication Sig    albuterol (PROVENTIL/VENTOLIN HFA) 90 mcg/actuation inhaler INHALE 2 PUFFS BY MOUTH EVERY 6 HOURS AS NEEDED    aspirin (ECOTRIN) 81 MG EC tablet Take 1 tablet (81 mg total) by mouth once daily.    atorvastatin (LIPITOR) 80 MG tablet Take 1 tablet (80 mg total) by mouth once daily.    budesonide-formoterol 80-4.5 mcg (SYMBICORT) 80-4.5 mcg/actuation HFAA INHALE 2 PUFFS INTO THE LUNGS TWICE DAILY. RINSE MOUTH AFTER USE    cadexomer iodine (IODOSORB) 0.9 % gel Apply topically once daily. Apply to right foot TMA. (Patient not taking: Reported on 11/27/2023)    clopidogreL (PLAVIX) 75 mg tablet Take 1 tablet (75 mg total) by mouth once daily.    hydrocortisone-aloe vera 1 % Crea cream Apply topically 2 (two) times daily.    metoprolol succinate (TOPROL-XL) 25 MG 24 hr tablet Take 1 tablet (25 mg total) by mouth once daily.    nicotine (NICODERM CQ) 21 mg/24 hr Place 1 patch onto the skin once daily. for 15 days    nitroGLYCERIN (NITROSTAT) 0.4 MG SL tablet Place 1 tablet (0.4 mg total) under the tongue every 5 (five) minutes  as needed for Chest pain.     Family History    None       Tobacco Use    Smoking status: Former     Current packs/day: 0.00     Average packs/day: 0.5 packs/day for 50.0 years (25.0 ttl pk-yrs)     Types: Cigarettes     Start date: 7/5/1963     Quit date: 7/5/2013     Years since quitting: 10.4    Smokeless tobacco: Never   Substance and Sexual Activity    Alcohol use: No    Drug use: No    Sexual activity: Not on file     Review of Systems   Constitutional: Negative for chills, decreased appetite, diaphoresis, fever, malaise/fatigue and weight gain.   HENT:  Negative for congestion and ear discharge.    Eyes:  Negative for blurred vision.   Cardiovascular:  Positive for chest pain, dyspnea on exertion and leg swelling (mild). Negative for irregular heartbeat, orthopnea, palpitations and paroxysmal nocturnal dyspnea.   Respiratory:  Positive for shortness of breath. Negative for cough, snoring and sputum production.    Skin:  Negative for color change, dry skin and rash.   Musculoskeletal:  Negative for back pain, falls, joint pain and neck pain.   Gastrointestinal:  Negative for bloating, abdominal pain, constipation and diarrhea.   Genitourinary:  Negative for dysuria, flank pain, hematuria and hesitancy.   Neurological:  Negative for focal weakness, headaches, numbness and seizures.   Psychiatric/Behavioral:  Negative for altered mental status, depression and hallucinations.      Objective:     Vital Signs (Most Recent):  Temp: 97.9 °F (36.6 °C) (11/29/23 1200)  Pulse: 78 (11/29/23 1417)  Resp: 19 (11/29/23 1417)  BP: (!) 118/59 (11/29/23 1417)  SpO2: 99 % (11/29/23 1417) Vital Signs (24h Range):  Temp:  [97.9 °F (36.6 °C)] 97.9 °F (36.6 °C)  Pulse:  [76-88] 78  Resp:  [17-27] 19  SpO2:  [98 %-100 %] 99 %  BP: (118-160)/(59-83) 118/59     Weight: 49.9 kg (110 lb)  Body mass index is 21.48 kg/m².    SpO2: 99 %       No intake or output data in the 24 hours ending 11/29/23 1507    Lines/Drains/Airways       Drain   Duration                  Colostomy 07/13/21 0201 Descending/sigmoid  days              Peripheral Intravenous Line  Duration                  Peripheral IV - Single Lumen 11/29/23 1303 20 G Anterior;Distal;Left Forearm <1 day                     Physical Exam  Constitutional:       General: She is not in acute distress.     Appearance: She is not ill-appearing.   HENT:      Nose: Nose normal.      Mouth/Throat:      Mouth: Mucous membranes are moist.   Eyes:      Pupils: Pupils are equal, round, and reactive to light.   Neck:      Comments: No JVD appreciated   Cardiovascular:      Rate and Rhythm: Normal rate and regular rhythm.      Pulses: Normal pulses.      Heart sounds: No murmur heard.  Pulmonary:      Effort: Pulmonary effort is normal. No respiratory distress.      Breath sounds: No wheezing or rales.   Abdominal:      General: Abdomen is flat. There is no distension.      Palpations: Abdomen is soft.      Tenderness: There is no right CVA tenderness or left CVA tenderness.      Comments: Ostomy bag    Musculoskeletal:         General: No swelling.      Cervical back: Normal range of motion and neck supple. No tenderness.      Right lower leg: No edema.      Left lower leg: No edema.      Comments: S/p R TMA   Skin:     General: Skin is warm.      Coloration: Skin is not pale.      Findings: No rash.   Neurological:      General: No focal deficit present.      Mental Status: She is alert and oriented to person, place, and time.      Motor: No weakness.        Significant Labs: CMP   Recent Labs   Lab 11/29/23  1319      K 3.4*      CO2 21*   GLU 94   BUN 16   CREATININE 0.7   CALCIUM 9.3   PROT 6.6   ALBUMIN 3.5   BILITOT 0.4   ALKPHOS 75   AST 19   ALT 18   ANIONGAP 14   , CBC   Recent Labs   Lab 11/29/23  1319   WBC 7.32   HGB 11.1*   HCT 34.4*      , Troponin   Recent Labs   Lab 11/29/23  1319   TROPONINI 0.571*  0.624*   All pertinent lab results from the last 24 hours have  been reviewed.    Significant Imaging: and Echocardiogram: Transthoracic echo (TTE) complete ([facility] Only):   Results for orders placed or performed during the hospital encounter of 11/22/23   Echo   Result Value Ref Range    RA Width 3.62 cm    LA volume (mod) 60.00 cm3    Left Atrium Major Axis 4.91 cm    Left Atrium Minor Axis 4.97 cm    RA Major Axis 4.24 cm    LV Diastolic Volume 86.13 mL    LV Systolic Volume 31.83 mL    MV Peak A James 0.71 m/s    TR Max James 3.02 m/s    MV Peak E James 1.04 m/s    Ao VTI 26.21 cm    Ao peak james 1.33 m/s    LVOT peak VTI 16.31 cm    LVOT peak james 0.79 m/s    LVOT diameter 2.00 cm    IVRT 88.49 msec    E wave deceleration time 183.35 msec    AV mean gradient 5 mmHg    TAPSE 1.56 cm    RVDD 2.87 cm    LA size 3.66 cm    Ascending aorta 3.31 cm    STJ 2.31 cm    Sinus 2.63 cm    LVIDs 2.89 2.1 - 4.0 cm    Posterior Wall 0.76 0.6 - 1.1 cm    IVS 0.93 0.6 - 1.1 cm    LVIDd 4.37 3.5 - 6.0 cm    TDI LATERAL 0.10 m/s    LA WIDTH 4.40 cm    TDI SEPTAL 0.07 m/s    LV LATERAL E/E' RATIO 10.40 m/s    LV SEPTAL E/E' RATIO 14.86 m/s    FS 34 28 - 44 %    LA volume 67.62 cm3    LV mass 116.32 g    ZLVIDD -0.44     ZLVIDS 0.16     Left Ventricle Relative Wall Thickness 0.35 cm    AV valve area 1.95 cm²    AV Velocity Ratio 0.59     AV index (prosthetic) 0.62     E/A ratio 1.46     Mean e' 0.09 m/s    LVOT area 3.1 cm2    LVOT stroke volume 51.21 cm3    AV peak gradient 7 mmHg    E/E' ratio 12.24 m/s    LV Systolic Volume Index 19.8 mL/m2    LV Diastolic Volume Index 53.50 mL/m2    LA Volume Index 42.0 mL/m2    LV Mass Index 72 g/m2    Triscuspid Valve Regurgitation Peak Gradient 36 mmHg    LA Volume Index (Mod) 37.3 mL/m2    ADOLFO by Velocity Ratio 1.87 cm²    BSA 1.65 m2    TV resting pulmonary artery pressure 44 mmHg    RV TB RVSP 11 mmHg    Est. RA pres 8 mmHg    RA area 20.0 cm2    Narrative      Left Ventricle: The left ventricle is normal in size. Ventricular mass   is normal. Normal wall  thickness. Normal wall motion. There is normal   systolic function with a visually estimated ejection fraction of 55 - 60%.   Grade II diastolic dysfunction.    Right Ventricle: Normal right ventricular cavity size. Wall thickness   is normal. Right ventricle wall motion  is normal. Systolic function is   normal.    The following segments are hypokinetic: basal inferior, basal   inferolateral and mid inferolateral suggesting ischemia/infarct in LCx or   RCA territory.    Left Atrium: Left atrium is mildly dilated.    Right Atrium: Right atrium is mildly dilated.    Aortic Valve: There is severe aortic valve sclerosis. Moderately   calcified cusps. There is moderate annular calcification present.    Mitral Valve: There is mild mitral annular calcification present. There   is mild to moderate regurgitation. Mild restriction of the posterior   leaflet likely ischemic MR.    Pulmonary Artery: The estimated pulmonary artery systolic pressure is   44 mmHg.    IVC/SVC: Intermediate venous pressure at 8 mmHg.       Angiogram 11/22/2023:    This was a successful PCI for acute myocardial infarction.    The Prox RCA lesion was 99% stenosed with 0% stenosis post-intervention.    The Dist RCA lesion was 90% stenosed with 0% stenosis post-intervention.    The RPDA lesion was 100% stenosed with 10% stenosis post-intervention.    The ejection fraction was greater than 55% by visual estimate.    The pre-procedure left ventricular end diastolic pressure was 15.    The estimated blood loss was <50 mL.      Assessment and Plan:     * Chest pain  - Presented with CP x2 episodes at rest since discharge, nonradiating  - Did not  rx for ASA, Plavix, NTG since discharge   - Tn 0.624 > 0.571  - EKG w/o evidence of acute ischemia  - CP resolved at time of CCU evaluation     PLAN:  - Loaded with  mg, Plavix 600 mg   - Heparin gtt  - ASA 81/Plavix 75 tomorrow  - Continue HIS, BB  - NTG PRN  - Cardiac telemetry  - Daily labs  -  Trend Tn, EKG for CP     Coronary artery disease  S/p PCI on 11/22/2023: Proximal RCA subtotal occlusion and distal RCA stenosis s/p GALEN to prox RCA and balloon angioplasty to distal RCA.     Recommended triple therapy with ASA, Plavix, and Eliquis x1 month then d/c ASA; pt declined Eliquis during last admission due to fall risk, agreeable to ASA/Plavix but did not  prescriptions since discharge    - ASA/Plavix  - HIS, BB  - Smoking cessation  - Risk factor reduction    S/P coronary artery stent placement  See 'CAD'    Acute on chronic diastolic heart failure  -  on admit  - CXR with ?L-sided pleural effusion  - Mild b/l LE edema    TTE 11/23/2023:    Left Ventricle: The left ventricle is normal in size. Ventricular mass is normal. Normal wall thickness. Normal wall motion. There is normal systolic function with a visually estimated ejection fraction of 55 - 60%. Grade II diastolic dysfunction.    Right Ventricle: Normal right ventricular cavity size. Wall thickness is normal. Right ventricle wall motion  is normal. Systolic function is normal.    The following segments are hypokinetic: basal inferior, basal inferolateral and mid inferolateral suggesting ischemia/infarct in LCx or RCA territory.    Left Atrium: Left atrium is mildly dilated.    Right Atrium: Right atrium is mildly dilated.    Aortic Valve: There is severe aortic valve sclerosis. Moderately calcified cusps. There is moderate annular calcification present.    Mitral Valve: There is mild mitral annular calcification present. There is mild to moderate regurgitation. Mild restriction of the posterior leaflet likely ischemic MR.    Pulmonary Artery: The estimated pulmonary artery systolic pressure is 44 mmHg.    IVC/SVC: Intermediate venous pressure at 8 mmHg.     PLAN:   - IV Lasix 20 mg BID   - BMP q 12h   - Replete electolytes for K >4, Phos >3, Mg >2   - Monitor I/Os    HLD (hyperlipidemia)  .8 on 07/12/2021  Home rx: Lipitor 80  "mg   Lipid panel pending    - Continue statin    Essential hypertension  Home rx: Toprol-XL 25 mg daily    - Continue Toprol    PVD (peripheral vascular disease)  Patient previously seen by Dr. 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 Later she underwent a right lower extremity angiogram and amputation of the right fourth and fifth toe 1/25/2013"     CTA 8/11/23 showed SFA stent occlusion which was thought to be chronic.  Arterial doppler showing severe PAD RLE.   Angiogram on 8/25 with right SFA stent placed    - Continue ASA, Plavix (see CAD)    S/P colostomy  Diverticulitis s/p left hemicolectomy and transverse colon end colostomy (2012)    Physical deconditioning  Lives alone, states she is able to perform ADLs but has difficulty with stairs    - PT/OT        VTE Risk Mitigation (From admission, onward)           Ordered     heparin 25,000 units in dextrose 5% (100 units/ml) IV bolus from bag - ADDITIONAL PRN BOLUS - 60 units/kg (max bolus 4000 units)  As needed (PRN)        Question:  Heparin Infusion Adjustment (DO NOT MODIFY ANSWER)  Answer:  \\[facility]\Images\Pharmacy\HeparinInfusions\heparin LOW INTENSITY nomogram for [facility] [id number]    11/29/23 1537     heparin 25,000 units in dextrose 5% (100 units/ml) IV bolus from bag - ADDITIONAL PRN BOLUS - 30 units/kg (max bolus 4000 units)  As needed (PRN)        Question:  Heparin Infusion Adjustment (DO NOT MODIFY ANSWER)  Answer:  \[employer]\epic\Images\Pharmacy\HeparinInfusions\heparin LOW INTENSITY nomogram for [facility] [id number].pdf    11/29/23 " 1537     heparin 25,000 units in dextrose 5% (100 units/ml) IV bolus from bag INITIAL BOLUS (max bolus 4000 units)  Once        Question:  Heparin Infusion Adjustment (DO NOT MODIFY ANSWER)  Answer:  \\[email or web address]\epic\Images\Pharmacy\HeparinInfusions\heparin LOW INTENSITY nomogram for [facility] [id number]    11/29/23 1537     heparin 25,000 units in dextrose 5% 250 mL (100 units/mL) infusion LOW INTENSITY nomogram - [facility]  Continuous        Question:  Begin at (units/kg/hr)  Answer:  12    11/29/23 1537     IP VTE HIGH RISK PATIENT  Once         11/29/23 1527     Place sequential compression device  Until discontinued         11/29/23 1527                    Glendy Conley MD  Cardiology   [facility] - Emergency Dept

## 2023-11-29 NOTE — CARE UPDATE
Pt to be admitted to CCU.  Ok to d/c consult    Saravanan Albert, PGY6  Cardiovascular Disease  [facility]

## 2023-11-29 NOTE — ED NOTES
Patient identifiers verified and correct for Radha Sotelo.  LOC: The patient is awake, alert and aware of environment with an appropriate affect, the patient is oriented x 3 and speaking appropriately.   APPEARANCE: Patient appears comfortable and in no acute distress, patient is clean and well groomed.  SKIN: The skin is warm and dry, color consistent with ethnicity, patient has normal skin turgor and moist mucus membranes, skin intact, bruising present on upper extremities.   MUSCULOSKELETAL: Patient moving all extremities spontaneously, no swelling noted. No toes on right foot.  RESPIRATORY: Airway is open and patent, respirations are spontaneous, patient has a normal effort and rate, no accessory muscle use noted, pt placed on continuous pulse ox with O2 sats noted at 99% on 2L NC.  CARDIAC: Pt placed on cardiac monitor. Patient has a normal rate and regular rhythm, no edema noted, capillary refill < 3 seconds. Chest pain improved since arrival.  GASTRO: Soft and non tender to palpation, no distention noted, normoactive bowel sounds present in all four quadrants. Pt states bowel movements have been regular.  : Pt denies any pain or frequency with urination.  NEURO: Pt opens eyes spontaneously, behavior appropriate to situation, follows commands, facial expression symmetrical, bilateral hand grasp equal and even, purposeful motor response noted, normal sensation in all extremities when touched with a finger.

## 2023-11-29 NOTE — ED PROVIDER NOTES
Encounter Date: 11/29/2023       History     Chief Complaint   Patient presents with    Chest Pain     Arrives via EMS for left 10/10 chest pain started x 30 min ago. Hx Mis, stents. Received 325 aspirin and 1 nitro with EMS. Wears 3L NC at baseline     Pt is a 77 yo F with PMH of COPD, CAD, HLD, HTN, PVD who presents with chest pain that started at rest and was chest pressure radiating into her L shoulder. She received nitro with EMS and had improvement in her pain but then it started to increase. Initially it was 10/10 pain but has not gotten that bad again. She had some nausea initially and shortness of breath which has resolved.     Patient has not yet been able to  her prescriptions including Plavix since discharge 4 days ago. She has been taking a baby aspirin.    The history is provided by the patient.     Review of patient's allergies indicates:  No Known Allergies  Past Medical History:   Diagnosis Date    Anemia     Anxiety     COPD (chronic obstructive pulmonary disease)     COPD (chronic obstructive pulmonary disease) with emphysema     Coronary artery disease     Depression     Diverticulitis     Hyperlipidemia     Hypertension     PVD (peripheral vascular disease)     PVD (peripheral vascular disease)     S/P colostomy     Substance abuse     hx heavy etoh use     Tobacco abuse      Past Surgical History:   Procedure Laterality Date    ANGIOGRAM, CORONARY, WITH LEFT HEART CATHETERIZATION N/A 11/22/2023    Procedure: Angiogram, Coronary, with Left Heart Cath;  Surgeon: Checo Bhatt MD;  Location: [facility] CATH LAB;  Service: Cardiology;  Laterality: N/A;    ANGIOGRAM, EXTREMITY, UNILATERAL Right 8/25/2023    Procedure: ANGIOGRAM, EXTREMITY, UNILATERAL;  Surgeon: Gary Rico MD;  Location: [facility] OR [address] [place] [address];  Service: Vascular;  Laterality: Right;  US GUIDED ACCESS LEFT GROIN  contrast: 150ml  fluoro: 27.9 min  mGy: 254.73  Gycm2: 62.4919  radial flush cocktail: 10ml     ANGIOGRAPHY OF LOWER EXTREMITY Right 8/24/2023    Procedure: Angiogram Extremity Unilateral;  Surgeon: Benji Ashley MD;  Location: [address];  Service: Vascular;  Laterality: Right;    COLOSTOMY      ECTOPIC PREGNANCY SURGERY      PTA, PERONEAL Right 8/25/2023    Procedure: PTA, PERONEAL TIBIAL TRUNK WITH SHOCKWAVE;  Surgeon: Gary Rico MD;  Location: [address];  Service: Vascular;  Laterality: Right;    PTCA, SINGLE VESSEL  11/22/2023    Procedure: PTCA, Single Vessel;  Surgeon: Checo Bhatt MD;  Location: [facility] CATH LAB;  Service: Cardiology;;    right forefoot amputation      right toe amputation      x2 toes    STENT, DRUG ELUTING, SINGLE VESSEL, CORONARY  11/22/2023    Procedure: Stent, Drug Eluting, Single Vessel, Coronary;  Surgeon: Checo Bhatt MD;  Location: [facility] CATH LAB;  Service: Cardiology;;    STENT, SUPERFICIAL FEMORAL ARTERY Right 8/25/2023    Procedure: STENT, SUPERFICIAL FEMORAL ARTERY;  Surgeon: Gary Rico MD;  Location: [address];  Service: Vascular;  Laterality: Right;  VIABAHN 6 X 15 X120     History reviewed. No pertinent family history.  Social History     Tobacco Use    Smoking status: Former     Current packs/day: 0.00     Average packs/day: 0.5 packs/day for 50.0 years (25.0 ttl pk-yrs)     Types: Cigarettes     Start date: 7/5/1963     Quit date: 7/5/2013     Years since quitting: 10.4    Smokeless tobacco: Never   Substance Use Topics    Alcohol use: No    Drug use: No         Physical Exam     Initial Vitals [11/29/23 1200]   BP Pulse Resp Temp SpO2   (!) 160/83 88 20 97.9 °F (36.6 °C) 98 %      MAP       --         Physical Exam    Nursing note and vitals reviewed.  Constitutional: She appears well-developed and well-nourished. She is not diaphoretic. No distress.   HENT:   Head: Normocephalic and atraumatic.   Eyes: Conjunctivae and EOM are normal.   Neck: Neck supple.   Normal range of  motion.  Cardiovascular:  Normal rate and regular rhythm.           Pulmonary/Chest: No respiratory distress.   Abdominal: She exhibits no distension.   Musculoskeletal:      Cervical back: Normal range of motion and neck supple.     Neurological: She is alert and oriented to person, place, and time. GCS score is 15. GCS eye subscore is 4. GCS verbal subscore is 5. GCS motor subscore is 6.   Skin: Skin is warm and dry.   Psychiatric: She has a normal mood and affect. Her behavior is normal. Judgment and thought content normal.         ED Course   Procedures  Labs Reviewed   CBC W/ AUTO DIFFERENTIAL - Abnormal; Notable for the following components:       Result Value    RBC 3.72 (*)     Hemoglobin 11.1 (*)     Hematocrit 34.4 (*)     Lymph # 0.7 (*)     Gran % 77.5 (*)     Lymph % 10.1 (*)     All other components within normal limits   COMPREHENSIVE METABOLIC PANEL - Abnormal; Notable for the following components:    Potassium 3.4 (*)     CO2 21 (*)     All other components within normal limits   TROPONIN I - Abnormal; Notable for the following components:    Troponin I 0.571 (*)     All other components within normal limits   TROPONIN I - Abnormal; Notable for the following components:    Troponin I 0.624 (*)     All other components within normal limits   B-TYPE NATRIURETIC PEPTIDE - Abnormal; Notable for the following components:     (*)     All other components within normal limits   TROPONIN ISTAT - Abnormal; Notable for the following components:    POC Cardiac Troponin I 0.32 (*)     All other components within normal limits   TROPONIN I - Abnormal; Notable for the following components:    Troponin I 0.970 (*)     All other components within normal limits   TROPONIN ISTAT - Abnormal; Notable for the following components:    POC Cardiac Troponin I 0.72 (*)     All other components within normal limits   APTT    Narrative:     Draw baseline aPTT prior to starting the heparin bolus or  infusion  (if  patient is on warfarin prior to heparin therapy)   PROTIME-INR    Narrative:     Draw baseline aPTT prior to starting the heparin bolus or  infusion  (if patient is on warfarin prior to heparin therapy)   LIPID PANEL   LIPID PANEL    Narrative:     Add on LIPID per MD [facility] Epic order [id number]  16:12  11/29/2023    POCT TROPONIN   POCT TROPONIN     EKG Readings: (Independently Interpreted)   Initial Reading: No STEMI. Previous EKG: Compared with most recent EKG Rhythm: Normal Sinus Rhythm. Heart Rate: 84. Ectopy: No Ectopy.   LAD, nonspecific ST abnormality     ECG Results              EKG 12-lead (Final result)  Result time 11/29/23 14:30:40      Final result by Interface, Lab In [facility] (11/29/23 14:30:40)                   Narrative:    Test Reason : R07.9,    Vent. Rate : 086 BPM     Atrial Rate : 086 BPM     P-R Int : 168 ms          QRS Dur : 092 ms      QT Int : 356 ms       P-R-T Axes : 038 -29 011 degrees     QTc Int : 426 ms    Sinus rhythm with Premature atrial complexes  Low voltage QRS  Left axis deviation  Abnormal ECG  When compared with ECG of 29-NOV-2023 12:02,  Premature atrial complexes are now Present  Confirmed by Alphonse Kan MD ([id number]) on 11/29/2023 2:30:34 PM    Referred By: [email or web address]   SELF           Confirmed By:Alphonse Kan MD                                     EKG 12-lead (Final result)  Result time 11/29/23 15:55:11      Final result by Interface, Lab In [facility] (11/29/23 15:55:11)                   Narrative:    Test Reason : R07.9,    Vent. Rate : 084 BPM     Atrial Rate : 084 BPM     P-R Int : 170 ms          QRS Dur : 092 ms      QT Int : 366 ms       P-R-T Axes : 043 -30 -03 degrees     QTc Int : 432 ms    Sinus rhythm with marked sinus arrythmia  Left axis deviation  Low voltage QRS  Abnormal ECG  When compared with ECG of 22-NOV-2023 15:52,  NC interval has decreased  The axis Shifted left  Nonspecific T wave abnormality has replaced inverted T waves in Lateral  leads  QT has  lengthened  Confirmed by Alphonse Kan MD ([id number]) on 11/29/2023 3:55:04 PM    Referred By: [email or web address]   SELF           Confirmed By:Alphonse Kan MD                                  Imaging Results              X-Ray Chest AP Portable (Final result)  Result time 11/29/23 13:36:06      Final result by Castillo Patricia MD (11/29/23 13:36:06)                   Impression:      See above      Electronically signed by: Castillo Patricia MD  Date:    11/29/2023  Time:    13:36               Narrative:    EXAMINATION:  XR CHEST AP PORTABLE    CLINICAL HISTORY:  Chest Pain;    TECHNIQUE:  Single frontal view of the chest was performed.    COMPARISON:  Non 11/23/2023 e    FINDINGS:  Heart size normal.  Accentuation interstitial markings.  Opacification at the left lung base probably related to pleural effusion associated atelectatic changes.  The upper lung fields are clear.                                       Medications   sodium chloride 0.9% flush 10 mL (has no administration in time range)   naloxone 0.4 mg/mL injection 0.02 mg (has no administration in time range)   glucose chewable tablet 16 g (has no administration in time range)   glucose chewable tablet 24 g (has no administration in time range)   glucagon (human recombinant) injection 1 mg (has no administration in time range)   dextrose 10% bolus 125 mL 125 mL (has no administration in time range)   dextrose 10% bolus 250 mL 250 mL (has no administration in time range)   aspirin EC tablet 81 mg (81 mg Oral Given 11/30/23 0949)   nicotine 21 mg/24 hr 1 patch (1 patch Transdermal Patch Applied 11/30/23 0949)   nitroGLYCERIN SL tablet 0.4 mg (has no administration in time range)   aspirin tablet 325 mg (325 mg Oral Not Given 11/29/23 1545)   clopidogreL tablet 75 mg (75 mg Oral Given 11/30/23 0949)   atorvastatin tablet 80 mg (80 mg Oral Given 11/30/23 0949)   metoprolol succinate (TOPROL-XL) 24 hr tablet 25 mg (25 mg Oral Given 11/30/23 0947)   furosemide injection 20  mg (20 mg Intravenous Given 11/30/23 1733)   albuterol-ipratropium 2.5 mg-0.5 mg/3 mL nebulizer solution 3 mL (3 mLs Nebulization Given 11/30/23 1936)   acetaminophen tablet 650 mg (650 mg Oral Given 11/30/23 2105)   enoxaparin injection 50 mg (50 mg Subcutaneous Given 11/30/23 1733)   morphine injection 2 mg (2 mg Intravenous Given 11/29/23 1345)   ondansetron injection 4 mg (4 mg Intravenous Given 11/29/23 1345)   clopidogreL tablet 600 mg (600 mg Oral Given 11/29/23 1421)   heparin 25,000 units in dextrose 5% (100 units/ml) IV bolus from bag INITIAL BOLUS (max bolus 4000 units) (2,830 Units Intravenous Bolus from Bag 11/29/23 1749)     Medical Decision Making  Concern for stent occlusion since patient has not been taking her plavix   EKG without a STEMI  Initial trop elevated compared to last week    2:00 PM  Discussed with cardiology fellow on call- recommends another dose of nitro now and trend trop, hold ACS protocol for now    Discussed again with cardiology- pt going to the CCU, he will place orders    Amount and/or Complexity of Data Reviewed  Labs: ordered.  Radiology: ordered.    Risk  Prescription drug management.                                      Clinical Impression:  Final diagnoses:  [R07.9] Chest pain  [I24.9] ACS (acute coronary syndrome)          ED Disposition Condition    Observation                 Abigail Crespo MD  11/30/23 8563

## 2023-11-29 NOTE — ED NOTES
Patient identifiers verified and correct for Radha Sotelo.  LOC: The patient is awake, alert and aware of environment with an appropriate affect, the patient is oriented x 3 and speaking appropriately.   APPEARANCE: Patient appears comfortable and in no acute distress, patient is clean and well groomed.  SKIN: The skin is warm and dry, color consistent with ethnicity, patient has normal skin turgor and moist mucus membranes, skin intact, no breakdown or bruising noted.   MUSCULOSKELETAL: Patient moving all extremities spontaneously, no swelling noted.  RESPIRATORY: Airway is open and patent, respirations are spontaneous, patient has a normal effort and rate, no accessory muscle use noted, pt placed on continuous pulse ox with O2 sats noted at 99% on2.  CARDIAC: Pt placed on cardiac monitor. Patient has a normal rate and regular rhythm, no edema noted, capillary refill < 3 seconds.   GASTRO: Soft and non tender to palpation, no distention noted, normoactive bowel sounds present in all four quadrants. Pt states bowel movements have been regular.  : Pt denies any pain or frequency with urination.  NEURO: Pt opens eyes spontaneously, behavior appropriate to situation, follows commands, facial expression symmetrical, bilateral hand grasp equal and even, purposeful motor response noted, normal sensation in all extremities when touched with a finger.

## 2023-11-29 NOTE — ASSESSMENT & PLAN NOTE
S/p PCI on 11/22/2023: Proximal RCA subtotal occlusion and distal RCA stenosis s/p GALEN to prox RCA and balloon angioplasty to distal RCA.     Recommended triple therapy with ASA, Plavix, and Eliquis x1 month then d/c ASA; pt declined Eliquis during last admission due to fall risk, agreeable to ASA/Plavix but did not  prescriptions since discharge    - ASA/Plavix  - HIS, BB  - Smoking cessation  - Risk factor reduction

## 2023-11-29 NOTE — Clinical Note
Diagnosis: Chest pain [681230]   Future Attending Provider: SISSY COOPER [[id number]]   Admitting Provider:: SISSY COOPER [[id number]]   Bed request comments: CSU   Reason for IP Medical Treatment  (Clinical interventions that can only be accomplished in the IP setting? ) :: Chest pain   I certify that Inpatient services for greater than or equal to 2 midnights are medically necessary:: Yes   Plans for Post-Acute care--if anticipated (pick the single best option):: A. No post acute care anticipated at this time

## 2023-11-29 NOTE — ASSESSMENT & PLAN NOTE
"Patient previously seen by Dr. 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 Later she underwent a right lower extremity angiogram and amputation of the right fourth and fifth toe 1/25/2013"     CTA 8/11/23 showed SFA stent occlusion which was thought to be chronic.  Arterial doppler showing severe PAD RLE.   Angiogram on 8/25 with right SFA stent placed    - Continue ASA, Plavix (see CAD)  "

## 2023-11-30 ENCOUNTER — EXTERNAL HOME HEALTH (OUTPATIENT)
Dept: HOME HEALTH SERVICES | Facility: HOSPITAL | Age: 78
End: 2023-11-30
Payer: MEDICARE

## 2023-11-30 LAB
ALBUMIN SERPL BCP-MCNC: 3.3 G/DL (ref 3.5–5.2)
ALP SERPL-CCNC: 75 U/L (ref 55–135)
ALT SERPL W/O P-5'-P-CCNC: 14 U/L (ref 10–44)
ANION GAP SERPL CALC-SCNC: 12 MMOL/L (ref 8–16)
APTT PPP: 27.1 SEC (ref 21–32)
APTT PPP: 40.5 SEC (ref 21–32)
AST SERPL-CCNC: 21 U/L (ref 10–40)
BASOPHILS # BLD AUTO: 0.05 K/UL (ref 0–0.2)
BASOPHILS NFR BLD: 0.8 % (ref 0–1.9)
BILIRUB SERPL-MCNC: 0.3 MG/DL (ref 0.1–1)
BUN SERPL-MCNC: 17 MG/DL (ref 8–23)
CALCIUM SERPL-MCNC: 9.1 MG/DL (ref 8.7–10.5)
CHLORIDE SERPL-SCNC: 100 MMOL/L (ref 95–110)
CO2 SERPL-SCNC: 26 MMOL/L (ref 23–29)
CREAT SERPL-MCNC: 0.7 MG/DL (ref 0.5–1.4)
DIFFERENTIAL METHOD: ABNORMAL
EOSINOPHIL # BLD AUTO: 0.3 K/UL (ref 0–0.5)
EOSINOPHIL NFR BLD: 4 % (ref 0–8)
ERYTHROCYTE [DISTWIDTH] IN BLOOD BY AUTOMATED COUNT: 13.4 % (ref 11.5–14.5)
EST. GFR  (NO RACE VARIABLE): >60 ML/MIN/1.73 M^2
GLUCOSE SERPL-MCNC: 88 MG/DL (ref 70–110)
HCT VFR BLD AUTO: 36 % (ref 37–48.5)
HGB BLD-MCNC: 11.4 G/DL (ref 12–16)
IMM GRANULOCYTES # BLD AUTO: 0.03 K/UL (ref 0–0.04)
IMM GRANULOCYTES NFR BLD AUTO: 0.5 % (ref 0–0.5)
LYMPHOCYTES # BLD AUTO: 0.8 K/UL (ref 1–4.8)
LYMPHOCYTES NFR BLD: 12.8 % (ref 18–48)
MAGNESIUM SERPL-MCNC: 1.7 MG/DL (ref 1.6–2.6)
MCH RBC QN AUTO: 29.3 PG (ref 27–31)
MCHC RBC AUTO-ENTMCNC: 31.7 G/DL (ref 32–36)
MCV RBC AUTO: 93 FL (ref 82–98)
MONOCYTES # BLD AUTO: 0.7 K/UL (ref 0.3–1)
MONOCYTES NFR BLD: 10.4 % (ref 4–15)
NEUTROPHILS # BLD AUTO: 4.7 K/UL (ref 1.8–7.7)
NEUTROPHILS NFR BLD: 71.5 % (ref 38–73)
NRBC BLD-RTO: 0 /100 WBC
PHOSPHATE SERPL-MCNC: 4 MG/DL (ref 2.7–4.5)
PLATELET # BLD AUTO: 306 K/UL (ref 150–450)
PMV BLD AUTO: 9.8 FL (ref 9.2–12.9)
POTASSIUM SERPL-SCNC: 3.8 MMOL/L (ref 3.5–5.1)
PROT SERPL-MCNC: 6.3 G/DL (ref 6–8.4)
RBC # BLD AUTO: 3.89 M/UL (ref 4–5.4)
SODIUM SERPL-SCNC: 138 MMOL/L (ref 136–145)
TROPONIN I SERPL DL<=0.01 NG/ML-MCNC: 0.78 NG/ML (ref 0–0.03)
TROPONIN I SERPL DL<=0.01 NG/ML-MCNC: 1.05 NG/ML (ref 0–0.03)
WBC # BLD AUTO: 6.55 K/UL (ref 3.9–12.7)

## 2023-11-30 PROCEDURE — 96372 THER/PROPH/DIAG INJ SC/IM: CPT | Performed by: STUDENT IN AN ORGANIZED HEALTH CARE EDUCATION/TRAINING PROGRAM

## 2023-11-30 PROCEDURE — 84100 ASSAY OF PHOSPHORUS: CPT

## 2023-11-30 PROCEDURE — 99231 SBSQ HOSP IP/OBS SF/LOW 25: CPT | Mod: GC,,, | Performed by: INTERNAL MEDICINE

## 2023-11-30 PROCEDURE — 93010 ELECTROCARDIOGRAM REPORT: CPT | Mod: ,,, | Performed by: INTERNAL MEDICINE

## 2023-11-30 PROCEDURE — 94640 AIRWAY INHALATION TREATMENT: CPT | Mod: XB

## 2023-11-30 PROCEDURE — 84484 ASSAY OF TROPONIN QUANT: CPT

## 2023-11-30 PROCEDURE — 85730 THROMBOPLASTIN TIME PARTIAL: CPT

## 2023-11-30 PROCEDURE — 36415 COLL VENOUS BLD VENIPUNCTURE: CPT | Performed by: STUDENT IN AN ORGANIZED HEALTH CARE EDUCATION/TRAINING PROGRAM

## 2023-11-30 PROCEDURE — 36415 COLL VENOUS BLD VENIPUNCTURE: CPT

## 2023-11-30 PROCEDURE — 25000242 PHARM REV CODE 250 ALT 637 W/ HCPCS: Performed by: STUDENT IN AN ORGANIZED HEALTH CARE EDUCATION/TRAINING PROGRAM

## 2023-11-30 PROCEDURE — 83735 ASSAY OF MAGNESIUM: CPT

## 2023-11-30 PROCEDURE — 25000003 PHARM REV CODE 250

## 2023-11-30 PROCEDURE — 94761 N-INVAS EAR/PLS OXIMETRY MLT: CPT

## 2023-11-30 PROCEDURE — 99900035 HC TECH TIME PER 15 MIN (STAT)

## 2023-11-30 PROCEDURE — 63600175 PHARM REV CODE 636 W HCPCS: Performed by: STUDENT IN AN ORGANIZED HEALTH CARE EDUCATION/TRAINING PROGRAM

## 2023-11-30 PROCEDURE — 80053 COMPREHEN METABOLIC PANEL: CPT

## 2023-11-30 PROCEDURE — 27100098 HC SPACER

## 2023-11-30 PROCEDURE — 93010 EKG 12-LEAD: ICD-10-PCS | Mod: ,,, | Performed by: INTERNAL MEDICINE

## 2023-11-30 PROCEDURE — 97161 PT EVAL LOW COMPLEX 20 MIN: CPT

## 2023-11-30 PROCEDURE — 99231 PR SUBSEQUENT HOSPITAL CARE,LEVL I: ICD-10-PCS | Mod: GC,,, | Performed by: INTERNAL MEDICINE

## 2023-11-30 PROCEDURE — G0378 HOSPITAL OBSERVATION PER HR: HCPCS

## 2023-11-30 PROCEDURE — 25000003 PHARM REV CODE 250: Performed by: STUDENT IN AN ORGANIZED HEALTH CARE EDUCATION/TRAINING PROGRAM

## 2023-11-30 PROCEDURE — 25000242 PHARM REV CODE 250 ALT 637 W/ HCPCS

## 2023-11-30 PROCEDURE — 84484 ASSAY OF TROPONIN QUANT: CPT | Mod: 91 | Performed by: STUDENT IN AN ORGANIZED HEALTH CARE EDUCATION/TRAINING PROGRAM

## 2023-11-30 PROCEDURE — 93005 ELECTROCARDIOGRAM TRACING: CPT

## 2023-11-30 PROCEDURE — 96366 THER/PROPH/DIAG IV INF ADDON: CPT

## 2023-11-30 PROCEDURE — 97165 OT EVAL LOW COMPLEX 30 MIN: CPT

## 2023-11-30 PROCEDURE — S4991 NICOTINE PATCH NONLEGEND: HCPCS

## 2023-11-30 PROCEDURE — 85025 COMPLETE CBC W/AUTO DIFF WBC: CPT

## 2023-11-30 PROCEDURE — 96376 TX/PRO/DX INJ SAME DRUG ADON: CPT

## 2023-11-30 PROCEDURE — 27000221 HC OXYGEN, UP TO 24 HOURS

## 2023-11-30 RX ORDER — BUDESONIDE AND FORMOTEROL FUMARATE DIHYDRATE 80; 4.5 UG/1; UG/1
AEROSOL RESPIRATORY (INHALATION)
Qty: 10.2 G | Refills: 6 | Status: SHIPPED | OUTPATIENT
Start: 2023-11-30 | End: 2024-01-02 | Stop reason: SDUPTHER

## 2023-11-30 RX ORDER — ALBUTEROL SULFATE 90 UG/1
2 AEROSOL, METERED RESPIRATORY (INHALATION) EVERY 6 HOURS
Qty: 25.5 G | Refills: 3 | Status: SHIPPED | OUTPATIENT
Start: 2023-11-30 | End: 2024-01-02 | Stop reason: SDUPTHER

## 2023-11-30 RX ORDER — ALBUTEROL SULFATE 90 UG/1
2 AEROSOL, METERED RESPIRATORY (INHALATION) EVERY 6 HOURS
Status: DISCONTINUED | OUTPATIENT
Start: 2023-11-30 | End: 2023-11-30

## 2023-11-30 RX ORDER — IBUPROFEN 200 MG
1 TABLET ORAL DAILY
Qty: 28 PATCH | Refills: 0 | Status: SHIPPED | OUTPATIENT
Start: 2023-11-30 | End: 2023-12-15

## 2023-11-30 RX ORDER — ACETAMINOPHEN 325 MG/1
650 TABLET ORAL EVERY 6 HOURS PRN
Status: DISCONTINUED | OUTPATIENT
Start: 2023-11-30 | End: 2023-12-01 | Stop reason: HOSPADM

## 2023-11-30 RX ORDER — NITROGLYCERIN 0.4 MG/1
0.4 TABLET SUBLINGUAL EVERY 5 MIN PRN
Qty: 25 TABLET | Refills: 2 | Status: SHIPPED | OUTPATIENT
Start: 2023-11-30

## 2023-11-30 RX ORDER — ATORVASTATIN CALCIUM 80 MG/1
80 TABLET, FILM COATED ORAL DAILY
Qty: 90 TABLET | Refills: 3 | Status: SHIPPED | OUTPATIENT
Start: 2023-11-30 | End: 2024-11-29

## 2023-11-30 RX ORDER — METOPROLOL SUCCINATE 25 MG/1
25 TABLET, EXTENDED RELEASE ORAL DAILY
Qty: 90 TABLET | Refills: 3 | Status: SHIPPED | OUTPATIENT
Start: 2023-11-30 | End: 2024-11-29

## 2023-11-30 RX ORDER — ENOXAPARIN SODIUM 100 MG/ML
1 INJECTION SUBCUTANEOUS
Status: DISCONTINUED | OUTPATIENT
Start: 2023-11-30 | End: 2023-12-01 | Stop reason: HOSPADM

## 2023-11-30 RX ORDER — IPRATROPIUM BROMIDE AND ALBUTEROL SULFATE 2.5; .5 MG/3ML; MG/3ML
3 SOLUTION RESPIRATORY (INHALATION)
Status: DISCONTINUED | OUTPATIENT
Start: 2023-11-30 | End: 2023-12-01 | Stop reason: HOSPADM

## 2023-11-30 RX ORDER — CLOPIDOGREL BISULFATE 75 MG/1
75 TABLET ORAL DAILY
Qty: 30 TABLET | Refills: 11 | Status: SHIPPED | OUTPATIENT
Start: 2023-11-30 | End: 2024-11-29

## 2023-11-30 RX ADMIN — ACETAMINOPHEN 650 MG: 325 TABLET ORAL at 05:11

## 2023-11-30 RX ADMIN — ALBUTEROL SULFATE 2 PUFF: 108 INHALANT RESPIRATORY (INHALATION) at 12:11

## 2023-11-30 RX ADMIN — IPRATROPIUM BROMIDE AND ALBUTEROL SULFATE 3 ML: .5; 3 SOLUTION RESPIRATORY (INHALATION) at 08:11

## 2023-11-30 RX ADMIN — METOPROLOL SUCCINATE 25 MG: 25 TABLET, EXTENDED RELEASE ORAL at 09:11

## 2023-11-30 RX ADMIN — ACETAMINOPHEN 650 MG: 325 TABLET ORAL at 09:11

## 2023-11-30 RX ADMIN — FUROSEMIDE 20 MG: 10 INJECTION, SOLUTION INTRAMUSCULAR; INTRAVENOUS at 05:11

## 2023-11-30 RX ADMIN — IPRATROPIUM BROMIDE AND ALBUTEROL SULFATE 3 ML: .5; 3 SOLUTION RESPIRATORY (INHALATION) at 02:11

## 2023-11-30 RX ADMIN — ENOXAPARIN SODIUM 50 MG: 60 INJECTION SUBCUTANEOUS at 05:11

## 2023-11-30 RX ADMIN — ATORVASTATIN CALCIUM 80 MG: 40 TABLET, FILM COATED ORAL at 09:11

## 2023-11-30 RX ADMIN — CLOPIDOGREL BISULFATE 75 MG: 75 TABLET ORAL at 09:11

## 2023-11-30 RX ADMIN — IPRATROPIUM BROMIDE AND ALBUTEROL SULFATE 3 ML: .5; 3 SOLUTION RESPIRATORY (INHALATION) at 07:11

## 2023-11-30 RX ADMIN — Medication 1 PATCH: at 09:11

## 2023-11-30 RX ADMIN — ASPIRIN 81 MG: 81 TABLET, COATED ORAL at 09:11

## 2023-11-30 NOTE — PLAN OF CARE
[facility] - Cardiology Stepdown  Initial Discharge Assessment       Primary Care Provider: No, Primary Doctor    Admission Diagnosis: Chest pain [R07.9]    Admission Date: 11/29/2023  Expected Discharge Date: 11/30/2023    Transition of Care Barriers: Does not adhere to care plan    Payor: [employer]ARE / Plan: OneWed (Formerly Nearlyweds) 65 / Product Type: Medicare Advantage /     Extended Emergency Contact Information  Primary Emergency Contact: Bonnie Garcia  Address: UNKNOWN   [place] [facility]  Mobile Phone: [phone]  Relation: Sister  Secondary Emergency Contact: Anthony Reardon  Mobile Phone: [phone]  Relation: Daughter   needed? No    Discharge Plan A: Home Health  Discharge Plan B: Home      [facility]. - [facility], [place] - [address]  [address] [facility] [address]  Phone: [phone] Fax: [phone]    [id number] - [place], [place] - [address] [facility]  [address] [facility] [address]  Phone: [phone] Fax: [phone]    [facility]. - [place], [place] - [address]  [address] [facility] [address]  Phone: [phone] Fax: [phone]    [id number] [place], [place] - [address] [facility]  [address] [place] [address]  Phone: [phone] Fax: [phone]      Initial Assessment (most recent)       Adult Discharge Assessment - 11/30/23 1118          Discharge Assessment    Assessment Type Discharge Planning Assessment     Confirmed/corrected address, phone number and insurance Yes     Confirmed Demographics Correct on Facesheet     Source of Information patient;health record     Communicated FLORIAN with patient/caregiver Yes     Reason For Admission Chest pain     People in Home grandchild(coral)     Facility Arrived From: Home     Do you expect to return to your current living situation? Yes     Do you have help at  home or someone to help you manage your care at home? Yes     Who are your caregiver(s) and their phone number(s)? Anthony Reardon (daughter) [phone]; granddaughter     Prior to hospitilization cognitive status: Alert/Oriented     Current cognitive status: Alert/Oriented     Walking or Climbing Stairs ambulation difficulty, requires equipment     Mobility Management rolling walker     Dressing/Bathing bathing difficulty, requires equipment     Home Accessibility not wheelchair accessible     Equipment Currently Used at Home walker, rolling;oxygen;shower chair     Readmission within 30 days? Yes     Patient currently being followed by outpatient case management? No     Do you currently have service(s) that help you manage your care at home? Yes     Name and Contact number of agency [facility]     Is the pt/caregiver preference to resume services with current agency Yes     Do you take prescription medications? Yes     Do you have prescription coverage? Yes     Do you have any problems affording any of your prescribed medications? No     Is the patient taking medications as prescribed? no     If no, which medications is patient not taking? Per CCU team pt did not get her meds after her last admission     Who is going to help you get home at discharge? Family     Are you on dialysis? No     Do you take coumadin? No     DME Needed Upon Discharge  none     Discharge Plan discussed with: Patient     Transition of Care Barriers Does not adhere to care plan     Discharge Plan A Home Health     Discharge Plan B Home                    11/30/23 1124   Readmission   Why were you hospitalized in the last 30 days? Yes   Why were you readmitted? Alarmed about signs/symptoms   When you left the hospital how did you feel? Weak   When you left the hospital where did you go? Home with Family   Did patient/caregiver refused recommended DC plan? No   Tell me about what happened between when you left the hospital and the day  "you returned. Pt stated that she started having chest pains and thought she was dying so immediately came back to the hospital   When did you start not feeling well? Yesteday morning   Did you try to manage your symptoms your self? No   Did you call anyone? No   Why? Pt stated that there was no time because she thought she was dying   Did you try to see or did see a doctor or nurse before you came? No   Why? Pt stated that there was no time because she thought she was dying   Did you have  a follow-up appointment on discharge? Yes   Was this a planned readmission? No      11/30/23 1134   Post-Acute Status   Post-Acute Authorization Home Health   Home Health Status Referrals Sent      Pt readmitted after being discharged last week and per CCU team pt did not get her meds after discharge.  Per CCU team they are working with pt's daughter to make sure that pt's meds get picked up this time.  LITO met with pt at bedside who stated that returned to the hospital after experiencing chest pain and "felt like I was dying."  Pt stated that her 17-year-old granddaughter lives with her and that she will have assistance from family at discharge.  Discussed ordering HH to prevent another readmission to which pt stated that she would agreeable.  Pt instructed to identify preference:    Preferred Facility: (if more than 1, listed in order of descending preference)  [facility] (pt reported she used them in the past)    Referral placed via [facility].  If an additional preferred facility not listed above is identified, additional referral to be sent. If above facilities unable to accept, will send additional referrals to in-network providers. LITO name and ext on whiteboard; discharge planning booklet provided.  Will continue to follow.    Discharge Plan A and Plan B have been determined by review of patient's clinical status, future medical and therapeutic needs, and coverage/benefits for post-acute care in coordination with " multidisciplinary team members.        Evon Dickinson LMSW  [facility]  [phone]

## 2023-11-30 NOTE — PLAN OF CARE
Problem: Occupational Therapy  Goal: Occupational Therapy Goal  Description: Goals to be met by: 12/30/23 (1 month)     Patient will increase functional independence with ADLs by performing:    UE Dressing with [place].  LE Dressing with [place].  Grooming while standing at sink with Modified [place].  Toileting from toilet with Modified [place] for hygiene and clothing management.   Rolling to Bilateral with [place].   Supine to sit with [place].  Step transfer with Modified [place]  Toilet transfer to toilet with Modified [place].    Evaluated pt and established OT POC. Continue OT as tolerated.  Ila Teixeira OT  11/30/2023    Outcome: Ongoing, Progressing

## 2023-11-30 NOTE — PT/OT/SLP EVAL
Physical Therapy Co-Evaluation    Patient Name:  Radha Sotelo   MRN:  [id number]  Admit Date: 11/29/2023  Admitting Diagnosis:  Chest pain   Length of Stay: 1 days  Recent Surgery: * No surgery found *      Recommendations:     Discharge Recommendations:    Low intensity therapy  Discharge Equipment Recommendations: none   Barriers to discharge: None    Plan:     During this hospitalization, patient to be seen 3 x/week to address the identified rehab impairments via gait training, therapeutic activities, therapeutic exercises, neuromuscular re-education and progress towards the established goals.  Plan of Care Expires:  12/30/23  Plan of Care Reviewed with: patient    Assessment:     Radha Sotelo is a 78 y.o. female admitted with a medical diagnosis of Chest pain. Pt found with HOB elevated agreeable to therapy evaluation with encouragement. Pt reports Ind PTA with all mobility and ADLs. Pt presents today with good strength but limited by decreased endurance, impaired balance, decreased safety awareness, and decreased activity tolerance. Patient required HHA for ambulation so patient would benefit from gait trial with AD in future sessions. Patient was only willing to ambulate short distance in room as she declined further mobility and stair training this session. Patient would benefit from skilled therapy services to maximize safety and independence, increase activity tolerance, decrease fall risk, decrease caregiver burden, improve QOL, improve patient's functional mobility, and decrease risk of contractures and pressure sores. Patient currently demonstrates a need for low intensity therapy on a scheduled basis secondary to a decline in functional status due to illness.     Problem List: weakness, impaired endurance, impaired self care skills, impaired functional mobility, gait instability, impaired balance, decreased safety awareness, impaired cardiopulmonary response to activity.  Rehab Prognosis: Good;  "patient would benefit from acute skilled PT services to address these deficits and reach maximum level of function.      Goals:   Multidisciplinary Problems       Physical Therapy Goals          Problem: Physical Therapy    Goal Priority Disciplines Outcome Goal Variances Interventions   Physical Therapy Goal     PT, PT/OT Ongoing, Progressing     Description: Goals to be met by: 23     Patient will increase functional independence with mobility by performin. Supine to sit with [place]  2. Sit to stand transfer with [place]  3. Bed to chair transfer with [place] using LRAD  4. Gait  x 150 feet with Supervision using LRAD.   5. Ascend/descend 14 stairs with bilateral Handrails Supervision using LRAD.                          Subjective     RN notified prior to session. No one present upon PT entrance into room. Patient agreeable to PT evaluation.    Chief Complaint: "They didn't send me any therapy when I was here last week"  Patient/Family Comments/goals: go home  Pain/Comfort:  Pain Rating 1: 0/10  Pain Rating Post-Intervention 1: 0/10    Social History:  Residence:  lives with her granddaughter   2nd level apartment with 14 ANAID and B HR. Pt's bathroom has a tub shower with a shower chair.  Support available:  family   Equipment Owned (not using): walker, rolling  Equipment Used: shower chair, oxygen   Prior level of function: pt reports independence; pt use 2L [place] oxygen at home but reports taking it off to go to the bathroom      Patient reports they will have assistance from family (alone during day) upon discharge.    Objective:     Additional staff present: OT; OT for co-evaluation due to suspected patient need for two skilled therapists to appropriately assess patient's functional deficits as well as ensure patient safety, accommodate for limited activity tolerance, and provide appropriate, skilled assistance to maximize functional potential during evaluation.    Patient found HOB " elevated with: oxygen, telemetry, colostomy     General Precautions: Standard, Cardiac fall   Orthopedic Precautions:N/A   Braces: N/A   Body mass index is 21.53 kg/m².  Oxygen Device: Nasal Cannula 2L  Vitals: BP (!) 100/58 (BP Location: Right arm, Patient Position: Lying)   Pulse 85   Temp 97.6 °F (36.4 °C) (Oral)   Resp 18   Ht 5' (1.524 m)   Wt 50 kg (110 lb 3.7 oz)   LMP  (LMP Unknown)   SpO2 (!) 94%   BMI 21.53 kg/m²       Exams:    Cognition:  Alert and Cooperative  Command following: Follows two-step verbal commands  Communication: clear/fluent    Sensation:   Light touch sensation: Intact BLEs    Gross Motor Coordination: No deficits noted during functional mobility tasks     Edema/Skin Integrity: None noted; Visible skin intact    Postural examination/scapula alignment: Rounded shoulder and Head forward    Lower Extremity Range of Motion:  Right Lower Extremity: WFL  Left Lower Extremity: WFL (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)    Lower Extremity Strength:  Right Lower Extremity: WFL  Left Lower Extremity: WFL       Functional Mobility:    Bed Mobility:  Scooting with stand by assistance  Supine > Sit with stand by assistance  Sit > Supine with stand by assistance    Transfers:   Sit <> Stand Transfer: Stand-by Assistance x 1 trials from EOB with no AD   Bed <> Chair: pt declined sitting in chair             Gait:  Distance: 4 steps fwd/back (pt declined further mobility)  Assistance level: Stand-by Assistance  Assistive Device:  HHA no AD  Gait Assessment: decreased step length , decreased stride length , decreased leonardo, decreased gait speed, and unsteady gait   Comments: Pt with slow gait and required HHA for balance.     Stairs:   -pt declined stair training     Balance:  Sitting Balance  Static: stand by assistance  Dynamic: stand by assistance  Standing:  Static: contact guard assistance  Dynamic: contact guard assistance    Outcome Measures:  AM-PAC 6 CLICK  MOBILITY  Turning over in bed (including adjusting bedclothes, sheets and blankets)?: 3  Sitting down on and standing up from a chair with arms (e.g., wheelchair, bedside commode, etc.): 3  Moving from lying on back to sitting on the side of the bed?: 3  Moving to and from a bed to a chair (including a wheelchair)?: 3  Need to walk in hospital room?: 3  Climbing 3-5 steps with a railing?: 3  Basic Mobility Total Score: 18     Education:  Time provided for education, counseling and discussion of health disposition in regards to patient's current status  All questions answered within PT scope of practice and to patient's satisfaction  PT role in POC to address current functional deficits  Pt educated on proper body mechanics, safety techniques, and energy conservation with PT facilitation and cueing throughout session  Call nursing/pct to transfer to chair/use bathroom. Pt stated understanding.    Patient left HOB elevated with all lines intact, call button in reach, and RN notified.      History:     Past Medical History:   Diagnosis Date    Anemia     Anxiety     COPD (chronic obstructive pulmonary disease)     COPD (chronic obstructive pulmonary disease) with emphysema     Coronary artery disease     Depression     Diverticulitis     Hyperlipidemia     Hypertension     PVD (peripheral vascular disease)     PVD (peripheral vascular disease)     S/P colostomy     Substance abuse     hx heavy etoh use     Tobacco abuse        Past Surgical History:   Procedure Laterality Date    ANGIOGRAM, CORONARY, WITH LEFT HEART CATHETERIZATION N/A 11/22/2023    Procedure: Angiogram, Coronary, with Left Heart Cath;  Surgeon: Checo Bhatt MD;  Location: [facility] CATH LAB;  Service: Cardiology;  Laterality: N/A;    ANGIOGRAM, EXTREMITY, UNILATERAL Right 8/25/2023    Procedure: ANGIOGRAM, EXTREMITY, UNILATERAL;  Surgeon: Gary Rico MD;  Location: [facility] OR [address] [place] [address];  Service: Vascular;  Laterality: Right;  US GUIDED  ACCESS LEFT GROIN  contrast: 150ml  fluoro: 27.9 min  mGy: 254.73  Gycm2: 62.4919  radial flush cocktail: 10ml    ANGIOGRAPHY OF LOWER EXTREMITY Right 8/24/2023    Procedure: Angiogram Extremity Unilateral;  Surgeon: Benji Ashley MD;  Location: [address];  Service: Vascular;  Laterality: Right;    COLOSTOMY      ECTOPIC PREGNANCY SURGERY      PTA, PERONEAL Right 8/25/2023    Procedure: PTA, PERONEAL TIBIAL TRUNK WITH SHOCKWAVE;  Surgeon: Gary Rico MD;  Location: [address];  Service: Vascular;  Laterality: Right;    PTCA, SINGLE VESSEL  11/22/2023    Procedure: PTCA, Single Vessel;  Surgeon: Checo Bhatt MD;  Location: [facility] CATH LAB;  Service: Cardiology;;    right forefoot amputation      right toe amputation      x2 toes    STENT, DRUG ELUTING, SINGLE VESSEL, CORONARY  11/22/2023    Procedure: Stent, Drug Eluting, Single Vessel, Coronary;  Surgeon: Checo Bhatt MD;  Location: [facility] CATH LAB;  Service: Cardiology;;    STENT, SUPERFICIAL FEMORAL ARTERY Right 8/25/2023    Procedure: STENT, SUPERFICIAL FEMORAL ARTERY;  Surgeon: Gary Rico MD;  Location: [facility] OR [address] [place] [address];  Service: Vascular;  Laterality: Right;  VIABAHN 6 X 15 X120       History reviewed. No pertinent family history.    Social History     Socioeconomic History    Marital status:    Tobacco Use    Smoking status: Former     Current packs/day: 0.00     Average packs/day: 0.5 packs/day for 50.0 years (25.0 ttl pk-yrs)     Types: Cigarettes     Start date: 7/5/1963     Quit date: 7/5/2013     Years since quitting: 10.4    Smokeless tobacco: Never   Substance and Sexual Activity    Alcohol use: No    Drug use: No   Social History Narrative    ** Merged History Encounter **          Social Determinants of Health     Financial Resource Strain: Low Risk  (8/19/2023)    Overall Financial Resource Strain (CARDIA)     Difficulty of Paying Living Expenses: Not very hard   Recent Concern:  Financial Resource Strain - Medium Risk (8/12/2023)    Overall Financial Resource Strain (CARDIA)     Difficulty of Paying Living Expenses: Somewhat hard   Food Insecurity: No Food Insecurity (8/19/2023)    Hunger Vital Sign     Worried About Running Out of Food in the Last Year: Never true     Ran Out of Food in the Last Year: Never true   Transportation Needs: No Transportation Needs (8/19/2023)    PRAPARE - Transportation     Lack of Transportation (Medical): No     Lack of Transportation (Non-Medical): No   Recent Concern: Transportation Needs - Unmet Transportation Needs (8/12/2023)    PRAPARE - Transportation     Lack of Transportation (Medical): Yes     Lack of Transportation (Non-Medical): Yes   Physical Activity: Inactive (8/12/2023)    Exercise Vital Sign     Days of Exercise per Week: 0 days     Minutes of Exercise per Session: 0 min   Stress: Stress Concern Present (8/12/2023)    [place] [employer] - Occupational Stress Questionnaire     Feeling of Stress : To some extent   Social Connections: Socially Isolated (8/19/2023)    Social Connection and Isolation Panel [NHANES]     Frequency of Communication with Friends and Family: More than three times a week     Frequency of Social Gatherings with Friends and Family: More than three times a week     Attends Worship Services: Never     Active Member of Clubs or Organizations: No     Attends Club or Organization Meetings: Never     Marital Status:    Housing Stability: Low Risk  (8/19/2023)    Housing Stability Vital Sign     Unable to Pay for Housing in the Last Year: No     Number of Places Lived in the Last Year: 1     Unstable Housing in the Last Year: No       Time Tracking:     PT Received On: 11/30/23  PT Start Time: 0850     PT Stop Time: 0901  PT Total Time (min): 11 min     Billable Minutes: Evaluation 11 minutes    11/30/2023

## 2023-11-30 NOTE — PT/OT/SLP EVAL
Occupational Therapy   Co-Evaluation and Co-Treatment    Name: Radha Sotelo  MRN: [id number]  Admitting Diagnosis: Chest pain  Recent Surgery: * No surgery found *      Recommendations:     Discharge Recommendations: Low Intensity Therapy  Discharge Equipment Recommendations:  none  Barriers to discharge:  None    Assessment:     Radha Sotelo is a 78 y.o. female with a medical diagnosis of Chest pain.  Pt presents with decreased endurance and impaired mobility performance as limited by cardiovascular status and generalized weakness. Pt found upright in bed and agreeable for therapy today. Pt session focused on mobility at EOB and within room. Pt refused additional  mobility today however reports she has all DME at home to A in smooth transition back to apartment.  Pt has granddaughter to A at d/c however granddaughter is in school at this time. Patient currently demonstrates a need for low intensity therapy on a scheduled basis secondary to a decline in functional status due to illness, injury, and disease   Performance deficits affecting function: weakness, impaired balance, impaired endurance, impaired functional mobility, impaired self care skills, gait instability, decreased lower extremity function, decreased upper extremity function, impaired cardiopulmonary response to activity.      Rehab Prognosis: Good; patient would benefit from acute skilled OT services to address these deficits and reach maximum level of function.       Plan:     Patient to be seen 3 x/week to address the above listed problems via self-care/home management, therapeutic activities, therapeutic exercises  Plan of Care Expires: 12/30/23  Plan of Care Reviewed with: patient    Subjective     Chief Complaint: not wanting to get OOB   Patient/Family Comments/goals: to go home     Occupational Profile:  Living Environment: Pt has granddaughter (17) living with her in a second floor apartment. Pt has flight of steps to enter apartment  with HR. Pt has a shower/tub combo for bathing with shower chair  Previous level of function: Mod I for ADLs/mobility with pt on 2L 02 except for bathroom needs. Pt states she does not use a RW as of recent.  Roles and Routines: Home dwelling. Retired from [employer]  Equipment Used at Home: walker, rolling, oxygen, shower chair  Assistance upon Discharge: granddaughter    Pain/Comfort:  Pain Rating 1: 0/10  Pain Rating Post-Intervention 1: 0/10  Pain Rating Post-Intervention 2: 0/10    Patients cultural, spiritual, Caodaism conflicts given the current situation: no    Objective:   Co-treatment with PT for maximal pt participation, safety, and activity tolerance     Communicated with: RN prior to session.  Patient found HOB elevated with oxygen, telemetry upon OT entry to room.    General Precautions: Standard, fall  Orthopedic Precautions: N/A  Braces: N/A  Respiratory Status: Nasal cannula, flow 2 L/min    Occupational Performance:    Bed Mobility:    Patient completed Rolling/Turning to Right with stand by assistance  Patient completed Scooting/Bridging with stand by assistance  Patient completed Supine to Sit with stand by assistance  Patient completed Sit to Supine with stand by assistance    Functional Mobility/Transfers:  Patient completed Sit <> Stand Transfer with stand by assistance  with  no assistive device   Functional Mobility: Pt stood and took 3 steps forward/backward with SBA using no AD. Pt refused stair training to mimic home layout, and refused out of room mobility    Activities of Daily Living:  Grooming:   refused  Lower Body Dressing: total assistance donning slides     Cognitive/Visual Perceptual:  Cognitive/Psychosocial Skills:     -       Oriented to: Person, Place, Time, and Situation   -       Follows Commands/attention:Follows multistep  commands  -       Communication: clear/fluent  -       Memory: No Deficits noted  -       Safety awareness/insight to disability: intact   -        Mood/Affect/Coping skills/emotional control: Appropriate to situation    Physical Exam:  Balance:    -       demo good sitting and standing using no AD   Upper Extremity Range of Motion:     -       Right Upper Extremity: WFL  -       Left Upper Extremity: WFL  Upper Extremity Strength:    -       Right Upper Extremity: WFL  -       Left Upper Extremity: WFL   Strength:    -       Right Upper Extremity: WFL  -       Left Upper Extremity: WFL    AMPAC 6 Click ADL:  AMPAC Total Score: 21    Treatment & Education:  Pt educated on role of occupational therapy, POC, and safety during ADLs and functional mobility. Pt and OT discussed importance of safe, continued mobility to optimize daily living skills. Pt verbalized understanding.     White board updated during session. Pt given instruction to call for medical staff/nurse for assistance.       Patient left HOB elevated with all lines intact, call button in reach, and RN notified    GOALS:   Multidisciplinary Problems       Occupational Therapy Goals          Problem: Occupational Therapy    Goal Priority Disciplines Outcome Interventions   Occupational Therapy Goal     OT, PT/OT Ongoing, Progressing    Description: Goals to be met by: 12/30/23 (1 month)     Patient will increase functional independence with ADLs by performing:    UE Dressing with [place].  LE Dressing with [place].  Grooming while standing at sink with Modified [place].  Toileting from toilet with Modified [place] for hygiene and clothing management.   Rolling to Bilateral with [place].   Supine to sit with [place].  Step transfer with Modified [place]  Toilet transfer to toilet with Modified [place].                         History:     Past Medical History:   Diagnosis Date    Anemia     Anxiety     COPD (chronic obstructive pulmonary disease)     COPD (chronic obstructive pulmonary disease) with emphysema     Coronary artery disease     Depression      Diverticulitis     Hyperlipidemia     Hypertension     PVD (peripheral vascular disease)     PVD (peripheral vascular disease)     S/P colostomy     Substance abuse     hx heavy etoh use     Tobacco abuse          Past Surgical History:   Procedure Laterality Date    ANGIOGRAM, CORONARY, WITH LEFT HEART CATHETERIZATION N/A 11/22/2023    Procedure: Angiogram, Coronary, with Left Heart Cath;  Surgeon: Checo Bhatt MD;  Location: [facility] CATH LAB;  Service: Cardiology;  Laterality: N/A;    ANGIOGRAM, EXTREMITY, UNILATERAL Right 8/25/2023    Procedure: ANGIOGRAM, EXTREMITY, UNILATERAL;  Surgeon: Gary Rico MD;  Location: [facility] OR [address] [place] [address];  Service: Vascular;  Laterality: Right;  US GUIDED ACCESS LEFT GROIN  contrast: 150ml  fluoro: 27.9 min  mGy: 254.73  Gycm2: 62.4919  radial flush cocktail: 10ml    ANGIOGRAPHY OF LOWER EXTREMITY Right 8/24/2023    Procedure: Angiogram Extremity Unilateral;  Surgeon: Benji Ashley MD;  Location: [address];  Service: Vascular;  Laterality: Right;    COLOSTOMY      ECTOPIC PREGNANCY SURGERY      PTA, PERONEAL Right 8/25/2023    Procedure: PTA, PERONEAL TIBIAL TRUNK WITH SHOCKWAVE;  Surgeon: Gary Rico MD;  Location: [address];  Service: Vascular;  Laterality: Right;    PTCA, SINGLE VESSEL  11/22/2023    Procedure: PTCA, Single Vessel;  Surgeon: Checo Bhatt MD;  Location: [facility] CATH LAB;  Service: Cardiology;;    right forefoot amputation      right toe amputation      x2 toes    STENT, DRUG ELUTING, SINGLE VESSEL, CORONARY  11/22/2023    Procedure: Stent, Drug Eluting, Single Vessel, Coronary;  Surgeon: Checo Bhatt MD;  Location: [facility] CATH LAB;  Service: Cardiology;;    STENT, SUPERFICIAL FEMORAL ARTERY Right 8/25/2023    Procedure: STENT, SUPERFICIAL FEMORAL ARTERY;  Surgeon: Gary Rico MD;  Location: [facility] OR [address] [place] [address];  Service: Vascular;  Laterality: Right;  VIABAHN 6 X 15 X120       Time  Tracking:     OT Date of Treatment: 11/30/23  OT Start Time: 0851  OT Stop Time: 0902  OT Total Time (min): 11 min    Billable Minutes:Evaluation 11 min    11/30/2023

## 2023-11-30 NOTE — ED NOTES
Telemetry Verification   Patient placed on Telemetry Box  Verified with War Room  Box # [id number]   Monitor Tech Max   Rate 78   Rhythm sinus

## 2023-11-30 NOTE — PLAN OF CARE
11/30/23 1414   Post-Acute Status   Post-Acute Authorization Home Health   Home Health Status Pending medical clearance/testing     Pt accepted by [facility] once medically cleared.  Will continue to follow.      Evon Dickinson LMSW  [facility]  [id number]

## 2023-11-30 NOTE — NURSING
Admitted to room 304, alert and oriented x4, denies pain, no s/s of distress, oriented to room and call light, skin intact, safety instructions given, verbalized understanding, VS WNL, comfort measures given, will continue to monitor.

## 2023-11-30 NOTE — PLAN OF CARE
[facility] - Cardiology Stepdown      HOME HEALTH ORDERS  FACE TO FACE ENCOUNTER    Patient Name: Radha Sotelo  YOB: 1945    PCP: Laurence, Primary Doctor   PCP Address: None  PCP Phone Number: None  PCP Fax: None    Encounter Date: 11/29/23    Admit to Home Health    Diagnoses:  Active Hospital Problems    Diagnosis  POA    *NSTEMI (non-ST elevated myocardial infarction) [I21.4]  Yes    Acute on chronic diastolic heart failure [I50.33]  Yes    Essential hypertension [I10]  Yes    S/P coronary artery stent placement [Z95.5]  Not Applicable    HLD (hyperlipidemia) [E78.5]  Yes    Coronary artery disease [I25.10]  Yes    S/P colostomy [Z93.3]  Not Applicable    PVD (peripheral vascular disease) [I73.9]  Yes    Physical deconditioning [R53.81]  Yes      Resolved Hospital Problems   No resolved problems to display.       Follow Up Appointments:  Future Appointments   Date Time Provider Department Center   12/4/2023  9:30 AM Zackery Calderon MD [facility] IM [facility] PCW   12/6/2023  9:00 AM VASCULAR, LAB [facility]   12/6/2023  9:40 AM Dionicio Saldaña MD [facility]   12/11/2023  8:00 AM Kalyn Pemberton NP [address]   1/2/2024  8:30 AM Maurilio Carlos MD [facility]IO [facility]       Allergies:Review of patient's allergies indicates:  No Known Allergies    Medications: Review discharge medications with patient and family and provide education.    Current Facility-Administered Medications   Medication Dose Route Frequency Provider Last Rate Last Admin    acetaminophen tablet 650 mg  650 mg Oral Q6H PRN Cristina Alarcon MD   650 mg at 12/01/23 0250    albuterol-ipratropium 2.5 mg-0.5 mg/3 mL nebulizer solution 3 mL  3 mL Nebulization Q6H WAKE Cristina Alarcon MD   3 mL at 12/01/23 0734    aspirin EC tablet 81 mg  81 mg Oral Daily Nicola Marcelo MD   81 mg at 12/01/23 0831    aspirin tablet 325 mg  325 mg Oral Once Nicola Marcelo MD        atorvastatin tablet 80 mg  80 mg  Oral Daily Yris Fierro MD   80 mg at 12/01/23 0831    clopidogreL tablet 75 mg  75 mg Oral Daily Nicola Marcelo MD   75 mg at 12/01/23 0831    dextrose 10% bolus 125 mL 125 mL  12.5 g Intravenous PRN Nicola Marcelo MD        dextrose 10% bolus 250 mL 250 mL  25 g Intravenous PRN Nicola Marcelo MD        enoxaparin injection 50 mg  1 mg/kg Subcutaneous Q12H Annabel Almeida MD   50 mg at 12/01/23 0535    furosemide tablet 20 mg  20 mg Oral Daily Annabel Almeida MD   20 mg at 12/01/23 0831    glucagon (human recombinant) injection 1 mg  1 mg Intramuscular PRN Nicola Marcelo MD        glucose chewable tablet 16 g  16 g Oral PRN Nicola Marcelo MD        glucose chewable tablet 24 g  24 g Oral PRN Nicola Marcelo MD        metoprolol succinate (TOPROL-XL) 24 hr tablet 25 mg  25 mg Oral Daily Yris Fierro MD   25 mg at 12/01/23 0831    naloxone 0.4 mg/mL injection 0.02 mg  0.02 mg Intravenous PRN Nicola Marcelo MD        nicotine 21 mg/24 hr 1 patch  1 patch Transdermal Daily Nicola Marcelo MD   1 patch at 12/01/23 0832    nitroGLYCERIN SL tablet 0.4 mg  0.4 mg Sublingual Q5 Min PRN Nicola Marcelo MD        potassium chloride SA CR tablet 40 mEq  40 mEq Oral BID Nicola Marcelo MD   40 mEq at 12/01/23 0831    sodium chloride 0.9% flush 10 mL  10 mL Intravenous Q12H PRN Nicola Marcelo MD         Current Discharge Medication List        START taking these medications    Details   furosemide (LASIX) 20 MG tablet Take 1 tablet (20 mg total) by mouth once daily. for 5 days  Qty: 5 tablet, Refills: 0           CONTINUE these medications which have CHANGED    Details   albuterol (PROVENTIL/VENTOLIN HFA) 90 mcg/actuation inhaler Inhale 2 puffs into the lungs every 6 (six) hours. Rescue  Qty: 25.5 g, Refills: 3    Associated Diagnoses: Chronic obstructive pulmonary disease, unspecified COPD type      atorvastatin (LIPITOR) 80 MG tablet Take 1 tablet (80 mg total) by mouth once daily.  Qty:  90 tablet, Refills: 3    Associated Diagnoses: Coronary artery disease involving native coronary artery of native heart without angina pectoris      budesonide-formoterol 80-4.5 mcg (SYMBICORT) 80-4.5 mcg/actuation HFAA INHALE 2 PUFFS INTO THE LUNGS TWICE DAILY. RINSE MOUTH AFTER USE  Qty: 10.2 g, Refills: 6    Associated Diagnoses: Chronic obstructive pulmonary disease, unspecified COPD type      clopidogreL (PLAVIX) 75 mg tablet Take 1 tablet (75 mg total) by mouth once daily.  Qty: 30 tablet, Refills: 11    Associated Diagnoses: Coronary artery disease involving native coronary artery of native heart without angina pectoris      metoprolol succinate (TOPROL-XL) 25 MG 24 hr tablet Take 1 tablet (25 mg total) by mouth once daily.  Qty: 90 tablet, Refills: 3    Comments: .  Associated Diagnoses: Coronary artery disease involving native coronary artery of native heart without angina pectoris      nicotine (NICODERM CQ) 21 mg/24 hr Place 1 patch onto the skin once daily. for 15 days  Qty: 28 patch, Refills: 0    Associated Diagnoses: Coronary artery disease involving native coronary artery of native heart without angina pectoris      nitroGLYCERIN (NITROSTAT) 0.4 MG SL tablet Place 1 tablet (0.4 mg total) under the tongue every 5 (five) minutes as needed for Chest pain.  Qty: 25 tablet, Refills: 2    Associated Diagnoses: Coronary artery disease involving native coronary artery of native heart without angina pectoris           CONTINUE these medications which have NOT CHANGED    Details   aspirin (ECOTRIN) 81 MG EC tablet Take 1 tablet (81 mg total) by mouth once daily.  Qty: 90 tablet, Refills: 2      hydrocortisone-aloe vera 1 % Crea cream Apply topically 2 (two) times daily.  Qty: 28 g, Refills: 0               I have seen and examined this patient within the last 30 days. My clinical findings that support the need for the home health skilled services and home bound status are the following:no   Weakness/numbness  causing balance and gait disturbance due to Coronary Heart Disease making it taxing to leave home.     Diet:   cardiac diet    Labs:  Nursing to perform labs twice weekly, obtain CBC and BMP on 12/4/2023 and report results to patients primary care physician    Referrals/ Consults  Physical Therapy to evaluate and treat. Evaluate for home safety and equipment needs; Establish/upgrade home exercise program. Perform / instruct on therapeutic exercises, gait training, transfer training, and Range of Motion.  Occupational Therapy to evaluate and treat. Evaluate home environment for safety and equipment needs. Perform/Instruct on transfers, ADL training, ROM, and therapeutic exercises.   to evaluate for community resources/long-range planning.  Aide to provide assistance with personal care, ADLs, and vital signs.    Activities:   activity as tolerated    Nursing:   Agency to admit patient within 24 hours of hospital discharge unless specified on physician order or at patient request    SN to complete comprehensive assessment including routine vital signs. Instruct on disease process and s/s of complications to report to MD. Review/verify medication list sent home with the patient at time of discharge  and instruct patient/caregiver as needed. Frequency may be adjusted depending on start of care date.     Skilled nurse to perform up to 3 visits PRN for symptoms related to diagnosis    Notify MD if SBP > 160 or < 90; DBP > 90 or < 50; HR > 120 or < 50; Temp > 101; O2 < 88%    Ok to schedule additional visits based on staff availability and patient request on consecutive days within the home health episode.    When multiple disciplines ordered:    Start of Care occurs on Sunday - Wednesday schedule remaining discipline evaluations as ordered on separate consecutive days following the start of care.    Thursday SOC -schedule subsequent evaluations Friday and Monday the following week.     Friday - Saturday SOC -  schedule subsequent discipline evaluations on consecutive days starting Monday of the following week.    For all post-discharge communication and subsequent orders please contact patient's primary care physician.     Miscellaneous   Home Oxygen:  Oxygen at 2 L/min nasal canula to be used:  Continuously. and Assess oxygen saturation via pulse oximeter as needed for increase in SOB.    Home Health Aide:  Nursing Three times weekly, Physical Therapy Three times weekly, Occupational Therapy Three times weekly, and Home Health Aide Three times weekly      I certify that this patient is confined to her home and needs intermittent skilled nursing care, physical therapy, and occupational therapy.      Nicola Marcelo MD  [facility]

## 2023-11-30 NOTE — PLAN OF CARE
PT evaluation completed- see note for details. PT POC and goals established.    Problem: Physical Therapy  Goal: Physical Therapy Goal  Description: Goals to be met by: 23     Patient will increase functional independence with mobility by performin. Supine to sit with Milam  2. Sit to stand transfer with Milam  3. Bed to chair transfer with Milam using LRAD  4. Gait  x 150 feet with Supervision using LRAD.   5. Ascend/descend 14 stairs with bilateral Handrails Supervision using LRAD.     Outcome: Ongoing, Progressing

## 2023-12-01 VITALS
BODY MASS INDEX: 21.65 KG/M2 | TEMPERATURE: 98 F | WEIGHT: 110.25 LBS | HEIGHT: 60 IN | RESPIRATION RATE: 18 BRPM | HEART RATE: 102 BPM | OXYGEN SATURATION: 96 % | SYSTOLIC BLOOD PRESSURE: 106 MMHG | DIASTOLIC BLOOD PRESSURE: 66 MMHG

## 2023-12-01 LAB
ALBUMIN SERPL BCP-MCNC: 3.3 G/DL (ref 3.5–5.2)
ALP SERPL-CCNC: 78 U/L (ref 55–135)
ALT SERPL W/O P-5'-P-CCNC: 13 U/L (ref 10–44)
ANION GAP SERPL CALC-SCNC: 13 MMOL/L (ref 8–16)
AST SERPL-CCNC: 18 U/L (ref 10–40)
BASOPHILS # BLD AUTO: 0.05 K/UL (ref 0–0.2)
BASOPHILS NFR BLD: 0.9 % (ref 0–1.9)
BILIRUB SERPL-MCNC: 0.3 MG/DL (ref 0.1–1)
BUN SERPL-MCNC: 22 MG/DL (ref 8–23)
CALCIUM SERPL-MCNC: 9.1 MG/DL (ref 8.7–10.5)
CHLORIDE SERPL-SCNC: 98 MMOL/L (ref 95–110)
CO2 SERPL-SCNC: 27 MMOL/L (ref 23–29)
CREAT SERPL-MCNC: 0.9 MG/DL (ref 0.5–1.4)
DIFFERENTIAL METHOD: ABNORMAL
EOSINOPHIL # BLD AUTO: 0.3 K/UL (ref 0–0.5)
EOSINOPHIL NFR BLD: 4.3 % (ref 0–8)
ERYTHROCYTE [DISTWIDTH] IN BLOOD BY AUTOMATED COUNT: 13.7 % (ref 11.5–14.5)
EST. GFR  (NO RACE VARIABLE): >60 ML/MIN/1.73 M^2
GLUCOSE SERPL-MCNC: 122 MG/DL (ref 70–110)
HCT VFR BLD AUTO: 34.4 % (ref 37–48.5)
HGB BLD-MCNC: 11.4 G/DL (ref 12–16)
IMM GRANULOCYTES # BLD AUTO: 0.03 K/UL (ref 0–0.04)
IMM GRANULOCYTES NFR BLD AUTO: 0.5 % (ref 0–0.5)
LYMPHOCYTES # BLD AUTO: 1 K/UL (ref 1–4.8)
LYMPHOCYTES NFR BLD: 17.1 % (ref 18–48)
MAGNESIUM SERPL-MCNC: 1.6 MG/DL (ref 1.6–2.6)
MCH RBC QN AUTO: 29.4 PG (ref 27–31)
MCHC RBC AUTO-ENTMCNC: 33.1 G/DL (ref 32–36)
MCV RBC AUTO: 89 FL (ref 82–98)
MONOCYTES # BLD AUTO: 0.6 K/UL (ref 0.3–1)
MONOCYTES NFR BLD: 10.7 % (ref 4–15)
NEUTROPHILS # BLD AUTO: 3.9 K/UL (ref 1.8–7.7)
NEUTROPHILS NFR BLD: 66.5 % (ref 38–73)
NRBC BLD-RTO: 0 /100 WBC
PHOSPHATE SERPL-MCNC: 3.8 MG/DL (ref 2.7–4.5)
PLATELET # BLD AUTO: 279 K/UL (ref 150–450)
PMV BLD AUTO: 9.2 FL (ref 9.2–12.9)
POTASSIUM SERPL-SCNC: 3.3 MMOL/L (ref 3.5–5.1)
PROT SERPL-MCNC: 6.3 G/DL (ref 6–8.4)
RBC # BLD AUTO: 3.88 M/UL (ref 4–5.4)
SODIUM SERPL-SCNC: 138 MMOL/L (ref 136–145)
WBC # BLD AUTO: 5.8 K/UL (ref 3.9–12.7)

## 2023-12-01 PROCEDURE — 63600175 PHARM REV CODE 636 W HCPCS: Performed by: STUDENT IN AN ORGANIZED HEALTH CARE EDUCATION/TRAINING PROGRAM

## 2023-12-01 PROCEDURE — 84100 ASSAY OF PHOSPHORUS: CPT

## 2023-12-01 PROCEDURE — 94761 N-INVAS EAR/PLS OXIMETRY MLT: CPT

## 2023-12-01 PROCEDURE — 25000242 PHARM REV CODE 250 ALT 637 W/ HCPCS: Performed by: STUDENT IN AN ORGANIZED HEALTH CARE EDUCATION/TRAINING PROGRAM

## 2023-12-01 PROCEDURE — G0378 HOSPITAL OBSERVATION PER HR: HCPCS

## 2023-12-01 PROCEDURE — 94640 AIRWAY INHALATION TREATMENT: CPT | Mod: XB

## 2023-12-01 PROCEDURE — 25000003 PHARM REV CODE 250: Performed by: STUDENT IN AN ORGANIZED HEALTH CARE EDUCATION/TRAINING PROGRAM

## 2023-12-01 PROCEDURE — 99239 HOSP IP/OBS DSCHRG MGMT >30: CPT | Mod: ,,, | Performed by: INTERNAL MEDICINE

## 2023-12-01 PROCEDURE — 96367 TX/PROPH/DG ADDL SEQ IV INF: CPT

## 2023-12-01 PROCEDURE — 25000003 PHARM REV CODE 250

## 2023-12-01 PROCEDURE — 83735 ASSAY OF MAGNESIUM: CPT

## 2023-12-01 PROCEDURE — S4991 NICOTINE PATCH NONLEGEND: HCPCS

## 2023-12-01 PROCEDURE — 63600175 PHARM REV CODE 636 W HCPCS

## 2023-12-01 PROCEDURE — 96376 TX/PRO/DX INJ SAME DRUG ADON: CPT

## 2023-12-01 PROCEDURE — 80053 COMPREHEN METABOLIC PANEL: CPT

## 2023-12-01 PROCEDURE — 27000221 HC OXYGEN, UP TO 24 HOURS

## 2023-12-01 PROCEDURE — 96372 THER/PROPH/DIAG INJ SC/IM: CPT | Performed by: STUDENT IN AN ORGANIZED HEALTH CARE EDUCATION/TRAINING PROGRAM

## 2023-12-01 PROCEDURE — 96366 THER/PROPH/DIAG IV INF ADDON: CPT

## 2023-12-01 PROCEDURE — 99239 PR HOSPITAL DISCHARGE DAY,>30 MIN: ICD-10-PCS | Mod: ,,, | Performed by: INTERNAL MEDICINE

## 2023-12-01 PROCEDURE — 85025 COMPLETE CBC W/AUTO DIFF WBC: CPT

## 2023-12-01 PROCEDURE — 36415 COLL VENOUS BLD VENIPUNCTURE: CPT

## 2023-12-01 RX ORDER — FUROSEMIDE 20 MG/1
20 TABLET ORAL DAILY
Status: DISCONTINUED | OUTPATIENT
Start: 2023-12-01 | End: 2023-12-01 | Stop reason: HOSPADM

## 2023-12-01 RX ORDER — FUROSEMIDE 20 MG/1
20 TABLET ORAL DAILY
Qty: 5 TABLET | Refills: 0 | Status: SHIPPED | OUTPATIENT
Start: 2023-12-02 | End: 2023-12-07

## 2023-12-01 RX ORDER — POTASSIUM CHLORIDE 20 MEQ/1
40 TABLET, EXTENDED RELEASE ORAL ONCE
Status: COMPLETED | OUTPATIENT
Start: 2023-12-01 | End: 2023-12-01

## 2023-12-01 RX ORDER — POTASSIUM CHLORIDE 20 MEQ/1
40 TABLET, EXTENDED RELEASE ORAL 2 TIMES DAILY
Status: DISCONTINUED | OUTPATIENT
Start: 2023-12-01 | End: 2023-12-01

## 2023-12-01 RX ORDER — MAGNESIUM SULFATE HEPTAHYDRATE 40 MG/ML
2 INJECTION, SOLUTION INTRAVENOUS ONCE
Status: COMPLETED | OUTPATIENT
Start: 2023-12-01 | End: 2023-12-01

## 2023-12-01 RX ADMIN — MAGNESIUM SULFATE HEPTAHYDRATE 2 G: 40 INJECTION, SOLUTION INTRAVENOUS at 08:12

## 2023-12-01 RX ADMIN — FUROSEMIDE 20 MG: 10 INJECTION, SOLUTION INTRAMUSCULAR; INTRAVENOUS at 05:12

## 2023-12-01 RX ADMIN — POTASSIUM CHLORIDE 40 MEQ: 1500 TABLET, EXTENDED RELEASE ORAL at 08:12

## 2023-12-01 RX ADMIN — METOPROLOL SUCCINATE 25 MG: 25 TABLET, EXTENDED RELEASE ORAL at 08:12

## 2023-12-01 RX ADMIN — ASPIRIN 81 MG: 81 TABLET, COATED ORAL at 08:12

## 2023-12-01 RX ADMIN — Medication 1 PATCH: at 08:12

## 2023-12-01 RX ADMIN — ENOXAPARIN SODIUM 50 MG: 60 INJECTION SUBCUTANEOUS at 05:12

## 2023-12-01 RX ADMIN — ATORVASTATIN CALCIUM 80 MG: 40 TABLET, FILM COATED ORAL at 08:12

## 2023-12-01 RX ADMIN — IPRATROPIUM BROMIDE AND ALBUTEROL SULFATE 3 ML: .5; 3 SOLUTION RESPIRATORY (INHALATION) at 12:12

## 2023-12-01 RX ADMIN — CLOPIDOGREL BISULFATE 75 MG: 75 TABLET ORAL at 08:12

## 2023-12-01 RX ADMIN — ACETAMINOPHEN 650 MG: 325 TABLET ORAL at 04:12

## 2023-12-01 RX ADMIN — POTASSIUM CHLORIDE 40 MEQ: 1500 TABLET, EXTENDED RELEASE ORAL at 01:12

## 2023-12-01 RX ADMIN — IPRATROPIUM BROMIDE AND ALBUTEROL SULFATE 3 ML: .5; 3 SOLUTION RESPIRATORY (INHALATION) at 07:12

## 2023-12-01 RX ADMIN — ENOXAPARIN SODIUM 50 MG: 60 INJECTION SUBCUTANEOUS at 04:12

## 2023-12-01 RX ADMIN — ACETAMINOPHEN 650 MG: 325 TABLET ORAL at 02:12

## 2023-12-01 RX ADMIN — FUROSEMIDE 20 MG: 20 TABLET ORAL at 08:12

## 2023-12-01 NOTE — DISCHARGE INSTRUCTIONS
Take aspirin and plavix daily, ok to stop taking plavix after one year  Take atorvastatin and toprol daily  Take Lasix 20mg once a day for 5 days    Follow up with Cardiology outpatient appointment and primary care physician

## 2023-12-01 NOTE — SUBJECTIVE & OBJECTIVE
Interval History:    Pt not c/o of chest pain this am, subjectively feeling well. Troponin continuing to rise, suggesting NSTEMI 2/2 non-compliance with DAPT s/p stent. Will continue medical management for ACS. PT/OT ordered. Plan for discharge tomorrow    Review of Systems   Constitutional: Negative for chills, decreased appetite, diaphoresis, fever, malaise/fatigue and weight gain.   HENT:  Negative for congestion and ear discharge.    Eyes:  Negative for blurred vision.   Cardiovascular:  Positive for dyspnea on exertion and leg swelling (mild). Negative for chest pain, irregular heartbeat, orthopnea, palpitations and paroxysmal nocturnal dyspnea.   Respiratory:  Positive for shortness of breath. Negative for cough, snoring and sputum production.    Skin:  Negative for color change, dry skin and rash.   Musculoskeletal:  Negative for back pain, falls, joint pain and neck pain.   Gastrointestinal:  Negative for bloating, abdominal pain, constipation and diarrhea.   Genitourinary:  Negative for dysuria, flank pain, hematuria and hesitancy.   Neurological:  Negative for focal weakness, headaches, numbness and seizures.   Psychiatric/Behavioral:  Negative for altered mental status, depression and hallucinations.      Objective:     Vital Signs (Most Recent):  Temp: 97.8 °F (36.6 °C) (11/30/23 1521)  Pulse: 99 (11/30/23 1521)  Resp: 18 (11/30/23 1521)  BP: 111/60 (11/30/23 1521)  SpO2: (!) 94 % (11/30/23 1521) Vital Signs (24h Range):  Temp:  [97.6 °F (36.4 °C)-98.7 °F (37.1 °C)] 97.8 °F (36.6 °C)  Pulse:  [] 99  Resp:  [16-40] 18  SpO2:  [92 %-100 %] 94 %  BP: ()/(51-67) 111/60     Weight: 50 kg (110 lb 3.7 oz)  Body mass index is 21.53 kg/m².    SpO2: (!) 94 %         Intake/Output Summary (Last 24 hours) at 11/30/2023 8646  Last data filed at 11/30/2023 0452  Gross per 24 hour   Intake --   Output 2000 ml   Net -2000 ml       Lines/Drains/Airways       Drain  Duration                  Colostomy 07/13/21  0201 Descending/sigmoid  days              Peripheral Intravenous Line  Duration                  Peripheral IV - Single Lumen 11/29/23 1303 20 G Anterior;Distal;Left Forearm 1 day         Peripheral IV - Single Lumen 11/29/23 1658 20 G Anterior;Right Forearm 1 day                     Physical Exam  Constitutional:       General: She is not in acute distress.     Appearance: She is not ill-appearing.   HENT:      Nose: Nose normal.      Mouth/Throat:      Mouth: Mucous membranes are moist.   Eyes:      Pupils: Pupils are equal, round, and reactive to light.   Neck:      Comments: No JVD appreciated   Cardiovascular:      Rate and Rhythm: Normal rate and regular rhythm.      Pulses: Normal pulses.      Heart sounds: No murmur heard.  Pulmonary:      Effort: Pulmonary effort is normal. No respiratory distress.      Breath sounds: No wheezing or rales.   Abdominal:      General: Abdomen is flat. There is no distension.      Palpations: Abdomen is soft.      Tenderness: There is no right CVA tenderness or left CVA tenderness.      Comments: Ostomy bag    Musculoskeletal:         General: No swelling.      Cervical back: Normal range of motion and neck supple. No tenderness.      Right lower leg: No edema.      Left lower leg: No edema.      Comments: S/p R TMA   Skin:     General: Skin is warm.      Coloration: Skin is not pale.      Findings: No rash.   Neurological:      General: No focal deficit present.      Mental Status: She is alert and oriented to person, place, and time.      Motor: No weakness.        Significant Labs: CMP   Recent Labs   Lab 11/29/23  1319 11/30/23  0554    138   K 3.4* 3.8    100   CO2 21* 26   GLU 94 88   BUN 16 17   CREATININE 0.7 0.7   CALCIUM 9.3 9.1   PROT 6.6 6.3   ALBUMIN 3.5 3.3*   BILITOT 0.4 0.3   ALKPHOS 75 75   AST 19 21   ALT 18 14   ANIONGAP 14 12     , CBC   Recent Labs   Lab 11/29/23  1319 11/30/23  0554   WBC 7.32 6.55   HGB 11.1* 11.4*   HCT 34.4* 36.0*     306     , Troponin   Recent Labs   Lab 11/29/23  1701 11/30/23  0944 11/30/23  1622   TROPONINI 0.970* 1.049* 0.779*     All pertinent lab results from the last 24 hours have been reviewed.    Significant Imaging: and Echocardiogram: Transthoracic echo (TTE) complete (Cupid Only):   Results for orders placed or performed during the hospital encounter of 11/22/23   Echo   Result Value Ref Range    RA Width 3.62 cm    LA volume (mod) 60.00 cm3    Left Atrium Major Axis 4.91 cm    Left Atrium Minor Axis 4.97 cm    RA Major Axis 4.24 cm    LV Diastolic Volume 86.13 mL    LV Systolic Volume 31.83 mL    MV Peak A James 0.71 m/s    TR Max James 3.02 m/s    MV Peak E James 1.04 m/s    Ao VTI 26.21 cm    Ao peak james 1.33 m/s    LVOT peak VTI 16.31 cm    LVOT peak james 0.79 m/s    LVOT diameter 2.00 cm    IVRT 88.49 msec    E wave deceleration time 183.35 msec    AV mean gradient 5 mmHg    TAPSE 1.56 cm    RVDD 2.87 cm    LA size 3.66 cm    Ascending aorta 3.31 cm    STJ 2.31 cm    Sinus 2.63 cm    LVIDs 2.89 2.1 - 4.0 cm    Posterior Wall 0.76 0.6 - 1.1 cm    IVS 0.93 0.6 - 1.1 cm    LVIDd 4.37 3.5 - 6.0 cm    TDI LATERAL 0.10 m/s    LA WIDTH 4.40 cm    TDI SEPTAL 0.07 m/s    LV LATERAL E/E' RATIO 10.40 m/s    LV SEPTAL E/E' RATIO 14.86 m/s    FS 34 28 - 44 %    LA volume 67.62 cm3    LV mass 116.32 g    ZLVIDD -0.44     ZLVIDS 0.16     Left Ventricle Relative Wall Thickness 0.35 cm    AV valve area 1.95 cm²    AV Velocity Ratio 0.59     AV index (prosthetic) 0.62     E/A ratio 1.46     Mean e' 0.09 m/s    LVOT area 3.1 cm2    LVOT stroke volume 51.21 cm3    AV peak gradient 7 mmHg    E/E' ratio 12.24 m/s    LV Systolic Volume Index 19.8 mL/m2    LV Diastolic Volume Index 53.50 mL/m2    LA Volume Index 42.0 mL/m2    LV Mass Index 72 g/m2    Triscuspid Valve Regurgitation Peak Gradient 36 mmHg    LA Volume Index (Mod) 37.3 mL/m2    ADOLFO by Velocity Ratio 1.87 cm²    BSA 1.65 m2    TV resting pulmonary artery pressure 44  mmHg    RV TB RVSP 11 mmHg    Est. RA pres 8 mmHg    RA area 20.0 cm2    Narrative      Left Ventricle: The left ventricle is normal in size. Ventricular mass   is normal. Normal wall thickness. Normal wall motion. There is normal   systolic function with a visually estimated ejection fraction of 55 - 60%.   Grade II diastolic dysfunction.    Right Ventricle: Normal right ventricular cavity size. Wall thickness   is normal. Right ventricle wall motion  is normal. Systolic function is   normal.    The following segments are hypokinetic: basal inferior, basal   inferolateral and mid inferolateral suggesting ischemia/infarct in LCx or   RCA territory.    Left Atrium: Left atrium is mildly dilated.    Right Atrium: Right atrium is mildly dilated.    Aortic Valve: There is severe aortic valve sclerosis. Moderately   calcified cusps. There is moderate annular calcification present.    Mitral Valve: There is mild mitral annular calcification present. There   is mild to moderate regurgitation. Mild restriction of the posterior   leaflet likely ischemic MR.    Pulmonary Artery: The estimated pulmonary artery systolic pressure is   44 mmHg.    IVC/SVC: Intermediate venous pressure at 8 mmHg.       Angiogram 11/22/2023:    This was a successful PCI for acute myocardial infarction.    The Prox RCA lesion was 99% stenosed with 0% stenosis post-intervention.    The Dist RCA lesion was 90% stenosed with 0% stenosis post-intervention.    The RPDA lesion was 100% stenosed with 10% stenosis post-intervention.    The ejection fraction was greater than 55% by visual estimate.    The pre-procedure left ventricular end diastolic pressure was 15.    The estimated blood loss was <50 mL.

## 2023-12-01 NOTE — ASSESSMENT & PLAN NOTE
"- Presented with CP x2 episodes at rest since discharge, nonradiating  - Did not  rx for ASA, Plavix, NTG since discharge   - Tn 0.624 > 0.571  - EKG w/o evidence of acute ischemia  - CP resolved at time of CCU evaluation    Concerning for NSTEMI 2/2 non-compliance with DAPT s/p stent given troponin continuing to rise during admission despite no EKG changes     - See "CAD" note; continuing medical mx for ACS  - not candidate for further invasive tx  "

## 2023-12-01 NOTE — ASSESSMENT & PLAN NOTE
S/p PCI on 11/22/2023: Proximal RCA subtotal occlusion and distal RCA stenosis s/p GALEN to prox RCA and balloon angioplasty to distal RCA.     Recommended triple therapy with ASA, Plavix, and Eliquis x1 month then d/c ASA; pt declined Eliquis during last admission due to fall risk, agreeable to ASA/Plavix but did not  prescriptions since discharge    Concerning for NSTEMI 2/2 non-compliance with DAPT s/p stent given troponin continuing to rise during admission despite no EKG changes    - Continue medical mx w/ ASA/Plavix  - Continue HIS, BB  - Started on lovenox  - Not candidate for further invasive tx 2/2 medication non-comliance  - Smoking cessation counseling performed, started on NRT  - Risk factor reduction

## 2023-12-01 NOTE — PLAN OF CARE
"[facility] - Cardiology Stepdown  Discharge Final Note    Primary Care Provider: No, Primary Doctor    Expected Discharge Date: 12/1/2023    Final Discharge Note (most recent)       Final Note - 12/01/23 1304          Final Note    Assessment Type Final Discharge Note     Anticipated Discharge Disposition Home-Health Care [facility]     Hospital Resources/Appts/Education Provided Post-Acute resouces added to AVS;Appointments scheduled and added to AVS        Post-Acute Status    Post-Acute Authorization Home Health     Home Health Status Set-up Complete/Auth obtained     Discharge Delays Change in Medical Condition                 SW placed HH orders for Replaced by [facility] in [facility] and faxed orders to [facility] requesting that auth be sent to [facility]:    Your fax has been successfully sent to [phone] at [phone].  ------------------------------------------------------------  From: [id number]  ------------------------------------------------------------  12/1/2023 1:00:58 PM Transmission Record   Sent to [phone] with remote ID "[email or web address]"   Result: (0/339;0/0) Success   Page record: 1 - 8   Elapsed time: 03:20 on channel 1      Evon Dickinson LMSW  [facility]  [id number]        Future Appointments   Date Time Provider Department Center   12/4/2023  9:30 AM Zackery Calderon MD [facility] IM [facility] PCW   12/6/2023  9:00 AM VASCULAR, LAB [facility] VASCLAB [facility]   12/6/2023  9:40 AM Dionicio Saldaña MD [facility] VASCSUR [facility]   12/11/2023  8:00 AM Kalyn Pemberton, NP [address]   1/2/2024  8:30 AM Maurilio Carlos MD [facility] CARDIO [facility]       Contact Info       [facility]   Specialty: Home Health Services    [address]  [address]  [place] [address]   Phone: [phone]       Next Steps: Follow up in 1 day(s)    Instructions: They will contact you to schedule date and time to begin home health services.  Call if you do not hear from them by the day after discharge.            "

## 2023-12-01 NOTE — PLAN OF CARE
Problem: Adult Inpatient Plan of Care  Goal: Plan of Care Review  Outcome: Ongoing, Progressing  Goal: Patient-Specific Goal (Individualized)  Outcome: Ongoing, Progressing  Goal: Absence of Hospital-Acquired Illness or Injury  Outcome: Ongoing, Progressing  Goal: Optimal Comfort and Wellbeing  Outcome: Ongoing, Progressing  Goal: Readiness for Transition of Care  Outcome: Ongoing, Progressing     Problem: Fluid and Electrolyte Imbalance (Acute Kidney Injury/Impairment)  Goal: Fluid and Electrolyte Balance  Outcome: Ongoing, Progressing     Problem: Oral Intake Inadequate (Acute Kidney Injury/Impairment)  Goal: Optimal Nutrition Intake  Outcome: Ongoing, Progressing     Problem: Renal Function Impairment (Acute Kidney Injury/Impairment)  Goal: Effective Renal Function  Outcome: Ongoing, Progressing     Problem: Fluid Imbalance (Pneumonia)  Goal: Fluid Balance  Outcome: Ongoing, Progressing     Problem: Infection (Pneumonia)  Goal: Resolution of Infection Signs and Symptoms  Outcome: Ongoing, Progressing     Problem: Respiratory Compromise (Pneumonia)  Goal: Effective Oxygenation and Ventilation  Outcome: Ongoing, Progressing     Problem: Impaired Wound Healing  Goal: Optimal Wound Healing  Outcome: Ongoing, Progressing

## 2023-12-01 NOTE — HOSPITAL COURSE
Presented to ED with recurrent chest pain (duration 2 days, worse on day of presentation). Of note, pt recently discharged from CCU on 11/24 following admission for inferior STEMI s/p PCI with RCA GALEN x2 and balloon angioplasty. Echo on 11/23 showed EF of 55-60% w/ Grade II diastolic dysfunction. Chest pain resolved following procedure and pt was discharged with strict instructions to maintain compliance with ASA and plavix x1 year along with HI Statin and BB. Patient admits that she never picked up her medications and returned to ED with symptoms similar to last cardiac event. Elevated troponin upon admission, w/o evidence of ischemia on EKG. Cardiology consulted and pt was loaded with ASA and plavix, followed by maintenance therapy. Initiated statin and BB. Concern for for NSTEMI 2/2 non-compliance with DAPT s/p stent given troponin continued to rise during admission despite no EKG changes. Not a candidate for further invasive tx 2/2 medication non-comliance. PT/OT consulted during admission. Pt on lovenox during stay in CCU, though d/c'd upon discharge given pt's request not to be on triple therapy in setting of fall risk. Discharged with Home health orders for PT/OT, and referrals sent for follow up with cardiology and PCP. Discharged on ASA, plavix, atorvastatin 80, and toprol XL 25mg. Reinforced the importance of taking these medications though this was emphasized prior to her last discharge. Pt fully understands that not complying with these meds puts her at risk of further stenosis, she states she is serious about adhering to our recommendations about discharge.

## 2023-12-01 NOTE — PROGRESS NOTES
[facility]  Cardiology  Progress Note    Patient Name: Radha Sotelo  MRN: [id number]  Admission Date: 11/29/2023  Hospital Length of Stay: 1 days  Code Status: Full Code   Attending Physician: Mackenzie Davis MD   Primary Care Physician: Laurence, Primary Doctor  Expected Discharge Date: 12/1/2023  Principal Problem:NSTEMI (non-ST elevated myocardial infarction)    Subjective:       Interval History:    Pt not c/o of chest pain this am, subjectively feeling well. Troponin continuing to rise, suggesting NSTEMI 2/2 non-compliance with DAPT s/p stent. Will continue medical management for ACS. PT/OT ordered. Plan for discharge tomorrow    Review of Systems   Constitutional: Negative for chills, decreased appetite, diaphoresis, fever, malaise/fatigue and weight gain.   HENT:  Negative for congestion and ear discharge.    Eyes:  Negative for blurred vision.   Cardiovascular:  Positive for dyspnea on exertion and leg swelling (mild). Negative for chest pain, irregular heartbeat, orthopnea, palpitations and paroxysmal nocturnal dyspnea.   Respiratory:  Positive for shortness of breath. Negative for cough, snoring and sputum production.    Skin:  Negative for color change, dry skin and rash.   Musculoskeletal:  Negative for back pain, falls, joint pain and neck pain.   Gastrointestinal:  Negative for bloating, abdominal pain, constipation and diarrhea.   Genitourinary:  Negative for dysuria, flank pain, hematuria and hesitancy.   Neurological:  Negative for focal weakness, headaches, numbness and seizures.   Psychiatric/Behavioral:  Negative for altered mental status, depression and hallucinations.      Objective:     Vital Signs (Most Recent):  Temp: 97.8 °F (36.6 °C) (11/30/23 1521)  Pulse: 99 (11/30/23 1521)  Resp: 18 (11/30/23 1521)  BP: 111/60 (11/30/23 1521)  SpO2: (!) 94 % (11/30/23 1521) Vital Signs (24h Range):  Temp:  [97.6 °F (36.4 °C)-98.7 °F (37.1 °C)] 97.8 °F (36.6 °C)  Pulse:  []  99  Resp:  [16-40] 18  SpO2:  [92 %-100 %] 94 %  BP: ()/(51-67) 111/60     Weight: 50 kg (110 lb 3.7 oz)  Body mass index is 21.53 kg/m².    SpO2: (!) 94 %         Intake/Output Summary (Last 24 hours) at 11/30/2023 1816  Last data filed at 11/30/2023 0452  Gross per 24 hour   Intake --   Output 2000 ml   Net -2000 ml       Lines/Drains/Airways       Drain  Duration                  Colostomy 07/13/21 0201 Descending/sigmoid  days              Peripheral Intravenous Line  Duration                  Peripheral IV - Single Lumen 11/29/23 1303 20 G Anterior;Distal;Left Forearm 1 day         Peripheral IV - Single Lumen 11/29/23 1658 20 G Anterior;Right Forearm 1 day                     Physical Exam  Constitutional:       General: She is not in acute distress.     Appearance: She is not ill-appearing.   HENT:      Nose: Nose normal.      Mouth/Throat:      Mouth: Mucous membranes are moist.   Eyes:      Pupils: Pupils are equal, round, and reactive to light.   Neck:      Comments: No JVD appreciated   Cardiovascular:      Rate and Rhythm: Normal rate and regular rhythm.      Pulses: Normal pulses.      Heart sounds: No murmur heard.  Pulmonary:      Effort: Pulmonary effort is normal. No respiratory distress.      Breath sounds: No wheezing or rales.   Abdominal:      General: Abdomen is flat. There is no distension.      Palpations: Abdomen is soft.      Tenderness: There is no right CVA tenderness or left CVA tenderness.      Comments: Ostomy bag    Musculoskeletal:         General: No swelling.      Cervical back: Normal range of motion and neck supple. No tenderness.      Right lower leg: No edema.      Left lower leg: No edema.      Comments: S/p R TMA   Skin:     General: Skin is warm.      Coloration: Skin is not pale.      Findings: No rash.   Neurological:      General: No focal deficit present.      Mental Status: She is alert and oriented to person, place, and time.      Motor: No weakness.         Significant Labs: CMP   Recent Labs   Lab 11/29/23  1319 11/30/23  0554    138   K 3.4* 3.8    100   CO2 21* 26   GLU 94 88   BUN 16 17   CREATININE 0.7 0.7   CALCIUM 9.3 9.1   PROT 6.6 6.3   ALBUMIN 3.5 3.3*   BILITOT 0.4 0.3   ALKPHOS 75 75   AST 19 21   ALT 18 14   ANIONGAP 14 12     , CBC   Recent Labs   Lab 11/29/23  1319 11/30/23  0554   WBC 7.32 6.55   HGB 11.1* 11.4*   HCT 34.4* 36.0*    306     , Troponin   Recent Labs   Lab 11/29/23  1701 11/30/23  0944 11/30/23  1622   TROPONINI 0.970* 1.049* 0.779*     All pertinent lab results from the last 24 hours have been reviewed.    Significant Imaging: and Echocardiogram: Transthoracic echo (TTE) complete ([facility] Only):   Results for orders placed or performed during the hospital encounter of 11/22/23   Echo   Result Value Ref Range    RA Width 3.62 cm    LA volume (mod) 60.00 cm3    Left Atrium Major Axis 4.91 cm    Left Atrium Minor Axis 4.97 cm    RA Major Axis 4.24 cm    LV Diastolic Volume 86.13 mL    LV Systolic Volume 31.83 mL    MV Peak A James 0.71 m/s    TR Max James 3.02 m/s    MV Peak E James 1.04 m/s    Ao VTI 26.21 cm    Ao peak james 1.33 m/s    LVOT peak VTI 16.31 cm    LVOT peak james 0.79 m/s    LVOT diameter 2.00 cm    IVRT 88.49 msec    E wave deceleration time 183.35 msec    AV mean gradient 5 mmHg    TAPSE 1.56 cm    RVDD 2.87 cm    LA size 3.66 cm    Ascending aorta 3.31 cm    STJ 2.31 cm    Sinus 2.63 cm    LVIDs 2.89 2.1 - 4.0 cm    Posterior Wall 0.76 0.6 - 1.1 cm    IVS 0.93 0.6 - 1.1 cm    LVIDd 4.37 3.5 - 6.0 cm    TDI LATERAL 0.10 m/s    LA WIDTH 4.40 cm    TDI SEPTAL 0.07 m/s    LV LATERAL E/E' RATIO 10.40 m/s    LV SEPTAL E/E' RATIO 14.86 m/s    FS 34 28 - 44 %    LA volume 67.62 cm3    LV mass 116.32 g    ZLVIDD -0.44     ZLVIDS 0.16     Left Ventricle Relative Wall Thickness 0.35 cm    AV valve area 1.95 cm²    AV Velocity Ratio 0.59     AV index (prosthetic) 0.62     E/A ratio 1.46     Mean e' 0.09 m/s    LVOT area 3.1  cm2    LVOT stroke volume 51.21 cm3    AV peak gradient 7 mmHg    E/E' ratio 12.24 m/s    LV Systolic Volume Index 19.8 mL/m2    LV Diastolic Volume Index 53.50 mL/m2    LA Volume Index 42.0 mL/m2    LV Mass Index 72 g/m2    Triscuspid Valve Regurgitation Peak Gradient 36 mmHg    LA Volume Index (Mod) 37.3 mL/m2    ADOLFO by Velocity Ratio 1.87 cm²    BSA 1.65 m2    TV resting pulmonary artery pressure 44 mmHg    RV TB RVSP 11 mmHg    Est. RA pres 8 mmHg    RA area 20.0 cm2    Narrative      Left Ventricle: The left ventricle is normal in size. Ventricular mass   is normal. Normal wall thickness. Normal wall motion. There is normal   systolic function with a visually estimated ejection fraction of 55 - 60%.   Grade II diastolic dysfunction.    Right Ventricle: Normal right ventricular cavity size. Wall thickness   is normal. Right ventricle wall motion  is normal. Systolic function is   normal.    The following segments are hypokinetic: basal inferior, basal   inferolateral and mid inferolateral suggesting ischemia/infarct in LCx or   RCA territory.    Left Atrium: Left atrium is mildly dilated.    Right Atrium: Right atrium is mildly dilated.    Aortic Valve: There is severe aortic valve sclerosis. Moderately   calcified cusps. There is moderate annular calcification present.    Mitral Valve: There is mild mitral annular calcification present. There   is mild to moderate regurgitation. Mild restriction of the posterior   leaflet likely ischemic MR.    Pulmonary Artery: The estimated pulmonary artery systolic pressure is   44 mmHg.    IVC/SVC: Intermediate venous pressure at 8 mmHg.       Angiogram 11/22/2023:    This was a successful PCI for acute myocardial infarction.    The Prox RCA lesion was 99% stenosed with 0% stenosis post-intervention.    The Dist RCA lesion was 90% stenosed with 0% stenosis post-intervention.    The RPDA lesion was 100% stenosed with 10% stenosis post-intervention.    The ejection fraction  "was greater than 55% by visual estimate.    The pre-procedure left ventricular end diastolic pressure was 15.    The estimated blood loss was <50 mL.  Assessment and Plan:       * NSTEMI (non-ST elevated myocardial infarction)  - Presented with CP x2 episodes at rest since discharge, nonradiating  - Did not  rx for ASA, Plavix, NTG since discharge   - Tn 0.624 > 0.571  - EKG w/o evidence of acute ischemia  - CP resolved at time of CCU evaluation    Concerning for NSTEMI 2/2 non-compliance with DAPT s/p stent given troponin continuing to rise during admission despite no EKG changes     - See "CAD" note; continuing medical mx for ACS  - not candidate for further invasive tx    Acute on chronic diastolic heart failure  -  on admit  - CXR with ?L-sided pleural effusion  - Mild b/l LE edema    TTE 11/23/2023:    Left Ventricle: The left ventricle is normal in size. Ventricular mass is normal. Normal wall thickness. Normal wall motion. There is normal systolic function with a visually estimated ejection fraction of 55 - 60%. Grade II diastolic dysfunction.    Right Ventricle: Normal right ventricular cavity size. Wall thickness is normal. Right ventricle wall motion  is normal. Systolic function is normal.    The following segments are hypokinetic: basal inferior, basal inferolateral and mid inferolateral suggesting ischemia/infarct in LCx or RCA territory.    Left Atrium: Left atrium is mildly dilated.    Right Atrium: Right atrium is mildly dilated.    Aortic Valve: There is severe aortic valve sclerosis. Moderately calcified cusps. There is moderate annular calcification present.    Mitral Valve: There is mild mitral annular calcification present. There is mild to moderate regurgitation. Mild restriction of the posterior leaflet likely ischemic MR.    Pulmonary Artery: The estimated pulmonary artery systolic pressure is 44 mmHg.    IVC/SVC: Intermediate venous pressure at 8 mmHg.     PLAN:   - IV Lasix 20 " "mg BID   - BMP q 12h   - Replete electolytes for K >4, Phos >3, Mg >2   - Monitor I/Os    HLD (hyperlipidemia)  .8 on 07/12/2021  Home rx: Lipitor 80 mg   Lipid panel pending    - Continue statin    S/P coronary artery stent placement  See 'CAD'    Essential hypertension  Home rx: Toprol-XL 25 mg daily    - Continue Toprol    Coronary artery disease  S/p PCI on 11/22/2023: Proximal RCA subtotal occlusion and distal RCA stenosis s/p GALEN to prox RCA and balloon angioplasty to distal RCA.     Recommended triple therapy with ASA, Plavix, and Eliquis x1 month then d/c ASA; pt declined Eliquis during last admission due to fall risk, agreeable to ASA/Plavix but did not  prescriptions since discharge    Concerning for NSTEMI 2/2 non-compliance with DAPT s/p stent given troponin continuing to rise during admission despite no EKG changes    - Continue medical mx w/ ASA/Plavix  - Continue HIS, BB  - Started on lovenox  - Not candidate for further invasive tx 2/2 medication non-comliance  - Smoking cessation counseling performed, started on NRT  - Risk factor reduction    S/P colostomy  Diverticulitis s/p left hemicolectomy and transverse colon end colostomy (2012)    PVD (peripheral vascular disease)  Patient previously seen by Dr. Saldaña but not seen since 2015     Note from that time states:  "+ MIs x4: last one 2012 s/p PCIs  + 'mini' - stroke  Tobacco use: >50 pack yrs, quit in 2013  S/p  R leg anio 11/9/12: R SFA proximal, PTA of 3 lesions   R SFA PTA (in-stent restenosis) 7/24/13  1/20/15: R SFA PTAS 6x30 mm Jordonlver - post-dilated w 5x30 mm balloon; s/p R TMA     She had a colectomy for diverticulitis performed on the hospital stay and had dry gangrene of 5th digit on right foot. Later she underwent a right lower extremity angiogram and amputation of the right fourth and fifth toe 1/25/2013"     CTA 8/11/23 showed SFA stent occlusion which was thought to be chronic.  Arterial doppler showing severe PAD RLE. "   Angiogram on 8/25 with right SFA stent placed    - Continue ASA, Plavix (see CAD)    Physical deconditioning  Lives alone, states she is able to perform ADLs but has difficulty with stairs    - PT/OT        VTE Risk Mitigation (From admission, onward)           Ordered     enoxaparin injection 50 mg  Every 12 hours (non-standard times)         11/30/23 1421     IP VTE HIGH RISK PATIENT  Once         11/29/23 1527     Place sequential compression device  Until discontinued         11/29/23 1527                    Nicola Marcelo MD  Cardiology  [facility]

## 2023-12-01 NOTE — DISCHARGE SUMMARY
[facility]  Cardiology  Discharge Summary      Patient Name: Radha Sotelo  MRN: [id number]  Admission Date: 11/29/2023  Hospital Length of Stay: 1 days  Discharge Date and Time:  12/01/2023 1:52 PM  Attending Physician: Amanuel Willams MD    Discharging Provider: Nicola Marcelo MD  Primary Care Physician: Laurence, Primary Doctor    HPI:   Ms. Sotelo is a 78-year-old F with PMHx of CAD s/p recent PCI (11/22, GALEN x2 to pRCA, dRCA; angioplasty rPDA), PAT, HTN, HLD, PVD, severe COPD (on 4L NC),and previous osteomyelitis with transmetatarsal amputation (2012) who presented to the emergency department with recurrent chest pain. Of note, pt recently discharged from CCU on 11/24 following admission for inferior STEMI s/p PCI with RCA GALEN x2 and balloon angioplasty. Echo on 11/23 showed EF of 55-60% w/ Grade II diastolic dysfunction. Chest pain resolved following procedure and pt was discharged with strict instructions to maintain compliance with ASA and plavix x1 year along with HIS and BB; see full hospital course below. Pt declined bedside delivery of prescriptions at time of discharge and returns today with recurrent chest pain stating she has not picked up her prescriptions.     Hospital Course 11/22-11/24/2023:  Presented to the ED with chest pain. EKG concerning for inferior STEMI and pt was taken to cath lab. During procedure, noted proximal RCA subtotal occlusion and distal RCA stenosis; GALEN placed to prox RCA and balloon angioplasty to distal RCA. Pt feeling much better post-procedure, CP resolved.  Repeat Echo on 11/23 showed EF of 55-60% w/ Grade II diastolic dysfunction. Progressive mobility attempted by nursing staff though pt refused to mobilize, insisting that she will not move around until she gets home (uses walker for assistance at home). One brief episode of A-fib after cath in setting of MI. Patients Chadvasc score 6, HASBLED score 3, though decision made to hold eliquis given  increased risk of bleeding in the event of a fall. Risk and benefits of triple therapy discussed with patient and she stated that she does not want to take eliquis. Pt does not frequently visit doctors, and believed to be unreliable with follow up appointments due to her frailty. She will require DAPT with aspirin and plavix for one year and was instructed to do so. Will also discharge with statin and lopressor.          * No surgery found *     Indwelling Lines/Drains at time of discharge:  Lines/Drains/Airways       Drain  Duration                  Colostomy 07/13/21 0201 Descending/sigmoid  days    Female External Urinary Catheter 11/29/23 2030 1 day                    Hospital Course:  Presented to ED with recurrent chest pain (duration 2 days, worse on day of presentation). Of note, pt recently discharged from CCU on 11/24 following admission for inferior STEMI s/p PCI with RCA GALEN x2 and balloon angioplasty. Echo on 11/23 showed EF of 55-60% w/ Grade II diastolic dysfunction. Chest pain resolved following procedure and pt was discharged with strict instructions to maintain compliance with ASA and plavix x1 year along with HI Statin and BB. Patient admits that she never picked up her medications and returned to ED with symptoms similar to last cardiac event. Elevated troponin upon admission, w/o evidence of ischemia on EKG. Cardiology consulted and pt was loaded with ASA and plavix, followed by maintenance therapy. Initiated statin and BB. Concern for for NSTEMI 2/2 non-compliance with DAPT s/p stent given troponin continued to rise during admission despite no EKG changes. Not a candidate for further invasive tx 2/2 medication non-comliance. PT/OT consulted during admission. Pt on lovenox during stay in CCU, though d/c'd upon discharge given pt's request not to be on triple therapy in setting of fall risk. Discharged with Home health orders for PT/OT, and referrals sent for follow up with cardiology and  PCP. Discharged on ASA, plavix, atorvastatin 80, and toprol XL 25mg. Reinforced the importance of taking these medications though this was emphasized prior to her last discharge. Pt fully understands that not complying with these meds puts her at risk of further stenosis, she states she is serious about adhering to our recommendations about discharge.    Physical Exam  Constitutional:       General: She is not in acute distress.     Appearance: She is not ill-appearing.   HENT:      Nose: Nose normal.      Mouth/Throat:      Mouth: Mucous membranes are moist.   Eyes:      Pupils: Pupils are equal, round, and reactive to light.   Neck:      Comments: No JVD appreciated   Cardiovascular:      Rate and Rhythm: Normal rate and regular rhythm.      Pulses: Normal pulses.      Heart sounds: No murmur heard.  Pulmonary:      Effort: Pulmonary effort is normal. No respiratory distress.      Breath sounds: No wheezing or rales.   Abdominal:      General: Abdomen is flat. There is no distension.      Palpations: Abdomen is soft.      Tenderness: There is no right CVA tenderness or left CVA tenderness.   Musculoskeletal:         General: No swelling.      Cervical back: Normal range of motion and neck supple. No tenderness.      Right lower leg: No edema.      Left lower leg: No edema.      Comments: S/p R TMA   Skin:     General: Skin is warm.      Coloration: Skin is not pale.      Findings: No rash.   Neurological:      General: No focal deficit present.      Mental Status: She is alert and oriented to person, place, and time.      Motor: No weakness.     Goals of Care Treatment Preferences:  Code Status: Full Code      Consults:     Significant Diagnostic Studies: Labs: All labs within the past 24 hours have been reviewed    Pending Diagnostic Studies:       None            Final Active Diagnoses:    Diagnosis Date Noted POA    PRINCIPAL PROBLEM:  NSTEMI (non-ST elevated myocardial infarction) [I21.4] 12/09/2013 Yes     Acute on chronic diastolic heart failure [I50.33] 11/29/2023 Yes    Essential hypertension [I10] 07/12/2021 Yes    S/P coronary artery stent placement [Z95.5] 07/12/2021 Not Applicable    HLD (hyperlipidemia) [E78.5] 07/12/2021 Yes    Coronary artery disease [I25.10]  Yes    S/P colostomy [Z93.3] 12/21/2012 Not Applicable    PVD (peripheral vascular disease) [I73.9] 11/12/2012 Yes    Physical deconditioning [R53.81] 11/05/2012 Yes      Problems Resolved During this Admission:     No new Assessment & Plan notes have been filed under this hospital service since the last note was generated.  Service: Cardiology      Discharged Condition: stable    Disposition: Home-Health Care Sv    Follow Up:   Follow-up [facility] Follow up in 1 day(s).    Specialty: Home Health Services  Why: They will contact you to schedule date and time to begin home health services.  Call if you do not hear from them by the day after discharge.  Contact information:  [facility]  [address] [place], [place] [address] [phone]                           Patient Instructions:      BASIC METABOLIC PANEL   Standing Status: Future Standing Exp. Date: 01/29/25     Medications:  Reconciled Home Medications:      Medication List        START taking these medications      furosemide 20 MG tablet  Commonly known as: LASIX  Take 1 tablet (20 mg total) by mouth once daily. for 5 days  Start taking on: December 2, 2023            CHANGE how you take these medications      albuterol 90 mcg/actuation inhaler  Commonly known as: PROVENTIL/VENTOLIN HFA  Inhale 2 puffs into the lungs every 6 (six) hours. Rescue  What changed: additional instructions            CONTINUE taking these medications      aspirin 81 MG EC tablet  Commonly known as: ECOTRIN  Take 1 tablet (81 mg total) by mouth once daily.     atorvastatin 80 MG tablet  Commonly known as: LIPITOR  Take 1 tablet (80 mg total) by mouth once daily.     budesonide-formoterol  80-4.5 mcg 80-4.5 mcg/actuation Hfaa  Commonly known as: SYMBICORT  INHALE 2 PUFFS INTO THE LUNGS TWICE DAILY. RINSE MOUTH AFTER USE     clopidogreL 75 mg tablet  Commonly known as: PLAVIX  Take 1 tablet (75 mg total) by mouth once daily.     hydrocortisone-aloe vera 1 % Crea cream  Apply topically 2 (two) times daily.     metoprolol succinate 25 MG 24 hr tablet  Commonly known as: TOPROL-XL  Take 1 tablet (25 mg total) by mouth once daily.     nicotine 21 mg/24 hr  Commonly known as: NICODERM CQ  Place 1 patch onto the skin once daily. for 15 days     nitroGLYCERIN 0.4 MG SL tablet  Commonly known as: NITROSTAT  Place 1 tablet (0.4 mg total) under the tongue every 5 (five) minutes as needed for Chest pain.              Time spent on the discharge of patient: 40 minutes    Nicola Marcelo MD  Cardiology  [facility]

## 2023-12-01 NOTE — NURSING
"Pt being discharged. Discharge teaching on follow up appointments, new meds to start taking and stop taking, signs and symptoms to look for, and when to reach out the md. Pt and family verbalized understanding. Pt will return home via private vehicle with her son. Instructed pt she should get son to bring home o2 for the ride, pt stated, "they'll be fine. Educated pt on side effects that can occur from this, pt verbalized understanding. IV's taken out, telemetry taken off. Discharge successful. Pt yen, discharge successful.   "

## 2023-12-01 NOTE — PLAN OF CARE
Pain med given for headache, plan of care reviewed with patient, comfort measures given, call light within reach, bed in low position, will continue to monitor.    Problem: Adult Inpatient Plan of Care  Goal: Plan of Care Review  Outcome: Ongoing, Progressing  Goal: Patient-Specific Goal (Individualized)  Outcome: Ongoing, Progressing  Goal: Absence of Hospital-Acquired Illness or Injury  Outcome: Ongoing, Progressing  Goal: Optimal Comfort and Wellbeing  Outcome: Ongoing, Progressing  Goal: Readiness for Transition of Care  Outcome: Ongoing, Progressing     Problem: Fluid and Electrolyte Imbalance (Acute Kidney Injury/Impairment)  Goal: Fluid and Electrolyte Balance  Outcome: Ongoing, Progressing     Problem: Oral Intake Inadequate (Acute Kidney Injury/Impairment)  Goal: Optimal Nutrition Intake  Outcome: Ongoing, Progressing     Problem: Adult Inpatient Plan of Care  Goal: Plan of Care Review  Outcome: Ongoing, Progressing  Goal: Patient-Specific Goal (Individualized)  Outcome: Ongoing, Progressing  Goal: Absence of Hospital-Acquired Illness or Injury  Outcome: Ongoing, Progressing  Goal: Optimal Comfort and Wellbeing  Outcome: Ongoing, Progressing  Goal: Readiness for Transition of Care  Outcome: Ongoing, Progressing     Problem: Fluid and Electrolyte Imbalance (Acute Kidney Injury/Impairment)  Goal: Fluid and Electrolyte Balance  Outcome: Ongoing, Progressing     Problem: Oral Intake Inadequate (Acute Kidney Injury/Impairment)  Goal: Optimal Nutrition Intake  Outcome: Ongoing, Progressing     Problem: Renal Function Impairment (Acute Kidney Injury/Impairment)  Goal: Effective Renal Function  Outcome: Ongoing, Progressing     Problem: Fluid Imbalance (Pneumonia)  Goal: Fluid Balance  Outcome: Ongoing, Progressing     Problem: Infection (Pneumonia)  Goal: Resolution of Infection Signs and Symptoms  Outcome: Ongoing, Progressing     Problem: Respiratory Compromise (Pneumonia)  Goal: Effective Oxygenation and  Ventilation  Outcome: Ongoing, Progressing     Problem: Impaired Wound Healing  Goal: Optimal Wound Healing  Outcome: Ongoing, Progressing     Problem: Renal Function Impairment (Acute Kidney Injury/Impairment)  Goal: Effective Renal Function  Outcome: Ongoing, Progressing     Problem: Fluid Imbalance (Pneumonia)  Goal: Fluid Balance  Outcome: Ongoing, Progressing     Problem: Infection (Pneumonia)  Goal: Resolution of Infection Signs and Symptoms  Outcome: Ongoing, Progressing     Problem: Respiratory Compromise (Pneumonia)  Goal: Effective Oxygenation and Ventilation  Outcome: Ongoing, Progressing     Problem: Impaired Wound Healing  Goal: Optimal Wound Healing  Outcome: Ongoing, Progressing

## 2023-12-04 ENCOUNTER — DOCUMENT SCAN (OUTPATIENT)
Dept: HOME HEALTH SERVICES | Facility: HOSPITAL | Age: 78
End: 2023-12-04
Payer: MEDICARE

## 2023-12-06 ENCOUNTER — TELEPHONE (OUTPATIENT)
Dept: CARDIAC REHAB | Facility: CLINIC | Age: 78
End: 2023-12-06
Payer: MEDICARE

## 2023-12-07 ENCOUNTER — TELEPHONE (OUTPATIENT)
Dept: VASCULAR SURGERY | Facility: CLINIC | Age: 78
End: 2023-12-07
Payer: MEDICARE

## 2023-12-07 NOTE — TELEPHONE ENCOUNTER
"Spoke with Aleta Broussard  nurse who verbalized"I needed clarification on an order written by Dr Saldaña for the pt to have a cbc and bmp twice weekly". Informed her that the pt was admitted under cardiology on recent hospitalization and Dr Saldaña is not a cardiologist he's vascular surgeon.Aleta verbalized understanding and said "I'll get it figured out".  ----- Message from Alix Lopez sent at 12/7/2023 11:51 AM CST -----  Contact: [phone] Aleta River/ [facility] is calling to clarify a lab order that was sent in please call     "

## 2023-12-11 PROCEDURE — G0180 MD CERTIFICATION HHA PATIENT: HCPCS | Mod: ,,, | Performed by: SURGERY

## 2023-12-12 ENCOUNTER — CARE AT HOME (OUTPATIENT)
Dept: HOME HEALTH SERVICES | Facility: CLINIC | Age: 78
End: 2023-12-12
Payer: MEDICARE

## 2023-12-12 VITALS
SYSTOLIC BLOOD PRESSURE: 100 MMHG | OXYGEN SATURATION: 96 % | HEART RATE: 64 BPM | DIASTOLIC BLOOD PRESSURE: 54 MMHG | RESPIRATION RATE: 18 BRPM | TEMPERATURE: 98 F

## 2023-12-12 DIAGNOSIS — I21.3 STEMI (ST ELEVATION MYOCARDIAL INFARCTION): Primary | ICD-10-CM

## 2023-12-12 DIAGNOSIS — J44.9 CHRONIC OBSTRUCTIVE PULMONARY DISEASE, UNSPECIFIED COPD TYPE: ICD-10-CM

## 2023-12-12 PROCEDURE — 99349 PR HOME VISIT,ESTAB PATIENT,LEVEL III: ICD-10-PCS | Mod: S$GLB,,,

## 2023-12-12 PROCEDURE — 1159F PR MEDICATION LIST DOCUMENTED IN MEDICAL RECORD: ICD-10-PCS | Mod: CPTII,S$GLB,,

## 2023-12-12 PROCEDURE — 1111F DSCHRG MED/CURRENT MED MERGE: CPT | Mod: CPTII,S$GLB,,

## 2023-12-12 PROCEDURE — 99349 HOME/RES VST EST MOD MDM 40: CPT | Mod: S$GLB,,,

## 2023-12-12 PROCEDURE — 1160F PR REVIEW ALL MEDS BY PRESCRIBER/CLIN PHARMACIST DOCUMENTED: ICD-10-PCS | Mod: CPTII,S$GLB,,

## 2023-12-12 PROCEDURE — 1159F MED LIST DOCD IN RCRD: CPT | Mod: CPTII,S$GLB,,

## 2023-12-12 PROCEDURE — 3078F DIAST BP <80 MM HG: CPT | Mod: CPTII,S$GLB,,

## 2023-12-12 PROCEDURE — 1160F RVW MEDS BY RX/DR IN RCRD: CPT | Mod: CPTII,S$GLB,,

## 2023-12-12 PROCEDURE — 1111F PR DISCHARGE MEDS RECONCILED W/ CURRENT OUTPATIENT MED LIST: ICD-10-PCS | Mod: CPTII,S$GLB,,

## 2023-12-12 PROCEDURE — 3074F PR MOST RECENT SYSTOLIC BLOOD PRESSURE < 130 MM HG: ICD-10-PCS | Mod: CPTII,S$GLB,,

## 2023-12-12 PROCEDURE — 3074F SYST BP LT 130 MM HG: CPT | Mod: CPTII,S$GLB,,

## 2023-12-12 PROCEDURE — 3078F PR MOST RECENT DIASTOLIC BLOOD PRESSURE < 80 MM HG: ICD-10-PCS | Mod: CPTII,S$GLB,,

## 2023-12-13 NOTE — ASSESSMENT & PLAN NOTE
-Stressed compliance with ASA, plavix, atorvastatin 80, and toprol XL 25mg.     -Nitroglycerin PRN CP.     -Follow up appointment with cardiology reviewed and written down in home.

## 2023-12-13 NOTE — ASSESSMENT & PLAN NOTE
Stable, at baseline  Using home oxygen at 2-3L   Continue symbicort daily, resp treatments q6  -Rescue inhaler as needed.

## 2023-12-13 NOTE — PROGRESS NOTES
[facility]  Transitional Care Management (TCM) Home Visit    Encounter Provider: OLEG HOWE,   PCP: Angela Walker MD  Consult Requested By: Dr. Mackenzie Davis  Admit Date: 11/29/23   IP Discharge Date: 12/1/23  Hospital Length of Stay: 2 days  Days since discharge (from IP or SNF): 11 days  [facility] Contact Note: 11/27, readmitted so rescheduled s/p 2nd discharge  Hospital Diagnosis: STEMI (ST elevation myocardial infarction) [I21.3]     HISTORY OF PRESENT ILLNESS      Patient ID: Radha Sotelo is a 78 y.o. female was recently admitted to the hospital, this is their TCM encounter.    Hospital Course Synopsis:  Ms. Sotelo was admitted twice recently CP needing PCI intervention.  She was discharged from CCU on 11/24 following admission for inferior STEMI s/p PCI with RCA GALEN x2 and balloon angioplasty. She had reoccurance and worsening of chest pain after discharge, was readmitted on 11/29 with concern for for NSTEMI 2/2 non-compliance with DAPT s/p stent given troponin continued to rise during admission despite no EKG changes. Not a candidate for further invasive tx 2/2 medication non-comliance. Discharged on ASA, plavix, atorvastatin 80, and toprol XL 25mg.     Developments since hospitalization and current needs:   Ms. Sotelo is doing better since hospitalization. Reports she is taking all medications as prescribed. She does not utilize a medication planner. States she does not need one. Appears to know medications well while reviewing thoroughly during our visit today.     [facility] has just started their services. She appears to be committed to complying with PT, nursing services and medication compliance.      Lives in an apartment with 17 year old granddaughter. Ambulates independently, but only short distances, in home. Has walker in home if needed. Wears O2- usually 2 liters, 3 liters during my visit today. Sleeping well, no acute elimination complaints/concerns.          DECISION MAKING TODAY       Assessment & Plan:  1. STEMI (ST elevation myocardial infarction)  Assessment & Plan:  -Stressed compliance with ASA, plavix, atorvastatin 80, and toprol XL 25mg.     -Nitroglycerin PRN CP.     -Follow up appointment with cardiology reviewed and written down in home.     Orders:  -     Ambulatory referral/consult to [facility]    2. Chronic obstructive pulmonary disease, unspecified COPD type  Assessment & Plan:  Stable, at baseline  Using home oxygen at 2-3L   Continue symbicort daily, resp treatments q6  -Rescue inhaler as needed.              ENVIRONMENT OF CARE      Family and/or Caregiver present at visit?  No  Name of Caregiver: n/a  History provided by: patient    Advance Care Planning   Advanced Care Planning Status:  Patient has had an ACP conversation  Living Will: No  Power of : No  [place]: No    Does Caregiver have HCPoA: n/a  Changes today: n/a       Impression upon entering the home:  Physical Dwelling: apartment/condo   Appearance of home environment: cleaniness: dirty, walking pathways: cluttered, lighting: adequate, and home structure: visible structural issues  Functional Status: minimal assistance  Mobility: ambulatory  Nutritional access: available food but inadequate intake  Home Health: Yes, HH [facility]    DME/Supplies: rolling walker, oxygen, and nebulizer      Diagnostic tests reviewed/disposition: No diagnosic tests pending after this hospitalization.  Disease/illness education:  STEMI  Establishment or re-establishment of referral orders for community resources: No other necessary community resources.   Discussion with other health care providers: No discussion with other health care providers necessary.   Does patient have a PCP at [facility]? Yes   Repatriation plan with PCP? Care at Home reason: high risk for no-show   Does patient have an ostomy (ileostomy, colostomy, suprapubic catheter, nephrostomy tube,  tracheostomy, PEG tube, pleurex catheter, cholecystostomy, etc)? No  Were BPAs reviewed? Yes    Social History     Socioeconomic History    Marital status:    Tobacco Use    Smoking status: Former     Current packs/day: 0.00     Average packs/day: 0.5 packs/day for 50.0 years (25.0 ttl pk-yrs)     Types: Cigarettes     Start date: 7/5/1963     Quit date: 7/5/2013     Years since quitting: 10.4    Smokeless tobacco: Never   Substance and Sexual Activity    Alcohol use: No    Drug use: No   Social History Narrative    ** Merged History Encounter **          Social Determinants of Health     Financial Resource Strain: Low Risk  (8/19/2023)    Overall Financial Resource Strain (CARDIA)     Difficulty of Paying Living Expenses: Not very hard   Recent Concern: Financial Resource Strain - Medium Risk (8/12/2023)    Overall Financial Resource Strain (CARDIA)     Difficulty of Paying Living Expenses: Somewhat hard   Food Insecurity: No Food Insecurity (8/19/2023)    Hunger Vital Sign     Worried About Running Out of Food in the Last Year: Never true     Ran Out of Food in the Last Year: Never true   Transportation Needs: No Transportation Needs (8/19/2023)    PRAPARE - Transportation     Lack of Transportation (Medical): No     Lack of Transportation (Non-Medical): No   Recent Concern: Transportation Needs - Unmet Transportation Needs (8/12/2023)    PRAPARE - Transportation     Lack of Transportation (Medical): Yes     Lack of Transportation (Non-Medical): Yes   Physical Activity: Inactive (8/12/2023)    Exercise Vital Sign     Days of Exercise per Week: 0 days     Minutes of Exercise per Session: 0 min   Stress: Stress Concern Present (8/12/2023)    [employer] [place] [employer] - Occupational Stress Questionnaire     Feeling of Stress : To some extent   Social Connections: Socially Isolated (8/19/2023)    Social Connection and Isolation Panel [NHANES]     Frequency of Communication with Friends and  Family: More than three times a week     Frequency of Social Gatherings with Friends and Family: More than three times a week     Attends Bahai Services: Never     Active Member of Clubs or Organizations: No     Attends Club or Organization Meetings: Never     Marital Status:    Housing Stability: Low Risk  (8/19/2023)    Housing Stability Vital Sign     Unable to Pay for Housing in the Last Year: No     Number of Places Lived in the Last Year: 1     Unstable Housing in the Last Year: No         OBJECTIVE:     Vital Signs:  Vitals:    12/12/23 1030   BP: (!) 100/54   Pulse: 64   Resp: 18   Temp: 98.4 °F (36.9 °C)       Review of Systems   Constitutional:  Negative for activity change, fatigue and fever.   HENT: Negative.     Eyes: Negative.    Respiratory:  Positive for shortness of breath. Negative for chest tightness.    Cardiovascular: Negative.  Negative for leg swelling.   Gastrointestinal: Negative.    Endocrine: Negative.    Genitourinary: Negative.    Musculoskeletal:  Positive for gait problem.   Allergic/Immunologic: Negative.    Hematological: Negative.    Psychiatric/Behavioral: Negative.  Negative for agitation.    All other systems reviewed and are negative.      Physical Exam:  Physical Exam  Constitutional:       Appearance: She is ill-appearing.   HENT:      Head: Normocephalic and atraumatic.   Cardiovascular:      Rate and Rhythm: Normal rate and regular rhythm.      Pulses: Normal pulses.      Heart sounds: Normal heart sounds.   Pulmonary:      Effort: Pulmonary effort is normal.      Breath sounds: Examination of the right-upper field reveals decreased breath sounds. Examination of the left-upper field reveals decreased breath sounds. Examination of the right-lower field reveals decreased breath sounds. Examination of the left-lower field reveals decreased breath sounds. Decreased breath sounds present.   Abdominal:      General: Bowel sounds are normal.      Palpations: Abdomen is  soft.   Musculoskeletal:      Cervical back: Normal range of motion and neck supple.      Right lower leg: No edema.      Left lower leg: No edema.      Comments: Mild generalizes weakness  Right forefoot amputation; healed   Skin:     General: Skin is warm and dry.   Neurological:      General: No focal deficit present.      Mental Status: She is alert and oriented to person, place, and time. Mental status is at baseline.   Psychiatric:         Mood and Affect: Mood normal.         Behavior: Behavior normal.         INSTRUCTIONS FOR PATIENT:   Medication List on Discharge:     Medication List            Accurate as of December 12, 2023  8:57 PM. If you have any questions, ask your nurse or doctor.                CONTINUE taking these medications      albuterol 90 mcg/actuation inhaler  Commonly known as: PROVENTIL/VENTOLIN HFA  Inhale 2 puffs into the lungs every 6 (six) hours. Rescue     aspirin 81 MG EC tablet  Commonly known as: ECOTRIN  Take 1 tablet (81 mg total) by mouth once daily.     atorvastatin 80 MG tablet  Commonly known as: LIPITOR  Take 1 tablet (80 mg total) by mouth once daily.     budesonide-formoterol 80-4.5 mcg 80-4.5 mcg/actuation Hfaa  Commonly known as: SYMBICORT  INHALE 2 PUFFS INTO THE LUNGS TWICE DAILY. RINSE MOUTH AFTER USE     clopidogreL 75 mg tablet  Commonly known as: PLAVIX  Take 1 tablet (75 mg total) by mouth once daily.     furosemide 20 MG tablet  Commonly known as: LASIX  Take 1 tablet (20 mg total) by mouth once daily. for 5 days     hydrocortisone-aloe vera 1 % Crea cream  Apply topically 2 (two) times daily.     metoprolol succinate 25 MG 24 hr tablet  Commonly known as: TOPROL-XL  Take 1 tablet (25 mg total) by mouth once daily.     nicotine 21 mg/24 hr  Commonly known as: NICODERM CQ  Place 1 patch onto the skin once daily. for 15 days     nitroGLYCERIN 0.4 MG SL tablet  Commonly known as: NITROSTAT  Place 1 tablet (0.4 mg total) under the tongue every 5 (five) minutes as  needed for Chest pain.              Medication Reconciliation:  Were medications changed on discharge? Yes  Were medications in the home? Yes  Is the patient taking the medications as directed? Yes  Does the patient/caregiver understand the medications and changes? Yes  Does updated med list accurately reflects meds patient is currently taking? Yes      Scheduled Follow-up, Appts Reviewed with Modifications if Needed: Yes  Future Appointments   Date Time Provider Department Center   1/2/2024  8:30 AM Maurilio Carlos MD [facility] CARDIO [facility]   2/7/2024  1:00 PM VASCULAR, LAB [facility] VASCLAB [facility]   2/7/2024  1:20 PM Dionicio Saldaña MD [facility] VASCSUR [facility]   3/20/2024  8:00 AM Kalyn Pemberton NP [address]         Signature:      OLEG HOWE, TARIK    Transition of Care Visit:  I have reviewed and updated the history and problem list.  I have reconciled the medication list.  I have discussed the hospitalization and current medical issues, prognosis and plans with the patient/family.

## 2023-12-14 ENCOUNTER — DOCUMENT SCAN (OUTPATIENT)
Dept: HOME HEALTH SERVICES | Facility: HOSPITAL | Age: 78
End: 2023-12-14
Payer: MEDICARE

## 2024-01-01 ENCOUNTER — HOSPITAL ENCOUNTER (INPATIENT)
Facility: HOSPITAL | Age: 79
LOS: 3 days | DRG: 270 | End: 2024-11-10
Attending: EMERGENCY MEDICINE | Admitting: INTERNAL MEDICINE
Payer: MEDICARE

## 2024-01-01 ENCOUNTER — ANESTHESIA EVENT (OUTPATIENT)
Dept: INTENSIVE CARE | Facility: HOSPITAL | Age: 79
DRG: 270 | End: 2024-01-01
Payer: MEDICARE

## 2024-01-01 ENCOUNTER — ANESTHESIA (OUTPATIENT)
Dept: INTENSIVE CARE | Facility: HOSPITAL | Age: 79
DRG: 270 | End: 2024-01-01
Payer: MEDICARE

## 2024-01-01 VITALS
TEMPERATURE: 98 F | DIASTOLIC BLOOD PRESSURE: 46 MMHG | BODY MASS INDEX: 20.77 KG/M2 | HEIGHT: 60 IN | WEIGHT: 105.81 LBS | RESPIRATION RATE: 4 BRPM | OXYGEN SATURATION: 74 % | SYSTOLIC BLOOD PRESSURE: 70 MMHG

## 2024-01-01 DIAGNOSIS — I21.3 STEMI (ST ELEVATION MYOCARDIAL INFARCTION): ICD-10-CM

## 2024-01-01 DIAGNOSIS — I46.9 CARDIAC ARREST: ICD-10-CM

## 2024-01-01 DIAGNOSIS — I25.10 CORONARY ARTERY DISEASE INVOLVING NATIVE CORONARY ARTERY OF NATIVE HEART WITHOUT ANGINA PECTORIS: ICD-10-CM

## 2024-01-01 DIAGNOSIS — R57.0 CARDIOGENIC SHOCK: Primary | ICD-10-CM

## 2024-01-01 DIAGNOSIS — I49.01 VENTRICULAR FIBRILLATION: ICD-10-CM

## 2024-01-01 LAB
ABO + RH BLD: NORMAL
ABO + RH BLD: NORMAL
ALBUMIN SERPL BCP-MCNC: 2.1 G/DL (ref 3.5–5.2)
ALBUMIN SERPL BCP-MCNC: 2.5 G/DL (ref 3.5–5.2)
ALBUMIN SERPL BCP-MCNC: 2.6 G/DL (ref 3.5–5.2)
ALBUMIN SERPL BCP-MCNC: 2.6 G/DL (ref 3.5–5.2)
ALBUMIN SERPL BCP-MCNC: 2.8 G/DL (ref 3.5–5.2)
ALLENS TEST: ABNORMAL
ALLENS TEST: NORMAL
ALP SERPL-CCNC: 62 U/L (ref 40–150)
ALP SERPL-CCNC: 72 U/L (ref 40–150)
ALP SERPL-CCNC: 74 U/L (ref 40–150)
ALP SERPL-CCNC: 77 U/L (ref 40–150)
ALP SERPL-CCNC: 78 U/L (ref 40–150)
ALP SERPL-CCNC: 97 U/L (ref 40–150)
ALT SERPL W/O P-5'-P-CCNC: 13 U/L (ref 10–44)
ALT SERPL W/O P-5'-P-CCNC: 22 U/L (ref 10–44)
ALT SERPL W/O P-5'-P-CCNC: 25 U/L (ref 10–44)
ALT SERPL W/O P-5'-P-CCNC: 29 U/L (ref 10–44)
ALT SERPL W/O P-5'-P-CCNC: 30 U/L (ref 10–44)
ALT SERPL W/O P-5'-P-CCNC: 31 U/L (ref 10–44)
ANION GAP SERPL CALC-SCNC: 10 MMOL/L (ref 8–16)
ANION GAP SERPL CALC-SCNC: 11 MMOL/L (ref 8–16)
ANION GAP SERPL CALC-SCNC: 19 MMOL/L (ref 8–16)
ANION GAP SERPL CALC-SCNC: 6 MMOL/L (ref 8–16)
ANION GAP SERPL CALC-SCNC: 7 MMOL/L (ref 8–16)
ANION GAP SERPL CALC-SCNC: 8 MMOL/L (ref 8–16)
ANION GAP SERPL CALC-SCNC: 8 MMOL/L (ref 8–16)
ANISOCYTOSIS BLD QL SMEAR: SLIGHT
APTT PPP: 29 SEC (ref 21–32)
APTT PPP: 53.2 SEC (ref 21–32)
APTT PPP: 56.9 SEC (ref 21–32)
APTT PPP: 61.3 SEC (ref 21–32)
ASCENDING AORTA: 3.14 CM
AST SERPL-CCNC: 126 U/L (ref 10–40)
AST SERPL-CCNC: 131 U/L (ref 10–40)
AST SERPL-CCNC: 189 U/L (ref 10–40)
AST SERPL-CCNC: 200 U/L (ref 10–40)
AST SERPL-CCNC: 200 U/L (ref 10–40)
AST SERPL-CCNC: 24 U/L (ref 10–40)
AV AREA BY CONTINUOUS VTI: 2.9 CM2
AV INDEX (PROSTH): 0.91
AV LVOT MEAN GRADIENT: 2 MMHG
AV LVOT PEAK GRADIENT: 4 MMHG
AV MEAN GRADIENT: 6.3 MMHG
AV PEAK GRADIENT: 13 MMHG
AV VALVE AREA BY VELOCITY RATIO: 1.7 CM²
AV VALVE AREA: 2.9 CM2
AV VELOCITY RATIO: 0.56
BACTERIA #/AREA URNS AUTO: ABNORMAL /HPF
BACTERIA UR CULT: NO GROWTH
BASOPHILS # BLD AUTO: 0.02 K/UL (ref 0–0.2)
BASOPHILS # BLD AUTO: 0.03 K/UL (ref 0–0.2)
BASOPHILS # BLD AUTO: 0.03 K/UL (ref 0–0.2)
BASOPHILS # BLD AUTO: 0.05 K/UL (ref 0–0.2)
BASOPHILS # BLD AUTO: 0.06 K/UL (ref 0–0.2)
BASOPHILS NFR BLD: 0.1 % (ref 0–1.9)
BASOPHILS NFR BLD: 0.1 % (ref 0–1.9)
BASOPHILS NFR BLD: 0.2 % (ref 0–1.9)
BASOPHILS NFR BLD: 0.4 % (ref 0–1.9)
BILIRUB SERPL-MCNC: 0.4 MG/DL (ref 0.1–1)
BILIRUB SERPL-MCNC: 0.4 MG/DL (ref 0.1–1)
BILIRUB SERPL-MCNC: 0.6 MG/DL (ref 0.1–1)
BILIRUB SERPL-MCNC: 0.6 MG/DL (ref 0.1–1)
BILIRUB SERPL-MCNC: 1.4 MG/DL (ref 0.1–1)
BILIRUB SERPL-MCNC: 2.3 MG/DL (ref 0.1–1)
BILIRUB UR QL STRIP: NEGATIVE
BLD GP AB SCN CELLS X3 SERPL QL: NORMAL
BLD GP AB SCN CELLS X3 SERPL QL: NORMAL
BLD PROD TYP BPU: NORMAL
BLD PROD TYP BPU: NORMAL
BLOOD UNIT EXPIRATION DATE: NORMAL
BLOOD UNIT EXPIRATION DATE: NORMAL
BLOOD UNIT TYPE CODE: 6200
BLOOD UNIT TYPE CODE: 6200
BLOOD UNIT TYPE: NORMAL
BLOOD UNIT TYPE: NORMAL
BNP SERPL-MCNC: 153 PG/ML (ref 0–99)
BSA FOR ECHO PROCEDURE: 1.43 M2
BUN SERPL-MCNC: 15 MG/DL (ref 8–23)
BUN SERPL-MCNC: 15 MG/DL (ref 8–23)
BUN SERPL-MCNC: 17 MG/DL (ref 8–23)
BUN SERPL-MCNC: 17 MG/DL (ref 8–23)
BUN SERPL-MCNC: 18 MG/DL (ref 8–23)
BUN SERPL-MCNC: 19 MG/DL (ref 8–23)
BUN SERPL-MCNC: 19 MG/DL (ref 8–23)
CALCIUM SERPL-MCNC: 7 MG/DL (ref 8.7–10.5)
CALCIUM SERPL-MCNC: 7.1 MG/DL (ref 8.7–10.5)
CALCIUM SERPL-MCNC: 7.3 MG/DL (ref 8.7–10.5)
CALCIUM SERPL-MCNC: 7.3 MG/DL (ref 8.7–10.5)
CALCIUM SERPL-MCNC: 7.5 MG/DL (ref 8.7–10.5)
CALCIUM SERPL-MCNC: 7.6 MG/DL (ref 8.7–10.5)
CALCIUM SERPL-MCNC: 7.7 MG/DL (ref 8.7–10.5)
CALCIUM SERPL-MCNC: 7.7 MG/DL (ref 8.7–10.5)
CALCIUM SERPL-MCNC: 7.8 MG/DL (ref 8.7–10.5)
CHLORIDE SERPL-SCNC: 105 MMOL/L (ref 95–110)
CHLORIDE SERPL-SCNC: 105 MMOL/L (ref 95–110)
CHLORIDE SERPL-SCNC: 107 MMOL/L (ref 95–110)
CHLORIDE SERPL-SCNC: 109 MMOL/L (ref 95–110)
CHLORIDE SERPL-SCNC: 110 MMOL/L (ref 95–110)
CHLORIDE SERPL-SCNC: 111 MMOL/L (ref 95–110)
CHLORIDE SERPL-SCNC: 111 MMOL/L (ref 95–110)
CHLORIDE SERPL-SCNC: 112 MMOL/L (ref 95–110)
CHLORIDE SERPL-SCNC: 112 MMOL/L (ref 95–110)
CLARITY UR REFRACT.AUTO: CLEAR
CO2 SERPL-SCNC: 17 MMOL/L (ref 23–29)
CO2 SERPL-SCNC: 22 MMOL/L (ref 23–29)
CO2 SERPL-SCNC: 23 MMOL/L (ref 23–29)
CO2 SERPL-SCNC: 23 MMOL/L (ref 23–29)
CO2 SERPL-SCNC: 26 MMOL/L (ref 23–29)
CO2 SERPL-SCNC: 27 MMOL/L (ref 23–29)
CO2 SERPL-SCNC: 28 MMOL/L (ref 23–29)
CODING SYSTEM: NORMAL
CODING SYSTEM: NORMAL
COLOR UR AUTO: YELLOW
CREAT SERPL-MCNC: 0.8 MG/DL (ref 0.5–1.4)
CREAT SERPL-MCNC: 0.9 MG/DL (ref 0.5–1.4)
CREAT SERPL-MCNC: 1 MG/DL (ref 0.5–1.4)
CREAT SERPL-MCNC: 1 MG/DL (ref 0.5–1.4)
CROSSMATCH INTERPRETATION: NORMAL
CROSSMATCH INTERPRETATION: NORMAL
CV ECHO LV RWT: 0.45 CM
DELSYS: ABNORMAL
DELSYS: NORMAL
DELSYS: NORMAL
DIFFERENTIAL METHOD BLD: ABNORMAL
DISPENSE STATUS: NORMAL
DISPENSE STATUS: NORMAL
DOHLE BOD BLD QL SMEAR: PRESENT
DOP CALC AO PEAK VEL: 1.8 M/S
DOP CALC AO VTI: 22.1 CM
DOP CALC LVOT AREA: 3.1 CM2
DOP CALC LVOT DIAMETER: 2 CM
DOP CALC LVOT PEAK VEL: 1 M/S
DOP CALC LVOT STROKE VOLUME: 63.4 CM3
DOP CALCLVOT PEAK VEL VTI: 20.2 CM
E WAVE DECELERATION TIME: 194.13 MS
E/A RATIO: 0.65
E/E' RATIO: 8.57 M/S
ECHO EF ESTIMATED: 55 %
ECHO LV POSTERIOR WALL: 0.9 CM (ref 0.6–1.1)
EOSINOPHIL # BLD AUTO: 0 K/UL (ref 0–0.5)
EOSINOPHIL # BLD AUTO: 0.1 K/UL (ref 0–0.5)
EOSINOPHIL # BLD AUTO: 0.1 K/UL (ref 0–0.5)
EOSINOPHIL # BLD AUTO: 0.2 K/UL (ref 0–0.5)
EOSINOPHIL NFR BLD: 0 % (ref 0–8)
EOSINOPHIL NFR BLD: 0.1 % (ref 0–8)
EOSINOPHIL NFR BLD: 0.4 % (ref 0–8)
EOSINOPHIL NFR BLD: 0.6 % (ref 0–8)
EOSINOPHIL NFR BLD: 0.7 % (ref 0–8)
EP: 5
EP: 7
ERYTHROCYTE [DISTWIDTH] IN BLOOD BY AUTOMATED COUNT: 14.9 % (ref 11.5–14.5)
ERYTHROCYTE [DISTWIDTH] IN BLOOD BY AUTOMATED COUNT: 15.1 % (ref 11.5–14.5)
ERYTHROCYTE [DISTWIDTH] IN BLOOD BY AUTOMATED COUNT: 15.3 % (ref 11.5–14.5)
ERYTHROCYTE [DISTWIDTH] IN BLOOD BY AUTOMATED COUNT: 15.4 % (ref 11.5–14.5)
ERYTHROCYTE [DISTWIDTH] IN BLOOD BY AUTOMATED COUNT: 16.2 % (ref 11.5–14.5)
ERYTHROCYTE [DISTWIDTH] IN BLOOD BY AUTOMATED COUNT: 16.4 % (ref 11.5–14.5)
ERYTHROCYTE [DISTWIDTH] IN BLOOD BY AUTOMATED COUNT: 16.5 % (ref 11.5–14.5)
ERYTHROCYTE [DISTWIDTH] IN BLOOD BY AUTOMATED COUNT: 16.9 % (ref 11.5–14.5)
ERYTHROCYTE [SEDIMENTATION RATE] IN BLOOD BY WESTERGREN METHOD: 1 MM/H
ERYTHROCYTE [SEDIMENTATION RATE] IN BLOOD BY WESTERGREN METHOD: 18 MM/H
ERYTHROCYTE [SEDIMENTATION RATE] IN BLOOD BY WESTERGREN METHOD: 2 MM/H
ERYTHROCYTE [SEDIMENTATION RATE] IN BLOOD BY WESTERGREN METHOD: 20 MM/H
ERYTHROCYTE [SEDIMENTATION RATE] IN BLOOD BY WESTERGREN METHOD: 21 MM/H
ERYTHROCYTE [SEDIMENTATION RATE] IN BLOOD BY WESTERGREN METHOD: 22 MM/H
ERYTHROCYTE [SEDIMENTATION RATE] IN BLOOD BY WESTERGREN METHOD: 25 MM/H
EST. GFR  (NO RACE VARIABLE): 57.7 ML/MIN/1.73 M^2
EST. GFR  (NO RACE VARIABLE): 57.7 ML/MIN/1.73 M^2
EST. GFR  (NO RACE VARIABLE): >60 ML/MIN/1.73 M^2
FIO2: 100
FIO2: 30
FIO2: 36
FIO2: 50
FLOW: 15
FLOW: 4
FRACTIONAL SHORTENING: 30 % (ref 28–44)
GLUCOSE SERPL-MCNC: 106 MG/DL (ref 70–110)
GLUCOSE SERPL-MCNC: 107 MG/DL (ref 70–110)
GLUCOSE SERPL-MCNC: 110 MG/DL (ref 70–110)
GLUCOSE SERPL-MCNC: 114 MG/DL (ref 70–110)
GLUCOSE SERPL-MCNC: 121 MG/DL (ref 70–110)
GLUCOSE SERPL-MCNC: 124 MG/DL (ref 70–110)
GLUCOSE SERPL-MCNC: 133 MG/DL (ref 70–110)
GLUCOSE SERPL-MCNC: 199 MG/DL (ref 70–110)
GLUCOSE SERPL-MCNC: 208 MG/DL (ref 70–110)
GLUCOSE UR QL STRIP: ABNORMAL
HAPTOGLOB SERPL-MCNC: <10 MG/DL (ref 30–250)
HCO3 UR-SCNC: 15 MMOL/L (ref 24–28)
HCO3 UR-SCNC: 19.5 MMOL/L (ref 24–28)
HCO3 UR-SCNC: 21.1 MMOL/L (ref 24–28)
HCO3 UR-SCNC: 24.9 MMOL/L (ref 24–28)
HCO3 UR-SCNC: 26.4 MMOL/L (ref 24–28)
HCO3 UR-SCNC: 26.4 MMOL/L (ref 24–28)
HCO3 UR-SCNC: 26.6 MMOL/L (ref 24–28)
HCO3 UR-SCNC: 28.2 MMOL/L (ref 24–28)
HCT VFR BLD AUTO: 24 % (ref 37–48.5)
HCT VFR BLD AUTO: 24.5 % (ref 37–48.5)
HCT VFR BLD AUTO: 25 % (ref 37–48.5)
HCT VFR BLD AUTO: 25.1 % (ref 37–48.5)
HCT VFR BLD AUTO: 25.7 % (ref 37–48.5)
HCT VFR BLD AUTO: 26 % (ref 37–48.5)
HCT VFR BLD AUTO: 27.8 % (ref 37–48.5)
HCT VFR BLD AUTO: 28.6 % (ref 37–48.5)
HCT VFR BLD AUTO: 29.5 % (ref 37–48.5)
HCT VFR BLD AUTO: 29.8 % (ref 37–48.5)
HCT VFR BLD CALC: 23 %PCV (ref 36–54)
HCT VFR BLD CALC: 31 %PCV (ref 36–54)
HGB BLD-MCNC: 7 G/DL (ref 12–16)
HGB BLD-MCNC: 7.1 G/DL (ref 12–16)
HGB BLD-MCNC: 7.3 G/DL (ref 12–16)
HGB BLD-MCNC: 7.4 G/DL (ref 12–16)
HGB BLD-MCNC: 7.4 G/DL (ref 12–16)
HGB BLD-MCNC: 7.9 G/DL (ref 12–16)
HGB BLD-MCNC: 8.4 G/DL (ref 12–16)
HGB BLD-MCNC: 8.5 G/DL (ref 12–16)
HGB BLD-MCNC: 8.9 G/DL (ref 12–16)
HGB BLD-MCNC: 9 G/DL (ref 12–16)
HGB UR QL STRIP: ABNORMAL
HYALINE CASTS UR QL AUTO: 19 /LPF
HYPOCHROMIA BLD QL SMEAR: ABNORMAL
HYPOCHROMIA BLD QL SMEAR: ABNORMAL
IMM GRANULOCYTES # BLD AUTO: 0.06 K/UL (ref 0–0.04)
IMM GRANULOCYTES # BLD AUTO: 0.08 K/UL (ref 0–0.04)
IMM GRANULOCYTES # BLD AUTO: 0.08 K/UL (ref 0–0.04)
IMM GRANULOCYTES # BLD AUTO: 0.1 K/UL (ref 0–0.04)
IMM GRANULOCYTES # BLD AUTO: 0.11 K/UL (ref 0–0.04)
IMM GRANULOCYTES # BLD AUTO: 0.12 K/UL (ref 0–0.04)
IMM GRANULOCYTES # BLD AUTO: 0.13 K/UL (ref 0–0.04)
IMM GRANULOCYTES # BLD AUTO: 0.41 K/UL (ref 0–0.04)
IMM GRANULOCYTES # BLD AUTO: 0.78 K/UL (ref 0–0.04)
IMM GRANULOCYTES # BLD AUTO: 1.21 K/UL (ref 0–0.04)
IMM GRANULOCYTES NFR BLD AUTO: 0.5 % (ref 0–0.5)
IMM GRANULOCYTES NFR BLD AUTO: 0.6 % (ref 0–0.5)
IMM GRANULOCYTES NFR BLD AUTO: 0.6 % (ref 0–0.5)
IMM GRANULOCYTES NFR BLD AUTO: 0.7 % (ref 0–0.5)
IMM GRANULOCYTES NFR BLD AUTO: 0.8 % (ref 0–0.5)
IMM GRANULOCYTES NFR BLD AUTO: 0.8 % (ref 0–0.5)
IMM GRANULOCYTES NFR BLD AUTO: 0.9 % (ref 0–0.5)
IMM GRANULOCYTES NFR BLD AUTO: 1.6 % (ref 0–0.5)
IMM GRANULOCYTES NFR BLD AUTO: 3.6 % (ref 0–0.5)
IMM GRANULOCYTES NFR BLD AUTO: 4.3 % (ref 0–0.5)
INR PPP: 1.1 (ref 0.8–1.2)
INTERVENTRICULAR SEPTUM: 1 CM (ref 0.6–1.1)
IP: 12
IP: 14
KETONES UR QL STRIP: NEGATIVE
LA MAJOR: 4.63 CM
LA MINOR: 4.87 CM
LA WIDTH: 3.23 CM
LACTATE SERPL-SCNC: 1.3 MMOL/L (ref 0.5–2.2)
LACTATE SERPL-SCNC: 9.6 MMOL/L (ref 0.5–2.2)
LDH SERPL L TO P-CCNC: 0.56 MMOL/L (ref 0.5–2.2)
LDH SERPL L TO P-CCNC: 1.06 MMOL/L (ref 0.5–2.2)
LDH SERPL L TO P-CCNC: 1.71 MMOL/L (ref 0.5–2.2)
LDH SERPL L TO P-CCNC: 1.93 MMOL/L (ref 0.36–1.25)
LDH SERPL L TO P-CCNC: 10.13 MMOL/L (ref 0.36–1.25)
LDH SERPL L TO P-CCNC: 11.27 MMOL/L (ref 0.5–2.2)
LDH SERPL L TO P-CCNC: 1267 U/L (ref 110–260)
LDH SERPL L TO P-CCNC: 7.4 MMOL/L (ref 0.36–1.25)
LDH SERPL L TO P-CCNC: 798 U/L (ref 110–260)
LDH SERPL L TO P-CCNC: 8.59 MMOL/L (ref 0.36–1.25)
LDH SERPL L TO P-CCNC: 948 U/L (ref 110–260)
LEFT ATRIUM SIZE: 2.97 CM
LEFT ATRIUM VOLUME INDEX MOD: 33.2 ML/M2
LEFT ATRIUM VOLUME INDEX: 27.3 ML/M2
LEFT ATRIUM VOLUME MOD: 47.17 ML
LEFT ATRIUM VOLUME: 38.71 CM3
LEFT INTERNAL DIMENSION IN SYSTOLE: 2.8 CM (ref 2.1–4)
LEFT VENTRICLE DIASTOLIC VOLUME INDEX: 47.77 ML/M2
LEFT VENTRICLE DIASTOLIC VOLUME: 67.84 ML
LEFT VENTRICLE MASS INDEX: 83.3 G/M2
LEFT VENTRICLE SYSTOLIC VOLUME INDEX: 21.3 ML/M2
LEFT VENTRICLE SYSTOLIC VOLUME: 30.22 ML
LEFT VENTRICULAR INTERNAL DIMENSION IN DIASTOLE: 4 CM (ref 3.5–6)
LEFT VENTRICULAR MASS: 118.2 G
LEUKOCYTE ESTERASE UR QL STRIP: NEGATIVE
LV LATERAL E/E' RATIO: 7.5
LV SEPTAL E/E' RATIO: 10
LYMPHOCYTES # BLD AUTO: 0.4 K/UL (ref 1–4.8)
LYMPHOCYTES # BLD AUTO: 0.4 K/UL (ref 1–4.8)
LYMPHOCYTES # BLD AUTO: 0.5 K/UL (ref 1–4.8)
LYMPHOCYTES # BLD AUTO: 0.5 K/UL (ref 1–4.8)
LYMPHOCYTES # BLD AUTO: 0.7 K/UL (ref 1–4.8)
LYMPHOCYTES # BLD AUTO: 0.9 K/UL (ref 1–4.8)
LYMPHOCYTES # BLD AUTO: 1 K/UL (ref 1–4.8)
LYMPHOCYTES # BLD AUTO: 1.2 K/UL (ref 1–4.8)
LYMPHOCYTES NFR BLD: 2.5 % (ref 18–48)
LYMPHOCYTES NFR BLD: 2.5 % (ref 18–48)
LYMPHOCYTES NFR BLD: 2.7 % (ref 18–48)
LYMPHOCYTES NFR BLD: 3.3 % (ref 18–48)
LYMPHOCYTES NFR BLD: 3.4 % (ref 18–48)
LYMPHOCYTES NFR BLD: 4.1 % (ref 18–48)
LYMPHOCYTES NFR BLD: 4.2 % (ref 18–48)
LYMPHOCYTES NFR BLD: 5.2 % (ref 18–48)
LYMPHOCYTES NFR BLD: 5.7 % (ref 18–48)
LYMPHOCYTES NFR BLD: 7.2 % (ref 18–48)
MAGNESIUM SERPL-MCNC: 1.3 MG/DL (ref 1.6–2.6)
MAGNESIUM SERPL-MCNC: 1.7 MG/DL (ref 1.6–2.6)
MAGNESIUM SERPL-MCNC: 2 MG/DL (ref 1.6–2.6)
MAGNESIUM SERPL-MCNC: 2.8 MG/DL (ref 1.6–2.6)
MAGNESIUM SERPL-MCNC: 3.3 MG/DL (ref 1.6–2.6)
MCH RBC QN AUTO: 27.5 PG (ref 27–31)
MCH RBC QN AUTO: 27.6 PG (ref 27–31)
MCH RBC QN AUTO: 27.8 PG (ref 27–31)
MCH RBC QN AUTO: 28.1 PG (ref 27–31)
MCH RBC QN AUTO: 28.1 PG (ref 27–31)
MCH RBC QN AUTO: 28.3 PG (ref 27–31)
MCH RBC QN AUTO: 28.3 PG (ref 27–31)
MCH RBC QN AUTO: 28.4 PG (ref 27–31)
MCH RBC QN AUTO: 28.5 PG (ref 27–31)
MCH RBC QN AUTO: 28.9 PG (ref 27–31)
MCHC RBC AUTO-ENTMCNC: 28 G/DL (ref 32–36)
MCHC RBC AUTO-ENTMCNC: 28.8 G/DL (ref 32–36)
MCHC RBC AUTO-ENTMCNC: 29.4 G/DL (ref 32–36)
MCHC RBC AUTO-ENTMCNC: 29.5 G/DL (ref 32–36)
MCHC RBC AUTO-ENTMCNC: 29.6 G/DL (ref 32–36)
MCHC RBC AUTO-ENTMCNC: 29.8 G/DL (ref 32–36)
MCHC RBC AUTO-ENTMCNC: 29.9 G/DL (ref 32–36)
MCHC RBC AUTO-ENTMCNC: 30.4 G/DL (ref 32–36)
MCHC RBC AUTO-ENTMCNC: 30.5 G/DL (ref 32–36)
MCHC RBC AUTO-ENTMCNC: 30.6 G/DL (ref 32–36)
MCV RBC AUTO: 92 FL (ref 82–98)
MCV RBC AUTO: 92 FL (ref 82–98)
MCV RBC AUTO: 94 FL (ref 82–98)
MCV RBC AUTO: 94 FL (ref 82–98)
MCV RBC AUTO: 95 FL (ref 82–98)
MCV RBC AUTO: 96 FL (ref 82–98)
MCV RBC AUTO: 96 FL (ref 82–98)
MCV RBC AUTO: 97 FL (ref 82–98)
MCV RBC AUTO: 97 FL (ref 82–98)
MCV RBC AUTO: 98 FL (ref 82–98)
MICROSCOPIC COMMENT: ABNORMAL
MIN VOL: 8.16
MIN VOL: 8.16
MODE: ABNORMAL
MODE: NORMAL
MODE: NORMAL
MONOCYTES # BLD AUTO: 0.8 K/UL (ref 0.3–1)
MONOCYTES # BLD AUTO: 0.8 K/UL (ref 0.3–1)
MONOCYTES # BLD AUTO: 0.9 K/UL (ref 0.3–1)
MONOCYTES # BLD AUTO: 0.9 K/UL (ref 0.3–1)
MONOCYTES # BLD AUTO: 1 K/UL (ref 0.3–1)
MONOCYTES # BLD AUTO: 1.1 K/UL (ref 0.3–1)
MONOCYTES # BLD AUTO: 1.1 K/UL (ref 0.3–1)
MONOCYTES # BLD AUTO: 1.4 K/UL (ref 0.3–1)
MONOCYTES NFR BLD: 3.5 % (ref 4–15)
MONOCYTES NFR BLD: 4.6 % (ref 4–15)
MONOCYTES NFR BLD: 5.2 % (ref 4–15)
MONOCYTES NFR BLD: 5.2 % (ref 4–15)
MONOCYTES NFR BLD: 5.4 % (ref 4–15)
MONOCYTES NFR BLD: 7.1 % (ref 4–15)
MONOCYTES NFR BLD: 7.2 % (ref 4–15)
MONOCYTES NFR BLD: 7.2 % (ref 4–15)
MONOCYTES NFR BLD: 7.6 % (ref 4–15)
MONOCYTES NFR BLD: 7.8 % (ref 4–15)
MV PEAK A VEL: 0.92 M/S
MV PEAK E VEL: 0.6 M/S
NEUTROPHILS # BLD AUTO: 10.7 K/UL (ref 1.8–7.7)
NEUTROPHILS # BLD AUTO: 11.4 K/UL (ref 1.8–7.7)
NEUTROPHILS # BLD AUTO: 11.5 K/UL (ref 1.8–7.7)
NEUTROPHILS # BLD AUTO: 11.8 K/UL (ref 1.8–7.7)
NEUTROPHILS # BLD AUTO: 12.2 K/UL (ref 1.8–7.7)
NEUTROPHILS # BLD AUTO: 13.1 K/UL (ref 1.8–7.7)
NEUTROPHILS # BLD AUTO: 16.3 K/UL (ref 1.8–7.7)
NEUTROPHILS # BLD AUTO: 18.7 K/UL (ref 1.8–7.7)
NEUTROPHILS # BLD AUTO: 23.3 K/UL (ref 1.8–7.7)
NEUTROPHILS # BLD AUTO: 24.8 K/UL (ref 1.8–7.7)
NEUTROPHILS NFR BLD: 83.3 % (ref 38–73)
NEUTROPHILS NFR BLD: 86 % (ref 38–73)
NEUTROPHILS NFR BLD: 86.6 % (ref 38–73)
NEUTROPHILS NFR BLD: 86.7 % (ref 38–73)
NEUTROPHILS NFR BLD: 87.2 % (ref 38–73)
NEUTROPHILS NFR BLD: 88.7 % (ref 38–73)
NEUTROPHILS NFR BLD: 89.4 % (ref 38–73)
NEUTROPHILS NFR BLD: 90.2 % (ref 38–73)
NEUTROPHILS NFR BLD: 90.4 % (ref 38–73)
NEUTROPHILS NFR BLD: 91.4 % (ref 38–73)
NITRITE UR QL STRIP: NEGATIVE
NRBC BLD-RTO: 0 /100 WBC
OHS CV RV/LV RATIO: 1.03 CM
OHS QRS DURATION: 92 MS
OHS QRS DURATION: 98 MS
OHS QTC CALCULATION: 466 MS
OHS QTC CALCULATION: 498 MS
OVALOCYTES BLD QL SMEAR: ABNORMAL
PCO2 BLDA: 39.6 MMHG (ref 35–45)
PCO2 BLDA: 53.2 MMHG (ref 35–45)
PCO2 BLDA: 54.1 MMHG (ref 35–45)
PCO2 BLDA: 59.4 MMHG (ref 35–45)
PCO2 BLDA: 65.6 MMHG (ref 35–45)
PCO2 BLDA: 65.8 MMHG (ref 35–45)
PCO2 BLDA: 73.1 MMHG (ref 35–45)
PCO2 BLDA: 79.1 MMHG (ref 35–45)
PEEP: 5
PH SMN: 7.14 [PH] (ref 7.35–7.45)
PH SMN: 7.16 [PH] (ref 7.35–7.45)
PH SMN: 7.17 [PH] (ref 7.35–7.45)
PH SMN: 7.19 [PH] (ref 7.35–7.45)
PH SMN: 7.2 [PH] (ref 7.35–7.45)
PH SMN: 7.21 [PH] (ref 7.35–7.45)
PH SMN: 7.22 [PH] (ref 7.35–7.45)
PH SMN: 7.26 [PH] (ref 7.35–7.45)
PH UR STRIP: 7 [PH] (ref 5–8)
PHOSPHATE SERPL-MCNC: 2.1 MG/DL (ref 2.7–4.5)
PHOSPHATE SERPL-MCNC: 2.8 MG/DL (ref 2.7–4.5)
PHOSPHATE SERPL-MCNC: 3.5 MG/DL (ref 2.7–4.5)
PHOSPHATE SERPL-MCNC: 3.9 MG/DL (ref 2.7–4.5)
PHOSPHATE SERPL-MCNC: 4 MG/DL (ref 2.7–4.5)
PHOSPHATE SERPL-MCNC: 4.9 MG/DL (ref 2.7–4.5)
PIP: 31
PIP: 31
PISA TR MAX VEL: 2.91 M/S
PLATELET # BLD AUTO: 101 K/UL (ref 150–450)
PLATELET # BLD AUTO: 118 K/UL (ref 150–450)
PLATELET # BLD AUTO: 119 K/UL (ref 150–450)
PLATELET # BLD AUTO: 127 K/UL (ref 150–450)
PLATELET # BLD AUTO: 143 K/UL (ref 150–450)
PLATELET # BLD AUTO: 144 K/UL (ref 150–450)
PLATELET # BLD AUTO: 160 K/UL (ref 150–450)
PLATELET # BLD AUTO: 161 K/UL (ref 150–450)
PLATELET # BLD AUTO: 296 K/UL (ref 150–450)
PLATELET # BLD AUTO: 307 K/UL (ref 150–450)
PLATELET BLD QL SMEAR: ABNORMAL
PLATELET BLD QL SMEAR: ABNORMAL
PMV BLD AUTO: 10.6 FL (ref 9.2–12.9)
PMV BLD AUTO: 10.6 FL (ref 9.2–12.9)
PMV BLD AUTO: 10.7 FL (ref 9.2–12.9)
PMV BLD AUTO: 10.7 FL (ref 9.2–12.9)
PMV BLD AUTO: 10.8 FL (ref 9.2–12.9)
PMV BLD AUTO: 10.9 FL (ref 9.2–12.9)
PMV BLD AUTO: 11.4 FL (ref 9.2–12.9)
PMV BLD AUTO: 11.6 FL (ref 9.2–12.9)
PO2 BLDA: 143 MMHG (ref 80–100)
PO2 BLDA: 181 MMHG (ref 80–100)
PO2 BLDA: 188 MMHG (ref 80–100)
PO2 BLDA: 21 MMHG (ref 40–60)
PO2 BLDA: 21 MMHG (ref 40–60)
PO2 BLDA: 22 MMHG (ref 40–60)
PO2 BLDA: 23 MMHG (ref 40–60)
PO2 BLDA: 24 MMHG (ref 40–60)
PO2 BLDA: 24 MMHG (ref 40–60)
PO2 BLDA: 30 MMHG (ref 40–60)
PO2 BLDA: 32 MMHG (ref 40–60)
PO2 BLDA: 33 MMHG (ref 40–60)
PO2 BLDA: 34 MMHG (ref 40–60)
PO2 BLDA: 36 MMHG (ref 40–60)
PO2 BLDA: 88 MMHG (ref 80–100)
PO2 BLDA: 89 MMHG (ref 80–100)
POC BE: -1 MMOL/L
POC BE: -13 MMOL/L
POC BE: -4 MMOL/L
POC BE: -7 MMOL/L
POC BE: -9 MMOL/L
POC IONIZED CALCIUM: 1.05 MMOL/L (ref 1.06–1.42)
POC IONIZED CALCIUM: 1.23 MMOL/L (ref 1.06–1.42)
POC SATURATED O2: 20 % (ref 95–100)
POC SATURATED O2: 21 % (ref 95–100)
POC SATURATED O2: 28 % (ref 95–100)
POC SATURATED O2: 28 % (ref 95–100)
POC SATURATED O2: 29 % (ref 95–100)
POC SATURATED O2: 29 % (ref 95–100)
POC SATURATED O2: 44 % (ref 95–100)
POC SATURATED O2: 49 % (ref 95–100)
POC SATURATED O2: 50 % (ref 95–100)
POC SATURATED O2: 50 % (ref 95–100)
POC SATURATED O2: 56 % (ref 95–100)
POC SATURATED O2: 94 % (ref 95–100)
POC SATURATED O2: 94 % (ref 95–100)
POC SATURATED O2: 99 % (ref 95–100)
POC TCO2: 16 MMOL/L (ref 23–27)
POC TCO2: 21 MMOL/L (ref 23–27)
POC TCO2: 23 MMOL/L (ref 23–27)
POC TCO2: 27 MMOL/L (ref 24–29)
POC TCO2: 28 MMOL/L (ref 23–27)
POC TCO2: 28 MMOL/L (ref 24–29)
POC TCO2: 29 MMOL/L (ref 23–27)
POC TCO2: 31 MMOL/L (ref 24–29)
POCT GLUCOSE: 196 MG/DL (ref 70–110)
POIKILOCYTOSIS BLD QL SMEAR: SLIGHT
POLYCHROMASIA BLD QL SMEAR: ABNORMAL
POTASSIUM BLD-SCNC: 2.5 MMOL/L (ref 3.5–5.1)
POTASSIUM BLD-SCNC: 4.2 MMOL/L (ref 3.5–5.1)
POTASSIUM SERPL-SCNC: 2.9 MMOL/L (ref 3.5–5.1)
POTASSIUM SERPL-SCNC: 2.9 MMOL/L (ref 3.5–5.1)
POTASSIUM SERPL-SCNC: 3.1 MMOL/L (ref 3.5–5.1)
POTASSIUM SERPL-SCNC: 3.1 MMOL/L (ref 3.5–5.1)
POTASSIUM SERPL-SCNC: 3.6 MMOL/L (ref 3.5–5.1)
POTASSIUM SERPL-SCNC: 3.9 MMOL/L (ref 3.5–5.1)
POTASSIUM SERPL-SCNC: 4.1 MMOL/L (ref 3.5–5.1)
POTASSIUM SERPL-SCNC: 4.2 MMOL/L (ref 3.5–5.1)
POTASSIUM SERPL-SCNC: 4.3 MMOL/L (ref 3.5–5.1)
PROT SERPL-MCNC: 4.2 G/DL (ref 6–8.4)
PROT SERPL-MCNC: 4.8 G/DL (ref 6–8.4)
PROT SERPL-MCNC: 4.9 G/DL (ref 6–8.4)
PROT SERPL-MCNC: 4.9 G/DL (ref 6–8.4)
PROT SERPL-MCNC: 5.2 G/DL (ref 6–8.4)
PROT SERPL-MCNC: 5.2 G/DL (ref 6–8.4)
PROT UR QL STRIP: ABNORMAL
PROTHROMBIN TIME: 12.3 SEC (ref 9–12.5)
RA MAJOR: 4.95 CM
RA PRESSURE ESTIMATED: 3 MMHG
RA WIDTH: 4.22 CM
RBC # BLD AUTO: 2.51 M/UL (ref 4–5.4)
RBC # BLD AUTO: 2.53 M/UL (ref 4–5.4)
RBC # BLD AUTO: 2.55 M/UL (ref 4–5.4)
RBC # BLD AUTO: 2.61 M/UL (ref 4–5.4)
RBC # BLD AUTO: 2.66 M/UL (ref 4–5.4)
RBC # BLD AUTO: 2.77 M/UL (ref 4–5.4)
RBC # BLD AUTO: 3.03 M/UL (ref 4–5.4)
RBC # BLD AUTO: 3.04 M/UL (ref 4–5.4)
RBC # BLD AUTO: 3.15 M/UL (ref 4–5.4)
RBC # BLD AUTO: 3.2 M/UL (ref 4–5.4)
RBC #/AREA URNS AUTO: 8 /HPF (ref 0–4)
RIGHT VENTRICLE DIASTOLIC BASEL DIMENSION: 4.1 CM
RV TB RVSP: 6 MMHG
SAMPLE: ABNORMAL
SAMPLE: NORMAL
SINUS: 2.86 CM
SITE: ABNORMAL
SITE: NORMAL
SODIUM BLD-SCNC: 141 MMOL/L (ref 136–145)
SODIUM BLD-SCNC: 142 MMOL/L (ref 136–145)
SODIUM SERPL-SCNC: 141 MMOL/L (ref 136–145)
SODIUM SERPL-SCNC: 142 MMOL/L (ref 136–145)
SODIUM SERPL-SCNC: 143 MMOL/L (ref 136–145)
SODIUM SERPL-SCNC: 146 MMOL/L (ref 136–145)
SP GR UR STRIP: >1.03 (ref 1–1.03)
SP02: 100
SP02: 96
SP02: 97
SPECIMEN OUTDATE: NORMAL
SPECIMEN OUTDATE: NORMAL
SPONT RATE: 22
SPONT RATE: 23
SPONT RATE: 24
SPONT RATE: 25
SPONT RATE: 26
SQUAMOUS #/AREA URNS AUTO: 1 /HPF
STJ: 2.2 CM
TDI LATERAL: 0.08 M/S
TDI SEPTAL: 0.06 M/S
TDI: 0.07 M/S
TOXIC GRANULES BLD QL SMEAR: PRESENT
TR MAX PG: 34 MMHG
TRANS ERYTHROCYTES VOL PATIENT: NORMAL ML
TRANS ERYTHROCYTES VOL PATIENT: NORMAL ML
TRICUSPID ANNULAR PLANE SYSTOLIC EXCURSION: 1.42 CM
TROPONIN I SERPL DL<=0.01 NG/ML-MCNC: 0.14 NG/ML (ref 0–0.03)
TROPONIN I SERPL DL<=0.01 NG/ML-MCNC: 25.35 NG/ML (ref 0–0.03)
TROPONIN I SERPL DL<=0.01 NG/ML-MCNC: 26.09 NG/ML (ref 0–0.03)
TROPONIN I SERPL DL<=0.01 NG/ML-MCNC: 30.16 NG/ML (ref 0–0.03)
TROPONIN I SERPL DL<=0.01 NG/ML-MCNC: 30.27 NG/ML (ref 0–0.03)
TV PEAK GRADIENT: 34 MMHG
TV REST PULMONARY ARTERY PRESSURE: 37 MMHG
URN SPEC COLLECT METH UR: ABNORMAL
VANCOMYCIN TROUGH SERPL-MCNC: 10.1 UG/ML (ref 10–22)
VT: 400
WBC # BLD AUTO: 12.4 K/UL (ref 3.9–12.7)
WBC # BLD AUTO: 12.91 K/UL (ref 3.9–12.7)
WBC # BLD AUTO: 13.17 K/UL (ref 3.9–12.7)
WBC # BLD AUTO: 13.65 K/UL (ref 3.9–12.7)
WBC # BLD AUTO: 14.11 K/UL (ref 3.9–12.7)
WBC # BLD AUTO: 14.5 K/UL (ref 3.9–12.7)
WBC # BLD AUTO: 17.87 K/UL (ref 3.9–12.7)
WBC # BLD AUTO: 21.56 K/UL (ref 3.9–12.7)
WBC # BLD AUTO: 25.82 K/UL (ref 3.9–12.7)
WBC # BLD AUTO: 28.46 K/UL (ref 3.9–12.7)
WBC #/AREA URNS AUTO: 11 /HPF (ref 0–5)
YEAST UR QL AUTO: ABNORMAL
Z-SCORE OF LEFT VENTRICULAR DIMENSION IN END DIASTOLE: -0.86
Z-SCORE OF LEFT VENTRICULAR DIMENSION IN END SYSTOLE: 0.26

## 2024-01-01 PROCEDURE — 25000003 PHARM REV CODE 250: Performed by: STUDENT IN AN ORGANIZED HEALTH CARE EDUCATION/TRAINING PROGRAM

## 2024-01-01 PROCEDURE — P9021 RED BLOOD CELLS UNIT: HCPCS | Performed by: EMERGENCY MEDICINE

## 2024-01-01 PROCEDURE — 80053 COMPREHEN METABOLIC PANEL: CPT | Performed by: STUDENT IN AN ORGANIZED HEALTH CARE EDUCATION/TRAINING PROGRAM

## 2024-01-01 PROCEDURE — 84484 ASSAY OF TROPONIN QUANT: CPT | Mod: 91

## 2024-01-01 PROCEDURE — 85025 COMPLETE CBC W/AUTO DIFF WBC: CPT | Performed by: STUDENT IN AN ORGANIZED HEALTH CARE EDUCATION/TRAINING PROGRAM

## 2024-01-01 PROCEDURE — 82803 BLOOD GASES ANY COMBINATION: CPT

## 2024-01-01 PROCEDURE — 5A1223Z PERFORMANCE OF CARDIAC PACING, CONTINUOUS: ICD-10-PCS | Performed by: INTERNAL MEDICINE

## 2024-01-01 PROCEDURE — 85610 PROTHROMBIN TIME: CPT | Performed by: STUDENT IN AN ORGANIZED HEALTH CARE EDUCATION/TRAINING PROGRAM

## 2024-01-01 PROCEDURE — 94640 AIRWAY INHALATION TREATMENT: CPT

## 2024-01-01 PROCEDURE — 99900035 HC TECH TIME PER 15 MIN (STAT)

## 2024-01-01 PROCEDURE — 27000202 HC IABP, ADD'L DAY

## 2024-01-01 PROCEDURE — 27000221 HC OXYGEN, UP TO 24 HOURS

## 2024-01-01 PROCEDURE — 02HQ32Z INSERTION OF MONITORING DEVICE INTO RIGHT PULMONARY ARTERY, PERCUTANEOUS APPROACH: ICD-10-PCS | Performed by: INTERNAL MEDICINE

## 2024-01-01 PROCEDURE — 85025 COMPLETE CBC W/AUTO DIFF WBC: CPT | Mod: 91 | Performed by: INTERNAL MEDICINE

## 2024-01-01 PROCEDURE — 80048 BASIC METABOLIC PNL TOTAL CA: CPT | Mod: XB | Performed by: STUDENT IN AN ORGANIZED HEALTH CARE EDUCATION/TRAINING PROGRAM

## 2024-01-01 PROCEDURE — 85730 THROMBOPLASTIN TIME PARTIAL: CPT | Performed by: STUDENT IN AN ORGANIZED HEALTH CARE EDUCATION/TRAINING PROGRAM

## 2024-01-01 PROCEDURE — 36430 TRANSFUSION BLD/BLD COMPNT: CPT

## 2024-01-01 PROCEDURE — 83010 ASSAY OF HAPTOGLOBIN QUANT: CPT

## 2024-01-01 PROCEDURE — 84132 ASSAY OF SERUM POTASSIUM: CPT

## 2024-01-01 PROCEDURE — 25000003 PHARM REV CODE 250: Performed by: INTERNAL MEDICINE

## 2024-01-01 PROCEDURE — 83735 ASSAY OF MAGNESIUM: CPT | Mod: 91 | Performed by: INTERNAL MEDICINE

## 2024-01-01 PROCEDURE — 63600175 PHARM REV CODE 636 W HCPCS

## 2024-01-01 PROCEDURE — P9021 RED BLOOD CELLS UNIT: HCPCS

## 2024-01-01 PROCEDURE — 99291 CRITICAL CARE FIRST HOUR: CPT | Mod: GC,,, | Performed by: INTERNAL MEDICINE

## 2024-01-01 PROCEDURE — 25500020 PHARM REV CODE 255: Performed by: INTERNAL MEDICINE

## 2024-01-01 PROCEDURE — 99900026 HC AIRWAY MAINTENANCE (STAT)

## 2024-01-01 PROCEDURE — 87086 URINE CULTURE/COLONY COUNT: CPT | Performed by: EMERGENCY MEDICINE

## 2024-01-01 PROCEDURE — 36620 INSERTION CATHETER ARTERY: CPT

## 2024-01-01 PROCEDURE — 27201423 OPTIME MED/SURG SUP & DEVICES STERILE SUPPLY: Performed by: INTERNAL MEDICINE

## 2024-01-01 PROCEDURE — 63600175 PHARM REV CODE 636 W HCPCS: Performed by: EMERGENCY MEDICINE

## 2024-01-01 PROCEDURE — C1894 INTRO/SHEATH, NON-LASER: HCPCS | Performed by: INTERNAL MEDICINE

## 2024-01-01 PROCEDURE — 33967 INSERT I-AORT PERCUT DEVICE: CPT | Mod: XU,,, | Performed by: INTERNAL MEDICINE

## 2024-01-01 PROCEDURE — 83615 LACTATE (LD) (LDH) ENZYME: CPT | Performed by: STUDENT IN AN ORGANIZED HEALTH CARE EDUCATION/TRAINING PROGRAM

## 2024-01-01 PROCEDURE — 33967 INSERT I-AORT PERCUT DEVICE: CPT | Performed by: INTERNAL MEDICINE

## 2024-01-01 PROCEDURE — 94799 UNLISTED PULMONARY SVC/PX: CPT

## 2024-01-01 PROCEDURE — 30233N1 TRANSFUSION OF NONAUTOLOGOUS RED BLOOD CELLS INTO PERIPHERAL VEIN, PERCUTANEOUS APPROACH: ICD-10-PCS | Performed by: INTERNAL MEDICINE

## 2024-01-01 PROCEDURE — 20000000 HC ICU ROOM

## 2024-01-01 PROCEDURE — 27000207 HC ISOLATION

## 2024-01-01 PROCEDURE — 63600175 PHARM REV CODE 636 W HCPCS: Performed by: STUDENT IN AN ORGANIZED HEALTH CARE EDUCATION/TRAINING PROGRAM

## 2024-01-01 PROCEDURE — 80053 COMPREHEN METABOLIC PANEL: CPT | Mod: 91 | Performed by: INTERNAL MEDICINE

## 2024-01-01 PROCEDURE — 86901 BLOOD TYPING SEROLOGIC RH(D): CPT | Performed by: INTERNAL MEDICINE

## 2024-01-01 PROCEDURE — 84100 ASSAY OF PHOSPHORUS: CPT | Performed by: STUDENT IN AN ORGANIZED HEALTH CARE EDUCATION/TRAINING PROGRAM

## 2024-01-01 PROCEDURE — 25000003 PHARM REV CODE 250

## 2024-01-01 PROCEDURE — 86902 BLOOD TYPE ANTIGEN DONOR EA: CPT | Performed by: EMERGENCY MEDICINE

## 2024-01-01 PROCEDURE — 85025 COMPLETE CBC W/AUTO DIFF WBC: CPT | Mod: 91

## 2024-01-01 PROCEDURE — 94002 VENT MGMT INPAT INIT DAY: CPT

## 2024-01-01 PROCEDURE — 83735 ASSAY OF MAGNESIUM: CPT | Performed by: STUDENT IN AN ORGANIZED HEALTH CARE EDUCATION/TRAINING PROGRAM

## 2024-01-01 PROCEDURE — 92610 EVALUATE SWALLOWING FUNCTION: CPT

## 2024-01-01 PROCEDURE — 27100171 HC OXYGEN HIGH FLOW UP TO 24 HOURS

## 2024-01-01 PROCEDURE — 37799 UNLISTED PX VASCULAR SURGERY: CPT

## 2024-01-01 PROCEDURE — 82330 ASSAY OF CALCIUM: CPT

## 2024-01-01 PROCEDURE — 84484 ASSAY OF TROPONIN QUANT: CPT | Performed by: EMERGENCY MEDICINE

## 2024-01-01 PROCEDURE — 85014 HEMATOCRIT: CPT

## 2024-01-01 PROCEDURE — 94761 N-INVAS EAR/PLS OXIMETRY MLT: CPT

## 2024-01-01 PROCEDURE — 85025 COMPLETE CBC W/AUTO DIFF WBC: CPT | Performed by: EMERGENCY MEDICINE

## 2024-01-01 PROCEDURE — 83605 ASSAY OF LACTIC ACID: CPT

## 2024-01-01 PROCEDURE — 25000242 PHARM REV CODE 250 ALT 637 W/ HCPCS

## 2024-01-01 PROCEDURE — 94660 CPAP INITIATION&MGMT: CPT

## 2024-01-01 PROCEDURE — 80202 ASSAY OF VANCOMYCIN: CPT | Performed by: INTERNAL MEDICINE

## 2024-01-01 PROCEDURE — 80053 COMPREHEN METABOLIC PANEL: CPT | Performed by: EMERGENCY MEDICINE

## 2024-01-01 PROCEDURE — 5A2204Z RESTORATION OF CARDIAC RHYTHM, SINGLE: ICD-10-PCS | Performed by: EMERGENCY MEDICINE

## 2024-01-01 PROCEDURE — 4A023N8 MEASUREMENT OF CARDIAC SAMPLING AND PRESSURE, BILATERAL, PERCUTANEOUS APPROACH: ICD-10-PCS | Performed by: INTERNAL MEDICINE

## 2024-01-01 PROCEDURE — 82800 BLOOD PH: CPT

## 2024-01-01 PROCEDURE — 5A02210 ASSISTANCE WITH CARDIAC OUTPUT USING BALLOON PUMP, CONTINUOUS: ICD-10-PCS | Performed by: INTERNAL MEDICINE

## 2024-01-01 PROCEDURE — C1769 GUIDE WIRE: HCPCS | Performed by: INTERNAL MEDICINE

## 2024-01-01 PROCEDURE — 31500 INSERT EMERGENCY AIRWAY: CPT | Mod: ,,, | Performed by: ANESTHESIOLOGY

## 2024-01-01 PROCEDURE — 84295 ASSAY OF SERUM SODIUM: CPT

## 2024-01-01 PROCEDURE — 84100 ASSAY OF PHOSPHORUS: CPT | Mod: 91 | Performed by: INTERNAL MEDICINE

## 2024-01-01 PROCEDURE — 93005 ELECTROCARDIOGRAM TRACING: CPT

## 2024-01-01 PROCEDURE — 85025 COMPLETE CBC W/AUTO DIFF WBC: CPT | Mod: 91 | Performed by: STUDENT IN AN ORGANIZED HEALTH CARE EDUCATION/TRAINING PROGRAM

## 2024-01-01 PROCEDURE — 27100094 HC IABP, SET-UP

## 2024-01-01 PROCEDURE — B2111ZZ FLUOROSCOPY OF MULTIPLE CORONARY ARTERIES USING LOW OSMOLAR CONTRAST: ICD-10-PCS | Performed by: INTERNAL MEDICINE

## 2024-01-01 PROCEDURE — 80048 BASIC METABOLIC PNL TOTAL CA: CPT | Mod: XB | Performed by: INTERNAL MEDICINE

## 2024-01-01 PROCEDURE — 86901 BLOOD TYPING SEROLOGIC RH(D): CPT | Performed by: EMERGENCY MEDICINE

## 2024-01-01 PROCEDURE — 94761 N-INVAS EAR/PLS OXIMETRY MLT: CPT | Mod: XB

## 2024-01-01 PROCEDURE — 36600 WITHDRAWAL OF ARTERIAL BLOOD: CPT

## 2024-01-01 PROCEDURE — 87040 BLOOD CULTURE FOR BACTERIA: CPT | Performed by: STUDENT IN AN ORGANIZED HEALTH CARE EDUCATION/TRAINING PROGRAM

## 2024-01-01 PROCEDURE — 83880 ASSAY OF NATRIURETIC PEPTIDE: CPT

## 2024-01-01 PROCEDURE — 93456 R HRT CORONARY ARTERY ANGIO: CPT | Performed by: INTERNAL MEDICINE

## 2024-01-01 PROCEDURE — 83605 ASSAY OF LACTIC ACID: CPT | Performed by: INTERNAL MEDICINE

## 2024-01-01 PROCEDURE — 86922 COMPATIBILITY TEST ANTIGLOB: CPT | Performed by: EMERGENCY MEDICINE

## 2024-01-01 PROCEDURE — C1751 CATH, INF, PER/CENT/MIDLINE: HCPCS | Performed by: INTERNAL MEDICINE

## 2024-01-01 PROCEDURE — 5A1935Z RESPIRATORY VENTILATION, LESS THAN 24 CONSECUTIVE HOURS: ICD-10-PCS | Performed by: INTERNAL MEDICINE

## 2024-01-01 PROCEDURE — 5A12012 PERFORMANCE OF CARDIAC OUTPUT, SINGLE, MANUAL: ICD-10-PCS | Performed by: EMERGENCY MEDICINE

## 2024-01-01 PROCEDURE — 86922 COMPATIBILITY TEST ANTIGLOB: CPT

## 2024-01-01 PROCEDURE — 80069 RENAL FUNCTION PANEL: CPT | Performed by: STUDENT IN AN ORGANIZED HEALTH CARE EDUCATION/TRAINING PROGRAM

## 2024-01-01 PROCEDURE — 27000190 HC CPAP FULL FACE MASK W/VALVE

## 2024-01-01 PROCEDURE — 81001 URINALYSIS AUTO W/SCOPE: CPT | Performed by: EMERGENCY MEDICINE

## 2024-01-01 PROCEDURE — 82542 COL CHROMOTOGRAPHY QUAL/QUAN: CPT | Performed by: STUDENT IN AN ORGANIZED HEALTH CARE EDUCATION/TRAINING PROGRAM

## 2024-01-01 PROCEDURE — 86022 PLATELET ANTIBODIES: CPT | Performed by: STUDENT IN AN ORGANIZED HEALTH CARE EDUCATION/TRAINING PROGRAM

## 2024-01-01 PROCEDURE — 85730 THROMBOPLASTIN TIME PARTIAL: CPT | Mod: 91

## 2024-01-01 PROCEDURE — 93010 ELECTROCARDIOGRAM REPORT: CPT | Mod: ,,, | Performed by: INTERNAL MEDICINE

## 2024-01-01 PROCEDURE — 83615 LACTATE (LD) (LDH) ENZYME: CPT | Mod: 91 | Performed by: INTERNAL MEDICINE

## 2024-01-01 PROCEDURE — 93456 R HRT CORONARY ARTERY ANGIO: CPT | Mod: 26,,, | Performed by: INTERNAL MEDICINE

## 2024-01-01 PROCEDURE — 0BH17EZ INSERTION OF ENDOTRACHEAL AIRWAY INTO TRACHEA, VIA NATURAL OR ARTIFICIAL OPENING: ICD-10-PCS

## 2024-01-01 RX ORDER — MORPHINE SULFATE 2 MG/ML
1 INJECTION, SOLUTION INTRAMUSCULAR; INTRAVENOUS EVERY 4 HOURS PRN
Status: DISCONTINUED | OUTPATIENT
Start: 2024-01-01 | End: 2024-01-01

## 2024-01-01 RX ORDER — ASPIRIN 325 MG
325 TABLET ORAL
Status: ACTIVE | OUTPATIENT
Start: 2024-01-01 | End: 2024-01-01

## 2024-01-01 RX ORDER — SODIUM CHLORIDE 0.9 % (FLUSH) 0.9 %
10 SYRINGE (ML) INJECTION
Status: DISCONTINUED | OUTPATIENT
Start: 2024-01-01 | End: 2024-01-01 | Stop reason: HOSPADM

## 2024-01-01 RX ORDER — EPINEPHRINE 1 MG/ML
INJECTION, SOLUTION, CONCENTRATE INTRAVENOUS
Status: DISPENSED
Start: 2024-01-01 | End: 2024-01-01

## 2024-01-01 RX ORDER — POTASSIUM CHLORIDE 29.8 MG/ML
40 INJECTION INTRAVENOUS ONCE
Status: COMPLETED | OUTPATIENT
Start: 2024-01-01 | End: 2024-01-01

## 2024-01-01 RX ORDER — PROPOFOL 10 MG/ML
INJECTION, EMULSION INTRAVENOUS
Status: COMPLETED
Start: 2024-01-01 | End: 2024-01-01

## 2024-01-01 RX ORDER — MORPHINE SULFATE 2 MG/ML
2 INJECTION, SOLUTION INTRAMUSCULAR; INTRAVENOUS
Status: DISCONTINUED | OUTPATIENT
Start: 2024-01-01 | End: 2024-01-01

## 2024-01-01 RX ORDER — INDOMETHACIN 25 MG/1
CAPSULE ORAL
Status: DISPENSED
Start: 2024-01-01 | End: 2024-01-01

## 2024-01-01 RX ORDER — VASOPRESSIN 20 [USP'U]/ML
INJECTION, SOLUTION INTRAMUSCULAR; SUBCUTANEOUS
Status: DISPENSED
Start: 2024-01-01 | End: 2024-01-01

## 2024-01-01 RX ORDER — MIDAZOLAM HYDROCHLORIDE 1 MG/ML
INJECTION, SOLUTION INTRAMUSCULAR; INTRAVENOUS
Status: DISPENSED
Start: 2024-01-01 | End: 2024-01-01

## 2024-01-01 RX ORDER — NOREPINEPHRINE BITARTRATE 0.02 MG/ML
INJECTION, SOLUTION INTRAVENOUS
Status: COMPLETED
Start: 2024-01-01 | End: 2024-01-01

## 2024-01-01 RX ORDER — NAPROXEN SODIUM 220 MG/1
81 TABLET, FILM COATED ORAL DAILY
Status: DISCONTINUED | OUTPATIENT
Start: 2024-01-01 | End: 2024-01-01

## 2024-01-01 RX ORDER — MUPIROCIN 20 MG/G
OINTMENT TOPICAL 2 TIMES DAILY
Status: DISCONTINUED | OUTPATIENT
Start: 2024-01-01 | End: 2024-01-01

## 2024-01-01 RX ORDER — ROCURONIUM BROMIDE 10 MG/ML
INJECTION, SOLUTION INTRAVENOUS
Status: DISPENSED
Start: 2024-01-01 | End: 2024-01-01

## 2024-01-01 RX ORDER — LIDOCAINE HYDROCHLORIDE 10 MG/ML
INJECTION, SOLUTION INFILTRATION; PERINEURAL
Status: DISPENSED
Start: 2024-01-01 | End: 2024-01-01

## 2024-01-01 RX ORDER — HYDROMORPHONE HYDROCHLORIDE 1 MG/ML
0.5 INJECTION, SOLUTION INTRAMUSCULAR; INTRAVENOUS; SUBCUTANEOUS
Status: DISCONTINUED | OUTPATIENT
Start: 2024-01-01 | End: 2024-01-01

## 2024-01-01 RX ORDER — ONDANSETRON HYDROCHLORIDE 2 MG/ML
4 INJECTION, SOLUTION INTRAVENOUS EVERY 8 HOURS PRN
Status: DISCONTINUED | OUTPATIENT
Start: 2024-01-01 | End: 2024-01-01 | Stop reason: HOSPADM

## 2024-01-01 RX ORDER — ASPIRIN 81 MG/1
81 TABLET ORAL DAILY
Qty: 90 TABLET | Refills: 3 | Status: SHIPPED | OUTPATIENT
Start: 2024-01-01 | End: 2024-01-01 | Stop reason: HOSPADM

## 2024-01-01 RX ORDER — ETOMIDATE 2 MG/ML
INJECTION INTRAVENOUS
Status: DISPENSED
Start: 2024-01-01 | End: 2024-01-01

## 2024-01-01 RX ORDER — NOREPINEPHRINE BITARTRATE 1 MG/ML
0-3 INJECTION INTRAVENOUS CONTINUOUS
Status: DISCONTINUED | OUTPATIENT
Start: 2024-01-01 | End: 2024-01-01

## 2024-01-01 RX ORDER — SODIUM CHLORIDE 9 MG/ML
INJECTION, SOLUTION INTRAVENOUS CONTINUOUS
OUTPATIENT
Start: 2024-01-01 | End: 2024-01-01

## 2024-01-01 RX ORDER — MAGNESIUM SULFATE HEPTAHYDRATE 40 MG/ML
4 INJECTION, SOLUTION INTRAVENOUS ONCE
Status: COMPLETED | OUTPATIENT
Start: 2024-01-01 | End: 2024-01-01

## 2024-01-01 RX ORDER — MORPHINE SULFATE 2 MG/ML
1 INJECTION, SOLUTION INTRAMUSCULAR; INTRAVENOUS
Status: DISCONTINUED | OUTPATIENT
Start: 2024-01-01 | End: 2024-01-01

## 2024-01-01 RX ORDER — DOBUTAMINE HYDROCHLORIDE 400 MG/100ML
5 INJECTION INTRAVENOUS CONTINUOUS
Status: DISCONTINUED | OUTPATIENT
Start: 2024-01-01 | End: 2024-01-01 | Stop reason: HOSPADM

## 2024-01-01 RX ORDER — DIPHENHYDRAMINE HCL 50 MG
50 CAPSULE ORAL
Status: ACTIVE | OUTPATIENT
Start: 2024-01-01 | End: 2024-01-01

## 2024-01-01 RX ORDER — TRAMADOL HYDROCHLORIDE 50 MG/1
50 TABLET ORAL EVERY 8 HOURS PRN
Status: DISCONTINUED | OUTPATIENT
Start: 2024-01-01 | End: 2024-01-01 | Stop reason: HOSPADM

## 2024-01-01 RX ORDER — PROPOFOL 10 MG/ML
0-50 INJECTION, EMULSION INTRAVENOUS CONTINUOUS
Status: DISCONTINUED | OUTPATIENT
Start: 2024-01-01 | End: 2024-01-01

## 2024-01-01 RX ORDER — DEXMEDETOMIDINE HYDROCHLORIDE 4 UG/ML
0-1.4 INJECTION, SOLUTION INTRAVENOUS CONTINUOUS
Status: DISCONTINUED | OUTPATIENT
Start: 2024-01-01 | End: 2024-01-01

## 2024-01-01 RX ORDER — IPRATROPIUM BROMIDE AND ALBUTEROL SULFATE 2.5; .5 MG/3ML; MG/3ML
3 SOLUTION RESPIRATORY (INHALATION)
Status: DISCONTINUED | OUTPATIENT
Start: 2024-01-01 | End: 2024-01-01 | Stop reason: HOSPADM

## 2024-01-01 RX ORDER — AMIODARONE HYDROCHLORIDE 150 MG/3ML
INJECTION, SOLUTION INTRAVENOUS CODE/TRAUMA/SEDATION MEDICATION
Status: COMPLETED | OUTPATIENT
Start: 2024-01-01 | End: 2024-01-01

## 2024-01-01 RX ORDER — PHENYLEPHRINE HCL IN 0.9% NACL 1 MG/10 ML
SYRINGE (ML) INTRAVENOUS
Status: DISCONTINUED | OUTPATIENT
Start: 2024-01-01 | End: 2024-01-01 | Stop reason: HOSPADM

## 2024-01-01 RX ORDER — FENTANYL CITRATE-0.9 % NACL/PF 10 MCG/ML
0-250 PLASTIC BAG, INJECTION (ML) INTRAVENOUS CONTINUOUS
Status: DISCONTINUED | OUTPATIENT
Start: 2024-01-01 | End: 2024-01-01 | Stop reason: HOSPADM

## 2024-01-01 RX ORDER — NOREPINEPHRINE BITARTRATE 0.02 MG/ML
INJECTION, SOLUTION INTRAVENOUS
Status: DISCONTINUED
Start: 2024-01-01 | End: 2024-01-01 | Stop reason: HOSPADM

## 2024-01-01 RX ORDER — NOREPINEPHRINE BITARTRATE 0.02 MG/ML
0-3 INJECTION, SOLUTION INTRAVENOUS CONTINUOUS
Status: DISCONTINUED | OUTPATIENT
Start: 2024-01-01 | End: 2024-01-01

## 2024-01-01 RX ORDER — HYDROCODONE BITARTRATE AND ACETAMINOPHEN 500; 5 MG/1; MG/1
TABLET ORAL
Status: DISCONTINUED | OUTPATIENT
Start: 2024-01-01 | End: 2024-01-01 | Stop reason: HOSPADM

## 2024-01-01 RX ORDER — ATORVASTATIN CALCIUM 40 MG/1
80 TABLET, FILM COATED ORAL DAILY
Status: DISCONTINUED | OUTPATIENT
Start: 2024-01-01 | End: 2024-01-01

## 2024-01-01 RX ORDER — PROPOFOL 10 MG/ML
INJECTION, EMULSION INTRAVENOUS
Status: DISPENSED
Start: 2024-01-01 | End: 2024-01-01

## 2024-01-01 RX ORDER — EPINEPHRINE 0.1 MG/ML
INJECTION INTRAVENOUS CODE/TRAUMA/SEDATION MEDICATION
Status: COMPLETED | OUTPATIENT
Start: 2024-01-01 | End: 2024-01-01

## 2024-01-01 RX ORDER — FUROSEMIDE 10 MG/ML
40 INJECTION INTRAMUSCULAR; INTRAVENOUS ONCE
Status: COMPLETED | OUTPATIENT
Start: 2024-01-01 | End: 2024-01-01

## 2024-01-01 RX ORDER — INDOMETHACIN 25 MG/1
CAPSULE ORAL
Status: DISCONTINUED | OUTPATIENT
Start: 2024-01-01 | End: 2024-01-01 | Stop reason: HOSPADM

## 2024-01-01 RX ORDER — MORPHINE SULFATE 2 MG/ML
INJECTION, SOLUTION INTRAMUSCULAR; INTRAVENOUS
Status: COMPLETED
Start: 2024-01-01 | End: 2024-01-01

## 2024-01-01 RX ORDER — ATORVASTATIN CALCIUM 80 MG/1
80 TABLET, FILM COATED ORAL DAILY
Qty: 90 TABLET | Refills: 3 | Status: SHIPPED | OUTPATIENT
Start: 2024-01-01 | End: 2024-01-01 | Stop reason: HOSPADM

## 2024-01-01 RX ORDER — SUCCINYLCHOLINE CHLORIDE 20 MG/ML
INJECTION INTRAMUSCULAR; INTRAVENOUS
Status: DISPENSED
Start: 2024-01-01 | End: 2024-01-01

## 2024-01-01 RX ORDER — ATROPINE SULFATE 0.1 MG/ML
INJECTION INTRAVENOUS CODE/TRAUMA/SEDATION MEDICATION
Status: COMPLETED | OUTPATIENT
Start: 2024-01-01 | End: 2024-01-01

## 2024-01-01 RX ORDER — FENTANYL CITRATE 50 UG/ML
25 INJECTION, SOLUTION INTRAMUSCULAR; INTRAVENOUS
Status: DISCONTINUED | OUTPATIENT
Start: 2024-01-01 | End: 2024-01-01 | Stop reason: HOSPADM

## 2024-01-01 RX ORDER — HEPARIN SODIUM,PORCINE/D5W 25000/250
0-40 INTRAVENOUS SOLUTION INTRAVENOUS CONTINUOUS
Status: DISCONTINUED | OUTPATIENT
Start: 2024-01-01 | End: 2024-01-01

## 2024-01-01 RX ORDER — ONDANSETRON HYDROCHLORIDE 2 MG/ML
INJECTION, SOLUTION INTRAVENOUS
Status: COMPLETED
Start: 2024-01-01 | End: 2024-01-01

## 2024-01-01 RX ORDER — MORPHINE SULFATE 2 MG/ML
2 INJECTION, SOLUTION INTRAMUSCULAR; INTRAVENOUS EVERY 4 HOURS PRN
Status: DISCONTINUED | OUTPATIENT
Start: 2024-01-01 | End: 2024-01-01

## 2024-01-01 RX ORDER — NOREPINEPHRINE BITARTRATE/D5W 4MG/250ML
PLASTIC BAG, INJECTION (ML) INTRAVENOUS
Status: DISCONTINUED | OUTPATIENT
Start: 2024-01-01 | End: 2024-01-01

## 2024-01-01 RX ORDER — ONDANSETRON HYDROCHLORIDE 2 MG/ML
4 INJECTION, SOLUTION INTRAVENOUS ONCE
Status: DISCONTINUED | OUTPATIENT
Start: 2024-01-01 | End: 2024-01-01

## 2024-01-01 RX ADMIN — AMIODARONE HYDROCHLORIDE 300 MG: 50 INJECTION, SOLUTION INTRAVENOUS at 12:11

## 2024-01-01 RX ADMIN — IPRATROPIUM BROMIDE AND ALBUTEROL SULFATE 3 ML: 2.5; .5 SOLUTION RESPIRATORY (INHALATION) at 08:11

## 2024-01-01 RX ADMIN — HEPARIN SODIUM AND DEXTROSE 12 UNITS/KG/HR: 10000; 5 INJECTION INTRAVENOUS at 07:11

## 2024-01-01 RX ADMIN — EPINEPHRINE 1.3 MCG/KG/MIN: 1 INJECTION INTRAMUSCULAR; INTRAVENOUS; SUBCUTANEOUS at 11:11

## 2024-01-01 RX ADMIN — SODIUM CHLORIDE 500 ML: 9 INJECTION, SOLUTION INTRAVENOUS at 03:11

## 2024-01-01 RX ADMIN — DEXMEDETOMIDINE HYDROCHLORIDE 0.2 MCG/KG/HR: 4 INJECTION INTRAVENOUS at 05:11

## 2024-01-01 RX ADMIN — PIPERACILLIN SODIUM AND TAZOBACTAM SODIUM 4.5 G: 4; .5 INJECTION, POWDER, FOR SOLUTION INTRAVENOUS at 11:11

## 2024-01-01 RX ADMIN — MAGNESIUM SULFATE IN WATER 4 G: 40 INJECTION, SOLUTION INTRAVENOUS at 05:11

## 2024-01-01 RX ADMIN — PROPOFOL 50 MCG/KG/MIN: 10 INJECTION, EMULSION INTRAVENOUS at 05:11

## 2024-01-01 RX ADMIN — VANCOMYCIN HYDROCHLORIDE 1000 MG: 1 INJECTION, POWDER, LYOPHILIZED, FOR SOLUTION INTRAVENOUS at 06:11

## 2024-01-01 RX ADMIN — VASOPRESSIN 0.08 UNITS/MIN: 20 INJECTION INTRAVENOUS at 03:11

## 2024-01-01 RX ADMIN — TRAMADOL HYDROCHLORIDE 50 MG: 50 TABLET, COATED ORAL at 10:11

## 2024-01-01 RX ADMIN — DOBUTAMINE HYDROCHLORIDE 2.5 MCG/KG/MIN: 400 INJECTION INTRAVENOUS at 09:11

## 2024-01-01 RX ADMIN — ASPIRIN 81 MG CHEWABLE TABLET 81 MG: 81 TABLET CHEWABLE at 08:11

## 2024-01-01 RX ADMIN — TICAGRELOR 90 MG: 90 TABLET ORAL at 09:11

## 2024-01-01 RX ADMIN — TRAMADOL HYDROCHLORIDE 50 MG: 50 TABLET, COATED ORAL at 08:11

## 2024-01-01 RX ADMIN — DEXMEDETOMIDINE HYDROCHLORIDE 0.6 MCG/KG/HR: 4 INJECTION INTRAVENOUS at 10:11

## 2024-01-01 RX ADMIN — TICAGRELOR 90 MG: 90 TABLET ORAL at 08:11

## 2024-01-01 RX ADMIN — VASOPRESSIN 0.08 UNITS/MIN: 20 INJECTION INTRAVENOUS at 06:11

## 2024-01-01 RX ADMIN — MORPHINE SULFATE 1 MG: 2 INJECTION, SOLUTION INTRAMUSCULAR; INTRAVENOUS at 12:11

## 2024-01-01 RX ADMIN — PIPERACILLIN SODIUM AND TAZOBACTAM SODIUM 4.5 G: 4; .5 INJECTION, POWDER, FOR SOLUTION INTRAVENOUS at 09:11

## 2024-01-01 RX ADMIN — ATORVASTATIN CALCIUM 80 MG: 40 TABLET, FILM COATED ORAL at 08:11

## 2024-01-01 RX ADMIN — POTASSIUM CHLORIDE 40 MEQ: 29.8 INJECTION, SOLUTION INTRAVENOUS at 03:11

## 2024-01-01 RX ADMIN — POTASSIUM CHLORIDE 40 MEQ: 29.8 INJECTION, SOLUTION INTRAVENOUS at 06:11

## 2024-01-01 RX ADMIN — ATORVASTATIN CALCIUM 80 MG: 40 TABLET, FILM COATED ORAL at 09:11

## 2024-01-01 RX ADMIN — NOREPINEPHRINE BITARTRATE: 16 SOLUTION INTRAVENOUS at 07:11

## 2024-01-01 RX ADMIN — Medication 75 MCG/HR: at 06:11

## 2024-01-01 RX ADMIN — EPINEPHRINE 0.04 MCG/KG/MIN: 1 INJECTION INTRAMUSCULAR; INTRAVENOUS; SUBCUTANEOUS at 03:11

## 2024-01-01 RX ADMIN — NOREPINEPHRINE BITARTRATE 0.1 MCG/KG/MIN: 16 SOLUTION INTRAVENOUS at 11:11

## 2024-01-01 RX ADMIN — MORPHINE SULFATE 1 MG: 2 INJECTION, SOLUTION INTRAMUSCULAR; INTRAVENOUS at 05:11

## 2024-01-01 RX ADMIN — MUPIROCIN: 20 OINTMENT TOPICAL at 09:11

## 2024-01-01 RX ADMIN — MORPHINE SULFATE 1 MG: 2 INJECTION, SOLUTION INTRAMUSCULAR; INTRAVENOUS at 09:11

## 2024-01-01 RX ADMIN — ONDANSETRON HYDROCHLORIDE 4 MG: 2 INJECTION, SOLUTION INTRAVENOUS at 09:11

## 2024-01-01 RX ADMIN — PIPERACILLIN SODIUM AND TAZOBACTAM SODIUM 4.5 G: 4; .5 INJECTION, POWDER, FOR SOLUTION INTRAVENOUS at 01:11

## 2024-01-01 RX ADMIN — MORPHINE SULFATE 1 MG: 2 INJECTION, SOLUTION INTRAMUSCULAR; INTRAVENOUS at 02:11

## 2024-01-01 RX ADMIN — POTASSIUM CHLORIDE 40 MEQ: 29.8 INJECTION, SOLUTION INTRAVENOUS at 08:11

## 2024-01-01 RX ADMIN — IPRATROPIUM BROMIDE AND ALBUTEROL SULFATE 3 ML: 2.5; .5 SOLUTION RESPIRATORY (INHALATION) at 03:11

## 2024-01-01 RX ADMIN — HEPARIN SODIUM AND DEXTROSE 12 UNITS/KG/HR: 10000; 5 INJECTION INTRAVENOUS at 04:11

## 2024-01-01 RX ADMIN — POTASSIUM CHLORIDE 40 MEQ: 29.8 INJECTION, SOLUTION INTRAVENOUS at 11:11

## 2024-01-01 RX ADMIN — NOREPINEPHRINE BITARTRATE 3 MCG/KG/MIN: 1 INJECTION, SOLUTION, CONCENTRATE INTRAVENOUS at 10:11

## 2024-01-01 RX ADMIN — DEXMEDETOMIDINE HYDROCHLORIDE 0.2 MCG/KG/HR: 4 INJECTION INTRAVENOUS at 01:11

## 2024-01-01 RX ADMIN — ATROPINE SULFATE 1 MG: 0.1 INJECTION INTRAVENOUS at 12:11

## 2024-01-01 RX ADMIN — EPINEPHRINE 0.1 MG: 0.1 INJECTION INTRAVENOUS at 12:11

## 2024-01-01 RX ADMIN — EPINEPHRINE 2 MCG/KG/MIN: 1 INJECTION INTRAMUSCULAR; INTRAVENOUS; SUBCUTANEOUS at 01:11

## 2024-01-01 RX ADMIN — PIPERACILLIN SODIUM AND TAZOBACTAM SODIUM 4.5 G: 4; .5 INJECTION, POWDER, FOR SOLUTION INTRAVENOUS at 05:11

## 2024-01-01 RX ADMIN — TRAMADOL HYDROCHLORIDE 50 MG: 50 TABLET, COATED ORAL at 04:11

## 2024-01-01 RX ADMIN — ONDANSETRON 4 MG: 2 INJECTION INTRAMUSCULAR; INTRAVENOUS at 09:11

## 2024-01-01 RX ADMIN — MORPHINE SULFATE 2 MG: 2 INJECTION, SOLUTION INTRAMUSCULAR; INTRAVENOUS at 02:11

## 2024-01-01 RX ADMIN — VANCOMYCIN HYDROCHLORIDE 750 MG: 750 INJECTION, POWDER, LYOPHILIZED, FOR SOLUTION INTRAVENOUS at 07:11

## 2024-01-01 RX ADMIN — NOREPINEPHRINE BITARTRATE 3 MCG/KG/MIN: 1 INJECTION, SOLUTION, CONCENTRATE INTRAVENOUS at 02:11

## 2024-01-01 RX ADMIN — NOREPINEPHRINE BITARTRATE 1.5 MCG/KG/MIN: 16 SOLUTION INTRAVENOUS at 05:11

## 2024-01-01 RX ADMIN — VANCOMYCIN HYDROCHLORIDE 750 MG: 750 INJECTION, POWDER, LYOPHILIZED, FOR SOLUTION INTRAVENOUS at 06:11

## 2024-01-01 RX ADMIN — PROPOFOL 25 MCG/KG/MIN: 10 INJECTION, EMULSION INTRAVENOUS at 10:11

## 2024-01-01 RX ADMIN — VASOPRESSIN 0.08 UNITS/MIN: 20 INJECTION INTRAVENOUS at 10:11

## 2024-01-01 RX ADMIN — NOREPINEPHRINE BITARTRATE 0.08 MCG/KG/MIN: 16 SOLUTION INTRAVENOUS at 08:11

## 2024-01-01 RX ADMIN — EPINEPHRINE 1 MCG/KG/MIN: 1 INJECTION INTRAMUSCULAR; INTRAVENOUS; SUBCUTANEOUS at 09:11

## 2024-01-01 RX ADMIN — ASPIRIN 81 MG CHEWABLE TABLET 81 MG: 81 TABLET CHEWABLE at 04:11

## 2024-01-01 RX ADMIN — NOREPINEPHRINE BITARTRATE 0.1 MCG/KG/MIN: 16 SOLUTION INTRAVENOUS at 12:11

## 2024-01-01 RX ADMIN — TICAGRELOR: 90 TABLET ORAL at 12:11

## 2024-01-01 RX ADMIN — FENTANYL CITRATE 25 MCG: 50 INJECTION, SOLUTION INTRAMUSCULAR; INTRAVENOUS at 06:11

## 2024-01-01 RX ADMIN — HUMAN ALBUMIN MICROSPHERES AND PERFLUTREN 0.11 MG: 10; .22 INJECTION, SOLUTION INTRAVENOUS at 12:11

## 2024-01-01 RX ADMIN — POTASSIUM CHLORIDE 40 MEQ: 29.8 INJECTION, SOLUTION INTRAVENOUS at 05:11

## 2024-01-01 RX ADMIN — NOREPINEPHRINE BITARTRATE: 16 SOLUTION INTRAVENOUS at 08:11

## 2024-01-01 RX ADMIN — IPRATROPIUM BROMIDE AND ALBUTEROL SULFATE 3 ML: 2.5; .5 SOLUTION RESPIRATORY (INHALATION) at 01:11

## 2024-01-01 RX ADMIN — FUROSEMIDE 40 MG: 10 INJECTION, SOLUTION INTRAVENOUS at 04:11

## 2024-01-01 RX ADMIN — NOREPINEPHRINE BITARTRATE 2.1 MCG/KG/MIN: 1 INJECTION, SOLUTION, CONCENTRATE INTRAVENOUS at 07:11

## 2024-01-02 DIAGNOSIS — J44.9 CHRONIC OBSTRUCTIVE PULMONARY DISEASE, UNSPECIFIED COPD TYPE: ICD-10-CM

## 2024-01-02 RX ORDER — ALBUTEROL SULFATE 90 UG/1
2 AEROSOL, METERED RESPIRATORY (INHALATION) EVERY 6 HOURS
Qty: 25.5 G | Refills: 3 | Status: SHIPPED | OUTPATIENT
Start: 2024-01-02

## 2024-01-02 RX ORDER — BUDESONIDE AND FORMOTEROL FUMARATE DIHYDRATE 80; 4.5 UG/1; UG/1
AEROSOL RESPIRATORY (INHALATION)
Qty: 10.2 G | Refills: 6 | Status: SHIPPED | OUTPATIENT
Start: 2024-01-02

## 2024-01-02 NOTE — TELEPHONE ENCOUNTER
----- Message from Latanya Cunningham MA sent at 1/2/2024 11:08 AM CST -----  The patient calling to humza if the provider can send her refills on her symbicort and albuterol solution sent to the pharmacy below. Says she does not have transportation to get to the [facility] pharmacy. Please give her a call back at [phone].          [id number] [place] [address] AT [facility] [address]  [address]  [facility] [address]  Phone: [phone] Fax: [phone]

## 2024-01-02 NOTE — TELEPHONE ENCOUNTER
No care due was identified.  [facility] Embedded Care Due Messages. Reference number: [id number].   1/02/2024 12:06:09 PM CST   [facility] Pregnancy And Lactation Text: There is insufficient data to evaluate drug-associated risk for major birth defects, miscarriage, or other adverse maternal or fetal outcomes.  There is a pregnancy registry that monitors pregnancy outcomes in pregnant persons exposed to the medication during pregnancy.  It is unknown if this medication is excreted in breast milk.  Do not breastfeed during treatment and for about 4 weeks after the last dose.

## 2024-01-03 ENCOUNTER — TELEPHONE (OUTPATIENT)
Dept: INTERNAL MEDICINE | Facility: CLINIC | Age: 79
End: 2024-01-03
Payer: MEDICARE

## 2024-01-03 DIAGNOSIS — J44.9 CHRONIC OBSTRUCTIVE PULMONARY DISEASE, UNSPECIFIED COPD TYPE: Primary | ICD-10-CM

## 2024-01-03 RX ORDER — IPRATROPIUM BROMIDE AND ALBUTEROL SULFATE 2.5; .5 MG/3ML; MG/3ML
3 SOLUTION RESPIRATORY (INHALATION) EVERY 6 HOURS PRN
Qty: 180 ML | Refills: 6 | Status: SHIPPED | OUTPATIENT
Start: 2024-01-03 | End: 2025-01-09

## 2024-01-03 NOTE — TELEPHONE ENCOUNTER
The patient is asking for a refill on her albuterol-ipratropium 2.5 mg-0.5 mg/3 mL nebulizer solution 3 mL  but I don't see it in the chart.

## 2024-01-03 NOTE — TELEPHONE ENCOUNTER
----- Message from Amanda Mckenzie sent at 1/3/2024 11:01 AM CST -----  Contact: Self/[phone]  Requesting an RX refill or new RX.  Is this a refill or new RX: New  RX name and strength : albuterol-ipratropium 2.5 mg-0.5 mg/3 mL nebulizer solution 3 mL    Is this a 30 day or 90 day RX: 30  Pharmacy name and phone # :  [id number] - [facility] [address] [facility] [address]  [facility] [address]  [facility] [address]  Phone: [phone] Fax: [phone]     The doctors have asked that we provide their patients with the following 2 reminders -- prescription refills can take up to 72 hours, and a friendly reminder that in the future you can use your [facility] account to request refills: pt is out of her medication

## 2024-01-31 ENCOUNTER — TELEPHONE (OUTPATIENT)
Dept: VASCULAR SURGERY | Facility: CLINIC | Age: 79
End: 2024-01-31
Payer: MEDICARE

## 2024-01-31 NOTE — TELEPHONE ENCOUNTER
Attempted to contact the pt to inform her that the appt scheduled on 2/07/24 was scheduled with the wrong provider and will be cancelled but no answer.Attempted to contact the pt via sister's phone number on file but phone is disconnected.Spoke with the pt's daughter Anthony and informed her of the above and that the pt should f/u with Dr Rico.Pt's daughter verbalized understanding of information received.Appt with Dr Saldaña cancelled.Message sent to Dr Rico staff to contact the pt to schedule an appt.

## 2024-02-05 ENCOUNTER — EXTERNAL HOME HEALTH (OUTPATIENT)
Dept: HOME HEALTH SERVICES | Facility: HOSPITAL | Age: 79
End: 2024-02-05
Payer: MEDICARE

## 2024-03-18 DIAGNOSIS — I25.10 CORONARY ARTERY DISEASE, UNSPECIFIED VESSEL OR LESION TYPE, UNSPECIFIED WHETHER ANGINA PRESENT, UNSPECIFIED WHETHER NATIVE OR TRANSPLANTED HEART: Primary | ICD-10-CM

## 2024-03-18 PROBLEM — I21.3 STEMI (ST ELEVATION MYOCARDIAL INFARCTION): Status: RESOLVED | Noted: 2023-12-12 | Resolved: 2024-03-18

## 2024-03-20 ENCOUNTER — CARE AT HOME (OUTPATIENT)
Dept: HOME HEALTH SERVICES | Facility: CLINIC | Age: 79
End: 2024-03-20
Payer: MEDICARE

## 2024-03-20 DIAGNOSIS — I50.32 CHRONIC DIASTOLIC HEART FAILURE: ICD-10-CM

## 2024-03-20 DIAGNOSIS — F33.0 MILD EPISODE OF RECURRENT MAJOR DEPRESSIVE DISORDER: ICD-10-CM

## 2024-03-20 DIAGNOSIS — I25.119 CORONARY ARTERY DISEASE INVOLVING NATIVE HEART WITH ANGINA PECTORIS, UNSPECIFIED VESSEL OR LESION TYPE: ICD-10-CM

## 2024-03-20 DIAGNOSIS — J44.9 CHRONIC OBSTRUCTIVE PULMONARY DISEASE, UNSPECIFIED COPD TYPE: Primary | ICD-10-CM

## 2024-03-20 DIAGNOSIS — D69.2 OTHER NONTHROMBOCYTOPENIC PURPURA: ICD-10-CM

## 2024-03-20 DIAGNOSIS — Z93.3 S/P COLOSTOMY: ICD-10-CM

## 2024-03-20 DIAGNOSIS — F17.210 TOBACCO DEPENDENCE DUE TO CIGARETTES: ICD-10-CM

## 2024-03-20 DIAGNOSIS — I70.223 ATHEROSCLEROSIS OF NATIVE ARTERIES OF EXTREMITIES WITH REST PAIN, BILATERAL LEGS: ICD-10-CM

## 2024-03-20 PROBLEM — I70.235 ATHEROSCLEROSIS OF NATIVE ARTERIES OF RIGHT LEG WITH ULCERATION OF OTHER PART OF FOOT: Status: RESOLVED | Noted: 2021-07-12 | Resolved: 2024-03-20

## 2024-03-20 PROBLEM — Z87.891 FORMER SMOKER: Status: RESOLVED | Noted: 2021-07-12 | Resolved: 2024-03-20

## 2024-03-20 PROCEDURE — 99349 HOME/RES VST EST MOD MDM 40: CPT | Mod: S$GLB,,, | Performed by: NURSE PRACTITIONER

## 2024-03-20 NOTE — ASSESSMENT & PLAN NOTE
Home smells of smoke, butts noted in trash.  Pt reports smoking 1/2 ppd   Knows she should not and its effective on her health  Reinforced danger of smoking with oxygen  Stated understanding    Smoking Cessation  I discussed with the patient regarding the hazardous effects of smoking on increasing risk of heart attack and stroke, worsening lung functions, and increasing cancer risk.   Patient was urged to stop smoking now.  I also offered nicotine taper (such as nicotine patch and gum) to help ease the craving to smoke. 5 min spent in discussion

## 2024-03-20 NOTE — ASSESSMENT & PLAN NOTE
Stable, at baseline  Using home oxygen at 2-3L   Continue symbicort daily, resp treatments q6  -Rescue inhaler as needed.   Continues to smoke .5ppd cigarettes

## 2024-03-20 NOTE — PROGRESS NOTES
[facility]  Medical Chronic Care Home Visit    Encounter Provider: Kalyn Pemberton   PCP: Laurence, Primary Doctor  Consult Requested By: Dr. Mackenzie Davis    HISTORY OF PRESENT ILLNESS      Patient ID: Radha Sotelo is a 78 y.o. female is being seen in the home due to physical debility that presents a taxing effort to leave the home, to mitigate high risk of hospital readmission and/or due to the limited availability of reliable or safe options for transportation to the point of access to the provider. Prior to treatment on this visit the chart was reviewed and patient verbal consent was obtained.    Chronic medical conditions synopsis:    Pt is being seen in her apartment today for routine follow up. She is AAOx3, reports she is in her usual state of health and no new problems at this time. She is complaint with her medications. Reports she rarely leaves the apartment as she in on the second floor and cannot manage the stairs and her oxygen without extreme effort. She continues to smoke-.5 ppd- uses oxygen, reports fair appetite. She does not have any upcoming appts and does not believe she could get there.     DECISION MAKING TODAY       Assessment & Plan:  1. Chronic obstructive pulmonary disease, unspecified COPD type  Assessment & Plan:  Stable, at baseline  Using home oxygen at 2-3L   Continue symbicort daily, resp treatments q6  -Rescue inhaler as needed.   Continues to smoke .5ppd cigarettes    Orders:  -     Ambulatory referral/consult to [facility]; Future; Expected date: 03/27/2024    2. Other nonthrombocytopenic purpura  Assessment & Plan:  Purpura to extremities  Monitor      3. Chronic diastolic heart failure  Assessment & Plan:  Stable and chronic  No s/s of fluid overload  No edema  Beta blocker and diuretic in place  Monitor    Orders:  -     Ambulatory referral/consult to [facility]; Future; Expected date: 03/27/2024    4. S/P colostomy  Assessment &  Plan:  Diverticulitis s/p left hemicolectomy and transverse colon end colostomy (2012)  Pt cares for stoma and appliance changes  Declines any need for HH      5. Mild episode of recurrent major depressive disorder  Assessment & Plan:  Pt with chronic depression  No thoughts of SI/HI  Celexa in place  Continue current plan  Monitor      6. Atherosclerosis of native arteries of extremities with rest pain, bilateral legs  Assessment & Plan:  Related to smoking history  Vascular following  ASA and plavix in place  Monitor    Orders:  -     Ambulatory referral/consult to [facility]; Future; Expected date: 03/27/2024    7. Coronary artery disease involving native heart with angina pectoris, unspecified vessel or lesion type  Assessment & Plan:  S/p PCI on 11/22/2023, h/o of STEMI  No recent episodes of chest pain  Stop smoking  Beta blocker and plavix in place  Monitor        8. Tobacco dependence due to cigarettes  Assessment & Plan:  Home smells of smoke, butts noted in trash.  Pt reports smoking 1/2 ppd   Knows she should not and its effective on her health  Reinforced danger of smoking with oxygen  Stated understanding    Smoking Cessation  I discussed with the patient regarding the hazardous effects of smoking on increasing risk of heart attack and stroke, worsening lung functions, and increasing cancer risk.   Patient was urged to stop smoking now.  I also offered nicotine taper (such as nicotine patch and gum) to help ease the craving to smoke. 5 min spent in discussion               ENVIRONMENT OF CARE      Family and/or Caregiver present at visit?  No  Name of Caregiver:   History provided by: patient    4Ms for Medical Decision-Making in Older Adults    Last Completed EAWV: None    MOBILITY:  Get Up and Go:       No data to display              Activities of Daily Living:       No data to display              Whisper Test:       No data to display              Disability Status:      1/20/2015     8:20  PM   Disability Status   Are you deaf or do you have serious difficulty hearing? N   Are you blind or do you have serious difficulty seeing, even when wearing glasses? N   Because of a physical, mental, or emotional condition, do you have serious difficulty concentrating, remembering, or making decisions? N   Do you have difficulty dressing or bathing? N   Because of a physical, mental, or emotional condition, do you have difficulty doing errands alone such as visiting a doctor's office or shopping? N     Nutrition Screening:       No data to display             Screening Score: 0-7 Malnourished, 8-11 At Risk, 12-14 Normal    MENTATION:   Depression Patient Health Questionnaire:       No data to display              Has Dementia Dx: No    Cognitive Function Screening:       No data to display              Cognitive Function Screening Total - Less than 4 = Abnormal,  Greater than or equal to 4 = Normal    MEDICATIONS:  High Risk Medications:  Total Active Medications: 0  This patient does not have an active medication from one of the medication groupers.    WHAT MATTERS MOST:  Advance Care Planning   ACP Status:   Patient has had an ACP conversation  Living Will: No  Power of : No  LaPOST: No    What is most important right now is to focus on extending life as long as possible, even it it means sacrificing quality    Accordingly, we have decided that the best plan to meet the patient's goals includes continuing with treatment           Is patient hospice appropriate: Yes  (If needed, use PPS <30 or FAST score >7)  Was referral to hospice placed: No    Impression upon entering the home:  Physical Dwelling: apartment/condo   Appearance of home environment: cleaniness: clean  Functional Status: independent  Mobility: ambulatory with device  Nutritional access: adequate intake and access  Home Health: No, and does not need it at this time   DME/Supplies: rolling walker and oxygen     Discussion with other health  care providers: No discussion with other health care providers necessary.   Does patient have a PCP at [place]? No   Repatriation plan with PCP? [facility] reason: mobility   Does patient have an ostomy (ileostomy, colostomy, suprapubic catheter, nephrostomy tube, tracheostomy, PEG tube, pleurex catheter, cholecystostomy, etc)? Yes. Is it on problem list?yes  Were BPAs reviewed? Yes    Social History     Socioeconomic History    Marital status:    Tobacco Use    Smoking status: Former     Current packs/day: 0.00     Average packs/day: 0.5 packs/day for 50.0 years (25.0 ttl pk-yrs)     Types: Cigarettes     Start date: 7/5/1963     Quit date: 7/5/2013     Years since quitting: 10.7    Smokeless tobacco: Never   Substance and Sexual Activity    Alcohol use: No    Drug use: No   Social History Narrative    ** Merged History Encounter **          Social Determinants of Health     Financial Resource Strain: Low Risk  (8/19/2023)    Overall Financial Resource Strain (CARDIA)     Difficulty of Paying Living Expenses: Not very hard   Recent Concern: Financial Resource Strain - Medium Risk (8/12/2023)    Overall Financial Resource Strain (CARDIA)     Difficulty of Paying Living Expenses: Somewhat hard   Food Insecurity: No Food Insecurity (8/19/2023)    Hunger Vital Sign     Worried About Running Out of Food in the Last Year: Never true     Ran Out of Food in the Last Year: Never true   Transportation Needs: No Transportation Needs (8/19/2023)    PRAPARE - Transportation     Lack of Transportation (Medical): No     Lack of Transportation (Non-Medical): No   Recent Concern: Transportation Needs - Unmet Transportation Needs (8/12/2023)    PRAPARE - Transportation     Lack of Transportation (Medical): Yes     Lack of Transportation (Non-Medical): Yes   Physical Activity: Inactive (8/12/2023)    Exercise Vital Sign     Days of Exercise per Week: 0 days     Minutes of Exercise per Session: 0 min   Stress: Stress Concern  Present (8/12/2023)    [facility] [place] of Occupational Health - Occupational Stress Questionnaire     Feeling of Stress : To some extent   Social Connections: Socially Isolated (8/19/2023)    Social Connection and Isolation Panel [NHANES]     Frequency of Communication with Friends and Family: More than three times a week     Frequency of Social Gatherings with Friends and Family: More than three times a week     Attends Evangelical Services: Never     Active Member of Clubs or Organizations: No     Attends Club or Organization Meetings: Never     Marital Status:    Housing Stability: Low Risk  (8/19/2023)    Housing Stability Vital Sign     Unable to Pay for Housing in the Last Year: No     Number of Places Lived in the Last Year: 1     Unstable Housing in the Last Year: No         OBJECTIVE:     Vital Signs:  There were no vitals filed for this visit.    Review of Systems   Constitutional:  Negative for activity change, fatigue and fever.   HENT: Negative.     Eyes: Negative.    Respiratory:  Positive for cough and shortness of breath. Negative for chest tightness.    Cardiovascular: Negative.  Negative for leg swelling.   Gastrointestinal: Negative.    Endocrine: Negative.    Genitourinary: Negative.    Musculoskeletal: Negative.    Skin: Negative.    Allergic/Immunologic: Negative.    Neurological: Negative.    Hematological:  Bruises/bleeds easily.   Psychiatric/Behavioral: Negative.  Negative for agitation.    All other systems reviewed and are negative.      Physical Exam:  Physical Exam  Vitals reviewed.   Constitutional:       General: She is not in acute distress.     Appearance: She is well-developed.   HENT:      Head: Normocephalic and atraumatic.      Nose: Nose normal.      Mouth/Throat:      Mouth: Mucous membranes are dry.   Eyes:      Pupils: Pupils are equal, round, and reactive to light.   Cardiovascular:      Rate and Rhythm: Normal rate and regular rhythm.      Heart sounds: Normal heart  sounds.   Pulmonary:      Effort: Pulmonary effort is normal.      Comments: Diminished to bases, oxygen in use at 2.5L  Abdominal:      General: Bowel sounds are normal.      Palpations: Abdomen is soft.      Comments: Colostomy in place with stool in bag   Musculoskeletal:         General: Normal range of motion.      Cervical back: Normal range of motion and neck supple.   Skin:     General: Skin is warm and dry.      Findings: Bruising present. Lesion: extremities.  Neurological:      Mental Status: She is alert and oriented to person, place, and time.      Cranial Nerves: No cranial nerve deficit.   Psychiatric:         Behavior: Behavior normal.         Thought Content: Thought content normal.         Judgment: Judgment normal.         INSTRUCTIONS FOR PATIENT:     Scheduled Follow-up, Appts Reviewed with Modifications if Needed: Yes  Future Appointments   Date Time Provider Department Center   7/8/2024  8:00 AM Kalyn Pemberton NP [facility] [place]         Current Outpatient Medications:     albuterol (PROVENTIL/VENTOLIN HFA) 90 mcg/actuation inhaler, Inhale 2 puffs into the lungs every 6 (six) hours. Rescue, Disp: 25.5 g, Rfl: 3    albuterol-ipratropium (DUO-NEB) 2.5 mg-0.5 mg/3 mL nebulizer solution, Take 3 mLs by nebulization every 6 (six) hours as needed for Wheezing or Shortness of Breath., Disp: 180 mL, Rfl: 6    aspirin (ECOTRIN) 81 MG EC tablet, Take 1 tablet (81 mg total) by mouth once daily., Disp: 90 tablet, Rfl: 2    atorvastatin (LIPITOR) 80 MG tablet, Take 1 tablet (80 mg total) by mouth once daily., Disp: 90 tablet, Rfl: 3    budesonide-formoterol 80-4.5 mcg (SYMBICORT) 80-4.5 mcg/actuation HFAA, INHALE 2 PUFFS INTO THE LUNGS TWICE DAILY. RINSE MOUTH AFTER USE, Disp: 10.2 g, Rfl: 6    clopidogreL (PLAVIX) 75 mg tablet, Take 1 tablet (75 mg total) by mouth once daily., Disp: 30 tablet, Rfl: 11    furosemide (LASIX) 20 MG tablet, Take 1 tablet (20 mg total) by mouth once daily. for 5 days,  Disp: 5 tablet, Rfl: 0    hydrocortisone-aloe vera 1 % Crea cream, Apply topically 2 (two) times daily., Disp: 28 g, Rfl: 0    metoprolol succinate (TOPROL-XL) 25 MG 24 hr tablet, Take 1 tablet (25 mg total) by mouth once daily., Disp: 90 tablet, Rfl: 3    nitroGLYCERIN (NITROSTAT) 0.4 MG SL tablet, Place 1 tablet (0.4 mg total) under the tongue every 5 (five) minutes as needed for Chest pain., Disp: 25 tablet, Rfl: 2    Medication Reconciliation:  Were medications changed during this appointment? No  Were medications in the home? Yes  Is the patient taking the medications as directed? Yes  Does the patient/caregiver understand the medications and changes? Yes  Does updated med list accurately reflects meds patient is currently taking? Yes    Signature: Kalyn Pemberton NP

## 2024-03-20 NOTE — ASSESSMENT & PLAN NOTE
Stable and chronic  No s/s of fluid overload  No edema  Beta blocker and diuretic in place  Monitor

## 2024-03-20 NOTE — ASSESSMENT & PLAN NOTE
S/p PCI on 11/22/2023, h/o of STEMI  No recent episodes of chest pain  Stop smoking  Beta blocker and plavix in place  Monitor

## 2024-03-20 NOTE — ASSESSMENT & PLAN NOTE
Diverticulitis s/p left hemicolectomy and transverse colon end colostomy (2012)  Pt cares for stoma and appliance changes  Declines any need for HH

## 2024-05-07 ENCOUNTER — OFFICE VISIT (OUTPATIENT)
Dept: HOME HEALTH SERVICES | Facility: CLINIC | Age: 79
End: 2024-05-07
Payer: MEDICARE

## 2024-05-07 VITALS
HEART RATE: 65 BPM | RESPIRATION RATE: 16 BRPM | BODY MASS INDEX: 21.65 KG/M2 | WEIGHT: 110.25 LBS | HEIGHT: 60 IN | OXYGEN SATURATION: 97 % | TEMPERATURE: 97 F | DIASTOLIC BLOOD PRESSURE: 70 MMHG | SYSTOLIC BLOOD PRESSURE: 140 MMHG

## 2024-05-07 DIAGNOSIS — F17.210 TOBACCO DEPENDENCE DUE TO CIGARETTES: ICD-10-CM

## 2024-05-07 DIAGNOSIS — I73.9 PVD (PERIPHERAL VASCULAR DISEASE): ICD-10-CM

## 2024-05-07 DIAGNOSIS — Z00.00 ENCOUNTER FOR PREVENTIVE HEALTH EXAMINATION: Primary | ICD-10-CM

## 2024-05-07 DIAGNOSIS — J44.9 CHRONIC OBSTRUCTIVE PULMONARY DISEASE, UNSPECIFIED COPD TYPE: ICD-10-CM

## 2024-05-07 DIAGNOSIS — I10 ESSENTIAL HYPERTENSION: ICD-10-CM

## 2024-05-07 DIAGNOSIS — E78.5 HYPERLIPIDEMIA, UNSPECIFIED HYPERLIPIDEMIA TYPE: ICD-10-CM

## 2024-05-07 DIAGNOSIS — E46 PROTEIN-CALORIE MALNUTRITION, UNSPECIFIED SEVERITY: ICD-10-CM

## 2024-05-07 DIAGNOSIS — J96.22 ACUTE ON CHRONIC RESPIRATORY FAILURE WITH HYPOXIA AND HYPERCAPNIA: ICD-10-CM

## 2024-05-07 DIAGNOSIS — F33.0 MILD EPISODE OF RECURRENT MAJOR DEPRESSIVE DISORDER: ICD-10-CM

## 2024-05-07 DIAGNOSIS — R26.9 ABNORMALITY OF GAIT AND MOBILITY: ICD-10-CM

## 2024-05-07 DIAGNOSIS — J96.21 ACUTE ON CHRONIC RESPIRATORY FAILURE WITH HYPOXIA AND HYPERCAPNIA: ICD-10-CM

## 2024-05-07 DIAGNOSIS — I50.33 ACUTE ON CHRONIC DIASTOLIC HEART FAILURE: ICD-10-CM

## 2024-05-07 DIAGNOSIS — Z89.431 HISTORY OF TRANSMETATARSAL AMPUTATION OF RIGHT FOOT: ICD-10-CM

## 2024-05-07 DIAGNOSIS — I70.223 ATHEROSCLEROSIS OF NATIVE ARTERIES OF EXTREMITIES WITH REST PAIN, BILATERAL LEGS: ICD-10-CM

## 2024-05-07 DIAGNOSIS — I70.25 ATHEROSCLEROSIS OF NATIVE ARTERIES OF THE EXTREMITIES WITH ULCERATION: ICD-10-CM

## 2024-05-07 DIAGNOSIS — D69.2 OTHER NONTHROMBOCYTOPENIC PURPURA: ICD-10-CM

## 2024-05-07 DIAGNOSIS — Z93.3 S/P COLOSTOMY: ICD-10-CM

## 2024-05-07 PROCEDURE — 1158F ADVNC CARE PLAN TLK DOCD: CPT | Mod: CPTII,S$GLB,,

## 2024-05-07 PROCEDURE — 1126F AMNT PAIN NOTED NONE PRSNT: CPT | Mod: CPTII,S$GLB,,

## 2024-05-07 PROCEDURE — 1159F MED LIST DOCD IN RCRD: CPT | Mod: CPTII,S$GLB,,

## 2024-05-07 PROCEDURE — 1101F PT FALLS ASSESS-DOCD LE1/YR: CPT | Mod: CPTII,S$GLB,,

## 2024-05-07 PROCEDURE — 3288F FALL RISK ASSESSMENT DOCD: CPT | Mod: CPTII,S$GLB,,

## 2024-05-07 PROCEDURE — G0439 PPPS, SUBSEQ VISIT: HCPCS | Mod: S$GLB,,,

## 2024-05-07 PROCEDURE — 3077F SYST BP >= 140 MM HG: CPT | Mod: CPTII,S$GLB,,

## 2024-05-07 PROCEDURE — 3078F DIAST BP <80 MM HG: CPT | Mod: CPTII,S$GLB,,

## 2024-05-07 PROCEDURE — 1170F FXNL STATUS ASSESSED: CPT | Mod: CPTII,S$GLB,,

## 2024-05-07 PROCEDURE — 1160F RVW MEDS BY RX/DR IN RCRD: CPT | Mod: CPTII,S$GLB,,

## 2024-05-07 NOTE — PROGRESS NOTES
Radha Sotelo presented for a follow-up Medicare AWV today. The following components were reviewed and updated:    Medical history  Family History  Social history  Allergies and Current Medications  Health Risk Assessment  Health Maintenance  Care Team    **See Completed Assessments for Annual Wellness visit with in the encounter summary    The following assessments were completed:  Depression Screening  Cognitive function Screening    Timed Get Up Test  Whisper Test      Opioid documentation:      Patient does not have a current opioid prescription.          Vitals:    05/07/24 0903   BP: (!) 140/70   BP Location: Left arm   Patient Position: Sitting   Pulse: 65   Resp: 16   Temp: 97 °F (36.1 °C)   SpO2: 97%   Weight: 50 kg (110 lb 3.7 oz)   Height: 5' (1.524 m)     Body mass index is 21.53 kg/m².       Physical Exam  Vitals reviewed.   Constitutional:       General: She is not in acute distress.     Appearance: Normal appearance.   HENT:      Head: Normocephalic.   Cardiovascular:      Rate and Rhythm: Normal rate and regular rhythm.      Pulses: Normal pulses.      Heart sounds: Normal heart sounds.   Pulmonary:      Effort: Pulmonary effort is normal. No respiratory distress.      Breath sounds: Normal breath sounds. No wheezing.   Abdominal:      General: Bowel sounds are normal.      Tenderness: There is no abdominal tenderness.   Musculoskeletal:         General: Normal range of motion.      Cervical back: Normal range of motion.      Right lower leg: No edema.      Left lower leg: No edema.   Feet:      Comments: Amputation of toes on R foot  Skin:     General: Skin is warm and dry.      Capillary Refill: Capillary refill takes less than 2 seconds.   Neurological:      General: No focal deficit present.      Mental Status: She is alert and oriented to person, place, and time.   Psychiatric:         Mood and Affect: Mood normal.         Behavior: Behavior normal.           Diagnoses and health risks identified  today and associated recommendations/orders:    1. Encounter for preventive health examination  Assessments completed.  HM recommendations reviewed.  F/u with PCP as instructed.      2. S/P colostomy  Chronic, stable on current regimen. Followed by GI.     3. History of transmetatarsal amputation of right foot  Chronic, stable on current regimen. Followed by PCP.     4. Chronic obstructive pulmonary disease, unspecified COPD type  Chronic, stable on current albuterol, Symbicort regimen. Followed by PCP.     5. Atherosclerosis of native arteries of the extremities with ulceration  Chronic, stable on current statin regimen. Followed by PCP.     6. Abnormality of gait and mobility  Chronic, stable on current regimen. Followed by PCP.     7. Atherosclerosis of native arteries of extremities with rest pain, bilateral legs  Chronic, stable on current statin regimen. Followed by PCP.     8. PVD (peripheral vascular disease)  Chronic, stable on current regimen. Followed by PCP.     9. Acute on chronic diastolic heart failure  Chronic, stable on current Metoprolol regimen. Followed by PCP.     10. Acute on chronic respiratory failure with hypoxia and hypercapnia  Chronic, stable on current albuterol, Symbicort regimen. Followed by PCP.     11. Mild episode of recurrent major depressive disorder  Chronic, stable on current regimen. Followed by PCP.     12. Other nonthrombocytopenic purpura  Chronic, stable on current regimen. Followed by PCP.,     13. Protein-calorie malnutrition, unspecified severity  Chronic, stable on current regimen. Followed by PCP.     14. Essential hypertension  Chronic, stable on current Metoprolol regimen. Followed by PCP.     15. Hyperlipidemia, unspecified hyperlipidemia type  Chronic, stable on current statin regimen. Followed by PCP.     16. Tobacco dependence due to cigarettes  Smokes 4-6 cigarettes daily. Not ready to quit. Counseling provided.       Provided Radha with a 5-10 year written  screening schedule and personal prevention plan. Recommendations were developed using the USPSTF age appropriate recommendations. Education, counseling, and referrals were provided as needed.  After Visit Summary printed and given to patient which includes a list of additional screenings\tests needed.    Follow up in about 1 year (around 5/7/2025) for Medicare AWV.      Lucille Shetty NP    I offered to discuss advanced care planning, including how to pick a person who would make decisions for you if you were unable to make them for yourself, called a health care power of , and what kind of decisions you might make such as use of life sustaining treatments such as ventilators and tube feeding when faced with a life limiting illness recorded on a living will that they will need to know. (How you want to be cared for as you near the end of your natural life)     X Patient is interested in learning more about how to make advanced directives.  I provided them paperwork and offered to discuss this with them.

## 2024-05-07 NOTE — PATIENT INSTRUCTIONS
Counseling and Referral of Other Preventative  (Italic type indicates deductible and co-insurance are waived)    Patient Name: Radha Sotelo  Today's Date: 5/7/2024    Health Maintenance       Date Due Completion Date    Pneumococcal Vaccines (Age 65+) (1 of 2 - PCV) Never done ---    TETANUS VACCINE Never done ---    DEXA Scan Never done ---    Shingles Vaccine (1 of 2) Never done ---    RSV Vaccine (Age 60+ and Pregnant patients) (1 - 1-dose 60+ series) Never done ---    LDCT Lung Screen 03/01/2021 3/1/2020    COVID-19 Vaccine (1 - 2023-24 season) Never done ---    Influenza Vaccine (Season Ended) 09/01/2024 ---    Lipid Panel 11/29/2028 11/29/2023        No orders of the defined types were placed in this encounter.    The following information is provided to all patients.  This information is to help you find resources for any of the problems found today that may be affecting your health:                  Living healthy guide: www.[facility].[place].gov      Understanding Diabetes: www.diabetes.org      Eating healthy: www.cdc.gov/healthyweight      CDC home safety checklist: www.cdc.gov/steadi/patient.html      Agency on Aging: www.goea.[place].gov      Alcoholics anonymous (AA): www.aa.org      Physical Activity: www.[place].nih.gov/hl3qber      Tobacco use: www.quitwithusla.org

## 2024-05-08 PROBLEM — J18.9 ATYPICAL PNEUMONIA: Status: RESOLVED | Noted: 2020-03-02 | Resolved: 2024-05-08

## 2024-05-21 DIAGNOSIS — J44.9 CHRONIC OBSTRUCTIVE PULMONARY DISEASE, UNSPECIFIED COPD TYPE: ICD-10-CM

## 2024-05-21 RX ORDER — IPRATROPIUM BROMIDE AND ALBUTEROL SULFATE 2.5; .5 MG/3ML; MG/3ML
3 SOLUTION RESPIRATORY (INHALATION) EVERY 6 HOURS PRN
Qty: 180 ML | Refills: 6 | Status: SHIPPED | OUTPATIENT
Start: 2024-05-21 | End: 2025-05-21

## 2024-05-24 ENCOUNTER — TELEPHONE (OUTPATIENT)
Dept: HOME HEALTH SERVICES | Facility: CLINIC | Age: 79
End: 2024-05-24
Payer: MEDICARE

## 2024-05-24 NOTE — TELEPHONE ENCOUNTER
Received a denial letter to refill patient's nebulizer solution.  Contacted [facility] and they transferred me to [facility] who takes care of all the pharmacy needs @ [phone].  Transferred to [facility] Appeals line.  Filed expedited appeal for patient's medication to be approved on basis that patient had been on the medication for a long time, is well controlled on the medication, and is medically necessary for patient.  Initiation Reference # is [id number] and Expedited Fax number to send documentation is [phone].  NP notified and documentation will be sent.

## 2024-05-25 ENCOUNTER — HOSPITAL ENCOUNTER (INPATIENT)
Facility: HOSPITAL | Age: 79
LOS: 1 days | Discharge: HOME HEALTH | DRG: 321 | End: 2024-05-28
Attending: EMERGENCY MEDICINE | Admitting: HOSPITALIST
Payer: MEDICARE

## 2024-05-25 DIAGNOSIS — J44.1 COPD EXACERBATION: ICD-10-CM

## 2024-05-25 DIAGNOSIS — I20.0 UNSTABLE ANGINA: ICD-10-CM

## 2024-05-25 DIAGNOSIS — R07.9 CHEST PAIN: Primary | ICD-10-CM

## 2024-05-25 DIAGNOSIS — F17.210 TOBACCO DEPENDENCE DUE TO CIGARETTES: ICD-10-CM

## 2024-05-25 DIAGNOSIS — Z99.81 SUPPLEMENTAL OXYGEN DEPENDENT: ICD-10-CM

## 2024-05-25 DIAGNOSIS — I21.4 NSTEMI (NON-ST ELEVATED MYOCARDIAL INFARCTION): ICD-10-CM

## 2024-05-25 DIAGNOSIS — Z98.61 CAD S/P PERCUTANEOUS CORONARY ANGIOPLASTY: ICD-10-CM

## 2024-05-25 DIAGNOSIS — I25.10 CAD, MULTIPLE VESSEL: ICD-10-CM

## 2024-05-25 DIAGNOSIS — T14.8XXD DELAYED WOUND HEALING: ICD-10-CM

## 2024-05-25 DIAGNOSIS — I50.9 CHRONIC HEART FAILURE, UNSPECIFIED HEART FAILURE TYPE: ICD-10-CM

## 2024-05-25 DIAGNOSIS — R07.9 CHEST PAIN WITH HIGH RISK OF ACUTE CORONARY SYNDROME: ICD-10-CM

## 2024-05-25 DIAGNOSIS — R06.02 SOB (SHORTNESS OF BREATH): ICD-10-CM

## 2024-05-25 DIAGNOSIS — I50.33 ACUTE ON CHRONIC DIASTOLIC HEART FAILURE: ICD-10-CM

## 2024-05-25 DIAGNOSIS — I25.10 CAD S/P PERCUTANEOUS CORONARY ANGIOPLASTY: ICD-10-CM

## 2024-05-25 DIAGNOSIS — Z86.79 HISTORY OF VALVULAR HEART DISEASE: ICD-10-CM

## 2024-05-25 DIAGNOSIS — I25.10 CORONARY ARTERY DISEASE INVOLVING NATIVE CORONARY ARTERY OF NATIVE HEART WITHOUT ANGINA PECTORIS: ICD-10-CM

## 2024-05-25 DIAGNOSIS — Z87.09 HISTORY OF COPD: ICD-10-CM

## 2024-05-25 PROBLEM — J96.12 CHRONIC RESPIRATORY FAILURE WITH HYPOXIA AND HYPERCAPNIA: Status: ACTIVE | Noted: 2020-03-01

## 2024-05-25 PROBLEM — J96.11 CHRONIC RESPIRATORY FAILURE WITH HYPOXIA AND HYPERCAPNIA: Status: ACTIVE | Noted: 2020-03-01

## 2024-05-25 LAB
ALBUMIN SERPL BCP-MCNC: 3.6 G/DL (ref 3.5–5.2)
ALP SERPL-CCNC: 105 U/L (ref 55–135)
ALT SERPL W/O P-5'-P-CCNC: 9 U/L (ref 10–44)
ANION GAP SERPL CALC-SCNC: 8 MMOL/L (ref 8–16)
AST SERPL-CCNC: 13 U/L (ref 10–40)
BASOPHILS # BLD AUTO: 0.05 K/UL (ref 0–0.2)
BASOPHILS NFR BLD: 0.7 % (ref 0–1.9)
BILIRUB SERPL-MCNC: 0.4 MG/DL (ref 0.1–1)
BNP SERPL-MCNC: 133 PG/ML (ref 0–99)
BUN SERPL-MCNC: 18 MG/DL (ref 8–23)
CALCIUM SERPL-MCNC: 9.2 MG/DL (ref 8.7–10.5)
CHLORIDE SERPL-SCNC: 106 MMOL/L (ref 95–110)
CO2 SERPL-SCNC: 25 MMOL/L (ref 23–29)
CREAT SERPL-MCNC: 0.8 MG/DL (ref 0.5–1.4)
DIFFERENTIAL METHOD BLD: ABNORMAL
EOSINOPHIL # BLD AUTO: 0.4 K/UL (ref 0–0.5)
EOSINOPHIL NFR BLD: 5.8 % (ref 0–8)
ERYTHROCYTE [DISTWIDTH] IN BLOOD BY AUTOMATED COUNT: 13.3 % (ref 11.5–14.5)
EST. GFR  (NO RACE VARIABLE): >60 ML/MIN/1.73 M^2
GLUCOSE SERPL-MCNC: 102 MG/DL (ref 70–110)
HCT VFR BLD AUTO: 36.1 % (ref 37–48.5)
HGB BLD-MCNC: 11.3 G/DL (ref 12–16)
IMM GRANULOCYTES # BLD AUTO: 0.03 K/UL (ref 0–0.04)
IMM GRANULOCYTES NFR BLD AUTO: 0.4 % (ref 0–0.5)
LYMPHOCYTES # BLD AUTO: 1.2 K/UL (ref 1–4.8)
LYMPHOCYTES NFR BLD: 17.7 % (ref 18–48)
MCH RBC QN AUTO: 29.2 PG (ref 27–31)
MCHC RBC AUTO-ENTMCNC: 31.3 G/DL (ref 32–36)
MCV RBC AUTO: 93 FL (ref 82–98)
MONOCYTES # BLD AUTO: 0.6 K/UL (ref 0.3–1)
MONOCYTES NFR BLD: 9.3 % (ref 4–15)
NEUTROPHILS # BLD AUTO: 4.5 K/UL (ref 1.8–7.7)
NEUTROPHILS NFR BLD: 66.1 % (ref 38–73)
NRBC BLD-RTO: 0 /100 WBC
PLATELET # BLD AUTO: 206 K/UL (ref 150–450)
PMV BLD AUTO: 10.3 FL (ref 9.2–12.9)
POTASSIUM SERPL-SCNC: 3.4 MMOL/L (ref 3.5–5.1)
PROT SERPL-MCNC: 6.4 G/DL (ref 6–8.4)
RBC # BLD AUTO: 3.87 M/UL (ref 4–5.4)
SODIUM SERPL-SCNC: 139 MMOL/L (ref 136–145)
TROPONIN I SERPL DL<=0.01 NG/ML-MCNC: 0.01 NG/ML (ref 0–0.03)
TROPONIN I SERPL DL<=0.01 NG/ML-MCNC: 0.02 NG/ML (ref 0–0.03)
WBC # BLD AUTO: 6.74 K/UL (ref 3.9–12.7)

## 2024-05-25 PROCEDURE — 83880 ASSAY OF NATRIURETIC PEPTIDE: CPT | Performed by: EMERGENCY MEDICINE

## 2024-05-25 PROCEDURE — 94640 AIRWAY INHALATION TREATMENT: CPT

## 2024-05-25 PROCEDURE — A4216 STERILE WATER/SALINE, 10 ML: HCPCS | Performed by: PHYSICIAN ASSISTANT

## 2024-05-25 PROCEDURE — 25000242 PHARM REV CODE 250 ALT 637 W/ HCPCS: Performed by: PHYSICIAN ASSISTANT

## 2024-05-25 PROCEDURE — 96372 THER/PROPH/DIAG INJ SC/IM: CPT | Performed by: PHYSICIAN ASSISTANT

## 2024-05-25 PROCEDURE — 27000221 HC OXYGEN, UP TO 24 HOURS

## 2024-05-25 PROCEDURE — 94761 N-INVAS EAR/PLS OXIMETRY MLT: CPT

## 2024-05-25 PROCEDURE — 27100098 HC SPACER

## 2024-05-25 PROCEDURE — 84484 ASSAY OF TROPONIN QUANT: CPT | Performed by: EMERGENCY MEDICINE

## 2024-05-25 PROCEDURE — 25000003 PHARM REV CODE 250: Performed by: PHYSICIAN ASSISTANT

## 2024-05-25 PROCEDURE — 94640 AIRWAY INHALATION TREATMENT: CPT | Mod: XB

## 2024-05-25 PROCEDURE — G0378 HOSPITAL OBSERVATION PER HR: HCPCS

## 2024-05-25 PROCEDURE — 85025 COMPLETE CBC W/AUTO DIFF WBC: CPT | Performed by: EMERGENCY MEDICINE

## 2024-05-25 PROCEDURE — 84484 ASSAY OF TROPONIN QUANT: CPT | Mod: 91 | Performed by: PHYSICIAN ASSISTANT

## 2024-05-25 PROCEDURE — 99285 EMERGENCY DEPT VISIT HI MDM: CPT | Mod: 25

## 2024-05-25 PROCEDURE — 99900035 HC TECH TIME PER 15 MIN (STAT)

## 2024-05-25 PROCEDURE — 80053 COMPREHEN METABOLIC PANEL: CPT | Performed by: EMERGENCY MEDICINE

## 2024-05-25 PROCEDURE — 63600175 PHARM REV CODE 636 W HCPCS: Performed by: PHYSICIAN ASSISTANT

## 2024-05-25 RX ORDER — NALOXONE HCL 0.4 MG/ML
0.02 VIAL (ML) INJECTION
Status: DISCONTINUED | OUTPATIENT
Start: 2024-05-25 | End: 2024-05-28 | Stop reason: HOSPADM

## 2024-05-25 RX ORDER — HEPARIN SODIUM 5000 [USP'U]/ML
5000 INJECTION, SOLUTION INTRAVENOUS; SUBCUTANEOUS EVERY 8 HOURS
Status: DISCONTINUED | OUTPATIENT
Start: 2024-05-25 | End: 2024-05-28 | Stop reason: HOSPADM

## 2024-05-25 RX ORDER — PROCHLORPERAZINE EDISYLATE 5 MG/ML
5 INJECTION INTRAMUSCULAR; INTRAVENOUS EVERY 6 HOURS PRN
Status: DISCONTINUED | OUTPATIENT
Start: 2024-05-25 | End: 2024-05-28 | Stop reason: HOSPADM

## 2024-05-25 RX ORDER — ASPIRIN 81 MG/1
243 TABLET ORAL ONCE
Status: COMPLETED | OUTPATIENT
Start: 2024-05-25 | End: 2024-05-25

## 2024-05-25 RX ORDER — SIMETHICONE 80 MG
1 TABLET,CHEWABLE ORAL 4 TIMES DAILY PRN
Status: DISCONTINUED | OUTPATIENT
Start: 2024-05-25 | End: 2024-05-28 | Stop reason: HOSPADM

## 2024-05-25 RX ORDER — ACETAMINOPHEN 325 MG/1
650 TABLET ORAL EVERY 4 HOURS PRN
Status: DISCONTINUED | OUTPATIENT
Start: 2024-05-25 | End: 2024-05-28 | Stop reason: HOSPADM

## 2024-05-25 RX ORDER — IPRATROPIUM BROMIDE AND ALBUTEROL SULFATE 2.5; .5 MG/3ML; MG/3ML
3 SOLUTION RESPIRATORY (INHALATION) EVERY 6 HOURS PRN
Status: DISCONTINUED | OUTPATIENT
Start: 2024-05-25 | End: 2024-05-26

## 2024-05-25 RX ORDER — ASPIRIN 81 MG/1
81 TABLET ORAL DAILY
Status: DISCONTINUED | OUTPATIENT
Start: 2024-05-26 | End: 2024-05-28 | Stop reason: HOSPADM

## 2024-05-25 RX ORDER — SODIUM CHLORIDE 0.9 % (FLUSH) 0.9 %
10 SYRINGE (ML) INJECTION EVERY 8 HOURS
Status: DISCONTINUED | OUTPATIENT
Start: 2024-05-25 | End: 2024-05-28 | Stop reason: HOSPADM

## 2024-05-25 RX ORDER — ONDANSETRON 8 MG/1
8 TABLET, ORALLY DISINTEGRATING ORAL EVERY 8 HOURS PRN
Status: DISCONTINUED | OUTPATIENT
Start: 2024-05-25 | End: 2024-05-28 | Stop reason: HOSPADM

## 2024-05-25 RX ORDER — GLUCAGON 1 MG
1 KIT INJECTION
Status: DISCONTINUED | OUTPATIENT
Start: 2024-05-25 | End: 2024-05-28 | Stop reason: HOSPADM

## 2024-05-25 RX ORDER — METOPROLOL SUCCINATE 25 MG/1
25 TABLET, EXTENDED RELEASE ORAL DAILY
Status: DISCONTINUED | OUTPATIENT
Start: 2024-05-25 | End: 2024-05-28 | Stop reason: HOSPADM

## 2024-05-25 RX ORDER — ATORVASTATIN CALCIUM 20 MG/1
80 TABLET, FILM COATED ORAL DAILY
Status: DISCONTINUED | OUTPATIENT
Start: 2024-05-26 | End: 2024-05-28 | Stop reason: HOSPADM

## 2024-05-25 RX ORDER — ALBUTEROL SULFATE 90 UG/1
2 AEROSOL, METERED RESPIRATORY (INHALATION) EVERY 6 HOURS
Status: DISCONTINUED | OUTPATIENT
Start: 2024-05-25 | End: 2024-05-26

## 2024-05-25 RX ORDER — NITROGLYCERIN 0.4 MG/1
0.4 TABLET SUBLINGUAL EVERY 5 MIN PRN
Status: DISCONTINUED | OUTPATIENT
Start: 2024-05-25 | End: 2024-05-28 | Stop reason: HOSPADM

## 2024-05-25 RX ORDER — TALC
6 POWDER (GRAM) TOPICAL NIGHTLY PRN
Status: DISCONTINUED | OUTPATIENT
Start: 2024-05-25 | End: 2024-05-28 | Stop reason: HOSPADM

## 2024-05-25 RX ORDER — FLUTICASONE FUROATE AND VILANTEROL 100; 25 UG/1; UG/1
1 POWDER RESPIRATORY (INHALATION) DAILY
Status: DISCONTINUED | OUTPATIENT
Start: 2024-05-25 | End: 2024-05-28 | Stop reason: HOSPADM

## 2024-05-25 RX ORDER — IBUPROFEN 200 MG
24 TABLET ORAL
Status: DISCONTINUED | OUTPATIENT
Start: 2024-05-25 | End: 2024-05-28 | Stop reason: HOSPADM

## 2024-05-25 RX ORDER — POLYETHYLENE GLYCOL 3350 17 G/17G
17 POWDER, FOR SOLUTION ORAL DAILY PRN
Status: DISCONTINUED | OUTPATIENT
Start: 2024-05-25 | End: 2024-05-28 | Stop reason: HOSPADM

## 2024-05-25 RX ORDER — CLOPIDOGREL BISULFATE 75 MG/1
75 TABLET ORAL DAILY
Status: DISCONTINUED | OUTPATIENT
Start: 2024-05-26 | End: 2024-05-28 | Stop reason: HOSPADM

## 2024-05-25 RX ORDER — ALUMINUM HYDROXIDE, MAGNESIUM HYDROXIDE, AND SIMETHICONE 1200; 120; 1200 MG/30ML; MG/30ML; MG/30ML
30 SUSPENSION ORAL 4 TIMES DAILY PRN
Status: DISCONTINUED | OUTPATIENT
Start: 2024-05-25 | End: 2024-05-28 | Stop reason: HOSPADM

## 2024-05-25 RX ORDER — IBUPROFEN 200 MG
16 TABLET ORAL
Status: DISCONTINUED | OUTPATIENT
Start: 2024-05-25 | End: 2024-05-28 | Stop reason: HOSPADM

## 2024-05-25 RX ORDER — ASPIRIN 325 MG
325 TABLET ORAL
Status: DISCONTINUED | OUTPATIENT
Start: 2024-05-25 | End: 2024-05-25

## 2024-05-25 RX ORDER — IBUPROFEN 200 MG
1 TABLET ORAL DAILY
Status: DISCONTINUED | OUTPATIENT
Start: 2024-05-26 | End: 2024-05-26

## 2024-05-25 RX ADMIN — ALBUTEROL SULFATE 2 PUFF: 108 INHALANT RESPIRATORY (INHALATION) at 07:05

## 2024-05-25 RX ADMIN — Medication 10 ML: at 09:05

## 2024-05-25 RX ADMIN — HEPARIN SODIUM 5000 UNITS: 5000 INJECTION INTRAVENOUS; SUBCUTANEOUS at 09:05

## 2024-05-25 RX ADMIN — ASPIRIN 243 MG: 81 TABLET, COATED ORAL at 09:05

## 2024-05-25 RX ADMIN — IPRATROPIUM BROMIDE AND ALBUTEROL SULFATE 3 ML: 2.5; .5 SOLUTION RESPIRATORY (INHALATION) at 05:05

## 2024-05-25 NOTE — ASSESSMENT & PLAN NOTE
Chronic, controlled. Latest blood pressure and vitals reviewed-     Temp:  [98.2 °F (36.8 °C)-98.3 °F (36.8 °C)]   Pulse:  [68-81]   Resp:  [18-20]   BP: (125-136)/(61-64)   SpO2:  [98 %-100 %] .   Home meds for hypertension were reviewed and noted below.   Hypertension Medications               metoprolol succinate (TOPROL-XL) 25 MG 24 hr tablet Take 1 tablet (25 mg total) by mouth once daily.    nitroGLYCERIN (NITROSTAT) 0.4 MG SL tablet Place 1 tablet (0.4 mg total) under the tongue every 5 (five) minutes as needed for Chest pain.     While in the hospital, will manage blood pressure as follows; Continue home antihypertensive regimen    Will utilize p.r.n. blood pressure medication only if patient's blood pressure greater than 180/110 and she develops symptoms such as worsening chest pain or shortness of breath.

## 2024-05-25 NOTE — ED NOTES
Nurses Note -- 4 Eyes      5/25/2024   5:24 PM      Skin assessed during: Admit      [] No Altered Skin Integrity Present    []Prevention Measures Documented      [x] Yes- Altered Skin Integrity Present or Discovered   [x] LDA Added if Not in Epic (Describe Wound)   [x] New Altered Skin Integrity was Present on Admit and Documented in LDA   [x] Wound Image Taken    Wound Care Consulted? No    Attending Nurse:  Cynthia Mcintosh RN/Staff Member:   GUSTAVO Peres

## 2024-05-25 NOTE — ASSESSMENT & PLAN NOTE
Patient is identified as having Diastolic (HFpEF) heart failure that is Chronic. CHF is currently controlled. Latest ECHO performed and demonstrates- Results for orders placed during the hospital encounter of 11/22/23    Echo    Interpretation Summary    Left Ventricle: The left ventricle is normal in size. Ventricular mass is normal. Normal wall thickness. Normal wall motion. There is normal systolic function with a visually estimated ejection fraction of 55 - 60%. Grade II diastolic dysfunction.    Right Ventricle: Normal right ventricular cavity size. Wall thickness is normal. Right ventricle wall motion  is normal. Systolic function is normal.    The following segments are hypokinetic: basal inferior, basal inferolateral and mid inferolateral suggesting ischemia/infarct in LCx or RCA territory.    Left Atrium: Left atrium is mildly dilated.    Right Atrium: Right atrium is mildly dilated.    Aortic Valve: There is severe aortic valve sclerosis. Moderately calcified cusps. There is moderate annular calcification present.    Mitral Valve: There is mild mitral annular calcification present. There is mild to moderate regurgitation. Mild restriction of the posterior leaflet likely ischemic MR.    Pulmonary Artery: The estimated pulmonary artery systolic pressure is 44 mmHg.    IVC/SVC: Intermediate venous pressure at 8 mmHg.  . Continue Beta Blocker and monitor clinical status closely. Monitor on telemetry. Patient is off CHF pathway.  Monitor strict Is&Os and daily weights.  Place on fluid restriction of 1.5 L. Continue to stress to patient importance of self efficacy and  on diet for CHF. Last BNP reviewed- and noted below   Recent Labs   Lab 05/25/24  1242   *   .

## 2024-05-25 NOTE — HPI
History of stroke  Continue DAPT Mr. Radha Sotelo is a 78-year-old woman with comorbidities of anemia, CAD with stents, diverticulitis with colostomy, COPD on 2 LPM at home, hypertension and previous history of heavy alcohol use presents for evaluation of chest pain. Pt reports over the last 3 days she has had worsening substernal chest pain. Reports pain has been constant since onset but improved w/ home sl ntg. Today however, her pain continued to worsen and she took her last ntg before calling EMS. She is unable to fully characterize the pain but notes it does radiate to her back. Endorses associated SOB but denies any cough, nausea, diaphoresis, vomiting. She also reports that she has ingested 25 tablets of nitroglycerin over this time due to this chest pain. She denies any current chest discomfort nor increased shortness of breath or diminished exertional tolerance. In addition, she denies any fever, chills, abd pain, dysuria or melenic stooling.     In ED, Pt AFVSS on 2 L NC. No leukocytosis. K 3.4. Trop 0.023, BNP wnl. CXR:  Increase in interstitial markings. No confluent consolidation. Probable remote right rib fractures noted. Given asa 325 x1.

## 2024-05-25 NOTE — ED PROVIDER NOTES
Encounter Date: 5/25/2024       History     Chief Complaint   Patient presents with    Chest Pain     Pt reports chest pain that radiates to the back x2 days. Pt took multiple ntg tabs over 2 days. Pt now pain free and wants to be evaluated.      78-year-old woman with comorbidities of anemia, CAD with stents, diverticulitis with colostomy, COPD on 2 LPM at home, as well as hypertension and previous history of heavy alcohol use presents to the emergency department for evaluation of chest discomfort which she reports over the last 3 days endorsing that it was worsened by reclining.  She also reports that she has ingested 25 tablets of nitroglycerin over this time due to this chest pain.  At the time of my exam, she denies any chest discomfort nor increased shortness of breath or diminished exertional tolerance.  In addition, she denies any increased cough, fever, chills, nausea, vomiting, or melenic stooling.    The history is provided by the patient and medical records. No  was used.     Review of patient's allergies indicates:  No Known Allergies  Past Medical History:   Diagnosis Date    Anemia     Anxiety     COPD (chronic obstructive pulmonary disease)     COPD (chronic obstructive pulmonary disease) with emphysema     Coronary artery disease     Depression     Diverticulitis     Hyperlipidemia     Hypertension     PVD (peripheral vascular disease)     PVD (peripheral vascular disease)     S/P colostomy     Substance abuse     hx heavy etoh use     Tobacco abuse      Past Surgical History:   Procedure Laterality Date    ANGIOGRAM, CORONARY, WITH LEFT HEART CATHETERIZATION N/A 11/22/2023    Procedure: Angiogram, Coronary, with Left Heart Cath;  Surgeon: Checo Bhatt MD;  Location: [facility] CATH LAB;  Service: Cardiology;  Laterality: N/A;    ANGIOGRAM, EXTREMITY, UNILATERAL Right 8/25/2023    Procedure: ANGIOGRAM, EXTREMITY, UNILATERAL;  Surgeon: Gary Rico MD;  [facility];  Service: Vascular;  Laterality: Right;  US GUIDED ACCESS LEFT GROIN  contrast: 150ml  fluoro: 27.9 min  mGy: 254.73  Gycm2: 62.4919  radial flush cocktail: 10ml    ANGIOGRAPHY OF LOWER EXTREMITY Right 8/24/2023    Procedure: Angiogram Extremity Unilateral;  Surgeon: Benji Ashley MD;  Location: [address];  Service: Vascular;  Laterality: Right;    COLOSTOMY      ECTOPIC PREGNANCY SURGERY      PTA, PERONEAL Right 8/25/2023    Procedure: PTA, PERONEAL TIBIAL TRUNK WITH SHOCKWAVE;  Surgeon: Gary Rico MD;  Location: [address];  Service: Vascular;  Laterality: Right;    PTCA, SINGLE VESSEL  11/22/2023    Procedure: PTCA, Single Vessel;  Surgeon: Checo Bhatt MD;  Location: [facility] CATH LAB;  Service: Cardiology;;    right forefoot amputation      right toe amputation      x2 toes    STENT, DRUG ELUTING, SINGLE VESSEL, CORONARY  11/22/2023    Procedure: Stent, Drug Eluting, Single Vessel, Coronary;  Surgeon: Checo Bhatt MD;  Location: [facility] CATH LAB;  Service: Cardiology;;    STENT, SUPERFICIAL FEMORAL ARTERY Right 8/25/2023    Procedure: STENT, SUPERFICIAL FEMORAL ARTERY;  Surgeon: Gary Rico MD;  Location: [address];  Service: Vascular;  Laterality: Right;  VIABAHN 6 X 15 X120     No family history on file.  Social History     Tobacco Use    Smoking status: Every Day     Current packs/day: 0.25     Average packs/day: 0.5 packs/day for 60.9 years (30.1 ttl pk-yrs)     Types: Cigarettes     Start date: 7/5/1963    Smokeless tobacco: Never   Substance Use Topics    Alcohol use: No    Drug use: No     Review of Systems    Physical Exam     Initial Vitals [05/25/24 1146]   BP Pulse Resp Temp SpO2   136/64 81 20 98.3 °F (36.8 °C) 98 %      MAP       --         Physical Exam    Vitals reviewed.  Constitutional:   Chronically ill-appearing 78-year-old  woman, nasal cannula applied, no acute distress noted   HENT:   Head:  Normocephalic and atraumatic.   Edentulous, tolerating secretions   Neck: No tracheal deviation present.   Cardiovascular:  Normal rate, regular rhythm and intact distal pulses.           Pulmonary/Chest: Breath sounds normal. No stridor. No respiratory distress.   Scant expiratory wheeze noted to left upper lobe without associated increased work of breathing   Abdominal: Abdomen is soft. There is no abdominal tenderness.   Ostomy noted to left mid abdomen with brown stool noted to bag, no surrounding erythema or tenderness identified.  Multiple reducible abdominal hernia is noted   Musculoskeletal:         General: No edema. Normal range of motion.      Comments: Right foot:  The forefoot is surgically absent, no evidence of acute skin breakdown is appreciated     Neurological: She is alert and oriented to person, place, and time.   Skin: Skin is warm and dry.   Psychiatric: She has a normal mood and affect. Thought content normal.         ED Course   Procedures  Labs Reviewed   CBC W/ AUTO DIFFERENTIAL - Abnormal; Notable for the following components:       Result Value    RBC 3.87 (*)     Hemoglobin 11.3 (*)     Hematocrit 36.1 (*)     MCHC 31.3 (*)     Lymph % 17.7 (*)     All other components within normal limits   COMPREHENSIVE METABOLIC PANEL - Abnormal; Notable for the following components:    Potassium 3.4 (*)     ALT 9 (*)     All other components within normal limits   B-TYPE NATRIURETIC PEPTIDE - Abnormal; Notable for the following components:     (*)     All other components within normal limits   TROPONIN I   TROPONIN I     EKG Readings: (Independently Interpreted)   Initial Reading: No STEMI. Rhythm: Normal Sinus Rhythm. Heart Rate: 77. Ectopy: No Ectopy. ST Segments: Normal ST Segments. Axis: Normal.       Imaging Results              X-Ray Chest AP Portable (Final result)  Result time 05/25/24 13:30:35      Final result by Blas Ha Jr., MD (05/25/24 13:30:35)                    Impression:      No detrimental change.      Electronically signed by: Blas Ha MD  Date:    05/25/2024  Time:    13:30               Narrative:    EXAMINATION:  XR CHEST AP PORTABLE    CLINICAL HISTORY:  Chest Pain;    TECHNIQUE:  Single frontal view of the chest was performed.    COMPARISON:  November 2023    FINDINGS:  Mild elevation left hemidiaphragm.  Heart size and pulmonary vessels are similar.  Increase in interstitial markings.  No confluent consolidation.  Probable remote right rib fractures noted.                                       Medications   albuterol inhaler 2 puff (has no administration in time range)   aspirin EC tablet 81 mg (has no administration in time range)   atorvastatin tablet 80 mg (has no administration in time range)   fluticasone furoate-vilanteroL 100-25 mcg/dose diskus inhaler 1 puff (1 puff Inhalation Not Given 5/25/24 1600)   clopidogreL tablet 75 mg (has no administration in time range)   metoprolol succinate (TOPROL-XL) 24 hr tablet 25 mg (25 mg Oral Not Given 5/25/24 1600)   nitroGLYCERIN SL tablet 0.4 mg (has no administration in time range)   sodium chloride 0.9% flush 10 mL (has no administration in time range)   albuterol-ipratropium 2.5 mg-0.5 mg/3 mL nebulizer solution 3 mL (3 mLs Nebulization Given 5/25/24 1709)   melatonin tablet 6 mg (has no administration in time range)   ondansetron disintegrating tablet 8 mg (has no administration in time range)   prochlorperazine injection Soln 5 mg (has no administration in time range)   polyethylene glycol packet 17 g (has no administration in time range)   simethicone chewable tablet 80 mg (has no administration in time range)   aluminum-magnesium hydroxide-simethicone 200-200-20 mg/5 mL suspension 30 mL (has no administration in time range)   acetaminophen tablet 650 mg (has no administration in time range)   naloxone 0.4 mg/mL injection 0.02 mg (has no administration in time range)   glucose chewable tablet 16 g (has no  administration in time range)   glucose chewable tablet 24 g (has no administration in time range)   glucagon (human recombinant) injection 1 mg (has no administration in time range)   heparin (porcine) injection 5,000 Units (has no administration in time range)   nicotine 21 mg/24 hr 1 patch (has no administration in time range)   dextrose 10% bolus 125 mL 125 mL (has no administration in time range)   dextrose 10% bolus 250 mL 250 mL (has no administration in time range)   aspirin EC tablet 243 mg (has no administration in time range)     Medical Decision Making  Differential diagnosis: Unstable angina, ACS, myocardial infarction, GERD    Amount and/or Complexity of Data Reviewed  Labs: ordered. Decision-making details documented in ED Course.  Radiology: ordered. Decision-making details documented in ED Course.  ECG/medicine tests: ordered and independent interpretation performed. Decision-making details documented in ED Course.              Attending Attestation:             Attending ED Notes:   Emergency department evaluation reveals minimal anemia with a hemoglobin 11.3 without evidence of significant granulocytosis or leukocytosis.  Metabolic profile reveals a minimally diminished potassium at 3.4 without additional evidence of solid organ dysfunction.  The serum BNP is notably mildly elevated at 133.  The initial serum troponin is within normal limits, and the EKG obtained in ED does not reveal evidence of persistent injury pattern or ischemia.  However, despite encouraging findings in the ED, after further discussion with Cardiology and further discussion of  risk stratification, it has been determined that this patient is at significantly high risk for a cardiac event in light of her 2 previous myocardial infarctions in the past with coronary stent as well as her presenting concern of severe chest pain for which she self administered 25 tablets of nitroglycerin during a 72 hr period.  In light of these  concerns, she will be placed in observation with Hospital Medicine in stable condition for further therapy and management.                             Clinical Impression:  Final diagnoses:  [Z86.79] History of valvular heart disease (Primary)  [I25.10] CAD, multiple vessel  [I20.0] Unstable angina  [R07.9] Chest pain with high risk of acute coronary syndrome  [Z99.81] Supplemental oxygen dependent  [Z87.09] History of COPD          ED Disposition Condition    Observation Stable                Gary Coleman MD  05/25/24 [id number]

## 2024-05-25 NOTE — ASSESSMENT & PLAN NOTE
Patient with Hypoxic Respiratory failure which is Chronic.  she is on home oxygen at 2 LPM. Contributing diagnoses includes - CHF and COPD Labs and images were reviewed. Patient Has recent ABG, which has been reviewed. Will treat underlying causes and adjust management of respiratory failure as follows continue supplemental oxygen at 2 L currently.

## 2024-05-25 NOTE — H&P
[facility] - Emergency Dept  [facility] Medicine  History & Physical    Patient Name: Radha Sotelo  MRN: [id number]  Patient Class: OP- Observation  Admission Date: 5/25/2024  Attending Physician: Delmi Perez MD   Primary Care Provider: Kalyn Pemberton NP         Patient information was obtained from patient, past medical records, and ER records.     Subjective:     Principal Problem:Chest pain    Chief Complaint:   Chief Complaint   Patient presents with    Chest Pain     Pt reports chest pain that radiates to the back x2 days. Pt took multiple ntg tabs over 2 days. Pt now pain free and wants to be evaluated.         HPI: Mr. Radha Sotelo is a 78-year-old woman with comorbidities of anemia, CAD with stents, diverticulitis with colostomy, COPD on 2 LPM at home, hypertension and previous history of heavy alcohol use presents for evaluation of chest pain. Pt reports over the last 3 days she has had worsening substernal chest pain. Reports pain has been constant since onset but improved w/ home sl ntg. Today however, her pain continued to worsen and she took her last ntg before calling EMS. She is unable to fully characterize the pain but notes it does radiate to her back. Endorses associated SOB but denies any cough, nausea, diaphoresis, vomiting. She also reports that she has ingested 25 tablets of nitroglycerin over this time due to this chest pain. She denies any current chest discomfort nor increased shortness of breath or diminished exertional tolerance. In addition, she denies any fever, chills, abd pain, dysuria or melenic stooling.     In ED, Pt AFVSS on 2 L NC. No leukocytosis. K 3.4. Trop 0.023, BNP wnl. CXR:  Increase in interstitial markings. No confluent consolidation. Probable remote right rib fractures noted. Given asa 325 x1.     Past Medical History:   Diagnosis Date    Anemia     Anxiety     COPD (chronic obstructive pulmonary disease)     COPD (chronic obstructive pulmonary disease)  with emphysema     Coronary artery disease     Depression     Diverticulitis     Hyperlipidemia     Hypertension     PVD (peripheral vascular disease)     PVD (peripheral vascular disease)     S/P colostomy     Substance abuse     hx heavy etoh use     Tobacco abuse        Past Surgical History:   Procedure Laterality Date    ANGIOGRAM, CORONARY, WITH LEFT HEART CATHETERIZATION N/A 11/22/2023    Procedure: Angiogram, Coronary, with Left Heart Cath;  Surgeon: Checo Bhatt MD;  Location: [facility] CATH LAB;  Service: Cardiology;  Laterality: N/A;    ANGIOGRAM, EXTREMITY, UNILATERAL Right 8/25/2023    Procedure: ANGIOGRAM, EXTREMITY, UNILATERAL;  Surgeon: Gary Rico MD;  Location: [facility] OR [address] [place] [address];  Service: Vascular;  Laterality: Right;  US GUIDED ACCESS LEFT GROIN  contrast: 150ml  fluoro: 27.9 min  mGy: 254.73  Gycm2: 62.4919  radial flush cocktail: 10ml    ANGIOGRAPHY OF LOWER EXTREMITY Right 8/24/2023    Procedure: Angiogram Extremity Unilateral;  Surgeon: Benji Ashley MD;  Location: [address];  Service: Vascular;  Laterality: Right;    COLOSTOMY      ECTOPIC PREGNANCY SURGERY      PTA, PERONEAL Right 8/25/2023    Procedure: PTA, PERONEAL TIBIAL TRUNK WITH SHOCKWAVE;  Surgeon: Gary Rico MD;  Location: [address];  Service: Vascular;  Laterality: Right;    PTCA, SINGLE VESSEL  11/22/2023    Procedure: PTCA, Single Vessel;  Surgeon: Checo Bhatt MD;  Location: [facility] CATH LAB;  Service: Cardiology;;    right forefoot amputation      right toe amputation      x2 toes    STENT, DRUG ELUTING, SINGLE VESSEL, CORONARY  11/22/2023    Procedure: Stent, Drug Eluting, Single Vessel, Coronary;  Surgeon: Checo Bhatt MD;  Location: [facility] CATH LAB;  Service: Cardiology;;    STENT, SUPERFICIAL FEMORAL ARTERY Right 8/25/2023    Procedure: STENT, SUPERFICIAL FEMORAL ARTERY;  Surgeon: Gary Rico MD;  Location: [facility] OR [address] [place] [address];  Service: Vascular;   Laterality: Right;  VIABAHN 6 X 15 X120       Review of patient's allergies indicates:  No Known Allergies    No current facility-administered medications on file prior to encounter.     Current Outpatient Medications on File Prior to Encounter   Medication Sig    aspirin (ECOTRIN) 81 MG EC tablet Take 1 tablet (81 mg total) by mouth once daily.    atorvastatin (LIPITOR) 80 MG tablet Take 1 tablet (80 mg total) by mouth once daily.    clopidogreL (PLAVIX) 75 mg tablet Take 1 tablet (75 mg total) by mouth once daily.    metoprolol succinate (TOPROL-XL) 25 MG 24 hr tablet Take 1 tablet (25 mg total) by mouth once daily.    nitroGLYCERIN (NITROSTAT) 0.4 MG SL tablet Place 1 tablet (0.4 mg total) under the tongue every 5 (five) minutes as needed for Chest pain.    albuterol (PROVENTIL/VENTOLIN HFA) 90 mcg/actuation inhaler Inhale 2 puffs into the lungs every 6 (six) hours. Rescue    albuterol-ipratropium (DUO-NEB) 2.5 mg-0.5 mg/3 mL nebulizer solution Take 3 mLs by nebulization every 6 (six) hours as needed for Wheezing or Shortness of Breath.    budesonide-formoterol 80-4.5 mcg (SYMBICORT) 80-4.5 mcg/actuation HFAA INHALE 2 PUFFS INTO THE LUNGS TWICE DAILY. RINSE MOUTH AFTER USE    hydrocortisone-aloe vera 1 % Crea cream Apply topically 2 (two) times daily.     Family History    None       Tobacco Use    Smoking status: Every Day     Current packs/day: 0.25     Average packs/day: 0.5 packs/day for 60.9 years (30.1 ttl pk-yrs)     Types: Cigarettes     Start date: 7/5/1963    Smokeless tobacco: Never   Substance and Sexual Activity    Alcohol use: No    Drug use: No    Sexual activity: Not on file     Review of Systems   Constitutional:  Positive for activity change. Negative for chills, diaphoresis, fatigue and fever.   HENT:  Negative for congestion, rhinorrhea and sore throat.    Respiratory:  Positive for cough (chronic) and shortness of breath (chronic). Negative for chest tightness and wheezing.     Cardiovascular:  Positive for chest pain. Negative for palpitations and leg swelling.   Gastrointestinal:  Negative for abdominal distention, abdominal pain, blood in stool, constipation, diarrhea, nausea and vomiting.   Genitourinary:  Negative for difficulty urinating, dysuria, frequency, hematuria and urgency.   Musculoskeletal:  Negative for arthralgias, back pain and neck stiffness.   Neurological:  Negative for dizziness, tremors, seizures, syncope, weakness, light-headedness, numbness and headaches.   Psychiatric/Behavioral:  Negative for agitation, confusion and hallucinations.      Objective:     Vital Signs (Most Recent):  Temp: 98.2 °F (36.8 °C) (05/25/24 1332)  Pulse: 68 (05/25/24 1332)  Resp: 18 (05/25/24 1332)  BP: 125/61 (05/25/24 1332)  SpO2: 100 % (05/25/24 1332) Vital Signs (24h Range):  Temp:  [98.2 °F (36.8 °C)-98.3 °F (36.8 °C)] 98.2 °F (36.8 °C)  Pulse:  [68-81] 68  Resp:  [18-20] 18  SpO2:  [98 %-100 %] 100 %  BP: (125-136)/(61-64) 125/61     Weight: 52.2 kg (115 lb)  Body mass index is 22.46 kg/m².     Physical Exam  Vitals and nursing note reviewed.   Constitutional:       General: She is not in acute distress.     Appearance: She is well-developed. She is not diaphoretic.      Interventions: Nasal cannula in place.   HENT:      Head: Normocephalic and atraumatic.      Right Ear: External ear normal.      Left Ear: External ear normal.      Nose: Nose normal. No congestion.      Mouth/Throat:      Pharynx: Oropharynx is clear.   Eyes:      General: No scleral icterus.     Extraocular Movements: Extraocular movements intact.   Cardiovascular:      Rate and Rhythm: Normal rate and regular rhythm.      Pulses: Normal pulses.      Heart sounds: Normal heart sounds. No murmur heard.  Pulmonary:      Effort: Pulmonary effort is normal. No respiratory distress.      Breath sounds: Normal breath sounds. No wheezing or rales.   Abdominal:      General: Bowel sounds are normal. There is no  distension.      Palpations: Abdomen is soft.      Tenderness: There is no abdominal tenderness. There is no guarding or rebound.   Musculoskeletal:      Cervical back: Normal range of motion.      Right lower leg: No edema.      Left lower leg: No edema.   Skin:     General: Skin is warm and dry.      Capillary Refill: Capillary refill takes less than 2 seconds.   Neurological:      General: No focal deficit present.      Mental Status: She is alert and oriented to person, place, and time. Mental status is at baseline.   Psychiatric:         Mood and Affect: Mood normal.         Behavior: Behavior normal.         Thought Content: Thought content normal.                Significant Labs: All pertinent labs within the past 24 hours have been reviewed.  CBC:   Recent Labs   Lab 05/25/24  1242   WBC 6.74   HGB 11.3*   HCT 36.1*        CMP:   Recent Labs   Lab 05/25/24  1242      K 3.4*      CO2 25      BUN 18   CREATININE 0.8   CALCIUM 9.2   PROT 6.4   ALBUMIN 3.6   BILITOT 0.4   ALKPHOS 105   AST 13   ALT 9*   ANIONGAP 8     Cardiac Markers:   Recent Labs   Lab 05/25/24  1242   *     Troponin:   Recent Labs   Lab 05/25/24  1242   TROPONINI 0.023       Significant Imaging: I have reviewed all pertinent imaging results/findings within the past 24 hours.  Imaging Results              X-Ray Chest AP Portable (Final result)  Result time 05/25/24 13:30:35      Final result by Blas Ha Jr., MD (05/25/24 13:30:35)                   Impression:      No detrimental change.      Electronically signed by: Blas Ha MD  Date:    05/25/2024  Time:    13:30               Narrative:    EXAMINATION:  XR CHEST AP PORTABLE    CLINICAL HISTORY:  Chest Pain;    TECHNIQUE:  Single frontal view of the chest was performed.    COMPARISON:  November 2023    FINDINGS:  Mild elevation left hemidiaphragm.  Heart size and pulmonary vessels are similar.  Increase in interstitial markings.  No confluent  consolidation.  Probable remote right rib fractures noted.                                     Assessment/Plan:     * Chest pain  - trop 0.023, trending  -   - ECG reveals NSR w/o gross ischemic change  - CXR unremarkable  - asa 325 x1 in ED  - SL NGT prn  - last echo noted, repeat TTE ordered  - NM stress Monday  - tele monitoring  - K>4, Mg>2    Chronic heart failure, unspecified heart failure type  Patient is identified as having Diastolic (HFpEF) heart failure that is Chronic. CHF is currently controlled. Latest ECHO performed and demonstrates- Results for orders placed during the hospital encounter of 11/22/23    Echo    Interpretation Summary    Left Ventricle: The left ventricle is normal in size. Ventricular mass is normal. Normal wall thickness. Normal wall motion. There is normal systolic function with a visually estimated ejection fraction of 55 - 60%. Grade II diastolic dysfunction.    Right Ventricle: Normal right ventricular cavity size. Wall thickness is normal. Right ventricle wall motion  is normal. Systolic function is normal.    The following segments are hypokinetic: basal inferior, basal inferolateral and mid inferolateral suggesting ischemia/infarct in LCx or RCA territory.    Left Atrium: Left atrium is mildly dilated.    Right Atrium: Right atrium is mildly dilated.    Aortic Valve: There is severe aortic valve sclerosis. Moderately calcified cusps. There is moderate annular calcification present.    Mitral Valve: There is mild mitral annular calcification present. There is mild to moderate regurgitation. Mild restriction of the posterior leaflet likely ischemic MR.    Pulmonary Artery: The estimated pulmonary artery systolic pressure is 44 mmHg.    IVC/SVC: Intermediate venous pressure at 8 mmHg.  . Continue Beta Blocker and monitor clinical status closely. Monitor on telemetry. Patient is off CHF pathway.  Monitor strict Is&Os and daily weights.  Place on fluid restriction of 1.5 L.  Continue to stress to patient importance of self efficacy and  on diet for CHF. Last BNP reviewed- and noted below   Recent Labs   Lab 05/25/24  1242   *   .    Hx of transient ischemic attack (TIA)  - continue asa and statin    HLD (hyperlipidemia)  - continue statin    Essential hypertension  Chronic, controlled. Latest blood pressure and vitals reviewed-     Temp:  [98.2 °F (36.8 °C)-98.3 °F (36.8 °C)]   Pulse:  [68-81]   Resp:  [18-20]   BP: (125-136)/(61-64)   SpO2:  [98 %-100 %] .   Home meds for hypertension were reviewed and noted below.   Hypertension Medications               metoprolol succinate (TOPROL-XL) 25 MG 24 hr tablet Take 1 tablet (25 mg total) by mouth once daily.    nitroGLYCERIN (NITROSTAT) 0.4 MG SL tablet Place 1 tablet (0.4 mg total) under the tongue every 5 (five) minutes as needed for Chest pain.     While in the hospital, will manage blood pressure as follows; Continue home antihypertensive regimen    Will utilize p.r.n. blood pressure medication only if patient's blood pressure greater than 180/110 and she develops symptoms such as worsening chest pain or shortness of breath.    Chronic obstructive pulmonary disease  Patient's COPD is controlled currently.  Patient is currently off COPD Pathway. Continue scheduled inhalers Supplemental oxygen and monitor respiratory status closely.     - continue formulary controller, duonebs prn    Chronic respiratory failure with hypoxia and hypercapnia  Patient with Hypoxic Respiratory failure which is Chronic.  she is on home oxygen at 2 LPM. Contributing diagnoses includes - CHF and COPD Labs and images were reviewed. Patient Has recent ABG, which has been reviewed. Will treat underlying causes and adjust management of respiratory failure as follows continue supplemental oxygen at 2 L currently.    CAD (coronary artery disease)  Patient with known CAD s/p stent placement, which is controlled Will continue ASA, Plavix, and Statin and  monitor for S/Sx of angina/ACS. Continue to monitor on telemetry.       VTE Risk Mitigation (From admission, onward)      None                 On 05/25/2024, patient should be placed in hospital observation services under my care in collaboration with Dr. Perez.           Pau Card PA-C  Department of Hospital Medicine  [facility] - Emergency Dept

## 2024-05-25 NOTE — SUBJECTIVE & OBJECTIVE
Past Medical History:   Diagnosis Date    Anemia     Anxiety     COPD (chronic obstructive pulmonary disease)     COPD (chronic obstructive pulmonary disease) with emphysema     Coronary artery disease     Depression     Diverticulitis     Hyperlipidemia     Hypertension     PVD (peripheral vascular disease)     PVD (peripheral vascular disease)     S/P colostomy     Substance abuse     hx heavy etoh use     Tobacco abuse        Past Surgical History:   Procedure Laterality Date    ANGIOGRAM, CORONARY, WITH LEFT HEART CATHETERIZATION N/A 11/22/2023    Procedure: Angiogram, Coronary, with Left Heart Cath;  Surgeon: Checo Bhatt MD;  Location: [facility] CATH LAB;  Service: Cardiology;  Laterality: N/A;    ANGIOGRAM, EXTREMITY, UNILATERAL Right 8/25/2023    Procedure: ANGIOGRAM, EXTREMITY, UNILATERAL;  Surgeon: Gary Rico MD;  Location: [facility] OR [address] [place] [address];  Service: Vascular;  Laterality: Right;  US GUIDED ACCESS LEFT GROIN  contrast: 150ml  fluoro: 27.9 min  mGy: 254.73  Gycm2: 62.4919  radial flush cocktail: 10ml    ANGIOGRAPHY OF LOWER EXTREMITY Right 8/24/2023    Procedure: Angiogram Extremity Unilateral;  Surgeon: Benji Ashley MD;  Location: [facility] OR [address] [place] [address];  Service: Vascular;  Laterality: Right;    COLOSTOMY      ECTOPIC PREGNANCY SURGERY      PTA, PERONEAL Right 8/25/2023    Procedure: PTA, PERONEAL TIBIAL TRUNK WITH SHOCKWAVE;  Surgeon: Gary Rico MD;  [facility];  Service: Vascular;  Laterality: Right;    PTCA, SINGLE VESSEL  11/22/2023    Procedure: PTCA, Single Vessel;  Surgeon: Checo Bhatt MD;  Location: [facility] CATH LAB;  Service: Cardiology;;    right forefoot amputation      right toe amputation      x2 toes    STENT, DRUG ELUTING, SINGLE VESSEL, CORONARY  11/22/2023    Procedure: Stent, Drug Eluting, Single Vessel, Coronary;  Surgeon: Checo Bhatt MD;  Location: [facility] CATH LAB;  Service: Cardiology;;    STENT, SUPERFICIAL FEMORAL  ARTERY Right 8/25/2023    Procedure: STENT, SUPERFICIAL FEMORAL ARTERY;  Surgeon: Gary Rico MD;  Location: [facility] OR [address] [place] [address];  Service: Vascular;  Laterality: Right;  VIABAHN 6 X 15 X120       Review of patient's allergies indicates:  No Known Allergies    No current facility-administered medications on file prior to encounter.     Current Outpatient Medications on File Prior to Encounter   Medication Sig    aspirin (ECOTRIN) 81 MG EC tablet Take 1 tablet (81 mg total) by mouth once daily.    atorvastatin (LIPITOR) 80 MG tablet Take 1 tablet (80 mg total) by mouth once daily.    clopidogreL (PLAVIX) 75 mg tablet Take 1 tablet (75 mg total) by mouth once daily.    metoprolol succinate (TOPROL-XL) 25 MG 24 hr tablet Take 1 tablet (25 mg total) by mouth once daily.    nitroGLYCERIN (NITROSTAT) 0.4 MG SL tablet Place 1 tablet (0.4 mg total) under the tongue every 5 (five) minutes as needed for Chest pain.    albuterol (PROVENTIL/VENTOLIN HFA) 90 mcg/actuation inhaler Inhale 2 puffs into the lungs every 6 (six) hours. Rescue    albuterol-ipratropium (DUO-NEB) 2.5 mg-0.5 mg/3 mL nebulizer solution Take 3 mLs by nebulization every 6 (six) hours as needed for Wheezing or Shortness of Breath.    budesonide-formoterol 80-4.5 mcg (SYMBICORT) 80-4.5 mcg/actuation HFAA INHALE 2 PUFFS INTO THE LUNGS TWICE DAILY. RINSE MOUTH AFTER USE    hydrocortisone-aloe vera 1 % Crea cream Apply topically 2 (two) times daily.     Family History    None       Tobacco Use    Smoking status: Every Day     Current packs/day: 0.25     Average packs/day: 0.5 packs/day for 60.9 years (30.1 ttl pk-yrs)     Types: Cigarettes     Start date: 7/5/1963    Smokeless tobacco: Never   Substance and Sexual Activity    Alcohol use: No    Drug use: No    Sexual activity: Not on file     Review of Systems   Constitutional:  Positive for activity change. Negative for chills, diaphoresis, fatigue and fever.   HENT:  Negative for congestion,  rhinorrhea and sore throat.    Respiratory:  Positive for cough (chronic) and shortness of breath (chronic). Negative for chest tightness and wheezing.    Cardiovascular:  Positive for chest pain. Negative for palpitations and leg swelling.   Gastrointestinal:  Negative for abdominal distention, abdominal pain, blood in stool, constipation, diarrhea, nausea and vomiting.   Genitourinary:  Negative for difficulty urinating, dysuria, frequency, hematuria and urgency.   Musculoskeletal:  Negative for arthralgias, back pain and neck stiffness.   Neurological:  Negative for dizziness, tremors, seizures, syncope, weakness, light-headedness, numbness and headaches.   Psychiatric/Behavioral:  Negative for agitation, confusion and hallucinations.      Objective:     Vital Signs (Most Recent):  Temp: 98.2 °F (36.8 °C) (05/25/24 1332)  Pulse: 68 (05/25/24 1332)  Resp: 18 (05/25/24 1332)  BP: 125/61 (05/25/24 1332)  SpO2: 100 % (05/25/24 1332) Vital Signs (24h Range):  Temp:  [98.2 °F (36.8 °C)-98.3 °F (36.8 °C)] 98.2 °F (36.8 °C)  Pulse:  [68-81] 68  Resp:  [18-20] 18  SpO2:  [98 %-100 %] 100 %  BP: (125-136)/(61-64) 125/61     Weight: 52.2 kg (115 lb)  Body mass index is 22.46 kg/m².     Physical Exam  Vitals and nursing note reviewed.   Constitutional:       General: She is not in acute distress.     Appearance: She is well-developed. She is not diaphoretic.      Interventions: Nasal cannula in place.   HENT:      Head: Normocephalic and atraumatic.      Right Ear: External ear normal.      Left Ear: External ear normal.      Nose: Nose normal. No congestion.      Mouth/Throat:      Pharynx: Oropharynx is clear.   Eyes:      General: No scleral icterus.     Extraocular Movements: Extraocular movements intact.   Cardiovascular:      Rate and Rhythm: Normal rate and regular rhythm.      Pulses: Normal pulses.      Heart sounds: Normal heart sounds. No murmur heard.  Pulmonary:      Effort: Pulmonary effort is normal. No  respiratory distress.      Breath sounds: Normal breath sounds. No wheezing or rales.   Abdominal:      General: Bowel sounds are normal. There is no distension.      Palpations: Abdomen is soft.      Tenderness: There is no abdominal tenderness. There is no guarding or rebound.   Musculoskeletal:      Cervical back: Normal range of motion.      Right lower leg: No edema.      Left lower leg: No edema.   Skin:     General: Skin is warm and dry.      Capillary Refill: Capillary refill takes less than 2 seconds.   Neurological:      General: No focal deficit present.      Mental Status: She is alert and oriented to person, place, and time. Mental status is at baseline.   Psychiatric:         Mood and Affect: Mood normal.         Behavior: Behavior normal.         Thought Content: Thought content normal.                Significant Labs: All pertinent labs within the past 24 hours have been reviewed.  CBC:   Recent Labs   Lab 05/25/24  1242   WBC 6.74   HGB 11.3*   HCT 36.1*        CMP:   Recent Labs   Lab 05/25/24  1242      K 3.4*      CO2 25      BUN 18   CREATININE 0.8   CALCIUM 9.2   PROT 6.4   ALBUMIN 3.6   BILITOT 0.4   ALKPHOS 105   AST 13   ALT 9*   ANIONGAP 8     Cardiac Markers:   Recent Labs   Lab 05/25/24  1242   *     Troponin:   Recent Labs   Lab 05/25/24  1242   TROPONINI 0.023       Significant Imaging: I have reviewed all pertinent imaging results/findings within the past 24 hours.  Imaging Results              X-Ray Chest AP Portable (Final result)  Result time 05/25/24 13:30:35      Final result by Blas Ha Jr., MD (05/25/24 13:30:35)                   Impression:      No detrimental change.      Electronically signed by: Blas Ha MD  Date:    05/25/2024  Time:    13:30               Narrative:    EXAMINATION:  XR CHEST AP PORTABLE    CLINICAL HISTORY:  Chest Pain;    TECHNIQUE:  Single frontal view of the chest was performed.    COMPARISON:  November  2023    FINDINGS:  Mild elevation left hemidiaphragm.  Heart size and pulmonary vessels are similar.  Increase in interstitial markings.  No confluent consolidation.  Probable remote right rib fractures noted.

## 2024-05-25 NOTE — ASSESSMENT & PLAN NOTE
- trop 0.023, trending  -   - ECG reveals NSR w/o gross ischemic change  - CXR unremarkable  - asa 325 x1 in ED  - SL NGT prn  - last echo noted, repeat TTE ordered  - NM stress Monday  - tele monitoring  - K>4, Mg>2

## 2024-05-25 NOTE — ED NOTES
Messaged primary team to clarify asa order. Pt has taken 81mg of asa and plavix today. Awaiting response.

## 2024-05-25 NOTE — ED TRIAGE NOTES
Triage note:  Chief Complaint   Patient presents with    Chest Pain     Pt reports chest pain that radiates to the back x2 days. Pt took multiple ntg tabs over 2 days. Pt now pain free and wants to be evaluated.      Review of patient's allergies indicates:  No Known Allergies  Past Medical History:   Diagnosis Date    Anemia     Anxiety     COPD (chronic obstructive pulmonary disease)     COPD (chronic obstructive pulmonary disease) with emphysema     Coronary artery disease     Depression     Diverticulitis     Hyperlipidemia     Hypertension     PVD (peripheral vascular disease)     PVD (peripheral vascular disease)     S/P colostomy     Substance abuse     hx heavy etoh use     Tobacco abuse

## 2024-05-25 NOTE — ASSESSMENT & PLAN NOTE
Patient's COPD is controlled currently.  Patient is currently off COPD Pathway. Continue scheduled inhalers Supplemental oxygen and monitor respiratory status closely.     - continue formulary controller, eduarda prn

## 2024-05-26 LAB
ANION GAP SERPL CALC-SCNC: 9 MMOL/L (ref 8–16)
ASCENDING AORTA: 2.68 CM
AV INDEX (PROSTH): 0.58
AV MEAN GRADIENT: 7 MMHG
AV PEAK GRADIENT: 16 MMHG
AV REGURGITATION PRESSURE HALF TIME: 540.5 MS
AV VALVE AREA BY VELOCITY RATIO: 1.9 CM²
AV VALVE AREA: 2.21 CM²
AV VELOCITY RATIO: 0.5
BASOPHILS # BLD AUTO: 0.05 K/UL (ref 0–0.2)
BASOPHILS NFR BLD: 0.8 % (ref 0–1.9)
BSA FOR ECHO PROCEDURE: 1.49 M2
BUN SERPL-MCNC: 17 MG/DL (ref 8–23)
CALCIUM SERPL-MCNC: 9.1 MG/DL (ref 8.7–10.5)
CHLORIDE SERPL-SCNC: 104 MMOL/L (ref 95–110)
CO2 SERPL-SCNC: 27 MMOL/L (ref 23–29)
CREAT SERPL-MCNC: 0.7 MG/DL (ref 0.5–1.4)
CV ECHO LV RWT: 0.25 CM
DIFFERENTIAL METHOD BLD: ABNORMAL
DOP CALC AO PEAK VEL: 2 M/S
DOP CALC AO VTI: 36.7 CM
DOP CALC LVOT AREA: 3.8 CM2
DOP CALC LVOT DIAMETER: 2.2 CM
DOP CALC LVOT PEAK VEL: 1 M/S
DOP CALC LVOT STROKE VOLUME: 80.93 CM3
DOP CALCLVOT PEAK VEL VTI: 21.3 CM
E WAVE DECELERATION TIME: 116.18 MSEC
E/A RATIO: 0.7
E/E' RATIO: 7.76 M/S
ECHO LV POSTERIOR WALL: 0.57 CM (ref 0.6–1.1)
EOSINOPHIL # BLD AUTO: 0.4 K/UL (ref 0–0.5)
EOSINOPHIL NFR BLD: 6 % (ref 0–8)
ERYTHROCYTE [DISTWIDTH] IN BLOOD BY AUTOMATED COUNT: 13.4 % (ref 11.5–14.5)
EST. GFR  (NO RACE VARIABLE): >60 ML/MIN/1.73 M^2
FRACTIONAL SHORTENING: 36 % (ref 28–44)
GLUCOSE SERPL-MCNC: 84 MG/DL (ref 70–110)
HCT VFR BLD AUTO: 38.5 % (ref 37–48.5)
HGB BLD-MCNC: 11.9 G/DL (ref 12–16)
IMM GRANULOCYTES # BLD AUTO: 0.01 K/UL (ref 0–0.04)
IMM GRANULOCYTES NFR BLD AUTO: 0.2 % (ref 0–0.5)
INTERVENTRICULAR SEPTUM: 0.87 CM (ref 0.6–1.1)
IVRT: 91.34 MSEC
LA MAJOR: 4.73 CM
LA MINOR: 3.88 CM
LA WIDTH: 3.88 CM
LEFT ATRIUM SIZE: 3.74 CM
LEFT ATRIUM VOLUME INDEX MOD: 30.4 ML/M2
LEFT ATRIUM VOLUME INDEX: 35.5 ML/M2
LEFT ATRIUM VOLUME MOD: 45 CM3
LEFT ATRIUM VOLUME: 52.58 CM3
LEFT INTERNAL DIMENSION IN SYSTOLE: 2.89 CM (ref 2.1–4)
LEFT VENTRICLE DIASTOLIC VOLUME INDEX: 63.02 ML/M2
LEFT VENTRICLE DIASTOLIC VOLUME: 93.27 ML
LEFT VENTRICLE MASS INDEX: 68 G/M2
LEFT VENTRICLE SYSTOLIC VOLUME INDEX: 21.5 ML/M2
LEFT VENTRICLE SYSTOLIC VOLUME: 31.82 ML
LEFT VENTRICULAR INTERNAL DIMENSION IN DIASTOLE: 4.52 CM (ref 3.5–6)
LEFT VENTRICULAR MASS: 99.91 G
LV LATERAL E/E' RATIO: 6 M/S
LV SEPTAL E/E' RATIO: 11 M/S
LVOT MG: 2.05 MMHG
LVOT MV: 0.68 CM/S
LYMPHOCYTES # BLD AUTO: 1.1 K/UL (ref 1–4.8)
LYMPHOCYTES NFR BLD: 17.5 % (ref 18–48)
MAGNESIUM SERPL-MCNC: 1.9 MG/DL (ref 1.6–2.6)
MCH RBC QN AUTO: 29.8 PG (ref 27–31)
MCHC RBC AUTO-ENTMCNC: 30.9 G/DL (ref 32–36)
MCV RBC AUTO: 96 FL (ref 82–98)
MONOCYTES # BLD AUTO: 0.6 K/UL (ref 0.3–1)
MONOCYTES NFR BLD: 9.9 % (ref 4–15)
MV PEAK A VEL: 0.94 M/S
MV PEAK E VEL: 0.66 M/S
MV STENOSIS PRESSURE HALF TIME: 33.69 MS
MV VALVE AREA P 1/2 METHOD: 6.53 CM2
NEUTROPHILS # BLD AUTO: 4.2 K/UL (ref 1.8–7.7)
NEUTROPHILS NFR BLD: 65.6 % (ref 38–73)
NRBC BLD-RTO: 0 /100 WBC
OHS CV RV/LV RATIO: 0.6 CM
OHS QRS DURATION: 88 MS
OHS QTC CALCULATION: 427 MS
PISA AR MAX VEL: 3.39 M/S
PISA TR MAX VEL: 2.06 M/S
PLATELET # BLD AUTO: 194 K/UL (ref 150–450)
PMV BLD AUTO: 10 FL (ref 9.2–12.9)
POTASSIUM SERPL-SCNC: 3.6 MMOL/L (ref 3.5–5.1)
RA MAJOR: 3.51 CM
RA PRESSURE ESTIMATED: 3 MMHG
RA WIDTH: 2.94 CM
RBC # BLD AUTO: 4 M/UL (ref 4–5.4)
RIGHT VENTRICULAR END-DIASTOLIC DIMENSION: 2.69 CM
RV TB RVSP: 5 MMHG
SINUS: 2.72 CM
SODIUM SERPL-SCNC: 140 MMOL/L (ref 136–145)
STJ: 2.41 CM
TDI LATERAL: 0.11 M/S
TDI SEPTAL: 0.06 M/S
TDI: 0.09 M/S
TR MAX PG: 17 MMHG
TRICUSPID ANNULAR PLANE SYSTOLIC EXCURSION: 2.53 CM
TV REST PULMONARY ARTERY PRESSURE: 20 MMHG
WBC # BLD AUTO: 6.34 K/UL (ref 3.9–12.7)
Z-SCORE OF LEFT VENTRICULAR DIMENSION IN END DIASTOLE: 0.24
Z-SCORE OF LEFT VENTRICULAR DIMENSION IN END SYSTOLE: 0.42

## 2024-05-26 PROCEDURE — 36415 COLL VENOUS BLD VENIPUNCTURE: CPT | Performed by: PHYSICIAN ASSISTANT

## 2024-05-26 PROCEDURE — 25000003 PHARM REV CODE 250: Performed by: PHYSICIAN ASSISTANT

## 2024-05-26 PROCEDURE — 94640 AIRWAY INHALATION TREATMENT: CPT | Mod: XB

## 2024-05-26 PROCEDURE — A4216 STERILE WATER/SALINE, 10 ML: HCPCS | Performed by: PHYSICIAN ASSISTANT

## 2024-05-26 PROCEDURE — 80048 BASIC METABOLIC PNL TOTAL CA: CPT | Performed by: PHYSICIAN ASSISTANT

## 2024-05-26 PROCEDURE — 25000242 PHARM REV CODE 250 ALT 637 W/ HCPCS: Performed by: PHYSICIAN ASSISTANT

## 2024-05-26 PROCEDURE — 85025 COMPLETE CBC W/AUTO DIFF WBC: CPT | Performed by: PHYSICIAN ASSISTANT

## 2024-05-26 PROCEDURE — 25000003 PHARM REV CODE 250

## 2024-05-26 PROCEDURE — S4991 NICOTINE PATCH NONLEGEND: HCPCS

## 2024-05-26 PROCEDURE — 96372 THER/PROPH/DIAG INJ SC/IM: CPT | Performed by: PHYSICIAN ASSISTANT

## 2024-05-26 PROCEDURE — G0378 HOSPITAL OBSERVATION PER HR: HCPCS

## 2024-05-26 PROCEDURE — 63600175 PHARM REV CODE 636 W HCPCS: Performed by: PHYSICIAN ASSISTANT

## 2024-05-26 PROCEDURE — 94761 N-INVAS EAR/PLS OXIMETRY MLT: CPT

## 2024-05-26 PROCEDURE — 83735 ASSAY OF MAGNESIUM: CPT | Performed by: PHYSICIAN ASSISTANT

## 2024-05-26 PROCEDURE — 99900035 HC TECH TIME PER 15 MIN (STAT)

## 2024-05-26 PROCEDURE — 27000221 HC OXYGEN, UP TO 24 HOURS

## 2024-05-26 RX ORDER — IPRATROPIUM BROMIDE AND ALBUTEROL SULFATE 2.5; .5 MG/3ML; MG/3ML
3 SOLUTION RESPIRATORY (INHALATION) EVERY 6 HOURS
Status: DISCONTINUED | OUTPATIENT
Start: 2024-05-26 | End: 2024-05-28 | Stop reason: HOSPADM

## 2024-05-26 RX ORDER — POTASSIUM CHLORIDE 20 MEQ/1
40 TABLET, EXTENDED RELEASE ORAL ONCE
Status: COMPLETED | OUTPATIENT
Start: 2024-05-26 | End: 2024-05-26

## 2024-05-26 RX ORDER — IBUPROFEN 200 MG
1 TABLET ORAL DAILY
Status: DISCONTINUED | OUTPATIENT
Start: 2024-05-26 | End: 2024-05-28 | Stop reason: HOSPADM

## 2024-05-26 RX ADMIN — Medication 10 ML: at 10:05

## 2024-05-26 RX ADMIN — CLOPIDOGREL BISULFATE 75 MG: 75 TABLET ORAL at 09:05

## 2024-05-26 RX ADMIN — Medication 10 ML: at 02:05

## 2024-05-26 RX ADMIN — ATORVASTATIN CALCIUM 80 MG: 40 TABLET, FILM COATED ORAL at 09:05

## 2024-05-26 RX ADMIN — FLUTICASONE FUROATE AND VILANTEROL TRIFENATATE 1 PUFF: 100; 25 POWDER RESPIRATORY (INHALATION) at 07:05

## 2024-05-26 RX ADMIN — HEPARIN SODIUM 5000 UNITS: 5000 INJECTION INTRAVENOUS; SUBCUTANEOUS at 09:05

## 2024-05-26 RX ADMIN — IPRATROPIUM BROMIDE AND ALBUTEROL SULFATE 3 ML: 2.5; .5 SOLUTION RESPIRATORY (INHALATION) at 01:05

## 2024-05-26 RX ADMIN — HEPARIN SODIUM 5000 UNITS: 5000 INJECTION INTRAVENOUS; SUBCUTANEOUS at 03:05

## 2024-05-26 RX ADMIN — Medication 1 PATCH: at 06:05

## 2024-05-26 RX ADMIN — IPRATROPIUM BROMIDE AND ALBUTEROL SULFATE 3 ML: 2.5; .5 SOLUTION RESPIRATORY (INHALATION) at 07:05

## 2024-05-26 RX ADMIN — METOPROLOL SUCCINATE 25 MG: 25 TABLET, EXTENDED RELEASE ORAL at 09:05

## 2024-05-26 RX ADMIN — Medication 10 ML: at 05:05

## 2024-05-26 RX ADMIN — HEPARIN SODIUM 5000 UNITS: 5000 INJECTION INTRAVENOUS; SUBCUTANEOUS at 05:05

## 2024-05-26 RX ADMIN — ALBUTEROL SULFATE 2 PUFF: 108 INHALANT RESPIRATORY (INHALATION) at 05:05

## 2024-05-26 RX ADMIN — POTASSIUM CHLORIDE 40 MEQ: 1500 TABLET, EXTENDED RELEASE ORAL at 09:05

## 2024-05-26 RX ADMIN — ASPIRIN 81 MG: 81 TABLET, COATED ORAL at 09:05

## 2024-05-26 RX ADMIN — ALBUTEROL SULFATE 2 PUFF: 108 INHALANT RESPIRATORY (INHALATION) at 12:05

## 2024-05-26 NOTE — SUBJECTIVE & OBJECTIVE
Interval History: NAEON. Pt seen and evaluated at bedside this am. Pt is doing well this am and is HDS. Pt's chest pain has completely resolved and has not returned. Tn flat. NM stress ordered for tomorrow. NPO at mn.     Review of Systems   Constitutional:  Negative for activity change, chills, diaphoresis, fatigue and fever.   HENT:  Negative for congestion, rhinorrhea and sore throat.    Respiratory:  Positive for cough (chronic) and shortness of breath (chronic). Negative for chest tightness and wheezing.    Cardiovascular:  Negative for chest pain, palpitations and leg swelling.   Gastrointestinal:  Negative for abdominal distention, abdominal pain, blood in stool, constipation, diarrhea, nausea and vomiting.   Genitourinary:  Negative for difficulty urinating, dysuria, frequency, hematuria and urgency.   Musculoskeletal:  Negative for arthralgias, back pain and neck stiffness.   Neurological:  Negative for dizziness, tremors, seizures, syncope, weakness, light-headedness, numbness and headaches.   Psychiatric/Behavioral:  Negative for agitation, confusion and hallucinations.      Objective:     Vital Signs (Most Recent):  Temp: 98.4 °F (36.9 °C) (05/26/24 0736)  Pulse: 70 (05/26/24 0736)  Resp: 18 (05/26/24 0736)  BP: 121/71 (05/26/24 0736)  SpO2: 97 % (05/26/24 0736) Vital Signs (24h Range):  Temp:  [97.9 °F (36.6 °C)-98.4 °F (36.9 °C)] 98.4 °F (36.9 °C)  Pulse:  [63-81] 70  Resp:  [16-24] 18  SpO2:  [95 %-100 %] 97 %  BP: (121-149)/(60-71) 121/71     Weight: 54.6 kg (120 lb 5.9 oz)  Body mass index is 23.51 kg/m².    Intake/Output Summary (Last 24 hours) at 5/26/2024 0940  Last data filed at 5/26/2024 0600  Gross per 24 hour   Intake --   Output 400 ml   Net -400 ml         Physical Exam  Vitals and nursing note reviewed.   Constitutional:       General: She is not in acute distress.     Appearance: She is well-developed. She is not diaphoretic.      Interventions: Nasal cannula in place.   HENT:      Head:  Normocephalic and atraumatic.      Right Ear: External ear normal.      Left Ear: External ear normal.      Nose: Nose normal. No congestion.      Mouth/Throat:      Pharynx: Oropharynx is clear.   Eyes:      General: No scleral icterus.     Extraocular Movements: Extraocular movements intact.   Cardiovascular:      Rate and Rhythm: Normal rate and regular rhythm.      Pulses: Normal pulses.      Heart sounds: Normal heart sounds. No murmur heard.  Pulmonary:      Effort: Pulmonary effort is normal. No respiratory distress.      Breath sounds: Normal breath sounds. No wheezing or rales.   Abdominal:      General: Bowel sounds are normal. There is no distension.      Palpations: Abdomen is soft.      Tenderness: There is no abdominal tenderness. There is no guarding or rebound.   Musculoskeletal:      Cervical back: Normal range of motion.      Right lower leg: No edema.      Left lower leg: No edema.   Skin:     General: Skin is warm and dry.      Capillary Refill: Capillary refill takes less than 2 seconds.   Neurological:      General: No focal deficit present.      Mental Status: She is alert and oriented to person, place, and time. Mental status is at baseline.   Psychiatric:         Mood and Affect: Mood normal.         Behavior: Behavior normal.         Thought Content: Thought content normal.             Significant Labs: All pertinent labs within the past 24 hours have been reviewed.    Significant Imaging: I have reviewed all pertinent imaging results/findings within the past 24 hours.

## 2024-05-26 NOTE — ASSESSMENT & PLAN NOTE
Chronic, controlled. Latest blood pressure and vitals reviewed-     Temp:  [97.9 °F (36.6 °C)-98.4 °F (36.9 °C)]   Pulse:  [63-81]   Resp:  [16-24]   BP: (121-149)/(60-71)   SpO2:  [95 %-100 %] .   Home meds for hypertension were reviewed and noted below.   Hypertension Medications               metoprolol succinate (TOPROL-XL) 25 MG 24 hr tablet Take 1 tablet (25 mg total) by mouth once daily.    nitroGLYCERIN (NITROSTAT) 0.4 MG SL tablet Place 1 tablet (0.4 mg total) under the tongue every 5 (five) minutes as needed for Chest pain.     While in the hospital, will manage blood pressure as follows; Continue home antihypertensive regimen    Will utilize p.r.n. blood pressure medication only if patient's blood pressure greater than 180/110 and she develops symptoms such as worsening chest pain or shortness of breath.

## 2024-05-26 NOTE — PROGRESS NOTES
[facility] [address] [place] [address] Medicine  Progress Note    Patient Name: Radha Sotelo  MRN: [id number]  Patient Class: OP- Observation   Admission Date: 5/25/2024  Length of Stay: 0 days  Attending Physician: Delmi Perez MD  Primary Care Provider: Kalyn Pemberton NP        Subjective:     Principal Problem:Chest pain        HPI:  Mr. Radha Sotelo is a 78-year-old woman with comorbidities of anemia, CAD with stents, diverticulitis with colostomy, COPD on 2 LPM at home, hypertension and previous history of heavy alcohol use presents for evaluation of chest pain. Pt reports over the last 3 days she has had worsening substernal chest pain. Reports pain has been constant since onset but improved w/ home sl ntg. Today however, her pain continued to worsen and she took her last ntg before calling EMS. She is unable to fully characterize the pain but notes it does radiate to her back. Endorses associated SOB but denies any cough, nausea, diaphoresis, vomiting. She also reports that she has ingested 25 tablets of nitroglycerin over this time due to this chest pain. She denies any current chest discomfort nor increased shortness of breath or diminished exertional tolerance. In addition, she denies any fever, chills, abd pain, dysuria or melenic stooling.     In ED, Pt AFVSS on 2 L NC. No leukocytosis. K 3.4. Trop 0.023, BNP wnl. CXR:  Increase in interstitial markings. No confluent consolidation. Probable remote right rib fractures noted. Given asa 325 x1.     Overview/Hospital Course:  Pt placed in obs for high risk chest pain needing ACS r/o. Pt HDS and afebrile. Has no new O2 requirements. Trop initially elevated 0.023 and down trended 0.015. CXR w/ increased interstitial markings, no consolidations. ECG showed normal sinus rhythm with sinus arrhythmia Treated w/ asa. Electrolytes placed as needed. PET stress ordered.     Interval History: NAEON. Pt seen and evaluated at bedside this am. Pt is doing  well this am and is HDS. Pt's chest pain has completely resolved and has not returned. Tn flat. NM stress ordered for tomorrow. NPO at mn.     Review of Systems   Constitutional:  Negative for activity change, chills, diaphoresis, fatigue and fever.   HENT:  Negative for congestion, rhinorrhea and sore throat.    Respiratory:  Positive for cough (chronic) and shortness of breath (chronic). Negative for chest tightness and wheezing.    Cardiovascular:  Negative for chest pain, palpitations and leg swelling.   Gastrointestinal:  Negative for abdominal distention, abdominal pain, blood in stool, constipation, diarrhea, nausea and vomiting.   Genitourinary:  Negative for difficulty urinating, dysuria, frequency, hematuria and urgency.   Musculoskeletal:  Negative for arthralgias, back pain and neck stiffness.   Neurological:  Negative for dizziness, tremors, seizures, syncope, weakness, light-headedness, numbness and headaches.   Psychiatric/Behavioral:  Negative for agitation, confusion and hallucinations.      Objective:     Vital Signs (Most Recent):  Temp: 98.4 °F (36.9 °C) (05/26/24 0736)  Pulse: 70 (05/26/24 0736)  Resp: 18 (05/26/24 0736)  BP: 121/71 (05/26/24 0736)  SpO2: 97 % (05/26/24 0736) Vital Signs (24h Range):  Temp:  [97.9 °F (36.6 °C)-98.4 °F (36.9 °C)] 98.4 °F (36.9 °C)  Pulse:  [63-81] 70  Resp:  [16-24] 18  SpO2:  [95 %-100 %] 97 %  BP: (121-149)/(60-71) 121/71     Weight: 54.6 kg (120 lb 5.9 oz)  Body mass index is 23.51 kg/m².    Intake/Output Summary (Last 24 hours) at 5/26/2024 0940  Last data filed at 5/26/2024 0600  Gross per 24 hour   Intake --   Output 400 ml   Net -400 ml         Physical Exam  Vitals and nursing note reviewed.   Constitutional:       General: She is not in acute distress.     Appearance: She is well-developed. She is not diaphoretic.      Interventions: Nasal cannula in place.   HENT:      Head: Normocephalic and atraumatic.      Right Ear: External ear normal.      Left  Ear: External ear normal.      Nose: Nose normal. No congestion.      Mouth/Throat:      Pharynx: Oropharynx is clear.   Eyes:      General: No scleral icterus.     Extraocular Movements: Extraocular movements intact.   Cardiovascular:      Rate and Rhythm: Normal rate and regular rhythm.      Pulses: Normal pulses.      Heart sounds: Normal heart sounds. No murmur heard.  Pulmonary:      Effort: Pulmonary effort is normal. No respiratory distress.      Breath sounds: Normal breath sounds. No wheezing or rales.   Abdominal:      General: Bowel sounds are normal. There is no distension.      Palpations: Abdomen is soft.      Tenderness: There is no abdominal tenderness. There is no guarding or rebound.   Musculoskeletal:      Cervical back: Normal range of motion.      Right lower leg: No edema.      Left lower leg: No edema.   Skin:     General: Skin is warm and dry.      Capillary Refill: Capillary refill takes less than 2 seconds.   Neurological:      General: No focal deficit present.      Mental Status: She is alert and oriented to person, place, and time. Mental status is at baseline.   Psychiatric:         Mood and Affect: Mood normal.         Behavior: Behavior normal.         Thought Content: Thought content normal.             Significant Labs: All pertinent labs within the past 24 hours have been reviewed.    Significant Imaging: I have reviewed all pertinent imaging results/findings within the past 24 hours.    Assessment/Plan:      * Chest pain  - trop 0.023, trending  -   - ECG reveals NSR w/o gross ischemic change  - CXR unremarkable  - asa 325 x1 in ED  - SL NGT prn  - last echo noted, repeat TTE ordered  - NM stress Monday  - tele monitoring  - K>4, Mg>2    Chronic heart failure, unspecified heart failure type  Patient is identified as having Diastolic (HFpEF) heart failure that is Chronic. CHF is currently controlled. Latest ECHO performed and demonstrates- Results for orders placed during  the hospital encounter of 11/22/23    Echo    Interpretation Summary    Left Ventricle: The left ventricle is normal in size. Ventricular mass is normal. Normal wall thickness. Normal wall motion. There is normal systolic function with a visually estimated ejection fraction of 55 - 60%. Grade II diastolic dysfunction.    Right Ventricle: Normal right ventricular cavity size. Wall thickness is normal. Right ventricle wall motion  is normal. Systolic function is normal.    The following segments are hypokinetic: basal inferior, basal inferolateral and mid inferolateral suggesting ischemia/infarct in LCx or RCA territory.    Left Atrium: Left atrium is mildly dilated.    Right Atrium: Right atrium is mildly dilated.    Aortic Valve: There is severe aortic valve sclerosis. Moderately calcified cusps. There is moderate annular calcification present.    Mitral Valve: There is mild mitral annular calcification present. There is mild to moderate regurgitation. Mild restriction of the posterior leaflet likely ischemic MR.    Pulmonary Artery: The estimated pulmonary artery systolic pressure is 44 mmHg.    IVC/SVC: Intermediate venous pressure at 8 mmHg.  . Continue Beta Blocker and monitor clinical status closely. Monitor on telemetry. Patient is off CHF pathway.  Monitor strict Is&Os and daily weights.  Place on fluid restriction of 1.5 L. Continue to stress to patient importance of self efficacy and  on diet for CHF. Last BNP reviewed- and noted below   Recent Labs   Lab 05/25/24  1242   *         Hx of transient ischemic attack (TIA)  - continue asa and statin    HLD (hyperlipidemia)  - continue statin    Essential hypertension  Chronic, controlled. Latest blood pressure and vitals reviewed-     Temp:  [97.9 °F (36.6 °C)-98.4 °F (36.9 °C)]   Pulse:  [63-81]   Resp:  [16-24]   BP: (121-149)/(60-71)   SpO2:  [95 %-100 %] .   Home meds for hypertension were reviewed and noted below.   Hypertension Medications                metoprolol succinate (TOPROL-XL) 25 MG 24 hr tablet Take 1 tablet (25 mg total) by mouth once daily.    nitroGLYCERIN (NITROSTAT) 0.4 MG SL tablet Place 1 tablet (0.4 mg total) under the tongue every 5 (five) minutes as needed for Chest pain.     While in the hospital, will manage blood pressure as follows; Continue home antihypertensive regimen    Will utilize p.r.n. blood pressure medication only if patient's blood pressure greater than 180/110 and she develops symptoms such as worsening chest pain or shortness of breath.    Chronic obstructive pulmonary disease  Patient's COPD is controlled currently.  Patient is currently off COPD Pathway. Continue scheduled inhalers Supplemental oxygen and monitor respiratory status closely.     - continue formulary controller, duonebs prn    Chronic respiratory failure with hypoxia and hypercapnia  Patient with Hypoxic Respiratory failure which is Chronic.  she is on home oxygen at 2 LPM. Contributing diagnoses includes - CHF and COPD Labs and images were reviewed. Patient Has recent ABG, which has been reviewed. Will treat underlying causes and adjust management of respiratory failure as follows continue supplemental oxygen at 2 L currently.    CAD (coronary artery disease)  Patient with known CAD s/p stent placement, which is controlled Will continue ASA, Plavix, and Statin and monitor for S/Sx of angina/ACS. Continue to monitor on telemetry.       VTE Risk Mitigation (From admission, onward)           Ordered     heparin (porcine) injection 5,000 Units  Every 8 hours         05/25/24 1533     IP VTE HIGH RISK PATIENT  Once         05/25/24 1533     Place sequential compression device  Until discontinued         05/25/24 1533                    Discharge Planning   FLORIAN: 5/27/2024     Code Status: Full Code   Is the patient medically ready for discharge?:     Reason for patient still in hospital (select all that apply): Patient trending condition, Laboratory  test, and Treatment                     Pau Card PA-C  Department of Hospital Medicine   [facility] - Observation 11H

## 2024-05-26 NOTE — HOSPITAL COURSE
Pt placed in obs for high risk chest pain needing ACS r/o. Pt HDS and afebrile. Has no new O2 requirements. Trop initially elevated 0.023 and down trended 0.015. CXR w/ increased interstitial markings, no consolidations. ECG showed normal sinus rhythm with sinus arrhythmia Treated w/ asa. Electrolytes placed as needed. PET stress and was abnormal. Underwent Angiogram, Coronary, with Left Heart Cath (N/A)  Stent, Drug Eluting, Single Vessel, Coronary w/ interventional cardiology 5/27/24. Plavix for at least 1 year and ASA 81 mg indefinitely. Referral placed for cardiac rehab. Given ambulatory referral to wound care for LLE stump wound. Pt med rec'd and medications were delivered to bedside prior to discharge. Pt medically ready for discharge home. Pt educated on hospital course and plan, verbally agrees and understands. All questions answered.     Physical Exam  Gen: in NAD, appears stated age  Neuro: AAOx3, motor, sensory, and strength grossly intact BL  HEENT: EOMI, PERRLA; no JVD appreciated  CVS: RRR, no m/r/g  Resp: lungs CTAB, no w/r/r; no belabored breathing or accessory muscle use appreciated   Abd: NTND, soft to palpation  Extrem: no UE or LE edema BL

## 2024-05-26 NOTE — PLAN OF CARE
Problem: Adult Inpatient Plan of Care  Goal: Plan of Care Review  Outcome: Progressing  Goal: Patient-Specific Goal (Individualized)  Outcome: Progressing  Goal: Absence of Hospital-Acquired Illness or Injury  Outcome: Progressing  Goal: Optimal Comfort and Wellbeing  Outcome: Progressing  Goal: Readiness for Transition of Care  Outcome: Progressing     Problem: Infection  Goal: Absence of Infection Signs and Symptoms  Outcome: Progressing     Problem: Acute Kidney Injury/Impairment  Goal: Fluid and Electrolyte Balance  Outcome: Progressing  Goal: Improved Oral Intake  Outcome: Progressing  Goal: Effective Renal Function  Outcome: Progressing     Problem: Comorbidity Management  Goal: Maintenance of COPD Symptom Control  Outcome: Progressing  Goal: Blood Pressure in Desired Range  Outcome: Progressing     Problem: Chest Pain  Goal: Resolution of Chest Pain Symptoms  Outcome: Progressing

## 2024-05-26 NOTE — ASSESSMENT & PLAN NOTE
Patient is identified as having Diastolic (HFpEF) heart failure that is Chronic. CHF is currently controlled. Latest ECHO performed and demonstrates- Results for orders placed during the hospital encounter of 11/22/23    Echo    Interpretation Summary    Left Ventricle: The left ventricle is normal in size. Ventricular mass is normal. Normal wall thickness. Normal wall motion. There is normal systolic function with a visually estimated ejection fraction of 55 - 60%. Grade II diastolic dysfunction.    Right Ventricle: Normal right ventricular cavity size. Wall thickness is normal. Right ventricle wall motion  is normal. Systolic function is normal.    The following segments are hypokinetic: basal inferior, basal inferolateral and mid inferolateral suggesting ischemia/infarct in LCx or RCA territory.    Left Atrium: Left atrium is mildly dilated.    Right Atrium: Right atrium is mildly dilated.    Aortic Valve: There is severe aortic valve sclerosis. Moderately calcified cusps. There is moderate annular calcification present.    Mitral Valve: There is mild mitral annular calcification present. There is mild to moderate regurgitation. Mild restriction of the posterior leaflet likely ischemic MR.    Pulmonary Artery: The estimated pulmonary artery systolic pressure is 44 mmHg.    IVC/SVC: Intermediate venous pressure at 8 mmHg.  . Continue Beta Blocker and monitor clinical status closely. Monitor on telemetry. Patient is off CHF pathway.  Monitor strict Is&Os and daily weights.  Place on fluid restriction of 1.5 L. Continue to stress to patient importance of self efficacy and  on diet for CHF. Last BNP reviewed- and noted below   Recent Labs   Lab 05/25/24  1242   *

## 2024-05-26 NOTE — NURSING
Nurses Note -- 4 Eyes      5/26/2024   5:20 AM      Skin assessed during: Admit      [] No Altered Skin Integrity Present    []Prevention Measures Documented      [x] Yes- Altered Skin Integrity Present or Discovered   [x] LDA Added if Not in Epic (Describe Wound)   [x] New Altered Skin Integrity was Present on Admit and Documented in LDA   [x] Wound Image Taken    Wound Care Consulted? Yes    Attending Nurse:  Robyn Mcintosh RN/Staff Member:   Irene

## 2024-05-27 LAB
ABO + RH BLD: ABNORMAL
ANION GAP SERPL CALC-SCNC: 10 MMOL/L (ref 8–16)
BASOPHILS # BLD AUTO: 0.06 K/UL (ref 0–0.2)
BASOPHILS NFR BLD: 0.8 % (ref 0–1.9)
BLD GP AB SCN CELLS X3 SERPL QL: ABNORMAL
BLOOD GROUP ANTIBODIES SERPL: NORMAL
BUN SERPL-MCNC: 29 MG/DL (ref 8–23)
CALCIUM SERPL-MCNC: 9.7 MG/DL (ref 8.7–10.5)
CHLORIDE SERPL-SCNC: 102 MMOL/L (ref 95–110)
CO2 SERPL-SCNC: 26 MMOL/L (ref 23–29)
CREAT SERPL-MCNC: 0.9 MG/DL (ref 0.5–1.4)
CV STRESS BASE HR: 99 BPM
DIASTOLIC BLOOD PRESSURE: 52 MMHG
DIFFERENTIAL METHOD BLD: ABNORMAL
EOSINOPHIL # BLD AUTO: 0.3 K/UL (ref 0–0.5)
EOSINOPHIL NFR BLD: 3.5 % (ref 0–8)
ERYTHROCYTE [DISTWIDTH] IN BLOOD BY AUTOMATED COUNT: 13.2 % (ref 11.5–14.5)
EST. GFR  (NO RACE VARIABLE): >60 ML/MIN/1.73 M^2
GLUCOSE SERPL-MCNC: 86 MG/DL (ref 70–110)
GLUCOSE SERPL-MCNC: 91 MG/DL (ref 70–110)
HCT VFR BLD AUTO: 39.1 % (ref 37–48.5)
HGB BLD-MCNC: 12.5 G/DL (ref 12–16)
IMM GRANULOCYTES # BLD AUTO: 0.02 K/UL (ref 0–0.04)
IMM GRANULOCYTES NFR BLD AUTO: 0.3 % (ref 0–0.5)
LYMPHOCYTES # BLD AUTO: 1 K/UL (ref 1–4.8)
LYMPHOCYTES NFR BLD: 13.6 % (ref 18–48)
MAGNESIUM SERPL-MCNC: 2 MG/DL (ref 1.6–2.6)
MCH RBC QN AUTO: 30.3 PG (ref 27–31)
MCHC RBC AUTO-ENTMCNC: 32 G/DL (ref 32–36)
MCV RBC AUTO: 95 FL (ref 82–98)
MONOCYTES # BLD AUTO: 0.6 K/UL (ref 0.3–1)
MONOCYTES NFR BLD: 8.9 % (ref 4–15)
NEUTROPHILS # BLD AUTO: 5.3 K/UL (ref 1.8–7.7)
NEUTROPHILS NFR BLD: 72.9 % (ref 38–73)
NRBC BLD-RTO: 0 /100 WBC
OHS CV CPX 1 MINUTE RECOVERY HEART RATE: 117 BPM
OHS CV CPX 85 PERCENT MAX PREDICTED HEART RATE MALE: 122
OHS CV CPX MAX PREDICTED HEART RATE: 143
OHS CV CPX PATIENT IS FEMALE: 1
OHS CV CPX PATIENT IS MALE: 0
OHS CV CPX PEAK DIASTOLIC BLOOD PRESSURE: 54 MMHG
OHS CV CPX PEAK HEAR RATE: 117 BPM
OHS CV CPX PEAK RATE PRESSURE PRODUCT: NORMAL
OHS CV CPX PEAK SYSTOLIC BLOOD PRESSURE: 103 MMHG
OHS CV CPX PERCENT MAX PREDICTED HEART RATE ACHIEVED: 85
OHS CV CPX RATE PRESSURE PRODUCT PRESENTING: NORMAL
PLATELET # BLD AUTO: 203 K/UL (ref 150–450)
PMV BLD AUTO: 10.3 FL (ref 9.2–12.9)
POCT GLUCOSE: 91 MG/DL (ref 70–110)
POTASSIUM SERPL-SCNC: 4.8 MMOL/L (ref 3.5–5.1)
RBC # BLD AUTO: 4.12 M/UL (ref 4–5.4)
SODIUM SERPL-SCNC: 138 MMOL/L (ref 136–145)
SPECIMEN OUTDATE: ABNORMAL
SYSTOLIC BLOOD PRESSURE: 104 MMHG
WBC # BLD AUTO: 7.21 K/UL (ref 3.9–12.7)

## 2024-05-27 PROCEDURE — 63600175 PHARM REV CODE 636 W HCPCS: Performed by: PHYSICIAN ASSISTANT

## 2024-05-27 PROCEDURE — 027034Z DILATION OF CORONARY ARTERY, ONE ARTERY WITH DRUG-ELUTING INTRALUMINAL DEVICE, PERCUTANEOUS APPROACH: ICD-10-PCS | Performed by: INTERNAL MEDICINE

## 2024-05-27 PROCEDURE — 25000242 PHARM REV CODE 250 ALT 637 W/ HCPCS: Performed by: PHYSICIAN ASSISTANT

## 2024-05-27 PROCEDURE — 25000003 PHARM REV CODE 250

## 2024-05-27 PROCEDURE — 25500020 PHARM REV CODE 255: Performed by: INTERNAL MEDICINE

## 2024-05-27 PROCEDURE — S4991 NICOTINE PATCH NONLEGEND: HCPCS

## 2024-05-27 PROCEDURE — 36415 COLL VENOUS BLD VENIPUNCTURE: CPT | Performed by: PHYSICIAN ASSISTANT

## 2024-05-27 PROCEDURE — 99233 SBSQ HOSP IP/OBS HIGH 50: CPT | Mod: 25,,, | Performed by: INTERNAL MEDICINE

## 2024-05-27 PROCEDURE — 25000003 PHARM REV CODE 250: Performed by: PHYSICIAN ASSISTANT

## 2024-05-27 PROCEDURE — 86900 BLOOD TYPING SEROLOGIC ABO: CPT | Performed by: HOSPITALIST

## 2024-05-27 PROCEDURE — 85347 COAGULATION TIME ACTIVATED: CPT | Performed by: INTERNAL MEDICINE

## 2024-05-27 PROCEDURE — 36415 COLL VENOUS BLD VENIPUNCTURE: CPT | Performed by: HOSPITALIST

## 2024-05-27 PROCEDURE — 25000003 PHARM REV CODE 250: Performed by: STUDENT IN AN ORGANIZED HEALTH CARE EDUCATION/TRAINING PROGRAM

## 2024-05-27 PROCEDURE — 96372 THER/PROPH/DIAG INJ SC/IM: CPT | Performed by: PHYSICIAN ASSISTANT

## 2024-05-27 PROCEDURE — 99900035 HC TECH TIME PER 15 MIN (STAT)

## 2024-05-27 PROCEDURE — 86870 RBC ANTIBODY IDENTIFICATION: CPT | Performed by: HOSPITALIST

## 2024-05-27 PROCEDURE — C1887 CATHETER, GUIDING: HCPCS | Performed by: INTERNAL MEDICINE

## 2024-05-27 PROCEDURE — A4216 STERILE WATER/SALINE, 10 ML: HCPCS | Performed by: PHYSICIAN ASSISTANT

## 2024-05-27 PROCEDURE — 27201423 OPTIME MED/SURG SUP & DEVICES STERILE SUPPLY: Performed by: INTERNAL MEDICINE

## 2024-05-27 PROCEDURE — 93454 CORONARY ARTERY ANGIO S&I: CPT | Mod: 26,59,51, | Performed by: INTERNAL MEDICINE

## 2024-05-27 PROCEDURE — C1894 INTRO/SHEATH, NON-LASER: HCPCS | Performed by: INTERNAL MEDICINE

## 2024-05-27 PROCEDURE — 99152 MOD SED SAME PHYS/QHP 5/>YRS: CPT | Mod: ,,, | Performed by: INTERNAL MEDICINE

## 2024-05-27 PROCEDURE — C1874 STENT, COATED/COV W/DEL SYS: HCPCS | Performed by: INTERNAL MEDICINE

## 2024-05-27 PROCEDURE — 80048 BASIC METABOLIC PNL TOTAL CA: CPT | Performed by: PHYSICIAN ASSISTANT

## 2024-05-27 PROCEDURE — 99153 MOD SED SAME PHYS/QHP EA: CPT | Performed by: INTERNAL MEDICINE

## 2024-05-27 PROCEDURE — B2111ZZ FLUOROSCOPY OF MULTIPLE CORONARY ARTERIES USING LOW OSMOLAR CONTRAST: ICD-10-PCS | Performed by: INTERNAL MEDICINE

## 2024-05-27 PROCEDURE — 94761 N-INVAS EAR/PLS OXIMETRY MLT: CPT

## 2024-05-27 PROCEDURE — 83735 ASSAY OF MAGNESIUM: CPT | Performed by: PHYSICIAN ASSISTANT

## 2024-05-27 PROCEDURE — 99152 MOD SED SAME PHYS/QHP 5/>YRS: CPT | Performed by: INTERNAL MEDICINE

## 2024-05-27 PROCEDURE — 86850 RBC ANTIBODY SCREEN: CPT | Performed by: HOSPITALIST

## 2024-05-27 PROCEDURE — 92928 PRQ TCAT PLMT NTRAC ST 1 LES: CPT | Mod: RC,,, | Performed by: INTERNAL MEDICINE

## 2024-05-27 PROCEDURE — A9502 TC99M TETROFOSMIN: HCPCS | Performed by: HOSPITALIST

## 2024-05-27 PROCEDURE — 93454 CORONARY ARTERY ANGIO S&I: CPT | Performed by: INTERNAL MEDICINE

## 2024-05-27 PROCEDURE — C1725 CATH, TRANSLUMIN NON-LASER: HCPCS | Performed by: INTERNAL MEDICINE

## 2024-05-27 PROCEDURE — 94640 AIRWAY INHALATION TREATMENT: CPT | Mod: XB

## 2024-05-27 PROCEDURE — 20600001 HC STEP DOWN PRIVATE ROOM

## 2024-05-27 PROCEDURE — 94640 AIRWAY INHALATION TREATMENT: CPT

## 2024-05-27 PROCEDURE — C1769 GUIDE WIRE: HCPCS | Performed by: INTERNAL MEDICINE

## 2024-05-27 PROCEDURE — C9600 PERC DRUG-EL COR STENT SING: HCPCS | Mod: RC | Performed by: INTERNAL MEDICINE

## 2024-05-27 PROCEDURE — 63600175 PHARM REV CODE 636 W HCPCS: Performed by: INTERNAL MEDICINE

## 2024-05-27 PROCEDURE — 4A023N7 MEASUREMENT OF CARDIAC SAMPLING AND PRESSURE, LEFT HEART, PERCUTANEOUS APPROACH: ICD-10-PCS | Performed by: INTERNAL MEDICINE

## 2024-05-27 PROCEDURE — 25000003 PHARM REV CODE 250: Performed by: INTERNAL MEDICINE

## 2024-05-27 PROCEDURE — 85025 COMPLETE CBC W/AUTO DIFF WBC: CPT | Performed by: PHYSICIAN ASSISTANT

## 2024-05-27 PROCEDURE — 27000221 HC OXYGEN, UP TO 24 HOURS

## 2024-05-27 DEVICE — IMPLANTABLE DEVICE: Type: IMPLANTABLE DEVICE | Site: HEART | Status: FUNCTIONAL

## 2024-05-27 RX ORDER — SODIUM CHLORIDE 0.9 % (FLUSH) 0.9 %
10 SYRINGE (ML) INJECTION
Status: DISCONTINUED | OUTPATIENT
Start: 2024-05-27 | End: 2024-05-28 | Stop reason: HOSPADM

## 2024-05-27 RX ORDER — SODIUM CHLORIDE 9 MG/ML
INJECTION, SOLUTION INTRAVENOUS CONTINUOUS
Status: DISCONTINUED | OUTPATIENT
Start: 2024-05-27 | End: 2024-05-27

## 2024-05-27 RX ORDER — PROTAMINE SULFATE 10 MG/ML
INJECTION, SOLUTION INTRAVENOUS
Status: DISCONTINUED | OUTPATIENT
Start: 2024-05-27 | End: 2024-05-27

## 2024-05-27 RX ORDER — HEPARIN SODIUM 1000 [USP'U]/ML
INJECTION, SOLUTION INTRAVENOUS; SUBCUTANEOUS
Status: DISCONTINUED | OUTPATIENT
Start: 2024-05-27 | End: 2024-05-27

## 2024-05-27 RX ORDER — LIDOCAINE HYDROCHLORIDE 20 MG/ML
INJECTION, SOLUTION EPIDURAL; INFILTRATION; INTRACAUDAL; PERINEURAL
Status: DISCONTINUED | OUTPATIENT
Start: 2024-05-27 | End: 2024-05-27

## 2024-05-27 RX ORDER — SODIUM CHLORIDE 9 MG/ML
INJECTION, SOLUTION INTRAVENOUS CONTINUOUS
Status: ACTIVE | OUTPATIENT
Start: 2024-05-27 | End: 2024-05-27

## 2024-05-27 RX ORDER — AMINOPHYLLINE 25 MG/ML
100 INJECTION, SOLUTION INTRAVENOUS ONCE
Status: DISCONTINUED | OUTPATIENT
Start: 2024-05-27 | End: 2024-05-27

## 2024-05-27 RX ORDER — HEPARIN SOD,PORCINE/0.9 % NACL 1000/500ML
INTRAVENOUS SOLUTION INTRAVENOUS
Status: DISCONTINUED | OUTPATIENT
Start: 2024-05-27 | End: 2024-05-27

## 2024-05-27 RX ORDER — PHENYLEPHRINE HYDROCHLORIDE 10 MG/ML
INJECTION INTRAVENOUS
Status: DISCONTINUED | OUTPATIENT
Start: 2024-05-27 | End: 2024-05-27

## 2024-05-27 RX ORDER — MIDAZOLAM HYDROCHLORIDE 1 MG/ML
INJECTION, SOLUTION INTRAMUSCULAR; INTRAVENOUS
Status: DISCONTINUED | OUTPATIENT
Start: 2024-05-27 | End: 2024-05-27

## 2024-05-27 RX ORDER — DIPHENHYDRAMINE HCL 50 MG
50 CAPSULE ORAL ONCE
Status: COMPLETED | OUTPATIENT
Start: 2024-05-27 | End: 2024-05-27

## 2024-05-27 RX ORDER — FENTANYL CITRATE 50 UG/ML
INJECTION, SOLUTION INTRAMUSCULAR; INTRAVENOUS
Status: DISCONTINUED | OUTPATIENT
Start: 2024-05-27 | End: 2024-05-27

## 2024-05-27 RX ORDER — IBUPROFEN 600 MG/1
600 TABLET ORAL EVERY 6 HOURS PRN
Status: DISCONTINUED | OUTPATIENT
Start: 2024-05-27 | End: 2024-05-28 | Stop reason: HOSPADM

## 2024-05-27 RX ADMIN — HEPARIN SODIUM 5000 UNITS: 5000 INJECTION INTRAVENOUS; SUBCUTANEOUS at 09:05

## 2024-05-27 RX ADMIN — IPRATROPIUM BROMIDE AND ALBUTEROL SULFATE 3 ML: 2.5; .5 SOLUTION RESPIRATORY (INHALATION) at 01:05

## 2024-05-27 RX ADMIN — IPRATROPIUM BROMIDE AND ALBUTEROL SULFATE 3 ML: 2.5; .5 SOLUTION RESPIRATORY (INHALATION) at 09:05

## 2024-05-27 RX ADMIN — Medication 1 PATCH: at 09:05

## 2024-05-27 RX ADMIN — FLUTICASONE FUROATE AND VILANTEROL TRIFENATATE 1 PUFF: 100; 25 POWDER RESPIRATORY (INHALATION) at 08:05

## 2024-05-27 RX ADMIN — CLOPIDOGREL BISULFATE 75 MG: 75 TABLET ORAL at 09:05

## 2024-05-27 RX ADMIN — TETROFOSMIN 10.4 MILLICURIE: 1.38 INJECTION, POWDER, LYOPHILIZED, FOR SOLUTION INTRAVENOUS at 10:05

## 2024-05-27 RX ADMIN — Medication 10 ML: at 06:05

## 2024-05-27 RX ADMIN — SODIUM CHLORIDE: 9 INJECTION, SOLUTION INTRAVENOUS at 05:05

## 2024-05-27 RX ADMIN — SODIUM CHLORIDE: 0.9 INJECTION, SOLUTION INTRAVENOUS at 04:05

## 2024-05-27 RX ADMIN — HEPARIN SODIUM 5000 UNITS: 5000 INJECTION INTRAVENOUS; SUBCUTANEOUS at 05:05

## 2024-05-27 RX ADMIN — METOPROLOL SUCCINATE 25 MG: 25 TABLET, EXTENDED RELEASE ORAL at 09:05

## 2024-05-27 RX ADMIN — ATORVASTATIN CALCIUM 80 MG: 40 TABLET, FILM COATED ORAL at 09:05

## 2024-05-27 RX ADMIN — Medication 10 ML: at 08:05

## 2024-05-27 RX ADMIN — ASPIRIN 81 MG: 81 TABLET, COATED ORAL at 09:05

## 2024-05-27 RX ADMIN — IBUPROFEN 600 MG: 200 TABLET, FILM COATED ORAL at 09:05

## 2024-05-27 RX ADMIN — IPRATROPIUM BROMIDE AND ALBUTEROL SULFATE 3 ML: 2.5; .5 SOLUTION RESPIRATORY (INHALATION) at 07:05

## 2024-05-27 RX ADMIN — TETROFOSMIN 30.2 MILLICURIE: 1.38 INJECTION, POWDER, LYOPHILIZED, FOR SOLUTION INTRAVENOUS at 10:05

## 2024-05-27 NOTE — SUBJECTIVE & OBJECTIVE
Interval History: NAEON. Pt seen and evaluated at bedside this am. Pt is doing well this am. Stress test was abnormal and had a reversible perfusion defect. Interventional cardiology consulted. Pt to remain NPO at this time for potential intervention.     Review of Systems   Constitutional:  Negative for activity change, chills, diaphoresis, fatigue and fever.   HENT:  Negative for congestion, rhinorrhea and sore throat.    Respiratory:  Positive for cough (chronic) and shortness of breath (chronic). Negative for chest tightness and wheezing.    Cardiovascular:  Negative for chest pain, palpitations and leg swelling.   Gastrointestinal:  Negative for abdominal distention, abdominal pain, blood in stool, constipation, diarrhea, nausea and vomiting.   Genitourinary:  Negative for difficulty urinating, dysuria, frequency, hematuria and urgency.   Musculoskeletal:  Negative for arthralgias, back pain and neck stiffness.   Neurological:  Negative for dizziness, tremors, seizures, syncope, weakness, light-headedness, numbness and headaches.   Psychiatric/Behavioral:  Negative for agitation, confusion and hallucinations.      Objective:     Vital Signs (Most Recent):  Temp: 97.9 °F (36.6 °C) (05/27/24 0454)  Pulse: 108 (05/27/24 1344)  Resp: (!) 22 (05/27/24 1344)  BP: (!) 104/56 (peak of stress) (05/27/24 1151)  SpO2: (!) 94 % (05/27/24 1344) Vital Signs (24h Range):  Temp:  [97.9 °F (36.6 °C)-98.5 °F (36.9 °C)] 97.9 °F (36.6 °C)  Pulse:  [] 108  Resp:  [16-22] 22  SpO2:  [94 %-98 %] 94 %  BP: (104-146)/(52-68) 104/56     Weight: 52.2 kg (115 lb)  Body mass index is 22.46 kg/m².    Intake/Output Summary (Last 24 hours) at 5/27/2024 1431  Last data filed at 5/27/2024 0548  Gross per 24 hour   Intake --   Output 850 ml   Net -850 ml         Physical Exam  Vitals and nursing note reviewed.   Constitutional:       General: She is not in acute distress.     Appearance: She is well-developed. She is not diaphoretic.       Interventions: Nasal cannula in place.   HENT:      Head: Normocephalic and atraumatic.      Right Ear: External ear normal.      Left Ear: External ear normal.      Nose: Nose normal. No congestion.      Mouth/Throat:      Pharynx: Oropharynx is clear.   Eyes:      General: No scleral icterus.     Extraocular Movements: Extraocular movements intact.   Cardiovascular:      Rate and Rhythm: Normal rate and regular rhythm.      Pulses: Normal pulses.      Heart sounds: Normal heart sounds. No murmur heard.  Pulmonary:      Effort: Pulmonary effort is normal. No respiratory distress.      Breath sounds: Normal breath sounds. No wheezing or rales.   Abdominal:      General: Bowel sounds are normal. There is no distension.      Palpations: Abdomen is soft.      Tenderness: There is no abdominal tenderness. There is no guarding or rebound.   Musculoskeletal:      Cervical back: Normal range of motion.      Right lower leg: No edema.      Left lower leg: No edema.   Skin:     General: Skin is warm and dry.      Capillary Refill: Capillary refill takes less than 2 seconds.   Neurological:      General: No focal deficit present.      Mental Status: She is alert and oriented to person, place, and time. Mental status is at baseline.   Psychiatric:         Mood and Affect: Mood normal.         Behavior: Behavior normal.         Thought Content: Thought content normal.             Significant Labs: All pertinent labs within the past 24 hours have been reviewed.    Significant Imaging: I have reviewed all pertinent imaging results/findings within the past 24 hours.

## 2024-05-27 NOTE — HPI
78 F with severe COPD on home O2 for chronic hypoxic respiratory failure (3 L via nasal cannula), CAD ( RCA GALEN PCI 11/23 with Dr. Li), PAT, previous osteomyelitis with transmetatarsal amputation (2012) who presented to the emergency department with chest pain. Interventional cardiology consulted for chest pain, positive stress test.      She had worsening substernal chest tightness over the past 5 days, initially responding to nitro. However on Saturday, her pain worsening and was similar to previous heart attack unresponsive to nitro. She endorsed associated SOB but denies any cough, nausea, diaphoresis, vomiting. She also reports that she has ingested 25 tablets of nitroglycerin over this time due to this chest pain.   she denies any fever, chills, abd pain, dysuria or melenic stooling.      In ED, Pt AFVSS on 3 L NC. No leukocytosis. K 3.4. Trop 0.023, BNP wnl. CXR:  Increase in interstitial markings. No confluent consolidation. Probable remote right rib fractures noted. Given asa 325 x1.   Nuclear stress was abnormal.     TTE 5/2024    Left Ventricle: The left ventricle is normal in size. Ventricular mass is normal. Normal wall thickness. Normal wall motion. There is normal systolic function with a visually estimated ejection fraction of 60 - 65%. There is normal diastolic function.    Right Ventricle: Normal right ventricular cavity size. Wall thickness is normal. Systolic function is normal.    Left Atrium: Left atrium is mildly dilated.    Aortic Valve: There is moderate aortic valve sclerosis. There is annular calcification present. Mildly restricted motion. There is mild stenosis. Aortic valve area by VTI is 2.21 cm². Aortic valve peak velocity is 2.0 m/s. Mean gradient is 7 mmHg. The dimensionless index is 0.58. There is mild aortic regurgitation.    Pulmonary Artery: The estimated pulmonary artery systolic pressure is 20 mmHg.    IVC/SVC: Normal venous pressure at 3 mmHg.

## 2024-05-27 NOTE — CONSULTS
[facility]  Cardiology  Consult Note    Patient Name: Radha Sotelo  MRN: [id number]  Admission Date: 5/25/2024  Hospital Length of Stay: 0 days  Code Status: Full Code   Attending Provider: Delmi Perez MD   Consulting Provider: Cristina Alarcon MD  Primary Care Physician: Kalyn Pemberton NP  Principal Problem:Chest pain    Patient information was obtained from patient and ER records.     Inpatient consult to Interventional Cardiology  Consult performed by: Cristina Alarcon MD  Consult ordered by: Pau Card PA-C        Subjective:     Chief Complaint:  CP     HPI:   78 F with severe COPD on home O2 for chronic hypoxic respiratory failure (3 L via nasal cannula), CAD ( RCA GALEN PCI 11/23 with Dr. iL), PAT, previous osteomyelitis with transmetatarsal amputation (2012) who presented to the emergency department with chest pain. Interventional cardiology consulted for chest pain, positive stress test.      She had worsening substernal chest tightness over the past 5 days, initially responding to nitro. However on Saturday, her pain worsening and was similar to previous heart attack unresponsive to nitro. She endorsed associated SOB but denies any cough, nausea, diaphoresis, vomiting. She also reports that she has ingested 25 tablets of nitroglycerin over this time due to this chest pain.   she denies any fever, chills, abd pain, dysuria or melenic stooling.      In ED, Pt AFVSS on 3 L NC. No leukocytosis. K 3.4. Trop 0.023, BNP wnl. CXR:  Increase in interstitial markings. No confluent consolidation. Probable remote right rib fractures noted. Given asa 325 x1.   Nuclear stress was abnormal.     TTE 5/2024    Left Ventricle: The left ventricle is normal in size. Ventricular mass is normal. Normal wall thickness. Normal wall motion. There is normal systolic function with a visually estimated ejection fraction of 60 - 65%. There is normal diastolic function.    Right Ventricle:  Normal right ventricular cavity size. Wall thickness is normal. Systolic function is normal.    Left Atrium: Left atrium is mildly dilated.    Aortic Valve: There is moderate aortic valve sclerosis. There is annular calcification present. Mildly restricted motion. There is mild stenosis. Aortic valve area by VTI is 2.21 cm². Aortic valve peak velocity is 2.0 m/s. Mean gradient is 7 mmHg. The dimensionless index is 0.58. There is mild aortic regurgitation.    Pulmonary Artery: The estimated pulmonary artery systolic pressure is 20 mmHg.    IVC/SVC: Normal venous pressure at 3 mmHg.       Past Medical History:   Diagnosis Date    Anemia     Anxiety     COPD (chronic obstructive pulmonary disease)     COPD (chronic obstructive pulmonary disease) with emphysema     Coronary artery disease     Depression     Diverticulitis     Hyperlipidemia     Hypertension     PVD (peripheral vascular disease)     PVD (peripheral vascular disease)     S/P colostomy     Substance abuse     hx heavy etoh use     Tobacco abuse        Past Surgical History:   Procedure Laterality Date    ANGIOGRAM, CORONARY, WITH LEFT HEART CATHETERIZATION N/A 11/22/2023    Procedure: Angiogram, Coronary, with Left Heart Cath;  Surgeon: Checo Bhatt MD;  Location: [facility] CATH LAB;  Service: Cardiology;  Laterality: N/A;    ANGIOGRAM, EXTREMITY, UNILATERAL Right 8/25/2023    Procedure: ANGIOGRAM, EXTREMITY, UNILATERAL;  Surgeon: Gary Rico MD;  Location: [facility] OR [address] [place] [address];  Service: Vascular;  Laterality: Right;  US GUIDED ACCESS LEFT GROIN  contrast: 150ml  fluoro: 27.9 min  mGy: 254.73  Gycm2: 62.4919  radial flush cocktail: 10ml    ANGIOGRAPHY OF LOWER EXTREMITY Right 8/24/2023    Procedure: Angiogram Extremity Unilateral;  Surgeon: Benji Ashley MD;  Location: [facility] OR [address] [place] [address];  Service: Vascular;  Laterality: Right;    COLOSTOMY      ECTOPIC PREGNANCY SURGERY      PTA, PERONEAL Right 8/25/2023    Procedure: PTA, PERONEAL  TIBIAL TRUNK WITH SHOCKWAVE;  Surgeon: Gary Rico MD;  Location: [address];  Service: Vascular;  Laterality: Right;    PTCA, SINGLE VESSEL  11/22/2023    Procedure: PTCA, Single Vessel;  Surgeon: Checo Bhatt MD;  Location: [facility] CATH LAB;  Service: Cardiology;;    right forefoot amputation      right toe amputation      x2 toes    STENT, DRUG ELUTING, SINGLE VESSEL, CORONARY  11/22/2023    Procedure: Stent, Drug Eluting, Single Vessel, Coronary;  Surgeon: Checo Bhatt MD;  Location: [facility] CATH LAB;  Service: Cardiology;;    STENT, SUPERFICIAL FEMORAL ARTERY Right 8/25/2023    Procedure: STENT, SUPERFICIAL FEMORAL ARTERY;  Surgeon: Gary Rico MD;  Location: [address];  Service: Vascular;  Laterality: Right;  VIABAHN 6 X 15 X120       Review of patient's allergies indicates:  No Known Allergies    No current facility-administered medications on file prior to encounter.     Current Outpatient Medications on File Prior to Encounter   Medication Sig    aspirin (ECOTRIN) 81 MG EC tablet Take 1 tablet (81 mg total) by mouth once daily.    atorvastatin (LIPITOR) 80 MG tablet Take 1 tablet (80 mg total) by mouth once daily.    clopidogreL (PLAVIX) 75 mg tablet Take 1 tablet (75 mg total) by mouth once daily.    metoprolol succinate (TOPROL-XL) 25 MG 24 hr tablet Take 1 tablet (25 mg total) by mouth once daily.    nitroGLYCERIN (NITROSTAT) 0.4 MG SL tablet Place 1 tablet (0.4 mg total) under the tongue every 5 (five) minutes as needed for Chest pain.    albuterol (PROVENTIL/VENTOLIN HFA) 90 mcg/actuation inhaler Inhale 2 puffs into the lungs every 6 (six) hours. Rescue    albuterol-ipratropium (DUO-NEB) 2.5 mg-0.5 mg/3 mL nebulizer solution Take 3 mLs by nebulization every 6 (six) hours as needed for Wheezing or Shortness of Breath.    budesonide-formoterol 80-4.5 mcg (SYMBICORT) 80-4.5 mcg/actuation HFAA INHALE 2 PUFFS INTO THE LUNGS TWICE DAILY. RINSE MOUTH AFTER  USE    hydrocortisone-aloe vera 1 % Crea cream Apply topically 2 (two) times daily.     Family History    None       Tobacco Use    Smoking status: Every Day     Current packs/day: 0.25     Average packs/day: 0.5 packs/day for 60.9 years (30.1 ttl pk-yrs)     Types: Cigarettes     Start date: 7/5/1963    Smokeless tobacco: Never   Substance and Sexual Activity    Alcohol use: No    Drug use: No    Sexual activity: Not on file     Review of Systems   Constitutional: Negative for chills, decreased appetite, diaphoresis, fever, malaise/fatigue and weight gain.   HENT:  Negative for congestion and ear discharge.    Eyes:  Negative for blurred vision.   Cardiovascular:  Positive for dyspnea on exertion. Negative for chest pain, irregular heartbeat, leg swelling (mild), orthopnea, palpitations and paroxysmal nocturnal dyspnea.   Respiratory:  Positive for shortness of breath. Negative for cough, snoring and sputum production.    Skin:  Negative for color change, dry skin and rash.   Musculoskeletal:  Negative for back pain, falls, joint pain and neck pain.   Gastrointestinal:  Negative for bloating, abdominal pain, constipation and diarrhea.   Genitourinary:  Negative for dysuria, flank pain, hematuria and hesitancy.   Neurological:  Negative for focal weakness, headaches, numbness and seizures.   Psychiatric/Behavioral:  Negative for altered mental status, depression and hallucinations.      Objective:     Vital Signs (Most Recent):  Temp: 97.9 °F (36.6 °C) (05/27/24 0454)  Pulse: 108 (05/27/24 1344)  Resp: (!) 22 (05/27/24 1344)  BP: (!) 104/56 (peak of stress) (05/27/24 1151)  SpO2: (!) 94 % (05/27/24 1344) Vital Signs (24h Range):  Temp:  [97.9 °F (36.6 °C)-98.5 °F (36.9 °C)] 97.9 °F (36.6 °C)  Pulse:  [] 108  Resp:  [16-22] 22  SpO2:  [94 %-98 %] 94 %  BP: (104-146)/(52-68) 104/56     Weight: 52.2 kg (115 lb)  Body mass index is 22.46 kg/m².    SpO2: (!) 94 %         Intake/Output Summary (Last 24 hours) at  "5/27/2024 1527  Last data filed at 5/27/2024 0548  Gross per 24 hour   Intake --   Output 850 ml   Net -850 ml       Lines/Drains/Airways       Drain  Duration                  Colostomy 07/13/21 0201 Descending/sigmoid LLQ 1049 days                     Physical Exam  Constitutional:       General: She is not in acute distress.     Appearance: She is not ill-appearing.   HENT:      Nose: Nose normal.      Mouth/Throat:      Mouth: Mucous membranes are moist.   Eyes:      Pupils: Pupils are equal, round, and reactive to light.   Neck:      Comments: No JVD appreciated   Cardiovascular:      Rate and Rhythm: Normal rate and regular rhythm.      Pulses: Normal pulses.      Heart sounds: No murmur heard.  Pulmonary:      Effort: Pulmonary effort is normal. No respiratory distress.      Breath sounds: No wheezing or rales.   Abdominal:      General: Abdomen is flat. There is no distension.      Palpations: Abdomen is soft.      Tenderness: There is no right CVA tenderness or left CVA tenderness.      Comments: Ostomy bag    Musculoskeletal:         General: No swelling.      Cervical back: Normal range of motion and neck supple. No tenderness.      Right lower leg: No edema.      Left lower leg: No edema.      Comments: S/p R TMA   Skin:     General: Skin is warm.      Coloration: Skin is not pale.      Findings: No rash.   Neurological:      General: No focal deficit present.      Mental Status: She is alert and oriented to person, place, and time.      Motor: No weakness.          Significant Labs: ABG: No results for input(s): "PH", "PCO2", "HCO3", "POCSATURATED", "BE" in the last 48 hours., Blood Culture: No results for input(s): "LABBLOO" in the last 48 hours., BMP:   Recent Labs   Lab 05/26/24  0514 05/27/24  0505   GLU 84 86    138   K 3.6 4.8    102   CO2 27 26   BUN 17 29*   CREATININE 0.7 0.9   CALCIUM 9.1 9.7   MG 1.9 2.0   , CMP   Recent Labs   Lab 05/26/24  0514 05/27/24  0505    138   K " "3.6 4.8    102   CO2 27 26   GLU 84 86   BUN 17 29*   CREATININE 0.7 0.9   CALCIUM 9.1 9.7   ANIONGAP 9 10   , CBC   Recent Labs   Lab 05/26/24  0515 05/27/24  0505   WBC 6.34 7.21   HGB 11.9* 12.5   HCT 38.5 39.1    203   , INR No results for input(s): "INR", "PROTIME" in the last 48 hours., Lipid Panel No results for input(s): "CHOL", "HDL", "LDLCALC", "TRIG", "CHOLHDL" in the last 48 hours.,   Pathology Results  (Last 10 years)      None        , Troponin   Recent Labs   Lab 05/25/24  1707   TROPONINI 0.015   , and All pertinent lab results from the last 24 hours have been reviewed.   Assessment and Plan:     * Chest pain  78 F with severe COPD on home O2 for chronic hypoxic respiratory failure (3 L via nasal cannula), CAD ( RCA GALEN PCI 11/23 with Dr. Li), PAT, previous osteomyelitis with transmetatarsal amputation (2012) presented with chest pain.    Trop 0.023, BNP wnl.    Nuclear stress was abnormal; with a  moderate intensity, small to moderate sized, mostly fixed perfusion abnormality with some reversibilty in the basal to mid inferior and inferolateral wall(s) in the typical distribution of the RCA territory.     TTE 5/2024    Left Ventricle: The left ventricle is normal in size. Ventricular mass is normal. Normal wall thickness. Normal wall motion. There is normal systolic function with a visually estimated ejection fraction of 60 - 65%. There is normal diastolic function.    Right Ventricle: Normal right ventricular cavity size. Wall thickness is normal. Systolic function is normal.    Left Atrium: Left atrium is mildly dilated.    Aortic Valve: There is moderate aortic valve sclerosis. There is annular calcification present. Mildly restricted motion. There is mild stenosis. Aortic valve area by VTI is 2.21 cm². Aortic valve peak velocity is 2.0 m/s. Mean gradient is 7 mmHg. The dimensionless index is 0.58. There is mild aortic regurgitation.    Pulmonary Artery: The estimated pulmonary " artery systolic pressure is 20 mmHg.    IVC/SVC: Normal venous pressure at 3 mmHg.     Given patient's CAD history and worsening recurrent typical cardiac cp with a positive stress test, LHC is warranted.   LHC +/- PCI, patient is a GALEN candidate  - Anti-platelet Therapy: ASA and Plavix   - Access: R femoral   - Creatinine: 0.9  - Pre-Op Med: Bendaryl 50mg pO   - All patient's questions were answered.  -The risks, benefits and alternatives of the procedure were explained to the patient.   -The risks of coronary angiography include but are not limited to: bleeding, infection, heart rhythm abnormalities, allergic reactions, kidney injury and potential need for dialysis, stroke and death.   - Should stenting be indicated, the patient has agreed to dual anti-platelet therapy with a drug-eluting stent   - Additionally, pt is aware that non-compliance is likely to result in stent clotting with heart attack, heart failure, and/or death  -The risks of moderate sedation include hypotension, respiratory depression, arrhythmias, bronchospasm, and death.   - Informed consent was obtained and the  patient is agreeable to proceed with the procedure.          VTE Risk Mitigation (From admission, onward)           Ordered     heparin (porcine) injection 5,000 Units  Every 8 hours         05/25/24 1533     IP VTE HIGH RISK PATIENT  Once         05/25/24 1533     Place sequential compression device  Until discontinued         05/25/24 1533                    Thank you for your consult. I will follow-up with patient. Please contact us if you have any additional questions.    Cristina Alarcon MD  Cardiology   [facility]

## 2024-05-27 NOTE — ASSESSMENT & PLAN NOTE
Patient is identified as having Diastolic (HFpEF) heart failure that is Chronic. CHF is currently controlled. Latest ECHO performed and demonstrates- Results for orders placed during the hospital encounter of 11/22/23    Echo    Interpretation Summary    Left Ventricle: The left ventricle is normal in size. Ventricular mass is normal. Normal wall thickness. Normal wall motion. There is normal systolic function with a visually estimated ejection fraction of 55 - 60%. Grade II diastolic dysfunction.    Right Ventricle: Normal right ventricular cavity size. Wall thickness is normal. Right ventricle wall motion  is normal. Systolic function is normal.    The following segments are hypokinetic: basal inferior, basal inferolateral and mid inferolateral suggesting ischemia/infarct in LCx or RCA territory.    Left Atrium: Left atrium is mildly dilated.    Right Atrium: Right atrium is mildly dilated.    Aortic Valve: There is severe aortic valve sclerosis. Moderately calcified cusps. There is moderate annular calcification present.    Mitral Valve: There is mild mitral annular calcification present. There is mild to moderate regurgitation. Mild restriction of the posterior leaflet likely ischemic MR.    Pulmonary Artery: The estimated pulmonary artery systolic pressure is 44 mmHg.    IVC/SVC: Intermediate venous pressure at 8 mmHg.  . Continue Beta Blocker and monitor clinical status closely. Monitor on telemetry. Patient is off CHF pathway.  Monitor strict Is&Os and daily weights.  Place on fluid restriction of 1.5 L. Continue to stress to patient importance of self efficacy and  on diet for CHF. Last BNP reviewed- and noted below   Recent Labs   Lab 05/25/24  1242   *

## 2024-05-27 NOTE — NURSING
In for Nuclear stress test. Arrived with 3L NC intact. Complained of SOB with movement from WC to bedside. O2  sats 93% on 3L. No wheezing. Diminished breathe sounds to RLL. No wheezing. Stress protocol completed and complained of SOB, CP, and throat tightness. Aminophylline administered by protocol and Cardiology Fellow at bedside to assess. All symptoms resolved 5 minutes into recovery. Images obtained.

## 2024-05-27 NOTE — PLAN OF CARE
Problem: Adult Inpatient Plan of Care  Goal: Plan of Care Review  5/27/2024 0543 by vEon Rausch LPN  Outcome: Progressing  5/27/2024 0543 by Evon Rausch LPN  Outcome: Progressing     Problem: Infection  Goal: Absence of Infection Signs and Symptoms  5/27/2024 0543 by Evon Rausch LPN  Outcome: Progressing  5/27/2024 0543 by Evon Rausch LPN  Outcome: Progressing     Problem: Acute Kidney Injury/Impairment  Goal: Fluid and Electrolyte Balance  5/27/2024 0543 by Evon Rausch LPN  Outcome: Progressing  5/27/2024 0543 by Evon Rausch LPN  Outcome: Progressing     Problem: Wound  Goal: Optimal Coping  5/27/2024 0543 by Evon Rausch LPN  Outcome: Progressing  5/27/2024 0543 by Evon Rausch LPN  Outcome: Progressing  Goal: Optimal Wound Healing  Outcome: Progressing

## 2024-05-27 NOTE — ASSESSMENT & PLAN NOTE
- trop 0.023, 0.01  -   - ECG reveals NSR w/o gross ischemic change  - CXR unremarkable  - asa 325 x1 in ED  - SL NGT prn  - last echo noted, repeat TTE ordered  - NM stress positive. Interventional cardiology consulted.  - tele monitoring  - K>4, Mg>2

## 2024-05-27 NOTE — ASSESSMENT & PLAN NOTE
Chronic, controlled. Latest blood pressure and vitals reviewed-     Temp:  [97.9 °F (36.6 °C)-98.5 °F (36.9 °C)]   Pulse:  []   Resp:  [16-22]   BP: (104-146)/(52-68)   SpO2:  [94 %-98 %] .   Home meds for hypertension were reviewed and noted below.   Hypertension Medications               metoprolol succinate (TOPROL-XL) 25 MG 24 hr tablet Take 1 tablet (25 mg total) by mouth once daily.    nitroGLYCERIN (NITROSTAT) 0.4 MG SL tablet Place 1 tablet (0.4 mg total) under the tongue every 5 (five) minutes as needed for Chest pain.     While in the hospital, will manage blood pressure as follows; Continue home antihypertensive regimen    Will utilize p.r.n. blood pressure medication only if patient's blood pressure greater than 180/110 and she develops symptoms such as worsening chest pain or shortness of breath.

## 2024-05-27 NOTE — PROGRESS NOTES
[facility] - Observation [address] [place] [address] Medicine  Progress Note    Patient Name: Radha Sotelo  MRN: [id number]  Patient Class: OP- Observation   Admission Date: 5/25/2024  Length of Stay: 0 days  Attending Physician: Delmi Perez MD  Primary Care Provider: Kalyn Pemberton NP        Subjective:     Principal Problem:Chest pain        HPI:  Mr. Radha Sotelo is a 78-year-old woman with comorbidities of anemia, CAD with stents, diverticulitis with colostomy, COPD on 2 LPM at home, hypertension and previous history of heavy alcohol use presents for evaluation of chest pain. Pt reports over the last 3 days she has had worsening substernal chest pain. Reports pain has been constant since onset but improved w/ home sl ntg. Today however, her pain continued to worsen and she took her last ntg before calling EMS. She is unable to fully characterize the pain but notes it does radiate to her back. Endorses associated SOB but denies any cough, nausea, diaphoresis, vomiting. She also reports that she has ingested 25 tablets of nitroglycerin over this time due to this chest pain. She denies any current chest discomfort nor increased shortness of breath or diminished exertional tolerance. In addition, she denies any fever, chills, abd pain, dysuria or melenic stooling.     In ED, Pt AFVSS on 2 L NC. No leukocytosis. K 3.4. Trop 0.023, BNP wnl. CXR:  Increase in interstitial markings. No confluent consolidation. Probable remote right rib fractures noted. Given asa 325 x1.     Overview/Hospital Course:  Pt placed in obs for high risk chest pain needing ACS r/o. Pt HDS and afebrile. Has no new O2 requirements. Trop initially elevated 0.023 and down trended 0.015. CXR w/ increased interstitial markings, no consolidations. ECG showed normal sinus rhythm with sinus arrhythmia Treated w/ asa. Electrolytes placed as needed. PET stress and was abnormal. Interventional cardiology consulted.    Interval History: NAEON. Pt seen and  evaluated at bedside this am. Pt is doing well this am. Stress test was abnormal and had a reversible perfusion defect. Interventional cardiology consulted. Pt to remain NPO at this time for potential intervention.     Review of Systems   Constitutional:  Negative for activity change, chills, diaphoresis, fatigue and fever.   HENT:  Negative for congestion, rhinorrhea and sore throat.    Respiratory:  Positive for cough (chronic) and shortness of breath (chronic). Negative for chest tightness and wheezing.    Cardiovascular:  Negative for chest pain, palpitations and leg swelling.   Gastrointestinal:  Negative for abdominal distention, abdominal pain, blood in stool, constipation, diarrhea, nausea and vomiting.   Genitourinary:  Negative for difficulty urinating, dysuria, frequency, hematuria and urgency.   Musculoskeletal:  Negative for arthralgias, back pain and neck stiffness.   Neurological:  Negative for dizziness, tremors, seizures, syncope, weakness, light-headedness, numbness and headaches.   Psychiatric/Behavioral:  Negative for agitation, confusion and hallucinations.      Objective:     Vital Signs (Most Recent):  Temp: 97.9 °F (36.6 °C) (05/27/24 0454)  Pulse: 108 (05/27/24 1344)  Resp: (!) 22 (05/27/24 1344)  BP: (!) 104/56 (peak of stress) (05/27/24 1151)  SpO2: (!) 94 % (05/27/24 1344) Vital Signs (24h Range):  Temp:  [97.9 °F (36.6 °C)-98.5 °F (36.9 °C)] 97.9 °F (36.6 °C)  Pulse:  [] 108  Resp:  [16-22] 22  SpO2:  [94 %-98 %] 94 %  BP: (104-146)/(52-68) 104/56     Weight: 52.2 kg (115 lb)  Body mass index is 22.46 kg/m².    Intake/Output Summary (Last 24 hours) at 5/27/2024 1431  Last data filed at 5/27/2024 0548  Gross per 24 hour   Intake --   Output 850 ml   Net -850 ml         Physical Exam  Vitals and nursing note reviewed.   Constitutional:       General: She is not in acute distress.     Appearance: She is well-developed. She is not diaphoretic.      Interventions: Nasal cannula in  place.   HENT:      Head: Normocephalic and atraumatic.      Right Ear: External ear normal.      Left Ear: External ear normal.      Nose: Nose normal. No congestion.      Mouth/Throat:      Pharynx: Oropharynx is clear.   Eyes:      General: No scleral icterus.     Extraocular Movements: Extraocular movements intact.   Cardiovascular:      Rate and Rhythm: Normal rate and regular rhythm.      Pulses: Normal pulses.      Heart sounds: Normal heart sounds. No murmur heard.  Pulmonary:      Effort: Pulmonary effort is normal. No respiratory distress.      Breath sounds: Normal breath sounds. No wheezing or rales.   Abdominal:      General: Bowel sounds are normal. There is no distension.      Palpations: Abdomen is soft.      Tenderness: There is no abdominal tenderness. There is no guarding or rebound.   Musculoskeletal:      Cervical back: Normal range of motion.      Right lower leg: No edema.      Left lower leg: No edema.   Skin:     General: Skin is warm and dry.      Capillary Refill: Capillary refill takes less than 2 seconds.   Neurological:      General: No focal deficit present.      Mental Status: She is alert and oriented to person, place, and time. Mental status is at baseline.   Psychiatric:         Mood and Affect: Mood normal.         Behavior: Behavior normal.         Thought Content: Thought content normal.             Significant Labs: All pertinent labs within the past 24 hours have been reviewed.    Significant Imaging: I have reviewed all pertinent imaging results/findings within the past 24 hours.    Assessment/Plan:      * Chest pain  - trop 0.023, 0.01  -   - ECG reveals NSR w/o gross ischemic change  - CXR unremarkable  - asa 325 x1 in ED  - SL NGT prn  - last echo noted, repeat TTE ordered  - NM stress positive. Interventional cardiology consulted.  - tele monitoring  - K>4, Mg>2    Chronic heart failure, unspecified heart failure type  Patient is identified as having Diastolic  (HFpEF) heart failure that is Chronic. CHF is currently controlled. Latest ECHO performed and demonstrates- Results for orders placed during the hospital encounter of 11/22/23    Echo    Interpretation Summary    Left Ventricle: The left ventricle is normal in size. Ventricular mass is normal. Normal wall thickness. Normal wall motion. There is normal systolic function with a visually estimated ejection fraction of 55 - 60%. Grade II diastolic dysfunction.    Right Ventricle: Normal right ventricular cavity size. Wall thickness is normal. Right ventricle wall motion  is normal. Systolic function is normal.    The following segments are hypokinetic: basal inferior, basal inferolateral and mid inferolateral suggesting ischemia/infarct in LCx or RCA territory.    Left Atrium: Left atrium is mildly dilated.    Right Atrium: Right atrium is mildly dilated.    Aortic Valve: There is severe aortic valve sclerosis. Moderately calcified cusps. There is moderate annular calcification present.    Mitral Valve: There is mild mitral annular calcification present. There is mild to moderate regurgitation. Mild restriction of the posterior leaflet likely ischemic MR.    Pulmonary Artery: The estimated pulmonary artery systolic pressure is 44 mmHg.    IVC/SVC: Intermediate venous pressure at 8 mmHg.  . Continue Beta Blocker and monitor clinical status closely. Monitor on telemetry. Patient is off CHF pathway.  Monitor strict Is&Os and daily weights.  Place on fluid restriction of 1.5 L. Continue to stress to patient importance of self efficacy and  on diet for CHF. Last BNP reviewed- and noted below   Recent Labs   Lab 05/25/24  1242   *         Hx of transient ischemic attack (TIA)  - continue asa and statin    HLD (hyperlipidemia)  - continue statin    Essential hypertension  Chronic, controlled. Latest blood pressure and vitals reviewed-     Temp:  [97.9 °F (36.6 °C)-98.5 °F (36.9 °C)]   Pulse:  []   Resp:   [16-22]   BP: (104-146)/(52-68)   SpO2:  [94 %-98 %] .   Home meds for hypertension were reviewed and noted below.   Hypertension Medications               metoprolol succinate (TOPROL-XL) 25 MG 24 hr tablet Take 1 tablet (25 mg total) by mouth once daily.    nitroGLYCERIN (NITROSTAT) 0.4 MG SL tablet Place 1 tablet (0.4 mg total) under the tongue every 5 (five) minutes as needed for Chest pain.     While in the hospital, will manage blood pressure as follows; Continue home antihypertensive regimen    Will utilize p.r.n. blood pressure medication only if patient's blood pressure greater than 180/110 and she develops symptoms such as worsening chest pain or shortness of breath.    Chronic obstructive pulmonary disease  Patient's COPD is controlled currently.  Patient is currently off COPD Pathway. Continue scheduled inhalers Supplemental oxygen and monitor respiratory status closely.     - continue formulary controller, duonebs prn    Chronic respiratory failure with hypoxia and hypercapnia  Patient with Hypoxic Respiratory failure which is Chronic.  she is on home oxygen at 2 LPM. Contributing diagnoses includes - CHF and COPD Labs and images were reviewed. Patient Has recent ABG, which has been reviewed. Will treat underlying causes and adjust management of respiratory failure as follows continue supplemental oxygen at 2 L currently.    CAD (coronary artery disease)  Patient with known CAD s/p stent placement, which is controlled Will continue ASA, Plavix, and Statin and monitor for S/Sx of angina/ACS. Continue to monitor on telemetry.       VTE Risk Mitigation (From admission, onward)           Ordered     heparin (porcine) injection 5,000 Units  Every 8 hours         05/25/24 1533     IP VTE HIGH RISK PATIENT  Once         05/25/24 1533     Place sequential compression device  Until discontinued         05/25/24 1533                    Discharge Planning   FLORIAN: 5/27/2024     Code Status: Full Code   Is the  patient medically ready for discharge?:     Reason for patient still in hospital (select all that apply): Patient trending condition, Laboratory test, Treatment, and Consult recommendations  Discharge Plan A: Home with family                  Pau Card PA-C  Department of Hospital Medicine   [facility] - Observation 11H

## 2024-05-27 NOTE — PLAN OF CARE
[facility] - Observation 11H  Initial Discharge Assessment       Primary Care Provider: Kalyn Pemberton NP    Admission Diagnosis: Unstable angina [I20.0]  Supplemental oxygen dependent [Z99.81]  History of valvular heart disease [Z86.79]  CAD, multiple vessel [I25.10]  Chest pain with high risk of acute coronary syndrome [R07.9]  Chest pain [R07.9]  History of COPD [Z87.09]    Admission Date: 5/25/2024  Expected Discharge Date: 5/27/2024         Payor: [employer] / Plan: [employer] CHOICES / Product Type: Medicare Advantage /     Extended Emergency Contact Information  Primary Emergency Contact: Anthony Reardon  Mobile Phone: [phone]  Relation: Daughter   needed? No    Discharge Plan A: (P) Home with family  Discharge Plan B: (P) Home with family      [id number]. - [facility], [place] - [address] [facility]  [address] [facility] [address]  Phone: [phone] Fax: [phone]    [id number] - [place], [place] - [address] [facility]  [address] [facility] [address]  Phone: [phone] Fax: [phone]    [facility]. - [place], [place] - [address] [facility]  [address] [facility] [address]  Phone: [phone] Fax: [phone]    [id number] [address] [place] [address] AT [facility]  [address] [place] [address]  Phone: [phone] Fax: [phone]             SW completed Discharge Planning Assessment with patient via bedside. Discharge planning booklet given to patient/family and whiteboard updated with FLORIAN and phone #. All questions answered.    Patient reported that she will need assistance with transportation upon discharge.     Patient reported that her granddaughter live with her, and prior to hospitalization she was independent with her ADL's. Patient reported that she is on home O2 and uses a walker. Patient reported that she also has a  wheelchair. Patient reported that she is not on dialysis and does not go to a Coumadin clinic.      Patient lives in an apartment complex with one flight of stairs to enter. Patient reported that she does not have difficulty climbing the stairs.     Discharge Plan A and Plan B have been determined by review of patient's clinical status, future medical and therapeutic needs, and coverage/benefits for post-acute care in coordination with multidisciplinary team members.      Sri Vaughn LMSW  [facility]  Ext. [phone]

## 2024-05-27 NOTE — SUBJECTIVE & OBJECTIVE
Past Medical History:   Diagnosis Date    Anemia     Anxiety     COPD (chronic obstructive pulmonary disease)     COPD (chronic obstructive pulmonary disease) with emphysema     Coronary artery disease     Depression     Diverticulitis     Hyperlipidemia     Hypertension     PVD (peripheral vascular disease)     PVD (peripheral vascular disease)     S/P colostomy     Substance abuse     hx heavy etoh use     Tobacco abuse        Past Surgical History:   Procedure Laterality Date    ANGIOGRAM, CORONARY, WITH LEFT HEART CATHETERIZATION N/A 11/22/2023    Procedure: Angiogram, Coronary, with Left Heart Cath;  Surgeon: Checo Bhatt MD;  Location: [facility] CATH LAB;  Service: Cardiology;  Laterality: N/A;    ANGIOGRAM, EXTREMITY, UNILATERAL Right 8/25/2023    Procedure: ANGIOGRAM, EXTREMITY, UNILATERAL;  Surgeon: Gary Rico MD;  Location: [facility] OR [address] [place] [address];  Service: Vascular;  Laterality: Right;  US GUIDED ACCESS LEFT GROIN  contrast: 150ml  fluoro: 27.9 min  mGy: 254.73  Gycm2: 62.4919  radial flush cocktail: 10ml    ANGIOGRAPHY OF LOWER EXTREMITY Right 8/24/2023    Procedure: Angiogram Extremity Unilateral;  Surgeon: Benji Ashley MD;  Location: [facility] OR [address] [place] [address];  Service: Vascular;  Laterality: Right;    COLOSTOMY      ECTOPIC PREGNANCY SURGERY      PTA, PERONEAL Right 8/25/2023    Procedure: PTA, PERONEAL TIBIAL TRUNK WITH SHOCKWAVE;  Surgeon: Gary Rico MD;  [facility];  Service: Vascular;  Laterality: Right;    PTCA, SINGLE VESSEL  11/22/2023    Procedure: PTCA, Single Vessel;  Surgeon: Checo Bhatt MD;  Location: [facility] CATH LAB;  Service: Cardiology;;    right forefoot amputation      right toe amputation      x2 toes    STENT, DRUG ELUTING, SINGLE VESSEL, CORONARY  11/22/2023    Procedure: Stent, Drug Eluting, Single Vessel, Coronary;  Surgeon: Checo Bhatt MD;  Location: [facility] CATH LAB;  Service: Cardiology;;    STENT, SUPERFICIAL FEMORAL  ARTERY Right 8/25/2023    Procedure: STENT, SUPERFICIAL FEMORAL ARTERY;  Surgeon: Gary Rico MD;  Location: [facility] OR [address] [place] [address];  Service: Vascular;  Laterality: Right;  VIABAHN 6 X 15 X120       Review of patient's allergies indicates:  No Known Allergies    No current facility-administered medications on file prior to encounter.     Current Outpatient Medications on File Prior to Encounter   Medication Sig    aspirin (ECOTRIN) 81 MG EC tablet Take 1 tablet (81 mg total) by mouth once daily.    atorvastatin (LIPITOR) 80 MG tablet Take 1 tablet (80 mg total) by mouth once daily.    clopidogreL (PLAVIX) 75 mg tablet Take 1 tablet (75 mg total) by mouth once daily.    metoprolol succinate (TOPROL-XL) 25 MG 24 hr tablet Take 1 tablet (25 mg total) by mouth once daily.    nitroGLYCERIN (NITROSTAT) 0.4 MG SL tablet Place 1 tablet (0.4 mg total) under the tongue every 5 (five) minutes as needed for Chest pain.    albuterol (PROVENTIL/VENTOLIN HFA) 90 mcg/actuation inhaler Inhale 2 puffs into the lungs every 6 (six) hours. Rescue    albuterol-ipratropium (DUO-NEB) 2.5 mg-0.5 mg/3 mL nebulizer solution Take 3 mLs by nebulization every 6 (six) hours as needed for Wheezing or Shortness of Breath.    budesonide-formoterol 80-4.5 mcg (SYMBICORT) 80-4.5 mcg/actuation HFAA INHALE 2 PUFFS INTO THE LUNGS TWICE DAILY. RINSE MOUTH AFTER USE    hydrocortisone-aloe vera 1 % Crea cream Apply topically 2 (two) times daily.     Family History    None       Tobacco Use    Smoking status: Every Day     Current packs/day: 0.25     Average packs/day: 0.5 packs/day for 60.9 years (30.1 ttl pk-yrs)     Types: Cigarettes     Start date: 7/5/1963    Smokeless tobacco: Never   Substance and Sexual Activity    Alcohol use: No    Drug use: No    Sexual activity: Not on file     Review of Systems   Constitutional: Negative for chills, decreased appetite, diaphoresis, fever, malaise/fatigue and weight gain.   HENT:  Negative for  congestion and ear discharge.    Eyes:  Negative for blurred vision.   Cardiovascular:  Positive for dyspnea on exertion. Negative for chest pain, irregular heartbeat, leg swelling (mild), orthopnea, palpitations and paroxysmal nocturnal dyspnea.   Respiratory:  Positive for shortness of breath. Negative for cough, snoring and sputum production.    Skin:  Negative for color change, dry skin and rash.   Musculoskeletal:  Negative for back pain, falls, joint pain and neck pain.   Gastrointestinal:  Negative for bloating, abdominal pain, constipation and diarrhea.   Genitourinary:  Negative for dysuria, flank pain, hematuria and hesitancy.   Neurological:  Negative for focal weakness, headaches, numbness and seizures.   Psychiatric/Behavioral:  Negative for altered mental status, depression and hallucinations.      Objective:     Vital Signs (Most Recent):  Temp: 97.9 °F (36.6 °C) (05/27/24 0454)  Pulse: 108 (05/27/24 1344)  Resp: (!) 22 (05/27/24 1344)  BP: (!) 104/56 (peak of stress) (05/27/24 1151)  SpO2: (!) 94 % (05/27/24 1344) Vital Signs (24h Range):  Temp:  [97.9 °F (36.6 °C)-98.5 °F (36.9 °C)] 97.9 °F (36.6 °C)  Pulse:  [] 108  Resp:  [16-22] 22  SpO2:  [94 %-98 %] 94 %  BP: (104-146)/(52-68) 104/56     Weight: 52.2 kg (115 lb)  Body mass index is 22.46 kg/m².    SpO2: (!) 94 %         Intake/Output Summary (Last 24 hours) at 5/27/2024 1527  Last data filed at 5/27/2024 0548  Gross per 24 hour   Intake --   Output 850 ml   Net -850 ml       Lines/Drains/Airways       Drain  Duration                  Colostomy 07/13/21 0201 Descending/sigmoid LLQ 1049 days                     Physical Exam  Constitutional:       General: She is not in acute distress.     Appearance: She is not ill-appearing.   HENT:      Nose: Nose normal.      Mouth/Throat:      Mouth: Mucous membranes are moist.   Eyes:      Pupils: Pupils are equal, round, and reactive to light.   Neck:      Comments: No JVD appreciated  "  Cardiovascular:      Rate and Rhythm: Normal rate and regular rhythm.      Pulses: Normal pulses.      Heart sounds: No murmur heard.  Pulmonary:      Effort: Pulmonary effort is normal. No respiratory distress.      Breath sounds: No wheezing or rales.   Abdominal:      General: Abdomen is flat. There is no distension.      Palpations: Abdomen is soft.      Tenderness: There is no right CVA tenderness or left CVA tenderness.      Comments: Ostomy bag    Musculoskeletal:         General: No swelling.      Cervical back: Normal range of motion and neck supple. No tenderness.      Right lower leg: No edema.      Left lower leg: No edema.      Comments: S/p R TMA   Skin:     General: Skin is warm.      Coloration: Skin is not pale.      Findings: No rash.   Neurological:      General: No focal deficit present.      Mental Status: She is alert and oriented to person, place, and time.      Motor: No weakness.          Significant Labs: ABG: No results for input(s): "PH", "PCO2", "HCO3", "POCSATURATED", "BE" in the last 48 hours., Blood Culture: No results for input(s): "LABBLOO" in the last 48 hours., BMP:   Recent Labs   Lab 05/26/24  0514 05/27/24  0505   GLU 84 86    138   K 3.6 4.8    102   CO2 27 26   BUN 17 29*   CREATININE 0.7 0.9   CALCIUM 9.1 9.7   MG 1.9 2.0   , CMP   Recent Labs   Lab 05/26/24  0514 05/27/24  0505    138   K 3.6 4.8    102   CO2 27 26   GLU 84 86   BUN 17 29*   CREATININE 0.7 0.9   CALCIUM 9.1 9.7   ANIONGAP 9 10   , CBC   Recent Labs   Lab 05/26/24  0515 05/27/24  0505   WBC 6.34 7.21   HGB 11.9* 12.5   HCT 38.5 39.1    203   , INR No results for input(s): "INR", "PROTIME" in the last 48 hours., Lipid Panel No results for input(s): "CHOL", "HDL", "LDLCALC", "TRIG", "CHOLHDL" in the last 48 hours.,   Pathology Results  (Last 10 years)      None        , Troponin   Recent Labs   Lab 05/25/24  1707   TROPONINI 0.015   , and All pertinent lab results from the last " 24 hours have been reviewed.

## 2024-05-27 NOTE — ASSESSMENT & PLAN NOTE
78 F with severe COPD on home O2 for chronic hypoxic respiratory failure (3 L via nasal cannula), CAD ( RCA GALEN PCI 11/23 with Dr. Li), PAT, previous osteomyelitis with transmetatarsal amputation (2012) presented with chest pain.    Trop 0.023, BNP wnl.    Nuclear stress was abnormal; with a  moderate intensity, small to moderate sized, mostly fixed perfusion abnormality with some reversibilty in the basal to mid inferior and inferolateral wall(s) in the typical distribution of the RCA territory.     TTE 5/2024    Left Ventricle: The left ventricle is normal in size. Ventricular mass is normal. Normal wall thickness. Normal wall motion. There is normal systolic function with a visually estimated ejection fraction of 60 - 65%. There is normal diastolic function.    Right Ventricle: Normal right ventricular cavity size. Wall thickness is normal. Systolic function is normal.    Left Atrium: Left atrium is mildly dilated.    Aortic Valve: There is moderate aortic valve sclerosis. There is annular calcification present. Mildly restricted motion. There is mild stenosis. Aortic valve area by VTI is 2.21 cm². Aortic valve peak velocity is 2.0 m/s. Mean gradient is 7 mmHg. The dimensionless index is 0.58. There is mild aortic regurgitation.    Pulmonary Artery: The estimated pulmonary artery systolic pressure is 20 mmHg.    IVC/SVC: Normal venous pressure at 3 mmHg.     Given patient's CAD history and worsening recurrent typical cardiac cp with a positive stress test, LHC is warranted.   LHC +/- PCI, patient is a GALEN candidate  - Anti-platelet Therapy: ASA and Plavix   - Access: R femoral   - Creatinine: 0.9  - Pre-Op Med: Bendaryl 50mg pO   - All patient's questions were answered.  -The risks, benefits and alternatives of the procedure were explained to the patient.   -The risks of coronary angiography include but are not limited to: bleeding, infection, heart rhythm abnormalities, allergic reactions, kidney injury and  potential need for dialysis, stroke and death.   - Should stenting be indicated, the patient has agreed to dual anti-platelet therapy with a drug-eluting stent   - Additionally, pt is aware that non-compliance is likely to result in stent clotting with heart attack, heart failure, and/or death  -The risks of moderate sedation include hypotension, respiratory depression, arrhythmias, bronchospasm, and death.   - Informed consent was obtained and the  patient is agreeable to proceed with the procedure.

## 2024-05-27 NOTE — PLAN OF CARE
Problem: Adult Inpatient Plan of Care  Goal: Plan of Care Review  Outcome: Progressing  Goal: Patient-Specific Goal (Individualized)  Outcome: Progressing  Goal: Absence of Hospital-Acquired Illness or Injury  Outcome: Progressing  Goal: Optimal Comfort and Wellbeing  Outcome: Progressing  Goal: Readiness for Transition of Care  Outcome: Progressing     Problem: Infection  Goal: Absence of Infection Signs and Symptoms  Outcome: Progressing     Problem: Acute Kidney Injury/Impairment  Goal: Fluid and Electrolyte Balance  Outcome: Progressing  Goal: Improved Oral Intake  Outcome: Progressing  Goal: Effective Renal Function  Outcome: Progressing

## 2024-05-27 NOTE — BRIEF OP NOTE
Brief Operative Note:    : Karel Mack MD     Referring Physician: Self,Aaareferral     All Operators: Surgeon(s):  Karel Mack MD Bagh, Imad, MD Baldawi, MD Sherri Cantu Peter G., MD     Preoperative Diagnosis: CAD, multiple vessel [I25.10]     Postop Diagnosis: CAD, multiple vessel [I25.10]    Treatments/Procedures: Procedure(s) (LRB):  Angiogram, Coronary, with Left Heart Cath (N/A)  Stent, Drug Eluting, Single Vessel, Coronary    Findings:Severe coronary disease is present. Proximal RCA ISR s/p GALEN PCI. See catheterization report for full details.    Estimated Blood loss: 20 cc    Specimens removed: No    Recommendations:   - Routine post-cath care as per orders  - IVF at 200 cc/hr for 2 hrs  - Cardiac rehab referral, Continue medical management, Risk factor reduction, Plavix for at least 1 year and ASA 81 mg indefinitely, Follow-up with outpatient cardiologist    Cristina Alarcon      negative...

## 2024-05-27 NOTE — NURSING
Patient transferred to [facility] from Cath lab with puncture to right radial and vasc band in place. No bleed Radial pulse 2+ doppler

## 2024-05-27 NOTE — NURSING
Nurses Note -- 4 Eyes      5/27/2024   6:24 PM      Skin assessed during: Transfer      [] No Altered Skin Integrity Present    []Prevention Measures Documented      [x] Yes- Altered Skin Integrity Present or Discovered   [] LDA Added if Not in Epic (Describe Wound)   [] New Altered Skin Integrity was Present on Admit and Documented in LDA   [] Wound Image Taken    Wound Care Consulted? No    Attending Nurse:  Eda Mcintosh RN/Staff Member:   Rosalind         Continue Regimen: Biotin and prenatal MVI Render In Strict Bullet Format?: No Plan: Consider Nutrafol supplement if ok with the pediatrician while nursing. Detail Level: Zone

## 2024-05-28 ENCOUNTER — TELEPHONE (OUTPATIENT)
Dept: CARDIAC REHAB | Facility: CLINIC | Age: 79
End: 2024-05-28
Payer: MEDICARE

## 2024-05-28 VITALS
RESPIRATION RATE: 18 BRPM | HEART RATE: 94 BPM | HEIGHT: 60 IN | DIASTOLIC BLOOD PRESSURE: 56 MMHG | BODY MASS INDEX: 23.03 KG/M2 | TEMPERATURE: 99 F | SYSTOLIC BLOOD PRESSURE: 116 MMHG | WEIGHT: 117.31 LBS | OXYGEN SATURATION: 92 %

## 2024-05-28 PROBLEM — I35.0 MODERATE AORTIC VALVE STENOSIS: Status: ACTIVE | Noted: 2024-05-28

## 2024-05-28 LAB
ANION GAP SERPL CALC-SCNC: 8 MMOL/L (ref 8–16)
BASOPHILS # BLD AUTO: 0.05 K/UL (ref 0–0.2)
BASOPHILS NFR BLD: 0.8 % (ref 0–1.9)
BUN SERPL-MCNC: 30 MG/DL (ref 8–23)
CALCIUM SERPL-MCNC: 9.2 MG/DL (ref 8.7–10.5)
CHLORIDE SERPL-SCNC: 104 MMOL/L (ref 95–110)
CO2 SERPL-SCNC: 27 MMOL/L (ref 23–29)
CREAT SERPL-MCNC: 0.8 MG/DL (ref 0.5–1.4)
DIFFERENTIAL METHOD BLD: ABNORMAL
EOSINOPHIL # BLD AUTO: 0.3 K/UL (ref 0–0.5)
EOSINOPHIL NFR BLD: 4.6 % (ref 0–8)
ERYTHROCYTE [DISTWIDTH] IN BLOOD BY AUTOMATED COUNT: 13.3 % (ref 11.5–14.5)
EST. GFR  (NO RACE VARIABLE): >60 ML/MIN/1.73 M^2
GLUCOSE SERPL-MCNC: 86 MG/DL (ref 70–110)
HCT VFR BLD AUTO: 39.5 % (ref 37–48.5)
HGB BLD-MCNC: 12.5 G/DL (ref 12–16)
IMM GRANULOCYTES # BLD AUTO: 0.01 K/UL (ref 0–0.04)
IMM GRANULOCYTES NFR BLD AUTO: 0.2 % (ref 0–0.5)
LYMPHOCYTES # BLD AUTO: 0.7 K/UL (ref 1–4.8)
LYMPHOCYTES NFR BLD: 11.1 % (ref 18–48)
MAGNESIUM SERPL-MCNC: 1.9 MG/DL (ref 1.6–2.6)
MCH RBC QN AUTO: 30.2 PG (ref 27–31)
MCHC RBC AUTO-ENTMCNC: 31.6 G/DL (ref 32–36)
MCV RBC AUTO: 95 FL (ref 82–98)
MONOCYTES # BLD AUTO: 0.5 K/UL (ref 0.3–1)
MONOCYTES NFR BLD: 7.5 % (ref 4–15)
NEUTROPHILS # BLD AUTO: 5 K/UL (ref 1.8–7.7)
NEUTROPHILS NFR BLD: 75.8 % (ref 38–73)
NRBC BLD-RTO: 0 /100 WBC
PLATELET # BLD AUTO: 182 K/UL (ref 150–450)
PMV BLD AUTO: 10.5 FL (ref 9.2–12.9)
POC ACTIVATED CLOTTING TIME K: 293 SEC (ref 74–137)
POCT GLUCOSE: 103 MG/DL (ref 70–110)
POCT GLUCOSE: 108 MG/DL (ref 70–110)
POCT GLUCOSE: 116 MG/DL (ref 70–110)
POTASSIUM SERPL-SCNC: 4.4 MMOL/L (ref 3.5–5.1)
RBC # BLD AUTO: 4.14 M/UL (ref 4–5.4)
SAMPLE: ABNORMAL
SODIUM SERPL-SCNC: 139 MMOL/L (ref 136–145)
WBC # BLD AUTO: 6.55 K/UL (ref 3.9–12.7)

## 2024-05-28 PROCEDURE — 27000221 HC OXYGEN, UP TO 24 HOURS

## 2024-05-28 PROCEDURE — 25000003 PHARM REV CODE 250

## 2024-05-28 PROCEDURE — 25000003 PHARM REV CODE 250: Performed by: PHYSICIAN ASSISTANT

## 2024-05-28 PROCEDURE — 94640 AIRWAY INHALATION TREATMENT: CPT

## 2024-05-28 PROCEDURE — 99900035 HC TECH TIME PER 15 MIN (STAT)

## 2024-05-28 PROCEDURE — 85025 COMPLETE CBC W/AUTO DIFF WBC: CPT | Performed by: PHYSICIAN ASSISTANT

## 2024-05-28 PROCEDURE — 94761 N-INVAS EAR/PLS OXIMETRY MLT: CPT

## 2024-05-28 PROCEDURE — 36415 COLL VENOUS BLD VENIPUNCTURE: CPT | Performed by: PHYSICIAN ASSISTANT

## 2024-05-28 PROCEDURE — 25000242 PHARM REV CODE 250 ALT 637 W/ HCPCS: Performed by: PHYSICIAN ASSISTANT

## 2024-05-28 PROCEDURE — S4991 NICOTINE PATCH NONLEGEND: HCPCS

## 2024-05-28 PROCEDURE — 83735 ASSAY OF MAGNESIUM: CPT | Performed by: PHYSICIAN ASSISTANT

## 2024-05-28 PROCEDURE — 80048 BASIC METABOLIC PNL TOTAL CA: CPT | Performed by: PHYSICIAN ASSISTANT

## 2024-05-28 PROCEDURE — 63600175 PHARM REV CODE 636 W HCPCS: Performed by: PHYSICIAN ASSISTANT

## 2024-05-28 RX ORDER — CLOPIDOGREL BISULFATE 75 MG/1
75 TABLET ORAL DAILY
Qty: 30 TABLET | Refills: 11 | Status: SHIPPED | OUTPATIENT
Start: 2024-05-28 | End: 2025-05-28

## 2024-05-28 RX ORDER — ASPIRIN 81 MG/1
81 TABLET ORAL DAILY
Qty: 30 TABLET | Refills: 11 | Status: SHIPPED | OUTPATIENT
Start: 2024-05-28 | End: 2025-05-28

## 2024-05-28 RX ORDER — NITROGLYCERIN 0.4 MG/1
0.4 TABLET SUBLINGUAL EVERY 5 MIN PRN
Qty: 25 TABLET | Refills: 2 | Status: SHIPPED | OUTPATIENT
Start: 2024-05-28

## 2024-05-28 RX ORDER — IBUPROFEN 200 MG
1 TABLET ORAL DAILY
Qty: 28 PATCH | Refills: 0 | Status: SHIPPED | OUTPATIENT
Start: 2024-05-28

## 2024-05-28 RX ADMIN — IPRATROPIUM BROMIDE AND ALBUTEROL SULFATE 3 ML: 2.5; .5 SOLUTION RESPIRATORY (INHALATION) at 07:05

## 2024-05-28 RX ADMIN — IPRATROPIUM BROMIDE AND ALBUTEROL SULFATE 3 ML: 2.5; .5 SOLUTION RESPIRATORY (INHALATION) at 02:05

## 2024-05-28 RX ADMIN — CLOPIDOGREL BISULFATE 75 MG: 75 TABLET ORAL at 09:05

## 2024-05-28 RX ADMIN — METOPROLOL SUCCINATE 25 MG: 25 TABLET, EXTENDED RELEASE ORAL at 09:05

## 2024-05-28 RX ADMIN — HEPARIN SODIUM 5000 UNITS: 5000 INJECTION INTRAVENOUS; SUBCUTANEOUS at 02:05

## 2024-05-28 RX ADMIN — ASPIRIN 81 MG: 81 TABLET, COATED ORAL at 09:05

## 2024-05-28 RX ADMIN — HEPARIN SODIUM 5000 UNITS: 5000 INJECTION INTRAVENOUS; SUBCUTANEOUS at 06:05

## 2024-05-28 RX ADMIN — Medication 1 PATCH: at 09:05

## 2024-05-28 RX ADMIN — FLUTICASONE FUROATE AND VILANTEROL TRIFENATATE 1 PUFF: 100; 25 POWDER RESPIRATORY (INHALATION) at 08:05

## 2024-05-28 RX ADMIN — ATORVASTATIN CALCIUM 80 MG: 40 TABLET, FILM COATED ORAL at 09:05

## 2024-05-28 RX ADMIN — IPRATROPIUM BROMIDE AND ALBUTEROL SULFATE 3 ML: 2.5; .5 SOLUTION RESPIRATORY (INHALATION) at 12:05

## 2024-05-28 NOTE — LETTER
May 28, 2024    Radha Sotelo  [address]  [address]  [facility] [address]             [facility]  [address]  [place] [address]  Phone: [phone]                                  [facility]   [address]   [place] [address]  [phone]         [facility]   [address] [place] [address] [phone]         [facility]    [address]  [place] [address] [phone]   Re: Radha Sotelo  Clinic number: [id number]    Dear Ms. Sotelo:    You were recently admitted to an [facility] facility for cardiac (heart) problem.  Your physician has referred you to [facility].  Cardiac Rehab Phase 2 is an educational and exercise program, conducted in a outpatient setting, proven to help reduce your risk for recurrent heart events.    Cardiac rehab has two major parts:    1. Exercise training to help you achieve cardiovascular fitness while learning how to exercise safely and improve muscle strength and endurance.  Your exercise prescription will be based on the results of the cardiopulmonary stress test (CPX) which will be done before entering the program and at completion.  2. Education, counseling and training to help you understand your heart condition and find ways to reduce your risk of future heart problems.  The cardiac rehab team will help you learn how to cope with the stress of adjusting to a new lifestyle and to deal with your fears about the future.    Phase 2 is a 36-session program, meeting 3 times a week for 12 weeks.  Each session consists of an hour of exercise and half-hour dedicated to the educational topic of the day.  Class days vary per location.  Please contact your nearest facility for details.    Through cardiac rehab you will learn:  About your heart condition, medical therapies, and medication  Risk factors in y our lifestyle contributing to  heart disease  New strategies to modify your risk factors  About a healthy diet that can lower your blood cholesterol, control weight, help prevent or control high blood pressure, and diabetes  How to stop smoking  How to manage stress    If you are interested in getting started, call the [facility] of your choosing.     Sincerely,     [facility]

## 2024-05-28 NOTE — TELEPHONE ENCOUNTER
Letter regarding Phase II cardiac rehab was sent to patient.  Will contact patient in 2 weeks to see if interested.  Also, information letter sent to [facility].  Roberto Lawson, RN  Cardiac Rehab Nurse

## 2024-05-28 NOTE — NURSING
Uneventful shift.  Pt slept intermittently but not for long/not much this shift.  Remains pain free, VS remain stable.  Report given to oncoming shift and pt care endorsed to Anaid CHEN at this time

## 2024-05-28 NOTE — PLAN OF CARE
Pt discharged home to continue with [facility].Transported by Wheelchair van for O2 needs.    Mayra Rojo [facility]  Case Management Department  [email or web address]    [facility] - Cardiology Stepdown  Discharge Final Note    Primary Care Provider: Kalny Pemberton NP    Expected Discharge Date: 5/28/2024    Final Discharge Note (most recent)       Final Note - 05/28/24 1610          Final Note    Assessment Type Final Discharge Note     Anticipated Discharge Disposition Home or Self Care     Hospital Resources/Appts/Education Provided Provided patient/caregiver with written discharge plan information;Appointments scheduled and added to AVS        Post-Acute Status    Post-Acute Authorization Home Health     Home Health Status Referrals Sent     Discharge Delays None known at this time                     Important Message from Medicare  Important Message from Medicare regarding Discharge Appeal Rights: Given to patient/caregiver, Explained to patient/caregiver, Signed/date by patient/caregiver     Date IMM was signed: 05/28/24  Time IMM was signed: 1030      Future Appointments   Date Time Provider Department Center   6/5/2024  8:00 AM Kalyn Pemberton NP [facility] [place]   7/8/2024  8:00 AM Kalyn Pemberton NP [address] [place]

## 2024-05-28 NOTE — PLAN OF CARE
[facility] - Cardiology Stepdown      HOME HEALTH ORDERS  FACE TO FACE ENCOUNTER    Patient Name: Radha Sotelo  YOB: 1945    PCP: Kalyn Pemberton NP   PCP Address: [address] [place] [address]  PCP Phone Number: [phone]  PCP Fax: [phone]    Encounter Date: 5/25/24    Admit to Home Health    Diagnoses:  Active Hospital Problems    Diagnosis  POA    *Chest pain [R07.9]  Yes    Moderate aortic valve stenosis [I35.0]  Yes     POA  Seen on Echo      Chronic heart failure, unspecified heart failure type [I50.9]  Yes    Chronic obstructive pulmonary disease [J44.9]  Yes    Essential hypertension [I10]  Yes    HLD (hyperlipidemia) [E78.5]  Yes    Hx of transient ischemic attack (TIA) [Z86.73]  Not Applicable    Chronic respiratory failure with hypoxia and hypercapnia [J96.11, J96.12]  Yes    NSTEMI (non-ST elevated myocardial infarction) [I21.4]  Yes     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       Follow Up Appointments:  Future Appointments   Date Time Provider Department Center   6/5/2024  8:00 AM Kalyn Pemberton NP [address]   7/8/2024  8:00 AM Kalyn Pemberton NP [address] [place]       Allergies:Review of patient's allergies indicates:  No Known Allergies    Medications: Review discharge medications with patient and family and provide education.    Current Facility-Administered Medications   Medication Dose Route Frequency Provider Last Rate Last Admin    acetaminophen tablet 650 mg  650 mg Oral Q4H PRN Pau Card PA-C        albuterol-ipratropium 2.5 mg-0.5 mg/3 mL nebulizer solution 3 mL  3 mL Nebulization Q6H Pau Card PA-C   3 mL at 05/28/24 1216    aluminum-magnesium hydroxide-simethicone 200-200-20 mg/5 mL suspension 30 mL  30 mL Oral QID PRN Pau Card PA-C        aspirin EC tablet 81 mg   81 mg Oral Daily Pau Card PA-C   81 mg at 05/28/24 0939    atorvastatin tablet 80 mg  80 mg Oral Daily Pau Card PA-C   80 mg at 05/28/24 0939    clopidogreL tablet 75 mg  75 mg Oral Daily Pau Card PA-C   75 mg at 05/28/24 0939    dextrose 10% bolus 125 mL 125 mL  12.5 g Intravenous PRN Pau Card PA-C        dextrose 10% bolus 250 mL 250 mL  25 g Intravenous PRN Pau Card PA-C        fluticasone furoate-vilanteroL 100-25 mcg/dose diskus inhaler 1 puff  1 puff Inhalation Daily Pau Card PA-C   1 puff at 05/28/24 0800    glucagon (human recombinant) injection 1 mg  1 mg Intramuscular PRN Pau Card PA-C        glucose chewable tablet 16 g  16 g Oral PRN Pau Card PA-C        glucose chewable tablet 24 g  24 g Oral PRN Pau Card PA-C        heparin (porcine) injection 5,000 Units  5,000 Units Subcutaneous Q8H Pau Card PA-C   5,000 Units at 05/28/24 0618    ibuprofen tablet 600 mg  600 mg Oral Q6H PRN Pau Card PA-C   600 mg at 05/27/24 0927    melatonin tablet 6 mg  6 mg Oral Nightly PRN Pau Card PA-C        metoprolol succinate (TOPROL-XL) 24 hr tablet 25 mg  25 mg Oral Daily Pau Card PA-C   25 mg at 05/28/24 0939    naloxone 0.4 mg/mL injection 0.02 mg  0.02 mg Intravenous PRN Pau Card PA-C        nicotine 21 mg/24 hr 1 patch  1 patch Transdermal Daily Addie Paz PA-C   1 patch at 05/28/24 0938    nitroGLYCERIN SL tablet 0.4 mg  0.4 mg Sublingual Q5 Min PRN Pau Card PA-C        ondansetron disintegrating tablet 8 mg  8 mg Oral Q8H PRN Pau Card PA-C        polyethylene glycol packet 17 g  17 g Oral Daily PRN Pau Card PA-C        prochlorperazine injection Soln 5 mg  5 mg Intravenous Q6H PRN Pau Card PA-C        simethicone chewable tablet 80 mg  1 tablet Oral QID PRN  Pau Card PA-C        sodium chloride 0.9% flush 10 mL  10 mL Intravenous Q8H Pau Card PA-C   10 mL at 05/27/24 2049    sodium chloride 0.9% flush 10 mL  10 mL Intravenous Cristina Cota MD         Current Discharge Medication List        START taking these medications    Details   nicotine (NICODERM CQ) 14 mg/24 hr Place 1 patch onto the skin once daily.  Qty: 28 patch, Refills: 0           CONTINUE these medications which have CHANGED    Details   aspirin (ECOTRIN) 81 MG EC tablet Take 1 tablet (81 mg total) by mouth once daily.  Qty: 30 tablet, Refills: 11      clopidogreL (PLAVIX) 75 mg tablet Take 1 tablet (75 mg total) by mouth once daily.  Qty: 30 tablet, Refills: 11    Associated Diagnoses: Coronary artery disease involving native coronary artery of native heart without angina pectoris      nitroGLYCERIN (NITROSTAT) 0.4 MG SL tablet Place 1 tablet (0.4 mg total) under the tongue every 5 (five) minutes as needed for Chest pain.  Qty: 25 tablet, Refills: 2    Associated Diagnoses: Coronary artery disease involving native coronary artery of native heart without angina pectoris           CONTINUE these medications which have NOT CHANGED    Details   atorvastatin (LIPITOR) 80 MG tablet Take 1 tablet (80 mg total) by mouth once daily.  Qty: 90 tablet, Refills: 3    Associated Diagnoses: Coronary artery disease involving native coronary artery of native heart without angina pectoris      metoprolol succinate (TOPROL-XL) 25 MG 24 hr tablet Take 1 tablet (25 mg total) by mouth once daily.  Qty: 90 tablet, Refills: 3    Comments: .  Associated Diagnoses: Coronary artery disease involving native coronary artery of native heart without angina pectoris      albuterol (PROVENTIL/VENTOLIN HFA) 90 mcg/actuation inhaler Inhale 2 puffs into the lungs every 6 (six) hours. Rescue  Qty: 25.5 g, Refills: 3    Associated Diagnoses: Chronic obstructive pulmonary disease, unspecified COPD type       albuterol-ipratropium (DUO-NEB) 2.5 mg-0.5 mg/3 mL nebulizer solution Take 3 mLs by nebulization every 6 (six) hours as needed for Wheezing or Shortness of Breath.  Qty: 180 mL, Refills: 6    Associated Diagnoses: Chronic obstructive pulmonary disease, unspecified COPD type      budesonide-formoterol 80-4.5 mcg (SYMBICORT) 80-4.5 mcg/actuation HFAA INHALE 2 PUFFS INTO THE LUNGS TWICE DAILY. RINSE MOUTH AFTER USE  Qty: 10.2 g, Refills: 6    Associated Diagnoses: Chronic obstructive pulmonary disease, unspecified COPD type      hydrocortisone-aloe vera 1 % Crea cream Apply topically 2 (two) times daily.  Qty: 28 g, Refills: 0               I have seen and examined this patient within the last 30 days. My clinical findings that support the need for the home health skilled services and home bound status are the following:no   Weakness/numbness causing balance and gait disturbance due to Weakness/Debility making it taxing to leave home.     Diet:   cardiac diet    Labs:  Report Lab results to PCP.    Referrals/ Consults  Physical Therapy to evaluate and treat. Evaluate for home safety and equipment needs; Establish/upgrade home exercise program. Perform / instruct on therapeutic exercises, gait training, transfer training, and Range of Motion.  Occupational Therapy to evaluate and treat. Evaluate home environment for safety and equipment needs. Perform/Instruct on transfers, ADL training, ROM, and therapeutic exercises.    Activities:   activity as tolerated    Nursing:   Agency to admit patient within 24 hours of hospital discharge unless specified on physician order or at patient request    SN to complete comprehensive assessment including routine vital signs. Instruct on disease process and s/s of complications to report to MD. Review/verify medication list sent home with the patient at time of discharge  and instruct patient/caregiver as needed. Frequency may be adjusted depending on start of care date.     Skilled  nurse to perform up to 3 visits PRN for symptoms related to diagnosis    Notify MD if SBP > 160 or < 90; DBP > 90 or < 50; HR > 120 or < 50; Temp > 101; O2 < 88%    Ok to schedule additional visits based on staff availability and patient request on consecutive days within the home health episode.    When multiple disciplines ordered:    Start of Care occurs on Sunday - Wednesday schedule remaining discipline evaluations as ordered on separate consecutive days following the start of care.    Thursday SOC -schedule subsequent evaluations Friday and Monday the following week.     Friday - Saturday SOC - schedule subsequent discipline evaluations on consecutive days starting Monday of the following week.    For all post-discharge communication and subsequent orders please contact patient's primary care physician. If unable to reach primary care physician or do not receive response within 30 minutes, please contact [facility] for clinical staff order clarification      Home Health Aide:  Physical Therapy Three times weekly and Occupational Therapy Three times weekly    Wound Care Orders  no    I certify that this patient is confined to her home and needs physical therapy and occupational therapy.

## 2024-05-28 NOTE — PLAN OF CARE
CHW met with patient/family at bedside. Patient experience rounding completed and reviewed the following.     Do you know your discharge plan? Yes or No,    If yes, what is the plan? (Home, Home Health, Rehab, SNF, LTAC, or Other) Yes Home    Have you discussed your needs and preferences with your SW/CM? Yes or No  Yes Home    If you are discharging home, do you have help at home? Yes or No Yes    Do you think you will need help additional at home at discharge? Yes or No   No    Do you currently have difficulty keeping up with bills, affording medicine or buying food? Yes or No No    Assigned SW/CM notified of any patient/family needs or concerns. Appropriate resources provided to address patient's needs.  Mayra Jovel CHW  Case Management  [id number]

## 2024-05-28 NOTE — PROGRESS NOTES
[facility] - Cardiology Stepdown  Wound Care    Patient Name:  Radha Sotelo   MRN:  [id number]  Date: 5/28/2024  Diagnosis: Chest pain    History:     Past Medical History:   Diagnosis Date    Anemia     Anxiety     COPD (chronic obstructive pulmonary disease)     COPD (chronic obstructive pulmonary disease) with emphysema     Coronary artery disease     Depression     Diverticulitis     Hyperlipidemia     Hypertension     PVD (peripheral vascular disease)     PVD (peripheral vascular disease)     S/P colostomy     Substance abuse     hx heavy etoh use     Tobacco abuse        Social History     Socioeconomic History    Marital status:    Tobacco Use    Smoking status: Every Day     Current packs/day: 0.25     Average packs/day: 0.5 packs/day for 60.9 years (30.1 ttl pk-yrs)     Types: Cigarettes     Start date: 7/5/1963    Smokeless tobacco: Never   Substance and Sexual Activity    Alcohol use: No    Drug use: No   Social History Narrative    ** Merged History Encounter **          Social Determinants of Health     Financial Resource Strain: Low Risk  (5/7/2024)    Overall Financial Resource Strain (CARDIA)     Difficulty of Paying Living Expenses: Not hard at all   Food Insecurity: No Food Insecurity (5/7/2024)    Hunger Vital Sign     Worried About Running Out of Food in the Last Year: Never true     Ran Out of Food in the Last Year: Never true   Transportation Needs: No Transportation Needs (5/7/2024)    PRAPARE - Transportation     Lack of Transportation (Medical): No     Lack of Transportation (Non-Medical): No   Physical Activity: Inactive (5/7/2024)    Exercise Vital Sign     Days of Exercise per Week: 0 days     Minutes of Exercise per Session: 0 min   Stress: No Stress Concern Present (5/7/2024)    [employer] - Occupational Stress Questionnaire     Feeling of Stress : Not at all   Housing Stability: Low Risk  (5/7/2024)    Housing Stability Vital Sign     Unable to Pay  for Housing in the Last Year: No     Homeless in the Last Year: No       Precautions:     Allergies as of 05/25/2024    (No Known Allergies)       [facility] Assessment Details/Treatment   Patient seen for wound care consultation.   Reviewed chart for this encounter.   See Flow Sheet for findings.    Pt up in chair and agreeable to care. Dry and tan ulceration/dehisced incision site to the right foot, likely related shoe use. The periwound is intact, pink, dry and blanchable. Pt states the wound and periwound are very tender/painful. The wound was cleansed with betadine and a foam dressing applied. Pt educated on the wound care and the importance of turning every 2 hours.     RECOMMENDATIONS:  - right foot: bedside nursing to paint with betadine, let it dry and apply a foam dressing daily  - turning every 2 hours  - bedside nursing to maintain pressure injury prevention interventions     05/28/24 1304        Wound 05/25/24 2200 Incision Right anterior Foot other (see comments)   Date First Assessed/Time First Assessed: 05/25/24 2200   Present on Original Admission: Yes  Primary Wound Type: Incision  Side: Right  Orientation: anterior  Location: Foot  Is this injury device related?: No  Incision Type: other (see comments)   Wound Image    Incision WDL ex   Dressing Appearance Open to air   Drainage Amount None   Appearance Intact;Dry;Tan   Tissue loss description Partial thickness   Periwound Area Intact;Dry;Pink;Other (see comments)  (tender)   Care Cleansed with:;Povidone iodine   Dressing Applied;Foam     Recommendations made to primary team for above plan via secure chat. Wound care will follow-up as needed.     05/28/2024

## 2024-05-28 NOTE — PLAN OF CARE
"Assessment completed, see flowsheets.  Pt is oriented x4.  Currently denies pain, nausea, and/or SOB.  Very indifferent towards care.  Appears suspicious-states "I just hope they didn't use me as a guinea pig.  The one that had me sign the papers wasn't even in the room, it was some [place] woman.  Makes you wonder what really happened...."  reassured pt she was not used as an experiment and that all staff are competent and not hiding things from her.  Discussed that many various staff members that are in room during procedures.  She then states "well every time they tried to give me a sheet it had blood on it so I guess they couldn't control the bleeding.... I just hope I get thru this, I really wonder what they're hiding".  Again reinforced that the procedure completed was exactly what she was told was happening/what she signed the consent for and reiterated that all staff are competent to provide care & not hiding anything from her.  Reassured her that her procedure went well and she continues to recover as expected.  Discussed POC for this shift.  Pt agreeable stating "do whatever, I just want to get thru tonight without a hitch so I can get out tomorrow".  Call light within reach, will continue to monitor.        Problem: Adult Inpatient Plan of Care  Goal: Plan of Care Review  Outcome: Progressing  Flowsheets (Taken 5/27/2024 1945)  Plan of Care Reviewed With: patient     Problem: Acute Kidney Injury/Impairment  Goal: Improved Oral Intake  Outcome: Progressing  Intervention: Promote and Optimize Oral Intake  Flowsheets (Taken 5/27/2024 1945)  Oral Nutrition Promotion: rest periods promoted  Nutrition Interventions: food preferences provided     Problem: Fall Injury Risk  Goal: Absence of Fall and Fall-Related Injury  Outcome: Progressing     "

## 2024-05-28 NOTE — CARE UPDATE
I have reviewed the chart of Radha Sotelo who is hospitalized for the following:    Active Hospital Problems    Diagnosis    *Chest pain    Moderate aortic valve stenosis     POA  Seen on Echo      Chronic heart failure, unspecified heart failure type    Chronic obstructive pulmonary disease    Essential hypertension    HLD (hyperlipidemia)    Hx of transient ischemic attack (TIA)    Chronic respiratory failure with hypoxia and hypercapnia    NSTEMI (non-ST elevated myocardial infarction)     MV disease seen during cardiac cath  S/p PCI      CAD (coronary artery disease)        Nanda Henderson NP  [facility]

## 2024-05-28 NOTE — NURSING
Patient is ready for discharge. Patient stable alert and oriented. IVs removed. No complaints of pain. Discussed discharge plan. Reviewed medications and side effects, appointments, and answered questions with patient and family. RX given to patient.

## 2024-05-28 NOTE — DISCHARGE SUMMARY
[facility] - Cardiology [facility] Medicine  Discharge Summary      Patient Name: Radha Sotelo  MRN: [id number]  DEEPIKA: [id number]  Patient Class: IP- Inpatient  Admission Date: 5/25/2024  Hospital Length of Stay: 1 days  Discharge Date and Time:  05/28/2024 9:45 AM  Attending Physician: Delmi Perez MD   Discharging Provider: Pau Card PA-C  Primary Care Provider: Kalyn Pemberton NP  Hospital Medicine Team: [facility] HOSP MED E Pau Card PA-C  Primary Care Team: [facility] HOSP MED E    HPI:   Mr. Radha Sotelo is a 78-year-old woman with comorbidities of anemia, CAD with stents, diverticulitis with colostomy, COPD on 2 LPM at home, hypertension and previous history of heavy alcohol use presents for evaluation of chest pain. Pt reports over the last 3 days she has had worsening substernal chest pain. Reports pain has been constant since onset but improved w/ home sl ntg. Today however, her pain continued to worsen and she took her last ntg before calling EMS. She is unable to fully characterize the pain but notes it does radiate to her back. Endorses associated SOB but denies any cough, nausea, diaphoresis, vomiting. She also reports that she has ingested 25 tablets of nitroglycerin over this time due to this chest pain. She denies any current chest discomfort nor increased shortness of breath or diminished exertional tolerance. In addition, she denies any fever, chills, abd pain, dysuria or melenic stooling.     In ED, Pt AFVSS on 2 L NC. No leukocytosis. K 3.4. Trop 0.023, BNP wnl. CXR:  Increase in interstitial markings. No confluent consolidation. Probable remote right rib fractures noted. Given asa 325 x1.     Procedure(s) (LRB):  Angiogram, Coronary, with Left Heart Cath (N/A)  Stent, Drug Eluting, Single Vessel, Coronary      Hospital Course:   Pt placed in obs for high risk chest pain needing ACS r/o. Pt HDS and afebrile. Has no new O2 requirements. Trop initially elevated 0.023  and down trended 0.015. CXR w/ increased interstitial markings, no consolidations. ECG showed normal sinus rhythm with sinus arrhythmia Treated w/ asa. Electrolytes placed as needed. PET stress and was abnormal. Underwent Angiogram, Coronary, with Left Heart Cath (N/A)  Stent, Drug Eluting, Single Vessel, Coronary w/ interventional cardiology 5/27/24. Plavix for at least 1 year and ASA 81 mg indefinitely. Referral placed for cardiac rehab. Given ambulatory referral to wound care for LLE stump wound. Pt med rec'd and medications were delivered to bedside prior to discharge. Pt medically ready for discharge home. Pt educated on hospital course and plan, verbally agrees and understands. All questions answered.     Physical Exam  Gen: in NAD, appears stated age  Neuro: AAOx3, motor, sensory, and strength grossly intact BL  HEENT: EOMI, PERRLA; no JVD appreciated  CVS: RRR, no m/r/g  Resp: lungs CTAB, no w/r/r; no belabored breathing or accessory muscle use appreciated   Abd: NTND, soft to palpation  Extrem: no UE or LE edema BL      Goals of Care Treatment Preferences:  Code Status: Full Code          What is most important right now is to focus on extending life as long as possible, even it it means sacrificing quality.  Accordingly, we have decided that the best plan to meet the patient's goals includes continuing with treatment.      Consults:   Consults (From admission, onward)          Status Ordering Provider     Inpatient consult to Interventional Cardiology  Once        Provider:  (Not yet assigned)    Completed PETRONA JIMENEZ            Cardiac/Vascular  * Chest pain  - trop 0.023, 0.01  -   - ECG reveals NSR w/o gross ischemic change  - CXR unremarkable  - asa 325 x1 in ED  - SL NGT prn  - last echo noted, repeat TTE ordered  - NM stress positive. Interventional cardiology consulted.  - tele monitoring  - K>4, Mg>2      Final Active Diagnoses:    Diagnosis Date Noted POA    PRINCIPAL PROBLEM:   Chest pain [R07.9] 05/25/2024 Yes    Moderate aortic valve stenosis [I35.0] 05/28/2024 Yes    Chronic heart failure, unspecified heart failure type [I50.9] 09/13/2023 Yes    Chronic obstructive pulmonary disease [J44.9] 07/12/2021 Yes    Essential hypertension [I10] 07/12/2021 Yes    HLD (hyperlipidemia) [E78.5] 07/12/2021 Yes    Hx of transient ischemic attack (TIA) [Z86.73] 07/12/2021 Not Applicable    Chronic respiratory failure with hypoxia and hypercapnia [J96.11, J96.12] 03/01/2020 Yes    NSTEMI (non-ST elevated myocardial infarction) [I21.4] 12/09/2013 Yes    CAD (coronary artery disease) [I25.10] 03/22/2013 Yes      Problems Resolved During this Admission:       Discharged Condition: good    Disposition: Home or Self Care    Follow Up:    Patient Instructions:      Ambulatory referral/consult to Smoking Cessation Program   Standing Status: Future   Referral Priority: Routine Referral Type: Consultation   Referral Reason: Specialty Services Required   Requested Specialty: CTTS   Number of Visits Requested: 1     Notify your health care provider if you experience any of the following:  temperature >100.4     Notify your health care provider if you experience any of the following:  severe uncontrolled pain     Notify your health care provider if you experience any of the following:  redness, tenderness, or signs of infection (pain, swelling, redness, odor or green/yellow discharge around incision site)     Notify your health care provider if you experience any of the following:  difficulty breathing or increased cough     Notify your health care provider if you experience any of the following:  persistent dizziness, light-headedness, or visual disturbances     Cardiac rehab phase II   Standing Status: Future Standing Exp. Date: 05/27/25     Order Specific Question Answer Comments   Department [facility] CARDIAC REHAB    Select qualifying diagnosis: Z98.61 - Coronary angioplasty status      Activity as tolerated        Significant Diagnostic Studies: Labs: All labs within the past 24 hours have been reviewed    Pending Diagnostic Studies:       Procedure Component Value Units Date/Time    EKG 12-LEAD on arrival to floor [[id number]]     Order Status: Sent Lab Status: No result     EKG 12-lead [[id number]]     Order Status: Sent Lab Status: No result            Medications:  Reconciled Home Medications:      Medication List        START taking these medications      nicotine 14 mg/24 hr  Commonly known as: NICODERM CQ  Place 1 patch onto the skin once daily.            CONTINUE taking these medications      albuterol 90 mcg/actuation inhaler  Commonly known as: PROVENTIL/VENTOLIN HFA  Inhale 2 puffs into the lungs every 6 (six) hours. Rescue     albuterol-ipratropium 2.5 mg-0.5 mg/3 mL nebulizer solution  Commonly known as: DUO-NEB  Take 3 mLs by nebulization every 6 (six) hours as needed for Wheezing or Shortness of Breath.     aspirin 81 MG EC tablet  Commonly known as: ECOTRIN  Take 1 tablet (81 mg total) by mouth once daily.     atorvastatin 80 MG tablet  Commonly known as: LIPITOR  Take 1 tablet (80 mg total) by mouth once daily.     budesonide-formoterol 80-4.5 mcg 80-4.5 mcg/actuation Hfaa  Commonly known as: SYMBICORT  INHALE 2 PUFFS INTO THE LUNGS TWICE DAILY. RINSE MOUTH AFTER USE     clopidogreL 75 mg tablet  Commonly known as: PLAVIX  Take 1 tablet (75 mg total) by mouth once daily.     hydrocortisone-aloe vera 1 % Crea cream  Apply topically 2 (two) times daily.     metoprolol succinate 25 MG 24 hr tablet  Commonly known as: TOPROL-XL  Take 1 tablet (25 mg total) by mouth once daily.     nitroGLYCERIN 0.4 MG SL tablet  Commonly known as: NITROSTAT  Place 1 tablet (0.4 mg total) under the tongue every 5 (five) minutes as needed for Chest pain.              Indwelling Lines/Drains at time of discharge:   Lines/Drains/Airways       Drain  Duration                  Colostomy 07/13/21 0201 Descending/sigmoid LLQ 1050  days    Female External Urinary Catheter w/ Suction 05/27/24 2238 <1 day                    Time spent on the discharge of patient: 36 minutes         Pau Card PA-C  Department of Hospital Medicine  [facility] - Cardiology Stepdown

## 2024-05-28 NOTE — PLAN OF CARE
AAOX4,VSS,O2 sats>90% on 3L NC.Plan of care discussed with patient.Patient has no complaints of pain/SOB. Discussed medications and care. Patient has no questions at this time.Pt visualised and stable.Call light within reach.Pt resting,bed at lowest position.

## 2024-05-29 ENCOUNTER — PATIENT OUTREACH (OUTPATIENT)
Dept: ADMINISTRATIVE | Facility: CLINIC | Age: 79
End: 2024-05-29
Payer: MEDICARE

## 2024-05-29 NOTE — PROGRESS NOTES
C3 nurse spoke with Radha Sotelo for a TCC post hospital discharge follow up call. The patient has a scheduled [facility] appointment with [facility], Kalyn Pemberton NP on 6/5/2024 @ 0800.

## 2024-06-05 ENCOUNTER — TELEPHONE (OUTPATIENT)
Dept: HOME HEALTH SERVICES | Facility: CLINIC | Age: 79
End: 2024-06-05

## 2024-06-05 ENCOUNTER — CARE AT HOME (OUTPATIENT)
Dept: HOME HEALTH SERVICES | Facility: CLINIC | Age: 79
End: 2024-06-05
Payer: MEDICARE

## 2024-06-05 VITALS — RESPIRATION RATE: 16 BRPM | TEMPERATURE: 98 F | HEART RATE: 72 BPM | OXYGEN SATURATION: 97 %

## 2024-06-05 DIAGNOSIS — J44.9 CHRONIC OBSTRUCTIVE PULMONARY DISEASE, UNSPECIFIED COPD TYPE: ICD-10-CM

## 2024-06-05 DIAGNOSIS — Z93.3 S/P COLOSTOMY: ICD-10-CM

## 2024-06-05 DIAGNOSIS — Z89.431 HISTORY OF TRANSMETATARSAL AMPUTATION OF RIGHT FOOT: Primary | ICD-10-CM

## 2024-06-05 DIAGNOSIS — F17.210 TOBACCO DEPENDENCE DUE TO CIGARETTES: ICD-10-CM

## 2024-06-05 DIAGNOSIS — I25.10 CORONARY ARTERY DISEASE INVOLVING NATIVE CORONARY ARTERY OF NATIVE HEART, UNSPECIFIED WHETHER ANGINA PRESENT: ICD-10-CM

## 2024-06-05 DIAGNOSIS — L08.9 SOFT TISSUE INFECTION OF FOOT: ICD-10-CM

## 2024-06-05 DIAGNOSIS — T14.8XXD DELAYED WOUND HEALING: Primary | ICD-10-CM

## 2024-06-05 PROCEDURE — 1160F RVW MEDS BY RX/DR IN RCRD: CPT | Mod: CPTII,S$GLB,, | Performed by: NURSE PRACTITIONER

## 2024-06-05 PROCEDURE — 1159F MED LIST DOCD IN RCRD: CPT | Mod: CPTII,S$GLB,, | Performed by: NURSE PRACTITIONER

## 2024-06-05 PROCEDURE — 99495 TRANSJ CARE MGMT MOD F2F 14D: CPT | Mod: S$GLB,,, | Performed by: NURSE PRACTITIONER

## 2024-06-05 PROCEDURE — 1111F DSCHRG MED/CURRENT MED MERGE: CPT | Mod: CPTII,S$GLB,, | Performed by: NURSE PRACTITIONER

## 2024-06-05 RX ORDER — CEPHALEXIN 500 MG/1
500 CAPSULE ORAL 3 TIMES DAILY
Qty: 21 CAPSULE | Refills: 0 | Status: SHIPPED | OUTPATIENT
Start: 2024-06-05 | End: 2024-06-12

## 2024-06-05 NOTE — TELEPHONE ENCOUNTER
Faxed orders, notes, and demographics to [facility] for in home wound care per NP request.  Fax confirmation received.

## 2024-06-05 NOTE — ASSESSMENT & PLAN NOTE
Patient's COPD is controlled currently.   Continue scheduled inhalers   Supplemental oxygen in place  - continue duonebs prn-albuterol ordered via [employer] to assist with med and delivery

## 2024-06-05 NOTE — PROGRESS NOTES
[facility]  Transitional Care Management (TCM) Home Visit    Encounter Provider: Kalyn Pemberton   PCP: Kalyn Pemberton, NP  Consult Requested By: No ref. provider found  Admit Date: 5/25/24   IP Discharge Date: 5/28/24  Hospital Length of Stay: 3 days  Days since discharge (from IP or SNF): 8 days  [facility] Contact Note: 5/29/24  Hospital Diagnosis: No admission diagnoses are documented for this encounter.     HISTORY OF PRESENT ILLNESS      Patient ID: Radha Sotelo is a 79 y.o. female was recently admitted to the hospital, this is their TCM encounter.    Hospital Course Synopsis:  Pt placed in obs for high risk chest pain needing ACS r/o. Pt HDS and afebrile. Has no new O2 requirements. Trop initially elevated 0.023 and down trended 0.015. CXR w/ increased interstitial markings, no consolidations. ECG showed normal sinus rhythm with sinus arrhythmia Treated w/ asa. Electrolytes placed as needed. PET stress and was abnormal. Underwent Angiogram, Coronary, with Left Heart Cath (N/A)  Stent, Drug Eluting, Single Vessel, Coronary w/ interventional cardiology 5/27/24. Plavix for at least 1 year and ASA 81 mg indefinitely. Referral placed for cardiac rehab. Given ambulatory referral to wound care for LLE stump wound.    Today: She is found in her apartment. AAOx3, agreeable to this visit. She reports no further episodes of chest pain or SOB since hospital discharge. Wound to her right foot is most concerning for her today. She has a partial amputation to that foot previously for gangrene and currently has a non-healing wound along the incision. She saw the wound nurse while hospitalized and was instructed to paint with betadine daily. She is doing this and HH is coming later today to begin services. She is concerned as her foot is becoming more swollen, red and wound now has some drainage and white tissue. She also reports an increase in pain.    She has all medications in the home, including NTG  for angina. She is taking all as scheduled. Prior to this admit, she was awaiting an appeal for albuterol for her nebulizer. Reports appeal confusing, she is not sure of answer, but does have some meds for about 2 week at this time.      DECISION MAKING TODAY       Assessment & Plan:  1. Delayed wound healing  Assessment & Plan:  See soft tissue infection    Orders:  -     Ambulatory referral/consult to Wound Clinic; Future; Expected date: 06/12/2024    2. S/P colostomy  Assessment & Plan:  Diverticulitis s/p left hemicolectomy and transverse colon end colostomy (2012)  Pt cares for stoma and appliance changes  Declines any need for HH      3. 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 Tobacco dependence due to cigarettes  Assessment & Plan:  Not currently smoking  Nicotine patches in use  Continued cessation reinforced      5. Coronary artery disease involving native coronary artery of native heart, unspecified whether angina present  Assessment & Plan:  Patient with known CAD s/p stent placement, which is controlled Will continue ASA, Plavix, and Statin and monitor for S/Sx of angina/ACS. Continue to monitor  No recent angina      6. Chronic obstructive pulmonary disease, unspecified COPD type  Assessment & Plan:  Patient's COPD is controlled currently.   Continue scheduled inhalers   Supplemental oxygen in place  - continue duonebs prn-albuterol [facility] assist with med and delivery      Other orders  -     cephALEXin (KEFLEX) 500 MG capsule; Take 1 capsule (500 mg total) by mouth 3 (three) times daily. for 7 days  Dispense: 21 capsule; Refill: 0         Medication List on Discharge:     Medication List            Accurate as  of June 5, 2024  3:32 PM. If you have any questions, ask your nurse or doctor.                START taking these medications      cephALEXin 500 MG capsule  Commonly known as: KEFLEX  Take 1 capsule (500 mg total) by mouth 3 (three) times daily. for 7 days  Started by: Kalyn Pemberton NP            CONTINUE taking these medications      albuterol 90 mcg/actuation inhaler  Commonly known as: PROVENTIL/VENTOLIN HFA  Inhale 2 puffs into the lungs every 6 (six) hours. Rescue     albuterol-ipratropium 2.5 mg-0.5 mg/3 mL nebulizer solution  Commonly known as: DUO-NEB  Take 3 mLs by nebulization every 6 (six) hours as needed for Wheezing or Shortness of Breath.     aspirin 81 MG EC tablet  Commonly known as: ECOTRIN  Take 1 tablet (81 mg total) by mouth once daily.     atorvastatin 80 MG tablet  Commonly known as: LIPITOR  Take 1 tablet (80 mg total) by mouth once daily.     budesonide-formoterol 80-4.5 mcg 80-4.5 mcg/actuation Hfaa  Commonly known as: SYMBICORT  INHALE 2 PUFFS INTO THE LUNGS TWICE DAILY. RINSE MOUTH AFTER USE     clopidogreL 75 mg tablet  Commonly known as: PLAVIX  Take 1 tablet (75 mg total) by mouth once daily.     hydrocortisone-aloe vera 1 % Crea cream  Apply topically 2 (two) times daily.     metoprolol succinate 25 MG 24 hr tablet  Commonly known as: TOPROL-XL  Take 1 tablet (25 mg total) by mouth once daily.     nicotine 14 mg/24 hr  Commonly known as: NICODERM CQ  Place 1 patch onto the skin once daily.     nitroGLYCERIN 0.4 MG SL tablet  Commonly known as: NITROSTAT  Place 1 tablet (0.4 mg total) under the tongue every 5 (five) minutes as needed for Chest pain.              Medication Reconciliation:  Were medications changed on discharge? Yes  Were medications in the home? Yes  Is the patient taking the medications as directed? Yes  Does the patient understand the medications and changes? Yes  Does updated med list accurately reflects meds patient is currently taking? Yes    ENVIRONMENT OF  CARE      Family and/or Caregiver present at visit?  No  Name of Caregiver:   History provided by: patient    Advance Care Planning   Advanced Care Planning Status:  Patient has had an ACP conversation  Living Will: No  Power of : No  [place]: No      Is patient hospice appropriate: No  (If needed, use PPS <30 or FAST score >7)  Was referral to hospice placed: No       Impression upon entering the home:  Physical Dwelling: apartment/condo   Appearance of home environment: cleaniness: clean  Functional Status: independent  Mobility: ambulatory with device  Nutritional access: adequate intake and access  Home Health: Yes, [facility]    DME/Supplies: rolling walker and oxygen     Diagnostic tests reviewed/disposition: No diagnosic tests pending after this hospitalization.  Disease/illness education:  Copd  Establishment or re-establishment of referral orders for community resources: No other necessary community resources.   Discussion with other health care providers: No discussion with other health care providers necessary.   Does patient have a PCP at [place]? Yes   Repatriation plan with PCP? [facility] reason: transportation   Does patient have an ostomy (ileostomy, colostomy, suprapubic catheter, nephrostomy tube, tracheostomy, PEG tube, pleurex catheter, cholecystostomy, etc)? Yes. Is it on problem list?yes  Were BPAs reviewed? Yes    Social History     Socioeconomic History    Marital status:    Tobacco Use    Smoking status: Every Day     Current packs/day: 0.25     Average packs/day: 0.5 packs/day for 60.9 years (30.1 ttl pk-yrs)     Types: Cigarettes     Start date: 7/5/1963    Smokeless tobacco: Never   Substance and Sexual Activity    Alcohol use: No    Drug use: No   Social History Narrative    ** Merged History Encounter **          Social Determinants of Health     Financial Resource Strain: Low Risk  (5/7/2024)    Overall Financial Resource Strain (CARDIA)     Difficulty of Paying  Living Expenses: Not hard at all   Food Insecurity: No Food Insecurity (5/7/2024)    Hunger Vital Sign     Worried About Running Out of Food in the Last Year: Never true     Ran Out of Food in the Last Year: Never true   Transportation Needs: No Transportation Needs (5/7/2024)    PRAPARE - Transportation     Lack of Transportation (Medical): No     Lack of Transportation (Non-Medical): No   Physical Activity: Inactive (5/7/2024)    Exercise Vital Sign     Days of Exercise per Week: 0 days     Minutes of Exercise per Session: 0 min   Stress: No Stress Concern Present (5/7/2024)    [employer] - Occupational Stress Questionnaire     Feeling of Stress : Not at all   Housing Stability: Low Risk  (5/7/2024)    Housing Stability Vital Sign     Unable to Pay for Housing in the Last Year: No     Homeless in the Last Year: No       OBJECTIVE:     Vital Signs:  Vitals:    06/05/24 1000   Pulse: 72   Resp: 16   Temp: 97.8 °F (36.6 °C)       Review of Systems   Constitutional:  Negative for activity change, fatigue and fever.   HENT: Negative.     Eyes: Negative.    Respiratory:  Positive for shortness of breath. Negative for chest tightness.    Cardiovascular: Negative.  Negative for leg swelling.   Gastrointestinal: Negative.    Endocrine: Negative.    Genitourinary: Negative.    Musculoskeletal: Negative.    Skin:  Positive for wound (see photo).   Allergic/Immunologic: Negative.    Neurological: Negative.    Hematological: Negative.    Psychiatric/Behavioral: Negative.  Negative for agitation.    All other systems reviewed and are negative.      Physical Exam:  Physical Exam  Vitals reviewed.   Constitutional:       General: She is not in acute distress.     Appearance: She is well-developed.   HENT:      Head: Normocephalic and atraumatic.      Nose: Nose normal.      Mouth/Throat:      Mouth: Mucous membranes are dry.   Eyes:      Pupils: Pupils are equal, round, and reactive to light.    Cardiovascular:      Rate and Rhythm: Normal rate and regular rhythm.      Heart sounds: Normal heart sounds.   Pulmonary:      Effort: Pulmonary effort is normal.      Comments: Diminished to bases    Abdominal:      General: Bowel sounds are normal.      Palpations: Abdomen is soft.   Musculoskeletal:         General: Normal range of motion.      Cervical back: Normal range of motion and neck supple.   Skin:     General: Skin is warm and dry.      Comments: Wound to right foot    Neurological:      Mental Status: She is alert and oriented to person, place, and time.      Cranial Nerves: No cranial nerve deficit.   Psychiatric:         Behavior: Behavior normal.         Thought Content: Thought content normal.         Judgment: Judgment normal.         INSTRUCTIONS FOR PATIENT:     Scheduled Follow-up, Appts Reviewed with Modifications if Needed: Yes  Future Appointments   Date Time Provider Department Center   7/8/2024  8:00 AM Kalyn Pemberton NP [address]       Signature: Kalyn Pemberton NP    Transition of Care Visit:  I have reviewed and updated the history and problem list.  I have reconciled the medication list.  I have discussed the hospitalization and current medical issues, prognosis and plans with the patient/family.

## 2024-06-05 NOTE — ASSESSMENT & PLAN NOTE
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

## 2024-06-05 NOTE — TELEPHONE ENCOUNTER
Order placed with [facility] for Medications for patient's nebulizer and sent to [facility] per NP request.

## 2024-06-07 ENCOUNTER — TELEPHONE (OUTPATIENT)
Dept: HOME HEALTH SERVICES | Facility: CLINIC | Age: 79
End: 2024-06-07
Payer: MEDICARE

## 2024-06-11 ENCOUNTER — DOCUMENTATION ONLY (OUTPATIENT)
Dept: CARDIAC REHAB | Facility: CLINIC | Age: 79
End: 2024-06-11
Payer: MEDICARE

## 2024-06-11 ENCOUNTER — TELEPHONE (OUTPATIENT)
Dept: CARDIAC REHAB | Facility: CLINIC | Age: 79
End: 2024-06-11
Payer: MEDICARE

## 2024-06-11 NOTE — PROGRESS NOTES
Pt has partial amputation of left foot and is receiving wound care. Order for cardiac rehab deferred at this time.

## 2024-06-18 NOTE — PHYSICIAN QUERY
Due to the conflicting clinical picture, please clinically validate the NSTEMI.   If validated, please provide additional clinical support for the NSTEMI.     The condition is confirmed. Please specify clinical support (signs, symptoms, and treatment) for the confirmed diagnosis: elevated troponin, CAD requiring intervention

## 2024-06-20 NOTE — PHYSICIAN QUERY
Please clarify if there is any clinical correlation between the In Stent Restenosis and NSTEMI. Are the conditions:    Due to or associated with each other.    Karel Mack

## 2024-07-08 ENCOUNTER — CARE AT HOME (OUTPATIENT)
Dept: HOME HEALTH SERVICES | Facility: CLINIC | Age: 79
End: 2024-07-08
Payer: MEDICARE

## 2024-07-08 DIAGNOSIS — J44.9 CHRONIC OBSTRUCTIVE PULMONARY DISEASE, UNSPECIFIED COPD TYPE: ICD-10-CM

## 2024-07-08 DIAGNOSIS — I10 ESSENTIAL HYPERTENSION: ICD-10-CM

## 2024-07-08 DIAGNOSIS — G62.9 NEUROPATHY: Primary | ICD-10-CM

## 2024-07-08 PROCEDURE — 99442 PR PHYSICIAN TELEPHONE EVALUATION 11-20 MIN: CPT | Mod: 95,S$GLB,, | Performed by: NURSE PRACTITIONER

## 2024-07-08 RX ORDER — GABAPENTIN 100 MG/1
100 CAPSULE ORAL 3 TIMES DAILY
Qty: 270 CAPSULE | Refills: 3 | Status: SHIPPED | OUTPATIENT
Start: 2024-07-08 | End: 2025-07-08

## 2024-07-08 NOTE — PROGRESS NOTES
Established Patient - Audio Only Telehealth Visit     The patient location is: HOME  The chief complaint leading to consultation is: routine care  Visit type: Virtual visit with audio only (telephone)     The reason for the audio only service rather than synchronous audio and video virtual visit was related to technical difficulties or patient preference/necessity.     Each patient to whom I provide medical services by telemedicine is:  (1) informed of the relationship between the physician and patient and the respective role of any other health care provider with respect to management of the patient; and (2) notified that they may decline to receive medical services by telemedicine and may withdraw from such care at any time. Patient verbally consented to receive this service via voice-only telephone call.       Subjective:      Patient ID: Radha Sotelo is a 79 y.o. female.    Prior to this visit, patient's last encounter with PCP was 5/7/2024.    Pt reports burning pain to BLE feeling like a rock is in her shoe when she walks, main complaint today is decreased ability to walk due to burning pain/foot wound. She has a wound to her right foot. Seen after admit in May for partial amputation and infection. She completed antibiotics but continued to have a wound and no transportation to clinic for wound care. She was referred to [facility] for home based wound care and she reports they have been seeing her. [facility] and HH providing wound care 3x week. Denies any pain medication use at this time. No recent falls      HTN  - Currently taking: Metoprolol  - How often taking BP at home: does not but HH takes 3x week, average is: 120/70      HLD  - Currently taking: Atorvastatin  - Last lipid panel was on 11/29/23   LDL 84  The ASCVD Risk score (Anthony MIRZA, et al., 2019) failed to calculate for the following reasons:    The patient has a prior MI or stroke diagnosis       Asthma/COPD  - Currently taking:  Symbicort/Albuterol  - Smoking status: No desire to quit  - On 2 L of O2 at home      Neuropathy:  -right foot/s/p partial amputation with chronic wounds  Start gabapentin 100mg TID will titrate as needed            FU in 1 mon to specifically check neuropathy      4Ms for Medical Decision-Making in Older Adults    Last Completed EAWV: 2024    MOBILITY:  Get Up and Go:      2024     9:08 AM   Get Up and Go   Trial 1 10 seconds     Activities of Daily Livin/7/2024     9:06 AM   Activities of Daily Living   Ambulation Independent   Dressing Independent   Transfers Independent   Toileting Continent of bladder;Continent of bowel   Feeding Independent   Cleaning home/Chores Independent   Telephone use Independent   Shopping Independent   Paying bills Independent   Taking meds Independent     Whisper Test:      2024     9:08 AM   Whisper Test   Whisper Test Normal     Disability Status:      2024     9:06 AM   Disability Status   Are you deaf or do you have serious difficulty hearing? N   Are you blind or do you have serious difficulty seeing, even when wearing glasses? N   Because of a physical, mental, or emotional condition, do you have serious difficulty concentrating, remembering, or making decisions? N   Do you have serious difficulty walking or climbing stairs? Y   Do you have difficulty dressing or bathing? N   Because of a physical, mental, or emotional condition, do you have difficulty doing errands alone such as visiting a doctor's office or shopping? N     Nutrition Screenin/7/2024     9:05 AM   Nutrition Screening   Has food intake declined over the past three months due to loss of appetite, digestive problems, chewing or swallowing difficulties? No decrease in food intake   Involuntary weight loss during the last 3 months? No weight loss   Mobility? Goes out   Has the patient suffered psychological stress or acute disease in the past three months? No   Neuropsychological  problems? No psychological problems   Body Mass Index (BMI)?  BMI 21 to less than 23   Screening Score 13   Interpretation Normal nutritional status    Screening Score: 0-7 Malnourished, 8-11 At Risk, 12-14 Normal    MENTATION:   Depression Patient Health Questionnaire:      2024     9:08 AM   Depression Patient Health Questionnaire   Over the last two weeks how often have you been bothered by little interest or pleasure in doing things Not at all   Over the last two weeks how often have you been bothered by feeling down, depressed or hopeless Not at all   PHQ-2 Total Score 0     Has Dementia Dx: No    Cognitive Function Screenin/7/2024     9:08 AM   Cognitive Function Screening   Clock Drawing Test 1   Mini-Cog 3 Minute Recall 3   Cognitive Function Screening 4     Cognitive Function Screening Total - Less than 4 = Abnormal,  Greater than or equal to 4 = Normal    MEDICATIONS:  High Risk Medications:  Total Active Medications: 1  gabapentin - 100 MG    WHAT MATTERS MOST:  Advance Care Planning   ACP Status:   Patient has had an ACP conversation  Living Will: No  Power of : No  LaPOST: No    What is most important right now is to focus on extending life as long as possible, even it it means sacrificing quality    Accordingly, we have decided that the best plan to meet the patient's goals includes continuing with treatment            Social History     Socioeconomic History    Marital status:    Tobacco Use    Smoking status: Every Day     Current packs/day: 0.25     Average packs/day: 0.5 packs/day for 61.0 years (30.1 ttl pk-yrs)     Types: Cigarettes     Start date: 1963    Smokeless tobacco: Never   Substance and Sexual Activity    Alcohol use: No    Drug use: No   Social History Narrative    ** Merged History Encounter **          Social Determinants of Health     Financial Resource Strain: Low Risk  (2024)    Overall Financial Resource Strain (CARDIA)     Difficulty of Paying  Living Expenses: Not hard at all   Food Insecurity: No Food Insecurity (5/7/2024)    Hunger Vital Sign     Worried About Running Out of Food in the Last Year: Never true     Ran Out of Food in the Last Year: Never true   Transportation Needs: No Transportation Needs (5/7/2024)    PRAPARE - Transportation     Lack of Transportation (Medical): No     Lack of Transportation (Non-Medical): No   Physical Activity: Inactive (5/7/2024)    Exercise Vital Sign     Days of Exercise per Week: 0 days     Minutes of Exercise per Session: 0 min   Stress: No Stress Concern Present (5/7/2024)    [employer] - Occupational Stress Questionnaire     Feeling of Stress : Not at all   Housing Stability: Low Risk  (5/7/2024)    Housing Stability Vital Sign     Unable to Pay for Housing in the Last Year: No     Homeless in the Last Year: No       Review of Systems    Objective:     Lab Results   Component Value Date    WBC 6.55 05/28/2024    HGB 12.5 05/28/2024    HCT 39.5 05/28/2024     05/28/2024    CHOL 152 11/29/2023    TRIG 114 11/29/2023    HDL 45 11/29/2023    ALT 9 (L) 05/25/2024    AST 13 05/25/2024     05/28/2024    K 4.4 05/28/2024     05/28/2024    CREATININE 0.8 05/28/2024    BUN 30 (H) 05/28/2024    CO2 27 05/28/2024    TSH 0.882 07/12/2021    INR 1.0 11/29/2023    HGBA1C 5.6 08/19/2023         Assessment:   79 y.o. female with multiple co-morbid illnesses here to continue work-up of chronic issues.     Plan:   1. Neuropathy  -     gabapentin (NEURONTIN) 100 MG capsule; Take 1 capsule (100 mg total) by mouth 3 (three) times daily.  Dispense: 270 capsule; Refill: 3    2. Chronic obstructive pulmonary disease, unspecified COPD type    3. Essential hypertension       See abov3  Health Maintenance         Date Due Completion Date    Pneumococcal Vaccines (Age 65+) (1 of 2 - PCV) Never done ---    TETANUS VACCINE Never done ---    LDCT Lung Screen 05/07/2025 (Originally 3/1/2021)  3/1/2020    RSV Vaccine (Age 60+ and Pregnant patients) (1 - 1-dose 60+ series) 05/07/2025 (Originally 5/31/2006) ---    Influenza Vaccine (1) 09/01/2024 ---    Lipid Panel 11/29/2028 11/29/2023            No follow-ups on file. . 15 minutes spent with this patient today in a non-face to face encounter.        This service was not originating from a related E/M service provided within the previous 7 days nor will  to an E/M service or procedure within the next 24 hours or my soonest available appointment.  Prevailing standard of care was able to be met in this audio-only visit.

## 2024-07-12 DIAGNOSIS — J44.9 CHRONIC OBSTRUCTIVE PULMONARY DISEASE, UNSPECIFIED COPD TYPE: ICD-10-CM

## 2024-07-12 RX ORDER — BUDESONIDE AND FORMOTEROL FUMARATE DIHYDRATE 80; 4.5 UG/1; UG/1
AEROSOL RESPIRATORY (INHALATION)
Qty: 10.2 G | Refills: 6 | Status: SHIPPED | OUTPATIENT
Start: 2024-07-12

## 2024-07-14 ENCOUNTER — HOSPITAL ENCOUNTER (EMERGENCY)
Facility: HOSPITAL | Age: 79
Discharge: HOME | End: 2024-07-14
Attending: EMERGENCY MEDICINE
Payer: MEDICARE

## 2024-07-14 VITALS
RESPIRATION RATE: 20 BRPM | HEART RATE: 101 BPM | WEIGHT: 117 LBS | SYSTOLIC BLOOD PRESSURE: 163 MMHG | OXYGEN SATURATION: 94 % | BODY MASS INDEX: 22.85 KG/M2 | TEMPERATURE: 98 F | DIASTOLIC BLOOD PRESSURE: 68 MMHG

## 2024-07-14 DIAGNOSIS — R60.0 LOWER EXTREMITY EDEMA: ICD-10-CM

## 2024-07-14 DIAGNOSIS — R06.02 SHORTNESS OF BREATH: ICD-10-CM

## 2024-07-14 DIAGNOSIS — R06.02 SOB (SHORTNESS OF BREATH): ICD-10-CM

## 2024-07-14 LAB
ALBUMIN SERPL BCP-MCNC: 3.7 G/DL (ref 3.5–5.2)
ALP SERPL-CCNC: 120 U/L (ref 55–135)
ALT SERPL W/O P-5'-P-CCNC: 8 U/L (ref 10–44)
ANION GAP SERPL CALC-SCNC: 8 MMOL/L (ref 8–16)
AST SERPL-CCNC: 13 U/L (ref 10–40)
BASOPHILS # BLD AUTO: 0.04 K/UL (ref 0–0.2)
BASOPHILS NFR BLD: 0.5 % (ref 0–1.9)
BILIRUB SERPL-MCNC: 0.5 MG/DL (ref 0.1–1)
BNP SERPL-MCNC: 80 PG/ML (ref 0–99)
BUN SERPL-MCNC: 23 MG/DL (ref 8–23)
CALCIUM SERPL-MCNC: 9.2 MG/DL (ref 8.7–10.5)
CHLORIDE SERPL-SCNC: 96 MMOL/L (ref 95–110)
CO2 SERPL-SCNC: 37 MMOL/L (ref 23–29)
CREAT SERPL-MCNC: 0.8 MG/DL (ref 0.5–1.4)
DIFFERENTIAL METHOD BLD: ABNORMAL
EOSINOPHIL # BLD AUTO: 0.1 K/UL (ref 0–0.5)
EOSINOPHIL NFR BLD: 0.8 % (ref 0–8)
ERYTHROCYTE [DISTWIDTH] IN BLOOD BY AUTOMATED COUNT: 12.6 % (ref 11.5–14.5)
EST. GFR  (NO RACE VARIABLE): >60 ML/MIN/1.73 M^2
GLUCOSE SERPL-MCNC: 120 MG/DL (ref 70–110)
HCT VFR BLD AUTO: 39 % (ref 37–48.5)
HGB BLD-MCNC: 11.2 G/DL (ref 12–16)
IMM GRANULOCYTES # BLD AUTO: 0.04 K/UL (ref 0–0.04)
IMM GRANULOCYTES NFR BLD AUTO: 0.5 % (ref 0–0.5)
LYMPHOCYTES # BLD AUTO: 0.5 K/UL (ref 1–4.8)
LYMPHOCYTES NFR BLD: 6.6 % (ref 18–48)
MCH RBC QN AUTO: 29.7 PG (ref 27–31)
MCHC RBC AUTO-ENTMCNC: 28.7 G/DL (ref 32–36)
MCV RBC AUTO: 103 FL (ref 82–98)
MONOCYTES # BLD AUTO: 0.3 K/UL (ref 0.3–1)
MONOCYTES NFR BLD: 3.3 % (ref 4–15)
NEUTROPHILS # BLD AUTO: 6.7 K/UL (ref 1.8–7.7)
NEUTROPHILS NFR BLD: 88.3 % (ref 38–73)
NRBC BLD-RTO: 0 /100 WBC
OHS QRS DURATION: 76 MS
OHS QTC CALCULATION: 440 MS
PLATELET # BLD AUTO: 185 K/UL (ref 150–450)
PMV BLD AUTO: 10.1 FL (ref 9.2–12.9)
POTASSIUM SERPL-SCNC: 4.3 MMOL/L (ref 3.5–5.1)
PROT SERPL-MCNC: 6.8 G/DL (ref 6–8.4)
RBC # BLD AUTO: 3.77 M/UL (ref 4–5.4)
SARS-COV-2 RDRP RESP QL NAA+PROBE: NEGATIVE
SODIUM SERPL-SCNC: 141 MMOL/L (ref 136–145)
WBC # BLD AUTO: 7.61 K/UL (ref 3.9–12.7)

## 2024-07-14 PROCEDURE — 80053 COMPREHEN METABOLIC PANEL: CPT | Performed by: EMERGENCY MEDICINE

## 2024-07-14 PROCEDURE — U0002 COVID-19 LAB TEST NON-CDC: HCPCS | Performed by: EMERGENCY MEDICINE

## 2024-07-14 PROCEDURE — 25000003 PHARM REV CODE 250

## 2024-07-14 PROCEDURE — 93005 ELECTROCARDIOGRAM TRACING: CPT

## 2024-07-14 PROCEDURE — 99285 EMERGENCY DEPT VISIT HI MDM: CPT | Mod: 25

## 2024-07-14 PROCEDURE — 63600175 PHARM REV CODE 636 W HCPCS: Performed by: EMERGENCY MEDICINE

## 2024-07-14 PROCEDURE — 83880 ASSAY OF NATRIURETIC PEPTIDE: CPT

## 2024-07-14 PROCEDURE — 93010 ELECTROCARDIOGRAM REPORT: CPT | Mod: ,,, | Performed by: INTERNAL MEDICINE

## 2024-07-14 PROCEDURE — 85025 COMPLETE CBC W/AUTO DIFF WBC: CPT | Performed by: EMERGENCY MEDICINE

## 2024-07-14 PROCEDURE — 96374 THER/PROPH/DIAG INJ IV PUSH: CPT

## 2024-07-14 RX ORDER — HYDROCODONE BITARTRATE AND ACETAMINOPHEN 5; 325 MG/1; MG/1
1 TABLET ORAL
Status: COMPLETED | OUTPATIENT
Start: 2024-07-14 | End: 2024-07-14

## 2024-07-14 RX ORDER — KETOROLAC TROMETHAMINE 30 MG/ML
10 INJECTION, SOLUTION INTRAMUSCULAR; INTRAVENOUS
Status: COMPLETED | OUTPATIENT
Start: 2024-07-14 | End: 2024-07-14

## 2024-07-14 RX ADMIN — HYDROCODONE BITARTRATE AND ACETAMINOPHEN 1 TABLET: 5; 325 TABLET ORAL at 11:07

## 2024-07-14 RX ADMIN — KETOROLAC TROMETHAMINE 10 MG: 30 INJECTION, SOLUTION INTRAMUSCULAR; INTRAVENOUS at 09:07

## 2024-07-14 NOTE — ED NOTES
The patient is awake, alert and acting age appropriately.    No apparent distress noted. Airway is open and patent.  Respirations with normal effort and rate noted. No needs at this time. Will continue to monitor.

## 2024-07-14 NOTE — ED NOTES
Pt assisted to SPD wheelchair. Pt is awake, alert and oriented x 3. Pt is angry and yelling at staff. Respirations are spontaneous with normal rate and effort.

## 2024-07-14 NOTE — ED NOTES
The patient is awake, alert and acting age appropriately.    No apparent distress noted. Airway is open and patent.  Respirations with normal effort and rate noted. Explanation of care provided to family and patient.

## 2024-07-14 NOTE — ED NOTES
Pt identifiers Radha Sotelo were checked and are correct  LOC: The patient is awake, alert, aware of environment with an appropriate affect. Oriented x4, speaking appropriately  APPEARANCE: Pt rates right foot stump pain a 10/10 , in no acute distress, pt is clean and well groomed, clothing properly fastened  SKIN: Skin warm, dry and intact, normal skin turgor, moist mucus membranes Dressing to right foot stump noted  Dressing removed, incision healed with steri strip  Wound with black scab noted on right heel   RESPIRATORY: Airway is open and patent, respirations are spontaneous, even and unlabored, normal effort and rate  O2 at 3L per min per nasal cannula noted  Coarse wheezing auscultated to all lung fields   CARDIAC: Normal rate and rhythm, no  1 +  peripheral edema noted to right lower leg and foot , capillary refill < 3 seconds, bilateral radial pulses 2+  Pt is on a cardiac monitor and pulse oximetry  ABDOMEN: Soft, nontender, nondistended. Bowel sounds present to all four quad of abd on auscultation  left side colostomy with pink moist stoma noted with brown colored drainage  Abd hernia noted   NEUROLOGIC: PERRL, facial expression is symmetrical, patient moving all extremities spontaneously, normal sensation in all extremities when touched with a finger.  Follows all commands appropriately  MUSCULOSKELETAL: Right foot partially amputated

## 2024-07-14 NOTE — ED PROVIDER NOTES
Encounter Date: 7/14/2024       History     Chief Complaint   Patient presents with    Shortness of Breath     From home, history of COPD. Duo Neb and solu medrol in route     HPI    Radha Sotelo is a 79 y.o. female w/a PMHx significant for COPD w/home O2 requirement, C8 D, NSTEMI, HTN, TIA who presents to the ED w/ complaints of SOB, rhinorrhea, and productive cough of yellow sputum for the past 4 days, right leg pain (present at baseline but worsened over the past few days).  Patient states that she has not had an acute increase in her home oxygen requirement.  Patient did receive DuoNeb and Solu-Medrol EN route, which she states may have improved her symptoms transiently.  Patient is also experiencing right lower extremity edema, which she states is her baseline.  She is also having pain in his extremity, primarily her foot which is s/p amputation and currently being treated by wound care w/o complication.  Patient denies lightheadedness, syncope, f/c, HA, sore throat, CP, abdominal pain, N/V/D, dysuria, changes in p.o. intake.    Review of patient's allergies indicates:  No Known Allergies  Past Medical History:   Diagnosis Date    Anemia     Anxiety     COPD (chronic obstructive pulmonary disease)     COPD (chronic obstructive pulmonary disease) with emphysema     Coronary artery disease     Depression     Diverticulitis     Hyperlipidemia     Hypertension     PVD (peripheral vascular disease)     PVD (peripheral vascular disease)     S/P colostomy     Substance abuse     hx heavy etoh use     Tobacco abuse      Past Surgical History:   Procedure Laterality Date    ANGIOGRAM, CORONARY, WITH LEFT HEART CATHETERIZATION N/A 11/22/2023    Procedure: Angiogram, Coronary, with Left Heart Cath;  Surgeon: Checo Bhatt MD;  Location: [facility] CATH LAB;  Service: Cardiology;  Laterality: N/A;    ANGIOGRAM, CORONARY, WITH LEFT HEART CATHETERIZATION N/A 5/27/2024    Procedure: Angiogram, Coronary, with Left Heart  Cath;  Surgeon: Karel Mack MD;  Location: [facility] CATH LAB;  Service: Cardiology;  Laterality: N/A;    ANGIOGRAM, EXTREMITY, UNILATERAL Right 8/25/2023    Procedure: ANGIOGRAM, EXTREMITY, UNILATERAL;  Surgeon: Gary Rico MD;  Location: [address];  Service: Vascular;  Laterality: Right;  US GUIDED ACCESS LEFT GROIN  contrast: 150ml  fluoro: 27.9 min  mGy: 254.73  Gycm2: 62.4919  radial flush cocktail: 10ml    ANGIOGRAPHY OF LOWER EXTREMITY Right 8/24/2023    Procedure: Angiogram Extremity Unilateral;  Surgeon: Benji Ashley MD;  Location: [address];  Service: Vascular;  Laterality: Right;    COLOSTOMY      ECTOPIC PREGNANCY SURGERY      PTA, PERONEAL Right 8/25/2023    Procedure: PTA, PERONEAL TIBIAL TRUNK WITH SHOCKWAVE;  Surgeon: Gary Rico MD;  Location: [address];  Service: Vascular;  Laterality: Right;    PTCA, SINGLE VESSEL  11/22/2023    Procedure: PTCA, Single Vessel;  Surgeon: Checo Bhatt MD;  Location: [facility] CATH LAB;  Service: Cardiology;;    right forefoot amputation      right toe amputation      x2 toes    STENT, DRUG ELUTING, SINGLE VESSEL, CORONARY  11/22/2023    Procedure: Stent, Drug Eluting, Single Vessel, Coronary;  Surgeon: Checo Bhatt MD;  Location: [facility] CATH LAB;  Service: Cardiology;;    STENT, DRUG ELUTING, SINGLE VESSEL, CORONARY  5/27/2024    Procedure: Stent, Drug Eluting, Single Vessel, Coronary;  Surgeon: Karel Mack MD;  Location: [facility] CATH LAB;  Service: Cardiology;;    STENT, SUPERFICIAL FEMORAL ARTERY Right 8/25/2023    Procedure: STENT, SUPERFICIAL FEMORAL ARTERY;  Surgeon: Gary Rico MD;  Location: [address];  Service: Vascular;  Laterality: Right;  VIABAHN 6 X 15 X120     No family history on file.  Social History     Tobacco Use    Smoking status: Every Day     Current packs/day: 0.25     Average packs/day: 0.5 packs/day for 61.0 years (30.1 ttl pk-yrs)     Types:  Cigarettes     Start date: 7/5/1963    Smokeless tobacco: Never   Substance Use Topics    Alcohol use: No    Drug use: No     Review of Systems    Physical Exam     Initial Vitals [07/14/24 0633]   BP Pulse Resp Temp SpO2   (!) 153/76 102 (!) 26 98.6 °F (37 °C) 99 %      MAP       --         Physical Exam    Constitutional: She appears well-developed and well-nourished. She is not diaphoretic. She appears distressed.   HENT:   Head: Normocephalic and atraumatic.   Nose: Nose normal.   Mouth/Throat: Oropharynx is clear and moist.   Eyes: Conjunctivae and EOM are normal. No scleral icterus.   Neck: No JVD present.   Normal range of motion.  Cardiovascular:  Normal rate, regular rhythm, normal heart sounds and intact distal pulses.           Pulmonary/Chest: No stridor. No respiratory distress. She has wheezes. She has no rhonchi. She has no rales. She exhibits no tenderness.   Abdominal: Abdomen is soft. She exhibits no distension. There is no abdominal tenderness.   Musculoskeletal:         General: Tenderness (Right lower extremity) and edema (Right lower extremity) present. Normal range of motion.      Cervical back: Normal range of motion.     Neurological: She is alert and oriented to person, place, and time. She has normal strength.   Skin: Skin is warm and dry. Capillary refill takes less than 2 seconds. There is erythema (Right lower extremity).         ED Course   Procedures  Labs Reviewed   CBC W/ AUTO DIFFERENTIAL - Abnormal; Notable for the following components:       Result Value    RBC 3.77 (*)     Hemoglobin 11.2 (*)      (*)     MCHC 28.7 (*)     Lymph # 0.5 (*)     Gran % 88.3 (*)     Lymph % 6.6 (*)     Mono % 3.3 (*)     All other components within normal limits   COMPREHENSIVE METABOLIC PANEL - Abnormal; Notable for the following components:    CO2 37 (*)     Glucose 120 (*)     ALT 8 (*)     All other components within normal limits   B-TYPE NATRIURETIC PEPTIDE   SARS-COV-2 RNA  AMPLIFICATION, QUAL        ECG Results              EKG 12-lead (Final result)        Collection Time Result Time QRS Duration OHS QTC Calculation    07/14/24 09:10:00 07/14/24 10:12:38 76 440                     Final result by Interface, Lab In [facility] (07/14/24 10:12:43)                   Narrative:    Test Reason : R06.02,    Vent. Rate : 103 BPM     Atrial Rate : 103 BPM     P-R Int : 160 ms          QRS Dur : 076 ms      QT Int : 336 ms       P-R-T Axes : 079 055 064 degrees     QTc Int : 440 ms    Sinus tachycardia  Otherwise normal ECG  When compared with ECG of 27-MAY-2024 10:46,  No significant change was found    Confirmed by Fausto THOMPSON MD ([id number]) on 7/14/2024 10:12:37 AM    Referred By: [email or web address]   SELF           Confirmed By:Fausto THOMPSON MD                                  Imaging Results              US Lower Extremity Veins Right (Final result)  Result time 07/14/24 10:41:14      Final result by Diaz Ku MD (07/14/24 10:41:14)                   Impression:      No evidence of deep venous thrombosis in common femoral, femoral, popliteal and upper greater saphenous veins.    Evaluation of right calf veins was not performed.    Electronically signed by resident: Stacey Lomax  Date:    07/14/2024  Time:    10:26    Electronically signed by: Diaz Ku  Date:    07/14/2024  Time:    10:41               Narrative:    EXAMINATION:  US LOWER EXTREMITY VEINS RIGHT    CLINICAL HISTORY:  Shortness of breath    TECHNIQUE:  Duplex and color flow Doppler and dynamic compression was performed of the right lower extremity veins was performed.    COMPARISON:  None    FINDINGS:  Right thigh veins: The common femoral, femoral, popliteal, and upper greater saphenous veins are patent and free of thrombus. The veins are normally compressible and have normal phasic flow and augmentation response.    Evaluation of right calf veins was refused by the patient.                                       X-Ray Chest AP Portable  (Final result)  Result time 07/14/24 09:54:25      Final result by Emery Aguilar Jr., MD (07/14/24 09:54:25)                   Impression:      No acute findings    Chronic lung changes.      Electronically signed by: Emery Barger Jr  Date:    07/14/2024  Time:    09:54               Narrative:    EXAMINATION:  XR CHEST AP PORTABLE    CLINICAL HISTORY:  shortness of breath;    TECHNIQUE:  Single frontal view of the chest was performed.    COMPARISON:  05/25/2024    FINDINGS:  Cardiomediastinal silhouettestable with ectasia and atherosclerosis of the thoracic aorta.    There is stable elevation of the left hemidiaphragm and associated left basilar pleuroparenchymal scarring and or bronchiectasis.  Generalized coarsening of lung markings suggests chronic lung disease.    There are some remote right-sided rib fractures present and there is chronic left basilar pleural thickening.                                       Medications   ketorolac injection 9.999 mg (9.999 mg Intravenous Given 7/14/24 0930)   HYDROcodone-acetaminophen 5-325 mg per tablet 1 tablet (1 tablet Oral Given 7/14/24 1141)     Medical Decision Making  Radha Sotelo is a 79 y.o. female who presents to the emergency department w/ complaints of SOB, rhinorrhea, productive cough of yellow sputum for the past 4 days. On initial evaluation, patient appears to be in acute distress and agitated.  She is slightly hypertensive, but otherwise VSS.  Patient is currently on nasal cannula w/ home oxygen requirement w/ sats in the upper 90s.  On exam, patient has diffuse mild end expiratory wheezing.  No crackles/rales/rhonchi appreciated.  Right lower extremity is edematous/erythematous w/ tenderness to palpation at the site of her amputation primarily.  For her pain, patient given dose of Toradol, which did improve her symptoms.  Patient also required a dose of Norco for these symptoms w/ improvement in her pain.  Patient does also have some  tenderness to palpation in her calf, which she states is normal for her.  On subsequent re-evaluations, patient has improved lung sounds w/less wheezing; no interventions were given prior to this exam finding.  Otherwise, physical exam largely unchanged on multiple re-evaluations.     Pertinent Labs:  CBC w/o evidence of leukocytosis, reducing concern for acute infection.  CMP WNL, reducing concern for acute metabolic derangement.  BNP WNL, reducing concern for CHF exacerbation.    Pertinent Imaging:  Right lower extremity US showed no evidence of DVT proximal to calf.  Study limited by patient as she no longer wished to participate and did not want to have her calf examined.  I explained to the risks and benefits of completing this procedure, but patient again declined.  CXR w/no acute process, reducing concern for CHF exacerbation, pneumonia, significant COPD exacerbation.     Ddx including, but not limited to:  Viral URI v. pneumonia v. bronchitis v. bronchiectasis v. COPD exacerbation v. CHF exacerbation v. PE     Most likely Dx:  At this time, I have highest suspicion for upper respiratory tract infection v. bronchitis.  Patient may also have some component of COPD exacerbation, but I do not think that this is clinically important as patient is lung sounds improved throughout her ED course and she did not require any additional oxygen requirement beyond her home requirement.  Difficult to definitively rule out PE as patient's DVT study was incomplete.  However, I do have low suspicion for PE at this time.       Disposition:   Patient discharged home. Discussed strict return precautions and home care plan with patient and/or caregiver who expressed understanding and agreement.    Please see HPI, physical exam, ED course for additional details.    Amount and/or Complexity of Data Reviewed  Labs: ordered.    Risk  Prescription drug management.              Attending Attestation:             Attending ED Notes:    Attending Note:  I have seen the patient, have repeated the key portions of the history and physical, reviewed and agree with the medical documentation, and supervised and managed the medical care of the patient. Additionally, I was present for the critical portion of any procedure(s) performed.    79 F hx above here for SOB, wheezing, right leg pain.  VS reviewed, mildly tachycardic at 102. On 2 L NC at 94 %  Wheezing noted on arrival, treated with nebs  EKG reviewed by me as sinus tachycardia 103 with normal axis, no acute ischemic changes  Labs stable  DVT negative for proximal occulusion, patient refused exam of the calf veins.  Sx improved after treatment.   Pain meds given in ED for chronic foot pain  Medically stable for discharge.    COCO Barber MD  Staff ED Physician                                     Clinical Impression:  Final diagnoses:  [R06.02] Shortness of breath  [R60.0] Lower extremity edema  [R06.02] SOB (shortness of breath)          ED Disposition Condition    Discharge Stable          ED Prescriptions    None       Follow-up Information       Follow up With Specialties Details Why Contact Info    Kalyn Pemberton NP Family Medicine, Palliative Medicine Follow up  [address] [facility] [address]  [phone]               Huber Blair MD  Resident  07/14/24 1608       Charo Barber MD  07/15/24 0793

## 2024-07-14 NOTE — ED NOTES
Nurses Note -- 4 Eyes      7/14/2024   11:13 AM      Skin assessed during: Admit      [] No Altered Skin Integrity Present    []Prevention Measures Documented      [x] Yes- Altered Skin Integrity Present or Discovered   [x] LDA Added if Not in Epic (Describe Wound)   [x] New Altered Skin Integrity was Present on Admit and Documented in LDA   [x] Wound Image Taken    Wound Care Consulted? Yes    Attending Nurse:  GUSTAVO Steele    Second RN/Staff Member:  GUSTAVO Peres

## 2024-07-16 ENCOUNTER — PATIENT OUTREACH (OUTPATIENT)
Dept: EMERGENCY MEDICINE | Facility: HOSPITAL | Age: 79
End: 2024-07-16
Payer: MEDICARE

## 2024-07-25 ENCOUNTER — CARE AT HOME (OUTPATIENT)
Dept: HOME HEALTH SERVICES | Facility: CLINIC | Age: 79
End: 2024-07-25
Payer: MEDICARE

## 2024-07-25 ENCOUNTER — TELEPHONE (OUTPATIENT)
Dept: HOME HEALTH SERVICES | Facility: CLINIC | Age: 79
End: 2024-07-25

## 2024-07-25 VITALS
DIASTOLIC BLOOD PRESSURE: 62 MMHG | SYSTOLIC BLOOD PRESSURE: 124 MMHG | RESPIRATION RATE: 18 BRPM | HEART RATE: 88 BPM | OXYGEN SATURATION: 95 %

## 2024-07-25 DIAGNOSIS — I50.32 CHRONIC DIASTOLIC HEART FAILURE: Primary | ICD-10-CM

## 2024-07-25 DIAGNOSIS — Z89.431 PARTIAL NONTRAUMATIC AMPUTATION OF RIGHT FOOT: ICD-10-CM

## 2024-07-25 DIAGNOSIS — Z91.81 HISTORY OF RECENT FALL: ICD-10-CM

## 2024-07-25 DIAGNOSIS — J96.11 CHRONIC RESPIRATORY FAILURE WITH HYPOXIA AND HYPERCAPNIA: ICD-10-CM

## 2024-07-25 DIAGNOSIS — I10 ESSENTIAL HYPERTENSION: ICD-10-CM

## 2024-07-25 DIAGNOSIS — G62.9 NEUROPATHY: Primary | ICD-10-CM

## 2024-07-25 DIAGNOSIS — J96.12 CHRONIC RESPIRATORY FAILURE WITH HYPOXIA AND HYPERCAPNIA: ICD-10-CM

## 2024-07-25 DIAGNOSIS — T14.8XXD DELAYED WOUND HEALING: ICD-10-CM

## 2024-07-25 DIAGNOSIS — G62.9 NEUROPATHY: ICD-10-CM

## 2024-07-25 RX ORDER — GABAPENTIN 300 MG/1
300 CAPSULE ORAL 2 TIMES DAILY
Qty: 180 CAPSULE | Refills: 3 | Status: ON HOLD | OUTPATIENT
Start: 2024-07-25 | End: 2025-07-25

## 2024-07-25 RX ORDER — FUROSEMIDE 20 MG/1
20 TABLET ORAL DAILY PRN
Qty: 30 TABLET | Refills: 11 | Status: ON HOLD | OUTPATIENT
Start: 2024-07-25 | End: 2025-07-25

## 2024-07-25 NOTE — ASSESSMENT & PLAN NOTE
Edema to legs, worse to right leg with wound  Difficulty elevating due to pain  Previously on diuretic  Continue BB, add lasix 20mg daily prn edema, wt gain.  Monitor  Pt is having difficulty getting around in home, or out of apartment related to SOB, amputation.  Wheelchair requested

## 2024-07-25 NOTE — PROGRESS NOTES
[facility]  Transitional Care Management (TCM) Home Visit    Encounter Provider: Kalyn Pemberton   PCP: Kalyn Pemberton, NP  Consult Requested By: No ref. provider found  Admit Date: 7/14/24   IP Discharge Date: 7/14/24  Hospital Length of Stay: er stay 1day  Days since discharge (from IP or SNF): 11  [facility] Contact Note:   Hospital Diagnosis: No admission diagnoses are documented for this encounter.     HISTORY OF PRESENT ILLNESS      Patient ID: Radha Sotelo is a 79 y.o. female was recently admitted to the hospital, this is their TCM encounter.    Hospital Course Synopsis:    Radha Sotelo is a 79 y.o. female who presents to the emergency department w/ complaints of SOB, rhinorrhea, productive cough of yellow sputum for the past 4 days. On initial evaluation, patient appears to be in acute distress and agitated. She is slightly hypertensive, but otherwise VSS. Patient is currently on nasal cannula w/ home oxygen requirement w/ sats in the upper 90s. On exam, patient has diffuse mild end expiratory wheezing. No crackles/rales/rhonchi appreciated. Right lower extremity is edematous/erythematous w/ tenderness to palpation at the site of her amputation primarily. For her pain, patient given dose of Toradol, which did improve her symptoms. Patient also required a dose of Norco for these symptoms w/ improvement in her pain. Patient does also have some tenderness to palpation in her calf, which she states is normal for her.     DECISION MAKING TODAY       Assessment & Plan:  1. Chronic diastolic heart failure  Assessment & Plan:  Edema to legs, worse to right leg with wound  Difficulty elevating due to pain  Previously on diuretic  Continue BB, add lasix 20mg daily prn edema, wt gain.  Monitor  Pt is having difficulty getting around in home, or out of apartment related to SOB, amputation.  Wheelchair requested    Orders:  -     furosemide (LASIX) 20 MG tablet; Take 1 tablet (20 mg total) by  mouth daily as needed (edema/wt gain).  Dispense: 30 tablet; Refill: 11    2. Neuropathy  Assessment & Plan:  Minimal improvement with gabapentin 100mg  Continued burning pain to legs especially to amputation site  Increase Gabapentin to 300mg BID  Elevate to decrease edema  Monitor    Orders:  -     gabapentin (NEURONTIN) 300 MG capsule; Take 1 capsule (300 mg total) by mouth 2 (two) times daily.  Dispense: 180 capsule; Refill: 3    3. Chronic respiratory failure with hypoxia and hypercapnia  Assessment & Plan:  Chronic and stable  Reports breathing at her baseline  Oxygen in use  Continue current plan      4. Delayed wound healing  Assessment & Plan:  Recent infection  Wound to foot  [facility] following pt  Home health pt for dressing changes      5. Partial nontraumatic amputation of right foot  Assessment & Plan:  Podiatry following for wound  Monitor           Ms Sotelo has a mobility limitation that significantly impairs the ability to participate in one or more mobility related activities of daily living (MRADL's) such as hygiene, meal preparation, toileting and bathing in customary locations in the home.  The mobility limitation cannot be sufficiently resolved by the use of a cane or walker.   The use of a manual wheelchair will significantly improve the patient's ability to participate in MRADLS and the patient will use it on regular basis in the home.  She has expressed willingness to use a manual wheelchair in the home.        Medication List on Discharge:     Medication List            Accurate as of July 25, 2024  1:06 PM. If you have any questions, ask your nurse or doctor.                START taking these medications      furosemide 20 MG tablet  Commonly known as: LASIX  Take 1 tablet (20 mg total) by mouth daily as needed (edema/wt gain).  Started by: Kalyn Pemberton NP            CHANGE how you take these medications      gabapentin 300 MG capsule  Commonly known as: NEURONTIN  Take 1 capsule  (300 mg total) by mouth 2 (two) times daily.  What changed:   medication strength  how much to take  when to take this  Changed by: Kalyn Pemberton NP            CONTINUE taking these medications      albuterol 90 mcg/actuation inhaler  Commonly known as: PROVENTIL/VENTOLIN HFA  Inhale 2 puffs into the lungs every 6 (six) hours. Rescue     albuterol-ipratropium 2.5 mg-0.5 mg/3 mL nebulizer solution  Commonly known as: DUO-NEB  Take 3 mLs by nebulization every 6 (six) hours as needed for Wheezing or Shortness of Breath.     aspirin 81 MG EC tablet  Commonly known as: ECOTRIN  Take 1 tablet (81 mg total) by mouth once daily.     atorvastatin 80 MG tablet  Commonly known as: LIPITOR  Take 1 tablet (80 mg total) by mouth once daily.     budesonide-formoterol 80-4.5 mcg 80-4.5 mcg/actuation Hfaa  Commonly known as: SYMBICORT  INHALE 2 PUFFS INTO THE LUNGS TWICE DAILY. RINSE MOUTH AFTER USE     clopidogreL 75 mg tablet  Commonly known as: PLAVIX  Take 1 tablet (75 mg total) by mouth once daily.     hydrocortisone-aloe vera 1 % Crea cream  Apply topically 2 (two) times daily.     metoprolol succinate 25 MG 24 hr tablet  Commonly known as: TOPROL-XL  Take 1 tablet (25 mg total) by mouth once daily.     nicotine 14 mg/24 hr  Commonly known as: NICODERM CQ  Place 1 patch onto the skin once daily.     nitroGLYCERIN 0.4 MG SL tablet  Commonly known as: NITROSTAT  Place 1 tablet (0.4 mg total) under the tongue every 5 (five) minutes as needed for Chest pain.              Medication Reconciliation:  Were medications changed on discharge? No  Were medications in the home? Yes  Is the patient taking the medications as directed? Yes  Does the patient understand the medications and changes? Yes  Does updated med list accurately reflects meds patient is currently taking? Yes    ENVIRONMENT OF CARE      Family and/or Caregiver present at visit?  No  Name of Caregiver:   History provided by: patient    Advance Care Planning   Advanced  Care Planning Status:  Patient has had an ACP conversation  Living Will: No  Power of : No  [place]: No    Does Caregiver have HCPoA: No  Changes today:   Is patient hospice appropriate: No  (If needed, use PPS <30 or FAST score >7)  Was referral to hospice placed: No       Impression upon entering the home:  Physical Dwelling: apartment/condo   Appearance of home environment: cleaniness: dirty  Functional Status: moderate assistance  Mobility: chair bound  Nutritional access: adequate intake and access  Home Health: Yes, HH Agency      DME/Supplies: oxygen     Diagnostic tests reviewed/disposition: No diagnosic tests pending after this hospitalization.  Disease/illness education: CHF  Establishment or re-establishment of referral orders for community resources: No other necessary community resources.   Discussion with other health care providers: No discussion with other health care providers necessary.   Does patient have a PCP at [facility]? Yes   Repatriation plan with PCP? [facility] at Home reason: mobility   Does patient have an ostomy (ileostomy, colostomy, suprapubic catheter, nephrostomy tube, tracheostomy, PEG tube, pleurex catheter, cholecystostomy, etc)? No  Were BPAs reviewed? Yes    Social History     Socioeconomic History    Marital status:    Tobacco Use    Smoking status: Every Day     Current packs/day: 0.25     Average packs/day: 0.5 packs/day for 61.1 years (30.1 ttl pk-yrs)     Types: Cigarettes     Start date: 7/5/1963    Smokeless tobacco: Never   Substance and Sexual Activity    Alcohol use: No    Drug use: No    Sexual activity: Not Currently   Social History Narrative    ** Merged History Encounter **          Social Determinants of Health     Financial Resource Strain: Low Risk  (5/7/2024)    Overall Financial Resource Strain (CARDIA)     Difficulty of Paying Living Expenses: Not hard at all   Food Insecurity: No Food Insecurity (5/7/2024)    Hunger Vital Sign     Worried About Running  Out of Food in the Last Year: Never true     Ran Out of Food in the Last Year: Never true   Transportation Needs: No Transportation Needs (5/7/2024)    PRAPARE - Transportation     Lack of Transportation (Medical): No     Lack of Transportation (Non-Medical): No   Physical Activity: Inactive (5/7/2024)    Exercise Vital Sign     Days of Exercise per Week: 0 days     Minutes of Exercise per Session: 0 min   Stress: No Stress Concern Present (5/7/2024)    [employer] [place] [employer] - Occupational Stress Questionnaire     Feeling of Stress : Not at all   Housing Stability: Low Risk  (5/7/2024)    Housing Stability Vital Sign     Unable to Pay for Housing in the Last Year: No     Homeless in the Last Year: No       OBJECTIVE:     Vital Signs:  Vitals:    07/25/24 1143   BP: 124/62   Pulse: 88   Resp: 18       Review of Systems   Constitutional:  Negative for activity change, fatigue and fever.   HENT: Negative.     Eyes: Negative.    Respiratory:  Positive for shortness of breath. Negative for chest tightness.    Cardiovascular:  Positive for leg swelling.   Gastrointestinal: Negative.    Endocrine: Negative.    Genitourinary: Negative.    Musculoskeletal: Negative.    Skin:  Positive for wound.   Allergic/Immunologic: Negative.    Neurological: Negative.    Hematological: Negative.    Psychiatric/Behavioral: Negative.  Negative for agitation.    All other systems reviewed and are negative.      Physical Exam:  Physical Exam  Vitals reviewed.   Constitutional:       General: She is not in acute distress.     Appearance: She is well-developed. She is ill-appearing.   HENT:      Head: Normocephalic and atraumatic.      Nose: Nose normal.      Mouth/Throat:      Mouth: Mucous membranes are dry.   Eyes:      Pupils: Pupils are equal, round, and reactive to light.   Cardiovascular:      Rate and Rhythm: Normal rate and regular rhythm.      Heart sounds: Normal heart sounds.   Pulmonary:      Effort: Pulmonary  effort is normal.      Comments: Diminished with occasional wheeze  Abdominal:      General: Bowel sounds are normal.      Palpations: Abdomen is soft.   Musculoskeletal:         General: Normal range of motion.      Cervical back: Normal range of motion and neck supple.   Skin:     General: Skin is warm and dry.      Comments: D/I dressing to right foot stump   Neurological:      Mental Status: She is alert and oriented to person, place, and time. Mental status is at baseline.      Cranial Nerves: No cranial nerve deficit.   Psychiatric:         Behavior: Behavior normal.         Thought Content: Thought content normal.         Judgment: Judgment normal.         INSTRUCTIONS FOR PATIENT:     Scheduled Follow-up, Appts Reviewed with Modifications if Needed: Yes  No future appointments.      Signature: Kalyn Pemberton NP    Transition of Care Visit:  I have reviewed and updated the history and problem list.  I have reconciled the medication list.  I have discussed the hospitalization and current medical issues, prognosis and plans with the patient/family.

## 2024-07-25 NOTE — ASSESSMENT & PLAN NOTE
Minimal improvement with gabapentin 100mg  Continued burning pain to legs especially to amputation site  Increase Gabapentin to 300mg BID  Elevate to decrease edema  Monitor

## 2024-07-25 NOTE — TELEPHONE ENCOUNTER
Orders placed per NP request for transport wheelchair.  Orders faxed to [facility].  Fax confirmation received.

## 2024-07-27 ENCOUNTER — HOSPITAL ENCOUNTER (INPATIENT)
Facility: HOSPITAL | Age: 79
LOS: 4 days | Discharge: HOME HEALTH | DRG: 189 | End: 2024-07-31
Attending: EMERGENCY MEDICINE | Admitting: INTERNAL MEDICINE
Payer: COMMERCIAL

## 2024-07-27 DIAGNOSIS — G93.40 ACUTE ENCEPHALOPATHY: ICD-10-CM

## 2024-07-27 DIAGNOSIS — Z89.431 PARTIAL NONTRAUMATIC AMPUTATION OF RIGHT FOOT: ICD-10-CM

## 2024-07-27 DIAGNOSIS — I25.10 CORONARY ARTERY DISEASE INVOLVING NATIVE CORONARY ARTERY OF NATIVE HEART WITHOUT ANGINA PECTORIS: ICD-10-CM

## 2024-07-27 DIAGNOSIS — I25.10 CORONARY ARTERY DISEASE INVOLVING NATIVE CORONARY ARTERY OF NATIVE HEART, UNSPECIFIED WHETHER ANGINA PRESENT: ICD-10-CM

## 2024-07-27 DIAGNOSIS — M79.89 LEG SWELLING: ICD-10-CM

## 2024-07-27 DIAGNOSIS — J96.22 ACUTE ON CHRONIC RESPIRATORY FAILURE WITH HYPOXIA AND HYPERCAPNIA: ICD-10-CM

## 2024-07-27 DIAGNOSIS — R06.89 HYPERCAPNIA: ICD-10-CM

## 2024-07-27 DIAGNOSIS — I10 ESSENTIAL HYPERTENSION: ICD-10-CM

## 2024-07-27 DIAGNOSIS — J96.01 ACUTE HYPOXIC ON CHRONIC HYPERCAPNIC RESPIRATORY FAILURE: Primary | ICD-10-CM

## 2024-07-27 DIAGNOSIS — L03.115 CELLULITIS OF RIGHT LEG: ICD-10-CM

## 2024-07-27 DIAGNOSIS — G93.41 ENCEPHALOPATHY, METABOLIC: ICD-10-CM

## 2024-07-27 DIAGNOSIS — E78.5 HYPERLIPIDEMIA, UNSPECIFIED HYPERLIPIDEMIA TYPE: ICD-10-CM

## 2024-07-27 DIAGNOSIS — J44.9 CHRONIC OBSTRUCTIVE PULMONARY DISEASE, UNSPECIFIED COPD TYPE: ICD-10-CM

## 2024-07-27 DIAGNOSIS — T14.8XXD DELAYED WOUND HEALING: ICD-10-CM

## 2024-07-27 DIAGNOSIS — T68.XXXA HYPOTHERMIA, INITIAL ENCOUNTER: ICD-10-CM

## 2024-07-27 DIAGNOSIS — D64.9 ANEMIA, UNSPECIFIED TYPE: ICD-10-CM

## 2024-07-27 DIAGNOSIS — R06.02 SOB (SHORTNESS OF BREATH): ICD-10-CM

## 2024-07-27 DIAGNOSIS — I50.32 CHRONIC DIASTOLIC HEART FAILURE: ICD-10-CM

## 2024-07-27 DIAGNOSIS — J96.12 ACUTE HYPOXIC ON CHRONIC HYPERCAPNIC RESPIRATORY FAILURE: Primary | ICD-10-CM

## 2024-07-27 DIAGNOSIS — J44.1 COPD EXACERBATION: ICD-10-CM

## 2024-07-27 DIAGNOSIS — J96.21 ACUTE ON CHRONIC RESPIRATORY FAILURE WITH HYPOXIA AND HYPERCAPNIA: ICD-10-CM

## 2024-07-27 DIAGNOSIS — Z93.3 S/P COLOSTOMY: ICD-10-CM

## 2024-07-27 LAB
ALBUMIN SERPL BCP-MCNC: 3.3 G/DL (ref 3.5–5.2)
ALBUMIN SERPL BCP-MCNC: 3.8 G/DL (ref 3.5–5.2)
ALLENS TEST: ABNORMAL
ALP SERPL-CCNC: 101 U/L (ref 55–135)
ALP SERPL-CCNC: 113 U/L (ref 55–135)
ALT SERPL W/O P-5'-P-CCNC: 11 U/L (ref 10–44)
ALT SERPL W/O P-5'-P-CCNC: 11 U/L (ref 10–44)
AMMONIA PLAS-SCNC: 115 UMOL/L (ref 10–50)
AMMONIA PLAS-SCNC: 35 UMOL/L (ref 10–50)
ANION GAP SERPL CALC-SCNC: 10 MMOL/L (ref 8–16)
ANION GAP SERPL CALC-SCNC: 8 MMOL/L (ref 8–16)
AST SERPL-CCNC: 14 U/L (ref 10–40)
AST SERPL-CCNC: 17 U/L (ref 10–40)
BACTERIA #/AREA URNS AUTO: ABNORMAL /HPF
BASOPHILS # BLD AUTO: 0.02 K/UL (ref 0–0.2)
BASOPHILS # BLD AUTO: 0.04 K/UL (ref 0–0.2)
BASOPHILS NFR BLD: 0.2 % (ref 0–1.9)
BASOPHILS NFR BLD: 0.4 % (ref 0–1.9)
BILIRUB SERPL-MCNC: 0.1 MG/DL (ref 0.1–1)
BILIRUB SERPL-MCNC: 0.2 MG/DL (ref 0.1–1)
BILIRUB UR QL STRIP: NEGATIVE
BNP SERPL-MCNC: 143 PG/ML (ref 0–99)
BUN SERPL-MCNC: 20 MG/DL (ref 8–23)
BUN SERPL-MCNC: 20 MG/DL (ref 8–23)
CALCIUM SERPL-MCNC: 8.5 MG/DL (ref 8.7–10.5)
CALCIUM SERPL-MCNC: 9.3 MG/DL (ref 8.7–10.5)
CHLORIDE SERPL-SCNC: 102 MMOL/L (ref 95–110)
CHLORIDE SERPL-SCNC: 98 MMOL/L (ref 95–110)
CLARITY UR REFRACT.AUTO: CLEAR
CO2 SERPL-SCNC: 29 MMOL/L (ref 23–29)
CO2 SERPL-SCNC: 32 MMOL/L (ref 23–29)
COLOR UR AUTO: YELLOW
CREAT SERPL-MCNC: 0.9 MG/DL (ref 0.5–1.4)
CREAT SERPL-MCNC: 0.9 MG/DL (ref 0.5–1.4)
DELSYS: ABNORMAL
DIFFERENTIAL METHOD BLD: ABNORMAL
DIFFERENTIAL METHOD BLD: ABNORMAL
EOSINOPHIL # BLD AUTO: 0 K/UL (ref 0–0.5)
EOSINOPHIL # BLD AUTO: 0.2 K/UL (ref 0–0.5)
EOSINOPHIL NFR BLD: 0.2 % (ref 0–8)
EOSINOPHIL NFR BLD: 2.5 % (ref 0–8)
EP: 5
EP: 5
EP: 8
EP: 8
ERYTHROCYTE [DISTWIDTH] IN BLOOD BY AUTOMATED COUNT: 12.9 % (ref 11.5–14.5)
ERYTHROCYTE [DISTWIDTH] IN BLOOD BY AUTOMATED COUNT: 12.9 % (ref 11.5–14.5)
ERYTHROCYTE [SEDIMENTATION RATE] IN BLOOD BY WESTERGREN METHOD: 0 MM/H
ERYTHROCYTE [SEDIMENTATION RATE] IN BLOOD BY WESTERGREN METHOD: 2 MM/H
ERYTHROCYTE [SEDIMENTATION RATE] IN BLOOD BY WESTERGREN METHOD: 20 MM/H
EST. GFR  (NO RACE VARIABLE): >60 ML/MIN/1.73 M^2
EST. GFR  (NO RACE VARIABLE): >60 ML/MIN/1.73 M^2
FIO2: 28
FIO2: 30
GLUCOSE SERPL-MCNC: 152 MG/DL (ref 70–110)
GLUCOSE SERPL-MCNC: 255 MG/DL (ref 70–110)
GLUCOSE UR QL STRIP: NEGATIVE
HCO3 UR-SCNC: 38.6 MMOL/L (ref 24–28)
HCO3 UR-SCNC: 38.7 MMOL/L (ref 24–28)
HCO3 UR-SCNC: 38.8 MMOL/L (ref 24–28)
HCO3 UR-SCNC: 39.4 MMOL/L (ref 24–28)
HCO3 UR-SCNC: 39.6 MMOL/L (ref 24–28)
HCO3 UR-SCNC: 41.1 MMOL/L (ref 24–28)
HCO3 UR-SCNC: 43 MMOL/L (ref 24–28)
HCO3 UR-SCNC: ABNORMAL MMOL/L
HCO3 UR-SCNC: ABNORMAL MMOL/L
HCT VFR BLD AUTO: 37.1 % (ref 37–48.5)
HCT VFR BLD AUTO: 41.3 % (ref 37–48.5)
HCT VFR BLD CALC: 39 %PCV (ref 36–54)
HGB BLD-MCNC: 10.4 G/DL (ref 12–16)
HGB BLD-MCNC: 11.7 G/DL (ref 12–16)
HGB UR QL STRIP: ABNORMAL
HYALINE CASTS UR QL AUTO: 10 /LPF
IMM GRANULOCYTES # BLD AUTO: 0.04 K/UL (ref 0–0.04)
IMM GRANULOCYTES # BLD AUTO: 0.05 K/UL (ref 0–0.04)
IMM GRANULOCYTES NFR BLD AUTO: 0.4 % (ref 0–0.5)
IMM GRANULOCYTES NFR BLD AUTO: 0.4 % (ref 0–0.5)
INFLUENZA A, MOLECULAR: NOT DETECTED
INFLUENZA B, MOLECULAR: NOT DETECTED
IP: 18
IP: 18
IP: 20
IP: 20
KETONES UR QL STRIP: NEGATIVE
LACTATE SERPL-SCNC: 0.7 MMOL/L (ref 0.5–2.2)
LDH SERPL L TO P-CCNC: <0.3 MMOL/L (ref 0.5–2.2)
LEUKOCYTE ESTERASE UR QL STRIP: NEGATIVE
LYMPHOCYTES # BLD AUTO: 0.3 K/UL (ref 1–4.8)
LYMPHOCYTES # BLD AUTO: 0.7 K/UL (ref 1–4.8)
LYMPHOCYTES NFR BLD: 2.5 % (ref 18–48)
LYMPHOCYTES NFR BLD: 7.7 % (ref 18–48)
MAGNESIUM SERPL-MCNC: 2.2 MG/DL (ref 1.6–2.6)
MAGNESIUM SERPL-MCNC: 2.8 MG/DL (ref 1.6–2.6)
MCH RBC QN AUTO: 29.7 PG (ref 27–31)
MCH RBC QN AUTO: 29.8 PG (ref 27–31)
MCHC RBC AUTO-ENTMCNC: 28 G/DL (ref 32–36)
MCHC RBC AUTO-ENTMCNC: 28.3 G/DL (ref 32–36)
MCV RBC AUTO: 105 FL (ref 82–98)
MCV RBC AUTO: 106 FL (ref 82–98)
MICROSCOPIC COMMENT: ABNORMAL
MIN VOL: 10.7
MIN VOL: 29.1
MIN VOL: 8.5
MIN VOL: 9.8
MODE: ABNORMAL
MONOCYTES # BLD AUTO: 0.2 K/UL (ref 0.3–1)
MONOCYTES # BLD AUTO: 0.8 K/UL (ref 0.3–1)
MONOCYTES NFR BLD: 1.4 % (ref 4–15)
MONOCYTES NFR BLD: 8.6 % (ref 4–15)
MRSA SCREEN BY PCR: NOT DETECTED
NEUTROPHILS # BLD AUTO: 11.7 K/UL (ref 1.8–7.7)
NEUTROPHILS # BLD AUTO: 7.6 K/UL (ref 1.8–7.7)
NEUTROPHILS NFR BLD: 80.4 % (ref 38–73)
NEUTROPHILS NFR BLD: 95.3 % (ref 38–73)
NITRITE UR QL STRIP: NEGATIVE
NRBC BLD-RTO: 0 /100 WBC
NRBC BLD-RTO: 0 /100 WBC
OHS QRS DURATION: 72 MS
OHS QRS DURATION: 80 MS
OHS QRS DURATION: 88 MS
OHS QTC CALCULATION: 427 MS
OHS QTC CALCULATION: 434 MS
OHS QTC CALCULATION: 477 MS
PCO2 BLDA: 117.8 MMHG (ref 35–45)
PCO2 BLDA: 60.3 MMHG (ref 35–45)
PCO2 BLDA: 64.5 MMHG (ref 35–45)
PCO2 BLDA: 79.1 MMHG (ref 35–45)
PCO2 BLDA: 79.9 MMHG (ref 35–45)
PCO2 BLDA: 81.9 MMHG (ref 35–45)
PCO2 BLDA: 90.6 MMHG (ref 35–45)
PCO2 BLDA: >130 MMHG (ref 35–45)
PCO2 BLDA: >130 MMHG (ref 35–45)
PH SMN: 7.02 [PH] (ref 7.35–7.45)
PH SMN: 7.07 [PH] (ref 7.35–7.45)
PH SMN: 7.13 [PH] (ref 7.35–7.45)
PH SMN: 7.24 [PH] (ref 7.35–7.45)
PH SMN: 7.28 [PH] (ref 7.35–7.45)
PH SMN: 7.29 [PH] (ref 7.35–7.45)
PH SMN: 7.34 [PH] (ref 7.35–7.45)
PH SMN: 7.4 [PH] (ref 7.35–7.45)
PH SMN: 7.44 [PH] (ref 7.35–7.45)
PH UR STRIP: 6 [PH] (ref 5–8)
PHOSPHATE SERPL-MCNC: 2.6 MG/DL (ref 2.7–4.5)
PLATELET # BLD AUTO: 205 K/UL (ref 150–450)
PLATELET # BLD AUTO: 222 K/UL (ref 150–450)
PMV BLD AUTO: 10.1 FL (ref 9.2–12.9)
PMV BLD AUTO: 10.4 FL (ref 9.2–12.9)
PO2 BLDA: 24 MMHG (ref 40–60)
PO2 BLDA: 25 MMHG (ref 40–60)
PO2 BLDA: 29 MMHG (ref 40–60)
PO2 BLDA: 32 MMHG (ref 40–60)
PO2 BLDA: 37 MMHG (ref 40–60)
PO2 BLDA: 41 MMHG (ref 40–60)
PO2 BLDA: 41 MMHG (ref 40–60)
PO2 BLDA: 42 MMHG (ref 40–60)
PO2 BLDA: 55 MMHG (ref 40–60)
POC BE: 10 MMOL/L
POC BE: 11 MMOL/L
POC BE: 12 MMOL/L
POC BE: 12 MMOL/L
POC BE: 15 MMOL/L
POC BE: 17 MMOL/L
POC BE: 17 MMOL/L
POC BE: ABNORMAL MMOL/L
POC BE: ABNORMAL MMOL/L
POC IONIZED CALCIUM: 1.34 MMOL/L (ref 1.06–1.42)
POC SATURATED O2: 33 % (ref 95–100)
POC SATURATED O2: 35 % (ref 95–100)
POC SATURATED O2: 54 % (ref 95–100)
POC SATURATED O2: 61 % (ref 95–100)
POC SATURATED O2: 65 % (ref 95–100)
POC SATURATED O2: 74 % (ref 95–100)
POC SATURATED O2: 76 % (ref 95–100)
POC SATURATED O2: ABNORMAL %
POC SATURATED O2: ABNORMAL %
POC TCO2: 41 MMOL/L (ref 24–29)
POC TCO2: 42 MMOL/L (ref 24–29)
POC TCO2: 43 MMOL/L (ref 24–29)
POC TCO2: 43 MMOL/L (ref 24–29)
POC TCO2: 45 MMOL/L (ref 24–29)
POC TCO2: ABNORMAL MMOL/L
POC TCO2: ABNORMAL MMOL/L
POTASSIUM BLD-SCNC: 4.7 MMOL/L (ref 3.5–5.1)
POTASSIUM SERPL-SCNC: 3.9 MMOL/L (ref 3.5–5.1)
POTASSIUM SERPL-SCNC: 4.9 MMOL/L (ref 3.5–5.1)
PROT SERPL-MCNC: 6.1 G/DL (ref 6–8.4)
PROT SERPL-MCNC: 6.9 G/DL (ref 6–8.4)
PROT UR QL STRIP: ABNORMAL
RBC # BLD AUTO: 3.49 M/UL (ref 4–5.4)
RBC # BLD AUTO: 3.94 M/UL (ref 4–5.4)
RBC #/AREA URNS AUTO: 3 /HPF (ref 0–4)
RSV AG BY MOLECULAR METHOD: NOT DETECTED
SAMPLE: ABNORMAL
SARS-COV-2 RDRP RESP QL NAA+PROBE: NEGATIVE
SARS-COV-2 RNA RESP QL NAA+PROBE: NOT DETECTED
SITE: ABNORMAL
SODIUM BLD-SCNC: 140 MMOL/L (ref 136–145)
SODIUM SERPL-SCNC: 138 MMOL/L (ref 136–145)
SODIUM SERPL-SCNC: 141 MMOL/L (ref 136–145)
SP GR UR STRIP: 1.02 (ref 1–1.03)
SP02: 94
SP02: 94
SP02: 95
SP02: 95
SPONT RATE: 20
SPONT RATE: 23
SPONT RATE: 23
SPONT RATE: 26
SQUAMOUS #/AREA URNS AUTO: 0 /HPF
T4 FREE SERPL-MCNC: 0.73 NG/DL (ref 0.71–1.51)
TROPONIN I SERPL DL<=0.01 NG/ML-MCNC: 0.01 NG/ML (ref 0–0.03)
TSH SERPL DL<=0.005 MIU/L-ACNC: 0.07 UIU/ML (ref 0.4–4)
URN SPEC COLLECT METH UR: ABNORMAL
WBC # BLD AUTO: 12.29 K/UL (ref 3.9–12.7)
WBC # BLD AUTO: 9.47 K/UL (ref 3.9–12.7)
WBC #/AREA URNS AUTO: 4 /HPF (ref 0–5)

## 2024-07-27 PROCEDURE — 25000003 PHARM REV CODE 250: Performed by: NURSE PRACTITIONER

## 2024-07-27 PROCEDURE — 82803 BLOOD GASES ANY COMBINATION: CPT

## 2024-07-27 PROCEDURE — 83605 ASSAY OF LACTIC ACID: CPT

## 2024-07-27 PROCEDURE — 93010 ELECTROCARDIOGRAM REPORT: CPT | Mod: 76,,, | Performed by: INTERNAL MEDICINE

## 2024-07-27 PROCEDURE — 93005 ELECTROCARDIOGRAM TRACING: CPT

## 2024-07-27 PROCEDURE — 82800 BLOOD PH: CPT

## 2024-07-27 PROCEDURE — 82330 ASSAY OF CALCIUM: CPT

## 2024-07-27 PROCEDURE — 83735 ASSAY OF MAGNESIUM: CPT | Mod: 91 | Performed by: EMERGENCY MEDICINE

## 2024-07-27 PROCEDURE — 25000003 PHARM REV CODE 250

## 2024-07-27 PROCEDURE — 84295 ASSAY OF SERUM SODIUM: CPT

## 2024-07-27 PROCEDURE — 83735 ASSAY OF MAGNESIUM: CPT | Performed by: NURSE PRACTITIONER

## 2024-07-27 PROCEDURE — 99900035 HC TECH TIME PER 15 MIN (STAT)

## 2024-07-27 PROCEDURE — 84132 ASSAY OF SERUM POTASSIUM: CPT

## 2024-07-27 PROCEDURE — 94799 UNLISTED PULMONARY SVC/PX: CPT

## 2024-07-27 PROCEDURE — 84484 ASSAY OF TROPONIN QUANT: CPT | Performed by: EMERGENCY MEDICINE

## 2024-07-27 PROCEDURE — 81001 URINALYSIS AUTO W/SCOPE: CPT | Performed by: EMERGENCY MEDICINE

## 2024-07-27 PROCEDURE — 94640 AIRWAY INHALATION TREATMENT: CPT

## 2024-07-27 PROCEDURE — 63600175 PHARM REV CODE 636 W HCPCS: Mod: JZ,JG

## 2024-07-27 PROCEDURE — 83605 ASSAY OF LACTIC ACID: CPT | Performed by: EMERGENCY MEDICINE

## 2024-07-27 PROCEDURE — 85025 COMPLETE CBC W/AUTO DIFF WBC: CPT | Performed by: NURSE PRACTITIONER

## 2024-07-27 PROCEDURE — 27100171 HC OXYGEN HIGH FLOW UP TO 24 HOURS

## 2024-07-27 PROCEDURE — 87040 BLOOD CULTURE FOR BACTERIA: CPT | Performed by: EMERGENCY MEDICINE

## 2024-07-27 PROCEDURE — 93010 ELECTROCARDIOGRAM REPORT: CPT | Mod: ,,, | Performed by: INTERNAL MEDICINE

## 2024-07-27 PROCEDURE — 85014 HEMATOCRIT: CPT

## 2024-07-27 PROCEDURE — 84439 ASSAY OF FREE THYROXINE: CPT | Performed by: EMERGENCY MEDICINE

## 2024-07-27 PROCEDURE — U0002 COVID-19 LAB TEST NON-CDC: HCPCS

## 2024-07-27 PROCEDURE — 25000003 PHARM REV CODE 250: Performed by: INTERNAL MEDICINE

## 2024-07-27 PROCEDURE — 80053 COMPREHEN METABOLIC PANEL: CPT | Performed by: NURSE PRACTITIONER

## 2024-07-27 PROCEDURE — 82140 ASSAY OF AMMONIA: CPT | Mod: 91

## 2024-07-27 PROCEDURE — 25000242 PHARM REV CODE 250 ALT 637 W/ HCPCS: Performed by: STUDENT IN AN ORGANIZED HEALTH CARE EDUCATION/TRAINING PROGRAM

## 2024-07-27 PROCEDURE — 63600175 PHARM REV CODE 636 W HCPCS

## 2024-07-27 PROCEDURE — 0241U SARS-COV2 (COVID) WITH FLU/RSV BY PCR: CPT | Performed by: NURSE PRACTITIONER

## 2024-07-27 PROCEDURE — 99291 CRITICAL CARE FIRST HOUR: CPT | Mod: ,,, | Performed by: NURSE PRACTITIONER

## 2024-07-27 PROCEDURE — 94660 CPAP INITIATION&MGMT: CPT

## 2024-07-27 PROCEDURE — 80053 COMPREHEN METABOLIC PANEL: CPT | Mod: 91 | Performed by: EMERGENCY MEDICINE

## 2024-07-27 PROCEDURE — 94644 CONT INHLJ TX 1ST HOUR: CPT

## 2024-07-27 PROCEDURE — 83880 ASSAY OF NATRIURETIC PEPTIDE: CPT | Performed by: EMERGENCY MEDICINE

## 2024-07-27 PROCEDURE — 84443 ASSAY THYROID STIM HORMONE: CPT | Performed by: EMERGENCY MEDICINE

## 2024-07-27 PROCEDURE — 27000190 HC CPAP FULL FACE MASK W/VALVE

## 2024-07-27 PROCEDURE — 25000242 PHARM REV CODE 250 ALT 637 W/ HCPCS: Performed by: EMERGENCY MEDICINE

## 2024-07-27 PROCEDURE — 63600175 PHARM REV CODE 636 W HCPCS: Performed by: NURSE PRACTITIONER

## 2024-07-27 PROCEDURE — 85025 COMPLETE CBC W/AUTO DIFF WBC: CPT | Mod: 91 | Performed by: EMERGENCY MEDICINE

## 2024-07-27 PROCEDURE — 94761 N-INVAS EAR/PLS OXIMETRY MLT: CPT | Mod: XB

## 2024-07-27 PROCEDURE — 84100 ASSAY OF PHOSPHORUS: CPT | Performed by: NURSE PRACTITIONER

## 2024-07-27 PROCEDURE — 82140 ASSAY OF AMMONIA: CPT | Performed by: EMERGENCY MEDICINE

## 2024-07-27 PROCEDURE — 20000000 HC ICU ROOM

## 2024-07-27 PROCEDURE — 87641 MR-STAPH DNA AMP PROBE: CPT | Performed by: NURSE PRACTITIONER

## 2024-07-27 PROCEDURE — 99900031 HC PATIENT EDUCATION (STAT)

## 2024-07-27 PROCEDURE — 63600175 PHARM REV CODE 636 W HCPCS: Performed by: EMERGENCY MEDICINE

## 2024-07-27 PROCEDURE — 5A09457 ASSISTANCE WITH RESPIRATORY VENTILATION, 24-96 CONSECUTIVE HOURS, CONTINUOUS POSITIVE AIRWAY PRESSURE: ICD-10-PCS | Performed by: INTERNAL MEDICINE

## 2024-07-27 RX ORDER — CLOPIDOGREL BISULFATE 75 MG/1
75 TABLET ORAL DAILY
Status: DISCONTINUED | OUTPATIENT
Start: 2024-07-27 | End: 2024-07-31 | Stop reason: HOSPADM

## 2024-07-27 RX ORDER — FUROSEMIDE 10 MG/ML
20 INJECTION INTRAMUSCULAR; INTRAVENOUS ONCE
Status: COMPLETED | OUTPATIENT
Start: 2024-07-27 | End: 2024-07-27

## 2024-07-27 RX ORDER — ATORVASTATIN CALCIUM 40 MG/1
80 TABLET, FILM COATED ORAL DAILY
Status: DISCONTINUED | OUTPATIENT
Start: 2024-07-27 | End: 2024-07-31 | Stop reason: HOSPADM

## 2024-07-27 RX ORDER — ALBUTEROL SULFATE 2.5 MG/.5ML
2.5 SOLUTION RESPIRATORY (INHALATION) EVERY 6 HOURS PRN
Status: DISCONTINUED | OUTPATIENT
Start: 2024-07-27 | End: 2024-07-31 | Stop reason: HOSPADM

## 2024-07-27 RX ORDER — IPRATROPIUM BROMIDE AND ALBUTEROL SULFATE 2.5; .5 MG/3ML; MG/3ML
3 SOLUTION RESPIRATORY (INHALATION) EVERY 4 HOURS
Status: DISCONTINUED | OUTPATIENT
Start: 2024-07-27 | End: 2024-07-31 | Stop reason: HOSPADM

## 2024-07-27 RX ORDER — POLYETHYLENE GLYCOL 3350 17 G/17G
17 POWDER, FOR SOLUTION ORAL DAILY
Status: DISCONTINUED | OUTPATIENT
Start: 2024-07-27 | End: 2024-07-31 | Stop reason: HOSPADM

## 2024-07-27 RX ORDER — SENNOSIDES 8.6 MG/1
8.6 TABLET ORAL DAILY PRN
Status: DISCONTINUED | OUTPATIENT
Start: 2024-07-27 | End: 2024-07-31 | Stop reason: HOSPADM

## 2024-07-27 RX ORDER — ALBUTEROL SULFATE 2.5 MG/.5ML
15 SOLUTION RESPIRATORY (INHALATION)
Status: COMPLETED | OUTPATIENT
Start: 2024-07-27 | End: 2024-07-27

## 2024-07-27 RX ORDER — OLANZAPINE 10 MG/2ML
2.5 INJECTION, POWDER, FOR SOLUTION INTRAMUSCULAR EVERY 6 HOURS PRN
Status: DISCONTINUED | OUTPATIENT
Start: 2024-07-27 | End: 2024-07-31 | Stop reason: HOSPADM

## 2024-07-27 RX ORDER — SODIUM CHLORIDE 0.9 % (FLUSH) 0.9 %
10 SYRINGE (ML) INJECTION
Status: DISCONTINUED | OUTPATIENT
Start: 2024-07-27 | End: 2024-07-31 | Stop reason: HOSPADM

## 2024-07-27 RX ORDER — IPRATROPIUM BROMIDE 0.5 MG/2.5ML
1 SOLUTION RESPIRATORY (INHALATION)
Status: COMPLETED | OUTPATIENT
Start: 2024-07-27 | End: 2024-07-27

## 2024-07-27 RX ORDER — METHYLPREDNISOLONE SOD SUCC 125 MG
125 VIAL (EA) INJECTION
Status: COMPLETED | OUTPATIENT
Start: 2024-07-27 | End: 2024-07-27

## 2024-07-27 RX ORDER — MAGNESIUM SULFATE HEPTAHYDRATE 40 MG/ML
2 INJECTION, SOLUTION INTRAVENOUS
Status: COMPLETED | OUTPATIENT
Start: 2024-07-27 | End: 2024-07-27

## 2024-07-27 RX ORDER — ASPIRIN 81 MG/1
81 TABLET ORAL DAILY
Status: DISCONTINUED | OUTPATIENT
Start: 2024-07-27 | End: 2024-07-31 | Stop reason: HOSPADM

## 2024-07-27 RX ORDER — DEXMEDETOMIDINE HYDROCHLORIDE 4 UG/ML
0-1.4 INJECTION, SOLUTION INTRAVENOUS CONTINUOUS
Status: DISCONTINUED | OUTPATIENT
Start: 2024-07-27 | End: 2024-07-30

## 2024-07-27 RX ORDER — SODIUM CHLORIDE 9 MG/ML
INJECTION, SOLUTION INTRAVENOUS
Status: DISCONTINUED | OUTPATIENT
Start: 2024-07-27 | End: 2024-07-31 | Stop reason: HOSPADM

## 2024-07-27 RX ORDER — METOPROLOL SUCCINATE 25 MG/1
25 TABLET, EXTENDED RELEASE ORAL DAILY
Status: DISCONTINUED | OUTPATIENT
Start: 2024-07-27 | End: 2024-07-31 | Stop reason: HOSPADM

## 2024-07-27 RX ADMIN — IPRATROPIUM BROMIDE AND ALBUTEROL SULFATE 3 ML: 2.5; .5 SOLUTION RESPIRATORY (INHALATION) at 03:07

## 2024-07-27 RX ADMIN — METHYLPREDNISOLONE SODIUM SUCCINATE 125 MG: 125 INJECTION, POWDER, FOR SOLUTION INTRAMUSCULAR; INTRAVENOUS at 12:07

## 2024-07-27 RX ADMIN — SODIUM CHLORIDE: 9 INJECTION, SOLUTION INTRAVENOUS at 06:07

## 2024-07-27 RX ADMIN — IPRATROPIUM BROMIDE AND ALBUTEROL SULFATE 3 ML: 2.5; .5 SOLUTION RESPIRATORY (INHALATION) at 08:07

## 2024-07-27 RX ADMIN — ALBUTEROL SULFATE 15 MG: 2.5 SOLUTION RESPIRATORY (INHALATION) at 12:07

## 2024-07-27 RX ADMIN — FUROSEMIDE 20 MG: 10 INJECTION, SOLUTION INTRAMUSCULAR; INTRAVENOUS at 04:07

## 2024-07-27 RX ADMIN — OLANZAPINE 2.5 MG: 10 INJECTION, POWDER, FOR SOLUTION INTRAMUSCULAR at 09:07

## 2024-07-27 RX ADMIN — DEXMEDETOMIDINE HYDROCHLORIDE 0.9 MCG/KG/HR: 4 INJECTION INTRAVENOUS at 09:07

## 2024-07-27 RX ADMIN — PIPERACILLIN SODIUM AND TAZOBACTAM SODIUM 4.5 G: 4; .5 INJECTION, POWDER, FOR SOLUTION INTRAVENOUS at 10:07

## 2024-07-27 RX ADMIN — DEXMEDETOMIDINE HYDROCHLORIDE 0.2 MCG/KG/HR: 4 INJECTION INTRAVENOUS at 12:07

## 2024-07-27 RX ADMIN — AZITHROMYCIN MONOHYDRATE 500 MG: 500 INJECTION, POWDER, LYOPHILIZED, FOR SOLUTION INTRAVENOUS at 06:07

## 2024-07-27 RX ADMIN — MAGNESIUM SULFATE HEPTAHYDRATE 2 G: 40 INJECTION, SOLUTION INTRAVENOUS at 12:07

## 2024-07-27 RX ADMIN — PIPERACILLIN SODIUM AND TAZOBACTAM SODIUM 4.5 G: 4; .5 INJECTION, POWDER, FOR SOLUTION INTRAVENOUS at 05:07

## 2024-07-27 RX ADMIN — VANCOMYCIN HYDROCHLORIDE 1000 MG: 1 INJECTION, POWDER, LYOPHILIZED, FOR SOLUTION INTRAVENOUS at 01:07

## 2024-07-27 RX ADMIN — IPRATROPIUM BROMIDE AND ALBUTEROL SULFATE 3 ML: 2.5; .5 SOLUTION RESPIRATORY (INHALATION) at 11:07

## 2024-07-27 RX ADMIN — IPRATROPIUM BROMIDE 1 MG: 0.5 SOLUTION RESPIRATORY (INHALATION) at 12:07

## 2024-07-27 RX ADMIN — PIPERACILLIN SODIUM AND TAZOBACTAM SODIUM 4.5 G: 4; .5 INJECTION, POWDER, FOR SOLUTION INTRAVENOUS at 12:07

## 2024-07-27 NOTE — CONSULTS
[facility]    Wound Care     Patient Name:  Radha Sotelo  MRN:  [id number]  Date: 7/27/2024  Diagnosis: Acute on chronic respiratory failure with hypoxia and hypercapnia     History:  Past Medical History:   Diagnosis Date    Anemia     Anxiety     COPD (chronic obstructive pulmonary disease)     COPD (chronic obstructive pulmonary disease) with emphysema     Coronary artery disease     Depression     Diverticulitis     Hyperlipidemia     Hypertension     PVD (peripheral vascular disease)     PVD (peripheral vascular disease)     S/P colostomy     Substance abuse     hx heavy etoh use     Tobacco abuse      Social History     Socioeconomic History    Marital status:    Tobacco Use    Smoking status: Every Day     Current packs/day: 0.25     Average packs/day: 0.5 packs/day for 61.1 years (30.1 ttl pk-yrs)     Types: Cigarettes     Start date: 7/5/1963    Smokeless tobacco: Never   Substance and Sexual Activity    Alcohol use: No    Drug use: No    Sexual activity: Not Currently   Social History Narrative    ** Merged History Encounter **          Social Determinants of Health     Financial Resource Strain: Low Risk  (5/7/2024)    Overall Financial Resource Strain (CARDIA)     Difficulty of Paying Living Expenses: Not hard at all   Food Insecurity: No Food Insecurity (5/7/2024)    Hunger Vital Sign     Worried About Running Out of Food in the Last Year: Never true     Ran Out of Food in the Last Year: Never true   Transportation Needs: No Transportation Needs (5/7/2024)    PRAPARE - Transportation     Lack of Transportation (Medical): No     Lack of Transportation (Non-Medical): No   Physical Activity: Inactive (5/7/2024)    Exercise Vital Sign     Days of Exercise per Week: 0 days     Minutes of Exercise per Session: 0 min   Stress: No Stress Concern Present (5/7/2024)    [employer] [place] [employer] - Occupational Stress Questionnaire     Feeling of Stress : Not at all    Housing Stability: Low Risk  (5/7/2024)    Housing Stability Vital Sign     Unable to Pay for Housing in the Last Year: No     Homeless in the Last Year: No     Precautions:  Allergies as of 07/27/2024    (No Known Allergies)       WO Assessment Details / Treatment:    Patient seen for wound care: New Consult   Chart reviewed for this encounter.   Labs:   WBC (K/uL)   Date Value   07/27/2024 12.29   07/27/2024 9.47     Glucose (mg/dL)   Date Value   07/27/2024 255 (H)   07/27/2024 152 (H)     Albumin (g/dL)   Date Value   07/27/2024 3.3 (L)   07/27/2024 3.8       Suresh Score: 14    Narrative:  Pt seen for WC consultation for R foot wound  Chart reviewed for this encounter.   See Flow Sheet for additional documentation and media.    Pt found lying in bed, agreeable to care at this time. Pt unreliable historian at this time, per chart review, R anterior foot wound r/t previous surgical incision, delayed wound healing. Wound CHRISTIAN on WC time of arrival. Wound bed is pink and dry, pt does state her foot is very tender. Gently cleansed wound w/ Vashe, Woun'Dres collagen hydrogel applied to wound bed to provide moisture, secured w/ foam dressing.      RECOMMENDATIONS:  Q2 days - R anterior foot - cleanse gently w/ Vashe, pat dry and apply Woun'Dres hydrogel to wound bed, cover w/ foam dressing.    Bedside nurse assess for acute changes (purulence, increased redness/swelling, increased drainage, malodor, increased pain, pallor, necrosis) please contact physician on any acute changes.      Discussed POC with patient and primary nurse.   See EMR for orders & patient education.       Bedside nursing to continue care & monitoring.  Bedside nursing to maintain pressure injury prevention interventions    Thank you for the consult. Wound Care will continue to follow.         07/27/24 1312   WOCN Assessment   WOCN Total Time (mins) 15   Visit Date 07/27/24   Visit Time 1312   Consult Type New   WOCN Speciality Wound    Intervention assessed;changed;applied;chart review;coordination of care;orders   Teaching on-going        Wound 05/25/24 2200 Incision Right anterior Foot other (see comments)   Date First Assessed/Time First Assessed: 05/25/24 2200   Present on Original Admission: Yes  Primary Wound Type: Incision  Side: Right  Orientation: anterior  Location: Foot  Is this injury device related?: No  Incision Type: other (see comments)   Wound Image    Dressing Appearance Open to air   Drainage Amount None   Drainage Characteristics/Odor No odor   Appearance [place];Dry   Periwound Area Intact;Dry   Wound Edges Defined   Care Cleansed with:;Antimicrobial agent   Dressing Applied;Collagen;Foam   Dressing Change Due 07/29/24

## 2024-07-27 NOTE — PROGRESS NOTES
"Pharmacokinetic Initial Assessment: IV Vancomycin    Assessment/Plan:    Initiate intravenous vancomycin with loading dose of 1000 mg once followed by a maintenance dose of vancomycin 750mg IV every 24 hours  Desired empiric serum trough concentration is 10 to 20 mcg/mL  Draw vancomycin trough level 30 min prior to third dose on 7/29 at approximately 0030  Pharmacy will continue to follow and monitor vancomycin.      Please contact pharmacy at extension [phone] with any questions regarding this assessment.     Thank you for the consult,   Mariya Reis       Patient brief summary:  Radha Sotelo is a 79 y.o. female initiated on antimicrobial therapy with IV Vancomycin for treatment of suspected  pneumonia    Drug Allergies:   Review of patient's allergies indicates:  No Known Allergies    Actual Body Weight:   53.1kg    Renal Function:   Estimated Creatinine Clearance: 37 mL/min (based on SCr of 0.9 mg/dL).,     Dialysis Method (if applicable):  N/A    CBC (last 72 hours):  Recent Labs   Lab Result Units 07/27/24  0020   WBC K/uL 9.47   Hemoglobin g/dL 11.7*   Hematocrit % 41.3   Platelets K/uL 222   Gran % % 80.4*   Lymph % % 7.7*   Mono % % 8.6   Eosinophil % % 2.5   Basophil % % 0.4   Differential Method  Automated       Metabolic Panel (last 72 hours):  Recent Labs   Lab Result Units 07/27/24  0020 07/27/24  0049   Sodium mmol/L 141  --    Potassium mmol/L 4.9  --    Chloride mmol/L 102  --    CO2 mmol/L 29  --    Glucose mg/dL 152*  --    Glucose, UA   --  Negative   BUN mg/dL 20  --    Creatinine mg/dL 0.9  --    Albumin g/dL 3.8  --    Total Bilirubin mg/dL 0.1  --    Alkaline Phosphatase U/L 113  --    AST U/L 17  --    ALT U/L 11  --    Magnesium mg/dL 2.2  --        Drug levels (last 3 results):  No results for input(s): "VANCOMYCINRA", "VANCORANDOM", "VANCOMYCINPE", "VANCOPEAK", "VANCOMYCINTR", "VANCOTROUGH" in the last 72 hours.    Microbiologic Results:  Microbiology Results (last 7 days)       " Procedure Component Value Units Date/Time    Culture, Respiratory with Gram Stain [[id number]]     Order Status: No result Specimen: Respiratory     MRSA Screen by PCR [[id number]]     Order Status: No result Specimen: Nasal Swab     Blood culture #1 **CANNOT BE ORDERED STAT** [[id number]] Collected: 07/27/24 0020    Order Status: Sent Specimen: Blood from Peripheral, Antecubital, Right Updated: 07/27/24 0032    Blood culture #2 **CANNOT BE ORDERED STAT** [[id number]] Collected: 07/27/24 0021    Order Status: Sent Specimen: Blood from Peripheral, Antecubital, Right Updated: 07/27/24 0032

## 2024-07-27 NOTE — ASSESSMENT & PLAN NOTE
Most recent ECHO 05/26/24:     Left Ventricle: The left ventricle is normal in size. Ventricular mass is normal. Normal wall thickness. Normal wall motion. There is normal systolic function with a visually estimated ejection fraction of 60 - 65%. There is normal diastolic function.    Right Ventricle: Normal right ventricular cavity size. Wall thickness is normal. Systolic function is normal.    Left Atrium: Left atrium is mildly dilated.    Aortic Valve: There is moderate aortic valve sclerosis. There is annular calcification present. Mildly restricted motion. There is mild stenosis. Aortic valve area by VTI is 2.21 cm². Aortic valve peak velocity is 2.0 m/s. Mean gradient is 7 mmHg. The dimensionless index is 0.58. There is mild aortic regurgitation.    Pulmonary Artery: The estimated pulmonary artery systolic pressure is 20 mmHg.    IVC/SVC: Normal venous pressure at 3 mmHg.    --   -- PRN diuresis   -- Metoprolol   -- Continue telemetry    154.1

## 2024-07-27 NOTE — ASSESSMENT & PLAN NOTE
Patient with Hypercapnic and Hypoxic Respiratory failure which is Acute on chronic.  she is on home oxygen at 2 LPM. Supplemental oxygen was provided and noted- Oxygen Concentration (%):  [30-40] 30    Signs/symptoms of respiratory failure include- tachypnea and lethargy. Contributing diagnoses includes - CHF, COPD, and Obesity Hypoventilation Labs and images were reviewed. Patient Has recent ABG, which has been reviewed. Will treat underlying causes and adjust management of respiratory failure as follows-     79 year old female with COPD, CAD, NSTEMI, HTN, and HLD who presented to [facility] ED via EMS for hypercapnic respiratory failure with initial CO2 > 130. She was placed on NIPPV.     -- Given Methylpred, Mg, Albuterol, and Ipratropium in the ED  -- Continue NIPPV    - weaned to 2L NC. Bipap qhs and with naps  -- Wean FiO2 for SpO2 goal 88-92%   -- Follow up COVID / Flu swab    - negative  -- Follow up MRSA swab    - negative  -- Obtain sputum sample if possible  -- Continue broad spectrum abx   -- Continue prednisone   -- Continue albuterol

## 2024-07-27 NOTE — SUBJECTIVE & OBJECTIVE
Past Medical History:   Diagnosis Date    Anemia     Anxiety     COPD (chronic obstructive pulmonary disease)     COPD (chronic obstructive pulmonary disease) with emphysema     Coronary artery disease     Depression     Diverticulitis     Hyperlipidemia     Hypertension     PVD (peripheral vascular disease)     PVD (peripheral vascular disease)     S/P colostomy     Substance abuse     hx heavy etoh use     Tobacco abuse        Past Surgical History:   Procedure Laterality Date    ANGIOGRAM, CORONARY, WITH LEFT HEART CATHETERIZATION N/A 11/22/2023    Procedure: Angiogram, Coronary, with Left Heart Cath;  Surgeon: Checo Bhatt MD;  Location: [facility] CATH LAB;  Service: Cardiology;  Laterality: N/A;    ANGIOGRAM, CORONARY, WITH LEFT HEART CATHETERIZATION N/A 5/27/2024    Procedure: Angiogram, Coronary, with Left Heart Cath;  Surgeon: Karel Mack MD;  Location: [facility] CATH LAB;  Service: Cardiology;  Laterality: N/A;    ANGIOGRAM, EXTREMITY, UNILATERAL Right 8/25/2023    Procedure: ANGIOGRAM, EXTREMITY, UNILATERAL;  Surgeon: Gary Rico MD;  Location: [facility] OR [address] [place] [address];  Service: Vascular;  Laterality: Right;  US GUIDED ACCESS LEFT GROIN  contrast: 150ml  fluoro: 27.9 min  mGy: 254.73  Gycm2: 62.4919  radial flush cocktail: 10ml    ANGIOGRAPHY OF LOWER EXTREMITY Right 8/24/2023    Procedure: Angiogram Extremity Unilateral;  Surgeon: Benji Ashley MD;  [facility];  Service: Vascular;  Laterality: Right;    COLOSTOMY      ECTOPIC PREGNANCY SURGERY      PTA, PERONEAL Right 8/25/2023    Procedure: PTA, PERONEAL TIBIAL TRUNK WITH SHOCKWAVE;  Surgeon: Gary Rico MD;  [facility];  Service: Vascular;  Laterality: Right;    PTCA, SINGLE VESSEL  11/22/2023    Procedure: PTCA, Single Vessel;  Surgeon: Checo Bhatt MD;  Location: [facility] CATH LAB;  Service: Cardiology;;    right forefoot amputation      right toe amputation      x2 toes    STENT,  DRUG ELUTING, SINGLE VESSEL, CORONARY  11/22/2023    Procedure: Stent, Drug Eluting, Single Vessel, Coronary;  Surgeon: Checo Bhatt MD;  Location: [facility] CATH LAB;  Service: Cardiology;;    STENT, DRUG ELUTING, SINGLE VESSEL, CORONARY  5/27/2024    Procedure: Stent, Drug Eluting, Single Vessel, Coronary;  Surgeon: Karel Mack MD;  Location: [facility] CATH LAB;  Service: Cardiology;;    STENT, SUPERFICIAL FEMORAL ARTERY Right 8/25/2023    Procedure: STENT, SUPERFICIAL FEMORAL ARTERY;  Surgeon: Gary Rico MD;  Location: [facility] OR [address] [place] [address];  Service: Vascular;  Laterality: Right;  VIABAHN 6 X 15 X120       Review of patient's allergies indicates:  No Known Allergies    Family History    None       Tobacco Use    Smoking status: Every Day     Current packs/day: 0.25     Average packs/day: 0.5 packs/day for 61.1 years (30.1 ttl pk-yrs)     Types: Cigarettes     Start date: 7/5/1963    Smokeless tobacco: Never   Substance and Sexual Activity    Alcohol use: No    Drug use: No    Sexual activity: Not Currently      Review of Systems   Unable to perform ROS: Mental status change     Objective:     Vital Signs (Most Recent):  Temp: 96.8 °F (36 °C) (07/27/24 0230)  Pulse: 92 (07/27/24 0230)  Resp: (!) 32 (07/27/24 0230)  BP: (!) 140/62 (07/27/24 0217)  SpO2: (!) 92 % (07/27/24 0230) Vital Signs (24h Range):  Temp:  [94.3 °F (34.6 °C)-96.8 °F (36 °C)] 96.8 °F (36 °C)  Pulse:  [84-92] 92  Resp:  [16-48] 32  SpO2:  [92 %-100 %] 92 %  BP: (134-159)/() 140/62   Weight: 53.1 kg (117 lb 1 oz)  Body mass index is 22.86 kg/m².      Intake/Output Summary (Last 24 hours) at 7/27/2024 0237  Last data filed at 7/27/2024 0234  Gross per 24 hour   Intake 150 ml   Output --   Net 150 ml          Physical Exam  Vitals and nursing note reviewed.   Constitutional:       General: She is not in acute distress.     Appearance: She is obese. She is ill-appearing.   HENT:      Head: Normocephalic and atraumatic.       Mouth/Throat:      Mouth: Mucous membranes are dry.      Pharynx: Oropharynx is clear. No oropharyngeal exudate.   Cardiovascular:      Rate and Rhythm: Normal rate and regular rhythm.      Pulses: Normal pulses.      Heart sounds: No murmur heard.  Pulmonary:      Effort: Pulmonary effort is normal. No respiratory distress.      Comments: Continuous NIPPV  Abdominal:      Palpations: Abdomen is soft.      Tenderness: There is no abdominal tenderness.   Musculoskeletal:      Right lower leg: Edema present.      Left lower leg: Edema present.      Comments: Partial R foot amputation with erythema, swelling, and tenderness   Skin:     General: Skin is warm and dry.      Coloration: Skin is pale. Skin is not jaundiced.   Neurological:      Mental Status: She is lethargic.      GCS: GCS eye subscore is 3. GCS verbal subscore is 5. GCS motor subscore is 5.            Vents:  Oxygen Concentration (%): 40 (07/27/24 0010)  Lines/Drains/Airways       Drain  Duration                  Colostomy 07/13/21 0201 Descending/sigmoid LLQ 1110 days         Urethral Catheter 07/27/24 0052 Temperature probe 14 Fr. <1 day              Peripheral Intravenous Line  Duration                  Peripheral IV - Single Lumen 07/27/24 0019 20 G Left Antecubital <1 day         Peripheral IV - Single Lumen 07/27/24 0052 20 G 1 in Right Forearm <1 day                  Significant Labs:    CBC/Anemia Profile:  Recent Labs   Lab 07/27/24  0009 07/27/24  0020   WBC  --  9.47   HGB  --  11.7*   HCT 39 41.3   PLT  --  222   MCV  --  105*   RDW  --  12.9        Chemistries:  Recent Labs   Lab 07/27/24  0020      K 4.9      CO2 29   BUN 20   CREATININE 0.9   CALCIUM 9.3   ALBUMIN 3.8   PROT 6.9   BILITOT 0.1   ALKPHOS 113   ALT 11   AST 17   MG 2.2       All pertinent labs within the past 24 hours have been reviewed.    Significant Imaging: I have reviewed all pertinent imaging results/findings within the past 24 hours.

## 2024-07-27 NOTE — H&P
"  [facility] - Emergency Dept  Critical Care Medicine  History & Physical    Patient Name: Radha Sotelo  MRN: [id number]  Admission Date: 7/27/2024  Hospital Length of Stay: 0 days  Code Status: Full Code  Attending Physician: Diaz Posey MD   Primary Care Provider: Kalyn Pemberton NP   Principal Problem: Acute on chronic respiratory failure with hypoxia and hypercapnia    Subjective:     HPI:  Mrs. Sotelo is a79 year old female with PMH notable for COPD on home oxygen, CAD, PVD, HTN, HLD, partial right foot amputation with chronic right leg swelling who presented to [facility] ED via EMS for shortness of breath and altered mental status. Per chart review, "patient's family stated that she at baseline is fully communicative but that all of a sudden she became less responsive.  EMS reports that she had a GCS of 12 with them in route to the hospital and that her communication ability was limited.  They subsequently placed her on CPAP in route to the hospital.  Further history limited given the patient's current mental status."    She was hypothermic to 94.5F and otherwise HDS. VBG trend in the ED 7.01/>130 --> 7.06/>130 --> 7.13/117. Ammonia elevated at 115. . In the emergency department she was given albuterol, Ipratropium, Methylpred, Mg, Vancomycin, and Zosyn.     Critical care medicine consulted for hypercapnic respiratory failure.     Hospital/ICU Course:  No notes on file     Past Medical History:   Diagnosis Date    Anemia     Anxiety     COPD (chronic obstructive pulmonary disease)     COPD (chronic obstructive pulmonary disease) with emphysema     Coronary artery disease     Depression     Diverticulitis     Hyperlipidemia     Hypertension     PVD (peripheral vascular disease)     PVD (peripheral vascular disease)     S/P colostomy     Substance abuse     hx heavy etoh use     Tobacco abuse        Past Surgical History:   Procedure Laterality Date    ANGIOGRAM, CORONARY, WITH LEFT HEART " CATHETERIZATION N/A 11/22/2023    Procedure: Angiogram, Coronary, with Left Heart Cath;  Surgeon: Checo Bhatt MD;  Location: [facility] CATH LAB;  Service: Cardiology;  Laterality: N/A;    ANGIOGRAM, CORONARY, WITH LEFT HEART CATHETERIZATION N/A 5/27/2024    Procedure: Angiogram, Coronary, with Left Heart Cath;  Surgeon: Karel Mack MD;  Location: [facility] CATH LAB;  Service: Cardiology;  Laterality: N/A;    ANGIOGRAM, EXTREMITY, UNILATERAL Right 8/25/2023    Procedure: ANGIOGRAM, EXTREMITY, UNILATERAL;  Surgeon: Gary Rico MD;  [facility];  Service: Vascular;  Laterality: Right;  US GUIDED ACCESS LEFT GROIN  contrast: 150ml  fluoro: 27.9 min  mGy: 254.73  Gycm2: 62.4919  radial flush cocktail: 10ml    ANGIOGRAPHY OF LOWER EXTREMITY Right 8/24/2023    Procedure: Angiogram Extremity Unilateral;  Surgeon: Benji Ashley MD;  [facility];  Service: Vascular;  Laterality: Right;    COLOSTOMY      ECTOPIC PREGNANCY SURGERY      PTA, PERONEAL Right 8/25/2023    Procedure: PTA, PERONEAL TIBIAL TRUNK WITH SHOCKWAVE;  Surgeon: Gary Rico MD;  [facility];  Service: Vascular;  Laterality: Right;    PTCA, SINGLE VESSEL  11/22/2023    Procedure: PTCA, Single Vessel;  Surgeon: Checo Bhatt MD;  Location: [facility] CATH LAB;  Service: Cardiology;;    right forefoot amputation      right toe amputation      x2 toes    STENT, DRUG ELUTING, SINGLE VESSEL, CORONARY  11/22/2023    Procedure: Stent, Drug Eluting, Single Vessel, Coronary;  Surgeon: Checo Bhatt MD;  Location: [facility] CATH LAB;  Service: Cardiology;;    STENT, DRUG ELUTING, SINGLE VESSEL, CORONARY  5/27/2024    Procedure: Stent, Drug Eluting, Single Vessel, Coronary;  Surgeon: Karel Mack MD;  Location: [facility] CATH LAB;  Service: Cardiology;;    STENT, SUPERFICIAL FEMORAL ARTERY Right 8/25/2023    Procedure: STENT, SUPERFICIAL FEMORAL ARTERY;  Surgeon: Jacky  Gary RABAGO MD;  Location: [facility] OR [address] [place] [address];  Service: Vascular;  Laterality: Right;  VIABAHN 6 X 15 X120       Review of patient's allergies indicates:  No Known Allergies    Family History    None       Tobacco Use    Smoking status: Every Day     Current packs/day: 0.25     Average packs/day: 0.5 packs/day for 61.1 years (30.1 ttl pk-yrs)     Types: Cigarettes     Start date: 7/5/1963    Smokeless tobacco: Never   Substance and Sexual Activity    Alcohol use: No    Drug use: No    Sexual activity: Not Currently      Review of Systems   Unable to perform ROS: Mental status change     Objective:     Vital Signs (Most Recent):  Temp: 96.8 °F (36 °C) (07/27/24 0230)  Pulse: 92 (07/27/24 0230)  Resp: (!) 32 (07/27/24 0230)  BP: (!) 140/62 (07/27/24 0217)  SpO2: (!) 92 % (07/27/24 0230) Vital Signs (24h Range):  Temp:  [94.3 °F (34.6 °C)-96.8 °F (36 °C)] 96.8 °F (36 °C)  Pulse:  [84-92] 92  Resp:  [16-48] 32  SpO2:  [92 %-100 %] 92 %  BP: (134-159)/() 140/62   Weight: 53.1 kg (117 lb 1 oz)  Body mass index is 22.86 kg/m².      Intake/Output Summary (Last 24 hours) at 7/27/2024 0237  Last data filed at 7/27/2024 0234  Gross per 24 hour   Intake 150 ml   Output --   Net 150 ml          Physical Exam  Vitals and nursing note reviewed.   Constitutional:       General: She is not in acute distress.     Appearance: She is obese. She is ill-appearing.   HENT:      Head: Normocephalic and atraumatic.      Mouth/Throat:      Mouth: Mucous membranes are dry.      Pharynx: Oropharynx is clear. No oropharyngeal exudate.   Cardiovascular:      Rate and Rhythm: Normal rate and regular rhythm.      Pulses: Normal pulses.      Heart sounds: No murmur heard.  Pulmonary:      Effort: Pulmonary effort is normal. No respiratory distress.      Comments: Continuous NIPPV  Abdominal:      Palpations: Abdomen is soft.      Tenderness: There is no abdominal tenderness.   Musculoskeletal:      Right lower leg: Edema present.      Left  lower leg: Edema present.      Comments: Partial R foot amputation with erythema, swelling, and tenderness   Skin:     General: Skin is warm and dry.      Coloration: Skin is pale. Skin is not jaundiced.   Neurological:      Mental Status: She is lethargic.      GCS: GCS eye subscore is 3. GCS verbal subscore is 5. GCS motor subscore is 5.            Vents:  Oxygen Concentration (%): 40 (07/27/24 0010)  Lines/Drains/Airways       Drain  Duration                  Colostomy 07/13/21 0201 Descending/sigmoid LLQ 1110 days         Urethral Catheter 07/27/24 0052 Temperature probe 14 Fr. <1 day              Peripheral Intravenous Line  Duration                  Peripheral IV - Single Lumen 07/27/24 0019 20 G Left Antecubital <1 day         Peripheral IV - Single Lumen 07/27/24 0052 20 G 1 in Right Forearm <1 day                  Significant Labs:    CBC/Anemia Profile:  Recent Labs   Lab 07/27/24  0009 07/27/24  0020   WBC  --  9.47   HGB  --  11.7*   HCT 39 41.3   PLT  --  222   MCV  --  105*   RDW  --  12.9        Chemistries:  Recent Labs   Lab 07/27/24  0020      K 4.9      CO2 29   BUN 20   CREATININE 0.9   CALCIUM 9.3   ALBUMIN 3.8   PROT 6.9   BILITOT 0.1   ALKPHOS 113   ALT 11   AST 17   MG 2.2       All pertinent labs within the past 24 hours have been reviewed.    Significant Imaging: I have reviewed all pertinent imaging results/findings within the past 24 hours.  Assessment/Plan:     Neuro  Encephalopathy, metabolic  -- Follow up CT head  -- Follow up TSH / Free T4   -- Respiratory management as above   -- Avoid sedating agents   -- Ammonia elevated at 115  -- Delirium precautions     Pulmonary  * Acute on chronic respiratory failure with hypoxia and hypercapnia  Patient with Hypercapnic and Hypoxic Respiratory failure which is Acute on chronic.  she is on home oxygen at 2 LPM. Supplemental oxygen was provided and noted- Oxygen Concentration (%):  [40] 40    Signs/symptoms of respiratory failure  include- tachypnea and lethargy. Contributing diagnoses includes - CHF, COPD, and Obesity Hypoventilation Labs and images were reviewed. Patient Has recent ABG, which has been reviewed. Will treat underlying causes and adjust management of respiratory failure as follows-     79 year old female with COPD, CAD, NSTEMI, HTN, and HLD who presented [facility] via [facility] for hypercapnic respiratory failure with initial CO2 > 130. She was placed on NIPPV.     -- Given Methylpred, Mg, Albuterol, and Ipratropium in the ED  -- Continue NIPPV   -- Wean FiO2 for SpO2 goal 88-92%  -- Continue asthma   -- Follow up COVID / Flu swab   -- Follow up MRSA swab   -- Obtain sputum sample if possible  -- Continue broad spectrum abx   -- Continue prednisone   -- Continue albuterol     COPD exacerbation  -- Plan per respiratory failure     Cardiac/Vascular  Chronic diastolic heart failure  Most recent ECHO 05/26/24:     Left Ventricle: The left ventricle is normal in size. Ventricular mass is normal. Normal wall thickness. Normal wall motion. There is normal systolic function with a visually estimated ejection fraction of 60 - 65%. There is normal diastolic function.    Right Ventricle: Normal right ventricular cavity size. Wall thickness is normal. Systolic function is normal.    Left Atrium: Left atrium is mildly dilated.    Aortic Valve: There is moderate aortic valve sclerosis. There is annular calcification present. Mildly restricted motion. There is mild stenosis. Aortic valve area by VTI is 2.21 cm². Aortic valve peak velocity is 2.0 m/s. Mean gradient is 7 mmHg. The dimensionless index is 0.58. There is mild aortic regurgitation.    Pulmonary Artery: The estimated pulmonary artery systolic pressure is 20 mmHg.    IVC/SVC: Normal venous pressure at 3 mmHg.    --   -- PRN diuresis   -- Metoprolol   -- Continue telemetry     HLD (hyperlipidemia)  -- Continue statin therapies   -- CMP daily. LFTs stable.     CAD (coronary artery  disease)  -- ASA / Plavix    Essential hypertension  -- Continue Metoprolol   -- SBP < 180 goal   -- PRN hydralazine if needed     CAD (coronary artery disease)  Multi vessel CAD. Recent cath with stent placement 05/27      The Prox RCA lesion was 99% stenosed with 0% stenosis post-intervention.    The RPDA lesion was 100% stenosed.    Prox RCA lesion: A .    Prox RCA lesion: A STENT SYNERGY XD [id number] stent was not successfully placed.    Prox RCA lesion: A .    Prox RCA lesion: A [place] stent was successfully placed.    Prox RCA lesion: A .    Prox RCA lesion: A .    The estimated blood loss was none.    -- Continue ASA / Plavix     GI  S/P colostomy  -- Secondary to bowel perforation    Orthopedic  Delayed wound healing  -- Wound care consulted   -- Follow up foot XRAYs. May need CT to evaluate for presence of abscess   -- Given RLE swelling: follow up BLE U/S        Critical Care Daily Checklist:    A: Awake: RASS Goal/Actual Goal:    Actual:     B: Spontaneous Breathing Trial Performed?     C: SAT & SBT Coordinated?  N/A                      D: Delirium: CAM-ICU     E: Early Mobility Performed? No   F: Feeding Goal:    Status:     Current Diet Order   Procedures    Diet NPO      AS: Analgesia/Sedation Avoid in setting of AMS    T: Thromboembolic Prophylaxis Yes   H: HOB > 300 Yes   U: Stress Ulcer Prophylaxis (if needed)    G: Glucose Control 140-180 goal    B: Bowel Function     I: Indwelling Catheter (Lines & Harris) Necessity PIV   D: De-escalation of Antimicrobials/Pharmacotherapies Continue BSA     Plan for the day/ETD Admit to ICU for management of respiratory failure     Code Status:  Family/Goals of Care: Full Code      Critical Care Time: 45 minutes  Critical secondary to Patient has a condition that poses threat to life and bodily function: acute on chronic hypercapnic respiratory failure    Plan of care to be discussed with Dr. Posey and attestation to follow.     Critical care was time  spent personally by me on the following activities: development of treatment plan with patient or surrogate and bedside caregivers, discussions with consultants, evaluation of patient's response to treatment, examination of patient, ordering and performing treatments and interventions, ordering and review of laboratory studies, ordering and review of radiographic studies, pulse oximetry, re-evaluation of patient's condition. This critical care time did not overlap with that of any other provider or involve time for any procedures.     Dereje Colvin NP  Critical Care Medicine  [facility] - Emergency Dept

## 2024-07-27 NOTE — HOSPITAL COURSE
Pt received scheduled breathing treatments as well as steroids. Antibiotics continued for respiratory as well as skin coverage in setting of RLE cellulitis and COPD exacerbation. Continued on BiPAP with improvement in mentation and pCO2. Weaned to home 2L NC and started on nightly BiPAP. Patient with intermittent agitation requiring precedex and PRN Zyprexa. Family states this is not her baseline but she has been having forgetfulness over the last few months, acting intermittently suspicious towards staff. De-escalated to Zosyn only for antibiotics, received 3 days of azithromycin. On HFNC overnight, did not comply with BiPAP.

## 2024-07-27 NOTE — ED TRIAGE NOTES
Radha Sotelo, a 79 y.o. female presents to the ED w/ complaint of shortness of breath. Pt family member called when the patient woke up feeling SOB, shortly became more somnolent and less responsive. Pt arrives on CPAP. Only responds to pain at this time.     Triage note:  Chief Complaint   Patient presents with    Shortness of Breath     Arrives on cpap      Review of patient's allergies indicates:  No Known Allergies  Past Medical History:   Diagnosis Date    Anemia     Anxiety     COPD (chronic obstructive pulmonary disease)     COPD (chronic obstructive pulmonary disease) with emphysema     Coronary artery disease     Depression     Diverticulitis     Hyperlipidemia     Hypertension     PVD (peripheral vascular disease)     PVD (peripheral vascular disease)     S/P colostomy     Substance abuse     hx heavy etoh use     Tobacco abuse

## 2024-07-27 NOTE — ASSESSMENT & PLAN NOTE
-- CTH with NAF  -- TSH and FT4 unremarkable  -- Respiratory management as above   -- Avoid sedating agents   -- Ammonia elevated at 115, repeat 35  -- Delirium precautions     Family details worsening agitation, possible hallucinations at home. Worsened after she stopped drinking 2 weeks ago. Unclear if due to infection vs dementia (frontotemporal/vascular), EtoH/wernicke's    - appears to be confabulating on exam   - treatment dose IV thiamine   - improving with precedex wean, abx.

## 2024-07-27 NOTE — ED NOTES
Nurses Note -- 4 Eyes      7/27/2024   12:59 AM      Skin assessed during: Admit      [] No Altered Skin Integrity Present    []Prevention Measures Documented      [x] Yes- Altered Skin Integrity Present or Discovered   [x] LDA Added if Not in Epic (Describe Wound)   [] New Altered Skin Integrity was Present on Admit and Documented in LDA   [x] Wound Image Taken    Wound Care Consulted? No    Attending Nurse:  Talisha Mcintosh RN/Staff Member:  Max

## 2024-07-27 NOTE — PLAN OF CARE
MICU DAILY GOALS     Family/Goals of care/Code Status   Code Status: Full Code    24H Vital Sign Range  Temp:  [94.3 °F (34.6 °C)-100.4 °F (38 °C)]   Pulse:  []   Resp:  [16-51]   BP: ()/()   SpO2:  [90 %-100 %]      Shift Events (include procedures and significant events)   Patient intermittently agitated throughout shift. MD notified and assessed at bedside. Patient intermittently reports distrust of all medical staff, but allows some care. Patient continually education on care provided. Repeat VBG's completed per MD order. Patient verbally abuse and combative to nursing staff. BIPAP at night and nap time use. Patient on 2L NC and precedex gtt.     AWAKE RASS: Goal - RASS Goal: 0-->alert and calm  Actual - RASS (Junior Agitation-Sedation Scale): alert and calm    Restraint necessity: Not necessary   BREATHE SBT: Not intubated    Coordinate A & B, analgesics/sedatives Pain: managed   SAT: Not intubated   Delirium CAM-ICU: Overall CAM-ICU: Positive   Early(intubated/ Progressive (non-intubated) Mobility MOVE Screen (INTUBATED ONLY): Not intubated    Activity: Activity Management: Rolling - L1   Feeding/Nutrition Diet order: Diet/Nutrition Received: NPO,     Thrombus DVT prophylaxis: VTE Required Core Measure: (SCDs) Sequential compression device initiated/maintained   HOB Elevation Head of Bed (HOB) Positioning: HOB at 30-45 degrees   Ulcer Prophylaxis GI: yes   Glucose control managed     Skin Skin assessed during: Daily Assessment    Sacrum intact/not altered? Yes  Heels intact/not altered? Yes  Surgical wound? No    CHECK ONE!   (no altered skin or altered skin) and sub boxes:  [] No Altered Skin Integrity Present    []Prevention Measures Documented    [x] Altered Skin Integrity Present or Discovered   [x] LDA present in EPIC, daily doc completed              [] LDA added if not in EPIC (describe wound).                    When describing wound, do not stage, use descriptive words only.    []  Wound Image Taken (required on admit,                   transfer/discharge and every Tuesday)    Wound Care Consulted? Yes    4 EYES:  Attending Nurse (1st set of eyes): GUSTAVO Zaman    Second RN/Staff Member (2nd set of eyes): GUSTAVO Delgadillo   Bowel Function no issues Ostomy   Indwelling Catheter Necessity      Urethral Catheter 07/27/24 0052 Temperature probe 14 Fr.-Reason for Continuing Urinary Catheterization: Critically ill in ICU and requiring hourly monitoring of intake/output          De-escalation Antibiotics No        VS and assessment per flow sheet, patient progressing towards goals as tolerated, plan of care reviewed with patient, all concerns addressed, will continue to monitor.

## 2024-07-27 NOTE — ASSESSMENT & PLAN NOTE
-- Wound care consulted   -- Follow up foot XRAYs. May need CT to evaluate for presence of abscess   -- Given RLE swelling: follow up BLE U/S

## 2024-07-27 NOTE — HPI
"Mrs. Sotelo is a79 year old female with PMH notable for COPD on home oxygen, CAD, PVD, HTN, HLD, partial right foot amputation with chronic right leg swelling who presented to [facility] ED via EMS for shortness of breath and altered mental status. Per chart review, "patient's family stated that she at baseline is fully communicative but that all of a sudden she became less responsive.  EMS reports that she had a GCS of 12 with them in route to the hospital and that her communication ability was limited.  They subsequently placed her on CPAP in route to the hospital.  Further history limited given the patient's current mental status."    She was hypothermic to 94.5F and otherwise HDS. VBG trend in the ED 7.01/>130 --> 7.06/>130 --> 7.13/117. Ammonia elevated at 115. . In the emergency department she was given albuterol, Ipratropium, Methylpred, Mg, Vancomycin, and Zosyn.     Critical care medicine consulted for hypercapnic respiratory failure.   "

## 2024-07-27 NOTE — PLAN OF CARE
Pt remains free from falls and injuries. PIVs, mock, ostomy, bipap remain. Pt more awake, now sitting up and watching TV. VSS, in no acute distress. Pt reoriented, no family at bedside.

## 2024-07-27 NOTE — PLAN OF CARE
CM called pts daughter Anthony Reardon at [phone] to complete a discharge planning assessment. She stated that she is currently at work and can not talk and asked me to return the call at a later time. The pt is currently unable to assist in discharge planning questions due to being on CPAP. CM will return at a later time to follow up.    07/27/24 1213   Discharge Assessment   Assessment Type Discharge Planning Assessment

## 2024-07-27 NOTE — ED PROVIDER NOTES
Encounter Date: 7/27/2024       History     Chief Complaint   Patient presents with    Shortness of Breath     Arrives on cpap      The history is provided by the patient and the EMS personnel.     Patient is a 79-year-old female with a past medical history of COPD on home oxygen, CAD, PVD, HTN, HLD, partial right foot amputation with chronic right leg swelling who presents due to altered mental status.  EMS reports that the patient's family stated that she at baseline is fully communicative but that all of a sudden she became less responsive.  EMS reports that she had a GCS of 12 with them in route to the hospital and that her communication ability was limited.  They subsequently placed her on CPAP in route to the hospital.  Further history limited given the patient's current mental status.    Review of patient's allergies indicates:  No Known Allergies  Past Medical History:   Diagnosis Date    Anemia     Anxiety     COPD (chronic obstructive pulmonary disease)     COPD (chronic obstructive pulmonary disease) with emphysema     Coronary artery disease     Depression     Diverticulitis     Hyperlipidemia     Hypertension     PVD (peripheral vascular disease)     PVD (peripheral vascular disease)     S/P colostomy     Substance abuse     hx heavy etoh use     Tobacco abuse      Past Surgical History:   Procedure Laterality Date    ANGIOGRAM, CORONARY, WITH LEFT HEART CATHETERIZATION N/A 11/22/2023    Procedure: Angiogram, Coronary, with Left Heart Cath;  Surgeon: Checo Bhatt MD;  Location: [facility] CATH LAB;  Service: Cardiology;  Laterality: N/A;    ANGIOGRAM, CORONARY, WITH LEFT HEART CATHETERIZATION N/A 5/27/2024    Procedure: Angiogram, Coronary, with Left Heart Cath;  Surgeon: Karel Mack MD;  Location: [facility] CATH LAB;  Service: Cardiology;  Laterality: N/A;    ANGIOGRAM, EXTREMITY, UNILATERAL Right 8/25/2023    Procedure: ANGIOGRAM, EXTREMITY, UNILATERAL;  Surgeon: Gary Rico MD;   [address];  Service: Vascular;  Laterality: Right;  US GUIDED ACCESS LEFT GROIN  contrast: 150ml  fluoro: 27.9 min  mGy: 254.73  Gycm2: 62.4919  radial flush cocktail: 10ml    ANGIOGRAPHY OF LOWER EXTREMITY Right 8/24/2023    Procedure: Angiogram Extremity Unilateral;  Surgeon: Benji Ashley MD;  Location: [address];  Service: Vascular;  Laterality: Right;    COLOSTOMY      ECTOPIC PREGNANCY SURGERY      PTA, PERONEAL Right 8/25/2023    Procedure: PTA, PERONEAL TIBIAL TRUNK WITH SHOCKWAVE;  Surgeon: Gary Rico MD;  [address];  Service: Vascular;  Laterality: Right;    PTCA, SINGLE VESSEL  11/22/2023    Procedure: PTCA, Single Vessel;  Surgeon: Checo Bhatt MD;  Location: [facility] CATH LAB;  Service: Cardiology;;    right forefoot amputation      right toe amputation      x2 toes    STENT, DRUG ELUTING, SINGLE VESSEL, CORONARY  11/22/2023    Procedure: Stent, Drug Eluting, Single Vessel, Coronary;  Surgeon: Checo Bhatt MD;  Location: [facility] CATH LAB;  Service: Cardiology;;    STENT, DRUG ELUTING, SINGLE VESSEL, CORONARY  5/27/2024    Procedure: Stent, Drug Eluting, Single Vessel, Coronary;  Surgeon: Karel Mack MD;  Location: [facility] CATH LAB;  Service: Cardiology;;    STENT, SUPERFICIAL FEMORAL ARTERY Right 8/25/2023    Procedure: STENT, SUPERFICIAL FEMORAL ARTERY;  Surgeon: Gary Rico MD;  Location: [address];  Service: Vascular;  Laterality: Right;  VIABAHN 6 X 15 X120     No family history on file.  Social History     Tobacco Use    Smoking status: Every Day     Current packs/day: 0.25     Average packs/day: 0.5 packs/day for 61.1 years (30.1 ttl pk-yrs)     Types: Cigarettes     Start date: 7/5/1963    Smokeless tobacco: Never   Substance Use Topics    Alcohol use: No    Drug use: No       Physical Exam     Initial Vitals   BP Pulse Resp Temp SpO2   07/27/24 0023 07/27/24 0010 07/27/24 0010 07/27/24 0016 07/27/24  0010   (!) 151/101 88 (!) 22 (S) (!) 94.5 °F (34.7 °C) 98 %      MAP       --                Physical Exam    Nursing note and vitals reviewed.  Constitutional: She appears well-developed. No distress.   Patient noted to be in significant respiratory distress.  She has an increased work of breathing.   HENT:   Head: Normocephalic and atraumatic.   Mouth/Throat: Oropharynx is clear and moist and mucous membranes are normal.   Eyes: EOM are normal. Pupils are equal, round, and reactive to light.   Neck:   Normal range of motion.  Cardiovascular:  Normal rate and regular rhythm.           Pulmonary/Chest: She is in respiratory distress.   Diffuse inspiratory rhonchi/expiratory wheezes are auscultated in all lung fields.  Patient is tachypneic   Abdominal: Abdomen is soft. She exhibits no distension. There is no abdominal tenderness. There is no rebound and no guarding.   Musculoskeletal:      Cervical back: Normal range of motion.     Neurological: She is alert.   Can not fully assess given the patient's current mental status.  She is noted to be on BiPAP and can not really communicate.   Skin: Skin is warm.   Psychiatric:   Can not assess given the patient's current mental status         ED Course   Procedures  Labs Reviewed   B-TYPE NATRIURETIC PEPTIDE - Abnormal       Result Value     (*)    CBC W/ AUTO DIFFERENTIAL - Abnormal    WBC 9.47      RBC 3.94 (*)     Hemoglobin 11.7 (*)     Hematocrit 41.3       (*)     MCH 29.7      MCHC 28.3 (*)     RDW 12.9      Platelets 222      MPV 10.1      Immature Granulocytes 0.4      Gran # (ANC) 7.6      Immature Grans (Abs) 0.04      Lymph # 0.7 (*)     Mono # 0.8      Eos # 0.2      Baso # 0.04      nRBC 0      Gran % 80.4 (*)     Lymph % 7.7 (*)     Mono % 8.6      Eosinophil % 2.5      Basophil % 0.4      Differential Method Automated     COMPREHENSIVE METABOLIC PANEL - Abnormal    Sodium 141      Potassium 4.9      Chloride 102      CO2 29      Glucose 152  (*)     BUN 20      Creatinine 0.9      Calcium 9.3      Total Protein 6.9      Albumin 3.8      Total Bilirubin 0.1      Alkaline Phosphatase 113      AST 17      ALT 11      eGFR >60.0      Anion Gap 10     TSH - Abnormal    TSH 0.073 (*)    URINALYSIS, REFLEX TO URINE CULTURE - Abnormal    Specimen UA Urine, Clean Catch      Color, UA Yellow      Appearance, UA Clear      pH, UA 6.0      Specific Gravity, UA 1.020      Protein, UA 1+ (*)     Glucose, UA Negative      Ketones, UA Negative      Bilirubin (UA) Negative      Occult Blood UA Trace (*)     Nitrite, UA Negative      Leukocytes, UA Negative      Narrative:     Specimen Source->Urine   AMMONIA - Abnormal    Ammonia 115 (*)    URINALYSIS MICROSCOPIC - Abnormal    RBC, UA 3      WBC, UA 4      Bacteria Rare      Squam Epithel, UA 0      Hyaline Casts, UA 10 (*)     Microscopic Comment SEE COMMENT      Narrative:     Specimen Source->Urine   COMPREHENSIVE METABOLIC PANEL - Abnormal    Sodium 138      Potassium 3.9      Chloride 98      CO2 32 (*)     Glucose 255 (*)     BUN 20      Creatinine 0.9      Calcium 8.5 (*)     Total Protein 6.1      Albumin 3.3 (*)     Total Bilirubin 0.2      Alkaline Phosphatase 101      AST 14      ALT 11      eGFR >60.0      Anion Gap 8     MAGNESIUM - Abnormal    Magnesium 2.8 (*)    PHOSPHORUS - Abnormal    Phosphorus 2.6 (*)    CBC W/ AUTO DIFFERENTIAL - Abnormal    WBC 12.29      RBC 3.49 (*)     Hemoglobin 10.4 (*)     Hematocrit 37.1       (*)     MCH 29.8      MCHC 28.0 (*)     RDW 12.9      Platelets 205      MPV 10.4      Immature Granulocytes 0.4      Gran # (ANC) 11.7 (*)     Immature Grans (Abs) 0.05 (*)     Lymph # 0.3 (*)     Mono # 0.2 (*)     Eos # 0.0      Baso # 0.02      nRBC 0      Gran % 95.3 (*)     Lymph % 2.5 (*)     Mono % 1.4 (*)     Eosinophil % 0.2      Basophil % 0.2      Differential Method Automated     ISTAT PROCEDURE - Abnormal    POC PH 7.018 (*)     POC PCO2 >130.0 (*)     POC PO2 55       POC HCO3 UNAVAILABLE      POC BE UNAVAILABLE      POC SATURATED O2 UNAVAILABLE      POC Sodium 140      POC Potassium 4.7      POC TCO2 UNAVAILABLE      POC Ionized Calcium 1.34      POC Hematocrit 39      Sample VENOUS      Site Other      Allens Test N/A     ISTAT LACTATE - Abnormal    POC Lactate <0.30 (*)     Sample VENOUS      Site Other      Allens Test N/A     ISTAT PROCEDURE - Abnormal    POC PH 7.066 (*)     POC PCO2 >130.0 (*)     POC PO2 25 (*)     POC HCO3 UNAVAILABLE      POC BE UNAVAILABLE      POC SATURATED O2 UNAVAILABLE      POC TCO2 UNAVAILABLE      Sample VENOUS      Site Other      Allens Test N/A     ISTAT PROCEDURE - Abnormal    POC PH 7.132 (*)     POC PCO2 117.8 (*)     POC PO2 29 (*)     POC HCO3 39.4 (*)     POC BE 10 (*)     POC SATURATED O2 35      POC TCO2 43 (*)     Sample VENOUS      Site Other      Allens Test N/A     ISTAT PROCEDURE - Abnormal    POC PH 7.238 (*)     POC PCO2 90.6 (*)     POC PO2 42      POC HCO3 38.6 (*)     POC BE 11 (*)     POC SATURATED O2 65      POC TCO2 41 (*)     Sample VENOUS      Site Other      Allens Test N/A     CULTURE, BLOOD   CULTURE, BLOOD   MRSA SCREEN BY PCR   CULTURE, RESPIRATORY   TROPONIN I    Troponin I 0.013     MAGNESIUM    Magnesium 2.2     LACTIC ACID, PLASMA    Lactate (Lactic Acid) 0.7     SARS-COV-2 RNA AMPLIFICATION, QUAL    SARS-CoV-2 RNA, Amplification, Qual Negative     T4, FREE    Free T4 0.73       EKG Readings: (Independently Interpreted)   Independently interpreted by MD:  Rate 98, NSR, no stemi, no ectopy, no hypertrophy          Imaging Results              CT Head Without Contrast (In process)                      X-Ray Foot 2 View Right (In process)                      X-Ray Chest 1 View (Final result)  Result time 07/27/24 01:44:09      Final result by James Salmeron MD (07/27/24 01:44:09)                   Impression:      Radiographic findings as above.      Electronically signed by: James  Jaorn  Date:    07/27/2024  Time:    01:44               Narrative:    EXAMINATION:  XR CHEST 1 VIEW    CLINICAL HISTORY:  Encephalopathy, unspecified    TECHNIQUE:  Single frontal view of the chest was performed.    COMPARISON:  Chest radiograph July 14, 2024    FINDINGS:  The patient is rotated, when accounting for position and technique and depth of inspiration the cardiomediastinal silhouette appears stable.  Aortic atherosclerotic change noted.    Chronic appearing lung changes are noted.  There is mild parenchymal change at the left lung base that may relate to atelectatic change, the possibly of a minimal amount of pleural fluid is considered.  There is no large pleural effusion.  There is no evidence for pneumothorax.    The osseous structures demonstrate chronic change.                                    X-Rays:   Independently Interpreted Readings:   Other Readings:  Patient's chest x-ray was independently interpreted by me:  No obvious intrathoracic abnormalities noted other than a possible mild pleural effusion in the left lower lung base    Medications   sodium chloride 0.9% flush 10 mL (has no administration in time range)   vancomycin - pharmacy to dose (has no administration in time range)   piperacillin-tazobactam (ZOSYN) 4.5 g in D5W 100 mL IVPB (MB+) (has no administration in time range)   vancomycin 750 mg in D5W 250 mL IVPB (Vial-Mate) (has no administration in time range)   aspirin EC tablet 81 mg (has no administration in time range)   atorvastatin tablet 80 mg (has no administration in time range)   clopidogreL tablet 75 mg (has no administration in time range)   metoprolol succinate (TOPROL-XL) 24 hr tablet 25 mg (has no administration in time range)   albuterol-ipratropium 2.5 mg-0.5 mg/3 mL nebulizer solution 3 mL (3 mLs Nebulization Given 7/27/24 0308)   albuterol sulfate nebulizer solution 2.5 mg (has no administration in time range)   methylPREDNISolone sodium succinate injection 40 mg  (has no administration in time range)   azithromycin (ZITHROMAX) 500 mg in D5W 250 mL IVPB (Vial-Mate) (500 mg Intravenous New Bag 7/27/24 0609)   polyethylene glycol packet 17 g (has no administration in time range)   senna tablet 8.6 mg (has no administration in time range)   0.9%  NaCl infusion ( Intravenous New Bag 7/27/24 0608)   albuterol sulfate nebulizer solution 15 mg (15 mg Nebulization Given 7/27/24 0010)   ipratropium 0.02 % nebulizer solution 1 mg (1 mg Nebulization Given 7/27/24 0010)   magnesium sulfate 2g in water 50mL IVPB (premix) (0 g Intravenous Stopped 7/27/24 0234)   methylPREDNISolone sodium succinate injection 125 mg (125 mg Intravenous Given 7/27/24 0035)   vancomycin (VANCOCIN) 1,000 mg in D5W 250 mL IVPB (Vial-Mate) (0 mg Intravenous Stopped 7/27/24 0244)   piperacillin-tazobactam (ZOSYN) 4.5 g in D5W 100 mL IVPB (MB+) (0 g Intravenous Stopped 7/27/24 0115)   furosemide injection 20 mg (20 mg Intravenous Given 7/27/24 0414)     Medical Decision Making  Differential diagnosis includes but is not limited to hypercapnic encephalopathy, myxedema coma, ACS, pneumonia, viral URI, cardiac tamponade, pericarditis, pulmonary embolism, sepsis, flash pulmonary edema.  Upon my initial evaluation of the patient, she was noted to still be awake and protecting her airway, however she did have an increased work of breathing and was not able to communicate.  She was placed on BiPAP immediately and a VBG was obtained which showed that she had a pCO2 of 130 and a pH of 7.0.  Her initial temperature was also noted to be 94 for which we placed a Georgina Hugger as well as gave her a dose of broad-spectrum antibiotics for with higher clinical concerns for sepsis.    Patient's subsequent VBG was shown to be mildly improved to 7.066 but her pCO2 was still significantly elevated.  We gave her additional rounds of albuterol/ipratropium treatments.  Approximately 2 hours after being on the BiPAP, we performed another  VBG which showed that her pH was 7.12 with a pCO2 down to 115.  Upon my re-evaluation of the patient, her cognitive ability was improving and she stated that she felt better.  Given her need for continuous BiPAP, MICU was consulted and agreed to come and evaluate the patient.    After seeing the patient, MICU agreed to accept her to their service for further management of her acute on chronic hypercapnic respiratory failure.    Amount and/or Complexity of Data Reviewed  External Data Reviewed: notes.  Labs: ordered.  Radiology: ordered.    Risk  Prescription drug management.  Decision regarding hospitalization.              Attending Attestation:   Physician Attestation Statement for Resident:  As the supervising MD   Physician Attestation Statement: I have personally seen and examined this patient.   I agree with the above history.  -:   As the supervising MD I agree with the above PE.     As the supervising MD I agree with the above treatment, course, plan, and disposition.    I have reviewed and agree with the residents interpretation of the following: lab data, x-rays and EKG.  I have reviewed the following: old records at this facility.                                 Clinical Impression:  Final diagnoses:  [G93.40] Acute encephalopathy  [M79.89] Leg swelling  [R06.89] Hypercapnia  [T68.XXXA] Hypothermia, initial encounter  [J96.01, J96.12] Acute hypoxic on chronic hypercapnic respiratory failure (Primary)          ED Disposition Condition    Admit Stable                Wai Fallon MD  Resident  07/27/24 0643       Talisha Daniel MD  07/28/24 0013

## 2024-07-27 NOTE — NURSING
Received report from night shift RN. PT on continuous BIPAP. Upon initial assessment, Patient A.A.O X 2, patient disoriented to time and situation. PT agitated and confused. MD Moises notified and assessed at bedside. Patient reoriented to time, place and situation and calming techniques promoted. Respiratory completed VBG per MD order. Patient remains on continuous BIPAP.

## 2024-07-27 NOTE — ASSESSMENT & PLAN NOTE
Patient with Hypercapnic and Hypoxic Respiratory failure which is Acute on chronic.  she is on home oxygen at 2 LPM. Supplemental oxygen was provided and noted- Oxygen Concentration (%):  [40] 40    Signs/symptoms of respiratory failure include- tachypnea and lethargy. Contributing diagnoses includes - CHF, COPD, and Obesity Hypoventilation Labs and images were reviewed. Patient Has recent ABG, which has been reviewed. Will treat underlying causes and adjust management of respiratory failure as follows-     79 year old female with COPD, CAD, NSTEMI, HTN, and HLD who presented to [facility] ED via EMS for hypercapnic respiratory failure with initial CO2 > 130. She was placed on NIPPV.     -- Given Methylpred, Mg, Albuterol, and Ipratropium in the ED  -- Continue NIPPV   -- Wean FiO2 for SpO2 goal 88-92%  -- Continue asthma   -- Follow up COVID / Flu swab   -- Follow up MRSA swab   -- Obtain sputum sample if possible  -- Continue broad spectrum abx   -- Continue prednisone   -- Continue albuterol

## 2024-07-27 NOTE — ASSESSMENT & PLAN NOTE
-- Follow up CT head  -- Follow up TSH / Free T4   -- Respiratory management as above   -- Avoid sedating agents   -- Ammonia elevated at 115  -- Delirium precautions

## 2024-07-27 NOTE — NURSING
Pt received from ED, transported by RN and RRT via stretcher. Pt placed on continuous bedside cardiac, SpO2, and NIBP monitor. Bed low, locked, call bell in reach. Pt drowsy, but following commands, VSS, on cont BiPAP. In no acute pain or distress. New orders noted.

## 2024-07-27 NOTE — CONSULTS
Patient seen and evaluated by critical care medicine. To be admitted to ICU for further management. Full H&P to follow.     DOMINIQUE Ivan, [facility]  Pulmonary Critical Care Medicine   07/27/2024

## 2024-07-27 NOTE — ASSESSMENT & PLAN NOTE
Multi vessel CAD. Recent cath with stent placement 05/27      The Prox RCA lesion was 99% stenosed with 0% stenosis post-intervention.    The RPDA lesion was 100% stenosed.    Prox RCA lesion: A .    Prox RCA lesion: A STENT SYNERGY XD [id number] stent was not successfully placed.    Prox RCA lesion: A .    Prox RCA lesion: A [place] stent was successfully placed.    Prox RCA lesion: A .    Prox RCA lesion: A .    The estimated blood loss was none.    -- Continue ASA / Plavix

## 2024-07-28 LAB
ALBUMIN SERPL BCP-MCNC: 3.2 G/DL (ref 3.5–5.2)
ALP SERPL-CCNC: 86 U/L (ref 55–135)
ALT SERPL W/O P-5'-P-CCNC: 10 U/L (ref 10–44)
ANION GAP SERPL CALC-SCNC: 7 MMOL/L (ref 8–16)
AST SERPL-CCNC: 13 U/L (ref 10–40)
BASOPHILS # BLD AUTO: 0.04 K/UL (ref 0–0.2)
BASOPHILS NFR BLD: 0.4 % (ref 0–1.9)
BILIRUB SERPL-MCNC: 0.3 MG/DL (ref 0.1–1)
BUN SERPL-MCNC: 22 MG/DL (ref 8–23)
CALCIUM SERPL-MCNC: 9.1 MG/DL (ref 8.7–10.5)
CHLORIDE SERPL-SCNC: 97 MMOL/L (ref 95–110)
CO2 SERPL-SCNC: 37 MMOL/L (ref 23–29)
CREAT SERPL-MCNC: 0.8 MG/DL (ref 0.5–1.4)
DIFFERENTIAL METHOD BLD: ABNORMAL
EOSINOPHIL # BLD AUTO: 0 K/UL (ref 0–0.5)
EOSINOPHIL NFR BLD: 0.2 % (ref 0–8)
ERYTHROCYTE [DISTWIDTH] IN BLOOD BY AUTOMATED COUNT: 13.1 % (ref 11.5–14.5)
EST. GFR  (NO RACE VARIABLE): >60 ML/MIN/1.73 M^2
GLUCOSE SERPL-MCNC: 125 MG/DL (ref 70–110)
HCT VFR BLD AUTO: 34.2 % (ref 37–48.5)
HGB BLD-MCNC: 10.3 G/DL (ref 12–16)
IMM GRANULOCYTES # BLD AUTO: 0.03 K/UL (ref 0–0.04)
IMM GRANULOCYTES NFR BLD AUTO: 0.3 % (ref 0–0.5)
INR PPP: 1 (ref 0.8–1.2)
LYMPHOCYTES # BLD AUTO: 0.6 K/UL (ref 1–4.8)
LYMPHOCYTES NFR BLD: 6.6 % (ref 18–48)
MAGNESIUM SERPL-MCNC: 2.3 MG/DL (ref 1.6–2.6)
MCH RBC QN AUTO: 29.2 PG (ref 27–31)
MCHC RBC AUTO-ENTMCNC: 30.1 G/DL (ref 32–36)
MCV RBC AUTO: 97 FL (ref 82–98)
MONOCYTES # BLD AUTO: 0.8 K/UL (ref 0.3–1)
MONOCYTES NFR BLD: 8 % (ref 4–15)
NEUTROPHILS # BLD AUTO: 7.9 K/UL (ref 1.8–7.7)
NEUTROPHILS NFR BLD: 84.5 % (ref 38–73)
NRBC BLD-RTO: 0 /100 WBC
OHS QRS DURATION: 84 MS
OHS QTC CALCULATION: 461 MS
PHOSPHATE SERPL-MCNC: 2.9 MG/DL (ref 2.7–4.5)
PLATELET # BLD AUTO: 180 K/UL (ref 150–450)
PMV BLD AUTO: 9.9 FL (ref 9.2–12.9)
POTASSIUM SERPL-SCNC: 4.4 MMOL/L (ref 3.5–5.1)
PROT SERPL-MCNC: 5.9 G/DL (ref 6–8.4)
PROTHROMBIN TIME: 10.7 SEC (ref 9–12.5)
RBC # BLD AUTO: 3.53 M/UL (ref 4–5.4)
SODIUM SERPL-SCNC: 141 MMOL/L (ref 136–145)
WBC # BLD AUTO: 9.4 K/UL (ref 3.9–12.7)

## 2024-07-28 PROCEDURE — 63600175 PHARM REV CODE 636 W HCPCS: Performed by: NURSE PRACTITIONER

## 2024-07-28 PROCEDURE — 84100 ASSAY OF PHOSPHORUS: CPT | Performed by: NURSE PRACTITIONER

## 2024-07-28 PROCEDURE — 20000000 HC ICU ROOM

## 2024-07-28 PROCEDURE — 85610 PROTHROMBIN TIME: CPT

## 2024-07-28 PROCEDURE — 94640 AIRWAY INHALATION TREATMENT: CPT

## 2024-07-28 PROCEDURE — 99233 SBSQ HOSP IP/OBS HIGH 50: CPT | Mod: GC,,, | Performed by: INTERNAL MEDICINE

## 2024-07-28 PROCEDURE — 63600175 PHARM REV CODE 636 W HCPCS

## 2024-07-28 PROCEDURE — 80053 COMPREHEN METABOLIC PANEL: CPT | Performed by: NURSE PRACTITIONER

## 2024-07-28 PROCEDURE — 25000003 PHARM REV CODE 250

## 2024-07-28 PROCEDURE — 27000221 HC OXYGEN, UP TO 24 HOURS

## 2024-07-28 PROCEDURE — 25000003 PHARM REV CODE 250: Performed by: NURSE PRACTITIONER

## 2024-07-28 PROCEDURE — 85025 COMPLETE CBC W/AUTO DIFF WBC: CPT | Performed by: NURSE PRACTITIONER

## 2024-07-28 PROCEDURE — 25000242 PHARM REV CODE 250 ALT 637 W/ HCPCS: Performed by: STUDENT IN AN ORGANIZED HEALTH CARE EDUCATION/TRAINING PROGRAM

## 2024-07-28 PROCEDURE — 83735 ASSAY OF MAGNESIUM: CPT | Performed by: NURSE PRACTITIONER

## 2024-07-28 PROCEDURE — 25000003 PHARM REV CODE 250: Performed by: INTERNAL MEDICINE

## 2024-07-28 PROCEDURE — 92610 EVALUATE SWALLOWING FUNCTION: CPT

## 2024-07-28 PROCEDURE — 94761 N-INVAS EAR/PLS OXIMETRY MLT: CPT

## 2024-07-28 PROCEDURE — 99900035 HC TECH TIME PER 15 MIN (STAT)

## 2024-07-28 PROCEDURE — 94660 CPAP INITIATION&MGMT: CPT

## 2024-07-28 RX ORDER — ENOXAPARIN SODIUM 100 MG/ML
40 INJECTION SUBCUTANEOUS EVERY 24 HOURS
Status: DISCONTINUED | OUTPATIENT
Start: 2024-07-28 | End: 2024-07-31 | Stop reason: HOSPADM

## 2024-07-28 RX ORDER — SODIUM CHLORIDE 9 MG/ML
INJECTION, SOLUTION INTRAVENOUS
Status: DISCONTINUED | OUTPATIENT
Start: 2024-07-28 | End: 2024-07-31 | Stop reason: HOSPADM

## 2024-07-28 RX ADMIN — VANCOMYCIN HYDROCHLORIDE 750 MG: 750 INJECTION, POWDER, LYOPHILIZED, FOR SOLUTION INTRAVENOUS at 02:07

## 2024-07-28 RX ADMIN — THIAMINE HYDROCHLORIDE 500 MG: 100 INJECTION, SOLUTION INTRAMUSCULAR; INTRAVENOUS at 05:07

## 2024-07-28 RX ADMIN — DEXMEDETOMIDINE HYDROCHLORIDE 1.1 MCG/KG/HR: 4 INJECTION INTRAVENOUS at 10:07

## 2024-07-28 RX ADMIN — IPRATROPIUM BROMIDE AND ALBUTEROL SULFATE 3 ML: 2.5; .5 SOLUTION RESPIRATORY (INHALATION) at 08:07

## 2024-07-28 RX ADMIN — PIPERACILLIN SODIUM AND TAZOBACTAM SODIUM 4.5 G: 4; .5 INJECTION, POWDER, FOR SOLUTION INTRAVENOUS at 05:07

## 2024-07-28 RX ADMIN — IPRATROPIUM BROMIDE AND ALBUTEROL SULFATE 3 ML: 2.5; .5 SOLUTION RESPIRATORY (INHALATION) at 04:07

## 2024-07-28 RX ADMIN — IPRATROPIUM BROMIDE AND ALBUTEROL SULFATE 3 ML: 2.5; .5 SOLUTION RESPIRATORY (INHALATION) at 11:07

## 2024-07-28 RX ADMIN — AZITHROMYCIN MONOHYDRATE 500 MG: 500 INJECTION, POWDER, LYOPHILIZED, FOR SOLUTION INTRAVENOUS at 05:07

## 2024-07-28 RX ADMIN — DEXMEDETOMIDINE HYDROCHLORIDE 1 MCG/KG/HR: 4 INJECTION INTRAVENOUS at 04:07

## 2024-07-28 RX ADMIN — PIPERACILLIN SODIUM AND TAZOBACTAM SODIUM 4.5 G: 4; .5 INJECTION, POWDER, FOR SOLUTION INTRAVENOUS at 02:07

## 2024-07-28 RX ADMIN — THIAMINE HYDROCHLORIDE 500 MG: 100 INJECTION, SOLUTION INTRAMUSCULAR; INTRAVENOUS at 10:07

## 2024-07-28 RX ADMIN — PIPERACILLIN SODIUM AND TAZOBACTAM SODIUM 4.5 G: 4; .5 INJECTION, POWDER, FOR SOLUTION INTRAVENOUS at 10:07

## 2024-07-28 RX ADMIN — OLANZAPINE 2.5 MG: 10 INJECTION, POWDER, FOR SOLUTION INTRAMUSCULAR at 09:07

## 2024-07-28 RX ADMIN — DEXMEDETOMIDINE HYDROCHLORIDE 0.9 MCG/KG/HR: 4 INJECTION INTRAVENOUS at 04:07

## 2024-07-28 RX ADMIN — THIAMINE HYDROCHLORIDE 500 MG: 100 INJECTION, SOLUTION INTRAMUSCULAR; INTRAVENOUS at 09:07

## 2024-07-28 RX ADMIN — SODIUM CHLORIDE: 9 INJECTION, SOLUTION INTRAVENOUS at 02:07

## 2024-07-28 RX ADMIN — ENOXAPARIN SODIUM 40 MG: 40 INJECTION SUBCUTANEOUS at 05:07

## 2024-07-28 NOTE — PT/OT/SLP EVAL
Speech Language Pathology Evaluation  Bedside Swallow    Patient Name:  Radha Sotelo   MRN:  [id number]  Admitting Diagnosis: Acute on chronic respiratory failure with hypoxia and hypercapnia    Recommendations:                 General Recommendations:  Dysphagia therapy  Diet recommendations:  NPO, NPO   Aspiration Precautions: Strict aspiration precautions   General Precautions: Standard, aspiration, NPO, fall  Communication strategies:  go to room if call light pushed    Assessment:     Radha Sotelo is a 79 y.o. female with an SLP diagnosis of Dysphagia.  Patient presents with overt signs of aspiration and increased WOB with minimal PO trials of thin liquids. SLP will continue to follow.     History:     Past Medical History:   Diagnosis Date    Anemia     Anxiety     COPD (chronic obstructive pulmonary disease)     COPD (chronic obstructive pulmonary disease) with emphysema     Coronary artery disease     Depression     Diverticulitis     Hyperlipidemia     Hypertension     PVD (peripheral vascular disease)     PVD (peripheral vascular disease)     S/P colostomy     Substance abuse     hx heavy etoh use     Tobacco abuse        Past Surgical History:   Procedure Laterality Date    ANGIOGRAM, CORONARY, WITH LEFT HEART CATHETERIZATION N/A 11/22/2023    Procedure: Angiogram, Coronary, with Left Heart Cath;  Surgeon: Checo Bhatt MD;  Location: [facility] CATH LAB;  Service: Cardiology;  Laterality: N/A;    ANGIOGRAM, CORONARY, WITH LEFT HEART CATHETERIZATION N/A 5/27/2024    Procedure: Angiogram, Coronary, with Left Heart Cath;  Surgeon: Karel Mack MD;  Location: [facility] CATH LAB;  Service: Cardiology;  Laterality: N/A;    ANGIOGRAM, EXTREMITY, UNILATERAL Right 8/25/2023    Procedure: ANGIOGRAM, EXTREMITY, UNILATERAL;  Surgeon: Gary Rico MD;  Location: [facility] OR [address] [place] [address];  Service: Vascular;  Laterality: Right;  US GUIDED ACCESS LEFT GROIN  contrast: 150ml  fluoro: 27.9 min  mGy:  "254.73  Gycm2: 62.4919  radial flush cocktail: 10ml    ANGIOGRAPHY OF LOWER EXTREMITY Right 8/24/2023    Procedure: Angiogram Extremity Unilateral;  Surgeon: Benji Ashley MD;  Location: [address];  Service: Vascular;  Laterality: Right;    COLOSTOMY      ECTOPIC PREGNANCY SURGERY      PTA, PERONEAL Right 8/25/2023    Procedure: PTA, PERONEAL TIBIAL TRUNK WITH SHOCKWAVE;  Surgeon: Gary Rico MD;  Location: [address];  Service: Vascular;  Laterality: Right;    PTCA, SINGLE VESSEL  11/22/2023    Procedure: PTCA, Single Vessel;  Surgeon: Checo Bhatt MD;  Location: [facility] CATH LAB;  Service: Cardiology;;    right forefoot amputation      right toe amputation      x2 toes    STENT, DRUG ELUTING, SINGLE VESSEL, CORONARY  11/22/2023    Procedure: Stent, Drug Eluting, Single Vessel, Coronary;  Surgeon: Checo Bhatt MD;  Location: [facility] CATH LAB;  Service: Cardiology;;    STENT, DRUG ELUTING, SINGLE VESSEL, CORONARY  5/27/2024    Procedure: Stent, Drug Eluting, Single Vessel, Coronary;  Surgeon: Karel Mack MD;  Location: [facility] CATH LAB;  Service: Cardiology;;    STENT, SUPERFICIAL FEMORAL ARTERY Right 8/25/2023    Procedure: STENT, SUPERFICIAL FEMORAL ARTERY;  Surgeon: Gary Rico MD;  Location: [address];  Service: Vascular;  Laterality: Right;  VIABAHN 6 X 15 X120     HPI:  Mrs. Sotelo is a79 year old female with PMH notable for COPD on home oxygen, CAD, PVD, HTN, HLD, partial right foot amputation with chronic right leg swelling who presented to [facility] ED via EMS for shortness of breath and altered mental status. Per chart review, "patient's family stated that she at baseline is fully communicative but that all of a sudden she became less responsive.  EMS reports that she had a GCS of 12 with them in route to the hospital and that her communication ability was limited.  They subsequently placed her on CPAP in route to the hospital.  Further " "history limited given the patient's current mental status." She was hypothermic to 94.5F and otherwise HDS. VBG trend in the ED 7.01/>130 --> 7.06/>130 --> 7.13/117. Ammonia elevated at 115. . In the emergency department she was given albuterol, Ipratropium, Methylpred, Mg, Vancomycin, and Zosyn. Critical care medicine consulted for hypercapnic respiratory failure."    Prior Intubation HX:  None this admission.    Modified Barium Swallow: None this admission.    Chest X-Rays: 7/27: "The patient is rotated, when accounting for position and technique and depth of inspiration the cardiomediastinal silhouette appears stable.  Aortic atherosclerotic change noted. Chronic appearing lung changes are noted.  There is mild parenchymal change at the left lung base that may relate to atelectatic change, the possibly of a minimal amount of pleural fluid is considered.  There is no large pleural effusion.  There is no evidence for pneumothorax. The osseous structures demonstrate chronic change."    Prior diet: NPO.    Subjective     Spoke with RN prior to entry.     Pt awake/alert and agreeable to evaluation given some encouragement.     "I don't want anything else honey."    Pain/Comfort:  Pain Rating 1: 0/10  Pain Rating Post-Intervention 1: 0/10    Respiratory Status: Nasal cannula, flow 2 L/min    Objective:     Oral Musculature Evaluation  Oral Musculature: WFL  Dentition: edentulous (Top dentures at bedside though unable to determine whether pt uses dentures to eat.)  Secretion Management: adequate  Mucosal Quality: dry  Oral Labial Strength and Mobility: impaired seal  Lingual Strength and Mobility: functional protrusion, functional lateral movement  Volitional Cough: Elicited with decreased strength  Volitional Swallow: Unable to elicit  Voice Prior to PO Intake: Clear vocal quality with adequate intensity    Bedside Swallow Eval:   Consistencies Assessed:  Thin liquids cup sips x3    Pt politely declining PO " trials of puree and mildly thickened liquids despite max encouragement from the SLP.     Oral Phase:   Anterior loss    Pharyngeal Phase:   Coughing  multiple spontaneous swallows  Increased WOB following last PO trial of thin liquids     Compensatory Strategies  None    Treatment: Education provided re: role of SLP, recs for NPO, swallow precs, s/s aspiration and POC.  Pt did not demonstrate understanding, therefore ongoing education is warranted. RN and MD notified on overall impressions upon exiting the room.      Goals:   Multidisciplinary Problems       SLP Goals          Problem: SLP    Goal Priority Disciplines Outcome   SLP Goal     SLP Progressing   Description: Speech Language Pathology Goals  Goals expected to be met by 8/11:    1. The pt will participate in an ongoing swallow evaluation.                                Plan:     Patient to be seen:  4 x/week   Plan of Care expires:  08/25/24  Plan of Care reviewed with:  patient   SLP Follow-Up:  Yes       Discharge recommendations:   (tbd)   Barriers to Discharge:  Level of Skilled Assistance Needed      Time Tracking:     SLP Treatment Date:   07/28/24  Speech Start Time:  1359  Speech Stop Time:  1412     Speech Total Time (min):  13 min    Billable Minutes: Eval Swallow and Oral Function 13    07/28/2024     Quin Galvin CF-SLP

## 2024-07-28 NOTE — ASSESSMENT & PLAN NOTE
-- Wound care consulted   -- Follow up foot XRAYs. May need CT/MRI to evaluate for presence of abscess    - Xray equivocal, no pain on exam. Will defer for now  -- Given RLE swelling: follow up BLE U/S   - US negative.

## 2024-07-28 NOTE — SUBJECTIVE & OBJECTIVE
"Interval History/Significant Events: refused overnight bipap. Became agitated and responding to internal stimuli overnight requiring prn zyprexa. Patient's family contacted, see Care Update from Dr. Eason 7/27. Started treatment dose thiamine.    Cooperative but irritable today. Failed SLP eval due to recurrent aspiration. Strict NPO. R foot xray w edema vs potential infection. No tenderness or pain on exam. Weaned to 2L NC.     Mentation improved throughout the day as precedex weaned. Unclear if her account of visions in her home are true hallucinations or confabulations. No nystagmus noted.     Review of Systems   Reason unable to perform ROS: "not in the mood"     Objective:     Vital Signs (Most Recent):  Temp: 98.2 °F (36.8 °C) (07/28/24 1151)  Pulse: 66 (07/28/24 1151)  Resp: (!) 22 (07/28/24 1151)  BP: (!) 151/65 (07/28/24 1101)  SpO2: 95 % (07/28/24 1151) Vital Signs (24h Range):  Temp:  [97.7 °F (36.5 °C)-99.5 °F (37.5 °C)] 98.2 °F (36.8 °C)  Pulse:  [59-97] 66  Resp:  [15-45] 22  SpO2:  [92 %-99 %] 95 %  BP: ()/(53-65) 151/65   Weight: 56.2 kg (123 lb 14.4 oz)  Body mass index is 24.2 kg/m².      Intake/Output Summary (Last 24 hours) at 7/28/2024 1613  Last data filed at 7/28/2024 1201  Gross per 24 hour   Intake 934.12 ml   Output 2250 ml   Net -1315.88 ml          Physical Exam  Constitutional:       Appearance: She is normal weight. She is ill-appearing. She is not toxic-appearing.   HENT:      Head: Normocephalic.      Mouth/Throat:      Mouth: Mucous membranes are dry.   Eyes:      General: No scleral icterus.     Extraocular Movements: Extraocular movements intact.   Cardiovascular:      Rate and Rhythm: Normal rate and regular rhythm.      Pulses: Normal pulses.      Heart sounds: Normal heart sounds.   Pulmonary:      Breath sounds: Wheezing and rhonchi present.   Abdominal:      General: There is no distension.      Tenderness: There is no abdominal tenderness.   Musculoskeletal:      " Cervical back: Normal range of motion.   Skin:     Capillary Refill: Capillary refill takes less than 2 seconds.   Neurological:      Mental Status: She is disoriented.   Psychiatric:         Mood and Affect: Affect is angry.         Speech: Speech is slurred.         Thought Content: Thought content is paranoid.         Cognition and Memory: Memory is impaired.            Vents:  Oxygen Concentration (%): 28 (07/28/24 1151)  Lines/Drains/Airways       Drain  Duration                  Colostomy 07/13/21 0201 Descending/sigmoid LLQ 1111 days         Urethral Catheter 07/27/24 0052 Temperature probe 14 Fr. 1 day              Peripheral Intravenous Line  Duration                  Peripheral IV - Single Lumen 07/27/24 0019 20 G Left Antecubital 1 day         Peripheral IV - Single Lumen 07/27/24 2251 20 G Anterior;Distal;Left Wrist <1 day         Peripheral IV - Single Lumen 07/27/24 2252 22 G Posterior;Right Hand <1 day                  Significant Labs:    CBC/Anemia Profile:  Recent Labs   Lab 07/27/24  0020 07/27/24  0257 07/28/24  0216   WBC 9.47 12.29 9.40   HGB 11.7* 10.4* 10.3*   HCT 41.3 37.1 34.2*    205 180   * 106* 97   RDW 12.9 12.9 13.1        Chemistries:  Recent Labs   Lab 07/27/24  0020 07/27/24  0257 07/28/24  0216    138 141   K 4.9 3.9 4.4    98 97   CO2 29 32* 37*   BUN 20 20 22   CREATININE 0.9 0.9 0.8   CALCIUM 9.3 8.5* 9.1   ALBUMIN 3.8 3.3* 3.2*   PROT 6.9 6.1 5.9*   BILITOT 0.1 0.2 0.3   ALKPHOS 113 101 86   ALT 11 11 10   AST 17 14 13   MG 2.2 2.8* 2.3   PHOS  --  2.6* 2.9       All pertinent labs within the past 24 hours have been reviewed.    Significant Imaging:  I have reviewed all pertinent imaging results/findings within the past 24 hours.

## 2024-07-28 NOTE — PROGRESS NOTES
"[facility] - Cardiac Medical ICU  Critical Care Medicine  Progress Note    Patient Name: Radha Sotelo  MRN: [id number]  Admission Date: 7/27/2024  Hospital Length of Stay: 1 days  Code Status: Full Code  Attending Provider: Diaz Posey MD  Primary Care Provider: Kalyn Pemberton NP   Principal Problem: Acute on chronic respiratory failure with hypoxia and hypercapnia    Subjective:     HPI:  Mrs. Sotelo is a79 year old female with PMH notable for COPD on home oxygen, CAD, PVD, HTN, HLD, partial right foot amputation with chronic right leg swelling who [place] [facility] [place] for shortness of breath and altered mental status. Per chart review, "patient's family stated that she at baseline is fully communicative but that all of a sudden she became less responsive.  EMS reports that she had a GCS of 12 with them in route to the hospital and that her communication ability was limited.  They subsequently placed her on CPAP in route to the hospital.  Further history limited given the patient's current mental status."    She was hypothermic to 94.5F and otherwise HDS. VBG trend in the ED 7.01/>130 --> 7.06/>130 --> 7.13/117. Ammonia elevated at 115. . In the emergency department she was given albuterol, Ipratropium, Methylpred, Mg, Vancomycin, and Zosyn.     Critical care medicine consulted for hypercapnic respiratory failure.     Hospital/ICU Course:  Pt received scheduled breathing treatments as well as steroids. Antibiotics continued for respiratory as well as skin coverage in setting of RLE cellulitis and COPD exacerbation. Continued on BiPAP with improvement in mentation and pCO2. Weaned to home 2L NC and started on nightly BiPAP.     Interval History/Significant Events: refused overnight bipap. Became agitated and responding to internal stimuli overnight requiring prn zyprexa. Patient's family contacted, see Care Update from Dr. Eason 7/27. Started treatment dose thiamine.    Cooperative but " "irritable today. Failed SLP eval due to recurrent aspiration. Strict NPO. R foot xray w edema vs potential infection. No tenderness or pain on exam. Weaned to 2L NC.     Mentation improved throughout the day as precedex weaned. Unclear if her account of visions in her home are true hallucinations or confabulations. No nystagmus noted.     Review of Systems   Reason unable to perform ROS: "not in the mood"     Objective:     Vital Signs (Most Recent):  Temp: 98.2 °F (36.8 °C) (07/28/24 1151)  Pulse: 66 (07/28/24 1151)  Resp: (!) 22 (07/28/24 1151)  BP: (!) 151/65 (07/28/24 1101)  SpO2: 95 % (07/28/24 1151) Vital Signs (24h Range):  Temp:  [97.7 °F (36.5 °C)-99.5 °F (37.5 °C)] 98.2 °F (36.8 °C)  Pulse:  [59-97] 66  Resp:  [15-45] 22  SpO2:  [92 %-99 %] 95 %  BP: ()/(53-65) 151/65   Weight: 56.2 kg (123 lb 14.4 oz)  Body mass index is 24.2 kg/m².      Intake/Output Summary (Last 24 hours) at 7/28/2024 1613  Last data filed at 7/28/2024 1201  Gross per 24 hour   Intake 934.12 ml   Output 2250 ml   Net -1315.88 ml          Physical Exam  Constitutional:       Appearance: She is normal weight. She is ill-appearing. She is not toxic-appearing.   HENT:      Head: Normocephalic.      Mouth/Throat:      Mouth: Mucous membranes are dry.   Eyes:      General: No scleral icterus.     Extraocular Movements: Extraocular movements intact.   Cardiovascular:      Rate and Rhythm: Normal rate and regular rhythm.      Pulses: Normal pulses.      Heart sounds: Normal heart sounds.   Pulmonary:      Breath sounds: Wheezing and rhonchi present.   Abdominal:      General: There is no distension.      Tenderness: There is no abdominal tenderness.   Musculoskeletal:      Cervical back: Normal range of motion.   Skin:     Capillary Refill: Capillary refill takes less than 2 seconds.   Neurological:      Mental Status: She is disoriented.   Psychiatric:         Mood and Affect: Affect is angry.         Speech: Speech is slurred.         " Thought Content: Thought content is paranoid.         Cognition and Memory: Memory is impaired.            Vents:  Oxygen Concentration (%): 28 (07/28/24 1151)  Lines/Drains/Airways       Drain  Duration                  Colostomy 07/13/21 0201 Descending/sigmoid LLQ 1111 days         Urethral Catheter 07/27/24 0052 Temperature probe 14 Fr. 1 day              Peripheral Intravenous Line  Duration                  Peripheral IV - Single Lumen 07/27/24 0019 20 G Left Antecubital 1 day         Peripheral IV - Single Lumen 07/27/24 2251 20 G Anterior;Distal;Left Wrist <1 day         Peripheral IV - Single Lumen 07/27/24 2252 22 G Posterior;Right Hand <1 day                  Significant Labs:    CBC/Anemia Profile:  Recent Labs   Lab 07/27/24  0020 07/27/24  0257 07/28/24  0216   WBC 9.47 12.29 9.40   HGB 11.7* 10.4* 10.3*   HCT 41.3 37.1 34.2*    205 180   * 106* 97   RDW 12.9 12.9 13.1        Chemistries:  Recent Labs   Lab 07/27/24  0020 07/27/24  0257 07/28/24  0216    138 141   K 4.9 3.9 4.4    98 97   CO2 29 32* 37*   BUN 20 20 22   CREATININE 0.9 0.9 0.8   CALCIUM 9.3 8.5* 9.1   ALBUMIN 3.8 3.3* 3.2*   PROT 6.9 6.1 5.9*   BILITOT 0.1 0.2 0.3   ALKPHOS 113 101 86   ALT 11 11 10   AST 17 14 13   MG 2.2 2.8* 2.3   PHOS  --  2.6* 2.9       All pertinent labs within the past 24 hours have been reviewed.    Significant Imaging:  I have reviewed all pertinent imaging results/findings within the past 24 hours.    ABG  Recent Labs   Lab 07/27/24  2043   PH 7.442   PO2 41   PCO2 60.3*   HCO3 41.1*   BE 17*     Assessment/Plan:     Neuro  Encephalopathy, metabolic  -- CTH with NAF  -- TSH and FT4 unremarkable  -- Respiratory management as above   -- Avoid sedating agents   -- Ammonia elevated at 115, repeat 35  -- Delirium precautions     Family details worsening agitation, possible hallucinations at home. Worsened after she stopped drinking 2 weeks ago. Unclear if due to infection vs dementia  (frontotemporal/vascular), EtoH/wernicke's    - appears to be confabulating on exam   - treatment dose IV thiamine   - improving with precedex wean, abx.     Pulmonary  * Acute on chronic respiratory failure with hypoxia and hypercapnia  Patient with Hypercapnic and Hypoxic Respiratory failure which is Acute on chronic.  she is on home oxygen at 2 LPM. Supplemental oxygen was provided and noted- Oxygen Concentration (%):  [30-40] 30    Signs/symptoms of respiratory failure include- tachypnea and lethargy. Contributing diagnoses includes - CHF, COPD, and Obesity Hypoventilation Labs and images were reviewed. Patient Has recent ABG, which has been reviewed. Will treat underlying causes and adjust management of respiratory failure as follows-     79 year old female with COPD, CAD, NSTEMI, HTN, and HLD who presented to [facility] ED via EMS for hypercapnic respiratory failure with initial CO2 > 130. She was placed on NIPPV.     -- Given Methylpred, Mg, Albuterol, and Ipratropium in the ED  -- Continue NIPPV    - weaned to 2L NC. Bipap qhs and with naps  -- Wean FiO2 for SpO2 goal 88-92%   -- Follow up COVID / Flu swab    - negative  -- Follow up MRSA swab    - negative  -- Obtain sputum sample if possible  -- Continue broad spectrum abx   -- Continue prednisone   -- Continue albuterol     COPD exacerbation  -- Plan per respiratory failure     Cardiac/Vascular  Chronic diastolic heart failure  Most recent ECHO 05/26/24:     Left Ventricle: The left ventricle is normal in size. Ventricular mass is normal. Normal wall thickness. Normal wall motion. There is normal systolic function with a visually estimated ejection fraction of 60 - 65%. There is normal diastolic function.    Right Ventricle: Normal right ventricular cavity size. Wall thickness is normal. Systolic function is normal.    Left Atrium: Left atrium is mildly dilated.    Aortic Valve: There is moderate aortic valve sclerosis. There is annular calcification present.  Mildly restricted motion. There is mild stenosis. Aortic valve area by VTI is 2.21 cm². Aortic valve peak velocity is 2.0 m/s. Mean gradient is 7 mmHg. The dimensionless index is 0.58. There is mild aortic regurgitation.    Pulmonary Artery: The estimated pulmonary artery systolic pressure is 20 mmHg.    IVC/SVC: Normal venous pressure at 3 mmHg.    --   -- PRN diuresis   -- Metoprolol   -- Continue telemetry     HLD (hyperlipidemia)  -- Continue statin therapies   -- CMP daily. LFTs stable.     CAD (coronary artery disease)  -- ASA / Plavix    Essential hypertension  -- Continue Metoprolol   -- SBP < 180 goal   -- PRN hydralazine if needed     CAD (coronary artery disease)  Multi vessel CAD. Recent cath with stent placement 05/27      The Prox RCA lesion was 99% stenosed with 0% stenosis post-intervention.    The RPDA lesion was 100% stenosed.    Prox RCA lesion: A .    Prox RCA lesion: A STENT SYNERGY XD 3.0X12MM stent was not successfully placed.    Prox RCA lesion: A .    Prox RCA lesion: A [place] stent was successfully placed.    Prox RCA lesion: A .    Prox RCA lesion: A .    The estimated blood loss was none.    -- Continue ASA / Plavix     GI  S/P colostomy  -- Secondary to bowel perforation    Orthopedic  Delayed wound healing  -- Wound care consulted   -- Follow up foot XRAYs. May need CT/MRI to evaluate for presence of abscess    - Xray equivocal, no pain on exam. Will defer for now  -- Given RLE swelling: follow up BLE U/S   - US negative.        Critical Care Daily Checklist:    A: Awake: RASS Goal/Actual Goal: RASS Goal: 1-->restless  Actual:     B: Spontaneous Breathing Trial Performed?     C: SAT & SBT Coordinated?  N/a                      D: Delirium: CAM-ICU Overall CAM-ICU: Positive   E: Early Mobility Performed? No   F: Feeding Goal:    Status:     Current Diet Order   Procedures    Diet NPO      AS: Analgesia/Sedation Precedex wean   T: Thromboembolic Prophylaxis lovenox   H: HOB  > 300 Yes   U: Stress Ulcer Prophylaxis (if needed) N/a   G: Glucose Control 140-180   B: Bowel Function     I: Indwelling Catheter (Lines & Harris) Necessity 3x pIV, colostomy   D: De-escalation of Antimicrobials/Pharmacotherapies Vanc/zosyn    Plan for the day/ETD Wean precedex    Code Status:  Family/Goals of Care: Full Code         Critical secondary to Patient has an abrupt change in neurologic status: AMS      Critical care was time spent personally by me on the following activities: development of treatment plan with patient or surrogate and bedside caregivers, discussions with consultants, evaluation of patient's response to treatment, examination of patient, ordering and performing treatments and interventions, ordering and review of laboratory studies, ordering and review of radiographic studies, pulse oximetry, re-evaluation of patient's condition. This critical care time did not overlap with that of any other provider or involve time for any procedures.     Dougie Nunez MD  Critical Care Medicine  [facility] - Cardiac Medical ICU

## 2024-07-28 NOTE — PLAN OF CARE
Pt remains free from falls and injuries. PIVs, mock, 4L NC, ostomy remain. Precedex gtt infusing and titrated. Zyprexa x1. Pt slept well between care, but was combative, uncooperative, and verbally abusive when less sedated. U/O>1600 mL. VSS, no other acute issues overnight. No family at bedside.

## 2024-07-28 NOTE — CARE UPDATE
Called patient's daughter, Mrs. Anthony Reardon ([phone]). She stated that the patient has been having intermittent hallucinations and episodes of agitation over the past month.  She stated that her agitation episodes have occurred while her son and daughter have been taking care of the patient. Per daughter, the patient used to drink beer frequently but has not had any in the past month. She also reports that the family lives in the same apartment complex and keeps a close eye on the patient but that the patient's neighbor has been providing the patient with cigarettes to smoke. She stated that prior to this hospitalization that some cigarettes were found in her apartment and the family has removed them. The patient has been having worsening pain in her right lower extremity to the point where she can no longer walk on it independently.

## 2024-07-28 NOTE — ASSESSMENT & PLAN NOTE
Multi vessel CAD. Recent cath with stent placement 05/27      The Prox RCA lesion was 99% stenosed with 0% stenosis post-intervention.    The RPDA lesion was 100% stenosed.    Prox RCA lesion: A .    Prox RCA lesion: A STENT SYNERGY XD 3.0X12MM stent was not successfully placed.    Prox RCA lesion: A .    Prox RCA lesion: A [place] stent was successfully placed.    Prox RCA lesion: A .    Prox RCA lesion: A .    The estimated blood loss was none.    -- Continue ASA / Plavix

## 2024-07-28 NOTE — PLAN OF CARE
Problem: SLP  Goal: SLP Goal  Description: Speech Language Pathology Goals  Goals expected to be met by 8/11:    1. The pt will participate in an ongoing swallow evaluation.       Outcome: Progressing     Bedside swallow evaluation completed. SLP with recommendation for strict NPO. SLP will continue to follow.    POLY Gonzales-SLP

## 2024-07-29 PROBLEM — D64.9 ANEMIA: Status: ACTIVE | Noted: 2024-07-29

## 2024-07-29 PROBLEM — T38.0X5A STEROID-INDUCED HYPERGLYCEMIA: Status: ACTIVE | Noted: 2024-07-29

## 2024-07-29 PROBLEM — R73.9 STEROID-INDUCED HYPERGLYCEMIA: Status: ACTIVE | Noted: 2024-07-29

## 2024-07-29 LAB
ALBUMIN SERPL BCP-MCNC: 2.7 G/DL (ref 3.5–5.2)
ALP SERPL-CCNC: 68 U/L (ref 55–135)
ALT SERPL W/O P-5'-P-CCNC: 8 U/L (ref 10–44)
ANION GAP SERPL CALC-SCNC: 12 MMOL/L (ref 8–16)
AST SERPL-CCNC: 17 U/L (ref 10–40)
BASOPHILS # BLD AUTO: 0.05 K/UL (ref 0–0.2)
BASOPHILS NFR BLD: 0.8 % (ref 0–1.9)
BILIRUB SERPL-MCNC: 0.4 MG/DL (ref 0.1–1)
BUN SERPL-MCNC: 20 MG/DL (ref 8–23)
CALCIUM SERPL-MCNC: 7.5 MG/DL (ref 8.7–10.5)
CHLORIDE SERPL-SCNC: 102 MMOL/L (ref 95–110)
CO2 SERPL-SCNC: 24 MMOL/L (ref 23–29)
CREAT SERPL-MCNC: 0.9 MG/DL (ref 0.5–1.4)
DIFFERENTIAL METHOD BLD: ABNORMAL
EOSINOPHIL # BLD AUTO: 0.2 K/UL (ref 0–0.5)
EOSINOPHIL NFR BLD: 2.7 % (ref 0–8)
ERYTHROCYTE [DISTWIDTH] IN BLOOD BY AUTOMATED COUNT: 13.3 % (ref 11.5–14.5)
EST. GFR  (NO RACE VARIABLE): >60 ML/MIN/1.73 M^2
GLUCOSE SERPL-MCNC: 276 MG/DL (ref 70–110)
HCT VFR BLD AUTO: 32.5 % (ref 37–48.5)
HGB BLD-MCNC: 9.7 G/DL (ref 12–16)
IMM GRANULOCYTES # BLD AUTO: 0.02 K/UL (ref 0–0.04)
IMM GRANULOCYTES NFR BLD AUTO: 0.3 % (ref 0–0.5)
LYMPHOCYTES # BLD AUTO: 0.9 K/UL (ref 1–4.8)
LYMPHOCYTES NFR BLD: 12.9 % (ref 18–48)
MAGNESIUM SERPL-MCNC: 1.8 MG/DL (ref 1.6–2.6)
MCH RBC QN AUTO: 29.1 PG (ref 27–31)
MCHC RBC AUTO-ENTMCNC: 29.8 G/DL (ref 32–36)
MCV RBC AUTO: 98 FL (ref 82–98)
MONOCYTES # BLD AUTO: 0.5 K/UL (ref 0.3–1)
MONOCYTES NFR BLD: 7.4 % (ref 4–15)
NEUTROPHILS # BLD AUTO: 5 K/UL (ref 1.8–7.7)
NEUTROPHILS NFR BLD: 75.9 % (ref 38–73)
NRBC BLD-RTO: 0 /100 WBC
PHOSPHATE SERPL-MCNC: 2.9 MG/DL (ref 2.7–4.5)
PLATELET # BLD AUTO: 150 K/UL (ref 150–450)
PMV BLD AUTO: 10 FL (ref 9.2–12.9)
POCT GLUCOSE: 95 MG/DL (ref 70–110)
POTASSIUM SERPL-SCNC: 3.5 MMOL/L (ref 3.5–5.1)
PROT SERPL-MCNC: 5.1 G/DL (ref 6–8.4)
RBC # BLD AUTO: 3.33 M/UL (ref 4–5.4)
SODIUM SERPL-SCNC: 138 MMOL/L (ref 136–145)
VANCOMYCIN TROUGH SERPL-MCNC: 8.1 UG/ML (ref 10–22)
WBC # BLD AUTO: 6.58 K/UL (ref 3.9–12.7)

## 2024-07-29 PROCEDURE — 97535 SELF CARE MNGMENT TRAINING: CPT

## 2024-07-29 PROCEDURE — 63600175 PHARM REV CODE 636 W HCPCS: Performed by: NURSE PRACTITIONER

## 2024-07-29 PROCEDURE — 25000003 PHARM REV CODE 250: Performed by: NURSE PRACTITIONER

## 2024-07-29 PROCEDURE — 83735 ASSAY OF MAGNESIUM: CPT | Performed by: NURSE PRACTITIONER

## 2024-07-29 PROCEDURE — 94640 AIRWAY INHALATION TREATMENT: CPT

## 2024-07-29 PROCEDURE — 99291 CRITICAL CARE FIRST HOUR: CPT | Mod: GC,,, | Performed by: INTERNAL MEDICINE

## 2024-07-29 PROCEDURE — 5A0935A ASSISTANCE WITH RESPIRATORY VENTILATION, LESS THAN 24 CONSECUTIVE HOURS, HIGH NASAL FLOW/VELOCITY: ICD-10-PCS | Performed by: INTERNAL MEDICINE

## 2024-07-29 PROCEDURE — 25000003 PHARM REV CODE 250

## 2024-07-29 PROCEDURE — 99900035 HC TECH TIME PER 15 MIN (STAT)

## 2024-07-29 PROCEDURE — 85025 COMPLETE CBC W/AUTO DIFF WBC: CPT | Performed by: INTERNAL MEDICINE

## 2024-07-29 PROCEDURE — 80053 COMPREHEN METABOLIC PANEL: CPT | Performed by: NURSE PRACTITIONER

## 2024-07-29 PROCEDURE — 20600001 HC STEP DOWN PRIVATE ROOM

## 2024-07-29 PROCEDURE — 94761 N-INVAS EAR/PLS OXIMETRY MLT: CPT

## 2024-07-29 PROCEDURE — 92526 ORAL FUNCTION THERAPY: CPT

## 2024-07-29 PROCEDURE — 80202 ASSAY OF VANCOMYCIN: CPT | Performed by: INTERNAL MEDICINE

## 2024-07-29 PROCEDURE — 27100171 HC OXYGEN HIGH FLOW UP TO 24 HOURS

## 2024-07-29 PROCEDURE — 25000242 PHARM REV CODE 250 ALT 637 W/ HCPCS: Performed by: STUDENT IN AN ORGANIZED HEALTH CARE EDUCATION/TRAINING PROGRAM

## 2024-07-29 PROCEDURE — 63600175 PHARM REV CODE 636 W HCPCS

## 2024-07-29 PROCEDURE — 94660 CPAP INITIATION&MGMT: CPT

## 2024-07-29 PROCEDURE — 84100 ASSAY OF PHOSPHORUS: CPT | Performed by: NURSE PRACTITIONER

## 2024-07-29 RX ORDER — GLUCAGON 1 MG
1 KIT INJECTION
Status: DISCONTINUED | OUTPATIENT
Start: 2024-07-29 | End: 2024-07-31 | Stop reason: HOSPADM

## 2024-07-29 RX ORDER — INSULIN ASPART 100 [IU]/ML
0-5 INJECTION, SOLUTION INTRAVENOUS; SUBCUTANEOUS EVERY 6 HOURS PRN
Status: DISCONTINUED | OUTPATIENT
Start: 2024-07-29 | End: 2024-07-31 | Stop reason: HOSPADM

## 2024-07-29 RX ORDER — POTASSIUM CHLORIDE 7.45 MG/ML
10 INJECTION INTRAVENOUS
Status: COMPLETED | OUTPATIENT
Start: 2024-07-29 | End: 2024-07-29

## 2024-07-29 RX ORDER — HYDROMORPHONE HYDROCHLORIDE 1 MG/ML
1 INJECTION, SOLUTION INTRAMUSCULAR; INTRAVENOUS; SUBCUTANEOUS ONCE
Status: COMPLETED | OUTPATIENT
Start: 2024-07-29 | End: 2024-07-29

## 2024-07-29 RX ORDER — MAGNESIUM SULFATE HEPTAHYDRATE 40 MG/ML
2 INJECTION, SOLUTION INTRAVENOUS ONCE
Status: COMPLETED | OUTPATIENT
Start: 2024-07-29 | End: 2024-07-29

## 2024-07-29 RX ADMIN — DEXMEDETOMIDINE HYDROCHLORIDE 1.1 MCG/KG/HR: 4 INJECTION INTRAVENOUS at 07:07

## 2024-07-29 RX ADMIN — IPRATROPIUM BROMIDE AND ALBUTEROL SULFATE 3 ML: 2.5; .5 SOLUTION RESPIRATORY (INHALATION) at 04:07

## 2024-07-29 RX ADMIN — PIPERACILLIN SODIUM AND TAZOBACTAM SODIUM 4.5 G: 4; .5 INJECTION, POWDER, FOR SOLUTION INTRAVENOUS at 07:07

## 2024-07-29 RX ADMIN — IPRATROPIUM BROMIDE AND ALBUTEROL SULFATE 3 ML: 2.5; .5 SOLUTION RESPIRATORY (INHALATION) at 11:07

## 2024-07-29 RX ADMIN — POTASSIUM CHLORIDE 10 MEQ: 7.46 INJECTION, SOLUTION INTRAVENOUS at 07:07

## 2024-07-29 RX ADMIN — POTASSIUM CHLORIDE 10 MEQ: 7.46 INJECTION, SOLUTION INTRAVENOUS at 09:07

## 2024-07-29 RX ADMIN — HYDROMORPHONE HYDROCHLORIDE 1 MG: 1 INJECTION, SOLUTION INTRAMUSCULAR; INTRAVENOUS; SUBCUTANEOUS at 02:07

## 2024-07-29 RX ADMIN — AZITHROMYCIN MONOHYDRATE 500 MG: 500 INJECTION, POWDER, LYOPHILIZED, FOR SOLUTION INTRAVENOUS at 05:07

## 2024-07-29 RX ADMIN — ENOXAPARIN SODIUM 40 MG: 40 INJECTION SUBCUTANEOUS at 07:07

## 2024-07-29 RX ADMIN — THIAMINE HYDROCHLORIDE 500 MG: 100 INJECTION, SOLUTION INTRAMUSCULAR; INTRAVENOUS at 02:07

## 2024-07-29 RX ADMIN — DEXMEDETOMIDINE HYDROCHLORIDE 1 MCG/KG/HR: 4 INJECTION INTRAVENOUS at 12:07

## 2024-07-29 RX ADMIN — PIPERACILLIN SODIUM AND TAZOBACTAM SODIUM 4.5 G: 4; .5 INJECTION, POWDER, FOR SOLUTION INTRAVENOUS at 09:07

## 2024-07-29 RX ADMIN — VANCOMYCIN HYDROCHLORIDE 750 MG: 750 INJECTION, POWDER, LYOPHILIZED, FOR SOLUTION INTRAVENOUS at 12:07

## 2024-07-29 RX ADMIN — METHYLPREDNISOLONE SODIUM SUCCINATE 40 MG: 40 INJECTION, POWDER, FOR SOLUTION INTRAMUSCULAR; INTRAVENOUS at 09:07

## 2024-07-29 RX ADMIN — PIPERACILLIN SODIUM AND TAZOBACTAM SODIUM 4.5 G: 4; .5 INJECTION, POWDER, FOR SOLUTION INTRAVENOUS at 01:07

## 2024-07-29 RX ADMIN — IPRATROPIUM BROMIDE AND ALBUTEROL SULFATE 3 ML: 2.5; .5 SOLUTION RESPIRATORY (INHALATION) at 12:07

## 2024-07-29 RX ADMIN — POTASSIUM CHLORIDE 10 MEQ: 7.46 INJECTION, SOLUTION INTRAVENOUS at 05:07

## 2024-07-29 RX ADMIN — IPRATROPIUM BROMIDE AND ALBUTEROL SULFATE 3 ML: 2.5; .5 SOLUTION RESPIRATORY (INHALATION) at 07:07

## 2024-07-29 RX ADMIN — MAGNESIUM SULFATE HEPTAHYDRATE 2 G: 40 INJECTION, SOLUTION INTRAVENOUS at 05:07

## 2024-07-29 NOTE — PLAN OF CARE
[facility] Medicine ICU Acceptance Note    Date of Admit: 7/27/2024  Date of Transfer: 7/29/2024  Terrell, C/J, L, Onc (IV chemo w/in 1 month), Gyn/Onc, or other special case?: no   ICU team stepping patient down:  MICU  Accepting  team: DEENA BRENNAN    Brief History of Present Illness:      Radha Sotelo is a 79 y.o. 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     To Do / Pending Studies / Follow ups:    COPD flare improved   on IV antbiotic zosyn  NPO per SLP       Patient has been accepted by [facility] Medicine Team DEENA BRENNAN , who will assume care of the patient upon arrival to the floor from the ICU. Please contact ICU team with any concerns prior to arrival. Please contact [facility] Medicine at [phone] or [phone] (please do NOT leave a voicemail) when patient arrives to the floor.    Solomon Brown MD  Attending Staff Physician  [facility]  pager- [phone]  Spectralpwg - [phone]

## 2024-07-29 NOTE — PLAN OF CARE
[facility] - Cardiac Medical ICU  Initial Discharge Assessment       Primary Care Provider: Kalyn Pemberton NP    Admission Diagnosis: Hypercapnia [R06.89]  Leg swelling [M79.89]  Acute encephalopathy [G93.40]  Hypothermia, initial encounter [T68.XXXA]    Admission Date: 7/27/2024  Expected Discharge Date: 8/2/2024    Transition of Care Barriers: None    Payor: Webs [facility] / Plan: Webs CHOICES / Product Type: Medicare Advantage /     Extended Emergency Contact Information  Primary Emergency Contact: Anthony Reardon  Mobile Phone: [phone]  Relation: Daughter   needed? No    Discharge Plan A: Home with family, Home Health  Discharge Plan B: Home with family      [facility] [facility]. - [place], [place] - [address] [facility]  [address] [facility] [address]  Phone: [phone] Fax: [phone]    [facility] [id number] [place], [place] - [address] [facility]  [address] [facility] [address]  Phone: [phone] Fax: [phone]    [facility]. - [place], [place] - [address] [facility]  [address] [facility] [address]  Phone: [phone] Fax: [phone]    [id number] [address] [facility] AT [address] [address]  Phone: [phone] Fax: [phone]      Transferred from:     Past Medical History:   Diagnosis Date    Anemia     Anxiety     COPD (chronic obstructive pulmonary disease)     COPD (chronic obstructive pulmonary disease) with emphysema     Coronary artery disease     Depression     Diverticulitis     Hyperlipidemia     Hypertension     PVD (peripheral vascular disease)     PVD (peripheral vascular disease)     S/P colostomy     Substance abuse     hx heavy etoh use     Tobacco abuse          CM spoke with Anthony Reardon (daughter) [phone] via phone for Discharge Planning Assessment.  Patient unable to answer questions due to  resting.  Per Anthony, patient  lives with Anthony' daughter (granddaughter) with 2nd floor apartment and 12  step(s) to enter residence.   Per Anthony, patient was semi-dependent with ADLS and used no DME for ambulation. Patient is not on dialysis and does not take Coumadin. Patient takes 81 mg aspirin.  Patient is current with [facility] and [facility]. PCP and pharmacy verified, and has not been hospitalized in past 30 days. Patient will have help from Anthony Reardon (daughter) [phone] upon discharge.   Discharge Planning discussed with Anthony quiroz) [phone] via phone.  All questions addressed.  CM will follow for needs.      Discharge Plan A and Plan B have been determined by review of patient's clinical status, future medical and therapeutic needs, and coverage/benefits for post-acute care in coordination with multidisciplinary team members.        Initial Assessment (most recent)       Adult Discharge Assessment - 07/29/24 1523          Discharge Assessment    Assessment Type Discharge Planning Assessment     Confirmed/corrected address, phone number and insurance Yes     Confirmed Demographics Correct on Facesheet     Source of Information family     When was your last doctors appointment? 05/07/24     Does patient/caregiver understand observation status Yes     Communicated FLORIAN with patient/caregiver Date not available/Unable to determine     Reason For Admission Acute on chronic respiratory failure with hypoxia and hypercapnia     People in Home grandchild(coral)     Facility Arrived From: Home     Do you expect to return to your current living situation? Yes     Do you have help at home or someone to help you manage your care at home? Yes     Who are your caregiver(s) and their phone number(s)? Anthony Reardon (ramos) [phone], and granddaughter (lives with patient)     Prior to hospitilization cognitive status: Unable to Assess   due to patient's respiratory situation     Current cognitive status: Unable to Assess   patient resting due to receiving mild sedation.    Walking or Climbing Stairs Difficulty yes     Walking or Climbing Stairs ambulation difficulty, dependent     Mobility Management does not have any equipment to walk with. Patient had right foot amputation in past.     Dressing/Bathing Difficulty yes     Dressing/Bathing bathing difficulty, assistance 1 person;dressing difficulty, assistance 1 person     Dressing/Bathing Management granddaughter, grandson, and daughter assist in these tasks     Home Accessibility not wheelchair accessible     Equipment Currently Used at Home nebulizer;oxygen     Readmission within 30 days? No     Patient currently being followed by outpatient case management? No     Do you currently have service(s) that help you manage your care at home? Yes     Name and Contact number of agency [facility], [facility]    Is the pt/caregiver preference to resume services with current agency Yes     Do you take prescription medications? Yes     Do you have prescription coverage? Yes     Coverage BasisCode [facility] CHOICES     Do you have any problems affording any of your prescribed medications? No     Is the patient taking medications as prescribed? yes     Who is going to help you get home at discharge? will need transport home     How do you get to doctors appointments? other (see comments)   EMS    Are you on dialysis? No     Do you take coumadin? No   takes 81 mg aspirin    Discharge Plan A Home with family;Home Health     Discharge Plan B Home with family     DME Needed Upon Discharge  other (see comments)   TBD    Discharge Plan discussed with: Adult children     Transition of Care Barriers None        Physical Activity    On average, how many days per week do you engage in moderate to strenuous exercise (like a brisk walk)? 0 days     On average, how many minutes do you engage in exercise at this level?  0 min        Financial Resource Strain    How hard is it for you to pay for the very basics like food, housing, medical care, and heating? Not very hard        Housing Stability    In the last 12 months, was there a time when you were not able to pay the mortgage or rent on time? No     At any time in the past 12 months, were you homeless or living in a shelter (including now)? No        Transportation Needs    Has the lack of transportation kept you from medical appointments, meetings, work or from getting things needed for daily living? No        Food Insecurity    Within the past 12 months, you worried that your food would run out before you got the money to buy more. Never true     Within the past 12 months, the food you bought just didn't last and you didn't have money to get more. Never true        Stress    Do you feel stress - tense, restless, nervous, or anxious, or unable to sleep at night because your mind is troubled all the time - these days? Patient unable to answer        Social Isolation    How often do you feel lonely or isolated from those around you?  Patient unable to answer        Alcohol Use    Q1: How often do you have a drink containing alcohol? 2-3 times a week     Q2: How many drinks containing alcohol do you have on a typical day when you are drinking? 1 or 2     Q3: How often do you have six or more drinks on one occasion? Never        Utilities    In the past 12 months has the electric, gas, oil, or water company threatened to shut off services in your home? No        Health Literacy    How often do you need to have someone help you when you read instructions, pamphlets, or other written material from your doctor or pharmacy? Patient unable to respond        OTHER    Name(s) of People in Home granddaughter                   Marcy Downing RN     [phone]

## 2024-07-29 NOTE — HOSPITAL COURSE
Pt received scheduled breathing treatments as well as steroids. Antibiotics continued for respiratory as well as skin coverage in setting of RLE cellulitis and COPD exacerbation. Continued on BiPAP with improvement in mentation and pCO2. Weaned to home 2L NC and started on nightly BiPAP.      Interval History/Significant Events: Patient agitated and suspicious upon interview. Not believing that providers are who they say they are and stating that their credentials are false. Trying to hide areas to be examined from providers. Raised voice and asked provider to leave.     Collateral: Called daughter who states that over the past few months the patient has become more forgetful. Patient lives in an apartment and is mostly independent in her ADL's but has some help from grand children that live below her. During  before her acute episode that she is admitted for she was suspicious of her own grandson and yelled at him which daughter states is unusual behavior for her.     Patient received scheduled breathing treatments as well as steroids. Antibiotics continued for respiratory as well as skin coverage in setting of RLE cellulitis and COPD exacerbation. Continued on BiPAP with improvement in mentation and pCO2. Weaned to home 2L NC and started on nightly BiPAP. Patient with intermittent agitation requiring precedex and PRN Zyprexa. Family states this is not her baseline but she has been having forgetfulness over the last few months, acting intermittently suspicious towards staff. De-escalated to Zosyn only for antibiotics, received 3 days of azithromycin. On HFNC overnight, did not comply with BiPAP.            7/30 Transfer to hospital medicine .admitted to the MICU for COPD exacerbation. hypercapnic respiratory failure due to COPD exacerbation - improved with BiPAP with good response  stable on low flow oxygen. sats 95% on 2LNC. Continue bronchodilators, nebs, prednisone. NC @ 2L which is her baseline. Was  put on precedex for agitation, but off now, still in wrist restraints. Not on pressors.  SLP eval - recs NPO/ noted gitation, hallucinations, confabulation - last ETOH 2 weeks ago. Thiamine started for possible wernicke's encephalopathy. vancomycin discontinued. continue zosyn . on bilateral UE restraints . Psychiatry consulted for encephalopathy/restraints/cognitive dysfunction - 2.5mg Zyprexa PO/IM Q6H PRN for any non redirectable agitation  AAOX 3. off restraints since last night . Leucocytosis of 13. K of 5.6 . received D50 insulin and albuterol concentrate.sats 95% on 3LNC. podiatry consulted for cortical regularity at the osteotomy sites- right foot. could represent evolving postoperative changes or possibly superimposed infection. SLP recs regular diet   7/31 completed Zosyn.  Podiatry eval -  Wound at Right  medial aspect of TMA is painted with betadine and foot is wrapped with kerlix and ACE without compression.  WBAT Right  foot, no restrictions

## 2024-07-29 NOTE — ASSESSMENT & PLAN NOTE
-- CTH with NAF  -- TSH and FT4 unremarkable  -- Respiratory management as above   -- Avoid sedating agents   -- Ammonia elevated at 115, repeat 35  -- Delirium precautions     Family details worsening agitation, possible hallucinations at home. Worsened after she stopped drinking 2 weeks ago. Unclear if due to infection vs dementia (frontotemporal/vascular), EtoH/wernicke's. Baseline patient as some support with ADL's but is mostly independent. Her agitation with staff and suspicion with is unusual behavior for her.    - appears to be confabulating on exam   - treatment dose IV thiamine   - improving with precedex wean, abx.

## 2024-07-29 NOTE — ASSESSMENT & PLAN NOTE
-- CTH with NAF  -- TSH and FT4 unremarkable  -- Respiratory management as above   -- Avoid sedating agents   -- Ammonia elevated at 115, repeat 35  -- Delirium precautions      Family details worsening agitation, possible hallucinations at home. Worsened after she stopped drinking 2 weeks ago. Unclear if due to infection vs dementia (frontotemporal/vascular), EtoH/wernicke's. Baseline patient as some support with ADL's but is mostly independent. Her agitation with staff and suspicion with is unusual behavior for her.          - appears to be confabulating on exam         - treatment dose IV thiamine         - improving with precedex wean, abx.     7/29 on bilateral UE restraints . Psychiatry consulted for encephalopathy/restraints/cognitive dysfunction   7/30 Psychiatry consulted for encephalopathy/restraints/cognitive dysfunction - 2.5mg Zyprexa PO/IM Q6H PRN for any non redirectable agitation  AAOX 3. off restraints since last night

## 2024-07-29 NOTE — RESIDENT HANDOFF
"Critical Care Handoff     Primary Team: [facility] CRITICAL CARE MEDICINE TEAM 2 Room Number: [id number] [id number]     Patient Name: Radha Sotelo MRN: [id number]     Date of Birth: [id number] Allergies: Patient has no known allergies.     Age: 79 y.o. Admit Date: 7/27/2024     Sex: female  BMI: Body mass index is 24.2 kg/m².     Code Status: Full Code        llness Level (current clinical status): Watcher - No    Reason for Admission: Acute on chronic respiratory failure with hypoxia and hypercapnia    Brief HPI (pertinent PMH and diagnosis or differential diagnosis): Mrs. Sotelo is a79 year old female with PMH notable for COPD on home oxygen, CAD, PVD, HTN, HLD, partial right foot amputation with chronic right leg swelling who presented [place] [facility] [place] via [place] for shortness of breath and altered mental status. Per chart review, "patient's family stated that she at baseline is fully communicative but that all of a sudden she became less responsive.  EMS reports that she had a GCS of 12 with them in route to the hospital and that her communication ability was limited.  They subsequently placed her on CPAP in route to the hospital.  Further history limited given the patient's current mental status."    She was hypothermic to 94.5F and otherwise HDS. VBG trend in the ED 7.01/>130 --> 7.06/>130 --> 7.13/117. Ammonia elevated at 115. . In the emergency department she was given albuterol, Ipratropium, Methylpred, Mg, Vancomycin, and Zosyn.     Critical care medicine consulted for hypercapnic respiratory failure.        Hospital Course (updated, brief assessment by system or problem, significant events): Pt received scheduled breathing treatments as well as steroids. Antibiotics continued for respiratory as well as skin coverage in setting of RLE cellulitis and COPD exacerbation. Continued on BiPAP with improvement in mentation and pCO2. Weaned to home 2L NC and started on nightly BiPAP. Patient with intermittent agitation requiring " precedex and PRN Zyprexa. Family states this is not her baseline but she has been having forgetfulness over the last few months, acting intermittently suspicious towards staff. De-escalated to Zosyn only for antibiotics, received 3 days of azithromycin. On HFNC overnight, did not comply with BiPAP.      Tasks (specific, using if-then statements): If agitated give PRN Zyprexa     Estimated Discharge Date: 8/2/2024    Discharge Disposition: Home-Health Care Svc    Mentored By: Sim Ernandez

## 2024-07-29 NOTE — ASSESSMENT & PLAN NOTE
Patient with Hypercapnic and Hypoxic Respiratory failure which is Acute on chronic.  she is on home oxygen at 2 LPM. Supplemental oxygen was provided and noted- Oxygen Concentration (%):  [28] 28     Signs/symptoms of respiratory failure include- tachypnea and lethargy. Contributing diagnoses includes - CHF, COPD, and Obesity Hypoventilation Labs and images were reviewed. Patient Has recent ABG, which has been reviewed. Will treat underlying causes and adjust management of respiratory failure as follows-      79 year old female with COPD, CAD, NSTEMI, HTN, and HLD who [place] [facility] [place] for hypercapnic respiratory failure with initial CO2 > 130. She was placed on NIPPV.      7/28 pH has recovered to 7.44.   -- Given Methylpred, Mg, Albuterol, and Ipratropium in the ED  -- Continue NIPPV          - weaned to 2L NC. Bipap qhs and with naps  -- Wean FiO2 for SpO2 goal 88-92%   -- Follow up COVID / Flu swab          - negative  -- Follow up MRSA swab          - negative  -- Obtain sputum sample if possible  -- Continue broad spectrum abx, deescalated to Zosyn only   -- Continue prednisone   -- Continue      Patient received scheduled breathing treatments as well as steroids. Antibiotics continued for respiratory as well as skin coverage in setting of RLE cellulitis and COPD exacerbation. Continued on BiPAP with improvement in mentation and pCO2. Weaned to home 2L NC and started on nightly BiPAP. Patient with intermittent agitation requiring precedex and PRN Zyprexa. Family states this is not her baseline but she has been having forgetfulness over the last few months, acting intermittently suspicious towards staff. De-escalated to Zosyn only for antibiotics, received 3 days of azithromycin. On HFNC overnight, did not comply with BiPAP.            7/30 Transfer to hospital medicine .admitted to the MICU for COPD exacerbation. hypercapnic respiratory failure due to COPD exacerbation - improved with BiPAP with  good response  stable on low flow oxygen. sats 95% on 2LNC. Continue bronchodilators, nebs, prednisone. NC @ 2L which is her baseline. vancomycin discontinued. continue zosyn.Leucocytosis of 13

## 2024-07-29 NOTE — ASSESSMENT & PLAN NOTE
-- XR with no subcutaneous air  -- Likely cellulitis, continue broad spectrum Abx for now  -- consider further imaging if no clinical improvement   7/30 podiatry consulted for cortical regularity at the osteotomy sites- right foot. could represent evolving postoperative changes or possibly superimposed infection.

## 2024-07-29 NOTE — NURSING TRANSFER
Nursing Transfer Note      7/29/2024   5:54 PM    Nurse giving handoff: GUSTAVO Herrera  Nurse receiving handoff:GUSTAVO Walters    Reason patient is being transferred: No longer in need of critical care    Transfer To: [id number]    Transfer via bed    Transfer with O2 2LNC    Transported by GUSTAVO Herrera/GUSTAVO [place]    Additional Lines: Oxygen    4eyes on Skin: yes    Any special needs or follow-up needed: Dentures transferred with patient and given to her upon arrival.     Patient belongings transferred with patient: Yes    Notified: daughter    Upon arrival to floor: cardiac monitor applied, patient oriented to room, call bell in reach, and bed in lowest position

## 2024-07-29 NOTE — PT/OT/SLP PROGRESS
"Speech Language Pathology Treatment    Patient Name:  Radha Sotelo   MRN:  [id number]  Admitting Diagnosis: Acute on chronic respiratory failure with hypoxia and hypercapnia    Recommendations:                 General Recommendations:  Dysphagia therapy  Diet recommendations:  NPO, NPO   Aspiration Precautions:   Pleasure feeding of ice chips sparingly when awake/alert/participating   Meds via alternative routes   General Precautions: Standard, aspiration, fall, NPO  Communication strategies:  none    Assessment:     Radha Sotelo is a 79 y.o. female with an SLP diagnosis of Dysphagia. She presents with poor ability to participate in PO trials this date 2/2 persistent agitation and poor ability to be redirected. Pt is at slightly increased risk for development of aspiration-related complications given decreased oral health status and decreased functional mobility. Given inability to complete OP trials this date and high degree of overt signs of airway compromise with low level PO intake on initial eval, pt is not safe for initiation of an oral diet. SLP to continue to follow.      Subjective     Spoke with nursing prior to session. Pt found resting in bed upon SLP entry into room.     Patient goals: "Get out!"     Pain/Comfort:  Pain Rating 1: 0/10    Respiratory Status: High flow, flow 25 L/min, concentration 28%    Objective:     Has the patient been evaluated by SLP for swallowing?   Yes  Keep patient NPO? Yes   Current Respiratory Status:        Pt seen for ongoing dysphagia therapy. Upon SLP entry into room, pt becoming agitated and aggressively shouting "no" and "get out" at clinician despite SLP still remaining in doorway. As SLP gently approached pt, she continued to shout and verbalized suspicion of SLP and stated, "You're here to poison me, get out!" SLP offered pt a variety of oral intake including ice chips, water, pudding, and art crackers though pt again becoming persistently agitated and " trying to pull at bilateral wrist restraints and thrashing in bed. Pt requesting SLP's credentials and pointing to name badge; SLP gave pt badge for inspection though she required MOD verbal encouragement to return badge. Attempted to provide consistent education regarding importance of oral intake and participation in therapy though pt unable to exhibit understanding 2/2 agitation without redirection. Pt left with bed in lowest locked position upon SLP exit. Spoke with RN regarding impressions and recommendations and nursing verbalized understanding.      Goals:   Multidisciplinary Problems       SLP Goals          Problem: SLP    Goal Priority Disciplines Outcome   SLP Goal     SLP Progressing   Description: Speech Language Pathology Goals  Goals expected to be met by 8/11:    1. The pt will participate in an ongoing swallow evaluation.                                Plan:     Patient to be seen:  4 x/week   Plan of Care expires:  08/25/24  Plan of Care reviewed with:  patient   SLP Follow-Up:  Yes       Discharge recommendations:   (TBD)   Barriers to Discharge:  Level of Skilled Assistance Needed      Time Tracking:     SLP Treatment Date:   07/29/24  Speech Start Time:  0945  Speech Stop Time:  1001     Speech Total Time (min):  16 min    Billable Minutes: Treatment Swallowing Dysfunction 8 and Self Care/Home Management Training 8      07/29/2024

## 2024-07-29 NOTE — PROGRESS NOTES
Patient arrived to [id number] from MICU at approx 1745. VSS and patient assessed. Skin assessed with GUSTAVO Hermosillo-see LDA for wounds. R foot wound dressing change complete. Patient placed on tele box [id number], confirmed patient is visible with Quang in telemetry. Patient oriented to room and told to call for assistance before attempting to get out of bed- call light and belongings in reach. Patient bed alarm set. Patient resting at this time with needs met and safety measures in place.      07/29/24 1805   Pain/Comfort/Sleep   Comfort/Acceptable Pain Level 0   Cognitive   Cognitive/Neuro/Behavioral WDL ex;orientation   Level of Consciousness (AVPU) alert   Arousal Level opens eyes spontaneously   Orientation disoriented to;situation   Speech follows commands   Pupils   Pupil PERRLA yes   Pupil Size Left 2 mm   Pupil Shape Left round   Pupil Reaction Left brisk;equal   Pupil Accommodation Left normal response   Pupil Size Right 2 mm   Pupil Shape Right round   Pupil Reaction Right equal;brisk   Pupil Accommodation Right normal response   [place] Coma Scale   Best Eye Response 4-->(E4) spontaneous   Best Motor Response 6-->(M6) obeys commands   Best Verbal Response 4-->(V4) confused   [place] Coma Scale Score 14   Motor Response   Motor Response general motor response   General Motor Response purposeful motor response   HEENT   HEENT WDL WDL   Face Symptoms symmetrical at rest, with movement and expression   Respiratory   Respiratory WDL ex;cough   Rhythm/Pattern, Respiratory unlabored;pattern regular;depth regular;chest wiggle adequate;no shortness of breath reported   Expansion/Accessory Muscles/Retractions no use of accessory muscles;no retractions   Cough Frequency frequent   Cough Type congested;nonproductive   Breath Sounds   All Lung Fields Breath Sounds Anterior:;coarse   Oxygen Therapy   Flow (L/min) (Oxygen Therapy) 3   Peripheral Neurovascular   Peripheral Neurovascular WDL WDL   VTE Required Core Measure  Pharmacological prophylaxis initiated/maintained   VTE Prevention/Management bleeding risk assessed   All Extremities Neurovascular Assessment   General All Extremity Temperature warm   General All Extremity Color no discoloration   General All Extremity Sensation no tingling;no numbness   Pulse Radial   Left Radial Pulse 2+ (normal)   Right Radial Pulse 2+ (normal)   Pulse Dorsalis Pedis   Left Dorsalis Pedis Pulse 1+ (weak)   Right Dorsalis Pedis Pulse 1+ (weak)   Pulse Posterior Tibial   Left Posterior Tibial Pulse 1+ (weak)   Right Posterior Tibial Pulse 1+ (weak)        Peripheral IV - Single Lumen 07/27/24 2251 20 G Anterior;Distal;Left Wrist   Placement Date/Time: 07/27/24 2251   Present Prior to Hospital Arrival?: No  Inserted by: RN  Size (G): 20 G  Orientation: Anterior;Distal;Left  Location: Wrist   Extremity Assessment Distal to IV No warmth;No swelling;No redness;No abnormal discoloration   Line Status Saline locked   Dressing Status Clean;Dry;Intact   Dressing Intervention Integrity maintained        Peripheral IV - Single Lumen 07/27/24 2252 22 G Posterior;Right Hand   Placement Date/Time: 07/27/24 2252   Present Prior to Hospital Arrival?: No  Inserted by: RN  Size (G): 22 G  Orientation: Posterior;Right  Location: Hand  Insertion attempts (enter comment if more than 2 attempts): 2   Site Assessment Clean;Dry;Intact;No redness;No swelling;No warmth;No drainage   Extremity Assessment Distal to IV No redness;No abnormal discoloration;No warmth;No swelling   Line Status Saline locked   Dressing Status Clean;Dry;Intact   Dressing Intervention Integrity maintained   Skin   Skin WDL ex   Skin Color/Characteristics pale   Skin Temperature warm   Skin Moisture dry   Skin Elasticity slow return to original state   Skin   2 Clinical Staff Member Skin Inspection GUSTAVO Hermosillo Risk Assessment   Sensory Perception 3-->slightly limited   Moisture 3-->occasionally moist   Activity 1-->bedfast   Mobility  3-->slightly limited   Nutrition 2-->probably inadequate   Friction and Shear 2-->potential problem   Suresh Score 14        Wound 05/25/24 2200 Incision Right anterior Foot other (see comments)   Date First Assessed/Time First Assessed: 05/25/24 2200   Present on Original Admission: Yes  Primary Wound Type: Incision  Side: Right  Orientation: anterior  Location: Foot  Is this injury device related?: No  Incision Type: other (see comments)   Dressing Appearance other (see comments)  (loose)   Drainage Amount None   Appearance Dry;Pink;Red   Care Cleansed with:;Povidone iodine   Dressing Changed;Foam   Dressing Change Due 07/30/24   Musculoskeletal   Musculoskeletal WDL ex   General Mobility significantly impaired   Gastrointestinal   GI WDL WDL   Abdominal Appearance nondistended;rounded   Abdominal Palpation All Quadrants   All Quadrants Abdominal Palpation soft/nontender   Bowel Sounds All Quadrants   All Quadrants Bowel Sounds audible and normoactive   GI Signs/Symptoms other (see comments)  (colostomy)        Colostomy 07/13/21 0201 Descending/sigmoid LLQ   Placement Date/Time: 07/13/21 0201   Present Prior to Hospital Arrival?: Yes  Colostomy Type: Descending/sigmoid  Location: LLQ   Stomal Appliance 1 piece;Intact   Stoma Appearance round;protruding above skin level   Site Assessment Intact;Clean   Peristomal Assessment Clean;Intact   Genitourinary   Genitourinary WDL ex;voiding ability/characteristics   Voiding Characteristics external catheter;due to void  (due to void by 2330)   Coping/Psychosocial   Observed Emotional State accepting;calm;cooperative   Verbalized Emotional State acceptance   Plan of Care Reviewed With patient   Safety   Safety WDL WDL   Safety Factors ID band on;bed in low position;upper side rails raised x 2;call light in reach;wheels locked   All Alarms alarm(s) activated and audible   Enhanced Safety Measures bed alarm set   Fall Risk Assessment (every shift)   History Of Fall (W/I 3  Mos) 0-->No   Polypharmacy 3-->Yes   Central Nervous System/Psychotropic Medication 3-->Yes   Cardiovascular Medication 3-->Yes   Age Greater Than 65 Years 2-->Yes   Altered Elimination 2-->Yes   Cognitive Deficit 2-->Yes   Sensory Deficit 2-->Yes   Dizziness/Vertigo 0-->No   Depression 0-->No   Mobility Deficit/Weakness 2-->Yes   Male 0-->No   Fall Risk Score 19   Safety Management   Safety Promotion/Fall Prevention assistive device/personal item within reach;medications reviewed;nonskid shoes/socks when out of bed;room near unit station   Medication Review/Management medications reviewed   Patient Rounds bed in low position;bed wheels locked;call light in patient/parent reach;clutter free environment maintained;ID band on;placement of personal items at bedside;toileting offered;visualized patient   Safety Bands on Patient Fall Risk Band   Elopement Precautions bed alarm   Mobility   GEMS (Richardson Early Mobility Scale) Level 1-Primary in bed activities   Positioning   Body Position position changed independently   Head of Bed (HOB) Positioning HOB at 30-45 degrees   Positioning/Transfer Devices pillows;in use

## 2024-07-29 NOTE — PROGRESS NOTES
Pharmacokinetic Assessment Follow Up: IV Vancomycin    Vancomycin serum concentration assessment(s):    The trough level was drawn correctly and can be used to guide therapy at this time. The measurement is below the desired definitive target range of 10 to 20 mcg/mL.    Vancomycin Regimen Plan:    Change regimen to Vancomycin 1000 mg IV every 24 hours with next serum trough concentration measured at 0030 on 7/31    Drug levels (last 3 results):  Recent Labs   Lab Result Units 07/29/24  0057   Vancomycin-Trough ug/mL 8.1*       Pharmacy will continue to follow and monitor vancomycin.    Please contact pharmacy at extension [phone] for questions regarding this assessment.    Thank you for the consult,   Mariya Reis       Patient brief summary:  Radha Sotelo is a 79 y.o. female initiated on antimicrobial therapy with IV Vancomycin for treatment of pneumonia      Drug Allergies:   Review of patient's allergies indicates:  No Known Allergies    Actual Body Weight:   56.2kg    Renal Function:   Estimated Creatinine Clearance: 45.6 mL/min (based on SCr of 0.8 mg/dL).,     Dialysis Method (if applicable):  N/A    CBC (last 72 hours):  Recent Labs   Lab Result Units 07/27/24  0020 07/27/24  0257 07/28/24  0216   WBC K/uL 9.47 12.29 9.40   Hemoglobin g/dL 11.7* 10.4* 10.3*   Hematocrit % 41.3 37.1 34.2*   Platelets K/uL 222 205 180   Gran % % 80.4* 95.3* 84.5*   Lymph % % 7.7* 2.5* 6.6*   Mono % % 8.6 1.4* 8.0   Eosinophil % % 2.5 0.2 0.2   Basophil % % 0.4 0.2 0.4   Differential Method  Automated Automated Automated       Metabolic Panel (last 72 hours):  Recent Labs   Lab Result Units 07/27/24  0020 07/27/24  0049 07/27/24  0257 07/28/24  0216   Sodium mmol/L 141  --  138 141   Potassium mmol/L 4.9  --  3.9 4.4   Chloride mmol/L 102  --  98 97   CO2 mmol/L 29  --  32* 37*   Glucose mg/dL 152*  --  255* 125*   Glucose, UA   --  Negative  --   --    BUN mg/dL 20  --  20 22   Creatinine mg/dL 0.9  --  0.9 0.8   Albumin  g/dL 3.8  --  3.3* 3.2*   Total Bilirubin mg/dL 0.1  --  0.2 0.3   Alkaline Phosphatase U/L 113  --  101 86   AST U/L 17  --  14 13   ALT U/L 11  --  11 10   Magnesium mg/dL 2.2  --  2.8* 2.3   Phosphorus mg/dL  --   --  2.6* 2.9       Vancomycin Administrations:  vancomycin given in the last 96 hours                     vancomycin 750 mg in D5W 250 mL IVPB (Vial-Mate) (mg) 750 mg New Bag 07/29/24 0058     750 mg New Bag 07/28/24 0207    vancomycin (VANCOCIN) 1,000 mg in D5W 250 mL IVPB (Vial-Mate) (mg) 1,000 mg New Bag 07/27/24 0113                    Microbiologic Results:  Microbiology Results (last 7 days)       Procedure Component Value Units Date/Time    Blood culture #2 **CANNOT BE ORDERED STAT** [[id number]] Collected: 07/27/24 0021    Order Status: Completed Specimen: Blood from Peripheral, Antecubital, Right Updated: 07/28/24 0613     Blood Culture, Routine No Growth to date      No Growth to date    Blood culture #1 **CANNOT BE ORDERED STAT** [[id number]] Collected: 07/27/24 0020    Order Status: Completed Specimen: Blood from Peripheral, Antecubital, Right Updated: 07/28/24 0613     Blood Culture, Routine No Growth to date      No Growth to date    MRSA Screen by PCR [[id number]] Collected: 07/27/24 0258    Order Status: Completed Specimen: Nasal Swab Updated: 07/27/24 0926     MRSA SCREEN BY PCR Not Detected    Culture, Respiratory with Gram Stain [[id number]]     Order Status: No result Specimen: Respiratory

## 2024-07-29 NOTE — ASSESSMENT & PLAN NOTE
Multi vessel CAD. Recent cath with stent placement 05/27      The Prox RCA lesion was 99% stenosed with 0% stenosis post-intervention.    The RPDA lesion was 100% stenosed.    Prox RCA lesion: A .    Prox RCA lesion: A STENT SYNERGY XD 3.0X12MM stent was not successfully placed.    Prox RCA lesion: A .    Prox RCA lesion: A [place] stent was successfully placed.    Prox RCA lesion: A .    Prox RCA lesion: A .    The estimated blood loss was none.    -- Continue ASA / Plavix

## 2024-07-29 NOTE — ASSESSMENT & PLAN NOTE
Delayed wound healing  -- Wound care consulted   -- Follow up foot XRAYs. May need CT/MRI to evaluate for presence of abscess          - Xray equivocal, no pain on exam. Will defer for now  -- Given RLE swelling: follow up BLE U/S         - US negative.   7/30 podiatry consulted for cortical regularity at the osteotomy sites- right foot. could represent evolving postoperative changes or possibly superimposed infection.

## 2024-07-29 NOTE — ASSESSMENT & PLAN NOTE
Most recent ECHO 05/26/24:     Left Ventricle: The left ventricle is normal in size. Ventricular mass is normal. Normal wall thickness. Normal wall motion. There is normal systolic function with a visually estimated ejection fraction of 60 - 65%. There is normal diastolic function.    Right Ventricle: Normal right ventricular cavity size. Wall thickness is normal. Systolic function is normal.    Left Atrium: Left atrium is mildly dilated.    Aortic Valve: There is moderate aortic valve sclerosis. There is annular calcification present. Mildly restricted motion. There is mild stenosis. Aortic valve area by VTI is 2.21 cm². Aortic valve peak velocity is 2.0 m/s. Mean gradient is 7 mmHg. The dimensionless index is 0.58. There is mild aortic regurgitation.    Pulmonary Artery: The estimated pulmonary artery systolic pressure is 20 mmHg.    IVC/SVC: Normal venous pressure at 3 mmHg.     --   -- PRN diuresis   -- Metoprolol   -- Continue telemetry

## 2024-07-29 NOTE — PROGRESS NOTES
Pharmacokinetic Assessment Follow Up: IV Vancomycin    Vancomycin order has been discontinued. Pharmacy will sign off. Please reconsult as needed! Thank you!    Please contact pharmacy for questions regarding this assessment.    Thank you for the consult,   Jonathan Johnson  [phone]

## 2024-07-29 NOTE — PROGRESS NOTES
"[facility] - Card[facility] Medical [facility]  Critical Care Medicine  Progress Note    Patient Name: Radha Sotelo  MRN: [id number]  Admission Date: 7/27/2024  Hospital Length of Stay: 2 days  Code Status: Full Code  Attending Provider: Sim Mcdermott MD  Primary Care Provider: Kalyn Pemberton NP   Principal Problem: Acute on chronic respiratory failure with hypoxia and hypercapnia    Subjective:     HPI:  Mrs. 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 Per chart review, "patient's family stated that she at baseline is fully communicative but that all of a sudden she became less responsive.  EMS reports that she had a GCS of 12 with them in route to the hospital and that her communication ability was limited.  They subsequently placed her on CPAP in route to the hospital.  Further history limited given the patient's current mental status."    She was hypothermic to 94.5F and otherwise HDS. VBG trend in the ED 7.01/>130 --> 7.06/>130 --> 7.13/117. Ammonia elevated at 115. . In the emergency department she was given albuterol, Ipratropium, Methylpred, Mg, Vancomycin, and Zosyn.     Critical care medicine consulted for hypercapnic respiratory failure.     Hospital/ICU Course:  Pt received scheduled breathing treatments as well as steroids. Antibiotics continued for respiratory as well as skin coverage in setting of RLE cellulitis and COPD exacerbation. Continued on BiPAP with improvement in mentation and pCO2. Weaned to home 2L NC and started on nightly BiPAP.     Interval History/Significant Events: Patient agitated and suspicious upon interview. Not believing that providers are who they say they are and stating that their credentials are false. Trying to hide areas to be examined from providers. Raised voice and asked provider " to leave.    Collateral: Called daughter who states that over the past few months the patient has become more forgetful. Patient lives in an apartment and is mostly independent in her ADL's but has some help from grand children that live below her. During  before her acute episode that she is admitted for she was suspicious of her own grandson and yelled at him which daughter states is unusual behavior for her.     Review of Systems   Psychiatric/Behavioral:  Positive for agitation and hallucinations.      Objective:     Vital Signs (Most Recent):  Temp: 98.2 °F (36.8 °C) (07/29/24 1136)  Pulse: 65 (07/29/24 1136)  Resp: (!) 22 (07/29/24 1136)  BP: (!) 137/99 (07/29/24 1105)  SpO2: 97 % (07/29/24 1136) Vital Signs (24h Range):  Temp:  [97.5 °F (36.4 °C)-98.4 °F (36.9 °C)] 98.2 °F (36.8 °C)  Pulse:  [56-81] 65  Resp:  [16-30] 22  SpO2:  [92 %-97 %] 97 %  BP: (120-161)/(51-99) 137/99   Weight: 56.2 kg (123 lb 14.4 oz)  Body mass index is 24.2 kg/m².      Intake/Output Summary (Last 24 hours) at 7/29/2024 1252  Last data filed at 7/29/2024 1100  Gross per 24 hour   Intake 2521.97 ml   Output 1595 ml   Net 926.97 ml          Physical Exam  Constitutional:       Appearance: Normal appearance.   HENT:      Head: Normocephalic.   Eyes:      Extraocular Movements: Extraocular movements intact.      Pupils: Pupils are equal, round, and reactive to light.   Cardiovascular:      Rate and Rhythm: Normal rate and regular rhythm.   Pulmonary:      Effort: Pulmonary effort is normal.      Breath sounds: Wheezing present.   Abdominal:      General: Abdomen is flat.   Musculoskeletal:         General: Normal range of motion.      Comments: In restraints.    Neurological:      General: No focal deficit present.      Mental Status: She is alert.            Vents:  Oxygen Concentration (%): 28 (07/29/24 1105)  Lines/Drains/Airways       Drain  Duration                  Colostomy 07/13/21 0201 Descending/sigmoid LLQ 1112 days          Urethral Catheter 07/27/24 0052 Temperature probe 14 Fr. 2 days              Peripheral Intravenous Line  Duration                  Peripheral IV - Single Lumen 07/27/24 2251 20 G Anterior;Distal;Left Wrist 1 day         Peripheral IV - Single Lumen 07/27/24 2252 22 G Posterior;Right Hand 1 day                  Significant Labs:    CBC/Anemia Profile:  Recent Labs   Lab 07/28/24 0216 07/29/24  0524   WBC 9.40 6.58   HGB 10.3* 9.7*   HCT 34.2* 32.5*    150   MCV 97 98   RDW 13.1 13.3        Chemistries:  Recent Labs   Lab 07/28/24 0216 07/29/24  0411    138   K 4.4 3.5   CL 97 102   CO2 37* 24   BUN 22 20   CREATININE 0.8 0.9   CALCIUM 9.1 7.5*   ALBUMIN 3.2* 2.7*   PROT 5.9* 5.1*   BILITOT 0.3 0.4   ALKPHOS 86 68   ALT 10 8*   AST 13 17   MG 2.3 1.8   PHOS 2.9 2.9       CMP:   Recent Labs   Lab 07/28/24 0216 07/29/24  0411    138   K 4.4 3.5   CL 97 102   CO2 37* 24   * 276*   BUN 22 20   CREATININE 0.8 0.9   CALCIUM 9.1 7.5*   PROT 5.9* 5.1*   ALBUMIN 3.2* 2.7*   BILITOT 0.3 0.4   ALKPHOS 86 68   AST 13 17   ALT 10 8*   ANIONGAP 7* 12       Significant Imaging:  I have reviewed all pertinent imaging results/findings within the past 24 hours.  U/S: I have reviewed all pertinent results/findings within the past 24 hours and my personal findings are:  normal    ABG  Recent Labs   Lab 07/27/24 2043   PH 7.442   PO2 41   PCO2 60.3*   HCO3 41.1*   BE 17*     Assessment/Plan:     Neuro  Encephalopathy, metabolic  -- CTH with NAF  -- TSH and FT4 unremarkable  -- Respiratory management as above   -- Avoid sedating agents   -- Ammonia elevated at 115, repeat 35  -- Delirium precautions     Family details worsening agitation, possible hallucinations at home. Worsened after she stopped drinking 2 weeks ago. Unclear if due to infection vs dementia (frontotemporal/vascular), EtoH/wernicke's. Baseline patient as some support with ADL's but is mostly independent. Her agitation with staff and  suspicion with is unusual behavior for her.    - appears to be confabulating on exam   - treatment dose IV thiamine   - improving with precedex wean, abx.     Pulmonary  * Acute on chronic respiratory failure with hypoxia and hypercapnia  Patient with Hypercapnic and Hypoxic Respiratory failure which is Acute on chronic.  she is on home oxygen at 2 LPM. Supplemental oxygen was provided and noted- Oxygen Concentration (%):  [28] 28    Signs/symptoms of respiratory failure include- tachypnea and lethargy. Contributing diagnoses includes - CHF, COPD, and Obesity Hypoventilation Labs and images were reviewed. Patient Has recent ABG, which has been reviewed. Will treat underlying causes and adjust management of respiratory failure as follows-     79 year old female with COPD, CAD, NSTEMI, HTN, and HLD who presented to [facility] ED via EMS for hypercapnic respiratory failure with initial CO2 > 130. She was placed on NIPPV.     7/28 pH has recovered to 7.44.   -- Given Methylpred, Mg, Albuterol, and Ipratropium in the ED  -- Continue NIPPV    - weaned to 2L NC. Bipap qhs and with naps  -- Wean FiO2 for SpO2 goal 88-92%   -- Follow up COVID / Flu swab    - negative  -- Follow up MRSA swab    - negative  -- Obtain sputum sample if possible  -- Continue broad spectrum abx, deescalated to Zosyn only   -- Continue prednisone   -- Continue albuterol     COPD exacerbation  -- Plan per respiratory failure     Cardiac/Vascular  Chronic diastolic heart failure  Most recent ECHO 05/26/24:     Left Ventricle: The left ventricle is normal in size. Ventricular mass is normal. Normal wall thickness. Normal wall motion. There is normal systolic function with a visually estimated ejection fraction of 60 - 65%. There is normal diastolic function.    Right Ventricle: Normal right ventricular cavity size. Wall thickness is normal. Systolic function is normal.    Left Atrium: Left atrium is mildly dilated.    Aortic Valve: There is moderate aortic  valve sclerosis. There is annular calcification present. Mildly restricted motion. There is mild stenosis. Aortic valve area by VTI is 2.21 cm². Aortic valve peak velocity is 2.0 m/s. Mean gradient is 7 mmHg. The dimensionless index is 0.58. There is mild aortic regurgitation.    Pulmonary Artery: The estimated pulmonary artery systolic pressure is 20 mmHg.    IVC/SVC: Normal venous pressure at 3 mmHg.    --   -- PRN diuresis   -- Metoprolol   -- Continue telemetry     HLD (hyperlipidemia)  -- Continue statin therapies. Atorvastatin 80 mg   -- CMP daily. LFTs stable.       CAD (coronary artery disease)  -- ASA / Plavix    Essential hypertension  > Has been maintaining her BP below goal  -- Continue Metoprolol   -- SBP < 180 goal       CAD (coronary artery disease)  Multi vessel CAD. Recent cath with stent placement 05/27      The Prox RCA lesion was 99% stenosed with 0% stenosis post-intervention.    The RPDA lesion was 100% stenosed.    Prox RCA lesion: A .    Prox RCA lesion: A STENT SYNERGY XD 3.0X12MM stent was not successfully placed.    Prox RCA lesion: A .    Prox RCA lesion: A [place] stent was successfully placed.    Prox RCA lesion: A .    Prox RCA lesion: A .    The estimated blood loss was none.    -- Continue ASA / Plavix     PVD (peripheral vascular disease)  Known PVD    - Continue Aspirin    GI  S/P colostomy  -- Secondary to bowel perforation    Orthopedic  Delayed wound healing  -- Wound care consulted   -- Follow up foot XRAYs. May need CT/MRI to evaluate for presence of abscess    - Xray equivocal, no pain on exam. Will defer for now  -- Given RLE swelling: follow up BLE U/S   - US negative.        Critical Care Daily Checklist:    A: Awake: RASS Goal/Actual Goal: RASS Goal: 0-->alert and calm  Actual:     B: Spontaneous Breathing Trial Performed?     C: SAT & SBT Coordinated?  N/a                      D: Delirium: CAM-ICU Overall CAM-ICU: Positive   E: Early Mobility Performed? No    F: Feeding Goal:    Status:     Current Diet Order   Procedures    Diet NPO      AS: Analgesia/Sedation N/a   T: Thromboembolic Prophylaxis enoxaparin   H: HOB > 300 Yes   U: Stress Ulcer Prophylaxis (if needed) non   G: Glucose Control SSI   B: Bowel Function     I: Indwelling Catheter (Lines & Harris) Necessity Harris since 7/27   D: De-escalation of Antimicrobials/Pharmacotherapies De-escalated Azithro and Vanc    Plan for the day/ETD Continue to Ween precedex    Code Status:  Family/Goals of Care: Full Code  Family coming today     Critical care was time spent personally by me on the following activities: development of treatment plan with patient or surrogate and bedside caregivers, discussions with consultants, evaluation of patient's response to treatment, examination of patient, ordering and performing treatments and interventions, ordering and review of laboratory studies, ordering and review of radiographic studies, pulse oximetry, re-evaluation of patient's condition. This critical care time did not overlap with that of any other provider or involve time for any procedures.     Pascual Frank MD  Critical Care Medicine  [facility] - Cardiac Medical ICU

## 2024-07-29 NOTE — ASSESSMENT & PLAN NOTE
Patient's with Normocytic anemia.. Hemoglobin stable. Etiology likely due to chronic disease .  Current CBC reviewed-    Recent Labs   Lab 07/28/24  0216 07/29/24  0524 07/30/24  0505   HGB 10.3* 9.7* 12.7         Component Value Date/Time    MCV 92 07/30/2024 0505    RDW 13.4 07/30/2024 0505    IRON 65 07/15/2021 0735    FERRITIN 135 07/15/2021 0735    RETIC 5.85 (H) 11/11/2012 0334    FOLATE 10.3 07/15/2021 0735    FMCUIKPK79 229 07/15/2021 0735     Monitor CBC and transfuse if H/H drops below 7/21.

## 2024-07-29 NOTE — PLAN OF CARE
MICU DAILY GOALS     Family/Goals of care/Code Status   Code Status: Full Code    24H Vital Sign Range  Temp:  [97.7 °F (36.5 °C)-99.1 °F (37.3 °C)]   Pulse:  [59-88]   Resp:  [15-31]   BP: (109-161)/(53-75)   SpO2:  [92 %-100 %]      Shift Events (include procedures and significant events)        Patient intermittently agitated at start of shift. MD notified and assessed at bedside. Patient verbally abuse to nursing staff. Patient intermittently reports distrust of all medical staff, intermittently refuses care. Patient continually education on care provided. Patient verbally abuse and combative to nursing staff. Patient removed PIV. Patient on 2L NC and Precedex gtt continued.   AWAKE RASS: Goal - RASS Goal: 0-->alert and calm  Actual - RASS (Junior Agitation-Sedation Scale): restless    Restraint necessity: Not necessary   BREATHE SBT: Not intubated    Coordinate A & B, analgesics/sedatives Pain: managed   SAT: Not intubated   Delirium CAM-ICU: Overall CAM-ICU: Positive   Early(intubated/ Progressive (non-intubated) Mobility MOVE Screen (INTUBATED ONLY): Not intubated    Activity: Activity Management: Rolling - L1, Arm raise - L1   Feeding/Nutrition Diet order: Diet/Nutrition Received: NPO,     Thrombus DVT prophylaxis: VTE Required Core Measure: (SCDs) Sequential compression device initiated/maintained   HOB Elevation Head of Bed (HOB) Positioning: HOB at 30-45 degrees   Ulcer Prophylaxis GI: yes   Glucose control managed     Skin Skin assessed during: Daily Assessment    Sacrum intact/not altered? Yes  Heels intact/not altered? No  Surgical wound? No    CHECK ONE!   (no altered skin or altered skin) and sub boxes:  [] No Altered Skin Integrity Present    []Prevention Measures Documented    [x] Altered Skin Integrity Present or Discovered   [x] LDA present in EPIC, daily doc completed              [] LDA added if not in EPIC (describe wound).                    When describing wound, do not stage, use  descriptive words only.    [] Wound Image Taken (required on admit,                   transfer/discharge and every Tuesday)    Wound Care Consulted? Yes    4 EYES:  Attending Nurse (1st set of eyes): GUSTAVO Zaman    Second RN/Staff Member (2nd set of eyes): GUSTAVO Jaquez   Bowel Function no issues    Indwelling Catheter Necessity      Urethral Catheter 07/27/24 0052 Temperature probe 14 Fr.-Reason for Continuing Urinary Catheterization: Critically ill in ICU and requiring hourly monitoring of intake/output          De-escalation Antibiotics No        VS and assessment per flow sheet, patient progressing towards goals as tolerated, plan of care reviewed with family, all concerns addressed, will continue to monitor.

## 2024-07-29 NOTE — ASSESSMENT & PLAN NOTE
Most recent ECHO 05/26/24:     Left Ventricle: The left ventricle is normal in size. Ventricular mass is normal. Normal wall thickness. Normal wall motion. There is normal systolic function with a visually estimated ejection fraction of 60 - 65%. There is normal diastolic function.    Right Ventricle: Normal right ventricular cavity size. Wall thickness is normal. Systolic function is normal.    Left Atrium: Left atrium is mildly dilated.    Aortic Valve: There is moderate aortic valve sclerosis. There is annular calcification present. Mildly restricted motion. There is mild stenosis. Aortic valve area by VTI is 2.21 cm². Aortic valve peak velocity is 2.0 m/s. Mean gradient is 7 mmHg. The dimensionless index is 0.58. There is mild aortic regurgitation.    Pulmonary Artery: The estimated pulmonary artery systolic pressure is 20 mmHg.    IVC/SVC: Normal venous pressure at 3 mmHg.    --   -- PRN diuresis   -- Metoprolol   -- Continue telemetry

## 2024-07-29 NOTE — ASSESSMENT & PLAN NOTE
Multi vessel CAD. Recent cath with stent placement 05/27       The Prox RCA lesion was 99% stenosed with 0% stenosis post-intervention.    The RPDA lesion was 100% stenosed.    Prox RCA lesion: A .    Prox RCA lesion: A STENT SYNERGY XD [id number] stent was not successfully placed.    Prox RCA lesion: A .    Prox RCA lesion: A [place] stent was successfully placed.    Prox RCA lesion: A .    Prox RCA lesion: A .    The estimated blood loss was none.     -- Continue ASA / Plavix

## 2024-07-29 NOTE — HPI
"  Mrs. 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 Per chart review, "patient's family stated that she at baseline is fully communicative but that all of a sudden she became less responsive.  EMS reports that she had a GCS of 12 with them in route to the hospital and that her communication ability was limited.  They subsequently placed her on CPAP in route to the hospital.  Further history limited given the patient's current mental status."     She was hypothermic to 94.5F and otherwise HDS. VBG trend in the ED 7.01/>130 --> 7.06/>130 --> 7.13/117. Ammonia elevated at 115. . In the emergency department she was given albuterol, Ipratropium, Methylpred, Mg, Vancomycin, and Zosyn.      Critical care medicine consulted for hypercapnic respiratory failure.      "

## 2024-07-29 NOTE — PLAN OF CARE
Patient is current with [facility]. When patient is ready to discharge please fax home health orders to:    [facility]  Ph: [phone]  Fax: [phone]      Marcy Downing RN     [phone]

## 2024-07-29 NOTE — ASSESSMENT & PLAN NOTE
Patient with Hypercapnic and Hypoxic Respiratory failure which is Acute on chronic.  she is on home oxygen at 2 LPM. Supplemental oxygen was provided and noted- Oxygen Concentration (%):  [28] 28    Signs/symptoms of respiratory failure include- tachypnea and lethargy. Contributing diagnoses includes - CHF, COPD, and Obesity Hypoventilation Labs and images were reviewed. Patient Has recent ABG, which has been reviewed. Will treat underlying causes and adjust management of respiratory failure as follows-     79 year old female with COPD, CAD, NSTEMI, HTN, and HLD who presented to [facility] ED via EMS for hypercapnic respiratory failure with initial CO2 > 130. She was placed on NIPPV.     7/28 pH has recovered to 7.44.   -- Given Methylpred, Mg, Albuterol, and Ipratropium in the ED  -- Continue NIPPV    - weaned to 2L NC. Bipap qhs and with naps  -- Wean FiO2 for SpO2 goal 88-92%   -- Follow up COVID / Flu swab    - negative  -- Follow up MRSA swab    - negative  -- Obtain sputum sample if possible  -- Continue broad spectrum abx, deescalated to Zosyn only   -- Continue prednisone   -- Continue albuterol

## 2024-07-29 NOTE — ASSESSMENT & PLAN NOTE
"Patient's FSGs are controlled on current hypoglycemics.   Last A1c reviewed-   Lab Results   Component Value Date    HGBA1C 5.6 08/19/2023    HGBA1C 5.3 07/12/2021    HGBA1C 5.6 10/23/2012     Will hold PO hypoglycemics and will start correctional scale insulin  Most recent fingerstick glucose reviewed- No results for input(s): "POCTGLUCOSE" in the last 24 hours.  currently on   Antihyperglycemics (From admission, onward)      Start     Stop Route Frequency Ordered    07/29/24 1247  insulin aspart U-100 pen 0-5 Units         -- SubQ Every 6 hours PRN 07/29/24 1249             "

## 2024-07-29 NOTE — SUBJECTIVE & OBJECTIVE
Interval History/Significant Events: Patient agitated and suspicious upon interview. Not believing that providers are who they say they are and stating that their credentials are false. Trying to hide areas to be examined from providers. Raised voice and asked provider to leave.    Collateral: Called daughter who states that over the past few months the patient has become more forgetful. Patient lives in an apartment and is mostly independent in her ADL's but has some help from grand children that live below her. During  before her acute episode that she is admitted for she was suspicious of her own grandson and yelled at him which daughter states is unusual behavior for her.     Review of Systems   Psychiatric/Behavioral:  Positive for agitation and hallucinations.      Objective:     Vital Signs (Most Recent):  Temp: 98.2 °F (36.8 °C) (07/29/24 1136)  Pulse: 65 (07/29/24 1136)  Resp: (!) 22 (07/29/24 1136)  BP: (!) 137/99 (07/29/24 1105)  SpO2: 97 % (07/29/24 1136) Vital Signs (24h Range):  Temp:  [97.5 °F (36.4 °C)-98.4 °F (36.9 °C)] 98.2 °F (36.8 °C)  Pulse:  [56-81] 65  Resp:  [16-30] 22  SpO2:  [92 %-97 %] 97 %  BP: (120-161)/(51-99) 137/99   Weight: 56.2 kg (123 lb 14.4 oz)  Body mass index is 24.2 kg/m².      Intake/Output Summary (Last 24 hours) at 7/29/2024 1252  Last data filed at 7/29/2024 1100  Gross per 24 hour   Intake 2521.97 ml   Output 1595 ml   Net 926.97 ml          Physical Exam  Constitutional:       Appearance: Normal appearance.   HENT:      Head: Normocephalic.   Eyes:      Extraocular Movements: Extraocular movements intact.      Pupils: Pupils are equal, round, and reactive to light.   Cardiovascular:      Rate and Rhythm: Normal rate and regular rhythm.   Pulmonary:      Effort: Pulmonary effort is normal.      Breath sounds: Wheezing present.   Abdominal:      General: Abdomen is flat.   Musculoskeletal:         General: Normal range of motion.      Comments: In restraints.     Neurological:      General: No focal deficit present.      Mental Status: She is alert.            Vents:  Oxygen Concentration (%): 28 (07/29/24 1105)  Lines/Drains/Airways       Drain  Duration                  Colostomy 07/13/21 0201 Descending/sigmoid LLQ 1112 days         Urethral Catheter 07/27/24 0052 Temperature probe 14 Fr. 2 days              Peripheral Intravenous Line  Duration                  Peripheral IV - Single Lumen 07/27/24 2251 20 G Anterior;Distal;Left Wrist 1 day         Peripheral IV - Single Lumen 07/27/24 2252 22 G Posterior;Right Hand 1 day                  Significant Labs:    CBC/Anemia Profile:  Recent Labs   Lab 07/28/24  0216 07/29/24  0524   WBC 9.40 6.58   HGB 10.3* 9.7*   HCT 34.2* 32.5*    150   MCV 97 98   RDW 13.1 13.3        Chemistries:  Recent Labs   Lab 07/28/24  0216 07/29/24  0411    138   K 4.4 3.5   CL 97 102   CO2 37* 24   BUN 22 20   CREATININE 0.8 0.9   CALCIUM 9.1 7.5*   ALBUMIN 3.2* 2.7*   PROT 5.9* 5.1*   BILITOT 0.3 0.4   ALKPHOS 86 68   ALT 10 8*   AST 13 17   MG 2.3 1.8   PHOS 2.9 2.9       CMP:   Recent Labs   Lab 07/28/24  0216 07/29/24  0411    138   K 4.4 3.5   CL 97 102   CO2 37* 24   * 276*   BUN 22 20   CREATININE 0.8 0.9   CALCIUM 9.1 7.5*   PROT 5.9* 5.1*   ALBUMIN 3.2* 2.7*   BILITOT 0.3 0.4   ALKPHOS 86 68   AST 13 17   ALT 10 8*   ANIONGAP 7* 12       Significant Imaging:  I have reviewed all pertinent imaging results/findings within the past 24 hours.  U/S: I have reviewed all pertinent results/findings within the past 24 hours and my personal findings are:  normal

## 2024-07-30 PROBLEM — T14.8XXA CHRONIC WOUND: Chronic | Status: ACTIVE | Noted: 2024-07-30

## 2024-07-30 PROBLEM — E87.5 HYPERKALEMIA: Status: ACTIVE | Noted: 2024-07-30

## 2024-07-30 LAB
ALBUMIN SERPL BCP-MCNC: 3.6 G/DL (ref 3.5–5.2)
ALP SERPL-CCNC: 87 U/L (ref 55–135)
ALT SERPL W/O P-5'-P-CCNC: 12 U/L (ref 10–44)
ANION GAP SERPL CALC-SCNC: 13 MMOL/L (ref 8–16)
ANION GAP SERPL CALC-SCNC: 8 MMOL/L (ref 8–16)
ANION GAP SERPL CALC-SCNC: 9 MMOL/L (ref 8–16)
ANION GAP SERPL CALC-SCNC: 9 MMOL/L (ref 8–16)
AST SERPL-CCNC: 57 U/L (ref 10–40)
BASOPHILS # BLD AUTO: 0.05 K/UL (ref 0–0.2)
BASOPHILS NFR BLD: 0.4 % (ref 0–1.9)
BILIRUB SERPL-MCNC: 0.9 MG/DL (ref 0.1–1)
BUN SERPL-MCNC: 19 MG/DL (ref 8–23)
BUN SERPL-MCNC: 20 MG/DL (ref 8–23)
BUN SERPL-MCNC: 20 MG/DL (ref 8–23)
BUN SERPL-MCNC: 25 MG/DL (ref 8–23)
CALCIUM SERPL-MCNC: 8.6 MG/DL (ref 8.7–10.5)
CALCIUM SERPL-MCNC: 8.7 MG/DL (ref 8.7–10.5)
CALCIUM SERPL-MCNC: 8.7 MG/DL (ref 8.7–10.5)
CALCIUM SERPL-MCNC: 8.8 MG/DL (ref 8.7–10.5)
CHLORIDE SERPL-SCNC: 100 MMOL/L (ref 95–110)
CHLORIDE SERPL-SCNC: 101 MMOL/L (ref 95–110)
CHLORIDE SERPL-SCNC: 102 MMOL/L (ref 95–110)
CHLORIDE SERPL-SCNC: 104 MMOL/L (ref 95–110)
CO2 SERPL-SCNC: 21 MMOL/L (ref 23–29)
CO2 SERPL-SCNC: 26 MMOL/L (ref 23–29)
CO2 SERPL-SCNC: 27 MMOL/L (ref 23–29)
CO2 SERPL-SCNC: 29 MMOL/L (ref 23–29)
CREAT SERPL-MCNC: 0.8 MG/DL (ref 0.5–1.4)
CREAT SERPL-MCNC: 0.8 MG/DL (ref 0.5–1.4)
CREAT SERPL-MCNC: 0.9 MG/DL (ref 0.5–1.4)
CREAT SERPL-MCNC: 0.9 MG/DL (ref 0.5–1.4)
DIFFERENTIAL METHOD BLD: ABNORMAL
EOSINOPHIL # BLD AUTO: 0.1 K/UL (ref 0–0.5)
EOSINOPHIL NFR BLD: 0.4 % (ref 0–8)
ERYTHROCYTE [DISTWIDTH] IN BLOOD BY AUTOMATED COUNT: 13.4 % (ref 11.5–14.5)
EST. GFR  (NO RACE VARIABLE): >60 ML/MIN/1.73 M^2
GLUCOSE SERPL-MCNC: 108 MG/DL (ref 70–110)
GLUCOSE SERPL-MCNC: 163 MG/DL (ref 70–110)
GLUCOSE SERPL-MCNC: 71 MG/DL (ref 70–110)
GLUCOSE SERPL-MCNC: 85 MG/DL (ref 70–110)
HCT VFR BLD AUTO: 39.8 % (ref 37–48.5)
HGB BLD-MCNC: 12.7 G/DL (ref 12–16)
IMM GRANULOCYTES # BLD AUTO: 0.07 K/UL (ref 0–0.04)
IMM GRANULOCYTES NFR BLD AUTO: 0.5 % (ref 0–0.5)
LYMPHOCYTES # BLD AUTO: 0.9 K/UL (ref 1–4.8)
LYMPHOCYTES NFR BLD: 7 % (ref 18–48)
MAGNESIUM SERPL-MCNC: 2.5 MG/DL (ref 1.6–2.6)
MCH RBC QN AUTO: 29.3 PG (ref 27–31)
MCHC RBC AUTO-ENTMCNC: 31.9 G/DL (ref 32–36)
MCV RBC AUTO: 92 FL (ref 82–98)
MONOCYTES # BLD AUTO: 1.3 K/UL (ref 0.3–1)
MONOCYTES NFR BLD: 9.6 % (ref 4–15)
NEUTROPHILS # BLD AUTO: 11 K/UL (ref 1.8–7.7)
NEUTROPHILS NFR BLD: 82.1 % (ref 38–73)
NRBC BLD-RTO: 0 /100 WBC
PHOSPHATE SERPL-MCNC: 3.7 MG/DL (ref 2.7–4.5)
PLATELET # BLD AUTO: 141 K/UL (ref 150–450)
PMV BLD AUTO: 11.1 FL (ref 9.2–12.9)
POCT GLUCOSE: 111 MG/DL (ref 70–110)
POCT GLUCOSE: 122 MG/DL (ref 70–110)
POCT GLUCOSE: 239 MG/DL (ref 70–110)
POCT GLUCOSE: 92 MG/DL (ref 70–110)
POTASSIUM SERPL-SCNC: 3.2 MMOL/L (ref 3.5–5.1)
POTASSIUM SERPL-SCNC: 3.2 MMOL/L (ref 3.5–5.1)
POTASSIUM SERPL-SCNC: 3.5 MMOL/L (ref 3.5–5.1)
POTASSIUM SERPL-SCNC: 3.5 MMOL/L (ref 3.5–5.1)
POTASSIUM SERPL-SCNC: 3.8 MMOL/L (ref 3.5–5.1)
POTASSIUM SERPL-SCNC: 3.8 MMOL/L (ref 3.5–5.1)
POTASSIUM SERPL-SCNC: 5.6 MMOL/L (ref 3.5–5.1)
PROT SERPL-MCNC: 7 G/DL (ref 6–8.4)
RBC # BLD AUTO: 4.34 M/UL (ref 4–5.4)
SODIUM SERPL-SCNC: 136 MMOL/L (ref 136–145)
SODIUM SERPL-SCNC: 136 MMOL/L (ref 136–145)
SODIUM SERPL-SCNC: 138 MMOL/L (ref 136–145)
SODIUM SERPL-SCNC: 139 MMOL/L (ref 136–145)
WBC # BLD AUTO: 13.37 K/UL (ref 3.9–12.7)

## 2024-07-30 PROCEDURE — 25000003 PHARM REV CODE 250: Performed by: HOSPITALIST

## 2024-07-30 PROCEDURE — 27000221 HC OXYGEN, UP TO 24 HOURS

## 2024-07-30 PROCEDURE — 36415 COLL VENOUS BLD VENIPUNCTURE: CPT | Mod: XB | Performed by: NURSE PRACTITIONER

## 2024-07-30 PROCEDURE — 63600175 PHARM REV CODE 636 W HCPCS

## 2024-07-30 PROCEDURE — S5010 5% DEXTROSE AND 0.45% SALINE: HCPCS | Performed by: HOSPITALIST

## 2024-07-30 PROCEDURE — 63600175 PHARM REV CODE 636 W HCPCS: Performed by: NURSE PRACTITIONER

## 2024-07-30 PROCEDURE — 99900035 HC TECH TIME PER 15 MIN (STAT)

## 2024-07-30 PROCEDURE — 25000242 PHARM REV CODE 250 ALT 637 W/ HCPCS: Performed by: HOSPITALIST

## 2024-07-30 PROCEDURE — 36415 COLL VENOUS BLD VENIPUNCTURE: CPT | Performed by: HOSPITALIST

## 2024-07-30 PROCEDURE — 99223 1ST HOSP IP/OBS HIGH 75: CPT | Mod: ,,,

## 2024-07-30 PROCEDURE — 85025 COMPLETE CBC W/AUTO DIFF WBC: CPT | Performed by: NURSE PRACTITIONER

## 2024-07-30 PROCEDURE — 63600175 PHARM REV CODE 636 W HCPCS: Performed by: HOSPITALIST

## 2024-07-30 PROCEDURE — 80048 BASIC METABOLIC PNL TOTAL CA: CPT | Mod: 91,XB | Performed by: HOSPITALIST

## 2024-07-30 PROCEDURE — 80053 COMPREHEN METABOLIC PANEL: CPT | Performed by: NURSE PRACTITIONER

## 2024-07-30 PROCEDURE — 94640 AIRWAY INHALATION TREATMENT: CPT

## 2024-07-30 PROCEDURE — 25000003 PHARM REV CODE 250: Performed by: NURSE PRACTITIONER

## 2024-07-30 PROCEDURE — S5010 5% DEXTROSE AND 0.45% SALINE: HCPCS

## 2024-07-30 PROCEDURE — 51798 US URINE CAPACITY MEASURE: CPT

## 2024-07-30 PROCEDURE — 99223 1ST HOSP IP/OBS HIGH 75: CPT | Mod: ,,, | Performed by: PODIATRIST

## 2024-07-30 PROCEDURE — 25000242 PHARM REV CODE 250 ALT 637 W/ HCPCS: Performed by: STUDENT IN AN ORGANIZED HEALTH CARE EDUCATION/TRAINING PROGRAM

## 2024-07-30 PROCEDURE — 25000003 PHARM REV CODE 250

## 2024-07-30 PROCEDURE — 83735 ASSAY OF MAGNESIUM: CPT | Performed by: NURSE PRACTITIONER

## 2024-07-30 PROCEDURE — 94761 N-INVAS EAR/PLS OXIMETRY MLT: CPT

## 2024-07-30 PROCEDURE — 63600175 PHARM REV CODE 636 W HCPCS: Mod: JZ,JG

## 2024-07-30 PROCEDURE — 20600001 HC STEP DOWN PRIVATE ROOM

## 2024-07-30 PROCEDURE — 92526 ORAL FUNCTION THERAPY: CPT

## 2024-07-30 PROCEDURE — 84100 ASSAY OF PHOSPHORUS: CPT | Performed by: NURSE PRACTITIONER

## 2024-07-30 RX ORDER — PREDNISONE 20 MG/1
40 TABLET ORAL DAILY
Status: DISCONTINUED | OUTPATIENT
Start: 2024-07-31 | End: 2024-07-31 | Stop reason: HOSPADM

## 2024-07-30 RX ORDER — ALBUTEROL SULFATE 2.5 MG/.5ML
10 SOLUTION RESPIRATORY (INHALATION) ONCE
Status: COMPLETED | OUTPATIENT
Start: 2024-07-30 | End: 2024-07-30

## 2024-07-30 RX ORDER — LABETALOL HCL 20 MG/4 ML
10 SYRINGE (ML) INTRAVENOUS ONCE AS NEEDED
Status: COMPLETED | OUTPATIENT
Start: 2024-07-30 | End: 2024-07-30

## 2024-07-30 RX ORDER — DEXTROSE MONOHYDRATE AND SODIUM CHLORIDE 5; .45 G/100ML; G/100ML
INJECTION, SOLUTION INTRAVENOUS CONTINUOUS
Status: DISCONTINUED | OUTPATIENT
Start: 2024-07-30 | End: 2024-07-31 | Stop reason: HOSPADM

## 2024-07-30 RX ORDER — DEXTROSE MONOHYDRATE AND SODIUM CHLORIDE 5; .45 G/100ML; G/100ML
INJECTION, SOLUTION INTRAVENOUS CONTINUOUS
Status: DISCONTINUED | OUTPATIENT
Start: 2024-07-30 | End: 2024-07-30

## 2024-07-30 RX ORDER — GABAPENTIN 300 MG/1
300 CAPSULE ORAL 2 TIMES DAILY
Status: DISCONTINUED | OUTPATIENT
Start: 2024-07-30 | End: 2024-07-31 | Stop reason: HOSPADM

## 2024-07-30 RX ADMIN — IPRATROPIUM BROMIDE AND ALBUTEROL SULFATE 3 ML: 2.5; .5 SOLUTION RESPIRATORY (INHALATION) at 04:07

## 2024-07-30 RX ADMIN — INSULIN HUMAN 10 UNITS: 100 INJECTION, SOLUTION PARENTERAL at 09:07

## 2024-07-30 RX ADMIN — DEXTROSE AND SODIUM CHLORIDE: 5; 450 INJECTION, SOLUTION INTRAVENOUS at 04:07

## 2024-07-30 RX ADMIN — PIPERACILLIN SODIUM AND TAZOBACTAM SODIUM 4.5 G: 4; .5 INJECTION, POWDER, FOR SOLUTION INTRAVENOUS at 06:07

## 2024-07-30 RX ADMIN — THIAMINE HYDROCHLORIDE 500 MG: 100 INJECTION, SOLUTION INTRAMUSCULAR; INTRAVENOUS at 08:07

## 2024-07-30 RX ADMIN — METHYLPREDNISOLONE SODIUM SUCCINATE 40 MG: 40 INJECTION, POWDER, FOR SOLUTION INTRAMUSCULAR; INTRAVENOUS at 08:07

## 2024-07-30 RX ADMIN — Medication 10 MG: at 01:07

## 2024-07-30 RX ADMIN — PIPERACILLIN SODIUM AND TAZOBACTAM SODIUM 4.5 G: 4; .5 INJECTION, POWDER, FOR SOLUTION INTRAVENOUS at 10:07

## 2024-07-30 RX ADMIN — IPRATROPIUM BROMIDE AND ALBUTEROL SULFATE 3 ML: 2.5; .5 SOLUTION RESPIRATORY (INHALATION) at 12:07

## 2024-07-30 RX ADMIN — ENOXAPARIN SODIUM 40 MG: 40 INJECTION SUBCUTANEOUS at 06:07

## 2024-07-30 RX ADMIN — DEXTROSE MONOHYDRATE 500 ML: 100 INJECTION, SOLUTION INTRAVENOUS at 08:07

## 2024-07-30 RX ADMIN — THIAMINE HYDROCHLORIDE 500 MG: 100 INJECTION, SOLUTION INTRAMUSCULAR; INTRAVENOUS at 09:07

## 2024-07-30 RX ADMIN — IPRATROPIUM BROMIDE AND ALBUTEROL SULFATE 3 ML: 2.5; .5 SOLUTION RESPIRATORY (INHALATION) at 07:07

## 2024-07-30 RX ADMIN — ALBUTEROL SULFATE 10 MG: 2.5 SOLUTION RESPIRATORY (INHALATION) at 09:07

## 2024-07-30 RX ADMIN — PIPERACILLIN SODIUM AND TAZOBACTAM SODIUM 4.5 G: 4; .5 INJECTION, POWDER, FOR SOLUTION INTRAVENOUS at 01:07

## 2024-07-30 RX ADMIN — THIAMINE HYDROCHLORIDE 500 MG: 100 INJECTION, SOLUTION INTRAMUSCULAR; INTRAVENOUS at 02:07

## 2024-07-30 RX ADMIN — IPRATROPIUM BROMIDE AND ALBUTEROL SULFATE 3 ML: 2.5; .5 SOLUTION RESPIRATORY (INHALATION) at 08:07

## 2024-07-30 RX ADMIN — GABAPENTIN 300 MG: 300 CAPSULE ORAL at 08:07

## 2024-07-30 RX ADMIN — DEXTROSE AND SODIUM CHLORIDE: 5; 450 INJECTION, SOLUTION INTRAVENOUS at 08:07

## 2024-07-30 RX ADMIN — GABAPENTIN 300 MG: 300 CAPSULE ORAL at 02:07

## 2024-07-30 NOTE — SUBJECTIVE & OBJECTIVE
Scheduled Meds:   albuterol-ipratropium  3 mL Nebulization Q4H    aspirin  81 mg Oral Daily    atorvastatin  80 mg Oral Daily    clopidogreL  75 mg Oral Daily    enoxparin  40 mg Subcutaneous Q24H (prophylaxis, 1700)    methylPREDNISolone injection (PEDS and ADULTS)  40 mg Intravenous Daily    metoprolol succinate  25 mg Oral Daily    piperacillin-tazobactam (Zosyn) IV (PEDS and ADULTS) (extended infusion is not appropriate)  4.5 g Intravenous Q8H    polyethylene glycol  17 g Oral Daily    [START ON 7/31/2024] predniSONE  40 mg Oral Daily    thiamine (B-1) 500 mg in D5W 100 mL IVPB  500 mg Intravenous TID     Continuous Infusions:   D5 and 0.45% NaCl   Intravenous Continuous 60 mL/hr at 07/30/24 0849 New Bag at 07/30/24 0849     PRN Meds:  Current Facility-Administered Medications:     0.9% NaCl, , Intravenous, PRN    0.9% NaCl, , Intravenous, PRN    albuterol sulfate, 2.5 mg, Nebulization, Q6H PRN    dextrose 10%, 12.5 g, Intravenous, PRN    dextrose 10%, 25 g, Intravenous, PRN    [COMPLETED] dextrose 10%, 50 g, Intravenous, Once **AND** dextrose 10%, 25 g, Intravenous, PRN **AND** [COMPLETED] insulin regular, 10 Units, Intravenous, Once    glucagon (human recombinant), 1 mg, Intramuscular, PRN    insulin aspart U-100, 0-5 Units, Subcutaneous, Q6H PRN    OLANZapine, 2.5 mg, Intramuscular, Q6H PRN    senna, 8.6 mg, Oral, Daily PRN    sodium chloride 0.9%, 10 mL, Intravenous, PRN    Review of patient's allergies indicates:  No Known Allergies     Past Medical History:   Diagnosis Date    Anemia     Anxiety     COPD (chronic obstructive pulmonary disease)     COPD (chronic obstructive pulmonary disease) with emphysema     Coronary artery disease     Depression     Diverticulitis     Hyperlipidemia     Hypertension     PVD (peripheral vascular disease)     PVD (peripheral vascular disease)     S/P colostomy     Substance abuse     hx heavy etoh use     Tobacco abuse      Past Surgical History:   Procedure Laterality  Date    ANGIOGRAM, CORONARY, WITH LEFT HEART CATHETERIZATION N/A 11/22/2023    Procedure: Angiogram, Coronary, with Left Heart Cath;  Surgeon: Checo Bhatt MD;  Location: [facility] CATH LAB;  Service: Cardiology;  Laterality: N/A;    ANGIOGRAM, CORONARY, WITH LEFT HEART CATHETERIZATION N/A 5/27/2024    Procedure: Angiogram, Coronary, with Left Heart Cath;  Surgeon: Karel Mack MD;  Location: [facility] CATH LAB;  Service: Cardiology;  Laterality: N/A;    ANGIOGRAM, EXTREMITY, UNILATERAL Right 8/25/2023    Procedure: ANGIOGRAM, EXTREMITY, UNILATERAL;  Surgeon: Gary Rico MD;  [facility];  Service: Vascular;  Laterality: Right;  US GUIDED ACCESS LEFT GROIN  contrast: 150ml  fluoro: 27.9 min  mGy: 254.73  Gycm2: 62.4919  radial flush cocktail: 10ml    ANGIOGRAPHY OF LOWER EXTREMITY Right 8/24/2023    Procedure: Angiogram Extremity Unilateral;  Surgeon: Benji Ashley MD;  Location: [facility] OR [facility] FLR;  Service: Vascular;  Laterality: Right;    COLOSTOMY      ECTOPIC PREGNANCY SURGERY      PTA, PERONEAL Right 8/25/2023    Procedure: PTA, PERONEAL TIBIAL TRUNK WITH SHOCKWAVE;  Surgeon: Gary Rico MD;  Location: [facility] OR [facility] FLR;  Service: Vascular;  Laterality: Right;    PTCA, SINGLE VESSEL  11/22/2023    Procedure: PTCA, Single Vessel;  Surgeon: Checo Bhatt MD;  Location: [facility] CATH LAB;  Service: Cardiology;;    right forefoot amputation      right toe amputation      x2 toes    STENT, DRUG ELUTING, SINGLE VESSEL, CORONARY  11/22/2023    Procedure: Stent, Drug Eluting, Single Vessel, Coronary;  Surgeon: Checo Bhatt MD;  Location: [facility] CATH LAB;  Service: Cardiology;;    STENT, DRUG ELUTING, SINGLE VESSEL, CORONARY  5/27/2024    Procedure: Stent, Drug Eluting, Single Vessel, Coronary;  Surgeon: Karel Mack MD;  Location: [facility] CATH LAB;  Service: Cardiology;;    STENT, SUPERFICIAL FEMORAL ARTERY Right 8/25/2023    Procedure: STENT,  SUPERFICIAL FEMORAL ARTERY;  Surgeon: Gary Rico MD;  Location: [facility] OR [address] [place] [address];  Service: Vascular;  Laterality: Right;  VIABAHN 6 X 15 X120       Family History    None       Tobacco Use    Smoking status: Every Day     Current packs/day: 0.25     Average packs/day: 0.5 packs/day for 61.1 years (30.1 ttl pk-yrs)     Types: Cigarettes     Start date: 7/5/1963    Smokeless tobacco: Never   Substance and Sexual Activity    Alcohol use: No    Drug use: No    Sexual activity: Not Currently     Review of Systems   Constitutional: Negative.    HENT: Negative.     Eyes: Negative.    Respiratory: Negative.     Cardiovascular: Negative.    Gastrointestinal: Negative.    Endocrine: Negative.    Genitourinary: Negative.    Musculoskeletal:  Positive for arthralgias. Negative for joint swelling.   Skin:  Positive for wound.   Allergic/Immunologic: Negative.    Neurological: Negative.    Hematological: Negative.    Psychiatric/Behavioral: Negative.       Objective:     Vital Signs (Most Recent):  Temp: 97.5 °F (36.4 °C) (07/30/24 1127)  Pulse: 90 (07/30/24 1238)  Resp: 17 (07/30/24 1238)  BP: (!) 167/73 (07/30/24 1127)  SpO2: 96 % (07/30/24 1238) Vital Signs (24h Range):  Temp:  [96.4 °F (35.8 °C)-99.4 °F (37.4 °C)] 97.5 °F (36.4 °C)  Pulse:  [] 90  Resp:  [17-23] 17  SpO2:  [93 %-97 %] 96 %  BP: (124-187)/() 167/73     Weight: 57.5 kg (126 lb 12.2 oz)  Body mass index is 24.76 kg/m².    Foot Exam    General  Orientation: alert and oriented to person, place, and time   Affect: appropriate       Right Foot/Ankle     Inspection and Palpation  Tenderness: bony tenderness and fifth metatarsal base   Swelling: none   Skin Exam: ulcer; no drainage, no maceration, no cellulitis and no erythema     Neurovascular  Dorsalis pedis: absent  Posterior tibial: absent  Saphenous nerve sensation: normal  Tibial nerve sensation: normal  Superficial peroneal nerve sensation: normal  Deep peroneal nerve sensation:  normal  Sural nerve sensation: normal    Tests  Izaiah's Sign: negative    Comments  R TMA: medial chronic (1+ year) wound appears dry and stable with no signs of active infection, wound bed is granular with healthy wound edges, negative PTB  R heel: plantar eschar (1+ month) appears dry and stable with no signs of active infection  Generalized bony tenderness of the foot where skin is thin    Left Foot/Ankle      Inspection and Palpation  Skin Exam: skin intact;     Neurovascular  Dorsalis pedis: 1+  Posterior tibial: 1+  Saphenous nerve sensation: normal  Tibial nerve sensation: normal  Superficial peroneal nerve sensation: normal  Deep peroneal nerve sensation: normal  Sural nerve sensation: normal          Laboratory:  BMP:   Recent Labs   Lab 07/30/24  0505 07/30/24  1154   GLU 85 71    139   K 5.6* 3.2*  3.2*    101   CO2 21* 29   BUN 25* 20   CREATININE 0.9 0.8   CALCIUM 8.8 8.7   MG 2.5  --      CBC:   Recent Labs   Lab 07/30/24  0505   WBC 13.37*   RBC 4.34   HGB 12.7   HCT 39.8   *   MCV 92   MCH 29.3   MCHC 31.9*       Diagnostic Results:  I have reviewed all pertinent imaging results/findings within the past 24 hours.    Clinical Findings:

## 2024-07-30 NOTE — ASSESSMENT & PLAN NOTE
Recent Labs     07/28/24  0216 07/29/24  0411 07/30/24  0505   K 4.4 3.5 5.6*   7/30 K of 5.6 . received D50 insulin and albuterol concentrate

## 2024-07-30 NOTE — CONSULTS
"[facility] ([place] [id number])  Podiatry  Consult Note    Patient Name: Radha Sotelo  MRN: [id number]  Admission Date: 7/27/2024  Hospital Length of Stay: 3 days  Attending Physician: Solomon Brown MD  Primary Care Provider: Kalyn Pemberton NP     Inpatient consult to Podiatry  Consult performed by: Horaico Narvaez MD  Consult ordered by: Solomon Brown MD  Reason for consult: see below        Subjective:     History of Present Illness:  79F with a PMHx of COPD on home oxygen, CAD, PVD, HTN, HLD, R TMA w/ chronic RLE swelling admitted  and altered mental status. Per chart review, "patient's family stated that she at baseline is fully communicative but that all of a sudden she became less responsive. EMS reports that she had a GCS of 12 with them in route to the hospital and that her communication ability was limited. They subsequently placed her on CPAP in route to the hospital. Further history limited given the patient's current mental status."     Podiatry consulted for R TMA wound and abnormal XR imaging. XR R foot shows increased cortical regularity at osteotomy sites could represent evolving postop changes or possibly superimposed infection, correlate clinically    Hypertensive, AF, WBC 13.37    Scheduled Meds:   albuterol-ipratropium  3 mL Nebulization Q4H    aspirin  81 mg Oral Daily    atorvastatin  80 mg Oral Daily    clopidogreL  75 mg Oral Daily    enoxparin  40 mg Subcutaneous Q24H (prophylaxis, 1700)    methylPREDNISolone injection (PEDS and ADULTS)  40 mg Intravenous Daily    metoprolol succinate  25 mg Oral Daily    piperacillin-tazobactam (Zosyn) IV (PEDS and ADULTS) (extended infusion is not appropriate)  4.5 g Intravenous Q8H    polyethylene glycol  17 g Oral Daily    [START ON 7/31/2024] predniSONE  40 mg Oral Daily    thiamine (B-1) 500 mg in D5W 100 mL IVPB  500 mg Intravenous TID     Continuous Infusions:   D5 and 0.45% NaCl   Intravenous Continuous 60 mL/hr at " 07/30/24 0849 New Bag at 07/30/24 0849     PRN Meds:  Current Facility-Administered Medications:     0.9% NaCl, , Intravenous, PRN    0.9% NaCl, , Intravenous, PRN    albuterol sulfate, 2.5 mg, Nebulization, Q6H PRN    dextrose 10%, 12.5 g, Intravenous, PRN    dextrose 10%, 25 g, Intravenous, PRN    [COMPLETED] dextrose 10%, 50 g, Intravenous, Once **AND** dextrose 10%, 25 g, Intravenous, PRN **AND** [COMPLETED] insulin regular, 10 Units, Intravenous, Once    glucagon (human recombinant), 1 mg, Intramuscular, PRN    insulin aspart U-100, 0-5 Units, Subcutaneous, Q6H PRN    OLANZapine, 2.5 mg, Intramuscular, Q6H PRN    senna, 8.6 mg, Oral, Daily PRN    sodium chloride 0.9%, 10 mL, Intravenous, PRN    Review of patient's allergies indicates:  No Known Allergies     Past Medical History:   Diagnosis Date    Anemia     Anxiety     COPD (chronic obstructive pulmonary disease)     COPD (chronic obstructive pulmonary disease) with emphysema     Coronary artery disease     Depression     Diverticulitis     Hyperlipidemia     Hypertension     PVD (peripheral vascular disease)     PVD (peripheral vascular disease)     S/P colostomy     Substance abuse     hx heavy etoh use     Tobacco abuse      Past Surgical History:   Procedure Laterality Date    ANGIOGRAM, CORONARY, WITH LEFT HEART CATHETERIZATION N/A 11/22/2023    Procedure: Angiogram, Coronary, with Left Heart Cath;  Surgeon: Checo Bhatt MD;  Location: [facility] CATH LAB;  Service: Cardiology;  Laterality: N/A;    ANGIOGRAM, CORONARY, WITH LEFT HEART CATHETERIZATION N/A 5/27/2024    Procedure: Angiogram, Coronary, with Left Heart Cath;  Surgeon: Karel Mack MD;  Location: [facility] CATH LAB;  Service: Cardiology;  Laterality: N/A;    ANGIOGRAM, EXTREMITY, UNILATERAL Right 8/25/2023    Procedure: ANGIOGRAM, EXTREMITY, UNILATERAL;  Surgeon: Gary Rico MD;  Location: [facility] OR [address] [place] [address];  Service: Vascular;  Laterality: Right;  US GUIDED ACCESS LEFT  GROIN  contrast: 150ml  fluoro: 27.9 min  mGy: 254.73  Gycm2: 62.4919  radial flush cocktail: 10ml    ANGIOGRAPHY OF LOWER EXTREMITY Right 8/24/2023    Procedure: Angiogram Extremity Unilateral;  Surgeon: Benji Ashley MD;  Location: [address];  Service: Vascular;  Laterality: Right;    COLOSTOMY      ECTOPIC PREGNANCY SURGERY      PTA, PERONEAL Right 8/25/2023    Procedure: PTA, PERONEAL TIBIAL TRUNK WITH SHOCKWAVE;  Surgeon: Gary Rico MD;  Location: [address];  Service: Vascular;  Laterality: Right;    PTCA, SINGLE VESSEL  11/22/2023    Procedure: PTCA, Single Vessel;  Surgeon: Checo Bhatt MD;  Location: [facility] CATH LAB;  Service: Cardiology;;    right forefoot amputation      right toe amputation      x2 toes    STENT, DRUG ELUTING, SINGLE VESSEL, CORONARY  11/22/2023    Procedure: Stent, Drug Eluting, Single Vessel, Coronary;  Surgeon: Checo Bhatt MD;  Location: [facility] CATH LAB;  Service: Cardiology;;    STENT, DRUG ELUTING, SINGLE VESSEL, CORONARY  5/27/2024    Procedure: Stent, Drug Eluting, Single Vessel, Coronary;  Surgeon: Karel Mack MD;  Location: [facility] CATH LAB;  Service: Cardiology;;    STENT, SUPERFICIAL FEMORAL ARTERY Right 8/25/2023    Procedure: STENT, SUPERFICIAL FEMORAL ARTERY;  Surgeon: Gary Rico MD;  Location: [address];  Service: Vascular;  Laterality: Right;  VIABAHN 6 X 15 X120       Family History    None       Tobacco Use    Smoking status: Every Day     Current packs/day: 0.25     Average packs/day: 0.5 packs/day for 61.1 years (30.1 ttl pk-yrs)     Types: Cigarettes     Start date: 7/5/1963    Smokeless tobacco: Never   Substance and Sexual Activity    Alcohol use: No    Drug use: No    Sexual activity: Not Currently     Review of Systems   Constitutional: Negative.    HENT: Negative.     Eyes: Negative.    Respiratory: Negative.     Cardiovascular: Negative.    Gastrointestinal: Negative.    Endocrine:  Negative.    Genitourinary: Negative.    Musculoskeletal:  Positive for arthralgias. Negative for joint swelling.   Skin:  Positive for wound.   Allergic/Immunologic: Negative.    Neurological: Negative.    Hematological: Negative.    Psychiatric/Behavioral: Negative.       Objective:     Vital Signs (Most Recent):  Temp: 97.5 °F (36.4 °C) (07/30/24 1127)  Pulse: 90 (07/30/24 1238)  Resp: 17 (07/30/24 1238)  BP: (!) 167/73 (07/30/24 1127)  SpO2: 96 % (07/30/24 1238) Vital Signs (24h Range):  Temp:  [96.4 °F (35.8 °C)-99.4 °F (37.4 °C)] 97.5 °F (36.4 °C)  Pulse:  [] 90  Resp:  [17-23] 17  SpO2:  [93 %-97 %] 96 %  BP: (124-187)/() 167/73     Weight: 57.5 kg (126 lb 12.2 oz)  Body mass index is 24.76 kg/m².    Foot Exam    General  Orientation: alert and oriented to person, place, and time   Affect: appropriate       Right Foot/Ankle     Inspection and Palpation  Tenderness: bony tenderness and fifth metatarsal base   Swelling: none   Skin Exam: ulcer; no drainage, no maceration, no cellulitis and no erythema     Neurovascular  Dorsalis pedis: absent  Posterior tibial: absent  Saphenous nerve sensation: normal  Tibial nerve sensation: normal  Superficial peroneal nerve sensation: normal  Deep peroneal nerve sensation: normal  Sural nerve sensation: normal    Tests  Izaiah's Sign: negative    Comments  R TMA: medial chronic (1+ year) wound appears dry and stable with no signs of active infection, wound bed is granular with healthy wound edges, negative PTB  R heel: plantar eschar (1+ month) appears dry and stable with no signs of active infection  Generalized bony tenderness of the foot where skin is thin    Left Foot/Ankle      Inspection and Palpation  Skin Exam: skin intact;     Neurovascular  Dorsalis pedis: 1+  Posterior tibial: 1+  Saphenous nerve sensation: normal  Tibial nerve sensation: normal  Superficial peroneal nerve sensation: normal  Deep peroneal nerve sensation: normal  Sural nerve sensation:  normal          Laboratory:  BMP:   Recent Labs   Lab 07/30/24  0505 07/30/24  1154   GLU 85 71    139   K 5.6* 3.2*  3.2*    101   CO2 21* 29   BUN 25* 20   CREATININE 0.9 0.8   CALCIUM 8.8 8.7   MG 2.5  --      CBC:   Recent Labs   Lab 07/30/24  0505   WBC 13.37*   RBC 4.34   HGB 12.7   HCT 39.8   *   MCV 92   MCH 29.3   MCHC 31.9*       Diagnostic Results:  I have reviewed all pertinent imaging results/findings within the past 24 hours.    Clinical Findings:              Assessment/Plan:     Orthopedic  Chronic wound  R TMA: medial chronic (1+ year) wound appears dry and stable with no signs of active infection, wound bed is granular with healthy wound edges, negative PTB  R heel: plantar eschar (1+ month) appears dry and stable with no signs of active infection  Generalized bony tenderness of the foot where skin is thin    - No surgical intervention is indicated at this time  - Wound at R medial aspect of TMA is painted with betadine and foot is wrapped with kerlix and ACE without compression  - Wound care orders to follow  - Pt instructed to continue with home health wound care and dressing changes for foot wounds  - WBAT R foot, no restrictions  - Abx per primary  - Podiatry to sign off    Future discharge recs:  - SNF or HH to apply bandaging as described above  - Patient to keep dressings clean dry and intact  - Patient explained the importance of daily foot checks          Thank you for your consult.     Horacio Narvaez MD  Podiatry  [facility] ([place])

## 2024-07-30 NOTE — HPI
"79F with a PMHx of COPD on home oxygen, CAD, PVD, HTN, HLD, R TMA w/ chronic RLE swelling admitted  and altered mental status. Per chart review, "patient's family stated that she at baseline is fully communicative but that all of a sudden she became less responsive. EMS reports that she had a GCS of 12 with them in route to the hospital and that her communication ability was limited. They subsequently placed her on CPAP in route to the hospital. Further history limited given the patient's current mental status."     Podiatry consulted for R TMA wound and abnormal XR imaging. XR R foot shows increased cortical regularity at osteotomy sites could represent evolving postop changes or possibly superimposed infection, correlate clinically    Hypertensive, AF, WBC 13.37  "

## 2024-07-30 NOTE — PT/OT/SLP PROGRESS
"Speech Language Pathology Treatment    Patient Name:  Radha Sotelo   MRN:  [id number]  Admitting Diagnosis: Acute on chronic respiratory failure with hypoxia and hypercapnia    Recommendations:                 General Recommendations:   Follow up to ensure ongoing diet tolerance  Diet recommendations:  Regular Diet - IDDSI Level 7, Liquid Diet Level: Thin liquids - IDDSI Level 0   Aspiration Precautions: 1 bite/sip at a time, Eliminate distractions, Feed only when awake/alert, and Meds whole 1 at a time   General Precautions: Standard, aspiration, fall  Communication strategies:  none    Assessment:     Radha Sotelo is a 79 y.o. female who presents with improving dysphagia. She presents with significantly improved levels of alertness and participation in PO intake. She demonstrated adequate swallow safety and efficacy for initiation of an unmodified diet. SLP to continue to follow to ensure ongoing diet tolerance.     Subjective     Pt found resting in bed upon SLP entry into room. Pt agreeable to participate in all aspects of session.      Patient goals: "Well that's good"      Pain/Comfort:  Pain Rating 1: 0/10    Respiratory Status: Nasal cannula, flow 3 L/min    Objective:     Has the patient been evaluated by SLP for swallowing?   Yes  Keep patient NPO? No   Current Respiratory Status:        Pt seen for ongoing dysphagia therapy. Pt with significantly improved levels of alertness as she was able to participate in basic conversation regarding medical care. She endorsed hunger and was unable to recall details of most recent speech session and/or rationale for ongoing NPO status. HOB elevated for all oral intake. Pt consumed self-regulated open cup and straw sips of thin liquids, purees, semi solids (art cracker pieces coated in pudding) and self-regulated bites of solids with adequate mastication, bolus containment, AP bolus transport, and no overt signs of airway compromise. SLP provided education " regarding overall impressions, upgrade to regular diet with thin liquids, safe swallow strategies, and ongoing SLP POC. Pt verbalized understanding and had no additional questions or concerns upon SLP exit. Whiteboard updated. Pt left with bed in lowest locked position upon SLP exit. Spoke with RN regarding impressions and recommendations and nursing verbalized understanding.     Goals:   Multidisciplinary Problems       SLP Goals          Problem: SLP    Goal Priority Disciplines Outcome   SLP Goal     SLP Progressing   Description: Speech Language Pathology Goals  Goals expected to be met by 8/11:    1. The pt will participate in an ongoing swallow evaluation.                                Plan:     Patient to be seen:  4 x/week   Plan of Care expires:  08/25/24  Plan of Care reviewed with:  patient   SLP Follow-Up:  Yes       Discharge recommendations:  Low Intensity Therapy   Barriers to Discharge:  Level of Skilled Assistance Needed      Time Tracking:     SLP Treatment Date:   07/30/24  Speech Start Time:  1100  Speech Stop Time:  1109     Speech Total Time (min):  9 min    Billable Minutes: Treatment Swallowing Dysfunction 9      07/30/2024

## 2024-07-30 NOTE — CONSULTS
"CONSULTATION LIAISON PSYCHIATRY INITIAL EVALUATION    Patient Name: Radha Sotelo  MRN: [id number]  Patient Class: IP- Inpatient  Admission Date: 7/27/2024  Attending Physician: Solomon rBown MD      HPI:   Radha Sotelo is a 79 y.o. female with no past psychiatric history & past pertinent medical history of COPD on home oxygen, HTN, HLD, CAD, PVD s/p partial right foot amputation w/ chronic right leg swelling presents to the ED/admitted to the hospital for Acute on chronic respiratory failure with hypoxia and hypercapnia    Psychiatry consulted for "encephalopathy/restraints/cognitive dysfunction ? "    Per night psychiatry resident: On psych exam, patient initially irritable and minimally cooperative w/ paranoid behavior. Hard of hearing, and did not want to turn the TV volume down. She is alert and oriented to person, place, and situation. She asked why me and the respiratory therapist came through the window, while frantically looking over us trying to get a view of the door. When the RT asked to scan her wristband, she stated it was "all melted" and she refused her breathing treatment because "there was nothing to hook it to", even after RT explained it was a mask that would go over her face. After RT left room, she asked me to stand at the foot of the bed so she can make sure no one is coming in her room. She denies prior psych history and answers most of my questions with questions. Informed patient that the psychiatry team would be back in the morning to speak with her, to which she pleasantly smiled and told me to have a good night.        Collateral with patient's permission:   ***    Medical Review of Systems:  {Review Of Systems:59224}    Psychiatric Review of Systems (is patient experiencing or having changes in):  sleep: no  appetite: no  weight: no  energy/anergy: no  interest/pleasure/anhedonia: no  somatic symptoms: no  libido: not asked   anxiety/panic: no  guilty/hopelessness: " "no  concentration: no  Tamera:no  Psychosis: no  Trauma: no  S.I.B.s/risky behavior: no    Past Psychiatric History:  Previous Medication Trials: no  Previous Psychiatric Hospitalizations:no   Previous Suicide Attempts: no  History of Violence: no  Outpatient Psychiatrist: no  Family Psychiatric History: not asked     Substance Abuse History (with emphasis over the last 12 months):  Recreational Drugs:  denies   Use of Alcohol: denied, chart review suggest history of heavy alcohol use in the past   Tobacco Use:yes, 1/2 ppd  Rehab History:no    Social History:  Marital Status:   Children: 6  Employment Status/Info: retired, would not state past occupation  :{YES/NO/WILD CARDS:40435}  Education:  declined to answer "that doesn't have anything to do with anything"  Special Ed: unknown  Housing Status: with family  Access to gun: {YES/NO/WILD CARDS:24724}  Psychosocial Stressors: health  Functioning Relationships: good support system    Legal History:  Past Charges/Incarcerations: no  Pending charges:no    Mental Status Exam:  General Appearance: {XX-Appearance:08106}  Behavior: {XX-Behavior:54582}  Involuntary Movements and Motor Activity: {XX-Movements:50959}  Gait and Station: {XX-Gait:86350}  Speech and Language: {XX-Speech&Language:26431}  Mood: "***"  Affect: {XX-Affect:56901}  Thought Process and Associations: {XX-TP&Associations:99041}  Thought Content and Perceptions:: {XX-ThoughtContent:17569}  Sensorium and Orientation: {XX-Orientation&Sensorium:05873}  Recent and Remote Memory: {XX-Memory:71111}  Attention and Concentration: {XX-Attention:97397}  Fund of Knowledge: {XX-FundOfKnowledge:73031}  Insight: {XX-Insight:07078}  Judgment: {XX-Judgment:20908}    CAM ICU positive? {YES/NO/WILD CARDS:57863}      ASSESSMENT & RECOMMENDATIONS   (Please list each relevant SPECIFIC psychiatric DSMV or medical diagnosis and recs for it under the listing DO NOT WRITE AN IMPRESSION PARAGRAPH!!)    MDD " mild/moderate/severe, BIPOLAR I/II, Unspecified mood etc  PSYCH MEDICATIONS  Scheduled  PRN  OR doesn't warrant any med management in the inpatient setting defer to outpatient    PRUDENCIO/panic d/o, adjustment d/o etc  PSYCH MEDICATIONS  Scheduled  PRN  OR doesn't warrant any med management in the inpatient setting defer to outpatient    Schizophrenia, schizoaffective, delusional d/o, acute psychosis etc  PSYCH MEDICATIONS  Scheduled  PRN  OR doesn't warrant any med management in the inpatient setting defer to outpatient    Dementia, parkinson's, pseudo dementia etc  PSYCH MEDICATIONS  Scheduled  PRN  OR doesn't warrant any med management in the inpatient setting defer to outpatient    DELIRIUM  DELIRIUM BEHAVIOR MANAGEMENT  PLEASE utilize CHEMICAL restraints with PRN meds first for agitation. Minimize use of PHYSICAL restraints OR have periods of being out of physical restraints if possible.  Keep window shades open and room lit during day and room dim at night in order to promote normal sleep-wake cycles  Encourage family at bedside. [place] patient often to situation, location, date.  Continue to Limit or Discontinue use of Narcotics, Benzos and Anti-cholinergic medications as they may worsen delirium.  Continue medical workup for causative etiology of Delirium.     OTHER PERTINENT DIAGNOSIS    RISK ASSESSMENT  NEEDS PEC because patient is in imminent danger of hurting self or others and is gravely disabled. & NEEDS 1:1 sitter  NO NEED FOR PEC patient NOT in any imminent danger of hurting self or others and not gravely disabled.     FOLLOW UP  Will follow up while in house  Will sign off. Patient can follow up with ***/outpatient mental health provider. Resources provided in patient's discharge instructions.    DISPOSITION - once medically cleared:   Seek involuntary inpatient psychiatric admission for stabilization of acute psychiatric symptoms and a safe disposition plan is enacted. The patient &/or their family was  informed that the patient will be transferred to an inpatient unit per ED/primary placement team.   OR  Patient may be discharged home with next of kin with outpatient psychaitric follow up/rehab.   OR  Defer to medical team    Please contact ON CALL psychiatry service (24/7) for any acute issues that may arise.    Dr. Cathi ENAMORADO Psychiatry  [facility]  7/29/2024 9:44 PM        --------------------------------------------------------------------------------------------------------------------------------------------------------------------------------------------------------------------------------------    CONTINUED HISTORY & OBJECTIVE clinical data & findings reviewed and noted for above decision making    Past Medical/Surgical History:   Past Medical History:   Diagnosis Date    Anemia     Anxiety     COPD (chronic obstructive pulmonary disease)     COPD (chronic obstructive pulmonary disease) with emphysema     Coronary artery disease     Depression     Diverticulitis     Hyperlipidemia     Hypertension     PVD (peripheral vascular disease)     PVD (peripheral vascular disease)     S/P colostomy     Substance abuse     hx heavy etoh use     Tobacco abuse      Past Surgical History:   Procedure Laterality Date    ANGIOGRAM, CORONARY, WITH LEFT HEART CATHETERIZATION N/A 11/22/2023    Procedure: Angiogram, Coronary, with Left Heart Cath;  Surgeon: Checo Bhatt MD;  Location: [facility] CATH LAB;  Service: Cardiology;  Laterality: N/A;    ANGIOGRAM, CORONARY, WITH LEFT HEART CATHETERIZATION N/A 5/27/2024    Procedure: Angiogram, Coronary, with Left Heart Cath;  Surgeon: aKrel Mack MD;  Location: [facility] CATH LAB;  Service: Cardiology;  Laterality: N/A;    ANGIOGRAM, EXTREMITY, UNILATERAL Right 8/25/2023    Procedure: ANGIOGRAM, EXTREMITY, UNILATERAL;  Surgeon: Gary Rico MD;  Location: [facility] OR [address] [place] [address];  Service: Vascular;  Laterality: Right;  US GUIDED ACCESS LEFT  GROIN  contrast: 150ml  fluoro: 27.9 min  mGy: 254.73  Gycm2: 62.4919  radial flush cocktail: 10ml    ANGIOGRAPHY OF LOWER EXTREMITY Right 8/24/2023    Procedure: Angiogram Extremity Unilateral;  Surgeon: Benji Ashley MD;  Location: [address];  Service: Vascular;  Laterality: Right;    COLOSTOMY      ECTOPIC PREGNANCY SURGERY      PTA, PERONEAL Right 8/25/2023    Procedure: PTA, PERONEAL TIBIAL TRUNK WITH SHOCKWAVE;  Surgeon: Gary Rico MD;  Location: [address];  Service: Vascular;  Laterality: Right;    PTCA, SINGLE VESSEL  11/22/2023    Procedure: PTCA, Single Vessel;  Surgeon: Checo Bhatt MD;  Location: [facility] CATH LAB;  Service: Cardiology;;    right forefoot amputation      right toe amputation      x2 toes    STENT, DRUG ELUTING, SINGLE VESSEL, CORONARY  11/22/2023    Procedure: Stent, Drug Eluting, Single Vessel, Coronary;  Surgeon: Checo Bhatt MD;  Location: [facility] CATH LAB;  Service: Cardiology;;    STENT, DRUG ELUTING, SINGLE VESSEL, CORONARY  5/27/2024    Procedure: Stent, Drug Eluting, Single Vessel, Coronary;  Surgeon: Karel Mack MD;  Location: [facility] CATH LAB;  Service: Cardiology;;    STENT, SUPERFICIAL FEMORAL ARTERY Right 8/25/2023    Procedure: STENT, SUPERFICIAL FEMORAL ARTERY;  Surgeon: Gary Rico MD;  Location: [address];  Service: Vascular;  Laterality: Right;  VIABAHN 6 X 15 X120       Current Medications:   Scheduled Meds:    albuterol-ipratropium  3 mL Nebulization Q4H    aspirin  81 mg Oral Daily    atorvastatin  80 mg Oral Daily    clopidogreL  75 mg Oral Daily    enoxparin  40 mg Subcutaneous Q24H (prophylaxis, 1700)    methylPREDNISolone injection (PEDS and ADULTS)  40 mg Intravenous Daily    metoprolol succinate  25 mg Oral Daily    piperacillin-tazobactam (Zosyn) IV (PEDS and ADULTS) (extended infusion is not appropriate)  4.5 g Intravenous Q8H    polyethylene glycol  17 g Oral Daily    thiamine (B-1) 500  mg in D5W 100 mL IVPB  500 mg Intravenous TID     PRN Meds:   Current Facility-Administered Medications:     0.9% NaCl, , Intravenous, PRN    0.9% NaCl, , Intravenous, PRN    albuterol sulfate, 2.5 mg, Nebulization, Q6H PRN    dextrose 10%, 12.5 g, Intravenous, PRN    dextrose 10%, 25 g, Intravenous, PRN    glucagon (human recombinant), 1 mg, Intramuscular, PRN    insulin aspart U-100, 0-5 Units, Subcutaneous, Q6H PRN    OLANZapine, 2.5 mg, Intramuscular, Q6H PRN    senna, 8.6 mg, Oral, Daily PRN    sodium chloride 0.9%, 10 mL, Intravenous, PRN    Allergies:   Review of patient's allergies indicates:  No Known Allergies    Vitals  Vitals:    07/29/24 2115   BP: (!) 151/71   Pulse:    Resp:    Temp:        Labs/Imaging/Studies:  Recent Results (from the past 24 hour(s))   VANCOMYCIN, TROUGH    Collection Time: 07/29/24 12:57 AM   Result Value Ref Range    Vancomycin-Trough 8.1 (L) 10.0 - 22.0 ug/mL   Comprehensive metabolic panel    Collection Time: 07/29/24  4:11 AM   Result Value Ref Range    Sodium 138 136 - 145 mmol/L    Potassium 3.5 3.5 - 5.1 mmol/L    Chloride 102 95 - 110 mmol/L    CO2 24 23 - 29 mmol/L    Glucose 276 (H) 70 - 110 mg/dL    BUN 20 8 - 23 mg/dL    Creatinine 0.9 0.5 - 1.4 mg/dL    Calcium 7.5 (L) 8.7 - 10.5 mg/dL    Total Protein 5.1 (L) 6.0 - 8.4 g/dL    Albumin 2.7 (L) 3.5 - 5.2 g/dL    Total Bilirubin 0.4 0.1 - 1.0 mg/dL    Alkaline Phosphatase 68 55 - 135 U/L    AST 17 10 - 40 U/L    ALT 8 (L) 10 - 44 U/L    eGFR >60.0 >60 mL/min/1.73 m^2    Anion Gap 12 8 - 16 mmol/L   Magnesium    Collection Time: 07/29/24  4:11 AM   Result Value Ref Range    Magnesium 1.8 1.6 - 2.6 mg/dL   Phosphorus    Collection Time: 07/29/24  4:11 AM   Result Value Ref Range    Phosphorus 2.9 2.7 - 4.5 mg/dL   CBC auto differential    Collection Time: 07/29/24  5:24 AM   Result Value Ref Range    WBC 6.58 3.90 - 12.70 K/uL    RBC 3.33 (L) 4.00 - 5.40 M/uL    Hemoglobin 9.7 (L) 12.0 - 16.0 g/dL    Hematocrit 32.5 (L)  37.0 - 48.5 %    MCV 98 82 - 98 fL    MCH 29.1 27.0 - 31.0 pg    MCHC 29.8 (L) 32.0 - 36.0 g/dL    RDW 13.3 11.5 - 14.5 %    Platelets 150 150 - 450 K/uL    MPV 10.0 9.2 - 12.9 fL    Immature Granulocytes 0.3 0.0 - 0.5 %    Gran # (ANC) 5.0 1.8 - 7.7 K/uL    Immature Grans (Abs) 0.02 0.00 - 0.04 K/uL    Lymph # 0.9 (L) 1.0 - 4.8 K/uL    Mono # 0.5 0.3 - 1.0 K/uL    Eos # 0.2 0.0 - 0.5 K/uL    Baso # 0.05 0.00 - 0.20 K/uL    nRBC 0 0 /100 WBC    Gran % 75.9 (H) 38.0 - 73.0 %    Lymph % 12.9 (L) 18.0 - 48.0 %    Mono % 7.4 4.0 - 15.0 %    Eosinophil % 2.7 0.0 - 8.0 %    Basophil % 0.8 0.0 - 1.9 %    Differential Method Automated    POCT glucose    Collection Time: 07/29/24  8:00 PM   Result Value Ref Range    POCT Glucose 95 70 - 110 mg/dL     Imaging Results              US Lower Extremity Veins Bilateral (Final result)  Result time 07/28/24 03:36:44      Final result by James Salmeron MD (07/28/24 03:36:44)                   Impression:      No evidence of deep venous thrombosis in either lower extremity.      Electronically signed by: James Salmeron  Date:    07/28/2024  Time:    03:36               Narrative:    EXAMINATION:  US LOWER EXTREMITY VEINS BILATERAL    CLINICAL HISTORY:  Other specified soft tissue disorders    TECHNIQUE:  Duplex and color flow Doppler and dynamic compression was performed of the bilateral lower extremity veins was performed.    COMPARISON:  Left lower extremity venous ultrasound July 14, 2024    FINDINGS:  Right thigh veins: The common femoral, femoral, popliteal, upper greater saphenous, and deep femoral veins are patent and free of thrombus. The veins are normally compressible and have normal phasic flow and augmentation response.    Right calf veins: The visualized calf veins are patent.    Left thigh veins: The common femoral, femoral, popliteal, upper greater saphenous, and deep femoral veins are patent and free of thrombus. The veins are normally compressible and have normal  phasic flow and augmentation response.    Left calf veins: The visualized calf veins are patent.    Miscellaneous: None                                       CT Head Without Contrast (Final result)  Result time 07/27/24 06:50:09      Final result by Diaz Ku MD (07/27/24 06:50:09)                   Impression:      No acute intracranial abnormality.    Electronically signed by resident: Stacey Lomax  Date:    07/27/2024  Time:    06:09    Electronically signed by: Diaz Ku  Date:    07/27/2024  Time:    06:50               Narrative:    EXAMINATION:  CT HEAD WITHOUT CONTRAST    CLINICAL HISTORY:  Mental status change, unknown cause;    TECHNIQUE:  Low dose axial CT images obtained throughout the head without intravenous contrast. Sagittal and coronal reconstructions were performed.    COMPARISON:  CT head 09/04/2012    FINDINGS:  The ventricles, cisterns and sulci are within normal limits without evidence of hydrocephalus. No extra-axial blood or fluid collections.    The brain parenchyma demonstrates no parenchymal mass effect, edema, acute hemorrhage or acute major vascular distribution infarct.  Left cerebellar small hyperdensity (2-9) likely due to artifact.  Mild patchy periventricular white matter hypodensity likely representing remote microvascular infarcts.    Skull/extracranial contents (limited evaluation): No displaced calvarial fracture. Moderate right and mild left mastoid air cell opacification possibly on the basis of effusion and/or mucosal thickening.                                        X-Ray Foot 2 View Right (Final result)  Result time 07/27/24 08:06:46      Final result by Yoseph Fishman MD (07/27/24 08:06:46)                   Impression:      Prior transmetatarsal amputation of all 5 toes.  Slightly increased cortical regularity at the osteotomy sites could represent evolving postoperative changes or possibly superimposed infection.    There is some soft tissue swelling about  the osteotomy sites and in the right foot and lower leg, however the degree of swelling has diminished from 08/18/2023.    Recommendations include clinical correlation and continued follow-up imaging.    This report was flagged in Epic as abnormal.      Electronically signed by: Yoseph Fishman  Date:    07/27/2024  Time:    08:06               Narrative:    EXAMINATION:  XR FOOT 2 VIEW RIGHT    CLINICAL HISTORY:  evaluation for osteomyelitis in setting of chronic wound and pain;    TECHNIQUE:  AP and lateral radiographs of the right foot    COMPARISON:  Right foot series 08/18/2023    FINDINGS:  Prior transmetatarsal amputation of all 5 toes.    Allowing for differences in radiographic technique and patient positioning, some of the osteotomy sites appear slightly more irregular than in 2023.  Uncertain if this represents evolving postoperative changes, or superimposed osseous infection    Osteopenia.    No acute fracture deformity or dislocation.    There remains some soft tissue swelling in the right lower leg and right foot, including over the amputation sites, but overall the swelling is diminished from the comparison radiograph.    No new radiopaque foreign bodies or soft tissue emphysema apparent.    No acute dislocation.                                       X-Ray Chest 1 View (Final result)  Result time 07/27/24 01:44:09      Final result by James Salmeron MD (07/27/24 01:44:09)                   Impression:      Radiographic findings as above.      Electronically signed by: James Salmreon  Date:    07/27/2024  Time:    01:44               Narrative:    EXAMINATION:  XR CHEST 1 VIEW    CLINICAL HISTORY:  Encephalopathy, unspecified    TECHNIQUE:  Single frontal view of the chest was performed.    COMPARISON:  Chest radiograph July 14, 2024    FINDINGS:  The patient is rotated, when accounting for position and technique and depth of inspiration the cardiomediastinal silhouette appears stable.  Aortic  atherosclerotic change noted.    Chronic appearing lung changes are noted.  There is mild parenchymal change at the left lung base that may relate to atelectatic change, the possibly of a minimal amount of pleural fluid is considered.  There is no large pleural effusion.  There is no evidence for pneumothorax.    The osseous structures demonstrate chronic change.

## 2024-07-30 NOTE — PROGRESS NOTES
VSS. Patient remains calm and cooperative with staff. Colostomy bag changed. Patient cleared for regular diet by speech. K+ 5.6; shift 3.5. Patient resting at this time with needs met and safety measures in place.   07/30/24 0835   Pain/Comfort/Sleep   Pain Rating (0-10): Rest 0   Cognitive   Cognitive/Neuro/Behavioral WDL ex;orientation   Level of Consciousness (AVPU) alert   Arousal Level opens eyes spontaneously   Orientation disoriented to;situation   Speech clear/fluent;follows commands   Pupils   Pupil PERRLA yes   Pupil Size Left 2 mm   Pupil Shape Left round   Pupil Reaction Left equal   Pupil Accommodation Left normal response   Pupil Size Right 2 mm   Pupil Shape Right round   Pupil Reaction Right equal   Pupil Accommodation Right normal response   Brooks Coma Scale   Best Eye Response 4-->(E4) spontaneous   Best Motor Response 6-->(M6) obeys commands   Best Verbal Response 4-->(V4) confused   Blaine Coma Scale Score 14   Motor Response   Motor Response general motor response   General Motor Response purposeful motor response   HEENT   HEENT WDL WDL   Face Symptoms symmetrical at rest, with movement and expression   Respiratory   Respiratory WDL ex;cough   Rhythm/Pattern, Respiratory unlabored;pattern regular;depth regular;chest wiggle adequate;no shortness of breath reported   Expansion/Accessory Muscles/Retractions no retractions;no use of accessory muscles   Cough Frequency infrequent   Cough Type congested;nonproductive   Breath Sounds   All Lung Fields Breath Sounds Anterior:;coarse   Oxygen Therapy   Flow (L/min) (Oxygen Therapy) 3   Device (Oxygen Therapy) nasal cannula   Cardiac   Cardiac WDL WDL   Peripheral Neurovascular   Peripheral Neurovascular WDL WDL   VTE Required Core Measure Pharmacological prophylaxis initiated/maintained   VTE Prevention/Management ROM (active) performed   All Extremities Neurovascular Assessment   General All Extremity Temperature warm   General All Extremity Color  no discoloration   General All Extremity Sensation no tingling;no numbness   Pulse Radial   Left Radial Pulse 2+ (normal)   Right Radial Pulse 2+ (normal)   Pulse Dorsalis Pedis   Left Dorsalis Pedis Pulse 1+ (weak)   Right Dorsalis Pedis Pulse 1+ (weak)   Pulse Posterior Tibial   Left Posterior Tibial Pulse 1+ (weak)   Right Posterior Tibial Pulse 1+ (weak)        Peripheral IV - Single Lumen 07/30/24 0106 22 G 3/4 in Distal;Posterior;Right Forearm   Placement Date/Time: 07/30/24 0106   Present Prior to Hospital Arrival?: No  Inserted by: RN  Size (G): 22 G  Length (in): 3/4 in  Orientation: Distal;Posterior;Right  Location: Forearm  Site Prep: Chlorhexidine   Insertion attempts (enter comment if ...   Site Assessment Clean;Dry;Intact;No redness;No swelling;No warmth;No drainage   Extremity Assessment Distal to IV No warmth;No swelling;No redness;No abnormal discoloration   Line Status Flushed;Infusing   Dressing Status Clean;Dry;Intact   Dressing Intervention Integrity maintained   Dressing Change Due 07/06/24   Site Change Due 08/11/24   Reason Not Rotated Not due   Skin   Skin WDL ex;color;characteristics   Skin Color/Characteristics pale   Skin Temperature warm   Skin Moisture dry   Skin Elasticity slow return to original state   Skin Integrity bruised (ecchymotic);other (see comments)  (R foot ulcer)   Specialty Bed/Overlay Overlay, nonpowered/static (waffle)   Bed Support Surface Assessed;Firm   Suresh Risk Assessment   Sensory Perception 3-->slightly limited   Moisture 3-->occasionally moist   Activity 1-->bedfast   Mobility 3-->slightly limited   Nutrition 1-->very poor   Friction and Shear 2-->potential problem   Suresh Score 13   Pressure Injury Prevention    Check Moisture Management Pad Done   Sacral Foam Dressing Peel back sacral foam dressing, assess skin and reapply   Heel protection technique Pillow   Heel preventative measures Remove  (per patient request; heels floated with pillows)   Check  Medical Devices Done        Wound 05/25/24 2200 Incision Right anterior Foot other (see comments)   Date First Assessed/Time First Assessed: 05/25/24 2200   Present on Original Admission: Yes  Primary Wound Type: Incision  Side: Right  Orientation: anterior  Location: Foot  Is this injury device related?: No  Incision Type: other (see comments)   Dressing Appearance Dry;Intact;Clean   Appearance Dressing in place, unable to visualize   Musculoskeletal   Musculoskeletal WDL ex;mobility   General Mobility generalized weakness;moderately impaired   Gastrointestinal   GI WDL ex   Abdominal Appearance rounded;other (see comments)  (midline abd hernia)   Abdominal Palpation All Quadrants   GI Signs/Symptoms other (see comments)  (colostomy)   Last Bowel Movement 07/30/24        Colostomy 07/13/21 0201 Descending/sigmoid LLQ   Placement Date/Time: 07/13/21 0201   Present Prior to Hospital Arrival?: Yes  Colostomy Type: Descending/sigmoid  Location: LLQ   Stomal Appliance 1 piece   Stoma Appearance rosebud appearance;protruding above skin level;round   Site Assessment Clean;Intact;Dry   Stoma Function flatus;stool   Genitourinary   Genitourinary WDL ex;voiding ability/characteristics   Voiding Characteristics external catheter   Urine Characteristics yellow   Coping/Psychosocial   Observed Emotional State accepting;calm;cooperative   Verbalized Emotional State acceptance   Plan of Care Reviewed With patient   Safety   Safety WDL WDL   Safety Factors ID band on;upper side rails raised x 2;call light in reach;wheels locked   All Alarms alarm(s) activated and audible   Enhanced Safety Measures bed alarm set   Fall Risk Assessment (every shift)   History Of Fall (W/I 3 Mos) 0-->No   Polypharmacy 3-->Yes   Central Nervous System/Psychotropic Medication 3-->Yes   Cardiovascular Medication 3-->Yes   Age Greater Than 65 Years 2-->Yes   Altered Elimination 2-->Yes   Cognitive Deficit 2-->Yes   Sensory Deficit 0-->No    Dizziness/Vertigo 0-->No   Depression 0-->No   Mobility Deficit/Weakness 2-->Yes   Male 0-->No   Fall Risk Score 17   ABC Risk for Fall with Injury Assessment   A= Age: Is the patient greater than or equal to 85 years old or frail due to clinical condition? No   B=Bones: Does the patient have osteoporosis, previous fracture, prolonged steroid use, or metastatic bone cancer? No   C=Coagulation Disorders: Does the patient have a bleeding disorder, either through anticoagulants or underlying clinical condition? No   S=recent Surgery: Is the patient post-op surgicalwith a recent lower limb amputation or recent major abdominal or thoracic surgery? No   Safety Management   Safety Promotion/Fall Prevention bed alarm set;family expresses understanding of fall risk and prevention;lighting adjusted;medications reviewed;nonskid shoes/socks when out of bed   Medication Review/Management medications reviewed   Patient Rounds bed in low position;bed wheels locked;call light in patient/parent reach;clutter free environment maintained;ID band on;placement of personal items at bedside;toileting offered;visualized patient   Safety Bands on Patient Fall Risk Band   Elopement Precautions bed alarm   Daily Care   Activity Management Rolling - L1   Activity Assistance Provided assistance, 2 people   Mobility   GEMS (Lynd Early Mobility Scale) Level 1-Primary in bed activities

## 2024-07-30 NOTE — ASSESSMENT & PLAN NOTE
R TMA: medial chronic (1+ year) wound appears dry and stable with no signs of active infection, wound bed is granular with healthy wound edges, negative PTB  R heel: plantar eschar (1+ month) appears dry and stable with no signs of active infection  Generalized bony tenderness of the foot where skin is thin    - No surgical intervention is indicated at this time  - Wound at R medial aspect of TMA is painted with betadine and foot is wrapped with kerlix and ACE without compression  - Wound care orders to follow  - Pt instructed to continue with home health wound care and dressing changes for foot wounds  - WBAT R foot, no restrictions  - Podiatry to sign off    Future discharge recs:  - SNF or HH to apply bandaging as described above  - Patient to keep dressings clean dry and intact  - Patient explained the importance of daily foot checks

## 2024-07-30 NOTE — NURSING
End of Shift Summary:  Patient was transferred from the MICU at 1700.  Patient was confused at the beginning of the shift concerned that people were trying to poison her or hurt her.  However, during the night she began to clear.  Was able to give me the correct date and day of the week.  Patient's colostomy bag was changed.  Patient is NPO until speech is able to evaluate her, however she can have some ice chips for her comfort.  Patient was uncomfortable on the bed and complained that her feet were burning.  Placed a waffle mattress under her and she stated that it was a little more comfortable.  Patient had an elevated blood pressure at the beginning of the shift which was treated with Labetalol 10 mg with good results and her blood pressure came down below 180's.  Patient's blood sugar was within normal limits.  At the end of the shift, patient stated that she was unable to void.  Bladder scanned her for 340 cc.  Did an In and Out catheterization and drained 400 cc of urine.  Patient tolerated the procedure well.  Patient is a poor venipuncture and IV start.  Patient bruises easily after venipuncture.

## 2024-07-30 NOTE — PROGRESS NOTES
"[facility] - Stepdown Flex ([place] [id number])  [facility] Medicine  Progress Note    Patient Name: Radha Sotelo  MRN: [id number]  Patient Class: IP- Inpatient   Admission Date: 7/27/2024  Length of Stay: 3 days  Attending Physician: Solomon Brown MD  Primary Care Provider: Kalyn Pemberton NP        Subjective:     Principal Problem:Acute on chronic respiratory failure with hypoxia and hypercapnia        HPI:    Mrs. 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 Per chart review, "patient's family stated that she at baseline is fully communicative but that all of a sudden she became less responsive.  EMS reports that she had a GCS of 12 with them in route to the hospital and that her communication ability was limited.  They subsequently placed her on CPAP in route to the hospital.  Further history limited given the patient's current mental status."     She was hypothermic to 94.5F and otherwise HDS. VBG trend in the ED 7.01/>130 --> 7.06/>130 --> 7.13/117. Ammonia elevated at 115. . In the emergency department she was given albuterol, Ipratropium, Methylpred, Mg, Vancomycin, and Zosyn.      Critical care medicine consulted for hypercapnic respiratory failure.        Overview/Hospital Course:    Pt received scheduled breathing treatments as well as steroids. Antibiotics continued for respiratory as well as skin coverage in setting of RLE cellulitis and COPD exacerbation. Continued on BiPAP with improvement in mentation and pCO2. Weaned to home 2L NC and started on nightly BiPAP.      Interval History/Significant Events: Patient agitated and suspicious upon interview. Not believing that providers are who they say they are and stating that their credentials are false. Trying to hide areas to be examined from providers. " Raised voice and asked provider to leave.     Collateral: Called daughter who states that over the past few months the patient has become more forgetful. Patient lives in an apartment and is mostly independent in her ADL's but has some help from grand children that live below her. During  before her acute episode that she is admitted for she was suspicious of her own grandson and yelled at him which daughter states is unusual behavior for her.     Patient received scheduled breathing treatments as well as steroids. Antibiotics continued for respiratory as well as skin coverage in setting of RLE cellulitis and COPD exacerbation. Continued on BiPAP with improvement in mentation and pCO2. Weaned to home 2L NC and started on nightly BiPAP. Patient with intermittent agitation requiring precedex and PRN Zyprexa. Family states this is not her baseline but she has been having forgetfulness over the last few months, acting intermittently suspicious towards staff. De-escalated to Zosyn only for antibiotics, received 3 days of azithromycin. On HFNC overnight, did not comply with BiPAP.            7/30 Transfer to hospital medicine .admitted to the MICU for COPD exacerbation. hypercapnic respiratory failure due to COPD exacerbation - improved with BiPAP with good response  stable on low flow oxygen. sats 95% on 2LNC. Continue bronchodilators, nebs, prednisone. NC @ 2L which is her baseline. Was put on precedex for agitation, but off now, still in wrist restraints. Not on pressors.  SLP eval - recs NPO/ noted gitation, hallucinations, confabulation - last ETOH 2 weeks ago. Thiamine started for possible wernicke's encephalopathy. vancomycin discontinued. continue zosyn . on bilateral UE restraints . Psychiatry consulted for encephalopathy/restraints/cognitive dysfunction - 2.5mg Zyprexa PO/IM Q6H PRN for any non redirectable agitation  AAOX 3. off restraints since last night . Leucocytosis of 13. K of 5.6 . received D50  insulin and albuterol concentrate.sats 95% on 3LNC. podiatry consulted for cortical regularity at the osteotomy sites- right foot. could represent evolving postoperative changes or possibly superimposed infection. SLP recs regular diet            Review of Systems:   Pain scale:   Constitutional:  fever,  chills, headache, vision loss, hearing loss, weight loss, Generalized weakness, falls, loss of smell, loss of taste, poor appetite,  sore throat  Respiratory: cough, shortness of breath.   Cardiovascular: chest pain, dizziness, palpitations, orthopnea, swelling of feet, syncope  Gastrointestinal: nausea, vomiting, abdominal pain, diarrhea, black stool,  blood in stool, change in bowel habits, constipation  Genitourinary: hematuria, dysuria, urgency, frequency  Integument/Breast: rash,  pruritis  Hematologic/Lymphatic: easy bruising, lymphadenopathy  Musculoskeletal: arthralgias , myalgias, back pain, neck pain, knee pain  Neurological: confusion, seizures, tremors, slurred speech, agitation and hallucinations.   Behavioral/Psych:  depression, anxiety, auditory or visual hallucinations     OBJECTIVE:     Physical Exam:  Body mass index is 24.76 kg/m².    Constitutional: Appears well-developed and well-nourished.   Head: Normocephalic and atraumatic.   Neck: Normal range of motion. Neck supple.   Cardiovascular: Normal heart rate.  Regular heart rhythm.  Pulmonary/Chest: Effort normal.   Abdominal: No distension.  No tenderness. colostomy  Musculoskeletal: Normal range of motion. No edema. rigth foot wound at TMA site. right heel scab  Neurological: Alert and oriented to person, place, and time.   Skin: Skin is warm and dry.   Psychiatric: Normal mood and affect. Behavior is normal.                  Vital Signs  Temp: 97.5 °F (36.4 °C) (07/30/24 1127)  Pulse: 90 (07/30/24 1238)  Resp: 17 (07/30/24 1238)  BP: (!) 167/73 (07/30/24 1127)  SpO2: 96 % (07/30/24 1238)     24 Hour VS Range    Temp:  [96.4 °F (35.8 °C)-99.4  °F (37.4 °C)]   Pulse:  []   Resp:  [17-23]   BP: (124-187)/()   SpO2:  [93 %-97 %]     Intake/Output Summary (Last 24 hours) at 7/30/2024 1344  Last data filed at 7/30/2024 0622  Gross per 24 hour   Intake 146.67 ml   Output 1295 ml   Net -1148.33 ml         I/O This Shift:  No intake/output data recorded.    Wt Readings from Last 3 Encounters:   07/30/24 57.5 kg (126 lb 12.2 oz)   07/14/24 53.1 kg (117 lb)   05/28/24 53.2 kg (117 lb 4.6 oz)       I have personally reviewed the vitals and recorded Intake/Output     Laboratory/Diagnostic Data:    CBC/Anemia Labs: Coags:    Recent Labs   Lab 07/28/24  0216 07/29/24  0524 07/30/24  0505   WBC 9.40 6.58 13.37*   HGB 10.3* 9.7* 12.7   HCT 34.2* 32.5* 39.8    150 141*   MCV 97 98 92   RDW 13.1 13.3 13.4    Recent Labs   Lab 07/28/24  1139   INR 1.0        Chemistries: ABG:   Recent Labs   Lab 07/28/24  0216 07/29/24  0411 07/30/24  0505 07/30/24  1154    138 138 139   K 4.4 3.5 5.6* 3.2*  3.2*   CL 97 102 104 101   CO2 37* 24 21* 29   BUN 22 20 25* 20   CREATININE 0.8 0.9 0.9 0.8   CALCIUM 9.1 7.5* 8.8 8.7   PROT 5.9* 5.1* 7.0  --    BILITOT 0.3 0.4 0.9  --    ALKPHOS 86 68 87  --    ALT 10 8* 12  --    AST 13 17 57*  --    MG 2.3 1.8 2.5  --    PHOS 2.9 2.9 3.7  --     Recent Labs   Lab 07/27/24  1156 07/27/24  1600 07/27/24  2043   PH 7.343* 7.396 7.442   PCO2 79.1* 64.5* 60.3*   PO2 32* 41 41   HCO3 43.0* 39.6* 41.1*   POCSATURATED 54 74 76   BE 17* 15* 17*        POCT Glucose: HbA1c:    Recent Labs   Lab 07/29/24 2000 07/30/24  0636 07/30/24  0930 07/30/24  1258   POCTGLUCOSE 95 92 239* 122*    Hemoglobin A1C   Date Value Ref Range Status   08/19/2023 5.6 4.0 - 5.6 % Final     Comment:     ADA Screening Guidelines:  5.7-6.4%  Consistent with prediabetes  >or=6.5%  Consistent with diabetes    High levels of fetal hemoglobin interfere with the HbA1C  assay. Heterozygous hemoglobin variants (HbS, HgC, etc)do  not significantly interfere with  "this assay.   However, presence of multiple variants may affect accuracy.     07/12/2021 5.3 4.0 - 5.6 % Final     Comment:     ADA Screening Guidelines:  5.7-6.4%  Consistent with prediabetes  >or=6.5%  Consistent with diabetes    High levels of fetal hemoglobin interfere with the HbA1C  assay. Heterozygous hemoglobin variants (HbS, HgC, etc)do  not significantly interfere with this assay.   However, presence of multiple variants may affect accuracy.     10/23/2012 5.6 4.0 - 6.2 % Final        Cardiac Enzymes: Ejection Fractions:    No results for input(s): "CPK", "CPKMB", "MB", "TROPONINI" in the last 72 hours. No results found for: "EF"       No results for input(s): "COLORU", "APPEARANCEUA", "PHUR", "SPECGRAV", "PROTEINUA", "GLUCUA", "KETONESU", "BILIRUBINUA", "OCCULTUA", "NITRITE", "UROBILINOGEN", "LEUKOCYTESUR", "RBCUA", "WBCUA", "BACTERIA", "SQUAMEPITHEL", "HYALINECASTS" in the last 48 hours.    Invalid input(s): "WRIGHTSUR"    Procalcitonin (ng/mL)   Date Value   03/01/2020 0.54 (H)     Lactate (Lactic Acid) (mmol/L)   Date Value   07/27/2024 0.7   08/26/2023 0.7   08/11/2023 2.0   07/12/2021 0.9   03/02/2020 0.7     BNP (pg/mL)   Date Value   07/27/2024 143 (H)   07/14/2024 80   05/25/2024 133 (H)   11/29/2023 831 (H)   08/10/2023 56     CRP (mg/L)   Date Value   08/18/2023 33.7 (H)     Sed Rate   Date Value   08/18/2023 27 mm/Hr   03/22/2013 16 mm/hr     D-Dimer (mg/L FEU)   Date Value   03/01/2020 4.64 (H)     Ferritin (ng/mL)   Date Value   07/15/2021 135     LD (U/L)   Date Value   11/11/2012 230   11/02/2012 487 (H)   11/01/2012 494 (H)   10/23/2012 171     Troponin I (ng/mL)   Date Value   07/27/2024 0.013   05/25/2024 0.015   05/25/2024 0.023   11/30/2023 0.779 (H)   11/30/2023 1.049 (H)   11/29/2023 0.970 (H)   11/29/2023 0.571 (H)   11/29/2023 0.624 (H)     CPK (U/L)   Date Value   12/10/2013 87   12/10/2013 75   01/15/2013 32   10/24/2012 302 (H)   10/18/2012 225 (H)   09/05/2012 243 (H) "   04/13/2012 181 (H)   05/20/2010 204 (H)     Results for orders placed or performed during the hospital encounter of 03/01/20   Vitamin D   Result Value Ref Range    Vit D, 25-Hydroxy 5 (L) 30 - 96 ng/mL     SARS-CoV2 (COVID-19) Qualitative PCR (no units)   Date Value   07/27/2024 Not Detected   07/29/2021 Not Detected   07/22/2021 Not Detected     SARS-CoV-2 RNA, Amplification, Qual (no units)   Date Value   07/27/2024 Negative   07/14/2024 Negative     POC Rapid COVID (no units)   Date Value   07/12/2021 Negative       Microbiology labs for the last week  Microbiology Results (last 7 days)       Procedure Component Value Units Date/Time    Blood culture #1 **CANNOT BE ORDERED STAT** [[id number]] Collected: 07/27/24 0020    Order Status: Completed Specimen: Blood from Peripheral, Antecubital, Right Updated: 07/30/24 0613     Blood Culture, Routine No Growth to date      No Growth to date      No Growth to date      No Growth to date    Blood culture #2 **CANNOT BE ORDERED STAT** [[id number]] Collected: 07/27/24 0021    Order Status: Completed Specimen: Blood from Peripheral, Antecubital, Right Updated: 07/30/24 0613     Blood Culture, Routine No Growth to date      No Growth to date      No Growth to date      No Growth to date    MRSA Screen by PCR [[id number]] Collected: 07/27/24 0258    Order Status: Completed Specimen: Nasal Swab Updated: 07/27/24 0926     MRSA SCREEN BY PCR Not Detected    Culture, Respiratory with Gram Stain [[id number]]     Order Status: No result Specimen: Respiratory             Reviewed and noted in plan where applicable- Please see chart for full lab data.    Lines/Drains:       Peripheral IV - Single Lumen 07/30/24 0106 22 G 3/4 in Distal;Posterior;Right Forearm (Active)   Site Assessment Clean;Dry;Intact 07/30/24 0400   Line Status Infusing 07/30/24 0400   Dressing Status Clean;Dry;Intact 07/30/24 0400   Dressing Intervention Integrity maintained 07/30/24 0400   Number of days: 0             Colostomy 07/13/21 0201 Descending/sigmoid LLQ (Active)   Stomal Appliance 1 piece 07/29/24 2056   Stoma Appearance round;rosebud appearance 07/29/24 2056   Stoma Size (in) 3 07/29/24 2056   Site Assessment Clean;Intact 07/29/24 2056   Peristomal Assessment Excoriation 07/29/24 2056   Accessories/Skin Care barrier substance over peristomal skin;cleansed w/ soap and water;skin sealant 07/29/24 2056   Stoma Function flatus;stool 07/29/24 2056   Treatment Bag change 07/29/24 2056   Output (mL) 300 mL 07/29/24 2056   Number of days: 1113       Imaging  ECG Results              EKG 12-lead (Final result)        Collection Time Result Time QRS Duration OHS QTC Calculation    07/27/24 00:28:09 07/27/24 10:00:02 72 427                     Final result by Interface, Lab In [facility] (07/27/24 10:00:05)                   Narrative:    Test Reason : G93.40,    Vent. Rate : 094 BPM     Atrial Rate : 083 BPM     P-R Int : 178 ms          QRS Dur : 072 ms      QT Int : 342 ms       P-R-T Axes : 087 054 062 degrees     QTc Int : 427 ms    Sinus rhythm with Premature supraventricular complexes and with occasional   Premature ventricular complexes vs artifact  Non-specific ST abn but artifact marked  Abnormal ECG  When compared with ECG of 14-JUL-2024 09:10,  Premature ventricular complexes are now Present  Premature supraventricular complexes are now possible vs artifact    Confirmed by Fausto THOMPSON MD ([id number]) on 7/27/2024 10:00:00 AM    Referred By: [email or web address]   SELF           Confirmed By:Fausto THOMPSON MD                                    Results for orders placed during the hospital encounter of 05/25/24    Echo    Interpretation Summary    Left Ventricle: The left ventricle is normal in size. Ventricular mass is normal. Normal wall thickness. Normal wall motion. There is normal systolic function with a visually estimated ejection fraction of 60 - 65%. There is normal diastolic function.    Right Ventricle: Normal right  ventricular cavity size. Wall thickness is normal. Systolic function is normal.    Left Atrium: Left atrium is mildly dilated.    Aortic Valve: There is moderate aortic valve sclerosis. There is annular calcification present. Mildly restricted motion. There is mild stenosis. Aortic valve area by VTI is 2.21 cm². Aortic valve peak velocity is 2.0 m/s. Mean gradient is 7 mmHg. The dimensionless index is 0.58. There is mild aortic regurgitation.    Pulmonary Artery: The estimated pulmonary artery systolic pressure is 20 mmHg.    IVC/SVC: Normal venous pressure at 3 mmHg.      US Lower Extremity Veins Bilateral  Narrative: EXAMINATION:  US LOWER EXTREMITY VEINS BILATERAL    CLINICAL HISTORY:  Other specified soft tissue disorders    TECHNIQUE:  Duplex and color flow Doppler and dynamic compression was performed of the bilateral lower extremity veins was performed.    COMPARISON:  Left lower extremity venous ultrasound July 14, 2024    FINDINGS:  Right thigh veins: The common femoral, femoral, popliteal, upper greater saphenous, and deep femoral veins are patent and free of thrombus. The veins are normally compressible and have normal phasic flow and augmentation response.    Right calf veins: The visualized calf veins are patent.    Left thigh veins: The common femoral, femoral, popliteal, upper greater saphenous, and deep femoral veins are patent and free of thrombus. The veins are normally compressible and have normal phasic flow and augmentation response.    Left calf veins: The visualized calf veins are patent.    Miscellaneous: None  Impression: No evidence of deep venous thrombosis in either lower extremity.    Electronically signed by: James Salmeron  Date:    07/28/2024  Time:    03:36      Labs, Imaging, EKG and Diagnostic results from 7/30/2024 were reviewed.    Medications:  Medication list was reviewed and changes noted under Assessment/Plan.  No current facility-administered medications on file prior to  encounter.     Current Outpatient Medications on File Prior to Encounter   Medication Sig Dispense Refill    albuterol (PROVENTIL/VENTOLIN HFA) 90 mcg/actuation inhaler Inhale 2 puffs into the lungs every 6 (six) hours. Rescue 25.5 g 3    albuterol-ipratropium (DUO-NEB) 2.5 mg-0.5 mg/3 mL nebulizer solution Take 3 mLs by nebulization every 6 (six) hours as needed for Wheezing or Shortness of Breath. 180 mL 6    aspirin (ECOTRIN) 81 MG EC tablet Take 1 tablet (81 mg total) by mouth once daily. 30 tablet 11    atorvastatin (LIPITOR) 80 MG tablet Take 1 tablet (80 mg total) by mouth once daily. 90 tablet 3    budesonide-formoterol 80-4.5 mcg (SYMBICORT) 80-4.5 mcg/actuation HFAA INHALE 2 PUFFS INTO THE LUNGS TWICE DAILY. RINSE MOUTH AFTER USE 10.2 g 6    clopidogreL (PLAVIX) 75 mg tablet Take 1 tablet (75 mg total) by mouth once daily. 30 tablet 11    furosemide (LASIX) 20 MG tablet Take 1 tablet (20 mg total) by mouth daily as needed (edema/wt gain). 30 tablet 11    gabapentin (NEURONTIN) 300 MG capsule Take 1 capsule (300 mg total) by mouth 2 (two) times daily. 180 capsule 3    hydrocortisone-aloe vera 1 % Crea cream Apply topically 2 (two) times daily. 28 g 0    metoprolol succinate (TOPROL-XL) 25 MG 24 hr tablet Take 1 tablet (25 mg total) by mouth once daily. 90 tablet 3    nicotine (NICODERM CQ) 14 mg/24 hr Place 1 patch onto the skin once daily. 28 patch 0    nitroGLYCERIN (NITROSTAT) 0.4 MG SL tablet Place 1 tablet (0.4 mg total) under the tongue every 5 (five) minutes as needed for Chest pain. (Patient not taking: Reported on 5/29/2024) 25 tablet 2     Scheduled Medications:  Current Facility-Administered Medications   Medication Dose Route Frequency    albuterol-ipratropium  3 mL Nebulization Q4H    aspirin  81 mg Oral Daily    atorvastatin  80 mg Oral Daily    clopidogreL  75 mg Oral Daily    enoxparin  40 mg Subcutaneous Q24H (prophylaxis, 1700)    methylPREDNISolone injection (PEDS and ADULTS)  40 mg  Intravenous Daily    metoprolol succinate  25 mg Oral Daily    piperacillin-tazobactam (Zosyn) IV (PEDS and ADULTS) (extended infusion is not appropriate)  4.5 g Intravenous Q8H    polyethylene glycol  17 g Oral Daily    thiamine (B-1) 500 mg in D5W 100 mL IVPB  500 mg Intravenous TID     PRN:   Current Facility-Administered Medications:     0.9% NaCl, , Intravenous, PRN    0.9% NaCl, , Intravenous, PRN    albuterol sulfate, 2.5 mg, Nebulization, Q6H PRN    dextrose 10%, 12.5 g, Intravenous, PRN    dextrose 10%, 25 g, Intravenous, PRN    [COMPLETED] dextrose 10%, 50 g, Intravenous, Once **AND** dextrose 10%, 25 g, Intravenous, PRN **AND** [COMPLETED] insulin regular, 10 Units, Intravenous, Once    glucagon (human recombinant), 1 mg, Intramuscular, PRN    insulin aspart U-100, 0-5 Units, Subcutaneous, Q6H PRN    OLANZapine, 2.5 mg, Intramuscular, Q6H PRN    senna, 8.6 mg, Oral, Daily PRN    sodium chloride 0.9%, 10 mL, Intravenous, PRN  Infusions:    D5 and 0.45% NaCl   Intravenous Continuous 60 mL/hr at 07/30/24 0849 New Bag at 07/30/24 0849     Estimated Creatinine Clearance: 46 mL/min (based on SCr of 0.8 mg/dL).             Assessment/Plan:      * Acute on chronic respiratory failure with hypoxia and hypercapnia    Patient with Hypercapnic and Hypoxic Respiratory failure which is Acute on chronic.  she is on home oxygen at 2 LPM. Supplemental oxygen was provided and noted- Oxygen Concentration (%):  [28] 28     Signs/symptoms of respiratory failure include- tachypnea and lethargy. Contributing diagnoses includes - CHF, COPD, and Obesity Hypoventilation Labs and images were reviewed. Patient Has recent ABG, which has been reviewed. Will treat underlying causes and adjust management of respiratory failure as follows-      79 year old female with COPD, CAD, NSTEMI, HTN, and HLD who presented [facility] ED via EMS for hypercapnic respiratory failure with initial CO2 > 130. She was placed on NIPPV.      7/28 pH has  recovered to 7.44.   -- Given Methylpred, Mg, Albuterol, and Ipratropium in the ED  -- Continue NIPPV          - weaned to 2L NC. Bipap qhs and with naps  -- Wean FiO2 for SpO2 goal 88-92%   -- Follow up COVID / Flu swab          - negative  -- Follow up MRSA swab          - negative  -- Obtain sputum sample if possible  -- Continue broad spectrum abx, deescalated to Zosyn only   -- Continue prednisone   -- Continue      Patient received scheduled breathing treatments as well as steroids. Antibiotics continued for respiratory as well as skin coverage in setting of RLE cellulitis and COPD exacerbation. Continued on BiPAP with improvement in mentation and pCO2. Weaned to home 2L NC and started on nightly BiPAP. Patient with intermittent agitation requiring precedex and PRN Zyprexa. Family states this is not her baseline but she has been having forgetfulness over the last few months, acting intermittently suspicious towards staff. De-escalated to Zosyn only for antibiotics, received 3 days of azithromycin. On HFNC overnight, did not comply with BiPAP.            7/30 Transfer to hospital medicine .admitted to the MICU for COPD exacerbation. hypercapnic respiratory failure due to COPD exacerbation - improved with BiPAP with good response  stable on low flow oxygen. sats 95% on 2LNC. Continue bronchodilators, nebs, prednisone. NC @ 2L which is her baseline. vancomycin discontinued. continue zosyn.Leucocytosis of 13    Hyperkalemia    Recent Labs     07/28/24  0216 07/29/24  0411 07/30/24  0505   K 4.4 3.5 5.6*   7/30 K of 5.6 . received D50 insulin and albuterol concentrate     Anemia    Patient's with Normocytic anemia.. Hemoglobin stable. Etiology likely due to chronic disease .  Current CBC reviewed-    Recent Labs   Lab 07/28/24  0216 07/29/24  0524 07/30/24  0505   HGB 10.3* 9.7* 12.7         Component Value Date/Time    MCV 92 07/30/2024 0505    RDW 13.4 07/30/2024 0505    IRON 65 07/15/2021 0735    FERRITIN 135  "07/15/2021 0735    RETIC 5.85 (H) 11/11/2012 0334    FOLATE 10.3 07/15/2021 0735    [id number] 229 07/15/2021 0735     Monitor CBC and transfuse if H/H drops below 7/21.      Steroid-induced hyperglycemia  Patient's FSGs are controlled on current hypoglycemics.   Last A1c reviewed-   Lab Results   Component Value Date    HGBA1C 5.6 08/19/2023    HGBA1C 5.3 07/12/2021    HGBA1C 5.6 10/23/2012     Will hold PO hypoglycemics and will start correctional scale insulin  Most recent fingerstick glucose reviewed- No results for input(s): "POCTGLUCOSE" in the last 24 hours.  currently on   Antihyperglycemics (From admission, onward)      Start     Stop Route Frequency Ordered    07/29/24 1247  insulin aspart U-100 pen 0-5 Units         -- SubQ Every 6 hours PRN 07/29/24 1249               Cellulitis of right leg  -- XR with no subcutaneous air  -- Likely cellulitis, continue broad spectrum Abx for now  -- consider further imaging if no clinical improvement   7/30 podiatry consulted for cortical regularity at the osteotomy sites- right foot. could represent evolving postoperative changes or possibly superimposed infection.      Encephalopathy, metabolic    -- CTH with NAF  -- TSH and FT4 unremarkable  -- Respiratory management as above   -- Avoid sedating agents   -- Ammonia elevated at 115, repeat 35  -- Delirium precautions      Family details worsening agitation, possible hallucinations at home. Worsened after she stopped drinking 2 weeks ago. Unclear if due to infection vs dementia (frontotemporal/vascular), EtoH/wernicke's. Baseline patient as some support with ADL's but is mostly independent. Her agitation with staff and suspicion with is unusual behavior for her.          - appears to be confabulating on exam         - treatment dose IV thiamine         - improving with precedex wean, abx.     7/29 on bilateral UE restraints . Psychiatry consulted for encephalopathy/restraints/cognitive dysfunction   7/30 Psychiatry " consulted for encephalopathy/restraints/cognitive dysfunction - 2.5mg Zyprexa PO/IM Q6H PRN for any non redirectable agitation  AAOX 3. off restraints since last night     Chronic diastolic heart failure  Most recent ECHO 05/26/24:     Left Ventricle: The left ventricle is normal in size. Ventricular mass is normal. Normal wall thickness. Normal wall motion. There is normal systolic function with a visually estimated ejection fraction of 60 - 65%. There is normal diastolic function.    Right Ventricle: Normal right ventricular cavity size. Wall thickness is normal. Systolic function is normal.    Left Atrium: Left atrium is mildly dilated.    Aortic Valve: There is moderate aortic valve sclerosis. There is annular calcification present. Mildly restricted motion. There is mild stenosis. Aortic valve area by VTI is 2.21 cm². Aortic valve peak velocity is 2.0 m/s. Mean gradient is 7 mmHg. The dimensionless index is 0.58. There is mild aortic regurgitation.    Pulmonary Artery: The estimated pulmonary artery systolic pressure is 20 mmHg.    IVC/SVC: Normal venous pressure at 3 mmHg.     --   -- PRN diuresis   -- Metoprolol   -- Continue telemetry        Partial nontraumatic amputation of right foot    Delayed wound healing  -- Wound care consulted   -- Follow up foot XRAYs. May need CT/MRI to evaluate for presence of abscess          - Xray equivocal, no pain on exam. Will defer for now  -- Given RLE swelling: follow up BLE U/S         - US negative.   7/30 podiatry consulted for cortical regularity at the osteotomy sites- right foot. could represent evolving postoperative changes or possibly superimposed infection.      HLD (hyperlipidemia)    -- Continue statin therapies. Atorvastatin 80 mg   -- CMP daily. LFTs stable.        Essential hypertension    > Has been maintaining her BP below goal  -- Continue Metoprolol   -- SBP < 180 goal        Chronic obstructive pulmonary disease    -- Plan per respiratory  failure        COPD exacerbation    -- Plan per respiratory failure        Delayed wound healing    -- Wound care consulted   -- Follow up foot XRAYs. May need CT/MRI to evaluate for presence of abscess          - Xray equivocal, no pain on exam. Will defer for now  -- Given RLE swelling: follow up BLE U/S         - US negative.       CAD (coronary artery disease)    Multi vessel CAD. Recent cath with stent placement 05/27       The Prox RCA lesion was 99% stenosed with 0% stenosis post-intervention.    The RPDA lesion was 100% stenosed.    Prox RCA lesion: A .    Prox RCA lesion: A STENT SYNERGY XD [id number] stent was not successfully placed.    Prox RCA lesion: A .    Prox RCA lesion: A [place] stent was successfully placed.    Prox RCA lesion: A .    Prox RCA lesion: A .    The estimated blood loss was none.     -- Continue ASA / Plavix     S/P colostomy    -- Secondary to bowel perforation       PVD (peripheral vascular disease)    Known PVD    - Continue Aspirin      VTE Risk Mitigation (From admission, onward)           Ordered     enoxaparin injection 40 mg  Every 24 hours         07/28/24 1130     IP VTE HIGH RISK PATIENT  Once         07/27/24 0229     Place sequential compression device  Until discontinued         07/27/24 0229                    Discharge Planning   FLORIAN: 8/2/2024     Code Status: Full Code   Is the patient medically ready for discharge?:     Reason for patient still in hospital (select all that apply): Treatment  Discharge Plan A: Home with family, Home Health                  Solomon Brown MD  Department of Hospital Medicine   [facility] ([place])

## 2024-07-30 NOTE — CONSULTS
"CONSULTATION LIAISON PSYCHIATRY INITIAL EVALUATION    Patient Name: Radha Sotelo  MRN: [id number]  Patient Class: IP- Inpatient  Admission Date: 7/27/2024  Attending Physician: Solomon Brown MD      HPI:   Radha Sotelo is a 79 y.o. female with no past psychiatric history & past pertinent medical history of COPD on home oxygen, HTN, HLD, CAD, PVD s/p partial right foot amputation w/ chronic right leg swelling presents to the ED/admitted to the hospital for Acute on chronic respiratory failure with hypoxia and hypercapnia     Psychiatry consulted for "encephalopathy/restraints/cognitive dysfunction ? "    Per chart review: No PRNs overnight. Last PRN medication for agitaiton rec'd 7/28 2148    Per night psychiatry resident: On psych exam, patient initially irritable and minimally cooperative w/ paranoid behavior. Hard of hearing, and did not want to turn the TV volume down. She is alert and oriented to person, place, and situation. She asked why me and the respiratory therapist came through the window, while frantically looking over us trying to get a view of the door. When the RT asked to scan her wristband, she stated it was "all melted" and she refused her breathing treatment because "there was nothing to hook it to", even after RT explained it was a mask that would go over her face. After RT left room, she asked me to stand at the foot of the bed so she can make sure no one is coming in her room. She denies prior psych history and answers most of my questions with questions. Informed patient that the psychiatry team would be back in the morning to speak with her, to which she pleasantly smiled and told me to have a good night.      On psych exam this AM:  Today, pt calm and cooperative during interview. Alert and oriented x4. No paranoia noted during this mornings interview. Brought up the issues last night and she shows me the bruises on her arms from multiple IV sticks and states "wouldn't you " "feel that way?" States she feels safe in the hospital and does not feel that anyone is trying to harm her. She does recall being confused overnight but states "I think it was all the medication. I do not feel that way right now." CAM ICU currently negative.     Denies SI/HI/AVH.    Per RN, pt has been calm, cooperative, and pleasant during her shift. No issues today. Does report being told pt was irritable and not pleasant overnight.    Collateral with patient's permission:   None obtained at this time     Medical Review of Systems:  Pertinent items are noted in HPI.    Psychiatric Review of Systems (is patient experiencing or having changes in):  sleep: no  appetite: no  weight: no  energy/anergy: no  interest/pleasure/anhedonia: no  somatic symptoms: no  libido: no  anxiety/panic: no  guilty/hopelessness: no  concentration: no  Tamera:no  Psychosis: no  Trauma: no  S.I.B.s/risky behavior: no    Past Psychiatric History:  Previous Medication Trials: no  Previous Psychiatric Hospitalizations:no   Previous Suicide Attempts: no  History of Violence: no  Outpatient Psychiatrist: no  Family Psychiatric History: no    Substance Abuse History (with emphasis over the last 12 months):  Recreational Drugs:  denies   Use of Alcohol: denied, chart review suggest history of heavy alcohol use in the past   Tobacco Use:yes, 1/2 ppd  Rehab History:no    Social History:  Marital Status:   Children: 6  Employment Status/Info:  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."  :no  Education: 11th grade  Special Ed: no  Housing Status: lives with granddaughter   Access to gun: no  Psychosocial Stressors: health  Functioning Relationships: good support system    Legal History:  Past Charges/Incarcerations: no  Pending charges:no    Mental Status Exam:  General Appearance: appears stated age, well developed and nourished, adequately groomed and appropriately dressed, in no acute distress  Behavior: " "normal; cooperative; reasonably friendly, pleasant, and polite; appropriate eye-contact; under good behavioral control  Involuntary Movements and Motor Activity: no abnormal involuntary movements noted; no tics, no tremors, no akathisia, no dystonia, no evidence of tardive dyskinesia; no psychomotor agitation or retardation  Gait and Station: unable to assess - patient lying down or seated  Speech and Language: intact; normal rate, rhythm, volume, tone, and pitch; conversational, spontaneous, and coherent; speaks and understands English proficiently and fluently; repeats words and phrases, no word finding difficulties are noted  Mood: "great"  Affect: normal, euthymic, reactive, full-range, mood-congruent, appropriate to situation and context  Thought Process and Associations: intact; linear, goal-directed, organized, and logical; no loosening of associations noted  Thought Content and Perceptions:: no suicidal or homicidal ideation, no auditory or visual hallucinations, no paranoid ideation, no ideas of reference, no evidence of delusions or psychosis  Sensorium and Orientation: intact; alert with clear sensorium; oriented fully to person, place, time and situation  Recent and Remote Memory: grossly intact, able to recall relevant and salient information from the recent and remote past  Attention and Concentration: grossly intact, attentive to the conversation and not readily distractible, able to spell WORLD forwards and backwards  Fund of Knowledge: grossly intact, used appropriate vocabulary and demonstrated an awareness of current events, consistent with educational level achieved, correctly identifies the President and   Insight: intact, demonstrates awareness of illness and situation  Judgment: intact, behavior is adequate/appropriate to the circumstances, compliant with health provider's recommendations and instructions    CAM ICU positive? no      ASSESSMENT & " RECOMMENDATIONS   Delirium     DELIRIUM  DELIRIUM BEHAVIOR MANAGEMENT  PLEASE utilize CHEMICAL restraints with PRN meds first for agitation. Minimize use of PHYSICAL restraints OR have periods of being out of physical restraints if possible.  Keep window shades open and room lit during day and room dim at night in order to promote normal sleep-wake cycles  Encourage family at bedside. [place] patient often to situation, location, date.  Continue to Limit or Discontinue use of Narcotics, Benzos and Anti-cholinergic medications as they may worsen delirium.  Continue medical workup for causative etiology of Delirium.   Can use 2.5mg Zyprexa PO/IM Q6H PRN for any non redirectable agitation. Monitor Qtc closely if repeated doses are needed. 7/27- 461    RISK ASSESSMENT  NO NEED FOR PEC patient NOT in any imminent danger of hurting self or others and not gravely disabled.     FOLLOW UP  Will sign off. Please re-consult if any new issues arise    DISPOSITION - once medically cleared:   Defer to medical team    Please contact ON CALL psychiatry service (24/7) for any acute issues that may arise.    Bhupinder Rodrigues, MICHAEL   Psychiatry  [facility]  7/30/2024 11:15 AM        --------------------------------------------------------------------------------------------------------------------------------------------------------------------------------------------------------------------------------------    CONTINUED HISTORY & OBJECTIVE clinical data & findings reviewed and noted for above decision making    Past Medical/Surgical History:   Past Medical History:   Diagnosis Date    Anemia     Anxiety     COPD (chronic obstructive pulmonary disease)     COPD (chronic obstructive pulmonary disease) with emphysema     Coronary artery disease     Depression     Diverticulitis     Hyperlipidemia     Hypertension     PVD (peripheral vascular disease)     PVD (peripheral vascular disease)     S/P colostomy      Substance abuse     hx heavy etoh use     Tobacco abuse      Past Surgical History:   Procedure Laterality Date    ANGIOGRAM, CORONARY, WITH LEFT HEART CATHETERIZATION N/A 11/22/2023    Procedure: Angiogram, Coronary, with Left Heart Cath;  Surgeon: Checo Bhatt MD;  Location: [facility] CATH LAB;  Service: Cardiology;  Laterality: N/A;    ANGIOGRAM, CORONARY, WITH LEFT HEART CATHETERIZATION N/A 5/27/2024    Procedure: Angiogram, Coronary, with Left Heart Cath;  Surgeon: Karel Mack MD;  Location: [facility] CATH LAB;  Service: Cardiology;  Laterality: N/A;    ANGIOGRAM, EXTREMITY, UNILATERAL Right 8/25/2023    Procedure: ANGIOGRAM, EXTREMITY, UNILATERAL;  Surgeon: Gary Rico MD;  Location: [facility] OR [address] [place] [address];  Service: Vascular;  Laterality: Right;  US GUIDED ACCESS LEFT GROIN  contrast: 150ml  fluoro: 27.9 min  mGy: 254.73  Gycm2: 62.4919  radial flush cocktail: 10ml    ANGIOGRAPHY OF LOWER EXTREMITY Right 8/24/2023    Procedure: Angiogram Extremity Unilateral;  Surgeon: Benji Ashley MD;  Location: [address];  Service: Vascular;  Laterality: Right;    COLOSTOMY      ECTOPIC PREGNANCY SURGERY      PTA, PERONEAL Right 8/25/2023    Procedure: PTA, PERONEAL TIBIAL TRUNK WITH SHOCKWAVE;  Surgeon: Gary Rico MD;  [address];  Service: Vascular;  Laterality: Right;    PTCA, SINGLE VESSEL  11/22/2023    Procedure: PTCA, Single Vessel;  Surgeon: Checo Bhatt MD;  Location: [facility] CATH LAB;  Service: Cardiology;;    right forefoot amputation      right toe amputation      x2 toes    STENT, DRUG ELUTING, SINGLE VESSEL, CORONARY  11/22/2023    Procedure: Stent, Drug Eluting, Single Vessel, Coronary;  Surgeon: Checo Bhatt MD;  Location: [facility] CATH LAB;  Service: Cardiology;;    STENT, DRUG ELUTING, SINGLE VESSEL, CORONARY  5/27/2024    Procedure: Stent, Drug Eluting, Single Vessel, Coronary;  Surgeon: aKrel Mack MD;  Location: [facility] CATH LAB;   Service: Cardiology;;    STENT, SUPERFICIAL FEMORAL ARTERY Right 8/25/2023    Procedure: STENT, SUPERFICIAL FEMORAL ARTERY;  Surgeon: Gary Rico MD;  Location: [facility] OR [address] [place] [address];  Service: Vascular;  Laterality: Right;  VIABAHN 6 X 15 X120       Current Medications:   Scheduled Meds:    albuterol-ipratropium  3 mL Nebulization Q4H    aspirin  81 mg Oral Daily    atorvastatin  80 mg Oral Daily    clopidogreL  75 mg Oral Daily    enoxparin  40 mg Subcutaneous Q24H (prophylaxis, 1700)    methylPREDNISolone injection (PEDS and ADULTS)  40 mg Intravenous Daily    metoprolol succinate  25 mg Oral Daily    piperacillin-tazobactam (Zosyn) IV (PEDS and ADULTS) (extended infusion is not appropriate)  4.5 g Intravenous Q8H    polyethylene glycol  17 g Oral Daily    thiamine (B-1) 500 mg in D5W 100 mL IVPB  500 mg Intravenous TID     PRN Meds:   Current Facility-Administered Medications:     0.9% NaCl, , Intravenous, PRN    0.9% NaCl, , Intravenous, PRN    albuterol sulfate, 2.5 mg, Nebulization, Q6H PRN    dextrose 10%, 12.5 g, Intravenous, PRN    dextrose 10%, 25 g, Intravenous, PRN    [COMPLETED] dextrose 10%, 50 g, Intravenous, Once **AND** dextrose 10%, 25 g, Intravenous, PRN **AND** [COMPLETED] insulin regular, 10 Units, Intravenous, Once    glucagon (human recombinant), 1 mg, Intramuscular, PRN    insulin aspart U-100, 0-5 Units, Subcutaneous, Q6H PRN    OLANZapine, 2.5 mg, Intramuscular, Q6H PRN    senna, 8.6 mg, Oral, Daily PRN    sodium chloride 0.9%, 10 mL, Intravenous, PRN    Allergies:   Review of patient's allergies indicates:  No Known Allergies    Vitals  Vitals:    07/30/24 1103   BP:    Pulse: 74   Resp:    Temp:        Labs/Imaging/Studies:  Recent Results (from the past 24 hour(s))   POCT glucose    Collection Time: 07/29/24  8:00 PM   Result Value Ref Range    POCT Glucose 95 70 - 110 mg/dL   Comprehensive metabolic panel    Collection Time: 07/30/24  5:05 AM   Result Value Ref Range     Sodium 138 136 - 145 mmol/L    Potassium 5.6 (H) 3.5 - 5.1 mmol/L    Chloride 104 95 - 110 mmol/L    CO2 21 (L) 23 - 29 mmol/L    Glucose 85 70 - 110 mg/dL    BUN 25 (H) 8 - 23 mg/dL    Creatinine 0.9 0.5 - 1.4 mg/dL    Calcium 8.8 8.7 - 10.5 mg/dL    Total Protein 7.0 6.0 - 8.4 g/dL    Albumin 3.6 3.5 - 5.2 g/dL    Total Bilirubin 0.9 0.1 - 1.0 mg/dL    Alkaline Phosphatase 87 55 - 135 U/L    AST 57 (H) 10 - 40 U/L    ALT 12 10 - 44 U/L    eGFR >60.0 >60 mL/min/1.73 m^2    Anion Gap 13 8 - 16 mmol/L   Magnesium    Collection Time: 07/30/24  5:05 AM   Result Value Ref Range    Magnesium 2.5 1.6 - 2.6 mg/dL   Phosphorus    Collection Time: 07/30/24  5:05 AM   Result Value Ref Range    Phosphorus 3.7 2.7 - 4.5 mg/dL   CBC auto differential    Collection Time: 07/30/24  5:05 AM   Result Value Ref Range    WBC 13.37 (H) 3.90 - 12.70 K/uL    RBC 4.34 4.00 - 5.40 M/uL    Hemoglobin 12.7 12.0 - 16.0 g/dL    Hematocrit 39.8 37.0 - 48.5 %    MCV 92 82 - 98 fL    MCH 29.3 27.0 - 31.0 pg    MCHC 31.9 (L) 32.0 - 36.0 g/dL    RDW 13.4 11.5 - 14.5 %    Platelets 141 (L) 150 - 450 K/uL    MPV 11.1 9.2 - 12.9 fL    Immature Granulocytes 0.5 0.0 - 0.5 %    Gran # (ANC) 11.0 (H) 1.8 - 7.7 K/uL    Immature Grans (Abs) 0.07 (H) 0.00 - 0.04 K/uL    Lymph # 0.9 (L) 1.0 - 4.8 K/uL    Mono # 1.3 (H) 0.3 - 1.0 K/uL    Eos # 0.1 0.0 - 0.5 K/uL    Baso # 0.05 0.00 - 0.20 K/uL    nRBC 0 0 /100 WBC    Gran % 82.1 (H) 38.0 - 73.0 %    Lymph % 7.0 (L) 18.0 - 48.0 %    Mono % 9.6 4.0 - 15.0 %    Eosinophil % 0.4 0.0 - 8.0 %    Basophil % 0.4 0.0 - 1.9 %    Differential Method Automated    POCT glucose    Collection Time: 07/30/24  6:36 AM   Result Value Ref Range    POCT Glucose 92 70 - 110 mg/dL     Imaging Results              US Lower Extremity Veins Bilateral (Final result)  Result time 07/28/24 03:36:44      Final result by James Salmeron MD (07/28/24 03:36:44)                   Impression:      No evidence of deep venous thrombosis in  either lower extremity.      Electronically signed by: James Salmeron  Date:    07/28/2024  Time:    03:36               Narrative:    EXAMINATION:  US LOWER EXTREMITY VEINS BILATERAL    CLINICAL HISTORY:  Other specified soft tissue disorders    TECHNIQUE:  Duplex and color flow Doppler and dynamic compression was performed of the bilateral lower extremity veins was performed.    COMPARISON:  Left lower extremity venous ultrasound July 14, 2024    FINDINGS:  Right thigh veins: The common femoral, femoral, popliteal, upper greater saphenous, and deep femoral veins are patent and free of thrombus. The veins are normally compressible and have normal phasic flow and augmentation response.    Right calf veins: The visualized calf veins are patent.    Left thigh veins: The common femoral, femoral, popliteal, upper greater saphenous, and deep femoral veins are patent and free of thrombus. The veins are normally compressible and have normal phasic flow and augmentation response.    Left calf veins: The visualized calf veins are patent.    Miscellaneous: None                                       CT Head Without Contrast (Final result)  Result time 07/27/24 06:50:09      Final result by Diaz Ku MD (07/27/24 06:50:09)                   Impression:      No acute intracranial abnormality.    Electronically signed by resident: Stacey Lomax  Date:    07/27/2024  Time:    06:09    Electronically signed by: Diaz Ku  Date:    07/27/2024  Time:    06:50               Narrative:    EXAMINATION:  CT HEAD WITHOUT CONTRAST    CLINICAL HISTORY:  Mental status change, unknown cause;    TECHNIQUE:  Low dose axial CT images obtained throughout the head without intravenous contrast. Sagittal and coronal reconstructions were performed.    COMPARISON:  CT head 09/04/2012    FINDINGS:  The ventricles, cisterns and sulci are within normal limits without evidence of hydrocephalus. No extra-axial blood or fluid collections.    The  brain parenchyma demonstrates no parenchymal mass effect, edema, acute hemorrhage or acute major vascular distribution infarct.  Left cerebellar small hyperdensity (2-9) likely due to artifact.  Mild patchy periventricular white matter hypodensity likely representing remote microvascular infarcts.    Skull/extracranial contents (limited evaluation): No displaced calvarial fracture. Moderate right and mild left mastoid air cell opacification possibly on the basis of effusion and/or mucosal thickening.                                        X-Ray Foot 2 View Right (Final result)  Result time 07/27/24 08:06:46      Final result by Yoseph Fishman MD (07/27/24 08:06:46)                   Impression:      Prior transmetatarsal amputation of all 5 toes.  Slightly increased cortical regularity at the osteotomy sites could represent evolving postoperative changes or possibly superimposed infection.    There is some soft tissue swelling about the osteotomy sites and in the right foot and lower leg, however the degree of swelling has diminished from 08/18/2023.    Recommendations include clinical correlation and continued follow-up imaging.    This report was flagged in Epic as abnormal.      Electronically signed by: Yoseph Fishman  Date:    07/27/2024  Time:    08:06               Narrative:    EXAMINATION:  XR FOOT 2 VIEW RIGHT    CLINICAL HISTORY:  evaluation for osteomyelitis in setting of chronic wound and pain;    TECHNIQUE:  AP and lateral radiographs of the right foot    COMPARISON:  Right foot series 08/18/2023    FINDINGS:  Prior transmetatarsal amputation of all 5 toes.    Allowing for differences in radiographic technique and patient positioning, some of the osteotomy sites appear slightly more irregular than in 2023.  Uncertain if this represents evolving postoperative changes, or superimposed osseous infection    Osteopenia.    No acute fracture deformity or dislocation.    There remains some soft tissue  swelling in the right lower leg and right foot, including over the amputation sites, but overall the swelling is diminished from the comparison radiograph.    No new radiopaque foreign bodies or soft tissue emphysema apparent.    No acute dislocation.                                       X-Ray Chest 1 View (Final result)  Result time 07/27/24 01:44:09      Final result by James Salmeron MD (07/27/24 01:44:09)                   Impression:      Radiographic findings as above.      Electronically signed by: James Salmeron  Date:    07/27/2024  Time:    01:44               Narrative:    EXAMINATION:  XR CHEST 1 VIEW    CLINICAL HISTORY:  Encephalopathy, unspecified    TECHNIQUE:  Single frontal view of the chest was performed.    COMPARISON:  Chest radiograph July 14, 2024    FINDINGS:  The patient is rotated, when accounting for position and technique and depth of inspiration the cardiomediastinal silhouette appears stable.  Aortic atherosclerotic change noted.    Chronic appearing lung changes are noted.  There is mild parenchymal change at the left lung base that may relate to atelectatic change, the possibly of a minimal amount of pleural fluid is considered.  There is no large pleural effusion.  There is no evidence for pneumothorax.    The osseous structures demonstrate chronic change.

## 2024-07-30 NOTE — PLAN OF CARE
Problem: Infection  Goal: Absence of Infection Signs and Symptoms  Outcome: Progressing  Intervention: Prevent or Manage Infection  Flowsheets (Taken 7/30/2024 0244)  Fever Reduction/Comfort Measures: lightweight bedding     Problem: Adult Inpatient Plan of Care  Goal: Plan of Care Review  Outcome: Progressing  Flowsheets (Taken 7/30/2024 0244)  Plan of Care Reviewed With: patient  Goal: Patient-Specific Goal (Individualized)  Outcome: Progressing  Goal: Absence of Hospital-Acquired Illness or Injury  Outcome: Progressing  Goal: Optimal Comfort and Wellbeing  Outcome: Progressing  Intervention: Provide Person-Centered Care  Flowsheets (Taken 7/30/2024 0244)  Trust Relationship/Rapport:   care explained   empathic listening provided  Goal: Readiness for Transition of Care  Outcome: Progressing     Problem: Acute Kidney Injury/Impairment  Goal: Fluid and Electrolyte Balance  Outcome: Progressing  Goal: Improved Oral Intake  Outcome: Unable to Meet  Intervention: Promote and Optimize Oral Intake  Flowsheets (Taken 7/30/2024 0244)  Nutrition Interventions: (Patient is NPO) other (see comments)  Goal: Effective Renal Function  Outcome: Progressing     Problem: Wound  Goal: Optimal Coping  Outcome: Progressing  Goal: Optimal Functional Ability  Outcome: Progressing  Goal: Absence of Infection Signs and Symptoms  Outcome: Progressing  Goal: Improved Oral Intake  Outcome: Unable to Meet  Goal: Optimal Pain Control and Function  Outcome: Progressing  Goal: Skin Health and Integrity  Outcome: Progressing  Intervention: Optimize Skin Protection  Flowsheets (Taken 7/30/2024 0244)  Pressure Reduction Techniques: frequent weight shift encouraged  Activity Management: Rolling - L1  Head of Bed (HOB) Positioning: HOB at 20-30 degrees  Goal: Optimal Wound Healing  Outcome: Progressing     Problem: Fall Injury Risk  Goal: Absence of Fall and Fall-Related Injury  Outcome: Progressing  Intervention: Promote Injury-Free  Environment  Flowsheets (Taken 7/30/2024 9213)  Safety Promotion/Fall Prevention:   bed alarm set   Fall Risk reviewed with patient/family     Problem: Skin Injury Risk Increased  Goal: Skin Health and Integrity  Outcome: Progressing     Problem: Restraint, Nonviolent  Goal: Absence of Harm or Injury  Outcome: Met

## 2024-07-31 VITALS
WEIGHT: 126.75 LBS | RESPIRATION RATE: 19 BRPM | HEIGHT: 60 IN | HEART RATE: 87 BPM | OXYGEN SATURATION: 98 % | DIASTOLIC BLOOD PRESSURE: 65 MMHG | TEMPERATURE: 99 F | BODY MASS INDEX: 24.88 KG/M2 | SYSTOLIC BLOOD PRESSURE: 143 MMHG

## 2024-07-31 LAB
ALBUMIN SERPL BCP-MCNC: 3.3 G/DL (ref 3.5–5.2)
ALP SERPL-CCNC: 83 U/L (ref 55–135)
ALT SERPL W/O P-5'-P-CCNC: 11 U/L (ref 10–44)
ANION GAP SERPL CALC-SCNC: 9 MMOL/L (ref 8–16)
AST SERPL-CCNC: 37 U/L (ref 10–40)
BASOPHILS # BLD AUTO: 0.04 K/UL (ref 0–0.2)
BASOPHILS NFR BLD: 0.4 % (ref 0–1.9)
BILIRUB SERPL-MCNC: 0.5 MG/DL (ref 0.1–1)
BUN SERPL-MCNC: 18 MG/DL (ref 8–23)
CALCIUM SERPL-MCNC: 8.6 MG/DL (ref 8.7–10.5)
CHLORIDE SERPL-SCNC: 103 MMOL/L (ref 95–110)
CO2 SERPL-SCNC: 28 MMOL/L (ref 23–29)
CREAT SERPL-MCNC: 0.9 MG/DL (ref 0.5–1.4)
DIFFERENTIAL METHOD BLD: ABNORMAL
EOSINOPHIL # BLD AUTO: 0.1 K/UL (ref 0–0.5)
EOSINOPHIL NFR BLD: 1.5 % (ref 0–8)
ERYTHROCYTE [DISTWIDTH] IN BLOOD BY AUTOMATED COUNT: 13.5 % (ref 11.5–14.5)
EST. GFR  (NO RACE VARIABLE): >60 ML/MIN/1.73 M^2
GLUCOSE SERPL-MCNC: 95 MG/DL (ref 70–110)
HCT VFR BLD AUTO: 37.9 % (ref 37–48.5)
HGB BLD-MCNC: 11.1 G/DL (ref 12–16)
IMM GRANULOCYTES # BLD AUTO: 0.05 K/UL (ref 0–0.04)
IMM GRANULOCYTES NFR BLD AUTO: 0.6 % (ref 0–0.5)
LYMPHOCYTES # BLD AUTO: 1.1 K/UL (ref 1–4.8)
LYMPHOCYTES NFR BLD: 12 % (ref 18–48)
MAGNESIUM SERPL-MCNC: 2 MG/DL (ref 1.6–2.6)
MCH RBC QN AUTO: 28.8 PG (ref 27–31)
MCHC RBC AUTO-ENTMCNC: 29.3 G/DL (ref 32–36)
MCV RBC AUTO: 98 FL (ref 82–98)
MONOCYTES # BLD AUTO: 0.8 K/UL (ref 0.3–1)
MONOCYTES NFR BLD: 9.4 % (ref 4–15)
NEUTROPHILS # BLD AUTO: 6.8 K/UL (ref 1.8–7.7)
NEUTROPHILS NFR BLD: 76.1 % (ref 38–73)
NRBC BLD-RTO: 0 /100 WBC
PHOSPHATE SERPL-MCNC: 3.7 MG/DL (ref 2.7–4.5)
PLATELET # BLD AUTO: 181 K/UL (ref 150–450)
PMV BLD AUTO: 10.5 FL (ref 9.2–12.9)
POCT GLUCOSE: 106 MG/DL (ref 70–110)
POCT GLUCOSE: 144 MG/DL (ref 70–110)
POTASSIUM SERPL-SCNC: 3.6 MMOL/L (ref 3.5–5.1)
POTASSIUM SERPL-SCNC: 3.9 MMOL/L (ref 3.5–5.1)
PROT SERPL-MCNC: 6.3 G/DL (ref 6–8.4)
RBC # BLD AUTO: 3.85 M/UL (ref 4–5.4)
SODIUM SERPL-SCNC: 140 MMOL/L (ref 136–145)
WBC # BLD AUTO: 8.93 K/UL (ref 3.9–12.7)

## 2024-07-31 PROCEDURE — 25000003 PHARM REV CODE 250

## 2024-07-31 PROCEDURE — 83735 ASSAY OF MAGNESIUM: CPT | Performed by: NURSE PRACTITIONER

## 2024-07-31 PROCEDURE — 25000003 PHARM REV CODE 250: Performed by: STUDENT IN AN ORGANIZED HEALTH CARE EDUCATION/TRAINING PROGRAM

## 2024-07-31 PROCEDURE — 36415 COLL VENOUS BLD VENIPUNCTURE: CPT | Performed by: HOSPITALIST

## 2024-07-31 PROCEDURE — 99900035 HC TECH TIME PER 15 MIN (STAT)

## 2024-07-31 PROCEDURE — 25000003 PHARM REV CODE 250: Performed by: HOSPITALIST

## 2024-07-31 PROCEDURE — 63600175 PHARM REV CODE 636 W HCPCS

## 2024-07-31 PROCEDURE — 84132 ASSAY OF SERUM POTASSIUM: CPT | Performed by: HOSPITALIST

## 2024-07-31 PROCEDURE — 25000003 PHARM REV CODE 250: Performed by: NURSE PRACTITIONER

## 2024-07-31 PROCEDURE — 84100 ASSAY OF PHOSPHORUS: CPT | Performed by: NURSE PRACTITIONER

## 2024-07-31 PROCEDURE — 27000221 HC OXYGEN, UP TO 24 HOURS

## 2024-07-31 PROCEDURE — 63600175 PHARM REV CODE 636 W HCPCS: Performed by: HOSPITALIST

## 2024-07-31 PROCEDURE — 85025 COMPLETE CBC W/AUTO DIFF WBC: CPT | Performed by: NURSE PRACTITIONER

## 2024-07-31 PROCEDURE — 94640 AIRWAY INHALATION TREATMENT: CPT

## 2024-07-31 PROCEDURE — 25000242 PHARM REV CODE 250 ALT 637 W/ HCPCS: Performed by: STUDENT IN AN ORGANIZED HEALTH CARE EDUCATION/TRAINING PROGRAM

## 2024-07-31 PROCEDURE — 63600175 PHARM REV CODE 636 W HCPCS: Performed by: NURSE PRACTITIONER

## 2024-07-31 PROCEDURE — 80053 COMPREHEN METABOLIC PANEL: CPT | Performed by: NURSE PRACTITIONER

## 2024-07-31 PROCEDURE — 36415 COLL VENOUS BLD VENIPUNCTURE: CPT | Performed by: NURSE PRACTITIONER

## 2024-07-31 PROCEDURE — 94761 N-INVAS EAR/PLS OXIMETRY MLT: CPT

## 2024-07-31 PROCEDURE — 92526 ORAL FUNCTION THERAPY: CPT

## 2024-07-31 RX ORDER — OXYCODONE AND ACETAMINOPHEN 5; 325 MG/1; MG/1
1 TABLET ORAL ONCE
Status: COMPLETED | OUTPATIENT
Start: 2024-07-31 | End: 2024-07-31

## 2024-07-31 RX ORDER — ACETAMINOPHEN 500 MG
1000 TABLET ORAL ONCE
Status: COMPLETED | OUTPATIENT
Start: 2024-07-31 | End: 2024-07-31

## 2024-07-31 RX ORDER — PREDNISONE 20 MG/1
40 TABLET ORAL DAILY
Qty: 2 TABLET | Refills: 0 | Status: SHIPPED | OUTPATIENT
Start: 2024-08-01 | End: 2024-08-02

## 2024-07-31 RX ADMIN — ATORVASTATIN CALCIUM 80 MG: 40 TABLET, FILM COATED ORAL at 09:07

## 2024-07-31 RX ADMIN — CLOPIDOGREL BISULFATE 75 MG: 75 TABLET ORAL at 09:07

## 2024-07-31 RX ADMIN — THIAMINE HYDROCHLORIDE 500 MG: 100 INJECTION, SOLUTION INTRAMUSCULAR; INTRAVENOUS at 10:07

## 2024-07-31 RX ADMIN — ASPIRIN 81 MG: 81 TABLET, COATED ORAL at 09:07

## 2024-07-31 RX ADMIN — OXYCODONE HYDROCHLORIDE AND ACETAMINOPHEN 1 TABLET: 5; 325 TABLET ORAL at 05:07

## 2024-07-31 RX ADMIN — GABAPENTIN 300 MG: 300 CAPSULE ORAL at 09:07

## 2024-07-31 RX ADMIN — IPRATROPIUM BROMIDE AND ALBUTEROL SULFATE 3 ML: 2.5; .5 SOLUTION RESPIRATORY (INHALATION) at 12:07

## 2024-07-31 RX ADMIN — PIPERACILLIN SODIUM AND TAZOBACTAM SODIUM 4.5 G: 4; .5 INJECTION, POWDER, FOR SOLUTION INTRAVENOUS at 10:07

## 2024-07-31 RX ADMIN — IPRATROPIUM BROMIDE AND ALBUTEROL SULFATE 3 ML: 2.5; .5 SOLUTION RESPIRATORY (INHALATION) at 01:07

## 2024-07-31 RX ADMIN — ACETAMINOPHEN 1000 MG: 500 TABLET ORAL at 01:07

## 2024-07-31 RX ADMIN — METOPROLOL SUCCINATE 25 MG: 25 TABLET, EXTENDED RELEASE ORAL at 09:07

## 2024-07-31 RX ADMIN — PIPERACILLIN SODIUM AND TAZOBACTAM SODIUM 4.5 G: 4; .5 INJECTION, POWDER, FOR SOLUTION INTRAVENOUS at 01:07

## 2024-07-31 RX ADMIN — PREDNISONE 40 MG: 20 TABLET ORAL at 09:07

## 2024-07-31 NOTE — PT/OT/SLP PROGRESS
"Speech Language Pathology Treatment/Discharge Summary    Patient Name:  Radha Sotelo   MRN:  [id number]  Admitting Diagnosis: Acute on chronic respiratory failure with hypoxia and hypercapnia    Recommendations:                 General Recommendations:  Follow-up not indicated  Diet recommendations:  Regular Diet - IDDSI Level 7, Liquid Diet Level: Thin liquids - IDDSI Level 0   Aspiration Precautions: 1 bite/sip at a time, Eliminate distractions, Feed only when awake/alert, and Meds whole 1 at a time   General Precautions: Standard, aspiration, fall  Communication strategies:  none    Assessment:     Radha Sotelo is a 79 y.o. female who presents with a functional swallow for PO intake of an unmodified diet. No additional skilled speech services required at this time.      Subjective     Pt found resting in bed upon SLP entry into room. Pt agreeable to participate in all aspects of session.      Patient goals: "They told me I might get out of here today"      Pain/Comfort:  Pain Rating 1: 0/10    Respiratory Status: Nasal cannula, flow 3 L/min    Objective:     Has the patient been evaluated by SLP for swallowing?   Yes  Keep patient NPO? No   Current Respiratory Status:        Pt seen for ongoing dysphagia therapy. She continued to display excellent levels of alertness and ability to participate in oral trials this date. She endorsed no concerns regarding swallow function and reports good tolerance of current regular diet with thin liquids. Pt consumed self-regulated bites of art cracker and self-regulated straw and open cup sips of thin liquids without overt signs of airway compromise. SLP provided education regarding overall impressions, continuation of regular diet with thin liquids, safe swallow strategies, and ongoing SLP POC. Pt verbalized understanding and had no additional questions or concerns upon SLP exit. Whiteboard remained current. Pt left with bed in lowest locked position upon SLP exit. "     Goals:   Multidisciplinary Problems       SLP Goals       Not on file              Multidisciplinary Problems (Resolved)          Problem: SLP    Goal Priority Disciplines Outcome   SLP Goal   (Resolved)     SLP Met   Description: Speech Language Pathology Goals  Goals expected to be met by 8/11:    1. The pt will participate in an ongoing swallow evaluation.                                Plan:     Patient to be seen:  4 x/week   Plan of Care expires:  08/25/24  Plan of Care reviewed with:  patient   SLP Follow-Up:  No       Discharge recommendations:  No Therapy Indicated   Barriers to Discharge:  Level of Skilled Assistance Needed      Time Tracking:     SLP Treatment Date:   07/31/24  Speech Start Time:  0827  Speech Stop Time:  0834     Speech Total Time (min):  7 min    Billable Minutes: Treatment Swallowing Dysfunction 7      07/31/2024

## 2024-07-31 NOTE — PLAN OF CARE
7/30  PM  Daily wound care ordered RT. Anterior foot  wound paint with betadine daily leave open to air, cover DTI to heel with mepilex, last K+ 3.6 DC'd BMP, K+ q 4 hrs. Order.,  Pt. C/O pain to rt. Lower leg and wounds rated a 9 if it moves or touches anything - notified On call ordered percocet sony pt. Will need PRN pain management. Pt.'s only form of pain management has been Neurontin BID pt. States helps pain but needs something to take PRN, pain kept pt. Up all night.             Problem: Infection  Goal: Absence of Infection Signs and Symptoms  Outcome: Progressing     Problem: Adult Inpatient Plan of Care  Goal: Plan of Care Review  Outcome: Progressing  Goal: Patient-Specific Goal (Individualized)  Outcome: Progressing  Goal: Absence of Hospital-Acquired Illness or Injury  Outcome: Progressing  Goal: Optimal Comfort and Wellbeing  Outcome: Progressing  Goal: Readiness for Transition of Care  Outcome: Progressing     Problem: Acute Kidney Injury/Impairment  Goal: Fluid and Electrolyte Balance  Outcome: Progressing  Goal: Improved Oral Intake  Outcome: Progressing  Goal: Effective Renal Function  Outcome: Progressing     Problem: Wound  Goal: Optimal Coping  Outcome: Progressing  Goal: Optimal Functional Ability  Outcome: Progressing  Goal: Absence of Infection Signs and Symptoms  Outcome: Progressing  Goal: Improved Oral Intake  Outcome: Progressing  Goal: Optimal Pain Control and Function  Outcome: Progressing  Goal: Skin Health and Integrity  Outcome: Progressing  Goal: Optimal Wound Healing  Outcome: Progressing     Problem: Fall Injury Risk  Goal: Absence of Fall and Fall-Related Injury  Outcome: Progressing     Problem: Skin Injury Risk Increased  Goal: Skin Health and Integrity  Outcome: Progressing

## 2024-07-31 NOTE — DISCHARGE SUMMARY
"Martín Costa - Stepdown Flex ([place] [address])  [facility] Medicine  Discharge Summary      Patient Name: Radha Sotelo  MRN: [id number]  [facility]: [id number]  Patient Class: IP- Inpatient  Admission Date: 7/27/2024  Hospital Length of Stay: 4 days  Discharge Date and Time:  07/31/2024 8:06 AM  Attending Physician: Solomon Brown MD   Discharging Provider: Solomon Brown MD  Primary Care Provider: Kalyn Pemberton NP  Hospital Medicine Team: [facility] MED Q Solomon Brown MD  Primary Care Team: [facility] MED Q    HPI:     Mrs. Sotelo is a79 year old female with PMH notable for COPD on home oxygen, CAD, PVD, HTN, HLD, partial right foot amputation with chronic right leg swelling who presented to [facility] ED via EMS for shortness of breath and altered mental status. Per chart review, "patient's family stated that she at baseline is fully communicative but that all of a sudden she became less responsive.  EMS reports that she had a GCS of 12 with them in route to the hospital and that her communication ability was limited.  They subsequently placed her on CPAP in route to the hospital.  Further history limited given the patient's current mental status."     She was hypothermic to 94.5F and otherwise HDS. VBG trend in the ED 7.01/>130 --> 7.06/>130 --> 7.13/117. Ammonia elevated at 115. . In the emergency department she was given albuterol, Ipratropium, Methylpred, Mg, Vancomycin, and Zosyn.      Critical care medicine consulted for hypercapnic respiratory failure.        * No surgery found *      Hospital Course:     Pt received scheduled breathing treatments as well as steroids. Antibiotics continued for respiratory as well as skin coverage in setting of RLE cellulitis and COPD exacerbation. Continued on BiPAP with improvement in mentation and pCO2. Weaned to home 2L NC and started on nightly BiPAP.      Interval History/Significant Events: Patient agitated and suspicious upon interview. Not believing that " providers are who they say they are and stating that their credentials are false. Trying to hide areas to be examined from providers. Raised voice and asked provider to leave.     Collateral: Called daughter who states that over the past few months the patient has become more forgetful. Patient lives in an apartment and is mostly independent in her ADL's but has some help from grand children that live below her. During  before her acute episode that she is admitted for she was suspicious of her own grandson and yelled at him which daughter states is unusual behavior for her.     Patient received scheduled breathing treatments as well as steroids. Antibiotics continued for respiratory as well as skin coverage in setting of RLE cellulitis and COPD exacerbation. Continued on BiPAP with improvement in mentation and pCO2. Weaned to home 2L NC and started on nightly BiPAP. Patient with intermittent agitation requiring precedex and PRN Zyprexa. Family states this is not her baseline but she has been having forgetfulness over the last few months, acting intermittently suspicious towards staff. De-escalated to Zosyn only for antibiotics, received 3 days of azithromycin. On HFNC overnight, did not comply with BiPAP.            7/30 Transfer to hospital medicine .admitted to the MICU for COPD exacerbation. hypercapnic respiratory failure due to COPD exacerbation - improved with BiPAP with good response  stable on low flow oxygen. sats 95% on 2LNC. Continue bronchodilators, nebs, prednisone. NC @ 2L which is her baseline. Was put on precedex for agitation, but off now, still in wrist restraints. Not on pressors.  SLP eval - recs NPO/ noted gitation, hallucinations, confabulation - last ETOH 2 weeks ago. Thiamine started for possible wernicke's encephalopathy. vancomycin discontinued. continue zosyn . on bilateral UE restraints . Psychiatry consulted for encephalopathy/restraints/cognitive dysfunction - 2.5mg Zyprexa  PO/IM Q6H PRN for any non redirectable agitation  AAOX 3. off restraints since last night . Leucocytosis of 13. K of 5.6 . received D50 insulin and albuterol concentrate.sats 95% on 3LNC. podiatry consulted for cortical regularity at the osteotomy sites- right foot. could represent evolving postoperative changes or possibly superimposed infection. SLP recs regular diet   7/31 completed Zosyn.  Podiatry eval -  Wound at Right  medial aspect of TMA is painted with betadine and foot is wrapped with kerlix and ACE without compression.  WBAT Right  foot, no restrictions          Goals of Care Treatment Preferences:  Code Status: Full Code          What is most important right now is to focus on extending life as long as possible, even it it means sacrificing quality.  Accordingly, we have decided that the best plan to meet the patient's goals includes continuing with treatment.      Consults:   Consults (From admission, onward)          Status Ordering Provider     Inpatient consult to Podiatry  Once        Provider:  (Not yet assigned)    Completed EVELYN MENDOSA     Inpatient consult to Psychiatry  Once        Provider:  (Not yet assigned)    Completed EVELYN MENDOSA     Inpatient consult to Critical Care Medicine  Once        Provider:  (Not yet assigned)    Completed ANDRZEJ MICHELLE                Assessment/Plan:      * Acute on chronic respiratory failure with hypoxia and hypercapnia     Patient with Hypercapnic and Hypoxic Respiratory failure which is Acute on chronic.  she is on home oxygen at 2 LPM. Supplemental oxygen was provided and noted- Oxygen Concentration (%):  [28] 28     Signs/symptoms of respiratory failure include- tachypnea and lethargy. Contributing diagnoses includes - CHF, COPD, and Obesity Hypoventilation Labs and images were reviewed. Patient Has recent ABG, which has been reviewed. Will treat underlying causes and adjust management of respiratory failure as follows-      79 year old  female with COPD, CAD, NSTEMI, HTN, and HLD who presented to [facility] ED via EMS for hypercapnic respiratory failure with initial CO2 > 130. She was placed on NIPPV.      7/28 pH has recovered to 7.44.   -- Given Methylpred, Mg, Albuterol, and Ipratropium in the ED  -- Continue NIPPV          - weaned to 2L NC. Bipap qhs and with naps  -- Wean FiO2 for SpO2 goal 88-92%   -- Follow up COVID / Flu swab          - negative  -- Follow up MRSA swab          - negative  -- Obtain sputum sample if possible  -- Continue broad spectrum abx, deescalated to Zosyn only   -- Continue prednisone   -- Continue       Patient received scheduled breathing treatments as well as steroids. Antibiotics continued for respiratory as well as skin coverage in setting of RLE cellulitis and COPD exacerbation. Continued on BiPAP with improvement in mentation and pCO2. Weaned to home 2L NC and started on nightly BiPAP. Patient with intermittent agitation requiring precedex and PRN Zyprexa. Family states this is not her baseline but she has been having forgetfulness over the last few months, acting intermittently suspicious towards staff. De-escalated to Zosyn only for antibiotics, received 3 days of azithromycin. On HFNC overnight, did not comply with BiPAP.             7/30 Transfer to hospital medicine .admitted to the MICU for COPD exacerbation. hypercapnic respiratory failure due to COPD exacerbation - improved with BiPAP with good response  stable on low flow oxygen. sats 95% on 2LNC. Continue bronchodilators, nebs, prednisone. NC @ 2L which is her baseline. vancomycin discontinued. continue zosyn.Leucocytosis of 13  7/31 completed Zosyn.       Chronic wound  7/31 .  Podiatry eval -  Wound at Right  medial aspect of TMA is painted with betadine and foot is wrapped with kerlix and ACE without compression.  WBAT Right  foot, no restrictions        Hyperkalemia           Recent Labs     07/28/24  0216 07/29/24  0411 07/30/24  0505   K 4.4 3.5 5.6*  "  7/30 K of 5.6 . received D50 insulin and albuterol concentrate      Anemia     Patient's with Normocytic anemia.. Hemoglobin stable. Etiology likely due to chronic disease .  Current CBC reviewed-          Recent Labs   Lab 07/28/24  0216 07/29/24  0524 07/30/24  0505   HGB 10.3* 9.7* 12.7                Component Value Date/Time     MCV 92 07/30/2024 0505     RDW 13.4 07/30/2024 0505     IRON 65 07/15/2021 0735     FERRITIN 135 07/15/2021 0735     RETIC 5.85 (H) 11/11/2012 0334     FOLATE 10.3 07/15/2021 0735     BTJNJYPO11 229 07/15/2021 0735      Monitor CBC and transfuse if H/H drops below 7/21.       Steroid-induced hyperglycemia  Patient's FSGs are controlled on current hypoglycemics.   Last A1c reviewed-         Lab Results   Component Value Date     HGBA1C 5.6 08/19/2023     HGBA1C 5.3 07/12/2021     HGBA1C 5.6 10/23/2012      Will hold PO hypoglycemics and will start correctional scale insulin  Most recent fingerstick glucose reviewed- No results for input(s): "POCTGLUCOSE" in the last 24 hours.  currently on   Antihyperglycemics (From admission, onward)        Start     Stop Route Frequency Ordered     07/29/24 1247   insulin aspart U-100 pen 0-5 Units         -- SubQ Every 6 hours PRN 07/29/24 1249                   Cellulitis of right leg  -- XR with no subcutaneous air  -- Likely cellulitis, continue broad spectrum Abx for now  -- consider further imaging if no clinical improvement   7/30 podiatry consulted for cortical regularity at the osteotomy sites- right foot. could represent evolving postoperative changes or possibly superimposed infection.        Encephalopathy, metabolic     -- CTH with NAF  -- TSH and FT4 unremarkable  -- Respiratory management as above   -- Avoid sedating agents   -- Ammonia elevated at 115, repeat 35  -- Delirium precautions      Family details worsening agitation, possible hallucinations at home. Worsened after she stopped drinking 2 weeks ago. Unclear if due to infection " vs dementia (frontotemporal/vascular), EtoH/wernicke's. Baseline patient as some support with ADL's but is mostly independent. Her agitation with staff and suspicion with is unusual behavior for her.          - appears to be confabulating on exam         - treatment dose IV thiamine         - improving with precedex wean, abx.      7/29 on bilateral UE restraints . Psychiatry consulted for encephalopathy/restraints/cognitive dysfunction   7/30 Psychiatry consulted for encephalopathy/restraints/cognitive dysfunction - 2.5mg Zyprexa PO/IM Q6H PRN for any non redirectable agitation  AAOX 3. off restraints since last night      Chronic diastolic heart failure  Most recent ECHO 05/26/24:     Left Ventricle: The left ventricle is normal in size. Ventricular mass is normal. Normal wall thickness. Normal wall motion. There is normal systolic function with a visually estimated ejection fraction of 60 - 65%. There is normal diastolic function.    Right Ventricle: Normal right ventricular cavity size. Wall thickness is normal. Systolic function is normal.    Left Atrium: Left atrium is mildly dilated.    Aortic Valve: There is moderate aortic valve sclerosis. There is annular calcification present. Mildly restricted motion. There is mild stenosis. Aortic valve area by VTI is 2.21 cm². Aortic valve peak velocity is 2.0 m/s. Mean gradient is 7 mmHg. The dimensionless index is 0.58. There is mild aortic regurgitation.    Pulmonary Artery: The estimated pulmonary artery systolic pressure is 20 mmHg.    IVC/SVC: Normal venous pressure at 3 mmHg.     --   -- PRN diuresis   -- Metoprolol   -- Continue telemetry         Partial nontraumatic amputation of right foot     Delayed wound healing  -- Wound care consulted   -- Follow up foot XRAYs. May need CT/MRI to evaluate for presence of abscess          - Xray equivocal, no pain on exam. Will defer for now  -- Given RLE swelling: follow up BLE U/S         - US negative.   7/30  podiatry consulted for cortical regularity at the osteotomy sites- right foot. could represent evolving postoperative changes or possibly superimposed infection.        HLD (hyperlipidemia)     -- Continue statin therapies. Atorvastatin 80 mg   -- CMP daily. LFTs stable.         Essential hypertension     > Has been maintaining her BP below goal  -- Continue Metoprolol   -- SBP < 180 goal         Chronic obstructive pulmonary disease     -- Plan per respiratory failure         COPD exacerbation     -- Plan per respiratory failure         Delayed wound healing     -- Wound care consulted   -- Follow up foot XRAYs. May need CT/MRI to evaluate for presence of abscess          - Xray equivocal, no pain on exam. Will defer for now  -- Given RLE swelling: follow up BLE U/S         - US negative.         CAD (coronary artery disease)     Multi vessel CAD. Recent cath with stent placement 05/27       The Prox RCA lesion was 99% stenosed with 0% stenosis post-intervention.    The RPDA lesion was 100% stenosed.    Prox RCA lesion: A .    Prox RCA lesion: A STENT SYNERGY XD [id number] stent was not successfully placed.    Prox RCA lesion: A .    Prox RCA lesion: A Husam Batson stent was successfully placed.    Prox RCA lesion: A .    Prox RCA lesion: A .    The estimated blood loss was none.     -- Continue ASA / Plavix      S/P colostomy     -- Secondary to bowel perforation        PVD (peripheral vascular disease)     Known PVD    - Continue Aspirin        Final Active Diagnoses:    Diagnosis Date Noted POA    PRINCIPAL PROBLEM:  Acute on chronic respiratory failure with hypoxia and hypercapnia [J96.21, J96.22] 03/01/2020 Yes    Hyperkalemia [E87.5] 07/30/2024 Unknown    Chronic wound [T14.8XXA] 07/30/2024 Yes     Chronic    Steroid-induced hyperglycemia [R73.9, T38.0X5A] 07/29/2024 Yes    Anemia [D64.9] 07/29/2024 Unknown    Encephalopathy, metabolic [G93.41] 07/27/2024 Yes    Cellulitis of right leg [L03.115] 07/27/2024  Unknown    Chronic diastolic heart failure [I50.32] 11/29/2023 Yes    Essential hypertension [I10] 07/12/2021 Yes    Chronic obstructive pulmonary disease [J44.9] 07/12/2021 Yes    HLD (hyperlipidemia) [E78.5] 07/12/2021 Yes    Partial nontraumatic amputation of right foot [Z89.431] 07/12/2021 Not Applicable    COPD exacerbation [J44.1]  Yes    Delayed wound healing [T14.8XXD] 04/24/2013 Yes    CAD (coronary artery disease) [I25.10] 03/22/2013 Yes    S/P colostomy [Z93.3] 12/21/2012 Not Applicable    PVD (peripheral vascular disease) [I73.9] 11/12/2012 Yes      Problems Resolved During this Admission:    Diagnosis Date Noted Date Resolved POA    CAD (coronary artery disease) [I25.10]  07/29/2024 Yes       Discharged Condition: fair    Disposition: home health     Follow Up:    Patient Instructions:      Ambulatory referral/consult to Podiatry   Standing Status: Future   Referral Priority: Routine Referral Type: Consultation   Referral Reason: Specialty Services Required   Requested Specialty: Podiatry   Number of Visits Requested: 1       Significant Diagnostic Studies: Labs: BMP:   Recent Labs   Lab 07/30/24  0505 07/30/24  1154 07/30/24  1602 07/30/24 2009 07/31/24  0006 07/31/24  0606   GLU 85   < > 163* 108  --  95      < > 136 136  --  140   K 5.6*   < > 3.5  3.5 3.8  3.8 3.6 3.9      < > 100 102  --  103   CO2 21*   < > 27 26  --  28   BUN 25*   < > 20 19  --  18   CREATININE 0.9   < > 0.9 0.8  --  0.9   CALCIUM 8.8   < > 8.7 8.6*  --  8.6*   MG 2.5  --   --   --   --  2.0    < > = values in this interval not displayed.   , CMP   Recent Labs   Lab 07/30/24  0505 07/30/24  1154 07/30/24  1602 07/30/24 2009 07/31/24  0006 07/31/24  0606      < > 136 136  --  140   K 5.6*   < > 3.5  3.5 3.8  3.8 3.6 3.9      < > 100 102  --  103   CO2 21*   < > 27 26  --  28   GLU 85   < > 163* 108  --  95   BUN 25*   < > 20 19  --  18   CREATININE 0.9   < > 0.9 0.8  --  0.9   CALCIUM 8.8   < > 8.7  "8.6*  --  8.6*   PROT 7.0  --   --   --   --  6.3   ALBUMIN 3.6  --   --   --   --  3.3*   BILITOT 0.9  --   --   --   --  0.5   ALKPHOS 87  --   --   --   --  83   AST 57*  --   --   --   --  37   ALT 12  --   --   --   --  11   ANIONGAP 13   < > 9 8  --  9    < > = values in this interval not displayed.   , CBC   Recent Labs   Lab 07/30/24  0505 07/31/24  0606   WBC 13.37* 8.93   HGB 12.7 11.1*   HCT 39.8 37.9   * 181   , INR   Lab Results   Component Value Date    INR 1.0 07/28/2024    INR 1.0 11/29/2023    INR 1.1 11/22/2023   , Lipid Panel   Lab Results   Component Value Date    CHOL 152 11/29/2023    HDL 45 11/29/2023    LDLCALC 84.2 11/29/2023    TRIG 114 11/29/2023    CHOLHDL 29.6 11/29/2023   , Troponin   Recent Labs   Lab 07/27/24  0020   TROPONINI 0.013   , A1C: No results for input(s): "HGBA1C" in the last 4320 hours., and All labs within the past 24 hours have been reviewed  Microbiology: Blood Culture   Lab Results   Component Value Date    LABBLOO No Growth to date 07/27/2024    LABBLOO No Growth to date 07/27/2024    LABBLOO No Growth to date 07/27/2024    LABBLOO No Growth to date 07/27/2024    LABBLOO No Growth to date 07/27/2024   , Sputum Culture No results found for: "GSRESP", "RESPIRATORYC", and Urine Culture    Lab Results   Component Value Date    LABURIN ENTEROBACTER CLOACAE  10,000 - 49,999 cfu/ml   01/22/2015       US Lower Extremity Veins Bilateral  Narrative: EXAMINATION:  US LOWER EXTREMITY VEINS BILATERAL    CLINICAL HISTORY:  Other specified soft tissue disorders    TECHNIQUE:  Duplex and color flow Doppler and dynamic compression was performed of the bilateral lower extremity veins was performed.    COMPARISON:  Left lower extremity venous ultrasound July 14, 2024    FINDINGS:  Right thigh veins: The common femoral, femoral, popliteal, upper greater saphenous, and deep femoral veins are patent and free of thrombus. The veins are normally compressible and have normal phasic " flow and augmentation response.    Right calf veins: The visualized calf veins are patent.    Left thigh veins: The common femoral, femoral, popliteal, upper greater saphenous, and deep femoral veins are patent and free of thrombus. The veins are normally compressible and have normal phasic flow and augmentation response.    Left calf veins: The visualized calf veins are patent.    Miscellaneous: None  Impression: No evidence of deep venous thrombosis in either lower extremity.    Electronically signed by: James Salmeron  Date:    07/28/2024  Time:    03:36       Pending Diagnostic Studies:       None           Medications:  Reconciled Home Medications:      Medication List        START taking these medications      predniSONE 20 MG tablet  Commonly known as: DELTASONE  Take 2 tablets (40 mg total) by mouth once daily. for 1 day  Start taking on: August 1, 2024            CONTINUE taking these medications      albuterol 90 mcg/actuation inhaler  Commonly known as: PROVENTIL/VENTOLIN HFA  Inhale 2 puffs into the lungs every 6 (six) hours. Rescue     albuterol-ipratropium 2.5 mg-0.5 mg/3 mL nebulizer solution  Commonly known as: DUO-NEB  Take 3 mLs by nebulization every 6 (six) hours as needed for Wheezing or Shortness of Breath.     aspirin 81 MG EC tablet  Commonly known as: ECOTRIN  Take 1 tablet (81 mg total) by mouth once daily.     atorvastatin 80 MG tablet  Commonly known as: LIPITOR  Take 1 tablet (80 mg total) by mouth once daily.     budesonide-formoterol 80-4.5 mcg 80-4.5 mcg/actuation Hfaa  Commonly known as: SYMBICORT  INHALE 2 PUFFS INTO THE LUNGS TWICE DAILY. RINSE MOUTH AFTER USE     clopidogreL 75 mg tablet  Commonly known as: PLAVIX  Take 1 tablet (75 mg total) by mouth once daily.     furosemide 20 MG tablet  Commonly known as: LASIX  Take 1 tablet (20 mg total) by mouth daily as needed (edema/wt gain).     gabapentin 300 MG capsule  Commonly known as: NEURONTIN  Take 1 capsule (300 mg total) by  mouth 2 (two) times daily.     hydrocortisone-aloe vera 1 % Crea cream  Apply topically 2 (two) times daily.     metoprolol succinate 25 MG 24 hr tablet  Commonly known as: TOPROL-XL  Take 1 tablet (25 mg total) by mouth once daily.     nicotine 14 mg/24 hr  Commonly known as: NICODERM CQ  Place 1 patch onto the skin once daily.     nitroGLYCERIN 0.4 MG SL tablet  Commonly known as: NITROSTAT  Place 1 tablet (0.4 mg total) under the tongue every 5 (five) minutes as needed for Chest pain.              Indwelling Lines/Drains at time of discharge:   Lines/Drains/Airways       Drain  Duration                  Colostomy 07/13/21 0201 Descending/sigmoid LLQ 1114 days    Female External Urinary Catheter w/ Suction 07/30/24 1 day                 45 minutes of time spent on discharge, including examining the patient, providing discharge instructions, arranging   follow up and documentation        Solomon Brown MD  Attending Staff Physician  [facility] Medicine  pager- [phone]  [employer] - [phone]

## 2024-07-31 NOTE — PLAN OF CARE
Discharge Plan A and Plan B have been determined by review of patient's clinical status, future medical and therapeutic needs, and coverage/benefits for post-acute care in coordination with multidisciplinary team members.    07/31/24 1052   Post-Acute Status   Post-Acute Authorization Home Health   Home Health Status Set-up Complete/Auth obtained   Coverage [facility] CHOICES -   Hospital Resources/Appts/Education Provided Appointments scheduled and added to AVS   Patient choice form signed by patient/caregiver List from CMS Compare;List with quality metrics by geographic area provided   Discharge Delays None known at this time   Discharge Plan   Discharge Plan A Home Health   Discharge Plan B Home with family     CM met with patient and spoke with daughter: Anthony diaz # [phone] via phone  to discuss discharge planning.  Patients plan is to return home with HH services. Patient is oxygen dependent. Patient will need transportation home.      FLORIAN:  7/31/24    0930 am  HH orders uploaded in Care Port     0823 am  CM spoke with Nirmala [facility] [phone] and confirmed patient is current and HH orders uploaded in Care Port.    10:58 am  CM spoke with bedside pharmacy and patient is to receive prednisone     1207 pm  CM scheduled transportation home via ambulance and ETA pending      12:15 pm   ETA 3:15 pm    12:39 pm  CM updated the patients daughter Anthony HUANG of  ETA. Patients daughter verbalized an undertanding                Luz Maria Aguero RN  Case Management  [facility]  [phone]

## 2024-07-31 NOTE — ASSESSMENT & PLAN NOTE
7/31 .  Podiatry eval -  Wound at Right  medial aspect of TMA is painted with betadine and foot is wrapped with kerlix and ACE without compression.  WBAT Right  foot, no restrictions

## 2024-07-31 NOTE — ASSESSMENT & PLAN NOTE
-- XR with no subcutaneous air  -- Likely cellulitis, continue broad spectrum Abx for now  -- consider further imaging if no clinical improvement   7/30 podiatry consulted for cortical regularity at the osteotomy sites- right foot. could represent evolving postoperative changes or possibly superimposed infection. No surgical intervention is indicated at this time

## 2024-07-31 NOTE — ASSESSMENT & PLAN NOTE
Patient with Hypercapnic and Hypoxic Respiratory failure which is Acute on chronic.  she is on home oxygen at 2 LPM. Supplemental oxygen was provided and noted- Oxygen Concentration (%):  [28] 28     Signs/symptoms of respiratory failure include- tachypnea and lethargy. Contributing diagnoses includes - CHF, COPD, and Obesity Hypoventilation Labs and images were reviewed. Patient Has recent ABG, which has been reviewed. Will treat underlying causes and adjust management of respiratory failure as follows-      79 year old female with COPD, CAD, NSTEMI, HTN, and HLD who presented to [facility] ED via EMS for hypercapnic respiratory failure with initial CO2 > 130. She was placed on NIPPV.      7/28 pH has recovered to 7.44.   -- Given Methylpred, Mg, Albuterol, and Ipratropium in the ED  -- Continue NIPPV          - weaned to 2L NC. Bipap qhs and with naps  -- Wean FiO2 for SpO2 goal 88-92%   -- Follow up COVID / Flu swab          - negative  -- Follow up MRSA swab          - negative  -- Obtain sputum sample if possible  -- Continue broad spectrum abx, deescalated to Zosyn only   -- Continue prednisone   -- Continue      Patient received scheduled breathing treatments as well as steroids. Antibiotics continued for respiratory as well as skin coverage in setting of RLE cellulitis and COPD exacerbation. Continued on BiPAP with improvement in mentation and pCO2. Weaned to home 2L NC and started on nightly BiPAP. Patient with intermittent agitation requiring precedex and PRN Zyprexa. Family states this is not her baseline but she has been having forgetfulness over the last few months, acting intermittently suspicious towards staff. De-escalated to Zosyn only for antibiotics, received 3 days of azithromycin. On HFNC overnight, did not comply with BiPAP.            7/30 Transfer to hospital medicine .admitted to the MICU for COPD exacerbation. hypercapnic respiratory failure due to COPD exacerbation - improved with BiPAP with  good response  stable on low flow oxygen. sats 95% on 2LNC. Continue bronchodilators, nebs, prednisone. NC @ 2L which is her baseline. vancomycin discontinued. continue zosyn.Leucocytosis of 13  7/31 completed Zosyn.

## 2024-07-31 NOTE — NURSING
Discharged to home via ambulance. Vs stable. Alert and oriented x 4. 2 liters.nc intact. Safety measures in place. Discharge instructions given.

## 2024-07-31 NOTE — PLAN OF CARE
[facility] - [facility] ([place])  Discharge Final Note    Primary Care Provider: Kalyn Pemberton NP    Expected Discharge Date: 7/31/2024  Discharge Plan A and Plan B have been determined by review of patient's clinical status, future medical and therapeutic needs, and coverage/benefits for post-acute care in coordination with multidisciplinary team members.     Final Discharge Note (most recent)       Final Note - 07/31/24 1536          Final Note    Assessment Type Final Discharge Note     Anticipated Discharge Disposition Planned Readmission - Home-Health Care Svc    [facility] Phone: [phone]    What phone number can be called within the next 1-3 days to see how you are doing after discharge? [phone]   Anthony Reardon (daughter)    Hospital Resources/Appts/Education Provided Appointments scheduled and added to AVS        Post-Acute Status    Post-Acute Authorization Home Health     Home Health Status Set-up Complete/Auth obtained     Coverage [employer] [facility] [employer] [facility] CHOICES -     Patient choice form signed by patient/caregiver List from CMS Compare;List with quality metrics by geographic area provided     Discharge Delays None known at this time                     Important Message from Medicare  Important Message from Medicare regarding Discharge Appeal Rights: Given to patient/caregiver, Explained to patient/caregiver, Signed/date by patient/caregiver     Date IMM was signed: 07/31/24  Time IMM was signed: 0922    Contact Info       Kalyn Pemberton NP   Specialty: Family Medicine, Palliative Medicine   Relationship: PCP - General    [address] [address]   Phone: [phone]       Next Steps: Follow up in 1 week(s)    [facility]    [address] [place] [address]  [phone]       Next Steps: Follow up          No future appointments.    CM met with  patient and daughter via phone  and discussed discharge plans, patient to [place] home with  home health. Patients medications delivered to bedside,  No HME/DME  recommended and no follow up appointment[s] scheduled.   [facility]cher.      Luz Maria Aguero RN  Case Management  [facility]  [phone]

## 2024-07-31 NOTE — PROGRESS NOTES
"[facility] - Stepdown Flex ([place] [id number])  [facility]  Progress Note    Patient Name: Radha Sotelo  MRN: [id number]  Patient Class: IP- Inpatient   Admission Date: 7/27/2024  Length of Stay: 4 days  Attending Physician: Solomon Brown MD  Primary Care Provider: Kalyn Pemberton NP        Subjective:     Principal Problem:Acute on chronic respiratory failure with hypoxia and hypercapnia        HPI:    Mrs. 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 Per chart review, "patient's family stated that she at baseline is fully communicative but that all of a sudden she became less responsive.  EMS reports that she had a GCS of 12 with them in route to the hospital and that her communication ability was limited.  They subsequently placed her on CPAP in route to the hospital.  Further history limited given the patient's current mental status."     She was hypothermic to 94.5F and otherwise HDS. VBG trend in the ED 7.01/>130 --> 7.06/>130 --> 7.13/117. Ammonia elevated at 115. . In the emergency department she was given albuterol, Ipratropium, Methylpred, Mg, Vancomycin, and Zosyn.      Critical care medicine consulted for hypercapnic respiratory failure.        Overview/Hospital Course:    Pt received scheduled breathing treatments as well as steroids. Antibiotics continued for respiratory as well as skin coverage in setting of RLE cellulitis and COPD exacerbation. Continued on BiPAP with improvement in mentation and pCO2. Weaned to home 2L NC and started on nightly BiPAP.      Interval History/Significant Events: Patient agitated and suspicious upon interview. Not believing that providers are who they say they are and stating that their credentials are false. Trying to hide areas to be examined from providers. " Raised voice and asked provider to leave.     Collateral: Called daughter who states that over the past few months the patient has become more forgetful. Patient lives in an apartment and is mostly independent in her ADL's but has some help from grand children that live below her. During  before her acute episode that she is admitted for she was suspicious of her own grandson and yelled at him which daughter states is unusual behavior for her.     Patient received scheduled breathing treatments as well as steroids. Antibiotics continued for respiratory as well as skin coverage in setting of RLE cellulitis and COPD exacerbation. Continued on BiPAP with improvement in mentation and pCO2. Weaned to home 2L NC and started on nightly BiPAP. Patient with intermittent agitation requiring precedex and PRN Zyprexa. Family states this is not her baseline but she has been having forgetfulness over the last few months, acting intermittently suspicious towards staff. De-escalated to Zosyn only for antibiotics, received 3 days of azithromycin. On HFNC overnight, did not comply with BiPAP.            7/30 Transfer to hospital medicine .admitted to the MICU for COPD exacerbation. hypercapnic respiratory failure due to COPD exacerbation - improved with BiPAP with good response  stable on low flow oxygen. sats 95% on 2LNC. Continue bronchodilators, nebs, prednisone. NC @ 2L which is her baseline. Was put on precedex for agitation, but off now, still in wrist restraints. Not on pressors.  SLP eval - recs NPO/ noted gitation, hallucinations, confabulation - last ETOH 2 weeks ago. Thiamine started for possible wernicke's encephalopathy. vancomycin discontinued. continue zosyn . on bilateral UE restraints . Psychiatry consulted for encephalopathy/restraints/cognitive dysfunction - 2.5mg Zyprexa PO/IM Q6H PRN for any non redirectable agitation  AAOX 3. off restraints since last night . Leucocytosis of 13. K of 5.6 . received D50  insulin and albuterol concentrate.sats 95% on 3LNC. podiatry consulted for cortical regularity at the osteotomy sites- right foot. could represent evolving postoperative changes or possibly superimposed infection. SLP recs regular diet   7/31 completed Zosyn.  Podiatry eval -  Wound at Right  medial aspect of TMA is painted with betadine and foot is wrapped with kerlix and ACE without compression.  WBAT Right  foot, no restrictions             Review of Systems:   Pain scale:   Constitutional:  fever,  chills, headache, vision loss, hearing loss, weight loss, Generalized weakness, falls, loss of smell, loss of taste, poor appetite,  sore throat  Respiratory: cough, shortness of breath.   Cardiovascular: chest pain, dizziness, palpitations, orthopnea, swelling of feet, syncope  Gastrointestinal: nausea, vomiting, abdominal pain, diarrhea, black stool,  blood in stool, change in bowel habits, constipation  Genitourinary: hematuria, dysuria, urgency, frequency  Integument/Breast: rash,  pruritis  Hematologic/Lymphatic: easy bruising, lymphadenopathy  Musculoskeletal: arthralgias , myalgias, back pain, neck pain, knee pain  Neurological: confusion, seizures, tremors, slurred speech, agitation and hallucinations.   Behavioral/Psych:  depression, anxiety, auditory or visual hallucinations     OBJECTIVE:     Physical Exam:  Body mass index is 24.76 kg/m².    Constitutional: Appears well-developed and well-nourished.   Head: Normocephalic and atraumatic.   Neck: Normal range of motion. Neck supple.   Cardiovascular: Normal heart rate.  Regular heart rhythm.  Pulmonary/Chest: Effort normal.   Abdominal: No distension.  No tenderness. colostomy  Musculoskeletal: Normal range of motion. No edema. rigth foot wound at TMA site. right heel scab  Neurological: Alert and oriented to person, place, and time.   Skin: Skin is warm and dry.   Psychiatric: Normal mood and affect. Behavior is normal.                  Vital Signs  Temp:  98.2 °F (36.8 °C) (07/31/24 0734)  Pulse: 87 (07/31/24 0734)  Resp: 20 (07/31/24 0529)  BP: (!) 164/74 (07/31/24 0734)  SpO2: 95 % (07/31/24 0734)     24 Hour VS Range    Temp:  [97.5 °F (36.4 °C)-99.4 °F (37.4 °C)]   Pulse:  []   Resp:  [17-20]   BP: (137-179)/(56-74)   SpO2:  [91 %-98 %]     Intake/Output Summary (Last 24 hours) at 7/31/2024 0758  Last data filed at 7/31/2024 0533  Gross per 24 hour   Intake 560 ml   Output 1150 ml   Net -590 ml         I/O This Shift:  No intake/output data recorded.    Wt Readings from Last 3 Encounters:   07/30/24 57.5 kg (126 lb 12.2 oz)   07/14/24 53.1 kg (117 lb)   05/28/24 53.2 kg (117 lb 4.6 oz)       I have personally reviewed the vitals and recorded Intake/Output     Laboratory/Diagnostic Data:    CBC/Anemia Labs: Coags:    Recent Labs   Lab 07/29/24  0524 07/30/24  0505 07/31/24  0606   WBC 6.58 13.37* 8.93   HGB 9.7* 12.7 11.1*   HCT 32.5* 39.8 37.9    141* 181   MCV 98 92 98   RDW 13.3 13.4 13.5    Recent Labs   Lab 07/28/24  1139   INR 1.0        Chemistries: ABG:   Recent Labs   Lab 07/29/24  0411 07/30/24  0505 07/30/24  1154 07/30/24  1602 07/30/24 2009 07/31/24  0006 07/31/24  0606    138   < > 136 136  --  140   K 3.5 5.6*   < > 3.5  3.5 3.8  3.8 3.6 3.9    104   < > 100 102  --  103   CO2 24 21*   < > 27 26  --  28   BUN 20 25*   < > 20 19  --  18   CREATININE 0.9 0.9   < > 0.9 0.8  --  0.9   CALCIUM 7.5* 8.8   < > 8.7 8.6*  --  8.6*   PROT 5.1* 7.0  --   --   --   --  6.3   BILITOT 0.4 0.9  --   --   --   --  0.5   ALKPHOS 68 87  --   --   --   --  83   ALT 8* 12  --   --   --   --  11   AST 17 57*  --   --   --   --  37   MG 1.8 2.5  --   --   --   --  2.0   PHOS 2.9 3.7  --   --   --   --  3.7    < > = values in this interval not displayed.    Recent Labs   Lab 07/27/24  1156 07/27/24  1600 07/27/24 2043   PH 7.343* 7.396 7.442   PCO2 79.1* 64.5* 60.3*   PO2 32* 41 41   HCO3 43.0* 39.6* 41.1*   POCSATURATED 54 74 76   BE 17* 15*  "17*        POCT Glucose: HbA1c:    Recent Labs   Lab 07/29/24 2000 07/30/24  0636 07/30/24  0930 07/30/24  1258 07/30/24 2021 07/30/24  2319   POCTGLUCOSE 95 92 239* 122* 111* 144*    Hemoglobin A1C   Date Value Ref Range Status   08/19/2023 5.6 4.0 - 5.6 % Final     Comment:     ADA Screening Guidelines:  5.7-6.4%  Consistent with prediabetes  >or=6.5%  Consistent with diabetes    High levels of fetal hemoglobin interfere with the HbA1C  assay. Heterozygous hemoglobin variants (HbS, HgC, etc)do  not significantly interfere with this assay.   However, presence of multiple variants may affect accuracy.     07/12/2021 5.3 4.0 - 5.6 % Final     Comment:     ADA Screening Guidelines:  5.7-6.4%  Consistent with prediabetes  >or=6.5%  Consistent with diabetes    High levels of fetal hemoglobin interfere with the HbA1C  assay. Heterozygous hemoglobin variants (HbS, HgC, etc)do  not significantly interfere with this assay.   However, presence of multiple variants may affect accuracy.     10/23/2012 5.6 4.0 - 6.2 % Final        Cardiac Enzymes: Ejection Fractions:    No results for input(s): "CPK", "CPKMB", "MB", "TROPONINI" in the last 72 hours. No results found for: "EF"       No results for input(s): "COLORU", "APPEARANCEUA", "PHUR", "SPECGRAV", "PROTEINUA", "GLUCUA", "KETONESU", "BILIRUBINUA", "OCCULTUA", "NITRITE", "UROBILINOGEN", "LEUKOCYTESUR", "RBCUA", "WBCUA", "BACTERIA", "SQUAMEPITHEL", "HYALINECASTS" in the last 48 hours.    Invalid input(s): "WRIGHTSUR"    Procalcitonin (ng/mL)   Date Value   03/01/2020 0.54 (H)     Lactate (Lactic Acid) (mmol/L)   Date Value   07/27/2024 0.7   08/26/2023 0.7   08/11/2023 2.0   07/12/2021 0.9   03/02/2020 0.7     BNP (pg/mL)   Date Value   07/27/2024 143 (H)   07/14/2024 80   05/25/2024 133 (H)   11/29/2023 831 (H)   08/10/2023 56     CRP (mg/L)   Date Value   08/18/2023 33.7 (H)     Sed Rate   Date Value   08/18/2023 27 mm/Hr   03/22/2013 16 mm/hr     D-Dimer (mg/L FEU)   Date " Value   03/01/2020 4.64 (H)     Ferritin (ng/mL)   Date Value   07/15/2021 135     LD (U/L)   Date Value   11/11/2012 230   11/02/2012 487 (H)   11/01/2012 494 (H)   10/23/2012 171     Troponin I (ng/mL)   Date Value   07/27/2024 0.013   05/25/2024 0.015   05/25/2024 0.023   11/30/2023 0.779 (H)   11/30/2023 1.049 (H)   11/29/2023 0.970 (H)   11/29/2023 0.571 (H)   11/29/2023 0.624 (H)     CPK (U/L)   Date Value   12/10/2013 87   12/10/2013 75   01/15/2013 32   10/24/2012 302 (H)   10/18/2012 225 (H)   09/05/2012 243 (H)   04/13/2012 181 (H)   05/20/2010 204 (H)     Results for orders placed or performed during the hospital encounter of 03/01/20   Vitamin D   Result Value Ref Range    Vit D, 25-Hydroxy 5 (L) 30 - 96 ng/mL     SARS-CoV2 (COVID-19) Qualitative PCR (no units)   Date Value   07/27/2024 Not Detected   07/29/2021 Not Detected   07/22/2021 Not Detected     SARS-CoV-2 RNA, Amplification, Qual (no units)   Date Value   07/27/2024 Negative   07/14/2024 Negative     POC Rapid COVID (no units)   Date Value   07/12/2021 Negative       Microbiology labs for the last week  Microbiology Results (last 7 days)       Procedure Component Value Units Date/Time    Blood culture #1 **CANNOT BE ORDERED STAT** [[id number]] Collected: 07/27/24 0020    Order Status: Completed Specimen: Blood from Peripheral, Antecubital, Right Updated: 07/31/24 0612     Blood Culture, Routine No Growth to date      No Growth to date      No Growth to date      No Growth to date      No Growth to date    Blood culture #2 **CANNOT BE ORDERED STAT** [[id number]] Collected: 07/27/24 0021    Order Status: Completed Specimen: Blood from Peripheral, Antecubital, Right Updated: 07/31/24 0612     Blood Culture, Routine No Growth to date      No Growth to date      No Growth to date      No Growth to date      No Growth to date    MRSA Screen by PCR [[id number]] Collected: 07/27/24 0258    Order Status: Completed Specimen: Nasal Swab Updated: 07/27/24  0926     MRSA SCREEN BY PCR Not Detected    Culture, Respiratory with Gram Stain [[id number]]     Order Status: No result Specimen: Respiratory             Reviewed and noted in plan where applicable- Please see chart for full lab data.    Lines/Drains:       Peripheral IV - Single Lumen 07/30/24 0106 22 G 3/4 in Distal;Posterior;Right Forearm (Active)   Site Assessment Clean;Dry;Intact 07/30/24 0400   Line Status Infusing 07/30/24 0400   Dressing Status Clean;Dry;Intact 07/30/24 0400   Dressing Intervention Integrity maintained 07/30/24 0400   Number of days: 0            Colostomy 07/13/21 0201 Descending/sigmoid LLQ (Active)   Stomal Appliance 1 piece 07/29/24 2056   Stoma Appearance round;rosebud appearance 07/29/24 2056   Stoma Size (in) 3 07/29/24 2056   Site Assessment Clean;Intact 07/29/24 2056   Peristomal Assessment Excoriation 07/29/24 2056   Accessories/Skin Care barrier substance over peristomal skin;cleansed w/ soap and water;skin sealant 07/29/24 2056   Stoma Function flatus;stool 07/29/24 2056   Treatment Bag change 07/29/24 2056   Output (mL) 300 mL 07/29/24 2056   Number of days: 1113       Imaging  ECG Results              EKG 12-lead (Final result)        Collection Time Result Time QRS Duration OHS QTC Calculation    07/27/24 00:28:09 07/27/24 10:00:02 72 427                     Final result by Interface, Lab In [facility] (07/27/24 10:00:05)                   Narrative:    Test Reason : G93.40,    Vent. Rate : 094 BPM     Atrial Rate : 083 BPM     P-R Int : 178 ms          QRS Dur : 072 ms      QT Int : 342 ms       P-R-T Axes : 087 054 062 degrees     QTc Int : 427 ms    Sinus rhythm with Premature supraventricular complexes and with occasional   Premature ventricular complexes vs artifact  Non-specific ST abn but artifact marked  Abnormal ECG  When compared with ECG of 14-JUL-2024 09:10,  Premature ventricular complexes are now Present  Premature supraventricular complexes are now possible vs  artifact    Confirmed by Fausto THOMPSON MD ([id number]) on 7/27/2024 10:00:00 AM    Referred By: [email or web address]   SELF           Confirmed By:Fausto THOMPSON MD                                    Results for orders placed during the hospital encounter of 05/25/24    Echo    Interpretation Summary    Left Ventricle: The left ventricle is normal in size. Ventricular mass is normal. Normal wall thickness. Normal wall motion. There is normal systolic function with a visually estimated ejection fraction of 60 - 65%. There is normal diastolic function.    Right Ventricle: Normal right ventricular cavity size. Wall thickness is normal. Systolic function is normal.    Left Atrium: Left atrium is mildly dilated.    Aortic Valve: There is moderate aortic valve sclerosis. There is annular calcification present. Mildly restricted motion. There is mild stenosis. Aortic valve area by VTI is 2.21 cm². Aortic valve peak velocity is 2.0 m/s. Mean gradient is 7 mmHg. The dimensionless index is 0.58. There is mild aortic regurgitation.    Pulmonary Artery: The estimated pulmonary artery systolic pressure is 20 mmHg.    IVC/SVC: Normal venous pressure at 3 mmHg.      US Lower Extremity Veins Bilateral  Narrative: EXAMINATION:  US LOWER EXTREMITY VEINS BILATERAL    CLINICAL HISTORY:  Other specified soft tissue disorders    TECHNIQUE:  Duplex and color flow Doppler and dynamic compression was performed of the bilateral lower extremity veins was performed.    COMPARISON:  Left lower extremity venous ultrasound July 14, 2024    FINDINGS:  Right thigh veins: The common femoral, femoral, popliteal, upper greater saphenous, and deep femoral veins are patent and free of thrombus. The veins are normally compressible and have normal phasic flow and augmentation response.    Right calf veins: The visualized calf veins are patent.    Left thigh veins: The common femoral, femoral, popliteal, upper greater saphenous, and deep femoral veins are patent and free of  thrombus. The veins are normally compressible and have normal phasic flow and augmentation response.    Left calf veins: The visualized calf veins are patent.    Miscellaneous: None  Impression: No evidence of deep venous thrombosis in either lower extremity.    Electronically signed by: James Salmeron  Date:    07/28/2024  Time:    03:36      Labs, Imaging, EKG and Diagnostic results from 7/31/2024 were reviewed.    Medications:  Medication list was reviewed and changes noted under Assessment/Plan.  No current facility-administered medications on file prior to encounter.     Current Outpatient Medications on File Prior to Encounter   Medication Sig Dispense Refill    albuterol (PROVENTIL/VENTOLIN HFA) 90 mcg/actuation inhaler Inhale 2 puffs into the lungs every 6 (six) hours. Rescue 25.5 g 3    albuterol-ipratropium (DUO-NEB) 2.5 mg-0.5 mg/3 mL nebulizer solution Take 3 mLs by nebulization every 6 (six) hours as needed for Wheezing or Shortness of Breath. 180 mL 6    aspirin (ECOTRIN) 81 MG EC tablet Take 1 tablet (81 mg total) by mouth once daily. 30 tablet 11    atorvastatin (LIPITOR) 80 MG tablet Take 1 tablet (80 mg total) by mouth once daily. 90 tablet 3    budesonide-formoterol 80-4.5 mcg (SYMBICORT) 80-4.5 mcg/actuation HFAA INHALE 2 PUFFS INTO THE LUNGS TWICE DAILY. RINSE MOUTH AFTER USE 10.2 g 6    clopidogreL (PLAVIX) 75 mg tablet Take 1 tablet (75 mg total) by mouth once daily. 30 tablet 11    furosemide (LASIX) 20 MG tablet Take 1 tablet (20 mg total) by mouth daily as needed (edema/wt gain). 30 tablet 11    gabapentin (NEURONTIN) 300 MG capsule Take 1 capsule (300 mg total) by mouth 2 (two) times daily. 180 capsule 3    hydrocortisone-aloe vera 1 % Crea cream Apply topically 2 (two) times daily. 28 g 0    metoprolol succinate (TOPROL-XL) 25 MG 24 hr tablet Take 1 tablet (25 mg total) by mouth once daily. 90 tablet 3    nicotine (NICODERM CQ) 14 mg/24 hr Place 1 patch onto the skin once daily. 28 patch  0    nitroGLYCERIN (NITROSTAT) 0.4 MG SL tablet Place 1 tablet (0.4 mg total) under the tongue every 5 (five) minutes as needed for Chest pain. (Patient not taking: Reported on 5/29/2024) 25 tablet 2     Scheduled Medications:  Current Facility-Administered Medications   Medication Dose Route Frequency    albuterol-ipratropium  3 mL Nebulization Q4H    aspirin  81 mg Oral Daily    atorvastatin  80 mg Oral Daily    clopidogreL  75 mg Oral Daily    enoxparin  40 mg Subcutaneous Q24H (prophylaxis, 1700)    gabapentin  300 mg Oral BID    methylPREDNISolone injection (PEDS and ADULTS)  40 mg Intravenous Daily    metoprolol succinate  25 mg Oral Daily    piperacillin-tazobactam (Zosyn) IV (PEDS and ADULTS) (extended infusion is not appropriate)  4.5 g Intravenous Q8H    polyethylene glycol  17 g Oral Daily    predniSONE  40 mg Oral Daily    thiamine (B-1) 500 mg in D5W 100 mL IVPB  500 mg Intravenous TID     PRN:   Current Facility-Administered Medications:     0.9% NaCl, , Intravenous, PRN    0.9% NaCl, , Intravenous, PRN    albuterol sulfate, 2.5 mg, Nebulization, Q6H PRN    dextrose 10%, 12.5 g, Intravenous, PRN    dextrose 10%, 25 g, Intravenous, PRN    [COMPLETED] dextrose 10%, 50 g, Intravenous, Once **AND** dextrose 10%, 25 g, Intravenous, PRN **AND** [COMPLETED] insulin regular, 10 Units, Intravenous, Once    glucagon (human recombinant), 1 mg, Intramuscular, PRN    insulin aspart U-100, 0-5 Units, Subcutaneous, Q6H PRN    OLANZapine, 2.5 mg, Intramuscular, Q6H PRN    senna, 8.6 mg, Oral, Daily PRN    sodium chloride 0.9%, 10 mL, Intravenous, PRN  Infusions:    D5 and 0.45% NaCl   Intravenous Continuous 60 mL/hr at 07/30/24 0849 New Bag at 07/30/24 0849     Estimated Creatinine Clearance: 40.9 mL/min (based on SCr of 0.9 mg/dL).             Assessment/Plan:      * Acute on chronic respiratory failure with hypoxia and hypercapnia    Patient with Hypercapnic and Hypoxic Respiratory failure which is Acute on chronic.   she is on home oxygen at 2 LPM. Supplemental oxygen was provided and noted- Oxygen Concentration (%):  [28] 28     Signs/symptoms of respiratory failure include- tachypnea and lethargy. Contributing diagnoses includes - CHF, COPD, and Obesity Hypoventilation Labs and images were reviewed. Patient Has recent ABG, which has been reviewed. Will treat underlying causes and adjust management of respiratory failure as follows-      79 year old female with COPD, CAD, NSTEMI, HTN, and HLD who presented to [facility] ED via EMS for hypercapnic respiratory failure with initial CO2 > 130. She was placed on NIPPV.      7/28 pH has recovered to 7.44.   -- Given Methylpred, Mg, Albuterol, and Ipratropium in the ED  -- Continue NIPPV          - weaned to 2L NC. Bipap qhs and with naps  -- Wean FiO2 for SpO2 goal 88-92%   -- Follow up COVID / Flu swab          - negative  -- Follow up MRSA swab          - negative  -- Obtain sputum sample if possible  -- Continue broad spectrum abx, deescalated to Zosyn only   -- Continue prednisone   -- Continue      Patient received scheduled breathing treatments as well as steroids. Antibiotics continued for respiratory as well as skin coverage in setting of RLE cellulitis and COPD exacerbation. Continued on BiPAP with improvement in mentation and pCO2. Weaned to home 2L NC and started on nightly BiPAP. Patient with intermittent agitation requiring precedex and PRN Zyprexa. Family states this is not her baseline but she has been having forgetfulness over the last few months, acting intermittently suspicious towards staff. De-escalated to Zosyn only for antibiotics, received 3 days of azithromycin. On HFNC overnight, did not comply with BiPAP.            7/30 Transfer to hospital medicine .admitted to the MICU for COPD exacerbation. hypercapnic respiratory failure due to COPD exacerbation - improved with BiPAP with good response  stable on low flow oxygen. sats 95% on 2LNC. Continue bronchodilators,  "nebs, prednisone. NC @ 2L which is her baseline. vancomycin discontinued. continue zosyn.Leucocytosis of 13  7/31 completed Zosyn.      Chronic wound  7/31 .  Podiatry eval -  Wound at Right  medial aspect of TMA is painted with betadine and foot is wrapped with kerlix and ACE without compression.  WBAT Right  foot, no restrictions      Hyperkalemia    Recent Labs     07/28/24  0216 07/29/24  0411 07/30/24  0505   K 4.4 3.5 5.6*   7/30 K of 5.6 . received D50 insulin and albuterol concentrate     Anemia    Patient's with Normocytic anemia.. Hemoglobin stable. Etiology likely due to chronic disease .  Current CBC reviewed-    Recent Labs   Lab 07/28/24  0216 07/29/24  0524 07/30/24  0505   HGB 10.3* 9.7* 12.7         Component Value Date/Time    MCV 92 07/30/2024 0505    RDW 13.4 07/30/2024 0505    IRON 65 07/15/2021 0735    FERRITIN 135 07/15/2021 0735    RETIC 5.85 (H) 11/11/2012 0334    FOLATE 10.3 07/15/2021 0735    [id number] 229 07/15/2021 0735     Monitor CBC and transfuse if H/H drops below 7/21.      Steroid-induced hyperglycemia  Patient's FSGs are controlled on current hypoglycemics.   Last A1c reviewed-   Lab Results   Component Value Date    HGBA1C 5.6 08/19/2023    HGBA1C 5.3 07/12/2021    HGBA1C 5.6 10/23/2012     Will hold PO hypoglycemics and will start correctional scale insulin  Most recent fingerstick glucose reviewed- No results for input(s): "POCTGLUCOSE" in the last 24 hours.  currently on   Antihyperglycemics (From admission, onward)      Start     Stop Route Frequency Ordered    07/29/24 1247  insulin aspart U-100 pen 0-5 Units         -- SubQ Every 6 hours PRN 07/29/24 1249               Cellulitis of right leg  -- XR with no subcutaneous air  -- Likely cellulitis, continue broad spectrum Abx for now  -- consider further imaging if no clinical improvement   7/30 podiatry consulted for cortical regularity at the osteotomy sites- right foot. could represent evolving postoperative changes or " possibly superimposed infection.      Encephalopathy, metabolic    -- CTH with NAF  -- TSH and FT4 unremarkable  -- Respiratory management as above   -- Avoid sedating agents   -- Ammonia elevated at 115, repeat 35  -- Delirium precautions      Family details worsening agitation, possible hallucinations at home. Worsened after she stopped drinking 2 weeks ago. Unclear if due to infection vs dementia (frontotemporal/vascular), EtoH/wernicke's. Baseline patient as some support with ADL's but is mostly independent. Her agitation with staff and suspicion with is unusual behavior for her.          - appears to be confabulating on exam         - treatment dose IV thiamine         - improving with precedex wean, abx.     7/29 on bilateral UE restraints . Psychiatry consulted for encephalopathy/restraints/cognitive dysfunction   7/30 Psychiatry consulted for encephalopathy/restraints/cognitive dysfunction - 2.5mg Zyprexa PO/IM Q6H PRN for any non redirectable agitation  AAOX 3. off restraints since last night     Chronic diastolic heart failure  Most recent ECHO 05/26/24:     Left Ventricle: The left ventricle is normal in size. Ventricular mass is normal. Normal wall thickness. Normal wall motion. There is normal systolic function with a visually estimated ejection fraction of 60 - 65%. There is normal diastolic function.    Right Ventricle: Normal right ventricular cavity size. Wall thickness is normal. Systolic function is normal.    Left Atrium: Left atrium is mildly dilated.    Aortic Valve: There is moderate aortic valve sclerosis. There is annular calcification present. Mildly restricted motion. There is mild stenosis. Aortic valve area by VTI is 2.21 cm². Aortic valve peak velocity is 2.0 m/s. Mean gradient is 7 mmHg. The dimensionless index is 0.58. There is mild aortic regurgitation.    Pulmonary Artery: The estimated pulmonary artery systolic pressure is 20 mmHg.    IVC/SVC: Normal venous pressure at 3 mmHg.      --   -- PRN diuresis   -- Metoprolol   -- Continue telemetry        Partial nontraumatic amputation of right foot    Delayed wound healing  -- Wound care consulted   -- Follow up foot XRAYs. May need CT/MRI to evaluate for presence of abscess          - Xray equivocal, no pain on exam. Will defer for now  -- Given RLE swelling: follow up BLE U/S         - US negative.   7/30 podiatry consulted for cortical regularity at the osteotomy sites- right foot. could represent evolving postoperative changes or possibly superimposed infection.      HLD (hyperlipidemia)    -- Continue statin therapies. Atorvastatin 80 mg   -- CMP daily. LFTs stable.        Essential hypertension    > Has been maintaining her BP below goal  -- Continue Metoprolol   -- SBP < 180 goal        Chronic obstructive pulmonary disease    -- Plan per respiratory failure        COPD exacerbation    -- Plan per respiratory failure        Delayed wound healing    -- Wound care consulted   -- Follow up foot XRAYs. May need CT/MRI to evaluate for presence of abscess          - Xray equivocal, no pain on exam. Will defer for now  -- Given RLE swelling: follow up BLE U/S         - US negative.       CAD (coronary artery disease)    Multi vessel CAD. Recent cath with stent placement 05/27       The Prox RCA lesion was 99% stenosed with 0% stenosis post-intervention.    The RPDA lesion was 100% stenosed.    Prox RCA lesion: A .    Prox RCA lesion: A STENT SYNERGY XD [id number] stent was not successfully placed.    Prox RCA lesion: A .    Prox RCA lesion: A [place] stent was successfully placed.    Prox RCA lesion: A .    Prox RCA lesion: A .    The estimated blood loss was none.     -- Continue ASA / Plavix     S/P colostomy    -- Secondary to bowel perforation       PVD (peripheral vascular disease)    Known PVD    - Continue Aspirin      VTE Risk Mitigation (From admission, onward)           Ordered     enoxaparin injection 40 mg  Every 24 hours          07/28/24 1130     IP VTE HIGH RISK PATIENT  Once         07/27/24 0229     Place sequential compression device  Until discontinued         07/27/24 0229                    Discharge Planning   FLORIAN: 8/2/2024     Code Status: Full Code   Is the patient medically ready for discharge?:     Reason for patient still in hospital (select all that apply): Treatment  Discharge Plan A: Home with family, Home Health                  Solomon Brown MD  Department of Hospital Medicine   [facility] ([place])

## 2024-07-31 NOTE — PLAN OF CARE
[facility] ([place] [address])      HOME HEALTH ORDERS  FACE TO FACE ENCOUNTER    Patient Name: Radha Sotelo  YOB: 1945    PCP: Kalyn Pemberton NP   PCP Address: [address] [place] [id number]  PCP Phone Number: [phone]  PCP Fax: [phone]    Encounter Date: 7/27/24    Admit to Home Health    Diagnoses:  Active Hospital Problems    Diagnosis  POA    *Acute on chronic respiratory failure with hypoxia and hypercapnia [J96.21, J96.22]  Yes    Hyperkalemia [E87.5]  Unknown    Chronic wound [T14.8XXA]  Yes     Chronic    Steroid-induced hyperglycemia [R73.9, T38.0X5A]  Yes    Anemia [D64.9]  Unknown    Encephalopathy, metabolic [G93.41]  Yes    Cellulitis of right leg [L03.115]  Unknown     -- XR with no subcutaneous air  -- Likely cellulitis, continue broad spectrum Abx for now  -- consider further imaging if no clinical improvement       Chronic diastolic heart failure [I50.32]  Yes    Essential hypertension [I10]  Yes    Chronic obstructive pulmonary disease [J44.9]  Yes    HLD (hyperlipidemia) [E78.5]  Yes    Partial nontraumatic amputation of right foot [Z89.431]  Not Applicable    COPD exacerbation [J44.1]  Yes    Delayed wound healing [T14.8XXD]  Yes    CAD (coronary artery disease) [I25.10]  Yes    S/P colostomy [Z93.3]  Not Applicable    PVD (peripheral vascular disease) [I73.9]  Yes      Resolved Hospital Problems    Diagnosis Date Resolved POA    CAD (coronary artery disease) [I25.10] 07/29/2024 Yes       Follow Up Appointments:  No future appointments.    Allergies:Review of patient's allergies indicates:  No Known Allergies    Medications: Review discharge medications with patient and family and provide education.    Current Facility-Administered Medications   Medication Dose Route Frequency Provider Last Rate Last Admin    0.9%  NaCl infusion   Intravenous PRN Diaz Posey MD 5 mL/hr at 07/29/24 1700 Rate Verify at 07/29/24 1700    0.9%   NaCl infusion   Intravenous PRN Diaz Posey MD 10 mL/hr at 07/29/24 1700 Rate Verify at 07/29/24 1700    albuterol sulfate nebulizer solution 2.5 mg  2.5 mg Nebulization Q6H PRN Dereje Colvin NP        albuterol-ipratropium 2.5 mg-0.5 mg/3 mL nebulizer solution 3 mL  3 mL Nebulization Q4H Sheryl Servin MD   3 mL at 07/31/24 0058    aspirin EC tablet 81 mg  81 mg Oral Daily Dereje Colvin NP        atorvastatin tablet 80 mg  80 mg Oral Daily Dereje Colvin NP        clopidogreL tablet 75 mg  75 mg Oral Daily Dereje Colvin NP        dextrose 10% bolus 125 mL 125 mL  12.5 g Intravenous PRN Pascual Frank MD        dextrose 10% bolus 250 mL 250 mL  25 g Intravenous PRN Pascual Frank MD        dextrose 10% bolus 250 mL 250 mL  25 g Intravenous PRN Solomon Brown MD        dextrose 5 % and 0.45 % NaCl infusion   Intravenous Continuous Solomon Brown MD 60 mL/hr at 07/30/24 0849 New Bag at 07/30/24 0849    enoxaparin injection 40 mg  40 mg Subcutaneous Q24H (prophylaxis, 1700) Dougie Nunez MD   40 mg at 07/30/24 1803    gabapentin capsule 300 mg  300 mg Oral BID Solomon Brown MD   300 mg at 07/30/24 2056    glucagon (human recombinant) injection 1 mg  1 mg Intramuscular PRN Pascual Frank MD        insulin aspart U-100 pen 0-5 Units  0-5 Units Subcutaneous Q6H PRN Pascual Frank MD        methylPREDNISolone sodium succinate injection 40 mg  40 mg Intravenous Daily Dereje Colvin NP   40 mg at 07/30/24 0849    metoprolol succinate (TOPROL-XL) 24 hr tablet 25 mg  25 mg Oral Daily Dereje Colvin NP        OLANZapine injection 2.5 mg  2.5 mg Intramuscular Q6H PRN Dean Eason MD   2.5 mg at 07/28/24 2148    piperacillin-tazobactam (ZOSYN) 4.5 g in D5W 100 mL IVPB (MB+)  4.5 g Intravenous Q8H Solomon Brown MD   Stopped at 07/31/24 0519    polyethylene glycol packet 17 g  17 g Oral Daily Dereje Colvin, TARIK        predniSONE  tablet 40 mg  40 mg Oral Daily Solomon Brown MD        senna tablet 8.6 mg  8.6 mg Oral Daily PRN Dereje Colvin NP        sodium chloride 0.9% flush 10 mL  10 mL Intravenous PRN Dereje Colvin NP        thiamine (B-1) 500 mg in D5W 100 mL IVPB  500 mg Intravenous TID Dean Eason MD   Stopped at 07/30/24 2126        Medication List        START taking these medications      predniSONE 20 MG tablet  Commonly known as: DELTASONE  Take 2 tablets (40 mg total) by mouth once daily. for 1 day  Start taking on: August 1, 2024            CONTINUE taking these medications      albuterol 90 mcg/actuation inhaler  Commonly known as: PROVENTIL/VENTOLIN HFA  Inhale 2 puffs into the lungs every 6 (six) hours. Rescue     albuterol-ipratropium 2.5 mg-0.5 mg/3 mL nebulizer solution  Commonly known as: DUO-NEB  Take 3 mLs by nebulization every 6 (six) hours as needed for Wheezing or Shortness of Breath.     aspirin 81 MG EC tablet  Commonly known as: ECOTRIN  Take 1 tablet (81 mg total) by mouth once daily.     atorvastatin 80 MG tablet  Commonly known as: LIPITOR  Take 1 tablet (80 mg total) by mouth once daily.     budesonide-formoterol 80-4.5 mcg 80-4.5 mcg/actuation Hfaa  Commonly known as: SYMBICORT  INHALE 2 PUFFS INTO THE LUNGS TWICE DAILY. RINSE MOUTH AFTER USE     clopidogreL 75 mg tablet  Commonly known as: PLAVIX  Take 1 tablet (75 mg total) by mouth once daily.     furosemide 20 MG tablet  Commonly known as: LASIX  Take 1 tablet (20 mg total) by mouth daily as needed (edema/wt gain).     gabapentin 300 MG capsule  Commonly known as: NEURONTIN  Take 1 capsule (300 mg total) by mouth 2 (two) times daily.     hydrocortisone-aloe vera 1 % Crea cream  Apply topically 2 (two) times daily.     metoprolol succinate 25 MG 24 hr tablet  Commonly known as: TOPROL-XL  Take 1 tablet (25 mg total) by mouth once daily.     nicotine 14 mg/24 hr  Commonly known as: NICODERM CQ  Place 1 patch onto the skin once  daily.     nitroGLYCERIN 0.4 MG SL tablet  Commonly known as: NITROSTAT  Place 1 tablet (0.4 mg total) under the tongue every 5 (five) minutes as needed for Chest pain.                I have seen and examined this patient within the last 30 days. My clinical findings that support the need for the home health skilled services and home bound status are the following:no   Weakness/numbness causing balance and gait disturbance due to Weakness/Debility making it taxing to leave home.     Diet:   cardiac diet        Referrals/ Consults  Physical Therapy to evaluate and treat. Evaluate for home safety and equipment needs; Establish/upgrade home exercise program. Perform / instruct on therapeutic exercises, gait training, transfer training, and Range of Motion.  Occupational Therapy to evaluate and treat. Evaluate home environment for safety and equipment needs. Perform/Instruct on transfers, ADL training, ROM, and therapeutic exercises.  Aide to provide assistance with personal care, ADLs, and vital signs.    Activities:   activity as tolerated    Nursing:   Agency to admit patient within 24 hours of hospital discharge unless specified on physician order or at patient request    SN to complete comprehensive assessment including routine vital signs. Instruct on disease process and s/s of complications to report to MD. Review/verify medication list sent home with the patient at time of discharge  and instruct patient/caregiver as needed. Frequency may be adjusted depending on start of care date.     Skilled nurse to perform up to 3 visits PRN for symptoms related to diagnosis    Notify MD if SBP > 160 or < 90; DBP > 90 or < 50; HR > 120 or < 50; Temp > 101; O2 < 88%;     Ok to schedule additional visits based on staff availability and patient request on consecutive days within the home health episode.    When multiple disciplines ordered:    Start of Care occurs on Sunday - Wednesday schedule remaining discipline evaluations  as ordered on separate consecutive days following the start of care.    Thursday SOC -schedule subsequent evaluations Friday and Monday the following week.     Friday - Saturday SOC - schedule subsequent discipline evaluations on consecutive days starting Monday of the following week.    For all post-discharge communication and subsequent orders please contact patient's primary care physician. If unable to reach primary care physician or do not receive response within 30 minutes, please contact [phone] for clinical staff order clarification    Miscellaneous   oxygen 2-3L via NC continuously    Routine Skin for Bedridden Patients: Instruct patient/caregiver to apply moisture barrier cream to all skin folds and wet areas in perineal area daily and after baths and all bowel movements.    Home Health Aide:  Nursing every 48 hours, Physical Therapy every 48 hours, Occupational Therapy every 48 hours, and Home Health Aide every 48 hours    Wound Care Orders  R anterior foot: paint with betadine, cover with mepilex border daily   R plantar heel: leave open to air    I certify that this patient is confined to her home and needs intermittent skilled nursing care, physical therapy, and occupational therapy.

## 2024-08-01 ENCOUNTER — PATIENT OUTREACH (OUTPATIENT)
Dept: ADMINISTRATIVE | Facility: CLINIC | Age: 79
End: 2024-08-01
Payer: MEDICARE

## 2024-08-01 LAB
BACTERIA BLD CULT: NORMAL
BACTERIA BLD CULT: NORMAL

## 2024-08-01 NOTE — PROGRESS NOTES
C3 nurse spoke with Radha Sotelo  for a TCC post hospital discharge follow up call. The patient is in agreement with the scheduled [facility] appointment with [facility], Kalyn Pemberton NP, on 08/09/2024 at 0800. The patient's physical address and contact confirmed.

## 2024-08-06 ENCOUNTER — TELEPHONE (OUTPATIENT)
Dept: HOME HEALTH SERVICES | Facility: CLINIC | Age: 79
End: 2024-08-06
Payer: MEDICARE

## 2024-08-06 DIAGNOSIS — G62.9 NEUROPATHY: ICD-10-CM

## 2024-08-06 DIAGNOSIS — Z91.81 HISTORY OF RECENT FALL: Primary | ICD-10-CM

## 2024-08-06 DIAGNOSIS — R26.9 ABNORMALITY OF GAIT AND MOBILITY: ICD-10-CM

## 2024-08-09 ENCOUNTER — OFFICE VISIT (OUTPATIENT)
Dept: HOME HEALTH SERVICES | Facility: CLINIC | Age: 79
End: 2024-08-09
Payer: MEDICARE

## 2024-08-09 DIAGNOSIS — J44.9 CHRONIC OBSTRUCTIVE PULMONARY DISEASE, UNSPECIFIED COPD TYPE: Primary | ICD-10-CM

## 2024-08-09 DIAGNOSIS — Z89.431 PARTIAL NONTRAUMATIC AMPUTATION OF RIGHT FOOT: ICD-10-CM

## 2024-08-09 RX ORDER — TRAMADOL HYDROCHLORIDE 50 MG/1
50 TABLET ORAL EVERY 8 HOURS PRN
Qty: 30 TABLET | Refills: 0 | Status: SHIPPED | OUTPATIENT
Start: 2024-08-09 | End: 2024-08-23

## 2024-08-09 NOTE — PROGRESS NOTES
[facility]  Transitional Care Management (TCM) Home Visit    Encounter Provider: Kalyn Pemberton   PCP: Kalyn Pemberton, NP  Consult Requested By: No ref. provider found  Admit Date: 8/13/24   IP Discharge Date: 8/15/24  Hospital Length of Stay: 4 days  Days since discharge (from IP or SNF):   [facility] Contact Note: 8/6/24  Hospital Diagnosis: No admission diagnoses are documented for this encounter.     HISTORY OF PRESENT ILLNESS      Patient ID: Radha Sotelo is a 79 y.o. female was recently admitted to the hospital, this is their TCM encounter.    Hospital Course Synopsis:    Pt received scheduled breathing treatments as well as steroids. Antibiotics continued for respiratory as well as skin coverage in setting of RLE cellulitis and COPD exacerbation. Continued on BiPAP with improvement in mentation and pCO2. Weaned to home 2L NC and started on nightly BiPAP.     Today, pt is found in her apartment, hospitals. Reports he breathing is currently at her baseline, she is using her oxygen via NC. She is compliant with all her medications.  Her compliant today is pain to her wound to her right foot. She had a partial amputation and the incision is not healing. Silvano is performing wound care. She reports he debrides the wound at every weekly visit and the procedure is painful. The gabapentin is not helping with that pain.     DECISION MAKING TODAY       Assessment & Plan:  1. Chronic obstructive pulmonary disease, unspecified COPD type  Assessment & Plan:  No evidence of exacerbation  Continue supplemental home O2  Continue Breo and DuoNebs      2. Partial nontraumatic amputation of right foot  Assessment & Plan:  Followed by Anthony  Tramadol for pain  Elevate foot      Other orders  -     traMADoL (ULTRAM) 50 mg tablet; Take 1 tablet (50 mg total) by mouth every 8 (eight) hours as needed for Pain.  Dispense: 30 tablet; Refill: 0         Medication List on Discharge:     Medication List             Accurate as of August 9, 2024 11:59 PM. If you have any questions, ask your nurse or doctor.                START taking these medications      traMADoL 50 mg tablet  Commonly known as: ULTRAM  Take 1 tablet (50 mg total) by mouth every 8 (eight) hours as needed for Pain.  Started by: Kalyn Pemberton NP            CONTINUE taking these medications      aspirin 81 MG EC tablet  Commonly known as: ECOTRIN  Take 1 tablet (81 mg total) by mouth once daily.     atorvastatin 80 MG tablet  Commonly known as: LIPITOR  Take 1 tablet (80 mg total) by mouth once daily.     budesonide-formoterol 80-4.5 mcg 80-4.5 mcg/actuation Hfaa  Commonly known as: SYMBICORT  INHALE 2 PUFFS INTO THE LUNGS TWICE DAILY. RINSE MOUTH AFTER USE     clopidogreL 75 mg tablet  Commonly known as: PLAVIX  Take 1 tablet (75 mg total) by mouth once daily.     furosemide 20 MG tablet  Commonly known as: LASIX  Take 1 tablet (20 mg total) by mouth daily as needed (edema/wt gain).     gabapentin 300 MG capsule  Commonly known as: NEURONTIN  Take 1 capsule (300 mg total) by mouth 2 (two) times daily.     hydrocortisone-aloe vera 1 % Crea cream  Apply topically 2 (two) times daily.     metoprolol succinate 25 MG 24 hr tablet  Commonly known as: TOPROL-XL  Take 1 tablet (25 mg total) by mouth once daily.     nicotine 14 mg/24 hr  Commonly known as: NICODERM CQ  Place 1 patch onto the skin once daily.     nitroGLYCERIN 0.4 MG SL tablet  Commonly known as: NITROSTAT  Place 1 tablet (0.4 mg total) under the tongue every 5 (five) minutes as needed for Chest pain.              Medication Reconciliation:  Were medications changed on discharge? No  Were medications in the home? Yes  Is the patient taking the medications as d  irected? Yes  Does the patient understand the medications and changes? Yes  Does updated med list accurately reflects meds patient is currently taking? Yes    ENVIRONMENT OF CARE      Family and/or Caregiver present at visit?  No  Name of  Caregiver:   History provided by: patient    Advance Care Planning   Advanced Care Planning Status:  Patient has had an ACP conversation  Living Will: No  Power of : No  [place]: No    Does Caregiver have HCPoA: No  Changes today:   Is patient hospice appropriate: Yes  (If needed, use PPS <30 or FAST score >7)  Was referral to hospice placed: No       Impression upon entering the home:  Physical Dwelling: apartment/condo   Appearance of home environment: cleaniness: clean  Functional Status: minimal assistance  Mobility: ambulatory with device  Nutritional access: adequate intake and access  Home Health: Yes, HH [facility]    DME/Supplies: rolling walker, oxygen, and wheelchair     Diagnostic tests reviewed/disposition: No diagnosic tests pending after this hospitalization.  Disease/illness education:  COPD  Establishment or re-establishment of referral orders for community resources: No other necessary community resources.   Discussion with other health care providers: No discussion with other health care providers necessary.   Does patient have a PCP at [place]? Yes   Repatriation plan with PCP? Care at Home reason: mobility   Does patient have an ostomy (ileostomy, colostomy, suprapubic catheter, nephrostomy tube, tracheostomy, PEG tube, pleurex catheter, cholecystostomy, etc)? No  Were BPAs reviewed? Yes    Social History     Socioeconomic History    Marital status:    Tobacco Use    Smoking status: Every Day     Current packs/day: 0.25     Average packs/day: 0.5 packs/day for 61.1 years (30.2 ttl pk-yrs)     Types: Cigarettes     Start date: 7/5/1963    Smokeless tobacco: Never   Substance and Sexual Activity    Alcohol use: No    Drug use: No    Sexual activity: Not Currently   Social History Narrative    ** Merged History Encounter **          Social Determinants of Health     Financial Resource Strain: Low Risk  (8/15/2024)    Overall Financial Resource Strain (CARDIA)     Difficulty of Paying  Living Expenses: Not very hard   Food Insecurity: No Food Insecurity (8/15/2024)    Hunger Vital Sign     Worried About Running Out of Food in the Last Year: Never true     Ran Out of Food in the Last Year: Never true   Transportation Needs: No Transportation Needs (8/15/2024)    TRANSPORTATION NEEDS     Transportation : No   Physical Activity: Inactive (7/29/2024)    Exercise Vital Sign     Days of Exercise per Week: 0 days     Minutes of Exercise per Session: 0 min   Stress: No Stress Concern Present (8/15/2024)    [employer] [place] [employer] - Occupational Stress Questionnaire     Feeling of Stress : Only a little   Housing Stability: Low Risk  (8/15/2024)    Housing Stability Vital Sign     Unable to Pay for Housing in the Last Year: No     Homeless in the Last Year: No       OBJECTIVE:     Vital Signs:  There were no vitals filed for this visit.    Review of Systems    Physical Exam:  Physical Exam    INSTRUCTIONS FOR PATIENT:     Scheduled Follow-up, Appts Reviewed with Modifications if Needed: Yes  Future Appointments   Date Time Provider Department Center   8/29/2024 11:30 AM Angela Walker MD [facility]         Signature: Kalyn Pemberton NP    Transition of Care Visit:  I have reviewed and updated the history and problem list.  I have reconciled the medication list.  I have discussed the hospitalization and current medical issues, prognosis and plans with the patient/family.

## 2024-08-12 ENCOUNTER — HOSPITAL ENCOUNTER (EMERGENCY)
Facility: HOSPITAL | Age: 79
Discharge: HOME | End: 2024-08-12
Attending: EMERGENCY MEDICINE
Payer: MEDICARE

## 2024-08-12 VITALS
WEIGHT: 132.25 LBS | DIASTOLIC BLOOD PRESSURE: 78 MMHG | BODY MASS INDEX: 25.83 KG/M2 | TEMPERATURE: 99 F | HEART RATE: 103 BPM | RESPIRATION RATE: 20 BRPM | SYSTOLIC BLOOD PRESSURE: 152 MMHG | OXYGEN SATURATION: 98 %

## 2024-08-12 DIAGNOSIS — R41.82 AMS (ALTERED MENTAL STATUS): ICD-10-CM

## 2024-08-12 DIAGNOSIS — M79.673 FOOT PAIN: ICD-10-CM

## 2024-08-12 DIAGNOSIS — N39.0 URINARY TRACT INFECTION WITHOUT HEMATURIA, SITE UNSPECIFIED: Primary | ICD-10-CM

## 2024-08-12 LAB
ALBUMIN SERPL BCP-MCNC: 3.5 G/DL (ref 3.5–5.2)
ALP SERPL-CCNC: 98 U/L (ref 55–135)
ALT SERPL W/O P-5'-P-CCNC: 13 U/L (ref 10–44)
AMMONIA PLAS-SCNC: 26 UMOL/L (ref 10–50)
ANION GAP SERPL CALC-SCNC: 13 MMOL/L (ref 8–16)
AST SERPL-CCNC: 18 U/L (ref 10–40)
BACTERIA #/AREA URNS HPF: ABNORMAL /HPF
BASOPHILS # BLD AUTO: 0.03 K/UL (ref 0–0.2)
BASOPHILS NFR BLD: 0.4 % (ref 0–1.9)
BILIRUB SERPL-MCNC: 0.6 MG/DL (ref 0.1–1)
BILIRUB UR QL STRIP: NEGATIVE
BNP SERPL-MCNC: 65 PG/ML (ref 0–99)
BUN SERPL-MCNC: 20 MG/DL (ref 8–23)
CALCIUM SERPL-MCNC: 9.4 MG/DL (ref 8.7–10.5)
CHLORIDE SERPL-SCNC: 93 MMOL/L (ref 95–110)
CLARITY UR: CLEAR
CO2 SERPL-SCNC: 34 MMOL/L (ref 23–29)
COLOR UR: YELLOW
CREAT SERPL-MCNC: 0.7 MG/DL (ref 0.5–1.4)
CTP QC/QA: YES
DIFFERENTIAL METHOD BLD: ABNORMAL
EOSINOPHIL # BLD AUTO: 0.2 K/UL (ref 0–0.5)
EOSINOPHIL NFR BLD: 2.3 % (ref 0–8)
ERYTHROCYTE [DISTWIDTH] IN BLOOD BY AUTOMATED COUNT: 13.2 % (ref 11.5–14.5)
EST. GFR  (NO RACE VARIABLE): >60 ML/MIN/1.73 M^2
FIO2: 21 %
GLUCOSE SERPL-MCNC: 104 MG/DL (ref 70–110)
GLUCOSE UR QL STRIP: NEGATIVE
HCT VFR BLD AUTO: 30.2 % (ref 37–48.5)
HGB BLD-MCNC: 9.2 G/DL (ref 12–16)
HGB UR QL STRIP: NEGATIVE
HYALINE CASTS #/AREA URNS LPF: 1 /LPF
IMM GRANULOCYTES # BLD AUTO: 0.03 K/UL (ref 0–0.04)
IMM GRANULOCYTES NFR BLD AUTO: 0.4 % (ref 0–0.5)
INFLUENZA A, MOLECULAR: NEGATIVE
INFLUENZA B, MOLECULAR: NEGATIVE
INR PPP: 1 (ref 0.8–1.2)
KETONES UR QL STRIP: ABNORMAL
LACTATE SERPL-SCNC: 1.1 MMOL/L (ref 0.5–2.2)
LEUKOCYTE ESTERASE UR QL STRIP: ABNORMAL
LIPASE SERPL-CCNC: 9 U/L (ref 4–60)
LYMPHOCYTES # BLD AUTO: 0.6 K/UL (ref 1–4.8)
LYMPHOCYTES NFR BLD: 7.9 % (ref 18–48)
MAGNESIUM SERPL-MCNC: 1.6 MG/DL (ref 1.6–2.6)
MCH RBC QN AUTO: 29.6 PG (ref 27–31)
MCHC RBC AUTO-ENTMCNC: 30.5 G/DL (ref 32–36)
MCV RBC AUTO: 97 FL (ref 82–98)
MICROSCOPIC COMMENT: ABNORMAL
MONOCYTES # BLD AUTO: 0.7 K/UL (ref 0.3–1)
MONOCYTES NFR BLD: 9.9 % (ref 4–15)
NEUTROPHILS # BLD AUTO: 5.9 K/UL (ref 1.8–7.7)
NEUTROPHILS NFR BLD: 79.1 % (ref 38–73)
NITRITE UR QL STRIP: NEGATIVE
NRBC BLD-RTO: 0 /100 WBC
PCO2 BLDA: 72.2 MMHG (ref 35–45)
PH SMN: 7.38 [PH] (ref 7.35–7.45)
PH UR STRIP: 7 [PH] (ref 5–8)
PLATELET # BLD AUTO: 211 K/UL (ref 150–450)
PMV BLD AUTO: 9.9 FL (ref 9.2–12.9)
PO2 BLDA: 23.6 MMHG (ref 40–60)
POC BASE DEFICIT: 15.6 MMOL/L (ref -2–2)
POC HCO3: 43.1 MMOL/L (ref 24–28)
POC PERFORMED BY: ABNORMAL
POC SATURATED O2: 40.3 % (ref 95–100)
POTASSIUM SERPL-SCNC: 4.1 MMOL/L (ref 3.5–5.1)
PROT SERPL-MCNC: 6.7 G/DL (ref 6–8.4)
PROT UR QL STRIP: ABNORMAL
PROTHROMBIN TIME: 11.3 SEC (ref 9–12.5)
RBC # BLD AUTO: 3.11 M/UL (ref 4–5.4)
RBC #/AREA URNS HPF: 2 /HPF (ref 0–4)
SARS-COV-2 RDRP RESP QL NAA+PROBE: NEGATIVE
SODIUM SERPL-SCNC: 140 MMOL/L (ref 136–145)
SP GR UR STRIP: 1.02 (ref 1–1.03)
SPECIMEN SOURCE: ABNORMAL
SPECIMEN SOURCE: NORMAL
SQUAMOUS #/AREA URNS HPF: 1 /HPF
TROPONIN I SERPL DL<=0.01 NG/ML-MCNC: 0.01 NG/ML (ref 0–0.03)
URN SPEC COLLECT METH UR: ABNORMAL
UROBILINOGEN UR STRIP-ACNC: NEGATIVE EU/DL
WBC # BLD AUTO: 7.46 K/UL (ref 3.9–12.7)
WBC #/AREA URNS HPF: 59 /HPF (ref 0–5)

## 2024-08-12 PROCEDURE — 85610 PROTHROMBIN TIME: CPT | Performed by: EMERGENCY MEDICINE

## 2024-08-12 PROCEDURE — 94761 N-INVAS EAR/PLS OXIMETRY MLT: CPT

## 2024-08-12 PROCEDURE — 99285 EMERGENCY DEPT VISIT HI MDM: CPT | Mod: 25,27

## 2024-08-12 PROCEDURE — 85025 COMPLETE CBC W/AUTO DIFF WBC: CPT | Performed by: EMERGENCY MEDICINE

## 2024-08-12 PROCEDURE — 83605 ASSAY OF LACTIC ACID: CPT | Performed by: EMERGENCY MEDICINE

## 2024-08-12 PROCEDURE — 87086 URINE CULTURE/COLONY COUNT: CPT | Performed by: EMERGENCY MEDICINE

## 2024-08-12 PROCEDURE — 96365 THER/PROPH/DIAG IV INF INIT: CPT

## 2024-08-12 PROCEDURE — 83735 ASSAY OF MAGNESIUM: CPT | Performed by: EMERGENCY MEDICINE

## 2024-08-12 PROCEDURE — 83880 ASSAY OF NATRIURETIC PEPTIDE: CPT | Performed by: EMERGENCY MEDICINE

## 2024-08-12 PROCEDURE — 25000003 PHARM REV CODE 250: Performed by: EMERGENCY MEDICINE

## 2024-08-12 PROCEDURE — 27000221 HC OXYGEN, UP TO 24 HOURS

## 2024-08-12 PROCEDURE — 83690 ASSAY OF LIPASE: CPT | Performed by: EMERGENCY MEDICINE

## 2024-08-12 PROCEDURE — 87635 SARS-COV-2 COVID-19 AMP PRB: CPT | Performed by: EMERGENCY MEDICINE

## 2024-08-12 PROCEDURE — 93010 ELECTROCARDIOGRAM REPORT: CPT | Mod: ,,, | Performed by: INTERNAL MEDICINE

## 2024-08-12 PROCEDURE — 84484 ASSAY OF TROPONIN QUANT: CPT | Performed by: EMERGENCY MEDICINE

## 2024-08-12 PROCEDURE — 94640 AIRWAY INHALATION TREATMENT: CPT | Mod: XB

## 2024-08-12 PROCEDURE — 99285 EMERGENCY DEPT VISIT HI MDM: CPT | Mod: 25

## 2024-08-12 PROCEDURE — 81000 URINALYSIS NONAUTO W/SCOPE: CPT | Performed by: EMERGENCY MEDICINE

## 2024-08-12 PROCEDURE — 63600175 PHARM REV CODE 636 W HCPCS: Performed by: EMERGENCY MEDICINE

## 2024-08-12 PROCEDURE — 87502 INFLUENZA DNA AMP PROBE: CPT | Performed by: EMERGENCY MEDICINE

## 2024-08-12 PROCEDURE — 80053 COMPREHEN METABOLIC PANEL: CPT | Performed by: EMERGENCY MEDICINE

## 2024-08-12 PROCEDURE — 82140 ASSAY OF AMMONIA: CPT | Performed by: EMERGENCY MEDICINE

## 2024-08-12 PROCEDURE — 93005 ELECTROCARDIOGRAM TRACING: CPT

## 2024-08-12 PROCEDURE — 25000242 PHARM REV CODE 250 ALT 637 W/ HCPCS: Performed by: EMERGENCY MEDICINE

## 2024-08-12 RX ORDER — CEPHALEXIN 500 MG/1
500 CAPSULE ORAL EVERY 8 HOURS
Qty: 15 CAPSULE | Refills: 0 | Status: SHIPPED | OUTPATIENT
Start: 2024-08-12 | End: 2024-08-13 | Stop reason: HOSPADM

## 2024-08-12 RX ORDER — CEPHALEXIN 500 MG/1
500 CAPSULE ORAL EVERY 8 HOURS
Qty: 15 CAPSULE | Refills: 0 | Status: SHIPPED | OUTPATIENT
Start: 2024-08-12 | End: 2024-08-12

## 2024-08-12 RX ORDER — IPRATROPIUM BROMIDE AND ALBUTEROL SULFATE 2.5; .5 MG/3ML; MG/3ML
3 SOLUTION RESPIRATORY (INHALATION)
Status: COMPLETED | OUTPATIENT
Start: 2024-08-12 | End: 2024-08-12

## 2024-08-12 RX ADMIN — CEFTRIAXONE SODIUM 1 G: 1 INJECTION, POWDER, FOR SOLUTION INTRAMUSCULAR; INTRAVENOUS at 05:08

## 2024-08-12 RX ADMIN — IPRATROPIUM BROMIDE AND ALBUTEROL SULFATE 3 ML: .5; 3 SOLUTION RESPIRATORY (INHALATION) at 01:08

## 2024-08-12 NOTE — DISCHARGE INSTRUCTIONS
Your confusion seems to be related to urinary tract infection.  You have been given IV antibiotics and will be discharged with oral antibiotics.  Please follow-up with your primary care doctor and return the ER for any concerning reason.

## 2024-08-12 NOTE — ED PROVIDER NOTES
Encounter Date: 8/12/2024       History     Chief Complaint   Patient presents with    Altered Mental Status     PT with recent admission for respiratory failure and encephalopathy presenting for AMS and increased pain to right leg amputation.      HPI    79 year old female with PMH notable for COPD on home oxygen, CAD, PVD, HTN, HLD, partial right foot amputation with chronic right leg swelling who presented to emergency department for evaluation of altered mental status as well as increasing to right foot.  Patient is unsure why she was here I asked her what happened and she reports that they told me to come the ER now here.  She denies fever chills chest pain she endorses chronic shortness of breath.  She reports that she has a history of PVD status post right foot amputation has been having pain to that site.  She came the ER for further evaluation via EMS.    Review of patient's allergies indicates:  No Known Allergies  Past Medical History:   Diagnosis Date    Anemia     Anxiety     COPD (chronic obstructive pulmonary disease)     COPD (chronic obstructive pulmonary disease) with emphysema     Coronary artery disease     Depression     Diverticulitis     Hyperlipidemia     Hypertension     PVD (peripheral vascular disease)     PVD (peripheral vascular disease)     S/P colostomy     Substance abuse     hx heavy etoh use     Tobacco abuse      Past Surgical History:   Procedure Laterality Date    ANGIOGRAM, CORONARY, WITH LEFT HEART CATHETERIZATION N/A 11/22/2023    Procedure: Angiogram, Coronary, with Left Heart Cath;  Surgeon: Checo Bhatt MD;  Location: [facility] CATH LAB;  Service: Cardiology;  Laterality: N/A;    ANGIOGRAM, CORONARY, WITH LEFT HEART CATHETERIZATION N/A 5/27/2024    Procedure: Angiogram, Coronary, with Left Heart Cath;  Surgeon: Karel Mack MD;  Location: [facility] CATH LAB;  Service: Cardiology;  Laterality: N/A;    ANGIOGRAM, EXTREMITY, UNILATERAL Right 8/25/2023    Procedure:  ANGIOGRAM, EXTREMITY, UNILATERAL;  Surgeon: Gary Rico MD;  Location: [address];  Service: Vascular;  Laterality: Right;  US GUIDED ACCESS LEFT GROIN  contrast: 150ml  fluoro: 27.9 min  mGy: 254.73  Gycm2: 62.4919  radial flush cocktail: 10ml    ANGIOGRAPHY OF LOWER EXTREMITY Right 8/24/2023    Procedure: Angiogram Extremity Unilateral;  Surgeon: Benji Ashley MD;  Location: [address];  Service: Vascular;  Laterality: Right;    COLOSTOMY      ECTOPIC PREGNANCY SURGERY      PTA, PERONEAL Right 8/25/2023    Procedure: PTA, PERONEAL TIBIAL TRUNK WITH SHOCKWAVE;  Surgeon: Gary Rico MD;  Location: [address];  Service: Vascular;  Laterality: Right;    PTCA, SINGLE VESSEL  11/22/2023    Procedure: PTCA, Single Vessel;  Surgeon: Checo Bhatt MD;  Location: [facility] CATH LAB;  Service: Cardiology;;    right forefoot amputation      right toe amputation      x2 toes    STENT, DRUG ELUTING, SINGLE VESSEL, CORONARY  11/22/2023    Procedure: Stent, Drug Eluting, Single Vessel, Coronary;  Surgeon: Checo Bhatt MD;  Location: [facility] CATH LAB;  Service: Cardiology;;    STENT, DRUG ELUTING, SINGLE VESSEL, CORONARY  5/27/2024    Procedure: Stent, Drug Eluting, Single Vessel, Coronary;  Surgeon: Karel Mack MD;  Location: [facility] CATH LAB;  Service: Cardiology;;    STENT, SUPERFICIAL FEMORAL ARTERY Right 8/25/2023    Procedure: STENT, SUPERFICIAL FEMORAL ARTERY;  Surgeon: Gary Rico MD;  Location: [address];  Service: Vascular;  Laterality: Right;  VIABAHN 6 X 15 X120     No family history on file.  Social History     Tobacco Use    Smoking status: Every Day     Current packs/day: 0.25     Average packs/day: 0.5 packs/day for 61.1 years (30.1 ttl pk-yrs)     Types: Cigarettes     Start date: 7/5/1963    Smokeless tobacco: Never   Substance Use Topics    Alcohol use: No    Drug use: No     Review of Systems   Respiratory:  Positive for  cough and shortness of breath.    All other systems reviewed and are negative.      Physical Exam     Initial Vitals [08/12/24 0029]   BP Pulse Resp Temp SpO2   (!) 156/76 88 18 98.7 °F (37.1 °C) 95 %      MAP       --         Physical Exam    Nursing note and vitals reviewed.  Constitutional:   Elderly chronically ill-appearing on home oxygen no significant distress   HENT:   Head: Normocephalic and atraumatic.   Eyes: EOM are normal. Pupils are equal, round, and reactive to light.   Neck:   Normal range of motion.  Cardiovascular:  Normal rate and regular rhythm.           Murmur heard.  Pulmonary/Chest:   Breathing comfortably on supplemental oxygen diffuse rhonchi no accessory muscle use no distress   Abdominal: Abdomen is soft. She exhibits no distension. There is no abdominal tenderness.   Musculoskeletal:         General: Normal range of motion.      Cervical back: Normal range of motion.      Comments: Status post right foot amputation noted mild erythema no significant warmth to the area no purulent discharge     Neurological: She is alert and oriented to person, place, and time. She has normal strength. GCS score is 15. GCS eye subscore is 4. GCS verbal subscore is 5. GCS motor subscore is 6.   Skin: Skin is warm and dry. Capillary refill takes less than 2 seconds.   Psychiatric: She has a normal mood and affect. Thought content normal.         ED Course   Procedures  Labs Reviewed   COMPREHENSIVE METABOLIC PANEL - Abnormal       Result Value    Sodium 140      Potassium 4.1      Chloride 93 (*)     CO2 34 (*)     Glucose 104      BUN 20      Creatinine 0.7      Calcium 9.4      Total Protein 6.7      Albumin 3.5      Total Bilirubin 0.6      Alkaline Phosphatase 98      AST 18      ALT 13      eGFR >60      Anion Gap 13     CBC W/ AUTO DIFFERENTIAL - Abnormal    WBC 7.46      RBC 3.11 (*)     Hemoglobin 9.2 (*)     Hematocrit 30.2 (*)     MCV 97      MCH 29.6      MCHC 30.5 (*)     RDW 13.2      Platelets  211      MPV 9.9      Immature Granulocytes 0.4      Gran # (ANC) 5.9      Immature Grans (Abs) 0.03      Lymph # 0.6 (*)     Mono # 0.7      Eos # 0.2      Baso # 0.03      nRBC 0      Gran % 79.1 (*)     Lymph % 7.9 (*)     Mono % 9.9      Eosinophil % 2.3      Basophil % 0.4      Differential Method Automated     URINALYSIS, REFLEX TO URINE CULTURE - Abnormal    Specimen UA Urine, Clean Catch      Color, UA Yellow      Appearance, UA Clear      pH, UA 7.0      Specific Gravity, UA 1.020      Protein, UA Trace (*)     Glucose, UA Negative      Ketones, UA 1+ (*)     Bilirubin (UA) Negative      Occult Blood UA Negative      Nitrite, UA Negative      Urobilinogen, UA Negative      Leukocytes, UA 3+ (*)     Narrative:     Specimen Source->Urine   URINALYSIS MICROSCOPIC - Abnormal    RBC, UA 2      WBC, UA 59 (*)     Bacteria Rare      Squam Epithel, UA 1      Hyaline Casts, UA 1      Microscopic Comment SEE COMMENT      Narrative:     Specimen Source->Urine   INFLUENZA A & B BY MOLECULAR    Influenza A, Molecular Negative      Influenza B, Molecular Negative      Flu A & B Source NP     CULTURE, URINE   AMMONIA    Ammonia 26     LACTIC ACID, PLASMA    Lactate (Lactic Acid) 1.1     LIPASE    Lipase 9     MAGNESIUM    Magnesium 1.6     PROTIME-INR    Prothrombin Time 11.3      INR 1.0     TROPONIN I    Troponin I 0.009     B-TYPE NATRIURETIC PEPTIDE   SARS-COV-2 RDRP GENE    POC Rapid COVID Negative       Acceptable Yes     POCT INFLUENZA A/B MOLECULAR          Imaging Results              CT Head Without Contrast (Final result)  Result time 08/12/24 03:17:36      Final result by Reggie Feliz MD (08/12/24 03:17:36)                   Impression:      No acute intracranial abnormalities.      Electronically signed by: Reggie Feliz MD  Date:    08/12/2024  Time:    03:17               Narrative:    EXAMINATION:  CT HEAD WITHOUT CONTRAST    CLINICAL HISTORY:  Mental status change, unknown  cause;    TECHNIQUE:  Low dose axial images were obtained through the head.  Coronal and sagittal reformations were also performed. Contrast was not administered.    COMPARISON:  07/27/2024.    FINDINGS:  The brain parenchyma appears normal for age with good corticomedullary differentiation.  There is no evidence of acute infarct, hemorrhage, or mass.  There is ventricular and sulcal enlargement consistent with generalized atrophy.  Mild confluent decreased supratentorial white matter attenuation most likely related to chronic nonspecific small vessel disease.   No mass-effect or midline shift.  There are no abnormal extra-axial fluid collections.  The paranasal sinuses and mastoid air cells are essentially clear .  The calvarium appears intact.  .                                       X-Ray Chest AP Portable (Final result)  Result time 08/12/24 01:54:45      Final result by Jasper Ramirez DO (08/12/24 01:54:45)                   Impression:      No acute abnormality.      Electronically signed by: Jasper Ramirez  Date:    08/12/2024  Time:    01:54               Narrative:    EXAMINATION:  XR CHEST AP PORTABLE    CLINICAL HISTORY:  Altered mental status, unspecified    TECHNIQUE:  Single frontal view of the chest was performed.    COMPARISON:  07/27/2024.    FINDINGS:  Minimal atelectasis noted in the left lung base.  There is coarse chronic interstitial prominence, stable.  There are remote fractures of the right 5th through 8th ribs.  Remaining osseous structures are intact.  The pleural spaces are clear.  The cardiac silhouette is unremarkable.  There are calcifications of the aortic arch.                                       X-Ray Foot Complete Right (Final result)  Result time 08/12/24 01:53:14      Final result by Jasper Ramirez DO (08/12/24 01:53:14)                   Impression:      No radiographic evidence of acute osteomyelitis.      Electronically signed by: Jasper  Ashley  Date:    08/12/2024  Time:    01:53               Narrative:    EXAMINATION:  XR FOOT COMPLETE 3 VIEW RIGHT    CLINICAL HISTORY:  . Pain in unspecified foot    TECHNIQUE:  AP, lateral, and oblique views of the right foot were performed.    COMPARISON:  07/27/2024.    FINDINGS:  There is diffuse osteopenia.  There is no radiographic evidence of acute osteomyelitis.  There are postoperative changes of transmetatarsal amputation of the foot.  There is no evidence of soft tissue gas.  There is a suspected plantar soft tissue ulceration of the heel.                                       Medications   albuterol-ipratropium 2.5 mg-0.5 mg/3 mL nebulizer solution 3 mL (3 mLs Nebulization Given 8/12/24 0110)   cefTRIAXone (Rocephin) 1 g in D5W 100 mL IVPB (MB+) (0 g Intravenous Stopped 8/12/24 0539)     Medical Decision Making  This is a 79-year-old female presents the ER for evaluation of altered mental status.  Patient does not remember or does not know why she was in the ER.  She was recently admitted for acute encephalopathy from hypercapnic respiratory failure.  She reports she feels that she was back at her baseline she was no significant complaints at this time.  She does report that she has chronic pain right foot.  She has chronic rhonchi but she was not in any respiratory distress her physical exam is grossly reassuring it appears to be at her baseline.  Differential includes hypercapnic respiratory failure, transient confusion, medication reaction, sepsis, stroke other cause will plan blood work CT scans x-ray observation reassess low threshold for admission    Amount and/or Complexity of Data Reviewed  Independent Historian: EMS  External Data Reviewed: labs and notes.  Labs: ordered. Decision-making details documented in ED Course.  Radiology: ordered and independent interpretation performed. Decision-making details documented in ED Course.  ECG/medicine tests: ordered and independent interpretation  performed. Decision-making details documented in ED Course.    Risk  Prescription drug management.               ED Course as of 08/12/24 0553   Mon Aug 12, 2024   0455 Urinalysis, Reflex to Urine Culture Urine, Clean Catch(!) [SE]   0455 Urinalysis Microscopic(!) [SE]   0455 POCT COVID-19 Rapid Screening [SE]   0455 POCT Venous Blood Gas(!!) [SE]   0455 Influenza A & B by Molecular [SE]   0455 Comprehensive metabolic panel(!) [SE]   0455 Lipase [SE]   0455 Ammonia [SE]   0455 Lactic acid, plasma [SE]   0455 Troponin I [SE]   0455 Protime-INR [SE]   0455 CBC auto differential(!) [SE]   0455 CT Head Without Contrast [SE]   0455 X-Ray Chest AP Portable [SE]   0455 X-Ray Foot Complete Right  Patient resting in bed no distress appears at baseline, she was breathing comfortably in bed she was no new complaints.  Labs imaging obtained and reviewed.  VBG revealed hyper CT apnea, however appears chronic pain she was appropriate at 7.38 no leukocytosis no significant left shift no significant electrolyte abnormality noted.  Ammonia lipase within normal limits CT head without acute process x-ray foot without osteo chest x-ray without acute process.  Patient does have +3 leukocyte esterase in the urine concerning for UTI likely source of confusion.  Will plan antibiotics observation anticipate discharge [SE]      ED Course User Index  [SE] aDryl Caban MD                           Clinical Impression:  Final diagnoses:  [R41.82] AMS (altered mental status)  [M79.673] Foot pain  [M79.673] Foot pain - s/p amuptation, asses for osteo  [N39.0] Urinary tract infection without hematuria, site unspecified (Primary)          ED Disposition Condition    Discharge Stable          ED Prescriptions       Medication Sig Dispense Start Date End Date Auth. Provider    cephALEXin (KEFLEX) 500 MG capsule  (Status: Discontinued) Take 1 capsule (500 mg total) by mouth every 8 (eight) hours. for 5 days 15 capsule 8/12/2024 8/12/2024  Daryl Caban MD    cephALEXin (KEFLEX) 500 MG capsule Take 1 capsule (500 mg total) by mouth every 8 (eight) hours. for 5 days 15 capsule 8/12/2024 8/17/2024 Daryl Caban MD          Follow-up Information       Follow up With Specialties Details Why Contact Info    Kalyn Pemberton NP Family Medicine, Palliative Medicine Schedule an appointment as soon as possible for a visit  As needed [address] [facility] [id number]  [phone]               Daryl Cbaan MD  08/12/24 0506

## 2024-08-13 ENCOUNTER — HOSPITAL ENCOUNTER (INPATIENT)
Facility: HOSPITAL | Age: 79
LOS: 1 days | Discharge: HOME HEALTH | DRG: 565 | End: 2024-08-15
Attending: EMERGENCY MEDICINE | Admitting: FAMILY MEDICINE
Payer: MEDICARE

## 2024-08-13 DIAGNOSIS — I73.9 PERIPHERAL VASCULAR DISEASE, UNSPECIFIED: ICD-10-CM

## 2024-08-13 DIAGNOSIS — R07.9 CHEST PAIN: ICD-10-CM

## 2024-08-13 DIAGNOSIS — R06.02 SHORTNESS OF BREATH: Primary | ICD-10-CM

## 2024-08-13 DIAGNOSIS — M86.9 OSTEOMYELITIS: ICD-10-CM

## 2024-08-13 PROBLEM — L03.119 CELLULITIS OF FOOT: Status: ACTIVE | Noted: 2024-08-13

## 2024-08-13 LAB
ALBUMIN SERPL BCP-MCNC: 3.3 G/DL (ref 3.5–5.2)
ALLENS TEST: ABNORMAL
ALLENS TEST: NORMAL
ALP SERPL-CCNC: 101 U/L (ref 55–135)
ALT SERPL W/O P-5'-P-CCNC: 14 U/L (ref 10–44)
ANION GAP SERPL CALC-SCNC: 14 MMOL/L (ref 8–16)
AST SERPL-CCNC: 20 U/L (ref 10–40)
BACTERIA UR CULT: NORMAL
BACTERIA UR CULT: NORMAL
BASOPHILS # BLD AUTO: 0.02 K/UL (ref 0–0.2)
BASOPHILS NFR BLD: 0.3 % (ref 0–1.9)
BILIRUB SERPL-MCNC: 0.5 MG/DL (ref 0.1–1)
BNP SERPL-MCNC: 97 PG/ML (ref 0–99)
BUN SERPL-MCNC: 17 MG/DL (ref 8–23)
CALCIUM SERPL-MCNC: 9.3 MG/DL (ref 8.7–10.5)
CHLORIDE SERPL-SCNC: 95 MMOL/L (ref 95–110)
CO2 SERPL-SCNC: 32 MMOL/L (ref 23–29)
CREAT SERPL-MCNC: 0.6 MG/DL (ref 0.5–1.4)
CRP SERPL-MCNC: 31.4 MG/L (ref 0–8.2)
DIFFERENTIAL METHOD BLD: ABNORMAL
EOSINOPHIL # BLD AUTO: 0.1 K/UL (ref 0–0.5)
EOSINOPHIL NFR BLD: 1.9 % (ref 0–8)
ERYTHROCYTE [DISTWIDTH] IN BLOOD BY AUTOMATED COUNT: 13.2 % (ref 11.5–14.5)
ERYTHROCYTE [SEDIMENTATION RATE] IN BLOOD BY PHOTOMETRIC METHOD: 74 MM/HR (ref 0–36)
EST. GFR  (NO RACE VARIABLE): >60 ML/MIN/1.73 M^2
GLUCOSE SERPL-MCNC: 101 MG/DL (ref 70–110)
HCO3 UR-SCNC: 39 MMOL/L (ref 24–28)
HCT VFR BLD AUTO: 32.5 % (ref 37–48.5)
HGB BLD-MCNC: 9.8 G/DL (ref 12–16)
IMM GRANULOCYTES # BLD AUTO: 0.02 K/UL (ref 0–0.04)
IMM GRANULOCYTES NFR BLD AUTO: 0.3 % (ref 0–0.5)
LDH SERPL L TO P-CCNC: 1.29 MMOL/L (ref 0.5–2.2)
LYMPHOCYTES # BLD AUTO: 0.7 K/UL (ref 1–4.8)
LYMPHOCYTES NFR BLD: 11.3 % (ref 18–48)
MCH RBC QN AUTO: 29 PG (ref 27–31)
MCHC RBC AUTO-ENTMCNC: 30.2 G/DL (ref 32–36)
MCV RBC AUTO: 96 FL (ref 82–98)
MONOCYTES # BLD AUTO: 0.6 K/UL (ref 0.3–1)
MONOCYTES NFR BLD: 8.8 % (ref 4–15)
NEUTROPHILS # BLD AUTO: 4.9 K/UL (ref 1.8–7.7)
NEUTROPHILS NFR BLD: 77.4 % (ref 38–73)
NRBC BLD-RTO: 0 /100 WBC
OHS QRS DURATION: 82 MS
OHS QRS DURATION: 84 MS
OHS QTC CALCULATION: 446 MS
OHS QTC CALCULATION: 473 MS
PCO2 BLDA: 44.1 MMHG (ref 35–45)
PH SMN: 7.55 [PH] (ref 7.35–7.45)
PLATELET # BLD AUTO: 222 K/UL (ref 150–450)
PMV BLD AUTO: 10.1 FL (ref 9.2–12.9)
PO2 BLDA: 51 MMHG (ref 40–60)
POC BE: 17 MMOL/L
POC SATURATED O2: 90 % (ref 95–100)
POC TCO2: 40 MMOL/L (ref 24–29)
POTASSIUM SERPL-SCNC: 3.5 MMOL/L (ref 3.5–5.1)
PROT SERPL-MCNC: 6.6 G/DL (ref 6–8.4)
RBC # BLD AUTO: 3.38 M/UL (ref 4–5.4)
SAMPLE: ABNORMAL
SAMPLE: NORMAL
SITE: ABNORMAL
SITE: NORMAL
SODIUM SERPL-SCNC: 141 MMOL/L (ref 136–145)
TROPONIN I SERPL DL<=0.01 NG/ML-MCNC: 0.02 NG/ML (ref 0–0.03)
TROPONIN I SERPL DL<=0.01 NG/ML-MCNC: 0.03 NG/ML (ref 0–0.03)
WBC # BLD AUTO: 6.36 K/UL (ref 3.9–12.7)

## 2024-08-13 PROCEDURE — 83880 ASSAY OF NATRIURETIC PEPTIDE: CPT | Performed by: EMERGENCY MEDICINE

## 2024-08-13 PROCEDURE — 94761 N-INVAS EAR/PLS OXIMETRY MLT: CPT | Mod: XB

## 2024-08-13 PROCEDURE — 27000221 HC OXYGEN, UP TO 24 HOURS

## 2024-08-13 PROCEDURE — 83605 ASSAY OF LACTIC ACID: CPT

## 2024-08-13 PROCEDURE — 63600175 PHARM REV CODE 636 W HCPCS: Performed by: FAMILY MEDICINE

## 2024-08-13 PROCEDURE — 99223 1ST HOSP IP/OBS HIGH 75: CPT | Mod: GC,,, | Performed by: PODIATRIST

## 2024-08-13 PROCEDURE — 25000003 PHARM REV CODE 250: Performed by: INTERNAL MEDICINE

## 2024-08-13 PROCEDURE — 94640 AIRWAY INHALATION TREATMENT: CPT

## 2024-08-13 PROCEDURE — 82962 GLUCOSE BLOOD TEST: CPT

## 2024-08-13 PROCEDURE — 25000003 PHARM REV CODE 250: Performed by: FAMILY MEDICINE

## 2024-08-13 PROCEDURE — 25000003 PHARM REV CODE 250

## 2024-08-13 PROCEDURE — 63600175 PHARM REV CODE 636 W HCPCS: Performed by: EMERGENCY MEDICINE

## 2024-08-13 PROCEDURE — 25500020 PHARM REV CODE 255: Performed by: FAMILY MEDICINE

## 2024-08-13 PROCEDURE — 86140 C-REACTIVE PROTEIN: CPT | Performed by: EMERGENCY MEDICINE

## 2024-08-13 PROCEDURE — 96367 TX/PROPH/DG ADDL SEQ IV INF: CPT

## 2024-08-13 PROCEDURE — 85025 COMPLETE CBC W/AUTO DIFF WBC: CPT | Performed by: EMERGENCY MEDICINE

## 2024-08-13 PROCEDURE — 82803 BLOOD GASES ANY COMBINATION: CPT

## 2024-08-13 PROCEDURE — 84484 ASSAY OF TROPONIN QUANT: CPT | Mod: 91 | Performed by: INTERNAL MEDICINE

## 2024-08-13 PROCEDURE — 96366 THER/PROPH/DIAG IV INF ADDON: CPT

## 2024-08-13 PROCEDURE — 93005 ELECTROCARDIOGRAM TRACING: CPT

## 2024-08-13 PROCEDURE — 63600175 PHARM REV CODE 636 W HCPCS

## 2024-08-13 PROCEDURE — 87040 BLOOD CULTURE FOR BACTERIA: CPT | Performed by: EMERGENCY MEDICINE

## 2024-08-13 PROCEDURE — 25000242 PHARM REV CODE 250 ALT 637 W/ HCPCS: Performed by: EMERGENCY MEDICINE

## 2024-08-13 PROCEDURE — 84484 ASSAY OF TROPONIN QUANT: CPT | Performed by: EMERGENCY MEDICINE

## 2024-08-13 PROCEDURE — 25000242 PHARM REV CODE 250 ALT 637 W/ HCPCS: Performed by: FAMILY MEDICINE

## 2024-08-13 PROCEDURE — 25000242 PHARM REV CODE 250 ALT 637 W/ HCPCS: Performed by: INTERNAL MEDICINE

## 2024-08-13 PROCEDURE — G0378 HOSPITAL OBSERVATION PER HR: HCPCS

## 2024-08-13 PROCEDURE — 96365 THER/PROPH/DIAG IV INF INIT: CPT | Mod: 59

## 2024-08-13 PROCEDURE — 96372 THER/PROPH/DIAG INJ SC/IM: CPT | Mod: 59

## 2024-08-13 PROCEDURE — 25000003 PHARM REV CODE 250: Performed by: EMERGENCY MEDICINE

## 2024-08-13 PROCEDURE — 93010 ELECTROCARDIOGRAM REPORT: CPT | Mod: ,,, | Performed by: INTERNAL MEDICINE

## 2024-08-13 PROCEDURE — 99900035 HC TECH TIME PER 15 MIN (STAT)

## 2024-08-13 PROCEDURE — 93922 UPR/L XTREMITY ART 2 LEVELS: CPT | Performed by: SURGERY

## 2024-08-13 PROCEDURE — 94640 AIRWAY INHALATION TREATMENT: CPT | Mod: XB

## 2024-08-13 PROCEDURE — 96375 TX/PRO/DX INJ NEW DRUG ADDON: CPT

## 2024-08-13 PROCEDURE — 85652 RBC SED RATE AUTOMATED: CPT | Performed by: EMERGENCY MEDICINE

## 2024-08-13 PROCEDURE — 80053 COMPREHEN METABOLIC PANEL: CPT | Performed by: EMERGENCY MEDICINE

## 2024-08-13 RX ORDER — NIFEDIPINE 30 MG/1
30 TABLET, EXTENDED RELEASE ORAL DAILY
Status: DISCONTINUED | OUTPATIENT
Start: 2024-08-13 | End: 2024-08-15 | Stop reason: HOSPADM

## 2024-08-13 RX ORDER — INSULIN ASPART 100 [IU]/ML
0-5 INJECTION, SOLUTION INTRAVENOUS; SUBCUTANEOUS
Status: DISCONTINUED | OUTPATIENT
Start: 2024-08-13 | End: 2024-08-15 | Stop reason: HOSPADM

## 2024-08-13 RX ORDER — ATORVASTATIN CALCIUM 40 MG/1
80 TABLET, FILM COATED ORAL DAILY
Status: DISCONTINUED | OUTPATIENT
Start: 2024-08-13 | End: 2024-08-15 | Stop reason: HOSPADM

## 2024-08-13 RX ORDER — IBUPROFEN 200 MG
24 TABLET ORAL
Status: DISCONTINUED | OUTPATIENT
Start: 2024-08-13 | End: 2024-08-15 | Stop reason: HOSPADM

## 2024-08-13 RX ORDER — GLUCAGON 1 MG
1 KIT INJECTION
Status: DISCONTINUED | OUTPATIENT
Start: 2024-08-13 | End: 2024-08-15 | Stop reason: HOSPADM

## 2024-08-13 RX ORDER — ASPIRIN 81 MG/1
81 TABLET ORAL DAILY
Status: DISCONTINUED | OUTPATIENT
Start: 2024-08-13 | End: 2024-08-14

## 2024-08-13 RX ORDER — HYDROCODONE BITARTRATE AND ACETAMINOPHEN 5; 325 MG/1; MG/1
1 TABLET ORAL EVERY 6 HOURS PRN
Status: DISCONTINUED | OUTPATIENT
Start: 2024-08-13 | End: 2024-08-15 | Stop reason: HOSPADM

## 2024-08-13 RX ORDER — CLOPIDOGREL BISULFATE 75 MG/1
75 TABLET ORAL DAILY
Status: DISCONTINUED | OUTPATIENT
Start: 2024-08-13 | End: 2024-08-14

## 2024-08-13 RX ORDER — HYDROCODONE BITARTRATE AND ACETAMINOPHEN 7.5; 325 MG/1; MG/1
1 TABLET ORAL EVERY 6 HOURS PRN
Status: DISCONTINUED | OUTPATIENT
Start: 2024-08-13 | End: 2024-08-15 | Stop reason: HOSPADM

## 2024-08-13 RX ORDER — METHYLPREDNISOLONE SOD SUCC 125 MG
125 VIAL (EA) INJECTION
Status: COMPLETED | OUTPATIENT
Start: 2024-08-13 | End: 2024-08-13

## 2024-08-13 RX ORDER — DOXYCYCLINE 100 MG/1
100 CAPSULE ORAL EVERY 12 HOURS
Qty: 20 CAPSULE | Refills: 0 | Status: SHIPPED | OUTPATIENT
Start: 2024-08-13 | End: 2024-08-15 | Stop reason: HOSPADM

## 2024-08-13 RX ORDER — IBUPROFEN 200 MG
16 TABLET ORAL
Status: DISCONTINUED | OUTPATIENT
Start: 2024-08-13 | End: 2024-08-15 | Stop reason: HOSPADM

## 2024-08-13 RX ORDER — METOPROLOL SUCCINATE 25 MG/1
25 TABLET, EXTENDED RELEASE ORAL DAILY
Status: DISCONTINUED | OUTPATIENT
Start: 2024-08-13 | End: 2024-08-15 | Stop reason: HOSPADM

## 2024-08-13 RX ORDER — ENOXAPARIN SODIUM 100 MG/ML
40 INJECTION SUBCUTANEOUS EVERY 24 HOURS
Status: DISCONTINUED | OUTPATIENT
Start: 2024-08-13 | End: 2024-08-15 | Stop reason: HOSPADM

## 2024-08-13 RX ORDER — ASPIRIN 325 MG
325 TABLET ORAL
Status: COMPLETED | OUTPATIENT
Start: 2024-08-13 | End: 2024-08-13

## 2024-08-13 RX ORDER — TRAMADOL HYDROCHLORIDE 50 MG/1
50 TABLET ORAL EVERY 8 HOURS PRN
Status: DISCONTINUED | OUTPATIENT
Start: 2024-08-13 | End: 2024-08-15 | Stop reason: HOSPADM

## 2024-08-13 RX ORDER — IPRATROPIUM BROMIDE AND ALBUTEROL SULFATE 2.5; .5 MG/3ML; MG/3ML
3 SOLUTION RESPIRATORY (INHALATION)
Status: COMPLETED | OUTPATIENT
Start: 2024-08-13 | End: 2024-08-13

## 2024-08-13 RX ORDER — SODIUM CHLORIDE 0.9 % (FLUSH) 0.9 %
10 SYRINGE (ML) INJECTION EVERY 12 HOURS PRN
Status: DISCONTINUED | OUTPATIENT
Start: 2024-08-13 | End: 2024-08-15 | Stop reason: HOSPADM

## 2024-08-13 RX ORDER — NALOXONE HCL 0.4 MG/ML
0.02 VIAL (ML) INJECTION
Status: DISCONTINUED | OUTPATIENT
Start: 2024-08-13 | End: 2024-08-15 | Stop reason: HOSPADM

## 2024-08-13 RX ORDER — IPRATROPIUM BROMIDE AND ALBUTEROL SULFATE 2.5; .5 MG/3ML; MG/3ML
3 SOLUTION RESPIRATORY (INHALATION) EVERY 6 HOURS
Status: DISCONTINUED | OUTPATIENT
Start: 2024-08-13 | End: 2024-08-15 | Stop reason: HOSPADM

## 2024-08-13 RX ORDER — FLUTICASONE FUROATE AND VILANTEROL 100; 25 UG/1; UG/1
1 POWDER RESPIRATORY (INHALATION) DAILY
Status: DISCONTINUED | OUTPATIENT
Start: 2024-08-13 | End: 2024-08-15 | Stop reason: HOSPADM

## 2024-08-13 RX ADMIN — IPRATROPIUM BROMIDE AND ALBUTEROL SULFATE 3 ML: 2.5; .5 SOLUTION RESPIRATORY (INHALATION) at 02:08

## 2024-08-13 RX ADMIN — ENOXAPARIN SODIUM 40 MG: 40 INJECTION SUBCUTANEOUS at 07:08

## 2024-08-13 RX ADMIN — ASPIRIN 81 MG: 81 TABLET, COATED ORAL at 08:08

## 2024-08-13 RX ADMIN — IPRATROPIUM BROMIDE AND ALBUTEROL SULFATE 3 ML: 2.5; .5 SOLUTION RESPIRATORY (INHALATION) at 06:08

## 2024-08-13 RX ADMIN — PIPERACILLIN SODIUM AND TAZOBACTAM SODIUM 4.5 G: 4; .5 INJECTION, POWDER, FOR SOLUTION INTRAVENOUS at 03:08

## 2024-08-13 RX ADMIN — FLUTICASONE FUROATE AND VILANTEROL TRIFENATATE 1 PUFF: 100; 25 POWDER RESPIRATORY (INHALATION) at 08:08

## 2024-08-13 RX ADMIN — IOHEXOL 100 ML: 350 INJECTION, SOLUTION INTRAVENOUS at 08:08

## 2024-08-13 RX ADMIN — METOPROLOL SUCCINATE 25 MG: 25 TABLET, EXTENDED RELEASE ORAL at 08:08

## 2024-08-13 RX ADMIN — ATORVASTATIN CALCIUM 80 MG: 40 TABLET, FILM COATED ORAL at 08:08

## 2024-08-13 RX ADMIN — VANCOMYCIN HYDROCHLORIDE 1000 MG: 1 INJECTION, POWDER, LYOPHILIZED, FOR SOLUTION INTRAVENOUS at 06:08

## 2024-08-13 RX ADMIN — TRAMADOL HYDROCHLORIDE 50 MG: 50 TABLET, COATED ORAL at 11:08

## 2024-08-13 RX ADMIN — METHYLPREDNISOLONE SODIUM SUCCINATE 125 MG: 125 INJECTION, POWDER, FOR SOLUTION INTRAMUSCULAR; INTRAVENOUS at 03:08

## 2024-08-13 RX ADMIN — ASPIRIN 325 MG ORAL TABLET 325 MG: 325 PILL ORAL at 05:08

## 2024-08-13 RX ADMIN — HYDROCODONE BITARTRATE AND ACETAMINOPHEN 1 TABLET: 7.5; 325 TABLET ORAL at 12:08

## 2024-08-13 RX ADMIN — VANCOMYCIN HYDROCHLORIDE 1250 MG: 1.25 INJECTION, POWDER, LYOPHILIZED, FOR SOLUTION INTRAVENOUS at 04:08

## 2024-08-13 RX ADMIN — CLOPIDOGREL BISULFATE 75 MG: 75 TABLET ORAL at 08:08

## 2024-08-13 RX ADMIN — CEFTRIAXONE 1 G: 1 INJECTION, POWDER, FOR SOLUTION INTRAMUSCULAR; INTRAVENOUS at 10:08

## 2024-08-13 RX ADMIN — HYDROCODONE BITARTRATE AND ACETAMINOPHEN 1 TABLET: 7.5; 325 TABLET ORAL at 07:08

## 2024-08-13 RX ADMIN — NIFEDIPINE 30 MG: 30 TABLET, FILM COATED, EXTENDED RELEASE ORAL at 10:08

## 2024-08-13 NOTE — ASSESSMENT & PLAN NOTE
Chronic, stable. Latest blood pressure and vitals reviewed-     Temp:  [97.9 °F (36.6 °C)-99.4 °F (37.4 °C)]   Pulse:  []   Resp:  [14-33]   BP: (152-206)/(60-86)   SpO2:  [95 %-99 %] .   Home meds for hypertension were reviewed and noted below.   Hypertension Medications               furosemide (LASIX) 20 MG tablet Take 1 tablet (20 mg total) by mouth daily as needed (edema/wt gain).    metoprolol succinate (TOPROL-XL) 25 MG 24 hr tablet Take 1 tablet (25 mg total) by mouth once daily.    nitroGLYCERIN (NITROSTAT) 0.4 MG SL tablet Place 1 tablet (0.4 mg total) under the tongue every 5 (five) minutes as needed for Chest pain.            While in the hospital, will manage blood pressure as follows; Continue home antihypertensive regimen    Will utilize p.r.n. blood pressure medication only if patient's blood pressure greater than 180/110 and she develops symptoms such as worsening chest pain or shortness of breath.

## 2024-08-13 NOTE — ED PROVIDER NOTES
Encounter Date: 8/12/2024       History     Chief Complaint   Patient presents with    Shortness of Breath     Pt coming from home for SOB x4 hours. Pt has a history of COPD and is normally on home oxygen. Per EMS pt states home oxygen machine was not working properly at home      HPI patient is a 79-year-old female with a past medical history remarkable for COPD on home oxygen to 3-4 L, coronary artery disease, hypertension and hyperlipidemia, colostomy history of previous right midfoot amputation secondary to peripheral vascular disease who presents emergency department for unclear reasons.  The patient's family members reportedly called 911 for either increased work of breathing or altered mental status.  The patient states that her daughter called 911 because she felt like she needed to be in the hospital but does not recall why her daughter felt like she needed to be in the hospital.  The patient notes maybe some increased shortness of breath over the previous several days denies any cough or change in sputum.  She also feels like her right lower extremity is more warm and red than usual but can not provide any additional details.  The patient does not recall why she has not ostomy.  Review of patient's allergies indicates:  No Known Allergies  Past Medical History:   Diagnosis Date    Anemia     Anxiety     COPD (chronic obstructive pulmonary disease)     COPD (chronic obstructive pulmonary disease) with emphysema     Coronary artery disease     Depression     Diverticulitis     Hyperlipidemia     Hypertension     PVD (peripheral vascular disease)     PVD (peripheral vascular disease)     S/P colostomy     Substance abuse     hx heavy etoh use     Tobacco abuse      Past Surgical History:   Procedure Laterality Date    ANGIOGRAM, CORONARY, WITH LEFT HEART CATHETERIZATION N/A 11/22/2023    Procedure: Angiogram, Coronary, with Left Heart Cath;  Surgeon: Checo Bhatt MD;  Location: [facility] CATH LAB;   Service: Cardiology;  Laterality: N/A;    ANGIOGRAM, CORONARY, WITH LEFT HEART CATHETERIZATION N/A 5/27/2024    Procedure: Angiogram, Coronary, with Left Heart Cath;  Surgeon: Karel Mack MD;  Location: [facility] CATH LAB;  Service: Cardiology;  Laterality: N/A;    ANGIOGRAM, EXTREMITY, UNILATERAL Right 8/25/2023    Procedure: ANGIOGRAM, EXTREMITY, UNILATERAL;  Surgeon: Gary Rico MD;  Location: [address];  Service: Vascular;  Laterality: Right;  US GUIDED ACCESS LEFT GROIN  contrast: 150ml  fluoro: 27.9 min  mGy: 254.73  Gycm2: 62.4919  radial flush cocktail: 10ml    ANGIOGRAPHY OF LOWER EXTREMITY Right 8/24/2023    Procedure: Angiogram Extremity Unilateral;  Surgeon: Benji Ashley MD;  Location: [address];  Service: Vascular;  Laterality: Right;    COLOSTOMY      ECTOPIC PREGNANCY SURGERY      PTA, PERONEAL Right 8/25/2023    Procedure: PTA, PERONEAL TIBIAL TRUNK WITH SHOCKWAVE;  Surgeon: Gary Rico MD;  Location: [address];  Service: Vascular;  Laterality: Right;    PTCA, SINGLE VESSEL  11/22/2023    Procedure: PTCA, Single Vessel;  Surgeon: Checo Bhatt MD;  Location: [facility] CATH LAB;  Service: Cardiology;;    right forefoot amputation      right toe amputation      x2 toes    STENT, DRUG ELUTING, SINGLE VESSEL, CORONARY  11/22/2023    Procedure: Stent, Drug Eluting, Single Vessel, Coronary;  Surgeon: Checo Bhatt MD;  Location: [facility] CATH LAB;  Service: Cardiology;;    STENT, DRUG ELUTING, SINGLE VESSEL, CORONARY  5/27/2024    Procedure: Stent, Drug Eluting, Single Vessel, Coronary;  Surgeon: Karel Mack MD;  Location: [facility] CATH LAB;  Service: Cardiology;;    STENT, SUPERFICIAL FEMORAL ARTERY Right 8/25/2023    Procedure: STENT, SUPERFICIAL FEMORAL ARTERY;  Surgeon: Gary Rico MD;  Location: [address];  Service: Vascular;  Laterality: Right;  VIABAHN 6 X 15 X120     No family history on file.  Social History      Tobacco Use    Smoking status: Every Day     Current packs/day: 0.25     Average packs/day: 0.5 packs/day for 61.1 years (30.2 ttl pk-yrs)     Types: Cigarettes     Start date: 7/5/1963    Smokeless tobacco: Never   Substance Use Topics    Alcohol use: No    Drug use: No         Physical Exam     Initial Vitals [08/12/24 2142]   BP Pulse Resp Temp SpO2   (!) 170/60 110 20 97.9 °F (36.6 °C) 95 %      MAP       --         Physical Exam     Nursing note and vitals reviewed.  Constitutional: Patient is chronically ill-appearing  HENT:   Head: Normocephalic and atraumatic.   Eyes: Conjunctivae and EOM are normal. Pupils are equal, round, and reactive to light.   Neck: Neck supple.   Normal range of motion.  Cardiovascular: Normal rate, regular rhythm, normal heart sounds and intact distal pulses.   Pulmonary/Chest:  Fine crackles bilaterally with prolonged expiratory phase  Abdominal: Abdomen is soft. Patient exhibits no distension. There is no abdominal tenderness.  Ostomy in place with normal-appearing stool, no blood  Musculoskeletal:                  Neurological: Patient is alert and oriented to person, place, and time. No cranial nerve deficit.  GCS score is 15.  Skin: Skin is warm and dry.  Psych: Normal mood/affect    ED Course   Procedures  Labs Reviewed   COMPREHENSIVE METABOLIC PANEL - Abnormal       Result Value    Sodium 141      Potassium 3.5      Chloride 95      CO2 32 (*)     Glucose 101      BUN 17      Creatinine 0.6      Calcium 9.3      Total Protein 6.6      Albumin 3.3 (*)     Total Bilirubin 0.5      Alkaline Phosphatase 101      AST 20      ALT 14      eGFR >60.0      Anion Gap 14     TROPONIN I - Abnormal    Troponin I 0.032 (*)    C-REACTIVE PROTEIN - Abnormal    CRP 31.4 (*)    SEDIMENTATION RATE - Abnormal    Sed Rate 74 (*)    CBC W/ AUTO DIFFERENTIAL - Abnormal    WBC 6.36      RBC 3.38 (*)     Hemoglobin 9.8 (*)     Hematocrit 32.5 (*)     MCV 96      MCH 29.0      MCHC 30.2 (*)      RDW 13.2      Platelets 222      MPV 10.1      Immature Granulocytes 0.3      Gran # (ANC) 4.9      Immature Grans (Abs) 0.02      Lymph # 0.7 (*)     Mono # 0.6      Eos # 0.1      Baso # 0.02      nRBC 0      Gran % 77.4 (*)     Lymph % 11.3 (*)     Mono % 8.8      Eosinophil % 1.9      Basophil % 0.3      Differential Method Automated     ISTAT PROCEDURE - Abnormal    POC PH 7.555 (*)     POC PCO2 44.1      POC PO2 51      POC HCO3 39.0 (*)     POC BE 17 (*)     POC SATURATED O2 90      POC TCO2 40 (*)     Sample VENOUS      Site Other      Allens Test N/A     CULTURE, BLOOD   CULTURE, BLOOD   B-TYPE NATRIURETIC PEPTIDE    BNP 97     ISTAT LACTATE    POC Lactate 1.29      Sample VENOUS      Site Other      Allens Test N/A            Imaging Results              X-Ray Chest AP Portable (Final result)  Result time 08/13/24 04:07:14      Final result by Reggie Feliz MD (08/13/24 04:07:14)                   Impression:      Underinflated lungs with hypoventilatory change.    Otherwise, no significant change from prior study.      Electronically signed by: Reggie Feliz MD  Date:    08/13/2024  Time:    04:07               Narrative:    EXAMINATION:  XR CHEST AP PORTABLE    CLINICAL HISTORY:  CHF;    TECHNIQUE:  Single frontal view of the chest was performed.    COMPARISON:  08/12/2024    FINDINGS:  Slight decrease in lung volumes with hypoventilatory changes compared with prior.    Previously described chronic interstitial changes and left base atelectasis appears similar.  Remote rib fractures appear similar to prior.    Heart and lungs  appear unchanged when allowing for differences in technique and positioning.                                       Medications   vancomycin 1,250 mg in D5W 250 mL IVPB (Vial-Mate) (1,250 mg Intravenous New Bag 8/13/24 5622)   albuterol-ipratropium 2.5 mg-0.5 mg/3 mL nebulizer solution 3 mL (3 mLs Nebulization Given 8/13/24 7552)   methylPREDNISolone sodium succinate  injection 125 mg (125 mg Intravenous Given 8/13/24 1173)   piperacillin-tazobactam (ZOSYN) 4.5 g in D5W 100 mL IVPB (MB+) (0 g Intravenous Stopped 8/13/24 3939)     Medical Decision Making  Amount and/or Complexity of Data Reviewed  Labs: ordered.  Radiology: ordered.    Risk  Prescription drug management.                     I have personally reviewed and interpreted the patients laboratory studies:   Lactate 1.29, pH 7.55/pCO2 of 44/HCO3 39, ESR and CRP elevated, troponin 0.03, BNP within normal limits, CMP unremarkable, hemoglobin 9.8  I have personally reviewed and interpreted imaging studies:  Chronic appearing fibrotic disease with chronic appearing left-sided effusion  I have personally reviewed and interpreted the patient's EKG:  Sinus tachycardia at 104 beats per minute, QRS 84,       I have also reviewed the following:   external records:  Troponin day before yesterday 0.009, echocardiogram from 05/2024 with EF of 60-65% and normal diastolic function       Patient arrives with mild respiratory distress with prolonged expiratory phase suggestive of possible COPD exacerbation however is stable on baseline oxygen.  Chest x-ray is not remarkable for infiltrate or pulmonary edema to suggest pneumonia or volume overload.  Patient's respiratory status improved with inhaled beta agonist, Solu-Medrol.    The patient was noted have an elevated troponin with a nonischemic appearing EKG, and no dysrhythmia.  Patient received aspirin and will plan for admission for repeat troponins and telemetry monitoring.    In addition, patient with evidence of erythema and warmth of the right lower extremity concerning for possible infection versus progression of peripheral arterial disease.  Patient received broad-spectrum antibiotics will plan for further evaluation as inpatient.                 Clinical Impression:  Final diagnoses:  [R06.02] Shortness of breath                 Twyla Seals MD  08/13/24 0633

## 2024-08-13 NOTE — H&P
[facility] - Emergency Dept  [facility] Medicine  History & Physical    Patient Name: Radha Sotelo  MRN: [id number]  Patient Class: OP- Observation  Admission Date: 8/13/2024  Attending Physician: Jeffery Duarte III*   Primary Care Provider: Kalyn Pemberton NP         Patient information was obtained from patient and ER records.     Subjective:     Principal Problem:Cellulitis of right leg    Chief Complaint:   Chief Complaint   Patient presents with    Shortness of Breath     Pt coming from home for SOB x4 hours. Pt has a history of COPD and is normally on home oxygen. Per EMS pt states home oxygen machine was not working properly at home         HPI: Ms. Sotelo is a 79-year-old female with a past medical history of COPD on home oxygen 3 L. The patient states her she lives at home with her granddaughter and had difficulty breathing whereby her daughter called EMS. EMS stated the patient's home oxygen machine was not working properly. She is admitted to medicine for possible sepsis. She had amputation of the toes on her right foot 3 months ago. She denies any pain, nausea, vomiting, or fevers. Patient history was difficult to gather as she was confused and not able to answer questions coherently.     Past Medical History:   Diagnosis Date    Anemia     Anxiety     COPD (chronic obstructive pulmonary disease)     COPD (chronic obstructive pulmonary disease) with emphysema     Coronary artery disease     Depression     Diverticulitis     Hyperlipidemia     Hypertension     PVD (peripheral vascular disease)     PVD (peripheral vascular disease)     S/P colostomy     Substance abuse     hx heavy etoh use     Tobacco abuse        Past Surgical History:   Procedure Laterality Date    ANGIOGRAM, CORONARY, WITH LEFT HEART CATHETERIZATION N/A 11/22/2023    Procedure: Angiogram, Coronary, with Left Heart Cath;  Surgeon: Checo Bhatt MD;  Location: [facility] CATH LAB;  Service: Cardiology;  Laterality: N/A;     ANGIOGRAM, CORONARY, WITH LEFT HEART CATHETERIZATION N/A 5/27/2024    Procedure: Angiogram, Coronary, with Left Heart Cath;  Surgeon: Karel Mack MD;  Location: [facility] CATH LAB;  Service: Cardiology;  Laterality: N/A;    ANGIOGRAM, EXTREMITY, UNILATERAL Right 8/25/2023    Procedure: ANGIOGRAM, EXTREMITY, UNILATERAL;  Surgeon: Gary Rico MD;  Location: [address];  Service: Vascular;  Laterality: Right;  US GUIDED ACCESS LEFT GROIN  contrast: 150ml  fluoro: 27.9 min  mGy: 254.73  Gycm2: 62.4919  radial flush cocktail: 10ml    ANGIOGRAPHY OF LOWER EXTREMITY Right 8/24/2023    Procedure: Angiogram Extremity Unilateral;  Surgeon: Benji Ashley MD;  Location: [address];  Service: Vascular;  Laterality: Right;    COLOSTOMY      ECTOPIC PREGNANCY SURGERY      PTA, PERONEAL Right 8/25/2023    Procedure: PTA, PERONEAL TIBIAL TRUNK WITH SHOCKWAVE;  Surgeon: Gary Rico MD;  Location: [address];  Service: Vascular;  Laterality: Right;    PTCA, SINGLE VESSEL  11/22/2023    Procedure: PTCA, Single Vessel;  Surgeon: Checo Bhatt MD;  Location: [facility] CATH LAB;  Service: Cardiology;;    right forefoot amputation      right toe amputation      x2 toes    STENT, DRUG ELUTING, SINGLE VESSEL, CORONARY  11/22/2023    Procedure: Stent, Drug Eluting, Single Vessel, Coronary;  Surgeon: Checo Bhatt MD;  Location: [facility] CATH LAB;  Service: Cardiology;;    STENT, DRUG ELUTING, SINGLE VESSEL, CORONARY  5/27/2024    Procedure: Stent, Drug Eluting, Single Vessel, Coronary;  Surgeon: Karel Mack MD;  Location: [facility] CATH LAB;  Service: Cardiology;;    STENT, SUPERFICIAL FEMORAL ARTERY Right 8/25/2023    Procedure: STENT, SUPERFICIAL FEMORAL ARTERY;  Surgeon: Gary Rico MD;  [address];  Service: Vascular;  Laterality: Right;  VIABAHN 6 X 15 X120       Review of patient's allergies indicates:  No Known Allergies    No current  facility-administered medications on file prior to encounter.     Current Outpatient Medications on File Prior to Encounter   Medication Sig    albuterol (PROVENTIL/VENTOLIN HFA) 90 mcg/actuation inhaler Inhale 2 puffs into the lungs every 6 (six) hours. Rescue    albuterol-ipratropium (DUO-NEB) 2.5 mg-0.5 mg/3 mL nebulizer solution Take 3 mLs by nebulization every 6 (six) hours as needed for Wheezing or Shortness of Breath.    aspirin (ECOTRIN) 81 MG EC tablet Take 1 tablet (81 mg total) by mouth once daily.    atorvastatin (LIPITOR) 80 MG tablet Take 1 tablet (80 mg total) by mouth once daily.    budesonide-formoterol 80-4.5 mcg (SYMBICORT) 80-4.5 mcg/actuation HFAA INHALE 2 PUFFS INTO THE LUNGS TWICE DAILY. RINSE MOUTH AFTER USE    cephALEXin (KEFLEX) 500 MG capsule Take 1 capsule (500 mg total) by mouth every 8 (eight) hours. for 5 days    clopidogreL (PLAVIX) 75 mg tablet Take 1 tablet (75 mg total) by mouth once daily.    furosemide (LASIX) 20 MG tablet Take 1 tablet (20 mg total) by mouth daily as needed (edema/wt gain). (Patient not taking: Reported on 8/1/2024)    gabapentin (NEURONTIN) 300 MG capsule Take 1 capsule (300 mg total) by mouth 2 (two) times daily. (Patient not taking: Reported on 8/1/2024)    hydrocortisone-aloe vera 1 % Crea cream Apply topically 2 (two) times daily. (Patient not taking: Reported on 8/1/2024)    metoprolol succinate (TOPROL-XL) 25 MG 24 hr tablet Take 1 tablet (25 mg total) by mouth once daily.    nicotine (NICODERM CQ) 14 mg/24 hr Place 1 patch onto the skin once daily.    nitroGLYCERIN (NITROSTAT) 0.4 MG SL tablet Place 1 tablet (0.4 mg total) under the tongue every 5 (five) minutes as needed for Chest pain.    traMADoL (ULTRAM) 50 mg tablet Take 1 tablet (50 mg total) by mouth every 8 (eight) hours as needed for Pain.     Family History    None       Tobacco Use    Smoking status: Every Day     Current packs/day: 0.25     Average packs/day: 0.5 packs/day for 61.1 years  (30.2 ttl pk-yrs)     Types: Cigarettes     Start date: 7/5/1963    Smokeless tobacco: Never   Substance and Sexual Activity    Alcohol use: No    Drug use: No    Sexual activity: Not Currently     Review of Systems   Constitutional:  Negative for chills, fatigue and fever.   HENT:  Negative for sore throat.    Respiratory:  Negative for cough and shortness of breath.    Cardiovascular:  Negative for chest pain.   Gastrointestinal:  Negative for abdominal pain.   Genitourinary:  Negative for hematuria.   Neurological:  Negative for dizziness, light-headedness and headaches.     Objective:     Vital Signs (Most Recent):  Temp: 98.5 °F (36.9 °C) (08/13/24 0600)  Pulse: 101 (08/13/24 0600)  Resp: 20 (08/13/24 0600)  BP: (!) 168/86 (08/13/24 0600)  SpO2: 96 % (08/13/24 0600) Vital Signs (24h Range):  Temp:  [97.9 °F (36.6 °C)-99.4 °F (37.4 °C)] 98.5 °F (36.9 °C)  Pulse:  [] 101  Resp:  [14-33] 20  SpO2:  [95 %-99 %] 96 %  BP: (152-206)/(60-86) 168/86     Weight: 59.9 kg (132 lb)  Body mass index is 25.78 kg/m².     Physical Exam  Constitutional:       General: She is not in acute distress.     Appearance: She is ill-appearing.   HENT:      Mouth/Throat:      Mouth: Mucous membranes are dry.   Cardiovascular:      Rate and Rhythm: Regular rhythm. Tachycardia present.   Pulmonary:      Breath sounds: No wheezing.   Abdominal:      Palpations: Abdomen is soft.      Tenderness: There is no abdominal tenderness.   Musculoskeletal:         General: Swelling present.      Comments: 2+ edema in right leg, not painful to palpation   Neurological:      Comments: Oriented to self and place but not to the day                 Significant Labs: All pertinent labs within the past 24 hours have been reviewed.  Recent Lab Results         08/13/24  0308   08/13/24  0257   08/13/24  0254        Albumin     3.3       ALP     101       Allens Test N/A   N/A         ALT     14       Anion Gap     14       AST     20       Baso #      0.02       Basophil %     0.3       BILIRUBIN TOTAL     0.5  Comment: For infants and newborns, interpretation of results should be based  on gestational age, weight and in agreement with clinical  observations.    Premature Infant recommended reference ranges:  Up to 24 hours.............<8.0 mg/dL  Up to 48 hours............<12.0 mg/dL  3-5 days..................<15.0 mg/dL  6-29 days.................<15.0 mg/dL         BNP     97  Comment: Values of less than 100 pg/ml are consistent with non-CHF populations.       Site Other   Other         BUN     17       Calcium     9.3       Chloride     95       CO2     32       Creatinine     0.6       CRP     31.4       Differential Method     Automated       eGFR     >60.0       Eos #     0.1       Eos %     1.9       Glucose     101       Gran # (ANC)     4.9       Gran %     77.4       Hematocrit     32.5       Hemoglobin     9.8       Immature Grans (Abs)     0.02  Comment: Mild elevation in immature granulocytes is non specific and   can be seen in a variety of conditions including stress response,   acute inflammation, trauma and pregnancy. Correlation with other   laboratory and clinical findings is essential.         Immature Granulocytes     0.3       Lymph #     0.7       Lymph %     11.3       MCH     29.0       MCHC     30.2       MCV     96       Mono #     0.6       Mono %     8.8       MPV     10.1       nRBC     0       Platelet Count     222       POC BE   17         POC HCO3   39.0         POC Lactate 1.29           POC PCO2   44.1         POC PH   7.555         POC PO2   51         POC SATURATED O2   90         POC TCO2   40         Potassium     3.5       PROTEIN TOTAL     6.6       RBC     3.38       RDW     13.2       Sample VENOUS   VENOUS         Sed Rate     74       Sodium     141       Troponin I     0.032  Comment: The reference interval for Troponin I represents the 99th percentile   cutoff   for our facility and is consistent with 3rd  generation assay   performance.         WBC     6.36               Significant Imaging: I have reviewed all pertinent imaging results/findings within the past 24 hours.  Assessment/Plan:     * Peripheral arterial disease  Nonhealing right foot wound, possible SSTI  Known PAD with multiple RLE procedures  ABIs ordered  Consider vascular surgery consult    Cellulitis of right foot  Continue vanc and Rocephin  Podiatry consulted  Follow up ABIs      Chronic diastolic heart failure  - continue home medications: metoprolol 24 hr 25 mg tablet       Partial nontraumatic amputation of right foot  - Podiatry consulted, appreciate recommendations       HLD (hyperlipidemia)  - continue home meds: atorvastatin 80 mg QD      Essential hypertension  Improved with pain control and home meds, continue    COPD (chronic obstructive pulmonary disease)  No evidence of exacerbation  Continue supplemental home O2  Continue Breo and DuoNebs    Elevated troponin  - trend troponin       Chronic hypoxic respiratory failure  -continue home O2 of 3 L  -titrate O2 for sats 88-92%  -continue Breo and scheduled DuoNebs    CAD (coronary artery disease)  Patient with known CAD.  Will continue ASA, Plavix, and Statin and monitor for S/Sx of angina/ACS. Continue to monitor on telemetry.       VTE Risk Mitigation (From admission, onward)      None                   On 08/13/2024, patient should be placed in hospital observation services.    Pharmacokinetic Initial Assessment: IV Vancomycin    Assessment/Plan:    Initiate intravenous vancomycin with loading dose of 1250 mg once followed by a maintenance dose of vancomycin 1000 mg IV every 12 hours  Desired empiric serum trough concentration is 15 to 20 mcg/mL  Draw vancomycin trough level 60 min prior to fourth dose on 08/14/2024 at approximately 16:00.  Pharmacy will continue to follow and monitor vancomycin.      Please contact pharmacy at extension [phone] with any questions regarding this assessment.  "    Thank you for the consult,   Blas Denton       Patient brief summary:  Radha Sotelo is a 79 y.o. female initiated on antimicrobial therapy with IV Vancomycin for treatment of suspected bacteremia    Drug Allergies:   Review of patient's allergies indicates:  No Known Allergies    Actual Body Weight:   59.9 kg    Renal Function:   Estimated Creatinine Clearance: 62.6 mL/min (based on SCr of 0.6 mg/dL).,     Dialysis Method (if applicable):  N/A    CBC (last 72 hours):  Recent Labs   Lab Result Units 08/12/24  0118 08/13/24  0254   WBC K/uL 7.46 6.36   Hemoglobin g/dL 9.2* 9.8*   Hematocrit % 30.2* 32.5*   Platelets K/uL 211 222   Gran % % 79.1* 77.4*   Lymph % % 7.9* 11.3*   Mono % % 9.9 8.8   Eosinophil % % 2.3 1.9   Basophil % % 0.4 0.3   Differential Method  Automated Automated       Metabolic Panel (last 72 hours):  Recent Labs   Lab Result Units 08/12/24  0118 08/12/24  0400 08/13/24  0254   Sodium mmol/L 140  --  141   Potassium mmol/L 4.1  --  3.5   Chloride mmol/L 93*  --  95   CO2 mmol/L 34*  --  32*   Glucose mg/dL 104  --  101   Glucose, UA   --  Negative  --    BUN mg/dL 20  --  17   Creatinine mg/dL 0.7  --  0.6   Albumin g/dL 3.5  --  3.3*   Total Bilirubin mg/dL 0.6  --  0.5   Alkaline Phosphatase U/L 98  --  101   AST U/L 18  --  20   ALT U/L 13  --  14   Magnesium mg/dL 1.6  --   --        Drug levels (last 3 results):  No results for input(s): "VANCOMYCINRA", "VANCORANDOM", "VANCOMYCINPE", "VANCOPEAK", "VANCOMYCINTR", "VANCOTROUGH" in the last 72 hours.    Microbiologic Results:  Microbiology Results (last 7 days)       Procedure Component Value Units Date/Time    Blood culture x two cultures. Draw prior to antibiotics. [[id number]] Collected: 08/13/24 0255    Order Status: Sent Specimen: Blood from Peripheral, Antecubital, Right Updated: 08/13/24 0306    Blood culture [[id number]] Collected: 08/13/24 0255    Order Status: Sent Specimen: Blood from Peripheral, Antecubital, Left Updated: " 08/13/24 0306    Blood culture x two cultures. Draw prior to antibiotics. [[id number]]     Order Status: Canceled Specimen: Blood     Blood culture [[id number]]     Order Status: Canceled Specimen: Blood               Maldonado Alexander MD  Department of Hospital Medicine  [facility] - Emergency Dept

## 2024-08-13 NOTE — PLAN OF CARE
LITO contacted pt daughter Anthony [phone].  As per daughter she is at work until 7pm.  As per daughter they are unsure where they get the home oxygen from.  Daughter asked LITO to contact her spouse Walter who is not working today [phone].  LITO contacted Walter and left a message    LITO contacted [facility] and asked if pt received O2 from them.  As per [facility] pt does receive O2 from them and provided the number to call for service.  As per [facility] the family needs to call [phone] in order to get machine serviced.    LITO contacted son-in-law Walter again and provided him with the phone number to get the machine services.  LITO also called daughter Atnhony again and left a voicemail at 1251hrs with the number for the O2 service        Amy Leblanc CD, MSW, LMSW, RSW   Case Management  [facility]  Email: [email or web address]

## 2024-08-13 NOTE — ASSESSMENT & PLAN NOTE
79 year old female with RLE redness and edema. Wound at TMA site and right plantar heel, patient declined debridement of the wounds today 8/13. NICCI's ordered as patient had non-palpable pulses and no signal on doppler at bedside along with a non-healing wound. WBC wnl, VSS.     Recommendations:  ABIs pending   Erythema consistent with possible SSTI, recommend broad-spectrum antibiotics  Offloading boots ordered for patient to wear when in bed  TMA site intact, except for the medial border. Additional wound on the right plantar heel. Patient declined debridement.  Betadine paint to the wound at TMA site and plantar heel.  Podiatry will continue to follow    Discharge Recommendations  - Patient to follow up in outpatient Podiatry clinic. Podiatry will schedule  - Antibiotics per Primary  - Betadine paint and left open to air   - WBAT  - Keep dressings clean, dry, and intact until follow-up appointment

## 2024-08-13 NOTE — ED NOTES
Received report from GUSTAVO Thorpe    LOC: The patient is awake, alert and aware of environment with an appropriate affect, the patient is oriented x 3 and speaking appropriately.   APPEARANCE: Patient appears comfortable and in no acute distress, patient is clean and well groomed.  SKIN: The skin is warm and dry, color consistent with ethnicity, patient has normal skin turgor and moist mucus membranes, skin intact, no breakdown or bruising noted.   MUSCULOSKELETAL: Patient moving all extremities spontaneously, no swelling noted. Pt reports 10/10 pain in right foot, given PRN tramadol for pain.  RESPIRATORY: Airway is open and patent, respirations are spontaneous, patient has a normal effort and rate, no accessory muscle. Denies SOB, currently on 3L O2 via NC.  CARDIAC: Pt placed on cardiac monitor. Patient has a normal rate and regular rhythm, no edema noted, capillary refill < 3 seconds. Denies CP.  GASTRO: Soft and non tender to palpation, no distention noted. Colostomy bag in LLQ.  : Pt denies any pain or frequency with urination.  NEURO: Pt opens eyes spontaneously, behavior appropriate to situation, follows commands, facial expression symmetrical, bilateral hand grasp equal and even, purposeful motor response noted, normal sensation in all extremities when touched with a finger.

## 2024-08-13 NOTE — SUBJECTIVE & OBJECTIVE
Scheduled Meds:   aspirin  81 mg Oral Daily    atorvastatin  80 mg Oral Daily    cefTRIAXone (Rocephin) IV (PEDS and ADULTS)  1 g Intravenous Q24H    clopidogreL  75 mg Oral Daily    fluticasone furoate-vilanteroL  1 puff Inhalation Daily    metoprolol succinate  25 mg Oral Daily    NIFEdipine  30 mg Oral Daily    vancomycin (VANCOCIN) IV (PEDS and ADULTS)  1,000 mg Intravenous Q12H     Continuous Infusions:  PRN Meds:  Current Facility-Administered Medications:     HYDROcodone-acetaminophen, 1 tablet, Oral, Q6H PRN    HYDROcodone-acetaminophen, 1 tablet, Oral, Q6H PRN    traMADoL, 50 mg, Oral, Q8H PRN    Pharmacy to dose Vancomycin consult, , , Once **AND** vancomycin - pharmacy to dose, , Intravenous, pharmacy to manage frequency    Review of patient's allergies indicates:  No Known Allergies     Past Medical History:   Diagnosis Date    Anemia     Anxiety     COPD (chronic obstructive pulmonary disease)     COPD (chronic obstructive pulmonary disease) with emphysema     Coronary artery disease     Depression     Diverticulitis     Hyperlipidemia     Hypertension     PVD (peripheral vascular disease)     PVD (peripheral vascular disease)     S/P colostomy     Substance abuse     hx heavy etoh use     Tobacco abuse      Past Surgical History:   Procedure Laterality Date    ANGIOGRAM, CORONARY, WITH LEFT HEART CATHETERIZATION N/A 11/22/2023    Procedure: Angiogram, Coronary, with Left Heart Cath;  Surgeon: Checo Bhatt MD;  Location: [facility] CATH LAB;  Service: Cardiology;  Laterality: N/A;    ANGIOGRAM, CORONARY, WITH LEFT HEART CATHETERIZATION N/A 5/27/2024    Procedure: Angiogram, Coronary, with Left Heart Cath;  Surgeon: Karel Mack MD;  Location: [facility] CATH LAB;  Service: Cardiology;  Laterality: N/A;    ANGIOGRAM, EXTREMITY, UNILATERAL Right 8/25/2023    Procedure: ANGIOGRAM, EXTREMITY, UNILATERAL;  Surgeon: Gary Rico MD;  Location: [facility] OR [address] [place] [address];  Service: Vascular;  Laterality:  Right;  US GUIDED ACCESS LEFT GROIN  contrast: 150ml  fluoro: 27.9 min  mGy: 254.73  Gycm2: 62.4919  radial flush cocktail: 10ml    ANGIOGRAPHY OF LOWER EXTREMITY Right 8/24/2023    Procedure: Angiogram Extremity Unilateral;  Surgeon: Benji Ashley MD;  Location: [address];  Service: Vascular;  Laterality: Right;    COLOSTOMY      ECTOPIC PREGNANCY SURGERY      PTA, PERONEAL Right 8/25/2023    Procedure: PTA, PERONEAL TIBIAL TRUNK WITH SHOCKWAVE;  Surgeon: Gary Rico MD;  Location: [address];  Service: Vascular;  Laterality: Right;    PTCA, SINGLE VESSEL  11/22/2023    Procedure: PTCA, Single Vessel;  Surgeon: Checo Bhatt MD;  Location: [facility] CATH LAB;  Service: Cardiology;;    right forefoot amputation      right toe amputation      x2 toes    STENT, DRUG ELUTING, SINGLE VESSEL, CORONARY  11/22/2023    Procedure: Stent, Drug Eluting, Single Vessel, Coronary;  Surgeon: Checo Bhatt MD;  Location: [facility] CATH LAB;  Service: Cardiology;;    STENT, DRUG ELUTING, SINGLE VESSEL, CORONARY  5/27/2024    Procedure: Stent, Drug Eluting, Single Vessel, Coronary;  Surgeon: Karel Mack MD;  Location: [facility] CATH LAB;  Service: Cardiology;;    STENT, SUPERFICIAL FEMORAL ARTERY Right 8/25/2023    Procedure: STENT, SUPERFICIAL FEMORAL ARTERY;  Surgeon: Gary Rico MD;  Location: [address];  Service: Vascular;  Laterality: Right;  VIABAHN 6 X 15 X120       Family History    None       Tobacco Use    Smoking status: Every Day     Current packs/day: 0.25     Average packs/day: 0.5 packs/day for 61.1 years (30.2 ttl pk-yrs)     Types: Cigarettes     Start date: 7/5/1963    Smokeless tobacco: Never   Substance and Sexual Activity    Alcohol use: No    Drug use: No    Sexual activity: Not Currently     Review of Systems   Constitutional:  Negative for chills and fever.   Cardiovascular:  Positive for leg swelling.   Gastrointestinal:  Negative for diarrhea,  nausea and vomiting.   Skin:  Positive for color change and wound.   Psychiatric/Behavioral:  Negative for confusion.      Objective:     Vital Signs (Most Recent):  Temp: 98.4 °F (36.9 °C) (08/13/24 1615)  Pulse: 94 (08/13/24 1615)  Resp: 18 (08/13/24 1615)  BP: (!) 143/81 (08/13/24 1615)  SpO2: 96 % (08/13/24 1615) Vital Signs (24h Range):  Temp:  [97.9 °F (36.6 °C)-99.4 °F (37.4 °C)] 98.4 °F (36.9 °C)  Pulse:  [] 94  Resp:  [14-33] 18  SpO2:  [94 %-99 %] 96 %  BP: (143-206)/(60-86) 143/81     Weight: 59.9 kg (132 lb)  Body mass index is 24.94 kg/m².    Foot Exam    General  Orientation: alert and oriented to person, place, and time       Right Foot/Ankle     Inspection and Palpation  Tenderness: (Patient was tender to palpation of the RLE)  Swelling: (RLE swelling present)    Neurovascular  Dorsalis pedis: absent  Posterior tibial: absent    Comments  There is erythema and edema present in the RLE. No warmth. Medial side of the TMA site has a wound with eschar at the base, no drainage present. Additionally there is a wound on the plantar heel. Eschar present at the wound base, no drainage present. Back of the heel has some redness possibly secondary to pressure.     Left Foot/Ankle      Inspection and Palpation  Ecchymosis: dorsum    Neurovascular  Dorsalis pedis: absent  Posterior tibial: absent    Comments  No wounds present. No redness or pain on palpation. No signs of infection.  There is ecchymosis present on the dorsum of the foot over the 4th and 5th metatarsals. Back of the heel has some redness possibly secondary to pressure.                               Laboratory:    Blood Cultures:   Recent Labs   Lab 08/13/24 0255   LABBLOO No Growth to date  No Growth to date     CBC:   Recent Labs   Lab 08/13/24 0254   WBC 6.36   RBC 3.38*   HGB 9.8*   HCT 32.5*      MCV 96   MCH 29.0   MCHC 30.2*     All pertinent labs reviewed within the last 24 hours.    Diagnostic Results:  EXAMINATION:  XR  FOOT COMPLETE 3 VIEW RIGHT     CLINICAL HISTORY:  . Osteomyelitis, unspecified     TECHNIQUE:  AP, lateral, and oblique views of the right foot were performed.     COMPARISON:  Foot radiograph 08/12/2024, 07/27/2024.     FINDINGS:  Bones are demineralized.  There are postoperative changes of transmetatarsal amputation.  No acute fracture or osseous destructive process seen to suggest osteomyelitis, noting limited sensitivity of radiographs.  No significant soft tissue abnormality.  Vascular calcifications noted.     Impression:     As above.     Electronically signed by resident: James Morejon  Date:                                            08/13/2024  Time:                                           06:33     Electronically signed by:Huber De La Paz MD  Date:                                            08/13/2024  Time:                                           06:38

## 2024-08-13 NOTE — SUBJECTIVE & OBJECTIVE
Past Medical History:   Diagnosis Date    Anemia     Anxiety     COPD (chronic obstructive pulmonary disease)     COPD (chronic obstructive pulmonary disease) with emphysema     Coronary artery disease     Depression     Diverticulitis     Hyperlipidemia     Hypertension     PVD (peripheral vascular disease)     PVD (peripheral vascular disease)     S/P colostomy     Substance abuse     hx heavy etoh use     Tobacco abuse        Past Surgical History:   Procedure Laterality Date    ANGIOGRAM, CORONARY, WITH LEFT HEART CATHETERIZATION N/A 11/22/2023    Procedure: Angiogram, Coronary, with Left Heart Cath;  Surgeon: Checo Bhatt MD;  Location: [facility] CATH LAB;  Service: Cardiology;  Laterality: N/A;    ANGIOGRAM, CORONARY, WITH LEFT HEART CATHETERIZATION N/A 5/27/2024    Procedure: Angiogram, Coronary, with Left Heart Cath;  Surgeon: Karel Mack MD;  Location: [facility] CATH LAB;  Service: Cardiology;  Laterality: N/A;    ANGIOGRAM, EXTREMITY, UNILATERAL Right 8/25/2023    Procedure: ANGIOGRAM, EXTREMITY, UNILATERAL;  Surgeon: Gary Rico MD;  Location: [facility] OR [address] [place] [address];  Service: Vascular;  Laterality: Right;  US GUIDED ACCESS LEFT GROIN  contrast: 150ml  fluoro: 27.9 min  mGy: 254.73  Gycm2: 62.4919  radial flush cocktail: 10ml    ANGIOGRAPHY OF LOWER EXTREMITY Right 8/24/2023    Procedure: Angiogram Extremity Unilateral;  Surgeon: Benji Ashley MD;  [facility];  Service: Vascular;  Laterality: Right;    COLOSTOMY      ECTOPIC PREGNANCY SURGERY      PTA, PERONEAL Right 8/25/2023    Procedure: PTA, PERONEAL TIBIAL TRUNK WITH SHOCKWAVE;  Surgeon: Gary Rico MD;  [facility];  Service: Vascular;  Laterality: Right;    PTCA, SINGLE VESSEL  11/22/2023    Procedure: PTCA, Single Vessel;  Surgeon: Checo Bhatt MD;  Location: [facility] CATH LAB;  Service: Cardiology;;    right forefoot amputation      right toe amputation      x2 toes    STENT,  DRUG ELUTING, SINGLE VESSEL, CORONARY  11/22/2023    Procedure: Stent, Drug Eluting, Single Vessel, Coronary;  Surgeon: Checo Bhatt MD;  Location: [facility] CATH LAB;  Service: Cardiology;;    STENT, DRUG ELUTING, SINGLE VESSEL, CORONARY  5/27/2024    Procedure: Stent, Drug Eluting, Single Vessel, Coronary;  Surgeon: Karel Mack MD;  Location: [facility] CATH LAB;  Service: Cardiology;;    STENT, SUPERFICIAL FEMORAL ARTERY Right 8/25/2023    Procedure: STENT, SUPERFICIAL FEMORAL ARTERY;  Surgeon: Gary Rico MD;  Location: [facility] OR [facility] FLR;  Service: Vascular;  Laterality: Right;  VIABAHN 6 X 15 X120       Review of patient's allergies indicates:  No Known Allergies    No current facility-administered medications on file prior to encounter.     Current Outpatient Medications on File Prior to Encounter   Medication Sig    albuterol (PROVENTIL/VENTOLIN HFA) 90 mcg/actuation inhaler Inhale 2 puffs into the lungs every 6 (six) hours. Rescue    albuterol-ipratropium (DUO-NEB) 2.5 mg-0.5 mg/3 mL nebulizer solution Take 3 mLs by nebulization every 6 (six) hours as needed for Wheezing or Shortness of Breath.    aspirin (ECOTRIN) 81 MG EC tablet Take 1 tablet (81 mg total) by mouth once daily.    atorvastatin (LIPITOR) 80 MG tablet Take 1 tablet (80 mg total) by mouth once daily.    budesonide-formoterol 80-4.5 mcg (SYMBICORT) 80-4.5 mcg/actuation HFAA INHALE 2 PUFFS INTO THE LUNGS TWICE DAILY. RINSE MOUTH AFTER USE    cephALEXin (KEFLEX) 500 MG capsule Take 1 capsule (500 mg total) by mouth every 8 (eight) hours. for 5 days    clopidogreL (PLAVIX) 75 mg tablet Take 1 tablet (75 mg total) by mouth once daily.    furosemide (LASIX) 20 MG tablet Take 1 tablet (20 mg total) by mouth daily as needed (edema/wt gain). (Patient not taking: Reported on 8/1/2024)    gabapentin (NEURONTIN) 300 MG capsule Take 1 capsule (300 mg total) by mouth 2 (two) times daily. (Patient not taking: Reported on 8/1/2024)     hydrocortisone-aloe vera 1 % Crea cream Apply topically 2 (two) times daily. (Patient not taking: Reported on 8/1/2024)    metoprolol succinate (TOPROL-XL) 25 MG 24 hr tablet Take 1 tablet (25 mg total) by mouth once daily.    nicotine (NICODERM CQ) 14 mg/24 hr Place 1 patch onto the skin once daily.    nitroGLYCERIN (NITROSTAT) 0.4 MG SL tablet Place 1 tablet (0.4 mg total) under the tongue every 5 (five) minutes as needed for Chest pain.    traMADoL (ULTRAM) 50 mg tablet Take 1 tablet (50 mg total) by mouth every 8 (eight) hours as needed for Pain.     Family History    None       Tobacco Use    Smoking status: Every Day     Current packs/day: 0.25     Average packs/day: 0.5 packs/day for 61.1 years (30.2 ttl pk-yrs)     Types: Cigarettes     Start date: 7/5/1963    Smokeless tobacco: Never   Substance and Sexual Activity    Alcohol use: No    Drug use: No    Sexual activity: Not Currently     Review of Systems   Constitutional:  Negative for chills, fatigue and fever.   HENT:  Negative for sore throat.    Respiratory:  Negative for cough and shortness of breath.    Cardiovascular:  Negative for chest pain.   Gastrointestinal:  Negative for abdominal pain.   Genitourinary:  Negative for hematuria.   Neurological:  Negative for dizziness, light-headedness and headaches.     Objective:     Vital Signs (Most Recent):  Temp: 98.5 °F (36.9 °C) (08/13/24 0600)  Pulse: 101 (08/13/24 0600)  Resp: 20 (08/13/24 0600)  BP: (!) 168/86 (08/13/24 0600)  SpO2: 96 % (08/13/24 0600) Vital Signs (24h Range):  Temp:  [97.9 °F (36.6 °C)-99.4 °F (37.4 °C)] 98.5 °F (36.9 °C)  Pulse:  [] 101  Resp:  [14-33] 20  SpO2:  [95 %-99 %] 96 %  BP: (152-206)/(60-86) 168/86     Weight: 59.9 kg (132 lb)  Body mass index is 25.78 kg/m².     Physical Exam  Constitutional:       General: She is not in acute distress.     Appearance: She is ill-appearing.   HENT:      Mouth/Throat:      Mouth: Mucous membranes are dry.   Cardiovascular:       Rate and Rhythm: Regular rhythm. Tachycardia present.   Pulmonary:      Breath sounds: No wheezing.   Abdominal:      Palpations: Abdomen is soft.      Tenderness: There is no abdominal tenderness.   Musculoskeletal:         General: Swelling present.      Comments: 2+ edema in right leg, not painful to palpation   Neurological:      Comments: Oriented to self and place but not to the day                 Significant Labs: All pertinent labs within the past 24 hours have been reviewed.  Recent Lab Results         08/13/24  0308   08/13/24  0257   08/13/24  0254        Albumin     3.3       ALP     101       Allens Test N/A   N/A         ALT     14       Anion Gap     14       AST     20       Baso #     0.02       Basophil %     0.3       BILIRUBIN TOTAL     0.5  Comment: For infants and newborns, interpretation of results should be based  on gestational age, weight and in agreement with clinical  observations.    Premature Infant recommended reference ranges:  Up to 24 hours.............<8.0 mg/dL  Up to 48 hours............<12.0 mg/dL  3-5 days..................<15.0 mg/dL  6-29 days.................<15.0 mg/dL         BNP     97  Comment: Values of less than 100 pg/ml are consistent with non-CHF populations.       Site Other   Other         BUN     17       Calcium     9.3       Chloride     95       CO2     32       Creatinine     0.6       CRP     31.4       Differential Method     Automated       eGFR     >60.0       Eos #     0.1       Eos %     1.9       Glucose     101       Gran # (ANC)     4.9       Gran %     77.4       Hematocrit     32.5       Hemoglobin     9.8       Immature Grans (Abs)     0.02  Comment: Mild elevation in immature granulocytes is non specific and   can be seen in a variety of conditions including stress response,   acute inflammation, trauma and pregnancy. Correlation with other   laboratory and clinical findings is essential.         Immature Granulocytes     0.3       Lymph #      0.7       Lymph %     11.3       MCH     29.0       MCHC     30.2       MCV     96       Mono #     0.6       Mono %     8.8       MPV     10.1       nRBC     0       Platelet Count     222       POC BE   17         POC HCO3   39.0         POC Lactate 1.29           POC PCO2   44.1         POC PH   7.555         POC PO2   51         POC SATURATED O2   90         POC TCO2   40         Potassium     3.5       PROTEIN TOTAL     6.6       RBC     3.38       RDW     13.2       Sample VENOUS   VENOUS         Sed Rate     74       Sodium     141       Troponin I     0.032  Comment: The reference interval for Troponin I represents the 99th percentile   cutoff   for our facility and is consistent with 3rd generation assay   performance.         WBC     6.36               Significant Imaging: I have reviewed all pertinent imaging results/findings within the past 24 hours.

## 2024-08-13 NOTE — PROGRESS NOTES
"Pharmacokinetic Initial Assessment: IV Vancomycin    Assessment/Plan:    Initiate intravenous vancomycin with loading dose of 1250 mg once followed by a maintenance dose of vancomycin 1000 mg IV every 12 hours  Desired empiric serum trough concentration is 15 to 20 mcg/mL  Draw vancomycin trough level 60 min prior to fourth dose on 08/14/2024 at approximately 16:00.  Pharmacy will continue to follow and monitor vancomycin.      Please contact pharmacy at extension [phone] with any questions regarding this assessment.     Thank you for the consult,   Blas Denton       Patient brief summary:  Radha Sotelo is a 79 y.o. female initiated on antimicrobial therapy with IV Vancomycin for treatment of suspected bacteremia    Drug Allergies:   Review of patient's allergies indicates:  No Known Allergies    Actual Body Weight:   59.9 kg    Renal Function:   Estimated Creatinine Clearance: 62.6 mL/min (based on SCr of 0.6 mg/dL).,     Dialysis Method (if applicable):  N/A    CBC (last 72 hours):  Recent Labs   Lab Result Units 08/12/24  0118 08/13/24  0254   WBC K/uL 7.46 6.36   Hemoglobin g/dL 9.2* 9.8*   Hematocrit % 30.2* 32.5*   Platelets K/uL 211 222   Gran % % 79.1* 77.4*   Lymph % % 7.9* 11.3*   Mono % % 9.9 8.8   Eosinophil % % 2.3 1.9   Basophil % % 0.4 0.3   Differential Method  Automated Automated       Metabolic Panel (last 72 hours):  Recent Labs   Lab Result Units 08/12/24  0118 08/12/24  0400 08/13/24  0254   Sodium mmol/L 140  --  141   Potassium mmol/L 4.1  --  3.5   Chloride mmol/L 93*  --  95   CO2 mmol/L 34*  --  32*   Glucose mg/dL 104  --  101   Glucose, UA   --  Negative  --    BUN mg/dL 20  --  17   Creatinine mg/dL 0.7  --  0.6   Albumin g/dL 3.5  --  3.3*   Total Bilirubin mg/dL 0.6  --  0.5   Alkaline Phosphatase U/L 98  --  101   AST U/L 18  --  20   ALT U/L 13  --  14   Magnesium mg/dL 1.6  --   --        Drug levels (last 3 results):  No results for input(s): "VANCOMYCINRA", "VANCORANDOM", " ""VANCOMYCINPE", "VANCOPEAK", "VANCOMYCINTR", "VANCOTROUGH" in the last 72 hours.    Microbiologic Results:  Microbiology Results (last 7 days)       Procedure Component Value Units Date/Time    Blood culture x two cultures. Draw prior to antibiotics. [[id number]] Collected: 08/13/24 0255    Order Status: Sent Specimen: Blood from Peripheral, Antecubital, Right Updated: 08/13/24 0306    Blood culture [[id number]] Collected: 08/13/24 0255    Order Status: Sent Specimen: Blood from Peripheral, Antecubital, Left Updated: 08/13/24 0306    Blood culture x two cultures. Draw prior to antibiotics. [[id number]]     Order Status: Canceled Specimen: Blood     Blood culture [[id number]]     Order Status: Canceled Specimen: Blood             "

## 2024-08-13 NOTE — ED NOTES
Assumed care of pt at this time. VSS, RR even and unlabored. Resting in bed comfortably. No voiced compaints of pain or discomfort at this time. Safety protocols remain, will continue to monitor.     Patient identifiers verified and correct for Radha Sotelo    LOC: The patient is awake, alert and aware of environment with an appropriate affect, the patient is oriented x 4 and speaking appropriately.   APPEARANCE: Patient appears comfortable and in no acute distress, patient is clean and well groomed.  SKIN: The skin is warm and dry, color consistent with ethnicity, patient has normal skin turgor and moist mucus membranes, wound to R foot and heel.  MUSCULOSKELETAL: Patient moving all extremities spontaneously, no swelling noted.  RESPIRATORY: Airway is open and patent, respirations are spontaneous, patient has a normal effort and rate, no accessory muscle use noted, pt placed on continuous pulse ox with O2 sats noted at 97% on 3L via NC  CARDIAC: Pt placed on cardiac monitor. Patient has a normal rate and regular rhythm, no edema noted, capillary refill < 3 seconds.   GASTRO: Soft and non tender to palpation, no distention noted, normoactive bowel sounds present in all four quadrants. Pt states bowel movements have been regular.  : Pt denies any pain or frequency with urination.  NEURO: Pt opens eyes spontaneously, behavior appropriate to situation, follows commands, facial expression symmetrical, bilateral hand grasp equal and even, purposeful motor response noted, normal sensation in all extremities when touched with a finger.   Statement Selected Statement Selected Statement Selected Statement Selected

## 2024-08-13 NOTE — ED NOTES
Nurses Note -- 4 Eyes      8/13/2024   7:53 AM      Skin assessed during: Q Shift Change      [] No Altered Skin Integrity Present    []Prevention Measures Documented      [x] Yes- Altered Skin Integrity Present or Discovered   [x] LDA Added if Not in Epic (Describe Wound)   [x] New Altered Skin Integrity was Present on Admit and Documented in LDA   [x] Wound Image Taken    Wound Care Consulted? No    Attending Nurse:  Magdiel Mcintosh RN/Staff Member:   Kyle

## 2024-08-13 NOTE — ED NOTES
Nurses Note -- 4 Eyes      8/13/2024   5:38 AM      Skin assessed during: Admit      [] No Altered Skin Integrity Present    []Prevention Measures Documented      [x] Yes- Altered Skin Integrity Present or Discovered   [x] LDA Added if Not in Epic (Describe Wound)   [x] New Altered Skin Integrity was Present on Admit and Documented in LDA   [x] Wound Image Taken    Wound Care Consulted? Yes    Attending Nurse:  Kyle Mcintosh RN/Staff Member:  Brian

## 2024-08-13 NOTE — HOSPITAL COURSE
Pt arrived to [facility] ED with acute on chronic respiratory failure. Symptoms resolve with the administration of oxygen. EMT report suspicious for non functioning oxygen tank for pt at home. RLE appeared stable but NICCI result indicated advanced imaging and vascular consult. Vascular recommended R AKA, patient declined.

## 2024-08-13 NOTE — HPI
Ms. Sotelo is a 79-year-old female with a past medical history of COPD on home oxygen 3 L. The patient states her she lives at home with her granddaughter and had difficulty breathing whereby her daughter called EMS. EMS stated the patient's home oxygen machine was not working properly. She is admitted to medicine for possible sepsis. She had amputation of the toes on her right foot 3 months ago. She denies any pain, nausea, vomiting, or fevers. Patient history was difficult to gather as she was confused and not able to answer questions coherently.

## 2024-08-13 NOTE — ED NOTES
PRN pain medication given, pt reports pain 10/10, states the Tramadol did not help relieve pain at all.

## 2024-08-13 NOTE — ED TRIAGE NOTES
Radha Sotelo, a 79 y.o. female presents to the ED w/ complaint of worsening SOB, states that she has been feeling SOB for the past week, states that she Is on 4L NC, but her work of breathing has been worse, was seen yesterday at [facility] and discharged, denies any pain anywhere, just states that she is not feeling well.     Triage note:  Chief Complaint   Patient presents with    Shortness of Breath     Pt coming from home for SOB x4 hours. Pt has a history of COPD and is normally on home oxygen. Per EMS pt states home oxygen machine was not working properly at home      Review of patient's allergies indicates:  No Known Allergies  Past Medical History:   Diagnosis Date    Anemia     Anxiety     COPD (chronic obstructive pulmonary disease)     COPD (chronic obstructive pulmonary disease) with emphysema     Coronary artery disease     Depression     Diverticulitis     Hyperlipidemia     Hypertension     PVD (peripheral vascular disease)     PVD (peripheral vascular disease)     S/P colostomy     Substance abuse     hx heavy etoh use     Tobacco abuse      
99
99

## 2024-08-13 NOTE — HPI
Ms. Radha Sotelo is a 79 year old female with a past medical history of COPD, CAD, PVD, HTN, HLD, chroninc swelling of the right leg, and a previous right foot TMA. She presented to the ED 8/12 after family called 911 for SOB and altered mental status. The patient also states that she has had increasing redness in her right leg and pain at her TMA site where she has a wound. Radiographs of the right foot taken 8/12 show bone demineralization, postoperative changes post TMA, and no acute fracture or osseous destructive process seen to suggest osteomyelitis. No significant soft tissue abnormality. Vascular calcifications noted.  Patient is currently on IV Rocephin and vancomycin. WBC 6.36, VSS.

## 2024-08-13 NOTE — PLAN OF CARE
[facility] - Emergency Dept  Initial Discharge Assessment       Primary Care Provider: Kalyn Pemberton NP    Admission Diagnosis: Shortness of breath [R06.02]    Admission Date: 8/13/2024  Expected Discharge Date:     Pt stated her granddaughter lives with her and will help when required.  Pt stated her PCP comes to her home and provides care.  On chart review pt is with [facility] and they provide wound care for the patient in the home.      Pt to d/c home with no needs when ready    Transition of Care Barriers: (P) None    Payor: frents [facility] / Plan: frents CHOICES / Product Type: Medicare Advantage /     Extended Emergency Contact Information  Primary Emergency Contact: Anthony Reardon  Mobile Phone: [phone]  Relation: Daughter   needed? No    Discharge Plan A: (P) Home with family  Discharge Plan B: (P) Home      [facility] - [facility], [place] - [address]  [address] [facility] [address] [address] [facility] [address]  Phone: [phone] Fax: [phone]    [id number] - [place], [place] - [address]  [address] [facility] [address]  Phone: [phone] Fax: [phone]    [facility]. - [place], [place] - [address] [facility] [address]  [address] [address]  Phone: [phone] Fax: [phone]    [id number] - [place], [place] - [address] [facility]  [address] [place] [address]  Phone: [phone] Fax: [phone]      Initial Assessment (most recent)       Adult Discharge Assessment - 08/13/24 0913          Discharge Assessment    Assessment Type Discharge Planning Assessment     Confirmed/corrected address, phone number and insurance Yes     Confirmed Demographics Correct on Facesheet     Source of Information patient     Reason For Admission Cellulitis of right leg     People in Home grandchild(coral)     Facility Arrived From: home      Do you expect to return to your current living situation? Yes     Do you have help at home or someone to help you manage your care at home? No     Prior to hospitilization cognitive status: Alert/Oriented;No Deficits     Current cognitive status: No Deficits;Alert/Oriented     Walking or Climbing Stairs Difficulty yes     Walking or Climbing Stairs ambulation difficulty, requires equipment     Mobility Management walker, wheechair     Dressing/Bathing Difficulty no     Home Accessibility not wheelchair accessible;stairs to enter home     Number of Stairs, Main Entrance other (see comments)   15-20 steps    Home Layout Able to live on 1st floor (P)      Equipment Currently Used at Home oxygen;shower chair;wheelchair;walker, standard (P)    3L continuous    Patient currently being followed by outpatient case management? Yes (P)      If yes, name of outpatient case management following[facility] (P)      Do you currently have service(s) that help you manage your care at home? Yes (P)      Name and Contact number of agency [facility] - wound care (P)      Is the pt/caregiver preference to resume services with current agency Yes (P)      Do you have any problems affording any of your prescribed medications? No (P)      How do you get to doctors appointments? family or friend will provide;other (see comments) (P)    PCP comes to home for care    Are you on dialysis? No (P)      Do you take coumadin? No (P)      Discharge Plan A Home with family (P)      Discharge Plan B Home (P)      DME Needed Upon Discharge  none (P)      Discharge Plan discussed with: Patient (P)      Transition of Care Barriers None (P)         OTHER    Name(s) of People in Home Granddaughter (P)                         Discharge Plan A and Plan B have been determined by review of patient's clinical status, future medical and therapeutic needs, and coverage/benefits for post-acute care in coordination with  multidisciplinary team members.    Amy Leblanc CD, MSW, LMSW, RSW   Case Management  [facility]  Email: [email or web address]

## 2024-08-13 NOTE — DISCHARGE INSTRUCTIONS
- schedule an appointment with your PCP for a hospital follow-up appointment within the next 2 weeks  - go to the ED if you experience any of the following: fever, chills, chest pain, difficulty breathing, abdominal pain, nausea, vomiting, diarrhea, or debilitating pain.  - call your PCP for any other concerns. If your PCP is unavailable, please go to the ED.

## 2024-08-13 NOTE — SUBJECTIVE & OBJECTIVE
Scheduled Meds:   aspirin  81 mg Oral Daily    atorvastatin  80 mg Oral Daily    cefTRIAXone (Rocephin) IV (PEDS and ADULTS)  1 g Intravenous Q24H    clopidogreL  75 mg Oral Daily    fluticasone furoate-vilanteroL  1 puff Inhalation Daily    metoprolol succinate  25 mg Oral Daily    NIFEdipine  30 mg Oral Daily    vancomycin (VANCOCIN) IV (PEDS and ADULTS)  1,000 mg Intravenous Q12H     Continuous Infusions:  PRN Meds:  Current Facility-Administered Medications:     traMADoL, 50 mg, Oral, Q8H PRN    Pharmacy to dose Vancomycin consult, , , Once **AND** vancomycin - pharmacy to dose, , Intravenous, pharmacy to manage frequency    Review of patient's allergies indicates:  No Known Allergies     Past Medical History:   Diagnosis Date    Anemia     Anxiety     COPD (chronic obstructive pulmonary disease)     COPD (chronic obstructive pulmonary disease) with emphysema     Coronary artery disease     Depression     Diverticulitis     Hyperlipidemia     Hypertension     PVD (peripheral vascular disease)     PVD (peripheral vascular disease)     S/P colostomy     Substance abuse     hx heavy etoh use     Tobacco abuse      Past Surgical History:   Procedure Laterality Date    ANGIOGRAM, CORONARY, WITH LEFT HEART CATHETERIZATION N/A 11/22/2023    Procedure: Angiogram, Coronary, with Left Heart Cath;  Surgeon: Checo Bhatt MD;  Location: [facility] CATH LAB;  Service: Cardiology;  Laterality: N/A;    ANGIOGRAM, CORONARY, WITH LEFT HEART CATHETERIZATION N/A 5/27/2024    Procedure: Angiogram, Coronary, with Left Heart Cath;  Surgeon: Karel Mack MD;  Location: [facility] CATH LAB;  Service: Cardiology;  Laterality: N/A;    ANGIOGRAM, EXTREMITY, UNILATERAL Right 8/25/2023    Procedure: ANGIOGRAM, EXTREMITY, UNILATERAL;  Surgeon: Gary Rico MD;  Location: [facility] OR [address] [place] [address];  Service: Vascular;  Laterality: Right;  US GUIDED ACCESS LEFT GROIN  contrast: 150ml  fluoro: 27.9 min  mGy: 254.73  Gycm2: 62.4919  radial  flush cocktail: 10ml    ANGIOGRAPHY OF LOWER EXTREMITY Right 8/24/2023    Procedure: Angiogram Extremity Unilateral;  Surgeon: Benji Ashley MD;  Location: [address];  Service: Vascular;  Laterality: Right;    COLOSTOMY      ECTOPIC PREGNANCY SURGERY      PTA, PERONEAL Right 8/25/2023    Procedure: PTA, PERONEAL TIBIAL TRUNK WITH SHOCKWAVE;  Surgeon: Gary Rico MD;  Location: [address];  Service: Vascular;  Laterality: Right;    PTCA, SINGLE VESSEL  11/22/2023    Procedure: PTCA, Single Vessel;  Surgeon: Checo Bhatt MD;  Location: [facility] CATH LAB;  Service: Cardiology;;    right forefoot amputation      right toe amputation      x2 toes    STENT, DRUG ELUTING, SINGLE VESSEL, CORONARY  11/22/2023    Procedure: Stent, Drug Eluting, Single Vessel, Coronary;  Surgeon: Checo Bhatt MD;  Location: [facility] CATH LAB;  Service: Cardiology;;    STENT, DRUG ELUTING, SINGLE VESSEL, CORONARY  5/27/2024    Procedure: Stent, Drug Eluting, Single Vessel, Coronary;  Surgeon: Karel Mack MD;  Location: [facility] CATH LAB;  Service: Cardiology;;    STENT, SUPERFICIAL FEMORAL ARTERY Right 8/25/2023    Procedure: STENT, SUPERFICIAL FEMORAL ARTERY;  Surgeon: Gary Rico MD;  Location: [address];  Service: Vascular;  Laterality: Right;  VIABAHN 6 X 15 X120       Family History    None       Tobacco Use    Smoking status: Every Day     Current packs/day: 0.25     Average packs/day: 0.5 packs/day for 61.1 years (30.2 ttl pk-yrs)     Types: Cigarettes     Start date: 7/5/1963    Smokeless tobacco: Never   Substance and Sexual Activity    Alcohol use: No    Drug use: No    Sexual activity: Not Currently     Review of Systems   Constitutional:  Negative for chills and fever.   Cardiovascular:  Positive for leg swelling.   Gastrointestinal:  Negative for diarrhea, nausea and vomiting.   Skin:  Positive for color change and wound.   Psychiatric/Behavioral:  Negative for  confusion.      Objective:     Vital Signs (Most Recent):  Temp: 98.6 °F (37 °C) (08/13/24 0819)  Pulse: 95 (08/13/24 0846)  Resp: 16 (08/13/24 0846)  BP: (!) 175/80 (08/13/24 0819)  SpO2: (!) 94 % (08/13/24 0819) Vital Signs (24h Range):  Temp:  [97.9 °F (36.6 °C)-99.4 °F (37.4 °C)] 98.6 °F (37 °C)  Pulse:  [] 95  Resp:  [14-33] 16  SpO2:  [94 %-99 %] 94 %  BP: (152-206)/(60-86) 175/80     Weight: 59.9 kg (132 lb)  Body mass index is 24.94 kg/m².    Foot Exam    General  Orientation: alert and oriented to person, place, and time       Right Foot/Ankle     Inspection and Palpation  Tenderness: (Patient was tender to palpation of the RLE)  Swelling: (RLE swelling present)    Neurovascular  Dorsalis pedis: absent  Posterior tibial: absent    Comments  There is erythema and edema present in the RLE. No warmth. Medial side of the TMA site has a wound with eschar at the base, no drainage present. Additionally there is a wound on the plantar heel. Eschar present at the wound base, no drainage present. Back of the heel has some redness possibly secondary to pressure.     Left Foot/Ankle      Inspection and Palpation  Ecchymosis: dorsum    Neurovascular  Dorsalis pedis: absent  Posterior tibial: absent    Comments  No wounds present. No redness or pain on palpation. No signs of infection.  There is ecchymosis present on the dorsum of the foot over the 4th and 5th metatarsals. Back of the heel has some redness possibly secondary to pressure.                               Laboratory:    Blood Cultures:   Recent Labs   Lab 08/13/24 0255   LABBLOO No Growth to date  No Growth to date     CBC:   Recent Labs   Lab 08/13/24 0254   WBC 6.36   RBC 3.38*   HGB 9.8*   HCT 32.5*      MCV 96   MCH 29.0   MCHC 30.2*     All pertinent labs reviewed within the last 24 hours.    Diagnostic Results:  EXAMINATION:  XR FOOT COMPLETE 3 VIEW RIGHT     CLINICAL HISTORY:  . Osteomyelitis, unspecified     TECHNIQUE:  AP, lateral,  and oblique views of the right foot were performed.     COMPARISON:  Foot radiograph 08/12/2024, 07/27/2024.     FINDINGS:  Bones are demineralized.  There are postoperative changes of transmetatarsal amputation.  No acute fracture or osseous destructive process seen to suggest osteomyelitis, noting limited sensitivity of radiographs.  No significant soft tissue abnormality.  Vascular calcifications noted.     Impression:     As above.     Electronically signed by resident: James Morejon  Date:                                            08/13/2024  Time:                                           06:33     Electronically signed by:Huber De La Paz MD  Date:                                            08/13/2024  Time:                                           06:38

## 2024-08-13 NOTE — CONSULTS
[facility] - Emergency Dept  Podiatry  Consult Note    Patient Name: Radha Sotelo  MRN: [id number]  Admission Date: 8/13/2024  Hospital Length of Stay: 0 days  Attending Physician: Jeffery Duarte III*  Primary Care Provider: Kalyn Pemberton NP     Inpatient consult to Podiatry  Consult performed by: Shelby Dennis MD  Consult ordered by: Tom Chanel MD        Subjective:     History of Present Illness:  Ms. Radha Sotelo is a 79 year old female with a past medical history of COPD, CAD, PVD, HTN, HLD, chroninc swelling of the right leg, and a previous right foot TMA. She presented to the ED 8/12 after family called 911 for SOB and altered mental status. The patient also states that she has had increasing redness in her right leg and pain at her TMA site where she has a wound. Radiographs of the right foot taken 8/12 show bone demineralization, postoperative changes post TMA, and no acute fracture or osseous destructive process seen to suggest osteomyelitis. No significant soft tissue abnormality. Vascular calcifications noted.  Patient is currently on IV Rocephin and vancomycin. WBC 6.36, VSS.     No new subjective & objective note has been filed under this hospital service since the last note was generated.    Assessment/Plan:     ID  * Cellulitis of right leg  79 year old female with RLE redness and edema. Wound at TMA site and right plantar heel, patient declined debridement of the wounds today 8/13. NICCI's ordered as patient had non-palpable pulses and no signal at bedside along with a non-healing wound. WBC wnl, VSS.     Recommendations:  ABIs pending   Erythema consistent with possible SSTI, recommend broad-spectrum antibiotics  Offloading boots ordered for patient to wear when in bed  TMA site intact, except for the medial border. Additional wound on the right plantar heel. Patient declined debridement.  Betadine paint to the wound at TMA site and plantar heel.  Podiatry will continue to  follow    Discharge Recommendations  - Patient to follow up in outpatient Podiatry clinic. Podiatry will schedule  - Antibiotics per Primary  - Betadine paint and left open to air   - WBAT  - Keep dressings clean, dry, and intact until follow-up appointment        Thank you for your consult. I will follow-up with patient. Please contact us if you have any additional questions.    Shelby Dennis MD  Podiatry  [facility] - Emergency Dept

## 2024-08-13 NOTE — CONSULTS
[facility] - [facility]  Podiatry  Consult Note    Patient Name: Radha Sotelo  MRN: [id number]  Admission Date: 8/13/2024  Hospital Length of Stay: 0 days  Attending Physician: Jeffery Duarte III*  Primary Care Provider: Kalyn Pemberton NP     Inpatient consult to Podiatry  Consult performed by: Shelby Dennis MD  Consult ordered by: Tom Chanel MD        Subjective:     History of Present Illness:  Ms. Radha Sotelo is a 79 year old female with a past medical history of COPD, CAD, PVD, HTN, HLD, chroninc swelling of the right leg, and a previous right foot TMA. She presented to the ED 8/12 after family called 911 for SOB and altered mental status. The patient also states that she has had increasing redness in her right leg and pain at her TMA site where she has a wound. Radiographs of the right foot taken 8/12 show bone demineralization, postoperative changes post TMA, and no acute fracture or osseous destructive process seen to suggest osteomyelitis. No significant soft tissue abnormality. Vascular calcifications noted.  Patient is currently on IV Rocephin and vancomycin. WBC 6.36, VSS.     Scheduled Meds:   aspirin  81 mg Oral Daily    atorvastatin  80 mg Oral Daily    cefTRIAXone (Rocephin) IV (PEDS and ADULTS)  1 g Intravenous Q24H    clopidogreL  75 mg Oral Daily    fluticasone furoate-vilanteroL  1 puff Inhalation Daily    metoprolol succinate  25 mg Oral Daily    NIFEdipine  30 mg Oral Daily    vancomycin (VANCOCIN) IV (PEDS and ADULTS)  1,000 mg Intravenous Q12H     Continuous Infusions:  PRN Meds:  Current Facility-Administered Medications:     HYDROcodone-acetaminophen, 1 tablet, Oral, Q6H PRN    HYDROcodone-acetaminophen, 1 tablet, Oral, Q6H PRN    traMADoL, 50 mg, Oral, Q8H PRN    Pharmacy to dose Vancomycin consult, , , Once **AND** vancomycin - pharmacy to dose, , Intravenous, pharmacy to manage frequency    Review of patient's allergies indicates:  No Known Allergies     Past Medical  History:   Diagnosis Date    Anemia     Anxiety     COPD (chronic obstructive pulmonary disease)     COPD (chronic obstructive pulmonary disease) with emphysema     Coronary artery disease     Depression     Diverticulitis     Hyperlipidemia     Hypertension     PVD (peripheral vascular disease)     PVD (peripheral vascular disease)     S/P colostomy     Substance abuse     hx heavy etoh use     Tobacco abuse      Past Surgical History:   Procedure Laterality Date    ANGIOGRAM, CORONARY, WITH LEFT HEART CATHETERIZATION N/A 11/22/2023    Procedure: Angiogram, Coronary, with Left Heart Cath;  Surgeon: Checo Bhatt MD;  Location: [facility] CATH LAB;  Service: Cardiology;  Laterality: N/A;    ANGIOGRAM, CORONARY, WITH LEFT HEART CATHETERIZATION N/A 5/27/2024    Procedure: Angiogram, Coronary, with Left Heart Cath;  Surgeon: Karel Mack MD;  Location: [facility] CATH LAB;  Service: Cardiology;  Laterality: N/A;    ANGIOGRAM, EXTREMITY, UNILATERAL Right 8/25/2023    Procedure: ANGIOGRAM, EXTREMITY, UNILATERAL;  Surgeon: Gary Rico MD;  Location: [facility] OR [address] [place] [address];  Service: Vascular;  Laterality: Right;  US GUIDED ACCESS LEFT GROIN  contrast: 150ml  fluoro: 27.9 min  mGy: 254.73  Gycm2: 62.4919  radial flush cocktail: 10ml    ANGIOGRAPHY OF LOWER EXTREMITY Right 8/24/2023    Procedure: Angiogram Extremity Unilateral;  Surgeon: Benji Ashley MD;  Location: [facility] OR [address] [place] [address];  Service: Vascular;  Laterality: Right;    COLOSTOMY      ECTOPIC PREGNANCY SURGERY      PTA, PERONEAL Right 8/25/2023    Procedure: PTA, PERONEAL TIBIAL TRUNK WITH SHOCKWAVE;  Surgeon: Gary Rico MD;  [facility];  Service: Vascular;  Laterality: Right;    PTCA, SINGLE VESSEL  11/22/2023    Procedure: PTCA, Single Vessel;  Surgeon: Checo Bhatt MD;  Location: [facility] CATH LAB;  Service: Cardiology;;    right forefoot amputation      right toe amputation      x2 toes    STENT, DRUG ELUTING,  SINGLE VESSEL, CORONARY  11/22/2023    Procedure: Stent, Drug Eluting, Single Vessel, Coronary;  Surgeon: Checo Bhatt MD;  Location: [facility] CATH LAB;  Service: Cardiology;;    STENT, DRUG ELUTING, SINGLE VESSEL, CORONARY  5/27/2024    Procedure: Stent, Drug Eluting, Single Vessel, Coronary;  Surgeon: Karel Mack MD;  Location: [facility] CATH LAB;  Service: Cardiology;;    STENT, SUPERFICIAL FEMORAL ARTERY Right 8/25/2023    Procedure: STENT, SUPERFICIAL FEMORAL ARTERY;  Surgeon: Gary Rico MD;  Location: [facility] OR [address] [place] [address];  Service: Vascular;  Laterality: Right;  VIABAHN 6 X 15 X120       Family History    None       Tobacco Use    Smoking status: Every Day     Current packs/day: 0.25     Average packs/day: 0.5 packs/day for 61.1 years (30.2 ttl pk-yrs)     Types: Cigarettes     Start date: 7/5/1963    Smokeless tobacco: Never   Substance and Sexual Activity    Alcohol use: No    Drug use: No    Sexual activity: Not Currently     Review of Systems   Constitutional:  Negative for chills and fever.   Cardiovascular:  Positive for leg swelling.   Gastrointestinal:  Negative for diarrhea, nausea and vomiting.   Skin:  Positive for color change and wound.   Psychiatric/Behavioral:  Negative for confusion.      Objective:     Vital Signs (Most Recent):  Temp: 98.4 °F (36.9 °C) (08/13/24 1615)  Pulse: 94 (08/13/24 1615)  Resp: 18 (08/13/24 1615)  BP: (!) 143/81 (08/13/24 1615)  SpO2: 96 % (08/13/24 1615) Vital Signs (24h Range):  Temp:  [97.9 °F (36.6 °C)-99.4 °F (37.4 °C)] 98.4 °F (36.9 °C)  Pulse:  [] 94  Resp:  [14-33] 18  SpO2:  [94 %-99 %] 96 %  BP: (143-206)/(60-86) 143/81     Weight: 59.9 kg (132 lb)  Body mass index is 24.94 kg/m².    Foot Exam    General  Orientation: alert and oriented to person, place, and time       Right Foot/Ankle     Inspection and Palpation  Tenderness: (Patient was tender to palpation of the RLE)  Swelling: (RLE swelling present)    Neurovascular  Dorsalis  pedis: absent  Posterior tibial: absent    Comments  There is erythema and edema present in the RLE. No warmth. Medial side of the TMA site has a wound with eschar at the base, no drainage present. Additionally there is a wound on the plantar heel. Eschar present at the wound base, no drainage present. Back of the heel has some redness possibly secondary to pressure.     Left Foot/Ankle      Inspection and Palpation  Ecchymosis: dorsum    Neurovascular  Dorsalis pedis: absent  Posterior tibial: absent    Comments  No wounds present. No redness or pain on palpation. No signs of infection.  There is ecchymosis present on the dorsum of the foot over the 4th and 5th metatarsals. Back of the heel has some redness possibly secondary to pressure.                               Laboratory:    Blood Cultures:   Recent Labs   Lab 08/13/24 0255   LABBLOO No Growth to date  No Growth to date     CBC:   Recent Labs   Lab 08/13/24 0254   WBC 6.36   RBC 3.38*   HGB 9.8*   HCT 32.5*      MCV 96   MCH 29.0   MCHC 30.2*     All pertinent labs reviewed within the last 24 hours.    Diagnostic Results:  EXAMINATION:  XR FOOT COMPLETE 3 VIEW RIGHT     CLINICAL HISTORY:  . Osteomyelitis, unspecified     TECHNIQUE:  AP, lateral, and oblique views of the right foot were performed.     COMPARISON:  Foot radiograph 08/12/2024, 07/27/2024.     FINDINGS:  Bones are demineralized.  There are postoperative changes of transmetatarsal amputation.  No acute fracture or osseous destructive process seen to suggest osteomyelitis, noting limited sensitivity of radiographs.  No significant soft tissue abnormality.  Vascular calcifications noted.     Impression:     As above.     Electronically signed by resident: James Morejon  Date:                                            08/13/2024  Time:                                           06:33     Electronically signed by:Huber De La Paz MD  Date:                                             08/13/2024  Time:                                           06:38    Assessment/Plan:     ID  Cellulitis of right leg  79 year old female with RLE redness and edema. Wound at TMA site and right plantar heel, patient declined debridement of the wounds today 8/13. NICCI's ordered as patient had non-palpable pulses and no signal on doppler at bedside along with a non-healing wound. WBC wnl, VSS.     Recommendations:  ABIs pending   Erythema consistent with possible SSTI, recommend broad-spectrum antibiotics  Offloading boots ordered for patient to wear when in bed  TMA site intact, except for the medial border. Additional wound on the right plantar heel. Patient declined debridement.  Betadine paint to the wound at TMA site and plantar heel.  Podiatry will sign off.  Please reach out with any further questions.    Thank you for your consult. I will follow-up with patient. Please contact us if you have any additional questions.    Shelby Dennis MD  Podiatry  [facility]

## 2024-08-13 NOTE — Clinical Note
Diagnosis: Shortness of breath [786.05.ICD-9-CM]   Future Attending Provider: BRADY HARP III [[id number]]

## 2024-08-13 NOTE — ASSESSMENT & PLAN NOTE
Patient with Hypercapnic Respiratory failure which is Acute on chronic.  she is on home oxygen at 3 LPM. Supplemental oxygen was provided and noted-  3L.     .   Signs/symptoms of respiratory failure include- tachypnea. Contributing diagnoses includes - COPD Labs and images were reviewed. Patient Has recent ABG, which has been reviewed. Will treat underlying causes and adjust management of respiratory failure as follows    - albulterol ipratroprium nebulizer  - vancomycin zosyn  - fluticasone

## 2024-08-13 NOTE — DISCHARGE SUMMARY
[facility]  Hospital Medicine  Discharge Summary      Patient Name: Radha Sotelo  MRN: [id number]  DEEPIKA: [id number]  Patient Class: OP- Observation  Admission Date: 8/13/2024  Hospital Length of Stay: 0 days  Discharge Date and Time:  08/13/2024 4:14 PM  Attending Physician: Jeffery Duarte III*   Discharging Provider: Horacio Narvaez MD  Primary Care Provider: Kalyn Pemberton NP  [facility] Medicine Team: [place] [address] Horacio Narvaez MD  Primary Care Team: [facility] 4    HPI:   Ms. Sotelo is a 79-year-old female with a past medical history of COPD on home oxygen 3 L. The patient states her she lives at home with her granddaughter and had difficulty breathing whereby her daughter called EMS. EMS stated the patient's home oxygen machine was not working properly. She is admitted to medicine for possible sepsis. She had amputation of the toes on her right foot 3 months ago. She denies any pain, nausea, vomiting, or fevers. Patient history was difficult to gather as she was confused and not able to answer questions coherently.     * No surgery found *      Hospital Course:   Pt arrived to [facility] ED with acute on chronic respiratory failure. Symptoms resolve with the administration of oxygen. EMT report suspicious for non functioning oxygen tank for pt at home. RLE appears stable, podiatry outpatient follow up was recommended.       Vitals:    08/13/24 1615   BP: (!) 143/81   Pulse: 94   Resp: 18   Temp: 98.4 °F (36.9 °C)        Physical Exam  Constitutional:       General: She is not in acute distress.  Cardiovascular:      Heart sounds: No murmur heard.  Pulmonary:      Effort: No respiratory distress.   Abdominal:      Tenderness: There is no abdominal tenderness.   Neurological:      Mental Status: Mental status is at baseline.   Psychiatric:         Mood and Affect: Mood normal.          Goals of Care Treatment Preferences:  Code Status: Full Code          What is most important right now is  "to focus on extending life as long as possible, even it it means sacrificing quality.  Accordingly, we have decided that the best plan to meet the patient's goals includes continuing with treatment.         Consults:   Consults (From admission, onward)          Status Ordering Provider     Pharmacy to dose Vancomycin consult  Once        Provider:  (Not yet assigned)   Placed in "And" Linked Group    Acknowledged BRENDAN MORELOS     Inpatient consult to Podiatry  Once        Provider:  (Not yet assigned)    Completed BRENDAN MORELOS            No new Assessment & Plan notes have been filed under this hospital service since the last note was generated.  Service: Hospital Medicine    Final Active Diagnoses:    Diagnosis Date Noted POA    PRINCIPAL PROBLEM:  Acute on chronic respiratory failure with hypoxia and hypercapnia [J96.21, J96.22] 03/01/2020 Yes    Chronic wound [T14.8XXA] 07/30/2024 Yes     Chronic    Cellulitis of right leg [L03.115] 07/27/2024 Yes    Chronic diastolic heart failure [I50.32] 11/29/2023 Yes    Essential hypertension [I10] 07/12/2021 Yes    HLD (hyperlipidemia) [E78.5] 07/12/2021 Yes    Partial nontraumatic amputation of right foot [Z89.431] 07/12/2021 Not Applicable    Elevated troponin [R79.89] 03/02/2020 Yes    CAD (coronary artery disease) [I25.10] 03/22/2013 Yes      Problems Resolved During this Admission:       Discharged Condition: stable    Disposition: Home or Self Care    Follow Up:   Follow-up Information       Kalyn Pemberton NP. Schedule an appointment as soon as possible for a visit in 1 week(s).    Specialties: Family Medicine, Palliative Medicine  Contact information:  [address] [facility] [id number] [phone]               [facility] PODIATRY. Schedule an appointment as soon as possible for a visit in 1 week(s).    Specialty: Podiatry  Contact information:  [address] [facility] [address] [phone]                         Patient " Instructions:   No discharge procedures on file.    Significant Diagnostic Studies: N/A    Pending Diagnostic Studies:       None           Medications:  Reconciled Home Medications:      Medication List        START taking these medications      doxycycline 100 MG Cap  Commonly known as: VIBRAMYCIN  Take 1 capsule (100 mg total) by mouth every 12 (twelve) hours. for 10 days            CONTINUE taking these medications      aspirin 81 MG EC tablet  Commonly known as: ECOTRIN  Take 1 tablet (81 mg total) by mouth once daily.     atorvastatin 80 MG tablet  Commonly known as: LIPITOR  Take 1 tablet (80 mg total) by mouth once daily.     budesonide-formoterol 80-4.5 mcg 80-4.5 mcg/actuation Hfaa  Commonly known as: SYMBICORT  INHALE 2 PUFFS INTO THE LUNGS TWICE DAILY. RINSE MOUTH AFTER USE     clopidogreL 75 mg tablet  Commonly known as: PLAVIX  Take 1 tablet (75 mg total) by mouth once daily.     furosemide 20 MG tablet  Commonly known as: LASIX  Take 1 tablet (20 mg total) by mouth daily as needed (edema/wt gain).     gabapentin 300 MG capsule  Commonly known as: NEURONTIN  Take 1 capsule (300 mg total) by mouth 2 (two) times daily.     hydrocortisone-aloe vera 1 % Crea cream  Apply topically 2 (two) times daily.     metoprolol succinate 25 MG 24 hr tablet  Commonly known as: TOPROL-XL  Take 1 tablet (25 mg total) by mouth once daily.     nicotine 14 mg/24 hr  Commonly known as: NICODERM CQ  Place 1 patch onto the skin once daily.     nitroGLYCERIN 0.4 MG SL tablet  Commonly known as: NITROSTAT  Place 1 tablet (0.4 mg total) under the tongue every 5 (five) minutes as needed for Chest pain.     traMADoL 50 mg tablet  Commonly known as: ULTRAM  Take 1 tablet (50 mg total) by mouth every 8 (eight) hours as needed for Pain.            STOP taking these medications      cephALEXin 500 MG capsule  Commonly known as: KEFLEX              Indwelling Lines/Drains at time of discharge:   Lines/Drains/Airways       Drain   Duration                  Colostomy 07/13/21 0201 Descending/sigmoid LLQ 1127 days                    Time spent on the discharge of patient: 35 minutes         Horacio Narvaez MD  Department of Hospital Medicine  [facility] - Emergency Dept

## 2024-08-13 NOTE — ASSESSMENT & PLAN NOTE
Patient with known CAD.  Will continue ASA, Plavix, and Statin and monitor for S/Sx of angina/ACS. Continue to monitor on telemetry.

## 2024-08-13 NOTE — PHARMACY MED REC
"Admission Medication History     The home medication history was taken by John Garibay.    You may go to "Admission" then "Reconcile Home Medications" tabs to review and/or act upon these items.     The home medication list has been updated by the Pharmacy department.   Please read ALL comments highlighted in yellow.   Please address this information as you see fit.    Feel free to contact us if you have any questions or require assistance.      The medications listed below were removed from the home medication list. Please reorder if appropriate:  Patient reports no longer taking the following medication(s):  ALBUTEROL HFA 90 MCG/ACT INH  ALBUTEROL-IPRATROPIUM 2.5 MG-0.5 MG/3 ML NEB    Medications listed below were obtained from: Patient/family and Analytic software- [employer]  Current Outpatient Medications on File Prior to Encounter   Medication Sig    aspirin (ECOTRIN) 81 MG EC tablet Take 1 tablet (81 mg total) by mouth once daily.    atorvastatin (LIPITOR) 80 MG tablet Take 1 tablet (80 mg total) by mouth once daily.    budesonide-formoterol 80-4.5 mcg (SYMBICORT) 80-4.5 mcg/actuation HFAA Inhale 2 puffs into the lungs twice daily. Rinse mouth after use.    cephALEXin (KEFLEX) 500 MG capsule Take 1 capsule (500 mg total) by mouth every 8 (eight) hours. for 5 days    clopidogreL (PLAVIX) 75 mg tablet Take 1 tablet (75 mg total) by mouth once daily. Last filled 7/3/24, #90/90 day supply.    furosemide (LASIX) 20 MG tablet Take 1 tablet (20 mg total) by mouth daily as needed (edema/wt gain). Last filled 7/25/24, #90/90 day supply.    gabapentin (NEURONTIN) 300 MG capsule Take 1 capsule (300 mg total) by mouth 2 (two) times daily. Last filled 7/25/24, #180/90 day supply.    hydrocortisone-aloe vera 1 % Crea cream Apply topically 2 (two) times daily.     metoprolol succinate (TOPROL-XL) 25 MG 24 hr tablet Take 1 tablet (25 mg total) by mouth once daily. Last filled 7/13/24, #90/90 day supply.    nicotine (NICODERM " CQ) 14 mg/24 hr Place 1 patch onto the skin once daily. Last filled 5/28/24 #28/28 day supply.    nitroGLYCERIN (NITROSTAT) 0.4 MG SL tablet Place 1 tablet (0.4 mg total) under the tongue every 5 (five) minutes as needed for Chest pain. Last filled 6/5/24, #25 day supply.    traMADoL (ULTRAM) 50 mg tablet Take 1 tablet (50 mg total) by mouth every 8 (eight) hours as needed for Pain. Last filled 8/9/24, #30/10 day supply.       Potential issues to be addressed PRIOR TO DISCHARGE  Please discuss with the patient barriers to adherence with medication treatment plans  Patient requires education regarding drug therapies           John Garibay  [phone]                  .

## 2024-08-13 NOTE — ASSESSMENT & PLAN NOTE
79 year old female with RLE redness and edema. Wound at TMA site and right plantar heel, patient declined debridement of the wounds today 8/13. NICCI's ordered as patient had non-palpable pulses and no signal at bedside along with a non-healing wound. WBC wnl, VSS.     Recommendations:  ABIs pending   Erythema consistent with possible SSTI, recommend broad-spectrum antibiotics  Offloading boots ordered for patient to wear when in bed  TMA site intact, except for the medial border. Additional wound on the right plantar heel. Patient declined debridement.  Betadine paint to the wound at TMA site and plantar heel.  Podiatry will continue to follow

## 2024-08-14 LAB
ALBUMIN SERPL BCP-MCNC: 3.2 G/DL (ref 3.5–5.2)
ALP SERPL-CCNC: 94 U/L (ref 55–135)
ALT SERPL W/O P-5'-P-CCNC: 11 U/L (ref 10–44)
ANION GAP SERPL CALC-SCNC: 12 MMOL/L (ref 8–16)
AST SERPL-CCNC: 21 U/L (ref 10–40)
BASOPHILS # BLD AUTO: 0.04 K/UL (ref 0–0.2)
BASOPHILS NFR BLD: 0.5 % (ref 0–1.9)
BILIRUB SERPL-MCNC: 0.3 MG/DL (ref 0.1–1)
BUN SERPL-MCNC: 17 MG/DL (ref 8–23)
CALCIUM SERPL-MCNC: 9 MG/DL (ref 8.7–10.5)
CHLORIDE SERPL-SCNC: 94 MMOL/L (ref 95–110)
CO2 SERPL-SCNC: 31 MMOL/L (ref 23–29)
CREAT SERPL-MCNC: 0.7 MG/DL (ref 0.5–1.4)
DIFFERENTIAL METHOD BLD: ABNORMAL
EOSINOPHIL # BLD AUTO: 0 K/UL (ref 0–0.5)
EOSINOPHIL NFR BLD: 0.5 % (ref 0–8)
ERYTHROCYTE [DISTWIDTH] IN BLOOD BY AUTOMATED COUNT: 13.8 % (ref 11.5–14.5)
EST. GFR  (NO RACE VARIABLE): >60 ML/MIN/1.73 M^2
GLUCOSE SERPL-MCNC: 145 MG/DL (ref 70–110)
HCT VFR BLD AUTO: 32 % (ref 37–48.5)
HGB BLD-MCNC: 10.1 G/DL (ref 12–16)
IMM GRANULOCYTES # BLD AUTO: 0.03 K/UL (ref 0–0.04)
IMM GRANULOCYTES NFR BLD AUTO: 0.4 % (ref 0–0.5)
LYMPHOCYTES # BLD AUTO: 0.5 K/UL (ref 1–4.8)
LYMPHOCYTES NFR BLD: 6.3 % (ref 18–48)
MAGNESIUM SERPL-MCNC: 1.9 MG/DL (ref 1.6–2.6)
MCH RBC QN AUTO: 29.6 PG (ref 27–31)
MCHC RBC AUTO-ENTMCNC: 31.6 G/DL (ref 32–36)
MCV RBC AUTO: 94 FL (ref 82–98)
MONOCYTES # BLD AUTO: 0.6 K/UL (ref 0.3–1)
MONOCYTES NFR BLD: 7.5 % (ref 4–15)
NEUTROPHILS # BLD AUTO: 7.1 K/UL (ref 1.8–7.7)
NEUTROPHILS NFR BLD: 84.8 % (ref 38–73)
NRBC BLD-RTO: 0 /100 WBC
PHOSPHATE SERPL-MCNC: 2.9 MG/DL (ref 2.7–4.5)
PLATELET # BLD AUTO: 212 K/UL (ref 150–450)
PMV BLD AUTO: 10.1 FL (ref 9.2–12.9)
POCT GLUCOSE: 102 MG/DL (ref 70–110)
POCT GLUCOSE: 103 MG/DL (ref 70–110)
POCT GLUCOSE: 106 MG/DL (ref 70–110)
POCT GLUCOSE: 93 MG/DL (ref 70–110)
POCT GLUCOSE: 99 MG/DL (ref 70–110)
POTASSIUM SERPL-SCNC: 3.3 MMOL/L (ref 3.5–5.1)
PROT SERPL-MCNC: 6.3 G/DL (ref 6–8.4)
RBC # BLD AUTO: 3.41 M/UL (ref 4–5.4)
SODIUM SERPL-SCNC: 137 MMOL/L (ref 136–145)
WBC # BLD AUTO: 8.38 K/UL (ref 3.9–12.7)

## 2024-08-14 PROCEDURE — 96366 THER/PROPH/DIAG IV INF ADDON: CPT

## 2024-08-14 PROCEDURE — 94640 AIRWAY INHALATION TREATMENT: CPT | Mod: XB

## 2024-08-14 PROCEDURE — 25000242 PHARM REV CODE 250 ALT 637 W/ HCPCS: Performed by: FAMILY MEDICINE

## 2024-08-14 PROCEDURE — 27000221 HC OXYGEN, UP TO 24 HOURS

## 2024-08-14 PROCEDURE — 21400001 HC TELEMETRY ROOM

## 2024-08-14 PROCEDURE — 94761 N-INVAS EAR/PLS OXIMETRY MLT: CPT

## 2024-08-14 PROCEDURE — 63600175 PHARM REV CODE 636 W HCPCS

## 2024-08-14 PROCEDURE — 25000003 PHARM REV CODE 250: Performed by: FAMILY MEDICINE

## 2024-08-14 PROCEDURE — 93970 EXTREMITY STUDY: CPT | Performed by: SURGERY

## 2024-08-14 PROCEDURE — 83735 ASSAY OF MAGNESIUM: CPT

## 2024-08-14 PROCEDURE — 94664 DEMO&/EVAL PT USE INHALER: CPT

## 2024-08-14 PROCEDURE — 25000003 PHARM REV CODE 250: Performed by: INTERNAL MEDICINE

## 2024-08-14 PROCEDURE — 25000003 PHARM REV CODE 250

## 2024-08-14 PROCEDURE — 99900035 HC TECH TIME PER 15 MIN (STAT)

## 2024-08-14 PROCEDURE — 63600175 PHARM REV CODE 636 W HCPCS: Performed by: FAMILY MEDICINE

## 2024-08-14 PROCEDURE — 84100 ASSAY OF PHOSPHORUS: CPT

## 2024-08-14 PROCEDURE — 80053 COMPREHEN METABOLIC PANEL: CPT

## 2024-08-14 PROCEDURE — 85025 COMPLETE CBC W/AUTO DIFF WBC: CPT

## 2024-08-14 RX ORDER — POTASSIUM CHLORIDE 20 MEQ/1
40 TABLET, EXTENDED RELEASE ORAL ONCE
Status: COMPLETED | OUTPATIENT
Start: 2024-08-14 | End: 2024-08-14

## 2024-08-14 RX ORDER — NAPROXEN SODIUM 220 MG/1
81 TABLET, FILM COATED ORAL DAILY
Status: DISCONTINUED | OUTPATIENT
Start: 2024-08-15 | End: 2024-08-15 | Stop reason: HOSPADM

## 2024-08-14 RX ORDER — CLOPIDOGREL BISULFATE 75 MG/1
75 TABLET ORAL DAILY
Status: DISCONTINUED | OUTPATIENT
Start: 2024-08-15 | End: 2024-08-15 | Stop reason: HOSPADM

## 2024-08-14 RX ORDER — POTASSIUM CHLORIDE 750 MG/1
30 CAPSULE, EXTENDED RELEASE ORAL ONCE
Status: COMPLETED | OUTPATIENT
Start: 2024-08-14 | End: 2024-08-14

## 2024-08-14 RX ADMIN — TRAMADOL HYDROCHLORIDE 50 MG: 50 TABLET, COATED ORAL at 06:08

## 2024-08-14 RX ADMIN — ENOXAPARIN SODIUM 40 MG: 40 INJECTION SUBCUTANEOUS at 05:08

## 2024-08-14 RX ADMIN — VANCOMYCIN HYDROCHLORIDE 1000 MG: 1 INJECTION, POWDER, LYOPHILIZED, FOR SOLUTION INTRAVENOUS at 05:08

## 2024-08-14 RX ADMIN — FLUTICASONE FUROATE AND VILANTEROL TRIFENATATE 1 PUFF: 100; 25 POWDER RESPIRATORY (INHALATION) at 09:08

## 2024-08-14 RX ADMIN — IPRATROPIUM BROMIDE AND ALBUTEROL SULFATE 3 ML: 2.5; .5 SOLUTION RESPIRATORY (INHALATION) at 07:08

## 2024-08-14 RX ADMIN — POTASSIUM CHLORIDE 40 MEQ: 1500 TABLET, EXTENDED RELEASE ORAL at 10:08

## 2024-08-14 RX ADMIN — ASPIRIN 81 MG: 81 TABLET, COATED ORAL at 09:08

## 2024-08-14 RX ADMIN — IPRATROPIUM BROMIDE AND ALBUTEROL SULFATE 3 ML: 2.5; .5 SOLUTION RESPIRATORY (INHALATION) at 01:08

## 2024-08-14 RX ADMIN — METOPROLOL SUCCINATE 25 MG: 25 TABLET, EXTENDED RELEASE ORAL at 09:08

## 2024-08-14 RX ADMIN — ATORVASTATIN CALCIUM 40 MG: 40 TABLET, FILM COATED ORAL at 09:08

## 2024-08-14 RX ADMIN — CEFTRIAXONE 1 G: 1 INJECTION, POWDER, FOR SOLUTION INTRAMUSCULAR; INTRAVENOUS at 10:08

## 2024-08-14 RX ADMIN — VANCOMYCIN HYDROCHLORIDE 1000 MG: 1 INJECTION, POWDER, LYOPHILIZED, FOR SOLUTION INTRAVENOUS at 06:08

## 2024-08-14 RX ADMIN — POTASSIUM CHLORIDE 30 MEQ: 750 CAPSULE, EXTENDED RELEASE ORAL at 12:08

## 2024-08-14 RX ADMIN — IPRATROPIUM BROMIDE AND ALBUTEROL SULFATE 3 ML: 2.5; .5 SOLUTION RESPIRATORY (INHALATION) at 12:08

## 2024-08-14 NOTE — CONSULTS
[facility]  Vascular Surgery  Consult Note    Inpatient consult to Vascular Surgery  Consult performed by: Loy Hernandez MD  Consult ordered by: Jeffery Duarte III, MD        Subjective:     Chief Complaint/Reason for Admission: SOB, R foot wounds    History of Present Illness: 79 year old female with PMH notable for COPD on 3L home oxygen, CAD, PVD, HTN, HLD, PAD s/p RLE angiogram with SFA restenting, partial right foot TMA,  who presented with SOB in setting of home oxygen tank malfunction.     Vascular surgery consulted for nonhealing wound of the right TMA.  Had not followed up with vascular surgery since her intervention last year.    (Not in a hospital admission)      Review of patient's allergies indicates:  No Known Allergies    Past Medical History:   Diagnosis Date    Anemia     Anxiety     COPD (chronic obstructive pulmonary disease)     COPD (chronic obstructive pulmonary disease) with emphysema     Coronary artery disease     Depression     Diverticulitis     Hyperlipidemia     Hypertension     PVD (peripheral vascular disease)     PVD (peripheral vascular disease)     S/P colostomy     Substance abuse     hx heavy etoh use     Tobacco abuse      Past Surgical History:   Procedure Laterality Date    ANGIOGRAM, CORONARY, WITH LEFT HEART CATHETERIZATION N/A 11/22/2023    Procedure: Angiogram, Coronary, with Left Heart Cath;  Surgeon: Checo Bhatt MD;  Location: [facility] CATH LAB;  Service: Cardiology;  Laterality: N/A;    ANGIOGRAM, CORONARY, WITH LEFT HEART CATHETERIZATION N/A 5/27/2024    Procedure: Angiogram, Coronary, with Left Heart Cath;  Surgeon: Karel Mack MD;  Location: [facility] CATH LAB;  Service: Cardiology;  Laterality: N/A;    ANGIOGRAM, EXTREMITY, UNILATERAL Right 8/25/2023    Procedure: ANGIOGRAM, EXTREMITY, UNILATERAL;  Surgeon: Gary Rico MD;  Location: [facility] OR [address] [place] [address];  Service: Vascular;  Laterality: Right;  US GUIDED ACCESS LEFT  GROIN  contrast: 150ml  fluoro: 27.9 min  mGy: 254.73  Gycm2: 62.4919  radial flush cocktail: 10ml    ANGIOGRAPHY OF LOWER EXTREMITY Right 8/24/2023    Procedure: Angiogram Extremity Unilateral;  Surgeon: Benji Ashley MD;  Location: [address];  Service: Vascular;  Laterality: Right;    COLOSTOMY      ECTOPIC PREGNANCY SURGERY      PTA, PERONEAL Right 8/25/2023    Procedure: PTA, PERONEAL TIBIAL TRUNK WITH SHOCKWAVE;  Surgeon: Gary Rico MD;  Location: [address];  Service: Vascular;  Laterality: Right;    PTCA, SINGLE VESSEL  11/22/2023    Procedure: PTCA, Single Vessel;  Surgeon: Checo Bhatt MD;  Location: [facility] CATH LAB;  Service: Cardiology;;    right forefoot amputation      right toe amputation      x2 toes    STENT, DRUG ELUTING, SINGLE VESSEL, CORONARY  11/22/2023    Procedure: Stent, Drug Eluting, Single Vessel, Coronary;  Surgeon: Checo Bhatt MD;  Location: [facility] CATH LAB;  Service: Cardiology;;    STENT, DRUG ELUTING, SINGLE VESSEL, CORONARY  5/27/2024    Procedure: Stent, Drug Eluting, Single Vessel, Coronary;  Surgeon: Karel Mack MD;  Location: [facility] CATH LAB;  Service: Cardiology;;    STENT, SUPERFICIAL FEMORAL ARTERY Right 8/25/2023    Procedure: STENT, SUPERFICIAL FEMORAL ARTERY;  Surgeon: Gary Rico MD;  Location: [address];  Service: Vascular;  Laterality: Right;  VIABAHN 6 X 15 X120     Family History    None       Tobacco Use    Smoking status: Every Day     Current packs/day: 0.25     Average packs/day: 0.5 packs/day for 61.1 years (30.2 ttl pk-yrs)     Types: Cigarettes     Start date: 7/5/1963    Smokeless tobacco: Never   Substance and Sexual Activity    Alcohol use: No    Drug use: No    Sexual activity: Not Currently     Review of Systems   Constitutional:  Negative for activity change, appetite change and fever.   Skin:  Positive for color change and wound.     Objective:     Vital Signs (Most Recent):  Temp:  98.5 °F (36.9 °C) (08/13/24 1932)  Pulse: 102 (08/13/24 1932)  Resp: 20 (08/13/24 1932)  BP: 129/62 (08/13/24 1932)  SpO2: (!) 93 % (08/13/24 1932) Vital Signs (24h Range):  Temp:  [97.9 °F (36.6 °C)-99.4 °F (37.4 °C)] 98.5 °F (36.9 °C)  Pulse:  [] 102  Resp:  [14-33] 20  SpO2:  [93 %-99 %] 93 %  BP: (129-206)/(60-86) 129/62     Weight: 59.9 kg (132 lb)  Body mass index is 24.94 kg/m².      Physical Exam  HENT:      Head: Normocephalic.   Eyes:      Pupils: Pupils are equal, round, and reactive to light.   Cardiovascular:      Pulses:           Femoral pulses are 2+ on the right side and 2+ on the left side.  Feet:      Comments: Right TMA wound, heel wound present.  Neurological:      Mental Status: She is alert.          Significant Labs:  All pertinent labs from the last 24 hours have been reviewed.    Significant Diagnostics:  I have reviewed all pertinent imaging results/findings within the past 24 hours.  Assessment/Plan:     Chronic wound  71-year-old female with history of COPD, on home 3 L O2, known PID status post right lower extremity angiogram with right TMA, presenting with shortness of breath, found to have chronic nonhealing right TMA and heel wound.    - ABIs with evidence of chronic peripheral insufficiency.  - Please obtain CTA runoff, MRI of the right foot to rule out osteo (ordered), will obtain lower extremity vein mapping for possible intervention.  - Please keep NPO at midnight for possible procedure.  - Continue dual antiplatelet therapy.        Thank you for your consult.     Loy Hernandez MD  Vascular Surgery  [facility]

## 2024-08-14 NOTE — ASSESSMENT & PLAN NOTE
Continue jean marie and Rocephin  Podiatry consulted     MEDICATION MANAGEMENT AGREEMENT    I, Mike LepeKIA, 1995, agree to use the following medications only as prescribed by VIKI Newberry (\"My Prescribing Provider\").    Medication and Date Prescribed Dose Directions Quantity Per Month   amphetamine-dextroamphetamine (ADDERALL XR) 20 MG 24 hr capsule  20 MG 24 hr capsule Take 1 capsule by mouth daily. 30     The purpose of this Medication Management Agreement is to describe my responsibilities with regard to my medications and to outline the conditions that must be met in order for me to continue treatment with these medications by My Prescribing Provider. I understand that these medications may not eliminate my symptoms, but may reduce my discomfort and improve what I am able to do each day. These medications are narcotics and have the potential for developing dependency or addiction.      MY RESPONSIBILITIES  · I will only take my medications at the dose and frequency described above.  · I will not increase or change how I take my medications without the prior approval of My Prescribing Provider.  · I understand that if I use more medication or take medication more often than prescribed, it may result in my being without medication for a period of time, and could possibly result in my injury or death.  · I will not share, sell, or trade my medication for money, goods or services.  · I will not use any illegal controlled substances, including marijuana, cocaine, etc.  · I will discontinue all previously used medication for the condition being treated by this medication, unless I am told to continue them by My Prescribing Provider.  · If there is any question of my ability to safely perform any activity, especially driving, I agree that I will not attempt to perform that activity until my ability to perform the activity has been evaluated or I have not used my medication for at least  3 days.   · I agree to participate in any medical, psychological  or psychiatric assessments recommended by My Prescribing Provider.    MY PHARMACY  · I agree to only use the following pharmacy (\"Pharmacy\") for the medication(s) listed above:    [facility]: [facility]  Location: [address] [place] [address]   Phone Number: [phone]    · I agree to immediately notify My Prescribing Provider prior to changing my Pharmacy for any reason.    NEW PRESCRIPTIONS AND PRESCRIPTION REFILLS  · I will only request prescription refills from My Prescribing Provider at an office visit or during regular office hours.  · I understand that refills will not be available during evenings or weekends.  · I will not request a prescription for the medications listed above from a health care provider other than My Prescribing Provider unless it is an emergency.    LOST OR STOLEN MEDICATIONS  · I will safeguard my medications from loss or theft and I understand that misplaced, lost or stolen medications will not be replaced.    COMMUNICATION WITH MY HEALTH CARE PROVIDERS, MY PHARMACY AND LAW ENFORCEMENT  I understand that it is difficult for health care providers to safely and effectively treat my symptoms if they do not have clear and accurate information about how I am using my medications or other drugs. Therefore, I agree to the following:  · I will communicate openly and honestly with My Prescribing Provider, my other health care providers and my Pharmacy.  · I will inform other health care providers that I am taking the medications listed above and that I have signed this Medication Management Agreement.  · I understand that in order to properly adjust and manage my treatment with the medications listed above, My Prescribing Provider may communicate with my Pharmacy and other health care providers involved in my care.  · If I seek emergency medical treatment, I will inform the health care providers caring for me that I am taking the medications listed above and that I have signed the Medication Management  agreement. In addition, I will ask that My Prescribing Provider be contacted to discuss any changes to my prescription medication regimen.   · I understand that My Prescribing Provider and my Pharmacy will cooperate with any city, state, or federal agency in the investigation of any possible illegal actions, including the sale or other diversion of my medications.    LABORATORY STUDIES AND RANDOM DRUG SCREENING  · I realize that all medications have potential side effects and I will have the recommended laboratory studies required to keep the regimen as safe as possible.  · I will submit to blood and/or urine test(s), as requested by My Prescribing provider to determine my compliance with this Medication Management Agreement and my medication regimen.    GENERAL BEHAVIOR  Inappropriate, aggressive, or threatening behavior may result in you being terminated (dropped) from our clinic and having your controlled medications stopped. This could include cursing, raising your voice, behaving in a physically intimidating way, or harassing our employees. We try as much as possible to be professional and appropriate with our patients, and we ask that you and those with you behave the same way toward your providers, other office personnel, and other patients and their families. If you have a complaint, you may contact the .      FAILURE TO COMPLY WITH THIS AGREEMENT  I understand that compliance with this Medication Management Agreement is important to continuing my treatment with My Prescribing Provider. If I fail to comply with this Medication Management Agreement, I understand that any or all of the following may occur:  · My Prescribing Provider will no longer prescribe the medications listed above.  · My Prescribing Provider will no longer be one of my health care providers.  · I will no longer be able to seek treatment at [facility].    I understand that if I miss 2 appointments for any reason,  including no-shows, and/or cancellations with less than 24 hours' notice, this may, at the doctor's discretion, result in my medications being tapered off over a several day period as necessary to avoid withdrawal symptoms. In addition, failure to comply with the prescribed treatment plan including therapies may result in my medications being tapered.    I agree to follow these guidelines that have been fully explained to me. All of my questions and concerns regarding treatment have been adequately answered. A copy of this document has been given to me.    Medication Management Agreement - Addendum    1. I understand that some of the medications being prescribed can be very potent. These medications may include narcotics, tranquilizers, and/or barbiturates.  2. I understand that treatment with these medications is controlled by State and Federal Government Agencies. These medications can be highly effective when taken as directed under close supervision.  3. I understand that possible side effects, risk and complications can sometimes occur. These may include, but are not limited to:  · Confusion, mental status changes  · Increased sleepiness and drowsiness  · Change in breathing, usually too slow  · Change in appetite, usually decreased  · Change in bowel habits, usually constipation  · Possible decreased coordination, balance, reaction time, etc., possibly making it difficult to drive a motor vehicle.  4. I will take the medications only as directed. If I use all the medication sooner than prescribed, I understand that it will not be replaced. I further understand that NO allowances will be made for lost, stolen, misplaced or damaged medications. THERE WILL BE NO EARLY REFILLS FOR ANY REASON.  5. I am responsible for keeping track of the amount of medication left and plan ahead for arranging for a refill of my prescription in a timely manner so that I do not run out of medication. A 48-hour advance notice is  required for refills of prescriptions. Request for refills must be telephoned to the office during regular office hours (Monday-Friday 8 am-4:30 pm). Refills will be telephoned to my pharmacy or the prescription can be picked up by me at the office during regular office hours. No prescriptions will be mailed to the patient.   6. I agree to help myself by training to change my behavior towards a healthier lifestyle. This includes not smoking, using alcohol in moderation as permitted by my physician, and concentrating on diet, weight control and exercise. I understand that only by following a healthier lifestyle can I hope to have the most successful outcome to my treatment.  7. I understand that if I violate any of the mentioned conditions, my controlled substance prescriptions and/or treatment by our clinic may be ended. If the violation involved obtaining controlled substances from or sharing controlled substances with another individual, the incidence may also be reported to my primary physician, local medical facilities and other authorities.     I have read and understood the information listed above. By signing this form, I agree to follow the expectations outlined above.    This Medication Management Agreement is entered into on this 8/18/2017.    _____________________________________            _______________    Patient Signature        Date    _____________________________________            _______________                        Provider Signature        Date    _____________________________________    ________________           Witness Signature        Date

## 2024-08-14 NOTE — ASSESSMENT & PLAN NOTE
71-year-old female with history of COPD, on home 3 L O2, known PID status post right lower extremity angiogram with right TMA, presenting with shortness of breath, found to have chronic nonhealing right TMA and heel wound.    - ABIs with evidence of chronic peripheral insufficiency.  - Please obtain CTA runoff, MRI of the right foot to rule out osteo (ordered), will obtain lower extremity vein mapping for possible intervention.  - Please keep NPO at midnight for possible procedure.  - Continue dual antiplatelet therapy.

## 2024-08-14 NOTE — SUBJECTIVE & OBJECTIVE
(Not in a hospital admission)      Review of patient's allergies indicates:  No Known Allergies    Past Medical History:   Diagnosis Date    Anemia     Anxiety     COPD (chronic obstructive pulmonary disease)     COPD (chronic obstructive pulmonary disease) with emphysema     Coronary artery disease     Depression     Diverticulitis     Hyperlipidemia     Hypertension     PVD (peripheral vascular disease)     PVD (peripheral vascular disease)     S/P colostomy     Substance abuse     hx heavy etoh use     Tobacco abuse      Past Surgical History:   Procedure Laterality Date    ANGIOGRAM, CORONARY, WITH LEFT HEART CATHETERIZATION N/A 11/22/2023    Procedure: Angiogram, Coronary, with Left Heart Cath;  Surgeon: Checo Bhatt MD;  Location: [facility] CATH LAB;  Service: Cardiology;  Laterality: N/A;    ANGIOGRAM, CORONARY, WITH LEFT HEART CATHETERIZATION N/A 5/27/2024    Procedure: Angiogram, Coronary, with Left Heart Cath;  Surgeon: Karel Mack MD;  Location: [facility] CATH LAB;  Service: Cardiology;  Laterality: N/A;    ANGIOGRAM, EXTREMITY, UNILATERAL Right 8/25/2023    Procedure: ANGIOGRAM, EXTREMITY, UNILATERAL;  Surgeon: Gary Rico MD;  Location: [facility] OR [address] [place] [address];  Service: Vascular;  Laterality: Right;  US GUIDED ACCESS LEFT GROIN  contrast: 150ml  fluoro: 27.9 min  mGy: 254.73  Gycm2: 62.4919  radial flush cocktail: 10ml    ANGIOGRAPHY OF LOWER EXTREMITY Right 8/24/2023    Procedure: Angiogram Extremity Unilateral;  Surgeon: Benji Ashley MD;  Location: [facility] OR [address] [place] [address];  Service: Vascular;  Laterality: Right;    COLOSTOMY      ECTOPIC PREGNANCY SURGERY      PTA, PERONEAL Right 8/25/2023    Procedure: PTA, PERONEAL TIBIAL TRUNK WITH SHOCKWAVE;  Surgeon: Gary Rico MD;  Location: [facility] OR [address] [place] [address];  Service: Vascular;  Laterality: Right;    PTCA, SINGLE VESSEL  11/22/2023    Procedure: PTCA, Single Vessel;  Surgeon: Checo Bhatt MD;  Location: [facility] CATH LAB;   Service: Cardiology;;    right forefoot amputation      right toe amputation      x2 toes    STENT, DRUG ELUTING, SINGLE VESSEL, CORONARY  11/22/2023    Procedure: Stent, Drug Eluting, Single Vessel, Coronary;  Surgeon: Checo Bhatt MD;  Location: [facility] CATH LAB;  Service: Cardiology;;    STENT, DRUG ELUTING, SINGLE VESSEL, CORONARY  5/27/2024    Procedure: Stent, Drug Eluting, Single Vessel, Coronary;  Surgeon: Karel Mack MD;  Location: [facility] CATH LAB;  Service: Cardiology;;    STENT, SUPERFICIAL FEMORAL ARTERY Right 8/25/2023    Procedure: STENT, SUPERFICIAL FEMORAL ARTERY;  Surgeon: Gary Rico MD;  Location: [facility] OR [address] [place] [address];  Service: Vascular;  Laterality: Right;  VIABAHN 6 X 15 X120     Family History    None       Tobacco Use    Smoking status: Every Day     Current packs/day: 0.25     Average packs/day: 0.5 packs/day for 61.1 years (30.2 ttl pk-yrs)     Types: Cigarettes     Start date: 7/5/1963    Smokeless tobacco: Never   Substance and Sexual Activity    Alcohol use: No    Drug use: No    Sexual activity: Not Currently     Review of Systems   Constitutional:  Negative for activity change, appetite change and fever.   Skin:  Positive for color change and wound.     Objective:     Vital Signs (Most Recent):  Temp: 98.5 °F (36.9 °C) (08/13/24 1932)  Pulse: 102 (08/13/24 1932)  Resp: 20 (08/13/24 1932)  BP: 129/62 (08/13/24 1932)  SpO2: (!) 93 % (08/13/24 1932) Vital Signs (24h Range):  Temp:  [97.9 °F (36.6 °C)-99.4 °F (37.4 °C)] 98.5 °F (36.9 °C)  Pulse:  [] 102  Resp:  [14-33] 20  SpO2:  [93 %-99 %] 93 %  BP: (129-206)/(60-86) 129/62     Weight: 59.9 kg (132 lb)  Body mass index is 24.94 kg/m².      Physical Exam  HENT:      Head: Normocephalic.   Eyes:      Pupils: Pupils are equal, round, and reactive to light.   Cardiovascular:      Pulses:           Femoral pulses are 2+ on the right side and 2+ on the left side.  Feet:      Comments: Right TMA wound, heel wound  present.  Neurological:      Mental Status: She is alert.          Significant Labs:  All pertinent labs from the last 24 hours have been reviewed.    Significant Diagnostics:  I have reviewed all pertinent imaging results/findings within the past 24 hours.

## 2024-08-14 NOTE — NURSING
Nurses Note -- 4 Eyes      8/14/2024   7:49 AM      Skin assessed during: Admit      [] No Altered Skin Integrity Present    []Prevention Measures Documented      [x] Yes- Altered Skin Integrity Present or Discovered   [] LDA Added if Not in Epic (Describe Wound)   [] New Altered Skin Integrity was Present on Admit and Documented in LDA   [x] Wound Image Taken    Wound Care Consulted? Yes    Attending Nurse:  Brittney Mcintosh RN/Staff Member:  Mechelle

## 2024-08-14 NOTE — HPI
79 year old female with PMH notable for COPD on 3L home oxygen, CAD, PVD, HTN, HLD, PAD s/p RLE angiogram with SFA restenting, partial right foot TMA,  who presented with SOB in setting of home oxygen tank malfunction.     Vascular surgery consulted for nonhealing wound of the right TMA.  Had not followed up with vascular surgery since her intervention last year.

## 2024-08-14 NOTE — ED NOTES
Pt transported to floor by escort.  Pt on telemetry for admission.  Report was called to 6th floor by night nurse. All belongings sent with pt.

## 2024-08-14 NOTE — ED NOTES
Nurses Note -- 4 Eyes      8/14/2024   5:28 AM      Skin assessed during: Daily Assessment      [] No Altered Skin Integrity Present    []Prevention Measures Documented      [x] Yes- Altered Skin Integrity Present or Discovered   [] LDA Added if Not in Epic (Describe Wound)   [] New Altered Skin Integrity was Present on Admit and Documented in LDA   [x] Wound Image Taken    Wound Care Consulted? No    Attending Nurse:  GUSTAVO Xiong    Second RN/Staff Member:   GUSTAVO Dowell

## 2024-08-14 NOTE — PROGRESS NOTES
[facility] Medicine  Progress Note    Patient Name: Radha Sotelo  MRN: [id number]  Patient Class: IP- Inpatient   Admission Date: 8/13/2024  Length of Stay: 0 days  Attending Physician: Jeffery Duarte III*  Primary Care Provider: Kalyn Pemberton NP        Subjective:     Principal Problem:Peripheral arterial disease        HPI:  Ms. Sotelo is a 79-year-old female with a past medical history of COPD on home oxygen 3 L. The patient states her she lives at home with her granddaughter and had difficulty breathing whereby her daughter called EMS. EMS stated the patient's home oxygen machine was not working properly. She is admitted to medicine for possible sepsis. She had amputation of the toes on her right foot 3 months ago. She denies any pain, nausea, vomiting, or fevers. Patient history was difficult to gather as she was confused and not able to answer questions coherently.     Overview/Hospital Course:  Pt arrived to [facility] ED with acute on chronic respiratory failure. Symptoms resolve with the administration of oxygen. EMT report suspicious for non functioning oxygen tank for pt at home. RLE appeared stable but NICCI result indicated advanced imaging and vascular consult. Vascular recommended R AKA.    Interval History: VSS, NAEON, pt denies any f/c/n/v/sob or any new complaints. Pt is weighing the decision to undergo surgery for RLE.    Review of Systems   Constitutional: Negative.    HENT: Negative.     Eyes: Negative.    Respiratory: Negative.     Cardiovascular:  Positive for leg swelling.   Gastrointestinal: Negative.    Endocrine: Negative.    Genitourinary: Negative.    Musculoskeletal: Negative.    Skin:  Positive for color change and wound.   Allergic/Immunologic: Negative.    Neurological: Negative.    Hematological: Negative.    Psychiatric/Behavioral:  Positive for agitation and confusion.      Objective:     Vital Signs (Most Recent):  Temp: 98.5 °F (36.9 °C) (08/14/24  1606)  Pulse: 77 (08/14/24 1606)  Resp: 18 (08/14/24 1606)  BP: 137/68 (08/14/24 1606)  SpO2: 96 % (08/14/24 1606) Vital Signs (24h Range):  Temp:  [98.4 °F (36.9 °C)-98.9 °F (37.2 °C)] 98.5 °F (36.9 °C)  Pulse:  [] 77  Resp:  [16-20] 18  SpO2:  [93 %-96 %] 96 %  BP: (119-149)/(61-69) 137/68     Weight: 59.9 kg (132 lb 0.9 oz)  Body mass index is 24.95 kg/m².  No intake or output data in the 24 hours ending 08/14/24 1637      Physical Exam  Cardiovascular:      Heart sounds: Normal heart sounds.   Pulmonary:      Effort: No respiratory distress.   Abdominal:      Tenderness: There is no abdominal tenderness.   Musculoskeletal:      Right lower leg: Edema present.   Neurological:      Mental Status: She is alert. Mental status is at baseline.             Significant Labs: All pertinent labs within the past 24 hours have been reviewed.    Significant Imaging: I have reviewed all pertinent imaging results/findings within the past 24 hours.    Assessment/Plan:      * Peripheral arterial disease  Nonhealing right foot wound, possible SSTI  Known PAD with multiple RLE procedures  NICCI results indicative of b/l critical limb ischemia  Vascular surgery consult  Recommended AKA for 8/15  Continue dual platelet therapy    Cellulitis of right foot  Continue vanc and Rocephin  Podiatry consulted      Chronic diastolic heart failure  - continue home medications: metoprolol 24 hr 25 mg tablet       Partial nontraumatic amputation of right foot  - Podiatry consulted, appreciate recommendations       HLD (hyperlipidemia)  - continue home meds: atorvastatin 80 mg QD      Essential hypertension  Improved with pain control and home meds, continue    COPD (chronic obstructive pulmonary disease)  No evidence of exacerbation  Continue supplemental home O2  Continue Breo and DuoNebs    Elevated troponin  - trend troponin       Chronic hypoxic respiratory failure  - continue home O2 of 3 L  - titrate O2 for sats 88-92%  - continue  Breo and scheduled DuoNebs    CAD (coronary artery disease)  Patient with known CAD.  Will continue ASA, Plavix, and Statin and monitor for S/Sx of angina/ACS. Continue to monitor on telemetry.     Peripheral vascular disease, unspecified  - avoid compression of RLE due to ischemic pain      VTE Risk Mitigation (From admission, onward)           Ordered     enoxaparin injection 40 mg  Daily         08/13/24 1854     IP VTE HIGH RISK PATIENT  Once         08/13/24 1854     Place sequential compression device  Until discontinued         08/13/24 1854                    Discharge Planning   FLORIAN: 8/16/2024     Code Status: Full Code   Is the patient medically ready for discharge?:     Reason for patient still in hospital (select all that apply): Patient trending condition and Treatment  Discharge Plan A: Home with family                  Horacio Narvaez MD  Department of Hospital Medicine   [facility] Surg

## 2024-08-14 NOTE — PLAN OF CARE
Discussed with the patient need for a right above-knee amputation given TMA wound which is unlikely to heal given lack of arterial blood supply to the foot, and risks of not undergoing surgery including worsening wounds, sepsis, potentially death.    Patient states that she does not want an amputation at this point and would prefers medical management with local wound care of her wound despite knowing these risks.    Please do not hesitate to reach out with any additional questions and concerns.

## 2024-08-14 NOTE — ASSESSMENT & PLAN NOTE
Nonhealing right foot wound, possible SSTI  Known PAD with multiple RLE procedures  NICCI results indicative of b/l critical limb ischemia  Vascular surgery consult  Recommended AKA for 8/15  Continue dual platelet therapy

## 2024-08-14 NOTE — SUBJECTIVE & OBJECTIVE
Interval History: VSS, NAEON, pt denies any f/c/n/v/sob or any new complaints. Pt is weighing the decision to undergo surgery for RLE.    Review of Systems   Constitutional: Negative.    HENT: Negative.     Eyes: Negative.    Respiratory: Negative.     Cardiovascular:  Positive for leg swelling.   Gastrointestinal: Negative.    Endocrine: Negative.    Genitourinary: Negative.    Musculoskeletal: Negative.    Skin:  Positive for color change and wound.   Allergic/Immunologic: Negative.    Neurological: Negative.    Hematological: Negative.    Psychiatric/Behavioral:  Positive for agitation and confusion.      Objective:     Vital Signs (Most Recent):  Temp: 98.5 °F (36.9 °C) (08/14/24 1606)  Pulse: 77 (08/14/24 1606)  Resp: 18 (08/14/24 1606)  BP: 137/68 (08/14/24 1606)  SpO2: 96 % (08/14/24 1606) Vital Signs (24h Range):  Temp:  [98.4 °F (36.9 °C)-98.9 °F (37.2 °C)] 98.5 °F (36.9 °C)  Pulse:  [] 77  Resp:  [16-20] 18  SpO2:  [93 %-96 %] 96 %  BP: (119-149)/(61-69) 137/68     Weight: 59.9 kg (132 lb 0.9 oz)  Body mass index is 24.95 kg/m².  No intake or output data in the 24 hours ending 08/14/24 1637      Physical Exam  Cardiovascular:      Heart sounds: Normal heart sounds.   Pulmonary:      Effort: No respiratory distress.   Abdominal:      Tenderness: There is no abdominal tenderness.   Musculoskeletal:      Right lower leg: Edema present.   Neurological:      Mental Status: She is alert. Mental status is at baseline.             Significant Labs: All pertinent labs within the past 24 hours have been reviewed.    Significant Imaging: I have reviewed all pertinent imaging results/findings within the past 24 hours.

## 2024-08-14 NOTE — ED NOTES
Telemetry Verification   Patient placed on Telemetry Box  Verified with War Room  Box # [id number]   Monitor Tech    Rate 84   Rhythm NSR

## 2024-08-14 NOTE — PLAN OF CARE
Problem: Adult Inpatient Plan of Care  Goal: Plan of Care Review  Outcome: Progressing  Goal: Patient-Specific Goal (Individualized)  Outcome: Progressing  Goal: Absence of Hospital-Acquired Illness or Injury  Outcome: Progressing  Goal: Optimal Comfort and Wellbeing  Outcome: Progressing  Goal: Readiness for Transition of Care  Outcome: Progressing     Problem: Acute Kidney Injury/Impairment  Goal: Fluid and Electrolyte Balance  Outcome: Progressing  Goal: Improved Oral Intake  Outcome: Progressing  Goal: Effective Renal Function  Outcome: Progressing     Problem: Wound  Goal: Optimal Coping  Outcome: Progressing  Goal: Optimal Functional Ability  Outcome: Progressing  Goal: Absence of Infection Signs and Symptoms  Outcome: Progressing  Goal: Improved Oral Intake  Outcome: Progressing  Goal: Optimal Pain Control and Function  Outcome: Progressing  Goal: Skin Health and Integrity  Outcome: Progressing  Goal: Optimal Wound Healing  Outcome: Progressing     Problem: Fall Injury Risk  Goal: Absence of Fall and Fall-Related Injury  Outcome: Progressing     Problem: Skin Injury Risk Increased  Goal: Skin Health and Integrity  Outcome: Progressing  Patient was given Rocephin beginning of shift and Vanc at the end of shift. Patient begin to have pain in right foot at the end of shift with a score of 10. Gave patient tramadol for pain. Changed patient colostomy bag twice for shift. Patient had Imaging done this afternoon as well.

## 2024-08-15 VITALS
HEIGHT: 61 IN | BODY MASS INDEX: 24.93 KG/M2 | HEART RATE: 104 BPM | WEIGHT: 132.06 LBS | RESPIRATION RATE: 18 BRPM | OXYGEN SATURATION: 99 % | TEMPERATURE: 99 F | DIASTOLIC BLOOD PRESSURE: 77 MMHG | SYSTOLIC BLOOD PRESSURE: 155 MMHG

## 2024-08-15 LAB
ALBUMIN SERPL BCP-MCNC: 3.1 G/DL (ref 3.5–5.2)
ALP SERPL-CCNC: 90 U/L (ref 55–135)
ALT SERPL W/O P-5'-P-CCNC: 10 U/L (ref 10–44)
ANION GAP SERPL CALC-SCNC: 9 MMOL/L (ref 8–16)
AST SERPL-CCNC: 20 U/L (ref 10–40)
BASOPHILS # BLD AUTO: 0.04 K/UL (ref 0–0.2)
BASOPHILS NFR BLD: 0.7 % (ref 0–1.9)
BILIRUB SERPL-MCNC: 0.4 MG/DL (ref 0.1–1)
BUN SERPL-MCNC: 14 MG/DL (ref 8–23)
CALCIUM SERPL-MCNC: 8.7 MG/DL (ref 8.7–10.5)
CHLORIDE SERPL-SCNC: 97 MMOL/L (ref 95–110)
CO2 SERPL-SCNC: 30 MMOL/L (ref 23–29)
CREAT SERPL-MCNC: 0.7 MG/DL (ref 0.5–1.4)
DIFFERENTIAL METHOD BLD: ABNORMAL
EOSINOPHIL # BLD AUTO: 0.3 K/UL (ref 0–0.5)
EOSINOPHIL NFR BLD: 4.6 % (ref 0–8)
ERYTHROCYTE [DISTWIDTH] IN BLOOD BY AUTOMATED COUNT: 14.2 % (ref 11.5–14.5)
EST. GFR  (NO RACE VARIABLE): >60 ML/MIN/1.73 M^2
GLUCOSE SERPL-MCNC: 73 MG/DL (ref 70–110)
HCT VFR BLD AUTO: 34.3 % (ref 37–48.5)
HGB BLD-MCNC: 9.8 G/DL (ref 12–16)
IMM GRANULOCYTES # BLD AUTO: 0.02 K/UL (ref 0–0.04)
IMM GRANULOCYTES NFR BLD AUTO: 0.3 % (ref 0–0.5)
LYMPHOCYTES # BLD AUTO: 0.7 K/UL (ref 1–4.8)
LYMPHOCYTES NFR BLD: 11.7 % (ref 18–48)
MAGNESIUM SERPL-MCNC: 2 MG/DL (ref 1.6–2.6)
MCH RBC QN AUTO: 28.9 PG (ref 27–31)
MCHC RBC AUTO-ENTMCNC: 28.6 G/DL (ref 32–36)
MCV RBC AUTO: 101 FL (ref 82–98)
MONOCYTES # BLD AUTO: 0.6 K/UL (ref 0.3–1)
MONOCYTES NFR BLD: 9.3 % (ref 4–15)
NEUTROPHILS # BLD AUTO: 4.5 K/UL (ref 1.8–7.7)
NEUTROPHILS NFR BLD: 73.4 % (ref 38–73)
NRBC BLD-RTO: 0 /100 WBC
PHOSPHATE SERPL-MCNC: 3 MG/DL (ref 2.7–4.5)
PLATELET # BLD AUTO: 202 K/UL (ref 150–450)
PMV BLD AUTO: 9.9 FL (ref 9.2–12.9)
POCT GLUCOSE: 104 MG/DL (ref 70–110)
POCT GLUCOSE: 105 MG/DL (ref 70–110)
POCT GLUCOSE: 115 MG/DL (ref 70–110)
POCT GLUCOSE: 82 MG/DL (ref 70–110)
POTASSIUM SERPL-SCNC: 4.1 MMOL/L (ref 3.5–5.1)
PROT SERPL-MCNC: 5.9 G/DL (ref 6–8.4)
RBC # BLD AUTO: 3.39 M/UL (ref 4–5.4)
SODIUM SERPL-SCNC: 136 MMOL/L (ref 136–145)
VANCOMYCIN SERPL-MCNC: 25.5 UG/ML
WBC # BLD AUTO: 6.14 K/UL (ref 3.9–12.7)

## 2024-08-15 PROCEDURE — 94640 AIRWAY INHALATION TREATMENT: CPT

## 2024-08-15 PROCEDURE — 25000242 PHARM REV CODE 250 ALT 637 W/ HCPCS: Performed by: FAMILY MEDICINE

## 2024-08-15 PROCEDURE — 63600175 PHARM REV CODE 636 W HCPCS

## 2024-08-15 PROCEDURE — 25000003 PHARM REV CODE 250

## 2024-08-15 PROCEDURE — 99900035 HC TECH TIME PER 15 MIN (STAT)

## 2024-08-15 PROCEDURE — 94761 N-INVAS EAR/PLS OXIMETRY MLT: CPT

## 2024-08-15 PROCEDURE — 63600175 PHARM REV CODE 636 W HCPCS: Performed by: FAMILY MEDICINE

## 2024-08-15 PROCEDURE — 80053 COMPREHEN METABOLIC PANEL: CPT

## 2024-08-15 PROCEDURE — 83735 ASSAY OF MAGNESIUM: CPT

## 2024-08-15 PROCEDURE — 85025 COMPLETE CBC W/AUTO DIFF WBC: CPT

## 2024-08-15 PROCEDURE — 27000221 HC OXYGEN, UP TO 24 HOURS

## 2024-08-15 PROCEDURE — 36415 COLL VENOUS BLD VENIPUNCTURE: CPT | Performed by: FAMILY MEDICINE

## 2024-08-15 PROCEDURE — 25000003 PHARM REV CODE 250: Performed by: FAMILY MEDICINE

## 2024-08-15 PROCEDURE — 80202 ASSAY OF VANCOMYCIN: CPT | Performed by: FAMILY MEDICINE

## 2024-08-15 PROCEDURE — 84100 ASSAY OF PHOSPHORUS: CPT

## 2024-08-15 PROCEDURE — 25000242 PHARM REV CODE 250 ALT 637 W/ HCPCS: Performed by: INTERNAL MEDICINE

## 2024-08-15 PROCEDURE — 25000003 PHARM REV CODE 250: Performed by: INTERNAL MEDICINE

## 2024-08-15 RX ORDER — HYDROXYZINE HYDROCHLORIDE 25 MG/1
25 TABLET, FILM COATED ORAL 3 TIMES DAILY PRN
Status: DISCONTINUED | OUTPATIENT
Start: 2024-08-15 | End: 2024-08-15 | Stop reason: HOSPADM

## 2024-08-15 RX ADMIN — ATORVASTATIN CALCIUM 80 MG: 40 TABLET, FILM COATED ORAL at 08:08

## 2024-08-15 RX ADMIN — IPRATROPIUM BROMIDE AND ALBUTEROL SULFATE 3 ML: 2.5; .5 SOLUTION RESPIRATORY (INHALATION) at 07:08

## 2024-08-15 RX ADMIN — VANCOMYCIN HYDROCHLORIDE 1000 MG: 1 INJECTION, POWDER, LYOPHILIZED, FOR SOLUTION INTRAVENOUS at 05:08

## 2024-08-15 RX ADMIN — IPRATROPIUM BROMIDE AND ALBUTEROL SULFATE 3 ML: 2.5; .5 SOLUTION RESPIRATORY (INHALATION) at 12:08

## 2024-08-15 RX ADMIN — ENOXAPARIN SODIUM 40 MG: 40 INJECTION SUBCUTANEOUS at 04:08

## 2024-08-15 RX ADMIN — ASPIRIN 81 MG CHEWABLE TABLET 81 MG: 81 TABLET CHEWABLE at 08:08

## 2024-08-15 RX ADMIN — IPRATROPIUM BROMIDE AND ALBUTEROL SULFATE 3 ML: 2.5; .5 SOLUTION RESPIRATORY (INHALATION) at 01:08

## 2024-08-15 RX ADMIN — METOPROLOL SUCCINATE 25 MG: 25 TABLET, EXTENDED RELEASE ORAL at 08:08

## 2024-08-15 RX ADMIN — FLUTICASONE FUROATE AND VILANTEROL TRIFENATATE 1 PUFF: 100; 25 POWDER RESPIRATORY (INHALATION) at 07:08

## 2024-08-15 RX ADMIN — NIFEDIPINE 30 MG: 30 TABLET, FILM COATED, EXTENDED RELEASE ORAL at 08:08

## 2024-08-15 RX ADMIN — CEFTRIAXONE 2 G: 2 INJECTION, POWDER, FOR SOLUTION INTRAMUSCULAR; INTRAVENOUS at 10:08

## 2024-08-15 RX ADMIN — HYDROXYZINE HYDROCHLORIDE 25 MG: 25 TABLET ORAL at 10:08

## 2024-08-15 RX ADMIN — CLOPIDOGREL BISULFATE 75 MG: 75 TABLET ORAL at 08:08

## 2024-08-15 NOTE — ASSESSMENT & PLAN NOTE
Nonhealing right foot wound, possible SSTI  Known PAD with multiple RLE procedures  NICCI results indicative of b/l critical limb ischemia  Vascular surgery consult  Recommended AKA for 8/15, patient declined  Continue dual platelet therapy

## 2024-08-15 NOTE — PLAN OF CARE
[facility]      HOME HEALTH ORDERS  FACE TO FACE ENCOUNTER    Patient Name: Radha Sotelo  YOB: 1945    PCP: Kalyn Pemberton NP   PCP Address: [address] [place] [address]  PCP Phone Number: [phone]  PCP Fax: [phone]    Encounter Date: 8/12/24    Admit to Home Health    Diagnoses:  Active Hospital Problems    Diagnosis  POA    *Peripheral arterial disease [I73.9]  Yes    Cellulitis of right foot [L03.119]  Yes    Chronic wound [T14.8XXA]  Yes     Chronic    Chronic diastolic heart failure [I50.32]  Yes    Essential hypertension [I10]  Yes    HLD (hyperlipidemia) [E78.5]  Yes    Partial nontraumatic amputation of right foot [Z89.431]  Not Applicable    Chronic obstructive pulmonary disease [J44.9]  Yes    Elevated troponin [R79.89]  Yes    COPD (chronic obstructive pulmonary disease) [J44.9]  Yes    Chronic hypoxic respiratory failure [J96.11]  Yes    CAD (coronary artery disease) [I25.10]  Yes    Peripheral vascular disease, unspecified [I73.9]  Yes      Resolved Hospital Problems   No resolved problems to display.       Follow Up Appointments:  Future Appointments   Date Time Provider Department Center   8/29/2024 11:30 AM Angela Walker MD [facility]       Allergies:Review of patient's allergies indicates:  No Known Allergies    Medications: Review discharge medications with patient and family and provide education.    Current Facility-Administered Medications   Medication Dose Route Frequency Provider Last Rate Last Admin    albuterol-ipratropium 2.5 mg-0.5 mg/3 mL nebulizer solution 3 mL  3 mL Nebulization Q6H Jeffery Duarte III, MD   3 mL at 08/15/24 1314    aspirin chewable tablet 81 mg  81 mg Oral Daily Horacio Narvaez MD   81 mg at 08/15/24 0807    atorvastatin tablet 80 mg  80 mg Oral Daily Tom Chanel MD   80 mg at 08/15/24 0807    cefTRIAXone (ROCEPHIN) 2 g in D5W 100 mL IVPB (MB+)  2 g Intravenous Q24H Calista  DO Neris   Stopped at 08/15/24 1106    clopidogreL tablet 75 mg  75 mg Oral Daily Horacio Narvaez MD   75 mg at 08/15/24 0807    dextrose 10% bolus 125 mL 125 mL  12.5 g Intravenous PRN Neris Grigsby DO        dextrose 10% bolus 250 mL 250 mL  25 g Intravenous PRN Neris Grigsby DO        enoxaparin injection 40 mg  40 mg Subcutaneous Daily Neris Grigsby DO   40 mg at 08/14/24 1740    fluticasone furoate-vilanteroL 100-25 mcg/dose diskus inhaler 1 puff  1 puff Inhalation Daily Tom Chanel MD   1 puff at 08/15/24 0722    glucagon (human recombinant) injection 1 mg  1 mg Intramuscular PRN Neris Grigsby DO        glucose chewable tablet 16 g  16 g Oral PRN Neris Grigsby DO        glucose chewable tablet 24 g  24 g Oral PRN Neris Grigsby DO        HYDROcodone-acetaminophen 5-325 mg per tablet 1 tablet  1 tablet Oral Q6H PRN Jeffery Duarte III, MD        HYDROcodone-acetaminophen 7.5-325 mg per tablet 1 tablet  1 tablet Oral Q6H PRN Jeffery Duarte III, MD   1 tablet at 08/13/24 1917    hydrOXYzine HCL tablet 25 mg  25 mg Oral TID PRN Horacio Narvaez MD   25 mg at 08/15/24 1050    insulin aspart U-100 pen 0-5 Units  0-5 Units Subcutaneous QID (AC + HS) PRN Neris Grigsby DO        metoprolol succinate (TOPROL-XL) 24 hr tablet 25 mg  25 mg Oral Daily Tom Chanel MD   25 mg at 08/15/24 0807    naloxone 0.4 mg/mL injection 0.02 mg  0.02 mg Intravenous PRN Neris Grigsby DO        NIFEdipine 24 hr tablet 30 mg  30 mg Oral Daily Horacio Narvaez MD   30 mg at 08/15/24 0807    sodium chloride 0.9% flush 10 mL  10 mL Intravenous Q12H PRN Neris Grigsby DO        traMADoL tablet 50 mg  50 mg Oral Q8H PRN Tom Chanel MD   50 mg at 08/14/24 1849    vancomycin (VANCOCIN) 1,000 mg in D5W 250 mL IVPB (Vial-Mate)  1,000 mg Intravenous Q12H Jeffery Duarte III, MD   Stopped at 08/15/24 0710    vancomycin - pharmacy to dose   Intravenous pharmacy to manage frequency Tom Chanel MD            Medication List         CONTINUE taking these medications      aspirin 81 MG EC tablet  Commonly known as: ECOTRIN  Take 1 tablet (81 mg total) by mouth once daily.     atorvastatin 80 MG tablet  Commonly known as: LIPITOR  Take 1 tablet (80 mg total) by mouth once daily.     budesonide-formoterol 80-4.5 mcg 80-4.5 mcg/actuation Hfaa  Commonly known as: SYMBICORT  INHALE 2 PUFFS INTO THE LUNGS TWICE DAILY. RINSE MOUTH AFTER USE     clopidogreL 75 mg tablet  Commonly known as: PLAVIX  Take 1 tablet (75 mg total) by mouth once daily.     furosemide 20 MG tablet  Commonly known as: LASIX  Take 1 tablet (20 mg total) by mouth daily as needed (edema/wt gain).     gabapentin 300 MG capsule  Commonly known as: NEURONTIN  Take 1 capsule (300 mg total) by mouth 2 (two) times daily.     metoprolol succinate 25 MG 24 hr tablet  Commonly known as: TOPROL-XL  Take 1 tablet (25 mg total) by mouth once daily.     nicotine 14 mg/24 hr  Commonly known as: NICODERM CQ  Place 1 patch onto the skin once daily.     nitroGLYCERIN 0.4 MG SL tablet  Commonly known as: NITROSTAT  Place 1 tablet (0.4 mg total) under the tongue every 5 (five) minutes as needed for Chest pain.     traMADoL 50 mg tablet  Commonly known as: ULTRAM  Take 1 tablet (50 mg total) by mouth every 8 (eight) hours as needed for Pain.            STOP taking these medications      cephALEXin 500 MG capsule  Commonly known as: KEFLEX     hydrocortisone-aloe vera 1 % Crea cream                I have seen and examined this patient within the last 30 days. My clinical findings that support the need for the home health skilled services and home bound status are the following:no   Weakness/numbness causing balance and gait disturbance due to Weakness/Debility making it taxing to leave home.     Diet:   cardiac diet    Referrals/ Consults  Physical Therapy to evaluate and treat. Evaluate for home safety and equipment needs; Establish/upgrade home exercise program. Perform / instruct on therapeutic  exercises, gait training, transfer training, and Range of Motion.  Occupational Therapy to evaluate and treat. Evaluate home environment for safety and equipment needs. Perform/Instruct on transfers, ADL training, ROM, and therapeutic exercises.   to evaluate for community resources/long-range planning.  Aide to provide assistance with personal care, ADLs, and vital signs.    Activities:   activity as tolerated    Nursing:   Agency to admit patient within 24 hours of hospital discharge unless specified on physician order or at patient request    SN to complete comprehensive assessment including routine vital signs. Instruct on disease process and s/s of complications to report to MD. Review/verify medication list sent home with the patient at time of discharge  and instruct patient/caregiver as needed. Frequency may be adjusted depending on start of care date.     Skilled nurse to perform up to 3 visits PRN for symptoms related to diagnosis    Notify MD if SBP > 160 or < 90; DBP > 90 or < 50; HR > 120 or < 50; Temp > 101; O2 < 88%    Ok to schedule additional visits based on staff availability and patient request on consecutive days within the home health episode.    When multiple disciplines ordered:    Start of Care occurs on Sunday - Wednesday schedule remaining discipline evaluations as ordered on separate consecutive days following the start of care.    Thursday SOC -schedule subsequent evaluations Friday and Monday the following week.     Friday - Saturday SOC - schedule subsequent discipline evaluations on consecutive days starting Monday of the following week.    Miscellaneous   Routine Skin for Bedridden Patients: Instruct patient/caregiver to apply moisture barrier cream to all skin folds and wet areas in perineal area daily and after baths and all bowel movements.  Home Oxygen:  Oxygen at 3 L/min nasal canula to be used:  Continuously.    Home Health Aide:  Nursing Three times weekly,  Physical Therapy Three times weekly, Occupational Therapy Three times weekly, Medical Social Work Three times weekly, and Home Health Aide Three times weekly    Wound Care Orders  yes:  Foot Ulcer:  Location: RLE TMA site    Dry Eschar: Pain with betadine twice daily.    Arterial Wound:  Location: RLE TMA site    Consult ET nurse        Apply the following to wound:   Other: paint with betadine (frequency)    I certify that this patient is confined to her home and needs intermittent skilled nursing care, physical therapy, and occupational therapy.

## 2024-08-15 NOTE — CONSULTS
[facility]    Wound Care     Patient Name:  Radha Sotelo  MRN:  [id number]  Date: 8/15/2024  Diagnosis: Peripheral arterial disease     History:  Past Medical History:   Diagnosis Date    Anemia     Anxiety     COPD (chronic obstructive pulmonary disease)     COPD (chronic obstructive pulmonary disease) with emphysema     Coronary artery disease     Depression     Diverticulitis     Hyperlipidemia     Hypertension     PVD (peripheral vascular disease)     PVD (peripheral vascular disease)     S/P colostomy     Substance abuse     hx heavy etoh use     Tobacco abuse      Social History     Socioeconomic History    Marital status:    Tobacco Use    Smoking status: Every Day     Current packs/day: 0.25     Average packs/day: 0.5 packs/day for 61.1 years (30.2 ttl pk-yrs)     Types: Cigarettes     Start date: 7/5/1963    Smokeless tobacco: Never   Substance and Sexual Activity    Alcohol use: No    Drug use: No    Sexual activity: Not Currently   Social History Narrative    ** Merged History Encounter **          Social Determinants of Health     Financial Resource Strain: Low Risk  (7/29/2024)    Overall Financial Resource Strain (CARDIA)     Difficulty of Paying Living Expenses: Not very hard   Food Insecurity: No Food Insecurity (7/29/2024)    Hunger Vital Sign     Worried About Running Out of Food in the Last Year: Never true     Ran Out of Food in the Last Year: Never true   Transportation Needs: No Transportation Needs (7/29/2024)    TRANSPORTATION NEEDS     Transportation : No   Physical Activity: Inactive (7/29/2024)    Exercise Vital Sign     Days of Exercise per Week: 0 days     Minutes of Exercise per Session: 0 min   Stress: Patient Unable To Answer (7/29/2024)    [employer] - Occupational Stress Questionnaire     Feeling of Stress : Patient unable to answer   Housing Stability: Low Risk  (7/29/2024)    Housing Stability Vital Sign     Unable to Pay for  "Housing in the Last Year: No     Homeless in the Last Year: No     Precautions:  Allergies as of 08/12/2024    (No Known Allergies)       WO Assessment Details / Treatment:    Patient seen for wound care: New Consult   Chart reviewed for this encounter.   Labs:   WBC (K/uL)   Date Value   08/15/2024 6.14   08/14/2024 8.38     Glucose (mg/dL)   Date Value   08/15/2024 73   08/14/2024 145 (H)     Albumin (g/dL)   Date Value   08/15/2024 3.1 (L)   08/14/2024 3.2 (L)       Suresh Score: 15    Narrative:  Pt seen for WC consultation / follow-up and agreed to assessment  Chart reviewed for this encounter.   See Flow Sheet for additional documentation and media.    Pt seen by podiatry and vascular, has orders from podiatry for wound.   Attempted to see pt, she refused, she "is being d/c and has all appointments set"    RECOMMENDATIONS:       Bedside nursing to continue care & monitoring.  Bedside nursing to maintain pressure injury prevention interventions    Thank you for the consult. Wound Care will sign off.  Please place a new consult if needed.        08/15/24 1430   WOCN Assessment   WOCN Total Time (mins) 10   Visit Date 08/15/24   Visit Time 1430   Consult Type New   WOCN Speciality Wound   Intervention chart review;team conference                     "

## 2024-08-15 NOTE — PLAN OF CARE
08/15/24 1359   Final Note   Assessment Type Final Discharge Note   Anticipated Discharge Disposition Home   Hospital Resources/Appts/Education Provided Provided patient/caregiver with written discharge plan information   Post-Acute Status   Patient choice form signed by patient/caregiver List with quality metrics by geographic area provided   Discharge Delays None known at this time     [facility] Med [facility]  Discharge Final Note    Primary Care Provider: Kalyn Pemberton NP    Expected Discharge Date: 8/15/2024    Pt d/c home with family and with resumed acute care at home program and [facility]. No d/c needs reported by medical team at this time.     Patient cleared for discharge from case management standpoint.     Discharge Plan A and Plan B have been determined by review of patient's clinical status, future medical and therapeutic needs, and coverage/benefits for post-acute care in coordination with multidisciplinary team members.        Final Discharge Note (most recent)       Final Note - 08/15/24 1359          Final Note    Assessment Type Final Discharge Note (P)      Anticipated Discharge Disposition Home or Self Care (P)      Hospital Resources/Appts/Education Provided Provided patient/caregiver with written discharge plan information (P)         Post-Acute Status    Patient choice form signed by patient/caregiver List with quality metrics by geographic area provided (P)      Discharge Delays None known at this time (P)                      Important Message from Medicare  Important Message from Medicare regarding Discharge Appeal Rights: Given to patient/caregiver, Explained to patient/caregiver, Signed/date by patient/caregiver     Date IMM was signed: 08/15/24  Time IMM was signed: 1051    Contact Info       Kalyn Pemberton NP   Specialty: Family Medicine, Palliative Medicine   Relationship: PCP - General    [facility] [place] [address]   Phone: [phone]       Next  Steps: Schedule an appointment as soon as possible for a visit in 1 week(s)    [facility] PODIATRY   Specialty: Podiatry    [address] [facility] [address]   Phone: [phone]       Next Steps: Schedule an appointment as soon as possible for a visit in 1 week(s)    [facility] VASCULAR SURGERY   Specialty: Vascular Surgery    [address] [facility] [address]   Phone: [phone]       Next Steps: Schedule an appointment as soon as possible for a visit in 1 week(s)            Future Appointments   Date Time Provider Department Center   8/29/2024 11:30 AM Angela Walker MD [facility]        scheduled post-discharge follow-up appointment and information added to AVS.     TIGRE Sutton  [facility]  Ext. [phone]

## 2024-08-15 NOTE — PLAN OF CARE
Problem: Adult Inpatient Plan of Care  Goal: Plan of Care Review  Outcome: Progressing  Goal: Absence of Hospital-Acquired Illness or Injury  Outcome: Progressing  Goal: Optimal Comfort and Wellbeing  Outcome: Progressing     Problem: Acute Kidney Injury/Impairment  Goal: Effective Renal Function  Outcome: Progressing     Problem: Wound  Goal: Optimal Coping  Outcome: Progressing  Goal: Absence of Infection Signs and Symptoms  Outcome: Progressing  Goal: Skin Health and Integrity  Outcome: Progressing     Problem: Fall Injury Risk  Goal: Absence of Fall and Fall-Related Injury  Outcome: Progressing
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
## 2024-08-15 NOTE — PLAN OF CARE
APPOINTMENT:    Patient Appointment(s) scheduled with Angela Walker MD Thursday Aug 29, 2024 11:30 AM

## 2024-08-15 NOTE — PROGRESS NOTES
Vancomycin discontinued. Pharmacy to sign off at this time.    Please call or re-consult if needed.    Mickey Mccarthy, Lis  Spectra [phone]

## 2024-08-15 NOTE — ASSESSMENT & PLAN NOTE
- Podiatry consulted  - paint with betadine and keep open to air  - follow up with podiatry and vascular outpatient

## 2024-08-15 NOTE — PLAN OF CARE
16:48- [facility] arranged stretcher to transport patient home at 18:00.   17:03- Pickup time 19:05     JARETH Gregg (On-call)  Case Management  [phone]

## 2024-08-18 LAB
BACTERIA BLD CULT: NORMAL
BACTERIA BLD CULT: NORMAL

## 2024-08-20 DIAGNOSIS — J44.9 CHRONIC OBSTRUCTIVE PULMONARY DISEASE, UNSPECIFIED COPD TYPE: Primary | ICD-10-CM

## 2024-08-26 DIAGNOSIS — J44.9 CHRONIC OBSTRUCTIVE PULMONARY DISEASE, UNSPECIFIED COPD TYPE: ICD-10-CM

## 2024-08-26 RX ORDER — IPRATROPIUM BROMIDE AND ALBUTEROL SULFATE 2.5; .5 MG/3ML; MG/3ML
3 SOLUTION RESPIRATORY (INHALATION) EVERY 6 HOURS PRN
Qty: 180 ML | Refills: 6 | Status: SHIPPED | OUTPATIENT
Start: 2024-08-26 | End: 2025-08-26

## 2024-09-09 ENCOUNTER — TELEPHONE (OUTPATIENT)
Dept: HOME HEALTH SERVICES | Facility: CLINIC | Age: 79
End: 2024-09-09
Payer: MEDICARE

## 2024-09-09 RX ORDER — TRAMADOL HYDROCHLORIDE 50 MG/1
50 TABLET ORAL EVERY 8 HOURS PRN
Qty: 30 TABLET | Refills: 0 | Status: ON HOLD | OUTPATIENT
Start: 2024-09-09 | End: 2024-09-23

## 2024-09-09 NOTE — TELEPHONE ENCOUNTER
Spoke with patient.  She is complaining of foot pain from wound care.  States that she is in a lot of pain.  After speaking with the NP, medication pended to her for refill.  Patient notified of medication at the pharmacy.

## 2024-09-12 ENCOUNTER — HOSPITAL ENCOUNTER (INPATIENT)
Facility: HOSPITAL | Age: 79
LOS: 2 days | Discharge: HOME HEALTH | DRG: 281 | End: 2024-09-15
Attending: STUDENT IN AN ORGANIZED HEALTH CARE EDUCATION/TRAINING PROGRAM | Admitting: STUDENT IN AN ORGANIZED HEALTH CARE EDUCATION/TRAINING PROGRAM
Payer: MEDICARE

## 2024-09-12 DIAGNOSIS — J44.1 COPD EXACERBATION: Primary | ICD-10-CM

## 2024-09-12 DIAGNOSIS — R79.89 ELEVATED TROPONIN: ICD-10-CM

## 2024-09-12 DIAGNOSIS — R07.9 CHEST PAIN: ICD-10-CM

## 2024-09-12 DIAGNOSIS — R06.02 SOB (SHORTNESS OF BREATH): ICD-10-CM

## 2024-09-12 PROBLEM — J96.20 ACUTE ON CHRONIC RESPIRATORY FAILURE: Status: ACTIVE | Noted: 2021-07-14

## 2024-09-12 LAB
ALBUMIN SERPL BCP-MCNC: 3.2 G/DL (ref 3.5–5.2)
ALP SERPL-CCNC: 145 U/L (ref 55–135)
ALT SERPL W/O P-5'-P-CCNC: 14 U/L (ref 10–44)
ANION GAP SERPL CALC-SCNC: 13 MMOL/L (ref 8–16)
AST SERPL-CCNC: 24 U/L (ref 10–40)
BASOPHILS # BLD AUTO: 0.05 K/UL (ref 0–0.2)
BASOPHILS NFR BLD: 0.6 % (ref 0–1.9)
BILIRUB SERPL-MCNC: 0.4 MG/DL (ref 0.1–1)
BNP SERPL-MCNC: 379 PG/ML (ref 0–99)
BUN SERPL-MCNC: 14 MG/DL (ref 8–23)
CALCIUM SERPL-MCNC: 9.2 MG/DL (ref 8.7–10.5)
CHLORIDE SERPL-SCNC: 103 MMOL/L (ref 95–110)
CO2 SERPL-SCNC: 20 MMOL/L (ref 23–29)
CREAT SERPL-MCNC: 0.8 MG/DL (ref 0.5–1.4)
DIFFERENTIAL METHOD BLD: ABNORMAL
EOSINOPHIL # BLD AUTO: 0.1 K/UL (ref 0–0.5)
EOSINOPHIL NFR BLD: 1 % (ref 0–8)
ERYTHROCYTE [DISTWIDTH] IN BLOOD BY AUTOMATED COUNT: 14.1 % (ref 11.5–14.5)
EST. GFR  (NO RACE VARIABLE): >60 ML/MIN/1.73 M^2
GLUCOSE SERPL-MCNC: 102 MG/DL (ref 70–110)
HCT VFR BLD AUTO: 33.9 % (ref 37–48.5)
HGB BLD-MCNC: 10.5 G/DL (ref 12–16)
IMM GRANULOCYTES # BLD AUTO: 0.05 K/UL (ref 0–0.04)
IMM GRANULOCYTES NFR BLD AUTO: 0.6 % (ref 0–0.5)
LYMPHOCYTES # BLD AUTO: 0.4 K/UL (ref 1–4.8)
LYMPHOCYTES NFR BLD: 5.1 % (ref 18–48)
MCH RBC QN AUTO: 28.7 PG (ref 27–31)
MCHC RBC AUTO-ENTMCNC: 31 G/DL (ref 32–36)
MCV RBC AUTO: 93 FL (ref 82–98)
MONOCYTES # BLD AUTO: 0.1 K/UL (ref 0.3–1)
MONOCYTES NFR BLD: 1.6 % (ref 4–15)
NEUTROPHILS # BLD AUTO: 7.4 K/UL (ref 1.8–7.7)
NEUTROPHILS NFR BLD: 91.1 % (ref 38–73)
NRBC BLD-RTO: 0 /100 WBC
PLATELET # BLD AUTO: 267 K/UL (ref 150–450)
PMV BLD AUTO: 10.4 FL (ref 9.2–12.9)
POTASSIUM SERPL-SCNC: 3.3 MMOL/L (ref 3.5–5.1)
PROT SERPL-MCNC: 6.7 G/DL (ref 6–8.4)
RBC # BLD AUTO: 3.66 M/UL (ref 4–5.4)
SODIUM SERPL-SCNC: 136 MMOL/L (ref 136–145)
TROPONIN I SERPL DL<=0.01 NG/ML-MCNC: 0.57 NG/ML (ref 0–0.03)
TROPONIN I SERPL DL<=0.01 NG/ML-MCNC: 0.65 NG/ML (ref 0–0.03)
WBC # BLD AUTO: 8.07 K/UL (ref 3.9–12.7)

## 2024-09-12 PROCEDURE — 96375 TX/PRO/DX INJ NEW DRUG ADDON: CPT

## 2024-09-12 PROCEDURE — 84484 ASSAY OF TROPONIN QUANT: CPT | Performed by: STUDENT IN AN ORGANIZED HEALTH CARE EDUCATION/TRAINING PROGRAM

## 2024-09-12 PROCEDURE — 84484 ASSAY OF TROPONIN QUANT: CPT | Mod: 91 | Performed by: INTERNAL MEDICINE

## 2024-09-12 PROCEDURE — 94761 N-INVAS EAR/PLS OXIMETRY MLT: CPT

## 2024-09-12 PROCEDURE — 25000242 PHARM REV CODE 250 ALT 637 W/ HCPCS: Performed by: STUDENT IN AN ORGANIZED HEALTH CARE EDUCATION/TRAINING PROGRAM

## 2024-09-12 PROCEDURE — G0378 HOSPITAL OBSERVATION PER HR: HCPCS

## 2024-09-12 PROCEDURE — 99285 EMERGENCY DEPT VISIT HI MDM: CPT | Mod: 25

## 2024-09-12 PROCEDURE — 36415 COLL VENOUS BLD VENIPUNCTURE: CPT | Performed by: INTERNAL MEDICINE

## 2024-09-12 PROCEDURE — 25000003 PHARM REV CODE 250: Performed by: INTERNAL MEDICINE

## 2024-09-12 PROCEDURE — 80053 COMPREHEN METABOLIC PANEL: CPT | Performed by: STUDENT IN AN ORGANIZED HEALTH CARE EDUCATION/TRAINING PROGRAM

## 2024-09-12 PROCEDURE — 25000003 PHARM REV CODE 250: Performed by: STUDENT IN AN ORGANIZED HEALTH CARE EDUCATION/TRAINING PROGRAM

## 2024-09-12 PROCEDURE — 93010 ELECTROCARDIOGRAM REPORT: CPT | Mod: ,,, | Performed by: INTERNAL MEDICINE

## 2024-09-12 PROCEDURE — 83880 ASSAY OF NATRIURETIC PEPTIDE: CPT | Performed by: STUDENT IN AN ORGANIZED HEALTH CARE EDUCATION/TRAINING PROGRAM

## 2024-09-12 PROCEDURE — 96361 HYDRATE IV INFUSION ADD-ON: CPT

## 2024-09-12 PROCEDURE — 85025 COMPLETE CBC W/AUTO DIFF WBC: CPT | Performed by: STUDENT IN AN ORGANIZED HEALTH CARE EDUCATION/TRAINING PROGRAM

## 2024-09-12 PROCEDURE — 94640 AIRWAY INHALATION TREATMENT: CPT | Mod: XB

## 2024-09-12 PROCEDURE — 93005 ELECTROCARDIOGRAM TRACING: CPT

## 2024-09-12 PROCEDURE — 27000221 HC OXYGEN, UP TO 24 HOURS

## 2024-09-12 PROCEDURE — 63600175 PHARM REV CODE 636 W HCPCS: Performed by: STUDENT IN AN ORGANIZED HEALTH CARE EDUCATION/TRAINING PROGRAM

## 2024-09-12 RX ORDER — ONDANSETRON HYDROCHLORIDE 2 MG/ML
4 INJECTION, SOLUTION INTRAVENOUS
Status: COMPLETED | OUTPATIENT
Start: 2024-09-12 | End: 2024-09-12

## 2024-09-12 RX ORDER — ASPIRIN 81 MG/1
81 TABLET ORAL DAILY
Status: DISCONTINUED | OUTPATIENT
Start: 2024-09-13 | End: 2024-09-15 | Stop reason: HOSPADM

## 2024-09-12 RX ORDER — IPRATROPIUM BROMIDE AND ALBUTEROL SULFATE 2.5; .5 MG/3ML; MG/3ML
3 SOLUTION RESPIRATORY (INHALATION) EVERY 4 HOURS PRN
Status: DISCONTINUED | OUTPATIENT
Start: 2024-09-12 | End: 2024-09-14

## 2024-09-12 RX ORDER — ASPIRIN 325 MG
325 TABLET ORAL
Status: COMPLETED | OUTPATIENT
Start: 2024-09-12 | End: 2024-09-12

## 2024-09-12 RX ORDER — CLOPIDOGREL BISULFATE 75 MG/1
75 TABLET ORAL DAILY
Status: DISCONTINUED | OUTPATIENT
Start: 2024-09-13 | End: 2024-09-15 | Stop reason: HOSPADM

## 2024-09-12 RX ORDER — ACETAMINOPHEN 325 MG/1
650 TABLET ORAL EVERY 6 HOURS PRN
Status: DISCONTINUED | OUTPATIENT
Start: 2024-09-12 | End: 2024-09-13

## 2024-09-12 RX ORDER — FUROSEMIDE 10 MG/ML
40 INJECTION INTRAMUSCULAR; INTRAVENOUS
Status: COMPLETED | OUTPATIENT
Start: 2024-09-12 | End: 2024-09-12

## 2024-09-12 RX ORDER — DEXAMETHASONE SODIUM PHOSPHATE 4 MG/ML
8 INJECTION, SOLUTION INTRA-ARTICULAR; INTRALESIONAL; INTRAMUSCULAR; INTRAVENOUS; SOFT TISSUE
Status: COMPLETED | OUTPATIENT
Start: 2024-09-12 | End: 2024-09-12

## 2024-09-12 RX ORDER — ATORVASTATIN CALCIUM 40 MG/1
80 TABLET, FILM COATED ORAL DAILY
Status: DISCONTINUED | OUTPATIENT
Start: 2024-09-13 | End: 2024-09-15 | Stop reason: HOSPADM

## 2024-09-12 RX ORDER — IPRATROPIUM BROMIDE AND ALBUTEROL SULFATE 2.5; .5 MG/3ML; MG/3ML
3 SOLUTION RESPIRATORY (INHALATION) EVERY 20 MIN
Status: COMPLETED | OUTPATIENT
Start: 2024-09-12 | End: 2024-09-12

## 2024-09-12 RX ORDER — PREDNISONE 20 MG/1
40 TABLET ORAL DAILY
Status: DISCONTINUED | OUTPATIENT
Start: 2024-09-13 | End: 2024-09-15 | Stop reason: HOSPADM

## 2024-09-12 RX ORDER — GABAPENTIN 300 MG/1
300 CAPSULE ORAL 2 TIMES DAILY
Status: DISCONTINUED | OUTPATIENT
Start: 2024-09-12 | End: 2024-09-15 | Stop reason: HOSPADM

## 2024-09-12 RX ORDER — METOPROLOL SUCCINATE 25 MG/1
25 TABLET, EXTENDED RELEASE ORAL DAILY
Status: DISCONTINUED | OUTPATIENT
Start: 2024-09-13 | End: 2024-09-15 | Stop reason: HOSPADM

## 2024-09-12 RX ORDER — TRAMADOL HYDROCHLORIDE 50 MG/1
50 TABLET ORAL EVERY 8 HOURS PRN
Status: DISCONTINUED | OUTPATIENT
Start: 2024-09-12 | End: 2024-09-15 | Stop reason: HOSPADM

## 2024-09-12 RX ORDER — ONDANSETRON 4 MG/1
4 TABLET, ORALLY DISINTEGRATING ORAL EVERY 8 HOURS PRN
Status: DISCONTINUED | OUTPATIENT
Start: 2024-09-12 | End: 2024-09-15 | Stop reason: HOSPADM

## 2024-09-12 RX ORDER — FUROSEMIDE 10 MG/ML
40 INJECTION INTRAMUSCULAR; INTRAVENOUS EVERY 12 HOURS
Status: DISCONTINUED | OUTPATIENT
Start: 2024-09-13 | End: 2024-09-14

## 2024-09-12 RX ADMIN — FUROSEMIDE 40 MG: 10 INJECTION, SOLUTION INTRAVENOUS at 08:09

## 2024-09-12 RX ADMIN — IPRATROPIUM BROMIDE AND ALBUTEROL SULFATE 3 ML: 2.5; .5 SOLUTION RESPIRATORY (INHALATION) at 08:09

## 2024-09-12 RX ADMIN — GABAPENTIN 300 MG: 300 CAPSULE ORAL at 09:09

## 2024-09-12 RX ADMIN — SODIUM CHLORIDE, POTASSIUM CHLORIDE, SODIUM LACTATE AND CALCIUM CHLORIDE 1000 ML: 600; 310; 30; 20 INJECTION, SOLUTION INTRAVENOUS at 07:09

## 2024-09-12 RX ADMIN — ASPIRIN 325 MG ORAL TABLET 325 MG: 325 PILL ORAL at 08:09

## 2024-09-12 RX ADMIN — IPRATROPIUM BROMIDE AND ALBUTEROL SULFATE 3 ML: 2.5; .5 SOLUTION RESPIRATORY (INHALATION) at 07:09

## 2024-09-12 RX ADMIN — ONDANSETRON 4 MG: 2 INJECTION INTRAMUSCULAR; INTRAVENOUS at 06:09

## 2024-09-12 RX ADMIN — DEXAMETHASONE SODIUM PHOSPHATE 8 MG: 4 INJECTION INTRA-ARTICULAR; INTRALESIONAL; INTRAMUSCULAR; INTRAVENOUS; SOFT TISSUE at 06:09
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
## 2024-09-13 PROBLEM — R07.9 CHEST PAIN: Status: RESOLVED | Noted: 2024-05-25 | Resolved: 2024-09-13

## 2024-09-13 PROBLEM — J96.10 CHRONIC RESPIRATORY FAILURE: Status: ACTIVE | Noted: 2021-07-14

## 2024-09-13 LAB
ANION GAP SERPL CALC-SCNC: 10 MMOL/L (ref 8–16)
APTT PPP: 29.1 SEC (ref 21–32)
APTT PPP: 29.9 SEC (ref 21–32)
AV INDEX (PROSTH): 0.78
AV MEAN GRADIENT: 6 MMHG
AV PEAK GRADIENT: 9 MMHG
AV VALVE AREA BY VELOCITY RATIO: 1.98 CM²
AV VALVE AREA: 2.38 CM²
AV VELOCITY RATIO: 0.65
BASOPHILS # BLD AUTO: 0 K/UL (ref 0–0.2)
BASOPHILS # BLD AUTO: 0.01 K/UL (ref 0–0.2)
BASOPHILS NFR BLD: 0 % (ref 0–1.9)
BASOPHILS NFR BLD: 0.2 % (ref 0–1.9)
BSA FOR ECHO PROCEDURE: 1.47 M2
BUN SERPL-MCNC: 15 MG/DL (ref 8–23)
CALCIUM SERPL-MCNC: 8.4 MG/DL (ref 8.7–10.5)
CHLORIDE SERPL-SCNC: 97 MMOL/L (ref 95–110)
CO2 SERPL-SCNC: 27 MMOL/L (ref 23–29)
CREAT SERPL-MCNC: 0.9 MG/DL (ref 0.5–1.4)
CV ECHO LV RWT: 0.4 CM
DIFFERENTIAL METHOD BLD: ABNORMAL
DIFFERENTIAL METHOD BLD: ABNORMAL
DOP CALC AO PEAK VEL: 1.54 M/S
DOP CALC AO VTI: 28.69 CM
DOP CALC LVOT AREA: 3 CM2
DOP CALC LVOT DIAMETER: 1.97 CM
DOP CALC LVOT PEAK VEL: 1 M/S
DOP CALC LVOT STROKE VOLUME: 68.39 CM3
DOP CALCLVOT PEAK VEL VTI: 22.45 CM
E WAVE DECELERATION TIME: 119.1 MSEC
E/A RATIO: 0.57
E/E' RATIO: 10.31 M/S
ECHO LV POSTERIOR WALL: 0.73 CM (ref 0.6–1.1)
EJECTION FRACTION: 65 %
EOSINOPHIL # BLD AUTO: 0 K/UL (ref 0–0.5)
EOSINOPHIL # BLD AUTO: 0 K/UL (ref 0–0.5)
EOSINOPHIL NFR BLD: 0 % (ref 0–8)
EOSINOPHIL NFR BLD: 0 % (ref 0–8)
ERYTHROCYTE [DISTWIDTH] IN BLOOD BY AUTOMATED COUNT: 13.9 % (ref 11.5–14.5)
ERYTHROCYTE [DISTWIDTH] IN BLOOD BY AUTOMATED COUNT: 14 % (ref 11.5–14.5)
EST. GFR  (NO RACE VARIABLE): >60 ML/MIN/1.73 M^2
FRACTIONAL SHORTENING: 33 % (ref 28–44)
GLUCOSE SERPL-MCNC: 193 MG/DL (ref 70–110)
HCT VFR BLD AUTO: 33.6 % (ref 37–48.5)
HCT VFR BLD AUTO: 33.9 % (ref 37–48.5)
HGB BLD-MCNC: 10.5 G/DL (ref 12–16)
HGB BLD-MCNC: 10.6 G/DL (ref 12–16)
IMM GRANULOCYTES # BLD AUTO: 0.03 K/UL (ref 0–0.04)
IMM GRANULOCYTES # BLD AUTO: 0.05 K/UL (ref 0–0.04)
IMM GRANULOCYTES NFR BLD AUTO: 0.6 % (ref 0–0.5)
IMM GRANULOCYTES NFR BLD AUTO: 0.8 % (ref 0–0.5)
INR PPP: 1 (ref 0.8–1.2)
INTERVENTRICULAR SEPTUM: 1.12 CM (ref 0.6–1.1)
LA MAJOR: 3.88 CM
LA MINOR: 4.27 CM
LA WIDTH: 2.61 CM
LEFT ATRIUM SIZE: 2.49 CM
LEFT ATRIUM VOLUME INDEX MOD: 9.7 ML/M2
LEFT ATRIUM VOLUME INDEX: 15.4 ML/M2
LEFT ATRIUM VOLUME MOD: 14.21 CM3
LEFT ATRIUM VOLUME: 22.46 CM3
LEFT INTERNAL DIMENSION IN SYSTOLE: 2.43 CM (ref 2.1–4)
LEFT VENTRICLE DIASTOLIC VOLUME INDEX: 38.19 ML/M2
LEFT VENTRICLE DIASTOLIC VOLUME: 55.76 ML
LEFT VENTRICLE MASS INDEX: 67 G/M2
LEFT VENTRICLE SYSTOLIC VOLUME INDEX: 14.3 ML/M2
LEFT VENTRICLE SYSTOLIC VOLUME: 20.87 ML
LEFT VENTRICULAR INTERNAL DIMENSION IN DIASTOLE: 3.64 CM (ref 3.5–6)
LEFT VENTRICULAR MASS: 98.14 G
LV LATERAL E/E' RATIO: 7.44 M/S
LV SEPTAL E/E' RATIO: 16.75 M/S
LYMPHOCYTES # BLD AUTO: 0.4 K/UL (ref 1–4.8)
LYMPHOCYTES # BLD AUTO: 0.5 K/UL (ref 1–4.8)
LYMPHOCYTES NFR BLD: 7.4 % (ref 18–48)
LYMPHOCYTES NFR BLD: 8.1 % (ref 18–48)
MCH RBC QN AUTO: 28.6 PG (ref 27–31)
MCH RBC QN AUTO: 28.7 PG (ref 27–31)
MCHC RBC AUTO-ENTMCNC: 31.3 G/DL (ref 32–36)
MCHC RBC AUTO-ENTMCNC: 31.3 G/DL (ref 32–36)
MCV RBC AUTO: 92 FL (ref 82–98)
MCV RBC AUTO: 92 FL (ref 82–98)
MONOCYTES # BLD AUTO: 0 K/UL (ref 0.3–1)
MONOCYTES # BLD AUTO: 0.1 K/UL (ref 0.3–1)
MONOCYTES NFR BLD: 0.8 % (ref 4–15)
MONOCYTES NFR BLD: 2.1 % (ref 4–15)
MV PEAK A VEL: 1.17 M/S
MV PEAK E VEL: 0.67 M/S
MV STENOSIS PRESSURE HALF TIME: 34.54 MS
MV VALVE AREA P 1/2 METHOD: 6.37 CM2
NEUTROPHILS # BLD AUTO: 4.5 K/UL (ref 1.8–7.7)
NEUTROPHILS # BLD AUTO: 5.4 K/UL (ref 1.8–7.7)
NEUTROPHILS NFR BLD: 89 % (ref 38–73)
NEUTROPHILS NFR BLD: 91 % (ref 38–73)
NRBC BLD-RTO: 0 /100 WBC
NRBC BLD-RTO: 0 /100 WBC
OHS QRS DURATION: 92 MS
OHS QRS DURATION: 94 MS
OHS QTC CALCULATION: 468 MS
OHS QTC CALCULATION: 473 MS
PLATELET # BLD AUTO: 288 K/UL (ref 150–450)
PLATELET # BLD AUTO: 310 K/UL (ref 150–450)
PMV BLD AUTO: 10.2 FL (ref 9.2–12.9)
PMV BLD AUTO: 10.3 FL (ref 9.2–12.9)
POTASSIUM SERPL-SCNC: 2.9 MMOL/L (ref 3.5–5.1)
POTASSIUM SERPL-SCNC: 3.2 MMOL/L (ref 3.5–5.1)
PROTHROMBIN TIME: 11.4 SEC (ref 9–12.5)
RA PRESSURE ESTIMATED: 3 MMHG
RBC # BLD AUTO: 3.66 M/UL (ref 4–5.4)
RBC # BLD AUTO: 3.7 M/UL (ref 4–5.4)
RIGHT VENTRICLE DIASTOLIC BASEL DIMENSION: 3.4 CM
SINUS: 2.73 CM
SODIUM SERPL-SCNC: 134 MMOL/L (ref 136–145)
STJ: 2.61 CM
TDI LATERAL: 0.09 M/S
TDI SEPTAL: 0.04 M/S
TDI: 0.07 M/S
TRICUSPID ANNULAR PLANE SYSTOLIC EXCURSION: 2.22 CM
TROPONIN I SERPL DL<=0.01 NG/ML-MCNC: 0.87 NG/ML (ref 0–0.03)
TROPONIN I SERPL DL<=0.01 NG/ML-MCNC: 1.76 NG/ML (ref 0–0.03)
TROPONIN I SERPL DL<=0.01 NG/ML-MCNC: 2.1 NG/ML (ref 0–0.03)
WBC # BLD AUTO: 4.98 K/UL (ref 3.9–12.7)
WBC # BLD AUTO: 6.08 K/UL (ref 3.9–12.7)
Z-SCORE OF LEFT VENTRICULAR DIMENSION IN END DIASTOLE: -1.88
Z-SCORE OF LEFT VENTRICULAR DIMENSION IN END SYSTOLE: -0.92

## 2024-09-13 PROCEDURE — 63600175 PHARM REV CODE 636 W HCPCS: Performed by: STUDENT IN AN ORGANIZED HEALTH CARE EDUCATION/TRAINING PROGRAM

## 2024-09-13 PROCEDURE — 96376 TX/PRO/DX INJ SAME DRUG ADON: CPT

## 2024-09-13 PROCEDURE — 85730 THROMBOPLASTIN TIME PARTIAL: CPT | Mod: 91 | Performed by: STUDENT IN AN ORGANIZED HEALTH CARE EDUCATION/TRAINING PROGRAM

## 2024-09-13 PROCEDURE — 85025 COMPLETE CBC W/AUTO DIFF WBC: CPT | Performed by: INTERNAL MEDICINE

## 2024-09-13 PROCEDURE — 25000003 PHARM REV CODE 250: Performed by: STUDENT IN AN ORGANIZED HEALTH CARE EDUCATION/TRAINING PROGRAM

## 2024-09-13 PROCEDURE — 80048 BASIC METABOLIC PNL TOTAL CA: CPT | Performed by: INTERNAL MEDICINE

## 2024-09-13 PROCEDURE — 36415 COLL VENOUS BLD VENIPUNCTURE: CPT | Performed by: STUDENT IN AN ORGANIZED HEALTH CARE EDUCATION/TRAINING PROGRAM

## 2024-09-13 PROCEDURE — 93010 ELECTROCARDIOGRAM REPORT: CPT | Mod: ,,, | Performed by: INTERNAL MEDICINE

## 2024-09-13 PROCEDURE — 36415 COLL VENOUS BLD VENIPUNCTURE: CPT | Performed by: INTERNAL MEDICINE

## 2024-09-13 PROCEDURE — 93005 ELECTROCARDIOGRAM TRACING: CPT

## 2024-09-13 PROCEDURE — 99223 1ST HOSP IP/OBS HIGH 75: CPT | Mod: ,,, | Performed by: PODIATRIST

## 2024-09-13 PROCEDURE — 20600001 HC STEP DOWN PRIVATE ROOM

## 2024-09-13 PROCEDURE — 85025 COMPLETE CBC W/AUTO DIFF WBC: CPT | Mod: 91 | Performed by: STUDENT IN AN ORGANIZED HEALTH CARE EDUCATION/TRAINING PROGRAM

## 2024-09-13 PROCEDURE — 99223 1ST HOSP IP/OBS HIGH 75: CPT | Mod: ,,, | Performed by: INTERNAL MEDICINE

## 2024-09-13 PROCEDURE — 84484 ASSAY OF TROPONIN QUANT: CPT | Performed by: INTERNAL MEDICINE

## 2024-09-13 PROCEDURE — 25500020 PHARM REV CODE 255: Performed by: STUDENT IN AN ORGANIZED HEALTH CARE EDUCATION/TRAINING PROGRAM

## 2024-09-13 PROCEDURE — 63600175 PHARM REV CODE 636 W HCPCS: Performed by: INTERNAL MEDICINE

## 2024-09-13 PROCEDURE — 25000003 PHARM REV CODE 250: Performed by: INTERNAL MEDICINE

## 2024-09-13 PROCEDURE — 84132 ASSAY OF SERUM POTASSIUM: CPT | Performed by: STUDENT IN AN ORGANIZED HEALTH CARE EDUCATION/TRAINING PROGRAM

## 2024-09-13 PROCEDURE — 85610 PROTHROMBIN TIME: CPT | Performed by: STUDENT IN AN ORGANIZED HEALTH CARE EDUCATION/TRAINING PROGRAM

## 2024-09-13 PROCEDURE — 84484 ASSAY OF TROPONIN QUANT: CPT | Mod: 91 | Performed by: STUDENT IN AN ORGANIZED HEALTH CARE EDUCATION/TRAINING PROGRAM

## 2024-09-13 PROCEDURE — 96365 THER/PROPH/DIAG IV INF INIT: CPT

## 2024-09-13 RX ORDER — POTASSIUM CHLORIDE 20 MEQ/1
40 TABLET, EXTENDED RELEASE ORAL EVERY 4 HOURS
Status: COMPLETED | OUTPATIENT
Start: 2024-09-13 | End: 2024-09-13

## 2024-09-13 RX ORDER — ENOXAPARIN SODIUM 100 MG/ML
40 INJECTION SUBCUTANEOUS EVERY 24 HOURS
Status: DISCONTINUED | OUTPATIENT
Start: 2024-09-13 | End: 2024-09-13

## 2024-09-13 RX ORDER — IBUPROFEN 200 MG
16 TABLET ORAL
Status: DISCONTINUED | OUTPATIENT
Start: 2024-09-13 | End: 2024-09-15 | Stop reason: HOSPADM

## 2024-09-13 RX ORDER — NALOXONE HCL 0.4 MG/ML
0.02 VIAL (ML) INJECTION
Status: DISCONTINUED | OUTPATIENT
Start: 2024-09-13 | End: 2024-09-15 | Stop reason: HOSPADM

## 2024-09-13 RX ORDER — SODIUM CHLORIDE 0.9 % (FLUSH) 0.9 %
10 SYRINGE (ML) INJECTION EVERY 12 HOURS PRN
Status: DISCONTINUED | OUTPATIENT
Start: 2024-09-13 | End: 2024-09-15 | Stop reason: HOSPADM

## 2024-09-13 RX ORDER — IBUPROFEN 200 MG
24 TABLET ORAL
Status: DISCONTINUED | OUTPATIENT
Start: 2024-09-13 | End: 2024-09-15 | Stop reason: HOSPADM

## 2024-09-13 RX ORDER — ACETAMINOPHEN 500 MG
1000 TABLET ORAL EVERY 8 HOURS
Status: DISCONTINUED | OUTPATIENT
Start: 2024-09-13 | End: 2024-09-15 | Stop reason: HOSPADM

## 2024-09-13 RX ORDER — NITROGLYCERIN 0.4 MG/1
0.4 TABLET SUBLINGUAL EVERY 5 MIN PRN
Status: DISCONTINUED | OUTPATIENT
Start: 2024-09-13 | End: 2024-09-15 | Stop reason: HOSPADM

## 2024-09-13 RX ORDER — HEPARIN SODIUM,PORCINE/D5W 25000/250
0-40 INTRAVENOUS SOLUTION INTRAVENOUS CONTINUOUS
Status: DISPENSED | OUTPATIENT
Start: 2024-09-13 | End: 2024-09-15

## 2024-09-13 RX ORDER — OXYCODONE HYDROCHLORIDE 5 MG/1
5 TABLET ORAL EVERY 6 HOURS PRN
Status: DISCONTINUED | OUTPATIENT
Start: 2024-09-13 | End: 2024-09-15 | Stop reason: HOSPADM

## 2024-09-13 RX ORDER — NITROGLYCERIN 0.4 MG/1
TABLET SUBLINGUAL
Status: DISPENSED
Start: 2024-09-13 | End: 2024-09-13

## 2024-09-13 RX ORDER — GLUCAGON 1 MG
1 KIT INJECTION
Status: DISCONTINUED | OUTPATIENT
Start: 2024-09-13 | End: 2024-09-15 | Stop reason: HOSPADM

## 2024-09-13 RX ADMIN — POTASSIUM CHLORIDE 40 MEQ: 1500 TABLET, EXTENDED RELEASE ORAL at 09:09

## 2024-09-13 RX ADMIN — ASPIRIN 81 MG: 81 TABLET, COATED ORAL at 09:09

## 2024-09-13 RX ADMIN — CLOPIDOGREL BISULFATE 75 MG: 75 TABLET ORAL at 09:09

## 2024-09-13 RX ADMIN — METOPROLOL SUCCINATE 25 MG: 25 TABLET, EXTENDED RELEASE ORAL at 09:09

## 2024-09-13 RX ADMIN — ACETAMINOPHEN 1000 MG: 500 TABLET ORAL at 09:09

## 2024-09-13 RX ADMIN — IOHEXOL 75 ML: 350 INJECTION, SOLUTION INTRAVENOUS at 02:09

## 2024-09-13 RX ADMIN — FUROSEMIDE 40 MG: 10 INJECTION, SOLUTION INTRAVENOUS at 09:09

## 2024-09-13 RX ADMIN — ATORVASTATIN CALCIUM 80 MG: 40 TABLET, FILM COATED ORAL at 09:09

## 2024-09-13 RX ADMIN — OXYCODONE HYDROCHLORIDE 5 MG: 5 TABLET ORAL at 05:09

## 2024-09-13 RX ADMIN — OXYCODONE HYDROCHLORIDE 5 MG: 5 TABLET ORAL at 11:09

## 2024-09-13 RX ADMIN — GABAPENTIN 300 MG: 300 CAPSULE ORAL at 09:09

## 2024-09-13 RX ADMIN — ACETAMINOPHEN 1000 MG: 500 TABLET ORAL at 05:09

## 2024-09-13 RX ADMIN — HEPARIN SODIUM 12 UNITS/KG/HR: 10000 INJECTION, SOLUTION INTRAVENOUS at 10:09

## 2024-09-13 RX ADMIN — TRAMADOL HYDROCHLORIDE 50 MG: 50 TABLET, COATED ORAL at 10:09

## 2024-09-13 RX ADMIN — PREDNISONE 40 MG: 20 TABLET ORAL at 10:09

## 2024-09-13 RX ADMIN — POTASSIUM CHLORIDE 40 MEQ: 1500 TABLET, EXTENDED RELEASE ORAL at 05:09

## 2024-09-13 NOTE — ASSESSMENT & PLAN NOTE
Chronic, controlled. Latest blood pressure and vitals reviewed-     Temp:  [98 °F (36.7 °C)-98.9 °F (37.2 °C)]   Pulse:  []   Resp:  [17-27]   BP: (110-173)/(57-84)   SpO2:  [95 %-100 %] .   Home meds for hypertension were reviewed and noted below.   Hypertension Medications               furosemide (LASIX) 20 MG tablet Take 1 tablet (20 mg total) by mouth daily as needed (edema/wt gain).    metoprolol succinate (TOPROL-XL) 25 MG 24 hr tablet Take 1 tablet (25 mg total) by mouth once daily.    nitroGLYCERIN (NITROSTAT) 0.4 MG SL tablet Place 1 tablet (0.4 mg total) under the tongue every 5 (five) minutes as needed for Chest pain.            While in the hospital, will manage blood pressure as follows; Continue home antihypertensive regimen    Will utilize p.r.n. blood pressure medication only if patient's blood pressure greater than 180/110 and she develops symptoms such as worsening chest pain or shortness of breath.

## 2024-09-13 NOTE — SUBJECTIVE & OBJECTIVE
Scheduled Meds:   acetaminophen  1,000 mg Oral Q8H    aspirin  81 mg Oral Daily    atorvastatin  80 mg Oral Daily    clopidogreL  75 mg Oral Daily    furosemide (LASIX) injection  40 mg Intravenous Q12H    gabapentin  300 mg Oral BID    metoprolol succinate  25 mg Oral Daily    nitroGLYCERIN        predniSONE  40 mg Oral Daily     Continuous Infusions:   heparin (porcine) in D5W  0-40 Units/kg/hr (Adjusted) Intravenous Continuous 7.1 mL/hr at 09/13/24 1737 15 Units/kg/hr at 09/13/24 1737     PRN Meds:  Current Facility-Administered Medications:     albuterol-ipratropium, 3 mL, Nebulization, Q4H PRN    dextrose 10%, 12.5 g, Intravenous, PRN    dextrose 10%, 25 g, Intravenous, PRN    glucagon (human recombinant), 1 mg, Intramuscular, PRN    glucose, 16 g, Oral, PRN    glucose, 24 g, Oral, PRN    heparin (PORCINE), 60 Units/kg (Adjusted), Intravenous, PRN    heparin (PORCINE), 30 Units/kg (Adjusted), Intravenous, PRN    naloxone, 0.02 mg, Intravenous, PRN    nitroGLYCERIN, , ,     nitroGLYCERIN, 0.4 mg, Sublingual, Q5 Min PRN    ondansetron, 4 mg, Oral, Q8H PRN    oxyCODONE, 5 mg, Oral, Q6H PRN    sodium chloride 0.9%, 10 mL, Intravenous, Q12H PRN    traMADoL, 50 mg, Oral, Q8H PRN    Review of patient's allergies indicates:  No Known Allergies     Past Medical History:   Diagnosis Date    Anemia     Anxiety     COPD (chronic obstructive pulmonary disease)     COPD (chronic obstructive pulmonary disease) with emphysema     Coronary artery disease     Depression     Diverticulitis     Hyperlipidemia     Hypertension     PVD (peripheral vascular disease)     PVD (peripheral vascular disease)     S/P colostomy     Substance abuse     hx heavy etoh use     Tobacco abuse      Past Surgical History:   Procedure Laterality Date    ANGIOGRAM, CORONARY, WITH LEFT HEART CATHETERIZATION N/A 11/22/2023    Procedure: Angiogram, Coronary, with Left Heart Cath;  Surgeon: Checo Bhatt MD;  Location: [facility] CATH LAB;  Service:  Cardiology;  Laterality: N/A;    ANGIOGRAM, CORONARY, WITH LEFT HEART CATHETERIZATION N/A 5/27/2024    Procedure: Angiogram, Coronary, with Left Heart Cath;  Surgeon: Karel Mack MD;  Location: [facility] CATH LAB;  Service: Cardiology;  Laterality: N/A;    ANGIOGRAM, EXTREMITY, UNILATERAL Right 8/25/2023    Procedure: ANGIOGRAM, EXTREMITY, UNILATERAL;  Surgeon: Gary Rico MD;  Location: [address];  Service: Vascular;  Laterality: Right;  US GUIDED ACCESS LEFT GROIN  contrast: 150ml  fluoro: 27.9 min  mGy: 254.73  Gycm2: 62.4919  radial flush cocktail: 10ml    ANGIOGRAPHY OF LOWER EXTREMITY Right 8/24/2023    Procedure: Angiogram Extremity Unilateral;  Surgeon: Benji Ashley MD;  Location: [address];  Service: Vascular;  Laterality: Right;    COLOSTOMY      ECTOPIC PREGNANCY SURGERY      PTA, PERONEAL Right 8/25/2023    Procedure: PTA, PERONEAL TIBIAL TRUNK WITH SHOCKWAVE;  Surgeon: Gary Rico MD;  Location: [address];  Service: Vascular;  Laterality: Right;    PTCA, SINGLE VESSEL  11/22/2023    Procedure: PTCA, Single Vessel;  Surgeon: Checo Bhatt MD;  Location: [facility] CATH LAB;  Service: Cardiology;;    right forefoot amputation      right toe amputation      x2 toes    STENT, DRUG ELUTING, SINGLE VESSEL, CORONARY  11/22/2023    Procedure: Stent, Drug Eluting, Single Vessel, Coronary;  Surgeon: Checo Bhatt MD;  Location: [facility] CATH LAB;  Service: Cardiology;;    STENT, DRUG ELUTING, SINGLE VESSEL, CORONARY  5/27/2024    Procedure: Stent, Drug Eluting, Single Vessel, Coronary;  Surgeon: Karel Mack MD;  Location: [facility] CATH LAB;  Service: Cardiology;;    STENT, SUPERFICIAL FEMORAL ARTERY Right 8/25/2023    Procedure: STENT, SUPERFICIAL FEMORAL ARTERY;  Surgeon: Gary Rico MD;  Location: [address];  Service: Vascular;  Laterality: Right;  VIABAHN 6 X 15 X120       Family History    None       Tobacco Use     Smoking status: Every Day     Current packs/day: 0.25     Average packs/day: 0.5 packs/day for 61.2 years (30.2 ttl pk-yrs)     Types: Cigarettes     Start date: 7/5/1963    Smokeless tobacco: Never   Substance and Sexual Activity    Alcohol use: No    Drug use: No    Sexual activity: Not Currently     Review of Systems   Constitutional:  Negative for chills and fever.   Cardiovascular:  Positive for leg swelling.   Gastrointestinal:  Negative for diarrhea, nausea and vomiting.   Skin:  Positive for color change and wound.   Psychiatric/Behavioral:  Negative for confusion.      Objective:     Vital Signs (Most Recent):  Temp: 98.5 °F (36.9 °C) (09/13/24 1556)  Pulse: 103 (09/13/24 1556)  Resp: 18 (09/13/24 1731)  BP: (!) 103/55 (09/13/24 1556)  SpO2: 95 % (09/13/24 1556) Vital Signs (24h Range):  Temp:  [98 °F (36.7 °C)-98.9 °F (37.2 °C)] 98.5 °F (36.9 °C)  Pulse:  [] 103  Resp:  [17-27] 18  SpO2:  [95 %-100 %] 95 %  BP: (103-173)/(55-84) 103/55     Weight: 50.8 kg (111 lb 15.9 oz)  Body mass index is 21.87 kg/m².    Foot Exam     General  Orientation: alert and oriented to person, place, and time      Right Foot/Ankle   Inspection and Palpation  Tenderness: (Patient was tender to palpation of the RLE)  Swelling: (RLE swelling present)     Neurovascular  Dorsalis pedis: absent  Posterior tibial: absent     Comments  Dry eschar wounds to medial aspect of TMA site and heel.  No erythema or edema noted.  No active drainage noted.  Severe tenderness to palpation.  No malodor, crepitus, fluctuance, purulent drainage noted.     Left Foot/Ankle    Inspection and Palpation  Ecchymosis: dorsum     Neurovascular  Dorsalis pedis: absent  Posterior tibial: absent     Comments  No wounds present. No redness or pain on palpation. No signs of infection.  There is ecchymosis present on the dorsum of the foot over the 4th and 5th metatarsals. Back of the heel has some redness possibly secondary to pressure.      Laboratory:  BMP:   Recent Labs   Lab 09/13/24  0355 09/13/24 0927   *  --    *  --    K 2.9* 3.2*   CL 97  --    CO2 27  --    BUN 15  --    CREATININE 0.9  --    CALCIUM 8.4*  --      CBC:   Recent Labs   Lab 09/13/24 0927   WBC 6.08   RBC 3.70*   HGB 10.6*   HCT 33.9*      MCV 92   MCH 28.6   MCHC 31.3*     Diagnostic Results:  I have reviewed all pertinent imaging results/findings within the past 24 hours.    Clinical Findings:  R TMA wound    R heel wound

## 2024-09-13 NOTE — SUBJECTIVE & OBJECTIVE
Past Medical History:   Diagnosis Date    Anemia     Anxiety     COPD (chronic obstructive pulmonary disease)     COPD (chronic obstructive pulmonary disease) with emphysema     Coronary artery disease     Depression     Diverticulitis     Hyperlipidemia     Hypertension     PVD (peripheral vascular disease)     PVD (peripheral vascular disease)     S/P colostomy     Substance abuse     hx heavy etoh use     Tobacco abuse        Past Surgical History:   Procedure Laterality Date    ANGIOGRAM, CORONARY, WITH LEFT HEART CATHETERIZATION N/A 11/22/2023    Procedure: Angiogram, Coronary, with Left Heart Cath;  Surgeon: Checo Bhatt MD;  Location: [facility] CATH LAB;  Service: Cardiology;  Laterality: N/A;    ANGIOGRAM, CORONARY, WITH LEFT HEART CATHETERIZATION N/A 5/27/2024    Procedure: Angiogram, Coronary, with Left Heart Cath;  Surgeon: Karel Mack MD;  Location: [facility] CATH LAB;  Service: Cardiology;  Laterality: N/A;    ANGIOGRAM, EXTREMITY, UNILATERAL Right 8/25/2023    Procedure: ANGIOGRAM, EXTREMITY, UNILATERAL;  Surgeon: Gary Rico MD;  Location: [facility] OR [address] [place] [address];  Service: Vascular;  Laterality: Right;  US GUIDED ACCESS LEFT GROIN  contrast: 150ml  fluoro: 27.9 min  mGy: 254.73  Gycm2: 62.4919  radial flush cocktail: 10ml    ANGIOGRAPHY OF LOWER EXTREMITY Right 8/24/2023    Procedure: Angiogram Extremity Unilateral;  Surgeon: Benji Ashley MD;  [facility];  Service: Vascular;  Laterality: Right;    COLOSTOMY      ECTOPIC PREGNANCY SURGERY      PTA, PERONEAL Right 8/25/2023    Procedure: PTA, PERONEAL TIBIAL TRUNK WITH SHOCKWAVE;  Surgeon: Gary Rico MD;  [facility];  Service: Vascular;  Laterality: Right;    PTCA, SINGLE VESSEL  11/22/2023    Procedure: PTCA, Single Vessel;  Surgeon: Checo Bhatt MD;  Location: [facility] CATH LAB;  Service: Cardiology;;    right forefoot amputation      right toe amputation      x2 toes    STENT,  DRUG ELUTING, SINGLE VESSEL, CORONARY  11/22/2023    Procedure: Stent, Drug Eluting, Single Vessel, Coronary;  Surgeon: Checo Bhatt MD;  Location: [facility] CATH LAB;  Service: Cardiology;;    STENT, DRUG ELUTING, SINGLE VESSEL, CORONARY  5/27/2024    Procedure: Stent, Drug Eluting, Single Vessel, Coronary;  Surgeon: Karel Mack MD;  Location: [facility] CATH LAB;  Service: Cardiology;;    STENT, SUPERFICIAL FEMORAL ARTERY Right 8/25/2023    Procedure: STENT, SUPERFICIAL FEMORAL ARTERY;  Surgeon: Gary Rico MD;  Location: [facility] OR [facility];  Service: Vascular;  Laterality: Right;  VIABAHN 6 X 15 X120       Review of patient's allergies indicates:  No Known Allergies    PTA Medications   Medication Sig    albuterol-ipratropium (DUO-NEB) 2.5 mg-0.5 mg/3 mL nebulizer solution Take 3 mLs by nebulization every 6 (six) hours as needed for Wheezing or Shortness of Breath.    aspirin (ECOTRIN) 81 MG EC tablet Take 1 tablet (81 mg total) by mouth once daily.    atorvastatin (LIPITOR) 80 MG tablet Take 1 tablet (80 mg total) by mouth once daily.    budesonide-formoterol 80-4.5 mcg (SYMBICORT) 80-4.5 mcg/actuation HFAA INHALE 2 PUFFS INTO THE LUNGS TWICE DAILY. RINSE MOUTH AFTER USE    clopidogreL (PLAVIX) 75 mg tablet Take 1 tablet (75 mg total) by mouth once daily.    furosemide (LASIX) 20 MG tablet Take 1 tablet (20 mg total) by mouth daily as needed (edema/wt gain).    gabapentin (NEURONTIN) 300 MG capsule Take 1 capsule (300 mg total) by mouth 2 (two) times daily.    metoprolol succinate (TOPROL-XL) 25 MG 24 hr tablet Take 1 tablet (25 mg total) by mouth once daily.    nicotine (NICODERM CQ) 14 mg/24 hr Place 1 patch onto the skin once daily.    nitroGLYCERIN (NITROSTAT) 0.4 MG SL tablet Place 1 tablet (0.4 mg total) under the tongue every 5 (five) minutes as needed for Chest pain.    traMADoL (ULTRAM) 50 mg tablet Take 1 tablet (50 mg total) by mouth every 8 (eight) hours as needed for Pain.      Family History    None       Tobacco Use    Smoking status: Every Day     Current packs/day: 0.25     Average packs/day: 0.5 packs/day for 61.2 years (30.2 ttl pk-yrs)     Types: Cigarettes     Start date: 7/5/1963    Smokeless tobacco: Never   Substance and Sexual Activity    Alcohol use: No    Drug use: No    Sexual activity: Not Currently     Review of Systems   Constitutional: Negative for malaise/fatigue.   Cardiovascular:  Negative for chest pain, dyspnea on exertion, irregular heartbeat, orthopnea, palpitations and paroxysmal nocturnal dyspnea.   Respiratory:  Negative for cough and shortness of breath.    Endocrine: Negative.    Gastrointestinal: Negative.    Genitourinary: Negative.    Neurological:  Negative for dizziness and weakness.   Psychiatric/Behavioral: Negative.     All other systems reviewed and are negative.    Objective:     Vital Signs (Most Recent):  Temp: 98 °F (36.7 °C) (09/13/24 0714)  Pulse: 93 (09/13/24 0714)  Resp: 17 (09/13/24 0714)  BP: 120/66 (09/13/24 0714)  SpO2: 97 % (09/13/24 0714) Vital Signs (24h Range):  Temp:  [98 °F (36.7 °C)-98.9 °F (37.2 °C)] 98 °F (36.7 °C)  Pulse:  [] 93  Resp:  [17-27] 17  SpO2:  [95 %-100 %] 97 %  BP: (110-173)/(57-84) 120/66     Weight: 50.8 kg (111 lb 15.9 oz)  Body mass index is 21.87 kg/m².    SpO2: 97 %         Intake/Output Summary (Last 24 hours) at 9/13/2024 1015  Last data filed at 9/13/2024 0146  Gross per 24 hour   Intake 118 ml   Output 600 ml   Net -482 ml       Lines/Drains/Airways       Drain  Duration                  Colostomy 07/13/21 0201 Descending/sigmoid LLQ 1158 days    Female External Urinary Catheter w/ Suction 09/12/24 2044 <1 day              Peripheral Intravenous Line  Duration                  Peripheral IV - Single Lumen 09/12/24 1638 20 G Anterior;Left;Proximal Forearm <1 day                     Physical Exam  Constitutional:       Appearance: Normal appearance. She is underweight.   Eyes:      Pupils: Pupils  are equal, round, and reactive to light.   Cardiovascular:      Rate and Rhythm: Normal rate and regular rhythm.      Pulses: Normal pulses.   Pulmonary:      Effort: Pulmonary effort is normal.      Breath sounds: Wheezing present.   Abdominal:      General: Abdomen is flat.      Palpations: Abdomen is soft.   Musculoskeletal:      Cervical back: Normal range of motion.      Right lower leg: No edema.      Left lower leg: No edema.      Comments: Partial right foot amputation    Neurological:      General: No focal deficit present.      Mental Status: She is alert and oriented to person, place, and time.   Psychiatric:         Mood and Affect: Mood normal.         Behavior: Behavior normal.            Significant Labs: All pertinent lab results from the last 24 hours have been reviewed.

## 2024-09-13 NOTE — AI DETERIORATION ALERT
"RAPID RESPONSE NURSE AI ALERT       AI alert received.    Chart Reviewed: 09/12/2024, 9:45 PM    MRN: [id number]  Bed: ED 27/27    Dx: Acute on chronic respiratory failure    Radha Sotelo has a past medical history of Anemia, Anxiety, COPD (chronic obstructive pulmonary disease), COPD (chronic obstructive pulmonary disease) with emphysema, Coronary artery disease, Depression, Diverticulitis, Hyperlipidemia, Hypertension, PVD (peripheral vascular disease), PVD (peripheral vascular disease), S/P colostomy, Substance abuse, and Tobacco abuse.    Last VS: BP (!) 144/81 (BP Location: Right arm)   Pulse (!) 119   Temp 98.5 °F (36.9 °C) (Oral)   Resp 20   Ht 5' 1" (1.549 m)   Wt 50.8 kg (112 lb)   LMP  (LMP Unknown)   SpO2 97%   BMI 21.16 kg/m²     24H Vital Sign Range:  Temp:  [98.4 °F (36.9 °C)-98.5 °F (36.9 °C)]   Pulse:  [106-119]   Resp:  [20-27]   BP: (137-173)/(70-84)   SpO2:  [97 %-100 %]     Level of Consciousness (AVPU): alert    Recent Labs     09/12/24  1849   WBC 8.07   HGB 10.5*   HCT 33.9*          Recent Labs     09/12/24  1849      K 3.3*      CO2 20*   BUN 14   CREATININE 0.8           No results for input(s): "PH", "PCO2", "PO2", "HCO3", "POCSATURATED", "BE" in the last 72 hours.     OXYGEN:  Flow (L/min) (Oxygen Therapy): 3          MEWS score:      Bedside RN, Trenity reports no issues at this time.   No additional concerns verbalized at this time. Instructed to call [phone] for further concerns or assistance.    Cristal Ramos RN        "

## 2024-09-13 NOTE — ASSESSMENT & PLAN NOTE
Cardiology Chronic, controlled. Latest blood pressure and vitals reviewed-     Temp:  [98.4 °F (36.9 °C)-98.5 °F (36.9 °C)]   Pulse:  [106-116]   Resp:  [20-27]   BP: (158-173)/(71-84)   SpO2:  [97 %-100 %] .   Home meds for hypertension were reviewed and noted below.   Hypertension Medications               furosemide (LASIX) 20 MG tablet Take 1 tablet (20 mg total) by mouth daily as needed (edema/wt gain).    metoprolol succinate (TOPROL-XL) 25 MG 24 hr tablet Take 1 tablet (25 mg total) by mouth once daily.    nitroGLYCERIN (NITROSTAT) 0.4 MG SL tablet Place 1 tablet (0.4 mg total) under the tongue every 5 (five) minutes as needed for Chest pain.            While in the hospital, will manage blood pressure as follows; Continue home antihypertensive regimen    Will utilize p.r.n. blood pressure medication only if patient's blood pressure greater than 180/110 and she develops symptoms such as worsening chest pain or shortness of breath.
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
## 2024-09-13 NOTE — SUBJECTIVE & OBJECTIVE
Interval History: Episode of chest pain this morning. Troponin uptrending. Cardiology consulted, hep gtt started. Patient has significant CAD history. She is stable on her home oxygen - no wheezing or crackles on exam. Chest pain free on my evaluation. She is hungry. Mild nausea. Denies diarrhea or constipation.     Review of Systems  Objective:     Vital Signs (Most Recent):  Temp: 98 °F (36.7 °C) (09/13/24 0714)  Pulse: 82 (09/13/24 1053)  Resp: 18 (09/13/24 1040)  BP: 120/66 (09/13/24 0714)  SpO2: 98 % (09/13/24 1053) Vital Signs (24h Range):  Temp:  [98 °F (36.7 °C)-98.9 °F (37.2 °C)] 98 °F (36.7 °C)  Pulse:  [] 82  Resp:  [17-27] 18  SpO2:  [95 %-100 %] 98 %  BP: (110-173)/(57-84) 120/66     Weight: 50.8 kg (111 lb 15.9 oz)  Body mass index is 21.87 kg/m².    Intake/Output Summary (Last 24 hours) at 9/13/2024 1131  Last data filed at 9/13/2024 0146  Gross per 24 hour   Intake 118 ml   Output 600 ml   Net -482 ml         Physical Exam  Constitutional:       General: She is not in acute distress.     Appearance: Normal appearance. She is underweight. She is not ill-appearing.   HENT:      Head: Normocephalic and atraumatic.      Mouth/Throat:      Mouth: Mucous membranes are moist.   Eyes:      General: No scleral icterus.  Cardiovascular:      Rate and Rhythm: Normal rate and regular rhythm.      Pulses: Normal pulses.      Heart sounds: Normal heart sounds.   Pulmonary:      Effort: Pulmonary effort is normal. No respiratory distress.      Breath sounds: Normal breath sounds. No wheezing or rales.   Abdominal:      General: Abdomen is flat. Bowel sounds are normal. There is no distension.      Palpations: Abdomen is soft.      Tenderness: There is no abdominal tenderness.   Musculoskeletal:         General: Normal range of motion.      Cervical back: Normal range of motion and neck supple.   Skin:     General: Skin is warm and dry.      Findings: Erythema present.      Comments: R foot    Neurological:       General: No focal deficit present.      Mental Status: She is alert and oriented to person, place, and time.   Psychiatric:         Mood and Affect: Mood normal.         Behavior: Behavior normal.             Significant Labs: All pertinent labs within the past 24 hours have been reviewed.  CBC:   Recent Labs   Lab 09/12/24 1849 09/13/24 0355 09/13/24 0927   WBC 8.07 4.98 6.08   HGB 10.5* 10.5* 10.6*   HCT 33.9* 33.6* 33.9*    288 310     CMP:   Recent Labs   Lab 09/12/24 1849 09/13/24 0355 09/13/24 0927    134*  --    K 3.3* 2.9* 3.2*    97  --    CO2 20* 27  --     193*  --    BUN 14 15  --    CREATININE 0.8 0.9  --    CALCIUM 9.2 8.4*  --    PROT 6.7  --   --    ALBUMIN 3.2*  --   --    BILITOT 0.4  --   --    ALKPHOS 145*  --   --    AST 24  --   --    ALT 14  --   --    ANIONGAP 13 10  --      Cardiac Markers:   Recent Labs   Lab 09/12/24 1849   *     troponins  Recent Labs   Lab 09/13/24 0927   TROPONINI 2.098*         Significant Imaging: I have reviewed all pertinent imaging results/findings within the past 24 hours.
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
## 2024-09-13 NOTE — PLAN OF CARE
Alert and oriented x 4. Speech is clear. Heparin drip at 15. Vs stable. Safety measures in place. No c/o chest pain or sign of distress. On 3 liters/nc. Bed in low position. Call light in reach.      Problem: Adult Inpatient Plan of Care  Goal: Plan of Care Review  Outcome: Progressing  Goal: Patient-Specific Goal (Individualized)  Outcome: Progressing  Goal: Absence of Hospital-Acquired Illness or Injury  Outcome: Progressing  Goal: Optimal Comfort and Wellbeing  Outcome: Progressing  Goal: Readiness for Transition of Care  Outcome: Progressing     Problem: Acute Kidney Injury/Impairment  Goal: Fluid and Electrolyte Balance  Outcome: Progressing  Goal: Improved Oral Intake  Outcome: Progressing  Goal: Effective Renal Function  Outcome: Progressing

## 2024-09-13 NOTE — ASSESSMENT & PLAN NOTE
Patient's most recent potassium results are listed below.   Recent Labs     09/12/24  1849 09/13/24  0355 09/13/24  0927   K 3.3* 2.9* 3.2*     Plan  - Replete potassium per protocol  - Monitor potassium Every 12 hours  - Patient's hypokalemia is improving
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
## 2024-09-13 NOTE — PLAN OF CARE
[facility] - Stepdown Flex ([place]-14)  Initial Discharge Assessment       Primary Care Provider: Kalyn Pemberton NP    Admission Diagnosis: SOB (shortness of breath) [R06.02]  Elevated troponin [R79.89]  COPD exacerbation [J44.1]  Chest pain [R07.9]    Admission Date: 9/12/2024  Expected Discharge Date: 9/16/2024    Transition of Care Barriers: None    [facility] [facility] / Plan: [employer] CHOICES / Product Type: Medicare Advantage /     Extended Emergency Contact Information  Primary Emergency Contact: Anthony Reardon  Mobile Phone: [phone]  Relation: Daughter   needed? No    Discharge Plan A: Home Health  Discharge Plan B: Home Health      [facility] - [place], [place] - [address] [facility]  [address] [facility] [address]  Phone: [phone] Fax: [phone]    [id number] - [place], [place] - [address] [facility]  [address] [facility] [address]  Phone: [phone] Fax: [phone]    [facility] [facility]. - [place] - [address] [facility]  [address] [facility] [address]  Phone: [phone] Fax: [phone]    [id number] [address] AT [address] [address]  Phone: [phone] Fax: [phone]      Initial Assessment (most recent)       Adult Discharge Assessment - 09/13/24 1114          Discharge Assessment    Assessment Type Discharge Planning Assessment     Confirmed/corrected address, phone number and insurance Yes     Confirmed Demographics Correct on Facesheet     Source of Information patient;family     Does patient/caregiver understand observation status Yes     Communicated FLORIAN with patient/caregiver Yes;Date not available/Unable to determine     Reason For Admission Acute on chronic respiratory failure     People in Home grandchild(coral)     Do you expect to return to your current living  situation? Yes     Do you have help at home or someone to help you manage your care at home? Yes     Who are your caregiver(s) and their phone number(s)? daughter and grand daughter     Prior to hospitilization cognitive status: Alert/Oriented     Current cognitive status: Alert/Oriented     Equipment Currently Used at Home oxygen;shower chair;wheelchair;walker, standard     Readmission within 30 days? Yes     Patient currently being followed by outpatient case management? Yes     If yes, name of outpatient case management following: [facility]   [facility]    Do you currently have service(s) that help you manage your care at home? Yes     Name and Contact number of agency [facility]     Is the pt/caregiver preference to resume services with current agency Yes     Do you take prescription medications? Yes     Do you have prescription coverage? Yes     Coverage Payor: [employer] [facility] [facility] [employer] CHOICES -     Do you have any problems affording any of your prescribed medications? No     Is the patient taking medications as prescribed? yes     Who is going to help you get home at discharge? Anthony Reardon Daughter [phone]     How do you get to doctors appointments? family or friend will provide     Are you on dialysis? No     Do you take coumadin? No     Discharge Plan A Home Health     Discharge Plan B Home Health     DME Needed Upon Discharge  other (see comments)   TBD    Discharge Plan discussed with: Patient     Transition of Care Barriers None        Physical Activity    On average, how many days per week do you engage in moderate to strenuous exercise (like a brisk walk)? 0 days     On average, how many minutes do you engage in exercise at this level? 0 min        Financial Resource Strain    How hard is it for you to pay for the very basics like food, housing, medical care, and heating? Not very hard        Housing Stability    In the last  12 months, was there a time when you were not able to pay the mortgage or rent on time? No     At any time in the past 12 months, were you homeless or living in a shelter (including now)? No        Transportation Needs    Has the lack of transportation kept you from medical appointments, meetings, work or from getting things needed for daily living? No        Food Insecurity    Within the past 12 months, you worried that your food would run out before you got the money to buy more. Never true     Within the past 12 months, the food you bought just didn't last and you didn't have money to get more. Never true        Stress    Do you feel stress - tense, restless, nervous, or anxious, or unable to sleep at night because your mind is troubled all the time - these days? Only a little        Social Isolation    How often do you feel lonely or isolated from those around you?  Never        Alcohol Use    Q1: How often do you have a drink containing alcohol? 2-3 times a week     Q2: How many drinks containing alcohol do you have on a typical day when you are drinking? 1 or 2     Q3: How often do you have six or more drinks on one occasion? Never        Utilities    In the past 12 months has the electric, gas, oil, or water company threatened to shut off services in your home? No        Health Literacy    How often do you need to have someone help you when you read instructions, pamphlets, or other written material from your doctor or pharmacy? Never                   Spoke to pt. Pt lives at home with her grand daughter. Post hospital  stay patients daughter and grand-daughter will be pt support person and pt has transportation at d/c with family. There have been hospitalizations within the last 30 days per pt. Verified pt PCP and preferred pharmacy. Pt stated not on Coumadin and is not receiving dialysis.    Patient currently with [facility].     All questions answered regarding case management/  discharge planning , pt verbalized understanding.     Discharge Plan A and Plan B have been determined by review of patient's clinical status, future medical and therapeutic needs, and coverage/benefits for post-acute care in coordination with multidisciplinary team members.      TIGRE Howard  [facility]   Ext: [phone]

## 2024-09-13 NOTE — ED NOTES
Telemetry Verification   Patient placed on Telemetry Box  Verified with War Room  Box # 2010   Monitor Tech War room   Rate 126   Rhythm Sinus tach

## 2024-09-13 NOTE — ASSESSMENT & PLAN NOTE
The risks, benefits and alternatives of the procedure were explained to the patient. At this point patient is asymptomatic and wishing to continue noninvasive treatment   Continue ACS protocol  Cardiology consults to follow, please re-consult interventional if any changes or questions

## 2024-09-13 NOTE — PLAN OF CARE
I have reviewed the chart of Radha Sotelo who is hospitalized for the following:    Active Hospital Problems    Diagnosis    *Acute on chronic respiratory failure    Chest pain    Hypokalemia    Essential hypertension    HLD (hyperlipidemia)    CAD (coronary artery disease)        Neida Begum PA-C  [facility]

## 2024-09-13 NOTE — PLAN OF CARE
During my shift, pt AAOx4, NAD. VS stable, pt remains afebrile. SPO2 wnl on 3L of O2 via NC. Pt denies pain at rest, n/v, chest pain, SOB, dizziness or lightheadedness. Colostomy to RLQ noted. Purewick in place collecting clear yellow urine. Pt NPO since midnight. Bandage to right foot clean, dry and intact. Pt reports wound care was provided in home yesterday a.m. by wound care nurse/home health. Pt remains free from falls or injury. Bed is lowered and locked. Call light is within reach. Pt has no known needs at this time.   Problem: Adult Inpatient Plan of Care  Goal: Plan of Care Review  Outcome: Progressing  Goal: Patient-Specific Goal (Individualized)  Outcome: Progressing  Goal: Absence of Hospital-Acquired Illness or Injury  Outcome: Progressing  Goal: Optimal Comfort and Wellbeing  Outcome: Progressing  Goal: Readiness for Transition of Care  Outcome: Progressing

## 2024-09-13 NOTE — ASSESSMENT & PLAN NOTE
Patient with Hypoxic Respiratory failure which is Acute on chronic.  she is on home oxygen at 2 LPM. Supplemental oxygen was provided and noted-      .   Signs/symptoms of respiratory failure include- tachypnea and wheezing. Contributing diagnoses includes - COPD Labs and images were reviewed. Patient Has not had a recent ABG. Will treat underlying causes and adjust management of respiratory failure as follows- bronchodilators, O2 per protocol, steroids, lasix

## 2024-09-13 NOTE — NURSING
Nurses Note -- 4 Eyes      9/12/2024   11:27 PM      Skin assessed during: Admit      [] No Altered Skin Integrity Present    []Prevention Measures Documented      [x] Yes- Altered Skin Integrity Present or Discovered   [] LDA Added if Not in Epic (Describe Wound)   [x] New Altered Skin Integrity was Present on Admit and Documented in LDA   [] Wound Image Taken    Wound Care Consulted? Yes    Attending Nurse:  Leslie Mcintosh RN/Staff Member:  Talisha CASEY RN

## 2024-09-13 NOTE — ED NOTES
"Patient refusing dressing change and images to be placed in chart.  "I just had wound care come this morning to have it cleaned  and dressed I dont really want it removed again"  "
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
## 2024-09-13 NOTE — HOSPITAL COURSE
Patient admitted for NSTEMI. Cardiology consulted 9/13 as patient troponin continued to rise and she had recurrent episodes of chest pain. She is currently declining intervention and would like to manage NSTEMI medically.

## 2024-09-13 NOTE — ASSESSMENT & PLAN NOTE
Patient presents with NSTEMI. Chest pain is currently controlled, although has had recurrent pain in last 12 hours. DARIEL score is 5. Patient is not currently on NSTEMI Pathway. Trop 0.6 > 2.1   PCI to RCA in May 2024. EKG reviewed.   Medical management includes; Dual Anti-Platelet therapy, Anticoagulation, and High Intensity Stain Echo has not been performed. Consult for cardiac rehab is not ordered. Patient counseled on lifestyle modifications- continue current medications and continue current healthy lifestyle patterns. Cardiology is consulted. Plan of care reviewed with cardiology team. Continue to monitor patient closely and adjust therapy as needed.  - cardiology consulted   - continue hep gtt, medical management   - TTE   - continue home medications  - telemetry   - K>4, Mag>2    Results for orders placed during the hospital encounter of 05/25/24    Echo    Interpretation Summary    Left Ventricle: The left ventricle is normal in size. Ventricular mass is normal. Normal wall thickness. Normal wall motion. There is normal systolic function with a visually estimated ejection fraction of 60 - 65%. There is normal diastolic function.    Right Ventricle: Normal right ventricular cavity size. Wall thickness is normal. Systolic function is normal.    Left Atrium: Left atrium is mildly dilated.    Aortic Valve: There is moderate aortic valve sclerosis. There is annular calcification present. Mildly restricted motion. There is mild stenosis. Aortic valve area by VTI is 2.21 cm². Aortic valve peak velocity is 2.0 m/s. Mean gradient is 7 mmHg. The dimensionless index is 0.58. There is mild aortic regurgitation.    Pulmonary Artery: The estimated pulmonary artery systolic pressure is 20 mmHg.    IVC/SVC: Normal venous pressure at 3 mmHg.

## 2024-09-13 NOTE — ASSESSMENT & PLAN NOTE
79 y.o. female with a PMHx including COPD on home oxygen (3 L at baseline), PAD (s/p amputation of toes of her R foot) and CAD s/p PCI to RCA who presented with SOB and chest pain. Now chest pain free. She is refusing LHC as discussed with interventional cardiology and wishes to not have any procedures done.     Plan  - Recommend medical therapy at this time (ACS treatment with DAPT and 48hr heparin gtt)  - Continue BB. If chest pain recurs, can increase metoprolol to 50mg qd as tolerated if no active wheezing/COPD better controlled  - NTG as needed for chest pain  - Consider adding ezetimibe to get LDL to goal of <55.   - Feel free to contact us if patient changes her mind about getting an angiogram
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
## 2024-09-13 NOTE — HPI
79F with a PMHx of COPD, CAD, PVD, HTN, HLD, s/p R TMA who was admitted for NSTEMI.  Podiatry consulted for R TMA wounds.  Patient was last seen 8/13 due to pain to her bilateral lower extremities.  Patient says wounds to her R foot has worsened since last admission.  She reports home health conducting dressing changes 3 times a week.  Her wounds are dressed with Iodosorb, gauze, and Kerlix.  On her last admission vascular surgery stated that there were no revascularization options and a R AKA/BKA was the only option.  Patient declined and the AKA/bk option at that time and continues to decline today.  Patient reports tenderness to her bilateral lower extremities.  She reports now having short of breath relating to her NSTEMI.  Denies any other pedal complaints.    VSS, afebrile, WBC WNL.

## 2024-09-13 NOTE — ASSESSMENT & PLAN NOTE
- EKG: no st-t changes  - elevated first trop ,w ill trend  - TTE  - control pain   NPO after midnight
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
## 2024-09-13 NOTE — ASSESSMENT & PLAN NOTE
"Assesment  Wounds noted to medial aspect of TMA site and R heel.  Wounds are dry eschar.  No erythema, edema, drainage, maceration, fluctuance, or crepitus noted.  No signs of infection.  Bilateral lower extremities overall stable.     Recommendations  -Physical exam findings discussed with the patient.  Discussed with the patient that her wounds are stable and shows no cardinal signs of infection.  -Per Vascular Surgery note on 8/14/24 "Discussed with the patient need for a right above-knee amputation given TMA wound which is unlikely to heal given lack of arterial blood supply to the foot, and risks of not undergoing surgery including worsening wounds, sepsis, potentially death. "  -No surgical intervention from Podiatry perspective.  Recommend continuing with the wound care  -R foot wounds dressed with Iodosorb, gauze, Kerlix, and secured with tape.  -Wound care orders placed  -Podiatry will sign off.  Please reach out with any further questions.  "

## 2024-09-13 NOTE — ASSESSMENT & PLAN NOTE
Patient is on her home 3LPM.   Patient with Hypoxic Respiratory failure which is Chronic.  she is on home oxygen at 3 LPM. Supplemental oxygen was provided and noted-  .  Initiated on COPD exacerbation protocol based on faint wheeze at presentation. Resolved by HOD2.   Signs/symptoms of respiratory failure include- tachypnea and wheezing. Contributing diagnoses includes - COPD Labs and images were reviewed. Patient Has not had a recent ABG. Will treat underlying causes and adjust management of respiratory failure as follows- bronchodilators, O2 per protocol, steroids

## 2024-09-13 NOTE — SUBJECTIVE & OBJECTIVE
Past Medical History:   Diagnosis Date    Anemia     Anxiety     COPD (chronic obstructive pulmonary disease)     COPD (chronic obstructive pulmonary disease) with emphysema     Coronary artery disease     Depression     Diverticulitis     Hyperlipidemia     Hypertension     PVD (peripheral vascular disease)     PVD (peripheral vascular disease)     S/P colostomy     Substance abuse     hx heavy etoh use     Tobacco abuse        Past Surgical History:   Procedure Laterality Date    ANGIOGRAM, CORONARY, WITH LEFT HEART CATHETERIZATION N/A 11/22/2023    Procedure: Angiogram, Coronary, with Left Heart Cath;  Surgeon: Checo Bhatt MD;  Location: [facility] CATH LAB;  Service: Cardiology;  Laterality: N/A;    ANGIOGRAM, CORONARY, WITH LEFT HEART CATHETERIZATION N/A 5/27/2024    Procedure: Angiogram, Coronary, with Left Heart Cath;  Surgeon: Karel Mack MD;  Location: [facility] CATH LAB;  Service: Cardiology;  Laterality: N/A;    ANGIOGRAM, EXTREMITY, UNILATERAL Right 8/25/2023    Procedure: ANGIOGRAM, EXTREMITY, UNILATERAL;  Surgeon: Gary Rico MD;  Location: [facility] OR [address] [place] [address];  Service: Vascular;  Laterality: Right;  US GUIDED ACCESS LEFT GROIN  contrast: 150ml  fluoro: 27.9 min  mGy: 254.73  Gycm2: 62.4919  radial flush cocktail: 10ml    ANGIOGRAPHY OF LOWER EXTREMITY Right 8/24/2023    Procedure: Angiogram Extremity Unilateral;  Surgeon: Benji Ashley MD;  [facility];  Service: Vascular;  Laterality: Right;    COLOSTOMY      ECTOPIC PREGNANCY SURGERY      PTA, PERONEAL Right 8/25/2023    Procedure: PTA, PERONEAL TIBIAL TRUNK WITH SHOCKWAVE;  Surgeon: Gary Rico MD;  [facility];  Service: Vascular;  Laterality: Right;    PTCA, SINGLE VESSEL  11/22/2023    Procedure: PTCA, Single Vessel;  Surgeon: Checo Bhatt MD;  Location: [facility] CATH LAB;  Service: Cardiology;;    right forefoot amputation      right toe amputation      x2 toes    STENT,  DRUG ELUTING, SINGLE VESSEL, CORONARY  11/22/2023    Procedure: Stent, Drug Eluting, Single Vessel, Coronary;  Surgeon: Checo Bhatt MD;  Location: [facility] CATH LAB;  Service: Cardiology;;    STENT, DRUG ELUTING, SINGLE VESSEL, CORONARY  5/27/2024    Procedure: Stent, Drug Eluting, Single Vessel, Coronary;  Surgeon: Karel Mack MD;  Location: [facility] CATH LAB;  Service: Cardiology;;    STENT, SUPERFICIAL FEMORAL ARTERY Right 8/25/2023    Procedure: STENT, SUPERFICIAL FEMORAL ARTERY;  Surgeon: Gary Rico MD;  [facility];  Service: Vascular;  Laterality: Right;  VIABAHN 6 X 15 X120       Review of patient's allergies indicates:  No Known Allergies    No current facility-administered medications on file prior to encounter.     Current Outpatient Medications on File Prior to Encounter   Medication Sig    albuterol-ipratropium (DUO-NEB) 2.5 mg-0.5 mg/3 mL nebulizer solution Take 3 mLs by nebulization every 6 (six) hours as needed for Wheezing or Shortness of Breath.    aspirin (ECOTRIN) 81 MG EC tablet Take 1 tablet (81 mg total) by mouth once daily.    atorvastatin (LIPITOR) 80 MG tablet Take 1 tablet (80 mg total) by mouth once daily.    budesonide-formoterol 80-4.5 mcg (SYMBICORT) 80-4.5 mcg/actuation HFAA INHALE 2 PUFFS INTO THE LUNGS TWICE DAILY. RINSE MOUTH AFTER USE    clopidogreL (PLAVIX) 75 mg tablet Take 1 tablet (75 mg total) by mouth once daily.    furosemide (LASIX) 20 MG tablet Take 1 tablet (20 mg total) by mouth daily as needed (edema/wt gain).    gabapentin (NEURONTIN) 300 MG capsule Take 1 capsule (300 mg total) by mouth 2 (two) times daily.    metoprolol succinate (TOPROL-XL) 25 MG 24 hr tablet Take 1 tablet (25 mg total) by mouth once daily.    nicotine (NICODERM CQ) 14 mg/24 hr Place 1 patch onto the skin once daily.    nitroGLYCERIN (NITROSTAT) 0.4 MG SL tablet Place 1 tablet (0.4 mg total) under the tongue every 5 (five) minutes as needed for Chest pain.     traMADoL (ULTRAM) 50 mg tablet Take 1 tablet (50 mg total) by mouth every 8 (eight) hours as needed for Pain.     Family History    None       Tobacco Use    Smoking status: Every Day     Current packs/day: 0.25     Average packs/day: 0.5 packs/day for 61.2 years (30.2 ttl pk-yrs)     Types: Cigarettes     Start date: 7/5/1963    Smokeless tobacco: Never   Substance and Sexual Activity    Alcohol use: No    Drug use: No    Sexual activity: Not Currently     Review of Systems   Constitutional:  Negative for appetite change.   Respiratory:  Positive for shortness of breath. Negative for cough.    Cardiovascular:  Positive for chest pain. Negative for leg swelling.   Gastrointestinal:  Positive for nausea and vomiting. Negative for abdominal distention, abdominal pain, constipation and diarrhea.   Genitourinary:  Negative for difficulty urinating and dysuria.   Neurological:  Negative for dizziness and headaches.     Objective:     Vital Signs (Most Recent):  Temp: 98.5 °F (36.9 °C) (09/12/24 1839)  Pulse: (!) 114 (09/12/24 2016)  Resp: (!) 26 (09/12/24 2016)  BP: (!) 173/84 (09/12/24 1839)  SpO2: 100 % (09/12/24 2016) Vital Signs (24h Range):  Temp:  [98.4 °F (36.9 °C)-98.5 °F (36.9 °C)] 98.5 °F (36.9 °C)  Pulse:  [106-116] 114  Resp:  [20-27] 26  SpO2:  [97 %-100 %] 100 %  BP: (158-173)/(71-84) 173/84     Weight: 50.8 kg (112 lb)  Body mass index is 21.16 kg/m².     Physical Exam  Constitutional:       Appearance: Normal appearance.   HENT:      Head: Normocephalic and atraumatic.      Mouth/Throat:      Mouth: Mucous membranes are moist.   Cardiovascular:      Rate and Rhythm: Normal rate and regular rhythm.      Pulses: Normal pulses.      Heart sounds: Normal heart sounds.   Pulmonary:      Effort: Pulmonary effort is normal.      Breath sounds: Wheezing present. No rales.   Abdominal:      General: Abdomen is flat. Bowel sounds are normal. There is no distension.      Palpations: Abdomen is soft.      Tenderness:  There is no abdominal tenderness.   Musculoskeletal:         General: Normal range of motion.      Cervical back: Normal range of motion and neck supple.   Skin:     General: Skin is warm and dry.      Findings: Erythema present.      Comments: R foot    Neurological:      General: No focal deficit present.      Mental Status: She is alert and oriented to person, place, and time.   Psychiatric:         Mood and Affect: Mood normal.                Significant Labs: All pertinent labs within the past 24 hours have been reviewed.    Significant Imaging: I have reviewed all pertinent imaging results/findings within the past 24 hours.

## 2024-09-13 NOTE — CONSULTS
[facility] ([place] [address])  Podiatry  Consult Note    Patient Name: Radha Sotelo  MRN: [id number]  Admission Date: 9/12/2024  Hospital Length of Stay: 0 days  Attending Physician: Yana Lazcano MD  Primary Care Provider: Kalyn Pemberton NP     Inpatient consult to Podiatry  Consult performed by: Enedina Harrington DPM  Consult ordered by: Yana Lazcano MD  Reason for consult: see below        Subjective:     History of Present Illness:  79F with a PMHx of COPD, CAD, PVD, HTN, HLD, s/p R TMA who was admitted for NSTEMI.  Podiatry consulted for R TMA wounds.  Patient was last seen 8/13 due to pain to her bilateral lower extremities.  Patient says wounds to her R foot has worsened since last admission.  She reports home health conducting dressing changes 3 times a week.  Her wounds are dressed with Iodosorb, gauze, and Kerlix.  On her last admission Vascular Surgery stated that there were no revascularization options and a R AKA/BKA was the only option.  Patient declined the AKA/BKA option at that time and continues to decline today.  Patient reports tenderness to her bilateral lower extremities.  She reports now having short of breath relating to her NSTEMI.  Denies any other pedal complaints.    VSS, afebrile, WBC WNL.    Scheduled Meds:   acetaminophen  1,000 mg Oral Q8H    aspirin  81 mg Oral Daily    atorvastatin  80 mg Oral Daily    clopidogreL  75 mg Oral Daily    furosemide (LASIX) injection  40 mg Intravenous Q12H    gabapentin  300 mg Oral BID    metoprolol succinate  25 mg Oral Daily    nitroGLYCERIN        predniSONE  40 mg Oral Daily     Continuous Infusions:   heparin (porcine) in D5W  0-40 Units/kg/hr (Adjusted) Intravenous Continuous 7.1 mL/hr at 09/13/24 1737 15 Units/kg/hr at 09/13/24 1737     PRN Meds:  Current Facility-Administered Medications:     albuterol-ipratropium, 3 mL, Nebulization, Q4H PRN    dextrose 10%, 12.5 g, Intravenous, PRN    dextrose 10%, 25 g,  Intravenous, PRN    glucagon (human recombinant), 1 mg, Intramuscular, PRN    glucose, 16 g, Oral, PRN    glucose, 24 g, Oral, PRN    heparin (PORCINE), 60 Units/kg (Adjusted), Intravenous, PRN    heparin (PORCINE), 30 Units/kg (Adjusted), Intravenous, PRN    naloxone, 0.02 mg, Intravenous, PRN    nitroGLYCERIN, , ,     nitroGLYCERIN, 0.4 mg, Sublingual, Q5 Min PRN    ondansetron, 4 mg, Oral, Q8H PRN    oxyCODONE, 5 mg, Oral, Q6H PRN    sodium chloride 0.9%, 10 mL, Intravenous, Q12H PRN    traMADoL, 50 mg, Oral, Q8H PRN    Review of patient's allergies indicates:  No Known Allergies     Past Medical History:   Diagnosis Date    Anemia     Anxiety     COPD (chronic obstructive pulmonary disease)     COPD (chronic obstructive pulmonary disease) with emphysema     Coronary artery disease     Depression     Diverticulitis     Hyperlipidemia     Hypertension     PVD (peripheral vascular disease)     PVD (peripheral vascular disease)     S/P colostomy     Substance abuse     hx heavy etoh use     Tobacco abuse      Past Surgical History:   Procedure Laterality Date    ANGIOGRAM, CORONARY, WITH LEFT HEART CATHETERIZATION N/A 11/22/2023    Procedure: Angiogram, Coronary, with Left Heart Cath;  Surgeon: Checo Bhatt MD;  Location: [facility] CATH LAB;  Service: Cardiology;  Laterality: N/A;    ANGIOGRAM, CORONARY, WITH LEFT HEART CATHETERIZATION N/A 5/27/2024    Procedure: Angiogram, Coronary, with Left Heart Cath;  Surgeon: Karel Mack MD;  Location: [facility] CATH LAB;  Service: Cardiology;  Laterality: N/A;    ANGIOGRAM, EXTREMITY, UNILATERAL Right 8/25/2023    Procedure: ANGIOGRAM, EXTREMITY, UNILATERAL;  Surgeon: Gary Rico MD;  Location: [facility] OR [address] [place] [address];  Service: Vascular;  Laterality: Right;  US GUIDED ACCESS LEFT GROIN  contrast: 150ml  fluoro: 27.9 min  mGy: 254.73  Gycm2: 62.4919  radial flush cocktail: 10ml    ANGIOGRAPHY OF LOWER EXTREMITY Right 8/24/2023    Procedure: Angiogram Extremity  Unilateral;  Surgeon: Benji Ashley MD;  Location: [address];  Service: Vascular;  Laterality: Right;    COLOSTOMY      ECTOPIC PREGNANCY SURGERY      PTA, PERONEAL Right 8/25/2023    Procedure: PTA, PERONEAL TIBIAL TRUNK WITH SHOCKWAVE;  Surgeon: Gary Rico MD;  Location: [address];  Service: Vascular;  Laterality: Right;    PTCA, SINGLE VESSEL  11/22/2023    Procedure: PTCA, Single Vessel;  Surgeon: Checo Bhatt MD;  Location: [facility] CATH LAB;  Service: Cardiology;;    right forefoot amputation      right toe amputation      x2 toes    STENT, DRUG ELUTING, SINGLE VESSEL, CORONARY  11/22/2023    Procedure: Stent, Drug Eluting, Single Vessel, Coronary;  Surgeon: Checo Bhatt MD;  Location: [facility] CATH LAB;  Service: Cardiology;;    STENT, DRUG ELUTING, SINGLE VESSEL, CORONARY  5/27/2024    Procedure: Stent, Drug Eluting, Single Vessel, Coronary;  Surgeon: Karel Mack MD;  Location: [facility] CATH LAB;  Service: Cardiology;;    STENT, SUPERFICIAL FEMORAL ARTERY Right 8/25/2023    Procedure: STENT, SUPERFICIAL FEMORAL ARTERY;  Surgeon: Gary Rico MD;  Location: [address];  Service: Vascular;  Laterality: Right;  VIABAHN 6 X 15 X120       Family History    None       Tobacco Use    Smoking status: Every Day     Current packs/day: 0.25     Average packs/day: 0.5 packs/day for 61.2 years (30.2 ttl pk-yrs)     Types: Cigarettes     Start date: 7/5/1963    Smokeless tobacco: Never   Substance and Sexual Activity    Alcohol use: No    Drug use: No    Sexual activity: Not Currently     Review of Systems   Constitutional:  Negative for chills and fever.   Cardiovascular:  Positive for leg swelling.   Gastrointestinal:  Negative for diarrhea, nausea and vomiting.   Skin:  Positive for color change and wound.   Psychiatric/Behavioral:  Negative for confusion.      Objective:     Vital Signs (Most Recent):  Temp: 98.5 °F (36.9 °C) (09/13/24 1556)  Pulse:  103 (09/13/24 1556)  Resp: 18 (09/13/24 1731)  BP: (!) 103/55 (09/13/24 1556)  SpO2: 95 % (09/13/24 1556) Vital Signs (24h Range):  Temp:  [98 °F (36.7 °C)-98.9 °F (37.2 °C)] 98.5 °F (36.9 °C)  Pulse:  [] 103  Resp:  [17-27] 18  SpO2:  [95 %-100 %] 95 %  BP: (103-173)/(55-84) 103/55     Weight: 50.8 kg (111 lb 15.9 oz)  Body mass index is 21.87 kg/m².    Foot Exam     General  Orientation: alert and oriented to person, place, and time      Right Foot/Ankle   Inspection and Palpation  Tenderness: (Patient was tender to palpation of the RLE)  Swelling: (RLE swelling present)     Neurovascular  Dorsalis pedis: absent  Posterior tibial: absent     Comments  Dry eschar wounds to medial aspect of TMA site and heel.  No erythema or edema noted.  No active drainage noted.  Severe tenderness to palpation.  No malodor, crepitus, fluctuance, purulent drainage noted.     Left Foot/Ankle    Inspection and Palpation  Ecchymosis: dorsum     Neurovascular  Dorsalis pedis: absent  Posterior tibial: absent     Comments  No wounds present. No redness or pain on palpation. No signs of infection.  There is ecchymosis present on the dorsum of the foot over the 4th and 5th metatarsals. Back of the heel has some redness possibly secondary to pressure.     Laboratory:  BMP:   Recent Labs   Lab 09/13/24  0355 09/13/24 0927   *  --    *  --    K 2.9* 3.2*   CL 97  --    CO2 27  --    BUN 15  --    CREATININE 0.9  --    CALCIUM 8.4*  --      CBC:   Recent Labs   Lab 09/13/24 0927   WBC 6.08   RBC 3.70*   HGB 10.6*   HCT 33.9*      MCV 92   MCH 28.6   MCHC 31.3*     Diagnostic Results:  I have reviewed all pertinent imaging results/findings within the past 24 hours.    Clinical Findings:  R TMA wound    R heel wound      Assessment/Plan:     Orthopedic  Nonhealing surgical wound  Assesment  Wounds noted to medial aspect of TMA site and R heel.  Wounds are dry eschar.  No erythema, edema, drainage, maceration,  "fluctuance, or crepitus noted.  No signs of infection.  Bilateral lower extremities overall stable.     Recommendations  -Physical exam findings discussed with the patient.  Discussed with the patient that her wounds are stable and shows no cardinal signs of infection.  -Per Vascular Surgery note on 8/14/24 "Discussed with the patient need for a right above-knee amputation given TMA wound which is unlikely to heal given lack of arterial blood supply to the foot, and risks of not undergoing surgery including worsening wounds, sepsis, potentially death. "  -No surgical intervention from Podiatry perspective.  Recommend continuing with the wound care  -R foot wounds dressed with Iodosorb, gauze, Kerlix, and secured with tape.  -Wound care orders placed  -Podiatry will sign off.  Please reach out with any further questions.      Thank you for your consult. I will sign off. Please contact us if you have any additional questions.    Enedina Harrington DPM  Podiatry  [facility] ([place])  "

## 2024-09-14 LAB
ANION GAP SERPL CALC-SCNC: 9 MMOL/L (ref 8–16)
APTT PPP: 36 SEC (ref 21–32)
APTT PPP: 54.3 SEC (ref 21–32)
APTT PPP: 57.6 SEC (ref 21–32)
BASOPHILS # BLD AUTO: 0 K/UL (ref 0–0.2)
BASOPHILS NFR BLD: 0 % (ref 0–1.9)
BUN SERPL-MCNC: 24 MG/DL (ref 8–23)
CALCIUM SERPL-MCNC: 9 MG/DL (ref 8.7–10.5)
CHLORIDE SERPL-SCNC: 101 MMOL/L (ref 95–110)
CO2 SERPL-SCNC: 28 MMOL/L (ref 23–29)
CREAT SERPL-MCNC: 1 MG/DL (ref 0.5–1.4)
DIFFERENTIAL METHOD BLD: ABNORMAL
EOSINOPHIL # BLD AUTO: 0 K/UL (ref 0–0.5)
EOSINOPHIL NFR BLD: 0 % (ref 0–8)
ERYTHROCYTE [DISTWIDTH] IN BLOOD BY AUTOMATED COUNT: 14.4 % (ref 11.5–14.5)
EST. GFR  (NO RACE VARIABLE): 57.7 ML/MIN/1.73 M^2
GLUCOSE SERPL-MCNC: 114 MG/DL (ref 70–110)
HCT VFR BLD AUTO: 33.9 % (ref 37–48.5)
HGB BLD-MCNC: 10.4 G/DL (ref 12–16)
IMM GRANULOCYTES # BLD AUTO: 0.11 K/UL (ref 0–0.04)
IMM GRANULOCYTES NFR BLD AUTO: 0.9 % (ref 0–0.5)
LYMPHOCYTES # BLD AUTO: 0.8 K/UL (ref 1–4.8)
LYMPHOCYTES NFR BLD: 6.2 % (ref 18–48)
MAGNESIUM SERPL-MCNC: 1.7 MG/DL (ref 1.6–2.6)
MCH RBC QN AUTO: 28.6 PG (ref 27–31)
MCHC RBC AUTO-ENTMCNC: 30.7 G/DL (ref 32–36)
MCV RBC AUTO: 93 FL (ref 82–98)
MONOCYTES # BLD AUTO: 0.8 K/UL (ref 0.3–1)
MONOCYTES NFR BLD: 6.2 % (ref 4–15)
NEUTROPHILS # BLD AUTO: 10.9 K/UL (ref 1.8–7.7)
NEUTROPHILS NFR BLD: 86.7 % (ref 38–73)
NRBC BLD-RTO: 0 /100 WBC
PHOSPHATE SERPL-MCNC: 2.8 MG/DL (ref 2.7–4.5)
PLATELET # BLD AUTO: 350 K/UL (ref 150–450)
PMV BLD AUTO: 10.7 FL (ref 9.2–12.9)
POTASSIUM SERPL-SCNC: 4.4 MMOL/L (ref 3.5–5.1)
RBC # BLD AUTO: 3.64 M/UL (ref 4–5.4)
SODIUM SERPL-SCNC: 138 MMOL/L (ref 136–145)
WBC # BLD AUTO: 12.54 K/UL (ref 3.9–12.7)

## 2024-09-14 PROCEDURE — 99900035 HC TECH TIME PER 15 MIN (STAT)

## 2024-09-14 PROCEDURE — 20600001 HC STEP DOWN PRIVATE ROOM

## 2024-09-14 PROCEDURE — 63600175 PHARM REV CODE 636 W HCPCS: Performed by: STUDENT IN AN ORGANIZED HEALTH CARE EDUCATION/TRAINING PROGRAM

## 2024-09-14 PROCEDURE — 25000003 PHARM REV CODE 250: Performed by: STUDENT IN AN ORGANIZED HEALTH CARE EDUCATION/TRAINING PROGRAM

## 2024-09-14 PROCEDURE — 85025 COMPLETE CBC W/AUTO DIFF WBC: CPT | Performed by: STUDENT IN AN ORGANIZED HEALTH CARE EDUCATION/TRAINING PROGRAM

## 2024-09-14 PROCEDURE — 85730 THROMBOPLASTIN TIME PARTIAL: CPT | Mod: 91 | Performed by: STUDENT IN AN ORGANIZED HEALTH CARE EDUCATION/TRAINING PROGRAM

## 2024-09-14 PROCEDURE — 84100 ASSAY OF PHOSPHORUS: CPT | Performed by: STUDENT IN AN ORGANIZED HEALTH CARE EDUCATION/TRAINING PROGRAM

## 2024-09-14 PROCEDURE — 94761 N-INVAS EAR/PLS OXIMETRY MLT: CPT

## 2024-09-14 PROCEDURE — 25000242 PHARM REV CODE 250 ALT 637 W/ HCPCS: Performed by: STUDENT IN AN ORGANIZED HEALTH CARE EDUCATION/TRAINING PROGRAM

## 2024-09-14 PROCEDURE — 80048 BASIC METABOLIC PNL TOTAL CA: CPT | Performed by: STUDENT IN AN ORGANIZED HEALTH CARE EDUCATION/TRAINING PROGRAM

## 2024-09-14 PROCEDURE — 27000221 HC OXYGEN, UP TO 24 HOURS

## 2024-09-14 PROCEDURE — 25000003 PHARM REV CODE 250: Performed by: INTERNAL MEDICINE

## 2024-09-14 PROCEDURE — 63600175 PHARM REV CODE 636 W HCPCS: Performed by: INTERNAL MEDICINE

## 2024-09-14 PROCEDURE — 94640 AIRWAY INHALATION TREATMENT: CPT

## 2024-09-14 PROCEDURE — 36415 COLL VENOUS BLD VENIPUNCTURE: CPT | Performed by: STUDENT IN AN ORGANIZED HEALTH CARE EDUCATION/TRAINING PROGRAM

## 2024-09-14 PROCEDURE — 83735 ASSAY OF MAGNESIUM: CPT | Performed by: STUDENT IN AN ORGANIZED HEALTH CARE EDUCATION/TRAINING PROGRAM

## 2024-09-14 RX ORDER — IPRATROPIUM BROMIDE AND ALBUTEROL SULFATE 2.5; .5 MG/3ML; MG/3ML
3 SOLUTION RESPIRATORY (INHALATION) EVERY 4 HOURS PRN
Status: DISCONTINUED | OUTPATIENT
Start: 2024-09-14 | End: 2024-09-15 | Stop reason: HOSPADM

## 2024-09-14 RX ORDER — FLUTICASONE FUROATE AND VILANTEROL 100; 25 UG/1; UG/1
1 POWDER RESPIRATORY (INHALATION) DAILY
Status: DISCONTINUED | OUTPATIENT
Start: 2024-09-14 | End: 2024-09-15 | Stop reason: HOSPADM

## 2024-09-14 RX ORDER — IPRATROPIUM BROMIDE AND ALBUTEROL SULFATE 2.5; .5 MG/3ML; MG/3ML
3 SOLUTION RESPIRATORY (INHALATION) EVERY 6 HOURS
Status: DISCONTINUED | OUTPATIENT
Start: 2024-09-14 | End: 2024-09-15 | Stop reason: HOSPADM

## 2024-09-14 RX ADMIN — ACETAMINOPHEN 1000 MG: 500 TABLET ORAL at 06:09

## 2024-09-14 RX ADMIN — OXYCODONE HYDROCHLORIDE 5 MG: 5 TABLET ORAL at 11:09

## 2024-09-14 RX ADMIN — CLOPIDOGREL BISULFATE 75 MG: 75 TABLET ORAL at 08:09

## 2024-09-14 RX ADMIN — OXYCODONE HYDROCHLORIDE 5 MG: 5 TABLET ORAL at 05:09

## 2024-09-14 RX ADMIN — ACETAMINOPHEN 1000 MG: 500 TABLET ORAL at 09:09

## 2024-09-14 RX ADMIN — GABAPENTIN 300 MG: 300 CAPSULE ORAL at 08:09

## 2024-09-14 RX ADMIN — ATORVASTATIN CALCIUM 80 MG: 40 TABLET, FILM COATED ORAL at 08:09

## 2024-09-14 RX ADMIN — IPRATROPIUM BROMIDE AND ALBUTEROL SULFATE 3 ML: 2.5; .5 SOLUTION RESPIRATORY (INHALATION) at 12:09

## 2024-09-14 RX ADMIN — OXYCODONE HYDROCHLORIDE 5 MG: 5 TABLET ORAL at 12:09

## 2024-09-14 RX ADMIN — PREDNISONE 40 MG: 20 TABLET ORAL at 08:09

## 2024-09-14 RX ADMIN — IPRATROPIUM BROMIDE AND ALBUTEROL SULFATE 3 ML: 2.5; .5 SOLUTION RESPIRATORY (INHALATION) at 07:09

## 2024-09-14 RX ADMIN — ASPIRIN 81 MG: 81 TABLET, COATED ORAL at 08:09

## 2024-09-14 RX ADMIN — HEPARIN SODIUM 17 UNITS/KG/HR: 10000 INJECTION, SOLUTION INTRAVENOUS at 10:09

## 2024-09-14 RX ADMIN — GABAPENTIN 300 MG: 300 CAPSULE ORAL at 09:09

## 2024-09-14 RX ADMIN — METOPROLOL SUCCINATE 25 MG: 25 TABLET, EXTENDED RELEASE ORAL at 08:09

## 2024-09-14 NOTE — ASSESSMENT & PLAN NOTE
Chronic, controlled. Latest blood pressure and vitals reviewed-     Temp:  [97.7 °F (36.5 °C)-98.5 °F (36.9 °C)]   Pulse:  []   Resp:  [16-18]   BP: (103-132)/(52-62)   SpO2:  [95 %-98 %] .   Home meds for hypertension were reviewed and noted below.   Hypertension Medications               furosemide (LASIX) 20 MG tablet Take 1 tablet (20 mg total) by mouth daily as needed (edema/wt gain).    metoprolol succinate (TOPROL-XL) 25 MG 24 hr tablet Take 1 tablet (25 mg total) by mouth once daily.    nitroGLYCERIN (NITROSTAT) 0.4 MG SL tablet Place 1 tablet (0.4 mg total) under the tongue every 5 (five) minutes as needed for Chest pain.            While in the hospital, will manage blood pressure as follows; Continue home antihypertensive regimen    Will utilize p.r.n. blood pressure medication only if patient's blood pressure greater than 180/110 and she develops symptoms such as worsening chest pain or shortness of breath.

## 2024-09-14 NOTE — ASSESSMENT & PLAN NOTE
Patient with known CAD s/p stent placement, which is controlled Will continue ASA, Plavix, and Statin and monitor for S/Sx of angina/ACS. Continue to monitor on telemetry.   - see above
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
## 2024-09-14 NOTE — PLAN OF CARE
Problem: Adult Inpatient Plan of Care  Goal: Plan of Care Review  Outcome: Progressing  Goal: Patient-Specific Goal (Individualized)  Outcome: Progressing  Goal: Absence of Hospital-Acquired Illness or Injury  Outcome: Progressing  Goal: Optimal Comfort and Wellbeing  Outcome: Progressing  Goal: Readiness for Transition of Care  Outcome: Progressing     Problem: Acute Kidney Injury/Impairment  Goal: Fluid and Electrolyte Balance  Outcome: Progressing  Goal: Improved Oral Intake  Outcome: Progressing  Goal: Effective Renal Function  Outcome: Progressing     Problem: Wound  Goal: Optimal Coping  Outcome: Progressing  Goal: Optimal Functional Ability  Outcome: Progressing  Goal: Absence of Infection Signs and Symptoms  Outcome: Progressing  Goal: Improved Oral Intake  Outcome: Progressing  Goal: Optimal Pain Control and Function  Outcome: Progressing  Goal: Skin Health and Integrity  Outcome: Progressing  Goal: Optimal Wound Healing  Outcome: Progressing     Problem: Fall Injury Risk  Goal: Absence of Fall and Fall-Related Injury  Outcome: Progressing     No changes this shift. PRN Oxy given and effective.

## 2024-09-14 NOTE — SUBJECTIVE & OBJECTIVE
Interval History: No additional episodes of chest pain. Patient is tolerating hep gtt. Denies chest pain, SOB, n/v, c/d.     Review of Systems  Objective:     Vital Signs (Most Recent):  Temp: 97.9 °F (36.6 °C) (09/14/24 0800)  Pulse: 76 (09/14/24 0859)  Resp: 18 (09/14/24 0800)  BP: 132/62 (09/14/24 0859)  SpO2: 95 % (09/14/24 0800) Vital Signs (24h Range):  Temp:  [97.7 °F (36.5 °C)-98.5 °F (36.9 °C)] 97.9 °F (36.6 °C)  Pulse:  [] 76  Resp:  [16-18] 18  SpO2:  [95 %-98 %] 95 %  BP: (103-132)/(52-62) 132/62     Weight: 50.8 kg (111 lb 15.9 oz)  Body mass index is 21.87 kg/m².    Intake/Output Summary (Last 24 hours) at 9/14/2024 1048  Last data filed at 9/14/2024 0514  Gross per 24 hour   Intake 502.4 ml   Output 900 ml   Net -397.6 ml         Physical Exam  Constitutional:       General: She is not in acute distress.     Appearance: Normal appearance. She is underweight. She is not ill-appearing.   HENT:      Head: Normocephalic and atraumatic.      Mouth/Throat:      Mouth: Mucous membranes are moist.   Eyes:      General: No scleral icterus.  Cardiovascular:      Rate and Rhythm: Normal rate and regular rhythm.      Pulses: Normal pulses.      Heart sounds: Normal heart sounds.   Pulmonary:      Effort: Pulmonary effort is normal. No respiratory distress.      Breath sounds: Normal breath sounds. No wheezing or rales.   Abdominal:      General: Abdomen is flat. Bowel sounds are normal. There is no distension.      Palpations: Abdomen is soft.      Tenderness: There is no abdominal tenderness.   Musculoskeletal:         General: Normal range of motion.      Cervical back: Normal range of motion and neck supple.   Skin:     General: Skin is warm and dry.      Findings: Erythema present.      Comments: R foot    Neurological:      General: No focal deficit present.      Mental Status: She is alert and oriented to person, place, and time.   Psychiatric:         Mood and Affect: Mood normal.         Behavior:  Behavior normal.             Significant Labs: All pertinent labs within the past 24 hours have been reviewed.  CBC:   Recent Labs   Lab 09/13/24 0355 09/13/24 0927 09/14/24 0445   WBC 4.98 6.08 12.54   HGB 10.5* 10.6* 10.4*   HCT 33.6* 33.9* 33.9*    310 350     CMP:   Recent Labs   Lab 09/12/24  1849 09/13/24 0355 09/13/24 0927 09/14/24 0445    134*  --  138   K 3.3* 2.9* 3.2* 4.4    97  --  101   CO2 20* 27  --  28    193*  --  114*   BUN 14 15  --  24*   CREATININE 0.8 0.9  --  1.0   CALCIUM 9.2 8.4*  --  9.0   PROT 6.7  --   --   --    ALBUMIN 3.2*  --   --   --    BILITOT 0.4  --   --   --    ALKPHOS 145*  --   --   --    AST 24  --   --   --    ALT 14  --   --   --    ANIONGAP 13 10  --  9     Cardiac Markers:   Recent Labs   Lab 09/12/24 1849   *     troponins  Recent Labs   Lab 09/13/24 0927   TROPONINI 2.098*         Significant Imaging: I have reviewed all pertinent imaging results/findings within the past 24 hours.

## 2024-09-14 NOTE — ASSESSMENT & PLAN NOTE
Patient's most recent potassium results are listed below.   Recent Labs     09/13/24  0355 09/13/24  0927 09/14/24  0445   K 2.9* 3.2* 4.4       Plan  - Replete potassium per protocol  - Monitor potassium Every 12 hours  - Patient's hypokalemia is improving

## 2024-09-14 NOTE — ASSESSMENT & PLAN NOTE
Patient presents with NSTEMI. Chest pain is currently controlled, although has had recurrent pain in last 12 hours. DARIEL score is 5. Patient is not currently on NSTEMI Pathway. Trop 0.6 > 2.1   PCI to RCA in May 2024. EKG reviewed.   Medical management includes; Dual Anti-Platelet therapy, Anticoagulation, and High Intensity Stain Echo has not been performed. Consult for cardiac rehab is not ordered. Patient counseled on lifestyle modifications- continue current medications and continue current healthy lifestyle patterns. Cardiology is consulted. Plan of care reviewed with cardiology team. Continue to monitor patient closely and adjust therapy as needed. TTE LVEF 65% during this admission.   - cardiology consulted   - continue hep gtt, medical management   - TTE > EOT 9/15 10am  - continue home medications  - telemetry   - K>4, Mag>2    Results for orders placed during the hospital encounter of 09/12/24    Echo    Interpretation Summary    Left Ventricle: The left ventricle is normal in size. Normal wall thickness. There is normal systolic function. Ejection fraction by visual approximation is 65%. There is normal diastolic function.    Right Ventricle: Normal right ventricular cavity size. Wall thickness is normal. Systolic function is normal.    Pulmonary Artery: Pulmonary artery pressure could not be estimated.    IVC/SVC: Normal venous pressure at 3 mmHg.

## 2024-09-14 NOTE — PLAN OF CARE
Alert and oriented x 4. Speech is clear. Vs stable. No c/o chest pain this shift. Colostomy bag changed. Heparin drip intact. Safety measures in place. Bed in low position. Call light in reach.      Problem: Adult Inpatient Plan of Care  Goal: Plan of Care Review  Outcome: Progressing  Goal: Patient-Specific Goal (Individualized)  Outcome: Progressing  Goal: Absence of Hospital-Acquired Illness or Injury  Outcome: Progressing  Goal: Optimal Comfort and Wellbeing  Outcome: Progressing  Goal: Readiness for Transition of Care  Outcome: Progressing     Problem: Acute Kidney Injury/Impairment  Goal: Fluid and Electrolyte Balance  Outcome: Progressing  Goal: Improved Oral Intake  Outcome: Progressing  Goal: Effective Renal Function  Outcome: Progressing     Problem: Wound  Goal: Optimal Coping  Outcome: Progressing  Goal: Optimal Functional Ability  Outcome: Progressing

## 2024-09-15 VITALS
SYSTOLIC BLOOD PRESSURE: 133 MMHG | HEART RATE: 77 BPM | HEIGHT: 60 IN | OXYGEN SATURATION: 100 % | BODY MASS INDEX: 21.99 KG/M2 | DIASTOLIC BLOOD PRESSURE: 69 MMHG | WEIGHT: 112 LBS | RESPIRATION RATE: 18 BRPM | TEMPERATURE: 98 F

## 2024-09-15 LAB
APTT PPP: 44.3 SEC (ref 21–32)
APTT PPP: 49 SEC (ref 21–32)
BASOPHILS # BLD AUTO: 0.01 K/UL (ref 0–0.2)
BASOPHILS NFR BLD: 0.1 % (ref 0–1.9)
DIFFERENTIAL METHOD BLD: ABNORMAL
EOSINOPHIL # BLD AUTO: 0 K/UL (ref 0–0.5)
EOSINOPHIL NFR BLD: 0 % (ref 0–8)
ERYTHROCYTE [DISTWIDTH] IN BLOOD BY AUTOMATED COUNT: 14.5 % (ref 11.5–14.5)
HCT VFR BLD AUTO: 32.1 % (ref 37–48.5)
HGB BLD-MCNC: 9.8 G/DL (ref 12–16)
IMM GRANULOCYTES # BLD AUTO: 0.06 K/UL (ref 0–0.04)
IMM GRANULOCYTES NFR BLD AUTO: 0.5 % (ref 0–0.5)
LYMPHOCYTES # BLD AUTO: 0.7 K/UL (ref 1–4.8)
LYMPHOCYTES NFR BLD: 6.2 % (ref 18–48)
MCH RBC QN AUTO: 28.7 PG (ref 27–31)
MCHC RBC AUTO-ENTMCNC: 30.5 G/DL (ref 32–36)
MCV RBC AUTO: 94 FL (ref 82–98)
MONOCYTES # BLD AUTO: 0.7 K/UL (ref 0.3–1)
MONOCYTES NFR BLD: 6.3 % (ref 4–15)
NEUTROPHILS # BLD AUTO: 9.6 K/UL (ref 1.8–7.7)
NEUTROPHILS NFR BLD: 86.9 % (ref 38–73)
NRBC BLD-RTO: 0 /100 WBC
PLATELET # BLD AUTO: 340 K/UL (ref 150–450)
PMV BLD AUTO: 9.8 FL (ref 9.2–12.9)
RBC # BLD AUTO: 3.42 M/UL (ref 4–5.4)
WBC # BLD AUTO: 11.06 K/UL (ref 3.9–12.7)

## 2024-09-15 PROCEDURE — 25000003 PHARM REV CODE 250: Performed by: INTERNAL MEDICINE

## 2024-09-15 PROCEDURE — 94640 AIRWAY INHALATION TREATMENT: CPT

## 2024-09-15 PROCEDURE — 85730 THROMBOPLASTIN TIME PARTIAL: CPT | Performed by: STUDENT IN AN ORGANIZED HEALTH CARE EDUCATION/TRAINING PROGRAM

## 2024-09-15 PROCEDURE — 27000221 HC OXYGEN, UP TO 24 HOURS

## 2024-09-15 PROCEDURE — 25000003 PHARM REV CODE 250: Performed by: STUDENT IN AN ORGANIZED HEALTH CARE EDUCATION/TRAINING PROGRAM

## 2024-09-15 PROCEDURE — 36415 COLL VENOUS BLD VENIPUNCTURE: CPT | Performed by: STUDENT IN AN ORGANIZED HEALTH CARE EDUCATION/TRAINING PROGRAM

## 2024-09-15 PROCEDURE — 25000242 PHARM REV CODE 250 ALT 637 W/ HCPCS: Performed by: STUDENT IN AN ORGANIZED HEALTH CARE EDUCATION/TRAINING PROGRAM

## 2024-09-15 PROCEDURE — 85025 COMPLETE CBC W/AUTO DIFF WBC: CPT | Performed by: STUDENT IN AN ORGANIZED HEALTH CARE EDUCATION/TRAINING PROGRAM

## 2024-09-15 PROCEDURE — 99900035 HC TECH TIME PER 15 MIN (STAT)

## 2024-09-15 PROCEDURE — 63600175 PHARM REV CODE 636 W HCPCS: Performed by: INTERNAL MEDICINE

## 2024-09-15 PROCEDURE — 94761 N-INVAS EAR/PLS OXIMETRY MLT: CPT

## 2024-09-15 RX ORDER — OXYCODONE AND ACETAMINOPHEN 5; 325 MG/1; MG/1
1 TABLET ORAL EVERY 8 HOURS PRN
Qty: 9 EACH | Refills: 0 | Status: SHIPPED | OUTPATIENT
Start: 2024-09-15 | End: 2024-09-27

## 2024-09-15 RX ORDER — PREDNISONE 20 MG/1
40 TABLET ORAL DAILY
Qty: 4 TABLET | Refills: 0 | Status: SHIPPED | OUTPATIENT
Start: 2024-09-16 | End: 2024-09-18

## 2024-09-15 RX ORDER — OXYCODONE HYDROCHLORIDE 5 MG/1
5 TABLET ORAL EVERY 8 HOURS PRN
Qty: 9 TABLET | Refills: 0 | Status: SHIPPED | OUTPATIENT
Start: 2024-09-15 | End: 2024-09-15 | Stop reason: HOSPADM

## 2024-09-15 RX ORDER — OXYCODONE HYDROCHLORIDE 5 MG/1
5 TABLET ORAL EVERY 8 HOURS PRN
Qty: 9 TABLET | Refills: 0 | Status: SHIPPED | OUTPATIENT
Start: 2024-09-15 | End: 2024-09-15

## 2024-09-15 RX ADMIN — IPRATROPIUM BROMIDE AND ALBUTEROL SULFATE 3 ML: 2.5; .5 SOLUTION RESPIRATORY (INHALATION) at 07:09

## 2024-09-15 RX ADMIN — METOPROLOL SUCCINATE 25 MG: 25 TABLET, EXTENDED RELEASE ORAL at 08:09

## 2024-09-15 RX ADMIN — ASPIRIN 81 MG: 81 TABLET, COATED ORAL at 09:09

## 2024-09-15 RX ADMIN — ATORVASTATIN CALCIUM 80 MG: 40 TABLET, FILM COATED ORAL at 08:09

## 2024-09-15 RX ADMIN — GABAPENTIN 300 MG: 300 CAPSULE ORAL at 08:09

## 2024-09-15 RX ADMIN — FLUTICASONE FUROATE AND VILANTEROL TRIFENATATE 1 PUFF: 100; 25 POWDER RESPIRATORY (INHALATION) at 07:09

## 2024-09-15 RX ADMIN — CLOPIDOGREL BISULFATE 75 MG: 75 TABLET ORAL at 08:09

## 2024-09-15 RX ADMIN — IPRATROPIUM BROMIDE AND ALBUTEROL SULFATE 3 ML: 2.5; .5 SOLUTION RESPIRATORY (INHALATION) at 01:09

## 2024-09-15 RX ADMIN — OXYCODONE HYDROCHLORIDE 5 MG: 5 TABLET ORAL at 08:09

## 2024-09-15 RX ADMIN — IPRATROPIUM BROMIDE AND ALBUTEROL SULFATE 3 ML: 2.5; .5 SOLUTION RESPIRATORY (INHALATION) at 12:09

## 2024-09-15 RX ADMIN — PREDNISONE 40 MG: 20 TABLET ORAL at 09:09

## 2024-09-15 RX ADMIN — ACETAMINOPHEN 1000 MG: 500 TABLET ORAL at 06:09

## 2024-09-15 NOTE — NURSING
Patient is alert and oriented x 4 with periods of confusion. On 3 liters/nc. No wob or sign of distress. Speech is clear. Vs stable. To be discharged home this afternoon to family. Transport scheduled. Discharge instructions given to patient. Meds delivered. Safety measures in place.
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
## 2024-09-15 NOTE — ASSESSMENT & PLAN NOTE
Patient presents with NSTEMI. Chest pain is currently controlled, although has had recurrent pain in last 12 hours. DARIEL score is 5. Patient is not currently on NSTEMI Pathway. Trop 0.6 > 2.1   PCI to RCA in May 2024. EKG reviewed.   Medical management includes; Dual Anti-Platelet therapy, Anticoagulation, and High Intensity Stain Echo has not been performed. Consult for cardiac rehab is not ordered. Patient counseled on lifestyle modifications- continue current medications and continue current healthy lifestyle patterns. Cardiology is consulted. Plan of care reviewed with cardiology team. Continue to monitor patient closely and adjust therapy as needed. TTE LVEF 65% during this admission.   - cardiology consulted   - continue hep gtt, medical management   - TTE > EOT 9/15 10am  - continue home medications  - telemetry   - K>4, Mag>2    Results for orders placed during the hospital encounter of 09/12/24    Echo    Interpretation Summary    Left Ventricle: The left ventricle is normal in size. Normal wall thickness. There is normal systolic function. Ejection fraction by visual approximation is 65%. There is normal diastolic function.    Right Ventricle: Normal right ventricular cavity size. Wall thickness is normal. Systolic function is normal.    Pulmonary Artery: Pulmonary artery pressure could not be estimated.    IVC/SVC: Normal venous pressure at 3 mmHg.
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
## 2024-09-15 NOTE — PLAN OF CARE
CHW met with patient/family at bedside. Patient experience rounding completed and reviewed the following.     Do you know your discharge plan? Yes or No,    If yes, what is the plan? (Home, Home Health, Rehab, SNF, LTAC, or Other) Yes Home w/ HH    Have you discussed your needs and preferences with your SW/CM? Yes or No  Yes Home w/ HH    If you are discharging home, do you have help at home? Yes or No Yes (family)    Do you think you will need help additional at home at discharge? Yes or No  No    Do you currently have difficulty keeping up with bills, affording medicine or buying food? Yes or No No    Assigned SW/CM notified of any patient/family needs or concerns. Appropriate resources provided to address patient's needs.  Nancy Jovel, JARETH  Case Management  [phone]

## 2024-09-15 NOTE — PLAN OF CARE
I certify I provided patient choice and a list to the patient/family of CMS rated Home Health, SNF, IRF, LTACH, jail Nursing Homes  Patient/Family signed Patient's Choice Disclosure Form choosing the following    [facility]        09/15/24 7228   Post-Acute Status   Post-Acute Authorization Home Health   Home Health Status Set-up Complete/Auth obtained   Patient choice form signed by patient/caregiver List with quality metrics by geographic area provided;List from CMS Compare;List from System Post-Acute Care   Discharge Delays (!) Post-Acute Set-up   Discharge Plan   Discharge Plan A Home Health   Discharge Plan B Home with family

## 2024-09-15 NOTE — ASSESSMENT & PLAN NOTE
Chronic, controlled. Latest blood pressure and vitals reviewed-     Temp:  [97.8 °F (36.6 °C)-98.2 °F (36.8 °C)]   Pulse:  [7-101]   Resp:  [17-20]   BP: (103-145)/(55-73)   SpO2:  [92 %-99 %] .   Home meds for hypertension were reviewed and noted below.   Hypertension Medications               furosemide (LASIX) 20 MG tablet Take 1 tablet (20 mg total) by mouth daily as needed (edema/wt gain).    metoprolol succinate (TOPROL-XL) 25 MG 24 hr tablet Take 1 tablet (25 mg total) by mouth once daily.    nitroGLYCERIN (NITROSTAT) 0.4 MG SL tablet Place 1 tablet (0.4 mg total) under the tongue every 5 (five) minutes as needed for Chest pain.            While in the hospital, will manage blood pressure as follows; Continue home antihypertensive regimen    Will utilize p.r.n. blood pressure medication only if patient's blood pressure greater than 180/110 and she develops symptoms such as worsening chest pain or shortness of breath.

## 2024-09-15 NOTE — PLAN OF CARE
CM met with patient stated she does not have any means of transportation home. While patient stated she does not have her key to get into her apt, her landlord will be readily available and also leave her apt door unlocked when she is en route home. At baseline patient states while she does wear O2 24/7 she quite mobile, feeds her pets, performs all her ADL care independently. CM to placed ADT 30.

## 2024-09-15 NOTE — PLAN OF CARE
Problem: Adult Inpatient Plan of Care  Goal: Plan of Care Review  Outcome: Progressing  Goal: Patient-Specific Goal (Individualized)  Outcome: Progressing  Goal: Absence of Hospital-Acquired Illness or Injury  Outcome: Progressing  Goal: Optimal Comfort and Wellbeing  Outcome: Progressing  Goal: Readiness for Transition of Care  Outcome: Progressing     Problem: Acute Kidney Injury/Impairment  Goal: Fluid and Electrolyte Balance  Outcome: Progressing  Goal: Improved Oral Intake  Outcome: Progressing  Goal: Effective Renal Function  Outcome: Progressing     Problem: Wound  Goal: Optimal Coping  Outcome: Progressing  Goal: Optimal Functional Ability  Outcome: Progressing  Goal: Absence of Infection Signs and Symptoms  Outcome: Progressing  Goal: Improved Oral Intake  Outcome: Progressing  Goal: Optimal Pain Control and Function  Outcome: Progressing  Goal: Skin Health and Integrity  Outcome: Progressing  Goal: Optimal Wound Healing  Outcome: Progressing     Problem: Fall Injury Risk  Goal: Absence of Fall and Fall-Related Injury  Outcome: Progressing     Problem: Skin Injury Risk Increased  Goal: Skin Health and Integrity  Outcome: Progressing     Rested through the night no complaints or request. No injury this shift will continue to monitor. SRUP CLWR

## 2024-09-17 ENCOUNTER — PATIENT OUTREACH (OUTPATIENT)
Dept: ADMINISTRATIVE | Facility: CLINIC | Age: 79
End: 2024-09-17
Payer: MEDICARE

## 2024-09-17 ENCOUNTER — TELEPHONE (OUTPATIENT)
Dept: HOME HEALTH SERVICES | Facility: CLINIC | Age: 79
End: 2024-09-17
Payer: MEDICARE

## 2024-09-17 NOTE — PROGRESS NOTES
C3 nurse spoke with Radha Sotelo  for a TCC post hospital discharge follow up call. The patient has a scheduled [facility] appointment with Kalyn Pemberton NP ( At home) on 09/20/2024 @ To be discussed with patient prior to visit.    Message sent to PCP staff.

## 2024-09-20 ENCOUNTER — OFFICE VISIT (OUTPATIENT)
Dept: HOME HEALTH SERVICES | Facility: CLINIC | Age: 79
End: 2024-09-20
Payer: MEDICARE

## 2024-09-20 VITALS — HEART RATE: 72 BPM | RESPIRATION RATE: 18 BRPM | OXYGEN SATURATION: 96 %

## 2024-09-20 DIAGNOSIS — Z89.431 PARTIAL NONTRAUMATIC AMPUTATION OF RIGHT FOOT: ICD-10-CM

## 2024-09-20 DIAGNOSIS — I25.10 CORONARY ARTERY DISEASE INVOLVING NATIVE CORONARY ARTERY OF NATIVE HEART, UNSPECIFIED WHETHER ANGINA PRESENT: Primary | ICD-10-CM

## 2024-09-20 DIAGNOSIS — J96.11 CHRONIC HYPOXIC RESPIRATORY FAILURE: ICD-10-CM

## 2024-09-20 DIAGNOSIS — J44.9 CHRONIC OBSTRUCTIVE PULMONARY DISEASE, UNSPECIFIED COPD TYPE: ICD-10-CM

## 2024-09-20 DIAGNOSIS — I50.32 CHRONIC DIASTOLIC HEART FAILURE: ICD-10-CM

## 2024-09-20 DIAGNOSIS — I70.223 ATHEROSCLEROSIS OF NATIVE ARTERIES OF EXTREMITIES WITH REST PAIN, BILATERAL LEGS: ICD-10-CM

## 2024-09-20 DIAGNOSIS — F33.0 MILD EPISODE OF RECURRENT MAJOR DEPRESSIVE DISORDER: ICD-10-CM

## 2024-09-20 NOTE — ASSESSMENT & PLAN NOTE
- No signs of volume overload today  -wt stable  continue home medications: metoprolol 24 hr 25 mg tablet

## 2024-09-20 NOTE — ASSESSMENT & PLAN NOTE
- continue home O2 of 3 L  - titrate O2 for sats 88-92%  - continue inhalers and scheduled DuoNebs
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
## 2024-09-20 NOTE — ASSESSMENT & PLAN NOTE
Patient with known CAD s/p stent placement, recent STEMI during admit  Will continue ASA, Plavix, and Statin and monitor for S/Sx of angina/ACS.   No angina today  Pt elected medical management  Reinforced compliance of medications  NTG in home for angina
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
## 2024-09-20 NOTE — ASSESSMENT & PLAN NOTE
Related to smoking history  Vascular following  ASA and plavix in place  Monitor  Chronic wound to right foot, pt declined AKA earlier this year  Continue wound care by [facility]

## 2024-09-25 ENCOUNTER — TELEPHONE (OUTPATIENT)
Dept: PODIATRY | Facility: CLINIC | Age: 79
End: 2024-09-25
Payer: MEDICARE

## 2024-09-25 NOTE — TELEPHONE ENCOUNTER
Left vm for pt with callback number of [phone] in regards to get missed appt r/s. R/s appt and mailed appt letter.

## 2024-09-25 NOTE — PHYSICIAN QUERY
Question: Please clarify the Cardiac diagnosis.    Provider Query Response:        NSTEMI , CAD, PAD

## 2024-09-27 RX ORDER — TRAMADOL HYDROCHLORIDE 50 MG/1
50 TABLET ORAL EVERY 8 HOURS PRN
Qty: 30 TABLET | Refills: 0 | Status: SHIPPED | OUTPATIENT
Start: 2024-09-27 | End: 2024-10-11

## 2024-09-29 ENCOUNTER — HOSPITAL ENCOUNTER (INPATIENT)
Facility: HOSPITAL | Age: 79
LOS: 2 days | Discharge: HOME HEALTH | DRG: 322 | End: 2024-10-01
Attending: EMERGENCY MEDICINE | Admitting: INTERNAL MEDICINE
Payer: MEDICARE

## 2024-09-29 DIAGNOSIS — L03.119 CELLULITIS OF FOOT: ICD-10-CM

## 2024-09-29 DIAGNOSIS — I48.0 PAROXYSMAL ATRIAL FIBRILLATION: ICD-10-CM

## 2024-09-29 DIAGNOSIS — E87.5 HYPERKALEMIA: ICD-10-CM

## 2024-09-29 DIAGNOSIS — R07.9 CHEST PAIN: ICD-10-CM

## 2024-09-29 DIAGNOSIS — I21.3 STEMI (ST ELEVATION MYOCARDIAL INFARCTION): Primary | ICD-10-CM

## 2024-09-29 DIAGNOSIS — T14.8XXA CHRONIC WOUND: Chronic | ICD-10-CM

## 2024-09-29 LAB
ABO + RH BLD: NORMAL
ALBUMIN SERPL BCP-MCNC: 2.7 G/DL (ref 3.5–5.2)
ALBUMIN SERPL BCP-MCNC: 3.1 G/DL (ref 3.5–5.2)
ALBUMIN SERPL BCP-MCNC: 3.1 G/DL (ref 3.5–5.2)
ALLENS TEST: ABNORMAL
ALP SERPL-CCNC: 108 U/L (ref 55–135)
ALP SERPL-CCNC: 108 U/L (ref 55–135)
ALP SERPL-CCNC: 92 U/L (ref 55–135)
ALT SERPL W/O P-5'-P-CCNC: 54 U/L (ref 10–44)
ALT SERPL W/O P-5'-P-CCNC: 67 U/L (ref 10–44)
ALT SERPL W/O P-5'-P-CCNC: 68 U/L (ref 10–44)
ANION GAP SERPL CALC-SCNC: 14 MMOL/L (ref 8–16)
ANION GAP SERPL CALC-SCNC: 8 MMOL/L (ref 8–16)
ANION GAP SERPL CALC-SCNC: 9 MMOL/L (ref 8–16)
AST SERPL-CCNC: 21 U/L (ref 10–40)
AST SERPL-CCNC: 23 U/L (ref 10–40)
AST SERPL-CCNC: 23 U/L (ref 10–40)
BASOPHILS # BLD AUTO: 0.04 K/UL (ref 0–0.2)
BASOPHILS # BLD AUTO: 0.06 K/UL (ref 0–0.2)
BASOPHILS NFR BLD: 0.4 % (ref 0–1.9)
BASOPHILS NFR BLD: 0.6 % (ref 0–1.9)
BILIRUB SERPL-MCNC: 0.4 MG/DL (ref 0.1–1)
BILIRUB SERPL-MCNC: 0.7 MG/DL (ref 0.1–1)
BILIRUB SERPL-MCNC: 0.7 MG/DL (ref 0.1–1)
BLD GP AB SCN CELLS X3 SERPL QL: NORMAL
BUN SERPL-MCNC: 17 MG/DL (ref 8–23)
BUN SERPL-MCNC: 18 MG/DL (ref 8–23)
BUN SERPL-MCNC: 18 MG/DL (ref 8–23)
CALCIUM SERPL-MCNC: 8.1 MG/DL (ref 8.7–10.5)
CALCIUM SERPL-MCNC: 8.6 MG/DL (ref 8.7–10.5)
CALCIUM SERPL-MCNC: 8.6 MG/DL (ref 8.7–10.5)
CHLORIDE SERPL-SCNC: 103 MMOL/L (ref 95–110)
CHLORIDE SERPL-SCNC: 106 MMOL/L (ref 95–110)
CHLORIDE SERPL-SCNC: 107 MMOL/L (ref 95–110)
CHOLEST SERPL-MCNC: 147 MG/DL (ref 120–199)
CHOLEST/HDLC SERPL: 3.3 {RATIO} (ref 2–5)
CO2 SERPL-SCNC: 18 MMOL/L (ref 23–29)
CO2 SERPL-SCNC: 22 MMOL/L (ref 23–29)
CO2 SERPL-SCNC: 23 MMOL/L (ref 23–29)
CREAT SERPL-MCNC: 0.9 MG/DL (ref 0.5–1.4)
CREAT SERPL-MCNC: 0.9 MG/DL (ref 0.5–1.4)
CREAT SERPL-MCNC: 1 MG/DL (ref 0.5–1.4)
DIFFERENTIAL METHOD BLD: ABNORMAL
DIFFERENTIAL METHOD BLD: ABNORMAL
EOSINOPHIL # BLD AUTO: 0.2 K/UL (ref 0–0.5)
EOSINOPHIL # BLD AUTO: 0.4 K/UL (ref 0–0.5)
EOSINOPHIL NFR BLD: 1.7 % (ref 0–8)
EOSINOPHIL NFR BLD: 4.4 % (ref 0–8)
ERYTHROCYTE [DISTWIDTH] IN BLOOD BY AUTOMATED COUNT: 14.5 % (ref 11.5–14.5)
ERYTHROCYTE [DISTWIDTH] IN BLOOD BY AUTOMATED COUNT: 14.5 % (ref 11.5–14.5)
EST. GFR  (NO RACE VARIABLE): 57.7 ML/MIN/1.73 M^2
EST. GFR  (NO RACE VARIABLE): >60 ML/MIN/1.73 M^2
EST. GFR  (NO RACE VARIABLE): >60 ML/MIN/1.73 M^2
ESTIMATED AVG GLUCOSE: 100 MG/DL (ref 68–131)
GLUCOSE SERPL-MCNC: 111 MG/DL (ref 70–110)
GLUCOSE SERPL-MCNC: 136 MG/DL (ref 70–110)
GLUCOSE SERPL-MCNC: 93 MG/DL (ref 70–110)
HBA1C MFR BLD: 5.1 % (ref 4–5.6)
HCT VFR BLD AUTO: 30.4 % (ref 37–48.5)
HCT VFR BLD AUTO: 31 % (ref 37–48.5)
HDLC SERPL-MCNC: 45 MG/DL (ref 40–75)
HDLC SERPL: 30.6 % (ref 20–50)
HGB BLD-MCNC: 9.3 G/DL (ref 12–16)
HGB BLD-MCNC: 9.4 G/DL (ref 12–16)
IMM GRANULOCYTES # BLD AUTO: 0.07 K/UL (ref 0–0.04)
IMM GRANULOCYTES # BLD AUTO: 0.09 K/UL (ref 0–0.04)
IMM GRANULOCYTES NFR BLD AUTO: 0.6 % (ref 0–0.5)
IMM GRANULOCYTES NFR BLD AUTO: 0.9 % (ref 0–0.5)
LACTATE SERPL-SCNC: 1.5 MMOL/L (ref 0.5–2.2)
LACTATE SERPL-SCNC: 1.6 MMOL/L (ref 0.5–2.2)
LDH SERPL L TO P-CCNC: 2.98 MMOL/L (ref 0.5–2.2)
LDLC SERPL CALC-MCNC: 83.8 MG/DL (ref 63–159)
LYMPHOCYTES # BLD AUTO: 0.6 K/UL (ref 1–4.8)
LYMPHOCYTES # BLD AUTO: 1.3 K/UL (ref 1–4.8)
LYMPHOCYTES NFR BLD: 13.5 % (ref 18–48)
LYMPHOCYTES NFR BLD: 5.3 % (ref 18–48)
MAGNESIUM SERPL-MCNC: 1.5 MG/DL (ref 1.6–2.6)
MAGNESIUM SERPL-MCNC: 2.3 MG/DL (ref 1.6–2.6)
MCH RBC QN AUTO: 28.4 PG (ref 27–31)
MCH RBC QN AUTO: 29 PG (ref 27–31)
MCHC RBC AUTO-ENTMCNC: 30.3 G/DL (ref 32–36)
MCHC RBC AUTO-ENTMCNC: 30.6 G/DL (ref 32–36)
MCV RBC AUTO: 94 FL (ref 82–98)
MCV RBC AUTO: 95 FL (ref 82–98)
MONOCYTES # BLD AUTO: 0.4 K/UL (ref 0.3–1)
MONOCYTES # BLD AUTO: 0.7 K/UL (ref 0.3–1)
MONOCYTES NFR BLD: 4.1 % (ref 4–15)
MONOCYTES NFR BLD: 7 % (ref 4–15)
NEUTROPHILS # BLD AUTO: 7.3 K/UL (ref 1.8–7.7)
NEUTROPHILS # BLD AUTO: 9.5 K/UL (ref 1.8–7.7)
NEUTROPHILS NFR BLD: 73.6 % (ref 38–73)
NEUTROPHILS NFR BLD: 87.9 % (ref 38–73)
NONHDLC SERPL-MCNC: 102 MG/DL
NRBC BLD-RTO: 0 /100 WBC
NRBC BLD-RTO: 0 /100 WBC
PHOSPHATE SERPL-MCNC: 4.4 MG/DL (ref 2.7–4.5)
PLATELET # BLD AUTO: 220 K/UL (ref 150–450)
PLATELET # BLD AUTO: 253 K/UL (ref 150–450)
PMV BLD AUTO: 10 FL (ref 9.2–12.9)
PMV BLD AUTO: 10.6 FL (ref 9.2–12.9)
POCT GLUCOSE: 123 MG/DL (ref 70–110)
POTASSIUM SERPL-SCNC: 3.1 MMOL/L (ref 3.5–5.1)
POTASSIUM SERPL-SCNC: 3.2 MMOL/L (ref 3.5–5.1)
POTASSIUM SERPL-SCNC: 3.9 MMOL/L (ref 3.5–5.1)
PROT SERPL-MCNC: 4.9 G/DL (ref 6–8.4)
PROT SERPL-MCNC: 5.7 G/DL (ref 6–8.4)
PROT SERPL-MCNC: 5.8 G/DL (ref 6–8.4)
RBC # BLD AUTO: 3.21 M/UL (ref 4–5.4)
RBC # BLD AUTO: 3.31 M/UL (ref 4–5.4)
SAMPLE: ABNORMAL
SITE: ABNORMAL
SODIUM SERPL-SCNC: 135 MMOL/L (ref 136–145)
SODIUM SERPL-SCNC: 137 MMOL/L (ref 136–145)
SODIUM SERPL-SCNC: 138 MMOL/L (ref 136–145)
SPECIMEN OUTDATE: NORMAL
T4 FREE SERPL-MCNC: 1.01 NG/DL (ref 0.71–1.51)
TRIGL SERPL-MCNC: 91 MG/DL (ref 30–150)
TROPONIN I SERPL DL<=0.01 NG/ML-MCNC: 0.06 NG/ML (ref 0–0.03)
TSH SERPL DL<=0.005 MIU/L-ACNC: 5.67 UIU/ML (ref 0.4–4)
WBC # BLD AUTO: 10.83 K/UL (ref 3.9–12.7)
WBC # BLD AUTO: 9.85 K/UL (ref 3.9–12.7)

## 2024-09-29 PROCEDURE — 27000221 HC OXYGEN, UP TO 24 HOURS

## 2024-09-29 PROCEDURE — 93010 ELECTROCARDIOGRAM REPORT: CPT | Mod: ,,, | Performed by: INTERNAL MEDICINE

## 2024-09-29 PROCEDURE — 25000242 PHARM REV CODE 250 ALT 637 W/ HCPCS

## 2024-09-29 PROCEDURE — 99900035 HC TECH TIME PER 15 MIN (STAT)

## 2024-09-29 PROCEDURE — C1887 CATHETER, GUIDING: HCPCS | Performed by: INTERNAL MEDICINE

## 2024-09-29 PROCEDURE — 93005 ELECTROCARDIOGRAM TRACING: CPT

## 2024-09-29 PROCEDURE — 86902 BLOOD TYPE ANTIGEN DONOR EA: CPT | Performed by: EMERGENCY MEDICINE

## 2024-09-29 PROCEDURE — 25000003 PHARM REV CODE 250: Performed by: INTERNAL MEDICINE

## 2024-09-29 PROCEDURE — C9606 PERC D-E COR REVASC W AMI S: HCPCS | Mod: RC | Performed by: INTERNAL MEDICINE

## 2024-09-29 PROCEDURE — 80053 COMPREHEN METABOLIC PANEL: CPT

## 2024-09-29 PROCEDURE — 86850 RBC ANTIBODY SCREEN: CPT | Performed by: EMERGENCY MEDICINE

## 2024-09-29 PROCEDURE — C1725 CATH, TRANSLUMIN NON-LASER: HCPCS | Performed by: INTERNAL MEDICINE

## 2024-09-29 PROCEDURE — 63600175 PHARM REV CODE 636 W HCPCS: Performed by: EMERGENCY MEDICINE

## 2024-09-29 PROCEDURE — 27201423 OPTIME MED/SURG SUP & DEVICES STERILE SUPPLY: Performed by: INTERNAL MEDICINE

## 2024-09-29 PROCEDURE — 63600175 PHARM REV CODE 636 W HCPCS

## 2024-09-29 PROCEDURE — 99285 EMERGENCY DEPT VISIT HI MDM: CPT | Mod: 25

## 2024-09-29 PROCEDURE — 84100 ASSAY OF PHOSPHORUS: CPT

## 2024-09-29 PROCEDURE — 85025 COMPLETE CBC W/AUTO DIFF WBC: CPT

## 2024-09-29 PROCEDURE — 4A023N7 MEASUREMENT OF CARDIAC SAMPLING AND PRESSURE, LEFT HEART, PERCUTANEOUS APPROACH: ICD-10-PCS | Performed by: INTERNAL MEDICINE

## 2024-09-29 PROCEDURE — 85025 COMPLETE CBC W/AUTO DIFF WBC: CPT | Mod: 91 | Performed by: EMERGENCY MEDICINE

## 2024-09-29 PROCEDURE — 20000000 HC ICU ROOM

## 2024-09-29 PROCEDURE — 99223 1ST HOSP IP/OBS HIGH 75: CPT | Mod: GC,,, | Performed by: INTERNAL MEDICINE

## 2024-09-29 PROCEDURE — 25000003 PHARM REV CODE 250: Performed by: HOSPITALIST

## 2024-09-29 PROCEDURE — 25000003 PHARM REV CODE 250

## 2024-09-29 PROCEDURE — 84439 ASSAY OF FREE THYROXINE: CPT

## 2024-09-29 PROCEDURE — 99152 MOD SED SAME PHYS/QHP 5/>YRS: CPT | Mod: ,,, | Performed by: INTERNAL MEDICINE

## 2024-09-29 PROCEDURE — 25000003 PHARM REV CODE 250: Performed by: EMERGENCY MEDICINE

## 2024-09-29 PROCEDURE — 027035Z DILATION OF CORONARY ARTERY, ONE ARTERY WITH TWO DRUG-ELUTING INTRALUMINAL DEVICES, PERCUTANEOUS APPROACH: ICD-10-PCS | Performed by: INTERNAL MEDICINE

## 2024-09-29 PROCEDURE — 94761 N-INVAS EAR/PLS OXIMETRY MLT: CPT

## 2024-09-29 PROCEDURE — C1769 GUIDE WIRE: HCPCS | Performed by: INTERNAL MEDICINE

## 2024-09-29 PROCEDURE — 83605 ASSAY OF LACTIC ACID: CPT

## 2024-09-29 PROCEDURE — 92941 PRQ TRLML REVSC TOT OCCL AMI: CPT | Mod: 22,RC,, | Performed by: INTERNAL MEDICINE

## 2024-09-29 PROCEDURE — 93454 CORONARY ARTERY ANGIO S&I: CPT | Mod: 26,22,XU, | Performed by: INTERNAL MEDICINE

## 2024-09-29 PROCEDURE — B2111ZZ FLUOROSCOPY OF MULTIPLE CORONARY ARTERIES USING LOW OSMOLAR CONTRAST: ICD-10-PCS | Performed by: INTERNAL MEDICINE

## 2024-09-29 PROCEDURE — 93454 CORONARY ARTERY ANGIO S&I: CPT | Mod: XU | Performed by: INTERNAL MEDICINE

## 2024-09-29 PROCEDURE — 86900 BLOOD TYPING SEROLOGIC ABO: CPT | Performed by: EMERGENCY MEDICINE

## 2024-09-29 PROCEDURE — 84484 ASSAY OF TROPONIN QUANT: CPT | Performed by: EMERGENCY MEDICINE

## 2024-09-29 PROCEDURE — 96374 THER/PROPH/DIAG INJ IV PUSH: CPT

## 2024-09-29 PROCEDURE — 83735 ASSAY OF MAGNESIUM: CPT | Mod: 91 | Performed by: INTERNAL MEDICINE

## 2024-09-29 PROCEDURE — 83036 HEMOGLOBIN GLYCOSYLATED A1C: CPT

## 2024-09-29 PROCEDURE — 80061 LIPID PANEL: CPT

## 2024-09-29 PROCEDURE — 83735 ASSAY OF MAGNESIUM: CPT

## 2024-09-29 PROCEDURE — 86901 BLOOD TYPING SEROLOGIC RH(D): CPT | Performed by: EMERGENCY MEDICINE

## 2024-09-29 PROCEDURE — C1760 CLOSURE DEV, VASC: HCPCS | Performed by: INTERNAL MEDICINE

## 2024-09-29 PROCEDURE — 80053 COMPREHEN METABOLIC PANEL: CPT | Mod: 91 | Performed by: EMERGENCY MEDICINE

## 2024-09-29 PROCEDURE — 63600175 PHARM REV CODE 636 W HCPCS: Performed by: INTERNAL MEDICINE

## 2024-09-29 PROCEDURE — C1894 INTRO/SHEATH, NON-LASER: HCPCS | Performed by: INTERNAL MEDICINE

## 2024-09-29 PROCEDURE — 99153 MOD SED SAME PHYS/QHP EA: CPT | Performed by: INTERNAL MEDICINE

## 2024-09-29 PROCEDURE — 84443 ASSAY THYROID STIM HORMONE: CPT

## 2024-09-29 PROCEDURE — 99152 MOD SED SAME PHYS/QHP 5/>YRS: CPT | Performed by: INTERNAL MEDICINE

## 2024-09-29 PROCEDURE — 85347 COAGULATION TIME ACTIVATED: CPT | Performed by: INTERNAL MEDICINE

## 2024-09-29 PROCEDURE — C1874 STENT, COATED/COV W/DEL SYS: HCPCS | Performed by: INTERNAL MEDICINE

## 2024-09-29 PROCEDURE — 80053 COMPREHEN METABOLIC PANEL: CPT | Mod: 91 | Performed by: INTERNAL MEDICINE

## 2024-09-29 DEVICE — EVEROLIMUS-ELUTING PLATINUM CHROMIUM CORONARY STENT SYSTEM
Type: IMPLANTABLE DEVICE | Site: HEART | Status: FUNCTIONAL
Brand: SYNERGY™ XD

## 2024-09-29 RX ORDER — METOPROLOL TARTRATE 25 MG/1
12.5 TABLET ORAL EVERY 6 HOURS
Status: DISCONTINUED | OUTPATIENT
Start: 2024-09-29 | End: 2024-09-29

## 2024-09-29 RX ORDER — MAGNESIUM SULFATE HEPTAHYDRATE 40 MG/ML
2 INJECTION, SOLUTION INTRAVENOUS ONCE
Status: COMPLETED | OUTPATIENT
Start: 2024-09-29 | End: 2024-09-29

## 2024-09-29 RX ORDER — SODIUM CHLORIDE 9 MG/ML
INJECTION, SOLUTION INTRAVENOUS CONTINUOUS
Status: ACTIVE | OUTPATIENT
Start: 2024-09-29 | End: 2024-09-29

## 2024-09-29 RX ORDER — NITROGLYCERIN 0.4 MG/1
0.4 TABLET SUBLINGUAL EVERY 5 MIN PRN
Status: DISCONTINUED | OUTPATIENT
Start: 2024-09-29 | End: 2024-10-01 | Stop reason: HOSPADM

## 2024-09-29 RX ORDER — GABAPENTIN 300 MG/1
300 CAPSULE ORAL 2 TIMES DAILY
Status: DISCONTINUED | OUTPATIENT
Start: 2024-09-29 | End: 2024-10-01 | Stop reason: HOSPADM

## 2024-09-29 RX ORDER — ONDANSETRON HYDROCHLORIDE 2 MG/ML
INJECTION, SOLUTION INTRAVENOUS
Status: DISCONTINUED | OUTPATIENT
Start: 2024-09-29 | End: 2024-09-29 | Stop reason: HOSPADM

## 2024-09-29 RX ORDER — IBUPROFEN 200 MG
16 TABLET ORAL
Status: DISCONTINUED | OUTPATIENT
Start: 2024-09-29 | End: 2024-10-01 | Stop reason: HOSPADM

## 2024-09-29 RX ORDER — POTASSIUM CHLORIDE 750 MG/1
30 CAPSULE, EXTENDED RELEASE ORAL
Status: COMPLETED | OUTPATIENT
Start: 2024-09-29 | End: 2024-09-29

## 2024-09-29 RX ORDER — SODIUM CHLORIDE 0.9 % (FLUSH) 0.9 %
10 SYRINGE (ML) INJECTION EVERY 12 HOURS PRN
Status: DISCONTINUED | OUTPATIENT
Start: 2024-09-29 | End: 2024-10-01 | Stop reason: HOSPADM

## 2024-09-29 RX ORDER — MIDAZOLAM HYDROCHLORIDE 1 MG/ML
INJECTION INTRAMUSCULAR; INTRAVENOUS
Status: DISCONTINUED | OUTPATIENT
Start: 2024-09-29 | End: 2024-09-29 | Stop reason: HOSPADM

## 2024-09-29 RX ORDER — GLUCAGON 1 MG
1 KIT INJECTION
Status: DISCONTINUED | OUTPATIENT
Start: 2024-09-29 | End: 2024-10-01 | Stop reason: HOSPADM

## 2024-09-29 RX ORDER — HEPARIN SODIUM 1000 [USP'U]/ML
INJECTION, SOLUTION INTRAVENOUS; SUBCUTANEOUS
Status: DISCONTINUED | OUTPATIENT
Start: 2024-09-29 | End: 2024-09-29 | Stop reason: HOSPADM

## 2024-09-29 RX ORDER — METOPROLOL SUCCINATE 25 MG/1
25 TABLET, EXTENDED RELEASE ORAL DAILY
Status: DISCONTINUED | OUTPATIENT
Start: 2024-09-29 | End: 2024-09-29

## 2024-09-29 RX ORDER — ACETAMINOPHEN 325 MG/1
650 TABLET ORAL EVERY 4 HOURS PRN
Status: DISCONTINUED | OUTPATIENT
Start: 2024-09-29 | End: 2024-10-01 | Stop reason: HOSPADM

## 2024-09-29 RX ORDER — NOREPINEPHRINE BITARTRATE/D5W 4MG/250ML
PLASTIC BAG, INJECTION (ML) INTRAVENOUS
Status: DISPENSED
Start: 2024-09-29 | End: 2024-09-30

## 2024-09-29 RX ORDER — HEPARIN SOD,PORCINE/0.9 % NACL 1000/500ML
INTRAVENOUS SOLUTION INTRAVENOUS
Status: DISCONTINUED | OUTPATIENT
Start: 2024-09-29 | End: 2024-09-29 | Stop reason: HOSPADM

## 2024-09-29 RX ORDER — CLOPIDOGREL BISULFATE 300 MG/1
600 TABLET, FILM COATED ORAL
Status: DISCONTINUED | OUTPATIENT
Start: 2024-09-29 | End: 2024-09-29

## 2024-09-29 RX ORDER — LIDOCAINE HYDROCHLORIDE 20 MG/ML
INJECTION, SOLUTION EPIDURAL; INFILTRATION; INTRACAUDAL; PERINEURAL
Status: DISCONTINUED | OUTPATIENT
Start: 2024-09-29 | End: 2024-09-29 | Stop reason: HOSPADM

## 2024-09-29 RX ORDER — IBUPROFEN 200 MG
24 TABLET ORAL
Status: DISCONTINUED | OUTPATIENT
Start: 2024-09-29 | End: 2024-10-01 | Stop reason: HOSPADM

## 2024-09-29 RX ORDER — FUROSEMIDE 10 MG/ML
40 INJECTION INTRAMUSCULAR; INTRAVENOUS ONCE
Status: COMPLETED | OUTPATIENT
Start: 2024-09-29 | End: 2024-09-29

## 2024-09-29 RX ORDER — ASPIRIN 81 MG/1
81 TABLET ORAL DAILY
Status: DISCONTINUED | OUTPATIENT
Start: 2024-09-29 | End: 2024-10-01 | Stop reason: HOSPADM

## 2024-09-29 RX ORDER — DIPHENHYDRAMINE HCL 50 MG
50 CAPSULE ORAL ONCE
Status: DISCONTINUED | OUTPATIENT
Start: 2024-09-29 | End: 2024-09-29 | Stop reason: HOSPADM

## 2024-09-29 RX ORDER — FENTANYL CITRATE 50 UG/ML
INJECTION, SOLUTION INTRAMUSCULAR; INTRAVENOUS
Status: DISCONTINUED | OUTPATIENT
Start: 2024-09-29 | End: 2024-09-29 | Stop reason: HOSPADM

## 2024-09-29 RX ORDER — MORPHINE SULFATE 4 MG/ML
4 INJECTION, SOLUTION INTRAMUSCULAR; INTRAVENOUS
Status: COMPLETED | OUTPATIENT
Start: 2024-09-29 | End: 2024-09-29

## 2024-09-29 RX ORDER — IPRATROPIUM BROMIDE AND ALBUTEROL SULFATE 2.5; .5 MG/3ML; MG/3ML
3 SOLUTION RESPIRATORY (INHALATION) EVERY 6 HOURS PRN
Status: DISCONTINUED | OUTPATIENT
Start: 2024-09-29 | End: 2024-10-01 | Stop reason: HOSPADM

## 2024-09-29 RX ORDER — METOPROLOL TARTRATE 1 MG/ML
5 INJECTION, SOLUTION INTRAVENOUS ONCE
Status: COMPLETED | OUTPATIENT
Start: 2024-09-29 | End: 2024-09-29

## 2024-09-29 RX ORDER — ONDANSETRON 8 MG/1
8 TABLET, ORALLY DISINTEGRATING ORAL EVERY 8 HOURS PRN
Status: DISCONTINUED | OUTPATIENT
Start: 2024-09-29 | End: 2024-09-29

## 2024-09-29 RX ORDER — DIPHENHYDRAMINE HCL 50 MG
50 CAPSULE ORAL
Status: COMPLETED | OUTPATIENT
Start: 2024-09-29 | End: 2024-09-29

## 2024-09-29 RX ORDER — ONDANSETRON HYDROCHLORIDE 2 MG/ML
4 INJECTION, SOLUTION INTRAVENOUS EVERY 6 HOURS PRN
Status: DISCONTINUED | OUTPATIENT
Start: 2024-09-29 | End: 2024-10-01 | Stop reason: HOSPADM

## 2024-09-29 RX ORDER — NALOXONE HCL 0.4 MG/ML
0.02 VIAL (ML) INJECTION
Status: DISCONTINUED | OUTPATIENT
Start: 2024-09-29 | End: 2024-10-01 | Stop reason: HOSPADM

## 2024-09-29 RX ORDER — SODIUM CHLORIDE 0.9 % (FLUSH) 0.9 %
10 SYRINGE (ML) INJECTION
Status: DISCONTINUED | OUTPATIENT
Start: 2024-09-29 | End: 2024-10-01 | Stop reason: HOSPADM

## 2024-09-29 RX ORDER — ATORVASTATIN CALCIUM 40 MG/1
80 TABLET, FILM COATED ORAL DAILY
Status: DISCONTINUED | OUTPATIENT
Start: 2024-09-29 | End: 2024-10-01 | Stop reason: HOSPADM

## 2024-09-29 RX ORDER — MORPHINE SULFATE 2 MG/ML
2 INJECTION, SOLUTION INTRAMUSCULAR; INTRAVENOUS EVERY 4 HOURS PRN
Status: DISCONTINUED | OUTPATIENT
Start: 2024-09-29 | End: 2024-10-01 | Stop reason: HOSPADM

## 2024-09-29 RX ORDER — PROTAMINE SULFATE 10 MG/ML
INJECTION, SOLUTION INTRAVENOUS
Status: DISCONTINUED | OUTPATIENT
Start: 2024-09-29 | End: 2024-09-29 | Stop reason: HOSPADM

## 2024-09-29 RX ADMIN — METOPROLOL SUCCINATE 25 MG: 25 TABLET, EXTENDED RELEASE ORAL at 02:09

## 2024-09-29 RX ADMIN — SODIUM CHLORIDE 500 ML: 9 INJECTION, SOLUTION INTRAVENOUS at 07:09

## 2024-09-29 RX ADMIN — DIPHENHYDRAMINE HYDROCHLORIDE 50 MG: 50 CAPSULE ORAL at 12:09

## 2024-09-29 RX ADMIN — SODIUM CHLORIDE 500 ML: 9 INJECTION, SOLUTION INTRAVENOUS at 04:09

## 2024-09-29 RX ADMIN — METOROPROLOL TARTRATE 5 MG: 5 INJECTION, SOLUTION INTRAVENOUS at 03:09

## 2024-09-29 RX ADMIN — POTASSIUM CHLORIDE 30 MEQ: 750 CAPSULE, EXTENDED RELEASE ORAL at 02:09

## 2024-09-29 RX ADMIN — ACETAMINOPHEN 650 MG: 325 TABLET ORAL at 02:09

## 2024-09-29 RX ADMIN — POTASSIUM CHLORIDE 30 MEQ: 750 CAPSULE, EXTENDED RELEASE ORAL at 04:09

## 2024-09-29 RX ADMIN — ACETAMINOPHEN 650 MG: 325 TABLET ORAL at 07:09

## 2024-09-29 RX ADMIN — TICAGRELOR 180 MG: 90 TABLET ORAL at 12:09

## 2024-09-29 RX ADMIN — MAGNESIUM SULFATE HEPTAHYDRATE 2 G: 40 INJECTION, SOLUTION INTRAVENOUS at 03:09

## 2024-09-29 RX ADMIN — FUROSEMIDE 40 MG: 10 INJECTION, SOLUTION INTRAVENOUS at 08:09

## 2024-09-29 RX ADMIN — ATORVASTATIN CALCIUM 80 MG: 40 TABLET, FILM COATED ORAL at 02:09

## 2024-09-29 RX ADMIN — ASPIRIN 81 MG: 81 TABLET, COATED ORAL at 02:09

## 2024-09-29 RX ADMIN — MORPHINE SULFATE 4 MG: 4 INJECTION INTRAVENOUS at 12:09

## 2024-09-29 RX ADMIN — SODIUM CHLORIDE: 9 INJECTION, SOLUTION INTRAVENOUS at 03:09

## 2024-09-29 RX ADMIN — TICAGRELOR 90 MG: 90 TABLET ORAL at 08:09

## 2024-09-29 RX ADMIN — ONDANSETRON 4 MG: 2 INJECTION INTRAMUSCULAR; INTRAVENOUS at 02:09

## 2024-09-29 NOTE — H&P
[facility] Dept   Interventional cardiology   Consult    Patient Name: Radha Sotelo  MRN: [id number]  Admission Date: 9/29/2024  Attending Physician: Charo Barber MD  Primary Care Provider: Kalyn Pemberton NP  Principal Problem:<principal problem not specified>    Subjective:     History of Present Illness:  79-year-old female with past medical history of CAD status post PCI in November 20, 2023 and May 20, 2024 for proximal RCA stenosis presents with chest pain that started at 11:30 a.m..  EKG shows inferior ST segment elevation.  Patient currently denies having any nausea vomiting.    Past Medical History:   Diagnosis Date    Anemia     Anxiety     COPD (chronic obstructive pulmonary disease)     COPD (chronic obstructive pulmonary disease) with emphysema     Coronary artery disease     Depression     Diverticulitis     Hyperlipidemia     Hypertension     PVD (peripheral vascular disease)     PVD (peripheral vascular disease)     S/P colostomy     Substance abuse     hx heavy etoh use     Tobacco abuse        Past Surgical History:   Procedure Laterality Date    ANGIOGRAM, CORONARY, WITH LEFT HEART CATHETERIZATION N/A 11/22/2023    Procedure: Angiogram, Coronary, with Left Heart Cath;  Surgeon: Checo Bhatt MD;  Location: [facility] CATH LAB;  Service: Cardiology;  Laterality: N/A;    ANGIOGRAM, CORONARY, WITH LEFT HEART CATHETERIZATION N/A 5/27/2024    Procedure: Angiogram, Coronary, with Left Heart Cath;  Surgeon: Karel Mack MD;  Location: [facility] CATH LAB;  Service: Cardiology;  Laterality: N/A;    ANGIOGRAM, EXTREMITY, UNILATERAL Right 8/25/2023    Procedure: ANGIOGRAM, EXTREMITY, UNILATERAL;  Surgeon: Gary Rico MD;  Location: [facility] OR [address] [place] [address];  Service: Vascular;  Laterality: Right;  US GUIDED ACCESS LEFT GROIN  contrast: 150ml  fluoro: 27.9 min  mGy: 254.73  Gycm2: 62.4919  radial flush cocktail: 10ml    ANGIOGRAPHY OF LOWER EXTREMITY Right 8/24/2023    Procedure:  Angiogram Extremity Unilateral;  Surgeon: Benji Ashley MD;  Location: [address];  Service: Vascular;  Laterality: Right;    COLOSTOMY      ECTOPIC PREGNANCY SURGERY      PTA, PERONEAL Right 8/25/2023    Procedure: PTA, PERONEAL TIBIAL TRUNK WITH SHOCKWAVE;  Surgeon: Gary Rico MD;  [address];  Service: Vascular;  Laterality: Right;    PTCA, SINGLE VESSEL  11/22/2023    Procedure: PTCA, Single Vessel;  Surgeon: Checo Bhatt MD;  Location: [facility] CATH LAB;  Service: Cardiology;;    right forefoot amputation      right toe amputation      x2 toes    STENT, DRUG ELUTING, SINGLE VESSEL, CORONARY  11/22/2023    Procedure: Stent, Drug Eluting, Single Vessel, Coronary;  Surgeon: Checo Bhatt MD;  Location: [facility] CATH LAB;  Service: Cardiology;;    STENT, DRUG ELUTING, SINGLE VESSEL, CORONARY  5/27/2024    Procedure: Stent, Drug Eluting, Single Vessel, Coronary;  Surgeon: Karel Mack MD;  Location: [facility] CATH LAB;  Service: Cardiology;;    STENT, SUPERFICIAL FEMORAL ARTERY Right 8/25/2023    Procedure: STENT, SUPERFICIAL FEMORAL ARTERY;  Surgeon: Gary Rico MD;  Location: [address];  Service: Vascular;  Laterality: Right;  VIABAHN 6 X 15 X120       Review of patient's allergies indicates:  No Known Allergies    Current Facility-Administered Medications   Medication    sodium chloride 0.9% flush 10 mL     Current Outpatient Medications   Medication Sig    albuterol-ipratropium (DUO-NEB) 2.5 mg-0.5 mg/3 mL nebulizer solution Take 3 mLs by nebulization every 6 (six) hours as needed for Wheezing or Shortness of Breath.    aspirin (ECOTRIN) 81 MG EC tablet Take 1 tablet (81 mg total) by mouth once daily.    atorvastatin (LIPITOR) 80 MG tablet Take 1 tablet (80 mg total) by mouth once daily.    budesonide-formoterol 80-4.5 mcg (SYMBICORT) 80-4.5 mcg/actuation HFAA INHALE 2 PUFFS INTO THE LUNGS TWICE DAILY. RINSE MOUTH AFTER USE    clopidogreL  (PLAVIX) 75 mg tablet Take 1 tablet (75 mg total) by mouth once daily.    furosemide (LASIX) 20 MG tablet Take 1 tablet (20 mg total) by mouth daily as needed (edema/wt gain). (Patient not taking: Reported on 9/17/2024)    gabapentin (NEURONTIN) 300 MG capsule Take 1 capsule (300 mg total) by mouth 2 (two) times daily.    metoprolol succinate (TOPROL-XL) 25 MG 24 hr tablet Take 1 tablet (25 mg total) by mouth once daily.    nicotine (NICODERM CQ) 14 mg/24 hr Place 1 patch onto the skin once daily. (Patient not taking: Reported on 9/17/2024)    nitroGLYCERIN (NITROSTAT) 0.4 MG SL tablet Place 1 tablet (0.4 mg total) under the tongue every 5 (five) minutes as needed for Chest pain.    traMADoL (ULTRAM) 50 mg tablet Take 1 tablet (50 mg total) by mouth every 8 (eight) hours as needed for Pain.     Family History    None       Tobacco Use    Smoking status: Every Day     Current packs/day: 0.25     Average packs/day: 0.5 packs/day for 61.2 years (30.2 ttl pk-yrs)     Types: Cigarettes     Start date: 7/5/1963    Smokeless tobacco: Never   Substance and Sexual Activity    Alcohol use: No    Drug use: No    Sexual activity: Not Currently     Review of Systems   Constitutional: Negative.    HENT: Negative.     Eyes: Negative.    Respiratory: Negative.     Cardiovascular: Negative.    Gastrointestinal: Negative.    Endocrine: Negative.    Genitourinary: Negative.    Musculoskeletal: Negative.    Neurological: Negative.    Hematological: Negative.      Objective:     Vital Signs (Most Recent):  Pulse: 91 (09/29/24 1232)  Resp: 20 (09/29/24 1233)  BP: (!) 114/52 (09/29/24 1232)  SpO2: 97 % (09/29/24 1232) Vital Signs (24h Range):  Pulse:  [87-91] 91  Resp:  [20-46] 20  SpO2:  [95 %-97 %] 97 %  BP: (103-114)/(52-53) 114/52     Patient Vitals for the past 72 hrs (Last 3 readings):   Weight   09/29/24 1222 50.8 kg (111 lb 15.9 oz)     Body mass index is 21.87 kg/m².    No intake or output data in the 24 hours ending 09/29/24  "1236       Physical Exam  Constitutional:       Appearance: Normal appearance.   HENT:      Head: Normocephalic and atraumatic.   Cardiovascular:      Rate and Rhythm: Normal rate and regular rhythm.      Pulses: Normal pulses.      Heart sounds: Normal heart sounds.   Pulmonary:      Effort: Pulmonary effort is normal.      Breath sounds: Normal breath sounds.   Abdominal:      General: Abdomen is flat. Bowel sounds are normal.      Palpations: Abdomen is soft.   Musculoskeletal:         General: Normal range of motion.      Cervical back: Normal range of motion.   Skin:     General: Skin is warm.      Capillary Refill: Capillary refill takes less than 2 seconds.   Neurological:      General: No focal deficit present.      Mental Status: She is alert and oriented to person, place, and time.            Significant Labs:  CBC:  No results for input(s): "WBC", "RBC", "HGB", "HCT", "PLT", "MCV", "MCH", "MCHC" in the last 168 hours.  BNP:  No results for input(s): "BNP" in the last 168 hours.    Invalid input(s): "BNPTRIAGELBLO"  CMP:  No results for input(s): "GLU", "CALCIUM", "ALBUMIN", "PROT", "NA", "K", "CO2", "CL", "BUN", "CREATININE", "ALKPHOS", "ALT", "AST", "BILITOT" in the last 168 hours.   Coagulation:   No results for input(s): "PT", "INR", "APTT" in the last 168 hours.  LDH:  No results for input(s): "LDH" in the last 72 hours.  Microbiology:  Microbiology Results (last 7 days)       ** No results found for the last 168 hours. **            I have reviewed all pertinent labs within the past 24 hours.    Diagnostic Results:  I have reviewed and interpreted all pertinent imaging results/findings within the past 24 hours.    Assessment/Plan:     STEMI (ST elevation myocardial infarction)  --LHC +/- PCI, patient is a GALEN candidate  - Anti-platelet Therapy: ASA / Ticagrelor  - Access: Right radial  - Catheters: JL3.5/XB RCA guide  - Creatinine/CrCl: 1.2  - Allergies: No shellfish / Iodine allergy  - " Pre-Hydration: NS  - Pre-Op Med: Bendaryl 50mg pO   - All patient's questions were answered.  -The risks, benefits and alternatives of the procedure were explained to the patient.   -The risks of coronary angiography include but are not limited to: bleeding, infection, heart rhythm abnormalities, allergic reactions, kidney injury and potential need for dialysis, stroke and death.   - Should stenting be indicated, the patient has agreed to dual anti-platelet therapy for 1-consecutive year with a drug-eluting stent and a minimum of 1-month with the use of a bare metal stent  - Additionally, pt is aware that non-compliance is likely to result in stent clotting with heart attack, heart failure, and/or death  -The risks of moderate sedation include hypotension, respiratory depression, arrhythmias, bronchospasm, and death.   - Informed consent was obtained and the  patient is agreeable to proceed with the procedure.          Brodie Ramirez MD  Heart Transplant  [facility] - Emergency Dept

## 2024-09-29 NOTE — ED PROVIDER NOTES
Source of History:  Patient and chart    Chief complaint:  Chest Pain      HPI:  Radha Sotelo is a 79 y.o. female with a medical history as below presents to the emergency department with a chief complaint of chest pain.  Patient was experiencing chest pain last night which resolved after several doses of nitro.  Patient went to sleep woke up this morning with the additional chest pain.  She took 2 additional doses of nitro without relief.  EMS was called initial EKGs showed a STEMI in the inferior leads.  It was administered 2 additional this is a sublingual nitro which caused her pressure to get into the 80s systolic.  Patient was then transported to emergency department further evaluation.  Upon presentation to emergency department patient was still feeling 10/10 chest pain and repeat EKGs showed STEMI in the inferior leads.  STEMI code was activated.    Review of patient's allergies indicates:  No Known Allergies    No current facility-administered medications on file prior to encounter.     Current Outpatient Medications on File Prior to Encounter   Medication Sig Dispense Refill    albuterol-ipratropium (DUO-NEB) 2.5 mg-0.5 mg/3 mL nebulizer solution Take 3 mLs by nebulization every 6 (six) hours as needed for Wheezing or Shortness of Breath. 180 mL 6    aspirin (ECOTRIN) 81 MG EC tablet Take 1 tablet (81 mg total) by mouth once daily. 30 tablet 11    atorvastatin (LIPITOR) 80 MG tablet Take 1 tablet (80 mg total) by mouth once daily. 90 tablet 3    budesonide-formoterol 80-4.5 mcg (SYMBICORT) 80-4.5 mcg/actuation HFAA INHALE 2 PUFFS INTO THE LUNGS TWICE DAILY. RINSE MOUTH AFTER USE 10.2 g 6    clopidogreL (PLAVIX) 75 mg tablet Take 1 tablet (75 mg total) by mouth once daily. 30 tablet 11    furosemide (LASIX) 20 MG tablet Take 1 tablet (20 mg total) by mouth daily as needed (edema/wt gain). (Patient not taking: Reported on 9/17/2024) 30 tablet 11    gabapentin (NEURONTIN) 300 MG capsule Take 1 capsule  (300 mg total) by mouth 2 (two) times daily. 180 capsule 3    metoprolol succinate (TOPROL-XL) 25 MG 24 hr tablet Take 1 tablet (25 mg total) by mouth once daily. 90 tablet 3    nicotine (NICODERM CQ) 14 mg/24 hr Place 1 patch onto the skin once daily. (Patient not taking: Reported on 9/17/2024) 28 patch 0    nitroGLYCERIN (NITROSTAT) 0.4 MG SL tablet Place 1 tablet (0.4 mg total) under the tongue every 5 (five) minutes as needed for Chest pain. 25 tablet 2    traMADoL (ULTRAM) 50 mg tablet Take 1 tablet (50 mg total) by mouth every 8 (eight) hours as needed for Pain. 30 tablet 0       PMH:  As per HPI and below:  Past Medical History:   Diagnosis Date    Anemia     Anxiety     COPD (chronic obstructive pulmonary disease)     COPD (chronic obstructive pulmonary disease) with emphysema     Coronary artery disease     Depression     Diverticulitis     Hyperlipidemia     Hypertension     PVD (peripheral vascular disease)     PVD (peripheral vascular disease)     S/P colostomy     Substance abuse     hx heavy etoh use     Tobacco abuse      Past Surgical History:   Procedure Laterality Date    ANGIOGRAM, CORONARY, WITH LEFT HEART CATHETERIZATION N/A 11/22/2023    Procedure: Angiogram, Coronary, with Left Heart Cath;  Surgeon: Checo Bhatt MD;  Location: [facility] CATH LAB;  Service: Cardiology;  Laterality: N/A;    ANGIOGRAM, CORONARY, WITH LEFT HEART CATHETERIZATION N/A 5/27/2024    Procedure: Angiogram, Coronary, with Left Heart Cath;  Surgeon: Karel Mack MD;  Location: [facility] CATH LAB;  Service: Cardiology;  Laterality: N/A;    ANGIOGRAM, EXTREMITY, UNILATERAL Right 8/25/2023    Procedure: ANGIOGRAM, EXTREMITY, UNILATERAL;  Surgeon: Gary Rico MD;  Location: [facility] OR [address] [place] [address];  Service: Vascular;  Laterality: Right;  US GUIDED ACCESS LEFT GROIN  contrast: 150ml  fluoro: 27.9 min  mGy: 254.73  Gycm2: 62.4919  radial flush cocktail: 10ml    ANGIOGRAPHY OF LOWER EXTREMITY Right 8/24/2023     Procedure: Angiogram Extremity Unilateral;  Surgeon: Benji Ashley MD;  Location: [address];  Service: Vascular;  Laterality: Right;    COLOSTOMY      ECTOPIC PREGNANCY SURGERY      PTA, PERONEAL Right 8/25/2023    Procedure: PTA, PERONEAL TIBIAL TRUNK WITH SHOCKWAVE;  Surgeon: Gary Rico MD;  [address];  Service: Vascular;  Laterality: Right;    PTCA, SINGLE VESSEL  11/22/2023    Procedure: PTCA, Single Vessel;  Surgeon: Checo Bhatt MD;  Location: [facility] CATH LAB;  Service: Cardiology;;    right forefoot amputation      right toe amputation      x2 toes    STENT, DRUG ELUTING, SINGLE VESSEL, CORONARY  11/22/2023    Procedure: Stent, Drug Eluting, Single Vessel, Coronary;  Surgeon: Checo Bhatt MD;  Location: [facility] CATH LAB;  Service: Cardiology;;    STENT, DRUG ELUTING, SINGLE VESSEL, CORONARY  5/27/2024    Procedure: Stent, Drug Eluting, Single Vessel, Coronary;  Surgeon: Karel Mack MD;  Location: [facility] CATH LAB;  Service: Cardiology;;    STENT, SUPERFICIAL FEMORAL ARTERY Right 8/25/2023    Procedure: STENT, SUPERFICIAL FEMORAL ARTERY;  Surgeon: Gary Rico MD;  Location: [address];  Service: Vascular;  Laterality: Right;  VIABAHN 6 X 15 X120       Social History     Socioeconomic History    Marital status:    Tobacco Use    Smoking status: Every Day     Current packs/day: 0.25     Average packs/day: 0.5 packs/day for 61.2 years (30.2 ttl pk-yrs)     Types: Cigarettes     Start date: 7/5/1963    Smokeless tobacco: Never   Substance and Sexual Activity    Alcohol use: No    Drug use: No    Sexual activity: Not Currently   Social History Narrative    ** Merged History Encounter **          Social Drivers of Health     Financial Resource Strain: Low Risk  (9/13/2024)    Overall Financial Resource Strain (CARDIA)     Difficulty of Paying Living Expenses: Not very hard   Food Insecurity: No Food Insecurity (9/13/2024)     Hunger Vital Sign     Worried About Running Out of Food in the Last Year: Never true     Ran Out of Food in the Last Year: Never true   Transportation Needs: No Transportation Needs (9/13/2024)    TRANSPORTATION NEEDS     Transportation : No   Physical Activity: Inactive (9/13/2024)    Exercise Vital Sign     Days of Exercise per Week: 0 days     Minutes of Exercise per Session: 0 min   Stress: No Stress Concern Present (9/13/2024)    Citizen of [place] [employer] - Occupational Stress Questionnaire     Feeling of Stress : Only a little   Housing Stability: Low Risk  (9/13/2024)    Housing Stability Vital Sign     Unable to Pay for Housing in the Last Year: No     Homeless in the Last Year: No       No family history on file.    Physical Exam:      Vitals:    09/29/24 1236   BP:    Pulse:    Resp:    Temp: 97.9 °F (36.6 °C)     Physical Exam  Gen:  Hemodynamic and in no acute distress  Mental Status:   Alert and oriented x3.  Appropriate conversant   Skin: Warm, dry. No rashes seen.  Eyes: No conjunctival injection.  Pulm: CTAB. No increased work of breathing.  No significant tachypnea.  No conversational dyspnea.    CV: Regular rate.   Abd: Soft.  Not distended.  Nontender.   MSK:  Patient was partial amputation of right foot.  Dressing is in place.  It appears clean and dry    Neuro: Awake. Speech normal. No focal neuro deficit observed.     Procedures  Laboratory Studies:  Labs Reviewed   CBC W/ AUTO DIFFERENTIAL - Abnormal       Result Value    WBC 9.85      RBC 3.31 (*)     Hemoglobin 9.4 (*)     Hematocrit 31.0 (*)     MCV 94      MCH 28.4      MCHC 30.3 (*)     RDW 14.5      Platelets 253      MPV 10.0      Immature Granulocytes 0.9 (*)     Gran # (ANC) 7.3      Immature Grans (Abs) 0.09 (*)     Lymph # 1.3      Mono # 0.7      Eos # 0.4      Baso # 0.06      nRBC 0      Gran % 73.6 (*)     Lymph % 13.5 (*)     Mono % 7.0      Eosinophil % 4.4      Basophil % 0.6      Differential Method Automated      COMPREHENSIVE METABOLIC PANEL - Abnormal    Sodium 138      Potassium 3.2 (*)     Chloride 106      CO2 18 (*)     Glucose 136 (*)     BUN 18      Creatinine 0.9      Calcium 8.6 (*)     Total Protein 5.8 (*)     Albumin 3.1 (*)     Total Bilirubin 0.7      Alkaline Phosphatase 108      AST 23      ALT 67 (*)     eGFR >60.0      Anion Gap 14     ISTAT LACTATE - Abnormal    POC Lactate 2.98 (*)     Sample VENOUS      Site Other      Allens Test N/A     URINALYSIS, REFLEX TO URINE CULTURE       EKG (independently interpreted by me):  Sinus rhythm.  Normal axis.  Normal intervals.  ST elevation in leads 2 3 and AVF.  Is a new finding from previous EKGs.  Repeat EKGs likewise showed ST elevation in leads 2 3.    Chart reviewed.  Patient was significant cardiac history.  Echo performed September this year showed ejection fraction of 65%.  Patient has had 3 vessel cardiac stenting in the past    Imaging Results    None         Medications Given:  Medications   sodium chloride 0.9% flush 10 mL (has no administration in time range)   0.9%  NaCl infusion (has no administration in time range)   diphenhydrAMINE capsule 50 mg (has no administration in time range)   sodium chloride 0.9% flush 10 mL (has no administration in time range)   naloxone 0.4 mg/mL injection 0.02 mg (has no administration in time range)   glucose chewable tablet 16 g (has no administration in time range)   glucose chewable tablet 24 g (has no administration in time range)   glucagon (human recombinant) injection 1 mg (has no administration in time range)   dextrose 10% bolus 125 mL 125 mL (has no administration in time range)   dextrose 10% bolus 250 mL 250 mL (has no administration in time range)   LIDOcaine (PF) 20 mg/ml (2%) injection (20 mLs  Given 9/29/24 1303)   heparin infusion 1,000 units/500 ml in 0.9% NaCl (on sterile field) (1,500 mLs Intra-Catheter Given 9/29/24 1303)   verapamiL 2.5 mg, nitroGLYCERIN in D5W 200 mcg/mL 200 mcg in 0.9% NaCl  8 mL ( Intra-arterial Given 9/29/24 1303)   albuterol-ipratropium 2.5 mg-0.5 mg/3 mL nebulizer solution 3 mL (has no administration in time range)   aspirin EC tablet 81 mg (has no administration in time range)   atorvastatin tablet 80 mg (has no administration in time range)   gabapentin capsule 300 mg (has no administration in time range)   metoprolol succinate (TOPROL-XL) 24 hr tablet 25 mg (has no administration in time range)   nitroGLYCERIN SL tablet 0.4 mg (has no administration in time range)   fentaNYL 50 mcg/mL injection (25 mcg Intravenous Given 9/29/24 1313)   midazolam (VERSED) 1 mg/mL injection (1 mg Intravenous Given 9/29/24 1339)   heparin (porcine) injection (8,000 Units Intravenous Given 9/29/24 1312)   ondansetron injection (4 mg Intravenous Given 9/29/24 1316)   protamine injection (10 mg Intravenous Given 9/29/24 1355)   acetaminophen tablet 650 mg (has no administration in time range)   ondansetron disintegrating tablet 8 mg (has no administration in time range)   0.9%  NaCl infusion (has no administration in time range)   diphenhydrAMINE capsule 50 mg (50 mg Oral Given 9/29/24 1233)   morphine injection 4 mg (4 mg Intravenous Given 9/29/24 1233)   ticagrelor tablet 180 mg (180 mg Oral Given 9/29/24 1232)       Discussed with:  Cardiac critical care.  The recommendations were to load the patient with Brilanta and admit to their service for further management.    MDM:    79 y.o. female with STEMI    Patient was experiencing symptomatic STEMI upon her arrival.  We treated with morphine.  Nitro was deferred secondary to hypotensive episode.  A her morning dose of Plavix.  However, she was given 325 mg of aspirin EN route by EMS.  We loaded the patient with Brilinta per Cardiac ICU recs.  Critical Care team for further management.    She has remained stable in the emergency department setting.      Medical Decision Making       Diagnostic Impression:    1. STEMI (ST elevation myocardial infarction)     2. Chest pain         ED Disposition Condition    Admit Stable               Patient and/or family understands the plan and is in agreement, verbalized understanding, questions answered    V Kole Dinh MD  Resident  Emergency Medicine         Darrius Dinh MD  Resident  09/29/24 1400    Attending Note:  I have seen the patient, have repeated the key portions of the history and physical, reviewed and agree with the medical documentation, and supervised and managed the medical care of the patient. Additionally, I was present for the critical portion of any procedure(s) performed.    79-year-old female presenting with chest pain, pre-hospital STEMI activation.  Pain started approximately 1 hour prior to arrival.  It is located in the center of her chest that radiates to the left shoulder.  It has been constant despite trying to nitroglycerin at home.  EMS treated her with full-dose aspirin, 2 nitro does with no significant improvement of her symptoms.  EKG concerning for inferior STEMI.  On arrival repeat EKG consistent with an inferior STEMI.  She was loaded with Brilinta.  Cardiology team at bedside, placed on a telemetry monitoring transported to cath lab with Cardiology team and nurses present.    COCO Barber MD  Staff ED Physician  09/29/2024 2:08 PM    Critical Care time:35 minutes inclusive of direct patient care, review of previous records, interpretation of labs, imaging and ekg, as well as discussion of my impression and plan of care with the patient, family and other clinicians/consultants. This time is exclusive of any separate billable procedures and of treating other patients.       Charo Barber MD  09/29/24 [id number]

## 2024-09-29 NOTE — ASSESSMENT & PLAN NOTE
Known PAD with multiple RLE procedures  Previous NICCI results indicative of b/l critical limb ischemia and vascular surgery was following   Continue Antiplatelet therapy

## 2024-09-29 NOTE — ASSESSMENT & PLAN NOTE
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

## 2024-09-29 NOTE — OP NOTE
Brief Operative Note:    : Checo Bhatt MD     Referring Physician: * No referring provider recorded for this case *     All Operators: Surgeon(s):  Checo Bhatt MD Garikapati, Kiran, MD     Preoperative Diagnosis: STEMI (ST elevation myocardial infarction) [I21.3]     Postop Diagnosis: STEMI (ST elevation myocardial infarction) [I21.3]    Treatments/Procedures: Procedure(s) (LRB):  Angiogram, Coronary, with Left Heart Cath (N/A)    Access: Right CFA    Findings:Severe coronary artery disease is present.     See catheterization report for full details.    Intervention: PCI to RCA    See catheterization report for full details.    Closure device: Perclose       Plan:  - Post cath protocol   - IVF @ 150 cc/hr x 4 hours  - Bed rest x 2 hours   - Continue aspirin 81 mg daily indefinitely  - Continue brilinta for minimum 6 months, ideally up to 1 year  - Continue high intensity statin therapy (LDL goal < 70)  - Risk factor reduction (BP <130/80 mmHg, glycemic control, etc)  - Cardiac rehab referral  - Follow up with outpatient cardiologist    Estimated Blood loss: 20 cc    Specimens removed: No    Brodie Ramirez MD  [facility]

## 2024-09-29 NOTE — SUBJECTIVE & OBJECTIVE
Past Medical History:   Diagnosis Date    Anemia     Anxiety     COPD (chronic obstructive pulmonary disease)     COPD (chronic obstructive pulmonary disease) with emphysema     Coronary artery disease     Depression     Diverticulitis     Hyperlipidemia     Hypertension     PVD (peripheral vascular disease)     PVD (peripheral vascular disease)     S/P colostomy     Substance abuse     hx heavy etoh use     Tobacco abuse        Past Surgical History:   Procedure Laterality Date    ANGIOGRAM, CORONARY, WITH LEFT HEART CATHETERIZATION N/A 11/22/2023    Procedure: Angiogram, Coronary, with Left Heart Cath;  Surgeon: Checo Bhatt MD;  Location: [facility] CATH LAB;  Service: Cardiology;  Laterality: N/A;    ANGIOGRAM, CORONARY, WITH LEFT HEART CATHETERIZATION N/A 5/27/2024    Procedure: Angiogram, Coronary, with Left Heart Cath;  Surgeon: Karel Mack MD;  Location: [facility] CATH LAB;  Service: Cardiology;  Laterality: N/A;    ANGIOGRAM, EXTREMITY, UNILATERAL Right 8/25/2023    Procedure: ANGIOGRAM, EXTREMITY, UNILATERAL;  Surgeon: Gary Rico MD;  Location: [facility] OR [address] [place] [address];  Service: Vascular;  Laterality: Right;  US GUIDED ACCESS LEFT GROIN  contrast: 150ml  fluoro: 27.9 min  mGy: 254.73  Gycm2: 62.4919  radial flush cocktail: 10ml    ANGIOGRAPHY OF LOWER EXTREMITY Right 8/24/2023    Procedure: Angiogram Extremity Unilateral;  Surgeon: Benji Ashley MD;  [facility];  Service: Vascular;  Laterality: Right;    COLOSTOMY      ECTOPIC PREGNANCY SURGERY      PTA, PERONEAL Right 8/25/2023    Procedure: PTA, PERONEAL TIBIAL TRUNK WITH SHOCKWAVE;  Surgeon: Gary Rico MD;  [facility];  Service: Vascular;  Laterality: Right;    PTCA, SINGLE VESSEL  11/22/2023    Procedure: PTCA, Single Vessel;  Surgeon: Checo Bhatt MD;  Location: [facility] CATH LAB;  Service: Cardiology;;    right forefoot amputation      right toe amputation      x2 toes    STENT,  DRUG ELUTING, SINGLE VESSEL, CORONARY  11/22/2023    Procedure: Stent, Drug Eluting, Single Vessel, Coronary;  Surgeon: Checo Bhatt MD;  Location: [facility] CATH LAB;  Service: Cardiology;;    STENT, DRUG ELUTING, SINGLE VESSEL, CORONARY  5/27/2024    Procedure: Stent, Drug Eluting, Single Vessel, Coronary;  Surgeon: Karel Mack MD;  Location: [facility] CATH LAB;  Service: Cardiology;;    STENT, SUPERFICIAL FEMORAL ARTERY Right 8/25/2023    Procedure: STENT, SUPERFICIAL FEMORAL ARTERY;  Surgeon: Gary Rico MD;  [facility];  Service: Vascular;  Laterality: Right;  VIABAHN 6 X 15 X120       Review of patient's allergies indicates:  No Known Allergies    Current Facility-Administered Medications   Medication    sodium chloride 0.9% flush 10 mL     Current Outpatient Medications   Medication Sig    albuterol-ipratropium (DUO-NEB) 2.5 mg-0.5 mg/3 mL nebulizer solution Take 3 mLs by nebulization every 6 (six) hours as needed for Wheezing or Shortness of Breath.    aspirin (ECOTRIN) 81 MG EC tablet Take 1 tablet (81 mg total) by mouth once daily.    atorvastatin (LIPITOR) 80 MG tablet Take 1 tablet (80 mg total) by mouth once daily.    budesonide-formoterol 80-4.5 mcg (SYMBICORT) 80-4.5 mcg/actuation HFAA INHALE 2 PUFFS INTO THE LUNGS TWICE DAILY. RINSE MOUTH AFTER USE    clopidogreL (PLAVIX) 75 mg tablet Take 1 tablet (75 mg total) by mouth once daily.    furosemide (LASIX) 20 MG tablet Take 1 tablet (20 mg total) by mouth daily as needed (edema/wt gain). (Patient not taking: Reported on 9/17/2024)    gabapentin (NEURONTIN) 300 MG capsule Take 1 capsule (300 mg total) by mouth 2 (two) times daily.    metoprolol succinate (TOPROL-XL) 25 MG 24 hr tablet Take 1 tablet (25 mg total) by mouth once daily.    nicotine (NICODERM CQ) 14 mg/24 hr Place 1 patch onto the skin once daily. (Patient not taking: Reported on 9/17/2024)    nitroGLYCERIN (NITROSTAT) 0.4 MG SL tablet Place 1 tablet (0.4  For information on Fall & Injury Prevention, visit: [email or web address] OR  [email or web address] OR  https://www.cdc.gov/steadi/patient.html mg total) under the tongue every 5 (five) minutes as needed for Chest pain.    traMADoL (ULTRAM) 50 mg tablet Take 1 tablet (50 mg total) by mouth every 8 (eight) hours as needed for Pain.     Family History    None       Tobacco Use    Smoking status: Every Day     Current packs/day: 0.25     Average packs/day: 0.5 packs/day for 61.2 years (30.2 ttl pk-yrs)     Types: Cigarettes     Start date: 7/5/1963    Smokeless tobacco: Never   Substance and Sexual Activity    Alcohol use: No    Drug use: No    Sexual activity: Not Currently     Review of Systems   Constitutional: Negative.    HENT: Negative.     Eyes: Negative.    Respiratory: Negative.     Cardiovascular: Negative.    Gastrointestinal: Negative.    Endocrine: Negative.    Genitourinary: Negative.    Musculoskeletal: Negative.    Neurological: Negative.    Hematological: Negative.      Objective:     Vital Signs (Most Recent):  Pulse: 91 (09/29/24 1232)  Resp: 20 (09/29/24 1233)  BP: (!) 114/52 (09/29/24 1232)  SpO2: 97 % (09/29/24 1232) Vital Signs (24h Range):  Pulse:  [87-91] 91  Resp:  [20-46] 20  SpO2:  [95 %-97 %] 97 %  BP: (103-114)/(52-53) 114/52     Patient Vitals for the past 72 hrs (Last 3 readings):   Weight   09/29/24 1222 50.8 kg (111 lb 15.9 oz)     Body mass index is 21.87 kg/m².    No intake or output data in the 24 hours ending 09/29/24 1236       Physical Exam  Constitutional:       Appearance: Normal appearance.   HENT:      Head: Normocephalic and atraumatic.   Cardiovascular:      Rate and Rhythm: Normal rate and regular rhythm.      Pulses: Normal pulses.      Heart sounds: Normal heart sounds.   Pulmonary:      Effort: Pulmonary effort is normal.      Breath sounds: Normal breath sounds.   Abdominal:      General: Abdomen is flat. Bowel sounds are normal.      Palpations: Abdomen is soft.   Musculoskeletal:         General: Normal range of motion.      Cervical back: Normal range of motion.   Skin:     General: Skin is warm.       "Capillary Refill: Capillary refill takes less than 2 seconds.   Neurological:      General: No focal deficit present.      Mental Status: She is alert and oriented to person, place, and time.            Significant Labs:  CBC:  No results for input(s): "WBC", "RBC", "HGB", "HCT", "PLT", "MCV", "MCH", "MCHC" in the last 168 hours.  BNP:  No results for input(s): "BNP" in the last 168 hours.    Invalid input(s): "BNPTRIAGELBLO"  CMP:  No results for input(s): "GLU", "CALCIUM", "ALBUMIN", "PROT", "NA", "K", "CO2", "CL", "BUN", "CREATININE", "ALKPHOS", "ALT", "AST", "BILITOT" in the last 168 hours.   Coagulation:   No results for input(s): "PT", "INR", "APTT" in the last 168 hours.  LDH:  No results for input(s): "LDH" in the last 72 hours.  Microbiology:  Microbiology Results (last 7 days)       ** No results found for the last 168 hours. **            I have reviewed all pertinent labs within the past 24 hours.    Diagnostic Results:  I have reviewed and interpreted all pertinent imaging results/findings within the past 24 hours.    "

## 2024-09-29 NOTE — ASSESSMENT & PLAN NOTE
Patient with known CAD s/p stent placement and CABG. Multi vessel CAD. Recent cath with stent placement 05/27.  Will continue ASA, Plavix, and Statin and monitor for S/Sx of angina/ACS. Continue to monitor on telemetry.

## 2024-09-29 NOTE — SUBJECTIVE & OBJECTIVE
Past Medical History:   Diagnosis Date    Anemia     Anxiety     COPD (chronic obstructive pulmonary disease)     COPD (chronic obstructive pulmonary disease) with emphysema     Coronary artery disease     Depression     Diverticulitis     Hyperlipidemia     Hypertension     PVD (peripheral vascular disease)     PVD (peripheral vascular disease)     S/P colostomy     Substance abuse     hx heavy etoh use     Tobacco abuse        Past Surgical History:   Procedure Laterality Date    ANGIOGRAM, CORONARY, WITH LEFT HEART CATHETERIZATION N/A 11/22/2023    Procedure: Angiogram, Coronary, with Left Heart Cath;  Surgeon: Checo Bhatt MD;  Location: [facility] CATH LAB;  Service: Cardiology;  Laterality: N/A;    ANGIOGRAM, CORONARY, WITH LEFT HEART CATHETERIZATION N/A 5/27/2024    Procedure: Angiogram, Coronary, with Left Heart Cath;  Surgeon: Karel Mack MD;  Location: [facility] CATH LAB;  Service: Cardiology;  Laterality: N/A;    ANGIOGRAM, EXTREMITY, UNILATERAL Right 8/25/2023    Procedure: ANGIOGRAM, EXTREMITY, UNILATERAL;  Surgeon: Gary Rico MD;  Location: [facility] OR [address] [place] [address];  Service: Vascular;  Laterality: Right;  US GUIDED ACCESS LEFT GROIN  contrast: 150ml  fluoro: 27.9 min  mGy: 254.73  Gycm2: 62.4919  radial flush cocktail: 10ml    ANGIOGRAPHY OF LOWER EXTREMITY Right 8/24/2023    Procedure: Angiogram Extremity Unilateral;  Surgeon: Benji Ashley MD;  [facility];  Service: Vascular;  Laterality: Right;    COLOSTOMY      ECTOPIC PREGNANCY SURGERY      PTA, PERONEAL Right 8/25/2023    Procedure: PTA, PERONEAL TIBIAL TRUNK WITH SHOCKWAVE;  Surgeon: Gary Rico MD;  [facility];  Service: Vascular;  Laterality: Right;    PTCA, SINGLE VESSEL  11/22/2023    Procedure: PTCA, Single Vessel;  Surgeon: Checo Bhatt MD;  Location: [facility] CATH LAB;  Service: Cardiology;;    right forefoot amputation      right toe amputation      x2 toes    STENT,  DRUG ELUTING, SINGLE VESSEL, CORONARY  11/22/2023    Procedure: Stent, Drug Eluting, Single Vessel, Coronary;  Surgeon: Checo Bhatt MD;  Location: [facility] CATH LAB;  Service: Cardiology;;    STENT, DRUG ELUTING, SINGLE VESSEL, CORONARY  5/27/2024    Procedure: Stent, Drug Eluting, Single Vessel, Coronary;  Surgeon: Karel Mack MD;  Location: [facility] CATH LAB;  Service: Cardiology;;    STENT, SUPERFICIAL FEMORAL ARTERY Right 8/25/2023    Procedure: STENT, SUPERFICIAL FEMORAL ARTERY;  Surgeon: Gary Rico MD;  [facility];  Service: Vascular;  Laterality: Right;  VIABAHN 6 X 15 X120       Review of patient's allergies indicates:  No Known Allergies    No current facility-administered medications on file prior to encounter.     Current Outpatient Medications on File Prior to Encounter   Medication Sig    albuterol-ipratropium (DUO-NEB) 2.5 mg-0.5 mg/3 mL nebulizer solution Take 3 mLs by nebulization every 6 (six) hours as needed for Wheezing or Shortness of Breath.    aspirin (ECOTRIN) 81 MG EC tablet Take 1 tablet (81 mg total) by mouth once daily.    atorvastatin (LIPITOR) 80 MG tablet Take 1 tablet (80 mg total) by mouth once daily.    budesonide-formoterol 80-4.5 mcg (SYMBICORT) 80-4.5 mcg/actuation HFAA INHALE 2 PUFFS INTO THE LUNGS TWICE DAILY. RINSE MOUTH AFTER USE    clopidogreL (PLAVIX) 75 mg tablet Take 1 tablet (75 mg total) by mouth once daily.    furosemide (LASIX) 20 MG tablet Take 1 tablet (20 mg total) by mouth daily as needed (edema/wt gain). (Patient not taking: Reported on 9/17/2024)    gabapentin (NEURONTIN) 300 MG capsule Take 1 capsule (300 mg total) by mouth 2 (two) times daily.    metoprolol succinate (TOPROL-XL) 25 MG 24 hr tablet Take 1 tablet (25 mg total) by mouth once daily.    nicotine (NICODERM CQ) 14 mg/24 hr Place 1 patch onto the skin once daily. (Patient not taking: Reported on 9/17/2024)    nitroGLYCERIN (NITROSTAT) 0.4 MG SL tablet Place 1  tablet (0.4 mg total) under the tongue every 5 (five) minutes as needed for Chest pain.    traMADoL (ULTRAM) 50 mg tablet Take 1 tablet (50 mg total) by mouth every 8 (eight) hours as needed for Pain.     Family History    None       Tobacco Use    Smoking status: Every Day     Current packs/day: 0.25     Average packs/day: 0.5 packs/day for 61.2 years (30.2 ttl pk-yrs)     Types: Cigarettes     Start date: 7/5/1963    Smokeless tobacco: Never   Substance and Sexual Activity    Alcohol use: No    Drug use: No    Sexual activity: Not Currently     Review of Systems   Cardiovascular:  Positive for chest pain. Negative for leg swelling, near-syncope, orthopnea, paroxysmal nocturnal dyspnea and syncope.   Gastrointestinal:  Negative for nausea and vomiting.     Objective:     Vital Signs (Most Recent):  Temp: 97.9 °F (36.6 °C) (09/29/24 1236)  Pulse: 91 (09/29/24 1232)  Resp: 20 (09/29/24 1233)  BP: (!) 114/52 (09/29/24 1232)  SpO2: 97 % (09/29/24 1232) Vital Signs (24h Range):  Temp:  [97.9 °F (36.6 °C)] 97.9 °F (36.6 °C)  Pulse:  [87-91] 91  Resp:  [20-46] 20  SpO2:  [95 %-97 %] 97 %  BP: (103-114)/(52-53) 114/52     Weight: 50.8 kg (111 lb 15.9 oz)  Body mass index is 21.87 kg/m².    SpO2: 97 %       No intake or output data in the 24 hours ending 09/29/24 1314    Lines/Drains/Airways       Peripheral Intravenous Line  Duration                  Peripheral IV - Single Lumen 09/29/24 1230 20 G Anterior;Distal;Left Upper Arm <1 day         Peripheral IV - Single Lumen 09/29/24 1230 22 G Anterior;Right Forearm <1 day                     Physical Exam  Constitutional:       General: She is in acute distress.      Appearance: She is ill-appearing.   HENT:      Head: Atraumatic.      Right Ear: Tympanic membrane normal.      Left Ear: Tympanic membrane normal.      Nose: Nose normal.      Mouth/Throat:      Mouth: Mucous membranes are dry.   Eyes:      Extraocular Movements: Extraocular movements intact.      Pupils: Pupils  are equal, round, and reactive to light.   Cardiovascular:      Rate and Rhythm: Tachycardia present.      Heart sounds: Normal heart sounds.   Pulmonary:      Effort: Pulmonary effort is normal. No respiratory distress.      Breath sounds: Normal breath sounds.   Abdominal:      Palpations: Abdomen is soft.   Musculoskeletal:         General: No swelling. Normal range of motion.      Cervical back: Normal range of motion.      Comments: R foot dressing in place, tenderness to light palpation, dressing is c/d/i   Skin:     General: Skin is warm and dry.   Neurological:      General: No focal deficit present.      Mental Status: She is alert and oriented to person, place, and time.          Significant Labs: CMP   Recent Labs   Lab 09/29/24  1228      K 3.2*      CO2 18*   *   BUN 18   CREATININE 0.9   CALCIUM 8.6*   PROT 5.8*   ALBUMIN 3.1*   BILITOT 0.7   ALKPHOS 108   AST 23   ALT 67*   ANIONGAP 14   , CBC   Recent Labs   Lab 09/29/24  1228   WBC 9.85   HGB 9.4*   HCT 31.0*      , Troponin   Recent Labs   Lab 09/29/24  1228   TROPONINI 0.058*   , and All pertinent lab results from the last 24 hours have been reviewed.    Significant Imaging: Echocardiogram: Transthoracic echo (TTE) complete (Cupid Only):   Results for orders placed or performed during the hospital encounter of 09/12/24   Echo   Result Value Ref Range    LA Vol (MOD) 14.21 cm3    Left Atrium Major Axis 3.88 cm    Left Atrium Minor Axis 4.27 cm    LV Diastolic Volume 55.76 mL    LV Systolic Volume 20.87 mL    MV Peak A James 1.17 m/s    MV stenosis pressure 1/2 time 34.54 ms    MV Peak E James 0.67 m/s    Ao VTI 28.69 cm    Ao peak james 1.54 m/s    LVOT peak VTI 22.45 cm    LVOT peak james 1.00 m/s    LVOT diameter 1.97 cm    E wave deceleration time 119.10 msec    AV mean gradient 6 mmHg    RV- oswald basal diam 3.4 cm    TAPSE 2.22 cm    LA size 2.49 cm    STJ 2.61 cm    Sinus 2.73 cm    LVIDs 2.43 2.1 - 4.0 cm    PW 0.73 0.6 -  1.1 cm    IVS 1.12 (A) 0.6 - 1.1 cm    LVIDd 3.64 3.5 - 6.0 cm    TDI LATERAL 0.09 m/s    LA WIDTH 2.61 cm    TDI SEPTAL 0.04 m/s    LV LATERAL E/E' RATIO 7.44 m/s    LV SEPTAL E/E' RATIO 16.75 m/s    FS 33 28 - 44 %    LA Vol 22.46 cm3    LV mass 98.14 g    ZLVIDD -1.88     ZLVIDS -0.92     Left Ventricle Relative Wall Thickness 0.40 cm    AV valve area 2.38 cm²    AV Velocity Ratio 0.65     AV index (prosthetic) 0.78     MV valve area p 1/2 method 6.37 cm2    E/A ratio 0.57     Mean e' 0.07 m/s    LVOT area 3.0 cm2    LVOT stroke volume 68.39 cm3    AV peak gradient 9 mmHg    E/E' ratio 10.31 m/s    LV Systolic Volume Index 14.3 mL/m2    LV Diastolic Volume Index 38.19 mL/m2    LEEANNE 15.4 mL/m2    LV Mass Index 67 g/m2    LEEANNE (MOD) 9.7 mL/m2    ADOLFO by Velocity Ratio 1.98 cm²    BSA 1.47 m2    EF 65 %    Est. RA pres 3 mmHg    Narrative      Left Ventricle: The left ventricle is normal in size. Normal wall   thickness. There is normal systolic function. Ejection fraction by visual   approximation is 65%. There is normal diastolic function.    Right Ventricle: Normal right ventricular cavity size. Wall thickness   is normal. Systolic function is normal.    Pulmonary Artery: Pulmonary artery pressure could not be estimated.    IVC/SVC: Normal venous pressure at 3 mmHg.

## 2024-09-29 NOTE — ED TRIAGE NOTES
Radharody Sotelo, a 79 y.o. female presents to the ED w/ complaint of hx of 3 stents. Arrives with chest pain starting at 12pm    Triage note:  Chief Complaint   Patient presents with    Chest Pain     Review of patient's allergies indicates:  No Known Allergies  Past Medical History:   Diagnosis Date    Anemia     Anxiety     COPD (chronic obstructive pulmonary disease)     COPD (chronic obstructive pulmonary disease) with emphysema     Coronary artery disease     Depression     Diverticulitis     Hyperlipidemia     Hypertension     PVD (peripheral vascular disease)     PVD (peripheral vascular disease)     S/P colostomy     Substance abuse     hx heavy etoh use     Tobacco abuse

## 2024-09-29 NOTE — ASSESSMENT & PLAN NOTE
Nutrition consulted. Most recent weight and BMI monitored-     Measurements:  Wt Readings from Last 1 Encounters:   09/29/24 50.8 kg (111 lb 15.9 oz)   Body mass index is 21.87 kg/m².    Patient has been screened and assessed by RD.    Malnutrition Type:  Context  Level    Malnutrition Characteristic Summary:       Interventions/Recommendations (treatment strategy):

## 2024-09-29 NOTE — Clinical Note
Patient moaning and difficult to sedate. Dr Li requesting anesthesia for assistance. Anesthesia called and now is at bedside.

## 2024-09-29 NOTE — ASSESSMENT & PLAN NOTE
Patient presents with chest pain that started morning of 9/29 and came to the ED with ambulance. She reports the pain being similar to the one she had last May. She has a history of stenting in the past and on home ASA, Plavix, but reports missing her Plavix today. EKG shows right inferior STEMI.     - Received Brilinta load, Morphine in the ED   - [facility] _/- PCI, patient is a GALEN candidate on 9/29   - Continue  Anti-platelet Therapy: ASA / Ticagrelor

## 2024-09-29 NOTE — ASSESSMENT & PLAN NOTE
Anemia is likely due to  chronic disease . Most recent hemoglobin and hematocrit are listed below.  Recent Labs     09/29/24  1228   HGB 9.4*   HCT 31.0*     Plan  - Monitor serial CBC: Daily  - Transfuse PRBC if patient becomes hemodynamically unstable, symptomatic or H/H drops below 7/21.  - Patient has not received any PRBC transfusions to date  - Patient's anemia is currently stable

## 2024-09-29 NOTE — NURSING
Pt arrived to CICU from cath lab at 1430. BP stable, tachycardic with ECG showed afib with RVR, satting above 90 on 2L NC. Still experiencing some chest pain but reported it being much better than in the ED pre-procedure. It continued to improve to 0/10. Upon assessment, no pulse was able to be found via doppler or palpation. Team is aware. Pt states people often assess for a pulse and do not find one in her foot. Popliteal is present.

## 2024-09-29 NOTE — PLAN OF CARE
CICU DAILY GOALS       A: Awake    RASS: Goal -    Actual - RASS (Junior Agitation-Sedation Scale): alert and calm   Restraint necessity:  no  B: Breath   SBT: Not intubated   C: Coordinate A & B, analgesics/sedatives   Pain: managed    SAT: Not intubated  D: Delirium   CAM-ICU: Overall CAM-ICU: Negative  E: Early(intubated/ Progressive (non-intubated) Mobility   MOVE Screen: Pass   Activity: Activity Management: Rolling - L1  FAS: Feeding/Nutrition   Diet order: Diet/Nutrition Received: low saturated fat/low cholesterol,   Fluid restriction:     Nutritional Supplement Intake: Quantity 0, Type:  n/a  T: Thrombus   DVT prophylaxis: VTE Required Core Measure: Pharmacological prophylaxis initiated/maintained, Patient refused interventions (refuses SCDs)  H: HOB Elevation   Head of Bed (HOB) Positioning: HOB at 60 degrees (for dinner, 4 hour flat time is up)  U: Ulcer Prophylaxis   GI: yes  G: Glucose control   managed      S: Skin   Nurses Note -- 4 Eyes  Skin assessed during: Admit   Choose one: No: prevention measures documented.    Attending Nurse: Micaela Rasmussen RN  Second RN/Staff Member:  Radha Burciaga RN    B: Bowel Function   no issues   I: Indwelling Catheters   Harris necessity:  no   CVC necessity: No  D: De-escalation Antibx   Yes  Plan for the day   Admit to CICU.   Family/Goals of care/Code Status   Code Status: Full Code     Pt admitted to CICU this after stent placement. Arrived to unit in afib RVR, ECG obtained. CP improving, rated at a 7, then 4, then 0. Tylenol given for headache. Metoprolol given for afib. Pt did experience hypotension shortly after. 500 fluid bolus given on top of her 600 total post cath hydration fluids. Another bolus will be given when the hydration fluids finish per Dr. Hudson's request. Cath site soft, clean, no bleeding. Popliteal pulse is present. Unable to doppler dorsalis pedis or posterior tibialis pulses on right lower extremity. MD Manjarrez called and made aware, see note.  Pt is eating dinner, asymptomatic to hypotension, with no complaints of pain.     Plan of care reviewed with Radha Sotelo, all concerns addressed, plan of care continues.         Problem: Adult Inpatient Plan of Care  Goal: Plan of Care Review  9/29/2024 1841 by Micaela Rasmussen RN  Outcome: Progressing  9/29/2024 1841 by Micaela Rasmussen RN  Outcome: Progressing  Goal: Patient-Specific Goal (Individualized)  9/29/2024 1841 by Micaela Rasmussen RN  Outcome: Progressing  9/29/2024 1841 by Micaela Rasmussen RN  Outcome: Progressing  Goal: Absence of Hospital-Acquired Illness or Injury  9/29/2024 1841 by Micaela Rasmussen RN  Outcome: Progressing  9/29/2024 1841 by Micaela Rasmussen RN  Outcome: Progressing  Goal: Optimal Comfort and Wellbeing  9/29/2024 1841 by Micaela Rasmussen RN  Outcome: Progressing  9/29/2024 1841 by Micaela Rasmussen RN  Outcome: Progressing  Goal: Readiness for Transition of Care  9/29/2024 1841 by Micaela Rasmussen RN  Outcome: Progressing  9/29/2024 1841 by Micaela Rasmussen RN  Outcome: Progressing     Problem: Acute Kidney Injury/Impairment  Goal: Fluid and Electrolyte Balance  9/29/2024 1841 by Micaela Rasmussen RN  Outcome: Progressing  9/29/2024 1841 by Micaela Rasmussen RN  Outcome: Progressing  Goal: Improved Oral Intake  9/29/2024 1841 by Micaela Rasmussen RN  Outcome: Progressing  9/29/2024 1841 by Micaela Rasmussen RN  Outcome: Progressing  Goal: Effective Renal Function  9/29/2024 1841 by Micaela Rasmussen RN  Outcome: Progressing  9/29/2024 1841 by Micaela Rasmussen RN  Outcome: Progressing     Problem: Wound  Goal: Optimal Coping  9/29/2024 1841 by Micaela Rasmussen RN  Outcome: Progressing  9/29/2024 1841 by Micaela Rasmussen RN  Outcome: Progressing  Goal: Optimal Functional Ability  9/29/2024 1841 by Micaela Rasmussen, RN  Outcome: Progressing  9/29/2024 1841 by Micaela Rasmussen, RN  Outcome: Progressing  Goal: Absence of Infection Signs and  Symptoms  9/29/2024 1841 by Micaela Rasmussen RN  Outcome: Progressing  9/29/2024 1841 by Micaela Rasmussen RN  Outcome: Progressing  Goal: Improved Oral Intake  9/29/2024 1841 by Micaela Rasmussen RN  Outcome: Progressing  9/29/2024 1841 by Micaela Rasmussen RN  Outcome: Progressing  Goal: Optimal Pain Control and Function  9/29/2024 1841 by Micaela Rasmussen RN  Outcome: Progressing  9/29/2024 1841 by Micaela Rasmussen RN  Outcome: Progressing  Goal: Skin Health and Integrity  9/29/2024 1841 by Micaela Rasmussen RN  Outcome: Progressing  9/29/2024 1841 by Micaela Rasmussen RN  Outcome: Progressing  Goal: Optimal Wound Healing  9/29/2024 1841 by Micaela Rasmussen RN  Outcome: Progressing  9/29/2024 1841 by Micaela Rasmussen RN  Outcome: Progressing     Problem: Cardiac Catheterization (Diagnostic/Interventional)  Goal: Absence of Bleeding  Outcome: Progressing  Goal: Absence of Contrast-Induced Injury  Outcome: Progressing  Goal: Stable Heart Rate and Rhythm  Outcome: Progressing  Goal: Absence of Embolism Signs and Symptoms  Outcome: Progressing  Goal: Anesthesia/Sedation Recovery  Outcome: Progressing  Goal: Optimal Pain Control and Function  Outcome: Progressing  Goal: Absence of Vascular Access Complication  Outcome: Progressing

## 2024-09-29 NOTE — ASSESSMENT & PLAN NOTE
Stable and chronic diastolic heart failure with preserved EF    - No s/s of fluid overload  - Continue to monitor and duresis with Lasix as needed   - Continue Beta Blocker     Results for orders placed during the hospital encounter of 09/12/24    Echo    Interpretation Summary    Left Ventricle: The left ventricle is normal in size. Normal wall thickness. There is normal systolic function. Ejection fraction by visual approximation is 65%. There is normal diastolic function.    Right Ventricle: Normal right ventricular cavity size. Wall thickness is normal. Systolic function is normal.    Pulmonary Artery: Pulmonary artery pressure could not be estimated.    IVC/SVC: Normal venous pressure at 3 mmHg.

## 2024-09-29 NOTE — ASSESSMENT & PLAN NOTE
--C +/- PCI, patient is a GALEN candidate  - Anti-platelet Therapy: ASA / Ticagrelor  - Access: Right radial  - Catheters: JL3.5/XB RCA guide  - Creatinine/CrCl: 1.2  - Allergies: No shellfish / Iodine allergy  - Pre-Hydration: NS  - Pre-Op Med: Bendaryl 50mg pO   - All patient's questions were answered.  -The risks, benefits and alternatives of the procedure were explained to the patient.   -The risks of coronary angiography include but are not limited to: bleeding, infection, heart rhythm abnormalities, allergic reactions, kidney injury and potential need for dialysis, stroke and death.   - Should stenting be indicated, the patient has agreed to dual anti-platelet therapy for 1-consecutive year with a drug-eluting stent and a minimum of 1-month with the use of a bare metal stent  - Additionally, pt is aware that non-compliance is likely to result in stent clotting with heart attack, heart failure, and/or death  -The risks of moderate sedation include hypotension, respiratory depression, arrhythmias, bronchospasm, and death.   - Informed consent was obtained and the  patient is agreeable to proceed with the procedure.

## 2024-09-29 NOTE — ASSESSMENT & PLAN NOTE
Known PVD , Arterial doppler showing severe PAD RLE, Angiogram on 8/25 with right SFA stent placed    - Continue Aspirin

## 2024-09-29 NOTE — HPI
79-year-old female with past medical history of CAD status post PCI in November 20, 2023 and May 20, 2024 for proximal RCA stenosis presents with chest pain that started at 11:30 a.m..  EKG shows inferior ST segment elevation.  Patient currently denies having any nausea vomiting.

## 2024-09-29 NOTE — ED NOTES
Nurses Note -- 4 Eyes      9/29/2024   12:30 PM      Skin assessed during: Admit      [] No Altered Skin Integrity Present    []Prevention Measures Documented      [x] Yes- Altered Skin Integrity Present or Discovered   [x] LDA Added if Not in Epic (Describe Wound)   [] New Altered Skin Integrity was Present on Admit and Documented in LDA   [] Wound Image Taken    Wound Care Consulted? Yes    Attending Nurse:  Castillo Mcintosh RN/Staff Member:   Delmi

## 2024-09-29 NOTE — HPI
Mrs Sotelo is a 79-year-old female with past medical history of COPD, depression, diverticulitis, HLD, PVD, S/p colostomy, transmetatarsal amputation of right foot, chronic diastolic heart failure, tobacco and heavy alcohol use, and CAD status post PCI in November 20, 2023 and May 20, 2024 for proximal RCA stenosis presents with chest pain that started at 11:30 am on 9/29. EKG shows inferior ST segment elevation in inferior leads. The pain is substernal and constant. She report Chest pain to be 10/10 in the ED. She reports her chest pain being similar to the pain she had back in May. She endorses associated nausea no vomiting. She also has complaints of R foot pain, she had previous metatarsal amputation and has ongoing foot wound. She reports she is supposed to start antibiotics tomorrow.     In the ED: patient received benadryl, morphine, loaded with Brilinta, and had a bedside echo. She was taken to the cath lab since the EKG shows inferior STEMI. POCUS shows inferior wall hypokinesis.

## 2024-09-29 NOTE — ED NOTES
Patient identifiers for Radha Sotelo 79 y.o. female checked and correct.  Chief Complaint   Patient presents with    Chest Pain     Past Medical History:   Diagnosis Date    Anemia     Anxiety     COPD (chronic obstructive pulmonary disease)     COPD (chronic obstructive pulmonary disease) with emphysema     Coronary artery disease     Depression     Diverticulitis     Hyperlipidemia     Hypertension     PVD (peripheral vascular disease)     PVD (peripheral vascular disease)     S/P colostomy     Substance abuse     hx heavy etoh use     Tobacco abuse      Allergies reported: Review of patient's allergies indicates:  No Known Allergies    Appearance: Pt awake, alert & oriented to person, place & time. Pt in no acute distress at present time. Pt is clean and well groomed with clothes appropriately fastened.   Skin: Skin warm, dry & intact. Color consistent with ethnicity. Mucous membranes moist. No breakdown or brusing noted.   Musculoskeletal: Patient moving all extremities well, no obvious swelling or deformities noted.   Respiratory: Respirations spontaneous, even, and non-labored. Visible chest rise noted. Airway is open and patent. No accessory muscle use noted.   Neurologic: Sensation is intact. Speech is clear and appropriate. Eyes open spontaneously, behavior appropriate to situation, follows commands, facial expression symmetrical, bilateral hand grasp equal and even, purposeful motor response noted.  Cardiac: All peripheral pulses present. No Bilateral lower extremity edema. Cap refill is <3 seconds. Left sided chest pain, stemi alert  Abdomen: Abdomen soft, non distended, non tender to palpation.   : Pt voids independently, denies dysuria, hematuria, frequency.

## 2024-09-29 NOTE — ASSESSMENT & PLAN NOTE
Patient's COPD is controlled currently. Mmonitor respiratory status closely.   No evidence of exacerbation  Continue supplemental home O2 as needed   DuoNebs as needed

## 2024-09-29 NOTE — Clinical Note
The catheter was repositioned into the ostium   left main. An angiography was performed of the left coronary arteries. Multiple views were taken. And the catheter was removed CCU room 325

## 2024-09-29 NOTE — H&P
[facility] - Cardiac Intensive Care  Cardiology  History and Physical     Patient Name: Radha Sotelo  MRN: [id number]  Admission Date: 9/29/2024  Code Status: Full Code   Attending Provider: Mackenzie Davis MD   Primary Care Physician: Kalyn Pemberton NP  Principal Problem:STEMI (ST elevation myocardial infarction)    Patient information was obtained from patient, past medical records, and ER records.     Subjective:     Chief Complaint:  chest pain     HPI:  Mrs Sotelo is a 79-year-old female with past medical history of COPD, depression, diverticulitis, HLD, PVD, S/p colostomy, transmetatarsal amputation of right foot, chronic diastolic heart failure, tobacco and heavy alcohol use, and CAD status post PCI in November 20, 2023 and May 20, 2024 for proximal RCA stenosis presents with chest pain that started at 11:30 am on 9/29. EKG shows inferior ST segment elevation in inferior leads. The pain is substernal and constant. She report Chest pain to be 10/10 in the ED. She reports her chest pain being similar to the pain she had back in May. She endorses associated nausea no vomiting. She also has complaints of R foot pain, she had previous metatarsal amputation and has ongoing foot wound. She reports she is supposed to start antibiotics tomorrow.     In the ED: patient received benadryl, morphine, loaded with Brilinta, and had a bedside echo. She was taken to the cath lab since the EKG shows inferior STEMI. POCUS shows inferior wall hypokinesis.           Past Medical History:   Diagnosis Date    Anemia     Anxiety     COPD (chronic obstructive pulmonary disease)     COPD (chronic obstructive pulmonary disease) with emphysema     Coronary artery disease     Depression     Diverticulitis     Hyperlipidemia     Hypertension     PVD (peripheral vascular disease)     PVD (peripheral vascular disease)     S/P colostomy     Substance abuse     hx heavy etoh use     Tobacco abuse        Past Surgical History:    Procedure Laterality Date    ANGIOGRAM, CORONARY, WITH LEFT HEART CATHETERIZATION N/A 11/22/2023    Procedure: Angiogram, Coronary, with Left Heart Cath;  Surgeon: Checo Bhatt MD;  Location: [facility] CATH LAB;  Service: Cardiology;  Laterality: N/A;    ANGIOGRAM, CORONARY, WITH LEFT HEART CATHETERIZATION N/A 5/27/2024    Procedure: Angiogram, Coronary, with Left Heart Cath;  Surgeon: Karel Mack MD;  Location: [facility] CATH LAB;  Service: Cardiology;  Laterality: N/A;    ANGIOGRAM, EXTREMITY, UNILATERAL Right 8/25/2023    Procedure: ANGIOGRAM, EXTREMITY, UNILATERAL;  Surgeon: Gary Rico MD;  Location: [facility] OR [address] [place] [address];  Service: Vascular;  Laterality: Right;  US GUIDED ACCESS LEFT GROIN  contrast: 150ml  fluoro: 27.9 min  mGy: 254.73  Gycm2: 62.4919  radial flush cocktail: 10ml    ANGIOGRAPHY OF LOWER EXTREMITY Right 8/24/2023    Procedure: Angiogram Extremity Unilateral;  Surgeon: Benji Ashley MD;  [facility];  Service: Vascular;  Laterality: Right;    COLOSTOMY      ECTOPIC PREGNANCY SURGERY      PTA, PERONEAL Right 8/25/2023    Procedure: PTA, PERONEAL TIBIAL TRUNK WITH SHOCKWAVE;  Surgeon: Gary Rioc MD;  [facility];  Service: Vascular;  Laterality: Right;    PTCA, SINGLE VESSEL  11/22/2023    Procedure: PTCA, Single Vessel;  Surgeon: Checo Bhatt MD;  Location: [facility] CATH LAB;  Service: Cardiology;;    right forefoot amputation      right toe amputation      x2 toes    STENT, DRUG ELUTING, SINGLE VESSEL, CORONARY  11/22/2023    Procedure: Stent, Drug Eluting, Single Vessel, Coronary;  Surgeon: Checo Bhatt MD;  Location: [facility] CATH LAB;  Service: Cardiology;;    STENT, DRUG ELUTING, SINGLE VESSEL, CORONARY  5/27/2024    Procedure: Stent, Drug Eluting, Single Vessel, Coronary;  Surgeon: Karel Mack MD;  Location: [facility] CATH LAB;  Service: Cardiology;;    STENT, SUPERFICIAL FEMORAL ARTERY Right 8/25/2023     Procedure: STENT, SUPERFICIAL FEMORAL ARTERY;  Surgeon: Gary Rico MD;  Location: [facility] OR [address] [place] [address];  Service: Vascular;  Laterality: Right;  VIABAHN 6 X 15 X120       Review of patient's allergies indicates:  No Known Allergies    No current facility-administered medications on file prior to encounter.     Current Outpatient Medications on File Prior to Encounter   Medication Sig    albuterol-ipratropium (DUO-NEB) 2.5 mg-0.5 mg/3 mL nebulizer solution Take 3 mLs by nebulization every 6 (six) hours as needed for Wheezing or Shortness of Breath.    aspirin (ECOTRIN) 81 MG EC tablet Take 1 tablet (81 mg total) by mouth once daily.    atorvastatin (LIPITOR) 80 MG tablet Take 1 tablet (80 mg total) by mouth once daily.    budesonide-formoterol 80-4.5 mcg (SYMBICORT) 80-4.5 mcg/actuation HFAA INHALE 2 PUFFS INTO THE LUNGS TWICE DAILY. RINSE MOUTH AFTER USE    clopidogreL (PLAVIX) 75 mg tablet Take 1 tablet (75 mg total) by mouth once daily.    furosemide (LASIX) 20 MG tablet Take 1 tablet (20 mg total) by mouth daily as needed (edema/wt gain). (Patient not taking: Reported on 9/17/2024)    gabapentin (NEURONTIN) 300 MG capsule Take 1 capsule (300 mg total) by mouth 2 (two) times daily.    metoprolol succinate (TOPROL-XL) 25 MG 24 hr tablet Take 1 tablet (25 mg total) by mouth once daily.    nicotine (NICODERM CQ) 14 mg/24 hr Place 1 patch onto the skin once daily. (Patient not taking: Reported on 9/17/2024)    nitroGLYCERIN (NITROSTAT) 0.4 MG SL tablet Place 1 tablet (0.4 mg total) under the tongue every 5 (five) minutes as needed for Chest pain.    traMADoL (ULTRAM) 50 mg tablet Take 1 tablet (50 mg total) by mouth every 8 (eight) hours as needed for Pain.     Family History    None       Tobacco Use    Smoking status: Every Day     Current packs/day: 0.25     Average packs/day: 0.5 packs/day for 61.2 years (30.2 ttl pk-yrs)     Types: Cigarettes     Start date: 7/5/1963    Smokeless tobacco: Never    Substance and Sexual Activity    Alcohol use: No    Drug use: No    Sexual activity: Not Currently     Review of Systems   Cardiovascular:  Positive for chest pain. Negative for leg swelling, near-syncope, orthopnea, paroxysmal nocturnal dyspnea and syncope.   Gastrointestinal:  Negative for nausea and vomiting.     Objective:     Vital Signs (Most Recent):  Temp: 97.9 °F (36.6 °C) (09/29/24 1236)  Pulse: 91 (09/29/24 1232)  Resp: 20 (09/29/24 1233)  BP: (!) 114/52 (09/29/24 1232)  SpO2: 97 % (09/29/24 1232) Vital Signs (24h Range):  Temp:  [97.9 °F (36.6 °C)] 97.9 °F (36.6 °C)  Pulse:  [87-91] 91  Resp:  [20-46] 20  SpO2:  [95 %-97 %] 97 %  BP: (103-114)/(52-53) 114/52     Weight: 50.8 kg (111 lb 15.9 oz)  Body mass index is 21.87 kg/m².    SpO2: 97 %       No intake or output data in the 24 hours ending 09/29/24 1314    Lines/Drains/Airways       Peripheral Intravenous Line  Duration                  Peripheral IV - Single Lumen 09/29/24 1230 20 G Anterior;Distal;Left Upper Arm <1 day         Peripheral IV - Single Lumen 09/29/24 1230 22 G Anterior;Right Forearm <1 day                     Physical Exam  Constitutional:       General: She is in acute distress.      Appearance: She is ill-appearing.   HENT:      Head: Atraumatic.      Right Ear: Tympanic membrane normal.      Left Ear: Tympanic membrane normal.      Nose: Nose normal.      Mouth/Throat:      Mouth: Mucous membranes are dry.   Eyes:      Extraocular Movements: Extraocular movements intact.      Pupils: Pupils are equal, round, and reactive to light.   Cardiovascular:      Rate and Rhythm: Tachycardia present.      Heart sounds: Normal heart sounds.   Pulmonary:      Effort: Pulmonary effort is normal. No respiratory distress.      Breath sounds: Normal breath sounds.   Abdominal:      Palpations: Abdomen is soft.   Musculoskeletal:         General: No swelling. Normal range of motion.      Cervical back: Normal range of motion.      Comments: JANETH  foot dressing in place, tenderness to light palpation, dressing is c/d/i   Skin:     General: Skin is warm and dry.   Neurological:      General: No focal deficit present.      Mental Status: She is alert and oriented to person, place, and time.          Significant Labs: CMP   Recent Labs   Lab 09/29/24  1228      K 3.2*      CO2 18*   *   BUN 18   CREATININE 0.9   CALCIUM 8.6*   PROT 5.8*   ALBUMIN 3.1*   BILITOT 0.7   ALKPHOS 108   AST 23   ALT 67*   ANIONGAP 14   , CBC   Recent Labs   Lab 09/29/24  1228   WBC 9.85   HGB 9.4*   HCT 31.0*      , Troponin   Recent Labs   Lab 09/29/24  1228   TROPONINI 0.058*   , and All pertinent lab results from the last 24 hours have been reviewed.    Significant Imaging: Echocardiogram: Transthoracic echo (TTE) complete (Cupid Only):   Results for orders placed or performed during the hospital encounter of 09/12/24   Echo   Result Value Ref Range    LA Vol (MOD) 14.21 cm3    Left Atrium Major Axis 3.88 cm    Left Atrium Minor Axis 4.27 cm    LV Diastolic Volume 55.76 mL    LV Systolic Volume 20.87 mL    MV Peak A James 1.17 m/s    MV stenosis pressure 1/2 time 34.54 ms    MV Peak E James 0.67 m/s    Ao VTI 28.69 cm    Ao peak james 1.54 m/s    LVOT peak VTI 22.45 cm    LVOT peak james 1.00 m/s    LVOT diameter 1.97 cm    E wave deceleration time 119.10 msec    AV mean gradient 6 mmHg    RV- oswald basal diam 3.4 cm    TAPSE 2.22 cm    LA size 2.49 cm    STJ 2.61 cm    Sinus 2.73 cm    LVIDs 2.43 2.1 - 4.0 cm    PW 0.73 0.6 - 1.1 cm    IVS 1.12 (A) 0.6 - 1.1 cm    LVIDd 3.64 3.5 - 6.0 cm    TDI LATERAL 0.09 m/s    LA WIDTH 2.61 cm    TDI SEPTAL 0.04 m/s    LV LATERAL E/E' RATIO 7.44 m/s    LV SEPTAL E/E' RATIO 16.75 m/s    FS 33 28 - 44 %    LA Vol 22.46 cm3    LV mass 98.14 g    ZLVIDD -1.88     ZLVIDS -0.92     Left Ventricle Relative Wall Thickness 0.40 cm    AV valve area 2.38 cm²    AV Velocity Ratio 0.65     AV index (prosthetic) 0.78     MV valve area p 1/2  method 6.37 cm2    E/A ratio 0.57     Mean e' 0.07 m/s    LVOT area 3.0 cm2    LVOT stroke volume 68.39 cm3    AV peak gradient 9 mmHg    E/E' ratio 10.31 m/s    LV Systolic Volume Index 14.3 mL/m2    LV Diastolic Volume Index 38.19 mL/m2    LEEANNE 15.4 mL/m2    LV Mass Index 67 g/m2    LEEANNE (MOD) 9.7 mL/m2    ADOLFO by Velocity Ratio 1.98 cm²    BSA 1.47 m2    EF 65 %    Est. RA pres 3 mmHg    Narrative      Left Ventricle: The left ventricle is normal in size. Normal wall   thickness. There is normal systolic function. Ejection fraction by visual   approximation is 65%. There is normal diastolic function.    Right Ventricle: Normal right ventricular cavity size. Wall thickness   is normal. Systolic function is normal.    Pulmonary Artery: Pulmonary artery pressure could not be estimated.    IVC/SVC: Normal venous pressure at 3 mmHg.       Assessment and Plan:     * STEMI (ST elevation myocardial infarction)  Patient presents with chest pain that started morning of 9/29 and came to the ED with ambulance. She reports the pain being similar to the one she had last May. She has a history of stenting in the past and on home ASA, Plavix, but reports missing her Plavix today. EKG shows right inferior STEMI.     - Received Brilinta load, Morphine in the ED   - LHC _/- PCI, patient is a GALEN candidate on 9/29   - Continue  Anti-platelet Therapy: ASA / Ticagrelor         Anemia  Anemia is likely due to  chronic disease . Most recent hemoglobin and hematocrit are listed below.  Recent Labs     09/29/24  1228   HGB 9.4*   HCT 31.0*     Plan  - Monitor serial CBC: Daily  - Transfuse PRBC if patient becomes hemodynamically unstable, symptomatic or H/H drops below 7/21.  - Patient has not received any PRBC transfusions to date  - Patient's anemia is currently stable    Peripheral arterial disease  Known PAD with multiple RLE procedures  Previous NICCI results indicative of b/l critical limb ischemia and vascular surgery was following    Continue Antiplatelet therapy       Chronic diastolic heart failure  Stable and chronic diastolic heart failure with preserved EF    - No s/s of fluid overload  - Continue to monitor and duresis with Lasix as needed   - Continue Beta Blocker     Results for orders placed during the hospital encounter of 09/12/24    Echo    Interpretation Summary    Left Ventricle: The left ventricle is normal in size. Normal wall thickness. There is normal systolic function. Ejection fraction by visual approximation is 65%. There is normal diastolic function.    Right Ventricle: Normal right ventricular cavity size. Wall thickness is normal. Systolic function is normal.    Pulmonary Artery: Pulmonary artery pressure could not be estimated.    IVC/SVC: Normal venous pressure at 3 mmHg.      Soft tissue infection of R foot  S/p PAD with gangrene resulting in partial amputation of right foot, patient reports taking antibiotics previously.          Partial nontraumatic amputation of right foot  Chronic, [facility] by [facility]  Wound care consulted       HLD (hyperlipidemia)    - continue home meds: atorvastatin 80 mg QD       COPD (chronic obstructive pulmonary disease)  Patient's COPD is controlled currently. Mmonitor respiratory status closely.   No evidence of exacerbation  Continue supplemental home O2 as needed   DuoNebs as needed       CAD (coronary artery disease)  Patient with known CAD s/p stent placement and CABG. Multi vessel CAD. Recent cath with stent placement 05/27.  Will continue ASA, Plavix, and Statin and monitor for S/Sx of angina/ACS. Continue to monitor on telemetry.         Peripheral vascular disease, unspecified  Known PVD , Arterial doppler showing severe PAD RLE, Angiogram on 8/25 with right SFA stent placed    - Continue Aspirin    Unspecified protein-calorie malnutrition  Nutrition consulted. Most recent weight and BMI monitored-     Measurements:  Wt Readings from Last 1 Encounters:   09/29/24 50.8 kg (111  lb 15.9 oz)   Body mass index is 21.87 kg/m².    Patient has been screened and assessed by RD.    Malnutrition Type:  Context  Level    Malnutrition Characteristic Summary:       Interventions/Recommendations (treatment strategy):             VTE Risk Mitigation (From admission, onward)           Ordered     Place sequential compression device  Until discontinued         09/29/24 1302     IP VTE HIGH RISK PATIENT  Once         09/29/24 1302                    Bethany Oneil MD  Cardiology   [facility] - Cardiac Intensive Care

## 2024-09-30 ENCOUNTER — TELEPHONE (OUTPATIENT)
Dept: CARDIAC REHAB | Facility: CLINIC | Age: 79
End: 2024-09-30
Payer: MEDICARE

## 2024-09-30 PROBLEM — I48.91 AFIB: Status: ACTIVE | Noted: 2024-09-30

## 2024-09-30 LAB
ALBUMIN SERPL BCP-MCNC: 2.9 G/DL (ref 3.5–5.2)
ALP SERPL-CCNC: 99 U/L (ref 55–135)
ALT SERPL W/O P-5'-P-CCNC: 55 U/L (ref 10–44)
ANION GAP SERPL CALC-SCNC: 6 MMOL/L (ref 8–16)
ASCENDING AORTA: 3.24 CM
AST SERPL-CCNC: 28 U/L (ref 10–40)
AV AREA BY CONTINUOUS VTI: 2 CM2
AV INDEX (PROSTH): 0.59
AV LVOT MEAN GRADIENT: 1 MMHG
AV LVOT PEAK GRADIENT: 3 MMHG
AV MEAN GRADIENT: 4.9 MMHG
AV PEAK GRADIENT: 9 MMHG
AV VALVE AREA BY VELOCITY RATIO: 2.1 CM²
AV VALVE AREA: 2 CM2
AV VELOCITY RATIO: 0.6
BASOPHILS # BLD AUTO: 0.06 K/UL (ref 0–0.2)
BASOPHILS NFR BLD: 0.7 % (ref 0–1.9)
BILIRUB SERPL-MCNC: 0.5 MG/DL (ref 0.1–1)
BILIRUB UR QL STRIP: NEGATIVE
BSA FOR ECHO PROCEDURE: 1.43 M2
BUN SERPL-MCNC: 18 MG/DL (ref 8–23)
CALCIUM SERPL-MCNC: 8.2 MG/DL (ref 8.7–10.5)
CHLORIDE SERPL-SCNC: 110 MMOL/L (ref 95–110)
CLARITY UR REFRACT.AUTO: CLEAR
CO2 SERPL-SCNC: 23 MMOL/L (ref 23–29)
COLOR UR AUTO: COLORLESS
CREAT SERPL-MCNC: 1.1 MG/DL (ref 0.5–1.4)
CV ECHO LV RWT: 0.43 CM
DIFFERENTIAL METHOD BLD: ABNORMAL
DOP CALC AO PEAK VEL: 1.5 M/S
DOP CALC AO VTI: 30.4 CM
DOP CALC LVOT AREA: 3.5 CM2
DOP CALC LVOT DIAMETER: 2.1 CM
DOP CALC LVOT PEAK VEL: 0.9 M/S
DOP CALC LVOT STROKE VOLUME: 62.3 CM3
DOP CALCLVOT PEAK VEL VTI: 18 CM
E WAVE DECELERATION TIME: 147.11 MS
E/A RATIO: 0.97
E/E' RATIO: 12.67 M/S
ECHO EF ESTIMATED: 48 %
ECHO LV POSTERIOR WALL: 1 CM (ref 0.6–1.1)
EOSINOPHIL # BLD AUTO: 0.2 K/UL (ref 0–0.5)
EOSINOPHIL NFR BLD: 2.7 % (ref 0–8)
ERYTHROCYTE [DISTWIDTH] IN BLOOD BY AUTOMATED COUNT: 14.6 % (ref 11.5–14.5)
EST. GFR  (NO RACE VARIABLE): 51.4 ML/MIN/1.73 M^2
FRACTIONAL SHORTENING: 23.4 % (ref 28–44)
GLUCOSE SERPL-MCNC: 89 MG/DL (ref 70–110)
GLUCOSE UR QL STRIP: NEGATIVE
HCT VFR BLD AUTO: 30.4 % (ref 37–48.5)
HGB BLD-MCNC: 8.8 G/DL (ref 12–16)
HGB UR QL STRIP: ABNORMAL
HYALINE CASTS UR QL AUTO: 5 /LPF
IMM GRANULOCYTES # BLD AUTO: 0.03 K/UL (ref 0–0.04)
IMM GRANULOCYTES NFR BLD AUTO: 0.4 % (ref 0–0.5)
INTERVENTRICULAR SEPTUM: 0.9 CM (ref 0.6–1.1)
KETONES UR QL STRIP: NEGATIVE
LA MAJOR: 4.6 CM
LA MINOR: 5.01 CM
LA WIDTH: 3.93 CM
LEFT ATRIUM SIZE: 3.87 CM
LEFT ATRIUM VOLUME INDEX MOD: 48 ML/M2
LEFT ATRIUM VOLUME INDEX: 43.7 ML/M2
LEFT ATRIUM VOLUME MOD: 68.17 ML
LEFT ATRIUM VOLUME: 62 CM3
LEFT INTERNAL DIMENSION IN SYSTOLE: 3.6 CM (ref 2.1–4)
LEFT VENTRICLE DIASTOLIC VOLUME INDEX: 72.35 ML/M2
LEFT VENTRICLE DIASTOLIC VOLUME: 102.74 ML
LEFT VENTRICLE MASS INDEX: 108 G/M2
LEFT VENTRICLE SYSTOLIC VOLUME INDEX: 37.3 ML/M2
LEFT VENTRICLE SYSTOLIC VOLUME: 53.03 ML
LEFT VENTRICULAR INTERNAL DIMENSION IN DIASTOLE: 4.7 CM (ref 3.5–6)
LEFT VENTRICULAR MASS: 153.4 G
LEUKOCYTE ESTERASE UR QL STRIP: NEGATIVE
LV LATERAL E/E' RATIO: 9.5
LV SEPTAL E/E' RATIO: 19
LYMPHOCYTES # BLD AUTO: 0.8 K/UL (ref 1–4.8)
LYMPHOCYTES NFR BLD: 9.3 % (ref 18–48)
MAGNESIUM SERPL-MCNC: 2.1 MG/DL (ref 1.6–2.6)
MCH RBC QN AUTO: 28.5 PG (ref 27–31)
MCHC RBC AUTO-ENTMCNC: 28.9 G/DL (ref 32–36)
MCV RBC AUTO: 98 FL (ref 82–98)
MICROSCOPIC COMMENT: ABNORMAL
MONOCYTES # BLD AUTO: 0.6 K/UL (ref 0.3–1)
MONOCYTES NFR BLD: 7.8 % (ref 4–15)
MV PEAK A VEL: 0.98 M/S
MV PEAK E VEL: 0.95 M/S
NEUTROPHILS # BLD AUTO: 6.4 K/UL (ref 1.8–7.7)
NEUTROPHILS NFR BLD: 79.1 % (ref 38–73)
NITRITE UR QL STRIP: NEGATIVE
NON-SQ EPI CELLS #/AREA URNS AUTO: 0 /HPF
NRBC BLD-RTO: 0 /100 WBC
OHS CV RV/LV RATIO: 0.77 CM
OHS QRS DURATION: 78 MS
OHS QRS DURATION: 84 MS
OHS QTC CALCULATION: 439 MS
OHS QTC CALCULATION: 492 MS
PH UR STRIP: 6 [PH] (ref 5–8)
PHOSPHATE SERPL-MCNC: 3.8 MG/DL (ref 2.7–4.5)
PLATELET # BLD AUTO: 213 K/UL (ref 150–450)
PMV BLD AUTO: 10.4 FL (ref 9.2–12.9)
POC ACTIVATED CLOTTING TIME K: 244 SEC (ref 74–137)
POTASSIUM SERPL-SCNC: 4.2 MMOL/L (ref 3.5–5.1)
PROT SERPL-MCNC: 5.4 G/DL (ref 6–8.4)
PROT UR QL STRIP: NEGATIVE
RA MAJOR: 4.51 CM
RA PRESSURE ESTIMATED: 3 MMHG
RA WIDTH: 3.46 CM
RBC # BLD AUTO: 3.09 M/UL (ref 4–5.4)
RBC #/AREA URNS AUTO: 1 /HPF (ref 0–4)
RIGHT VENTRICLE DIASTOLIC BASEL DIMENSION: 3.6 CM
SAMPLE: ABNORMAL
SINUS: 2.86 CM
SODIUM SERPL-SCNC: 139 MMOL/L (ref 136–145)
SP GR UR STRIP: >1.03 (ref 1–1.03)
SQUAMOUS #/AREA URNS AUTO: 0 /HPF
STJ: 2.45 CM
TDI LATERAL: 0.1 M/S
TDI SEPTAL: 0.05 M/S
TDI: 0.08 M/S
TRICUSPID ANNULAR PLANE SYSTOLIC EXCURSION: 1.15 CM
URN SPEC COLLECT METH UR: ABNORMAL
WBC # BLD AUTO: 8.1 K/UL (ref 3.9–12.7)
WBC #/AREA URNS AUTO: 1 /HPF (ref 0–5)
Z-SCORE OF LEFT VENTRICULAR DIMENSION IN END DIASTOLE: 0.75
Z-SCORE OF LEFT VENTRICULAR DIMENSION IN END SYSTOLE: 2.25

## 2024-09-30 PROCEDURE — 20600001 HC STEP DOWN PRIVATE ROOM

## 2024-09-30 PROCEDURE — 80053 COMPREHEN METABOLIC PANEL: CPT

## 2024-09-30 PROCEDURE — 63600175 PHARM REV CODE 636 W HCPCS

## 2024-09-30 PROCEDURE — 86922 COMPATIBILITY TEST ANTIGLOB: CPT

## 2024-09-30 PROCEDURE — 81001 URINALYSIS AUTO W/SCOPE: CPT | Performed by: EMERGENCY MEDICINE

## 2024-09-30 PROCEDURE — 25000003 PHARM REV CODE 250

## 2024-09-30 PROCEDURE — 99900035 HC TECH TIME PER 15 MIN (STAT)

## 2024-09-30 PROCEDURE — 25000242 PHARM REV CODE 250 ALT 637 W/ HCPCS: Performed by: STUDENT IN AN ORGANIZED HEALTH CARE EDUCATION/TRAINING PROGRAM

## 2024-09-30 PROCEDURE — 25500020 PHARM REV CODE 255: Performed by: INTERNAL MEDICINE

## 2024-09-30 PROCEDURE — 99233 SBSQ HOSP IP/OBS HIGH 50: CPT | Mod: ,,, | Performed by: PODIATRIST

## 2024-09-30 PROCEDURE — 94761 N-INVAS EAR/PLS OXIMETRY MLT: CPT

## 2024-09-30 PROCEDURE — 25000003 PHARM REV CODE 250: Performed by: HOSPITALIST

## 2024-09-30 PROCEDURE — 83735 ASSAY OF MAGNESIUM: CPT

## 2024-09-30 PROCEDURE — 85025 COMPLETE CBC W/AUTO DIFF WBC: CPT

## 2024-09-30 PROCEDURE — 84100 ASSAY OF PHOSPHORUS: CPT

## 2024-09-30 PROCEDURE — 94640 AIRWAY INHALATION TREATMENT: CPT

## 2024-09-30 PROCEDURE — 27000221 HC OXYGEN, UP TO 24 HOURS

## 2024-09-30 PROCEDURE — 99231 SBSQ HOSP IP/OBS SF/LOW 25: CPT | Mod: GC,,, | Performed by: INTERNAL MEDICINE

## 2024-09-30 RX ORDER — LIDOCAINE HYDROCHLORIDE 40 MG/ML
4 SOLUTION TOPICAL
Status: DISCONTINUED | OUTPATIENT
Start: 2024-09-30 | End: 2024-10-01 | Stop reason: HOSPADM

## 2024-09-30 RX ORDER — IPRATROPIUM BROMIDE AND ALBUTEROL SULFATE 2.5; .5 MG/3ML; MG/3ML
3 SOLUTION RESPIRATORY (INHALATION) EVERY 4 HOURS
Status: DISCONTINUED | OUTPATIENT
Start: 2024-09-30 | End: 2024-10-01 | Stop reason: HOSPADM

## 2024-09-30 RX ORDER — POTASSIUM CHLORIDE 20 MEQ/1
40 TABLET, EXTENDED RELEASE ORAL ONCE
Status: DISCONTINUED | OUTPATIENT
Start: 2024-09-30 | End: 2024-09-30

## 2024-09-30 RX ORDER — HYDROCODONE BITARTRATE AND ACETAMINOPHEN 500; 5 MG/1; MG/1
TABLET ORAL
Status: DISCONTINUED | OUTPATIENT
Start: 2024-09-30 | End: 2024-09-30

## 2024-09-30 RX ORDER — METOPROLOL SUCCINATE 25 MG/1
25 TABLET, EXTENDED RELEASE ORAL DAILY
Status: DISCONTINUED | OUTPATIENT
Start: 2024-09-30 | End: 2024-10-01 | Stop reason: HOSPADM

## 2024-09-30 RX ORDER — ENOXAPARIN SODIUM 100 MG/ML
30 INJECTION SUBCUTANEOUS EVERY 24 HOURS
Status: DISCONTINUED | OUTPATIENT
Start: 2024-09-30 | End: 2024-10-01 | Stop reason: HOSPADM

## 2024-09-30 RX ADMIN — IPRATROPIUM BROMIDE AND ALBUTEROL SULFATE 3 ML: 2.5; .5 SOLUTION RESPIRATORY (INHALATION) at 04:09

## 2024-09-30 RX ADMIN — HUMAN ALBUMIN MICROSPHERES AND PERFLUTREN 0.11 MG: 10; .22 INJECTION, SOLUTION INTRAVENOUS at 09:09

## 2024-09-30 RX ADMIN — ATORVASTATIN CALCIUM 80 MG: 40 TABLET, FILM COATED ORAL at 08:09

## 2024-09-30 RX ADMIN — ENOXAPARIN SODIUM 30 MG: 30 INJECTION SUBCUTANEOUS at 04:09

## 2024-09-30 RX ADMIN — GABAPENTIN 300 MG: 300 CAPSULE ORAL at 08:09

## 2024-09-30 RX ADMIN — TICAGRELOR 90 MG: 90 TABLET ORAL at 08:09

## 2024-09-30 RX ADMIN — IPRATROPIUM BROMIDE AND ALBUTEROL SULFATE 3 ML: 2.5; .5 SOLUTION RESPIRATORY (INHALATION) at 09:09

## 2024-09-30 RX ADMIN — METOPROLOL SUCCINATE 25 MG: 25 TABLET, EXTENDED RELEASE ORAL at 12:09

## 2024-09-30 RX ADMIN — ASPIRIN 81 MG: 81 TABLET, COATED ORAL at 08:09

## 2024-09-30 RX ADMIN — MORPHINE SULFATE 2 MG: 2 INJECTION, SOLUTION INTRAMUSCULAR; INTRAVENOUS at 01:09

## 2024-09-30 RX ADMIN — MORPHINE SULFATE 2 MG: 2 INJECTION, SOLUTION INTRAMUSCULAR; INTRAVENOUS at 09:09

## 2024-09-30 RX ADMIN — ACETAMINOPHEN 650 MG: 325 TABLET ORAL at 12:09

## 2024-09-30 RX ADMIN — IPRATROPIUM BROMIDE AND ALBUTEROL SULFATE 3 ML: 2.5; .5 SOLUTION RESPIRATORY (INHALATION) at 12:09

## 2024-09-30 RX ADMIN — MORPHINE SULFATE 2 MG: 2 INJECTION, SOLUTION INTRAMUSCULAR; INTRAVENOUS at 03:09

## 2024-09-30 NOTE — SUBJECTIVE & OBJECTIVE
Interval History: NAEON, patient reports SOB and would like to have a breathing treatment. She reports her chest pain has improved compared to yesterday.     Review of Systems   Cardiovascular:  Positive for chest pain. Negative for leg swelling, near-syncope, orthopnea, paroxysmal nocturnal dyspnea and syncope.   Gastrointestinal:  Negative for nausea and vomiting.     Objective:     Vital Signs (Most Recent):  Temp: 98.6 °F (37 °C) (09/30/24 0400)  Pulse: 81 (09/30/24 0800)  Resp: (!) 26 (09/30/24 0800)  BP: 107/74 (09/30/24 0800)  SpO2: 100 % (09/30/24 0800) Vital Signs (24h Range):  Temp:  [97.4 °F (36.3 °C)-98.6 °F (37 °C)] 98.6 °F (37 °C)  Pulse:  [] 81  Resp:  [12-46] 26  SpO2:  [91 %-100 %] 100 %  BP: ()/() 107/74     Weight: 48.1 kg (106 lb)  Body mass index is 20.7 kg/m².     SpO2: 100 %         Intake/Output Summary (Last 24 hours) at 9/30/2024 0835  Last data filed at 9/30/2024 0600  Gross per 24 hour   Intake 1858.27 ml   Output 1450 ml   Net 408.27 ml       Lines/Drains/Airways       Drain  Duration                  Colostomy 07/13/21 0201 Descending/sigmoid LLQ 1175 days              Peripheral Intravenous Line  Duration                  Peripheral IV - Single Lumen 09/29/24 1230 20 G Anterior;Distal;Left Upper Arm <1 day         Peripheral IV - Single Lumen 09/29/24 1230 22 G Anterior;Right Forearm <1 day                       Physical Exam  Constitutional:       General: She is not in acute distress.     Appearance: She is underweight. She is ill-appearing.   HENT:      Head: Atraumatic.      Right Ear: Tympanic membrane normal.      Left Ear: Tympanic membrane normal.      Nose: Nose normal.      Mouth/Throat:      Mouth: Mucous membranes are dry.   Eyes:      Extraocular Movements: Extraocular movements intact.      Pupils: Pupils are equal, round, and reactive to light.   Cardiovascular:      Rate and Rhythm: Normal rate and regular rhythm.      Heart sounds: Normal heart  sounds.   Pulmonary:      Effort: Pulmonary effort is normal. No respiratory distress.      Breath sounds: Wheezing present.   Abdominal:      Palpations: Abdomen is soft.   Musculoskeletal:         General: No swelling. Normal range of motion.      Cervical back: Normal range of motion.      Right lower leg: No edema.      Left lower leg: No edema.      Comments: R foot dressing in place, tenderness to light palpation, dressing is c/d/i   Skin:     General: Skin is warm.   Neurological:      General: No focal deficit present.      Mental Status: She is alert and oriented to person, place, and time.            Significant Labs: CMP   Recent Labs   Lab 09/29/24  1454 09/29/24  1953 09/30/24  0320   * 137 139   K 3.1* 3.9 4.2    107 110   CO2 23 22* 23   * 93 89   BUN 18 17 18   CREATININE 0.9 1.0 1.1   CALCIUM 8.6* 8.1* 8.2*   PROT 5.7* 4.9* 5.4*   ALBUMIN 3.1* 2.7* 2.9*   BILITOT 0.7 0.4 0.5   ALKPHOS 108 92 99   AST 23 21 28   ALT 68* 54* 55*   ANIONGAP 9 8 6*   , CBC   Recent Labs   Lab 09/29/24  1228 09/29/24  1454 09/30/24  0320   WBC 9.85 10.83 8.10   HGB 9.4* 9.3* 8.8*   HCT 31.0* 30.4* 30.4*    220 213   , and All pertinent lab results from the last 24 hours have been reviewed.    Significant Imaging: Echocardiogram: Transthoracic echo (TTE) complete (Cupid Only):   Results for orders placed or performed during the hospital encounter of 09/12/24   Echo   Result Value Ref Range    LA Vol (MOD) 14.21 cm3    Left Atrium Major Axis 3.88 cm    Left Atrium Minor Axis 4.27 cm    LV Diastolic Volume 55.76 mL    LV Systolic Volume 20.87 mL    MV Peak A James 1.17 m/s    MV stenosis pressure 1/2 time 34.54 ms    MV Peak E James 0.67 m/s    Ao VTI 28.69 cm    Ao peak james 1.54 m/s    LVOT peak VTI 22.45 cm    LVOT peak james 1.00 m/s    LVOT diameter 1.97 cm    E wave deceleration time 119.10 msec    AV mean gradient 6 mmHg    RV- oswald basal diam 3.4 cm    TAPSE 2.22 cm    LA size 2.49 cm    STJ 2.61  cm    Sinus 2.73 cm    LVIDs 2.43 2.1 - 4.0 cm    PW 0.73 0.6 - 1.1 cm    IVS 1.12 (A) 0.6 - 1.1 cm    LVIDd 3.64 3.5 - 6.0 cm    TDI LATERAL 0.09 m/s    LA WIDTH 2.61 cm    TDI SEPTAL 0.04 m/s    LV LATERAL E/E' RATIO 7.44 m/s    LV SEPTAL E/E' RATIO 16.75 m/s    FS 33 28 - 44 %    LA Vol 22.46 cm3    LV mass 98.14 g    ZLVIDD -1.88     ZLVIDS -0.92     Left Ventricle Relative Wall Thickness 0.40 cm    AV valve area 2.38 cm²    AV Velocity Ratio 0.65     AV index (prosthetic) 0.78     MV valve area p 1/2 method 6.37 cm2    E/A ratio 0.57     Mean e' 0.07 m/s    LVOT area 3.0 cm2    LVOT stroke volume 68.39 cm3    AV peak gradient 9 mmHg    E/E' ratio 10.31 m/s    LV Systolic Volume Index 14.3 mL/m2    LV Diastolic Volume Index 38.19 mL/m2    LEEANNE 15.4 mL/m2    LV Mass Index 67 g/m2    LEEANNE (MOD) 9.7 mL/m2    ADOLFO by Velocity Ratio 1.98 cm²    BSA 1.47 m2    EF 65 %    Est. RA pres 3 mmHg    Narrative      Left Ventricle: The left ventricle is normal in size. Normal wall   thickness. There is normal systolic function. Ejection fraction by visual   approximation is 65%. There is normal diastolic function.    Right Ventricle: Normal right ventricular cavity size. Wall thickness   is normal. Systolic function is normal.    Pulmonary Artery: Pulmonary artery pressure could not be estimated.    IVC/SVC: Normal venous pressure at 3 mmHg.

## 2024-09-30 NOTE — CONSULTS
[facility]  Podiatry  Consult Note    Patient Name: Radha Sotelo  MRN: [id number]  Admission Date: 9/29/2024  Hospital Length of Stay: 1 days  Attending Physician: Mackenzie Davis MD  Primary Care Provider: Kalyn Pemberton NP     Inpatient consult to Podiatry  Consult performed by: Enedina Harrington DPM  Consult ordered by: Jael Vasquez MD  Reason for consult: see below        Subjective:     History of Present Illness:  79F with a PMHx of COPD, CAD, PVD, HTN, HLD, s/p R TMA who was admitted for NSTEMI.  Podiatry consulted for R TMA wounds.  Patient was last seen 9/16 for R TMA wounds. She reports home health conducting dressing changes 3 times a week.  Her wounds are dressed with Iodosorb, gauze, and Kerlix.  On her last admission Vascular Surgery stated that there were no revascularization options and a R AKA/BKA was the only option.  Patient declined the AKA/BKA option at that time and continues to decline today.  Patient reports tenderness to her bilateral lower extremities.  She reports now having short of breath relating to her NSTEMI.  Denies any other pedal complaints.     Tachypnic, hypotensive, afebrile, WBC WNL. XR R foot shows no convincing osseous erosive or destructive process.     Scheduled Meds:   albuterol-ipratropium  3 mL Nebulization Q4H    aspirin  81 mg Oral Daily    atorvastatin  80 mg Oral Daily    enoxparin  30 mg Subcutaneous Q24H (prophylaxis, 1700)    gabapentin  300 mg Oral BID    metoprolol succinate  25 mg Oral Daily    ticagrelor  90 mg Oral BID     Continuous Infusions:  PRN Meds:  Current Facility-Administered Medications:     acetaminophen, 650 mg, Oral, Q4H PRN    albuterol-ipratropium, 3 mL, Nebulization, Q6H PRN    dextrose 10%, 12.5 g, Intravenous, PRN    dextrose 10%, 25 g, Intravenous, PRN    glucagon (human recombinant), 1 mg, Intramuscular, PRN    glucose, 16 g, Oral, PRN    glucose, 24 g, Oral, PRN    LIDOcaine HCL 4%, 4 mL, Topical  (Top), PRN    morphine, 2 mg, Intravenous, Q4H PRN    naloxone, 0.02 mg, Intravenous, PRN    nitroGLYCERIN, 0.4 mg, Sublingual, Q5 Min PRN    ondansetron, 4 mg, Intravenous, Q6H PRN    sodium chloride 0.9%, 10 mL, Intravenous, PRN    sodium chloride 0.9%, 10 mL, Intravenous, Q12H PRN    Review of patient's allergies indicates:  No Known Allergies     Past Medical History:   Diagnosis Date    Anemia     Anxiety     COPD (chronic obstructive pulmonary disease)     COPD (chronic obstructive pulmonary disease) with emphysema     Coronary artery disease     Depression     Diverticulitis     Hyperlipidemia     Hypertension     PVD (peripheral vascular disease)     PVD (peripheral vascular disease)     S/P colostomy     Substance abuse     hx heavy etoh use     Tobacco abuse      Past Surgical History:   Procedure Laterality Date    ANGIOGRAM, CORONARY, WITH LEFT HEART CATHETERIZATION N/A 11/22/2023    Procedure: Angiogram, Coronary, with Left Heart Cath;  Surgeon: Checo Bhatt MD;  Location: [facility] CATH LAB;  Service: Cardiology;  Laterality: N/A;    ANGIOGRAM, CORONARY, WITH LEFT HEART CATHETERIZATION N/A 5/27/2024    Procedure: Angiogram, Coronary, with Left Heart Cath;  Surgeon: Karel Mack MD;  Location: [facility] CATH LAB;  Service: Cardiology;  Laterality: N/A;    ANGIOGRAM, EXTREMITY, UNILATERAL Right 8/25/2023    Procedure: ANGIOGRAM, EXTREMITY, UNILATERAL;  Surgeon: Gary Rico MD;  Location: [facility] OR [address] [place] [address];  Service: Vascular;  Laterality: Right;  US GUIDED ACCESS LEFT GROIN  contrast: 150ml  fluoro: 27.9 min  mGy: 254.73  Gycm2: 62.4919  radial flush cocktail: 10ml    ANGIOGRAPHY OF LOWER EXTREMITY Right 8/24/2023    Procedure: Angiogram Extremity Unilateral;  Surgeon: Benji Ashley MD;  Location: [facility] OR [address] [place] [address];  Service: Vascular;  Laterality: Right;    COLOSTOMY      CORONARY ANGIOGRAPHY N/A 9/29/2024    Procedure: ANGIOGRAM, CORONARY ARTERY;  Surgeon: Checo Bhatt,  MD;  Location: [facility] CATH LAB;  Service: Cardiology;  Laterality: N/A;    ECTOPIC PREGNANCY SURGERY      PTA, PERONEAL Right 8/25/2023    Procedure: PTA, PERONEAL TIBIAL TRUNK WITH SHOCKWAVE;  Surgeon: Gary Rico MD;  Location: [facility] OR [address] [place] [address];  Service: Vascular;  Laterality: Right;    PTCA, SINGLE VESSEL  11/22/2023    Procedure: PTCA, Single Vessel;  Surgeon: Checo Bhatt MD;  Location: [facility] CATH LAB;  Service: Cardiology;;    right forefoot amputation      right toe amputation      x2 toes    STENT, DRUG ELUTING, SINGLE VESSEL, CORONARY  11/22/2023    Procedure: Stent, Drug Eluting, Single Vessel, Coronary;  Surgeon: Checo Bhatt MD;  Location: [facility] CATH LAB;  Service: Cardiology;;    STENT, DRUG ELUTING, SINGLE VESSEL, CORONARY  5/27/2024    Procedure: Stent, Drug Eluting, Single Vessel, Coronary;  Surgeon: Karel Mack MD;  Location: [facility] CATH LAB;  Service: Cardiology;;    STENT, DRUG ELUTING, SINGLE VESSEL, CORONARY  9/29/2024    Procedure: Stent, Drug Eluting, Single Vessel, Coronary;  Surgeon: Checo Bhatt MD;  Location: [facility] CATH LAB;  Service: Cardiology;;    STENT, SUPERFICIAL FEMORAL ARTERY Right 8/25/2023    Procedure: STENT, SUPERFICIAL FEMORAL ARTERY;  Surgeon: Gary Rico MD;  Location: [address];  Service: Vascular;  Laterality: Right;  VIABAHN 6 X 15 X120       Family History    None       Tobacco Use    Smoking status: Every Day     Current packs/day: 0.25     Average packs/day: 0.5 packs/day for 61.2 years (30.2 ttl pk-yrs)     Types: Cigarettes     Start date: 7/5/1963    Smokeless tobacco: Never   Substance and Sexual Activity    Alcohol use: No    Drug use: No    Sexual activity: Not Currently     Review of Systems   Constitutional:  Negative for chills and fever.   Cardiovascular:  Positive for leg swelling.   Gastrointestinal:  Negative for diarrhea, nausea and vomiting.   Skin:  Positive for color change and wound.    Psychiatric/Behavioral:  Negative for confusion.    Objective:     Vital Signs (Most Recent):  Temp: 98.5 °F (36.9 °C) (09/30/24 1126)  Pulse: 82 (09/30/24 1456)  Resp: 18 (09/30/24 1341)  BP: (!) 102/51 (09/30/24 1126)  SpO2: 96 % (09/30/24 1253) Vital Signs (24h Range):  Temp:  [97.4 °F (36.3 °C)-98.6 °F (37 °C)] 98.5 °F (36.9 °C)  Pulse:  [] 82  Resp:  [12-43] 18  SpO2:  [91 %-100 %] 96 %  BP: ()/() 102/51     Weight: 49.4 kg (108 lb 14.5 oz)  Body mass index is 21.27 kg/m².    Foot Exam     General  Orientation: alert and oriented to person, place, and time      Right Foot/Ankle   Inspection and Palpation  Tenderness: (Patient was tender to palpation of the RLE)  Swelling: (RLE swelling present)     Neurovascular  Dorsalis pedis: absent  Posterior tibial: absent     Comments  Dry eschar wounds to medial aspect of TMA site and heel.  No erythema or edema noted.  No active drainage noted.  Severe tenderness to palpation.  No malodor, crepitus, fluctuance, purulent drainage noted.     Left Foot/Ankle    Inspection and Palpation  Ecchymosis: dorsum     Neurovascular  Dorsalis pedis: absent  Posterior tibial: absent     Comments  No wounds present. No redness or pain on palpation. No signs of infection.  There is ecchymosis present on the dorsum of the foot over the 4th and 5th metatarsals. Back of the heel has some redness possibly secondary to pressure.     Laboratory:  BMP:   Recent Labs   Lab 09/30/24  0320   GLU 89      K 4.2      CO2 23   BUN 18   CREATININE 1.1   CALCIUM 8.2*   MG 2.1     CBC:   Recent Labs   Lab 09/30/24  0320   WBC 8.10   RBC 3.09*   HGB 8.8*   HCT 30.4*      MCV 98   MCH 28.5   MCHC 28.9*     Diagnostic Results:  I have reviewed all pertinent imaging results/findings within the past 24 hours.    Clinical Findings:          Assessment/Plan:     Orthopedic  Ulcer of right foot  Assesment  Wounds noted to medial aspect of TMA site and R heel.  Wounds are  "dry eschar.  No erythema, edema, drainage, maceration, fluctuance, or crepitus noted.  No signs of infection.  Bilateral lower extremities overall stable.     Recommendations  -Physical exam findings discussed with the patient.  Discussed with the patient that her wounds are stable and shows no cardinal signs of infection.  -Per Vascular Surgery note on 8/14/24 "Discussed with the patient need for a right above-knee amputation given TMA wound which is unlikely to heal given lack of arterial blood supply to the foot, and risks of not undergoing surgery including worsening wounds, sepsis, potentially death. "  -No surgical intervention from Podiatry perspective.  Recommend continuing with the wound care  -R foot wounds dressed with Iodosorb, gauze, Kerlix, and secured with tape.  -Wound care orders placed  -Podiatry will sign off.  Please reach out with any further questions.      Thank you for your consult. I will sign off. Please contact us if you have any additional questions.    Enedina Harrington DPM  Podiatry  [facility]  "

## 2024-09-30 NOTE — PLAN OF CARE
AAOX4,VSS,O2 sats>96% 1L.Plan of care discussed with patient.Patient has no complaints of pain/SOB. Discussed medications and care. Patient has no questions at this time.Pt visualised and stable.Call light within reach.

## 2024-09-30 NOTE — HPI
79F with a PMHx of COPD, CAD, PVD, HTN, HLD, s/p R TMA who was admitted for NSTEMI.  Podiatry consulted for R TMA wounds.  Patient was last seen 9/16 for R TMA wounds. She reports home health conducting dressing changes 3 times a week.  Her wounds are dressed with Iodosorb, gauze, and Kerlix.  On her last admission Vascular Surgery stated that there were no revascularization options and a R AKA/BKA was the only option.  Patient declined the AKA/BKA option at that time and continues to decline today.  Patient reports tenderness to her bilateral lower extremities.  She reports now having short of breath relating to her NSTEMI.  Denies any other pedal complaints.     Tachypnic, hypotensive, afebrile, WBC WNL. XR R foot shows no convincing osseous erosive or destructive process.

## 2024-09-30 NOTE — NURSING
1930 - Upon assessment, BLE DP/PT/Popliteal pulses not palpable or dopplerable. Pt endorses 7/10 pain to RLE, however, she states it is mostly at the amputation site. Dr. Pabon at bedside and aware. Tylenol ordered and administered and heat packs applied per MD order.     2020 - Pt has received a total of 1L fluid boluses, and is currently receiving another 500cc fluid bolus. The patient has not voided since she has been admitted to CICU post cath. Day shift RN unaware of last time patient has voided prior to admission. Bladder scan preformed and showed 148cc in bladder. Dr. Pabon aware. 40mg IVP lasix ordered.

## 2024-09-30 NOTE — ASSESSMENT & PLAN NOTE
Patient's COPD is controlled currently. Mmonitor respiratory status closely.   No evidence of exacerbation  Continue supplemental home O2 as needed   DuoNebs as needed   Patient takes Symbicort at home

## 2024-09-30 NOTE — PROGRESS NOTES
[facility]  Cardiology  Progress Note    Patient Name: Radha Sotelo  MRN: [id number]  Admission Date: 9/29/2024  Hospital Length of Stay: 1 days  Code Status: Full Code   Attending Physician: Mackenzie Davis MD   Primary Care Physician: Kalyn Pemberton NP  Expected Discharge Date:   Principal Problem:STEMI (ST elevation myocardial infarction)    Subjective:     Hospital Course:   She presented to the ED with chest pain, EKG shows Inferior STEMI received Morphine, Nitro, and Brilinta load and was taken to the Cath lab  on 9/29. It showed the RPDA lesion was 100% stenosed. (Chronic, unchnaged form prior angiogram). The Mid RCA lesion was 99% stenosed  with 0% stenosis post-intervention. Mid RCA stent was successfully placed for the lesion. Afib provoked by the MI but has converted back to Sinus and will be given an event monitor.       Interval History: NAEON, patient reports SOB and would like to have a breathing treatment. She reports her chest pain has improved compared to yesterday.     Review of Systems   Cardiovascular:  Positive for chest pain. Negative for leg swelling, near-syncope, orthopnea, paroxysmal nocturnal dyspnea and syncope.   Gastrointestinal:  Negative for nausea and vomiting.     Objective:     Vital Signs (Most Recent):  Temp: 98.6 °F (37 °C) (09/30/24 0400)  Pulse: 81 (09/30/24 0800)  Resp: (!) 26 (09/30/24 0800)  BP: 107/74 (09/30/24 0800)  SpO2: 100 % (09/30/24 0800) Vital Signs (24h Range):  Temp:  [97.4 °F (36.3 °C)-98.6 °F (37 °C)] 98.6 °F (37 °C)  Pulse:  [] 81  Resp:  [12-46] 26  SpO2:  [91 %-100 %] 100 %  BP: ()/() 107/74     Weight: 48.1 kg (106 lb)  Body mass index is 20.7 kg/m².     SpO2: 100 %         Intake/Output Summary (Last 24 hours) at 9/30/2024 0835  Last data filed at 9/30/2024 0600  Gross per 24 hour   Intake 1858.27 ml   Output 1450 ml   Net 408.27 ml       Lines/Drains/Airways       Drain  Duration                   Colostomy 07/13/21 0201 Descending/sigmoid LLQ 1175 days              Peripheral Intravenous Line  Duration                  Peripheral IV - Single Lumen 09/29/24 1230 20 G Anterior;Distal;Left Upper Arm <1 day         Peripheral IV - Single Lumen 09/29/24 1230 22 G Anterior;Right Forearm <1 day                       Physical Exam  Constitutional:       General: She is not in acute distress.     Appearance: She is underweight. She is ill-appearing.   HENT:      Head: Atraumatic.      Right Ear: Tympanic membrane normal.      Left Ear: Tympanic membrane normal.      Nose: Nose normal.      Mouth/Throat:      Mouth: Mucous membranes are dry.   Eyes:      Extraocular Movements: Extraocular movements intact.      Pupils: Pupils are equal, round, and reactive to light.   Cardiovascular:      Rate and Rhythm: Normal rate and regular rhythm.      Heart sounds: Normal heart sounds.   Pulmonary:      Effort: Pulmonary effort is normal. No respiratory distress.      Breath sounds: Wheezing present.   Abdominal:      Palpations: Abdomen is soft.   Musculoskeletal:         General: No swelling. Normal range of motion.      Cervical back: Normal range of motion.      Right lower leg: No edema.      Left lower leg: No edema.      Comments: R foot dressing in place, tenderness to light palpation, dressing is c/d/i   Skin:     General: Skin is warm.   Neurological:      General: No focal deficit present.      Mental Status: She is alert and oriented to person, place, and time.            Significant Labs: CMP   Recent Labs   Lab 09/29/24  1454 09/29/24  1953 09/30/24  0320   * 137 139   K 3.1* 3.9 4.2    107 110   CO2 23 22* 23   * 93 89   BUN 18 17 18   CREATININE 0.9 1.0 1.1   CALCIUM 8.6* 8.1* 8.2*   PROT 5.7* 4.9* 5.4*   ALBUMIN 3.1* 2.7* 2.9*   BILITOT 0.7 0.4 0.5   ALKPHOS 108 92 99   AST 23 21 28   ALT 68* 54* 55*   ANIONGAP 9 8 6*   , CBC   Recent Labs   Lab 09/29/24  1228 09/29/24  1454 09/30/24  0320    WBC 9.85 10.83 8.10   HGB 9.4* 9.3* 8.8*   HCT 31.0* 30.4* 30.4*    220 213   , and All pertinent lab results from the last 24 hours have been reviewed.    Significant Imaging: Echocardiogram: Transthoracic echo (TTE) complete (Cupid Only):   Results for orders placed or performed during the hospital encounter of 09/12/24   Echo   Result Value Ref Range    LA Vol (MOD) 14.21 cm3    Left Atrium Major Axis 3.88 cm    Left Atrium Minor Axis 4.27 cm    LV Diastolic Volume 55.76 mL    LV Systolic Volume 20.87 mL    MV Peak A James 1.17 m/s    MV stenosis pressure 1/2 time 34.54 ms    MV Peak E James 0.67 m/s    Ao VTI 28.69 cm    Ao peak james 1.54 m/s    LVOT peak VTI 22.45 cm    LVOT peak james 1.00 m/s    LVOT diameter 1.97 cm    E wave deceleration time 119.10 msec    AV mean gradient 6 mmHg    RV- oswald basal diam 3.4 cm    TAPSE 2.22 cm    LA size 2.49 cm    STJ 2.61 cm    Sinus 2.73 cm    LVIDs 2.43 2.1 - 4.0 cm    PW 0.73 0.6 - 1.1 cm    IVS 1.12 (A) 0.6 - 1.1 cm    LVIDd 3.64 3.5 - 6.0 cm    TDI LATERAL 0.09 m/s    LA WIDTH 2.61 cm    TDI SEPTAL 0.04 m/s    LV LATERAL E/E' RATIO 7.44 m/s    LV SEPTAL E/E' RATIO 16.75 m/s    FS 33 28 - 44 %    LA Vol 22.46 cm3    LV mass 98.14 g    ZLVIDD -1.88     ZLVIDS -0.92     Left Ventricle Relative Wall Thickness 0.40 cm    AV valve area 2.38 cm²    AV Velocity Ratio 0.65     AV index (prosthetic) 0.78     MV valve area p 1/2 method 6.37 cm2    E/A ratio 0.57     Mean e' 0.07 m/s    LVOT area 3.0 cm2    LVOT stroke volume 68.39 cm3    AV peak gradient 9 mmHg    E/E' ratio 10.31 m/s    LV Systolic Volume Index 14.3 mL/m2    LV Diastolic Volume Index 38.19 mL/m2    LEEANNE 15.4 mL/m2    LV Mass Index 67 g/m2    LEEANNE (MOD) 9.7 mL/m2    ADOLFO by Velocity Ratio 1.98 cm²    BSA 1.47 m2    EF 65 %    Est. RA pres 3 mmHg    Narrative      Left Ventricle: The left ventricle is normal in size. Normal wall   thickness. There is normal systolic function. Ejection fraction by visual    approximation is 65%. There is normal diastolic function.    Right Ventricle: Normal right ventricular cavity size. Wall thickness   is normal. Systolic function is normal.    Pulmonary Artery: Pulmonary artery pressure could not be estimated.    IVC/SVC: Normal venous pressure at 3 mmHg.       Assessment and Plan:     Brief HPI:    * STEMI (ST elevation myocardial infarction)  Patient presents with chest pain that started morning of 9/29 and came to the ED with ambulance. She reports the pain being similar to the one she had last May. She has a history of stenting in the past and on home ASA, Plavix, but reports missing her Plavix today. EKG shows right inferior STEMI.     - Received Brilinta load, Morphine in the ED   - LHC _/- PCI, patient is a GALEN candidate on 9/29   - Continue  Anti-platelet Therapy: ASA / Ticagrelor   - LHC: The RPDA lesion was 100% stenosed. (Chronic, unchnaged form prior angiogram)    The Mid RCA lesion was 99% stenosed with 0% stenosis post-intervention.    Mid RCA lesion: A stent was successfully placed.      Afib   Patient is currently in sinus rhythm.[id number] Score: 4.  Patient had Afib RVR episode on 9/29 most likely provoked due to the Stemi. According to medical records she had an afib episode in 2023 as well when she had an MI as well. Risk vs Benefit discussion back in 2023 with patient and did not want to be on Eliquis due to bleeding risk and falls. At home she was on Aspirin and Plavix only.       Anemia  Anemia is likely due to  chronic disease . Most recent hemoglobin and hematocrit are listed below.  Recent Labs     09/29/24  1228   HGB 9.4*   HCT 31.0*     Plan  - Monitor serial CBC: Daily  - Transfuse PRBC if patient becomes hemodynamically unstable, symptomatic or H/H drops below 7/21.  - Patient has not received any PRBC transfusions to date  - Patient's anemia is currently stable    Peripheral arterial disease  Known PAD with multiple RLE procedures  Previous NICCI  results indicative of b/l critical limb ischemia and vascular surgery was following   Continue Antiplatelet therapy       Chronic diastolic heart failure  Stable and chronic diastolic heart failure with preserved EF    - No s/s of fluid overload  - Continue to monitor and duresis with Lasix as needed   - Continue Beta Blocker     Results for orders placed during the hospital encounter of 09/12/24    Echo    Interpretation Summary    Left Ventricle: The left ventricle is normal in size. Normal wall thickness. There is normal systolic function. Ejection fraction by visual approximation is 65%. There is normal diastolic function.    Right Ventricle: Normal right ventricular cavity size. Wall thickness is normal. Systolic function is normal.    Pulmonary Artery: Pulmonary artery pressure could not be estimated.    IVC/SVC: Normal venous pressure at 3 mmHg.      Soft tissue infection of R foot  S/p PAD with gangrene resulting in partial amputation of right foot, patient reports taking antibiotics previously.          Partial nontraumatic amputation of right foot  Chronic, followed by [facility]       HLD (hyperlipidemia)    - continue home meds: atorvastatin 80 mg QD       COPD (chronic obstructive pulmonary disease)  Patient's COPD is controlled currently. Mmonitor respiratory status closely.   No evidence of exacerbation  Continue supplemental home O2 as needed   DuoNebs as needed   Patient takes Symbicort at home       CAD (coronary artery disease)  Patient with known CAD s/p stent placement and CABG. Multi vessel CAD. Recent cath with stent placement 05/27.  Will continue ASA, Plavix, and Statin and monitor for S/Sx of angina/ACS. Continue to monitor on telemetry.         Peripheral vascular disease, unspecified  Known PVD , Arterial doppler showing severe PAD RLE, Angiogram on 8/25 with right SFA stent placed    - Continue Aspirin    Unspecified protein-calorie malnutrition  Nutrition consulted. Most recent weight  and BMI monitored-     Measurements:  Wt Readings from Last 1 Encounters:   09/29/24 50.8 kg (111 lb 15.9 oz)   Body mass index is 21.87 kg/m².    Patient has been screened and assessed by RD.    Malnutrition Type:  Context  Level    Malnutrition Characteristic Summary:       Interventions/Recommendations (treatment strategy):             VTE Risk Mitigation (From admission, onward)           Ordered     Place sequential compression device  Until discontinued         09/29/24 1302     IP VTE HIGH RISK PATIENT  Once         09/29/24 1302                    Jael Vasquez MD  Cardiology  [facility] - Cardiac Intensive Care

## 2024-09-30 NOTE — PLAN OF CARE
CICU DAILY GOALS     No issues overnight  No episodes of hypotension  Pt voided 1450 cc's after lasix administered   Pt complained of 10/10 pain on R amputated foot, still unable to palpate or doppler a pulse, warm compress applied and prn morphine administered     A: Awake    RASS: Goal -    Actual - RASS (Junior Agitation-Sedation Scale): alert and calm   Restraint necessity:    B: Breath   SBT: Not intubated   C: Coordinate A & B, analgesics/sedatives   Pain: managed    SAT: Not intubated  D: Delirium   CAM-ICU: Overall CAM-ICU: Negative  E: Early(intubated/ Progressive (non-intubated) Mobility   MOVE Screen:  did not get out of bed throughout night    Activity: Activity Management: Rolling - L1  FAS: Feeding/Nutrition   Diet order: Diet/Nutrition Received: low saturated fat/low cholesterol,   Fluid restriction:     Nutritional Supplement Intake: Quantity , Type:  cardiac diet ordered  T: Thrombus   DVT prophylaxis: VTE Required Core Measure: Pharmacological prophylaxis initiated/maintained, Patient refused interventions (refuses SCDs)  H: HOB Elevation   Head of Bed (HOB) Positioning: HOB at 30-45 degrees  U: Ulcer Prophylaxis   GI: no  G: Glucose control   managed      S: Skin   Nurses Note -- 4 Eyes  Skin assessed during: Daily Assessment   Choose one: Yes: LDA present.    Attending Nurse: Didi Luna, RN  Second RN/Staff Member:      B: Bowel Function   no issues   I: Indwelling Catheters   Harris necessity:     CVC necessity: No  D: De-escalation Antibx   No  Plan for the day    Family/Goals of care/Code Status   Code Status: Full Code     No acute events throughout day, VS and assessment per flow sheet, patient progressing towards goals as tolerated, plan of care reviewed with Radha Sotelo and family, all concerns addressed, will continue to monitor.   Problem: Adult Inpatient Plan of Care  Goal: Plan of Care Review  Outcome: Progressing  Goal: Patient-Specific Goal (Individualized)  Outcome:  Progressing  Goal: Absence of Hospital-Acquired Illness or Injury  Outcome: Progressing  Goal: Optimal Comfort and Wellbeing  Outcome: Progressing

## 2024-09-30 NOTE — ASSESSMENT & PLAN NOTE
Patient is currently in sinus rhythm.[id number] Score: 4.  Patient had Afib RVR episode on 9/29 most likely provoked due to the Stemi. According to medical records she had an afib episode in 2023 as well when she had an MI as well. Risk vs Benefit discussion back in 2023 with patient and did not want to be on Eliquis due to bleeding risk and falls. At home she was on Aspirin and Plavix only.

## 2024-09-30 NOTE — SUBJECTIVE & OBJECTIVE
Scheduled Meds:   albuterol-ipratropium  3 mL Nebulization Q4H    aspirin  81 mg Oral Daily    atorvastatin  80 mg Oral Daily    enoxparin  30 mg Subcutaneous Q24H (prophylaxis, 1700)    gabapentin  300 mg Oral BID    metoprolol succinate  25 mg Oral Daily    ticagrelor  90 mg Oral BID     Continuous Infusions:  PRN Meds:  Current Facility-Administered Medications:     acetaminophen, 650 mg, Oral, Q4H PRN    albuterol-ipratropium, 3 mL, Nebulization, Q6H PRN    dextrose 10%, 12.5 g, Intravenous, PRN    dextrose 10%, 25 g, Intravenous, PRN    glucagon (human recombinant), 1 mg, Intramuscular, PRN    glucose, 16 g, Oral, PRN    glucose, 24 g, Oral, PRN    LIDOcaine HCL 4%, 4 mL, Topical (Top), PRN    morphine, 2 mg, Intravenous, Q4H PRN    naloxone, 0.02 mg, Intravenous, PRN    nitroGLYCERIN, 0.4 mg, Sublingual, Q5 Min PRN    ondansetron, 4 mg, Intravenous, Q6H PRN    sodium chloride 0.9%, 10 mL, Intravenous, PRN    sodium chloride 0.9%, 10 mL, Intravenous, Q12H PRN    Review of patient's allergies indicates:  No Known Allergies     Past Medical History:   Diagnosis Date    Anemia     Anxiety     COPD (chronic obstructive pulmonary disease)     COPD (chronic obstructive pulmonary disease) with emphysema     Coronary artery disease     Depression     Diverticulitis     Hyperlipidemia     Hypertension     PVD (peripheral vascular disease)     PVD (peripheral vascular disease)     S/P colostomy     Substance abuse     hx heavy etoh use     Tobacco abuse      Past Surgical History:   Procedure Laterality Date    ANGIOGRAM, CORONARY, WITH LEFT HEART CATHETERIZATION N/A 11/22/2023    Procedure: Angiogram, Coronary, with Left Heart Cath;  Surgeon: Checo Bhatt MD;  Location: [facility] CATH LAB;  Service: Cardiology;  Laterality: N/A;    ANGIOGRAM, CORONARY, WITH LEFT HEART CATHETERIZATION N/A 5/27/2024    Procedure: Angiogram, Coronary, with Left Heart Cath;  Surgeon: Karel Mack MD;  Location: [facility] CATH LAB;   Service: Cardiology;  Laterality: N/A;    ANGIOGRAM, EXTREMITY, UNILATERAL Right 8/25/2023    Procedure: ANGIOGRAM, EXTREMITY, UNILATERAL;  Surgeon: Gary Rico MD;  [facility];  Service: Vascular;  Laterality: Right;  US GUIDED ACCESS LEFT GROIN  contrast: 150ml  fluoro: 27.9 min  mGy: 254.73  Gycm2: 62.4919  radial flush cocktail: 10ml    ANGIOGRAPHY OF LOWER EXTREMITY Right 8/24/2023    Procedure: Angiogram Extremity Unilateral;  Surgeon: Benji Ashley MD;  [facility];  Service: Vascular;  Laterality: Right;    COLOSTOMY      CORONARY ANGIOGRAPHY N/A 9/29/2024    Procedure: ANGIOGRAM, CORONARY ARTERY;  Surgeon: Checo Bhatt MD;  Location: [facility] CATH LAB;  Service: Cardiology;  Laterality: N/A;    ECTOPIC PREGNANCY SURGERY      PTA, PERONEAL Right 8/25/2023    Procedure: PTA, PERONEAL TIBIAL TRUNK WITH SHOCKWAVE;  Surgeon: Gary Rico MD;  [facility];  Service: Vascular;  Laterality: Right;    PTCA, SINGLE VESSEL  11/22/2023    Procedure: PTCA, Single Vessel;  Surgeon: Checo Bhatt MD;  Location: [facility] CATH LAB;  Service: Cardiology;;    right forefoot amputation      right toe amputation      x2 toes    STENT, DRUG ELUTING, SINGLE VESSEL, CORONARY  11/22/2023    Procedure: Stent, Drug Eluting, Single Vessel, Coronary;  Surgeon: Checo Bhatt MD;  Location: [facility] CATH LAB;  Service: Cardiology;;    STENT, DRUG ELUTING, SINGLE VESSEL, CORONARY  5/27/2024    Procedure: Stent, Drug Eluting, Single Vessel, Coronary;  Surgeon: Karel Mack MD;  Location: [facility] CATH LAB;  Service: Cardiology;;    STENT, DRUG ELUTING, SINGLE VESSEL, CORONARY  9/29/2024    Procedure: Stent, Drug Eluting, Single Vessel, Coronary;  Surgeon: Checo Bhatt MD;  Location: [facility] CATH LAB;  Service: Cardiology;;    STENT, SUPERFICIAL FEMORAL ARTERY Right 8/25/2023    Procedure: STENT, SUPERFICIAL FEMORAL ARTERY;  Surgeon:  Gary Rico MD;  Location: [address];  Service: Vascular;  Laterality: Right;  VIABAHN 6 X 15 X120       Family History    None       Tobacco Use    Smoking status: Every Day     Current packs/day: 0.25     Average packs/day: 0.5 packs/day for 61.2 years (30.2 ttl pk-yrs)     Types: Cigarettes     Start date: 7/5/1963    Smokeless tobacco: Never   Substance and Sexual Activity    Alcohol use: No    Drug use: No    Sexual activity: Not Currently     Review of Systems   Constitutional:  Negative for chills and fever.   Cardiovascular:  Positive for leg swelling.   Gastrointestinal:  Negative for diarrhea, nausea and vomiting.   Skin:  Positive for color change and wound.   Psychiatric/Behavioral:  Negative for confusion.    Objective:     Vital Signs (Most Recent):  Temp: 98.5 °F (36.9 °C) (09/30/24 1126)  Pulse: 82 (09/30/24 1456)  Resp: 18 (09/30/24 1341)  BP: (!) 102/51 (09/30/24 1126)  SpO2: 96 % (09/30/24 1253) Vital Signs (24h Range):  Temp:  [97.4 °F (36.3 °C)-98.6 °F (37 °C)] 98.5 °F (36.9 °C)  Pulse:  [] 82  Resp:  [12-43] 18  SpO2:  [91 %-100 %] 96 %  BP: ()/() 102/51     Weight: 49.4 kg (108 lb 14.5 oz)  Body mass index is 21.27 kg/m².    Foot Exam     General  Orientation: alert and oriented to person, place, and time      Right Foot/Ankle   Inspection and Palpation  Tenderness: (Patient was tender to palpation of the RLE)  Swelling: (RLE swelling present)     Neurovascular  Dorsalis pedis: absent  Posterior tibial: absent     Comments  Dry eschar wounds to medial aspect of TMA site and heel.  No erythema or edema noted.  No active drainage noted.  Severe tenderness to palpation.  No malodor, crepitus, fluctuance, purulent drainage noted.     Left Foot/Ankle    Inspection and Palpation  Ecchymosis: dorsum     Neurovascular  Dorsalis pedis: absent  Posterior tibial: absent     Comments  No wounds present. No redness or pain on palpation. No signs of infection.  There  is ecchymosis present on the dorsum of the foot over the 4th and 5th metatarsals. Back of the heel has some redness possibly secondary to pressure.     Laboratory:  BMP:   Recent Labs   Lab 09/30/24  0320   GLU 89      K 4.2      CO2 23   BUN 18   CREATININE 1.1   CALCIUM 8.2*   MG 2.1     CBC:   Recent Labs   Lab 09/30/24  0320   WBC 8.10   RBC 3.09*   HGB 8.8*   HCT 30.4*      MCV 98   MCH 28.5   MCHC 28.9*     Diagnostic Results:  I have reviewed all pertinent imaging results/findings within the past 24 hours.    Clinical Findings:

## 2024-09-30 NOTE — HOSPITAL COURSE
She presented to the ED with chest pain, EKG shows Inferior STEMI received Morphine, Nitro, and Brilinta load and was taken to the Cath lab  on 9/29. It showed the RPDA lesion was 100% stenosed. (Chronic, unchnaged form prior angiogram). The Mid RCA lesion was 99% stenosed  with 0% stenosis post-intervention. Mid RCA stent was successfully placed for the lesion. Afib provoked by the MI but has converted back to Sinus and will be given an event monitor. Follow ups with podiatry and cardiology outpatient, and continue HH.

## 2024-09-30 NOTE — CONSULTS
[facility] Step[facility]  Wound Care    Patient Name:  Radha Sotelo   MRN:  [id number]  Date: 9/30/2024  Diagnosis: STEMI (ST elevation myocardial infarction)    History:     Past Medical History:   Diagnosis Date    Anemia     Anxiety     COPD (chronic obstructive pulmonary disease)     COPD (chronic obstructive pulmonary disease) with emphysema     Coronary artery disease     Depression     Diverticulitis     Hyperlipidemia     Hypertension     PVD (peripheral vascular disease)     PVD (peripheral vascular disease)     S/P colostomy     Substance abuse     hx heavy etoh use     Tobacco abuse        Social History     Socioeconomic History    Marital status:    Tobacco Use    Smoking status: Every Day     Current packs/day: 0.25     Average packs/day: 0.5 packs/day for 61.2 years (30.2 ttl pk-yrs)     Types: Cigarettes     Start date: 7/5/1963    Smokeless tobacco: Never   Substance and Sexual Activity    Alcohol use: No    Drug use: No    Sexual activity: Not Currently   Social History Narrative    ** Merged History Encounter **          Social Drivers of Health     Financial Resource Strain: Low Risk  (9/30/2024)    Overall Financial Resource Strain (CARDIA)     Difficulty of Paying Living Expenses: Not hard at all   Food Insecurity: No Food Insecurity (9/30/2024)    Hunger Vital Sign     Worried About Running Out of Food in the Last Year: Never true     Ran Out of Food in the Last Year: Never true   Transportation Needs: Unmet Transportation Needs (9/30/2024)    TRANSPORTATION NEEDS     Transportation : Yes, it has kept me from medical appointments or from getting my medications.   Physical Activity: Inactive (9/13/2024)    Exercise Vital Sign     Days of Exercise per Week: 0 days     Minutes of Exercise per Session: 0 min   Stress: No Stress Concern Present (9/30/2024)    [employer] [place] [employer] - Occupational Stress Questionnaire     Feeling of Stress : Not at all   Housing  Stability: Low Risk  (9/30/2024)    Housing Stability Vital Sign     Unable to Pay for Housing in the Last Year: No     Homeless in the Last Year: No       Precautions:     Allergies as of 09/29/2024    (No Known Allergies)       [facility] Assessment Details/Treatment     Patient seen for wound care consult. Patient states she will not allow the dressing to be changed without lidocaine applied to reduce pain. Discussed with MD and also discussed patient seeing podiatry as an outpatient. Consult to podiatry was placed and order for lidocaine. Podiatry was able to see patient and has written recs. He states no need for wound care for foot wounds at this time. Wound care will sign off. Please reconsult if needed. Nursing to continue care.     09/30/2024

## 2024-09-30 NOTE — LETTER
September 30, 2024    Radha Sotelo  [address]  [address]  [facility] [address]             [facility]  [address] [facility]  [place] [address]  Phone: [phone]                                  [facility]   [address] [facility]   [place] [address]  [phone]         [facility]   [address] [place] [address] [phone]         [facility]    [address]  [place] [address] [phone]   Re: Radha Sotelo  Clinic number: [id number]    Dear Ms. Sotelo:    You were recently admitted to an [facility] facility for cardiac (heart) problem.  Your physician has referred you to [facility].  Cardiac Rehab Phase 2 is an educational and exercise program, conducted in a outpatient setting, proven to help reduce your risk for recurrent heart events.    Cardiac rehab has two major parts:    1. Exercise training to help you achieve cardiovascular fitness while learning how to exercise safely and improve muscle strength and endurance.  Your exercise prescription will be based on the results of the cardiopulmonary stress test (CPX) which will be done before entering the program and at completion.  2. Education, counseling and training to help you understand your heart condition and find ways to reduce your risk of future heart problems.  The cardiac rehab team will help you learn how to cope with the stress of adjusting to a new lifestyle and to deal with your fears about the future.    Phase 2 is a 36-session program, meeting 3 times a week for 12 weeks.  Each session consists of an hour of exercise and half-hour dedicated to the educational topic of the day.  Class days vary per location.  Please contact your nearest facility for details.    Through cardiac rehab you will learn:  About your heart condition, medical therapies, and medication  Risk factors in y our lifestyle  contributing to heart disease  New strategies to modify your risk factors  About a healthy diet that can lower your blood cholesterol, control weight, help prevent or control high blood pressure, and diabetes  How to stop smoking  How to manage stress    If you are interested in getting started, call the [facility] of your choosing.     Sincerely,     [facility]

## 2024-10-01 VITALS
SYSTOLIC BLOOD PRESSURE: 128 MMHG | WEIGHT: 108.94 LBS | TEMPERATURE: 98 F | HEART RATE: 90 BPM | BODY MASS INDEX: 21.39 KG/M2 | OXYGEN SATURATION: 98 % | DIASTOLIC BLOOD PRESSURE: 60 MMHG | HEIGHT: 60 IN | RESPIRATION RATE: 18 BRPM

## 2024-10-01 LAB
ALBUMIN SERPL BCP-MCNC: 2.8 G/DL (ref 3.5–5.2)
ALP SERPL-CCNC: 92 U/L (ref 55–135)
ALT SERPL W/O P-5'-P-CCNC: 40 U/L (ref 10–44)
ANION GAP SERPL CALC-SCNC: 4 MMOL/L (ref 8–16)
AST SERPL-CCNC: 24 U/L (ref 10–40)
BASOPHILS # BLD AUTO: 0.04 K/UL (ref 0–0.2)
BASOPHILS NFR BLD: 0.6 % (ref 0–1.9)
BILIRUB SERPL-MCNC: 0.4 MG/DL (ref 0.1–1)
BLD PROD TYP BPU: NORMAL
BLOOD UNIT EXPIRATION DATE: NORMAL
BLOOD UNIT TYPE CODE: 6200
BLOOD UNIT TYPE: NORMAL
BUN SERPL-MCNC: 19 MG/DL (ref 8–23)
CALCIUM SERPL-MCNC: 8.5 MG/DL (ref 8.7–10.5)
CHLORIDE SERPL-SCNC: 110 MMOL/L (ref 95–110)
CO2 SERPL-SCNC: 25 MMOL/L (ref 23–29)
CODING SYSTEM: NORMAL
CREAT SERPL-MCNC: 0.8 MG/DL (ref 0.5–1.4)
CROSSMATCH INTERPRETATION: NORMAL
DIFFERENTIAL METHOD BLD: ABNORMAL
DISPENSE STATUS: NORMAL
EOSINOPHIL # BLD AUTO: 0.2 K/UL (ref 0–0.5)
EOSINOPHIL NFR BLD: 3.3 % (ref 0–8)
ERYTHROCYTE [DISTWIDTH] IN BLOOD BY AUTOMATED COUNT: 14.9 % (ref 11.5–14.5)
EST. GFR  (NO RACE VARIABLE): >60 ML/MIN/1.73 M^2
GLUCOSE SERPL-MCNC: 110 MG/DL (ref 70–110)
HCT VFR BLD AUTO: 29 % (ref 37–48.5)
HGB BLD-MCNC: 8.4 G/DL (ref 12–16)
IMM GRANULOCYTES # BLD AUTO: 0.03 K/UL (ref 0–0.04)
IMM GRANULOCYTES NFR BLD AUTO: 0.5 % (ref 0–0.5)
LYMPHOCYTES # BLD AUTO: 0.6 K/UL (ref 1–4.8)
LYMPHOCYTES NFR BLD: 9.5 % (ref 18–48)
MAGNESIUM SERPL-MCNC: 1.8 MG/DL (ref 1.6–2.6)
MCH RBC QN AUTO: 28.2 PG (ref 27–31)
MCHC RBC AUTO-ENTMCNC: 29 G/DL (ref 32–36)
MCV RBC AUTO: 97 FL (ref 82–98)
MONOCYTES # BLD AUTO: 0.5 K/UL (ref 0.3–1)
MONOCYTES NFR BLD: 7.4 % (ref 4–15)
NEUTROPHILS # BLD AUTO: 5.2 K/UL (ref 1.8–7.7)
NEUTROPHILS NFR BLD: 78.7 % (ref 38–73)
NRBC BLD-RTO: 0 /100 WBC
PHOSPHATE SERPL-MCNC: 2.9 MG/DL (ref 2.7–4.5)
PLATELET # BLD AUTO: 182 K/UL (ref 150–450)
PMV BLD AUTO: 10.6 FL (ref 9.2–12.9)
POTASSIUM SERPL-SCNC: 3.6 MMOL/L (ref 3.5–5.1)
PROT SERPL-MCNC: 5.2 G/DL (ref 6–8.4)
RBC # BLD AUTO: 2.98 M/UL (ref 4–5.4)
SODIUM SERPL-SCNC: 139 MMOL/L (ref 136–145)
TRANS ERYTHROCYTES VOL PATIENT: NORMAL ML
WBC # BLD AUTO: 6.64 K/UL (ref 3.9–12.7)

## 2024-10-01 PROCEDURE — 83735 ASSAY OF MAGNESIUM: CPT

## 2024-10-01 PROCEDURE — 94640 AIRWAY INHALATION TREATMENT: CPT

## 2024-10-01 PROCEDURE — 99239 HOSP IP/OBS DSCHRG MGMT >30: CPT | Mod: GC,,, | Performed by: INTERNAL MEDICINE

## 2024-10-01 PROCEDURE — 36415 COLL VENOUS BLD VENIPUNCTURE: CPT

## 2024-10-01 PROCEDURE — 85025 COMPLETE CBC W/AUTO DIFF WBC: CPT

## 2024-10-01 PROCEDURE — 27000221 HC OXYGEN, UP TO 24 HOURS

## 2024-10-01 PROCEDURE — 63600175 PHARM REV CODE 636 W HCPCS

## 2024-10-01 PROCEDURE — 99900035 HC TECH TIME PER 15 MIN (STAT)

## 2024-10-01 PROCEDURE — 84100 ASSAY OF PHOSPHORUS: CPT

## 2024-10-01 PROCEDURE — 80053 COMPREHEN METABOLIC PANEL: CPT

## 2024-10-01 PROCEDURE — 25000003 PHARM REV CODE 250: Performed by: HOSPITALIST

## 2024-10-01 PROCEDURE — 25000242 PHARM REV CODE 250 ALT 637 W/ HCPCS: Performed by: STUDENT IN AN ORGANIZED HEALTH CARE EDUCATION/TRAINING PROGRAM

## 2024-10-01 PROCEDURE — 94761 N-INVAS EAR/PLS OXIMETRY MLT: CPT

## 2024-10-01 PROCEDURE — 25000003 PHARM REV CODE 250

## 2024-10-01 RX ORDER — POTASSIUM CHLORIDE 750 MG/1
30 CAPSULE, EXTENDED RELEASE ORAL ONCE
Status: COMPLETED | OUTPATIENT
Start: 2024-10-01 | End: 2024-10-01

## 2024-10-01 RX ORDER — EZETIMIBE 10 MG/1
10 TABLET ORAL DAILY
Qty: 90 TABLET | Refills: 3 | Status: SHIPPED | OUTPATIENT
Start: 2024-10-01 | End: 2025-10-01

## 2024-10-01 RX ADMIN — IPRATROPIUM BROMIDE AND ALBUTEROL SULFATE 3 ML: 2.5; .5 SOLUTION RESPIRATORY (INHALATION) at 04:10

## 2024-10-01 RX ADMIN — ASPIRIN 81 MG: 81 TABLET, COATED ORAL at 08:10

## 2024-10-01 RX ADMIN — IPRATROPIUM BROMIDE AND ALBUTEROL SULFATE 3 ML: 2.5; .5 SOLUTION RESPIRATORY (INHALATION) at 07:10

## 2024-10-01 RX ADMIN — MORPHINE SULFATE 2 MG: 2 INJECTION, SOLUTION INTRAMUSCULAR; INTRAVENOUS at 01:10

## 2024-10-01 RX ADMIN — TICAGRELOR 90 MG: 90 TABLET ORAL at 08:10

## 2024-10-01 RX ADMIN — ATORVASTATIN CALCIUM 80 MG: 40 TABLET, FILM COATED ORAL at 08:10

## 2024-10-01 RX ADMIN — IPRATROPIUM BROMIDE AND ALBUTEROL SULFATE 3 ML: 2.5; .5 SOLUTION RESPIRATORY (INHALATION) at 01:10

## 2024-10-01 RX ADMIN — MORPHINE SULFATE 2 MG: 2 INJECTION, SOLUTION INTRAMUSCULAR; INTRAVENOUS at 08:10

## 2024-10-01 RX ADMIN — ACETAMINOPHEN 650 MG: 325 TABLET ORAL at 12:10

## 2024-10-01 RX ADMIN — POTASSIUM CHLORIDE 30 MEQ: 750 CAPSULE, EXTENDED RELEASE ORAL at 08:10

## 2024-10-01 RX ADMIN — ACETAMINOPHEN 650 MG: 325 TABLET ORAL at 11:10

## 2024-10-01 RX ADMIN — METOPROLOL SUCCINATE 25 MG: 25 TABLET, EXTENDED RELEASE ORAL at 08:10

## 2024-10-01 RX ADMIN — MORPHINE SULFATE 2 MG: 2 INJECTION, SOLUTION INTRAMUSCULAR; INTRAVENOUS at 12:10

## 2024-10-01 RX ADMIN — GABAPENTIN 300 MG: 300 CAPSULE ORAL at 08:10

## 2024-10-01 NOTE — PLAN OF CARE
[facility]      HOME HEALTH ORDERS  FACE TO FACE ENCOUNTER    Patient Name: Radha Sotelo  YOB: 1945    PCP: Kalyn Pemberton NP   PCP Address: [address] [place] [address]  PCP Phone Number: [phone]  PCP Fax: [phone]    Encounter Date: 9/29/24    Admit to Home Health    Diagnoses:  Active Hospital Problems    Diagnosis  POA    *STEMI (ST elevation myocardial infarction) [I21.3]  Yes     Priority: 1 - High    Afib [I48.91]  Yes    Anemia [D64.9]  Yes    Peripheral arterial disease [I73.9]  Yes    Chronic diastolic heart failure [I50.32]  Yes    Soft tissue infection of R foot [L08.9]  Yes    HLD (hyperlipidemia) [E78.5]  Yes    Partial nontraumatic amputation of right foot [Z89.431]  Not Applicable    COPD (chronic obstructive pulmonary disease) [J44.9]  Yes    Ulcer of right foot [L97.519]  Yes    CAD (coronary artery disease) [I25.10]  Yes    Peripheral vascular disease, unspecified [I73.9]  Yes    Unspecified protein-calorie malnutrition [E46]  Yes      Resolved Hospital Problems   No resolved problems to display.       Follow Up Appointments:  Future Appointments   Date Time Provider Department Center   10/23/2024  9:15 AM [facility]   12/11/2024  8:00 AM Kalyn Pemberton NP [address]       Allergies:Review of patient's allergies indicates:  No Known Allergies    Medications: Review discharge medications with patient and family and provide education.    Current Facility-Administered Medications   Medication Dose Route Frequency Provider Last Rate Last Admin    acetaminophen tablet 650 mg  650 mg Oral Q4H PRN Brodie Ramirez MD   650 mg at 10/01/24 0040    albuterol-ipratropium 2.5 mg-0.5 mg/3 mL nebulizer solution 3 mL  3 mL Nebulization Q6H PRN Bethany Oneil MD        albuterol-ipratropium 2.5 mg-0.5 mg/3 mL nebulizer solution 3 mL  3 mL Nebulization Q4H Floridalma Shea MD   3 mL at  10/01/24 0736    aspirin EC tablet 81 mg  81 mg Oral Daily Bethany Oneil MD   81 mg at 10/01/24 0808    atorvastatin tablet 80 mg  80 mg Oral Daily Bethany Oneli MD   80 mg at 10/01/24 0808    cadexomer iodine 0.9 % gel   Topical (Top) Daily PRN Mackenzie Davis MD        dextrose 10% bolus 125 mL 125 mL  12.5 g Intravenous PRN Bethany Oneil MD        dextrose 10% bolus 250 mL 250 mL  25 g Intravenous PRN Bethany Oneil MD        enoxaparin injection 30 mg  30 mg Subcutaneous Q24H (prophylaxis, 1700) Jael Vasquez MD   30 mg at 09/30/24 1644    gabapentin capsule 300 mg  300 mg Oral BID Bethany Oneil MD   300 mg at 10/01/24 0808    glucagon (human recombinant) injection 1 mg  1 mg Intramuscular PRN Bethany Oneil MD        glucose chewable tablet 16 g  16 g Oral PRN Bethany Oneil MD        glucose chewable tablet 24 g  24 g Oral PRN Bethany Oneil MD        LIDOcaine HCL 4% external solution 4 mL  4 mL Topical (Top) PRN Jael Vasquez MD        metoprolol succinate (TOPROL-XL) 24 hr tablet 25 mg  25 mg Oral Daily Jael Vasquez MD   25 mg at 10/01/24 0808    morphine injection 2 mg  2 mg Intravenous Q4H PRN Bethany Oneil MD   2 mg at 10/01/24 0802    naloxone 0.4 mg/mL injection 0.02 mg  0.02 mg Intravenous PRN Bethany Oneil MD        nitroGLYCERIN SL tablet 0.4 mg  0.4 mg Sublingual Q5 Min PRN Bethany Oneil MD        ondansetron injection 4 mg  4 mg Intravenous Q6H PRN Bethany Oniel MD   4 mg at 09/29/24 1449    sodium chloride 0.9% flush 10 mL  10 mL Intravenous PRN Brodie Ramirez MD        sodium chloride 0.9% flush 10 mL  10 mL Intravenous Q12H PRN Bethany Oneil MD        ticagrelor tablet 90 mg  90 mg Oral BID Bethany Oneil MD   90 mg at 10/01/24 0808        Medication List        START taking these medications      empagliflozin 10 mg tablet  Commonly known as: JARDIANCE  Take 1 tablet (10 mg total) by mouth once daily.     ezetimibe 10 mg tablet  Commonly  known as: ZETIA  Take 1 tablet (10 mg total) by mouth once daily.     * ticagrelor 90 mg tablet  Commonly known as: BRILINTA  Take 1 tablet (90 mg total) by mouth 2 (two) times daily.     * ticagrelor 90 mg tablet  Commonly known as: BRILINTA  Take 1 tablet (90 mg total) by mouth 2 (two) times daily.           * This list has 2 medication(s) that are the same as other medications prescribed for you. Read the directions carefully, and ask your doctor or other care provider to review them with you.                CONTINUE taking these medications      albuterol-ipratropium 2.5 mg-0.5 mg/3 mL nebulizer solution  Commonly known as: DUO-NEB  Take 3 mLs by nebulization every 6 (six) hours as needed for Wheezing or Shortness of Breath.     aspirin 81 MG EC tablet  Commonly known as: ECOTRIN  Take 1 tablet (81 mg total) by mouth once daily.     atorvastatin 80 MG tablet  Commonly known as: LIPITOR  Take 1 tablet (80 mg total) by mouth once daily.     budesonide-formoterol 80-4.5 mcg 80-4.5 mcg/actuation Hfaa  Commonly known as: SYMBICORT  INHALE 2 PUFFS INTO THE LUNGS TWICE DAILY. RINSE MOUTH AFTER USE     furosemide 20 MG tablet  Commonly known as: LASIX  Take 1 tablet (20 mg total) by mouth daily as needed (edema/wt gain).     gabapentin 300 MG capsule  Commonly known as: NEURONTIN  Take 1 capsule (300 mg total) by mouth 2 (two) times daily.     metoprolol succinate 25 MG 24 hr tablet  Commonly known as: TOPROL-XL  Take 1 tablet (25 mg total) by mouth once daily.     nitroGLYCERIN 0.4 MG SL tablet  Commonly known as: NITROSTAT  Place 1 tablet (0.4 mg total) under the tongue every 5 (five) minutes as needed for Chest pain.     traMADoL 50 mg tablet  Commonly known as: ULTRAM  Take 1 tablet (50 mg total) by mouth every 8 (eight) hours as needed for Pain.            STOP taking these medications      clopidogreL 75 mg tablet  Commonly known as: PLAVIX                I have seen and examined this patient within the last 30  days. My clinical findings that support the need for the home health skilled services and home bound status are the following:no   Weakness/numbness causing balance and gait disturbance due to Coronary Heart Disease making it taxing to leave home.     Diet:   cardiac diet    Labs:  none    Referrals/ Consults  Physical Therapy to evaluate and treat. Evaluate for home safety and equipment needs; Establish/upgrade home exercise program. Perform / instruct on therapeutic exercises, gait training, transfer training, and Range of Motion.  Occupational Therapy to evaluate and treat. Evaluate home environment for safety and equipment needs. Perform/Instruct on transfers, ADL training, ROM, and therapeutic exercises.  Aide to provide assistance with personal care, ADLs, and vital signs.    Activities:   activity as tolerated    Nursing:   Agency to admit patient within 24 hours of hospital discharge unless specified on physician order or at patient request    SN to complete comprehensive assessment including routine vital signs. Instruct on disease process and s/s of complications to report to MD. Review/verify medication list sent home with the patient at time of discharge  and instruct patient/caregiver as needed. Frequency may be adjusted depending on start of care date.     Skilled nurse to perform up to 3 visits PRN for symptoms related to diagnosis    Notify MD if SBP > 160 or < 90; DBP > 90 or < 50; HR > 120 or < 50; Temp > 101; O2 < 88%;    Ok to schedule additional visits based on staff availability and patient request on consecutive days within the home health episode.    When multiple disciplines ordered:    Start of Care occurs on Sunday - Wednesday schedule remaining discipline evaluations as ordered on separate consecutive days following the start of care.    Thursday SOC -schedule subsequent evaluations Friday and Monday the following week.     Friday - Saturday SOC - schedule subsequent discipline  evaluations on consecutive days starting Monday of the following week.    For all post-discharge communication and subsequent orders please contact patient's primary care physician. If unable to reach primary care physician or do not receive response within 30 minutes, please contact for clinical staff order clarification    Miscellaneous   Colostomy Care:  Instruct patient/caregiver to empty bag when full and PRN., Change and clean site every 48 hours, and Monitor skin integrity.  Routine Skin for Bedridden Patients: Instruct patient/caregiver to apply moisture barrier cream to all skin folds and wet areas in perineal area daily and after baths and all bowel movements.  Home Oxygen:  No change, Oxygen at 2 L/min nasal canula to be used:  As needed for SOB., Assess oxygen saturation via pulse oximeter as needed for increase in SOB., and Notify physician if oxygen saturation less than 88%    Home Health Aide:  Nursing Three times weekly, Physical Therapy Three times weekly, Occupational Therapy Three times weekly, and Home Health Aide Three times weekly    Wound Care Orders  no    I certify that this patient is confined to her home and needs intermittent skilled nursing care, physical therapy, and occupational therapy.

## 2024-10-01 NOTE — PLAN OF CARE
10/01/24 0943   Post-Acute Status   Post-Acute Authorization Home Health   Home Health Status Referrals Sent     Orders sent via Careport for pt to resume care with [facility].      Evon Dickinson LMSW  [facility]  [id number]

## 2024-10-01 NOTE — PLAN OF CARE
[facility] - Cardiology Stepdown  Initial Discharge Assessment       Primary Care Provider: Kalyn Pemberton NP    Admission Diagnosis: STEMI (ST elevation myocardial infarction) [I21.3]  Chest pain [R07.9]    Admission Date: 9/29/2024  Expected Discharge Date: 10/1/2024    Transition of Care Barriers: Transportation    [facility] / Plan: [email or web address] / Product Type: Medicare Advantage /     Extended Emergency Contact Information  Primary Emergency Contact: Anthony Reardon  Mobile Phone: [phone]  Relation: Daughter   needed? No    Discharge Plan A: Home Health  Discharge Plan B: Home with family      [id number] [facility]. - [facility], [place] - [address] [facility]  [address] [facility] [address]  Phone: [phone] Fax: [phone]    [id number] - [place], [place] - [address] [facility]  [address] [facility] [address]  Phone: [phone] Fax: [phone]    [facility] [facility]. - [place], [place] - [address] [facility]  [address] [facility] [address]  Phone: [phone] Fax: [phone]    [id number] [address] AT [facility]  [address] [place] [address]  Phone: [phone] Fax: [phone]      Initial Assessment (most recent)       Adult Discharge Assessment - 10/01/24 0935          Discharge Assessment    Assessment Type Discharge Planning Assessment     Confirmed/corrected address, phone number and insurance Yes     Confirmed Demographics Correct on Facesheet     Source of Information patient;health record     Communicated FLORIAN with patient/caregiver Yes     Reason For Admission STEMI     People in Home grandchild(coral)     Facility Arrived From: Home     Do you expect to return to your current living situation? Yes     Do you have help at home or someone to help you manage your care at home? Yes     Who are your caregiver(s) and  "their phone number(s)? Daughter and granddaughter     Prior to hospitilization cognitive status: Alert/Oriented     Current cognitive status: Alert/Oriented     Walking or Climbing Stairs Difficulty yes     Walking or Climbing Stairs ambulation difficulty, requires equipment     Mobility Management walker, wheelchair     Dressing/Bathing Difficulty no     Equipment Currently Used at Home walker, rolling;shower chair;oxygen;wheelchair     Readmission within 30 days? Yes     Patient currently being followed by outpatient case management? No     Do you currently have service(s) that help you manage your care at home? Yes     Name and Contact number of agency [facility], [facility]     Is the pt/caregiver preference to resume services with current agency Yes     Do you take prescription medications? Yes     Do you have prescription coverage? Yes     Do you have any problems affording any of your prescribed medications? No     Is the patient taking medications as prescribed? yes     Who is going to help you get home at discharge? Medical transport     How do you get to doctors appointments? --   pt reported that she does not go to her doctor appts    Are you on dialysis? No     Do you take coumadin? No     Discharge Plan A Home Health     Discharge Plan B Home with family     DME Needed Upon Discharge  none     Discharge Plan discussed with: Patient     Transition of Care Barriers Transportation                     Readmission Assessment (most recent)       Readmission Assessment - 10/01/24 0939          Readmission    Was this a planned readmission? No     Why were you hospitalized in the last 30 days? NSTEMI     Why were you readmitted? Alarmed about signs/symptoms     When you left the hospital how did you feel? "Tired"     When you left the hospital where did you go? Home with Home Health     Did patient/caregiver refused recommended DC plan? No     Tell me about what happened between when you left the " hospital and the day you returned. Pt reported she was sitting on the couch when the chest pain started     When did you start not feeling well? One minute before calling EMS     Did you try to manage your symptoms your self? No     Did you call anyone? No     Did you try to see or did see a doctor or nurse before you came? No     Did you have  a follow-up appointment on discharge? Yes                    SW met with pt at bedside to discuss discharge planning.  Pt lives with her 18-year-old granddaughter and uses a walker and wheelchair for ambulation but is independent with ADLs.  Pt is current with [facility] and wants to resume with them.  PCP is Kalyn Pemberton NP.  Pt will need medical transportation with O2 at discharge.  When asked if she had a way to get into her house pt said she did and told SW not to worry about it.  Discharge Plan A and Plan B have been determined by review of patient's clinical status, future medical and therapeutic needs, and coverage/benefits for post-acute care in coordination with multidisciplinary team members.  LITO name and ext on whiteboard; discharge planning booklet provided.  Will continue to follow.      Evon Dickinson LMSW  [facility]  [id number]

## 2024-10-01 NOTE — PLAN OF CARE
"SUBJECTIVE Duane is a 57 y.o. male presenting for Annual Exam    His current/chronic health conditions include:  Patient Active Problem List   Diagnosis   • Lumbar spondylosis   • Vitamin D deficiency       Outpatient Medications Marked as Taking for the 12/14/21 encounter (Office Visit) with Mary Fuchs APRN   Medication Sig Dispense Refill   • acetaminophen (TYLENOL) 325 MG tablet Take 650 mg by mouth.     • cetirizine (zyrTEC) 5 MG tablet Take 5 mg by mouth.     • metaxalone (Skelaxin) 800 MG tablet Take 1 tablet by mouth 3 (Three) Times a Day As Needed (back pain). 60 tablet 0   • [DISCONTINUED] metaxalone (Skelaxin) 800 MG tablet Take 1 tablet by mouth 3 (Three) Times a Day As Needed (back pain). 60 tablet 0        He notes improvements in his back pain w/ Skelaxin. He went to PT and this was helpful also.    Screenings:  PSA: 0.7  Colon Cancer: Pt sts he has never had CRC screening. No FH of CRC.        Complaints today include:  He would like to discuss medication intervention for wgt loss. He has taken Phentermine in the past w/ good success.      The patient's allergies, current medications, problem list, past medical history, past family history, and past social history were reviewed and updated as appropriate.    Review of Systems   Musculoskeletal: Positive for back pain.   All other systems reviewed and are negative.      OBJECTIVE    Vitals:    12/14/21 1457   BP: 130/80   Pulse: 85   Resp: 16   Temp: 97.8 °F (36.6 °C)   SpO2: 98%   Weight: (!) 157 kg (345 lb 8 oz)   Height: 167.6 cm (65.98\")       BP Readings from Last 3 Encounters:   12/14/21 130/80   11/11/21 142/76   11/09/21 (!) 158/102       Wt Readings from Last 3 Encounters:   12/14/21 (!) 157 kg (345 lb 8 oz)   11/11/21 (!) 157 kg (347 lb)   11/09/21 (!) 156 kg (343 lb)       Body mass index is 55.79 kg/m².  Nursing notes and vital signs reviewed.    Physical Exam  Constitutional:       General: He is not in acute distress.     " Cardiology appointment scheduled for patient on October 9, 2024 @ 1:00pm.   Appearance: Normal appearance. He is well-developed.   HENT:      Head: Normocephalic.      Right Ear: Hearing, tympanic membrane, ear canal and external ear normal.      Left Ear: Hearing, tympanic membrane, ear canal and external ear normal.      Nose: Nose normal. No mucosal edema or rhinorrhea.      Mouth/Throat:      Mouth: Mucous membranes are moist.      Pharynx: Oropharynx is clear. Uvula midline.   Eyes:      General: Lids are normal.      Extraocular Movements: Extraocular movements intact.      Conjunctiva/sclera: Conjunctivae normal.      Pupils: Pupils are equal, round, and reactive to light.   Neck:      Thyroid: No thyroid mass or thyromegaly.   Cardiovascular:      Rate and Rhythm: Regular rhythm.      Pulses: Normal pulses.      Heart sounds: S1 normal and S2 normal. No murmur heard.  No friction rub. No gallop.    Pulmonary:      Effort: Pulmonary effort is normal.      Breath sounds: Normal breath sounds. No wheezing, rhonchi or rales.   Abdominal:      General: Bowel sounds are normal.      Palpations: Abdomen is soft.      Tenderness: There is no abdominal tenderness. There is no guarding.      Hernia: No hernia is present.   Musculoskeletal:         General: No deformity. Normal range of motion.      Cervical back: Normal range of motion and neck supple.   Lymphadenopathy:      Cervical: No cervical adenopathy.   Skin:     General: Skin is warm and dry.      Findings: No lesion or rash.   Neurological:      General: No focal deficit present.      Mental Status: He is alert and oriented to person, place, and time.      Cranial Nerves: No cranial nerve deficit.      Sensory: No sensory deficit.      Motor: Motor function is intact.      Coordination: Coordination is intact.      Gait: Gait normal.      Deep Tendon Reflexes: Reflexes are normal and symmetric.   Psychiatric:         Attention and Perception: He is attentive.         Mood and Affect: Mood and affect normal.         Speech: Speech  normal.         Behavior: Behavior normal.         Thought Content: Thought content normal.           Recent Results (from the past 672 hour(s))   CBC (No Diff)    Collection Time: 12/06/21  9:58 AM    Specimen: Blood   Result Value Ref Range    WBC 6.5 3.4 - 10.8 x10E3/uL    RBC 5.02 4.14 - 5.80 x10E6/uL    Hemoglobin 14.9 13.0 - 17.7 g/dL    Hematocrit 42.6 37.5 - 51.0 %    MCV 85 79 - 97 fL    MCH 29.7 26.6 - 33.0 pg    MCHC 35.0 31.5 - 35.7 g/dL    RDW 12.1 11.6 - 15.4 %    Platelets 258 150 - 450 x10E3/uL   Comprehensive Metabolic Panel    Collection Time: 12/06/21  9:58 AM    Specimen: Blood   Result Value Ref Range    Glucose 92 65 - 99 mg/dL    BUN 9 6 - 24 mg/dL    Creatinine 0.77 0.76 - 1.27 mg/dL    eGFR Non African Am 101 >59 mL/min/1.73    eGFR African Am 117 >59 mL/min/1.73    BUN/Creatinine Ratio 12 9 - 20    Sodium 141 134 - 144 mmol/L    Potassium 5.4 (H) 3.5 - 5.2 mmol/L    Chloride 103 96 - 106 mmol/L    Total CO2 24 20 - 29 mmol/L    Calcium 9.0 8.7 - 10.2 mg/dL    Total Protein 7.0 6.0 - 8.5 g/dL    Albumin 4.3 3.8 - 4.9 g/dL    Globulin 2.7 1.5 - 4.5 g/dL    A/G Ratio 1.6 1.2 - 2.2    Total Bilirubin 0.6 0.0 - 1.2 mg/dL    Alkaline Phosphatase 65 44 - 121 IU/L    AST (SGOT) 16 0 - 40 IU/L    ALT (SGPT) 13 0 - 44 IU/L   Folate    Collection Time: 12/06/21  9:58 AM    Specimen: Blood   Result Value Ref Range    Folate >20.0 >3.0 ng/mL   Lipid Panel With / Chol / HDL Ratio    Collection Time: 12/06/21  9:58 AM    Specimen: Blood   Result Value Ref Range    Total Cholesterol 175 100 - 199 mg/dL    Triglycerides 93 0 - 149 mg/dL    HDL Cholesterol 57 >39 mg/dL    VLDL Cholesterol Bud 17 5 - 40 mg/dL    LDL Chol Calc ([facility]) 101 (H) 0 - 99 mg/dL    Chol/HDL Ratio 3.1 0.0 - 5.0 ratio   TSH    Collection Time: 12/06/21  9:58 AM    Specimen: Blood   Result Value Ref Range    TSH 0.557 0.450 - 4.500 uIU/mL   Vitamin B12    Collection Time: 12/06/21  9:58 AM    Specimen: Blood   Result Value Ref Range     Vitamin B-12 910 232 - 1,245 pg/mL   Vitamin D 25 Hydroxy    Collection Time: 12/06/21  9:58 AM    Specimen: Blood   Result Value Ref Range    25 Hydroxy, Vitamin D 12.6 (L) 30.0 - 100.0 ng/mL   PSA Screen    Collection Time: 12/06/21  9:58 AM    Specimen: Blood   Result Value Ref Range    PSA 0.7 0.0 - 4.0 ng/mL   Urine Drug Screen - Urine, Clean Catch    Collection Time: 12/06/21  9:58 AM    Specimen: Urine, Clean Catch   Result Value Ref Range    Amphetamine, Urine Qual Negative Fqfath=6838 ng/mL    Barbiturates Screen, Urine Negative Qmhswv=298 ng/mL    Benzodiazepine Screen, Urine Negative Qtrqui=866 ng/mL    THC Screen, Urine Negative Cutoff=20 ng/mL    Cocaine Screen, Urine Negative Qilihp=915 ng/mL    Opiate Screen, Urine Negative Gdvorq=025 ng/mL    Oxycodone/Oxymorphone, Urine Negative Hsxzhr=156 ng/mL    Phencyclidine (PCP), Urine Negative Cutoff=25 ng/mL    Methadone Screen, Urine Negative Xvqspl=358 ng/mL    Propoxyphene Screen Negative Rztwxv=682 ng/mL    Creatinine, Urine 200.8 20.0 - 300.0 mg/dL    pH, UA 7.9 4.5 - 8.9    Please note Comment        ECG 12 Lead    Date/Time: 12/14/2021 3:13 PM  Performed by: Carole Hurst MA  Authorized by: Mary Fuchs APRN   Rhythm: sinus rhythm  BPM: 88  Conduction: conduction normal  ST Segments: ST segments normal  T Waves: T waves normal  QRS axis: normal  Other: no other findings    Clinical impression: normal ECG              ASSESSMENT AND PLAN    Diagnoses and all orders for this visit:    1. Encounter for wellness examination in adult (Primary)    2. Screening for malignant neoplasm of colon  -     Cologuard - Stool, Per Rectum; Future    3. Vitamin D deficiency  -     vitamin D (ERGOCALCIFEROL) 1.25 MG (47028 UT) capsule capsule; Take 1 capsule by mouth 1 (One) Time Per Week.  Dispense: 15 capsule; Refill: 3    4. Morbid (severe) obesity due to excess calories (HCC)  -     phentermine (ADIPEX-P) 37.5 MG tablet; Take 1 tablet by mouth Every  Morning Before Breakfast.  Dispense: 30 tablet; Refill: 0    5. DDD (degenerative disc disease), lumbar  -     metaxalone (Skelaxin) 800 MG tablet; Take 1 tablet by mouth 3 (Three) Times a Day As Needed (back pain).  Dispense: 60 tablet; Refill: 0    Other orders  -     ECG 12 Lead          Preventative counseling completed including relevant screenings, appropriate vaccinations, healthy nutrition, and appropriate physical activity.  Patient's Body mass index is 55.79 kg/m². indicating that he is morbidly obese (BMI > 40 or > 35 with obesity - related health condition). Obesity-related health conditions include the following: none. Obesity is unchanged. BMI is is above average; BMI management plan is completed. We discussed low calorie, low carb based diet program, portion control, increasing exercise and pharmacologic options including Phentermine..      Encouraged COVID booster.      Medications, including side effects, were discussed with the patient. Patient verbalized understanding.  The plan of care was discussed. All questions were answered. Patient verbalized understanding.        Return in about 4 weeks (around 1/11/2022)., or sooner if needed.

## 2024-10-01 NOTE — PLAN OF CARE
VSS on 2L NC, BP soft with MAP>65. Denies CP, has SOB/WOLFE. Ostomy bag changed by pt, site has some bleeding, pending ostomy c/s. PRN pain meds given for RLE pain with good results. R groin site w/o bleeding or hematoma. Purewick in place. Pt educated all precautions   Pt's daughter Anthony would like to be involved in d/c planning. Will inform day RN    Problem: Adult Inpatient Plan of Care  Goal: Absence of Hospital-Acquired Illness or Injury  Outcome: Progressing     Problem: Wound  Goal: Optimal Coping  Outcome: Progressing  Goal: Absence of Infection Signs and Symptoms  Outcome: Progressing

## 2024-10-01 NOTE — PLAN OF CARE
10/01/24 1646   Post-Acute Status   Post-Acute Authorization Home Health   Home Health Status Set-up Complete/Auth obtained     Pt accepted to resume care with [facility].      Evon Dickinson LMSW  [facility]  [id number]

## 2024-10-01 NOTE — PLAN OF CARE
[facility] - Cardiology Stepdown  Discharge Final Note    Primary Care Provider: Kalyn Pemberton NP    Expected Discharge Date: 10/1/2024    Final Discharge Note (most recent)       Final Note - 10/01/24 1745          Final Note    Assessment Type Final Discharge Note     Anticipated Discharge Disposition Home-Health Care [facility]     What phone number can be called within the next 1-3 days to see how you are doing after discharge? [phone]     Hospital Resources/Appts/Education Provided Provided patient/caregiver with written discharge plan information;Appointments scheduled and added to AVS        Post-Acute Status    Post-Acute Authorization Home Health     Home Health Status Set-up Complete/Auth obtained     Patient choice form signed by patient/caregiver List with quality metrics by geographic area provided     Discharge Delays None known at this time                     Important Message from Medicare  Important Message from Medicare regarding Discharge Appeal Rights: Explained to patient/caregiver, Signed/date by patient/caregiver     Date IMM was signed: 10/01/24  Time IMM was signed: 1408      On call SW asked to aide in patients discharge. Patient to discharge to home this evening and continue services with [facility]. Stretcher ordered for patients discharge due to requiring oxygen during transport and patient unable to get up 10 steps to enter into home.    No other case management needs at this time.     .  Future Appointments   Date Time Provider Department Center   10/9/2024  1:00 PM Cristino Garcia MD [facility] CARDIO [facility]   10/23/2024  9:15 AM Lexie Cazares DPM [facility] POD [facility]   12/11/2024  8:00 AM Kalyn Pemberton NP [address]     TIGRE [place]  [facility]   Ext: [phone]

## 2024-10-01 NOTE — DISCHARGE SUMMARY
[facility] - [facility]iology Stepdown  Cardiology  Discharge Summary      Patient Name: Radha Sotelo  MRN: [id number]  Admission Date: 9/29/2024  Hospital Length of Stay: 2 days  Discharge Date and Time:  10/01/2024 9:23 AM  Attending Physician: Mackenzie Davis MD    Discharging Provider: Jael Vasquez MD  Primary Care Physician: Kalyn Pemberton NP    HPI:   Mrs Sotelo is a 79-year-old female with past medical history of COPD, depression, diverticulitis, HLD, PVD, S/p colostomy, transmetatarsal amputation of right foot, chronic diastolic heart failure, tobacco and heavy alcohol use, and CAD status post PCI in November 20, 2023 and May 20, 2024 for proximal RCA stenosis presents with chest pain that started at 11:30 am on 9/29. EKG shows inferior ST segment elevation in inferior leads. The pain is substernal and constant. She report Chest pain to be 10/10 in the ED. She reports her chest pain being similar to the pain she had back in May. She endorses associated nausea no vomiting. She also has complaints of R foot pain, she had previous metatarsal amputation and has ongoing foot wound. She reports she is supposed to start antibiotics tomorrow.     In the ED: patient received benadryl, morphine, loaded with Brilinta, and had a bedside echo. She was taken to the cath lab since the EKG shows inferior STEMI. POCUS shows inferior wall hypokinesis.           Procedure(s) (LRB):  ANGIOGRAM, CORONARY ARTERY (N/A)  Stent, Drug Eluting, Single Vessel, Coronary     Indwelling Lines/Drains at time of discharge:  Lines/Drains/Airways       Drain  Duration                  Colostomy 07/13/21 0201 Descending/sigmoid LLQ 1176 days                    Hospital Course:  She presented to the ED with chest pain, EKG shows Inferior STEMI received Morphine, Nitro, and Brilinta load and was taken to the Cath lab  on 9/29. It showed the RPDA lesion was 100% stenosed. (Chronic, unchnaged form prior angiogram). The Mid RCA  lesion was 99% stenosed  with 0% stenosis post-intervention. Mid RCA stent was successfully placed for the lesion. Afib provoked by the MI but has converted back to Sinus and will be given an event monitor. Follow ups with podiatry and cardiology outpatient, and continue HH.      Physical Exam  Constitutional:       General: She is not in acute distress.     Appearance: She is underweight. She is normal appearing   HENT:      Head: Atraumatic.      Right Ear: Tympanic membrane normal.      Left Ear: Tympanic membrane normal.      Nose: Nose normal.      Mouth/Throat:      Mouth: Mucous membranes are moist.   Eyes:      Extraocular Movements: Extraocular movements intact.      Pupils: Pupils are equal, round, and reactive to light.   Cardiovascular:      Rate and Rhythm: Normal rate and regular rhythm.      Heart sounds: Normal heart sounds.   Pulmonary:      Effort: Pulmonary effort is normal. No respiratory distress.      Breath sounds: Wheezing present.   Abdominal:      Palpations: Abdomen is soft.   Musculoskeletal:         General: No swelling. Normal range of motion.      Cervical back: Normal range of motion.      Right lower leg: No edema.      Left lower leg: No edema.      Comments: R foot dressing in place, tenderness to light palpation, dressing is c/d/i   Skin:     General: Skin is warm.   Neurological:      General: No focal deficit present.      Mental Status: She is alert and oriented to person, place, and time.     Goals of Care Treatment Preferences:  Code Status: Full Code          What is most important right now is to focus on extending life as long as possible, even it it means sacrificing quality.  Accordingly, we have decided that the best plan to meet the patient's goals includes continuing with treatment.      Consults:   Consults (From admission, onward)          Status Ordering Provider     Inpatient consult to Podiatry  Once        Provider:  (Not yet assigned)    [facility]  WERO     Inpatient consult to Registered Dietitian/Nutritionist  Once        Provider:  (Not yet assigned)    Completed TINA TANNER            Significant Diagnostic Studies: Labs: CMP   Recent Labs   Lab 09/29/24  1953 09/30/24  0320 10/01/24  0409    139 139   K 3.9 4.2 3.6    110 110   CO2 22* 23 25   GLU 93 89 110   BUN 17 18 19   CREATININE 1.0 1.1 0.8   CALCIUM 8.1* 8.2* 8.5*   PROT 4.9* 5.4* 5.2*   ALBUMIN 2.7* 2.9* 2.8*   BILITOT 0.4 0.5 0.4   ALKPHOS 92 99 92   AST 21 28 24   ALT 54* 55* 40   ANIONGAP 8 6* 4*    and CBC   Recent Labs   Lab 09/29/24  1454 09/30/24  0320 10/01/24  0409   WBC 10.83 8.10 6.64   HGB 9.3* 8.8* 8.4*   HCT 30.4* 30.4* 29.0*    213 182       Pending Diagnostic Studies:       None            Final Active Diagnoses:    Diagnosis Date Noted POA    PRINCIPAL PROBLEM:  STEMI (ST elevation myocardial infarction) [I21.3] 12/12/2023 Yes    Afib [I48.91] 09/30/2024 Yes    Anemia [D64.9] 07/29/2024 Yes    Peripheral arterial disease [I73.9] 07/27/2024 Yes    Chronic diastolic heart failure [I50.32] 11/29/2023 Yes    Soft tissue infection of R foot [L08.9] 08/11/2023 Yes    HLD (hyperlipidemia) [E78.5] 07/12/2021 Yes    Partial nontraumatic amputation of right foot [Z89.431] 07/12/2021 Not Applicable    COPD (chronic obstructive pulmonary disease) [J44.9]  Yes    Ulcer of right foot [L97.519] 03/23/2013 Yes    CAD (coronary artery disease) [I25.10] 03/22/2013 Yes    Peripheral vascular disease, unspecified [I73.9] 11/12/2012 Yes    Unspecified protein-calorie malnutrition [E46] 11/04/2012 Yes      Problems Resolved During this Admission:     No new Assessment & Plan notes have been filed under this hospital service since the last note was generated.  Service: Cardiology      Discharged Condition: stable    Disposition: Home or Self Care    Follow Up:    Patient Instructions:      LIPID PANEL   Standing Status: Future Standing Exp. Date: 12/30/25     COMPREHENSIVE  METABOLIC PANEL   Standing Status: Future Standing Exp. Date: 12/30/25     Ambulatory referral/consult to Cardiology   Standing Status: Future   Referral Priority: Routine Referral Type: Consultation   Referral Reason: Specialty Services Required   Requested Specialty: Cardiology   Number of Visits Requested: 1     Diet Cardiac     Cardiac rehab phase II   Standing Status: Future Standing Exp. Date: 09/29/25     Order Specific Question Answer Comments   Department [facility] CARDIAC REHAB      Cardiac event monitor   Standing Status: Future Standing Exp. Date: 09/30/25     Order Specific Question Answer Comments   Cardiac Event Monitor Auto Trigger    Release to patient Immediate      Medications:  Reconciled Home Medications:      Medication List        START taking these medications      empagliflozin 10 mg tablet  Commonly known as: JARDIANCE  Take 1 tablet (10 mg total) by mouth once daily.     ezetimibe 10 mg tablet  Commonly known as: ZETIA  Take 1 tablet (10 mg total) by mouth once daily.     * ticagrelor 90 mg tablet  Commonly known as: BRILINTA  Take 1 tablet (90 mg total) by mouth 2 (two) times daily.     * ticagrelor 90 mg tablet  Commonly known as: BRILINTA  Take 1 tablet (90 mg total) by mouth 2 (two) times daily.           * This list has 2 medication(s) that are the same as other medications prescribed for you. Read the directions carefully, and ask your doctor or other care provider to review them with you.                CONTINUE taking these medications      albuterol-ipratropium 2.5 mg-0.5 mg/3 mL nebulizer solution  Commonly known as: DUO-NEB  Take 3 mLs by nebulization every 6 (six) hours as needed for Wheezing or Shortness of Breath.     aspirin 81 MG EC tablet  Commonly known as: ECOTRIN  Take 1 tablet (81 mg total) by mouth once daily.     atorvastatin 80 MG tablet  Commonly known as: LIPITOR  Take 1 tablet (80 mg total) by mouth once daily.     budesonide-formoterol 80-4.5 mcg 80-4.5 mcg/actuation  Hfaa  Commonly known as: SYMBICORT  INHALE 2 PUFFS INTO THE LUNGS TWICE DAILY. RINSE MOUTH AFTER USE     furosemide 20 MG tablet  Commonly known as: LASIX  Take 1 tablet (20 mg total) by mouth daily as needed (edema/wt gain).     gabapentin 300 MG capsule  Commonly known as: NEURONTIN  Take 1 capsule (300 mg total) by mouth 2 (two) times daily.     metoprolol succinate 25 MG 24 hr tablet  Commonly known as: TOPROL-XL  Take 1 tablet (25 mg total) by mouth once daily.     nitroGLYCERIN 0.4 MG SL tablet  Commonly known as: NITROSTAT  Place 1 tablet (0.4 mg total) under the tongue every 5 (five) minutes as needed for Chest pain.     traMADoL 50 mg tablet  Commonly known as: ULTRAM  Take 1 tablet (50 mg total) by mouth every 8 (eight) hours as needed for Pain.            STOP taking these medications      clopidogreL 75 mg tablet  Commonly known as: PLAVIX              Time spent on the discharge of patient: 45 minutes    Jael Vasquez MD  Cardiology  [facility] - Cardiology Stepdown

## 2024-10-03 ENCOUNTER — HOSPITAL ENCOUNTER (INPATIENT)
Facility: HOSPITAL | Age: 79
LOS: 1 days | Discharge: HOME HEALTH | DRG: 280 | End: 2024-10-05
Attending: EMERGENCY MEDICINE | Admitting: STUDENT IN AN ORGANIZED HEALTH CARE EDUCATION/TRAINING PROGRAM
Payer: MEDICARE

## 2024-10-03 ENCOUNTER — CARE AT HOME (OUTPATIENT)
Dept: HOME HEALTH SERVICES | Facility: CLINIC | Age: 79
End: 2024-10-03
Payer: MEDICARE

## 2024-10-03 VITALS
DIASTOLIC BLOOD PRESSURE: 62 MMHG | SYSTOLIC BLOOD PRESSURE: 124 MMHG | RESPIRATION RATE: 16 BRPM | HEART RATE: 90 BPM | OXYGEN SATURATION: 97 %

## 2024-10-03 DIAGNOSIS — I25.2 HISTORY OF ST ELEVATION MYOCARDIAL INFARCTION (STEMI): ICD-10-CM

## 2024-10-03 DIAGNOSIS — R06.02 SOB (SHORTNESS OF BREATH): ICD-10-CM

## 2024-10-03 DIAGNOSIS — J96.11 CHRONIC HYPOXIC RESPIRATORY FAILURE: Primary | ICD-10-CM

## 2024-10-03 DIAGNOSIS — R07.9 CHEST PAIN: ICD-10-CM

## 2024-10-03 DIAGNOSIS — I70.25 ATHEROSCLEROSIS OF NATIVE ARTERIES OF THE EXTREMITIES WITH ULCERATION: ICD-10-CM

## 2024-10-03 DIAGNOSIS — M79.673 FOOT PAIN: ICD-10-CM

## 2024-10-03 DIAGNOSIS — I25.10 CORONARY ARTERY DISEASE INVOLVING NATIVE CORONARY ARTERY OF NATIVE HEART, UNSPECIFIED WHETHER ANGINA PRESENT: ICD-10-CM

## 2024-10-03 DIAGNOSIS — I50.9 HEART FAILURE: ICD-10-CM

## 2024-10-03 DIAGNOSIS — M79.606 LEG PAIN: ICD-10-CM

## 2024-10-03 DIAGNOSIS — I50.31 ACUTE HEART FAILURE WITH PRESERVED EJECTION FRACTION (HFPEF): ICD-10-CM

## 2024-10-03 DIAGNOSIS — I50.33 ACUTE ON CHRONIC DIASTOLIC HEART FAILURE: Primary | ICD-10-CM

## 2024-10-03 PROCEDURE — 93005 ELECTROCARDIOGRAM TRACING: CPT

## 2024-10-03 PROCEDURE — 83880 ASSAY OF NATRIURETIC PEPTIDE: CPT | Performed by: EMERGENCY MEDICINE

## 2024-10-03 PROCEDURE — 86140 C-REACTIVE PROTEIN: CPT | Performed by: EMERGENCY MEDICINE

## 2024-10-03 PROCEDURE — 87040 BLOOD CULTURE FOR BACTERIA: CPT | Mod: 59 | Performed by: EMERGENCY MEDICINE

## 2024-10-03 PROCEDURE — 94761 N-INVAS EAR/PLS OXIMETRY MLT: CPT

## 2024-10-03 PROCEDURE — 84484 ASSAY OF TROPONIN QUANT: CPT | Performed by: EMERGENCY MEDICINE

## 2024-10-03 PROCEDURE — 27000221 HC OXYGEN, UP TO 24 HOURS

## 2024-10-03 PROCEDURE — 85025 COMPLETE CBC W/AUTO DIFF WBC: CPT | Performed by: EMERGENCY MEDICINE

## 2024-10-03 PROCEDURE — 80053 COMPREHEN METABOLIC PANEL: CPT | Performed by: EMERGENCY MEDICINE

## 2024-10-03 PROCEDURE — 93010 ELECTROCARDIOGRAM REPORT: CPT | Mod: 76,,, | Performed by: INTERNAL MEDICINE

## 2024-10-03 RX ORDER — MORPHINE SULFATE 2 MG/ML
2 INJECTION, SOLUTION INTRAMUSCULAR; INTRAVENOUS
Status: COMPLETED | OUTPATIENT
Start: 2024-10-03 | End: 2024-10-04

## 2024-10-03 NOTE — PROGRESS NOTES
[facility]  Transitional Care Management (TCM) Home Visit    Encounter Provider: Kalyn Pemberton   PCP: Kalyn Pemberton, NP  Consult Requested By: No ref. provider found  Admit Date: 9/29/24   IP Discharge Date: 10/1/24  Hospital Length of Stay: 2 days  Days since discharge (from IP or SNF): 2 days  [facility] On Call Contact Note: NA  Hospital Diagnosis: No admission diagnoses are documented for this encounter.     HISTORY OF PRESENT ILLNESS      Patient ID: Radha Sotelo is a 79 y.o. female was recently admitted to the hospital, this is their TCM encounter.    Hospital Course Synopsis:    Mrs Sotelo is a 79-year-old female with past medical history of COPD, depression, diverticulitis, HLD, PVD, S/p colostomy, transmetatarsal amputation of right foot, chronic diastolic heart failure, tobacco and heavy alcohol use, and CAD status post PCI in November 20, 2023 and May 20, 2024 for proximal RCA stenosis presents with chest pain that started at 11:30 am on 9/29. EKG shows inferior ST segment elevation in inferior leads. The pain is substernal and constant. She report Chest pain to be 10/10 in the ED. She reports her chest pain being similar to the pain she had back in May. She endorses associated nausea no vomiting. She also has complaints of R foot pain, she had previous metatarsal amputation and has ongoing foot wound. She reports she is supposed to start antibiotics tomorrow.     Procedure(s) (LRB):  ANGIOGRAM, CORONARY ARTERY (N/A)  Stent, Drug Eluting, Single Vessel, Coronary     Today, she is found in her home. AAOX3 reports she feeling better. No further chest pain since her discharge. She does have her NTG available if needed. Reports her breathing is at her baseline. Using oxygen at 3L.  Home health and wound management have called this am to resume care of foot wound. Dressing intact.  She has the 3 new medications started at [facility] and is taking all of them. She is unsure why Jardiance was started.  Reports she is not a diabetic but they always think I am.      DECISION MAKING TODAY       Assessment & Plan:  1. Chronic hypoxic respiratory failure  Assessment & Plan:  - continue home O2 of 3 L  - titrate O2 for sats 88-92%  - continue inhalers and scheduled DuoNebs      2. Atherosclerosis of native arteries of the extremities with ulceration  Overview:  Dx updated per 2019 IMO Load    Assessment & Plan:  Non-healing incision to amputation  Declined AKA  Wound care in place via HH to resume today      3. Coronary artery disease involving native coronary artery of native heart, unspecified whether angina present  Assessment & Plan:  Per recent admit:  Patient with known CAD s/p stent placement and CABG. Multi vessel CAD. Recent cath with stent placement 05/27.  Will continue ASA, Plavix, and Statin and monitor for S/Sx of angina/ACS. Continue to monitor on telemetry.     No angina today  Brilinta and Zetia started as ordered  All meds in place           Medication List on Discharge:     Medication List            Accurate as of October 3, 2024  9:59 PM. If you have any questions, ask your nurse or doctor.                CONTINUE taking these medications      albuterol-ipratropium 2.5 mg-0.5 mg/3 mL nebulizer solution  Commonly known as: DUO-NEB  Take 3 mLs by nebulization every 6 (six) hours as needed for Wheezing or Shortness of Breath.     aspirin 81 MG EC tablet  Commonly known as: ECOTRIN  Take 1 tablet (81 mg total) by mouth once daily.     atorvastatin 80 MG tablet  Commonly known as: LIPITOR  Take 1 tablet (80 mg total) by mouth once daily.     BRILINTA 90 mg tablet  Generic drug: ticagrelor  Take 1 tablet (90 mg total) by mouth 2 (two) times daily.     budesonide-formoterol 80-4.5 mcg 80-4.5 mcg/actuation Hfaa  Commonly known as: SYMBICORT  INHALE 2 PUFFS INTO THE LUNGS TWICE DAILY. RINSE MOUTH AFTER USE     ezetimibe 10 mg tablet  Commonly known as: ZETIA  Take 1 tablet (10 mg total) by mouth once  daily.     furosemide 20 MG tablet  Commonly known as: LASIX  Take 1 tablet (20 mg total) by mouth daily as needed (edema/wt gain).     gabapentin 300 MG capsule  Commonly known as: NEURONTIN  Take 1 capsule (300 mg total) by mouth 2 (two) times daily.     JARDIANCE 10 mg tablet  Generic drug: empagliflozin  Take 1 tablet (10 mg total) by mouth once daily.     metoprolol succinate 25 MG 24 hr tablet  Commonly known as: TOPROL-XL  Take 1 tablet (25 mg total) by mouth once daily.     nitroGLYCERIN 0.4 MG SL tablet  Commonly known as: NITROSTAT  Place 1 tablet (0.4 mg total) under the tongue every 5 (five) minutes as needed for Chest pain.     traMADoL 50 mg tablet  Commonly known as: ULTRAM  Take 1 tablet (50 mg total) by mouth every 8 (eight) hours as needed for Pain.              Medication Reconciliation:  Were medications changed on discharge? Yes  Were medications in the home? Yes  Is the patient taking the medications as directed? Yes  Does the patient understand the medications and changes? Yes  Does updated med list accurately reflects meds patient is currently taking? Yes    ENVIRONMENT OF CARE      Family and/or Caregiver present at visit?  No  Name of Caregiver:   History provided by: patient    Advance Care Planning   Advanced Care Planning Status:  Patient has had an ACP conversation  Living Will: No  Power of : No  [place]: No    Does Caregiver have HCPoA: No  Changes today:   Is patient hospice appropriate: Yes  (If needed, use PPS <30 or FAST score >7)  Was referral to hospice placed: No       Impression upon entering the home:  Physical Dwelling: apartment/condo   Appearance of home environment: cleaniness: clean  Functional Status: minimal assistance  Mobility: ambulatory with device  Nutritional access: adequate intake and access  Home Health: Yes,  [facility]    DME/Supplies: oxygen and wheelchair     Diagnostic tests reviewed/disposition: No diagnosic tests pending after this  hospitalization.  Disease/illness education: CAD  Establishment or re-establishment of referral orders for community resources: No other necessary community resources.   Discussion with other health care providers: No discussion with other health care providers necessary.   Does patient have a PCP at [facility]? Yes   Repatriation plan with PCP? Care at Home reason: transportation   Does patient have an ostomy (ileostomy, colostomy, suprapubic catheter, nephrostomy tube, tracheostomy, PEG tube, pleurex catheter, cholecystostomy, etc)? No  Were BPAs reviewed? Yes    Social History     Socioeconomic History    Marital status:    Tobacco Use    Smoking status: Every Day     Current packs/day: 0.25     Average packs/day: 0.5 packs/day for 61.2 years (30.2 ttl pk-yrs)     Types: Cigarettes     Start date: 7/5/1963    Smokeless tobacco: Never   Substance and Sexual Activity    Alcohol use: No    Drug use: No    Sexual activity: Not Currently   Social History Narrative    ** Merged History Encounter **          Social Drivers of Health     Financial Resource Strain: Medium Risk (10/4/2024)    Overall Financial Resource Strain (CARDIA)     Difficulty of Paying Living Expenses: Somewhat hard   Food Insecurity: No Food Insecurity (10/4/2024)    Hunger Vital Sign     Worried About Running Out of Food in the Last Year: Never true     Ran Out of Food in the Last Year: Never true   Transportation Needs: No Transportation Needs (10/4/2024)    TRANSPORTATION NEEDS     Transportation : No   Recent Concern: Transportation Needs - Unmet Transportation Needs (9/30/2024)    TRANSPORTATION NEEDS     Transportation : Yes, it has kept me from medical appointments or from getting my medications.   Physical Activity: Inactive (10/4/2024)    Exercise Vital Sign     Days of Exercise per Week: 0 days     Minutes of Exercise per Session: 0 min   Stress: Stress Concern Present (10/4/2024)    [employer] - Occupational  Stress Questionnaire     Feeling of Stress : To some extent   Housing Stability: Low Risk  (10/4/2024)    Housing Stability Vital Sign     Unable to Pay for Housing in the Last Year: No     Homeless in the Last Year: No       OBJECTIVE:     Vital Signs:  Vitals:    10/03/24 0934   BP: 124/62   Pulse: 90   Resp: 16       Review of Systems   Constitutional:  Negative for activity change, fatigue and fever.   HENT: Negative.     Eyes: Negative.    Respiratory:  Positive for shortness of breath (at baseline). Negative for chest tightness.    Cardiovascular: Negative.  Negative for leg swelling.   Gastrointestinal: Negative.    Musculoskeletal:  Positive for gait problem.   Skin:  Positive for wound (to right foot).   Hematological: Negative.    Psychiatric/Behavioral: Negative.  Negative for agitation.    All other systems reviewed and are negative.      Physical Exam:  Physical Exam  Vitals reviewed.   Constitutional:       General: She is not in acute distress.     Appearance: She is well-developed. She is ill-appearing.   HENT:      Head: Normocephalic and atraumatic.      Nose: Nose normal.      Mouth/Throat:      Mouth: Mucous membranes are dry.   Eyes:      Pupils: Pupils are equal, round, and reactive to light.   Cardiovascular:      Rate and Rhythm: Normal rate and regular rhythm.      Heart sounds: Normal heart sounds.   Pulmonary:      Effort: Pulmonary effort is normal.      Comments: Diminished throughout  No wheezes noted  Oxygen in use via NC  Abdominal:      General: Bowel sounds are normal.      Palpations: Abdomen is soft.   Musculoskeletal:         General: Normal range of motion.      Cervical back: Normal range of motion and neck supple.   Skin:     General: Skin is warm and dry.   Neurological:      Mental Status: She is alert and oriented to person, place, and time.      Cranial Nerves: No cranial nerve deficit.   Psychiatric:         Behavior: Behavior normal.         Thought Content: Thought  content normal.         Judgment: Judgment normal.         INSTRUCTIONS FOR PATIENT:     Scheduled Follow-up, Appts Reviewed with Modifications if Needed: Yes  Future Appointments   Date Time Provider Department Center   10/9/2024  1:00 PM Cristino Garcia MD [facility] C[facility]   10/23/2024  9:15 AM Lexie Cazares, [facility]         Signature: Kalyn Pemberton NP    Transition of Care Visit:  I have reviewed and updated the history and problem list.  I have reconciled the medication list.  I have discussed the hospitalization and current medical issues, prognosis and plans with the patient/family.

## 2024-10-04 ENCOUNTER — DOCUMENTATION ONLY (OUTPATIENT)
Dept: CARDIOLOGY | Facility: CLINIC | Age: 79
End: 2024-10-04
Payer: MEDICARE

## 2024-10-04 DIAGNOSIS — R06.02 SOB (SHORTNESS OF BREATH): Primary | ICD-10-CM

## 2024-10-04 PROBLEM — E87.5 HYPERKALEMIA: Status: RESOLVED | Noted: 2024-07-30 | Resolved: 2024-10-04

## 2024-10-04 PROBLEM — I25.2 HISTORY OF ST ELEVATION MYOCARDIAL INFARCTION (STEMI): Status: ACTIVE | Noted: 2024-10-04

## 2024-10-04 PROBLEM — I50.31 ACUTE HEART FAILURE WITH PRESERVED EJECTION FRACTION (HFPEF): Status: ACTIVE | Noted: 2024-10-04

## 2024-10-04 PROBLEM — I21.3 STEMI (ST ELEVATION MYOCARDIAL INFARCTION): Status: RESOLVED | Noted: 2023-12-12 | Resolved: 2024-10-04

## 2024-10-04 PROBLEM — I50.33 ACUTE ON CHRONIC DIASTOLIC HEART FAILURE: Status: ACTIVE | Noted: 2024-10-04

## 2024-10-04 PROBLEM — S91.301D WOUND, OPEN, FOOT, RIGHT, SUBSEQUENT ENCOUNTER: Status: ACTIVE | Noted: 2024-10-04

## 2024-10-04 PROBLEM — G43.909 MIGRAINE HEADACHE: Status: ACTIVE | Noted: 2024-10-04

## 2024-10-04 PROBLEM — T38.0X5A STEROID-INDUCED HYPERGLYCEMIA: Status: RESOLVED | Noted: 2024-07-29 | Resolved: 2024-10-04

## 2024-10-04 PROBLEM — R73.9 STEROID-INDUCED HYPERGLYCEMIA: Status: RESOLVED | Noted: 2024-07-29 | Resolved: 2024-10-04

## 2024-10-04 PROBLEM — I50.9 CHF EXACERBATION: Status: ACTIVE | Noted: 2024-10-04

## 2024-10-04 PROBLEM — I73.9 PAD (PERIPHERAL ARTERY DISEASE): Status: ACTIVE | Noted: 2024-10-04

## 2024-10-04 PROBLEM — I50.30 (HFPEF) HEART FAILURE WITH PRESERVED EJECTION FRACTION: Status: ACTIVE | Noted: 2024-10-04

## 2024-10-04 PROBLEM — R82.71 ASYMPTOMATIC BACTERIURIA: Status: ACTIVE | Noted: 2024-10-04

## 2024-10-04 LAB
ALBUMIN SERPL BCP-MCNC: 3.5 G/DL (ref 3.5–5.2)
ALLENS TEST: ABNORMAL
ALLENS TEST: NORMAL
ALP SERPL-CCNC: 106 U/L (ref 55–135)
ALT SERPL W/O P-5'-P-CCNC: 28 U/L (ref 10–44)
ANION GAP SERPL CALC-SCNC: 15 MMOL/L (ref 8–16)
ANION GAP SERPL CALC-SCNC: 16 MMOL/L (ref 8–16)
ASCENDING AORTA: 3.36 CM
AST SERPL-CCNC: 24 U/L (ref 10–40)
AV AREA BY CONTINUOUS VTI: 3.5 CM2
AV INDEX (PROSTH): 0.94
AV LVOT MEAN GRADIENT: 2 MMHG
AV LVOT PEAK GRADIENT: 4 MMHG
AV MEAN GRADIENT: 3.8 MMHG
AV PEAK GRADIENT: 6.8 MMHG
AV REGURGITATION PRESSURE HALF TIME: 399.57 MS
AV VALVE AREA BY VELOCITY RATIO: 2.9 CM²
AV VALVE AREA: 3.6 CM2
AV VELOCITY RATIO: 0.77
BACTERIA #/AREA URNS AUTO: ABNORMAL /HPF
BASOPHILS # BLD AUTO: 0.06 K/UL (ref 0–0.2)
BASOPHILS NFR BLD: 0.8 % (ref 0–1.9)
BILIRUB SERPL-MCNC: 0.6 MG/DL (ref 0.1–1)
BILIRUB UR QL STRIP: NEGATIVE
BNP SERPL-MCNC: 432 PG/ML (ref 0–99)
BSA FOR ECHO PROCEDURE: 1.44 M2
BUN SERPL-MCNC: 15 MG/DL (ref 8–23)
BUN SERPL-MCNC: 20 MG/DL (ref 8–23)
CALCIUM SERPL-MCNC: 9.9 MG/DL (ref 8.7–10.5)
CALCIUM SERPL-MCNC: 9.9 MG/DL (ref 8.7–10.5)
CHLORIDE SERPL-SCNC: 103 MMOL/L (ref 95–110)
CHLORIDE SERPL-SCNC: 93 MMOL/L (ref 95–110)
CLARITY UR REFRACT.AUTO: CLEAR
CO2 SERPL-SCNC: 20 MMOL/L (ref 23–29)
CO2 SERPL-SCNC: 29 MMOL/L (ref 23–29)
COLOR UR AUTO: YELLOW
CREAT SERPL-MCNC: 0.8 MG/DL (ref 0.5–1.4)
CREAT SERPL-MCNC: 1.3 MG/DL (ref 0.5–1.4)
CRP SERPL-MCNC: 44 MG/L (ref 0–8.2)
CV ECHO LV RWT: 0.44 CM
DIFFERENTIAL METHOD BLD: ABNORMAL
DOP CALC AO PEAK VEL: 1.3 M/S
DOP CALC AO VTI: 21.8 CM
DOP CALC LVOT AREA: 3.8 CM2
DOP CALC LVOT DIAMETER: 2.2 CM
DOP CALC LVOT PEAK VEL: 1 M/S
DOP CALC LVOT STROKE VOLUME: 77.9 CM3
DOP CALCLVOT PEAK VEL VTI: 20.5 CM
E WAVE DECELERATION TIME: 275.37 MS
E/A RATIO: 0.62
E/E' RATIO: 11.45 M/S
ECHO EF ESTIMATED: 57 %
ECHO LV POSTERIOR WALL: 0.8 CM (ref 0.6–1.1)
EOSINOPHIL # BLD AUTO: 0.3 K/UL (ref 0–0.5)
EOSINOPHIL NFR BLD: 3.4 % (ref 0–8)
ERYTHROCYTE [DISTWIDTH] IN BLOOD BY AUTOMATED COUNT: 15.1 % (ref 11.5–14.5)
EST. GFR  (NO RACE VARIABLE): 42.1 ML/MIN/1.73 M^2
EST. GFR  (NO RACE VARIABLE): >60 ML/MIN/1.73 M^2
ESTIMATED AVG GLUCOSE: 94 MG/DL (ref 68–131)
FRACTIONAL SHORTENING: 30.6 % (ref 28–44)
GLUCOSE SERPL-MCNC: 73 MG/DL (ref 70–110)
GLUCOSE SERPL-MCNC: 82 MG/DL (ref 70–110)
GLUCOSE UR QL STRIP: ABNORMAL
HBA1C MFR BLD: 4.9 % (ref 4–5.6)
HCO3 UR-SCNC: 29.5 MMOL/L (ref 24–28)
HCT VFR BLD AUTO: 34 % (ref 37–48.5)
HGB BLD-MCNC: 9.9 G/DL (ref 12–16)
HGB UR QL STRIP: NEGATIVE
IMM GRANULOCYTES # BLD AUTO: 0.08 K/UL (ref 0–0.04)
IMM GRANULOCYTES NFR BLD AUTO: 1.1 % (ref 0–0.5)
INTERVENTRICULAR SEPTUM: 0.9 CM (ref 0.6–1.1)
KETONES UR QL STRIP: ABNORMAL
LA MAJOR: 4.19 CM
LA MINOR: 4.21 CM
LA WIDTH: 3.23 CM
LDH SERPL L TO P-CCNC: 1.05 MMOL/L (ref 0.5–2.2)
LEFT ATRIUM SIZE: 3.06 CM
LEFT ATRIUM VOLUME INDEX MOD: 21.2 ML/M2
LEFT ATRIUM VOLUME INDEX: 24.5 ML/M2
LEFT ATRIUM VOLUME MOD: 30.56 ML
LEFT ATRIUM VOLUME: 35.28 CM3
LEFT INTERNAL DIMENSION IN SYSTOLE: 2.5 CM (ref 2.1–4)
LEFT VENTRICLE DIASTOLIC VOLUME INDEX: 36.81 ML/M2
LEFT VENTRICLE DIASTOLIC VOLUME: 53 ML
LEFT VENTRICLE MASS INDEX: 59.5 G/M2
LEFT VENTRICLE SYSTOLIC VOLUME INDEX: 15.9 ML/M2
LEFT VENTRICLE SYSTOLIC VOLUME: 22.84 ML
LEFT VENTRICULAR INTERNAL DIMENSION IN DIASTOLE: 3.6 CM (ref 3.5–6)
LEFT VENTRICULAR MASS: 85.6 G
LEUKOCYTE ESTERASE UR QL STRIP: ABNORMAL
LV LATERAL E/E' RATIO: 10.5
LV SEPTAL E/E' RATIO: 12.6
LYMPHOCYTES # BLD AUTO: 0.7 K/UL (ref 1–4.8)
LYMPHOCYTES NFR BLD: 9.7 % (ref 18–48)
MAGNESIUM SERPL-MCNC: 1.6 MG/DL (ref 1.6–2.6)
MCH RBC QN AUTO: 28.3 PG (ref 27–31)
MCHC RBC AUTO-ENTMCNC: 29.1 G/DL (ref 32–36)
MCV RBC AUTO: 97 FL (ref 82–98)
MICROSCOPIC COMMENT: ABNORMAL
MONOCYTES # BLD AUTO: 0.5 K/UL (ref 0.3–1)
MONOCYTES NFR BLD: 6.4 % (ref 4–15)
MV A" WAVE DURATION": 117.98 MS
MV PEAK A VEL: 1.01 M/S
MV PEAK E VEL: 0.63 M/S
NEUTROPHILS # BLD AUTO: 5.7 K/UL (ref 1.8–7.7)
NEUTROPHILS NFR BLD: 78.6 % (ref 38–73)
NITRITE UR QL STRIP: NEGATIVE
NON-SQ EPI CELLS #/AREA URNS AUTO: 2 /HPF
NRBC BLD-RTO: 0 /100 WBC
OHS CV RV/LV RATIO: 0.97 CM
OHS QRS DURATION: 86 MS
OHS QRS DURATION: 90 MS
OHS QTC CALCULATION: 437 MS
OHS QTC CALCULATION: 439 MS
PCO2 BLDA: 42 MMHG (ref 35–45)
PH SMN: 7.46 [PH] (ref 7.35–7.45)
PH UR STRIP: 6 [PH] (ref 5–8)
PHOSPHATE SERPL-MCNC: 3.5 MG/DL (ref 2.7–4.5)
PLATELET # BLD AUTO: 252 K/UL (ref 150–450)
PMV BLD AUTO: 10.2 FL (ref 9.2–12.9)
PO2 BLDA: 41 MMHG (ref 40–60)
POC BE: 6 MMOL/L
POC SATURATED O2: 78 % (ref 95–100)
POC TCO2: 31 MMOL/L (ref 24–29)
POTASSIUM SERPL-SCNC: 3.4 MMOL/L (ref 3.5–5.1)
POTASSIUM SERPL-SCNC: 3.9 MMOL/L (ref 3.5–5.1)
PROT SERPL-MCNC: 6.9 G/DL (ref 6–8.4)
PROT UR QL STRIP: ABNORMAL
PULM VEIN A" WAVE DURATION": 117.98 MS
PULM VEIN S/D RATIO: 1.15
PULMONIC VEIN PEAK A VELOCITY: 0.3 M/S
PV PEAK D VEL: 0.72 M/S
PV PEAK S VEL: 0.83 M/S
RA MAJOR: 3.87 CM
RA PRESSURE ESTIMATED: 3 MMHG
RA WIDTH: 2.91 CM
RBC # BLD AUTO: 3.5 M/UL (ref 4–5.4)
RBC #/AREA URNS AUTO: 1 /HPF (ref 0–4)
RIGHT VENTRICLE DIASTOLIC BASEL DIMENSION: 3.5 CM
SAMPLE: ABNORMAL
SAMPLE: NORMAL
SINUS: 2.97 CM
SITE: ABNORMAL
SITE: NORMAL
SODIUM SERPL-SCNC: 138 MMOL/L (ref 136–145)
SODIUM SERPL-SCNC: 138 MMOL/L (ref 136–145)
SP GR UR STRIP: 1.01 (ref 1–1.03)
SQUAMOUS #/AREA URNS AUTO: 3 /HPF
STJ: 2.33 CM
TDI LATERAL: 0.06 M/S
TDI SEPTAL: 0.05 M/S
TDI: 0.06 M/S
TRICUSPID ANNULAR PLANE SYSTOLIC EXCURSION: 1.41 CM
TROPONIN I SERPL DL<=0.01 NG/ML-MCNC: 0.5 NG/ML (ref 0–0.03)
TROPONIN I SERPL DL<=0.01 NG/ML-MCNC: 0.54 NG/ML (ref 0–0.03)
TROPONIN I SERPL DL<=0.01 NG/ML-MCNC: 0.54 NG/ML (ref 0–0.03)
TROPONIN I SERPL DL<=0.01 NG/ML-MCNC: 0.57 NG/ML (ref 0–0.03)
TROPONIN I SERPL DL<=0.01 NG/ML-MCNC: 0.63 NG/ML (ref 0–0.03)
URN SPEC COLLECT METH UR: ABNORMAL
WBC # BLD AUTO: 7.29 K/UL (ref 3.9–12.7)
WBC #/AREA URNS AUTO: 1 /HPF (ref 0–5)
Z-SCORE OF LEFT VENTRICULAR DIMENSION IN END DIASTOLE: -1.95
Z-SCORE OF LEFT VENTRICULAR DIMENSION IN END SYSTOLE: -0.66

## 2024-10-04 PROCEDURE — 63600175 PHARM REV CODE 636 W HCPCS: Performed by: EMERGENCY MEDICINE

## 2024-10-04 PROCEDURE — 25000003 PHARM REV CODE 250: Performed by: STUDENT IN AN ORGANIZED HEALTH CARE EDUCATION/TRAINING PROGRAM

## 2024-10-04 PROCEDURE — 83735 ASSAY OF MAGNESIUM: CPT

## 2024-10-04 PROCEDURE — 63600175 PHARM REV CODE 636 W HCPCS

## 2024-10-04 PROCEDURE — 84484 ASSAY OF TROPONIN QUANT: CPT | Mod: 91

## 2024-10-04 PROCEDURE — 80048 BASIC METABOLIC PNL TOTAL CA: CPT | Performed by: STUDENT IN AN ORGANIZED HEALTH CARE EDUCATION/TRAINING PROGRAM

## 2024-10-04 PROCEDURE — 83036 HEMOGLOBIN GLYCOSYLATED A1C: CPT

## 2024-10-04 PROCEDURE — 83605 ASSAY OF LACTIC ACID: CPT

## 2024-10-04 PROCEDURE — 25000003 PHARM REV CODE 250: Performed by: EMERGENCY MEDICINE

## 2024-10-04 PROCEDURE — 25000003 PHARM REV CODE 250

## 2024-10-04 PROCEDURE — 96372 THER/PROPH/DIAG INJ SC/IM: CPT

## 2024-10-04 PROCEDURE — 99900035 HC TECH TIME PER 15 MIN (STAT)

## 2024-10-04 PROCEDURE — 84484 ASSAY OF TROPONIN QUANT: CPT | Performed by: EMERGENCY MEDICINE

## 2024-10-04 PROCEDURE — 25000242 PHARM REV CODE 250 ALT 637 W/ HCPCS

## 2024-10-04 PROCEDURE — 36415 COLL VENOUS BLD VENIPUNCTURE: CPT | Mod: XB | Performed by: STUDENT IN AN ORGANIZED HEALTH CARE EDUCATION/TRAINING PROGRAM

## 2024-10-04 PROCEDURE — 82803 BLOOD GASES ANY COMBINATION: CPT

## 2024-10-04 PROCEDURE — 81001 URINALYSIS AUTO W/SCOPE: CPT | Performed by: EMERGENCY MEDICINE

## 2024-10-04 PROCEDURE — 36415 COLL VENOUS BLD VENIPUNCTURE: CPT

## 2024-10-04 PROCEDURE — 84100 ASSAY OF PHOSPHORUS: CPT

## 2024-10-04 PROCEDURE — 21400001 HC TELEMETRY ROOM

## 2024-10-04 RX ORDER — ACETAMINOPHEN 500 MG
500 TABLET ORAL EVERY 6 HOURS PRN
Status: DISCONTINUED | OUTPATIENT
Start: 2024-10-04 | End: 2024-10-05 | Stop reason: HOSPADM

## 2024-10-04 RX ORDER — HYDROXYZINE HYDROCHLORIDE 25 MG/1
25 TABLET, FILM COATED ORAL 3 TIMES DAILY PRN
Status: DISCONTINUED | OUTPATIENT
Start: 2024-10-04 | End: 2024-10-05 | Stop reason: HOSPADM

## 2024-10-04 RX ORDER — LANOLIN ALCOHOL/MO/W.PET/CERES
400 CREAM (GRAM) TOPICAL 2 TIMES DAILY
Status: DISCONTINUED | OUTPATIENT
Start: 2024-10-04 | End: 2024-10-05 | Stop reason: HOSPADM

## 2024-10-04 RX ORDER — GLUCAGON 1 MG
1 KIT INJECTION
Status: DISCONTINUED | OUTPATIENT
Start: 2024-10-04 | End: 2024-10-05 | Stop reason: HOSPADM

## 2024-10-04 RX ORDER — HEPARIN SODIUM 5000 [USP'U]/ML
5000 INJECTION, SOLUTION INTRAVENOUS; SUBCUTANEOUS EVERY 8 HOURS
Status: DISCONTINUED | OUTPATIENT
Start: 2024-10-04 | End: 2024-10-05 | Stop reason: HOSPADM

## 2024-10-04 RX ORDER — ACETAMINOPHEN 325 MG/1
325 TABLET ORAL EVERY 6 HOURS PRN
Status: DISCONTINUED | OUTPATIENT
Start: 2024-10-04 | End: 2024-10-04

## 2024-10-04 RX ORDER — IBUPROFEN 200 MG
16 TABLET ORAL
Status: DISCONTINUED | OUTPATIENT
Start: 2024-10-04 | End: 2024-10-05 | Stop reason: HOSPADM

## 2024-10-04 RX ORDER — LIDOCAINE HYDROCHLORIDE 20 MG/ML
0.5 JELLY TOPICAL DAILY
COMMUNITY

## 2024-10-04 RX ORDER — FUROSEMIDE 10 MG/ML
40 INJECTION INTRAMUSCULAR; INTRAVENOUS EVERY 12 HOURS
Status: DISCONTINUED | OUTPATIENT
Start: 2024-10-04 | End: 2024-10-04

## 2024-10-04 RX ORDER — ASPIRIN 325 MG
325 TABLET ORAL
Status: COMPLETED | OUTPATIENT
Start: 2024-10-04 | End: 2024-10-04

## 2024-10-04 RX ORDER — MORPHINE SULFATE 2 MG/ML
2 INJECTION, SOLUTION INTRAMUSCULAR; INTRAVENOUS
Status: COMPLETED | OUTPATIENT
Start: 2024-10-04 | End: 2024-10-04

## 2024-10-04 RX ORDER — ASPIRIN 81 MG/1
81 TABLET ORAL DAILY
Status: DISCONTINUED | OUTPATIENT
Start: 2024-10-05 | End: 2024-10-05 | Stop reason: HOSPADM

## 2024-10-04 RX ORDER — LINEZOLID 600 MG/1
1200 TABLET, FILM COATED ORAL EVERY 12 HOURS
Status: ON HOLD | COMMUNITY
End: 2024-10-05 | Stop reason: HOSPADM

## 2024-10-04 RX ORDER — EZETIMIBE 10 MG/1
10 TABLET ORAL DAILY
Status: DISCONTINUED | OUTPATIENT
Start: 2024-10-04 | End: 2024-10-05 | Stop reason: HOSPADM

## 2024-10-04 RX ORDER — FLUTICASONE FUROATE AND VILANTEROL 100; 25 UG/1; UG/1
1 POWDER RESPIRATORY (INHALATION) DAILY
Status: DISCONTINUED | OUTPATIENT
Start: 2024-10-04 | End: 2024-10-05 | Stop reason: HOSPADM

## 2024-10-04 RX ORDER — IBUPROFEN 200 MG
24 TABLET ORAL
Status: DISCONTINUED | OUTPATIENT
Start: 2024-10-04 | End: 2024-10-05 | Stop reason: HOSPADM

## 2024-10-04 RX ORDER — GABAPENTIN 300 MG/1
300 CAPSULE ORAL 2 TIMES DAILY
Status: DISCONTINUED | OUTPATIENT
Start: 2024-10-04 | End: 2024-10-05 | Stop reason: HOSPADM

## 2024-10-04 RX ORDER — POTASSIUM CHLORIDE 750 MG/1
10 CAPSULE, EXTENDED RELEASE ORAL
Status: COMPLETED | OUTPATIENT
Start: 2024-10-04 | End: 2024-10-05

## 2024-10-04 RX ORDER — METOPROLOL SUCCINATE 25 MG/1
25 TABLET, EXTENDED RELEASE ORAL DAILY
Status: DISCONTINUED | OUTPATIENT
Start: 2024-10-04 | End: 2024-10-05 | Stop reason: HOSPADM

## 2024-10-04 RX ORDER — SODIUM CHLORIDE 0.9 % (FLUSH) 0.9 %
10 SYRINGE (ML) INJECTION
Status: DISCONTINUED | OUTPATIENT
Start: 2024-10-04 | End: 2024-10-05 | Stop reason: HOSPADM

## 2024-10-04 RX ORDER — NALOXONE HCL 0.4 MG/ML
0.02 VIAL (ML) INJECTION
Status: DISCONTINUED | OUTPATIENT
Start: 2024-10-04 | End: 2024-10-05 | Stop reason: HOSPADM

## 2024-10-04 RX ORDER — SODIUM CHLORIDE 0.9 % (FLUSH) 0.9 %
10 SYRINGE (ML) INJECTION EVERY 12 HOURS PRN
Status: DISCONTINUED | OUTPATIENT
Start: 2024-10-04 | End: 2024-10-05 | Stop reason: HOSPADM

## 2024-10-04 RX ORDER — IPRATROPIUM BROMIDE AND ALBUTEROL SULFATE 2.5; .5 MG/3ML; MG/3ML
3 SOLUTION RESPIRATORY (INHALATION) EVERY 6 HOURS PRN
Status: DISCONTINUED | OUTPATIENT
Start: 2024-10-04 | End: 2024-10-05 | Stop reason: HOSPADM

## 2024-10-04 RX ORDER — ATORVASTATIN CALCIUM 40 MG/1
80 TABLET, FILM COATED ORAL DAILY
Status: DISCONTINUED | OUTPATIENT
Start: 2024-10-04 | End: 2024-10-05 | Stop reason: HOSPADM

## 2024-10-04 RX ORDER — ACETAMINOPHEN 500 MG
500 TABLET ORAL EVERY 6 HOURS PRN
Status: DISCONTINUED | OUTPATIENT
Start: 2024-10-04 | End: 2024-10-04

## 2024-10-04 RX ORDER — TRAMADOL HYDROCHLORIDE 50 MG/1
50 TABLET ORAL EVERY 8 HOURS PRN
Status: DISCONTINUED | OUTPATIENT
Start: 2024-10-04 | End: 2024-10-05 | Stop reason: HOSPADM

## 2024-10-04 RX ORDER — FUROSEMIDE 10 MG/ML
40 INJECTION INTRAMUSCULAR; INTRAVENOUS
Status: COMPLETED | OUTPATIENT
Start: 2024-10-04 | End: 2024-10-04

## 2024-10-04 RX ADMIN — SODIUM CHLORIDE 250 ML: 9 INJECTION, SOLUTION INTRAVENOUS at 10:10

## 2024-10-04 RX ADMIN — HEPARIN SODIUM 5000 UNITS: 5000 INJECTION INTRAVENOUS; SUBCUTANEOUS at 10:10

## 2024-10-04 RX ADMIN — Medication 400 MG: at 10:10

## 2024-10-04 RX ADMIN — PIPERACILLIN SODIUM AND TAZOBACTAM SODIUM 4.5 G: 4; .5 INJECTION, POWDER, FOR SOLUTION INTRAVENOUS at 05:10

## 2024-10-04 RX ADMIN — HEPARIN SODIUM 5000 UNITS: 5000 INJECTION INTRAVENOUS; SUBCUTANEOUS at 03:10

## 2024-10-04 RX ADMIN — ATORVASTATIN CALCIUM 80 MG: 40 TABLET, FILM COATED ORAL at 08:10

## 2024-10-04 RX ADMIN — POTASSIUM CHLORIDE 10 MEQ: 750 CAPSULE, EXTENDED RELEASE ORAL at 10:10

## 2024-10-04 RX ADMIN — MORPHINE SULFATE 2 MG: 2 INJECTION, SOLUTION INTRAMUSCULAR; INTRAVENOUS at 12:10

## 2024-10-04 RX ADMIN — FLUTICASONE FUROATE AND VILANTEROL TRIFENATATE 1 PUFF: 100; 25 POWDER RESPIRATORY (INHALATION) at 11:10

## 2024-10-04 RX ADMIN — GABAPENTIN 300 MG: 300 CAPSULE ORAL at 10:10

## 2024-10-04 RX ADMIN — HYDROXYZINE HYDROCHLORIDE 25 MG: 25 TABLET, FILM COATED ORAL at 03:10

## 2024-10-04 RX ADMIN — FUROSEMIDE 40 MG: 10 INJECTION, SOLUTION INTRAVENOUS at 08:10

## 2024-10-04 RX ADMIN — HEPARIN SODIUM 5000 UNITS: 5000 INJECTION INTRAVENOUS; SUBCUTANEOUS at 08:10

## 2024-10-04 RX ADMIN — TRAMADOL HYDROCHLORIDE 50 MG: 50 TABLET, COATED ORAL at 09:10

## 2024-10-04 RX ADMIN — ACETAMINOPHEN 500 MG: 500 TABLET ORAL at 03:10

## 2024-10-04 RX ADMIN — MORPHINE SULFATE 2 MG: 2 INJECTION, SOLUTION INTRAMUSCULAR; INTRAVENOUS at 05:10

## 2024-10-04 RX ADMIN — FUROSEMIDE 40 MG: 10 INJECTION, SOLUTION INTRAVENOUS at 05:10

## 2024-10-04 RX ADMIN — Medication 400 MG: at 09:10

## 2024-10-04 RX ADMIN — TICAGRELOR 90 MG: 90 TABLET ORAL at 08:10

## 2024-10-04 RX ADMIN — EMPAGLIFLOZIN 10 MG: 10 TABLET, FILM COATED ORAL at 09:10

## 2024-10-04 RX ADMIN — TICAGRELOR 90 MG: 90 TABLET ORAL at 10:10

## 2024-10-04 RX ADMIN — EZETIMIBE 10 MG: 10 TABLET ORAL at 11:10

## 2024-10-04 RX ADMIN — GABAPENTIN 300 MG: 300 CAPSULE ORAL at 11:10

## 2024-10-04 RX ADMIN — ASPIRIN 325 MG ORAL TABLET 325 MG: 325 PILL ORAL at 04:10

## 2024-10-04 RX ADMIN — TRAMADOL HYDROCHLORIDE 50 MG: 50 TABLET, COATED ORAL at 10:10

## 2024-10-04 NOTE — ED NOTES
Patient comes into the emergency department by EMS with complaints of SOB.Patient denies any chest pain,chill,fever at this time.  LOC: The patient is awake, alert and aware of environment with an appropriate affect, the patient is oriented x 3 and speaking appropriately.   APPEARANCE: Patient appears comfortable and in no acute distress, patient is clean and well groomed.  SKIN: The skin is warm and dry, color consistent with ethnicity, patient has normal skin turgor and moist mucus membranes,not intact,right foot open wound.  MUSCULOSKELETAL: Patient moving all extremities spontaneously, no swelling noted.  RESPIRATORY: Airway is open and patent, respirations are spontaneous, patient has a normal effort and rate, no accessory muscle.  CARDIAC: Pt placed on cardiac monitor. Patient has a normal rate and regular rhythm, no edema noted, capillary refill < 3 seconds.   : Pt denies any pain or frequency with urination.  NEURO: Pt opens eyes spontaneously, behavior appropriate to situation, follows commands, facial expression symmetrical, bilateral hand grasp equal and even, purposeful motor response noted, normal sensation in all extremities when touched with a finger.

## 2024-10-04 NOTE — ASSESSMENT & PLAN NOTE
Per recent admit:  Patient with known CAD s/p stent placement and CABG. Multi vessel CAD. Recent cath with stent placement 05/27.  Will continue ASA, Plavix, and Statin and monitor for S/Sx of angina/ACS. Continue to monitor on telemetry.     No angina today  Brilinta and Zetia started as ordered  All meds in place

## 2024-10-04 NOTE — ED NOTES
I assumed care of this patient at this time. Report received from GUSTAVO Mathew. Pt is resting comfortably in ED stretcher + no acute distress observed. Pt is AAOx4. RR is even, unlabored, and spontaneous + pt's oxygen saturation is 100% on 2 LPM via NC at this time (baseline; hx COPD). Chronic wound to right foot + patient reporting 7/10 pain at this time; received Morphine at 5:28 am prior to my arrival. Reports using walker and wheelchair for mobility. Pt remains on external catheter + continent and able to void spontaneously. Bed low and locked; side rails up x2; call light within reach.

## 2024-10-04 NOTE — PLAN OF CARE
10/04/24 1009   Post-Acute Status   Post-Acute Authorization Home Health   Home Health Status Pending medical clearance/testing   Discharge Delays None known at this time   Discharge Plan   Discharge Plan A Home Health     SW received call from Edye [phone] with [facility].  Pt is current on their service and can continue on d/c       Amy Leblanc CD, MSW, LMSW, RSW   Case Management  [facility]  Email: [email or web address]

## 2024-10-04 NOTE — ASSESSMENT & PLAN NOTE
NICCI of 0.9 on the right lower extremity and 0.8 on the left lower extremity were noted in the ED.

## 2024-10-04 NOTE — SUBJECTIVE & OBJECTIVE
Past Medical History:   Diagnosis Date    Anemia     Anxiety     COPD (chronic obstructive pulmonary disease)     COPD (chronic obstructive pulmonary disease) with emphysema     Coronary artery disease     Depression     Diverticulitis     Hyperlipidemia     Hypertension     PVD (peripheral vascular disease)     PVD (peripheral vascular disease)     S/P colostomy     Substance abuse     hx heavy etoh use     Tobacco abuse        Past Surgical History:   Procedure Laterality Date    ANGIOGRAM, CORONARY, WITH LEFT HEART CATHETERIZATION N/A 11/22/2023    Procedure: Angiogram, Coronary, with Left Heart Cath;  Surgeon: Checo Bhatt MD;  Location: [facility] CATH LAB;  Service: Cardiology;  Laterality: N/A;    ANGIOGRAM, CORONARY, WITH LEFT HEART CATHETERIZATION N/A 5/27/2024    Procedure: Angiogram, Coronary, with Left Heart Cath;  Surgeon: Karel Mack MD;  Location: [facility] CATH LAB;  Service: Cardiology;  Laterality: N/A;    ANGIOGRAM, EXTREMITY, UNILATERAL Right 8/25/2023    Procedure: ANGIOGRAM, EXTREMITY, UNILATERAL;  Surgeon: Gary Rico MD;  [facility];  Service: Vascular;  Laterality: Right;  US GUIDED ACCESS LEFT GROIN  contrast: 150ml  fluoro: 27.9 min  mGy: 254.73  Gycm2: 62.4919  radial flush cocktail: 10ml    ANGIOGRAPHY OF LOWER EXTREMITY Right 8/24/2023    Procedure: Angiogram Extremity Unilateral;  Surgeon: Benji Ashley MD;  [facility];  Service: Vascular;  Laterality: Right;    COLOSTOMY      CORONARY ANGIOGRAPHY N/A 9/29/2024    Procedure: ANGIOGRAM, CORONARY ARTERY;  Surgeon: Checo Bhatt MD;  Location: [facility] CATH LAB;  Service: Cardiology;  Laterality: N/A;    ECTOPIC PREGNANCY SURGERY      PTA, PERONEAL Right 8/25/2023    Procedure: PTA, PERONEAL TIBIAL TRUNK WITH SHOCKWAVE;  Surgeon: Gary Rico MD;  Location: [facility] OR [facility] FLR;  Service: Vascular;  Laterality: Right;    PTCA, SINGLE VESSEL  11/22/2023    Procedure:  PTCA, Single Vessel;  Surgeon: Checo Bhatt MD;  Location: [facility] CATH LAB;  Service: Cardiology;;    right forefoot amputation      right toe amputation      x2 toes    STENT, DRUG ELUTING, SINGLE VESSEL, CORONARY  11/22/2023    Procedure: Stent, Drug Eluting, Single Vessel, Coronary;  Surgeon: Checo Bhatt MD;  Location: [facility] CATH LAB;  Service: Cardiology;;    STENT, DRUG ELUTING, SINGLE VESSEL, CORONARY  5/27/2024    Procedure: Stent, Drug Eluting, Single Vessel, Coronary;  Surgeon: Karel Mack MD;  Location: [facility] CATH LAB;  Service: Cardiology;;    STENT, DRUG ELUTING, SINGLE VESSEL, CORONARY  9/29/2024    Procedure: Stent, Drug Eluting, Single Vessel, Coronary;  Surgeon: Checo Bhatt MD;  Location: [facility] CATH LAB;  Service: Cardiology;;    STENT, SUPERFICIAL FEMORAL ARTERY Right 8/25/2023    Procedure: STENT, SUPERFICIAL FEMORAL ARTERY;  Surgeon: Gary Rico MD;  Location: [address];  Service: Vascular;  Laterality: Right;  VIABAHN 6 X 15 X120       Review of patient's allergies indicates:  No Known Allergies    No current facility-administered medications on file prior to encounter.     Current Outpatient Medications on File Prior to Encounter   Medication Sig    albuterol-ipratropium (DUO-NEB) 2.5 mg-0.5 mg/3 mL nebulizer solution Take 3 mLs by nebulization every 6 (six) hours as needed for Wheezing or Shortness of Breath.    aspirin (ECOTRIN) 81 MG EC tablet Take 1 tablet (81 mg total) by mouth once daily.    atorvastatin (LIPITOR) 80 MG tablet Take 1 tablet (80 mg total) by mouth once daily.    budesonide-formoterol 80-4.5 mcg (SYMBICORT) 80-4.5 mcg/actuation HFAA INHALE 2 PUFFS INTO THE LUNGS TWICE DAILY. RINSE MOUTH AFTER USE    empagliflozin (JARDIANCE) 10 mg tablet Take 1 tablet (10 mg total) by mouth once daily.    ezetimibe (ZETIA) 10 mg tablet Take 1 tablet (10 mg total) by mouth once daily.    furosemide (LASIX) 20 MG tablet Take 1 tablet (20 mg  total) by mouth daily as needed (edema/wt gain).    gabapentin (NEURONTIN) 300 MG capsule Take 1 capsule (300 mg total) by mouth 2 (two) times daily.    metoprolol succinate (TOPROL-XL) 25 MG 24 hr tablet Take 1 tablet (25 mg total) by mouth once daily.    nitroGLYCERIN (NITROSTAT) 0.4 MG SL tablet Place 1 tablet (0.4 mg total) under the tongue every 5 (five) minutes as needed for Chest pain.    ticagrelor (BRILINTA) 90 mg tablet Take 1 tablet (90 mg total) by mouth 2 (two) times daily.    traMADoL (ULTRAM) 50 mg tablet Take 1 tablet (50 mg total) by mouth every 8 (eight) hours as needed for Pain.     Family History    None       Tobacco Use    Smoking status: Every Day     Current packs/day: 0.25     Average packs/day: 0.5 packs/day for 61.2 years (30.2 ttl pk-yrs)     Types: Cigarettes     Start date: 7/5/1963    Smokeless tobacco: Never   Substance and Sexual Activity    Alcohol use: No    Drug use: No    Sexual activity: Not Currently     Review of Systems   Constitutional:  Negative for chills, diaphoresis and fever.   HENT: Negative.     Eyes: Negative.    Respiratory:  Positive for shortness of breath. Negative for cough and wheezing.    Cardiovascular:  Negative for chest pain, palpitations and leg swelling.   Gastrointestinal:  Positive for nausea. Negative for abdominal pain, constipation, diarrhea and vomiting.   Genitourinary: Negative.    Neurological:  Positive for headaches. Negative for dizziness, weakness, light-headedness and numbness.   Psychiatric/Behavioral: Negative.       Objective:     Vital Signs (Most Recent):  Temp: 98 °F (36.7 °C) (10/04/24 0600)  Pulse: 89 (10/04/24 0802)  Resp: 18 (10/04/24 0802)  BP: 119/60 (10/04/24 0802)  SpO2: 98 % (10/04/24 0802) Vital Signs (24h Range):  Temp:  [98 °F (36.7 °C)-98.7 °F (37.1 °C)] 98 °F (36.7 °C)  Pulse:  [] 89  Resp:  [16-20] 18  SpO2:  [97 %-100 %] 98 %  BP: (119-168)/(60-77) 119/60     Weight: 49.4 kg (108 lb 14.5 oz)  Body mass index is  21.27 kg/m².     Physical Exam  Vitals reviewed.   Constitutional:       Appearance: Normal appearance.   HENT:      Head: Normocephalic and atraumatic.   Cardiovascular:      Rate and Rhythm: Normal rate and regular rhythm.      Pulses: Normal pulses.   Pulmonary:      Effort: Pulmonary effort is normal. No respiratory distress.      Breath sounds: No stridor. Rales present. No wheezing.   Abdominal:      General: Abdomen is flat. Bowel sounds are normal.      Palpations: Abdomen is soft.      Tenderness: There is no abdominal tenderness.   Musculoskeletal:      Right lower leg: No edema.      Left lower leg: No edema.   Skin:     General: Skin is warm and dry.   Neurological:      General: No focal deficit present.      Mental Status: She is alert and oriented to person, place, and time.   Psychiatric:         Mood and Affect: Mood normal.         Thought Content: Thought content normal.                Significant Labs: All pertinent labs within the past 24 hours have been reviewed.    Significant Imaging: I have reviewed all pertinent imaging results/findings within the past 24 hours.

## 2024-10-04 NOTE — ASSESSMENT & PLAN NOTE
Interpretation Summary    Technically difficult study, no IV contrast given.    Left Ventricle: The left ventricle is normal in size. Normal wall thickness. Regional wall motion abnormalities present. See diagram for wall motion findings. There is normal systolic function with a visually estimated ejection fraction of 55 - 60%. There is normal diastolic function.    Right Ventricle: Normal right ventricular cavity size. Wall thickness is normal. Systolic function is borderline low.    Aortic Valve: There is mild aortic valve sclerosis. There is mild aortic regurgitation.    Mitral Valve: There is mild regurgitation.    IVC/SVC: Normal venous pressure at 3 mmHg.     Current Heart Failure Medications  empagliflozin (Jardiance) tablet 10 mg, Daily, Oral     Plan  - Monitor strict I&Os and daily weights.    - Place on telemetry  - Low sodium diet  - The patient's volume status is at their baseline  - BNP 400s   - received IV Lasix 40 x 2 doses during admission              - documented at least 1L of output so far               - recommend holding diuretics; appears euvolemic/dry on exam               - Echo - CVP 3mmHg   - start GDMT    - continue Jardiance and Toprol    - start spironolactone 25mg and Entresto 24-26  - follow up with Heart Failure transitional care clinic

## 2024-10-04 NOTE — ASSESSMENT & PLAN NOTE
Most recent BNP and echo results are listed below.  Recent Labs     10/03/24  2348   *     Latest ECHO  Results for orders placed during the hospital encounter of 09/29/24    Echo    Interpretation Summary    Left Ventricle: The left ventricle is normal in size. Increased ventricular mass. Normal wall thickness. There is concentric hypertrophy. Regional wall motion abnormalities present. See diagram for wall motion findings. There is normal systolic function with a visually estimated ejection fraction of 55 - 60%. There is indeterminate diastolic function.    Right Ventricle: Normal right ventricular cavity size. Wall thickness is normal. Right ventricle wall motion  is normal. Systolic function is borderline low.    Left Atrium: Left atrium is moderately dilated.    Aortic Valve: There is mild aortic valve sclerosis. There is mild aortic regurgitation.    Mitral Valve: There is mild regurgitation.    IVC/SVC: Normal venous pressure at 3 mmHg.    Current Heart Failure Medications  furosemide injection 40 mg, Every 12 hours, Intravenous    Plan  - Monitor strict I&Os and daily weights.    - Place on telemetry  - Low sodium diet  - Repeat Echo  - Oxygen as needed  - Keep bed elevated

## 2024-10-04 NOTE — H&P
[facility]  [facility]  History & Physical    Patient Name: Radha Sotelo  MRN: [id number]  Patient Class: OP- Observation  Admission Date: 10/3/2024  Attending Physician: Ashley Gilman MD   Primary Care Provider: Kalyn Pemberton NP         Patient information was obtained from patient and ER records.     Subjective:     Principal Problem:CHF exacerbation    Chief Complaint:   Chief Complaint   Patient presents with    Shortness of Breath     Pt arrived from home with c/o SOB, on 2L home O2 with a sat 98%.        HPI: Patient is a 79 year old female with a history of recent STEMI 9/29 s/p RCA stent placement, CHF, COPD, s/p colostomy, s/p transmetatarsal amputation of right foot, CAD s/p PCI 11/2023 and 5/2024 for proximal RCA stenosis presenting to the emergency department with shortness of breath. States that dyspnea is worse on exertion but is present at rest as well. Endorses orthopnea, PND. Denies any leg edema or body swelling. She was recently released from the hospital on 10/1 for a STEMI s/p stent placement, reports compliance with medications and denies chest pain. Denies cough or sputum production. Also endorses a moderate throbbing headache with associated nausea and photophobia. Denies fever, chills, CP, palpitations, cough or wheeze, abdominal pain, constipation/diarrhea, urinary symptoms, dizziness, numbness/weakness. Endorses foot pain from her right foot around her amputation site, chronic foot wound was present. A picture of this wound can be found in the media tab.     In the ED, the patient's initial vitals were grossly stable. On physical exam, patient was mildly SOB but did not show signs of respiratory distress and had good oxygen saturations on nasal cannula 2 L. Crackles were auscultated in the lower lung bases bilaterally. BNP was elevated at 432. Initial troponin was elevated at 0.574 but was noted to be down trending with the second at 0.504. CXR did  not show acute abnormalities. X-ray of the right foot did not show evidence of osteomyelitis.       Past Medical History:   Diagnosis Date    Anemia     Anxiety     COPD (chronic obstructive pulmonary disease)     COPD (chronic obstructive pulmonary disease) with emphysema     Coronary artery disease     Depression     Diverticulitis     Hyperlipidemia     Hypertension     PVD (peripheral vascular disease)     PVD (peripheral vascular disease)     S/P colostomy     Substance abuse     hx heavy etoh use     Tobacco abuse        Past Surgical History:   Procedure Laterality Date    ANGIOGRAM, CORONARY, WITH LEFT HEART CATHETERIZATION N/A 11/22/2023    Procedure: Angiogram, Coronary, with Left Heart Cath;  Surgeon: Checo Bhatt MD;  Location: [facility] CATH LAB;  Service: Cardiology;  Laterality: N/A;    ANGIOGRAM, CORONARY, WITH LEFT HEART CATHETERIZATION N/A 5/27/2024    Procedure: Angiogram, Coronary, with Left Heart Cath;  Surgeon: Karel Mack MD;  Location: [facility] CATH LAB;  Service: Cardiology;  Laterality: N/A;    ANGIOGRAM, EXTREMITY, UNILATERAL Right 8/25/2023    Procedure: ANGIOGRAM, EXTREMITY, UNILATERAL;  Surgeon: Gary Rico MD;  Location: [address];  Service: Vascular;  Laterality: Right;  US GUIDED ACCESS LEFT GROIN  contrast: 150ml  fluoro: 27.9 min  mGy: 254.73  Gycm2: 62.4919  radial flush cocktail: 10ml    ANGIOGRAPHY OF LOWER EXTREMITY Right 8/24/2023    Procedure: Angiogram Extremity Unilateral;  Surgeon: Benji Ashley MD;  Location: [facility] OR [address] [place] [address];  Service: Vascular;  Laterality: Right;    COLOSTOMY      CORONARY ANGIOGRAPHY N/A 9/29/2024    Procedure: ANGIOGRAM, CORONARY ARTERY;  Surgeon: Checo Bhatt MD;  Location: [facility] CATH LAB;  Service: Cardiology;  Laterality: N/A;    ECTOPIC PREGNANCY SURGERY      PTA, PERONEAL Right 8/25/2023    Procedure: PTA, PERONEAL TIBIAL TRUNK WITH SHOCKWAVE;  Surgeon: Gary Rico MD;  Location: [facility] OR  2ND FLR;  Service: Vascular;  Laterality: Right;    PTCA, SINGLE VESSEL  11/22/2023    Procedure: PTCA, Single Vessel;  Surgeon: Checo Bhatt MD;  Location: [facility] CATH LAB;  Service: Cardiology;;    right forefoot amputation      right toe amputation      x2 toes    STENT, DRUG ELUTING, SINGLE VESSEL, CORONARY  11/22/2023    Procedure: Stent, Drug Eluting, Single Vessel, Coronary;  Surgeon: Checo Bhatt MD;  Location: [facility] CATH LAB;  Service: Cardiology;;    STENT, DRUG ELUTING, SINGLE VESSEL, CORONARY  5/27/2024    Procedure: Stent, Drug Eluting, Single Vessel, Coronary;  Surgeon: Karel Mack MD;  Location: [facility] CATH LAB;  Service: Cardiology;;    STENT, DRUG ELUTING, SINGLE VESSEL, CORONARY  9/29/2024    Procedure: Stent, Drug Eluting, Single Vessel, Coronary;  Surgeon: Checo Bhatt MD;  Location: [facility] CATH LAB;  Service: Cardiology;;    STENT, SUPERFICIAL FEMORAL ARTERY Right 8/25/2023    Procedure: STENT, SUPERFICIAL FEMORAL ARTERY;  Surgeon: Gary Rico MD;  Location: [facility] OR 2ND FLR;  Service: Vascular;  Laterality: Right;  VIABAHN 6 X 15 X120       Review of patient's allergies indicates:  No Known Allergies    No current facility-administered medications on file prior to encounter.     Current Outpatient Medications on File Prior to Encounter   Medication Sig    albuterol-ipratropium (DUO-NEB) 2.5 mg-0.5 mg/3 mL nebulizer solution Take 3 mLs by nebulization every 6 (six) hours as needed for Wheezing or Shortness of Breath.    aspirin (ECOTRIN) 81 MG EC tablet Take 1 tablet (81 mg total) by mouth once daily.    atorvastatin (LIPITOR) 80 MG tablet Take 1 tablet (80 mg total) by mouth once daily.    budesonide-formoterol 80-4.5 mcg (SYMBICORT) 80-4.5 mcg/actuation HFAA INHALE 2 PUFFS INTO THE LUNGS TWICE DAILY. RINSE MOUTH AFTER USE    empagliflozin (JARDIANCE) 10 mg tablet Take 1 tablet (10 mg total) by mouth once daily.    ezetimibe (ZETIA) 10 mg tablet Take 1  tablet (10 mg total) by mouth once daily.    furosemide (LASIX) 20 MG tablet Take 1 tablet (20 mg total) by mouth daily as needed (edema/wt gain).    gabapentin (NEURONTIN) 300 MG capsule Take 1 capsule (300 mg total) by mouth 2 (two) times daily.    metoprolol succinate (TOPROL-XL) 25 MG 24 hr tablet Take 1 tablet (25 mg total) by mouth once daily.    nitroGLYCERIN (NITROSTAT) 0.4 MG SL tablet Place 1 tablet (0.4 mg total) under the tongue every 5 (five) minutes as needed for Chest pain.    ticagrelor (BRILINTA) 90 mg tablet Take 1 tablet (90 mg total) by mouth 2 (two) times daily.    traMADoL (ULTRAM) 50 mg tablet Take 1 tablet (50 mg total) by mouth every 8 (eight) hours as needed for Pain.     Family History    None       Tobacco Use    Smoking status: Every Day     Current packs/day: 0.25     Average packs/day: 0.5 packs/day for 61.2 years (30.2 ttl pk-yrs)     Types: Cigarettes     Start date: 7/5/1963    Smokeless tobacco: Never   Substance and Sexual Activity    Alcohol use: No    Drug use: No    Sexual activity: Not Currently     Review of Systems   Constitutional:  Negative for chills, diaphoresis and fever.   HENT: Negative.     Eyes: Negative.    Respiratory:  Positive for shortness of breath. Negative for cough and wheezing.    Cardiovascular:  Negative for chest pain, palpitations and leg swelling.   Gastrointestinal:  Positive for nausea. Negative for abdominal pain, constipation, diarrhea and vomiting.   Genitourinary: Negative.    Neurological:  Positive for headaches. Negative for dizziness, weakness, light-headedness and numbness.   Psychiatric/Behavioral: Negative.       Objective:     Vital Signs (Most Recent):  Temp: 98 °F (36.7 °C) (10/04/24 0600)  Pulse: 89 (10/04/24 0802)  Resp: 18 (10/04/24 0802)  BP: 119/60 (10/04/24 0802)  SpO2: 98 % (10/04/24 0802) Vital Signs (24h Range):  Temp:  [98 °F (36.7 °C)-98.7 °F (37.1 °C)] 98 °F (36.7 °C)  Pulse:  [] 89  Resp:  [16-20] 18  SpO2:  [97  %-100 %] 98 %  BP: (119-168)/(60-77) 119/60     Weight: 49.4 kg (108 lb 14.5 oz)  Body mass index is 21.27 kg/m².     Physical Exam  Vitals reviewed.   Constitutional:       Appearance: Normal appearance.   HENT:      Head: Normocephalic and atraumatic.   Cardiovascular:      Rate and Rhythm: Normal rate and regular rhythm.      Pulses: Normal pulses.   Pulmonary:      Effort: Pulmonary effort is normal. No respiratory distress.      Breath sounds: No stridor. Mild Rales present. No wheezing.   Abdominal:      General: Abdomen is flat. Bowel sounds are normal.      Palpations: Abdomen is soft.      Tenderness: There is no abdominal tenderness.   Musculoskeletal:      History of right TMA with large eschar-like wound at distal medial aspect     No active drainage some surrounding erythema.  Skin:     General: Skin is warm and dry.  Minimal bilateral lower extremity edema     Right TMA with   Neurological:      General: No focal deficit present.      Mental Status: She is alert and oriented to person, place, and time.   Psychiatric:         Mood and Affect: Mood normal.         Thought Content: Thought content normal.                Significant Labs: All pertinent labs within the past 24 hours have been reviewed.    Significant Imaging: I have reviewed all pertinent imaging results/findings within the past 24 hours.  Assessment/Plan:     PAD (peripheral artery disease)  NICCI of 0.9 on the right lower extremity and 0.8 on the left lower extremity were noted in the ED.           History of ST elevation myocardial infarction (STEMI)  Recent hospitalization from STEMI noted.     Plan:   - Trend troponin   - Repeat Echo       Migraine headache  Plan:   - tylenol prn       Acute on chronic diastolic heart failure  Most recent BNP and echo results are listed below.  Recent Labs     10/03/24  2348   *     Latest ECHO  Results for orders placed during the hospital encounter of 09/29/24    Echo    Interpretation Summary     Left Ventricle: The left ventricle is normal in size. Increased ventricular mass. Normal wall thickness. There is concentric hypertrophy. Regional wall motion abnormalities present. See diagram for wall motion findings. There is normal systolic function with a visually estimated ejection fraction of 55 - 60%. There is indeterminate diastolic function.    Right Ventricle: Normal right ventricular cavity size. Wall thickness is normal. Right ventricle wall motion  is normal. Systolic function is borderline low.    Left Atrium: Left atrium is moderately dilated.    Aortic Valve: There is mild aortic valve sclerosis. There is mild aortic regurgitation.    Mitral Valve: There is mild regurgitation.    IVC/SVC: Normal venous pressure at 3 mmHg.    Current Heart Failure Medications  furosemide injection 40 mg, Every 12 hours, Intravenous    Plan  - Monitor strict I&Os and daily weights.    - Place on telemetry  - Low sodium diet  - Repeat Echo  - Oxygen as needed  - Keep bed elevated       Asymptomatic bacteriuria  -UA has 3+ leukocytes but only 1 WBC with rare bacteria (some squamous and non squamous epithelial cells)  Plan:   - continue to monitor for symptoms       Chronic wound  X-ray of the foot in the ED did not show evidence of osteomyelitis     Plan:   - Wound care orders placed by podiatry       COPD (chronic obstructive pulmonary disease)  -Give Breo for home Symbicort  -no wheezing on exam      VTE Risk Mitigation (From admission, onward)           Ordered     heparin (porcine) injection 5,000 Units  Every 8 hours         10/04/24 0536     IP VTE HIGH RISK PATIENT  Once         10/04/24 0536     Place sequential compression device  Until discontinued         10/04/24 0536                           On 10/04/2024, patient should be placed in hospital observation services under my care in collaboration with IM team 1.         David Winters MD  Department of Hospital Medicine  [facility] Dept

## 2024-10-04 NOTE — ED PROVIDER NOTES
Source of History:  Patient  Chart    Chief complaint:  Shortness of Breath (Pt arrived from home with c/o SOB, on 2L home O2 with a sat 98%.)      HPI:  Radha Sotelo is a 79 y.o. female with history of COPD, depression, diverticulitis, HLD, PVD, S/p colostomy, transmetatarsal amputation of right foot, chronic diastolic heart failure, tobacco and heavy alcohol use, and CAD status post PCI in November 20, 2023 and May 20, 2024 for proximal RCA stenosis, inferior STEMI 9/29/24 presenting to emergency department with complaint of pain in her right foot, shortness of breath, and lightheadedness.      Patient states that she was having worsening pain in her right foot which is the site of a partial amputation and a chronic wound.  Pain began over the past 2 days.  She feels that the foot is more swollen and tender.  She denies fevers.    In addition, she developed shortness of breath this evening.  She feels lightheaded.  She denies chest pain.  States that she does not feel similar to when she had her STEMI several days ago.  She denies cough.  No abdominal pain.    Review of patient's allergies indicates:  No Known Allergies    No current facility-administered medications on file prior to encounter.     Current Outpatient Medications on File Prior to Encounter   Medication Sig Dispense Refill    albuterol-ipratropium (DUO-NEB) 2.5 mg-0.5 mg/3 mL nebulizer solution Take 3 mLs by nebulization every 6 (six) hours as needed for Wheezing or Shortness of Breath. 180 mL 6    aspirin (ECOTRIN) 81 MG EC tablet Take 1 tablet (81 mg total) by mouth once daily. 30 tablet 11    atorvastatin (LIPITOR) 80 MG tablet Take 1 tablet (80 mg total) by mouth once daily. 90 tablet 3    budesonide-formoterol 80-4.5 mcg (SYMBICORT) 80-4.5 mcg/actuation HFAA INHALE 2 PUFFS INTO THE LUNGS TWICE DAILY. RINSE MOUTH AFTER USE 10.2 g 6    ezetimibe (ZETIA) 10 mg tablet Take 1 tablet (10 mg total) by mouth once daily. 90 tablet 3    furosemide  (LASIX) 20 MG tablet Take 1 tablet (20 mg total) by mouth daily as needed (edema/wt gain). 30 tablet 11    gabapentin (NEURONTIN) 300 MG capsule Take 1 capsule (300 mg total) by mouth 2 (two) times daily. 180 capsule 3    metoprolol succinate (TOPROL-XL) 25 MG 24 hr tablet Take 1 tablet (25 mg total) by mouth once daily. 90 tablet 3    nitroGLYCERIN (NITROSTAT) 0.4 MG SL tablet Place 1 tablet (0.4 mg total) under the tongue every 5 (five) minutes as needed for Chest pain. 25 tablet 2    traMADoL (ULTRAM) 50 mg tablet Take 1 tablet (50 mg total) by mouth every 8 (eight) hours as needed for Pain. 30 tablet 0    empagliflozin (JARDIANCE) 10 mg tablet Take 1 tablet (10 mg total) by mouth once daily. 30 tablet 11    LIDOcaine HCL 2% (XYLOCAINE) 2 % jelly Apply 0.5 mLs topically once daily. (To bed wound)      linezolid (ZYVOX) 600 mg Tab Take 1,200 mg by mouth every 12 (twelve) hours.      ticagrelor (BRILINTA) 90 mg tablet Take 1 tablet (90 mg total) by mouth 2 (two) times daily. 60 tablet 11    UNABLE TO FIND Linezolid/Cipro/Ketoconazole 22/22/20.9 % Powder - Apply 2 gm topically to infection site 1 to 2 times daily.         PMH:  As per HPI and below:  Past Medical History:   Diagnosis Date    Anemia     Anxiety     COPD (chronic obstructive pulmonary disease)     COPD (chronic obstructive pulmonary disease) with emphysema     Coronary artery disease     Depression     Diverticulitis     Hyperlipidemia     Hypertension     PVD (peripheral vascular disease)     PVD (peripheral vascular disease)     S/P colostomy     STEMI (ST elevation myocardial infarction) 12/12/2023    Steroid-induced hyperglycemia 07/29/2024    Substance abuse     hx heavy etoh use     Tobacco abuse      Past Surgical History:   Procedure Laterality Date    ANGIOGRAM, CORONARY, WITH LEFT HEART CATHETERIZATION N/A 11/22/2023    Procedure: Angiogram, Coronary, with Left Heart Cath;  Surgeon: Checo Bhatt MD;  Location: [facility] CATH LAB;   Service: Cardiology;  Laterality: N/A;    ANGIOGRAM, CORONARY, WITH LEFT HEART CATHETERIZATION N/A 5/27/2024    Procedure: Angiogram, Coronary, with Left Heart Cath;  Surgeon: Karel Mack MD;  Location: [facility] CATH LAB;  Service: Cardiology;  Laterality: N/A;    ANGIOGRAM, EXTREMITY, UNILATERAL Right 8/25/2023    Procedure: ANGIOGRAM, EXTREMITY, UNILATERAL;  Surgeon: Gary Rico MD;  [facility];  Service: Vascular;  Laterality: Right;  US GUIDED ACCESS LEFT GROIN  contrast: 150ml  fluoro: 27.9 min  mGy: 254.73  Gycm2: 62.4919  radial flush cocktail: 10ml    ANGIOGRAPHY OF LOWER EXTREMITY Right 8/24/2023    Procedure: Angiogram Extremity Unilateral;  Surgeon: Benji Ashley MD;  [facility];  Service: Vascular;  Laterality: Right;    COLOSTOMY      CORONARY ANGIOGRAPHY N/A 9/29/2024    Procedure: ANGIOGRAM, CORONARY ARTERY;  Surgeon: Checo Bhatt MD;  Location: [facility] CATH LAB;  Service: Cardiology;  Laterality: N/A;    ECTOPIC PREGNANCY SURGERY      PTA, PERONEAL Right 8/25/2023    Procedure: PTA, PERONEAL TIBIAL TRUNK WITH SHOCKWAVE;  Surgeon: Gary Rico MD;  [facility];  Service: Vascular;  Laterality: Right;    PTCA, SINGLE VESSEL  11/22/2023    Procedure: PTCA, Single Vessel;  Surgeon: Checo Bhatt MD;  Location: [facility] CATH LAB;  Service: Cardiology;;    right forefoot amputation      right toe amputation      x2 toes    STENT, DRUG ELUTING, SINGLE VESSEL, CORONARY  11/22/2023    Procedure: Stent, Drug Eluting, Single Vessel, Coronary;  Surgeon: Checo Bhatt MD;  Location: [facility] CATH LAB;  Service: Cardiology;;    STENT, DRUG ELUTING, SINGLE VESSEL, CORONARY  5/27/2024    Procedure: Stent, Drug Eluting, Single Vessel, Coronary;  Surgeon: Karel Mack MD;  Location: [facility] CATH LAB;  Service: Cardiology;;    STENT, DRUG ELUTING, SINGLE VESSEL, CORONARY  9/29/2024    Procedure: Stent, Drug Eluting,  Single Vessel, Coronary;  Surgeon: Checo Bhatt MD;  Location: [facility] CATH LAB;  Service: Cardiology;;    STENT, SUPERFICIAL FEMORAL ARTERY Right 8/25/2023    Procedure: STENT, SUPERFICIAL FEMORAL ARTERY;  Surgeon: Gary Rico MD;  Location: [facility] OR [facility] FLR;  Service: Vascular;  Laterality: Right;  VIABAHN 6 X 15 X120       Social History     Socioeconomic History    Marital status:    Tobacco Use    Smoking status: Every Day     Current packs/day: 0.25     Average packs/day: 0.5 packs/day for 61.3 years (30.2 ttl pk-yrs)     Types: Cigarettes     Start date: 7/5/1963    Smokeless tobacco: Never   Substance and Sexual Activity    Alcohol use: No    Drug use: No    Sexual activity: Not Currently   Social History Narrative    ** Merged History Encounter **          Social Drivers of Health     Financial Resource Strain: Medium Risk (10/4/2024)    Overall Financial Resource Strain (CARDIA)     Difficulty of Paying Living Expenses: Somewhat hard   Food Insecurity: No Food Insecurity (10/4/2024)    Hunger Vital Sign     Worried About Running Out of Food in the Last Year: Never true     Ran Out of Food in the Last Year: Never true   Transportation Needs: No Transportation Needs (10/4/2024)    TRANSPORTATION NEEDS     Transportation : No   Recent Concern: Transportation Needs - Unmet Transportation Needs (9/30/2024)    TRANSPORTATION NEEDS     Transportation : Yes, it has kept me from medical appointments or from getting my medications.   Physical Activity: Inactive (10/4/2024)    Exercise Vital Sign     Days of Exercise per Week: 0 days     Minutes of Exercise per Session: 0 min   Stress: Stress Concern Present (10/4/2024)    [employer] - Occupational Stress Questionnaire     Feeling of Stress : To some extent   Housing Stability: Low Risk  (10/4/2024)    Housing Stability Vital Sign     Unable to Pay for Housing in the Last Year: No     Homeless in the Last Year:  No       No family history on file.    Physical Exam:      Vitals:    10/05/24 0311   BP: 100/60   Pulse: 106   Resp: 17   Temp: 98.7 °F (37.1 °C)     Gen:  Chronically ill-appearing.  Mental Status:  Alert and oriented .  Appropriate, conversant.  Skin: Warm, dry. No rashes seen.  Eyes: No conjunctival injection.  Pulm: CTAB. No increased work of breathing.  No significant tachypnea.  No audible stridor or wheezing.  No conversational dyspnea.    CV: Regular rate. Regular rhythm.  Palpable femoral pulses.  Femoral arterial puncture site without swelling or hematoma.  Abd: Soft.  Not distended.  Nontender.   MSK:  See photograph below of right foot ulceration.  Neuro: Awake. Speech normal. No focal neuro deficit observed.        Laboratory Studies:  Labs Reviewed   CBC W/ AUTO DIFFERENTIAL - Abnormal       Result Value    WBC 7.29      RBC 3.50 (*)     Hemoglobin 9.9 (*)     Hematocrit 34.0 (*)     MCV 97      MCH 28.3      MCHC 29.1 (*)     RDW 15.1 (*)     Platelets 252      MPV 10.2      Immature Granulocytes 1.1 (*)     Gran # (ANC) 5.7      Immature Grans (Abs) 0.08 (*)     Lymph # 0.7 (*)     Mono # 0.5      Eos # 0.3      Baso # 0.06      nRBC 0      Gran % 78.6 (*)     Lymph % 9.7 (*)     Mono % 6.4      Eosinophil % 3.4      Basophil % 0.8      Differential Method Automated     COMPREHENSIVE METABOLIC PANEL - Abnormal    Sodium 138      Potassium 3.9      Chloride 103      CO2 20 (*)     Glucose 82      BUN 15      Creatinine 0.8      Calcium 9.9      Total Protein 6.9      Albumin 3.5      Total Bilirubin 0.6      Alkaline Phosphatase 106      AST 24      ALT 28      eGFR >60.0      Anion Gap 15     URINALYSIS, REFLEX TO URINE CULTURE - Abnormal    Specimen UA Urine, Clean Catch      Color, UA Yellow      Appearance, UA Clear      pH, UA 6.0      Specific Gravity, UA 1.015      Protein, UA Trace (*)     Glucose, UA 2+ (*)     Ketones, UA 1+ (*)     Bilirubin (UA) Negative      Occult Blood UA Negative       Nitrite, UA Negative      Leukocytes, UA 3+ (*)     Narrative:     Specimen Source->Urine   TROPONIN I - Abnormal    Troponin I 0.574 (*)    B-TYPE NATRIURETIC PEPTIDE - Abnormal     (*)    C-REACTIVE PROTEIN - Abnormal    CRP 44.0 (*)    TROPONIN I - Abnormal    Troponin I 0.504 (*)    URINALYSIS MICROSCOPIC - Abnormal    RBC, UA 1      WBC, UA 1      Bacteria Rare      Squam Epithel, UA 3      Non-Squam Epith 2 (*)     Microscopic Comment SEE COMMENT      Narrative:     Specimen Source->Urine   TROPONIN I - Abnormal    Troponin I 0.540 (*)    ISTAT PROCEDURE - Abnormal    POC PH 7.455 (*)     POC PCO2 42.0      POC PO2 41      POC HCO3 29.5 (*)     POC BE 6 (*)     POC SATURATED O2 78      POC TCO2 31 (*)     Sample VENOUS      Site Other      Allens Test N/A     CULTURE, BLOOD    Blood Culture, Routine No Growth to date      Narrative:     Aerobic and anaerobic   CULTURE, BLOOD    Blood Culture, Routine No Growth to date      Narrative:     Aerobic and anaerobic   HEMOGLOBIN A1C    Hemoglobin A1C 4.9      Estimated Avg Glucose 94     MAGNESIUM    Magnesium 1.6     PHOSPHORUS    Phosphorus 3.5     ISTAT LACTATE    POC Lactate 1.05      Sample VENOUS      Site Other      Allens Test N/A         EKG (independently interpreted by me):  Normal sinus rhythm, rate 96.  No STEMI.  QRS 90 milliseconds.   milliseconds.    Chart reviewed.     Imaging Results              US Lower Extremity Arteries Right (Final result)  Result time 10/04/24 05:49:40      Final result by Yoseph Fishman MD (10/04/24 05:49:40)                   Impression:      Ankle-brachial index of 0.9 on the right and 0.8 on the left.    No hemodynamically significant stenosis demonstrated in the right lower extremity arterial system per velocity criteria.  Monophasic waveforms throughout suggest upstream stenosis.    Occlusion of the distal right superficial femoral artery and distal right popliteal artery with development of  collaterals.    Electronically signed by resident: Mickey Garcia  Date:    10/04/2024  Time:    04:00    Electronically signed by: Yoseph Fishman  Date:    10/04/2024  Time:    05:49               Narrative:    EXAMINATION:  US ARTERIAL LOWER EXTREMITY RIGHT WITH NICCI (XPD); US LOWER EXTREMITY ARTERIES RIGHT    CLINICAL HISTORY:  Right foot and leg swelling after femoral access for STEMI 4 days ago;  Pain in leg, unspecified    TECHNIQUE:  Right lower extremity arterial duplex ultrasound examination performed. Multiple gray scale and color doppler images were obtained in addition to waveform analysis.  Ankle-brachial indices were calculated.    COMPARISON:  CTA runoff 08/13/2024    FINDINGS:  The ankle brachial index on the right is 0.9 and on the left is 0.8.    The peak systolic velocities on the right are as follows, in centimeters/second:    Common femoral artery: 190    Deep femoral artery: 217    Superficial femoral artery, proximal: 124    Superficial femoral artery, mid portion: 179    Superficial femoral artery, distal: Occluded with collateralization.    Popliteal artery, proximal: 14    Popliteal artery, mid: 106    Popliteal artery, distal: Occluded with collateralization.    Anterior tibial artery: 33    Posterior tibial artery: 16    Monophasic waveforms are seen throughout the right lower extremity.    Adjacent to the proximal right superficial femoral artery is a 0.6 x 0.3 x 0.4 cm oval hypoechoic focus without internal Doppler signal.                                       US Lower Extrem Arteries Right with NICCI (xpd) (Final result)  Result time 10/04/24 05:49:40      Final result by Yoseph Fishman MD (10/04/24 05:49:40)                   Impression:      Ankle-brachial index of 0.9 on the right and 0.8 on the left.    No hemodynamically significant stenosis demonstrated in the right lower extremity arterial system per velocity criteria.  Monophasic waveforms throughout suggest upstream  stenosis.    Occlusion of the distal right superficial femoral artery and distal right popliteal artery with development of collaterals.    Electronically signed by resident: Mickey Garcia  Date:    10/04/2024  Time:    04:00    Electronically signed by: Yoseph Fishman  Date:    10/04/2024  Time:    05:49               Narrative:    EXAMINATION:  US ARTERIAL LOWER EXTREMITY RIGHT WITH NICCI (XPD); US LOWER EXTREMITY ARTERIES RIGHT    CLINICAL HISTORY:  Right foot and leg swelling after femoral access for STEMI 4 days ago;  Pain in leg, unspecified    TECHNIQUE:  Right lower extremity arterial duplex ultrasound examination performed. Multiple gray scale and color doppler images were obtained in addition to waveform analysis.  Ankle-brachial indices were calculated.    COMPARISON:  CTA runoff 08/13/2024    FINDINGS:  The ankle brachial index on the right is 0.9 and on the left is 0.8.    The peak systolic velocities on the right are as follows, in centimeters/second:    Common femoral artery: 190    Deep femoral artery: 217    Superficial femoral artery, proximal: 124    Superficial femoral artery, mid portion: 179    Superficial femoral artery, distal: Occluded with collateralization.    Popliteal artery, proximal: 14    Popliteal artery, mid: 106    Popliteal artery, distal: Occluded with collateralization.    Anterior tibial artery: 33    Posterior tibial artery: 16    Monophasic waveforms are seen throughout the right lower extremity.    Adjacent to the proximal right superficial femoral artery is a 0.6 x 0.3 x 0.4 cm oval hypoechoic focus without internal Doppler signal.                                       X-Ray Foot Complete Right (Final result)  Result time 10/03/24 23:45:32      Final result by Jasper Ramirez DO (10/03/24 23:45:32)                   Impression:      Postop changes of transmetatarsal amputation.  Soft tissue ulcer of the great toe amputation stump.  No radiographic evidence of acute  osteomyelitis.      Electronically signed by: Jasper Ramirez  Date:    10/03/2024  Time:    23:45               Narrative:    EXAMINATION:  XR FOOT COMPLETE 3 VIEW RIGHT    CLINICAL HISTORY:  . Pain in unspecified foot    TECHNIQUE:  AP, lateral, and oblique views of the right foot were performed.    COMPARISON:  09/30/2024.    FINDINGS:  There are postop changes of transmetatarsal amputation of the foot.  There is no radiographic evidence of acute osteomyelitis.  There is a soft tissue ulcer of the great toe metatarsal amputation stump.  There are vascular calcifications.  There is osteopenia.  There is no acute fracture or dislocation.  There is scattered joint space narrowing.                                       X-Ray Chest AP Portable (Final result)  Result time 10/03/24 23:29:18      Final result by Jasper Ramirez DO (10/03/24 23:29:18)                   Impression:      No acute abnormality.      Electronically signed by: Jasper Ramirez  Date:    10/03/2024  Time:    23:29               Narrative:    EXAMINATION:  XR CHEST AP PORTABLE    CLINICAL HISTORY:  Sepsis;    TECHNIQUE:  Single frontal view of the chest was performed.    COMPARISON:  09/29/2024.    FINDINGS:  The lungs are well expanded and clear. No focal opacities are seen. The pleural spaces are clear. The cardiac silhouette is unremarkable.  There are calcifications of the aortic arch.  The visualized osseous structures demonstrate osteopenia and degenerative changes.                                      Medications Given:  Medications   sodium chloride 0.9% flush 10 mL (has no administration in time range)   heparin (porcine) injection 5,000 Units (5,000 Units Subcutaneous Given 10/4/24 8813)   sodium chloride 0.9% flush 10 mL (has no administration in time range)   naloxone 0.4 mg/mL injection 0.02 mg (has no administration in time range)   glucose chewable tablet 16 g (has no administration in time range)   glucose chewable tablet 24 g (has  no administration in time range)   glucagon (human recombinant) injection 1 mg (has no administration in time range)   dextrose 10% bolus 125 mL 125 mL (has no administration in time range)   dextrose 10% bolus 250 mL 250 mL (has no administration in time range)   empagliflozin (Jardiance) tablet 10 mg (10 mg Oral Given 10/4/24 0903)   ticagrelor tablet 90 mg (90 mg Oral Given 10/4/24 2233)   atorvastatin tablet 80 mg (80 mg Oral Given 10/4/24 0812)   aspirin EC tablet 81 mg (has no administration in time range)   magnesium oxide tablet 400 mg (400 mg Oral Given 10/4/24 2233)   acetaminophen tablet 500 mg (500 mg Oral Given 10/5/24 0214)   traMADoL tablet 50 mg (50 mg Oral Given 10/4/24 2244)   gabapentin capsule 300 mg (300 mg Oral Given 10/4/24 2233)   ezetimibe tablet 10 mg (10 mg Oral Given 10/4/24 1123)   fluticasone furoate-vilanteroL 100-25 mcg/dose diskus inhaler 1 puff (1 puff Inhalation Given 10/4/24 1123)   albuterol-ipratropium 2.5 mg-0.5 mg/3 mL nebulizer solution 3 mL (has no administration in time range)   hydrOXYzine HCL tablet 25 mg (25 mg Oral Given 10/4/24 1531)   metoprolol succinate (TOPROL-XL) 24 hr tablet 25 mg (25 mg Oral Given 10/5/24 0100)   sacubitriL-valsartan 24-26 mg per tablet 1 tablet (1 tablet Oral Not Given 10/4/24 2100)   morphine injection 2 mg (2 mg Intravenous Given 10/4/24 0025)   aspirin tablet 325 mg (325 mg Oral Given 10/4/24 0403)   piperacillin-tazobactam (ZOSYN) 4.5 g in D5W 100 mL IVPB (MB+) (0 g Intravenous Stopped 10/4/24 0648)   morphine injection 2 mg (2 mg Intravenous Given 10/4/24 0528)   furosemide injection 40 mg (40 mg Intravenous Given 10/4/24 0529)   potassium chloride CR capsule 10 mEq (10 mEq Oral Given 10/5/24 0000)   sodium chloride 0.9% bolus 250 mL 250 mL (0 mLs Intravenous Stopped 10/5/24 0227)       Discussed with: IM, Cardiology    MDM:    79 y.o. female with pain and redness surrounding a foot ulceration, as well as shortness of breath and  lightheadedness.  She was afebrile, hemodynamically stable.      Satting well on 2 L nasal cannula.  Labs, chest x-ray, lower extremity ultrasound were obtained.      Labs reviewed.  No leukocytosis.  Hemoglobin 9.9.  No acute kidney injury or electrolyte disturbance.  CRP is 44.  Normal lactate , initial troponin 0.57.  Case was discussed with Cardiology, due to lack of chest pain and normal EKG, recommend diuresing and obtaining echo, no need for ACS protocol at this time.  Will trend troponins.      Lower extremity ultrasound without pseudoaneurysm or acute arterial occlusion.  Will treat for foot infection.  Antibiotics ordered.      Repeat troponin is downtrending.    Lasix ordered.  Case discussed with hospital medicine.      Diagnostic Impression:    1. SOB (shortness of breath)    2. Foot pain    3. Leg pain    4. Chest pain    5. Heart failure         ED Disposition Condition    Observation Stable               Patient understands the plan and is in agreement, verbalized understanding, questions answered    Talisha Daniel MD  Emergency Medicine         Talisha Daniel MD  10/05/24 0481

## 2024-10-04 NOTE — ED NOTES
Telemetry Verification   Patient placed on Telemetry Box.  Verified with War Room.  Box # [id number]   Monitor Tech Cindy   Rate 78   Rhythm NSR

## 2024-10-04 NOTE — PLAN OF CARE
Problem: Adult Inpatient Plan of Care  Goal: Plan of Care Review  Outcome: Not Progressing  Goal: Optimal Comfort and Wellbeing  Outcome: Not Progressing     Problem: Fluid Volume Excess  Goal: Fluid Balance  Outcome: Progressing

## 2024-10-04 NOTE — ED TRIAGE NOTES
Radha Sotelo, a 79 y.o. female presents to the ED w/ complaint of SOB.Patient denies any chest pain,chill,fever at this time.    Triage note:  Chief Complaint   Patient presents with    Shortness of Breath     Pt arrived from home with c/o SOB, on 2L home O2 with a sat 98%.     Review of patient's allergies indicates:  No Known Allergies  Past Medical History:   Diagnosis Date    Anemia     Anxiety     COPD (chronic obstructive pulmonary disease)     COPD (chronic obstructive pulmonary disease) with emphysema     Coronary artery disease     Depression     Diverticulitis     Hyperlipidemia     Hypertension     PVD (peripheral vascular disease)     PVD (peripheral vascular disease)     S/P colostomy     Substance abuse     hx heavy etoh use     Tobacco abuse

## 2024-10-04 NOTE — ED NOTES
Pt complaining of 7/10 pain in right foot. Hospital medicine team messaged and made aware. No PRN medications in MAR at this time.

## 2024-10-04 NOTE — ASSESSMENT & PLAN NOTE
Interpretation Summary    Technically difficult study, no IV contrast given.    Left Ventricle: The left ventricle is normal in size. Normal wall thickness. Regional wall motion abnormalities present. See diagram for wall motion findings. There is normal systolic function with a visually estimated ejection fraction of 55 - 60%. There is normal diastolic function.    Right Ventricle: Normal right ventricular cavity size. Wall thickness is normal. Systolic function is borderline low.    Aortic Valve: There is mild aortic valve sclerosis. There is mild aortic regurgitation.    Mitral Valve: There is mild regurgitation.    IVC/SVC: Normal venous pressure at 3 mmHg.    Current Heart Failure Medications  empagliflozin (Jardiance) tablet 10 mg, Daily, Oral    Plan  - Monitor strict I&Os and daily weights.    - Place on telemetry  - Low sodium diet  - The patient's volume status is at their baseline  - BNP 400s   - received IV Lasix 40 x 2 doses during admission   - documented at least 1L of output so far    - recommend holding diuretics; appears euvolemic/dry on exam    - Echo - CVP 3mmHg

## 2024-10-04 NOTE — HPI
"Radha Sotelo is a 79 year old female with a PMHx of recent STEMI 9/29 s/p RCA stent placement, COPD (baseline 2-3L), s/p colostomy, s/p transmetatarsal amputation of right foot, CAD s/p PCI 11/2023 and 5/2024 for proximal RCA stenosis. She presented to [facility] [place] for SOB, WOLFE, and PND but denies bilateral leg edema or swelling. In addition, she denies chest pain/pressure, diaphoresis, and radiating arm and jaw pain. She recently had a RCA stent placement 9/29 and reports compliance with medications however she is unable to list them.  and Troponin elevated at 0.5 but stable. Repeat TTE compared to prior showed unchanged EF (55-60%) and hypokineses of basal inferoseptal areas and  some "new mid inferior hypokinesis" that was not present on prior echo.     Cardiology was consulted for new wall motion abnormalities in setting of recent STEMI and elevated troponin.     Cath (9/29)    The RPDA lesion was 100% stenosed. (Chronic, unchnaged form prior angiogram)    The Mid RCA lesion was 99% stenosed with 0% stenosis post-intervention.    Mid RCA lesion: A stent was successfully placed.    Echo    Result Date: 10/4/2024    Technically difficult study, no IV contrast given.    Left Ventricle: The left ventricle is normal in size. Normal wall   thickness. Regional wall motion abnormalities present. See diagram for   wall motion findings. There is normal systolic function with a visually   estimated ejection fraction of 55 - 60%. There is normal diastolic   function.    Right Ventricle: Normal right ventricular cavity size. Wall thickness   is normal. Systolic function is borderline low.    Aortic Valve: There is mild aortic valve sclerosis. There is mild   aortic regurgitation.    Mitral Valve: There is mild regurgitation.    IVC/SVC: Normal venous pressure at 3 mmHg.        Echo    Result Date: 9/30/2024    Left Ventricle: The left ventricle is normal in size. Increased   ventricular mass. Normal wall thickness. There is " concentric hypertrophy.   Regional wall motion abnormalities present. See diagram for wall motion   findings. There is normal systolic function with a visually estimated   ejection fraction of 55 - 60%. There is indeterminate diastolic function.    Right Ventricle: Normal right ventricular cavity size. Wall thickness   is normal. Right ventricle wall motion  is normal. Systolic function is   borderline low.    Left Atrium: Left atrium is moderately dilated.    Aortic Valve: There is mild aortic valve sclerosis. There is mild   aortic regurgitation.    Mitral Valve: There is mild regurgitation.    IVC/SVC: Normal venous pressure at 3 mmHg.

## 2024-10-04 NOTE — CONSULTS
"[facility] - [facility] Medicine Telemetry  Cardiology  Consult Note    Patient Name: Radha Sotelo  MRN: [id number]  Admission Date: 10/3/2024  Hospital Length of Stay: 0 days  Code Status: Full Code   Attending Provider: Ashley Gilman MD   Consulting Provider: Tristan Eagle MD  Primary Care Physician: Kalyn Pemberton NP  Principal Problem:CHF exacerbation    Patient information was obtained from patient and past medical records.     Inpatient consult to Cardiology  Consult performed by: Tristan Eagle MD  Consult ordered by: Kiet Carpenter DO        Subjective:     Chief Complaint:  Shortness of Breath      HPI:   Radha Sotelo is a 79 year old female with a PMHx of recent STEMI 9/29 s/p RCA stent placement, CHF, COPD (baseline 2-3L), s/p colostomy, s/p transmetatarsal amputation of right foot, CAD s/p PCI 11/2023 and 5/2024 for proximal RCA stenosis. She presented to [facility] [place] for SOB, WOLFE, and PND but denies bilateral leg edema or swelling. In addition, she denies chest pain/pressure, diaphoresis, and radiating arm and jaw pain. She recently had a RCA stent placement 9/29 and reports compliance with medications however she is unable to list them.  and Troponin elevated at 0.5 but stable. Repeat TTE compared to prior showed unchanged EF (55-60%) and hypokineses of basal inferoseptal areas and  some "new mid inferior hypokinesis" that was not present on prior echo.     Cardiology was consulted for new wall motion abnormalities in setting of recent STEMI and elevated troponin.     Cath (9/29)    The RPDA lesion was 100% stenosed. (Chronic, unchnaged form prior angiogram)    The Mid RCA lesion was 99% stenosed with 0% stenosis post-intervention.    Mid RCA lesion: A stent was successfully placed.    Echo    Result Date: 10/4/2024    Technically difficult study, no IV contrast given.    Left Ventricle: The left ventricle is normal in size. Normal wall   thickness. Regional wall motion abnormalities present. See " diagram for   wall motion findings. There is normal systolic function with a visually   estimated ejection fraction of 55 - 60%. There is normal diastolic   function.    Right Ventricle: Normal right ventricular cavity size. Wall thickness   is normal. Systolic function is borderline low.    Aortic Valve: There is mild aortic valve sclerosis. There is mild   aortic regurgitation.    Mitral Valve: There is mild regurgitation.    IVC/SVC: Normal venous pressure at 3 mmHg.        Echo    Result Date: 9/30/2024    Left Ventricle: The left ventricle is normal in size. Increased   ventricular mass. Normal wall thickness. There is concentric hypertrophy.   Regional wall motion abnormalities present. See diagram for wall motion   findings. There is normal systolic function with a visually estimated   ejection fraction of 55 - 60%. There is indeterminate diastolic function.    Right Ventricle: Normal right ventricular cavity size. Wall thickness   is normal. Right ventricle wall motion  is normal. Systolic function is   borderline low.    Left Atrium: Left atrium is moderately dilated.    Aortic Valve: There is mild aortic valve sclerosis. There is mild   aortic regurgitation.    Mitral Valve: There is mild regurgitation.    IVC/SVC: Normal venous pressure at 3 mmHg.          Past Medical History:   Diagnosis Date    Anemia     Anxiety     COPD (chronic obstructive pulmonary disease)     COPD (chronic obstructive pulmonary disease) with emphysema     Coronary artery disease     Depression     Diverticulitis     Hyperlipidemia     Hypertension     PVD (peripheral vascular disease)     PVD (peripheral vascular disease)     S/P colostomy     Substance abuse     hx heavy etoh use     Tobacco abuse        Past Surgical History:   Procedure Laterality Date    ANGIOGRAM, CORONARY, WITH LEFT HEART CATHETERIZATION N/A 11/22/2023    Procedure: Angiogram, Coronary, with Left Heart Cath;  Surgeon: Checo Bhatt MD;  Location:  [facility] CATH LAB;  Service: Cardiology;  Laterality: N/A;    ANGIOGRAM, CORONARY, WITH LEFT HEART CATHETERIZATION N/A 5/27/2024    Procedure: Angiogram, Coronary, with Left Heart Cath;  Surgeon: Karel Mack MD;  Location: [facility] CATH LAB;  Service: Cardiology;  Laterality: N/A;    ANGIOGRAM, EXTREMITY, UNILATERAL Right 8/25/2023    Procedure: ANGIOGRAM, EXTREMITY, UNILATERAL;  Surgeon: Gary Rico MD;  [facility];  Service: Vascular;  Laterality: Right;  US GUIDED ACCESS LEFT GROIN  contrast: 150ml  fluoro: 27.9 min  mGy: 254.73  Gycm2: 62.4919  radial flush cocktail: 10ml    ANGIOGRAPHY OF LOWER EXTREMITY Right 8/24/2023    Procedure: Angiogram Extremity Unilateral;  Surgeon: Benji Ashley MD;  [facility];  Service: Vascular;  Laterality: Right;    COLOSTOMY      CORONARY ANGIOGRAPHY N/A 9/29/2024    Procedure: ANGIOGRAM, CORONARY ARTERY;  Surgeon: Checo Bhatt MD;  Location: [facility] CATH LAB;  Service: Cardiology;  Laterality: N/A;    ECTOPIC PREGNANCY SURGERY      PTA, PERONEAL Right 8/25/2023    Procedure: PTA, PERONEAL TIBIAL TRUNK WITH SHOCKWAVE;  Surgeon: Gary Rico MD;  [facility];  Service: Vascular;  Laterality: Right;    PTCA, SINGLE VESSEL  11/22/2023    Procedure: PTCA, Single Vessel;  Surgeon: Checo Bhatt MD;  Location: [facility] CATH LAB;  Service: Cardiology;;    right forefoot amputation      right toe amputation      x2 toes    STENT, DRUG ELUTING, SINGLE VESSEL, CORONARY  11/22/2023    Procedure: Stent, Drug Eluting, Single Vessel, Coronary;  Surgeon: Checo Bhatt MD;  Location: [facility] CATH LAB;  Service: Cardiology;;    STENT, DRUG ELUTING, SINGLE VESSEL, CORONARY  5/27/2024    Procedure: Stent, Drug Eluting, Single Vessel, Coronary;  Surgeon: Karel Mack MD;  Location: [facility] CATH LAB;  Service: Cardiology;;    STENT, DRUG ELUTING, SINGLE VESSEL, CORONARY  9/29/2024    Procedure: Stent,  Drug Eluting, Single Vessel, Coronary;  Surgeon: Checo Bhatt MD;  Location: [facility] CATH LAB;  Service: Cardiology;;    STENT, SUPERFICIAL FEMORAL ARTERY Right 8/25/2023    Procedure: STENT, SUPERFICIAL FEMORAL ARTERY;  Surgeon: Gary Rico MD;  [facility];  Service: Vascular;  Laterality: Right;  VIABAHN 6 X 15 X120       Review of patient's allergies indicates:  No Known Allergies    No current facility-administered medications on file prior to encounter.     Current Outpatient Medications on File Prior to Encounter   Medication Sig    albuterol-ipratropium (DUO-NEB) 2.5 mg-0.5 mg/3 mL nebulizer solution Take 3 mLs by nebulization every 6 (six) hours as needed for Wheezing or Shortness of Breath.    aspirin (ECOTRIN) 81 MG EC tablet Take 1 tablet (81 mg total) by mouth once daily.    atorvastatin (LIPITOR) 80 MG tablet Take 1 tablet (80 mg total) by mouth once daily.    budesonide-formoterol 80-4.5 mcg (SYMBICORT) 80-4.5 mcg/actuation HFAA INHALE 2 PUFFS INTO THE LUNGS TWICE DAILY. RINSE MOUTH AFTER USE    ezetimibe (ZETIA) 10 mg tablet Take 1 tablet (10 mg total) by mouth once daily.    furosemide (LASIX) 20 MG tablet Take 1 tablet (20 mg total) by mouth daily as needed (edema/wt gain).    gabapentin (NEURONTIN) 300 MG capsule Take 1 capsule (300 mg total) by mouth 2 (two) times daily.    metoprolol succinate (TOPROL-XL) 25 MG 24 hr tablet Take 1 tablet (25 mg total) by mouth once daily.    nitroGLYCERIN (NITROSTAT) 0.4 MG SL tablet Place 1 tablet (0.4 mg total) under the tongue every 5 (five) minutes as needed for Chest pain.    traMADoL (ULTRAM) 50 mg tablet Take 1 tablet (50 mg total) by mouth every 8 (eight) hours as needed for Pain.    empagliflozin (JARDIANCE) 10 mg tablet Take 1 tablet (10 mg total) by mouth once daily.    LIDOcaine HCL 2% (XYLOCAINE) 2 % jelly Apply 0.5 mLs topically once daily. (To bed wound)    linezolid (ZYVOX) 600 mg Tab Take 1,200 mg by mouth  every 12 (twelve) hours.    ticagrelor (BRILINTA) 90 mg tablet Take 1 tablet (90 mg total) by mouth 2 (two) times daily.    UNABLE TO FIND Linezolid/Cipro/Ketoconazole 22/22/20.9 % Powder - Apply 2 gm topically to infection site 1 to 2 times daily.     Family History    None       Tobacco Use    Smoking status: Every Day     Current packs/day: 0.25     Average packs/day: 0.5 packs/day for 61.2 years (30.2 ttl pk-yrs)     Types: Cigarettes     Start date: 7/5/1963    Smokeless tobacco: Never   Substance and Sexual Activity    Alcohol use: No    Drug use: No    Sexual activity: Not Currently     Review of Systems   Constitutional: Negative for weight gain.   Cardiovascular:  Positive for dyspnea on exertion, orthopnea and paroxysmal nocturnal dyspnea. Negative for chest pain, irregular heartbeat, leg swelling, palpitations and syncope.   Respiratory:  Positive for shortness of breath.      Objective:     Vital Signs (Most Recent):  Temp: 98.3 °F (36.8 °C) (10/04/24 1100)  Pulse: 100 (10/04/24 1123)  Resp: 20 (10/04/24 1123)  BP: 124/69 (10/04/24 1100)  SpO2: 95 % (10/04/24 1123) Vital Signs (24h Range):  Temp:  [98 °F (36.7 °C)-98.7 °F (37.1 °C)] 98.3 °F (36.8 °C)  Pulse:  [] 100  Resp:  [18-20] 20  SpO2:  [95 %-100 %] 95 %  BP: (111-168)/(60-77) 124/69     Weight: 49 kg (108 lb 0.4 oz)  Body mass index is 21.1 kg/m².    SpO2: 95 %         Intake/Output Summary (Last 24 hours) at 10/4/2024 1400  Last data filed at 10/4/2024 0717  Gross per 24 hour   Intake --   Output 900 ml   Net -900 ml       Lines/Drains/Airways       Drain  Duration                  Colostomy 07/13/21 0201 Descending/sigmoid LLQ 1179 days    Female External Urinary Catheter w/ Suction 10/04/24 0110 <1 day              Peripheral Intravenous Line  Duration                  Peripheral IV - Single Lumen 10/03/24 2347 22 G Anterior;Right Forearm <1 day                     Physical Exam  Constitutional:       General: She is not in acute  distress.     Appearance: She is not ill-appearing or toxic-appearing.   Cardiovascular:      Rate and Rhythm: Normal rate and regular rhythm.      Pulses: Normal pulses.      Heart sounds: Normal heart sounds. No murmur heard.  Pulmonary:      Effort: Pulmonary effort is normal. No respiratory distress.      Breath sounds: Normal breath sounds. No stridor. No wheezing, rhonchi or rales.      Comments: Comfortably on 2L NC  Chest:      Chest wall: No tenderness.   Musculoskeletal:         General: No swelling.      Right lower leg: No edema.      Left lower leg: No edema.      Comments: R foot amputated   Skin:     General: Skin is warm and dry.      Coloration: Skin is not jaundiced.   Neurological:      Mental Status: She is alert.          Significant Labs: CMP   Recent Labs   Lab 10/03/24  2348      K 3.9      CO2 20*   GLU 82   BUN 15   CREATININE 0.8   CALCIUM 9.9   PROT 6.9   ALBUMIN 3.5   BILITOT 0.6   ALKPHOS 106   AST 24   ALT 28   ANIONGAP 15    and Troponin   Recent Labs   Lab 10/04/24  0354 10/04/24  0636 10/04/24  1051   TROPONINI 0.504* 0.540* 0.536*       Significant Imaging:       Echo    Result Date: 10/4/2024    Technically difficult study, no IV contrast given.    Left Ventricle: The left ventricle is normal in size. Normal wall   thickness. Regional wall motion abnormalities present. See diagram for   wall motion findings. There is normal systolic function with a visually   estimated ejection fraction of 55 - 60%. There is normal diastolic   function.    Right Ventricle: Normal right ventricular cavity size. Wall thickness   is normal. Systolic function is borderline low.    Aortic Valve: There is mild aortic valve sclerosis. There is mild   aortic regurgitation.    Mitral Valve: There is mild regurgitation.    IVC/SVC: Normal venous pressure at 3 mmHg.        Echo    Result Date: 9/30/2024    Left Ventricle: The left ventricle is normal in size. Increased   ventricular mass. Normal  wall thickness. There is concentric hypertrophy.   Regional wall motion abnormalities present. See diagram for wall motion   findings. There is normal systolic function with a visually estimated   ejection fraction of 55 - 60%. There is indeterminate diastolic function.    Right Ventricle: Normal right ventricular cavity size. Wall thickness   is normal. Right ventricle wall motion  is normal. Systolic function is   borderline low.    Left Atrium: Left atrium is moderately dilated.    Aortic Valve: There is mild aortic valve sclerosis. There is mild   aortic regurgitation.    Mitral Valve: There is mild regurgitation.    IVC/SVC: Normal venous pressure at 3 mmHg.        Mid RCA lesion   Stent   Stent was successfully placed.   Post-Intervention Lesion Assessment   The intervention was successful. The guidewire did not cross the lesion. A subintimal strategy was intentionally used. The diagnostic DARIEL flow was 2. The post-intervention DARIEL flow was 3. The TMP grade was 3. There were no complications.   There is a 0% residual stenosis post intervention.         Assessment and Plan:     * Acute heart failure with preserved ejection fraction (HFpEF)  Interpretation Summary    Technically difficult study, no IV contrast given.    Left Ventricle: The left ventricle is normal in size. Normal wall thickness. Regional wall motion abnormalities present. See diagram for wall motion findings. There is normal systolic function with a visually estimated ejection fraction of 55 - 60%. There is normal diastolic function.    Right Ventricle: Normal right ventricular cavity size. Wall thickness is normal. Systolic function is borderline low.    Aortic Valve: There is mild aortic valve sclerosis. There is mild aortic regurgitation.    Mitral Valve: There is mild regurgitation.    IVC/SVC: Normal venous pressure at 3 mmHg.     Current Heart Failure Medications  empagliflozin (Jardiance) tablet 10 mg, Daily, Oral     Plan  - Monitor  "strict I&Os and daily weights.    - Place on telemetry  - Low sodium diet  - The patient's volume status is at their baseline  - BNP 400s   - received IV Lasix 40 x 2 doses during admission              - documented at least 1L of output so far               - recommend holding diuretics; appears euvolemic/dry on exam               - Echo - CVP 3mmHg   - start GDMT    - continue Jardiance and Toprol    - start spironolactone 25mg and Entresto 24-26  - follow up with Heart Failure transitional care clinic     History of ST elevation myocardial infarction (STEMI)  Patient is a 79 year old female with a PMHx of recent STEMI 9/29 s/p RCA stent placement, COPD, s/p colostomy, s/p transmetatarsal amputation of right foot, CAD s/p PCI 11/2023 and 5/2024 for proximal RCA stenosis who presented to [facility] [place] for [place]B, orthopnea, and PND. She denies chest and edema. On admission, her BNP 400s and troponin slightly elevated by stable at 0.5. EKG with acute ishemic changes. TTE showed no change in EF but "new wall motion abnormalities". Cardiology consulted for possible ACS protocol. Patient is back to baseline oxygen requirements (2L)     Plan  - continue DAPT therapy - ASA 81mg and Ticagrelor 90mg  - statin therapy - Lipitor 80mg   - elevated troponin could be a result of fluid overload ~ NSTEMI type II    - received IV diuresis; holding now; appears euvolemic on physical exam   - new wall motion abnormalities may be artifact or from different interpretation  - will not start ACS protocol          VTE Risk Mitigation (From admission, onward)           Ordered     heparin (porcine) injection 5,000 Units  Every 8 hours         10/04/24 0536     IP VTE HIGH RISK PATIENT  Once         10/04/24 0536     Place sequential compression device  Until discontinued         10/04/24 0536                    Thank you for your consult. I will sign off. Please contact us if you have any additional questions.    Tristan Eagle MD  Cardiology   [facility] - " Internal Medicine Telemetry

## 2024-10-04 NOTE — ASSESSMENT & PLAN NOTE
X-ray of the foot in the ED did not show evidence of osteomyelitis     Plan:   - Wound care orders placed by podiatry

## 2024-10-04 NOTE — PROGRESS NOTES
"[facility]) nurse navigator notified of H[facility] candidate in need of education and introduction to 4-6 week program.      PT aao x 3 while sitting in bed 10/4/2024. Introduced self to pt as H[facility] nurse navigator.     Patient given "Heart Failure Transitional Care Clinic Home Care Guide" which includes "Daily weight and symptom tracker".  Encouraged pt to review information.      Reviewed the following key points of H[facility] program with pt and family:    Take your medications as directed. Call our provider if any Health Care Professional changes your heart/fluid medications.   Weight yourself daily. Daily dry weights. Upon waking, empty your bladder, weigh with as little clothes as possible before eating or drinking. Record weight in Symptom Tracker and compare these weights for fluid gain. Weight gain overnight of 3 - 4lbs is fluid, also gain of 5lbs in 3 days is fluid as well. Call us!  Follow low salt and limit fluid diet. Salt/sodium restrictions 2000 - 3000mg, see page 4. Sodium makes you hold onto fluid. High sodium foods: deli meat, deli cheese, sausages, hot dogs, fast food, restaurant food, anything in a box, bottle, or can. Cook with fresh or frozen ingredients and use seasonings that are labeled NO SALT/SODIUM. Check portions sizes, salt is reported for one portion. A can may contain 2 - 3 portions. Fluid restriction 1.5 - 2 liters per day, see page 6, measure all your fluid, the milk in your cereal, broth in your soup and ice cream because anything that melts at room temperature is a liquid.   Stop smoking and start exercising. Brisk walking is good, don't walk like you're going to the hangman and DON'T WALK IN THE HEAT! Start slow and increase as tolerated. Remember if you don't use it, you lose it.   Go to all your appointments and call your team. Have your weight, blood pressure and pulse ready when we call to do the phone check-ins and call us if you have fluid gain or " questions.    The pt would prefer to have afternoon appts with [facility].    Pt reminded to follow Symptom tracker and to call at the onset of symptoms according to tracker.     Reviewed plan for follow up once discharged to include phone calls, in person and virtual visits to assist pt optimizing their heart failure medication regimen and encouraging healthy lifestyle modifications.  Reminded pt that program will assist them over the next 4-6 weeks and then patient will be transferred to long term care provider .  Reminded pt how to contact H[facility] navigator via phone and or via [employer].     Pt instructed appointment will be printed on hospital discharge paperwork.     Pt also reminded Nurse will call 48-72 hours after discharge to check on them.     PT verbalize read back some of information given.  Encouraged pt and family to read over information often and contact team with any questions or concerns.

## 2024-10-04 NOTE — PLAN OF CARE
[facility] - Emergency Dept  Initial Discharge Assessment       Primary Care Provider: Kalyn Pemberton NP    Admission Diagnosis: SOB (shortness of breath) [R06.02]    Admission Date: 10/3/2024  Expected Discharge Date:     Pt stated she lives on the second floor of a two-story walk-up and uses a wheelchair and walker.  Pt stated she is independent with her ADL's and does not require assistance.    Pt is current with [facility] and home health.  Pt did not know the name of the home health company.  On chart review the home health company will either be [facility] (from September) or [facility] (from October note).  SW sent information via [employer] and requested response via [employer]    Pt to d/c home with current home health and outpatient case management    Transition of Care Barriers: (P) None    Payor: [employer] [facility] [facility] / Plan: [employer] CHOICES / Product Type: Medicare Advantage /     Extended Emergency Contact Information  Primary Emergency Contact: Anthony Reardon  Mobile Phone: [phone]  Relation: Daughter   needed? No    Discharge Plan A: (P) Home, Home Health  Discharge Plan B: (P) Home, Home Health      [facility]. - [place], [place] - [address]  [address] [address]  Phone: [phone] Fax: [phone]    [id number] - [place], [place] - [address] [facility] [address]  [address] [address]  Phone: [phone] Fax: [phone]    [facility]. - [place], [place] - [address]  [address] [address]  Phone: [phone] Fax: [phone]    [id number] - [place], [place] - [address] [facility]  [address] [address]  Phone: [phone] Fax: [phone]      Initial Assessment (most recent)       Adult Discharge Assessment - 10/04/24 0802          Discharge Assessment    Assessment  Type Discharge Planning Assessment     Confirmed/corrected address, phone number and insurance Yes     Confirmed Demographics Correct on Facesheet     Source of Information patient     Reason For Admission CHF exacerbation (P)      People in Home grandchild(coral) (P)      Facility Arrived From: home (P)      Do you expect to return to your current living situation? Yes (P)      Do you have help at home or someone to help you manage your care at home? No (P)      Prior to hospitilization cognitive status: Alert/Oriented;No Deficits (P)      Current cognitive status: No Deficits;Alert/Oriented (P)      Walking or Climbing Stairs Difficulty yes (P)      Walking or Climbing Stairs ambulation difficulty, requires equipment (P)      Mobility Management wheelchair, walker (P)      Home Accessibility not wheelchair accessible;stairs to enter home (P)      Number of Stairs, Main Entrance other (see comments) (P)    14 steps up - 2nd floor walk up appartment    Home Layout Able to live on 1st floor (P)      Equipment Currently Used at Home wheelchair;walker, rolling;shower chair (P)      Patient currently being followed by outpatient case management? Yes (P)      If yes, name of outpatient case management following: [facility] (P)    [facility]    Do you currently have service(s) that help you manage your care at home? Yes (P)      Name and Contact number of agency Home Health - pt could not recall name of company. On chart review either [facility] or [facility] (P)      Is the pt/caregiver preference to resume services with current agency Yes (P)      Do you have any problems affording any of your prescribed medications? No (P)      Is the patient taking medications as prescribed? yes (P)      Who is going to help you get home at discharge? family/friends (P)      How do you get to doctors appointments? family or friend will provide (P)      Are you on dialysis? No (P)      Do you take coumadin?  No (P)      Discharge Plan A Home;Home Health (P)      Discharge Plan B Home;Home Health (P)      DME Needed Upon Discharge  none (P)      Discharge Plan discussed with: Patient (P)      Transition of Care Barriers None (P)         Physical Activity    On average, how many days per week do you engage in moderate to strenuous exercise (like a brisk walk)? 0 days (P)      On average, how many minutes do you engage in exercise at this level? 0 min (P)         Financial Resource Strain    How hard is it for you to pay for the very basics like food, housing, medical care, and heating? Somewhat hard (P)         Housing Stability    In the last 12 months, was there a time when you were not able to pay the mortgage or rent on time? No (P)      At any time in the past 12 months, were you homeless or living in a shelter (including now)? No (P)         Transportation Needs    Has the lack of transportation kept you from medical appointments, meetings, work or from getting things needed for daily living? No (P)         Food Insecurity    Within the past 12 months, you worried that your food would run out before you got the money to buy more. Never true (P)      Within the past 12 months, the food you bought just didn't last and you didn't have money to get more. Never true (P)         Stress    Do you feel stress - tense, restless, nervous, or anxious, or unable to sleep at night because your mind is troubled all the time - these days? To some extent (P)         Social Isolation    How often do you feel lonely or isolated from those around you?  Rarely (P)         Alcohol Use    Q1: How often do you have a drink containing alcohol? Never (P)      Q2: How many drinks containing alcohol do you have on a typical day when you are drinking? Patient does not drink (P)      Q3: How often do you have six or more drinks on one occasion? Never (P)         [email or web address]    In the past 12 months has the electric, gas, oil, or water company  threatened to shut off services in your home? No (P)         Health Literacy    How often do you need to have someone help you when you read instructions, pamphlets, or other written material from your doctor or pharmacy? Rarely (P)         OTHER    Name(s) of People in Home granddaughter (P)                       Discharge Plan A and Plan B have been determined by review of patient's clinical status, future medical and therapeutic needs, and coverage/benefits for post-acute care in coordination with multidisciplinary team members.    Amy Leblanc CD, MSW, LMSW, RSW   Case Management  [facility]  Email: [email or web address]

## 2024-10-04 NOTE — PHARMACY MED REC
"Admission Medication History     The home medication history was taken by Saud Garibay.    You may go to "Admission" then "Reconcile Home Medications" tabs to review and/or act upon these items.     The home medication list has been updated by the Pharmacy department.   Please read ALL comments highlighted in yellow.   Please address this information as you see fit.    Feel free to contact us if you have any questions or require assistance.      Medications listed below were obtained from: Patient/family and Analytic software- [employer] Medications   Medication Sig    albuterol-ipratropium (DUO-NEB) 2.5 mg-0.5 mg/3 mL nebulizer solution   Take 3 mLs by nebulization every 6 (six) hours as needed for Wheezing or Shortness of Breath.    aspirin (ECOTRIN) 81 MG EC tablet   Take 1 tablet (81 mg total) by mouth once daily.    atorvastatin (LIPITOR) 80 MG tablet   Take 1 tablet (80 mg total) by mouth once daily.    budesonide-formoterol 80-4.5 mcg (SYMBICORT) 80-4.5 mcg/actuation HFAA   INHALE 2 PUFFS INTO THE LUNGS TWICE DAILY. RINSE MOUTH AFTER USE    ezetimibe (ZETIA) 10 mg tablet   Take 1 tablet (10 mg total) by mouth once daily.    furosemide (LASIX) 20 MG tablet   Take 1 tablet (20 mg total) by mouth daily as needed (edema/wt gain).    gabapentin (NEURONTIN) 300 MG capsule   Take 1 capsule (300 mg total) by mouth 2 (two) times daily.    metoprolol succinate (TOPROL-XL) 25 MG 24 hr tablet   Take 1 tablet (25 mg total) by mouth once daily.    nitroGLYCERIN (NITROSTAT) 0.4 MG SL tablet   Place 1 tablet (0.4 mg total) under the tongue every 5 (five) minutes as needed for Chest pain.    traMADoL (ULTRAM) 50 mg tablet   Take 1 tablet (50 mg total) by mouth every 8 (eight) hours as needed for Pain.    empagliflozin (JARDIANCE) 10 mg tablet   Take 1 tablet (10 mg total) by mouth once daily. MD is reviewing interactions before directing patient to start.      LIDOcaine HCL 2% (XYLOCAINE) 2 % jelly   Apply 0.5 mLs topically " once daily. (To bed wound).Unable to verify if patient is currently using as she became agitated towards conclusion of the interview.       linezolid (ZYVOX) 600 mg Tab   Take 1,200 mg by mouth every 12 (twelve) hours. Unable to verify if patient is currently taking as she became agitated towards conclusion of the interview.      ticagrelor (BRILINTA) 90 mg tablet   Take 1 tablet (90 mg total) by mouth 2 (two) times daily. Per patient, she hasn't started due to MD reviewing.      UNABLE TO FIND Linezolid/Cipro/Ketoconazole 22/22/20.9 % Powder - Apply 2 gm topically to infection site 1 to 2 times daily. Unable to verify if patient is currently using as she became agitated towards conclusion of the interview.       Potential issues to be addressed PRIOR TO DISCHARGE  Patient requires education regarding drug therapies           Saud Garibay  EXT [id number]                  .

## 2024-10-04 NOTE — HPI
Patient is a 79 year old female with a history of recent STEMI 9/29 s/p RCA stent placement, CHF, COPD, s/p colostomy, s/p transmetatarsal amputation of right foot, CAD s/p PCI 11/2023 and 5/2024 for proximal RCA stenosis presenting to the emergency department with shortness of breath. States that dyspnea is worse on exertion but is present at rest as well. Endorses orthopnea, PND. Denies any leg edema or body swelling. She was recently released from the hospital on 10/1 for a STEMI s/p stent placement, reports compliance with medications and denies chest pain. Denies cough or sputum production. Also endorses a moderate throbbing headache with associated nausea and photophobia. Denies fever, chills, CP, palpitations, cough or wheeze, abdominal pain, constipation/diarrhea, urinary symptoms, dizziness, numbness/weakness. Endorses foot pain from her right foot around her amputation site, chronic foot wound was present. A picture of this wound can be found in the media tab.     In the ED, the patient's initial vitals were grossly stable. On physical exam, patient was mildly SOB but did not show signs of respiratory distress and had good oxygen saturations on nasal cannula 2 L. Crackles were auscultated in the lower lung bases bilaterally. BNP was elevated at 432. Initial troponin was elevated at 0.574 but was noted to be down trending with the second at 0.504. CXR did not show acute abnormalities. X-ray of the right foot did not show evidence of osteomyelitis.

## 2024-10-04 NOTE — ASSESSMENT & PLAN NOTE
"Patient is a 79 year old female with a PMHx of recent STEMI 9/29 s/p RCA stent placement, COPD, s/p colostomy, s/p transmetatarsal amputation of right foot, CAD s/p PCI 11/2023 and 5/2024 for proximal RCA stenosis who presented to [facility] for SOB, orthopnea, and PND. She denies chest and edema. On admission, her BNP 400s and troponin slightly elevated by stable at 0.5. EKG with acute ishemic changes. TTE showed no change in EF but "new wall motion abnormalities". Cardiology consulted for possible ACS protocol. Patient is back to baseline oxygen requirements (2L)     Plan  - continue DAPT therapy - ASA 81mg and Ticagrelor 90mg  - statin therapy - Lipitor 80mg   - elevated troponin could be a result of fluid overload ~ NSTEMI type II    - received IV diuresis; holding now; appears euvolemic on physical exam   - new wall motion abnormalities may be artifact or from different interpretation  - will not start ACS protocol    "

## 2024-10-04 NOTE — PLAN OF CARE
CM spoke with pt in room.  She states she lives at home, has O2, shower chair, walker, w/c, nebulizer, O2 concentrator.      She is interested in [facility] placement at [facility].  CM instructed in FPC NH placement process.  She will need 3 mos of bank statements to be screened for LT Medicaid.  CM can start the process by sending a referral to [facility], but pt would be responsible for completing the process.  Pt v/u.  CM sent referral to [facility] via .    2:17 PM  Per CP, [facility] willing to accept pt.    3:03 PM  CM called in [employer].  CM uploaded PasRR to , faxed PasRR to state.    MELLO MaderaN, BS, RN, CCM

## 2024-10-05 VITALS
HEIGHT: 60 IN | WEIGHT: 106.69 LBS | DIASTOLIC BLOOD PRESSURE: 69 MMHG | BODY MASS INDEX: 20.94 KG/M2 | HEART RATE: 86 BPM | RESPIRATION RATE: 18 BRPM | TEMPERATURE: 98 F | OXYGEN SATURATION: 98 % | SYSTOLIC BLOOD PRESSURE: 152 MMHG

## 2024-10-05 LAB
ANION GAP SERPL CALC-SCNC: 13 MMOL/L (ref 8–16)
BUN SERPL-MCNC: 21 MG/DL (ref 8–23)
CALCIUM SERPL-MCNC: 9.6 MG/DL (ref 8.7–10.5)
CHLORIDE SERPL-SCNC: 93 MMOL/L (ref 95–110)
CO2 SERPL-SCNC: 31 MMOL/L (ref 23–29)
CREAT SERPL-MCNC: 1.3 MG/DL (ref 0.5–1.4)
ERYTHROCYTE [DISTWIDTH] IN BLOOD BY AUTOMATED COUNT: 15.1 % (ref 11.5–14.5)
EST. GFR  (NO RACE VARIABLE): 42.1 ML/MIN/1.73 M^2
GLUCOSE SERPL-MCNC: 87 MG/DL (ref 70–110)
HCT VFR BLD AUTO: 32.9 % (ref 37–48.5)
HGB BLD-MCNC: 10.5 G/DL (ref 12–16)
MAGNESIUM SERPL-MCNC: 1.8 MG/DL (ref 1.6–2.6)
MCH RBC QN AUTO: 29 PG (ref 27–31)
MCHC RBC AUTO-ENTMCNC: 31.9 G/DL (ref 32–36)
MCV RBC AUTO: 91 FL (ref 82–98)
PHOSPHATE SERPL-MCNC: 4.1 MG/DL (ref 2.7–4.5)
PLATELET # BLD AUTO: 339 K/UL (ref 150–450)
PMV BLD AUTO: 10.3 FL (ref 9.2–12.9)
POTASSIUM SERPL-SCNC: 3.7 MMOL/L (ref 3.5–5.1)
RBC # BLD AUTO: 3.62 M/UL (ref 4–5.4)
SODIUM SERPL-SCNC: 137 MMOL/L (ref 136–145)
TROPONIN I SERPL DL<=0.01 NG/ML-MCNC: 0.56 NG/ML (ref 0–0.03)
WBC # BLD AUTO: 7.05 K/UL (ref 3.9–12.7)

## 2024-10-05 PROCEDURE — 80048 BASIC METABOLIC PNL TOTAL CA: CPT

## 2024-10-05 PROCEDURE — 25000003 PHARM REV CODE 250

## 2024-10-05 PROCEDURE — 25000242 PHARM REV CODE 250 ALT 637 W/ HCPCS

## 2024-10-05 PROCEDURE — 84484 ASSAY OF TROPONIN QUANT: CPT | Performed by: STUDENT IN AN ORGANIZED HEALTH CARE EDUCATION/TRAINING PROGRAM

## 2024-10-05 PROCEDURE — 63600175 PHARM REV CODE 636 W HCPCS

## 2024-10-05 PROCEDURE — 83735 ASSAY OF MAGNESIUM: CPT

## 2024-10-05 PROCEDURE — 94761 N-INVAS EAR/PLS OXIMETRY MLT: CPT

## 2024-10-05 PROCEDURE — 84100 ASSAY OF PHOSPHORUS: CPT

## 2024-10-05 PROCEDURE — 85027 COMPLETE CBC AUTOMATED: CPT

## 2024-10-05 PROCEDURE — 36415 COLL VENOUS BLD VENIPUNCTURE: CPT

## 2024-10-05 PROCEDURE — 94640 AIRWAY INHALATION TREATMENT: CPT

## 2024-10-05 PROCEDURE — 25000003 PHARM REV CODE 250: Performed by: STUDENT IN AN ORGANIZED HEALTH CARE EDUCATION/TRAINING PROGRAM

## 2024-10-05 PROCEDURE — 27000221 HC OXYGEN, UP TO 24 HOURS

## 2024-10-05 RX ORDER — HYDROXYZINE HYDROCHLORIDE 25 MG/1
25 TABLET, FILM COATED ORAL 3 TIMES DAILY PRN
Qty: 10 TABLET | Refills: 0 | Status: SHIPPED | OUTPATIENT
Start: 2024-10-05 | End: 2024-10-12

## 2024-10-05 RX ORDER — SACUBITRIL AND VALSARTAN 24; 26 MG/1; MG/1
1 TABLET, FILM COATED ORAL 2 TIMES DAILY
Qty: 180 TABLET | Refills: 3 | Status: SHIPPED | OUTPATIENT
Start: 2024-10-05 | End: 2025-10-05

## 2024-10-05 RX ADMIN — ACETAMINOPHEN 500 MG: 500 TABLET ORAL at 02:10

## 2024-10-05 RX ADMIN — ACETAMINOPHEN 500 MG: 500 TABLET ORAL at 10:10

## 2024-10-05 RX ADMIN — EZETIMIBE 10 MG: 10 TABLET ORAL at 09:10

## 2024-10-05 RX ADMIN — METOPROLOL SUCCINATE 25 MG: 25 TABLET, EXTENDED RELEASE ORAL at 01:10

## 2024-10-05 RX ADMIN — HEPARIN SODIUM 5000 UNITS: 5000 INJECTION INTRAVENOUS; SUBCUTANEOUS at 09:10

## 2024-10-05 RX ADMIN — EMPAGLIFLOZIN 10 MG: 10 TABLET, FILM COATED ORAL at 09:10

## 2024-10-05 RX ADMIN — HYDROXYZINE HYDROCHLORIDE 25 MG: 25 TABLET, FILM COATED ORAL at 06:10

## 2024-10-05 RX ADMIN — FLUTICASONE FUROATE AND VILANTEROL TRIFENATATE 1 PUFF: 100; 25 POWDER RESPIRATORY (INHALATION) at 09:10

## 2024-10-05 RX ADMIN — TICAGRELOR 90 MG: 90 TABLET ORAL at 09:10

## 2024-10-05 RX ADMIN — ATORVASTATIN CALCIUM 80 MG: 40 TABLET, FILM COATED ORAL at 09:10

## 2024-10-05 RX ADMIN — TRAMADOL HYDROCHLORIDE 50 MG: 50 TABLET, COATED ORAL at 04:10

## 2024-10-05 RX ADMIN — Medication 400 MG: at 09:10

## 2024-10-05 RX ADMIN — GABAPENTIN 300 MG: 300 CAPSULE ORAL at 09:10

## 2024-10-05 RX ADMIN — POTASSIUM CHLORIDE 10 MEQ: 750 CAPSULE, EXTENDED RELEASE ORAL at 12:10

## 2024-10-05 RX ADMIN — HEPARIN SODIUM 5000 UNITS: 5000 INJECTION INTRAVENOUS; SUBCUTANEOUS at 03:10

## 2024-10-05 RX ADMIN — ASPIRIN 81 MG: 81 TABLET, COATED ORAL at 09:10

## 2024-10-05 RX ADMIN — TRAMADOL HYDROCHLORIDE 50 MG: 50 TABLET, COATED ORAL at 12:10

## 2024-10-05 NOTE — PLAN OF CARE
Recommendations    Continue low sodium diet as tolerated.     Jordy BID to promote wound healing.     RD to monitor and follow up.    Goals: Meet % EEN/EPN by RD follow up.  Nutrition Goal Status: new  Communication of RD Recs: other (comment) (POC)

## 2024-10-05 NOTE — NURSING
Alert and verbal. Discharged home by arranged transportation via stretcher. Educated on medications and follow up appts; voiced understanding. Prescription medications delivered to bedside.

## 2024-10-05 NOTE — HOSPITAL COURSE
Patient admitted to medicine with concerns of decompensated heart failure in the setting of recent PCI.  Patient was diuresed with IV Lasix in the ED upon admission, with good urine output.  Patient reported resolution of her shortness of breath after initial dose.  Patient had mildly elevated troponin, shortness a breath, equivocal repeat echo compared to last time; cardio was consulted given recent PCI.  Cardiology recommended no ACS treatment, advanced GDMT. We would like to start ARNI and MRA, however limited by low blood pressure at this point in time, given small fluid bolus overnight.    Patient was referred to [facility].  Patient ultimately requesting intermediate nursing home placement, process was started.  Patient to discharge home with home health/acute care and later transitioned to intermediate home once finally approved.    Patient medically stable for discharge.  This plan was discussed with the patient, all questions that the patient had was answered.  Patient comfortable with discharge at this time.    Recommending that outpatient PCP/Cardiology titrate GDMT as tolerated.

## 2024-10-05 NOTE — ASSESSMENT & PLAN NOTE
Malnutrition Type:  Context: acute illness or injury  Level: severe    Related to (etiology):   Not consuming sufficient nutrients     Signs and Symptoms (as evidenced by):   Decreased PO intake, 19.7% BW in 2 months, non healing wounds     Malnutrition Characteristic Summary:  Weight Loss (Malnutrition): other (see comments) (19.7% in 2 months)  Energy Intake (Malnutrition): less than or equal to 50% for greater than or equal to 5 days      Interventions/Recommendations (treatment strategy):  Collab of nutrition care w/ other providers  Jordy    Nutrition Diagnosis Status:   New

## 2024-10-05 NOTE — ASSESSMENT & PLAN NOTE
-UA has 3+ leukocytes but only 1 WBC with rare bacteria (some squamous and non squamous epithelial cells)  Plan:   - continue to monitor for symptoms

## 2024-10-05 NOTE — DISCHARGE SUMMARY
[facility] - Internal Medicine [facility] Medicine  Discharge Summary      Patient Name: Radha Sotelo  MRN: [id number]  DEEPIKA: [id number]  Patient Class: IP- Inpatient  Admission Date: 10/3/2024  Hospital Length of Stay: 1 days  Discharge Date and Time: 10/5/2024  6:24 PM  Attending Physician: Ashley Gilman MD   Discharging Provider: Kiet Carpenter DO  Primary Care Provider: Kalyn Pemberton NP  Hospital Medicine Team: [facility] 1 Kiet Carpenter DO  Primary Care Team: [facility] 1    HPI:   Patient is a 79 year old female with a history of recent STEMI 9/29 s/p RCA stent placement, CHF, COPD, s/p colostomy, s/p transmetatarsal amputation of right foot, CAD s/p PCI 11/2023 and 5/2024 for proximal RCA stenosis presenting to the emergency department with shortness of breath. States that dyspnea is worse on exertion but is present at rest as well. Endorses orthopnea, PND. Denies any leg edema or body swelling. She was recently released from the hospital on 10/1 for a STEMI s/p stent placement, reports compliance with medications and denies chest pain. Denies cough or sputum production. Also endorses a moderate throbbing headache with associated nausea and photophobia. Denies fever, chills, CP, palpitations, cough or wheeze, abdominal pain, constipation/diarrhea, urinary symptoms, dizziness, numbness/weakness. Endorses foot pain from her right foot around her amputation site, chronic foot wound was present. A picture of this wound can be found in the media tab.     In the ED, the patient's initial vitals were grossly stable. On physical exam, patient was mildly SOB but did not show signs of respiratory distress and had good oxygen saturations on nasal cannula 2 L. Crackles were auscultated in the lower lung bases bilaterally. BNP was elevated at 432. Initial troponin was elevated at 0.574 but was noted to be down trending with the second at 0.504. CXR did not show acute abnormalities. X-ray of  the right foot did not show evidence of osteomyelitis.       * No surgery found *      Hospital Course:   Patient admitted to medicine with concerns of decompensated heart failure in the setting of recent PCI.  Patient was diuresed with IV Lasix in the ED upon admission, with good urine output.  Patient reported resolution of her shortness of breath after initial dose.  Patient had mildly elevated troponin, shortness a breath, equivocal repeat echo compared to last time; cardio was consulted given recent PCI.  Cardiology recommended no ACS treatment, advanced GDMT. We would like to start ARNI and MRA, however limited by low blood pressure at this point in time, given small fluid bolus overnight.    Patient was referred to [facility] acute care at home.  Patient ultimately requesting prison nursing home placement, process was started.  Patient to discharge home with home health/acute care and later transitioned to prison home once finally approved.    Patient medically stable for discharge.  This plan was discussed with the patient, all questions that the patient had was answered.  Patient comfortable with discharge at this time.    Recommending that outpatient PCP/Cardiology titrate GDMT as tolerated.       Goals of Care Treatment Preferences:  Code Status: Full Code          What is most important right now is to focus on extending life as long as possible, even it it means sacrificing quality.  Accordingly, we have decided that the best plan to meet the patient's goals includes continuing with treatment.    Physical Exam  Vitals reviewed.   Constitutional:       Appearance: Normal appearance.   HENT:      Head: Normocephalic and atraumatic.   Cardiovascular:      Rate and Rhythm: Normal rate and regular rhythm.      Pulses: Normal pulses.   Pulmonary:      Effort: Pulmonary effort is normal. No respiratory distress.      Breath sounds: No stridor. No rales. No wheezing.   Abdominal:      General: Abdomen is  flat. Bowel sounds are normal.      Palpations: Abdomen is soft.      Tenderness: There is no abdominal tenderness.   Musculoskeletal:      History of right TMA with large eschar-like wound at distal medial aspect     No active drainage some surrounding erythema.  Skin:     General: Skin is warm and dry.  Minimal bilateral lower extremity edema     Right TMA with   Neurological:      General: No focal deficit present.      Mental Status: She is alert and oriented to person, place, and time.   Psychiatric:         Mood and Affect: Mood normal.         Thought Content: Thought content normal.       SDOH Screening:  The patient was screened for utility difficulties, food insecurity, transport difficulties, housing insecurity, and interpersonal safety and there were no concerns identified this admission.     Consults:   Consults (From admission, onward)          Status Ordering Provider     Inpatient consult to Cardiology  Once        Provider:  (Not yet assigned)    Completed COLLEEN OLVERA     Inpatient consult to Social Work/Case Management  Once        Provider:  (Not yet assigned)    Acknowledged HAM VALDERRAMA     Inpatient consult to Registered Dietitian/Nutritionist  Once        Provider:  (Not yet assigned)    Completed HAM VALDERRAMA            Pulmonary  COPD (chronic obstructive pulmonary disease)  -Give Breo for home Symbicort  -no wheezing on exam    Chronic hypoxic respiratory failure  Stable on home o2    Cardiac/Vascular  * Acute on chronic diastolic heart failure  Echo    Result Date: 10/4/2024    Technically difficult study, no IV contrast given.    Left Ventricle: The left ventricle is normal in size. Normal wall   thickness. Regional wall motion abnormalities present. See diagram for   wall motion findings. There is normal systolic function with a visually   estimated ejection fraction of 55 - 60%. There is normal diastolic   function.    Right Ventricle: Normal right ventricular cavity size.  Wall thickness   is normal. Systolic function is borderline low.    Aortic Valve: There is mild aortic valve sclerosis. There is mild   aortic regurgitation.    Mitral Valve: There is mild regurgitation.    IVC/SVC: Normal venous pressure at 3 mmHg.    Current Heart Failure Medications  , 2 times daily, Oral    Plan  - Monitor strict I&Os and daily weights.    - Low sodium diet  - Oxygen as needed  - GDMT as tolerated. Continue Home Jardiance. Prescribed low dose Entresto. BP likely too low for other GDMT    Elevated troponin  Suspect due to supply/demand mismatch. Seen by cardiology. No treatment for ACS. Peaked and downtrending. Stable. No c/o  CP at this time      Orthopedic  Chronic wound  X-ray of the foot in the ED did not show evidence of osteomyelitis     Plan:   - Wound care orders placed by podiatry         Final Active Diagnoses:    Diagnosis Date Noted POA    PRINCIPAL PROBLEM:  Acute on chronic diastolic heart failure [I50.33] 10/04/2024 Yes    Asymptomatic bacteriuria [R82.71] 10/04/2024 Yes    Migraine headache [G43.909] 10/04/2024 Yes    History of ST elevation myocardial infarction (STEMI) [I25.2] 10/04/2024 Not Applicable    PAD (peripheral artery disease) [I73.9] 10/04/2024 Yes    Acute heart failure with preserved ejection fraction (HFpEF) [I50.31] 10/04/2024 Yes    Wound, open, foot, right, subsequent encounter [S91.301D] 10/04/2024 Not Applicable    Chronic wound [T14.8XXA] 07/30/2024 Yes     Chronic    Anemia [D64.9] 07/29/2024 Yes    Elevated troponin [R79.89] 03/02/2020 Yes    COPD (chronic obstructive pulmonary disease) [J44.9]  Yes    Chronic hypoxic respiratory failure [J96.11] 03/01/2020 Yes    Peripheral vascular disease, unspecified [I73.9] 11/12/2012 Yes    Physical deconditioning [R53.81] 11/05/2012 Yes    Severe malnutrition [E43] 11/04/2012 Yes      Problems Resolved During this Admission:       Discharged Condition: fair    Disposition: Home or Self Care    Follow Up:   Follow-up  Information       Kalyn Pemberton, NP. Call.    Specialties: Family Medicine, Palliative Medicine  Contact information:  [address]  [facility] [address]  [phone]                           Patient Instructions:      Ambulatory referral/consult to Smoking Cessation Program   Standing Status: Future   Referral Priority: Routine Referral Type: Consultation   Referral Reason: Specialty Services Required   Requested Specialty: CTTS   Number of Visits Requested: 1     ACCEPT - Ambulatory referral/consult to Cardiac Electrophysiology   Standing Status: Future   Referral Priority: Routine Referral Type: Consultation   Referral Reason: Specialty Services Required   Requested Specialty: Cardiology   Number of Visits Requested: 1     Ambulatory referral/consult to Heart Failure Transitional Care Clinic   Standing Status: Future   Referral Priority: Routine Referral Type: Consultation   Referral Reason: Specialty Services Required   Requested Specialty: Cardiology   Number of Visits Requested: 1     Ambulatory referral/consult to Acute Care at Home   Standing Status: Future   Referral Priority: Routine Referral Type: Consultation   Referral Reason: Continuity of Care   Referred to Provider: IRASEMA PROVIDER Requested Specialty: Internal Medicine   Number of Visits Requested: 1     Diet Cardiac     Leave dressing on - Keep it clean, dry, and intact until clinic visit     Notify your health care provider if you experience any of the following:  temperature >100.4     Notify your health care provider if you experience any of the following:  persistent nausea and vomiting or diarrhea     Notify your health care provider if you experience any of the following:  severe uncontrolled pain     Notify your health care provider if you experience any of the following:  redness, tenderness, or signs of infection (pain, swelling, redness, odor or green/yellow discharge around incision site)     Notify your health care  provider if you experience any of the following:  difficulty breathing or increased cough     Notify your health care provider if you experience any of the following:  severe persistent headache     Notify your health care provider if you experience any of the following:  worsening rash     Notify your health care provider if you experience any of the following:  persistent dizziness, light-headedness, or visual disturbances     Notify your health care provider if you experience any of the following:  increased confusion or weakness     Notify your health care provider if you experience any of the following:     Reason for not Prescribing Nicotine Replacement   Order Comments: As per OP smoking cess     Order Specific Question Answer Comments   Reason for not Prescribing: Not medically appropriate at this time      Activity as tolerated       Significant Diagnostic Studies:     Echo    Result Date: 10/4/2024    Technically difficult study, no IV contrast given.   Left Ventricle: The left ventricle is normal in size. Normal wall thickness. Regional wall motion abnormalities present. See diagram for wall motion findings. There is normal systolic function with a visually estimated ejection fraction of 55 - 60%. There is normal diastolic function.   Right Ventricle: Normal right ventricular cavity size. Wall thickness is normal. Systolic function is borderline low.   Aortic Valve: There is mild aortic valve sclerosis. There is mild aortic regurgitation.   Mitral Valve: There is mild regurgitation.   IVC/SVC: Normal venous pressure at 3 mmHg.     US Lower Extremity Arteries Right    Result Date: 10/4/2024  EXAMINATION: US ARTERIAL LOWER EXTREMITY RIGHT WITH NICCI (XPD); US LOWER EXTREMITY ARTERIES RIGHT CLINICAL HISTORY: Right foot and leg swelling after femoral access for STEMI 4 days ago;  Pain in leg, unspecified TECHNIQUE: Right lower extremity arterial duplex ultrasound examination performed. Multiple gray scale and  color doppler images were obtained in addition to waveform analysis.  Ankle-brachial indices were calculated. COMPARISON: CTA runoff 08/13/2024 FINDINGS: The ankle brachial index on the right is 0.9 and on the left is 0.8. The peak systolic velocities on the right are as follows, in centimeters/second: Common femoral artery: 190 Deep femoral artery: 217 Superficial femoral artery, proximal: 124 Superficial femoral artery, mid portion: 179 Superficial femoral artery, distal: Occluded with collateralization. Popliteal artery, proximal: 14 Popliteal artery, mid: 106 Popliteal artery, distal: Occluded with collateralization. Anterior tibial artery: 33 Posterior tibial artery: 16 Monophasic waveforms are seen throughout the right lower extremity. Adjacent to the proximal right superficial femoral artery is a 0.6 x 0.3 x 0.4 cm oval hypoechoic focus without internal Doppler signal.     Ankle-brachial index of 0.9 on the right and 0.8 on the left. No hemodynamically significant stenosis demonstrated in the right lower extremity arterial system per velocity criteria.  Monophasic waveforms throughout suggest upstream stenosis. Occlusion of the distal right superficial femoral artery and distal right popliteal artery with development of collaterals. Electronically signed by resident: Mickey Garcia Date:    10/04/2024 Time:    04:00 Electronically signed by: Yoseph Fishman Date:    10/04/2024 Time:    05:49    US Lower Extrem Arteries Right with NICCI (xpd)    Result Date: 10/4/2024  EXAMINATION: US ARTERIAL LOWER EXTREMITY RIGHT WITH NICCI (XPD); US LOWER EXTREMITY ARTERIES RIGHT CLINICAL HISTORY: Right foot and leg swelling after femoral access for STEMI 4 days ago;  Pain in leg, unspecified TECHNIQUE: Right lower extremity arterial duplex ultrasound examination performed. Multiple gray scale and color doppler images were obtained in addition to waveform analysis.  Ankle-brachial indices were calculated. COMPARISON: CTA runoff  08/13/2024 FINDINGS: The ankle brachial index on the right is 0.9 and on the left is 0.8. The peak systolic velocities on the right are as follows, in centimeters/second: Common femoral artery: 190 Deep femoral artery: 217 Superficial femoral artery, proximal: 124 Superficial femoral artery, mid portion: 179 Superficial femoral artery, distal: Occluded with collateralization. Popliteal artery, proximal: 14 Popliteal artery, mid: 106 Popliteal artery, distal: Occluded with collateralization. Anterior tibial artery: 33 Posterior tibial artery: 16 Monophasic waveforms are seen throughout the right lower extremity. Adjacent to the proximal right superficial femoral artery is a 0.6 x 0.3 x 0.4 cm oval hypoechoic focus without internal Doppler signal.     Ankle-brachial index of 0.9 on the right and 0.8 on the left. No hemodynamically significant stenosis demonstrated in the right lower extremity arterial system per velocity criteria.  Monophasic waveforms throughout suggest upstream stenosis. Occlusion of the distal right superficial femoral artery and distal right popliteal artery with development of collaterals. Electronically signed by resident: Mickey Garcia Date:    10/04/2024 Time:    04:00 Electronically signed by: Yoseph Fishman Date:    10/04/2024 Time:    05:49    X-Ray Foot Complete Right    Result Date: 10/3/2024  EXAMINATION: XR FOOT COMPLETE 3 VIEW RIGHT CLINICAL HISTORY: . Pain in unspecified foot TECHNIQUE: AP, lateral, and oblique views of the right foot were performed. COMPARISON: 09/30/2024. FINDINGS: There are postop changes of transmetatarsal amputation of the foot.  There is no radiographic evidence of acute osteomyelitis.  There is a soft tissue ulcer of the great toe metatarsal amputation stump.  There are vascular calcifications.  There is osteopenia.  There is no acute fracture or dislocation.  There is scattered joint space narrowing.     Postop changes of transmetatarsal amputation.  Soft  tissue ulcer of the great toe amputation stump.  No radiographic evidence of acute osteomyelitis. Electronically signed by: Jasper Ramirez Date:    10/03/2024 Time:    23:45    X-Ray Chest AP Portable    Result Date: 10/3/2024  EXAMINATION: XR CHEST AP PORTABLE CLINICAL HISTORY: Sepsis; TECHNIQUE: Single frontal view of the chest was performed. COMPARISON: 09/29/2024. FINDINGS: The lungs are well expanded and clear. No focal opacities are seen. The pleural spaces are clear. The cardiac silhouette is unremarkable.  There are calcifications of the aortic arch.  The visualized osseous structures demonstrate osteopenia and degenerative changes.     No acute abnormality. Electronically signed by: Jasper Ramirez Date:    10/03/2024 Time:    23:29          Pending Diagnostic Studies:       None           Medications:  Reconciled Home Medications:      Medication List        START taking these medications      hydrOXYzine HCL 25 MG tablet  Commonly known as: ATARAX  Take 1 tablet (25 mg total) by mouth 3 (three) times daily as needed for Anxiety.            CHANGE how you take these medications      ENTRESTO 24-26 mg per tablet  Generic drug: sacubitriL-valsartan  Take 1 tablet by mouth 2 (two) times daily.  Notes to patient: Hold until instructed to start by your PCP/Cardiologist            CONTINUE taking these medications      albuterol-ipratropium 2.5 mg-0.5 mg/3 mL nebulizer solution  Commonly known as: DUO-NEB  Take 3 mLs by nebulization every 6 (six) hours as needed for Wheezing or Shortness of Breath.     aspirin 81 MG EC tablet  Commonly known as: ECOTRIN  Take 1 tablet (81 mg total) by mouth once daily.     atorvastatin 80 MG tablet  Commonly known as: LIPITOR  Take 1 tablet (80 mg total) by mouth once daily.     BRILINTA 90 mg tablet  Generic drug: ticagrelor  Take 1 tablet (90 mg total) by mouth 2 (two) times daily.     budesonide-formoterol 80-4.5 mcg 80-4.5 mcg/actuation Hfaa  Commonly known as:  SYMBICORT  INHALE 2 PUFFS INTO THE LUNGS TWICE DAILY. RINSE MOUTH AFTER USE     ezetimibe 10 mg tablet  Commonly known as: ZETIA  Take 1 tablet (10 mg total) by mouth once daily.     furosemide 20 MG tablet  Commonly known as: LASIX  Take 1 tablet (20 mg total) by mouth daily as needed (edema/wt gain).  Notes to patient: Do not take if dizzy/low blood pressure     gabapentin 300 MG capsule  Commonly known as: NEURONTIN  Take 1 capsule (300 mg total) by mouth 2 (two) times daily.     JARDIANCE 10 mg tablet  Generic drug: empagliflozin  Take 1 tablet (10 mg total) by mouth once daily.     LIDOcaine HCL 2% 2 % jelly  Commonly known as: XYLOCAINE  Apply 0.5 mLs topically once daily. (To bed wound)     metoprolol succinate 25 MG 24 hr tablet  Commonly known as: TOPROL-XL  Take 1 tablet (25 mg total) by mouth once daily.     nitroGLYCERIN 0.4 MG SL tablet  Commonly known as: NITROSTAT  Place 1 tablet (0.4 mg total) under the tongue every 5 (five) minutes as needed for Chest pain.     traMADoL 50 mg tablet  Commonly known as: ULTRAM  Take 1 tablet (50 mg total) by mouth every 8 (eight) hours as needed for Pain.     UNABLE TO FIND  Linezolid/Cipro/Ketoconazole 22/22/20.9 % Powder - Apply 2 gm topically to infection site 1 to 2 times daily.            STOP taking these medications      linezolid 600 mg Tab  Commonly known as: ZYVOX              Indwelling Lines/Drains at time of discharge:   Lines/Drains/Airways       Drain  Duration                  Colostomy 07/13/21 0201 Descending/sigmoid LLQ 1180 days    Female External Urinary Catheter w/ Suction 10/04/24 0110 1 day                    Time spent on the discharge of patient: 60 minutes         Kiet Carpenter DO  Department of Hospital Medicine  [facility] - Internal Medicine Telemetry

## 2024-10-05 NOTE — CONSULTS
[facility] - Internal Medicine Telemetry  Adult Nutrition  Consult Note    SUMMARY     Recommendations    Continue low sodium diet as tolerated.     Jordy BID to promote wound healing.     RD to monitor and follow up.    Goals: Meet % EEN/EPN by RD follow up.  Nutrition Goal Status: new  Communication of RD Recs: other (comment) (POC)    Assessment and Plan    Endocrine  Severe malnutrition  Malnutrition Type:  Context: acute illness or injury  Level: severe    Related to (etiology):   Not consuming sufficient nutrients     Signs and Symptoms (as evidenced by):   Decreased PO intake, 19.7% BW in 2 months, non healing wounds     Malnutrition Characteristic Summary:  Weight Loss (Malnutrition): other (see comments) (19.7% in 2 months)  Energy Intake (Malnutrition): less than or equal to 50% for greater than or equal to 5 days      Interventions/Recommendations (treatment strategy):  Collab of nutrition care w/ other providers  Jordy  Diet education    Nutrition Diagnosis Status:   New         Malnutrition Assessment  Malnutrition Context: acute illness or injury  Malnutrition Level: severe          Weight Loss (Malnutrition): other (see comments) (19.7% in 2 months)  Energy Intake (Malnutrition): less than or equal to 50% for greater than or equal to 5 days   Orbital Region (Subcutaneous Fat Loss): severe depletion   Clavicle Bone Region (Muscle Loss): severe depletion                 Reason for Assessment    Reason For Assessment: consult  Diagnosis: other (see comments) (Acute heart failure with preserved ejection fraction (HFpEF))  General Information Comments: Consulted for fluid/salt restriction education. Pt reports reduced PO intake. Contributes decreased intake to nausea.  Pt says she does not want to try Boost to assist in nutrient optimization. She says she has food for that. Provided pt with diet education. Slightly dismissive of education handouts, but receptive to given information. Pt experienced a  19.7% loss of BW in 2 months. Unable to complete full NFPE due to pt sitting up with lunch tray. Noted orbital and clavicle wasting via visual NFPE. Education handouts provided: Low Sodium Nutrition Therapy, Fluid Restricted Nutrition Therapy  Nutrition Discharge Planning: Low sodium diet, fluid per MD    Nutrition Risk Screen    Nutrition Risk Screen: unintentional loss of 10 lbs or more in the past 2 months, reduced oral intake over the last month, large or nonhealing wound, burn or pressure injury    Nutrition/Diet History    Factors Affecting Nutritional Intake: decreased appetite, nausea/vomiting    Anthropometrics    Temp: 98.7 °F (37.1 °C)  Height Method: Stated  Height: 5' (152.4 cm)  Height (inches): 60 in  Weight Method: Bed Scale  Weight: 48.4 kg (106 lb 11.2 oz)  Weight (lb): 106.7 lb  Ideal Body Weight (IBW), Female: 100 lb  % Ideal Body Weight, Female (lb): 106.7 %  BMI (Calculated): 20.8  BMI Grade: 18.5-24.9 - normal       Lab/Procedures/Meds    Pertinent Labs Reviewed: reviewed  Pertinent Labs Comments: eGFR 42.1  Pertinent Medications Reviewed: reviewed  Pertinent Medications Comments: Atorvastatin, heparin      Estimated/Assessed Needs    Weight Used For Calorie Calculations: 48.4 kg (106 lb 11.2 oz)  Energy Calorie Requirements (kcal): 1151  Energy Need Method: [place] (MSJ x 1.3)  Protein Requirements: 49-63 (1.0-1.3 g/kg ABW)  Weight Used For Protein Calculations: 48.4 kg (106 lb 11.2 oz)  Fluid Requirements (mL): Per MD     RDA Method (mL): 1151         Nutrition Prescription Ordered    Current Diet Order: Low sodium, 1500 ml fluid    Evaluation of Received Nutrient/Fluid Intake    I/O: - 1,460 net I/O  Comments: LBM 10/4  % Intake of Estimated Energy Needs: 25 - 50 %  % Meal Intake: 25 - 50 %    Nutrition Risk    Level of Risk/Frequency of Follow-up:  (1x/week)       Monitor and Evaluation    Food and Nutrient Intake: food and beverage intake, energy intake  Food and Nutrient  Adminstration: diet order  Knowledge/Beliefs/Attitudes: food and nutrition knowledge/skill  Physical Activity and Function: nutrition-related ADLs and IADLs  Anthropometric Measurements: body mass index, weight change, weight  Biochemical Data, Medical Tests and Procedures: lipid profile, inflammatory profile, glucose/endocrine profile, gastrointestinal profile, electrolyte and renal panel  Nutrition-Focused Physical Findings: overall appearance       Nutrition Follow-Up    RD Follow-up?: Yes    Christiano Hagan, Registration Eligible, Provisional LDN

## 2024-10-05 NOTE — PLAN OF CARE
Discharge Plan A and Plan B have been determined by review of patient's clinical status, future medical and therapeutic needs, and coverage/benefits for post-acute care in coordination with multidisciplinary team members.    10/05/24 1602   Post-Acute Status   Post-Acute Authorization Home Health   Home Health Status Set-up Complete/Auth obtained   Discharge Plan   Discharge Plan A Home with family;Home Health   Discharge Plan B Home with family     LITO notified [facility] via careport of dc orders, LITO requested resumption of care.     Per MD and bedside nurse, patient is ready for dc, requires stretcher transport home w/ oxygen. Per bedside nurse, daughter requesting transportation be arranged for 6pm. LITO placed [facility] orders for patient transport to Home address. Scheduled for 6pm.  LITO phoned Anthony Reardon (Daughter) [phone] to notify, no answer. LITO left  with details of transport.      LITO will continue to follow.                  Jude Bello, TIGRE, LMSW  [facility]  Case Management  Ext. [phone]

## 2024-10-05 NOTE — ASSESSMENT & PLAN NOTE
Most recent BNP and echo results are listed below.  Recent Labs     10/03/24  2348   *     Echo    Result Date: 10/4/2024    Technically difficult study, no IV contrast given.    Left Ventricle: The left ventricle is normal in size. Normal wall   thickness. Regional wall motion abnormalities present. See diagram for   wall motion findings. There is normal systolic function with a visually   estimated ejection fraction of 55 - 60%. There is normal diastolic   function.    Right Ventricle: Normal right ventricular cavity size. Wall thickness   is normal. Systolic function is borderline low.    Aortic Valve: There is mild aortic valve sclerosis. There is mild   aortic regurgitation.    Mitral Valve: There is mild regurgitation.    IVC/SVC: Normal venous pressure at 3 mmHg.        Echo    Result Date: 9/30/2024    Left Ventricle: The left ventricle is normal in size. Increased   ventricular mass. Normal wall thickness. There is concentric hypertrophy.   Regional wall motion abnormalities present. See diagram for wall motion   findings. There is normal systolic function with a visually estimated   ejection fraction of 55 - 60%. There is indeterminate diastolic function.    Right Ventricle: Normal right ventricular cavity size. Wall thickness   is normal. Right ventricle wall motion  is normal. Systolic function is   borderline low.    Left Atrium: Left atrium is moderately dilated.    Aortic Valve: There is mild aortic valve sclerosis. There is mild   aortic regurgitation.    Mitral Valve: There is mild regurgitation.    IVC/SVC: Normal venous pressure at 3 mmHg.        Echo    Result Date: 9/13/2024    Left Ventricle: The left ventricle is normal in size. Normal wall   thickness. There is normal systolic function. Ejection fraction by visual   approximation is 65%. There is normal diastolic function.    Right Ventricle: Normal right ventricular cavity size. Wall thickness   is normal. Systolic function is  normal.    Pulmonary Artery: Pulmonary artery pressure could not be estimated.    IVC/SVC: Normal venous pressure at 3 mmHg.        Echo    Result Date: 5/26/2024    Left Ventricle: The left ventricle is normal in size. Ventricular mass   is normal. Normal wall thickness. Normal wall motion. There is normal   systolic function with a visually estimated ejection fraction of 60 - 65%.   There is normal diastolic function.    Right Ventricle: Normal right ventricular cavity size. Wall thickness   is normal. Systolic function is normal.    Left Atrium: Left atrium is mildly dilated.    Aortic Valve: There is moderate aortic valve sclerosis. There is   annular calcification present. Mildly restricted motion. There is mild   stenosis. Aortic valve area by VTI is 2.21 cm². Aortic valve peak velocity   is 2.0 m/s. Mean gradient is 7 mmHg. The dimensionless index is 0.58.   There is mild aortic regurgitation.    Pulmonary Artery: The estimated pulmonary artery systolic pressure is   20 mmHg.    IVC/SVC: Normal venous pressure at 3 mmHg.        Echo    Result Date: 11/23/2023    Left Ventricle: The left ventricle is normal in size. Ventricular mass   is normal. Normal wall thickness. Normal wall motion. There is normal   systolic function with a visually estimated ejection fraction of 55 - 60%.   Grade II diastolic dysfunction.    Right Ventricle: Normal right ventricular cavity size. Wall thickness   is normal. Right ventricle wall motion  is normal. Systolic function is   normal.    The following segments are hypokinetic: basal inferior, basal   inferolateral and mid inferolateral suggesting ischemia/infarct in LCx or   RCA territory.    Left Atrium: Left atrium is mildly dilated.    Right Atrium: Right atrium is mildly dilated.    Aortic Valve: There is severe aortic valve sclerosis. Moderately   calcified cusps. There is moderate annular calcification present.    Mitral Valve: There is mild mitral annular calcification  present. There   is mild to moderate regurgitation. Mild restriction of the posterior   leaflet likely ischemic MR.    Pulmonary Artery: The estimated pulmonary artery systolic pressure is   44 mmHg.    IVC/SVC: Intermediate venous pressure at 8 mmHg.           Current Heart Failure Medications  empagliflozin (Jardiance) tablet 10 mg, Daily, Oral  metoprolol succinate (TOPROL-XL) 24 hr tablet 25 mg, Daily, Oral  , 2 times daily, Oral    Plan  - Monitor strict I&Os and daily weights.    - Place on telemetry  - Low sodium diet  - Oxygen as needed  - Keep bed elevated   -GDMT as tolerated, OP GDMT addition of ARNI and MRA

## 2024-10-05 NOTE — CARE UPDATE
RRN IV START       Contacted by RN for IV start for IV access.  20g placed to RFA.   Please call Rapid Response TERRY CHEN RN with any questions or concerns at [phone].

## 2024-10-06 NOTE — PLAN OF CARE
[facility] Medicine Telemetry  Discharge Final Note    Primary Care Provider: Kalyn Pemberton NP    Expected Discharge Date: 10/5/2024    Final Discharge Note (most recent)       Final Note - 10/06/24 0952          Final Note    Assessment Type Final Discharge Note (P)      Anticipated Discharge Disposition Home-Health Care Svc (P)      What phone number can be called within the next 1-3 days to see how you are doing after discharge? [phone] (P)         Post-Acute Status    Post-Acute Authorization Home Health (P)      Home Health Status Set-up Complete/Auth obtained (P)                      Important Message from Medicare             Contact Info       Kalyn Pemberton NP   Specialty: Family Medicine, Palliative Medicine   Relationship: PCP - General    [facility] [address]   Phone: [phone]       Next Steps: Call          OCT    9 New Patient 1:00 PM  MD Stoney Evangelista - Cardiology  [address]  [address]  [facility] [address] [phone]     Patient discharged w/ [facility].   Patient discharged home via stretcher transport.       [facility] [place]   [phone] [address] [place] [address]                     TIGRE Dunbar, LMSW  [facility]  Case Management  Ext. [phone]

## 2024-10-07 NOTE — PLAN OF CARE
[facility] - Internal Medicine Telemetry      HOME HEALTH ORDERS  FACE TO FACE ENCOUNTER    Patient Name: Radha Sotelo  YOB: 1945    PCP: Kalyn Pemberton NP   PCP Address: [address] [place] [address] / [facility] [address]  PCP Phone Number: [phone]  PCP Fax: [phone]    Encounter Date: 10/3/24    Admit to Home Health    Diagnoses:  Active Hospital Problems    Diagnosis  POA    *Acute on chronic diastolic heart failure [I50.33]  Yes    Asymptomatic bacteriuria [R82.71]  Yes    Migraine headache [G43.909]  Yes    History of ST elevation myocardial infarction (STEMI) [I25.2]  Not Applicable    PAD (peripheral artery disease) [I73.9]  Yes    Acute heart failure with preserved ejection fraction (HFpEF) [I50.31]  Yes    Wound, open, foot, right, subsequent encounter [S91.301D]  Not Applicable    Chronic wound [T14.8XXA]  Yes     Chronic    Anemia [D64.9]  Yes    Elevated troponin [R79.89]  Yes    COPD (chronic obstructive pulmonary disease) [J44.9]  Yes    Chronic hypoxic respiratory failure [J96.11]  Yes    Peripheral vascular disease, unspecified [I73.9]  Yes    Physical deconditioning [R53.81]  Yes    Severe malnutrition [E43]  Yes      Resolved Hospital Problems   No resolved problems to display.       Follow Up Appointments:  Future Appointments   Date Time Provider Department Center   10/9/2024  1:00 PM Cristino Garcia MD [facility] CARDIO [facility]   10/18/2024 12:30 PM LAB, APPOINTMENT [facility] LAB VNP [facility]   10/18/2024  1:00 PM Amy Jose PA-C [facility]   10/18/2024  1:00 PM [facility] HEART FAILURE NURSE [facility]   10/23/2024  9:15 AM Lexie Cazares DPM [facility] POD [facility]       Allergies:Review of patient's allergies indicates:  No Known Allergies    Medications: Review discharge medications with patient and family and provide education.    No current facility-administered medications for this encounter.     Current Outpatient  Medications   Medication Sig Dispense Refill    albuterol-ipratropium (DUO-NEB) 2.5 mg-0.5 mg/3 mL nebulizer solution Take 3 mLs by nebulization every 6 (six) hours as needed for Wheezing or Shortness of Breath. 180 mL 6    aspirin (ECOTRIN) 81 MG EC tablet Take 1 tablet (81 mg total) by mouth once daily. 30 tablet 11    atorvastatin (LIPITOR) 80 MG tablet Take 1 tablet (80 mg total) by mouth once daily. 90 tablet 3    budesonide-formoterol 80-4.5 mcg (SYMBICORT) 80-4.5 mcg/actuation HFAA INHALE 2 PUFFS INTO THE LUNGS TWICE DAILY. RINSE MOUTH AFTER USE 10.2 g 6    ezetimibe (ZETIA) 10 mg tablet Take 1 tablet (10 mg total) by mouth once daily. 90 tablet 3    furosemide (LASIX) 20 MG tablet Take 1 tablet (20 mg total) by mouth daily as needed (edema/wt gain). 30 tablet 11    gabapentin (NEURONTIN) 300 MG capsule Take 1 capsule (300 mg total) by mouth 2 (two) times daily. 180 capsule 3    metoprolol succinate (TOPROL-XL) 25 MG 24 hr tablet Take 1 tablet (25 mg total) by mouth once daily. 90 tablet 3    nitroGLYCERIN (NITROSTAT) 0.4 MG SL tablet Place 1 tablet (0.4 mg total) under the tongue every 5 (five) minutes as needed for Chest pain. 25 tablet 2    traMADoL (ULTRAM) 50 mg tablet Take 1 tablet (50 mg total) by mouth every 8 (eight) hours as needed for Pain. 30 tablet 0    empagliflozin (JARDIANCE) 10 mg tablet Take 1 tablet (10 mg total) by mouth once daily. 30 tablet 11    hydrOXYzine HCL (ATARAX) 25 MG tablet Take 1 tablet (25 mg total) by mouth 3 (three) times daily as needed for Anxiety. 10 tablet 0    LIDOcaine HCL 2% (XYLOCAINE) 2 % jelly Apply 0.5 mLs topically once daily. (To bed wound)      sacubitriL-valsartan (ENTRESTO) 24-26 mg per tablet Take 1 tablet by mouth 2 (two) times daily. 180 tablet 3    ticagrelor (BRILINTA) 90 mg tablet Take 1 tablet (90 mg total) by mouth 2 (two) times daily. 60 tablet 11    UNABLE TO FIND Linezolid/Cipro/Ketoconazole 22/22/20.9 % Powder - Apply 2 gm topically to infection  site 1 to 2 times daily.          Medication List        START taking these medications      hydrOXYzine HCL 25 MG tablet  Commonly known as: ATARAX  Take 1 tablet (25 mg total) by mouth 3 (three) times daily as needed for Anxiety.            CHANGE how you take these medications      ENTRESTO 24-26 mg per tablet  Generic drug: sacubitriL-valsartan  Take 1 tablet by mouth 2 (two) times daily.  Notes to patient: Hold until instructed to start by your PCP/Cardiologist            CONTINUE taking these medications      albuterol-ipratropium 2.5 mg-0.5 mg/3 mL nebulizer solution  Commonly known as: DUO-NEB  Take 3 mLs by nebulization every 6 (six) hours as needed for Wheezing or Shortness of Breath.     aspirin 81 MG EC tablet  Commonly known as: ECOTRIN  Take 1 tablet (81 mg total) by mouth once daily.     atorvastatin 80 MG tablet  Commonly known as: LIPITOR  Take 1 tablet (80 mg total) by mouth once daily.     BRILINTA 90 mg tablet  Generic drug: ticagrelor  Take 1 tablet (90 mg total) by mouth 2 (two) times daily.     budesonide-formoterol 80-4.5 mcg 80-4.5 mcg/actuation Hfaa  Commonly known as: SYMBICORT  INHALE 2 PUFFS INTO THE LUNGS TWICE DAILY. RINSE MOUTH AFTER USE     ezetimibe 10 mg tablet  Commonly known as: ZETIA  Take 1 tablet (10 mg total) by mouth once daily.     furosemide 20 MG tablet  Commonly known as: LASIX  Take 1 tablet (20 mg total) by mouth daily as needed (edema/wt gain).  Notes to patient: Do not take if dizzy/low blood pressure     gabapentin 300 MG capsule  Commonly known as: NEURONTIN  Take 1 capsule (300 mg total) by mouth 2 (two) times daily.     JARDIANCE 10 mg tablet  Generic drug: empagliflozin  Take 1 tablet (10 mg total) by mouth once daily.     LIDOcaine HCL 2% 2 % jelly  Commonly known as: XYLOCAINE  Apply 0.5 mLs topically once daily. (To bed wound)     metoprolol succinate 25 MG 24 hr tablet  Commonly known as: TOPROL-XL  Take 1 tablet (25 mg total) by mouth once daily.      nitroGLYCERIN 0.4 MG SL tablet  Commonly known as: NITROSTAT  Place 1 tablet (0.4 mg total) under the tongue every 5 (five) minutes as needed for Chest pain.     traMADoL 50 mg tablet  Commonly known as: ULTRAM  Take 1 tablet (50 mg total) by mouth every 8 (eight) hours as needed for Pain.     UNABLE TO FIND  Linezolid/Cipro/Ketoconazole 22/22/20.9 % Powder - Apply 2 gm topically to infection site 1 to 2 times daily.            STOP taking these medications      linezolid 600 mg Tab  Commonly known as: ZYVOX                I have seen and examined this patient within the last 30 days. My clinical findings that support the need for the home health skilled services and home bound status are the following:no   Patient with medication mismanagement issues requiring home bound status as evidenced by  Poor understanding of medication regimen/dosage and Poor adherence to medication regimen/dosage.     Diet:   cardiac diet    Labs:       Referrals/ Consults  Aide to provide assistance with personal care, ADLs, and vital signs.    Activities:   activity as tolerated    Nursing:   Agency to admit patient within 24 hours of hospital discharge unless specified on physician order or at patient request    SN to complete comprehensive assessment including routine vital signs. Instruct on disease process and s/s of complications to report to MD. Review/verify medication list sent home with the patient at time of discharge  and instruct patient/caregiver as needed. Frequency may be adjusted depending on start of care date.     Skilled nurse to perform up to 3 visits PRN for symptoms related to diagnosis    Notify MD if SBP > 160 or < 90; DBP > 90 or < 50; HR > 120 or < 50; Temp > 101; O2 < 88%; Other:       Ok to schedule additional visits based on staff availability and patient request on consecutive days within the home health episode.    When multiple disciplines ordered:    Start of Care occurs on Sunday - Wednesday schedule  remaining discipline evaluations as ordered on separate consecutive days following the start of care.    Thursday SOC -schedule subsequent evaluations Friday and Monday the following week.     Friday - Saturday SOC - schedule subsequent discipline evaluations on consecutive days starting Monday of the following week.    For all post-discharge communication and subsequent orders please contact patient's primary care physician.     Miscellaneous   Medication management    Home Health Aide:  Nursing Three times weekly and Home Health Aide Three times weekly    Wound Care Orders  no    I certify that this patient is confined to her home and needs intermittent skilled nursing care.

## 2024-10-08 ENCOUNTER — DOCUMENTATION ONLY (OUTPATIENT)
Dept: CARDIOLOGY | Facility: CLINIC | Age: 79
End: 2024-10-08
Payer: MEDICARE

## 2024-10-08 NOTE — PROGRESS NOTES
"[facility]) hospital discharge 48-72 hour phone follow up completed.     Most Recent Hospital Discharge Date: 10/5/2024  Last admission Diagnosis/chief complaint: SOB    TCC LPN Navigator spoke with Ms. Radha Sotelo.    Current Patient reported weight: pt claim she did not gain an ounce. She will purchase a scale soon.     Current Patient reported blood pressure and heart rate: no numerics documented.  nurse checked vitals yesterday and they were fine.     Pt reports the following:  [x]  Shortness of Breath with Activity  []  Shortness of Breath at rest   []  Fatigue  []  Edema   [] Chest pain or tightness  [] Weight Increase since discharge  [] None of the above    Medications:   Discharge medication reviewed with pt.  Pt reports having medication list available and has all medications at home for use per list. Pt says she left the hospital with all her medications.     Education:   Confirmed pt received "Home Care Guide for Heart Failure Patients" while admitted. Reviewed key points as listed below.     Recommend 2 -3 gram sodium restriction and 1500 cc-2000 cc fluid restriction.  Encourage physical activity with graded exercise program.  Requested patient to weigh themselves daily, and to notify us if their weight increases by more than 3 lbs in 1 day or 5 lbs in 3 days.   Reminded patient to use "Daily weight and symptom tracker" to record and guide patient on when and how to call HFT[facility]. PT may also use symptom tracker if no scale available  Pt reports being in the Green (color) Zone. If in yellow/red, reminded that they should be calling HFT[facility] today or now.     Watch for these Signs and Symptoms: If any of these occur, contact HFT[facility] immediately:   Increase in shortness of breath with movement   Increase in swelling in your legs and ankles   Weight gain of more than 3 pounds in a day or 5 pounds in 3 days.   Difficulty breathing when you are lying down   Worsening fatigue or " tiredness   Stomach bloating, a full feeling or a loss of appetite   Increased coughing--especially when you are lying down      Pt was able to verbalize back to LPN in their own words correct diet/fluid restrictions, necessity for exercise, warning signs and symptoms, when and how to contact their TCC team.      Pt educated on follow-up plan while in HFT[facility] program to include:   Week 1 -  F/u appt with Provider and Nurse (Date) 10/18/2024. Pt states she don't know if she will make it to her appt with us, because she doesn't leave the house. She will call us if she can or can not make it.    Week 2-5 - In person/ Virtual/ phone call check ins    Week 5-7 - Pt will discharge from [facility] and transition to longterm care provider (Cardiology/PCP/ Advanced Heart Failure).      Patient active on myChart? No      Pt given the following contact information for ease of communication: [phone] (Mon-Fri, 8a-5p) & for urgent issues on the weekend to page the Heart Transplant MD on call.  Pt also encouraged utilize [facility] messaging as well.      Will follow up with pt at first clinic visit and Nurse navigator available for pt questions, issues or concerns.

## 2024-10-09 ENCOUNTER — TELEPHONE (OUTPATIENT)
Dept: ELECTROPHYSIOLOGY | Facility: CLINIC | Age: 79
End: 2024-10-09
Payer: MEDICARE

## 2024-10-09 DIAGNOSIS — Z86.73 HX OF TRANSIENT ISCHEMIC ATTACK (TIA): Primary | ICD-10-CM

## 2024-10-09 LAB
BACTERIA BLD CULT: NORMAL
BACTERIA BLD CULT: NORMAL

## 2024-10-10 ENCOUNTER — TELEPHONE (OUTPATIENT)
Dept: ELECTROPHYSIOLOGY | Facility: CLINIC | Age: 79
End: 2024-10-10
Payer: MEDICARE

## 2024-10-11 ENCOUNTER — TELEPHONE (OUTPATIENT)
Dept: ELECTROPHYSIOLOGY | Facility: CLINIC | Age: 79
End: 2024-10-11
Payer: MEDICARE

## 2024-10-11 NOTE — TELEPHONE ENCOUNTER
Pt daughter said she dont know how pt is supposed to come to hospital. She can't ride in a car. Pt daughter would not schedule an ced. Pt said she would have to figure it out but she doesn't know.She would call her insurance when she has a chance.

## 2024-10-11 NOTE — ASSESSMENT & PLAN NOTE
Suspect due to supply/demand mismatch. Seen by cardiology. No treatment for ACS. Peaked and downtrending. Stable. No c/o  CP at this time

## 2024-10-11 NOTE — ASSESSMENT & PLAN NOTE
Echo    Result Date: 10/4/2024    Technically difficult study, no IV contrast given.    Left Ventricle: The left ventricle is normal in size. Normal wall   thickness. Regional wall motion abnormalities present. See diagram for   wall motion findings. There is normal systolic function with a visually   estimated ejection fraction of 55 - 60%. There is normal diastolic   function.    Right Ventricle: Normal right ventricular cavity size. Wall thickness   is normal. Systolic function is borderline low.    Aortic Valve: There is mild aortic valve sclerosis. There is mild   aortic regurgitation.    Mitral Valve: There is mild regurgitation.    IVC/SVC: Normal venous pressure at 3 mmHg.    Current Heart Failure Medications  , 2 times daily, Oral    Plan  - Monitor strict I&Os and daily weights.    - Low sodium diet  - Oxygen as needed  - GDMT as tolerated. Continue Home Jardiance. Prescribed low dose Entresto. BP likely too low for other GDMT

## 2024-10-14 ENCOUNTER — TELEPHONE (OUTPATIENT)
Dept: CARDIAC REHAB | Facility: CLINIC | Age: 79
End: 2024-10-14
Payer: MEDICARE

## 2024-10-14 NOTE — TELEPHONE ENCOUNTER
Attempted to contact patient regarding interest in Phase II cardiac rehab.  Left message.  Mar Mcpherson RN  Cardiac [facility]

## 2024-10-18 ENCOUNTER — CARE AT HOME (OUTPATIENT)
Dept: HOME HEALTH SERVICES | Facility: CLINIC | Age: 79
End: 2024-10-18
Payer: MEDICARE

## 2024-10-18 DIAGNOSIS — T14.8XXA CHRONIC WOUND: Chronic | ICD-10-CM

## 2024-10-18 DIAGNOSIS — Z59.89 LIVING ACCOMMODATION ISSUES: ICD-10-CM

## 2024-10-18 DIAGNOSIS — J96.11 CHRONIC HYPOXIC RESPIRATORY FAILURE: Primary | ICD-10-CM

## 2024-10-18 DIAGNOSIS — I25.10 CORONARY ARTERY DISEASE INVOLVING NATIVE HEART, UNSPECIFIED VESSEL OR LESION TYPE, UNSPECIFIED WHETHER ANGINA PRESENT: ICD-10-CM

## 2024-10-18 PROCEDURE — 99350 HOME/RES VST EST HIGH MDM 60: CPT | Mod: S$GLB,,, | Performed by: NURSE PRACTITIONER

## 2024-10-18 RX ORDER — TRAMADOL HYDROCHLORIDE 50 MG/1
50 TABLET ORAL EVERY 6 HOURS PRN
Qty: 30 EACH | Refills: 0 | Status: SHIPPED | OUTPATIENT
Start: 2024-10-18 | End: 2024-11-01

## 2024-10-18 SDOH — SOCIAL DETERMINANTS OF HEALTH (SDOH): OTHER PROBLEMS RELATED TO HOUSING AND ECONOMIC CIRCUMSTANCES: Z59.89

## 2024-10-23 ENCOUNTER — PATIENT OUTREACH (OUTPATIENT)
Dept: ADMINISTRATIVE | Facility: CLINIC | Age: 79
End: 2024-10-23
Payer: MEDICARE

## 2024-10-23 PROBLEM — Z59.89 LIVING ACCOMMODATION ISSUES: Status: ACTIVE | Noted: 2024-10-23

## 2024-10-23 NOTE — ASSESSMENT & PLAN NOTE
Recent stent placement  Angina resolved since discharge  NTG in home  Compliant with her current medications

## 2024-10-23 NOTE — PROGRESS NOTES
C3 nurse spoke with Radha Sotelo  for a TCC post hospital discharge follow up call. The patient has a scheduled HOSFU appointment with Kalyn Pemberton NP  on 10/18/24.

## 2024-10-23 NOTE — ASSESSMENT & PLAN NOTE
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

## 2024-10-23 NOTE — PROGRESS NOTES
[facility]  Transitional Care Management (TCM) Home Visit    Encounter Provider: Kalyn Pemberton   PCP: Kalyn Pemberton, NP  Consult Requested By: Dr. Mackenzie Davis  Admit Date: 10/3/24   IP Discharge Date: 10/5/24  Hospital Length of Stay: 2 days  Days since discharge (from IP or SNF): 2 days  [facility] Contact Note:   Hospital Diagnosis: Coronary artery disease involving native heart, unspecified vessel or lesion type, unspecified whether angina present [I25.10]     HISTORY OF PRESENT ILLNESS      Patient ID: Radha Sotelo is a 79 y.o. female was recently admitted to the hospital, this is their TCM encounter.    Hospital Course Synopsis:  Patient admitted to medicine with concerns of decompensated heart failure in the setting of recent PCI. Patient was diuresed with IV Lasix in the ED upon admission, with good urine output. Patient reported resolution of her shortness of breath after initial dose. Patient had mildly elevated troponin, shortness a breath, equivocal repeat echo compared to last time; cardio was consulted given recent PCI. Cardiology recommended no ACS treatment, advanced GDMT. We would like to start ARNI and MRA, however limited by low blood pressure at this point in time, given small fluid bolus overnight.     Today, she is found in her home. AAOX3 reports she feeling better. No further chest pain since her discharge. She does have her NTG available if needed. Reports her breathing is at her baseline. Using oxygen at 3L.  Home health and wound management came yesterday and resumed services. Dressing intact.  She reports she needs assistance with finding a nursing home or [facility]. She no longer believes she can live alone      DECISION MAKING TODAY       Assessment & Plan:  1. Chronic hypoxic respiratory failure  Assessment & Plan:  On home oxygen at 2L  Sat stable on oxygen  Nebulizer and inhaler in place      2. Coronary artery disease involving native heart, unspecified  vessel or lesion type, unspecified whether angina present  Assessment & Plan:  Recent stent placement  Angina resolved since discharge  NTG in home  Compliant with her current medications    Orders:  -     Ambulatory referral/consult to [facility]    3. 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 Living accommodation issues  Assessment & Plan:  Lives alone in 2nd floor apartment, she cannot get up and down stairs  Reports she cannot perform housekeeping and needs assistance  Family limited help, will run errands and  her meds and food  Referral to [facility]  to assist with WILTON and nursing home resources      Other orders  -     traMADoL (ULTRAM) 50 mg tablet; Take 1 tablet (50 mg total) by mouth every 6 (six) hours as needed for Pain.  Dispense: 30 each; Refill: 0         Medication List on Discharge:     Medication List            Accurate as of October 18, 2024 11:59 PM. If you have any questions, ask your nurse or doctor.                START taking these medications      traMADoL 50 mg tablet  Commonly known as: ULTRAM  Take 1 tablet (50 mg total) by mouth every 6 (six) hours as needed for Pain.  Started by: Kalyn Pemberton NP            CONTINUE taking these medications      albuterol-ipratropium 2.5 mg-0.5 mg/3 mL nebulizer solution  Commonly known as: DUO-NEB  Take 3 mLs by nebulization every 6 (six) hours as needed for Wheezing or Shortness of Breath.     aspirin 81 MG EC tablet  Commonly known as: ECOTRIN  Take 1 tablet (81 mg total) by mouth once daily.     atorvastatin 80 MG tablet  Commonly known as: LIPITOR  Take 1 tablet (80 mg total) by mouth once daily.     BRILINTA 90 mg tablet  Generic drug: ticagrelor  Take 1 tablet (90 mg total) by mouth 2 (two) times daily.      budesonide-formoterol 80-4.5 mcg 80-4.5 mcg/actuation Hfaa  Commonly known as: SYMBICORT  INHALE 2 PUFFS INTO THE LUNGS TWICE DAILY. RINSE MOUTH AFTER USE     ENTRESTO 24-26 mg per tablet  Generic drug: sacubitriL-valsartan  Take 1 tablet by mouth 2 (two) times daily.     ezetimibe 10 mg tablet  Commonly known as: ZETIA  Take 1 tablet (10 mg total) by mouth once daily.     furosemide 20 MG tablet  Commonly known as: LASIX  Take 1 tablet (20 mg total) by mouth daily as needed (edema/wt gain).     gabapentin 300 MG capsule  Commonly known as: NEURONTIN  Take 1 capsule (300 mg total) by mouth 2 (two) times daily.     JARDIANCE 10 mg tablet  Generic drug: empagliflozin  Take 1 tablet (10 mg total) by mouth once daily.     LIDOcaine HCL 2% 2 % jelly  Commonly known as: XYLOCAINE  Apply 0.5 mLs topically once daily. (To bed wound)     metoprolol succinate 25 MG 24 hr tablet  Commonly known as: TOPROL-XL  Take 1 tablet (25 mg total) by mouth once daily.     nitroGLYCERIN 0.4 MG SL tablet  Commonly known as: NITROSTAT  Place 1 tablet (0.4 mg total) under the tongue every 5 (five) minutes as needed for Chest pain.     UNABLE TO FIND  Linezolid/Cipro/Ketoconazole 22/22/20.9 % Powder - Apply 2 gm topically to infection site 1 to 2 times daily.              Medication Reconciliation:  Were medications changed on discharge? Yes  Were medications in the home? Yes  Is the patient taking the medications as directed? Yes  Does the patient understand the medications and changes? Yes  Does updated med list accurately reflects meds patient is currently taking? Yes    ENVIRONMENT OF CARE      Family and/or Caregiver present at visit?  No  Name of Caregiver:   History provided by: patient    Advance Care Planning   Advanced Care Planning Status:  Patient has had an ACP conversation  Living Will: No  Power of : No  [place]: No    Does Caregiver have HCPoA: No  Changes today:   Is patient hospice appropriate: Yes  (If needed, use  PPS <30 or FAST score >7)  Was referral to hospice placed: No       Impression upon entering the home:  Physical Dwelling: apartment/condo   Appearance of home environment: cleaniness: clean  Functional Status: minimal assistance  Mobility: ambulatory with device  Nutritional access: adequate intake and access  Home Health: Yes,  Agency [facility]    DME/Supplies: oxygen and wheelchair     Diagnostic tests reviewed/disposition: No diagnosic tests pending after this hospitalization.  Disease/illness education: CAD  Establishment or re-establishment of referral orders for community resources: No other necessary community resources.   Discussion with other health care providers: No discussion with other health care providers necessary.   Does patient have a PCP at [facility]? Yes   Repatriation plan with PCP? [facility] reason: transportation   Does patient have an ostomy (ileostomy, colostomy, suprapubic catheter, nephrostomy tube, tracheostomy, PEG tube, pleurex catheter, cholecystostomy, etc)? No  Were BPAs reviewed? Yes    Social History     Socioeconomic History    Marital status:    Tobacco Use    Smoking status: Every Day     Current packs/day: 0.25     Average packs/day: 0.5 packs/day for 61.3 years (30.2 ttl pk-yrs)     Types: Cigarettes     Start date: 7/5/1963    Smokeless tobacco: Never   Substance and Sexual Activity    Alcohol use: No    Drug use: No    Sexual activity: Not Currently   Social History Narrative    ** Merged History Encounter **          Social Drivers of Health     Financial Resource Strain: Medium Risk (10/4/2024)    Overall Financial Resource Strain (CARDIA)     Difficulty of Paying Living Expenses: Somewhat hard   Food Insecurity: No Food Insecurity (10/4/2024)    Hunger Vital Sign     Worried About Running Out of Food in the Last Year: Never true     Ran Out of Food in the Last Year: Never true   Transportation Needs: No Transportation Needs (10/4/2024)    TRANSPORTATION NEEDS      Transportation : No   Recent Concern: Transportation Needs - Unmet Transportation Needs (9/30/2024)    TRANSPORTATION NEEDS     Transportation : Yes, it has kept me from medical appointments or from getting my medications.   Physical Activity: Inactive (10/4/2024)    Exercise Vital Sign     Days of Exercise per Week: 0 days     Minutes of Exercise per Session: 0 min   Stress: Stress Concern Present (10/4/2024)    [employer] - Occupational Stress Questionnaire     Feeling of Stress : To some extent   Housing Stability: Low Risk  (10/4/2024)    Housing Stability Vital Sign     Unable to Pay for Housing in the Last Year: No     Homeless in the Last Year: No       OBJECTIVE:     Vital Signs:  There were no vitals filed for this visit.  B/P 124/70, HR 72, 18    Review of Systems   Constitutional:  Negative for activity change, fatigue and fever.   HENT: Negative.     Eyes: Negative.    Respiratory:  Positive for shortness of breath (at baseline). Negative for chest tightness.    Cardiovascular: Negative.  Negative for leg swelling.   Gastrointestinal: Negative.    Musculoskeletal:  Positive for gait problem.   Skin:  Positive for wound (to right foot).   Hematological: Negative.    Psychiatric/Behavioral: Negative.  Negative for agitation.    All other systems reviewed and are negative.      Physical Exam:  Physical Exam  Vitals reviewed.   Constitutional:       General: She is not in acute distress.     Appearance: She is well-developed. She is ill-appearing.   HENT:      Head: Normocephalic and atraumatic.      Nose: Nose normal.      Mouth/Throat:      Mouth: Mucous membranes are dry.   Eyes:      Pupils: Pupils are equal, round, and reactive to light.   Cardiovascular:      Rate and Rhythm: Normal rate and regular rhythm.      Heart sounds: Normal heart sounds.   Pulmonary:      Effort: Pulmonary effort is normal.      Comments: Diminished throughout  No wheezes noted  Oxygen in use via  NC  Abdominal:      General: Bowel sounds are normal.      Palpations: Abdomen is soft.   Musculoskeletal:         General: Normal range of motion.      Cervical back: Normal range of motion and neck supple.   Skin:     General: Skin is warm and dry.   Neurological:      Mental Status: She is alert and oriented to person, place, and time.      Cranial Nerves: No cranial nerve deficit.   Psychiatric:         Behavior: Behavior normal.         Thought Content: Thought content normal.         Judgment: Judgment normal.         INSTRUCTIONS FOR PATIENT:     Scheduled Follow-up, Appts Reviewed with Modifications if Needed: Yes  Future Appointments   Date Time Provider Department Center   10/23/2024  9:15 AM Lexie Cazares DPM [facility]   11/21/2024  9:00 AM MONITOR, ARRHYTHMIA EVENT [facility]         Signature: Kalyn Pemberton NP    Transition of Care Visit:  I have reviewed and updated the history and problem list.  I have reconciled the medication list.  I have discussed the hospitalization and current medical issues, prognosis and plans with the patient/family.

## 2024-10-23 NOTE — ASSESSMENT & PLAN NOTE
Lives alone in 2nd floor apartment, she cannot get up and down stairs  Reports she cannot perform housekeeping and needs assistance  Family limited help, will run errands and  her meds and food  Referral to [facility]  to assist with WIL[facility] and nursing home resources

## 2024-10-31 DIAGNOSIS — J44.9 CHRONIC OBSTRUCTIVE PULMONARY DISEASE, UNSPECIFIED COPD TYPE: ICD-10-CM

## 2024-10-31 RX ORDER — BUDESONIDE AND FORMOTEROL FUMARATE DIHYDRATE 160; 4.5 UG/1; UG/1
2 AEROSOL RESPIRATORY (INHALATION) 2 TIMES DAILY
Qty: 10 G | Refills: 3 | Status: SHIPPED | OUTPATIENT
Start: 2024-10-31 | End: 2025-10-31

## 2024-10-31 RX ORDER — DILTIAZEM HYDROCHLORIDE 60 MG/1
TABLET, FILM COATED ORAL
Qty: 10.2 G | Refills: 6 | Status: CANCELLED | OUTPATIENT
Start: 2024-10-31

## 2024-11-05 DIAGNOSIS — G62.9 NEUROPATHY: ICD-10-CM

## 2024-11-05 NOTE — TELEPHONE ENCOUNTER
Patient called to notify us that she is moving apartment numbers to get a down stairs apartment.  States she is moving from [address].  Same address at [address] [place] [address] [place]. [place] [id number].  Confirmed with patient and changes were made in patient's chart.  Also requesting refills on her gabapentin and tramadol.  Pended medications to NP and NP was made aware of all changes.

## 2024-11-06 RX ORDER — TRAMADOL HYDROCHLORIDE 50 MG/1
50 TABLET ORAL EVERY 8 HOURS PRN
Qty: 30 TABLET | Refills: 0 | Status: ON HOLD | OUTPATIENT
Start: 2024-11-06 | End: 2024-11-20

## 2024-11-06 RX ORDER — GABAPENTIN 300 MG/1
300 CAPSULE ORAL 2 TIMES DAILY
Qty: 180 CAPSULE | Refills: 3 | Status: ON HOLD | OUTPATIENT
Start: 2024-11-06 | End: 2025-11-06

## 2024-11-07 PROBLEM — R57.0 CARDIOGENIC SHOCK: Status: ACTIVE | Noted: 2024-01-01

## 2024-11-07 PROBLEM — I44.2 COMPLETE HEART BLOCK: Status: ACTIVE | Noted: 2024-01-01

## 2024-11-07 NOTE — H&P
[facility] - Cardiac Intensive Care  Cardiology  History and Physical     Patient Name: Radha Sotelo  MRN: [id number]  Admission Date: 11/7/2024  Code Status: Prior   Attending Provider: No att. providers found   Primary Care Physician: Kalyn Pemberton NP  Principal Problem:STEMI (ST elevation myocardial infarction)    Patient information was obtained from past medical records and ER records.     Subjective:     Chief Complaint:  Cardiogenic shock     HPI:  Ms. Sotelo is a 79-year-old female with past medical history of recent STEMI in 9/29 s/p RCA stent placement, CHF, COPD, CAD s/p PCI 11/2023 and 5/2024 for proximal RCA stenosis who presented to [facility] ED for evaluation of chest pain and shortness of breath onset just PTA. Symptoms were reportedly similar in character to past STEMI. She was given NTGx2 en route without relief.     In the ED, pt was hypoxic, tachypneic, and hypotensive on arrival. Patient became unresponsive in the ED and went into Vfib. Code Blue was called, lasting approximately 10 minutes. Patient reportedly with cracked ribs following compressions. EKG in the ED revealed ST elevation in inferior and lateral lead and pt was subsequently taken to the cath lab. VBG revealed pH of 7.159. She was noted to be in complete heart block and received a TVP, as well as emergent balloon pump due to hypotension. Coronary angiogram by Intervention Cardiology performed and revealed complete occlusion of the RCA with left-to-right collaterals. Given multiple PCI's in the past, opted for medical management and treatment for cardiogenic shock. Patient transferred to CICU for higher level work-up and care.    Past Medical History:   Diagnosis Date    Anemia     Anxiety     COPD (chronic obstructive pulmonary disease)     COPD (chronic obstructive pulmonary disease) with emphysema     Coronary artery disease     Depression     Diverticulitis     Hyperlipidemia     Hypertension     PVD (peripheral vascular  disease)     PVD (peripheral vascular disease)     S/P colostomy     STEMI (ST elevation myocardial infarction) 12/12/2023    Steroid-induced hyperglycemia 07/29/2024    Substance abuse     hx heavy etoh use     Tobacco abuse        Past Surgical History:   Procedure Laterality Date    ANGIOGRAM, CORONARY, WITH LEFT HEART CATHETERIZATION N/A 11/22/2023    Procedure: Angiogram, Coronary, with Left Heart Cath;  Surgeon: Checo Bhatt MD;  Location: [facility] CATH LAB;  Service: Cardiology;  Laterality: N/A;    ANGIOGRAM, CORONARY, WITH LEFT HEART CATHETERIZATION N/A 5/27/2024    Procedure: Angiogram, Coronary, with Left Heart Cath;  Surgeon: Karel Mack MD;  Location: [facility] CATH LAB;  Service: Cardiology;  Laterality: N/A;    ANGIOGRAM, EXTREMITY, UNILATERAL Right 8/25/2023    Procedure: ANGIOGRAM, EXTREMITY, UNILATERAL;  Surgeon: Gary Rico MD;  [facility];  Service: Vascular;  Laterality: Right;  US GUIDED ACCESS LEFT GROIN  contrast: 150ml  fluoro: 27.9 min  mGy: 254.73  Gycm2: 62.4919  radial flush cocktail: 10ml    ANGIOGRAPHY OF LOWER EXTREMITY Right 8/24/2023    Procedure: Angiogram Extremity Unilateral;  Surgeon: Benji Ashley MD;  [facility];  Service: Vascular;  Laterality: Right;    COLOSTOMY      CORONARY ANGIOGRAPHY N/A 9/29/2024    Procedure: ANGIOGRAM, CORONARY ARTERY;  Surgeon: Checo Bhatt MD;  Location: [facility] CATH LAB;  Service: Cardiology;  Laterality: N/A;    ECTOPIC PREGNANCY SURGERY      PTA, PERONEAL Right 8/25/2023    Procedure: PTA, PERONEAL TIBIAL TRUNK WITH SHOCKWAVE;  Surgeon: Gary Rico MD;  [facility];  Service: Vascular;  Laterality: Right;    PTCA, SINGLE VESSEL  11/22/2023    Procedure: PTCA, Single Vessel;  Surgeon: Checo Bhatt MD;  Location: [facility] CATH LAB;  Service: Cardiology;;    right forefoot amputation      right toe amputation      x2 toes    STENT, DRUG ELUTING,  SINGLE VESSEL, CORONARY  11/22/2023    Procedure: Stent, Drug Eluting, Single Vessel, Coronary;  Surgeon: Checo Bhatt MD;  Location: [facility] CATH LAB;  Service: Cardiology;;    STENT, DRUG ELUTING, SINGLE VESSEL, CORONARY  5/27/2024    Procedure: Stent, Drug Eluting, Single Vessel, Coronary;  Surgeon: Karel Mack MD;  Location: [facility] CATH LAB;  Service: Cardiology;;    STENT, DRUG ELUTING, SINGLE VESSEL, CORONARY  9/29/2024    Procedure: Stent, Drug Eluting, Single Vessel, Coronary;  Surgeon: Checo Bhatt MD;  Location: [facility] CATH LAB;  Service: Cardiology;;    STENT, SUPERFICIAL FEMORAL ARTERY Right 8/25/2023    Procedure: STENT, SUPERFICIAL FEMORAL ARTERY;  Surgeon: Gary Rico MD;  Location: [facility] OR [address] [place] [address];  Service: Vascular;  Laterality: Right;  VIABAHN 6 X 15 X120       Review of patient's allergies indicates:  No Known Allergies    No current facility-administered medications on file prior to encounter.     Current Outpatient Medications on File Prior to Encounter   Medication Sig    albuterol-ipratropium (DUO-NEB) 2.5 mg-0.5 mg/3 mL nebulizer solution Take 3 mLs by nebulization every 6 (six) hours as needed for Wheezing or Shortness of Breath.    aspirin (ECOTRIN) 81 MG EC tablet Take 1 tablet (81 mg total) by mouth once daily.    atorvastatin (LIPITOR) 80 MG tablet Take 1 tablet (80 mg total) by mouth once daily.    budesonide-formoterol 160-4.5 mcg (SYMBICORT) 160-4.5 mcg/actuation HFAA Inhale 2 puffs into the lungs 2 (two) times daily. INHALE 2 PUFFS INTO THE LUNGS TWICE DAILY. RINSE MOUTH AFTER USE    empagliflozin (JARDIANCE) 10 mg tablet Take 1 tablet (10 mg total) by mouth once daily.    ezetimibe (ZETIA) 10 mg tablet Take 1 tablet (10 mg total) by mouth once daily.    furosemide (LASIX) 20 MG tablet Take 1 tablet (20 mg total) by mouth daily as needed (edema/wt gain).    gabapentin (NEURONTIN) 300 MG capsule Take 1 capsule (300 mg total) by mouth 2 (two) times  daily.    LIDOcaine HCL 2% (XYLOCAINE) 2 % jelly Apply 0.5 mLs topically once daily. (To bed wound)    metoprolol succinate (TOPROL-XL) 25 MG 24 hr tablet Take 1 tablet (25 mg total) by mouth once daily.    nitroGLYCERIN (NITROSTAT) 0.4 MG SL tablet Place 1 tablet (0.4 mg total) under the tongue every 5 (five) minutes as needed for Chest pain.    sacubitriL-valsartan (ENTRESTO) 24-26 mg per tablet Take 1 tablet by mouth 2 (two) times daily.    ticagrelor (BRILINTA) 90 mg tablet Take 1 tablet (90 mg total) by mouth 2 (two) times daily.    traMADoL (ULTRAM) 50 mg tablet Take 1 tablet (50 mg total) by mouth every 8 (eight) hours as needed for Pain.    UNABLE TO FIND Linezolid/Cipro/Ketoconazole 22/22/20.9 % Powder - Apply 2 gm topically to infection site 1 to 2 times daily.     Family History    None       Tobacco Use    Smoking status: Every Day     Current packs/day: 0.25     Average packs/day: 0.5 packs/day for 61.3 years (30.2 ttl pk-yrs)     Types: Cigarettes     Start date: 7/5/1963    Smokeless tobacco: Never   Substance and Sexual Activity    Alcohol use: No    Drug use: No    Sexual activity: Not Currently     Review of Systems   Unable to perform ROS: Patient unresponsive     Objective:     Vital Signs (Most Recent):  Pulse: (!) 121 (11/07/24 1251)  Resp: 11 (11/07/24 1251)  BP: (!) 171/77 (11/07/24 1251)  SpO2: 100 % (11/07/24 1251) Vital Signs (24h Range):  Pulse:  [] 121  Resp:  [11-28] 11  SpO2:  [85 %-100 %] 100 %  BP: ()/(42-77) 171/77     Weight: 48.1 kg (106 lb)  Body mass index is 20.7 kg/m².    SpO2: 100 %       No intake or output data in the 24 hours ending 11/07/24 1441    Lines/Drains/Airways       Central Venous Catheter Line  Duration                  Percutaneous Central Line - Cordis 11/07/24 1300 Femoral Vein Right <1 day    Pulmonary Artery Catheter Assessment  11/07/24 0900 Femoral Vein Left <1 day              Drain  Duration                  Colostomy 07/13/21 0204  Descending/sigmoid LLQ 1213 days              Arterial Line  Duration             Arterial Line 11/07/24 1300 Right Femoral <1 day              Line  Duration                  IABP 11/07/24 1411 8.0 Fr. 40 mL <1 day         Pacer Wires 11/07/24 1410 <1 day              Peripheral Intravenous Line  Duration                  Peripheral IV - Single Lumen 11/07/24 1222 20 G Anterior;Left;Proximal Forearm <1 day         Peripheral IV - Single Lumen 11/07/24 1233 20 G Anterior;Right Forearm <1 day         Peripheral IV - Single Lumen 11/07/24 1234 20 G Anterior;Left Hand <1 day                     Physical Exam  Constitutional:       Appearance: She is ill-appearing.      Comments: Central femoral arterial line on right, balloon pump on left, swan alexx catheter   HENT:      Head: Normocephalic and atraumatic.   Eyes:      Comments: Difficulty visualizing pupil due to bilateral cataracts   Cardiovascular:      Rate and Rhythm: Normal rate and regular rhythm.   Pulmonary:      Breath sounds: Rhonchi present.   Neurological:      Mental Status: She is unresponsive.      Comments: Withdraws from noxious stimuli          Significant Labs:   Pending CBC, CMP, troponin, CK, BNP.    Recent Labs   Lab 11/07/24  1251   HCT 31*     ABG with pH7.21, CO2 65, HCO3 26, and SaO2 94%.   POC Lactate 1.93    Significant Imaging: EKG with ST elevations in inferior and lateral leads, T-wave inversaions in V1-V3  Assessment and Plan:     * Cardiogenic shock  80 y/o F with pmhx of STEMI in 9/29 s/p RCA stent placement, CHF, COPD, CAD s/p PCI 11/2023 and 5/2024 for proximal RCA stenosis presented to [facility] for STEMI, admitted to CICU for cardiogenic shock after arresting in the ED. She was taken to cath lab and found to have complete heart block. A TVP was placed. She was also noted to be hypotensive, resulting in placement of emergent balloon pump. Cardiogenic shock multidisciplinary meeting held by Interventional Cardiology and decision was made  to treat the pt medically and not offer PCI given previous failures of this therapy. ABG with pH 7.21, CO2 65, HCO3 26, SvO2 94%, and lactate 1.93. She arrived to the unit unresponsive but withdrawing to noxious stimuli.     This patient has shock. The type of shock is cardiogenic due to ischemic. The patient has the following evidence of shock: altered mental status and hypoxia. The patient will be admitted to an intensive care unit, they will be treated as follows:    Plan  - F/u CBC, CMP, BNP, CK, troponin, and CXR  - Refer to STEMI (ST elevation myocardial infarction)  for further work-up and plan  - Broad spectrum antibiotics with Zosyn for possible aspiration PNA  - GOC conversation with family    Complete heart block  TVP placed by IC in cath lab.    STEMI (ST elevation myocardial infarction)  Patient presenting with original complaints of CP, found to have interolateral STEMI on EKG without relief after NTG x2.  mg and Ticagrelor 90 mg given in the ED. She was brought to the cath lab and results were as follows:      Late stent thrombosis of the right coronary artery    Complete heart block status post temporay pacemaker placement    Acute MI with cardiogenic shock    Right ventricular infarction    Successful placement of IABP in the left common fermoal artery as bridge to decision to hospice    Successful TVP pacement in the left common femoral vein    Successful [place] catheter placement in [place] left common femroal vein.    Plan  - Continue Ticagrelor 90 mg BID  - Lipitor 90 mg daily  - Start AC with heparin          VTE Risk Mitigation (From admission, onward)           Ordered     heparin 25,000 units in dextrose 5% (100 units/ml) IV bolus from bag LOW INTENSITY nomogram - [facility]  As needed (PRN)        Question:  Heparin Infusion Adjustment (DO NOT MODIFY ANSWER)  Answer:  [email or web address]\Images\Pharmacy\HeparinInfusions\heparin LOW INTENSITY nomogram for [facility] [id number].pdf    11/07/24 7298      heparin 25,000 units in dextrose 5% (100 units/ml) IV bolus from bag LOW INTENSITY nomogram - [facility]  As needed (PRN)        Question:  Heparin Infusion Adjustment (DO NOT MODIFY ANSWER)  Answer:  [email or web address]\Images\Pharmacy\HeparinInfusions\heparin LOW INTENSITY nomogram for [facility] [id number]    11/07/24 1536     heparin 25,000 units in dextrose 5% (100 units/ml) IV bolus from bag LOW INTENSITY nomogram - [facility]  Once        Question:  Heparin Infusion Adjustment (DO NOT MODIFY ANSWER)  Answer:  [email or web address]\Images\Pharmacy\HeparinInfusions\heparin LOW INTENSITY nomogram for [facility] [id number].pdf    11/07/24 1536     heparin 25,000 units in dextrose 5% 250 mL (100 units/mL) infusion LOW INTENSITY nomogram - [facility]  Continuous        Question:  Begin at (units/kg/hr)  Answer:  12    11/07/24 1536                    Ann Marie Parker DO  Cardiology   [facility] - Cardiac Intensive Care

## 2024-11-07 NOTE — Clinical Note
The catheter was inserted in the right ventricle. The transvenous pacing lead was secured in place in the RV and was inserted into the RV. The pacer was paced at 60 BPM 10 mA 10 mV.

## 2024-11-07 NOTE — SUBJECTIVE & OBJECTIVE
Past Medical History:   Diagnosis Date    Anemia     Anxiety     COPD (chronic obstructive pulmonary disease)     COPD (chronic obstructive pulmonary disease) with emphysema     Coronary artery disease     Depression     Diverticulitis     Hyperlipidemia     Hypertension     PVD (peripheral vascular disease)     PVD (peripheral vascular disease)     S/P colostomy     STEMI (ST elevation myocardial infarction) 12/12/2023    Steroid-induced hyperglycemia 07/29/2024    Substance abuse     hx heavy etoh use     Tobacco abuse        Past Surgical History:   Procedure Laterality Date    ANGIOGRAM, CORONARY, WITH LEFT HEART CATHETERIZATION N/A 11/22/2023    Procedure: Angiogram, Coronary, with Left Heart Cath;  Surgeon: Checo Bhatt MD;  Location: [facility] CATH LAB;  Service: Cardiology;  Laterality: N/A;    ANGIOGRAM, CORONARY, WITH LEFT HEART CATHETERIZATION N/A 5/27/2024    Procedure: Angiogram, Coronary, with Left Heart Cath;  Surgeon: Karel Mack MD;  Location: [facility] CATH LAB;  Service: Cardiology;  Laterality: N/A;    ANGIOGRAM, EXTREMITY, UNILATERAL Right 8/25/2023    Procedure: ANGIOGRAM, EXTREMITY, UNILATERAL;  Surgeon: Gary Rico MD;  Location: [address];  Service: Vascular;  Laterality: Right;  US GUIDED ACCESS LEFT GROIN  contrast: 150ml  fluoro: 27.9 min  mGy: 254.73  Gycm2: 62.4919  radial flush cocktail: 10ml    ANGIOGRAPHY OF LOWER EXTREMITY Right 8/24/2023    Procedure: Angiogram Extremity Unilateral;  Surgeon: Benji Ashley MD;  Location: [facility] OR [address] [place] [address];  Service: Vascular;  Laterality: Right;    COLOSTOMY      CORONARY ANGIOGRAPHY N/A 9/29/2024    Procedure: ANGIOGRAM, CORONARY ARTERY;  Surgeon: Checo Bhatt MD;  Location: [facility] CATH LAB;  Service: Cardiology;  Laterality: N/A;    ECTOPIC PREGNANCY SURGERY      PTA, PERONEAL Right 8/25/2023    Procedure: PTA, PERONEAL TIBIAL TRUNK WITH SHOCKWAVE;  Surgeon: Gary Rico MD;  Location: [facility] OR  2ND FLR;  Service: Vascular;  Laterality: Right;    PTCA, SINGLE VESSEL  11/22/2023    Procedure: PTCA, Single Vessel;  Surgeon: Checo Bhatt MD;  Location: [facility] CATH LAB;  Service: Cardiology;;    right forefoot amputation      right toe amputation      x2 toes    STENT, DRUG ELUTING, SINGLE VESSEL, CORONARY  11/22/2023    Procedure: Stent, Drug Eluting, Single Vessel, Coronary;  Surgeon: Checo Bhatt MD;  Location: [facility] CATH LAB;  Service: Cardiology;;    STENT, DRUG ELUTING, SINGLE VESSEL, CORONARY  5/27/2024    Procedure: Stent, Drug Eluting, Single Vessel, Coronary;  Surgeon: Karel Mack MD;  Location: [facility] CATH LAB;  Service: Cardiology;;    STENT, DRUG ELUTING, SINGLE VESSEL, CORONARY  9/29/2024    Procedure: Stent, Drug Eluting, Single Vessel, Coronary;  Surgeon: Checo Bhatt MD;  Location: [facility] CATH LAB;  Service: Cardiology;;    STENT, SUPERFICIAL FEMORAL ARTERY Right 8/25/2023    Procedure: STENT, SUPERFICIAL FEMORAL ARTERY;  Surgeon: Gary Rico MD;  Location: [facility] OR 2ND FLR;  Service: Vascular;  Laterality: Right;  VIABAHN 6 X 15 X120       Review of patient's allergies indicates:  No Known Allergies    No current facility-administered medications on file prior to encounter.     Current Outpatient Medications on File Prior to Encounter   Medication Sig    albuterol-ipratropium (DUO-NEB) 2.5 mg-0.5 mg/3 mL nebulizer solution Take 3 mLs by nebulization every 6 (six) hours as needed for Wheezing or Shortness of Breath.    aspirin (ECOTRIN) 81 MG EC tablet Take 1 tablet (81 mg total) by mouth once daily.    atorvastatin (LIPITOR) 80 MG tablet Take 1 tablet (80 mg total) by mouth once daily.    budesonide-formoterol 160-4.5 mcg (SYMBICORT) 160-4.5 mcg/actuation HFAA Inhale 2 puffs into the lungs 2 (two) times daily. INHALE 2 PUFFS INTO THE LUNGS TWICE DAILY. RINSE MOUTH AFTER USE    empagliflozin (JARDIANCE) 10 mg tablet Take 1 tablet (10 mg total) by mouth  once daily.    ezetimibe (ZETIA) 10 mg tablet Take 1 tablet (10 mg total) by mouth once daily.    furosemide (LASIX) 20 MG tablet Take 1 tablet (20 mg total) by mouth daily as needed (edema/wt gain).    gabapentin (NEURONTIN) 300 MG capsule Take 1 capsule (300 mg total) by mouth 2 (two) times daily.    LIDOcaine HCL 2% (XYLOCAINE) 2 % jelly Apply 0.5 mLs topically once daily. (To bed wound)    metoprolol succinate (TOPROL-XL) 25 MG 24 hr tablet Take 1 tablet (25 mg total) by mouth once daily.    nitroGLYCERIN (NITROSTAT) 0.4 MG SL tablet Place 1 tablet (0.4 mg total) under the tongue every 5 (five) minutes as needed for Chest pain.    sacubitriL-valsartan (ENTRESTO) 24-26 mg per tablet Take 1 tablet by mouth 2 (two) times daily.    ticagrelor (BRILINTA) 90 mg tablet Take 1 tablet (90 mg total) by mouth 2 (two) times daily.    traMADoL (ULTRAM) 50 mg tablet Take 1 tablet (50 mg total) by mouth every 8 (eight) hours as needed for Pain.    UNABLE TO FIND Linezolid/Cipro/Ketoconazole 22/22/20.9 % Powder - Apply 2 gm topically to infection site 1 to 2 times daily.     Family History    None       Tobacco Use    Smoking status: Every Day     Current packs/day: 0.25     Average packs/day: 0.5 packs/day for 61.3 years (30.2 ttl pk-yrs)     Types: Cigarettes     Start date: 7/5/1963    Smokeless tobacco: Never   Substance and Sexual Activity    Alcohol use: No    Drug use: No    Sexual activity: Not Currently     Review of Systems   Unable to perform ROS: Patient unresponsive     Objective:     Vital Signs (Most Recent):  Pulse: (!) 121 (11/07/24 1251)  Resp: 11 (11/07/24 1251)  BP: (!) 171/77 (11/07/24 1251)  SpO2: 100 % (11/07/24 1251) Vital Signs (24h Range):  Pulse:  [] 121  Resp:  [11-28] 11  SpO2:  [85 %-100 %] 100 %  BP: ()/(42-77) 171/77     Weight: 48.1 kg (106 lb)  Body mass index is 20.7 kg/m².    SpO2: 100 %       No intake or output data in the 24 hours ending 11/07/24 1441    Lines/Drains/Airways        Central Venous Catheter Line  Duration                  Percutaneous Central Line - Cordis 11/07/24 1300 Femoral Vein Right <1 day    Pulmonary Artery Catheter Assessment  11/07/24 0900 Femoral Vein Left <1 day              Drain  Duration                  Colostomy 07/13/21 0201 Descending/sigmoid LLQ 1213 days              Arterial Line  Duration             Arterial Line 11/07/24 1300 Right Femoral <1 day              Line  Duration                  IABP 11/07/24 1411 8.0 Fr. 40 mL <1 day         Pacer Wires 11/07/24 1410 <1 day              Peripheral Intravenous Line  Duration                  Peripheral IV - Single Lumen 11/07/24 1222 20 G Anterior;Left;Proximal Forearm <1 day         Peripheral IV - Single Lumen 11/07/24 1233 20 G Anterior;Right Forearm <1 day         Peripheral IV - Single Lumen 11/07/24 1234 20 G Anterior;Left Hand <1 day                     Physical Exam  Constitutional:       Appearance: She is ill-appearing.      Comments: Central femoral arterial line on right, balloon pump on left, swan alexx catheter   HENT:      Head: Normocephalic and atraumatic.   Eyes:      Comments: Difficulty visualizing pupil due to bilateral cataracts   Cardiovascular:      Rate and Rhythm: Normal rate and regular rhythm.   Pulmonary:      Breath sounds: Rhonchi present.   Neurological:      Mental Status: She is unresponsive.      Comments: Withdraws from noxious stimuli          Significant Labs:   Pending CBC, CMP, troponin, CK, BNP.    Recent Labs   Lab 11/07/24  1251   HCT 31*     ABG with pH7.21, CO2 65, HCO3 26, and SaO2 94%.   POC Lactate 1.93    Significant Imaging: EKG with ST elevations in inferior and lateral leads, T-wave inversaions in V1-V3

## 2024-11-07 NOTE — PROGRESS NOTES
"Pharmacokinetic Initial Assessment: IV Vancomycin    Assessment/Plan:    Initiate intravenous vancomycin with loading dose of 1000 mg once followed by a maintenance dose of vancomycin 750mg IV every 24 hours  Desired empiric serum trough concentration is 10 to 20 mcg/mL  Draw vancomycin trough level 60 min prior to third dose on 11/9 at approximately 1730  Pharmacy will continue to follow and monitor vancomycin.      Please contact pharmacy at extension [phone] with any questions regarding this assessment.     Thank you for the consult,   Carmita George       Patient brief summary:  Radha Sotelo is a 79 y.o. female initiated on antimicrobial therapy with IV Vancomycin for treatment of suspected sepsis    Drug Allergies:   Review of patient's allergies indicates:  No Known Allergies    Actual Body Weight:   48.1     Renal Function:   Estimated Creatinine Clearance: 37 mL/min (based on SCr of 0.9 mg/dL).,     Dialysis Method (if applicable):  N/A    CBC (last 72 hours):  Recent Labs   Lab Result Units 11/07/24  1509 11/07/24  1633   WBC K/uL 28.46* 25.82*   Hemoglobin g/dL 9.0* 8.9*   Hematocrit % 29.5* 29.8*   Platelets K/uL 307 296   Gran % % 87.2* 90.2*   Lymph % % 4.1* 2.5*   Mono % % 3.5* 5.4   Eosinophil % % 0.7 0.1   Basophil % % 0.2 0.2   Differential Method  Automated Automated       Metabolic Panel (last 72 hours):  Recent Labs   Lab Result Units 11/07/24  1509 11/07/24  1649   Sodium mmol/L 143  --    Potassium mmol/L 2.9*  --    Chloride mmol/L 109  --    CO2 mmol/L 23  --    Glucose mg/dL 199*  --    Glucose, UA   --  1+*   BUN mg/dL 15  --    Creatinine mg/dL 0.9  --    Albumin g/dL 2.8*  --    Total Bilirubin mg/dL 0.6  --    Alkaline Phosphatase U/L 97  --    AST U/L 24  --    ALT U/L 13  --        Drug levels (last 3 results):  No results for input(s): "VANCOMYCINRA", "VANCORANDOM", "VANCOMYCINPE", "VANCOPEAK", "VANCOMYCINTR", "VANCOTROUGH" in the last 72 hours.    Microbiologic " Results:  Microbiology Results (last 7 days)       Procedure Component Value Units Date/Time    Blood culture [[id number]] Collected: 11/07/24 1736    Order Status: Sent Specimen: Blood from Peripheral, Lower Arm, Right     Urine culture [[id number]] Collected: 11/07/24 1649    Order Status: No result Specimen: Urine Updated: 11/07/24 1726    Blood culture [[id number]]     Order Status: Sent Specimen: Blood

## 2024-11-07 NOTE — ASSESSMENT & PLAN NOTE
78 y/o F with pmhx of STEMI in 9/29 s/p RCA stent placement, CHF, COPD, CAD s/p PCI 11/2023 and 5/2024 for proximal RCA stenosis presented to [facility] for STEMI, admitted to CICU for cardiogenic shock after arresting in the ED. She was taken to cath lab and found to have complete heart block. A TVP was placed. She was also noted to be hypotensive, resulting in placement of emergent balloon pump. Cardiogenic shock multidisciplinary meeting held by Interventional Cardiology and decision was made to treat the pt medically and not offer PCI given previous failures of this therapy. ABG with pH 7.21, CO2 65, HCO3 26, SvO2 94%, and lactate 1.93. She arrived to the unit unresponsive but withdrawing to noxious stimuli.     This patient has shock. The type of shock is cardiogenic due to ischemic. The patient has the following evidence of shock: altered mental status and hypoxia. The patient will be admitted to an intensive care unit, they will be treated as follows:    Plan  - F/u CBC, CMP, BNP, CK, troponin, and CXR  - Refer to STEMI (ST elevation myocardial infarction)  for further work-up and plan  - Broad spectrum antibiotics with Zosyn for possible aspiration PNA  - GOC conversation with family

## 2024-11-07 NOTE — HPI
Ms. Sotelo is a 79-year-old female with past medical history of recent STEMI in 9/29 s/p RCA stent placement, CHF, COPD, CAD s/p PCI 11/2023 and 5/2024 for proximal RCA stenosis who presented to [facility] ED for evaluation of chest pain and shortness of breath onset just PTA. Symptoms were reportedly similar in character to past STEMI. She was given NTGx2 en route without relief.     In the ED, pt was hypoxic, tachypneic, and hypotensive on arrival. Patient became unresponsive in the ED and went into Vfib. Code Blue was called, lasting approximately 10 minutes. Patient reportedly with cracked ribs following compressions. EKG in the ED revealed ST elevation in inferior and lateral lead and pt was subsequently taken to the cath lab. VBG revealed pH of 7.159. She was noted to be in complete heart block and received a TVP, as well as emergent balloon pump due to hypotension. Coronary angiogram by Intervention Cardiology performed and revealed complete occlusion of the RCA with left-to-right collaterals. Given multiple PCI's in the past, opted for medical management and treatment for cardiogenic shock. Patient transferred to CICU for higher level work-up and care.   

## 2024-11-07 NOTE — Clinical Note
The groin was prepped. The site was prepped with ChloraPrep. The patient was draped. Bilateral subclavian

## 2024-11-07 NOTE — Clinical Note
The PA catheter was repositioned to the left pulmonary artery and secured in place for continuous hemodynamic monitoring. Hemodynamics were performed. Cardiac output was obtained at 3 L/min.

## 2024-11-07 NOTE — ED NOTES
CODE STEMI activated in route, EMS EKG shown to Dr. Fan. CODE STEMI paged over head prior to patients arrival.

## 2024-11-07 NOTE — ASSESSMENT & PLAN NOTE
Patient presenting with original complaints of CP, found to have interolateral STEMI on EKG without relief after NTG x2.  mg and Ticagrelor 90 mg given in the ED. She was brought to the cath lab and results were as follows:      Late stent thrombosis of the right coronary artery    Complete heart block status post temporay pacemaker placement    Acute MI with cardiogenic shock    Right ventricular infarction    Successful placement of IABP in the left common fermoal artery as bridge to decision to hospice    Successful TVP pacement in the left common femoral vein    Successful [place] catheter placement in [place] left common femroal vein.    Plan  - Continue Ticagrelor 90 mg BID  - Lipitor 90 mg daily  - Start AC with heparin

## 2024-11-07 NOTE — ED PROVIDER NOTES
Encounter Date: 11/7/2024       History     Chief Complaint   Patient presents with    Chest Pain     Patient called EMS for heart attack, patient having stemi per EMS     HPI  Patient is a 79-year-old female with past medical history of STEMI presenting due to chest pain and shortness of breath starting 20 minutes before she presented to the hospital.  Patient states that this is like her similar episodes of STEMI.  Patient says that she had some nausea with sweating with the episode.  He has not improved since its onset.  Patient was given nitroglycerin in route twice, without any relief of symptoms.  Patient states that she has been taking her medications as prescribed.      Review of patient's allergies indicates:  No Known Allergies  Past Medical History:   Diagnosis Date    Anemia     Anxiety     COPD (chronic obstructive pulmonary disease)     COPD (chronic obstructive pulmonary disease) with emphysema     Coronary artery disease     Depression     Diverticulitis     Hyperlipidemia     Hypertension     PVD (peripheral vascular disease)     PVD (peripheral vascular disease)     S/P colostomy     STEMI (ST elevation myocardial infarction) 12/12/2023    Steroid-induced hyperglycemia 07/29/2024    Substance abuse     hx heavy etoh use     Tobacco abuse      Past Surgical History:   Procedure Laterality Date    ANGIOGRAM, CORONARY, WITH LEFT HEART CATHETERIZATION N/A 11/22/2023    Procedure: Angiogram, Coronary, with Left Heart Cath;  Surgeon: Checo Bhatt MD;  Location: [facility] CATH LAB;  Service: Cardiology;  Laterality: N/A;    ANGIOGRAM, CORONARY, WITH LEFT HEART CATHETERIZATION N/A 5/27/2024    Procedure: Angiogram, Coronary, with Left Heart Cath;  Surgeon: Karel Mack MD;  Location: [facility] CATH LAB;  Service: Cardiology;  Laterality: N/A;    ANGIOGRAM, EXTREMITY, UNILATERAL Right 8/25/2023    Procedure: ANGIOGRAM, EXTREMITY, UNILATERAL;  Surgeon: Gary Rico MD;  Location: [facility] OR  2ND FLR;  Service: Vascular;  Laterality: Right;  US GUIDED ACCESS LEFT GROIN  contrast: 150ml  fluoro: 27.9 min  mGy: 254.73  Gycm2: 62.4919  radial flush cocktail: 10ml    ANGIOGRAPHY OF LOWER EXTREMITY Right 8/24/2023    Procedure: Angiogram Extremity Unilateral;  Surgeon: Benji Ashley MD;  [facility];  Service: Vascular;  Laterality: Right;    COLOSTOMY      CORONARY ANGIOGRAPHY N/A 9/29/2024    Procedure: ANGIOGRAM, CORONARY ARTERY;  Surgeon: Checo Bhatt MD;  Location: [facility] CATH LAB;  Service: Cardiology;  Laterality: N/A;    ECTOPIC PREGNANCY SURGERY      PTA, PERONEAL Right 8/25/2023    Procedure: PTA, PERONEAL TIBIAL TRUNK WITH SHOCKWAVE;  Surgeon: Gary Rico MD;  [address]  Service: Vascular;  Laterality: Right;    PTCA, SINGLE VESSEL  11/22/2023    Procedure: PTCA, Single Vessel;  Surgeon: Checo Bhatt MD;  Location: [facility] CATH LAB;  Service: Cardiology;;    right forefoot amputation      right toe amputation      x2 toes    STENT, DRUG ELUTING, SINGLE VESSEL, CORONARY  11/22/2023    Procedure: Stent, Drug Eluting, Single Vessel, Coronary;  Surgeon: Checo Bhatt MD;  Location: [facility] CATH LAB;  Service: Cardiology;;    STENT, DRUG ELUTING, SINGLE VESSEL, CORONARY  5/27/2024    Procedure: Stent, Drug Eluting, Single Vessel, Coronary;  Surgeon: Karel Mack MD;  Location: [facility] CATH LAB;  Service: Cardiology;;    STENT, DRUG ELUTING, SINGLE VESSEL, CORONARY  9/29/2024    Procedure: Stent, Drug Eluting, Single Vessel, Coronary;  Surgeon: Checo Bhatt MD;  Location: [facility] CATH LAB;  Service: Cardiology;;    STENT, SUPERFICIAL FEMORAL ARTERY Right 8/25/2023    Procedure: STENT, SUPERFICIAL FEMORAL ARTERY;  Surgeon: Gary Rico MD;  [facility];  Service: Vascular;  Laterality: Right;  VIABAHN 6 X 15 X120     No family history on file.  Social History     Tobacco Use    Smoking status: Every  Day     Current packs/day: 0.25     Average packs/day: 0.5 packs/day for 61.3 years (30.2 ttl pk-yrs)     Types: Cigarettes     Start date: 7/5/1963    Smokeless tobacco: Never   Substance Use Topics    Alcohol use: No    Drug use: No     Review of Systems  Per HPI  Physical Exam     Initial Vitals   BP Pulse Resp Temp SpO2   11/07/24 1234 11/07/24 1228 11/07/24 1228 11/07/24 1521 11/07/24 1228   (S) (!) 79/42 73 17 97.1 °F (36.2 °C) (!) 88 %      MAP       --                Physical Exam    Constitutional: She appears well-developed and well-nourished. She is not diaphoretic. She appears distressed.   HENT:   Head: Normocephalic and atraumatic.   Cardiovascular:  Normal rate and regular rhythm.           No murmur heard.  Pulmonary/Chest: Breath sounds normal. No respiratory distress. She has no wheezes. She has no rales.     Neurological: She is alert. GCS score is 15. GCS eye subscore is 4. GCS verbal subscore is 5. GCS motor subscore is 6.   Skin: Skin is warm and dry.   Psychiatric: She has a normal mood and affect.         ED Course   Procedures  Labs Reviewed   ISTAT PROCEDURE - Abnormal       Result Value    POC PH 7.159 (*)     POC PCO2 79.1 (*)     POC PO2 21 (*)     POC HCO3 28.2 (*)     POC BE -1      POC SATURATED O2 21      POC Sodium 142      POC Potassium 2.5 (*)     POC TCO2 31 (*)     POC Ionized Calcium 1.23      POC Hematocrit 31 (*)     Sample VENOUS      Site Other      Allens Test N/A      DelSys Resusc Bag      Mode SPONT      Flow 15      FiO2 100      Sp02 100       EKG Readings: (Independently Interpreted)   Initial Reading: STEMI. Rhythm: Normal Sinus Rhythm.   EKG showing ST elevations in 2 3 AVF and     ECG Results              EKG 12-lead (Final result)        Collection Time Result Time QRS Duration [facility] QTC Calculation    11/07/24 12:27:22 11/07/24 13:41:31 98 466                     Final result by Interface, Lab In [facility] (11/07/24 13:41:34)                   Narrative:    Test  Reason : I21.3,    Vent. Rate : 074 BPM     Atrial Rate : 074 BPM     P-R Int : 196 ms          QRS Dur : 098 ms      QT Int : 420 ms       P-R-T Axes : 076 076 097 degrees     QTc Int : 466 ms    Sinus rhythm with Premature supraventricular complexes  Low voltage QRS  ST elevation consider inferolateral injury or acute infarct    ACUTE MI / STEMI    Consider right ventricular involvement in acute inferior infarct  Abnormal ECG  When compared with ECG of 03-OCT-2024 23:06,  ST elevation now present in Inferior leads  Confirmed by Fausto THOMPSON MD ([id number]) on 11/7/2024 1:41:27 PM    Referred By: System System           Confirmed By:Fausto THOMPSON MD                                  Imaging Results    None          Medications   diphenhydrAMINE capsule 50 mg (has no administration in time range)   aspirin tablet 325 mg (has no administration in time range)   sodium chloride 0.9% flush 10 mL (has no administration in time range)   heparin 25,000 units in dextrose 5% 250 mL (100 units/mL) infusion LOW INTENSITY nomogram - [facility] (12 Units/kg/hr × 46.5 kg (Adjusted) Intravenous Verify Only 11/8/24 0500)   heparin 25,000 units in dextrose 5% (100 units/ml) IV bolus from bag LOW INTENSITY nomogram - [facility] (has no administration in time range)   heparin 25,000 units in dextrose 5% (100 units/ml) IV bolus from bag LOW INTENSITY nomogram - [facility] (has no administration in time range)   ticagrelor tablet 90 mg (90 mg Oral Given 11/7/24 1498)   atorvastatin tablet 80 mg (has no administration in time range)   piperacillin-tazobactam (ZOSYN) 4.5 g in D5W 100 mL IVPB (MB+) (0 g Intravenous Stopped 11/8/24 7972)   vancomycin - pharmacy to dose (has no administration in time range)   vancomycin 750 mg in D5W 250 mL IVPB (admixture device) (has no administration in time range)   NORepinephrine 4 mg in sodium chloride 0.9% 250 mL infusion (premix) (0.1 mcg/kg/min × 48.1 kg Intravenous Verify Only 11/8/24 0500)   DOBUtamine 1000 mg in D5W 250 mL  infusion (5 mcg/kg/min × 48.1 kg Intravenous Verify Only 11/8/24 0500)   morphine injection 1 mg (1 mg Intravenous Given 11/8/24 0236)   magnesium sulfate 2g in water 50mL IVPB (premix) (4 g Intravenous New Bag 11/8/24 0529)   ticagrelor (BRILINTA) 90 mg tablet (  Given 11/7/24 1233)   amiodarone injection (300 mg Intravenous Given 11/7/24 1242)   EPINEPHrine 0.1 mg/mL injection (0.1 mg Intravenous Given 11/7/24 1244)   atropine injection (1 mg Intravenous Given 11/7/24 1246)   heparin 25,000 units in dextrose 5% (100 units/ml) IV bolus from bag LOW INTENSITY nomogram - [facility] (2,790 Units Intravenous Bolus from Bag 11/7/24 1703)   vancomycin (VANCOCIN) 1,000 mg in D5W 250 mL IVPB (admixture device) (0 mg Intravenous Stopped 11/7/24 1944)   potassium chloride 40 mEq in 100 mL IVPB (FOR CENTRAL LINE ADMINISTRATION ONLY) (0 mEq Intravenous Stopped 11/7/24 2204)   potassium chloride 40 mEq in 100 mL IVPB (FOR CENTRAL LINE ADMINISTRATION ONLY) (0 mEq Intravenous Stopped 11/8/24 0326)   potassium chloride 40 mEq in 100 mL IVPB (FOR CENTRAL LINE ADMINISTRATION ONLY) (40 mEq Intravenous New Bag 11/8/24 0528)     Medical Decision Making  Patient is a 79-year-old hemodynamically stable, afebrile, in mild distress female presenting due to chest pain and shortness of breath found to have associated EKG findings of started.    EKG showing ST elevations in her inferior and some of her anterior leads.  Patient states that this is how her previous heart attacks had presented.  Suspect likely STEMI.  Cardiology consulted the bedside.  Patient became unresponsive and pulseless at bedside.  Cardiology was in the room at this point.  Chest compressions were started.  EKG showing V-tach.  Shock delivered with conversion to sinus rhythm with complete heart block.  Amiodarone and epinephrine were pushed.  Transcutaneous pacing initiated.  ROSC achieved.  Patient able to answer questions.  Patient taken to the cath lab with  Cardiology.    Amount and/or Complexity of Data Reviewed  Labs: ordered. Decision-making details documented in ED Course.    Risk  OTC drugs.  Prescription drug management.  Decision regarding hospitalization.              Attending Attestation:   Physician Attestation Statement for Resident:  As the supervising MD   Physician Attestation Statement: I have personally seen and examined this patient.   I agree with the above history.  -:   As the supervising MD I agree with the above PE.     As the supervising MD I agree with the above treatment, course, plan, and disposition.   -: Pre-hospital EKG showed STEMI, code STEMI was activated pre-hospital.  On arrival, patient with clinical syndrome consistent with STEMI.  EKG here showed STEMI per my independent interpretation. While cardiology was evaluating, patient went into VFib arrest.  I delivered 3 precordial thumps while awaiting defibrillator with no effect. CPR initiated and subsequently patient defibrillated.  Cardiology placed central access.  ROSC achieved.  Cardiology plans immediate catheterization.  I was personally present during the entire procedure.                  ED Course as of 11/08/24 0613   Thu Nov 07, 2024   1259 POC PH(!!): 7.159 [MB]   1259 POC PCO2(!): 79.1 [MB]   1259 POC PO2(!!): 21 [MB]   1259 POC HCO3(!): 28.2 [MB]      ED Course User Index  [MB] Alphonse Salazar MD                       Critical Care   Performed by: Emery Fan MD   Authorized by: Emery Fan MD    Total critical care time (exclusive of procedural time) : 30 minutes  Critical care was necessary to treat or prevent imminent or life-threatening deterioration of the following conditions:  STEMI, cardiac arrest      Clinical Impression:  Final diagnoses:  [I21.3] STEMI (ST elevation myocardial infarction)  [I49.01] Ventricular fibrillation  [I46.9] Cardiac arrest          ED Disposition Condition    Admit Stable                Alphonse Salazar MD  Resident  11/07/24  1456       Emery Fan MD  11/08/24 0618

## 2024-11-07 NOTE — CARE UPDATE
Goals of care was had with POA patients daughter and it was determined patient to remain full code until discussions are had with rest of family.

## 2024-11-08 PROBLEM — I46.9 CARDIAC ARREST: Status: ACTIVE | Noted: 2024-11-08

## 2024-11-08 PROBLEM — E87.20 METABOLIC ACIDOSIS: Status: ACTIVE | Noted: 2024-01-01

## 2024-11-08 PROBLEM — J96.02 ACUTE HYPERCAPNIC RESPIRATORY FAILURE: Status: ACTIVE | Noted: 2024-01-01

## 2024-11-08 PROBLEM — Z71.89 COUNSELING REGARDING ADVANCE CARE PLANNING AND GOALS OF CARE: Status: ACTIVE | Noted: 2024-11-08

## 2024-11-08 NOTE — PLAN OF CARE
CICU DAILY GOALS     CVP - 12, 10, 7   SvO2 - 28, 29, 44   started overnight and increased per MD order   Morphine given x2 PRN for rib/chest pain related to compressions and defibrillation   See previous note about pulses - MD aware   Potassium and Magnesium replaced   Mitts applied for patient pulling lines despite education and redirection   Continuous BiPAP overnight for elevated CO2   IABP 1:1, EKG, Auto   TVP in place       A: Awake    RASS: Goal -    Actual -     Restraint necessity: Clinical Justification: Removing medical devices, Treatment Interference  B: Breath   SBT: Not intubated   C: Coordinate A & B, analgesics/sedatives   Pain: managed    SAT: Not intubated  D: Delirium   CAM-ICU: Overall CAM-ICU: Positive  E: Early(intubated/ Progressive (non-intubated) Mobility   MOVE Screen: Pass   Activity: Activity Management: Patient unable to perform activities  FAS: Feeding/Nutrition   Diet order: Diet/Nutrition Received: NPO,   Fluid restriction:     Nutritional Supplement Intake: Quantity 0,   T: Thrombus   DVT prophylaxis: VTE Core Measure: Pharmacological prophylaxis initiated/maintained  H: HOB Elevation   Head of Bed (HOB) Positioning: HOB at 15 degrees  U: Ulcer Prophylaxis   GI: no  G: Glucose control   managed    S: Skin   Bathing/Skin Care: bath, complete;linen changed (11/07/24 1601)  Wounds: No  Wound care consulted: No  B: Bowel Function   no issues   I: Indwelling Catheters   Harris necessity:      Urethral Catheter 11/07/24 1601-Reason for Continuing Urinary Catheterization: Critically ill in ICU and requiring hourly monitoring of intake/output   CVC necessity: Yes  D: De-escalation Antibx   Yes  Plan for the day   TBD  Family/Goals of care/Code Status   Code Status: Full Code     No acute events throughout day, VS and assessment per flow sheet, patient progressing towards goals as tolerated, plan of care reviewed with Radha Sotelo and family, all concerns addressed, will continue to  monitor.

## 2024-11-08 NOTE — PROGRESS NOTES
Despite continuous education throughout the night regarding not pulling IV lines and keeping BiPAP on, patient has remained non-compliant. Patient has been re-educated and re-directed several times unsuccessfully. Bilateral soft mitts were applied overnight due to patient pulling at IVs, pulling at the IABP tubing, pulling at her mock, and continuously taking the BiPAP mask off. Patient was able to figure out that if she hit the BiPAP tubing with her mitt, it would disconnect from the mask and alarm. Patient has disconnected herself several times by hitting the BiPAP tubing with her mitts. MD called and notified. Per MD over the phone, OK to take off BiPAP and trial NC 4L. BiPAP removed and 4L NC placed on to patient. RRT notified

## 2024-11-08 NOTE — PLAN OF CARE
[facility] - Cardiac Intensive Care  Initial Discharge Assessment       Primary Care Provider: Kalyn Pemberton NP    Admission Diagnosis: Ventricular fibrillation [I49.01]  Cardiac arrest [I46.9]  STEMI (ST elevation myocardial infarction) [I21.3]    Admission Date: 11/7/2024  Expected Discharge Date: 11/12/2024    Transition of Care Barriers: None    Payor: [employer] [facility] / Plan: [employer] CHOICES / Product Type: Medicare Advantage /     Extended Emergency Contact Information  Primary Emergency Contact: ReardonAnthony  Mobile Phone: [phone]  Relation: Daughter   needed? No    Discharge Plan A: Home Health  Discharge Plan B: Hospice/home      [facility]. - [facility] [place], [place] - [address] [facility] [address] [facility]  [address] [facility] [address]  Phone: [phone] Fax: [phone]    [id number] - [place], [place] - [address] [facility]  [address] [facility] [address]  Phone: [phone] Fax: [phone]    [facility]. - [place], [place] - [address] [facility]  [address] [facility] [address]  Phone: [phone] Fax: [phone]    [id number] [place], [place] - [address] [facility]  [address] [place] [address]  Phone: [phone] Fax: [phone]      Initial Assessment (most recent)       Adult Discharge Assessment - 11/08/24 1539          Discharge Assessment    Assessment Type Discharge Planning Assessment     Confirmed/corrected address, phone number and insurance Yes     Confirmed Demographics Correct on Facesheet     Source of Information patient;health record     If unable to respond/provide information was family/caregiver contacted? Yes     Contact Name/Number Attempted to call Anthony (daughter) [phone]     Communicated FLORIAN with patient/caregiver Date not available/Unable to determine     Reason For Admission Cardiogenic shock     People in Home  grandchild(coral)     Facility Arrived From: Home     Do you expect to return to your current living situation? Yes     Do you have help at home or someone to help you manage your care at home? Yes     Who are your caregiver(s) and their phone number(s)? Anthony Reardon (daughter) [phone]     Prior to hospitilization cognitive status: Unable to Assess     Current cognitive status: Unable to Assess     Walking or Climbing Stairs Difficulty yes     Walking or Climbing Stairs ambulation difficulty, requires equipment     Mobility Management rolling walker     Dressing/Bathing Difficulty no     Home Accessibility not wheelchair accessible     Equipment Currently Used at Home walker, standard;oxygen;shower chair     Readmission within 30 days? No     Patient currently being followed by outpatient case management? Yes     If yes, name of outpatient case management following: [facility] outpatient case management     Do you currently have service(s) that help you manage your care at home? Yes     Name and Contact number of agency [facility]     How do you get to doctors appointments? family or friend will provide     Are you on dialysis? No     Do you take coumadin? No     Discharge Plan A Home Health     Discharge Plan B Hospice/home     DME Needed Upon Discharge  none     Transition of Care Barriers None                   SW met with pt at bedside to discuss discharge planning.  Pt was only able to answer a few questions before falling back asleep.  SW attempted to call pt's daughter Anthony but had to leave a voicemail.  The parts of the assessment that pt was not able to complete were completed via chart review.  Pt lives with her granddaughter and uses a walker for ambulation but is independent with ADLs.  Pt is current with [facility].  Palliative Care consulted to discuss goals of care.  Discharge Plan A and Plan B have been determined by review of patient's clinical status, future medical and therapeutic needs, and  coverage/benefits for post-acute care in coordination with multidisciplinary team members.  SW name and ext on whiteboard; discharge planning booklet provided.  Will continue to follow.      Evon Dickinson LMSW  [facility]  [id number]

## 2024-11-08 NOTE — ASSESSMENT & PLAN NOTE
Advance Care Planning     Code Status  In light of the patients advanced and life limiting illness,I engaged the the patient and family in a voluntary conversation about the patient's preferences for care at the very end of life. Family would like more time to consider code status but request that pt remain Full Code for nwo. Discussion had with patient this AM (11/8) as she is alert and oriented, though waxing and waning. She wishes to receive CPR and other invasive treatments performed when her heart and/or breathing stops. Pt will continue to be Full Code. Will bring palliative on board for further GOC discussion.

## 2024-11-08 NOTE — PROGRESS NOTES
AM CBC resulted with a hgb of 7.4 and plt of 160. Previous CBC 12 hours prior was hgb of 8.9 and plts of 296. STAT CBC re-draw sent off. Re-draw results exact same. MD called and notified. Charge RN notified

## 2024-11-08 NOTE — CARE UPDATE
Heart Transplant Care Update Note:    Hemodynamics@8pm:  CVP:10  SVO2: 28  CO: 2.0    CI: 1.4  SVR: 2218  PA 42/24    Patient also with very feint and intermittent monophasic L PT pulse dopplered. L DP pulse absent. L popliteal pulse present.     Plan:  Added Dobutamine at 2.5      Repeat Hemodynamics @MN  SV02:29   Unchanged hemodynamics     Increase  to 5        Discussed with CCU attending       Lexie LAZAR MD  Cardiovascular Disease PGY V  [facility]

## 2024-11-08 NOTE — HOSPITAL COURSE
Patient arrived to [facility] ED with chest pain and dyspnea.  While in the ED, she went into VFib arrest requiring compressions and defibrillation.  Rosc achieved and started on amiodarone and epi.  EKG notable for ST elevations in inferior and lateral leads.  She was taken emergently to the cath lab.  Noted to be incomplete heart block, status post TVP as well as IABP.  Angiogram notable for  of the RCA.  She was transferred to the CICU for medical management of coronary disease and cardiogenic shock.  Started on .  Hospital course also complicated by AHRF requiring BiPAP, though weaned to NC.  Patient became progressively anemic requiring pRBC transfusions.  Determine that she was likely hemolyzing secondary to balloon.  Decision made to remove IABP 11/9 PM d/t significant hemolysis.  Following this, patient began having significantly increased pressor requirements.  She required intubation due to high pressor requirements in addition to agitation and encephalopathy.  Lactic acidosis worsened in addition to further increase in pressor requirements.  Following discussion with family, decision made to transitioned to comfort care and patient made DNR. Patient passed shortly after this.

## 2024-11-08 NOTE — SUBJECTIVE & OBJECTIVE
Interval History: No acute overnight events. Pt started on dobutamine and levo. Weaned off bipap and stable on 4L this AM.    ROS  Objective:     Vital Signs (Most Recent):  Temp: 98.2 °F (36.8 °C) (11/08/24 1534)  Pulse: 82 (11/08/24 1534)  Resp: (!) 29 (11/08/24 1534)  BP: 107/71 (11/08/24 1501)  SpO2: (!) 92 % (11/08/24 1534) Vital Signs (24h Range):  Temp:  [98.2 °F (36.8 °C)-99.7 °F (37.6 °C)] 98.2 °F (36.8 °C)  Pulse:  [] 82  Resp:  [10-32] 29  SpO2:  [85 %-99 %] 92 %  BP: ()/(52-86) 107/71  Arterial Line BP: (103-119)/(28-44) 103/31     Weight: 48 kg (105 lb 13.1 oz)  Body mass index is 20.67 kg/m².     SpO2: (!) 92 %         Intake/Output Summary (Last 24 hours) at 11/8/2024 1545  Last data filed at 11/8/2024 1501  Gross per 24 hour   Intake 1933.27 ml   Output 455 ml   Net 1478.27 ml       Lines/Drains/Airways       Central Venous Catheter Line  Duration             Introducer 11/07/24 0900 Femoral Vein Left 1 day    Introducer 11/07/24 0900 Femoral Vein Left 1 day    Pulmonary Artery Catheter Assessment  11/07/24 0900 Femoral Vein Left 1 day              Drain  Duration                  Colostomy 07/13/21 0201 Descending/sigmoid LLQ 1214 days         NG/OG Tube 11/07/24 1642 nasogastric 14 Fr. Left nostril <1 day         Urethral Catheter 11/07/24 1601 <1 day              Line  Duration                  IABP 11/07/24 1411 8.0 Fr. 40 mL 1 day         Pacer Wires 11/07/24 1410 1 day              Peripheral Intravenous Line  Duration                  Peripheral IV - Single Lumen 11/07/24 1222 20 G Anterior;Left;Proximal Forearm 1 day         Peripheral IV - Single Lumen 11/07/24 1233 20 G Anterior;Right Forearm 1 day         Peripheral IV - Single Lumen 11/07/24 1234 20 G Anterior;Left Hand 1 day                       Physical Exam  Constitutional:       Appearance: She is ill-appearing.      Comments: Central femoral arterial line on right, balloon pump on left, swan alexx catheter   HENT:       Head: Normocephalic and atraumatic.   Eyes:      Comments: Difficulty visualizing pupil due to bilateral cataracts   Cardiovascular:      Rate and Rhythm: Normal rate and regular rhythm.   Pulmonary:      Breath sounds: Rhonchi present.   Neurological:      Mental Status: She is unresponsive.      Comments: Withdraws from noxious stimuli            Significant Labs: CMP   Recent Labs   Lab 11/07/24  1509 11/07/24  2218 11/08/24  0420 11/08/24  1114    141 143 143   K 2.9* 3.1* 3.6 4.3    112* 111* 112*   CO2 23 22* 22* 23   * 133* 107 121*   BUN 15 17 18 19   CREATININE 0.9 0.8 1.0 0.9   CALCIUM 7.3* 7.0* 7.3* 7.7*   PROT 5.2*  --  4.8* 4.9*   ALBUMIN 2.8*  --  2.5* 2.6*   BILITOT 0.6  --  0.4 0.4   ALKPHOS 97  --  77 78   AST 24  --  131* 189*   ALT 13  --  22 29   ANIONGAP 11 7* 10 8    and CBC   Recent Labs   Lab 11/08/24  0523 11/08/24  1114 11/08/24  1245   WBC 12.40 12.91* 13.65*   HGB 7.4* 7.0* 7.1*   HCT 25.7* 25.0* 24.0*    143* 144*       Significant Imaging: Echocardiogram: Transthoracic echo (TTE) complete (Cupid Only):   Results for orders placed or performed during the hospital encounter of 11/07/24   Echo   Result Value Ref Range    LV Diastolic Volume 67.84 mL    Echo EF Estimated 55 %    LV Systolic Volume 30.22 mL    IVS 1.0 0.6 - 1.1 cm    LVIDd 4.0 3.5 - 6.0 cm    LVIDs 2.8 2.1 - 4.0 cm    LVOT diameter 2.0 cm    PW 0.9 0.6 - 1.1 cm    AV LVOT peak gradient 4 mmHg    LVOT mn grad 2 mmHg    LVOT peak james 1.0 m/s    LVOT peak VTI 20.2 cm    RV- oswald basal diam 4.1 cm    LA size 2.97 cm    Left Atrium Major Axis 4.63 cm    Left Atrium Minor Axis 4.87 cm    LA Vol (MOD) 47.17 mL    RA Major [place] 4.95 cm    AV valve area 2.9 cm2    AV area by cont VTI 2.9 cm2    AV peak gradient 13.0 mmHg    AV mean gradient 6.3 mmHg    Ao peak james 1.8 m/s    Ao VTI 22.1 cm    MV Peak A James 0.92 m/s    E wave deceleration time 194.13 ms    MV Peak E James 0.60 m/s    E/A ratio 0.65     LV  LATERAL E/E' RATIO 7.50     LV SEPTAL E/E' RATIO 10.00     TDI LATERAL 0.08 m/s    TDI SEPTAL 0.06 m/s    TV peak gradient 34 mmHg    TR Max James 2.91 m/s    Ascending aorta 3.14 cm    STJ 2.20 cm    Sinus 2.86 cm    LA WIDTH 3.23 cm    RA Width 4.22 cm    TAPSE 1.42 cm    BSA 1.43 m2    LVOT stroke volume 63.4 cm3    LV Systolic Volume Index 21.3 mL/m2    LV Diastolic Volume Index 47.77 mL/m2    LVOT area 3.1 cm2    FS 30.0 28 - 44 %    Left Ventricle Relative Wall Thickness 0.45 cm    LV mass 118.2 g    LV Mass Index 83.3 g/m2    E/E' ratio 8.57 m/s    LEEANNE 27.3 mL/m2    LA Vol 38.71 cm3    RV/LV Ratio 1.03 cm    LEEANNE (MOD) 33.2 mL/m2    AV Velocity Ratio 0.56     AV index (prosthetic) 0.91     ADOLFO by Velocity Ratio 1.7 cm²    Triscuspid Valve Regurgitation Peak Gradient 34 mmHg    Mean e' 0.07 m/s    ZLVIDS 0.26     ZLVIDD -0.86     TV resting pulmonary artery pressure 37 mmHg    RV TB RVSP 6 mmHg    Est. RA pres 3 mmHg    Narrative      Left Ventricle: The left ventricle is normal in size. Ventricular mass   is normal. Normal wall thickness. There is concentric remodeling. Normal   wall motion. There is low normal systolic function with a visually   estimated ejection fraction of 50 - 55%. There is indeterminate diastolic   function.    Right Ventricle: Mild right ventricular enlargement. Wall thickness is   normal. Right ventricle wall motion  is normal. Systolic function is   normal. Pacemaker lead present in the ventricle.    Right Atrium: Right atrium is moderately dilated. Lead present in the   right atrium.    Aortic Valve: There is mild aortic valve sclerosis. There is mild   aortic regurgitation.    Tricuspid Valve: There is mild regurgitation.    Pulmonary Artery: The estimated pulmonary artery systolic pressure is   37 mmHg.    IVC/SVC: Normal venous pressure at 3 mmHg.

## 2024-11-08 NOTE — PROGRESS NOTES
[facility] - Cardiac Intensive Care  Cardiology  Progress Note    Patient Name: Radha Sotelo  MRN: [id number]  Admission Date: 11/7/2024  Hospital Length of Stay: 1 days  Code Status: Full Code   Attending Physician: Mackenzie Davis MD   Primary Care Physician: Kalyn Pemberton NP  Expected Discharge Date: 11/12/2024  Principal Problem:Cardiogenic shock    Subjective:     Hospital Course:   Patient arrived to [facility] ED with chest pain. Arrested in the ED, was shocked, given amio and epi, and transcutaneously paced prior to achieving ROSC. Found to have complete occlusion of the RCA during emergent left heart cath. Patient admitted to CCU for cardiogenic shock. Found to have hypercapnic respiratory failure and started on bipap. Now stable on 4-5 L NC. Currently being treated with DAPT, lipitor, heparin, and BAS for possible aspiration PNA. Currently requiring dobutamine and levo.     Interval History: No acute overnight events. Pt started on dobutamine and levo. Weaned off bipap and stable on 4L this AM.    ROS  Objective:     Vital Signs (Most Recent):  Temp: 98.2 °F (36.8 °C) (11/08/24 1534)  Pulse: 82 (11/08/24 1534)  Resp: (!) 29 (11/08/24 1534)  BP: 107/71 (11/08/24 1501)  SpO2: (!) 92 % (11/08/24 1534) Vital Signs (24h Range):  Temp:  [98.2 °F (36.8 °C)-99.7 °F (37.6 °C)] 98.2 °F (36.8 °C)  Pulse:  [] 82  Resp:  [10-32] 29  SpO2:  [85 %-99 %] 92 %  BP: ()/(52-86) 107/71  Arterial Line BP: (103-119)/(28-44) 103/31     Weight: 48 kg (105 lb 13.1 oz)  Body mass index is 20.67 kg/m².     SpO2: (!) 92 %         Intake/Output Summary (Last 24 hours) at 11/8/2024 1545  Last data filed at 11/8/2024 1501  Gross per 24 hour   Intake 1933.27 ml   Output 455 ml   Net 1478.27 ml       Lines/Drains/Airways       Central Venous Catheter Line  Duration             Introducer 11/07/24 0900 Femoral Vein Left 1 day    Introducer 11/07/24 0900 Femoral Vein Left 1 day    Pulmonary Artery Catheter Assessment   11/07/24 0900 Femoral Vein Left 1 day              Drain  Duration                  Colostomy 07/13/21 0201 Descending/sigmoid LLQ 1214 days         NG/OG Tube 11/07/24 1642 nasogastric 14 Fr. Left nostril <1 day         Urethral Catheter 11/07/24 1601 <1 day              Line  Duration                  IABP 11/07/24 1411 8.0 Fr. 40 mL 1 day         Pacer Wires 11/07/24 1410 1 day              Peripheral Intravenous Line  Duration                  Peripheral IV - Single Lumen 11/07/24 1222 20 G Anterior;Left;Proximal Forearm 1 day         Peripheral IV - Single Lumen 11/07/24 1233 20 G Anterior;Right Forearm 1 day         Peripheral IV - Single Lumen 11/07/24 1234 20 G Anterior;Left Hand 1 day                       Physical Exam  Constitutional:       Appearance: She is ill-appearing.      Comments: Central femoral arterial line on right, balloon pump on left, swan alexx catheter   HENT:      Head: Normocephalic and atraumatic.   Eyes:      Comments: EOMI  Cardiovascular:      Rate and Rhythm: Normal rate and regular rhythm.   Pulmonary:      Breath sounds: Rhonchi present.   Neurological:      Mental Status: She is alert but disoriented.            Significant Labs: CMP   Recent Labs   Lab 11/07/24  1509 11/07/24  2218 11/08/24  0420 11/08/24  1114    141 143 143   K 2.9* 3.1* 3.6 4.3    112* 111* 112*   CO2 23 22* 22* 23   * 133* 107 121*   BUN 15 17 18 19   CREATININE 0.9 0.8 1.0 0.9   CALCIUM 7.3* 7.0* 7.3* 7.7*   PROT 5.2*  --  4.8* 4.9*   ALBUMIN 2.8*  --  2.5* 2.6*   BILITOT 0.6  --  0.4 0.4   ALKPHOS 97  --  77 78   AST 24  --  131* 189*   ALT 13  --  22 29   ANIONGAP 11 7* 10 8    and CBC   Recent Labs   Lab 11/08/24  0523 11/08/24  1114 11/08/24  1245   WBC 12.40 12.91* 13.65*   HGB 7.4* 7.0* 7.1*   HCT 25.7* 25.0* 24.0*    143* 144*       Significant Imaging: Echocardiogram: Transthoracic echo (TTE) complete (Cupid Only):   Results for orders placed or performed during the  hospital encounter of 11/07/24   Echo   Result Value Ref Range    LV Diastolic Volume 67.84 mL    Echo EF Estimated 55 %    LV Systolic Volume 30.22 mL    IVS 1.0 0.6 - 1.1 cm    LVIDd 4.0 3.5 - 6.0 cm    LVIDs 2.8 2.1 - 4.0 cm    LVOT diameter 2.0 cm    PW 0.9 0.6 - 1.1 cm    AV LVOT peak gradient 4 mmHg    LVOT mn grad 2 mmHg    LVOT peak james 1.0 m/s    LVOT peak VTI 20.2 cm    RV- oswald basal diam 4.1 cm    LA size 2.97 cm    Left Atrium Major Axis 4.63 cm    Left Atrium Minor Axis 4.87 cm    LA Vol (MOD) 47.17 mL    RA Major [place] 4.95 cm    AV valve area 2.9 cm2    AV area by cont VTI 2.9 cm2    AV peak gradient 13.0 mmHg    AV mean gradient 6.3 mmHg    Ao peak james 1.8 m/s    Ao VTI 22.1 cm    MV Peak A James 0.92 m/s    E wave deceleration time 194.13 ms    MV Peak E James 0.60 m/s    E/A ratio 0.65     LV LATERAL E/E' RATIO 7.50     LV SEPTAL E/E' RATIO 10.00     TDI LATERAL 0.08 m/s    TDI SEPTAL 0.06 m/s    TV peak gradient 34 mmHg    TR Max James 2.91 m/s    Ascending aorta 3.14 cm    STJ 2.20 cm    Sinus 2.86 cm    LA WIDTH 3.23 cm    RA Width 4.22 cm    TAPSE 1.42 cm    BSA 1.43 m2    LVOT stroke volume 63.4 cm3    LV Systolic Volume Index 21.3 mL/m2    LV Diastolic Volume Index 47.77 mL/m2    LVOT area 3.1 cm2    FS 30.0 28 - 44 %    Left Ventricle Relative Wall Thickness 0.45 cm    LV mass 118.2 g    LV Mass Index 83.3 g/m2    E/E' ratio 8.57 m/s    LEEANNE 27.3 mL/m2    LA Vol 38.71 cm3    RV/LV Ratio 1.03 cm    LEEANNE (MOD) 33.2 mL/m2    AV Velocity Ratio 0.56     AV index (prosthetic) 0.91     ADOLFO by Velocity Ratio 1.7 cm²    Triscuspid Valve Regurgitation Peak Gradient 34 mmHg    Mean e' 0.07 m/s    ZLVIDS 0.26     ZLVIDD -0.86     TV resting pulmonary artery pressure 37 mmHg    RV TB RVSP 6 mmHg    Est. RA pres 3 mmHg    Narrative      Left Ventricle: The left ventricle is normal in size. Ventricular mass   is normal. Normal wall thickness. There is concentric remodeling. Normal   wall motion. There is low  normal systolic function with a visually   estimated ejection fraction of 50 - 55%. There is indeterminate diastolic   function.    Right Ventricle: Mild right ventricular enlargement. Wall thickness is   normal. Right ventricle wall motion  is normal. Systolic function is   normal. Pacemaker lead present in the ventricle.    Right Atrium: Right atrium is moderately dilated. Lead present in the   right atrium.    Aortic Valve: There is mild aortic valve sclerosis. There is mild   aortic regurgitation.    Tricuspid Valve: There is mild regurgitation.    Pulmonary Artery: The estimated pulmonary artery systolic pressure is   37 mmHg.    IVC/SVC: Normal venous pressure at 3 mmHg.       Assessment and Plan:       * Cardiogenic shock  80 y/o F with pmhx of STEMI in 9/29 s/p RCA stent placement, CHF, COPD, CAD s/p PCI 11/2023 and 5/2024 for proximal RCA stenosis presented to [facility] for STEMI, admitted to CICU for cardiogenic shock after arresting in the ED. She was taken to cath lab and found to have complete heart block. A TVP was placed. She was also noted to be hypotensive, resulting in placement of emergent balloon pump. Cardiogenic shock multidisciplinary meeting held by Interventional Cardiology and decision was made to treat the pt medically and not offer PCI given previous failures of this therapy. ABG with pH 7.21, CO2 65, HCO3 26, SvO2 94%, and lactate 1.93. She arrived to the unit unresponsive but withdrawing to noxious stimuli.     This patient has shock. The type of shock is cardiogenic due to ischemic. The patient has the following evidence of shock: altered mental status and hypoxia. The patient will be admitted to an intensive care unit, they will be treated as follows:    CXR: No definitive evidence of rib fracture.  Troponin: 0.142 -> 25.4 -> 26    Started on continuous dobutamine and levo yesterday evening. Currently on levo 0.1 of dobutamine 5.     Plan  - Continue to trend troponin until peak  - Refer to  STEMI (ST elevation myocardial infarction)  for further work-up and plan  - Broad spectrum antibiotics with Zosyn and Vanc for possible aspiration PNA  - GOC conversation with family  - Trend hemodynamics daily   - CVP 9, SVO2 36, CI 2.8, CO 3.9    Counseling regarding advance care planning and goals of care  Advance Care Planning    Code Status  In light of the patients advanced and life limiting illness,I engaged the the patient and family in a voluntary conversation about the patient's preferences for care at the very end of life. Family would like more time to consider code status but request that pt remain Full Code for nwo. Discussion had with patient this AM (11/8) as she is alert and oriented, though waxing and waning. She wishes to receive CPR and other invasive treatments performed when her heart and/or breathing stops. Pt will continue to be Full Code. Will bring palliative on board for further GOC discussion.    Metabolic acidosis  ABG with pH 7,216, CO2 65.6, HCO3 26.6, BE -1.     Chem panel significant for Cl 111 and CO2 22, AG 10 classifying this as a non-anion gap metabolic acidosis, likely in the setting of post-cardiac arrest.    Plan  - Continue to monitor with daily VBG's    Acute hypercapnic respiratory failure  Patient with Hypercapnic Respiratory failure which is Acute.  she is on home oxygen at 2-3 LPM. Supplemental oxygen was provided and noted- Oxygen Concentration (%):  [30-50] 30    .   Signs/symptoms of respiratory failure include- tachypnea, increased work of breathing, respiratory distress, use of accessory muscles, stridor, and lethargy. Contributing diagnoses includes - Aspiration, CHF, COPD, and Pulmonary Embolus Labs and images were reviewed. Patient Has recent ABG, which has been reviewed. Will treat underlying causes and adjust management of respiratory failure as follows-     Plan  - Bipap, wean as tolerated  - Pt now stable on 4-5 L NC, more alert this AM    Cardiac  arrest  Cardiac arrest x2 in the ED. Per ED note, EKG showed V-tach and pt was shocked with conversion to sinus rhythm with complete heart block. Amio and epi pushed. Transcutaneous pacing initiated and ROSC achieved. Patient subsequently taken emergently to cath lab.    Complete heart block  TVP placed by IC in cath lab.    STEMI (ST elevation myocardial infarction)  Patient presenting with original complaints of CP, found to have interolateral STEMI on EKG without relief after NTG x2.  mg and Ticagrelor 90 mg given in the ED. She was brought to the cath lab and results were as follows:      Late stent thrombosis of the right coronary artery    Complete heart block status post temporay pacemaker placement    Acute MI with cardiogenic shock    Right ventricular infarction    Successful placement of IABP in the left common fermoal artery as bridge to decision to hospice    Successful TVP pacement in the left common femoral vein    Successful Gibbs Yarely catheter placement in [place] left common femroal vein.    Plan  - Continue Ticagrelor 90 mg BID  - ASA 81 mg  - Lipitor 90 mg daily  - Start AC with heparin      COPD (chronic obstructive pulmonary disease)  Duonebs q6hr while awake.        VTE Risk Mitigation (From admission, onward)           Ordered     heparin 25,000 units in dextrose 5% (100 units/ml) IV bolus from bag LOW INTENSITY nomogram - [facility]  As needed (PRN)        Question:  Heparin Infusion Adjustment (DO NOT MODIFY ANSWER)  Answer:  \\[email or web address]\Images\Pharmacy\HeparinInfusions\heparin LOW INTENSITY nomogram for [facility] [id number]    11/07/24 1536     heparin 25,000 units in dextrose 5% (100 units/ml) IV bolus from bag LOW INTENSITY nomogram - [facility]  As needed (PRN)        Question:  Heparin Infusion Adjustment (DO NOT MODIFY ANSWER)  Answer:  \[email or web address]\Images\Pharmacy\HeparinInfusions\heparin LOW INTENSITY nomogram for [facility] [id number]    11/07/24 1536     heparin 25,000 units in dextrose 5%  250 mL (100 units/mL) infusion LOW INTENSITY nomogram - [facility]  Continuous        Question:  Begin at (units/kg/hr)  Answer:  12 11/07/24 1536                    Ann Marie Parker DO  Cardiology  [facility] - Cardiac Int[facility]

## 2024-11-08 NOTE — CONSULTS
Palliative medicine consult received for goals of care and advanced care planning for Ms Sotelo a 80 yo admitted to CICU with cardiogenic shock post STEMI.  She is awake and alert.  Appears oriented to person and place.  From brief interaction she does not appear able to participate in complex medical decisions.      Advance Care Planning     Date: 11/08/2024  Patient did not wish or was not able to name a surrogate decision maker or provide an Advance Care Plan.  As per chart review no  ACP documents have been received.    Patient also has 6 daughters who are the legal next of kin for medical decisions.   Clinical nursing staff indicates the oldest daughter Yvette has been identified as the HPOA.    Patient's other daughter Anthony Reardon [phone] identifes oldest daughter Yvette  is the HPOA.    Yvette is currently out of state on vacation and [place] return to [place] 11/11/24    -full code per primary team     Goals of care are pending.     [facility] will reach out to ramos Cruz for additional information        Thank you for consult and opportunity to participate in Ms. Sotelo's care.

## 2024-11-08 NOTE — PLAN OF CARE
CICU DAILY GOALS       A: Awake    RASS: Goal - RASS Goal: 0-->alert and calm  Actual - RASS (Junior Agitation-Sedation Scale): restless   Restraint necessity: Clinical Justification: Removing medical devices, Treatment Interference  B: Breath   SBT: Not intubated   C: Coordinate A & B, analgesics/sedatives   Pain: managed    SAT: Not intubated  D: Delirium   CAM-ICU: Overall CAM-ICU: Positive  E: Early(intubated/ Progressive (non-intubated) Mobility   MOVE Screen: Fail   Activity: Activity Management: Rolling - L1  FAS: Feeding/Nutrition   Diet order: Diet/Nutrition Received: NPO,   Fluid restriction:    T: Thrombus   DVT prophylaxis: VTE Core Measure: Pharmacological prophylaxis initiated/maintained  H: HOB Elevation   Head of Bed (HOB) Positioning: HOB at 15 degrees  U: Ulcer Prophylaxis   GI: yes  G: Glucose control   managed    S: Skin   Bathing/Skin Care: bath, complete;linen changed (11/07/24 1601)  Wounds: No  Wound care consulted: No  B: Bowel Function   no issues colostomy present  I: Indwelling Catheters   Harris necessity:      Urethral Catheter 11/07/24 1601-Reason for Continuing Urinary Catheterization: Critically ill in ICU and requiring hourly monitoring of intake/output   CVC necessity: Yes  D: De-escalation Antibx   Yes  Plan for the day   CVP 9,10,11- lasix 40 IVP given. 1 units PRBCs given, discussed GOC with patient and some family- no decisions made at this time.   Family/Goals of care/Code Status   Code Status: Full Code     No acute events throughout day, VS and assessment per flow sheet, patient progressing towards goals as tolerated, plan of care reviewed with Radha Sotelo and family, all concerns addressed, will continue to monitor.

## 2024-11-08 NOTE — PROGRESS NOTES
Difficulty obtaining L DP and PT pulses since start of shift. Georgina huggar placed on patient. Charge RN, Jeannie at bedside to troubleshoot. After approx 45 minutes of doppler, very faint and intermittent monophasic L PT pulse dopplered. L DP pulse absent. L popliteal pulse present. MD Alok and MD Ryan notified and aware

## 2024-11-08 NOTE — PROGRESS NOTES
11/08/2024  Mickey Majano    Current provider:  Mackenzie Davis MD    Device interrogation:       No data to display                   Rounded on Radha Kearney Sotelo to ensure all mechanical assist device settings (IABP or VAD) were appropriate and all parameters were within limits.  I was able to ensure all back up equipment was present, the staff had no issues, and the Perfusion Department daily rounding was complete.      For implantable VADs: Interrogation of Ventricular assist device was performed with analysis of device parameters and review of device function. I have personally reviewed the interrogation findings and agree with findings as stated.     In emergency, the nursing units have been notified to contact the perfusion department either by:  Calling [phone] from 630am to 4pm Mon thru Fri, utilizing the On-Call Finder functionality of Epic and searching for Perfusion, or by contacting the hospital  from 4pm to 630am and on weekends and asking to speak with the perfusionist on call.    4:10 PM

## 2024-11-08 NOTE — ASSESSMENT & PLAN NOTE
ABG with pH 7,216, CO2 65.6, HCO3 26.6, BE -1.     Chem panel significant for Cl 111 and CO2 22, AG 10 classifying this as a non-anion gap metabolic acidosis, likely in the setting of post-cardiac arrest.    Plan  - Continue to monitor with daily VBG's

## 2024-11-08 NOTE — ASSESSMENT & PLAN NOTE
Patient presenting with original complaints of CP, found to have interolateral STEMI on EKG without relief after NTG x2.  mg and Ticagrelor 90 mg given in the ED. She was brought to the cath lab and results were as follows:      Late stent thrombosis of the right coronary artery    Complete heart block status post temporay pacemaker placement    Acute MI with cardiogenic shock    Right ventricular infarction    Successful placement of IABP in the left common fermoal artery as bridge to decision to hospice    Successful TVP pacement in the left common femoral vein    Successful [place] catheter placement in [place] left common femroal vein.    Plan  - Continue Ticagrelor 90 mg BID  - ASA 81 mg  - Lipitor 90 mg daily  - Start AC with heparin

## 2024-11-08 NOTE — ASSESSMENT & PLAN NOTE
Cardiac arrest x2 in the ED. Per ED note, EKG showed V-tach and pt was shocked with conversion to sinus rhythm with complete heart block. Amio and epi pushed. Transcutaneous pacing initiated and ROSC achieved. Patient subsequently taken emergently to cath lab.

## 2024-11-08 NOTE — PLAN OF CARE
CICU DAILY GOALS       A: Awake    RASS: Goal -  0  Actual -  -4   Restraint necessity:  no  B: Breath   SBT: Not intubated   C: Coordinate A & B, analgesics/sedatives   Pain: managed    SAT: Not intubated  D: Delirium   CAM-ICU: Overall CAM-ICU: Positive  E: Early(intubated/ Progressive (non-intubated) Mobility   MOVE Screen: Fail   Activity: Activity Management: Patient unable to perform activities  FAS: Feeding/Nutrition   Diet order:  ,   Fluid restriction:     Nutritional Supplement Intake: Quantity 0, Type: not ordered  T: Thrombus   DVT prophylaxis: VTE Core Measure: Pharmacological prophylaxis initiated/maintained  H: HOB Elevation   Head of Bed (HOB) Positioning: HOB flat  U: Ulcer Prophylaxis   GI: yes  G: Glucose control   managed    S: Skin   Bathing/Skin Care: bath, complete;linen changed (11/07/24 1601)  Wounds: Yes  Wound care consulted: No  B: Bowel Function   no issues colostomy  I: Indwelling Catheters   Harris necessity:      Urethral Catheter 11/07/24 1601-Reason for Continuing Urinary Catheterization: Critically ill in ICU and requiring hourly monitoring of intake/output   CVC necessity: Yes  D: De-escalation Antibx   No, needed  Plan for the day    Admit to CICU.   Family/Goals of care/Code Status   Code Status: Prior     Arrived to CICU this afternoon on levo with IABP. Levo titrating down per IABP map goal of >65 per Dr. Mccabe. Transitioned from NC to Bipap. IV abx started. Heparin started. CHG bathed. Family at bedside. Only balloon pump alarm was immediately after cath lab delivery- IABP stopped inflating with high pressure alarm. Tubing was not kinked. Issue resolved quickly. Currently operating auto 1:1. TVP for backup use in place.      VS and assessment per flow sheet, patient progressing towards goals as tolerated, plan of care reviewed with Radha Sotelo and family, all concerns addressed, plan of care continues.     Problem: Wound  Goal: Optimal Coping  Outcome: Progressing      Problem: Adult Inpatient Plan of Care  Goal: Plan of Care Review  Outcome: Progressing  Goal: Patient-Specific Goal (Individualized)  Outcome: Progressing  Goal: Absence of Hospital-Acquired Illness or Injury  Outcome: Progressing  Goal: Optimal Comfort and Wellbeing  Outcome: Progressing     Problem: Infection  Goal: Absence of Infection Signs and Symptoms  Outcome: Progressing     Problem: Fall Injury Risk  Goal: Absence of Fall and Fall-Related Injury  Outcome: Progressing     Problem: Cardiac Catheterization (Diagnostic/Interventional)  Goal: Absence of Bleeding  Outcome: Progressing  Goal: Stable Heart Rate and Rhythm  Outcome: Progressing  Goal: Absence of Embolism Signs and Symptoms  Outcome: Progressing  Goal: Anesthesia/Sedation Recovery  Outcome: Progressing  Goal: Absence of Vascular Access Complication  Outcome: Progressing

## 2024-11-08 NOTE — PT/OT/SLP EVAL
Speech Language Pathology Evaluation  Bedside Swallow    Patient Name:  Radha Sotelo   MRN:  [id number]  [id number]    Admitting Diagnosis: Cardiogenic shock    Recommendations:                 General Recommendations:   ongoing swallowing assessment  Diet recommendations:  NPO, NPO   Aspiration Precautions: Frequent oral care and Strict aspiration precautions   General Precautions: Standard, aspiration, NPO  Communication strategies:  none    Assessment:     Radha Sotelo is a 79 y.o. female with an SLP diagnosis of Dysphagia.      History:     Past Medical History:   Diagnosis Date    Anemia     Anxiety     COPD (chronic obstructive pulmonary disease)     COPD (chronic obstructive pulmonary disease) with emphysema     Coronary artery disease     Depression     Diverticulitis     Hyperlipidemia     Hypertension     PVD (peripheral vascular disease)     PVD (peripheral vascular disease)     S/P colostomy     STEMI (ST elevation myocardial infarction) 12/12/2023    Steroid-induced hyperglycemia 07/29/2024    Substance abuse     hx heavy etoh use     Tobacco abuse        Past Surgical History:   Procedure Laterality Date    ANGIOGRAM, CORONARY, WITH LEFT HEART CATHETERIZATION N/A 11/22/2023    Procedure: Angiogram, Coronary, with Left Heart Cath;  Surgeon: Checo Bhatt MD;  Location: [facility] CATH LAB;  Service: Cardiology;  Laterality: N/A;    ANGIOGRAM, CORONARY, WITH LEFT HEART CATHETERIZATION N/A 5/27/2024    Procedure: Angiogram, Coronary, with Left Heart Cath;  Surgeon: Karel Mack MD;  Location: [facility] CATH LAB;  Service: Cardiology;  Laterality: N/A;    ANGIOGRAM, CORONARY, WITH LEFT HEART CATHETERIZATION N/A 11/7/2024    Procedure: Angiogram, Coronary, with Left Heart Cath;  Surgeon: Checo Bhatt MD;  Location: [facility] CATH LAB;  Service: Cardiology;  Laterality: N/A;    ANGIOGRAM, EXTREMITY, UNILATERAL Right 8/25/2023    Procedure: ANGIOGRAM, EXTREMITY, UNILATERAL;  Surgeon:  Gary Rico MD;  Location: [facility] OR [facility] FLR;  Service: Vascular;  Laterality: Right;  US GUIDED ACCESS LEFT GROIN  contrast: 150ml  fluoro: 27.9 min  mGy: 254.73  Gycm2: 62.4919  radial flush cocktail: 10ml    ANGIOGRAPHY OF LOWER EXTREMITY Right 8/24/2023    Procedure: Angiogram Extremity Unilateral;  Surgeon: Benji Ashley MD;  Location: [facility] OR [facility] FLR;  Service: Vascular;  Laterality: Right;    COLOSTOMY      CORONARY ANGIOGRAPHY N/A 9/29/2024    Procedure: ANGIOGRAM, CORONARY ARTERY;  Surgeon: Checo Bhatt MD;  Location: [facility] CATH LAB;  Service: Cardiology;  Laterality: N/A;    ECTOPIC PREGNANCY SURGERY      INSERTION OF INTRA-AORTIC BALLOON ASSIST DEVICE  11/7/2024    Procedure: INSERTION, INTRA-AORTIC BALLOON PUMP;  Surgeon: Checo Bhatt MD;  Location: [facility] CATH LAB;  Service: Cardiology;;    INSERTION, PACEMAKER, TEMPORARY TRANSVENOUS  11/7/2024    Procedure: Insertion, Pacemaker, Temporary Transvenous;  Surgeon: Checo Bhatt MD;  Location: [facility] CATH LAB;  Service: Cardiology;;    PLACEMENT OF SWAN ARLENE CATHETER WITH IMAGING GUIDANCE  11/7/2024    Procedure: INSERTION, CATHETER, SWAN-ARLENE, WITH IMAGING GUIDANCE;  Surgeon: Checo Bhatt MD;  Location: [facility] CATH LAB;  Service: Cardiology;;    PTA, PERONEAL Right 8/25/2023    Procedure: PTA, PERONEAL TIBIAL TRUNK WITH SHOCKWAVE;  Surgeon: Gary Rico MD;  [address]  Service: Vascular;  Laterality: Right;    PTCA, SINGLE VESSEL  11/22/2023    Procedure: PTCA, Single Vessel;  Surgeon: Checo Bhatt MD;  Location: [facility] CATH LAB;  Service: Cardiology;;    right forefoot amputation      RIGHT HEART CATHETERIZATION Right 11/7/2024    Procedure: INSERTION, CATHETER, RIGHT HEART;  Surgeon: Checo Bhatt MD;  Location: [facility] CATH LAB;  Service: Cardiology;  Laterality: Right;    right toe amputation      x2 toes    STENT, DRUG ELUTING, SINGLE VESSEL, CORONARY  11/22/2023     Procedure: Stent, Drug Eluting, Single Vessel, Coronary;  Surgeon: Checo Bhatt MD;  Location: [facility] CATH LAB;  Service: Cardiology;;    STENT, DRUG ELUTING, SINGLE VESSEL, CORONARY  5/27/2024    Procedure: Stent, Drug Eluting, Single Vessel, Coronary;  Surgeon: Karel Mack MD;  Location: [facility] CATH LAB;  Service: Cardiology;;    STENT, DRUG ELUTING, SINGLE VESSEL, CORONARY  9/29/2024    Procedure: Stent, Drug Eluting, Single Vessel, Coronary;  Surgeon: Checo Bhatt MD;  Location: [facility] CATH LAB;  Service: Cardiology;;    STENT, SUPERFICIAL FEMORAL ARTERY Right 8/25/2023    Procedure: STENT, SUPERFICIAL FEMORAL ARTERY;  Surgeon: Gary Rico MD;  Location: [facility] OR [address] [place] [address];  Service: Vascular;  Laterality: Right;  VIABAHN 6 X 15 X120     MD note:  HPI: Ms. Sotelo is a 79-year-old female with past medical history of recent STEMI in 9/29 s/p RCA stent placement, CHF, COPD, CAD s/p PCI 11/2023 and 5/2024 for proximal RCA stenosis who [place] [facility] of chest pain and shortness of breath onset just PTA. Symptoms were reportedly similar in character to past STEMI. She was given NTGx2 en route without relief.   In the ED, pt was hypoxic, tachypneic, and hypotensive on arrival. Patient became unresponsive in the ED and went into Vfib. Code Blue was called, lasting approximately 10 minutes. Patient reportedly with cracked ribs following compressions. EKG in the ED revealed ST elevation in inferior and lateral lead and pt was subsequently taken to the cath lab. VBG revealed pH of 7.159. She was noted to be in complete heart block and received a TVP, as well as emergent balloon pump due to hypotension. Coronary angiogram by Intervention Cardiology performed and revealed complete occlusion of the RCA with left-to-right collaterals. Given multiple PCI's in the past, opted for medical management and treatment for cardiogenic shock. Patient transferred to CICU for higher  "level work-up and care.    Chest X-Rays: The trachea and cardiomediastinal silhouette remains stable.  There is no evidence of pleural effusions, pneumothoraces or consolidations.  Chronic appearing interstitial markings are identified bilaterally.  No definitive evidence for rib fractures.       Prior diet: reg/thin; dentures at bedside; currently NPO with NGT      Subjective     Patient awake and cooperative.   SLP communicated with RN prior to entry. RN cleared SLP to administer po trials. RN reports coughing appreciated with mouth swabs dipped in water.     Objective:     Oral Musculature Evaluation  Oral Musculature: general weakness  Dentition: edentulous (dentures present in room)  Mucosal Quality: adequate  Volitional Cough: slightly weak  Volitional Swallow: delayed  Voice Prior to PO Intake: clear    Bedside Swallow Eval:   Consistencies Assessed:  Thin liquids ice chip, 1/4 teaspoon sip, pinched straw sip      Oral Phase:   Anterior loss  Decreased closure around utensil  Poor oral acceptance    Pharyngeal Phase:   coughing/choking  delayed swallow initation  Decreased O2 sats  Patient grabbing at chest saying, "help me" with minimal trials    Compensatory Strategies  None    Treatment: SLP provided patient education on SLP role, goals, and POC. Patient verbalized understanding of all discussed.     Goals:   Multidisciplinary Problems       SLP Goals          Problem: SLP    Goal Priority Disciplines Outcome   SLP Goal     SLP Progressing   Description: Short Term Goals:   1. Pt will participate in an ongoing assessment to determine the least restrictive and safest diet with possible updated goals to follow pending results.                         Plan:     Patient to be seen:  4 x/week   Plan of Care expires:  12/07/24  Plan of Care reviewed with:  patient   SLP Follow-Up:  Yes       Discharge recommendations:   (tbd)   Barriers to Discharge:  None    Time Tracking:     SLP Treatment Date:   " 11/08/24  Speech Start Time:  1027  Speech Stop Time:  1037     Speech Total Time (min):  10 min    Billable Minutes: Eval Swallow and Oral Function 10    11/08/2024

## 2024-11-08 NOTE — ASSESSMENT & PLAN NOTE
78 y/o F with pmhx of STEMI in 9/29 s/p RCA stent placement, CHF, COPD, CAD s/p PCI 11/2023 and 5/2024 for proximal RCA stenosis presented to [facility] [place] for STEMI, admitted to CICU for cardiogenic shock after arresting in the ED. She was taken to cath lab and found to have complete heart block. A TVP was placed. She was also noted to be hypotensive, resulting in placement of emergent balloon pump. Cardiogenic shock multidisciplinary meeting held by Interventional Cardiology and decision was made to treat the pt medically and not offer PCI given previous failures of this therapy. ABG with pH 7.21, CO2 65, HCO3 26, SvO2 94%, and lactate 1.93. She arrived to the unit unresponsive but withdrawing to noxious stimuli.     This patient has shock. The type of shock is cardiogenic due to ischemic. The patient has the following evidence of shock: altered mental status and hypoxia. The patient will be admitted to an intensive care unit, they will be treated as follows:    CXR: No definitive evidence of rib fracture.  Troponin: 0.142 -> 25.4 -> 26    Started on continuous dobutamine and levo yesterday evening. Currently on levo 18.3 of dobutamine 3.7.     Plan  - Continue to trend troponin until peak  - Refer to STEMI (ST elevation myocardial infarction)  for further work-up and plan  - Broad spectrum antibiotics with Zosyn and Vanc for possible aspiration PNA  - C conversation with family  - Trend hemodynamics daily   - CVP 9, SVO2 36, CI 2.8, CO 3.9

## 2024-11-08 NOTE — PLAN OF CARE
Problem: SLP  Goal: SLP Goal  Description: Short Term Goals:   1. Pt will participate in an ongoing assessment to determine the least restrictive and safest diet with possible updated goals to follow pending results.    Outcome: Progressing   Bedside swallow study completed. Recommend: NPO, frequent oral care, and strict aspiration precautions. ST will continue to follow.

## 2024-11-08 NOTE — ASSESSMENT & PLAN NOTE
Patient with Hypercapnic Respiratory failure which is Acute.  she is on home oxygen at 2-3 LPM. Supplemental oxygen was provided and noted- Oxygen Concentration (%):  [30-50] 30    .   Signs/symptoms of respiratory failure include- tachypnea, increased work of breathing, respiratory distress, use of accessory muscles, stridor, and lethargy. Contributing diagnoses includes - Aspiration, CHF, COPD, and Pulmonary Embolus Labs and images were reviewed. Patient Has recent ABG, which has been reviewed. Will treat underlying causes and adjust management of respiratory failure as follows-     Plan  - Bipap, wean as tolerated  - Pt now stable on 4-5 L NC, more alert this AM

## 2024-11-09 PROBLEM — D69.6 THROMBOCYTOPENIA: Status: ACTIVE | Noted: 2024-01-01

## 2024-11-09 NOTE — ASSESSMENT & PLAN NOTE
Most recent hemoglobin and hematocrit are listed below.  Recent Labs     11/08/24  1245 11/08/24  2026 11/09/24  0202   HGB 7.1* 8.4* 8.5*   HCT 24.0* 28.6* 27.8*     LDH 1267. T-bili 2.3. Concern for hemolysis s/p transfusion of 1U PRBC's overnight.     Plan  - Monitor serial CBC: Daily  - Transfuse PRBC if patient becomes hemodynamically unstable, symptomatic or H/H drops below 7/21.  - Patient has received 1 units of PRBCs on 11/8  - Patient's anemia is currently stable  - F/u haptoglobin   - Ctm monitor for s/s of hemolytic transfusion reaction

## 2024-11-09 NOTE — PROGRESS NOTES
11/09/2024  Wai Causey    Current provider:  Mackenzie Davis MD    Device interrogation:       No data to display                   Rounded on Radha Kearney Sotelo to ensure all mechanical assist device settings (IABP or VAD) were appropriate and all parameters were within limits.  I was able to ensure all back up equipment was present, the staff had no issues, and the Perfusion Department daily rounding was complete.      For implantable VADs: Interrogation of Ventricular assist device was performed with analysis of device parameters and review of device function. I have personally reviewed the interrogation findings and agree with findings as stated.     In emergency, the nursing units have been notified to contact the perfusion department either by:  Calling [phone] from 630am to 4pm Mon thru Fri, utilizing the On-Call Finder functionality of Epic and searching for Perfusion, or by contacting the hospital  from 4pm to 630am and on weekends and asking to speak with the perfusionist on call.    12:50 PM     0945: IABP AUGMENTATION DECREASED FROM 100% TO 80% DUE TO HIGH PRESSURE ALARM

## 2024-11-09 NOTE — ASSESSMENT & PLAN NOTE
78 y/o F with pmhx of STEMI in 9/29 s/p RCA stent placement, CHF, COPD, CAD s/p PCI 11/2023 and 5/2024 for proximal RCA stenosis [place] [facility] [place], admitted to CICU for cardiogenic shock after arresting in the ED. She was taken to cath lab and found to have complete heart block. A TVP was placed. She was also noted to be hypotensive, resulting in placement of emergent balloon pump. Cardiogenic shock multidisciplinary meeting held by Interventional Cardiology and decision was made to treat the pt medically and not offer PCI given previous failures of this therapy. ABG with pH 7.21, CO2 65, HCO3 26, SvO2 94%, and lactate 1.93. She arrived to the unit unresponsive but withdrawing to noxious stimuli.     This patient has shock. The type of shock is cardiogenic due to ischemic. The patient has the following evidence of shock: altered mental status and hypoxia. The patient will be admitted to an intensive care unit, they will be treated as follows:    CXR: No definitive evidence of rib fracture.  Troponin: 0.142 -> 25.4 -> 26 -> 30.162 -> 30.268    11/8: Started on continuous dobutamine and levo yesterday evening. Currently on levo 0.08 dobutamine 5    TTE performed, revealing:    Left Ventricle: The left ventricle is normal in size. Ventricular mass is normal. Normal wall thickness. There is concentric remodeling. Normal wall motion. There is low normal systolic function with a visually estimated ejection fraction of 50 - 55%. There is indeterminate diastolic function.    Right Ventricle: Mild right ventricular enlargement. Wall thickness is normal. Right ventricle wall motion  is normal. Systolic function is normal. Pacemaker lead present in the ventricle.    Right Atrium: Right atrium is moderately dilated. Lead present in the right atrium.    Aortic Valve: There is mild aortic valve sclerosis. There is mild aortic regurgitation.    Tricuspid Valve: There is mild regurgitation.    Pulmonary Artery: The  estimated pulmonary artery systolic pressure is 37 mmHg.    IVC/SVC: Normal venous pressure at 3 mmHg.    11/9: Balloon pump turning off intermittently throughout the night. Pt HDS     Plan  - Continue to trend troponin until peak  - Refer to STEMI (ST elevation myocardial infarction)  for further work-up and plan  - Broad spectrum antibiotics with Zosyn and Vanc for possible aspiration PNA  - GOC conversation with family  - Trend hemodynamics daily   - CVP 6, SVO2 50, CI 2.2, CO 3.2  - Will wean pt off dobutamine given normal EF  - Will attempt to wean pt off balloon pump via gradual reduction of ventricular assist rate

## 2024-11-09 NOTE — SUBJECTIVE & OBJECTIVE
Interval History: Hb 7 yesterday evening. Pt transfused 1U PRBC's. F/u Hb 8.4. Pt resting comfortably in bed this morning. Conversant. HDS at this time. Per RN, balloon pump intermittently turning off.    Review of Systems   Constitutional: Negative for chills and fever.   Cardiovascular:  Negative for chest pain.   Respiratory:  Negative for shortness of breath.    Gastrointestinal:  Negative for bloating, nausea and vomiting.     Objective:     Vital Signs (Most Recent):  Temp: 99 °F (37.2 °C) (11/09/24 0836)  Pulse: 84 (11/09/24 0836)  Resp: (!) 25 (11/09/24 0836)  BP: 137/76 (11/09/24 0801)  SpO2: 96 % (11/09/24 0836) Vital Signs (24h Range):  Temp:  [98.2 °F (36.8 °C)-99.1 °F (37.3 °C)] 99 °F (37.2 °C)  Pulse:  [76-97] 84  Resp:  [16-35] 25  SpO2:  [77 %-99 %] 96 %  BP: ()/(57-94) 137/76     Weight: 48 kg (105 lb 13.1 oz)  Body mass index is 20.67 kg/m².     SpO2: 96 %         Intake/Output Summary (Last 24 hours) at 11/9/2024 0909  Last data filed at 11/9/2024 0801  Gross per 24 hour   Intake 1708.14 ml   Output 3445 ml   Net -1736.86 ml       Lines/Drains/Airways       Central Venous Catheter Line  Duration             Introducer 11/07/24 0900 Femoral Vein Left 2 days    Introducer 11/07/24 0900 Femoral Vein Left 2 days    Pulmonary Artery Catheter Assessment  11/07/24 0900 Femoral Vein Left 2 days              Drain  Duration                  Colostomy 07/13/21 0201 Descending/sigmoid LLQ 1215 days         NG/OG Tube 11/07/24 1642 nasogastric 14 Fr. Left nostril 1 day         Urethral Catheter 11/07/24 1601 1 day              Line  Duration                  IABP 11/07/24 1411 8.0 Fr. 40 mL 1 day         Pacer Wires 11/07/24 1410 1 day              Peripheral Intravenous Line  Duration                  Peripheral IV - Single Lumen 11/07/24 1222 20 G Anterior;Left;Proximal Forearm 1 day         Peripheral IV - Single Lumen 11/07/24 1234 20 G Anterior;Left Hand 1 day                       Physical  Exam  Constitutional:       General: She is not in acute distress.  HENT:      Head: Normocephalic and atraumatic.      Mouth/Throat:      Mouth: Mucous membranes are dry.   Eyes:      Extraocular Movements: Extraocular movements intact.   Cardiovascular:      Rate and Rhythm: Normal rate and regular rhythm.   Pulmonary:      Effort: Pulmonary effort is normal.      Breath sounds: Normal breath sounds.   Abdominal:      Palpations: Abdomen is soft.      Tenderness: There is no abdominal tenderness.   Skin:     General: Skin is warm and dry.      Capillary Refill: Capillary refill takes less than 2 seconds.   Neurological:      Mental Status: She is alert.   Psychiatric:         Behavior: Behavior normal.            Significant Labs:   Recent Lab Results  (Last 5 results in the past 24 hours)        11/09/24  0839   11/09/24  0738   11/09/24  0202   11/08/24 2026 11/08/24 2010        Albumin     2.6           ALP     72           Allens Test N/A         N/A       ALT     31           Anion Gap   10   10           PTT     53.2  Comment: Refer to local heparin nomogram for intensity/dose specific   therapeutic   range.             AST     200           Baso #     0.02   0.03         Basophil %     0.1   0.2         BILIRUBIN TOTAL     2.3  Comment: For infants and newborns, interpretation of results should be based  on gestational age, weight and in agreement with clinical  observations.    Premature Infant recommended reference ranges:  Up to 24 hours.............<8.0 mg/dL  Up to 48 hours............<12.0 mg/dL  3-5 days..................<15.0 mg/dL  6-29 days.................<15.0 mg/dL             Site Other         Other       BUN   17   18           Calcium   7.7   7.6           Chloride   105   105           CO2   27   26           Creatinine   0.9   0.9           DelSys Nasal Can         Nasal Can       Differential Method     Automated   Automated         eGFR   >60.0   >60.0           Eos #     0.0    0.1         Eos %     0.1   0.6         FiO2 36               Flow 4         4       Glucose   106   114           Gran # (ANC)     13.1   16.3         Gran %     90.4   91.4         Group & Rh               Hematocrit     27.8   28.6         Hemoglobin     8.5   8.4         Immature Grans (Abs)     0.11  Comment: Mild elevation in immature granulocytes is non specific and   can be seen in a variety of conditions including stress response,   acute inflammation, trauma and pregnancy. Correlation with other   laboratory and clinical findings is essential.     0.13  Comment: Mild elevation in immature granulocytes is non specific and   can be seen in a variety of conditions including stress response,   acute inflammation, trauma and pregnancy. Correlation with other   laboratory and clinical findings is essential.           Immature Granulocytes     0.8   0.7         INDIRECT CARINA               Lactate Dehydrogenase     1,267  Comment: Results are increased in hemolyzed samples.           Lymph #     0.5   0.5         Lymph %     3.4   2.5         Magnesium      2.0           MCH     28.1   27.6         MCHC     30.6   29.4         MCV     92   94         Mode SPONT         SPONT       Mono #     0.8   0.8         Mono %     5.2   4.6         MPV     10.6   10.9         nRBC     0   0         Phosphorus Level     2.8           Platelet Count     119   127         POC PO2 32         33       POC SATURATED O2 50         49       Potassium   3.1   2.9           PROTEIN TOTAL     5.2           Rate 25               RBC     3.03   3.04         RDW     16.5   16.2         Sample VENOUS         VENOUS       Sodium   142   141           Sp02 96               Specimen Outdate               Troponin I       30.268  Comment: The reference interval for Troponin I represents the 99th percentile   cutoff   for our facility and is consistent with 3rd generation assay   performance.           WBC     14.50   17.87                                 Significant Imaging: X-Ray: CXR (11/9): X-Ray Chest 1 View (CXR): IABP marker just distal to the aortic knob. NG/OG tube terminates over the stomach. No consolidation, pleural effusion, or pneumothorax. Cardiac silhouette is stable.

## 2024-11-09 NOTE — ASSESSMENT & PLAN NOTE
The patients 3 most recent labs are listed below.  Recent Labs     11/08/24  1245 11/08/24 2026 11/09/24  0202   * 127* 119*     Some concern for KYLE given progressive decline in plts.     Plan  - Will transfuse if platelet count is <50k (if undergoing surgical procedure or have active bleeding).  - F/u serotonin release assay and heparin induced plt antibody  - Ctm with daily labs

## 2024-11-09 NOTE — PROGRESS NOTES
[facility] - Cardiac Intensive Care  Cardiology  Progress Note    Patient Name: Radha Sotelo  MRN: [id number]  Admission Date: 11/7/2024  Hospital Length of Stay: 2 days  Code Status: Full Code   Attending Physician: Mackenzie Davis MD   Primary Care Physician: Kalyn Pemberton NP  Expected Discharge Date: 11/12/2024  Principal Problem:Cardiogenic shock    Subjective:     Hospital Course:   Patient arrived to [facility] ED with chest pain. Arrested in the ED, was shocked, given amio and epi, and transcutaneously paced prior to achieving ROSC. Found to have complete occlusion of the RCA during emergent left heart cath. Patient admitted to CCU for cardiogenic shock. Found to have hypercapnic respiratory failure and started on bipap. Now stable on 4-5 L NC. Currently being treated with DAPT, lipitor, heparin, and BAS for possible aspiration PNA. Hb down trending to 7. 1U PRBC's transfused on 11/8. Currently requiring dobutamine and levo. However, given normal EF on TTE, will wean off dobutamine.     Interval History: Hb 7 yesterday evening. Pt transfused 1U PRBC's. F/u Hb 8.4. Pt resting comfortably in bed this morning. Conversant. HDS at this time. Per RN, balloon pump intermittently turning off.    Review of Systems   Constitutional: Negative for chills and fever.   Cardiovascular:  Negative for chest pain.   Respiratory:  Negative for shortness of breath.    Gastrointestinal:  Negative for bloating, nausea and vomiting.     Objective:     Vital Signs (Most Recent):  Temp: 99 °F (37.2 °C) (11/09/24 0836)  Pulse: 84 (11/09/24 0836)  Resp: (!) 25 (11/09/24 0836)  BP: 137/76 (11/09/24 0801)  SpO2: 96 % (11/09/24 0836) Vital Signs (24h Range):  Temp:  [98.2 °F (36.8 °C)-99.1 °F (37.3 °C)] 99 °F (37.2 °C)  Pulse:  [76-97] 84  Resp:  [16-35] 25  SpO2:  [77 %-99 %] 96 %  BP: ()/(57-94) 137/76     Weight: 48 kg (105 lb 13.1 oz)  Body mass index is 20.67 kg/m².     SpO2: 96 %         Intake/Output Summary (Last 24  hours) at 11/9/2024 0909  Last data filed at 11/9/2024 0801  Gross per 24 hour   Intake 1708.14 ml   Output 3445 ml   Net -1736.86 ml       Lines/Drains/Airways       Central Venous Catheter Line  Duration             Introducer 11/07/24 0900 Femoral Vein Left 2 days    Introducer 11/07/24 0900 Femoral Vein Left 2 days    Pulmonary Artery Catheter Assessment  11/07/24 0900 Femoral Vein Left 2 days              Drain  Duration                  Colostomy 07/13/21 0201 Descending/sigmoid LLQ 1215 days         NG/OG Tube 11/07/24 1642 nasogastric 14 Fr. Left nostril 1 day         Urethral Catheter 11/07/24 1601 1 day              Line  Duration                  IABP 11/07/24 1411 8.0 Fr. 40 mL 1 day         Pacer Wires 11/07/24 1410 1 day              Peripheral Intravenous Line  Duration                  Peripheral IV - Single Lumen 11/07/24 1222 20 G Anterior;Left;Proximal Forearm 1 day         Peripheral IV - Single Lumen 11/07/24 1234 20 G Anterior;Left Hand 1 day                       Physical Exam  Constitutional:       General: She is not in acute distress.  HENT:      Head: Normocephalic and atraumatic.      Mouth/Throat:      Mouth: Mucous membranes are dry.   Eyes:      Extraocular Movements: Extraocular movements intact.   Cardiovascular:      Rate and Rhythm: Normal rate and regular rhythm.   Pulmonary:      Effort: Pulmonary effort is normal.      Breath sounds: Normal breath sounds.   Abdominal:      Palpations: Abdomen is soft.      Tenderness: There is no abdominal tenderness.   Skin:     General: Skin is warm and dry.      Capillary Refill: Capillary refill takes less than 2 seconds.   Neurological:      Mental Status: She is alert.   Psychiatric:         Behavior: Behavior normal.            Significant Labs:   Recent Lab Results  (Last 5 results in the past 24 hours)        11/09/24  0839   11/09/24  0738   11/09/24  0202   11/08/24 2026 11/08/24 2010        Albumin     2.6           ALP     72            Allens Test N/A         N/A       ALT     31           Anion Gap   10   10           PTT     53.2  Comment: Refer to local heparin nomogram for intensity/dose specific   therapeutic   range.             AST     200           Baso #     0.02   0.03         Basophil %     0.1   0.2         BILIRUBIN TOTAL     2.3  Comment: For infants and newborns, interpretation of results should be based  on gestational age, weight and in agreement with clinical  observations.    Premature Infant recommended reference ranges:  Up to 24 hours.............<8.0 mg/dL  Up to 48 hours............<12.0 mg/dL  3-5 days..................<15.0 mg/dL  6-29 days.................<15.0 mg/dL             Site Other         Other       BUN   17   18           Calcium   7.7   7.6           Chloride   105   105           CO2   27   26           Creatinine   0.9   0.9           DelSys Nasal Can         Nasal Can       Differential Method     Automated   Automated         eGFR   >60.0   >60.0           Eos #     0.0   0.1         Eos %     0.1   0.6         FiO2 36               Flow 4         4       Glucose   106   114           Gran # (ANC)     13.1   16.3         Gran %     90.4   91.4         Group & Rh               Hematocrit     27.8   28.6         Hemoglobin     8.5   8.4         Immature Grans (Abs)     0.11  Comment: Mild elevation in immature granulocytes is non specific and   can be seen in a variety of conditions including stress response,   acute inflammation, trauma and pregnancy. Correlation with other   laboratory and clinical findings is essential.     0.13  Comment: Mild elevation in immature granulocytes is non specific and   can be seen in a variety of conditions including stress response,   acute inflammation, trauma and pregnancy. Correlation with other   laboratory and clinical findings is essential.           Immature Granulocytes     0.8   0.7         INDIRECT CARINA               Lactate Dehydrogenase      1,267  Comment: Results are increased in hemolyzed samples.           Lymph #     0.5   0.5         Lymph %     3.4   2.5         Magnesium      2.0           MCH     28.1   27.6         MCHC     30.6   29.4         MCV     92   94         Mode SPONT         SPONT       Mono #     0.8   0.8         Mono %     5.2   4.6         MPV     10.6   10.9         nRBC     0   0         Phosphorus Level     2.8           Platelet Count     119   127         POC PO2 32         33       POC SATURATED O2 50         49       Potassium   3.1   2.9           PROTEIN TOTAL     5.2           Rate 25               RBC     3.03   3.04         RDW     16.5   16.2         Sample VENOUS         VENOUS       Sodium   142   141           Sp02 96               Specimen Outdate               Troponin I       30.268  Comment: The reference interval for Troponin I represents the 99th percentile   cutoff   for our facility and is consistent with 3rd generation assay   performance.           WBC     14.50   17.87                                Significant Imaging: X-Ray: CXR (11/9): X-Ray Chest 1 View (CXR): IABP marker just distal to the aortic knob. NG/OG tube terminates over the stomach. No consolidation, pleural effusion, or pneumothorax. Cardiac silhouette is stable.   Assessment and Plan:       * Cardiogenic shock  80 y/o F with pmhx of STEMI in 9/29 s/p RCA stent placement, CHF, COPD, CAD s/p PCI 11/2023 and 5/2024 for proximal RCA stenosis presented to [facility] for STEMI, admitted to CICU for cardiogenic shock after arresting in the ED. She was taken to cath lab and found to have complete heart block. A TVP was placed. She was also noted to be hypotensive, resulting in placement of emergent balloon pump. Cardiogenic shock multidisciplinary meeting held by Interventional Cardiology and decision was made to treat the pt medically and not offer PCI given previous failures of this therapy. ABG with pH 7.21, CO2 65, HCO3 26, SvO2 94%, and lactate  1.93. She arrived to the unit unresponsive but withdrawing to noxious stimuli.     This patient has shock. The type of shock is cardiogenic due to ischemic. The patient has the following evidence of shock: altered mental status and hypoxia. The patient will be admitted to an intensive care unit, they will be treated as follows:    CXR: No definitive evidence of rib fracture.  Troponin: 0.142 -> 25.4 -> 26 -> 30.162 -> 30.268    11/8: Started on continuous dobutamine and levo yesterday evening. Currently on levo 0.08 dobutamine 5    TTE performed, revealing:    Left Ventricle: The left ventricle is normal in size. Ventricular mass is normal. Normal wall thickness. There is concentric remodeling. Normal wall motion. There is low normal systolic function with a visually estimated ejection fraction of 50 - 55%. There is indeterminate diastolic function.    Right Ventricle: Mild right ventricular enlargement. Wall thickness is normal. Right ventricle wall motion  is normal. Systolic function is normal. Pacemaker lead present in the ventricle.    Right Atrium: Right atrium is moderately dilated. Lead present in the right atrium.    Aortic Valve: There is mild aortic valve sclerosis. There is mild aortic regurgitation.    Tricuspid Valve: There is mild regurgitation.    Pulmonary Artery: The estimated pulmonary artery systolic pressure is 37 mmHg.    IVC/SVC: Normal venous pressure at 3 mmHg.    11/9: Balloon pump turning off intermittently throughout the night. Pt HDS     Plan  - Continue to trend troponin until peak  - Refer to STEMI (ST elevation myocardial infarction)  for further work-up and plan  - Broad spectrum antibiotics with Zosyn and Vanc for possible aspiration PNA  - GOC conversation with family  - Trend hemodynamics daily   - CVP 6, SVO2 50, CI 2.2, CO 3.2  - Will wean pt off dobutamine given normal EF  - Will attempt to wean pt off balloon pump via gradual reduction of ventricular assist rate      Thrombocytopenia  The patients 3 most recent labs are listed below.  Recent Labs     11/08/24  1245 11/08/24 2026 11/09/24  0202   * 127* 119*     Some concern for KYLE given progressive decline in plts.     Plan  - Will transfuse if platelet count is <50k (if undergoing surgical procedure or have active bleeding).  - F/u serotonin release assay and heparin induced plt antibody  - Ctm with daily labs      Counseling regarding advance care planning and goals of care  Advance Care Planning    Code Status  In light of the patients advanced and life limiting illness,I engaged the the patient and family in a voluntary conversation about the patient's preferences for care at the very end of life. Family would like more time to consider code status but request that pt remain Full Code for nwo. Discussion had with patient this AM (11/8) as she is alert and oriented, though waxing and waning. She wishes to receive CPR and other invasive treatments performed when her heart and/or breathing stops. Pt will continue to be Full Code. Will bring palliative on board for further GOC discussion.    Metabolic acidosis  ABG with pH 7,216, CO2 65.6, HCO3 26.6, BE -1.     Chem panel significant for Cl 111 and CO2 22, AG 10 classifying this as a non-anion gap metabolic acidosis, likely in the setting of post-cardiac arrest.    Plan  - Continue to monitor with daily VBG's    Acute hypercapnic respiratory failure  Patient with Hypercapnic Respiratory failure which is Acute.  she is on home oxygen at 2-3 LPM. Supplemental oxygen was provided and noted- Oxygen Concentration (%):  [30-50] 30    .   Signs/symptoms of respiratory failure include- tachypnea, increased work of breathing, respiratory distress, use of accessory muscles, stridor, and lethargy. Contributing diagnoses includes - Aspiration, CHF, COPD, and Pulmonary Embolus Labs and images were reviewed. Patient Has recent ABG, which has been reviewed. Will treat underlying  causes and adjust management of respiratory failure as follows-     Plan  - Bipap, wean as tolerated  - Pt now stable on 4-5 L NC, more alert this AM    Cardiac arrest  Cardiac arrest x2 in the ED. Per ED note, EKG showed V-tach and pt was shocked with conversion to sinus rhythm with complete heart block. Amio and epi pushed. Transcutaneous pacing initiated and ROSC achieved. Patient subsequently taken emergently to cath lab.    Complete heart block  TVP placed by IC in cath lab.    Anemia  Most recent hemoglobin and hematocrit are listed below.  Recent Labs     11/08/24  1245 11/08/24 2026 11/09/24  0202   HGB 7.1* 8.4* 8.5*   HCT 24.0* 28.6* 27.8*     LDH 1267. T-bili 2.3. Concern for hemolysis s/p transfusion of 1U PRBC's overnight.     Plan  - Monitor serial CBC: Daily  - Transfuse PRBC if patient becomes hemodynamically unstable, symptomatic or H/H drops below 7/21.  - Patient has received 1 units of PRBCs on 11/8  - Patient's anemia is currently stable  - F/u haptoglobin   - Ctm monitor for s/s of hemolytic transfusion reaction    STEMI (ST elevation myocardial infarction)  Patient presenting with original complaints of CP, found to have interolateral STEMI on EKG without relief after NTG x2.  mg and Ticagrelor 90 mg given in the ED. She was brought to the cath lab and results were as follows:      Late stent thrombosis of the right coronary artery    Complete heart block status post temporay pacemaker placement    Acute MI with cardiogenic shock    Right ventricular infarction    Successful placement of IABP in the left common fermoal artery as bridge to decision to hospice    Successful TVP pacement in the left common femoral vein    Successful Franklin Yarely catheter placement in [facility] left common femroal vein.    Plan  - Continue Ticagrelor 90 mg BID  - ASA 81 mg  - Lipitor 90 mg daily  - Start AC with heparin      COPD (chronic obstructive pulmonary disease)  Duonebs q6hr while awake.        VTE Risk  Mitigation (From admission, onward)           Ordered     heparin 25,000 units in dextrose 5% (100 units/ml) IV bolus from bag LOW INTENSITY nomogram - [facility]  As needed (PRN)        Question:  Heparin Infusion Adjustment (DO NOT MODIFY ANSWER)  Answer:  [email or web address]\Images\Pharmacy\HeparinInfusions\heparin LOW INTENSITY nomogram for [facility] [id number]    11/07/24 1536     heparin 25,000 units in dextrose 5% (100 units/ml) IV bolus from bag LOW INTENSITY nomogram - [facility]  As needed (PRN)        Question:  Heparin Infusion Adjustment (DO NOT MODIFY ANSWER)  Answer:  [email or web address]\Images\Pharmacy\HeparinInfusions\heparin LOW INTENSITY nomogram for [facility] [id number]    11/07/24 1536     heparin 25,000 units in dextrose 5% 250 mL (100 units/mL) infusion LOW INTENSITY nomogram - [facility]  Continuous        Question:  Begin at (units/kg/hr)  Answer:  12    11/07/24 1536                    Ann Marie Parker DO  Cardiology  [facility] - Cardiac Intensive Care

## 2024-11-09 NOTE — NURSING
CICU DAILY GOALS       A: Awake    RASS: Goal - RASS Goal: 0-->alert and calm  Actual - RASS (Junior Agitation-Sedation Scale): alert and calm   Restraint necessity: Clinical Justification: Removing medical devices, Treatment Interference  B: Breath   SBT: Not intubated   C: Coordinate A & B, analgesics/sedatives   Pain: managed    SAT: Not intubated  D: Delirium   CAM-ICU: Overall CAM-ICU: Positive  E: Early(intubated/ Progressive (non-intubated) Mobility   MOVE Screen: Pass   Activity: Activity Management: Arm raise - L1  FAS: Feeding/Nutrition   Diet order: Diet/Nutrition Received: NPO,   Fluid restriction:     Nutritional Supplement Intake: Quantity , Type:   T: Thrombus   DVT prophylaxis: VTE Core Measure: Pharmacological prophylaxis initiated/maintained  H: HOB Elevation   Head of Bed (HOB) Positioning: HOB at 15 degrees  U: Ulcer Prophylaxis   GI: yes  G: Glucose control   managed    S: Skin   Bathing/Skin Care: back care;dressed/undressed;incontinence care;linen changed (11/08/24 1701)  Wounds: Yes  Wound care consulted: Yes  B: Bowel Function   no issues   I: Indwelling Catheters   Harris necessity:      Urethral Catheter 11/07/24 1601-Reason for Continuing Urinary Catheterization: Critically ill in ICU and requiring hourly monitoring of intake/output   CVC necessity: Yes  D: De-escalation Antibx   No  Plan for the day   Continue to monitor hemodynamics  Family/Goals of care/Code Status   Code Status: Full Code     No acute events throughout day, VS and assessment per flow sheet, patient progressing towards goals as tolerated, plan of care reviewed with Radha Sotelo and family, all concerns addressed, will continue to monitor.

## 2024-11-09 NOTE — PROGRESS NOTES
Patient intubated @ about 17:03 and placed on a  ventilator with a size 7.0 ETT @ 22 cm anthony at the lips. Ambu bag and mask @ bedside, HOB @ 20 degrees angle. Vent plugged into the emergency outlet. Will continue to monitor.

## 2024-11-09 NOTE — CARE UPDATE
Cardiology Care Update Note:    Hemodynamics@8pm:  CVP:3  SVO2:49   CO: 3.1  CI: 2.2  SVR: 2341        Continuous Infusions:   DOBUTamine IV infusion (non-titrating)  5 mcg/kg/min Intravenous Continuous 3.6 mL/hr at 11/08/24 2301 5 mcg/kg/min at 11/08/24 2301    heparin (porcine) in D5W  0-40 Units/kg/hr (Adjusted) Intravenous Continuous 5.6 mL/hr at 11/08/24 2301 12 Units/kg/hr at 11/08/24 2301    NORepinephrine bitartrate-D5W  0-3 mcg/kg/min Intravenous Continuous 18 mL/hr at 11/08/24 2301 0.1 mcg/kg/min at 11/08/24 2301       Intake/Output Summary (Last 24 hours) at 11/9/2024 0014  Last data filed at 11/8/2024 2301  Gross per 24 hour   Intake 1811.93 ml   Output 1575 ml   Net 236.93 ml           Plan:  No changes. Continue current management           Lexie LAZAR MD  Cardiovascular Disease PGY V  [facility]

## 2024-11-09 NOTE — EICU
Intervention Initiated From:  Bedside    Lloyd intervened regarding:  Documentation      Comments: called into room via eLert for possible intubation    1702:  Etomidate 100mg and Succinylcholine 80mg given  1703:  Patient intubated with 7.0 ETT placed 22 @ lip    CXR ordered for placement confirmation

## 2024-11-09 NOTE — ANESTHESIA PROCEDURE NOTES
Ad Hoc Intubation    Date/Time: 11/9/2024 5:02 PM    Performed by: Ester Jordan DO  Authorized by: Isabelle Macias MD    Indications:  Respiratory distress  Diagnosis:  Heart Failure  Patient Location:  ICU  Timeout:  11/9/2024 4:48 PM  Procedure Start Time:  11/9/2024 4:58 PM  Procedure End Time:  11/9/2024 5:03 PM  Staff:     Anesthesiologist Present: Yes    Intubation:     Induction:  Intravenous    Intubated:  Postinduction    Mask Ventilation:  Not attempted    Attempted By:  Resident anesthesiologist    Method of Intubation:  Video laryngoscopy    Blade:  Syed 3    Laryngeal View Grade: Grade I - full view of chords      Difficult Airway Encountered?: No      Complications:  None    Airway Device:  Oral endotracheal tube    Airway Device Size:  7.0    Tube secured:  22    Secured at:  The lips    Placement Verified By:  Capnometry    Complicating Factors:  None    Findings Post-Intubation:  BS equal bilateral

## 2024-11-09 NOTE — NURSING
1000 - IABP in 1:2 per Alok SCHMIDT, plan to recheck hemodynamics in 1 hour    1101: SVO2 58, CVP 4    1205: IABP 1:1, heparin off per Ryan SCHMIDT, plan to remove IABP in a few hours    1407: MD at bedside IABP removed, Alok SCHMIDT holding pressure, pt tolerating well    1420: MD still holding pressure, HR 90's, MAP>80, no hematoma or bleeding    1430: Monitor started alarming, MAP in the 40's, titrated up on levo, MD notified of increasing presser requirements. Pt remained calm, not reporting of any lightheaded/dizziness    MAP remained <50's, MD notified, vaso and epi started per Alok SCHMIDT. CVP 6, SVO2 is 29    Alok SCHMIDT called to bedside due to maps remaining low, 500cc bolus NS ordered, pt continuing to require increased pressor support. Nadia SCHMIDT at bedside in attempts to place a-line. Pt becoming ill feeling, reports of feeling the need to throw up and as if she can't breathe. Family called to discuss intubation and goals of care.    Anesthesia team/RT called to intubate.     CRNA at bedside, wants to attempt placing Lilly before intubating    Unable to cycle a NIBP while CRNA attempting to place lilly, decided to intubate and get lilly after. Propofol started.    Alexandria placed, pt requiring increased sedation, precedex started per MD at bedside.    Pt waking up and biting ETT, maps drop to 30-40's, fentanyl ordered. Pressor requirements still increasing. Family at bedside,  called for support. Report given to night nurse.

## 2024-11-10 PROBLEM — Z51.5 COMFORT MEASURES ONLY STATUS: Status: ACTIVE | Noted: 2024-01-01

## 2024-11-10 NOTE — CARE UPDATE
Cardiology Care Update    Patient transitioned to comfort care as indicated in prior note. Comfort focused medications started. Inotropes and pressors turned off as requested by family and in line with GOC. Shortly after patient's blood pressure became undetectable and she and went in to asystole. Patient then extubated. Confirmed lack of cardiac or pulmonary sounds, pupillary reflex, or response to painful stimuli. TOD 0259. Family called, though did not answer as likely on her flight. Left a voicemail to call upon arrival.     ISAIAH Zuniga  Cardiovascular Disease fellow, PGY-4  [facility]

## 2024-11-10 NOTE — CHAPLAIN
11/09/24 2000   Clinical Encounter Type   Visit Type Initial Visit   Visit Category Critical Care   Visited With Health care provider;Family;Patient not available   Number of Family Visited 1   Length of Visit 60 Minutes   Referral From Family   Referral To    Patient Spiritual Encounters   Comments - Patient Pt intubated and unavailable. Chp prayed over pt per Sister's request.   Family Spiritual Encounters   Care Provided Reflective listening;Life review/ reflection;Prayer support;Compassionate presence;Explored pt/family concerns;Grief care;Decisions near end of life   Family Coping Open/discussion;Sadness;Living with uncertainty;Fearful;Active riki;Relying on spiritual resources   Comments - Family Chp met with Sister Tamiko bedside. Chp listened to story of pt's illness and spiritual history. Sister reports pt is Mu-ism, has active riki, and loves scripture. Sister doesn't believe pt has current /Faith and is appreciative of continued prayer support. Chp explored Sister's resistance to accepting current decompensation rate while simulatenously expressing a desire for her sister to be without suffering. Chp explored estranged family relationships and offered opportunity for Sister to reflect on shared life with pt. Chp provided grief care and prayer per Sister request. Sister reports pt has 3 living adult Daughters, 2 local and estranged. 1 Daughter, Yvette, is expressed to be the primary financial provider for pt and slated to arrive [place] 11/10 [facility] [place] [facility] [place].

## 2024-11-10 NOTE — DISCHARGE SUMMARY
[facility] - Card[facility]  Cardiology  Discharge Summary      Patient Name: Radha Sotelo  MRN: [id number]  Admission Date: 11/7/2024  Hospital Length of Stay: 3 days  Discharge Date and Time:  11/10/2024 4:15 AM  Attending Physician: Mackenzie Davis MD    Discharging Provider: Didi Zuniga MD  Primary Care Physician: Kalyn Pemberton NP    HPI:   Ms. Sotelo is a 79-year-old female with past medical history of recent STEMI in 9/29 s/p RCA stent placement, CHF, COPD, CAD s/p PCI 11/2023 and 5/2024 for proximal RCA stenosis who presented to [facility] ED for evaluation of chest pain and shortness of breath onset just PTA. Symptoms were reportedly similar in character to past STEMI. She was given NTGx2 en route without relief.     In the ED, pt was hypoxic, tachypneic, and hypotensive on arrival. Patient became unresponsive in the ED and went into Vfib. Code Blue was called, lasting approximately 10 minutes. Patient reportedly with cracked ribs following compressions. EKG in the ED revealed ST elevation in inferior and lateral lead and pt was subsequently taken to the cath lab. VBG revealed pH of 7.159. She was noted to be in complete heart block and received a TVP, as well as emergent balloon pump due to hypotension. Coronary angiogram by Intervention Cardiology performed and revealed complete occlusion of the RCA with left-to-right collaterals. Given multiple PCI's in the past, opted for medical management and treatment for cardiogenic shock. Patient transferred to CICU for higher level work-up and care.    Procedure(s) (LRB):  Angiogram, Coronary, with Left Heart Cath (N/A)  INSERTION, INTRA-AORTIC BALLOON PUMP  Insertion, Pacemaker, Temporary Transvenous  INSERTION, CATHETER, SWAN-ARLENE, WITH IMAGING GUIDANCE  INSERTION, CATHETER, RIGHT HEART (Right)     Indwelling Lines/Drains at time of discharge:  Lines/Drains/Airways       Central Venous Catheter Line  Duration             Introducer 11/07/24 0900  Femoral Vein Left 2 days    Pulmonary Artery Catheter Assessment  11/07/24 0900 Femoral Vein Left 2 days              Drain  Duration                  Colostomy 07/13/21 0201 Descending/sigmoid LLQ 1216 days         NG/OG Tube 11/07/24 1642 nasogastric 14 Fr. Left nostril 2 days         Urethral Catheter 11/07/24 1601 2 days              Airway  Duration                  Airway - Non-Surgical 11/09/24 1700 Endotracheal Tube <1 day                    Hospital Course:  Patient arrived to [facility] ED with chest pain and dyspnea.  While in the ED, she went into VFib arrest requiring compressions and defibrillation.  Rosc achieved and started on amiodarone and epi.  EKG notable for ST elevations in inferior and lateral leads.  She was taken emergently to the cath lab.  Noted to be incomplete heart block, status post TVP as well as IABP.  Angiogram notable for  of the RCA.  She was transferred to the CICU for medical management of coronary disease and cardiogenic shock.  Started on .  Hospital course also complicated by AHRF requiring BiPAP, though weaned to NC.  Patient became progressively anemic requiring pRBC transfusions.  Determine that she was likely hemolyzing secondary to balloon.  Decision made to remove IABP 11/9 PM d/t significant hemolysis.  Following this, patient began having significantly increased pressor requirements.  She required intubation due to high pressor requirements in addition to agitation and encephalopathy.  Lactic acidosis worsened in addition to further increase in pressor requirements.  Following discussion with family, decision made to transitioned to comfort care and patient made DNR. Patient passed shortly after this.     Goals of Care Treatment Preferences:  Code Status: DNR          What is most important right now is to focus on extending life as long as possible, even it it means sacrificing quality.  Accordingly, we have decided that the best plan to meet the patient's goals  "includes continuing with treatment.      Consults:   Consults (From admission, onward)          Status Ordering Provider     Inpatient consult to Palliative Care  Once        Provider:  (Not yet assigned)    Completed FLAVIO ROBISON     Pharmacy to dose Vancomycin consult  Once        Provider:  (Not yet assigned)   Placed in "And" Linked Group    Acknowledged FAB DOE            Significant Diagnostic Studies: N/A    Pending Diagnostic Studies:       Procedure Component Value Units Date/Time    Heparin-induced platelet antibody [[id number]] Collected: 24    Order Status: Sent Lab Status: In process Updated: 24    Specimen: Blood     Serotonin Release Assay [[id number]] Collected: 24    Order Status: Sent Lab Status: In process Updated: 24    Specimen: Blood             Final Active Diagnoses:    Diagnosis Date Noted POA    PRINCIPAL PROBLEM:  Cardiogenic shock [R57.0] 2024 Unknown    Comfort measures only status [Z51.5] 11/10/2024 Not Applicable    Thrombocytopenia [D69.6] 2024 Unknown    Cardiac arrest [I46.9] 2024 Unknown    Acute hypercapnic respiratory failure [J96.02] 2024 Unknown    Metabolic acidosis [E87.20] 2024 Unknown    Counseling regarding advance care planning and goals of care [Z71.89] 2024 Not Applicable    Complete heart block [I44.2] 2024 Unknown    Anemia [D64.9] 2024 Yes    STEMI (ST elevation myocardial infarction) [I21.3] 2023 Yes    COPD (chronic obstructive pulmonary disease) [J44.9]  Yes      Problems Resolved During this Admission:     No new Assessment & Plan notes have been filed under this hospital service since the last note was generated.  Service: Cardiology    Physical Exam:  CV: No heart sounds  RESP: No breath sounds  NEURO: No pupillary reflex, no response to painful stimuli      Discharged Condition:     Disposition:     Follow Up:    Patient Instructions:    "   CK   Standing Status: Future Standing Exp. Date: 02/05/26     Medications:  None    Time spent on the discharge of patient: 20 minutes    Didi Zuniga MD  Cardiology  [facility] - Cardiac Intensive Care

## 2024-11-10 NOTE — CARE UPDATE
Cardiology Care Update Note    Patient's IABP was removed at 1400 11/9. Shortly afterwards, patient became hemodynamically unstable with increasing pressor requirements. Levo titrated up and Epi and Vaso were added. Attempted to place arterial line, though unable d/t patient agitation. Due to increasing pressor requirements and ongoing agitation/encephalopathy, decision made to intubate. Arterial line then placed by anesthesia.     Hemos @ 8pm:  CVP 5  Svo2 28  CO 2.53  CI 1.77  SVR 1679    Labs also notable for progressively worsening lactate, 8.59 -> 10.13 -> 11.27. ABG notable for metabolic acidosis, s/p bicarb pushes.     Pressor requirements continued to increase to max doses. Lactate as above. Arterial line infiltrated and removed. Unable to place arterial line in other arm d/t lack of blood flow on doppler. Of note, patient also lost pulses in lower extremities.     Patient not responding neurologically. All sedation turned off in order to assess neuro status. Patient not responsive to stimuli, no gag reflex.     Throughout the night, the family was regularly updated regarding patient's critical condition. Spoke to MARÍA ELENAYvette ROYAL (eldest daughter), as well as sister Tamiko Rogers and other daughter Anthony Reardon. After discussions with family, Yvette reports that comfort is most important at this time. She does not want her mother to suffer and believes this is not what she would have wanted, especially given how critically ill she is. Attempted to call and update both Anthony and Tamiko following this discussion, though unable to get in contact. Yvette confirms that she does not want her mother to receive compressions or defibrillations. She would also like to start comfort focused care, including discontinuation of medications maintaining hemodynamics. She is at this time getting on a flight to [place] from [place].     ISAIAH Zuniga  Cardiovascular Disease fellow, PGY-4  [facility]

## 2024-11-10 NOTE — PROGRESS NOTES
New hemodynamics being drawn     SvO2 - 20   Lactate - 11.27    MD Nadia at bedside. A line infiltrated due to poor vasculature and removed. BP cuff cycling Q2 min. Attempting for new A line     Levo - maxed at 3mcg/kg/min  Epi - 1.3mcg/kg/min   Vaso - 0.08U/min    All sedation turned off for neuro exam. MD at bedside with verbal orders

## 2024-11-10 NOTE — PROGRESS NOTES
Rapid increase in titration of pressors since beginning of shift. In the process of switching over levo concentrations from 4mg to 32mg and Epi from 5mg to 10mg     Nadia SCHMIDT, notified.     Levo - maxed at 3mcg/kg/min  Vaso - 0.08U/min   Epi - 0.9mcg/kg/min    Hemodynamics drawn     CVP - 5   SvO2 - 28  Arterial lactic - 10.13   Arterial pH - 7.18  Bicarb - 15

## 2024-11-10 NOTE — PLAN OF CARE
CICU DAILY GOALS       A: Awake    RASS: Goal - RASS Goal: 0-->alert and calm  Actual - RASS (Junior Agitation-Sedation Scale): agitated   Restraint necessity: Clinical Justification: Removing medical devices  B: Breath   SBT: Not attempted   C: Coordinate A & B, analgesics/sedatives   Pain: managed    SAT: Not attempted  D: Delirium   CAM-ICU: Overall CAM-ICU: Positive  E: Early(intubated/ Progressive (non-intubated) Mobility   MOVE Screen: Fail   Activity: Activity Management: Arm raise - L1  FAS: Feeding/Nutrition   Diet order: Diet/Nutrition Received: NPO,   Fluid restriction:     Nutritional Supplement Intake: Quantity 0, Type:  0  T: Thrombus   DVT prophylaxis: VTE Core Measure: Pharmacological prophylaxis initiated/maintained  H: HOB Elevation   Head of Bed (HOB) Positioning: HOB at 15 degrees  U: Ulcer Prophylaxis   GI: yes  G: Glucose control   managed    S: Skin   Bathing/Skin Care: back care;dressed/undressed;incontinence care;linen changed (11/08/24 1701)  Wounds: No  Wound care consulted: No  B: Bowel Function   no issues   I: Indwelling Catheters   Harris necessity:      Urethral Catheter 11/07/24 1601-Reason for Continuing Urinary Catheterization: Critically ill in ICU and requiring hourly monitoring of intake/output   CVC necessity: Yes  D: De-escalation Antibx   No  Plan for the day   Remove IABP, intubate  Family/Goals of care/Code Status   Code Status: Full Code     No acute events throughout day, VS and assessment per flow sheet, patient progressing towards goals as tolerated, plan of care reviewed with Radha Sotelo and family, all concerns addressed, will continue to monitor.

## 2024-11-10 NOTE — PROGRESS NOTES
Pharmacokinetic Assessment Follow Up: IV Vancomycin    Vancomycin serum concentration assessment(s):    Vancomycin trough was drawn ~1 hours early and is below goal range of 15 - 20.     Vancomycin Regimen Plan:    Change regimen to vancomycin 1000 mg every 24 hours.   Renal function will be followed closely for decompensation.  Please discontinue scheduled regimen if renal function declines,   Draw next trough on 11/11 at 1730.     Drug levels (last 3 results):  Recent Labs   Lab Result Units 11/09/24  1748   Vancomycin-Trough ug/mL 10.1       Pharmacy will continue to follow and monitor vancomycin.    Please contact pharmacy at extension [phone] for questions regarding this assessment.    Thank you for the consult,   Ita Dickinson       Patient brief summary:  Radha Sotelo is a 79 y.o. female initiated on antimicrobial therapy with IV Vancomycin for treatment of sepsis      Drug Allergies:   Review of patient's allergies indicates:  No Known Allergies    Actual Body Weight:   48 kg     Renal Function:   Estimated Creatinine Clearance: 33.3 mL/min (based on SCr of 1 mg/dL).,     Dialysis Method (if applicable):  N/A    CBC (last 72 hours):  Recent Labs   Lab Result Units 11/07/24  1509 11/07/24  1633 11/08/24  0420 11/08/24  0523 11/08/24  1114 11/08/24  1245 11/08/24  2026 11/09/24  0202 11/09/24  1512 11/09/24  1748   WBC K/uL 28.46* 25.82* 13.17* 12.40 12.91* 13.65* 17.87* 14.50* 14.11* 21.56*   Hemoglobin g/dL 9.0* 8.9* 7.4* 7.4* 7.0* 7.1* 8.4* 8.5* 7.9* 7.3*   Hematocrit % 29.5* 29.8* 25.1* 25.7* 25.0* 24.0* 28.6* 27.8* 26.0* 24.5*   Platelets K/uL 307 296 160 161 143* 144* 127* 119* 118* 101*   Gran % % 87.2* 90.2* 86.6* 86.0* 88.7* 89.4* 91.4* 90.4* 83.3* 86.7*   Lymph % % 4.1* 2.5* 5.2* 5.7* 3.3* 2.7* 2.5* 3.4* 7.2* 4.2*   Mono % % 3.5* 5.4 7.2 7.6 7.2 7.1 4.6 5.2 7.8 5.2   Eosinophil % % 0.7 0.1 0.0 0.0 0.0 0.1 0.6 0.1 0.4 0.1   Basophil % % 0.2 0.2 0.2 0.2 0.2 0.1 0.2 0.1 0.4 0.2   Differential  Method  Automated Automated Automated Automated Automated Automated Automated Automated Automated Automated       Metabolic Panel (last 72 hours):  Recent Labs   Lab Result Units 11/07/24  1509 11/07/24  1649 11/07/24  2218 11/08/24  0420 11/08/24  1114 11/08/24  1707 11/09/24  0202 11/09/24  0738 11/09/24  1244 11/09/24  1748   Sodium mmol/L 143  --  141 143 143 141 141 142 141 146*   Potassium mmol/L 2.9*  --  3.1* 3.6 4.3 4.1 2.9* 3.1* 3.9 4.2   Chloride mmol/L 109  --  112* 111* 112* 111* 105 105 107 110   CO2 mmol/L 23  --  22* 22* 23 22* 26 27 28 17*   Glucose mg/dL 199*  --  133* 107 121* 124* 114* 106 110 208*   Glucose, UA   --  1+*  --   --   --   --   --   --   --   --    BUN mg/dL 15  --  17 18 19 19 18 17 15 18   Creatinine mg/dL 0.9  --  0.8 1.0 0.9 0.8 0.9 0.9 0.8 1.0   Albumin g/dL 2.8*  --   --  2.5* 2.6* 2.5* 2.6*  --  2.5* 2.1*   Total Bilirubin mg/dL 0.6  --   --  0.4 0.4 0.6 2.3*  --   --  1.4*   Alkaline Phosphatase U/L 97  --   --  77 78 74 72  --   --  62   AST U/L 24  --   --  131* 189* 200* 200*  --   --  126*   ALT U/L 13  --   --  22 29 30 31  --   --  25   Magnesium mg/dL  --   --   --  1.3* 3.3* 2.8* 2.0  --   --  1.7   Phosphorus mg/dL  --   --   --  3.5 4.0 3.9 2.8  --  2.1* 4.9*       Vancomycin Administrations:  vancomycin given in the last 96 hours                     vancomycin 750 mg in D5W 250 mL IVPB (admixture device) (mg) 750 mg New Bag 11/09/24 1809     750 mg New Bag 11/08/24 1922    vancomycin (VANCOCIN) 1,000 mg in D5W 250 mL IVPB (admixture device) (mg) 1,000 mg New Bag 11/07/24 1814                    Microbiologic Results:  Microbiology Results (last 7 days)       Procedure Component Value Units Date/Time    Clostridium difficile EIA [[id number]]     Order Status: No result Specimen: Stool     Clostridium difficile EIA [[id number]]     Order Status: Canceled Specimen: Stool     Blood culture [[id number]] Collected: 11/07/24 1741    Order Status: Completed Specimen:  Blood from Peripheral, Forearm, Left Updated: 11/08/24 2212     Blood Culture, Routine No Growth to date      No Growth to date    Blood culture [[id number]] Collected: 11/07/24 1736    Order Status: Completed Specimen: Blood from Peripheral, Lower Arm, Right Updated: 11/08/24 2212     Blood Culture, Routine No Growth to date      No Growth to date    Urine culture [[id number]] Collected: 11/07/24 1649    Order Status: Completed Specimen: Urine Updated: 11/08/24 2023     Urine Culture, Routine No growth    Narrative:      Specimen Source->Urine

## 2024-11-11 LAB
GLUCOSE SERPL-MCNC: 174 MG/DL (ref 70–110)
HCO3 UR-SCNC: 20.4 MMOL/L (ref 24–28)
HCT VFR BLD CALC: 22 %PCV (ref 36–54)
PCO2 BLDA: 62.7 MMHG (ref 35–45)
PF4 HEPARIN CMPLX AB SER QL: 0.09 OD (ref 0–0.4)
PH SMN: 7.12 [PH] (ref 7.35–7.45)
PO2 BLDA: 205 MMHG (ref 80–100)
POC BE: -9 MMOL/L
POC IONIZED CALCIUM: 1.05 MMOL/L (ref 1.06–1.42)
POC SATURATED O2: 99 % (ref 95–100)
POC TCO2: 22 MMOL/L (ref 23–27)
POTASSIUM BLD-SCNC: 2.2 MMOL/L (ref 3.5–5.1)
SAMPLE: ABNORMAL
SODIUM BLD-SCNC: 142 MMOL/L (ref 136–145)

## 2024-11-11 NOTE — PLAN OF CARE
[facility] - Cardiac Intensive [facility]  Discharge Final Note    Primary Care Provider: Kalyn Pemberton NP    Expected Discharge Date: 11/10/2024    Final Discharge Note (most recent)       Final Note - 24          Final Note    Assessment Type Final Discharge Note     Anticipated Discharge Disposition                  Evon Dickinson LMSW  [facility]  [phone]

## 2024-11-12 LAB
BACTERIA BLD CULT: NORMAL
BACTERIA BLD CULT: NORMAL
LABORATORY COMMENT REPORT: NORMAL
LABORATORY COMMENT REPORT: NORMAL
SRA 0.1IU/ML UFH SER-ACNC: <5 %
SRA 100IU/ML UFH SER-ACNC: <5 %
SRA INTERP SER-IMP: NORMAL
SRA UFH SER-IMP: NEGATIVE

## 2025-05-01 ENCOUNTER — EXTERNAL HOME HEALTH (OUTPATIENT)
Dept: HOME HEALTH SERVICES | Facility: HOSPITAL | Age: 80
End: 2025-05-01
Payer: MEDICARE

## 2025-05-15 ENCOUNTER — DOCUMENT SCAN (OUTPATIENT)
Dept: HOME HEALTH SERVICES | Facility: HOSPITAL | Age: 80
End: 2025-05-15
Payer: MEDICARE

## (undated) DEVICE — SOL 9P NACL IRR PIC IL

## (undated) DEVICE — INFLATOR ENCORE 26 BLLN INFL

## (undated) DEVICE — Device

## (undated) DEVICE — PAD DEFIB CADENCE ADULT R2

## (undated) DEVICE — HEMOSTAT VASC BAND REG 24CM

## (undated) DEVICE — SHEATH INTRODUCER 6FR 11CM

## (undated) DEVICE — SET MICROPUNCTURE

## (undated) DEVICE — CATH DIAG IMPULSE 6FR FL4

## (undated) DEVICE — DEVICE PERCLOSE SUT CLSR 6FR

## (undated) DEVICE — CATH BLLN SEEKER .035IN 135CM

## (undated) DEVICE — KIT INTRO MICRO NIT VSI 4FR

## (undated) DEVICE — SPIKE SHORT LG BORE 1-WAY 2IN

## (undated) DEVICE — COVER PROBE US 5.5X58L NON LTX

## (undated) DEVICE — WIRE DOC EXTENSION

## (undated) DEVICE — KIT MICROINTRO 4F .018X40X7CM

## (undated) DEVICE — TRANSDUCER ADULT DISP

## (undated) DEVICE — OMNIPAQUE 350MG 150ML VIAL

## (undated) DEVICE — GUIDEWIRE STD .035X180CM ANG

## (undated) DEVICE — GUIDE VISTA 6FR JR 4

## (undated) DEVICE — KIT COPILOT VALVE HEMO TOOL

## (undated) DEVICE — CABLE PACER

## (undated) DEVICE — OMNIPAQUE CONTRAST 350MG/100ML

## (undated) DEVICE — GUIDEWIRE EMERALD .035IN 260CM

## (undated) DEVICE — KIT GLIDESHEATH SLEND 6FR 10CM

## (undated) DEVICE — CATH EMERGE MR 15 X 2.50

## (undated) DEVICE — SNAP CAP 18 DOME COVERS

## (undated) DEVICE — VALVE CONTROL COPILOT

## (undated) DEVICE — GUIDEWIRE CHOICE PT  XS 182CM

## (undated) DEVICE — KIT CO-PILOT

## (undated) DEVICE — SPONGE DERMACEA 4X4IN 12PLY

## (undated) DEVICE — COVERS PROBE NR-48 STERILE

## (undated) DEVICE — CATH NC EMERGE MR 3X8MM

## (undated) DEVICE — CATH NC EMERGE MR 2.5X8X143

## (undated) DEVICE — CATH GUIDE LINER  V3 6F

## (undated) DEVICE — SYS LABEL CORRECT MED

## (undated) DEVICE — SUT SILK 0 BLK BR CT-1 30IN

## (undated) DEVICE — TRAY CATH LAB OMC

## (undated) DEVICE — GUIDEWIRE CROSS-IT .014 X 190

## (undated) DEVICE — CATH NC EMERGE MR 2.5X15MM

## (undated) DEVICE — GUIDE VISTA 6FR XB RCA

## (undated) DEVICE — DRAPE ANGIO BRACH 38X44IN

## (undated) DEVICE — GUIDEWIRE SUPRA CORE 035 190CM

## (undated) DEVICE — GUIDEWIRE RUNTHROUGH EF 180CM

## (undated) DEVICE — GUIDEWIRE ADVANTAGE .014X300CM

## (undated) DEVICE — CATH EMERGE MR 12 X 2.50

## (undated) DEVICE — SYR MARK 7 ARTERION 150ML

## (undated) DEVICE — EVEROLIMUS-ELUTING PLATINUM CHROMIUM CORONARY STENT SYSTEM
Type: IMPLANTABLE DEVICE | Site: HEART | Status: NON-FUNCTIONAL
Brand: SYNERGY™ XD
Removed: 2024-05-27

## (undated) DEVICE — GUIDEWIRE STF .035X260CM ANG

## (undated) DEVICE — CATH SWAN GANZ 8FR HEP FREE

## (undated) DEVICE — CATH DXTERITY JL35 100CM 6FR

## (undated) DEVICE — CATH DXTERITY JL40 100CM 6FR

## (undated) DEVICE — KIT CUSTOM MANIFOLD

## (undated) DEVICE — DECANTER FLUID TRNSF WHITE 9IN

## (undated) DEVICE — KIT PERCUTANEOUS SHEATH

## (undated) DEVICE — SHEATH 6FR 70CM

## (undated) DEVICE — INFLATOR ADVANTAGE ENCORE 26

## (undated) DEVICE — GUIDE WIRE BMW 014 X190

## (undated) DEVICE — BLLN MUSTANG 5 X 60 X 135

## (undated) DEVICE — CATH JACKY RADIAL 5FR 100CM

## (undated) DEVICE — GUIDEWIRE .021X180CM J-3MM TIP

## (undated) DEVICE — CATH 5FR OMNIFLUSH 65CM .038

## (undated) DEVICE — VISE RADIFOCUS MULTI TORQUE

## (undated) DEVICE — SPONGE GAUZE 16PLY 4X4

## (undated) DEVICE — CATH EMERGE MR 15 X 2.00

## (undated) DEVICE — DRESSING TRANS 4X4 TEGADERM

## (undated) DEVICE — KIT CATH INSERT LINEAR 7.5F

## (undated) DEVICE — CATH DXTERITY PG145 110CM 6FR

## (undated) DEVICE — DRESSING TRANS 4X4 3/4

## (undated) DEVICE — CATH 5FR BALLOON PT HEART PACE

## (undated) DEVICE — SOL NS 1000CC

## (undated) DEVICE — COVER INSTR ELASTIC BAND 40X20

## (undated) DEVICE — INTRODUCER RX PSI KIT 8.5 FR

## (undated) DEVICE — MICROCATHETER MAMBA FLEX 135CM

## (undated) DEVICE — CATH NC EMERGE MR 2.5X12MM

## (undated) DEVICE — GUIDEWIRE STF .035X180CM ANG

## (undated) DEVICE — CATH DIAG IMPULSE 6FR FR4